# Patient Record
Sex: MALE | Race: BLACK OR AFRICAN AMERICAN | Employment: OTHER | URBAN - METROPOLITAN AREA
[De-identification: names, ages, dates, MRNs, and addresses within clinical notes are randomized per-mention and may not be internally consistent; named-entity substitution may affect disease eponyms.]

---

## 2017-01-04 ENCOUNTER — HOSPITAL ENCOUNTER (OUTPATIENT)
Dept: WOUND CARE | Age: 67
End: 2017-01-04
Payer: COMMERCIAL

## 2017-01-11 ENCOUNTER — HOSPITAL ENCOUNTER (OUTPATIENT)
Dept: WOUND CARE | Age: 67
End: 2017-01-11
Payer: COMMERCIAL

## 2017-01-17 ENCOUNTER — OFFICE VISIT (OUTPATIENT)
Dept: INTERNAL MEDICINE CLINIC | Age: 67
End: 2017-01-17

## 2017-01-17 VITALS
HEIGHT: 73 IN | RESPIRATION RATE: 18 BRPM | SYSTOLIC BLOOD PRESSURE: 140 MMHG | HEART RATE: 82 BPM | DIASTOLIC BLOOD PRESSURE: 76 MMHG | OXYGEN SATURATION: 92 % | TEMPERATURE: 98.5 F

## 2017-01-17 DIAGNOSIS — I10 ESSENTIAL HYPERTENSION: ICD-10-CM

## 2017-01-17 DIAGNOSIS — E11.65 CONTROLLED TYPE 2 DIABETES MELLITUS WITH HYPERGLYCEMIA, WITHOUT LONG-TERM CURRENT USE OF INSULIN (HCC): Primary | ICD-10-CM

## 2017-01-17 DIAGNOSIS — E08.41 DIABETIC MONONEUROPATHY ASSOCIATED WITH DIABETES MELLITUS DUE TO UNDERLYING CONDITION (HCC): ICD-10-CM

## 2017-01-17 DIAGNOSIS — E78.1 PURE HYPERGLYCERIDEMIA: ICD-10-CM

## 2017-01-17 LAB
CHOLEST SERPL-MCNC: 198 MG/DL
GLUCOSE POC: 157 MG/DL
HBA1C MFR BLD HPLC: 6.7 %
HDLC SERPL-MCNC: 40 MG/DL
LDL CHOLESTEROL POC: 85
NON-HDL GOAL (POC): 158
TCHOL/HDL RATIO (POC): 4.9
TRIGL SERPL-MCNC: 366 MG/DL

## 2017-01-17 RX ORDER — OXYCODONE AND ACETAMINOPHEN 5; 325 MG/1; MG/1
1 TABLET ORAL
Qty: 14 TAB | Refills: 0 | Status: SHIPPED | OUTPATIENT
Start: 2017-01-17 | End: 2017-02-14 | Stop reason: SDUPTHER

## 2017-01-17 RX ORDER — LANCETS
EACH MISCELLANEOUS
Qty: 100 EACH | Refills: 12 | Status: SHIPPED | OUTPATIENT
Start: 2017-01-17 | End: 2017-02-23 | Stop reason: SDUPTHER

## 2017-01-17 RX ORDER — IBUPROFEN 800 MG/1
800 TABLET ORAL
Qty: 60 TAB | Refills: 12 | Status: SHIPPED | OUTPATIENT
Start: 2017-01-17 | End: 2017-04-04 | Stop reason: SDUPTHER

## 2017-01-17 NOTE — PROGRESS NOTES
Abi Díaz is a 73 y.o. male and presents with Diabetes; Hypertension; Cholesterol Problem; and Hospital Follow Up      Subjective:    He had a recent amputation of the lt.great toe. he has recurrent pains reported  Hypertension Review:  The patient has hypertension . Diet and Lifestyle: generally follows a low sodium diet, exercises sporadically  Home BP Monitoring: is not measured at home. Pertinent ROS: taking medications as instructed, no medication side effects noted, no TIA's, no chest pain on exertion, no dyspnea on exertion, no swelling of ankles. Dyslipidemia Review:  Patient presents for evaluation of lipids. Compliance with treatment thus far has been excellent. A repeat fasting lipid profile was done. The patient does not use medications that may worsen dyslipidemias . The patient exercises sporadically. The patient is not known to have coexisting coronary artery disease    Diabetes Mellitus Review:  He has diabetes mellitus. Diabetic ROS - medication compliance: compliant all of the time, diabetic diet compliance: compliant all of the time, home glucose monitoring: is performed. Known diabetic complications: he reports a new numbness involving the toes  Cardiovascular risk factors: family history, dyslipidemia, diabetes mellitus, obesity, hypertension  Current diabetic medications include oral agents/insulin   Eye exam current (within one year): no  Weight trend: stable  Prior visit with dietician: no  Current diet: \"healthy\" diet  in general  Current exercise: walking  Current monitoring regimen: home blood tests - daily  Home blood sugar records: trend: stable  Any episodes of hypoglycemia? no  Is He on ACE inhibitor or angiotensin II receptor blocker? Yes   Lab review: orders written for new lab studies as appropriate; see orders.        Review of Systems  Constitutional: negative for fevers, chills, anorexia and weight loss  Eyes:   negative for visual disturbance and irritation  ENT:   negative for tinnitus,sore throat,nasal congestion,ear pains. hoarseness  Respiratory:  negative for cough, hemoptysis, dyspnea,wheezing  CV:   negative for chest pain, palpitations, lower extremity edema  GI:   negative for nausea, vomiting, diarrhea, abdominal pain,melena  Endo:               negative for polyuria,polydipsia,polyphagia,heat intolerance  Genitourinary: negative for frequency, dysuria and hematuria  Integument:  negative for rash and pruritus  Hematologic:  negative for easy bruising and gum/nose bleeding  Musculoskel: myalgias, arthralgias joint pain,upper and lower body weakness  Neurological:  negative for headaches, dizziness, vertigo, memory problems and gait   Behavl/Psych:feelings of anxiety, depression, mood changes    Past Medical History   Diagnosis Date    Arthritis, Degenerative,  Knee 4/4/2011    Carcinoma, Prostate 4/4/2011    Degenerative disc disease 4/4/2011    Depression/ Anxiety 4/4/2011    Diabetes ([facility] [address]     Diabetes Mellitrus ( non-insulin dependent ) Type 2 4/4/2011    HEMATOCHEZIA 4/4/2011    Hypertrension 4/4/2011    Hypertriglyceridemia 4/4/2011    Insomnia 4/4/2011    Laryngitis 4/4/2011    Mild Aortic insufficiency 4/4/2011    Mild Concentric LVH (left ventricular hypertrophy) 4/4/2011    Neuropathy 4/4/2011    Osteoarthritis 4/4/2011    Rotator Cuff Tendonitis 4/4/2011     Past Surgical History   Procedure Laterality Date    Hx urological       Social History     Social History    Marital status: LEGALLY      Spouse name: N/A    Number of children: N/A    Years of education: N/A     Social History Main Topics    Smoking status: Former Smoker     Packs/day: 0.25     Quit date: 10/25/2016    Smokeless tobacco: Never Used    Alcohol use 7.0 oz/week     7 Cans of beer, 7 Shots of liquor per week    Drug use: None    Sexual activity: Yes     Partners: Female     Other Topics Concern    None     Social History Narrative History reviewed. No pertinent family history. Current Outpatient Prescriptions   Medication Sig Dispense Refill    ibuprofen (MOTRIN) 800 mg tablet Take 1 Tab by mouth two (2) times daily (after meals). 60 Tab 12    oxyCODONE-acetaminophen (PERCOCET) 5-325 mg per tablet Take 1 Tab by mouth every eight (8) hours as needed. 14 Tab 0    glucose blood VI test strips (CONTOUR NEXT STRIPS) strip Test 2-3 times daily 100 Strip 12    Lancets misc Use 2-3 times daily 100 Each 12    Calcium Carbonate-Vit D3-Min 600 mg calcium- 400 unit tab Take  by mouth two (2) times a day.  insulin regular (NOVOLIN R, HUMULIN R) 100 unit/mL injection 14 Units by SubCUTAneous route three (3) times daily as needed (with meals).  therapeutic multivitamin (THERA) tablet Take 1 Tab by mouth daily.  loratadine (CLARITIN) 10 mg tablet Take 10 mg by mouth daily.  aspirin delayed-release 81 mg tablet Take 81 mg by mouth daily.  gabapentin (NEURONTIN) 300 mg capsule 2 tabs tid 180 Cap 3    insulin glargine (LANTUS) 100 unit/mL injection 70 units every 12 hours (Patient taking differently: 72 Units. 70 units every 12 hours) 5 Vial 12    diltiazem (TIAZAC) 300 mg SR capsule Take 1 Cap by mouth daily. 30 Cap 12    fluticasone (FLONASE) 50 mcg/actuation nasal spray INHALE 2 SPRAYS IN EACH NOSTRIL EVERY DAY. 16 g 12    docusate sodium (COLACE) 100 mg capsule Take 100 mg by mouth daily.  acetaminophen (TYLENOL) 325 mg tablet Take 2 Tabs by mouth every four (4) hours as needed. 30 Tab 0    atorvastatin (LIPITOR) 40 mg tablet Take 40 mg by mouth nightly.        No Known Allergies    Objective:  Visit Vitals    /76    Pulse 82    Temp 98.5 °F (36.9 °C) (Oral)    Resp 18    Ht 6' 1\" (1.854 m)    SpO2 92%     Physical Exam:   General appearance - alert, well appearing, and in moderate distress  Mental status - alert, oriented to person, place, and time  EYE-LUH, EOMI, corneas normal, no foreign bodies  ENT-ENT exam normal, no neck nodes or sinus tenderness  Nose - normal and patent, no erythema, discharge or polyps  Mouth - mucous membranes moist, pharynx normal without lesions  Neck - supple, no significant adenopathy   Chest - clear to auscultation, no wheezes, rales or rhonchi, symmetric air entry   Heart - normal rate, regular rhythm, normal S1, S2, no murmurs, rubs, clicks or gallops   Abdomen - soft, nontender, nondistended, no masses or organomegaly  Lymph- no adenopathy palpable  Ext-peripheral pulses normal, no pedal edema, no clubbing or cyanosis  Skin-Warm and dry. no hyperpigmentation, vitiligo, or suspicious lesions  Neuro -alert, oriented, normal speech, focal findings  noted  Neck-normal C-spine, no tenderness, full ROM without pain  Knees-crepitations bilaterally,rt.knee deformity  Lt.knee-medial joint line tenderness noted. rt.knee-medial joint line tenderness noted  Lt.foot in wrapping        Results for orders placed or performed in visit on 01/17/17   AMB POC GLUCOSE BLOOD, BY GLUCOSE MONITORING DEVICE   Result Value Ref Range    Glucose  mg/dL   AMB POC HEMOGLOBIN A1C   Result Value Ref Range    Hemoglobin A1c (POC) 6.7 %   AMB POC LIPID PROFILE   Result Value Ref Range    Cholesterol (POC) 198     Triglycerides (POC) 366     HDL Cholesterol (POC) 40     LDL Cholesterol (POC) 85     Non-HDL Goal (POC) 158     TChol/HDL Ratio (POC) 4.9        Assessment/Plan:    ICD-10-CM ICD-9-CM    1. Controlled type 2 diabetes mellitus with hyperglycemia, without long-term current use of insulin ([facility]) E11.65 250.80 AMB POC GLUCOSE BLOOD, BY GLUCOSE MONITORING DEVICE     790.29 AMB POC HEMOGLOBIN A1C      AMB POC LIPID PROFILE   2. Essential hypertension I10 401.9 AMB POC GLUCOSE BLOOD, BY GLUCOSE MONITORING DEVICE      AMB POC HEMOGLOBIN A1C      AMB POC LIPID PROFILE   3. Hypertriglyceridemia E78.1 272.1 AMB POC HEMOGLOBIN A1C      AMB POC LIPID PROFILE   4.  Diabetic mononeuropathy associated with diabetes mellitus due to underlying condition [facility] E08.41 249.60 oxyCODONE-acetaminophen (PERCOCET) 5-325 mg per tablet     355.9      Orders Placed This Encounter    AMB POC GLUCOSE BLOOD, BY GLUCOSE MONITORING DEVICE    AMB POC HEMOGLOBIN A1C    AMB POC LIPID PROFILE    ibuprofen (MOTRIN) 800 mg tablet     Sig: Take 1 Tab by mouth two (2) times daily (after meals). Dispense:  60 Tab     Refill:  12    oxyCODONE-acetaminophen (PERCOCET) 5-325 mg per tablet     Sig: Take 1 Tab by mouth every eight (8) hours as needed. Dispense:  14 Tab     Refill:  0    glucose blood VI test strips (CONTOUR NEXT STRIPS) strip     Sig: Test 2-3 times daily     Dispense:  100 Strip     Refill:  12    Lancets misc     Sig: Use 2-3 times daily     Dispense:  100 Each     Refill:  12     lose weight, increase physical activity, follow low fat diet, follow low salt diet,Take 81mg aspirin daily  Follow-up Disposition:  Return in about 4 weeks (around 2/14/2017), or if symptoms worsen or fail to improve. I have reviewed with the patient details of the assessment and plan and all questions were answered. Relevent patient education was performed      An After Visit Summary was printed and given to the patient.

## 2017-01-17 NOTE — MR AVS SNAPSHOT
Visit Information Date & Time Provider Department Dept. Phone Encounter #  
 1/17/2017  2:45 PM Mala Wood MD [address] [facility] [phone] [id number] Follow-up Instructions Return in about 4 weeks (around 2/14/2017), or if symptoms worsen or fail to improve. Follow-up and Disposition History Upcoming Health Maintenance Date Due  
 EYE EXAM RETINAL OR DILATED Q1 3/11/2016 GLAUCOMA SCREENING Q2Y 4/21/2016 Pneumococcal 65+ High/Highest Risk (1 of 2 - PCV13) 4/21/2016 MEDICARE YEARLY EXAM 4/21/2016 INFLUENZA AGE 9 TO ADULT 8/1/2016 FOOT EXAM Q1 9/8/2016 MICROALBUMIN Q1 10/8/2016 FOBT Q 1 YEAR AGE 50-75 1/14/2017 HEMOGLOBIN A1C Q6M 3/14/2017 LIPID PANEL Q1 9/14/2017 DTaP/Tdap/Td series (2 - Td) 12/10/2024 Allergies as of 1/17/2017  Review Complete On: 1/17/2017 By: Mala Wood MD  
 No Known Allergies Current Immunizations  Reviewed on 12/10/2014 Name Date Pneumococcal Polysaccharide (PPSV-23) 9/11/2014 Tdap 12/10/2014 Not reviewed this visit You Were Diagnosed With   
  
 Codes Comments Controlled type 2 diabetes mellitus with hyperglycemia, without long-term current use of insulin ([facility]    -  Primary ICD-10-CM: E11.65 ICD-9-CM: 250.80, 790.29 Essential hypertension     ICD-10-CM: I10 
ICD-9-CM: 401.9 Pure hyperglyceridemia     ICD-10-CM: E78.1 ICD-9-CM: 272.1 Diabetic mononeuropathy associated with diabetes mellitus due to underlying condition [facility]     ICD-10-CM: E08.41 
ICD-9-CM: 249.60, 355.9 Vitals BP Pulse Temp Resp Height(growth percentile) SpO2  
 140/76 82 98.5 °F (36.9 °C) (Oral) 18 6' 1\" (1.854 m) 92% Smoking Status Former Smoker Vitals History Preferred Pharmacy Pharmacy Name Phone [facility] [address] [facility] [phone] Your Updated Medication List  
  
   
This list is accurate as of: 1/17/17  4:27 PM.  Always use your most recent med list.  
  
  
  
  
 acetaminophen 325 mg tablet Commonly known as:  TYLENOL Take 2 Tabs by mouth every four (4) hours as needed. aspirin delayed-release 81 mg tablet Take 81 mg by mouth daily. atorvastatin 40 mg tablet Commonly known as:  LIPITOR Take 40 mg by mouth nightly. Calcium Carbonate-Vit D3-Min 600 mg calcium- 400 unit Tab Take  by mouth two (2) times a day. COLACE 100 mg capsule Generic drug:  docusate sodium Take 100 mg by mouth daily. dilTIAZem 300 mg SR capsule Commonly known as:  [facility] Take 1 Cap by mouth daily. fluticasone 50 mcg/actuation nasal spray Commonly known as:  Becca Jumper INHALE 2 SPRAYS IN EACH NOSTRIL EVERY DAY. gabapentin 300 mg capsule Commonly known as:  NEURONTIN  
2 tabs tid  
  
 glucose blood VI test strips strip Commonly known as:  CONTOUR NEXT STRIPS Test 2-3 times daily  
  
 ibuprofen 800 mg tablet Commonly known as:  MOTRIN Take 1 Tab by mouth two (2) times daily (after meals). insulin glargine 100 unit/mL injection Commonly known as:  LANTUS  
70 units every 12 hours  
  
 insulin regular 100 unit/mL injection Commonly known as:  NOVOLIN R, HUMULIN R  
14 Units by SubCUTAneous route three (3) times daily as needed (with meals). Lancets Misc Use 2-3 times daily  
  
 loratadine 10 mg tablet Commonly known as:  Saranya Arevalo Take 10 mg by mouth daily. oxyCODONE-acetaminophen 5-325 mg per tablet Commonly known as:  PERCOCET Take 1 Tab by mouth every eight (8) hours as needed. THERA tablet Generic drug:  therapeutic multivitamin Take 1 Tab by mouth daily. Prescriptions Printed Refills  
 oxyCODONE-acetaminophen (PERCOCET) 5-325 mg per tablet 0 Sig: Take 1 Tab by mouth every eight (8) hours as needed. Class: Print Route: Oral  
  
Prescriptions Sent to Pharmacy Refills  
 ibuprofen (MOTRIN) 800 mg tablet 12 Sig: Take 1 Tab by mouth two (2) times daily (after meals). Class: Normal  
 Pharmacy: [address] Ph #: [phone] Route: Oral  
 glucose blood VI test strips (CONTOUR NEXT STRIPS) strip 12 Sig: Test 2-3 times daily Class: Normal  
 Pharmacy: [address] Ph #: [phone] Lancets misc 12 Sig: Use 2-3 times daily Class: Normal  
 Pharmacy: [address] Ph #: [phone] We Performed the Following AMB POC GLUCOSE BLOOD, BY GLUCOSE MONITORING DEVICE [23379 CPT(R)] AMB POC HEMOGLOBIN A1C [22809 CPT(R)] AMB POC LIPID PROFILE [00452 CPT(R)] Follow-up Instructions Return in about 4 weeks (around 2/14/2017), or if symptoms worsen or fail to improve. To-Do List   
 01/18/2017 1:30 PM  
  Appointment with Zaira Mac DPM; [facility] WOUND CARE 1 at [facility] ([phone]) Please provide this summary of care documentation to your next provider. Your primary care clinician is listed as Malia Noguera. If you have any questions after today's visit, please call [phone].

## 2017-01-18 ENCOUNTER — HOSPITAL ENCOUNTER (OUTPATIENT)
Dept: WOUND CARE | Age: 67
Discharge: HOME | End: 2017-01-18
Payer: COMMERCIAL

## 2017-01-18 PROCEDURE — 99212 OFFICE O/P EST SF 10 MIN: CPT

## 2017-01-18 NOTE — PROGRESS NOTES
[address], [address] [place] [address]   WOUND CARE PROGRESS NOTE       Name:  Tika Flowers   MR#:  [id number]   :  1950   Account #:  [de-identified]        Date of Adm:  2017       CONSULTING PHYSICIAN: Bladimir Last DPM    REASON FOR FOLLOWUP: Evaluation and treatment of left foot   wound, status post fifth ray resection of underlying osteomyelitis. HISTORY OF PRESENT ILLNESS: The patient is a pleasant 79-year-  old  male who presents today, approximately 11   weeks status post I and D and left fifth ray resection for underlying   osteomyelitis. He has finished his antibiotics. He has had local wound   care, including VAC therapy and most recently Aquacel AG. He has   been partial weightbearing in his offloading Darco wedge shoe. He   denies any fevers, chills, sweats, nausea or vomiting. He does relate   some hip pain from wearing the surgical shoe, and is anxious to get   out of the surgical shoe. PHYSICAL EXAMINATION   VITAL SIGNS: Stable. He is afebrile. EXTREMITIES: Examination of the left foot reveals a fully healed and   epithelialized wound over the lateral aspect of the foot with overlying   hyperkeratotic rim. There are no signs of infection. There is no wound. There is no drainage. ASSESSMENT AND PLAN: Healed left fifth ray resection with   underlying osteomyelitis with a wound which has fully healed and   epithelialized. At today's visit, I explained to him he can discontinue his   local wound care, his wound has healed; however, from a preventative   standpoint he does need diabetic shoes and inserts to better balance   his foot going forward so that he does not develop further or new   ulcerations. I have dispensed him a prescription for this, for [employer] as he relates that he lost his previous prescription. He will   return for followup with me for outpatient diabetic routine foot care.  He   did have a problem with his left great toenail, which he stubbed the   other day, which resulted in some bleeding, which has since dried. The   nail is extremely mycotic and lifted, and at bedside today I simply   removed the nail with a hemostat. There was no bleeding noted and a   dry sterile dressing was applied. He was instructed to use a Band-Aid   and antibiotic ointment for the next few days and follow up in my office.         Drew CATHERINE Juarez / ARNOLDO   D:  01/18/2017   14:26   T:  01/18/2017   15:03   Job #:  [id number]

## 2017-01-24 ENCOUNTER — TELEPHONE (OUTPATIENT)
Dept: INTERNAL MEDICINE CLINIC | Age: 67
End: 2017-01-24

## 2017-02-14 ENCOUNTER — OFFICE VISIT (OUTPATIENT)
Dept: INTERNAL MEDICINE CLINIC | Age: 67
End: 2017-02-14

## 2017-02-14 VITALS
OXYGEN SATURATION: 93 % | RESPIRATION RATE: 16 BRPM | HEART RATE: 76 BPM | SYSTOLIC BLOOD PRESSURE: 132 MMHG | DIASTOLIC BLOOD PRESSURE: 60 MMHG | TEMPERATURE: 98 F

## 2017-02-14 DIAGNOSIS — I10 HTN (HYPERTENSION), BENIGN: ICD-10-CM

## 2017-02-14 DIAGNOSIS — E08.41 DIABETIC MONONEUROPATHY ASSOCIATED WITH DIABETES MELLITUS DUE TO UNDERLYING CONDITION (HCC): ICD-10-CM

## 2017-02-14 DIAGNOSIS — K62.5 RECTAL BLEEDING: ICD-10-CM

## 2017-02-14 DIAGNOSIS — E11.42 DIABETIC POLYNEUROPATHY ASSOCIATED WITH TYPE 2 DIABETES MELLITUS (HCC): Primary | ICD-10-CM

## 2017-02-14 LAB — GLUCOSE POC: 185 MG/DL

## 2017-02-14 RX ORDER — OXYCODONE AND ACETAMINOPHEN 5; 325 MG/1; MG/1
1 TABLET ORAL
Qty: 14 TAB | Refills: 0 | Status: SHIPPED | OUTPATIENT
Start: 2017-02-14 | End: 2017-03-02 | Stop reason: SDUPTHER

## 2017-02-14 RX ORDER — GABAPENTIN 300 MG/1
CAPSULE ORAL
Qty: 180 CAP | Refills: 3 | Status: SHIPPED | OUTPATIENT
Start: 2017-02-14 | End: 2017-07-24 | Stop reason: SDUPTHER

## 2017-02-14 RX ORDER — DILTIAZEM HYDROCHLORIDE 300 MG/1
300 CAPSULE, EXTENDED RELEASE ORAL DAILY
Qty: 30 CAP | Refills: 12 | Status: SHIPPED | OUTPATIENT
Start: 2017-02-14 | End: 2017-12-22 | Stop reason: SDUPTHER

## 2017-02-14 RX ORDER — ATORVASTATIN CALCIUM 40 MG/1
40 TABLET, FILM COATED ORAL
Qty: 30 TAB | Refills: 11 | Status: SHIPPED | OUTPATIENT
Start: 2017-02-14 | End: 2017-08-23 | Stop reason: SDUPTHER

## 2017-02-14 RX ORDER — FLUTICASONE PROPIONATE 50 MCG
SPRAY, SUSPENSION (ML) NASAL
Qty: 16 G | Refills: 12 | Status: SHIPPED | OUTPATIENT
Start: 2017-02-14 | End: 2017-06-15 | Stop reason: SDUPTHER

## 2017-02-14 RX ORDER — INSULIN GLARGINE 100 [IU]/ML
INJECTION, SOLUTION SUBCUTANEOUS
Qty: 5 VIAL | Refills: 12
Start: 2017-02-14 | End: 2017-02-24 | Stop reason: SDUPTHER

## 2017-02-14 NOTE — PROGRESS NOTES
Jillian Mike is a 73 y.o. male and presents with Diabetes; Melena; and Other (electric wheelchair)  . Subjective:  He has had a bout of bright red blood per rectum. this has occurred on many occasions. He has not had a colonoscopy in a few years. Diabetes Mellitus Review:  He has diabetes mellitus. Diabetic ROS - medication compliance: compliant all of the time, diabetic diet compliance: compliant all of the time, home glucose monitoring: is performed. Known diabetic complications: none  Cardiovascular risk factors: family history, dyslipidemia, diabetes mellitus, obesity, hypertension  Current diabetic medications include oral agents/insulin   Eye exam current (within one year): no  Weight trend: stable  Prior visit with dietician: no  Current diet: \"healthy\" diet  in general  Current exercise: walking  Current monitoring regimen: home blood tests - daily  Home blood sugar records: trend: stable  Any episodes of hypoglycemia? no  Is He on ACE inhibitor or angiotensin II receptor blocker? Yes           Review of Systems  Constitutional: negative for fevers, chills, anorexia and weight loss  Eyes:   negative for visual disturbance and irritation  ENT:   negative for tinnitus,sore throat,nasal congestion,ear pains. hoarseness  Respiratory:  negative for cough, hemoptysis, dyspnea,wheezing  CV:   negative for chest pain, palpitations, lower extremity edema  GI:   negative for nausea, vomiting, diarrhea, abdominal pain,melena  Endo:               negative for polyuria,polydipsia,polyphagia,heat intolerance  Genitourinary: negative for frequency, dysuria and hematuria  Integument:  negative for rash and pruritus  Hematologic:  negative for easy bruising and gum/nose bleeding  Musculoskel: negative for myalgias, arthralgias, back pain, muscle weakness, joint pain  Neurological:  negative for headaches, dizziness, vertigo, memory problems and gait   Behavl/Psych: negative for feelings of anxiety, depression, mood changes    Past Medical History   Diagnosis Date    Arthritis, Degenerative,  Knee 4/4/2011    Carcinoma, Prostate 4/4/2011    Degenerative disc disease 4/4/2011    Depression/ Anxiety 4/4/2011    Diabetes ([address]     Diabetes Mellitrus ( non-insulin dependent ) Type 2 4/4/2011    HEMATOCHEZIA 4/4/2011    Hypertrension 4/4/2011    Hypertriglyceridemia 4/4/2011    Insomnia 4/4/2011    Laryngitis 4/4/2011    Mild Aortic insufficiency 4/4/2011    Mild Concentric LVH (left ventricular hypertrophy) 4/4/2011    Neuropathy 4/4/2011    Osteoarthritis 4/4/2011    Rotator Cuff Tendonitis 4/4/2011     Past Surgical History   Procedure Laterality Date    Hx urological       Social History     Social History    Marital status: LEGALLY      Spouse name: N/A    Number of children: N/A    Years of education: N/A     Social History Main Topics    Smoking status: Former Smoker     Packs/day: 0.25     Quit date: 10/25/2016    Smokeless tobacco: Never Used    Alcohol use 7.0 oz/week     7 Cans of beer, 7 Shots of liquor per week    Drug use: None    Sexual activity: Yes     Partners: Female     Other Topics Concern    None     Social History Narrative     No family history on file. Current Outpatient Prescriptions   Medication Sig Dispense Refill    gabapentin (NEURONTIN) 300 mg capsule 2 tabs tid 180 Cap 3    insulin glargine (LANTUS) 100 unit/mL injection 70 units every 12 hours 5 Vial 12    dilTIAZem (TIAZAC) 300 mg SR capsule Take 1 Cap by mouth daily. 30 Cap 12    fluticasone (FLONASE) 50 mcg/actuation nasal spray INHALE 2 SPRAYS IN EACH NOSTRIL EVERY DAY. 16 g 12    atorvastatin (LIPITOR) 40 mg tablet Take 1 Tab by mouth nightly. 30 Tab 11    oxyCODONE-acetaminophen (PERCOCET) 5-325 mg per tablet Take 1 Tab by mouth every eight (8) hours as needed. 14 Tab 0    ibuprofen (MOTRIN) 800 mg tablet Take 1 Tab by mouth two (2) times daily (after meals).  60 Tab 12    glucose blood VI test strips (CONTOUR NEXT STRIPS) strip Test 2-3 times daily 100 Strip 12    Lancets misc Use 2-3 times daily 100 Each 12    Calcium Carbonate-Vit D3-Min 600 mg calcium- 400 unit tab Take  by mouth two (2) times a day.  docusate sodium (COLACE) 100 mg capsule Take 100 mg by mouth daily.  insulin regular (NOVOLIN R, HUMULIN R) 100 unit/mL injection 14 Units by SubCUTAneous route three (3) times daily as needed (with meals).  therapeutic multivitamin (THERA) tablet Take 1 Tab by mouth daily.  loratadine (CLARITIN) 10 mg tablet Take 10 mg by mouth daily.  aspirin delayed-release 81 mg tablet Take 81 mg by mouth daily.  acetaminophen (TYLENOL) 325 mg tablet Take 2 Tabs by mouth every four (4) hours as needed. 30 Tab 0     No Known Allergies    Objective:  Visit Vitals    /60    Pulse 76    Temp 98 °F (36.7 °C) (Oral)    Resp 16    SpO2 93%     Physical Exam:   General appearance - alert, well appearing, and in no distress  Mental status - alert, oriented to person, place, and time  EYE-LUH, EOMI, corneas normal, no foreign bodies  ENT-ENT exam normal, no neck nodes or sinus tenderness  Nose - normal and patent, no erythema, discharge or polyps  Mouth - mucous membranes moist, pharynx normal without lesions  Neck - supple, no significant adenopathy   Chest - clear to auscultation, no wheezes, rales or rhonchi, symmetric air entry   Heart - normal rate, regular rhythm, normal S1, S2, no murmurs, rubs, clicks or gallops   Abdomen - soft, nontender, nondistended, no masses or organomegaly  Lymph- no adenopathy palpable  Ext-peripheral pulses normal, no pedal edema, no clubbing or cyanosis  Skin-Warm and dry.  no hyperpigmentation, vitiligo, or suspicious lesions  Neuro -alert, oriented, normal speech, no focal findings or movement disorder noted  Neck-normal C-spine, no tenderness, full ROM without pain  Feet-no nail deformities or callus formation with good pulses noted      Results for orders placed or performed in visit on 17   AMB POC GLUCOSE BLOOD, BY GLUCOSE MONITORING DEVICE   Result Value Ref Range    Glucose  mg/dL       Assessment/Plan:    ICD-10-CM ICD-9-CM    1. Diabetic polyneuropathy associated with type 2 diabetes mellitus (HCC) E11.42 250.60 AMB POC GLUCOSE BLOOD, BY GLUCOSE MONITORING DEVICE     357.2    2. HTN (hypertension), benign I10 401.1 dilTIAZem (TIAZAC) 300 mg SR capsule   3. Rectal bleeding K62.5 569.3 REFERRAL TO GASTROENTEROLOGY   4. Diabetic mononeuropathy associated with diabetes mellitus due to underlying condition (HCC) E08.41 249.60 oxyCODONE-acetaminophen (PERCOCET) 5-325 mg per tablet     355.9      Orders Placed This Encounter    REFERRAL TO GASTROENTEROLOGY     Referral Priority:   Routine     Referral Type:   Consultation     Referral Reason:   Specialty Services Required     Referred to Provider:   Yo Jimenez MD     Number of Visits Requested:   1    AMB POC GLUCOSE BLOOD, BY GLUCOSE MONITORING DEVICE    gabapentin (NEURONTIN) 300 mg capsule     Si tabs tid     Dispense:  180 Cap     Refill:  3    insulin glargine (LANTUS) 100 unit/mL injection     Si units every 12 hours     Dispense:  5 Vial     Refill:  12    dilTIAZem (TIAZAC) 300 mg SR capsule     Sig: Take 1 Cap by mouth daily. Dispense:  30 Cap     Refill:  12    fluticasone (FLONASE) 50 mcg/actuation nasal spray     Sig: INHALE 2 SPRAYS IN EACH NOSTRIL EVERY DAY. Dispense:  16 g     Refill:  12    atorvastatin (LIPITOR) 40 mg tablet     Sig: Take 1 Tab by mouth nightly. Dispense:  30 Tab     Refill:  11    oxyCODONE-acetaminophen (PERCOCET) 5-325 mg per tablet     Sig: Take 1 Tab by mouth every eight (8) hours as needed.      Dispense:  14 Tab     Refill:  0     lose weight, increase physical activity, follow low fat diet, follow low salt diet,Take 81mg aspirin daily  Patient Instructions   MyChart Activation    Thank you for requesting access to [employer]. Please follow the instructions below to securely access and download your online medical record. [employer] allows you to send messages to your doctor, view your test results, renew your prescriptions, schedule appointments, and more. How Do I Sign Up? 1. In your internet browser, go to [email or web address]  2. Click on the First Time User? Click Here link in the Sign In box. You will be redirect to the New Member Sign Up page. 3. Enter your [employer] Access Code exactly as it appears below. You will not need to use this code after youve completed the sign-up process. If you do not sign up before the expiration date, you must request a new code. [employer] Access Code: Activation code not generated  Current [employer] Status: Patient Declined (This is the date your [employer] access code will )    4. Enter the last four digits of your Social Security Number (xxxx) and Date of Birth (mm/dd/yyyy) as indicated and click Submit. You will be taken to the next sign-up page. 5. Create a [employer] ID. This will be your [employer] login ID and cannot be changed, so think of one that is secure and easy to remember. 6. Create a [employer] password. You can change your password at any time. 7. Enter your Password Reset Question and Answer. This can be used at a later time if you forget your password. 8. Enter your e-mail address. You will receive e-mail notification when new information is available in [address] 9. Click Sign Up. You can now view and download portions of your medical record. 10. Click the Download Summary menu link to download a portable copy of your medical information. Additional Information    If you have questions, please visit the Frequently Asked Questions section of the [employer] website at [email or web address] Remember, [employer] is NOT to be used for urgent needs. For medical emergencies, dial 911.            Follow-up Disposition:  Return in about 4 weeks (around 3/14/2017), or if symptoms worsen or fail to improve. I have reviewed with the patient details of the assessment and plan and all questions were answered. Relevent patient education was performed    An After Visit Summary was printed and given to the patient.

## 2017-02-14 NOTE — PATIENT INSTRUCTIONS
[employer] Activation    Thank you for requesting access to [employer]. Please follow the instructions below to securely access and download your online medical record. [employer] allows you to send messages to your doctor, view your test results, renew your prescriptions, schedule appointments, and more. How Do I Sign Up? 1. In your internet browser, go to [email or web address]  2. Click on the First Time User? Click Here link in the Sign In box. You will be redirect to the New Member Sign Up page. 3. Enter your [employer] Access Code exactly as it appears below. You will not need to use this code after youve completed the sign-up process. If you do not sign up before the expiration date, you must request a new code. [employer] Access Code: Activation code not generated  Current [employer] Status: Patient Declined (This is the date your [employer] access code will )    4. Enter the last four digits of your Social Security Number (xxxx) and Date of Birth (mm/dd/yyyy) as indicated and click Submit. You will be taken to the next sign-up page. 5. Create a [employer] ID. This will be your [employer] login ID and cannot be changed, so think of one that is secure and easy to remember. 6. Create a [employer] password. You can change your password at any time. 7. Enter your Password Reset Question and Answer. This can be used at a later time if you forget your password. 8. Enter your e-mail address. You will receive e-mail notification when new information is available in [address] 9. Click Sign Up. You can now view and download portions of your medical record. 10. Click the Download Summary menu link to download a portable copy of your medical information. Additional Information    If you have questions, please visit the Frequently Asked Questions section of the [employer] website at [email or web address] Remember, [employer] is NOT to be used for urgent needs. For medical emergencies, dial 911.

## 2017-02-14 NOTE — MR AVS SNAPSHOT
Visit Information Date & Time Provider Department Dept. Phone Encounter #  
 2/14/2017  2:45 PM Rafael Singer MD [address] [phone] [id number] Follow-up Instructions Return in about 4 weeks (around 3/14/2017), or if symptoms worsen or fail to improve. Upcoming Health Maintenance Date Due  
 EYE EXAM RETINAL OR DILATED Q1 3/11/2016 GLAUCOMA SCREENING Q2Y 4/21/2016 Pneumococcal 65+ High/Highest Risk (1 of 2 - PCV13) 4/21/2016 MEDICARE YEARLY EXAM 4/21/2016 INFLUENZA AGE 9 TO ADULT 8/1/2016 FOOT EXAM Q1 9/8/2016 MICROALBUMIN Q1 10/8/2016 FOBT Q 1 YEAR AGE 50-75 1/14/2017 HEMOGLOBIN A1C Q6M 7/17/2017 LIPID PANEL Q1 1/17/2018 DTaP/Tdap/Td series (2 - Td) 12/10/2024 Allergies as of 2/14/2017  Review Complete On: 2/14/2017 By: April Saran Yanes LPN No Known Allergies Current Immunizations  Reviewed on 12/10/2014 Name Date Pneumococcal Polysaccharide (PPSV-23) 9/11/2014 Tdap 12/10/2014 Not reviewed this visit You Were Diagnosed With   
  
 Codes Comments Diabetic polyneuropathy associated with type 2 diabetes mellitus ([facility]    -  Primary ICD-10-CM: E11.42 
ICD-9-CM: 250.60, 357.2 HTN (hypertension), benign     ICD-10-CM: I10 
ICD-9-CM: 401.1 Rectal bleeding     ICD-10-CM: K62.5 ICD-9-CM: 569.3 Diabetic mononeuropathy associated with diabetes mellitus due to underlying condition [facility]     ICD-10-CM: E08.41 
ICD-9-CM: 249.60, 355.9 Vitals BP Pulse Temp Resp SpO2 Smoking Status 132/60 76 98 °F (36.7 °C) (Oral) 16 93% Former Smoker Preferred Pharmacy Pharmacy Name Phone [facility] [address] [facility] [phone] Your Updated Medication List  
  
   
This list is accurate as of: 2/14/17  4:16 PM.  Always use your most recent med list.  
  
  
  
  
 acetaminophen 325 mg tablet Commonly known as:  TYLENOL Take 2 Tabs by mouth every four (4) hours as needed. aspirin delayed-release 81 mg tablet Take 81 mg by mouth daily. atorvastatin 40 mg tablet Commonly known as:  LIPITOR Take 1 Tab by mouth nightly. Calcium Carbonate-Vit D3-Min 600 mg calcium- 400 unit Tab Take  by mouth two (2) times a day. COLACE 100 mg capsule Generic drug:  docusate sodium Take 100 mg by mouth daily. dilTIAZem 300 mg SR capsule Commonly known as:  [facility] Take 1 Cap by mouth daily. fluticasone 50 mcg/actuation nasal spray Commonly known as:  [employer] INHALE 2 SPRAYS IN EACH NOSTRIL EVERY DAY. gabapentin 300 mg capsule Commonly known as:  NEURONTIN  
2 tabs tid  
  
 glucose blood VI test strips strip Commonly known as:  CONTOUR NEXT STRIPS Test 2-3 times daily  
  
 ibuprofen 800 mg tablet Commonly known as:  MOTRIN Take 1 Tab by mouth two (2) times daily (after meals). insulin glargine 100 unit/mL injection Commonly known as:  LANTUS  
70 units every 12 hours  
  
 insulin regular 100 unit/mL injection Commonly known as:  NOVOLIN R, HUMULIN R  
14 Units by SubCUTAneous route three (3) times daily as needed (with meals). Lancets Misc Use 2-3 times daily  
  
 loratadine 10 mg tablet Commonly known as:  Jimmy Kings Take 10 mg by mouth daily. oxyCODONE-acetaminophen 5-325 mg per tablet Commonly known as:  PERCOCET Take 1 Tab by mouth every eight (8) hours as needed. THERA tablet Generic drug:  therapeutic multivitamin Take 1 Tab by mouth daily. Prescriptions Printed Refills  
 oxyCODONE-acetaminophen (PERCOCET) 5-325 mg per tablet 0 Sig: Take 1 Tab by mouth every eight (8) hours as needed. Class: Print Route: Oral  
  
Prescriptions Sent to Pharmacy Refills  
 gabapentin (NEURONTIN) 300 mg capsule 3 Si tabs tid  Class: Normal  
 Pharmacy: [address] [place] [address] Ph #: [phone]  
 dilTIAZem (TIAZAC) 300 mg SR capsule 12 Sig: Take 1 Cap by mouth daily. Class: Normal  
 Pharmacy: [facility] [address] [place] [address] Ph #: [phone] Route: Oral  
 fluticasone (FLONASE) 50 mcg/actuation nasal spray 12 Sig: INHALE 2 SPRAYS IN EACH NOSTRIL EVERY DAY. Class: Normal  
 Pharmacy: [address] [place] [address] Ph #: [phone]  
 atorvastatin (LIPITOR) 40 mg tablet 11 Sig: Take 1 Tab by mouth nightly. Class: Normal  
 Pharmacy: [facility] [address] Ph #: [phone] Route: Oral  
  
We Performed the Following AMB POC GLUCOSE BLOOD, BY GLUCOSE MONITORING DEVICE [12468 CPT(R)] REFERRAL TO GASTROENTEROLOGY [CFY57 Custom] Follow-up Instructions Return in about 4 weeks (around 3/14/2017), or if symptoms worsen or fail to improve. Referral Information Referral ID Referred By Referred To  
  
 [id number] Emily Mejia, Lizzeth Guzman MD   
   [address] [facility] [address] [place], [address]  Phone: [phone] Fax: [phone] Visits Status Start Date End Date 1 New Request 2/14/17 2/14/18 If your referral has a status of pending review or denied, additional information will be sent to support the outcome of this decision. Patient Instructions [employer] Activation Thank you for requesting access to [employer]. Please follow the instructions below to securely access and download your online medical record. [employer] allows you to send messages to your doctor, view your test results, renew your prescriptions, schedule appointments, and more. How Do I Sign Up? 1. In your internet browser, go to [email or web address] 
 2. Click on the First Time User? Click Here link in the Sign In box. You will be redirect to the New Member Sign Up page. 3. Enter your [employer] ( [employer] ) Access Code exactly as it appears below. You will not need to use this code after youve completed the sign-up process. If you do not sign up before the expiration date, you must request a new code. MyChart Access Code: Activation code not generated Current [employer] ( HLM ) Status: Patient Declined (This is the date your MyChart access code will ) 4. Enter the last four digits of your Social Security Number (xxxx) and Date of Birth (mm/dd/yyyy) as indicated and click Submit. You will be taken to the next sign-up page. 5. Create a [employer] ID. This will be your [employer] ( HLM ) login ID and cannot be changed, so think of one that is secure and easy to remember. 6. Create a [employer] ( HLM ) password. You can change your password at any time. 7. Enter your Password Reset Question and Answer. This can be used at a later time if you forget your password. 8. Enter your e-mail address. You will receive e-mail notification when new information is available in [address] 9. Click Sign Up. You can now view and download portions of your medical record. 10. Click the Download Summary menu link to download a portable copy of your medical information. Additional Information If you have questions, please visit the Frequently Asked Questions section of the [employer] ( [employer] ) website at [email or web address] Remember, [employer] ( HLM ) is NOT to be used for urgent needs. For medical emergencies, dial 911. Please provide this summary of care documentation to your next provider. Your primary care clinician is listed as Yoni Maxwell. If you have any questions after today's visit, please call [phone].

## 2017-02-24 RX ORDER — LANCETS
EACH MISCELLANEOUS
Qty: 100 EACH | Refills: 12 | Status: SHIPPED | OUTPATIENT
Start: 2017-02-24 | End: 2018-03-07 | Stop reason: SDUPTHER

## 2017-02-27 RX ORDER — INSULIN GLARGINE 100 [IU]/ML
INJECTION, SOLUTION SUBCUTANEOUS
Qty: 3 VIAL | Refills: 3 | Status: SHIPPED | OUTPATIENT
Start: 2017-02-27 | End: 2017-05-22 | Stop reason: SDUPTHER

## 2017-03-02 ENCOUNTER — OFFICE VISIT (OUTPATIENT)
Dept: INTERNAL MEDICINE CLINIC | Age: 67
End: 2017-03-02

## 2017-03-02 VITALS
OXYGEN SATURATION: 94 % | SYSTOLIC BLOOD PRESSURE: 120 MMHG | DIASTOLIC BLOOD PRESSURE: 70 MMHG | HEIGHT: 73 IN | TEMPERATURE: 98.3 F | HEART RATE: 73 BPM | RESPIRATION RATE: 20 BRPM

## 2017-03-02 DIAGNOSIS — E11.65 CONTROLLED TYPE 2 DIABETES MELLITUS WITH HYPERGLYCEMIA, WITHOUT LONG-TERM CURRENT USE OF INSULIN (HCC): Primary | ICD-10-CM

## 2017-03-02 DIAGNOSIS — C61 MALIGNANT NEOPLASM OF PROSTATE (HCC): ICD-10-CM

## 2017-03-02 DIAGNOSIS — L03.116 CELLULITIS OF LEFT LOWER EXTREMITY: ICD-10-CM

## 2017-03-02 DIAGNOSIS — E08.41 DIABETIC MONONEUROPATHY ASSOCIATED WITH DIABETES MELLITUS DUE TO UNDERLYING CONDITION (HCC): ICD-10-CM

## 2017-03-02 DIAGNOSIS — E11.42 DIABETIC POLYNEUROPATHY ASSOCIATED WITH TYPE 2 DIABETES MELLITUS (HCC): ICD-10-CM

## 2017-03-02 DIAGNOSIS — I10 ESSENTIAL HYPERTENSION: ICD-10-CM

## 2017-03-02 RX ORDER — OXYCODONE AND ACETAMINOPHEN 5; 325 MG/1; MG/1
1 TABLET ORAL
Qty: 14 TAB | Refills: 0 | Status: SHIPPED | OUTPATIENT
Start: 2017-03-02 | End: 2019-03-14

## 2017-03-02 NOTE — PATIENT INSTRUCTIONS
[employer] Activation    Thank you for requesting access to [employer]. Please follow the instructions below to securely access and download your online medical record. [employer] allows you to send messages to your doctor, view your test results, renew your prescriptions, schedule appointments, and more. How Do I Sign Up? 1. In your internet browser, go to [email or web address]  2. Click on the First Time User? Click Here link in the Sign In box. You will be redirect to the New Member Sign Up page. 3. Enter your [employer] Access Code exactly as it appears below. You will not need to use this code after youve completed the sign-up process. If you do not sign up before the expiration date, you must request a new code. [employer] Access Code: Activation code not generated  Current [employer] Status: Patient Declined (This is the date your [employer] access code will )    4. Enter the last four digits of your Social Security Number (xxxx) and Date of Birth (mm/dd/yyyy) as indicated and click Submit. You will be taken to the next sign-up page. 5. Create a [employer] ID. This will be your [employer] login ID and cannot be changed, so think of one that is secure and easy to remember. 6. Create a [employer] password. You can change your password at any time. 7. Enter your Password Reset Question and Answer. This can be used at a later time if you forget your password. 8. Enter your e-mail address. You will receive e-mail notification when new information is available in [address] 9. Click Sign Up. You can now view and download portions of your medical record. 10. Click the Download Summary menu link to download a portable copy of your medical information. Additional Information    If you have questions, please visit the Frequently Asked Questions section of the [employer] website at [email or web address] Remember, [employer] is NOT to be used for urgent needs. For medical emergencies, dial 911.

## 2017-03-02 NOTE — MR AVS SNAPSHOT
Visit Information Date & Time Provider Department Dept. Phone Encounter #  
 3/2/2017  3:15 PM Paris Reynoso MD [address] [place] [address] [phone] [id number] Follow-up Instructions Return in about 4 weeks (around 3/30/2017), or if symptoms worsen or fail to improve. Your Appointments 3/15/2017  2:30 PM  
ROUTINE CARE with Paris Reynoso MD  
[facility] - [address] [place] [address] ([address]) Appt Note: 4 week check up [address] [facility],[address] [place] [id number]  
[phone]  
  
   
 [address] [place] [id number] Upcoming Health Maintenance Date Due  
 EYE EXAM RETINAL OR DILATED Q1 3/11/2016 GLAUCOMA SCREENING Q2Y 4/21/2016 Pneumococcal 65+ High/Highest Risk (1 of 2 - PCV13) 4/21/2016 MEDICARE YEARLY EXAM 4/21/2016 INFLUENZA AGE 9 TO ADULT 8/1/2016 FOOT EXAM Q1 9/8/2016 MICROALBUMIN Q1 10/8/2016 FOBT Q 1 YEAR AGE 50-75 1/14/2017 HEMOGLOBIN A1C Q6M 7/17/2017 LIPID PANEL Q1 1/17/2018 DTaP/Tdap/Td series (2 - Td) 12/10/2024 Allergies as of 3/2/2017  Review Complete On: 3/2/2017 By: Wilmer Santos LPN No Known Allergies Current Immunizations  Reviewed on 12/10/2014 Name Date Pneumococcal Polysaccharide (PPSV-23) 9/11/2014 Tdap 12/10/2014 Not reviewed this visit You Were Diagnosed With   
  
 Codes Comments Controlled type 2 diabetes mellitus with hyperglycemia, without long-term current use of insulin ([facility] [address]    -  Primary ICD-10-CM: E11.65 ICD-9-CM: 250.80, 790.29 Diabetic polyneuropathy associated with type 2 diabetes mellitus ([facility] [address]     ICD-10-CM: E11.42 
ICD-9-CM: 250.60, 357.2 Essential hypertension     ICD-10-CM: I10 
ICD-9-CM: 401.9 Malignant neoplasm of prostate [facility])     ICD-10-CM: J83 ICD-9-CM: 362 Cellulitis of left lower extremity     ICD-10-CM: L03.116 ICD-9-CM: 682.6 Diabetic mononeuropathy associated with diabetes mellitus due to underlying condition ([facility] [address]     ICD-10-CM: E08.41 
ICD-9-CM: 249.60, 355.9 Vitals BP  
  
  
  
  
  
 120/70 Vitals History Preferred Pharmacy Pharmacy Name Phone [facility] [address] [facility] [phone] Your Updated Medication List  
  
   
This list is accurate as of: 3/2/17  4:10 PM.  Always use your most recent med list.  
  
  
  
  
 acetaminophen 325 mg tablet Commonly known as:  TYLENOL Take 2 Tabs by mouth every four (4) hours as needed. aspirin delayed-release 81 mg tablet Take 81 mg by mouth daily. atorvastatin 40 mg tablet Commonly known as:  LIPITOR Take 1 Tab by mouth nightly. Calcium Carbonate-Vit D3-Min 600 mg calcium- 400 unit Tab Take  by mouth two (2) times a day. COLACE 100 mg capsule Generic drug:  docusate sodium Take 100 mg by mouth daily. dilTIAZem 300 mg SR capsule Commonly known as:  [facility] Take 1 Cap by mouth daily. fluticasone 50 mcg/actuation nasal spray Commonly known as:  [place] INHALE 2 SPRAYS IN EACH NOSTRIL EVERY DAY. gabapentin 300 mg capsule Commonly known as:  NEURONTIN  
2 tabs tid  
  
 glucose blood VI test strips strip Commonly known as:  CONTOUR NEXT STRIPS Test 2-3 times daily  
  
 ibuprofen 800 mg tablet Commonly known as:  MOTRIN Take 1 Tab by mouth two (2) times daily (after meals). insulin regular 100 unit/mL injection Commonly known as:  NOVOLIN R, HUMULIN R  
14 Units by SubCUTAneous route three (3) times daily as needed (with meals). Lancets Misc Use 2-3 times daily LANTUS 100 unit/mL injection Generic drug:  insulin glargine INJECT 72 UNITS SUBCUANEOUSLY EVERY 12 HOURS  
  
 loratadine 10 mg tablet Commonly known as:  Vicheydi Myla Take 10 mg by mouth daily. oxyCODONE-acetaminophen 5-325 mg per tablet Commonly known as:  PERCOCET Take 1 Tab by mouth every eight (8) hours as needed. THERA tablet Generic drug:  therapeutic multivitamin Take 1 Tab by mouth daily. [facility] Custom electric wheelchair Prescriptions Printed Refills Wheel Chair dianna 0 Sig: Custom electric wheelchair Class: Print  
 oxyCODONE-acetaminophen (PERCOCET) 5-325 mg per tablet 0 Sig: Take 1 Tab by mouth every eight (8) hours as needed. Class: Print Route: Oral  
  
We Performed the Following AMB POC GLUCOSE BLOOD, BY GLUCOSE MONITORING DEVICE [97559 CPT(R)] Follow-up Instructions Return in about 4 weeks (around 3/30/2017), or if symptoms worsen or fail to improve. Patient Instructions [employer] Activation Thank you for requesting access to [employer]. Please follow the instructions below to securely access and download your online medical record. [employer] allows you to send messages to your doctor, view your test results, renew your prescriptions, schedule appointments, and more. How Do I Sign Up? 1. In your internet browser, go to [email or web address] 
2. Click on the First Time User? Click Here link in the Sign In box. You will be redirect to the New Member Sign Up page. 3. Enter your [employer] Access Code exactly as it appears below. You will not need to use this code after youve completed the sign-up process. If you do not sign up before the expiration date, you must request a new code. [employer] Access Code: Activation code not generated Current [employer] Status: Patient Declined (This is the date your [employer] access code will ) 4. Enter the last four digits of your Social Security Number (xxxx) and Date of Birth (mm/dd/yyyy) as indicated and click Submit. You will be taken to the next sign-up page. 5. Create a [employer] ID.  This will be your [employer] login ID and cannot be changed, so think of one that is secure and easy to remember. 6. Create a [employer] password. You can change your password at any time. 7. Enter your Password Reset Question and Answer. This can be used at a later time if you forget your password. 8. Enter your e-mail address. You will receive e-mail notification when new information is available in [address] 9. Click Sign Up. You can now view and download portions of your medical record. 10. Click the Download Summary menu link to download a portable copy of your medical information. Additional Information If you have questions, please visit the Frequently Asked Questions section of the [employer] website at [email or web address] Remember, [employer] is NOT to be used for urgent needs. For medical emergencies, dial 911. Please provide this summary of care documentation to your next provider. Your primary care clinician is listed as Malia Faith. If you have any questions after today's visit, please call [phone].

## 2017-03-02 NOTE — PROGRESS NOTES
Abi Díaz is a 73 y.o. male and presents with Diabetes and Other (D.M.E)  . Subjective:   He needs a face to face encounter for electric wheelchair  Abi Díaz is a 73 y.o. male and presents with Diabetes and Other (D.M. E)  He has had extreme difficulty in getting about     His  ambulation is poor, cannot walk at all. Activities of daily living are difficulty to perform   Has no strength with the use of a cane walker or wheel chair    Review of Systems   Constitutional: negative for fevers, chills, anorexia and weight loss   Eyes: negative for visual disturbance and irritation   ENT: negative for tinnitus,sore throat,nasal congestion,ear pains. hoarseness   Respiratory: negative for cough, hemoptysis, dyspnea,wheezing   CV: negative for chest pain, palpitations, lower extremity edema   GI: negative for nausea, vomiting, diarrhea, abdominal pain,melena   Endo: negative for polyuria,polydipsia,polyphagia,heat intolerance   Genitourinary: negative for frequency, dysuria and hematuria   Integument: negative for rash and pruritus   Hematologic: negative for easy bruising and gum/nose bleeding   Musculoskel: myalgias, arthralgias,muscle weakness, joint pain   Neurological: gait problems,upper and lower extremity weakness with reduced sensation noted.    Behavl/Psych: negative for feelings of anxiety, depression, mood changes    Past Medical History:   Diagnosis Date    Arthritis, Degenerative,  Knee 4/4/2011    Carcinoma, Prostate 4/4/2011    Degenerative disc disease 4/4/2011    Depression/ Anxiety 4/4/2011    Diabetes ([facility] [address]     Diabetes Mellitrus ( non-insulin dependent ) Type 2 4/4/2011    HEMATOCHEZIA 4/4/2011    Hypertrension 4/4/2011    Hypertriglyceridemia 4/4/2011    Insomnia 4/4/2011    Laryngitis 4/4/2011    Mild Aortic insufficiency 4/4/2011    Mild Concentric LVH (left ventricular hypertrophy) 4/4/2011    Neuropathy 4/4/2011    Osteoarthritis 4/4/2011    Rotator Cuff Tendonitis 4/4/2011     Past Surgical History:   Procedure Laterality Date    HX UROLOGICAL       Social History     Social History    Marital status: LEGALLY      Spouse name: N/A    Number of children: N/A    Years of education: N/A     Social History Main Topics    Smoking status: Former Smoker     Packs/day: 0.25     Quit date: 10/25/2016    Smokeless tobacco: Never Used    Alcohol use 7.0 oz/week     7 Cans of beer, 7 Shots of liquor per week    Drug use: None    Sexual activity: Yes     Partners: Female     Other Topics Concern    None     Social History Narrative     History reviewed. No pertinent family history. Current Outpatient Prescriptions   Medication Sig Dispense Refill    LANTUS 100 unit/mL injection INJECT 72 UNITS SUBCUANEOUSLY EVERY 12 HOURS 3 Vial 3    Lancets misc Use 2-3 times daily 100 Each 12    insulin regular (NOVOLIN R, HUMULIN R) 100 unit/mL injection 14 Units by SubCUTAneous route three (3) times daily as needed (with meals). 1 Vial 12    gabapentin (NEURONTIN) 300 mg capsule 2 tabs tid 180 Cap 3    dilTIAZem (TIAZAC) 300 mg SR capsule Take 1 Cap by mouth daily. 30 Cap 12    fluticasone (FLONASE) 50 mcg/actuation nasal spray INHALE 2 SPRAYS IN EACH NOSTRIL EVERY DAY. 16 g 12    atorvastatin (LIPITOR) 40 mg tablet Take 1 Tab by mouth nightly. 30 Tab 11    oxyCODONE-acetaminophen (PERCOCET) 5-325 mg per tablet Take 1 Tab by mouth every eight (8) hours as needed. 14 Tab 0    ibuprofen (MOTRIN) 800 mg tablet Take 1 Tab by mouth two (2) times daily (after meals). 60 Tab 12    glucose blood VI test strips (CONTOUR NEXT STRIPS) strip Test 2-3 times daily 100 Strip 12    Calcium Carbonate-Vit D3-Min 600 mg calcium- 400 unit tab Take  by mouth two (2) times a day.  docusate sodium (COLACE) 100 mg capsule Take 100 mg by mouth daily.  therapeutic multivitamin (THERA) tablet Take 1 Tab by mouth daily.  loratadine (CLARITIN) 10 mg tablet Take 10 mg by mouth daily.  aspirin delayed-release 81 mg tablet Take 81 mg by mouth daily.  acetaminophen (TYLENOL) 325 mg tablet Take 2 Tabs by mouth every four (4) hours as needed. 30 Tab 0     No Known Allergies    Objective:  Visit Vitals    /70    Pulse 73    Temp 98.3 °F (36.8 °C) (Oral)    Resp 20    Ht 6' 1\" (1.854 m)    SpO2 94%         Results for orders placed or performed in visit on 02/14/17   AMB POC GLUCOSE BLOOD, BY GLUCOSE MONITORING DEVICE   Result Value Ref Range    Glucose  mg/dL                 Past Medical History:   Diagnosis Date    Arthritis, Degenerative,  Knee 4/4/2011    Carcinoma, Prostate 4/4/2011    Degenerative disc disease 4/4/2011    Depression/ Anxiety 4/4/2011    Diabetes ([address]     Diabetes Mellitrus ( non-insulin dependent ) Type 2 4/4/2011    HEMATOCHEZIA 4/4/2011    Hypertrension 4/4/2011    Hypertriglyceridemia 4/4/2011    Insomnia 4/4/2011    Laryngitis 4/4/2011    Mild Aortic insufficiency 4/4/2011    Mild Concentric LVH (left ventricular hypertrophy) 4/4/2011    Neuropathy 4/4/2011    Osteoarthritis 4/4/2011    Rotator Cuff Tendonitis 4/4/2011     Past Surgical History:   Procedure Laterality Date    HX UROLOGICAL       Social History     Social History    Marital status: LEGALLY      Spouse name: N/A    Number of children: N/A    Years of education: N/A     Social History Main Topics    Smoking status: Former Smoker     Packs/day: 0.25     Quit date: 10/25/2016    Smokeless tobacco: Never Used    Alcohol use 7.0 oz/week     7 Cans of beer, 7 Shots of liquor per week    Drug use: None    Sexual activity: Yes     Partners: Female     Other Topics Concern    None     Social History Narrative     History reviewed. No pertinent family history.   Current Outpatient Prescriptions   Medication Sig Dispense Refill    LANTUS 100 unit/mL injection INJECT 72 UNITS SUBCUANEOUSLY EVERY 12 HOURS 3 Vial 3    Lancets misc Use 2-3 times daily 100 Each 12  insulin regular (NOVOLIN R, HUMULIN R) 100 unit/mL injection 14 Units by SubCUTAneous route three (3) times daily as needed (with meals). 1 Vial 12    gabapentin (NEURONTIN) 300 mg capsule 2 tabs tid 180 Cap 3    dilTIAZem (TIAZAC) 300 mg SR capsule Take 1 Cap by mouth daily. 30 Cap 12    fluticasone (FLONASE) 50 mcg/actuation nasal spray INHALE 2 SPRAYS IN EACH NOSTRIL EVERY DAY. 16 g 12    atorvastatin (LIPITOR) 40 mg tablet Take 1 Tab by mouth nightly. 30 Tab 11    oxyCODONE-acetaminophen (PERCOCET) 5-325 mg per tablet Take 1 Tab by mouth every eight (8) hours as needed. 14 Tab 0    ibuprofen (MOTRIN) 800 mg tablet Take 1 Tab by mouth two (2) times daily (after meals). 60 Tab 12    glucose blood VI test strips (CONTOUR NEXT STRIPS) strip Test 2-3 times daily 100 Strip 12    Calcium Carbonate-Vit D3-Min 600 mg calcium- 400 unit tab Take  by mouth two (2) times a day.  docusate sodium (COLACE) 100 mg capsule Take 100 mg by mouth daily.  therapeutic multivitamin (THERA) tablet Take 1 Tab by mouth daily.  loratadine (CLARITIN) 10 mg tablet Take 10 mg by mouth daily.  aspirin delayed-release 81 mg tablet Take 81 mg by mouth daily.  acetaminophen (TYLENOL) 325 mg tablet Take 2 Tabs by mouth every four (4) hours as needed.  30 Tab 0     No Known Allergies    Objective:  Visit Vitals    /70    Pulse 73    Temp 98.3 °F (36.8 °C) (Oral)    Resp 20    Ht 6' 1\" (1.854 m)    SpO2 94%     Physical Exam:   General appearance - alert, well appearing, and in no distress  Mental status - alert, oriented to person, place, and time  EYE-LUH, EOMI, corneas normal, no foreign bodies  ENT-ENT exam normal, no neck nodes or sinus tenderness  Nose - normal and patent, no erythema, discharge or polyps  Mouth - mucous membranes moist, pharynx normal without lesions  Neck - supple, no significant adenopathy   Chest - clear to auscultation, no wheezes, rales or rhonchi, symmetric air entry Heart - normal rate, regular rhythm, normal S1, S2, no murmurs, rubs, clicks or gallops   Abdomen - soft, nontender, nondistended, no masses or organomegaly  Lymph- no adenopathy palpable  Ext-peripheral pulses normal  Skin-Warm and dry. no hyperpigmentation, vitiligo, or suspicious lesions  Neuro -alert, oriented, normal speech, no focal findings or movement disorder noted  Neck-normal C-spine, no tenderness, full ROM without pain  Feet-foot deformities noted  Unable to ambulate    Results for orders placed or performed in visit on 02/14/17   AMB POC GLUCOSE BLOOD, BY GLUCOSE MONITORING DEVICE   Result Value Ref Range    Glucose  mg/dL       Assessment/Plan:    ICD-10-CM ICD-9-CM    1. Controlled type 2 diabetes mellitus with hyperglycemia, without long-term current use of insulin ([facility]) E11.65 250.80 AMB POC GLUCOSE BLOOD, BY GLUCOSE MONITORING DEVICE     790.29    2. Diabetic polyneuropathy associated with type 2 diabetes mellitus ([facility]) E11.42 250.60      357.2    3. Essential hypertension I10 401.9    4. Carcinoma, Prostate C61 185    5. Cellulitis of left lower extremity L03.116 682.6      Orders Placed This Encounter    AMB POC GLUCOSE BLOOD, BY GLUCOSE MONITORING DEVICE     He would benefit from an electric wheelchair that is customized  Patient Instructions   [employer] Activation    Thank you for requesting access to [employer]. Please follow the instructions below to securely access and download your online medical record. [employer] allows you to send messages to your doctor, view your test results, renew your prescriptions, schedule appointments, and more. How Do I Sign Up? 1. In your internet browser, go to [email or web address]  2. Click on the First Time User? Click Here link in the Sign In box. You will be redirect to the New Member Sign Up page. 3. Enter your [employer] Access Code exactly as it appears below. You will not need to use this code after youve completed the sign-up process.  If you do not sign up before the expiration date, you must request a new code. [employer] Access Code: Activation code not generated  Current [employer] Status: Patient Declined (This is the date your [employer] access code will )    4. Enter the last four digits of your Social Security Number (xxxx) and Date of Birth (mm/dd/yyyy) as indicated and click Submit. You will be taken to the next sign-up page. 5. Create a [employer] ID. This will be your [employer] login ID and cannot be changed, so think of one that is secure and easy to remember. 6. Create a [employer] password. You can change your password at any time. 7. Enter your Password Reset Question and Answer. This can be used at a later time if you forget your password. 8. Enter your e-mail address. You will receive e-mail notification when new information is available in [address] 9. Click Sign Up. You can now view and download portions of your medical record. 10. Click the Download Summary menu link to download a portable copy of your medical information. Additional Information    If you have questions, please visit the Frequently Asked Questions section of the [employer] website at [email or web address] Remember, [employer] is NOT to be used for urgent needs. For medical emergencies, dial 911. Follow-up Disposition:  Return in about 4 weeks (around 3/30/2017), or if symptoms worsen or fail to improve. I have reviewed with the patient details of the assessment and plan and all questions were answered. Relevent patient education was performed    An After Visit Summary was printed and given to the patient.

## 2017-03-20 RX ORDER — ASPIRIN 81 MG/1
TABLET ORAL
Qty: 30 TAB | Refills: 0 | Status: SHIPPED | OUTPATIENT
Start: 2017-03-20 | End: 2018-08-30 | Stop reason: ALTCHOICE

## 2017-04-04 ENCOUNTER — OFFICE VISIT (OUTPATIENT)
Dept: INTERNAL MEDICINE CLINIC | Age: 67
End: 2017-04-04

## 2017-04-04 VITALS
HEART RATE: 90 BPM | RESPIRATION RATE: 16 BRPM | SYSTOLIC BLOOD PRESSURE: 122 MMHG | OXYGEN SATURATION: 94 % | DIASTOLIC BLOOD PRESSURE: 66 MMHG | TEMPERATURE: 98 F

## 2017-04-04 DIAGNOSIS — E11.65 CONTROLLED TYPE 2 DIABETES MELLITUS WITH HYPERGLYCEMIA, WITHOUT LONG-TERM CURRENT USE OF INSULIN (HCC): ICD-10-CM

## 2017-04-04 DIAGNOSIS — C61 MALIGNANT NEOPLASM OF PROSTATE (HCC): ICD-10-CM

## 2017-04-04 DIAGNOSIS — E11.42 DIABETIC POLYNEUROPATHY ASSOCIATED WITH TYPE 2 DIABETES MELLITUS (HCC): ICD-10-CM

## 2017-04-04 DIAGNOSIS — M12.812 ROTATOR CUFF ARTHROPATHY, LEFT: Primary | ICD-10-CM

## 2017-04-04 LAB — GLUCOSE POC: 177 MG/DL

## 2017-04-04 RX ORDER — IBUPROFEN 800 MG/1
800 TABLET ORAL
Qty: 90 TAB | Refills: 12 | Status: SHIPPED | OUTPATIENT
Start: 2017-04-04 | End: 2017-09-29 | Stop reason: ALTCHOICE

## 2017-04-04 RX ORDER — TRIAMCINOLONE ACETONIDE 40 MG/ML
40 INJECTION, SUSPENSION INTRA-ARTICULAR; INTRAMUSCULAR ONCE
Qty: 1 VIAL | Refills: 0
Start: 2017-04-04 | End: 2017-04-04

## 2017-04-04 RX ORDER — LIDOCAINE 50 MG/G
1 PATCH TOPICAL EVERY 24 HOURS
Qty: 1 PACKAGE | Refills: 3 | Status: SHIPPED | OUTPATIENT
Start: 2017-04-04 | End: 2017-04-13 | Stop reason: SDUPTHER

## 2017-04-04 NOTE — MR AVS SNAPSHOT
Visit Information Date & Time Provider Department Dept. Phone Encounter #  
 4/4/2017  2:30 PM Jass Mello MD [facility] [phone] [id number] Follow-up Instructions Return in about 4 weeks (around 5/2/2017), or if symptoms worsen or fail to improve. Follow-up and Disposition History Upcoming Health Maintenance Date Due  
 EYE EXAM RETINAL OR DILATED Q1 3/11/2016 GLAUCOMA SCREENING Q2Y 4/21/2016 Pneumococcal 65+ High/Highest Risk (1 of 2 - PCV13) 4/21/2016 MEDICARE YEARLY EXAM 4/21/2016 INFLUENZA AGE 9 TO ADULT 8/1/2016 FOOT EXAM Q1 9/8/2016 MICROALBUMIN Q1 10/8/2016 FOBT Q 1 YEAR AGE 50-75 1/14/2017 HEMOGLOBIN A1C Q6M 7/17/2017 LIPID PANEL Q1 1/17/2018 DTaP/Tdap/Td series (2 - Td) 12/10/2024 Allergies as of 4/4/2017  Review Complete On: 4/4/2017 By: April Ubaldo Prasad LPN No Known Allergies Current Immunizations  Reviewed on 12/10/2014 Name Date Pneumococcal Polysaccharide (PPSV-23) 9/11/2014 Tdap 12/10/2014 Not reviewed this visit You Were Diagnosed With   
  
 Codes Comments Rotator cuff arthropathy, left    -  Primary ICD-10-CM: E77.603 ICD-9-CM: 716.81 Controlled type 2 diabetes mellitus with hyperglycemia, without long-term current use of insulin (HCC)     ICD-10-CM: E11.65 ICD-9-CM: 250.80, 790.29 Diabetic polyneuropathy associated with type 2 diabetes mellitus ([facility]     ICD-10-CM: E11.42 
ICD-9-CM: 250.60, 357.2 Malignant neoplasm of prostate [facility])     ICD-10-CM: H46 ICD-9-CM: 558 Vitals BP Pulse Temp Resp SpO2 Smoking Status 122/66 90 98 °F (36.7 °C) (Oral) 16 94% Former Smoker Preferred Pharmacy Pharmacy Name Phone Lily [facility]ett [address] [facility] [phone] Your Updated Medication List  
  
   
 This list is accurate as of: 4/4/17  4:01 PM.  Always use your most recent med list.  
  
  
  
  
 acetaminophen 325 mg tablet Commonly known as:  TYLENOL Take 2 Tabs by mouth every four (4) hours as needed. aspirin delayed-release 81 mg tablet TAKE ONE TABLET BY MOUTH DAILY  
  
 atorvastatin 40 mg tablet Commonly known as:  LIPITOR Take 1 Tab by mouth nightly. Calcium Carbonate-Vit D3-Min 600 mg calcium- 400 unit Tab Take  by mouth two (2) times a day. COLACE 100 mg capsule Generic drug:  docusate sodium Take 100 mg by mouth daily. dilTIAZem 300 mg SR capsule Commonly known as:  [facility] Take 1 Cap by mouth daily. fluticasone 50 mcg/actuation nasal spray Commonly known as:  [place] INHALE 2 SPRAYS IN EACH NOSTRIL EVERY DAY. gabapentin 300 mg capsule Commonly known as:  NEURONTIN  
2 tabs tid  
  
 glucose blood VI test strips strip Commonly known as:  CONTOUR NEXT STRIPS Test 2-3 times daily  
  
 ibuprofen 800 mg tablet Commonly known as:  MOTRIN Take 1 Tab by mouth every eight (8) hours as needed for Pain. insulin regular 100 unit/mL injection Commonly known as:  NOVOLIN R, HUMULIN R  
14 Units by SubCUTAneous route three (3) times daily as needed (with meals). Lancets Misc Use 2-3 times daily LANTUS 100 unit/mL injection Generic drug:  insulin glargine INJECT 72 UNITS SUBCUANEOUSLY EVERY 12 HOURS  
  
 lidocaine 5 % Commonly known as:  LIDODERM  
1 Patch by TransDERmal route every twenty-four (24) hours. Apply to affected area every 24 hours  
  
 loratadine 10 mg tablet Commonly known as:  Mac Cindy Take 10 mg by mouth daily. oxyCODONE-acetaminophen 5-325 mg per tablet Commonly known as:  PERCOCET Take 1 Tab by mouth every eight (8) hours as needed. THERA tablet Generic drug:  therapeutic multivitamin Take 1 Tab by mouth daily. triamcinolone acetonide 40 mg/mL injection Commonly known as:  KENALOG-40  
1 mL by IntraBURSal route once for 1 dose. [address] [facility] Custom electric wheelchair Prescriptions Sent to Pharmacy Refills  
 ibuprofen (MOTRIN) 800 mg tablet 12 Sig: Take 1 Tab by mouth every eight (8) hours as needed for Pain. Class: Normal  
 Pharmacy: [facility] [id number] [facility] Ph #: [phone] Route: Oral  
 lidocaine (LIDODERM) 5 % 3 Si Patch by TransDERmal route every twenty-four (24) hours. Apply to affected area every 24 hours Class: Normal  
 Pharmacy: [facility] [id number] [facility] Ph #: [phone] Route: TransDERmal  
  
We Performed the Following AMB POC GLUCOSE BLOOD, BY GLUCOSE MONITORING DEVICE [30274 CPT(R)] ME DRAIN/INJECT INTERMEDIATE JOINT/BURSA [id number] CPT(R)] Follow-up Instructions Return in about 4 weeks (around 2017), or if symptoms worsen or fail to improve. Patient Instructions Ener1 Activation Thank you for requesting access to Ener1. Please follow the instructions below to securely access and download your online medical record. Ener1 allows you to send messages to your doctor, view your test results, renew your prescriptions, schedule appointments, and more. How Do I Sign Up? 1. In your internet browser, go to [email or web address] 
2. Click on the First Time User? Click Here link in the Sign In box. You will be redirect to the New Member Sign Up page. 3. Enter your Ener1 Access Code exactly as it appears below. You will not need to use this code after youve completed the sign-up process. If you do not sign up before the expiration date, you must request a new code. Ener1 Access Code: Activation code not generated Current Ener1 Status: Patient Declined (This is the date your Ener1 access code will ) 4. Enter the last four digits of your Social Security Number (xxxx) and Date of Birth (mm/dd/yyyy) as indicated and click Submit. You will be taken to the next sign-up page. 5. Create a Medmonk ID. This will be your Medmonk login ID and cannot be changed, so think of one that is secure and easy to remember. 6. Create a Medmonk password. You can change your password at any time. 7. Enter your Password Reset Question and Answer. This can be used at a later time if you forget your password. 8. Enter your e-mail address. You will receive e-mail notification when new information is available in [address] 9. Click Sign Up. You can now view and download portions of your medical record. 10. Click the Download Summary menu link to download a portable copy of your medical information. Additional Information If you have questions, please visit the Frequently Asked Questions section of the Medmonk website at [email or web address] Remember, Medmonk is NOT to be used for urgent needs. For medical emergencies, dial 911. Please provide this summary of care documentation to your next provider. Your primary care clinician is listed as Daniela Samaniego. If you have any questions after today's visit, please call [phone].

## 2017-04-04 NOTE — PROGRESS NOTES
Alice Vanessa is a 73 y.o. male and presents with Diabetes and Shoulder Pain (bilateral)  . Subjective:  Shoulder Pain Review:  Patient complains of left side shoulder pain. The symptoms began several weeks ago Course of symptoms since onset has been gradually worsening. Pain is described as overall severity = moderate. Symptoms were incited by no known event. Patient denies N/A. Therapy to date includes OTC analgesics: effective. Diabetes Mellitus Review:  He has diabetes mellitus. Diabetic ROS - medication compliance: compliant all of the time, diabetic diet compliance: compliant all of the time, home glucose monitoring: is performed. Known diabetic complications: none  Cardiovascular risk factors: family history, dyslipidemia, diabetes mellitus, obesity, hypertension  Current diabetic medications include oral agents/insulin   Eye exam current (within one year): no  Weight trend: stable  Prior visit with dietician: no  Current diet: \"healthy\" diet  in general  Current exercise: walking  Current monitoring regimen: home blood tests - daily  Home blood sugar records: trend: stable  Any episodes of hypoglycemia? no  Is He on ACE inhibitor or angiotensin II receptor blocker? Yes         Review of Systems  Constitutional: negative for fevers, chills, anorexia and weight loss  Eyes:   negative for visual disturbance and irritation  ENT:   negative for tinnitus,sore throat,nasal congestion,ear pains. hoarseness  Respiratory:  negative for cough, hemoptysis, dyspnea,wheezing  CV:   negative for chest pain, palpitations, lower extremity edema  GI:   negative for nausea, vomiting, diarrhea, abdominal pain,melena  Endo:               negative for polyuria,polydipsia,polyphagia,heat intolerance  Genitourinary: negative for frequency, dysuria and hematuria  Integument:  negative for rash and pruritus  Hematologic:  negative for easy bruising and gum/nose bleeding  Musculoskel: myalgias, arthralgias, back pain,joint pain  Neurological:  negative for headaches, dizziness, vertigo, memory problems and gait   Behavl/Psych: negative for feelings of anxiety, depression, mood changes    Past Medical History:   Diagnosis Date    Arthritis, Degenerative,  Knee 4/4/2011    Carcinoma, Prostate 4/4/2011    Degenerative disc disease 4/4/2011    Depression/ Anxiety 4/4/2011    Diabetes ([address]     Diabetes Mellitrus ( non-insulin dependent ) Type 2 4/4/2011    HEMATOCHEZIA 4/4/2011    Hypertrension 4/4/2011    Hypertriglyceridemia 4/4/2011    Insomnia 4/4/2011    Laryngitis 4/4/2011    Mild Aortic insufficiency 4/4/2011    Mild Concentric LVH (left ventricular hypertrophy) 4/4/2011    Neuropathy 4/4/2011    Osteoarthritis 4/4/2011    Rotator Cuff Tendonitis 4/4/2011     Past Surgical History:   Procedure Laterality Date    HX UROLOGICAL       Social History     Social History    Marital status: LEGALLY      Spouse name: N/A    Number of children: N/A    Years of education: N/A     Social History Main Topics    Smoking status: Former Smoker     Packs/day: 0.25     Quit date: 10/25/2016    Smokeless tobacco: Never Used    Alcohol use 7.0 oz/week     7 Cans of beer, 7 Shots of liquor per week    Drug use: None    Sexual activity: Yes     Partners: Female     Other Topics Concern    None     Social History Narrative     No family history on file. Current Outpatient Prescriptions   Medication Sig Dispense Refill    triamcinolone acetonide (KENALOG-40) 40 mg/mL injection 1 mL by IntraBURSal route once for 1 dose. 1 Vial 0    ibuprofen (MOTRIN) 800 mg tablet Take 1 Tab by mouth every eight (8) hours as needed for Pain. 90 Tab 12    lidocaine (LIDODERM) 5 % 1 Patch by TransDERmal route every twenty-four (24) hours.  Apply to affected area every 24 hours 1 Package 3    aspirin delayed-release 81 mg tablet TAKE ONE TABLET BY MOUTH DAILY 30 Tab 0    Wheel Chair dianna Custom electric wheelchair 1 Each 0  oxyCODONE-acetaminophen (PERCOCET) 5-325 mg per tablet Take 1 Tab by mouth every eight (8) hours as needed. 14 Tab 0    LANTUS 100 unit/mL injection INJECT 72 UNITS SUBCUANEOUSLY EVERY 12 HOURS 3 Vial 3    Lancets misc Use 2-3 times daily 100 Each 12    insulin regular (NOVOLIN R, HUMULIN R) 100 unit/mL injection 14 Units by SubCUTAneous route three (3) times daily as needed (with meals). 1 Vial 12    gabapentin (NEURONTIN) 300 mg capsule 2 tabs tid 180 Cap 3    dilTIAZem (TIAZAC) 300 mg SR capsule Take 1 Cap by mouth daily. 30 Cap 12    fluticasone (FLONASE) 50 mcg/actuation nasal spray INHALE 2 SPRAYS IN EACH NOSTRIL EVERY DAY. 16 g 12    atorvastatin (LIPITOR) 40 mg tablet Take 1 Tab by mouth nightly. 30 Tab 11    glucose blood VI test strips (CONTOUR NEXT STRIPS) strip Test 2-3 times daily 100 Strip 12    Calcium Carbonate-Vit D3-Min 600 mg calcium- 400 unit tab Take  by mouth two (2) times a day.  docusate sodium (COLACE) 100 mg capsule Take 100 mg by mouth daily.  therapeutic multivitamin (THERA) tablet Take 1 Tab by mouth daily.  loratadine (CLARITIN) 10 mg tablet Take 10 mg by mouth daily.  acetaminophen (TYLENOL) 325 mg tablet Take 2 Tabs by mouth every four (4) hours as needed.  30 Tab 0     No Known Allergies    Objective:  Visit Vitals    /66    Pulse 90    Temp 98 °F (36.7 °C) (Oral)    Resp 16    SpO2 94%     Physical Exam:   General appearance - alert, well appearing, and in no distress  Mental status - alert, oriented to person, place, and time  EYE-LUH, EOMI, corneas normal, no foreign bodies  ENT-ENT exam normal, no neck nodes or sinus tenderness  Nose - normal and patent, no erythema, discharge or polyps  Mouth - mucous membranes moist, pharynx normal without lesions  Neck - supple, no significant adenopathy   Chest - clear to auscultation, no wheezes, rales or rhonchi, symmetric air entry   Heart - normal rate, regular rhythm, normal S1, S2, no murmurs, rubs, clicks or gallops   Abdomen - soft, nontender, nondistended, no masses or organomegaly  Lymph- no adenopathy palpable  Ext-peripheral pulses normal, no pedal edema, no clubbing or cyanosis  Skin-Warm and dry. no hyperpigmentation, vitiligo, or suspicious lesions  Neuro -alert, oriented, normal speech, no focal findings or movement disorder noted  Neck-normal C-spine, no tenderness, full ROM without pain  Feet-no nail deformities or callus formation with good pulses noted  Lt.shoulder-subdeltoid tenderness, positive impingement signs    Results for orders placed or performed in visit on 17   AMB POC GLUCOSE BLOOD, BY GLUCOSE MONITORING DEVICE   Result Value Ref Range    Glucose  mg/dL       Assessment/Plan:    ICD-10-CM ICD-9-CM    1. Rotator cuff arthropathy, left M12.812 716.81    2. Controlled type 2 diabetes mellitus with hyperglycemia, without long-term current use of insulin ([facility]) E11.65 250.80 AMB POC GLUCOSE BLOOD, BY GLUCOSE MONITORING DEVICE     790.29 NE DRAIN/INJECT INTERMEDIATE JOINT/BURSA   3. Diabetic polyneuropathy associated with type 2 diabetes mellitus ([facility]) E11.42 250.60      357.2    4. Carcinoma, Prostate C61 185      Orders Placed This Encounter    AMB POC GLUCOSE BLOOD, BY GLUCOSE MONITORING DEVICE    NE DRAIN/INJECT INTERMEDIATE JOINT/BURSA    triamcinolone acetonide (KENALOG-40) 40 mg/mL injection     Si mL by IntraBURSal route once for 1 dose. Dispense:  1 Vial     Refill:  0    ibuprofen (MOTRIN) 800 mg tablet     Sig: Take 1 Tab by mouth every eight (8) hours as needed for Pain. Dispense:  90 Tab     Refill:  12    lidocaine (LIDODERM) 5 %     Si Patch by TransDERmal route every twenty-four (24) hours.  Apply to affected area every 24 hours     Dispense:  1 Package     Refill:  3     lose weight, increase physical activity, follow low fat diet, follow low salt diet,Take 81mg aspirin daily  Patient Instructions   MyChart Activation    Thank you for requesting access to Avalara. Please follow the instructions below to securely access and download your online medical record. Avalara allows you to send messages to your doctor, view your test results, renew your prescriptions, schedule appointments, and more. How Do I Sign Up? 1. In your internet browser, go to [email or web address]  2. Click on the First Time User? Click Here link in the Sign In box. You will be redirect to the New Member Sign Up page. 3. Enter your Avalara Access Code exactly as it appears below. You will not need to use this code after youve completed the sign-up process. If you do not sign up before the expiration date, you must request a new code. Quantcastt Access Code: Activation code not generated  Current Avalara Status: Patient Declined (This is the date your Avalara access code will )    4. Enter the last four digits of your Social Security Number (xxxx) and Date of Birth (mm/dd/yyyy) as indicated and click Submit. You will be taken to the next sign-up page. 5. Create a Avalara ID. This will be your Avalara login ID and cannot be changed, so think of one that is secure and easy to remember. 6. Create a Avalara password. You can change your password at any time. 7. Enter your Password Reset Question and Answer. This can be used at a later time if you forget your password. 8. Enter your e-mail address. You will receive e-mail notification when new information is available in [address] 9. Click Sign Up. You can now view and download portions of your medical record. 10. Click the Download Summary menu link to download a portable copy of your medical information. Additional Information    If you have questions, please visit the Frequently Asked Questions section of the Avalara website at [email or web address] Remember, Avalara is NOT to be used for urgent needs. For medical emergencies, dial 911.            Follow-up Disposition:  Return in about 4 weeks (around 5/2/2017), or if symptoms worsen or fail to improve. I have reviewed with the patient details of the assessment and plan and all questions were answered. Relevent patient education was performed    An After Visit Summary was printed and given to the patient.

## 2017-04-04 NOTE — PATIENT INSTRUCTIONS
Social [employer]icebook Activation    Thank you for requesting access to Dolphin. Please follow the instructions below to securely access and download your online medical record. Dolphin allows you to send messages to your doctor, view your test results, renew your prescriptions, schedule appointments, and more. How Do I Sign Up? 1. In your internet browser, go to [email or web address]  2. Click on the First Time User? Click Here link in the Sign In box. You will be redirect to the New Member Sign Up page. 3. Enter your Dolphin Access Code exactly as it appears below. You will not need to use this code after youve completed the sign-up process. If you do not sign up before the expiration date, you must request a new code. Dolphin Access Code: Activation code not generated  Current Dolphin Status: Patient Declined (This is the date your Dolphin access code will )    4. Enter the last four digits of your Social Security Number (xxxx) and Date of Birth (mm/dd/yyyy) as indicated and click Submit. You will be taken to the next sign-up page. 5. Create a Dolphin ID. This will be your Dolphin login ID and cannot be changed, so think of one that is secure and easy to remember. 6. Create a Dolphin password. You can change your password at any time. 7. Enter your Password Reset Question and Answer. This can be used at a later time if you forget your password. 8. Enter your e-mail address. You will receive e-mail notification when new information is available in [address] 9. Click Sign Up. You can now view and download portions of your medical record. 10. Click the Download Summary menu link to download a portable copy of your medical information. Additional Information    If you have questions, please visit the Frequently Asked Questions section of the Dolphin website at [email or web address] Remember, Dolphin is NOT to be used for urgent needs. For medical emergencies, dial 911.

## 2017-04-13 ENCOUNTER — OFFICE VISIT (OUTPATIENT)
Dept: INTERNAL MEDICINE CLINIC | Age: 67
End: 2017-04-13

## 2017-04-13 VITALS
HEIGHT: 73 IN | DIASTOLIC BLOOD PRESSURE: 86 MMHG | SYSTOLIC BLOOD PRESSURE: 157 MMHG | HEART RATE: 85 BPM | TEMPERATURE: 97.7 F | OXYGEN SATURATION: 94 % | RESPIRATION RATE: 19 BRPM

## 2017-04-13 DIAGNOSIS — Z00.00 MEDICARE ANNUAL WELLNESS VISIT, SUBSEQUENT: ICD-10-CM

## 2017-04-13 DIAGNOSIS — M12.811 ROTATOR CUFF ARTHROPATHY, RIGHT: Primary | ICD-10-CM

## 2017-04-13 DIAGNOSIS — E11.42 DIABETIC POLYNEUROPATHY ASSOCIATED WITH TYPE 2 DIABETES MELLITUS (HCC): ICD-10-CM

## 2017-04-13 LAB — GLUCOSE POC: 140 MG/DL

## 2017-04-13 RX ORDER — TRIAMCINOLONE ACETONIDE 40 MG/ML
40 INJECTION, SUSPENSION INTRA-ARTICULAR; INTRAMUSCULAR ONCE
Qty: 1 VIAL | Refills: 0
Start: 2017-04-13 | End: 2017-04-13

## 2017-04-13 RX ORDER — LIDOCAINE 50 MG/G
1 PATCH TOPICAL EVERY 24 HOURS
Qty: 1 PACKAGE | Refills: 3 | Status: SHIPPED | OUTPATIENT
Start: 2017-04-13 | End: 2017-06-15 | Stop reason: SDUPTHER

## 2017-04-13 NOTE — MR AVS SNAPSHOT
Visit Information Date & Time Provider Department Dept. Phone Encounter #  
 4/13/2017  9:30 AM Urban Garcia MD [address] [place] Eal[place] [phone] [id number] Follow-up Instructions Return in about 4 weeks (around 5/11/2017), or if symptoms worsen or fail to improve. Your Appointments 5/4/2017  1:45 PM  
ROUTINE CARE with Urban Garcia MD  
[facility] - Tressa Ansari ([facility]) Appt Note: Jose Rafael Booker [address] [id number]  
[phone]  
  
   
 [address] [place] [address],[address] [facility] [address] [phone] Upcoming Health Maintenance Date Due  
 EYE EXAM RETINAL OR DILATED Q1 3/11/2016 GLAUCOMA SCREENING Q2Y 4/21/2016 Pneumococcal 65+ High/Highest Risk (1 of 2 - PCV13) 4/21/2016 MEDICARE YEARLY EXAM 4/21/2016 INFLUENZA AGE 9 TO ADULT 8/1/2016 FOOT EXAM Q1 9/8/2016 MICROALBUMIN Q1 10/8/2016 FOBT Q 1 YEAR AGE 50-75 1/14/2017 HEMOGLOBIN A1C Q6M 7/17/2017 LIPID PANEL Q1 1/17/2018 DTaP/Tdap/Td series (2 - Td) 12/10/2024 Allergies as of 4/13/2017  Review Complete On: 4/13/2017 By: Urban Garcia MD  
 No Known Allergies Current Immunizations  Reviewed on 12/10/2014 Name Date Pneumococcal Polysaccharide (PPSV-23) 9/11/2014 Tdap 12/10/2014 Not reviewed this visit You Were Diagnosed With   
  
 Codes Comments Rotator cuff arthropathy, right    -  Primary ICD-10-CM: O66.284 ICD-9-CM: 716.81 Diabetic polyneuropathy associated with type 2 diabetes mellitus ([facility]     ICD-10-CM: E11.42 
ICD-9-CM: 250.60, 357.2 Medicare annual wellness visit, subsequent     ICD-10-CM: Z00.00 ICD-9-CM: V70.0 Vitals BP Pulse Temp Resp Height(growth percentile) SpO2  
 157/86 85 97.7 °F (36.5 °C) (Oral) 19 6' 1\" (1.854 m) 94% Smoking Status Former Smoker Vitals History Preferred Pharmacy Pharmacy Name Phone [facility]RE[facility] [address] [address] [facility] [phone] Your Updated Medication List  
  
   
This list is accurate as of: 4/13/17 11:00 AM.  Always use your most recent med list.  
  
  
  
  
 acetaminophen 325 mg tablet Commonly known as:  TYLENOL Take 2 Tabs by mouth every four (4) hours as needed. aspirin delayed-release 81 mg tablet TAKE ONE TABLET BY MOUTH DAILY  
  
 atorvastatin 40 mg tablet Commonly known as:  LIPITOR Take 1 Tab by mouth nightly. Calcium Carbonate-Vit D3-Min 600 mg calcium- 400 unit Tab Take  by mouth two (2) times a day. COLACE 100 mg capsule Generic drug:  docusate sodium Take 100 mg by mouth daily. dilTIAZem 300 mg SR capsule Commonly known as:  [facility] Take 1 Cap by mouth daily. fluticasone 50 mcg/actuation nasal spray Commonly known as:  Sara Six INHALE 2 SPRAYS IN EACH NOSTRIL EVERY DAY. gabapentin 300 mg capsule Commonly known as:  NEURONTIN  
2 tabs tid  
  
 glucose blood VI test strips strip Commonly known as:  CONTOUR NEXT STRIPS Test 2-3 times daily  
  
 ibuprofen 800 mg tablet Commonly known as:  MOTRIN Take 1 Tab by mouth every eight (8) hours as needed for Pain. insulin regular 100 unit/mL injection Commonly known as:  NOVOLIN R, HUMULIN R  
14 Units by SubCUTAneous route three (3) times daily as needed (with meals). Lancets Misc Use 2-3 times daily LANTUS 100 unit/mL injection Generic drug:  insulin glargine INJECT 72 UNITS SUBCUANEOUSLY EVERY 12 HOURS  
  
 lidocaine 5 % Commonly known as:  LIDODERM  
1 Patch by TransDERmal route every twenty-four (24) hours. Apply to affected area every 24 hours  
  
 loratadine 10 mg tablet Commonly known as:  [facility] [employer] Take 10 mg by mouth daily. oxyCODONE-acetaminophen 5-325 mg per tablet Commonly known as:  PERCOCET  
 Take 1 Tab by mouth every eight (8) hours as needed. THERA tablet Generic drug:  therapeutic multivitamin Take 1 Tab by mouth daily. triamcinolone acetonide 40 mg/mL injection Commonly known as:  KENALOG-40  
1 mL by IntraBURSal route once for 1 dose. [address] [facility] Custom electric wheelchair Prescriptions Sent to Pharmacy Refills  
 lidocaine (LIDODERM) 5 % 3 Si Patch by TransDERmal route every twenty-four (24) hours. Apply to affected area every 24 hours Class: Normal  
 [facility] [address] [address] [facility] [phone] Route: TransDERmal  
  
We Performed the Following AMB POC GLUCOSE BLOOD, BY GLUCOSE MONITORING DEVICE [86649 CPT(R)] Follow-up Instructions Return in about 4 weeks (around 2017), or if symptoms worsen or fail to improve. Patient Instructions Lyst Activation Thank you for requesting access to Lyst. Please follow the instructions below to securely access and download your online medical record. Lyst allows you to send messages to your doctor, view your test results, renew your prescriptions, schedule appointments, and more. How Do I Sign Up? 1. In your internet browser, go to [email or web address] 
2. Click on the First Time User? Click Here link in the Sign In box. You will be redirect to the New Member Sign Up page. 3. Enter your Lyst Access Code exactly as it appears below. You will not need to use this code after youve completed the sign-up process. If you do not sign up before the expiration date, you must request a new code. Lyst Access Code: Activation code not generated Current Lyst Status: Patient Declined (This is the date your Lyst access code will ) 4. Enter the last four digits of your Social Security Number (xxxx) and Date of Birth (mm/dd/yyyy) as indicated and click Submit.  You will be taken to the next sign-up page. 5. Create a ScalingData ID. This will be your [employer] login ID and cannot be changed, so think of one that is secure and easy to remember. 6. Create a [employer] password. You can change your password at any time. 7. Enter your Password Reset Question and Answer. This can be used at a later time if you forget your password. 8. Enter your e-mail address. You will receive e-mail notification when new information is available in [address]. 9. Click Sign Up. You can now view and download portions of your medical record. 10. Click the Download Summary menu link to download a portable copy of your medical information. Additional Information If you have questions, please visit the Frequently Asked Questions section of the [employer] website at [email or web address] Remember, [employer]ingData is NOT to be used for urgent needs. For medical emergencies, dial 911. Please provide this summary of care documentation to your next provider. Your primary care clinician is listed as Tom Kasper. If you have any questions after today's visit, please call [phone].

## 2017-04-13 NOTE — PROGRESS NOTES
Nevaeh Robledo is a 73 y.o. male and presents with Shoulder Pain and Diabetes  . Subjective:  Shoulder Pain Review:  Patient complains of rt. shoulder pain. The symptoms began several weeks ago Course of symptoms since onset has been gradually worsening. Pain is described as overall severity = moderate. Symptoms were incited by no known event. Patient denies N/A. Therapy to date includes OTC analgesics: effective. Diabetes Mellitus Review:  He has diabetes mellitus. Diabetic ROS - medication compliance: compliant all of the time, diabetic diet compliance: compliant all of the time, home glucose monitoring: is performed. Known diabetic complications: none  Cardiovascular risk factors: family history, dyslipidemia, diabetes mellitus, obesity, hypertension  Current diabetic medications include oral agents/insulin   Eye exam current (within one year): no  Weight trend: stable  Prior visit with dietician: no  Current diet: \"healthy\" diet  in general  Current exercise: walking  Current monitoring regimen: home blood tests - daily  Home blood sugar records: trend: stable  Any episodes of hypoglycemia? no  Is He on ACE inhibitor or angiotensin II receptor blocker? Yes       Nevaeh Robledo is a 73 y.o. male and presents for annual Medicare Wellness Visit. Problem List: Reviewed with patient and discussed risk factors.     Patient Active Problem List   Diagnosis Code    Laryngitis J04.0    Diabetic polyneuropathy (HCC) E11.42    Rotator Cuff Tendonitis M71.9, M67.919    Diabetes mellitus type 2, controlled ([facility] E11.9    Insomnia G47.00    Carcinoma, Prostate C61    Degenerative disc disease [id number]    Osteoarthrosis involving lower leg M17.10    Depression/ Anxiety F34.1    HTN (hypertension) I10    Osteoarthritis M16.9    Hypertriglyceridemia E78.1    HEMATOCHEZIA K62.5    Mild Aortic Insufficiency I35.1    Mild Concentric LVH (left ventricular hypertrophy) I51.7    Osteomyelitis (HCC) M86.9 Current medical providers:  Patient Care Team:  Rosa Poole MD as PCP - General (Internal Medicine)  Lotus Wilkerson, RN as Nurse Navigator    PSH: Reviewed with patient  Past Surgical History:   Procedure Laterality Date    HX UROLOGICAL          SH: Reviewed with patient  Social History   Substance Use Topics    Smoking status: Former Smoker     Packs/day: 0.25     Quit date: 10/25/2016    Smokeless tobacco: Never Used    Alcohol use 7.0 oz/week     7 Cans of beer, 7 Shots of liquor per week       FH: Reviewed with patient  History reviewed. No pertinent family history. Medications/Allergies: Reviewed with patient  Current Outpatient Prescriptions on File Prior to Visit   Medication Sig Dispense Refill    ibuprofen (MOTRIN) 800 mg tablet Take 1 Tab by mouth every eight (8) hours as needed for Pain. 90 Tab 12    aspirin delayed-release 81 mg tablet TAKE ONE TABLET BY MOUTH DAILY 30 Tab 0    Wheel Chair dianna Custom electric wheelchair 1 Each 0    LANTUS 100 unit/mL injection INJECT 72 UNITS SUBCUANEOUSLY EVERY 12 HOURS 3 Vial 3    Lancets misc Use 2-3 times daily 100 Each 12    insulin regular (NOVOLIN R, HUMULIN R) 100 unit/mL injection 14 Units by SubCUTAneous route three (3) times daily as needed (with meals). 1 Vial 12    gabapentin (NEURONTIN) 300 mg capsule 2 tabs tid 180 Cap 3    dilTIAZem (TIAZAC) 300 mg SR capsule Take 1 Cap by mouth daily. 30 Cap 12    fluticasone (FLONASE) 50 mcg/actuation nasal spray INHALE 2 SPRAYS IN EACH NOSTRIL EVERY DAY. 16 g 12    atorvastatin (LIPITOR) 40 mg tablet Take 1 Tab by mouth nightly. 30 Tab 11    glucose blood VI test strips (CONTOUR NEXT STRIPS) strip Test 2-3 times daily 100 Strip 12    Calcium Carbonate-Vit D3-Min 600 mg calcium- 400 unit tab Take  by mouth two (2) times a day.  docusate sodium (COLACE) 100 mg capsule Take 100 mg by mouth daily.  therapeutic multivitamin (THERA) tablet Take 1 Tab by mouth daily.       loratadine (CLARITIN) 10 mg tablet Take 10 mg by mouth daily.  oxyCODONE-acetaminophen (PERCOCET) 5-325 mg per tablet Take 1 Tab by mouth every eight (8) hours as needed. 14 Tab 0    acetaminophen (TYLENOL) 325 mg tablet Take 2 Tabs by mouth every four (4) hours as needed. 30 Tab 0     No current facility-administered medications on file prior to visit. No Known Allergies    Objective:  Visit Vitals    /86    Pulse 85    Temp 97.7 °F (36.5 °C) (Oral)    Resp 19    Ht 6' 1\" (1.854 m)    SpO2 94%    There is no height or weight on file to calculate BMI. Assessment of cognitive impairment: Alert and oriented x 3    Depression Screen: No flowsheet data found. Fall Risk Assessment:    Fall Risk Assessment, last 12 mths 4/13/2017   Able to walk? Yes   Fall in past 12 months? No       Functional Ability:   Does the patient exhibit a steady gait?  no   How long did it take the patient to get up and walk from a sitting position? Unable to perform   Is the patient self reliant?  (ie can do own laundry, meals, household chores)  yes     Does the patient handle his/her own medications? yes     Does the patient handle his/her own money? yes     Is the patients home safe (ie good lighting, handrails on stairs and bath, etc.)? yes     Did you notice or did patient express any hearing difficulties? yes     Did you notice or did patient express any vision difficulties?   no     Were distance and reading eye charts used? no       Advance Care Planning:   Patient was offered the opportunity to discuss advance care planning:  yes     Does patient have an Advance Directive:  yes   If no, did you provide information on Caring Connections?   yes       Plan:      Orders Placed This Encounter    AMB POC GLUCOSE BLOOD, BY GLUCOSE MONITORING DEVICE    lidocaine (LIDODERM) 5 %    triamcinolone acetonide (KENALOG-40) 40 mg/mL injection       Health Maintenance   Topic Date Due    EYE EXAM RETINAL OR DILATED Q1  03/11/2016    GLAUCOMA SCREENING Q2Y  04/21/2016    Pneumococcal 65+ High/Highest Risk (1 of 2 - PCV13) 04/21/2016    MEDICARE YEARLY EXAM  04/21/2016    INFLUENZA AGE 9 TO ADULT  08/01/2016    FOOT EXAM Q1  09/08/2016    MICROALBUMIN Q1  10/08/2016    FOBT Q 1 YEAR AGE 50-75  01/14/2017    HEMOGLOBIN A1C Q6M  07/17/2017    LIPID PANEL Q1  01/17/2018    DTaP/Tdap/Td series (2 - Td) 12/10/2024    Hepatitis C Screening  Addressed    ZOSTER VACCINE AGE 60>  Addressed       *Patient verbalized understanding and agreement with the plan. A copy of the After Visit Summary with personalized health plan was given to the patient today. Review of Systems  Constitutional: negative for fevers, chills, anorexia and weight loss  Eyes:   negative for visual disturbance and irritation  ENT:   negative for tinnitus,sore throat,nasal congestion,ear pains. hoarseness  Respiratory:  negative for cough, hemoptysis, dyspnea,wheezing  CV:   negative for chest pain, palpitations, lower extremity edema  GI:   negative for nausea, vomiting, diarrhea, abdominal pain,melena  Endo:               negative for polyuria,polydipsia,polyphagia,heat intolerance  Genitourinary: negative for frequency, dysuria and hematuria  Integument:  negative for rash and pruritus  Hematologic:  negative for easy bruising and gum/nose bleeding  Musculoskel: myalgias, arthralgias, back pain,joint pain  Neurological:  negative for headaches, dizziness, vertigo, memory problems and gait   Behavl/Psych: negative for feelings of anxiety, depression, mood changes    Past Medical History:   Diagnosis Date    Arthritis, Degenerative,  Knee 4/4/2011    Carcinoma, Prostate 4/4/2011    Degenerative disc disease 4/4/2011    Depression/ Anxiety 4/4/2011    Diabetes ([facility]     Diabetes Mellitrus ( non-insulin dependent ) Type 2 4/4/2011    HEMATOCHEZIA 4/4/2011    Hypertrension 4/4/2011    Hypertriglyceridemia 4/4/2011    Insomnia 4/4/2011  Laryngitis 4/4/2011    Mild Aortic insufficiency 4/4/2011    Mild Concentric LVH (left ventricular hypertrophy) 4/4/2011    Neuropathy 4/4/2011    Osteoarthritis 4/4/2011    Rotator Cuff Tendonitis 4/4/2011     Past Surgical History:   Procedure Laterality Date    HX UROLOGICAL       Social History     Social History    Marital status: LEGALLY      Spouse name: N/A    Number of children: N/A    Years of education: N/A     Social History Main Topics    Smoking status: Former Smoker     Packs/day: 0.25     Quit date: 10/25/2016    Smokeless tobacco: Never Used    Alcohol use 7.0 oz/week     7 Cans of beer, 7 Shots of liquor per week    Drug use: None    Sexual activity: Yes     Partners: Female     Other Topics Concern    None     Social History Narrative     History reviewed. No pertinent family history. Current Outpatient Prescriptions   Medication Sig Dispense Refill    ibuprofen (MOTRIN) 800 mg tablet Take 1 Tab by mouth every eight (8) hours as needed for Pain. 90 Tab 12    aspirin delayed-release 81 mg tablet TAKE ONE TABLET BY MOUTH DAILY 30 Tab 0    Wheel Chair dianna Custom electric wheelchair 1 Each 0    LANTUS 100 unit/mL injection INJECT 72 UNITS SUBCUANEOUSLY EVERY 12 HOURS 3 Vial 3    Lancets misc Use 2-3 times daily 100 Each 12    insulin regular (NOVOLIN R, HUMULIN R) 100 unit/mL injection 14 Units by SubCUTAneous route three (3) times daily as needed (with meals). 1 Vial 12    gabapentin (NEURONTIN) 300 mg capsule 2 tabs tid 180 Cap 3    dilTIAZem (TIAZAC) 300 mg SR capsule Take 1 Cap by mouth daily. 30 Cap 12    fluticasone (FLONASE) 50 mcg/actuation nasal spray INHALE 2 SPRAYS IN EACH NOSTRIL EVERY DAY. 16 g 12    atorvastatin (LIPITOR) 40 mg tablet Take 1 Tab by mouth nightly.  30 Tab 11    glucose blood VI test strips (CONTOUR NEXT STRIPS) strip Test 2-3 times daily 100 Strip 12    Calcium Carbonate-Vit D3-Min 600 mg calcium- 400 unit tab Take  by mouth two (2) times a day.  docusate sodium (COLACE) 100 mg capsule Take 100 mg by mouth daily.  therapeutic multivitamin (THERA) tablet Take 1 Tab by mouth daily.  loratadine (CLARITIN) 10 mg tablet Take 10 mg by mouth daily.  lidocaine (LIDODERM) 5 % 1 Patch by TransDERmal route every twenty-four (24) hours. Apply to affected area every 24 hours 1 Package 3    oxyCODONE-acetaminophen (PERCOCET) 5-325 mg per tablet Take 1 Tab by mouth every eight (8) hours as needed. 14 Tab 0    acetaminophen (TYLENOL) 325 mg tablet Take 2 Tabs by mouth every four (4) hours as needed. 30 Tab 0     No Known Allergies    Objective:  Visit Vitals    /86    Pulse 85    Temp 97.7 °F (36.5 °C) (Oral)    Resp 19    Ht 6' 1\" (1.854 m)    SpO2 94%     Physical Exam:   General appearance - alert, well appearing, and in moderate distress  Mental status - alert, oriented to person, place, and time  EYE-LUH, EOMI, corneas normal, no foreign bodies  ENT-ENT exam normal, no neck nodes or sinus tenderness  Nose - normal and patent, no erythema, discharge or polyps  Mouth - mucous membranes moist, pharynx normal without lesions  Neck - supple, no significant adenopathy   Chest - clear to auscultation, no wheezes, rales or rhonchi, symmetric air entry   Heart - normal rate, regular rhythm, normal S1, S2, no murmurs, rubs, clicks or gallops   Abdomen - soft, nontender, nondistended, no masses or organomegaly  Lymph- no adenopathy palpable  Ext-peripheral pulses normal, no pedal edema, no clubbing or cyanosis  Skin-Warm and dry. no hyperpigmentation, vitiligo, or suspicious lesions  Neuro -alert, oriented, normal speech, no focal findings or movement disorder noted  Neck-normal C-spine, no tenderness, full ROM without pain  Feet-no nail deformities or callus formation with good pulses noted  Rt. .shoulder-subdeltoid tenderness, positive impingement signs    Results for orders placed or performed in visit on 04/04/17   AMB POC GLUCOSE BLOOD, BY GLUCOSE MONITORING DEVICE   Result Value Ref Range    Glucose  mg/dL       Assessment/Plan:    ICD-10-CM ICD-9-CM    1. Rotator cuff arthropathy, right M12.811 716.81    2. Diabetic polyneuropathy associated with type 2 diabetes mellitus (HCC) E11.42 250.60      357.2      No orders of the defined types were placed in this encounter. lose weight, increase physical activity, follow low fat diet, follow low salt diet,Take 81mg aspirin daily  There are no Patient Instructions on file for this visit. Follow-up Disposition: Not on File      I have reviewed with the patient details of the assessment and plan and all questions were answered. Relevent patient education was performed    An After Visit Summary was printed and given to the patient.

## 2017-04-28 ENCOUNTER — ANESTHESIA EVENT (OUTPATIENT)
Dept: ENDOSCOPY | Age: 67
End: 2017-04-28
Payer: COMMERCIAL

## 2017-04-28 ENCOUNTER — HOSPITAL ENCOUNTER (OUTPATIENT)
Age: 67
Setting detail: OUTPATIENT SURGERY
Discharge: HOME | End: 2017-04-28
Attending: INTERNAL MEDICINE | Admitting: INTERNAL MEDICINE
Payer: COMMERCIAL

## 2017-04-28 ENCOUNTER — ANESTHESIA (OUTPATIENT)
Dept: ENDOSCOPY | Age: 67
End: 2017-04-28
Payer: COMMERCIAL

## 2017-04-28 VITALS
SYSTOLIC BLOOD PRESSURE: 170 MMHG | HEART RATE: 91 BPM | TEMPERATURE: 97.5 F | OXYGEN SATURATION: 96 % | BODY MASS INDEX: 36.06 KG/M2 | DIASTOLIC BLOOD PRESSURE: 80 MMHG | WEIGHT: 281 LBS | HEIGHT: 74 IN | RESPIRATION RATE: 17 BRPM

## 2017-04-28 LAB
GLUCOSE BLD STRIP.AUTO-MCNC: 106 MG/DL (ref 65–100)
GLUCOSE BLD STRIP.AUTO-MCNC: 119 MG/DL (ref 65–100)
GLUCOSE BLD STRIP.AUTO-MCNC: 59 MG/DL (ref 65–100)
GLUCOSE BLD STRIP.AUTO-MCNC: 74 MG/DL (ref 65–100)
SERVICE CMNT-IMP: ABNORMAL
SERVICE CMNT-IMP: NORMAL

## 2017-04-28 PROCEDURE — 74011000250 HC RX REV CODE- 250: Performed by: ANESTHESIOLOGY

## 2017-04-28 PROCEDURE — 74011250636 HC RX REV CODE- 250/636: Performed by: INTERNAL MEDICINE

## 2017-04-28 PROCEDURE — 82962 GLUCOSE BLOOD TEST: CPT

## 2017-04-28 PROCEDURE — 74011250636 HC RX REV CODE- 250/636

## 2017-04-28 PROCEDURE — 76060000031 HC ANESTHESIA FIRST 0.5 HR: Performed by: INTERNAL MEDICINE

## 2017-04-28 PROCEDURE — 74011000250 HC RX REV CODE- 250

## 2017-04-28 PROCEDURE — 76040000019: Performed by: INTERNAL MEDICINE

## 2017-04-28 RX ORDER — DEXTROMETHORPHAN/PSEUDOEPHED 2.5-7.5/.8
1.2 DROPS ORAL
Status: DISCONTINUED | OUTPATIENT
Start: 2017-04-28 | End: 2017-04-28 | Stop reason: HOSPADM

## 2017-04-28 RX ORDER — MIDAZOLAM HYDROCHLORIDE 1 MG/ML
5 INJECTION, SOLUTION INTRAMUSCULAR; INTRAVENOUS
Status: DISCONTINUED | OUTPATIENT
Start: 2017-04-28 | End: 2017-04-28 | Stop reason: HOSPADM

## 2017-04-28 RX ORDER — FENTANYL CITRATE 50 UG/ML
100 INJECTION, SOLUTION INTRAMUSCULAR; INTRAVENOUS ONCE
Status: DISCONTINUED | OUTPATIENT
Start: 2017-04-28 | End: 2017-04-28 | Stop reason: HOSPADM

## 2017-04-28 RX ORDER — ATROPINE SULFATE 0.1 MG/ML
0.5 INJECTION INTRAVENOUS
Status: DISCONTINUED | OUTPATIENT
Start: 2017-04-28 | End: 2017-04-28 | Stop reason: HOSPADM

## 2017-04-28 RX ORDER — DIPHENHYDRAMINE HYDROCHLORIDE 50 MG/ML
50 INJECTION, SOLUTION INTRAMUSCULAR; INTRAVENOUS ONCE
Status: DISCONTINUED | OUTPATIENT
Start: 2017-04-28 | End: 2017-04-28 | Stop reason: HOSPADM

## 2017-04-28 RX ORDER — SODIUM CHLORIDE 9 MG/ML
100 INJECTION, SOLUTION INTRAVENOUS CONTINUOUS
Status: DISCONTINUED | OUTPATIENT
Start: 2017-04-28 | End: 2017-04-28 | Stop reason: HOSPADM

## 2017-04-28 RX ORDER — SODIUM CHLORIDE 0.9 % (FLUSH) 0.9 %
5-10 SYRINGE (ML) INJECTION EVERY 8 HOURS
Status: DISCONTINUED | OUTPATIENT
Start: 2017-04-28 | End: 2017-04-28 | Stop reason: HOSPADM

## 2017-04-28 RX ORDER — SODIUM CHLORIDE 9 MG/ML
50 INJECTION, SOLUTION INTRAVENOUS CONTINUOUS
Status: DISCONTINUED | OUTPATIENT
Start: 2017-04-28 | End: 2017-04-28 | Stop reason: HOSPADM

## 2017-04-28 RX ORDER — DEXTROSE MONOHYDRATE AND SODIUM CHLORIDE 5; .9 G/100ML; G/100ML
100 INJECTION, SOLUTION INTRAVENOUS CONTINUOUS
Status: DISCONTINUED | OUTPATIENT
Start: 2017-04-28 | End: 2017-04-28 | Stop reason: HOSPADM

## 2017-04-28 RX ORDER — EPINEPHRINE 0.1 MG/ML
1 INJECTION INTRACARDIAC; INTRAVENOUS
Status: DISCONTINUED | OUTPATIENT
Start: 2017-04-28 | End: 2017-04-28 | Stop reason: HOSPADM

## 2017-04-28 RX ORDER — FLUMAZENIL 0.1 MG/ML
0.2 INJECTION INTRAVENOUS
Status: DISCONTINUED | OUTPATIENT
Start: 2017-04-28 | End: 2017-04-28 | Stop reason: HOSPADM

## 2017-04-28 RX ORDER — NALOXONE HYDROCHLORIDE 0.4 MG/ML
0.4 INJECTION, SOLUTION INTRAMUSCULAR; INTRAVENOUS; SUBCUTANEOUS
Status: DISCONTINUED | OUTPATIENT
Start: 2017-04-28 | End: 2017-04-28 | Stop reason: HOSPADM

## 2017-04-28 RX ORDER — PROPOFOL 10 MG/ML
INJECTION, EMULSION INTRAVENOUS AS NEEDED
Status: DISCONTINUED | OUTPATIENT
Start: 2017-04-28 | End: 2017-04-28 | Stop reason: HOSPADM

## 2017-04-28 RX ORDER — LIDOCAINE HYDROCHLORIDE 20 MG/ML
5 SOLUTION OROPHARYNGEAL AS NEEDED
Status: DISCONTINUED | OUTPATIENT
Start: 2017-04-28 | End: 2017-04-28 | Stop reason: HOSPADM

## 2017-04-28 RX ORDER — DEXTROSE 50 % IN WATER (D50W) INTRAVENOUS SYRINGE
25
Status: COMPLETED | OUTPATIENT
Start: 2017-04-28 | End: 2017-04-28

## 2017-04-28 RX ORDER — LORAZEPAM 2 MG/ML
2 INJECTION INTRAMUSCULAR AS NEEDED
Status: DISCONTINUED | OUTPATIENT
Start: 2017-04-28 | End: 2017-04-28 | Stop reason: HOSPADM

## 2017-04-28 RX ORDER — SODIUM CHLORIDE 0.9 % (FLUSH) 0.9 %
5-10 SYRINGE (ML) INJECTION AS NEEDED
Status: DISCONTINUED | OUTPATIENT
Start: 2017-04-28 | End: 2017-04-28 | Stop reason: HOSPADM

## 2017-04-28 RX ORDER — LIDOCAINE HYDROCHLORIDE 20 MG/ML
INJECTION, SOLUTION EPIDURAL; INFILTRATION; INTRACAUDAL; PERINEURAL AS NEEDED
Status: DISCONTINUED | OUTPATIENT
Start: 2017-04-28 | End: 2017-04-28 | Stop reason: HOSPADM

## 2017-04-28 RX ADMIN — LIDOCAINE HYDROCHLORIDE 40 MG: 20 INJECTION, SOLUTION EPIDURAL; INFILTRATION; INTRACAUDAL; PERINEURAL at 11:15

## 2017-04-28 RX ADMIN — DEXTROSE MONOHYDRATE 25 G: 25 INJECTION, SOLUTION INTRAVENOUS at 10:43

## 2017-04-28 RX ADMIN — SODIUM CHLORIDE 50 ML/HR: 900 INJECTION, SOLUTION INTRAVENOUS at 10:46

## 2017-04-28 RX ADMIN — PROPOFOL 200 MG: 10 INJECTION, EMULSION INTRAVENOUS at 11:29

## 2017-04-28 NOTE — ANESTHESIA POSTPROCEDURE EVALUATION
Post-Anesthesia Evaluation and Assessment    Patient: Surekha Webb MRN: [id number]  SSN: [id number]    YOB: 1950  Age: 78 y.o. Sex: male       Cardiovascular Function/Vital Signs  Visit Vitals    /80    Pulse 91    Temp 36.4 °C (97.5 °F)    Resp 17    Ht 6' 2\" (1.88 m)    Wt 127.5 kg (281 lb)    SpO2 96%    BMI 36.08 kg/m2       Patient is status post total IV anesthesia anesthesia for Procedure(s):  COLONOSCOPY. Nausea/Vomiting: None    Postoperative hydration reviewed and adequate. Pain:  Pain Scale 1: Numeric (0 - 10) (04/28/17 1202)  Pain Intensity 1: 0 (04/28/17 1202)   Managed    Neurological Status: At baseline    Mental Status and Level of Consciousness: Arousable    Pulmonary Status:   O2 Device: Room air (04/28/17 1202)   Adequate oxygenation and airway patent    Complications related to anesthesia: None    Post-anesthesia assessment completed.  No concerns    Signed By: Toni Lea DO     April 28, 2017

## 2017-04-28 NOTE — IP AVS SNAPSHOT
[address] [facility] 
[phone] Patient: Mary Kitchen MRN: [id number] YYH:[id number]/[id number] You are allergic to the following No active allergies Recent Documentation Height Weight BMI Smoking Status 1.88 m 127.5 kg 36.08 kg/m2 Current Every Day Smoker Emergency Contacts Name Discharge Info Relation Home Work Mobile Vicki Solano  Child [2] [phone] About your hospitalization You were admitted on:  April 28, 2017 You last received care in the:  [facility] You were discharged on:  April 28, 2017 Unit phone number:  [phone] Why you were hospitalized Your primary diagnosis was:  Not on File Providers Seen During Your Hospitalizations Provider Role Specialty Primary office phone Fidel Pritchett MD Attending Provider Internal Medicine [phone] Your Primary Care Physician (PCP) Primary Care Physician Office Phone Office Fax [address] [phone] Follow-up Information Follow up With Details Comments Contact Info Bubba Johns MD   [address] 
[phone] Your Appointments Thursday May 11, 2017  2:30 PM EDT ROUTINE CARE with Bubba Johns MD  
[facility] ([address]) [facility],[address] [place] [address] [phone] Current Discharge Medication List  
  
CONTINUE these medications which have NOT CHANGED Dose & Instructions Dispensing Information Comments Morning Noon Evening Bedtime  
 acetaminophen 325 mg tablet Commonly known as:  TYLENOL Your last dose was: Your next dose is:    
   
   
 Dose:  650 mg Take 2 Tabs by mouth every four (4) hours as needed. Quantity:  30 Tab Refills:  0 aspirin delayed-release 81 mg tablet Your last dose was: Your next dose is: TAKE ONE TABLET BY MOUTH DAILY Quantity:  30 Tab Refills:  0  
     
   
   
   
  
 atorvastatin 40 mg tablet Commonly known as:  LIPITOR Your last dose was: Your next dose is:    
   
   
 Dose:  40 mg Take 1 Tab by mouth nightly. Quantity:  30 Tab Refills:  11 Calcium Carbonate-Vit D3-Min 600 mg calcium- 400 unit Tab Your last dose was: Your next dose is: Take  by mouth two (2) times a day. Refills:  0  
     
   
   
   
  
 COLACE 100 mg capsule Generic drug:  docusate sodium Your last dose was: Your next dose is:    
   
   
 Dose:  100 mg Take 100 mg by mouth daily. Refills:  0  
     
   
   
   
  
 dilTIAZem 300 mg SR capsule Commonly known as:  [facility] Your last dose was: Your next dose is:    
   
   
 Dose:  300 mg Take 1 Cap by mouth daily. Quantity:  30 Cap Refills:  12  
     
   
   
   
  
 fluticasone 50 mcg/actuation nasal spray Commonly known as:  Dolly Peña Your last dose was: Your next dose is:    
   
   
 INHALE 2 SPRAYS IN EACH NOSTRIL EVERY DAY. Quantity:  16 g Refills:  12  
     
   
   
   
  
 gabapentin 300 mg capsule Commonly known as:  NEURONTIN Your last dose was: Your next dose is:    
   
   
 2 tabs tid Quantity:  180 Cap Refills:  3  
     
   
   
   
  
 glucose blood VI test strips strip Commonly known as:  CONTOUR NEXT STRIPS Your last dose was: Your next dose is:    
   
   
 Test 2-3 times daily Quantity:  100 Strip Refills:  12  
     
   
   
   
  
 ibuprofen 800 mg tablet Commonly known as:  MOTRIN Your last dose was: Your next dose is:    
   
   
 Dose:  800 mg Take 1 Tab by mouth every eight (8) hours as needed for Pain. Quantity:  90 Tab Refills:  12 insulin regular 100 unit/mL injection Commonly known as:  Chely Oats, HUMULIN R Your last dose was: Your next dose is:    
   
   
 Dose:  14 Units 14 Units by SubCUTAneous route three (3) times daily as needed (with meals). Quantity:  1 Vial  
Refills:  12 Lancets Misc Your last dose was: Your next dose is:    
   
   
 Use 2-3 times daily Quantity:  100 Each Refills:  12 LANTUS 100 unit/mL injection Generic drug:  insulin glargine Your last dose was: Your next dose is: INJECT 72 UNITS SUBCUANEOUSLY EVERY 12 HOURS Quantity:  3 Vial  
Refills:  3  
     
   
   
   
  
 lidocaine 5 % Commonly known as:  Merry Molina Your last dose was: Your next dose is:    
   
   
 Dose:  1 Patch 1 Patch by TransDERmal route every twenty-four (24) hours. Apply to affected area every 24 hours Quantity:  1 Package Refills:  3  
     
   
   
   
  
 loratadine 10 mg tablet Commonly known as:  Cris Amble Your last dose was: Your next dose is:    
   
   
 Dose:  10 mg Take 10 mg by mouth daily. Refills:  0  
     
   
   
   
  
 oxyCODONE-acetaminophen 5-325 mg per tablet Commonly known as:  PERCOCET Your last dose was: Your next dose is:    
   
   
 Dose:  1 Tab Take 1 Tab by mouth every eight (8) hours as needed. Quantity:  14 Tab Refills:  0  
     
   
   
   
  
 THERA tablet Generic drug:  therapeutic multivitamin Your last dose was: Your next dose is:    
   
   
 Dose:  1 Tab Take 1 Tab by mouth daily. Refills:  0 [address] Your last dose was: Your next dose is: Custom electric wheelchair Quantity:  1 Each Refills:  0 Discharge Instructions Endoscopy Discharge Instructions Dr. Arvind Alva [place] office  [address]    [phone] NAME: Sharlene Groves RECORD SCJR:[id number] PATIENT SS#[id number] YOB: 1950 SEX:  male AGE:  78 y.o. FINAL Discharge Procedure and Diagnosis:   
  
Procedure(s): 
COLONOSCOPY FINDINGS:    
Diverticulosis Internal hemorrhoids MEDICATIONS CONTINUE CURRENT MEDICATIONS 
 
  NEW MEDICATIONS 1.  
 2.  
 3.  
 
 
 
  
  
 
  
 
Testing Schedule Colonoscopy Screening                                   Recommendations Repeat colonoscopy in 1 years Prep for 2 days next colon Repeat colonoscopy in 5 years Repeat colonoscopy in 10 years New additional 
Tests Call the office  
([phone]) for the appointment time YOUR NEXT APPOINTMENT WITH DR Lois Rojas: in  4 week(s). Asif Meyer MD  
                 
 
 
                     
                                     
   If you had a colonoscopy the \"C\" indicates specific instructions    
  
x  Ordinarily you may resume your previous diet but your initial diet should be   light. Large meals can cause abdominal discomfort after these procedures. Specific Diet Recommendations:  
    
      High fiber diet.,  
 
     GERD diet: avoid fried and fatty foods, peppermint, chocolate, alcohol,    
          coffee, citrus fruits and juices, and tomato products. Avoid lying down for 2           to 3  hours after eating.       
            
__x__  You may feel quite tired and need to rest and recuperate for several hours    following these procedures. __x__  Due to the fact that sedation was administered for this procedure, do not drive,   operate machinery or sign legal documents for the next 24 hours. __x__  Mild abdominal pain may be experienced after your procedure, but is should   disappear after several hours. Notify your physician if you have persistent pain,   tenderness or abdominal distension. __x__  C Many patients for the first few hours following the exam may experience        belching or passing gas through the rectum. Walking may help to relieve      
 distention and gas pains. A warm bath or shower will often help with abdominal  cramping.                                                                                        
  
__x__   Gladystine Fierro may return to your normal routine tomorrow, according to how you feel        and depending on your doctors instructions. Be sure to call your doctor to make  an appointment for a post-surgery check-up on the date your doctor has   requested. __x__ C Rectal bleeding or spotting in small amounts may occur with the first bowel   movement following a colonoscopy or sigmoidoscopy. __x__  Gladystine Fierro may experience a numbness or lack of sensation in throat. If present, do not   
 eat or drink. Before eating, test your ability to drink with small sips of water. Y     You may try clear liquids or soups. If you tolerate these, you may then eat solid     food which is not greasy or spicy. __x__ C   
 IF POLYPS REMOVED: Avoid any blood thinning medication such as plavix,   aspirin or coumadin  NSAIDS (like advil or alleve) for 7 days. __x__  Notify your physician if you cough or vomit blood or experience chest pain. Your biopsy or testing result should be available in 7-10 days Prescription will be electronically sent to your pharmacy you must  
  let your nurse know your pharmacy:  
                                                                                                                               
 
 
     [id number] [place] [address]. TO HELP ENSURE A SMOOTH RECOVERY,  
    IT IS IMPORTANT TO FOLLOW THEM. _x___Pamphlet /Educational Information provided for diagnostic findings Additional education information can assessed at the sites below: 
 Mihaela 
 http://www.digestive. niddk.nih.gov/ddiseases/a-z.asp Web MD patient information Signature of individual giving instruction : 
 Date: 2017 [facility] Activation Thank you for requesting access to Wander[employer]. Please follow the instructions below to securely access and download your online medical record. Wanderio allows you to send messages to your doctor, view your test results, renew your prescriptions, schedule appointments, and more. How Do I Sign Up? 1. In your internet browser, go to [email or web address] 
2. Click on the First Time User? Click Here link in the Sign In box. You will be redirect to the New Member Sign Up page. 3. Enter your Wanderio Access Code exactly as it appears below. You will not need to use this code after youve completed the sign-up process. If you do not sign up before the expiration date, you must request a new code. Wanderio Access Code: Activation code not generated Current Wanderio Status: Patient Declined (This is the date your Wanderio access code will ) 4.  Enter the last four digits of your Social Security Number (xxxx) and Date of Birth (mm/dd/yyyy) as indicated and click Submit. You will be taken to the next sign-up page. 5. Create a Progressus ID. This will be your Progressus login ID and cannot be changed, so think of one that is secure and easy to remember. 6. Create a Progressus password. You can change your password at any time. 7. Enter your Password Reset Question and Answer. This can be used at a later time if you forget your password. 8. Enter your e-mail address. You will receive e-mail notification when new information is available in [address] 9. Click Sign Up. You can now view and download portions of your medical record. 10. Click the Download Summary menu link to download a portable copy of your medical information. Additional Information If you have questions, please visit the Frequently Asked Questions section of the Progressus website at [email or web address] Remember, Progressus is NOT to be used for urgent needs. For medical emergencies, dial 911. Discharge Orders None General Information Please provide this summary of care documentation to your next provider. Patient Signature:  ____________________________________________________________ Date:  ____________________________________________________________  
  
Codi Moe Provider Signature:  ____________________________________________________________ Date:  ____________________________________________________________

## 2017-04-28 NOTE — H&P
G I Procedure Note           Endoscopy History and Physical               Dr. Velazquez [facility] Office [address] [id number]  [id number]    1950  78 y.o.  male      Date of Procedure:   Preoperative Diagnosis:       Procedure:    4/28/2017           rectal bleeding                              Procedure(s):  COLONOSCOPY      Gastroenterologist:  Anesthesia:           Lorenzo Paulino MD                               MAC            History and procedure indication:  Cristal Johnson is a 78 y.o. BLACK OR  male who presents with: rectal bleeding   including the additional history of Gastrointestinal Bleeding ,Rectal bleeding,,  Change in bowel habits      Past Medical History:   Diagnosis Date    Arthritis, Degenerative,  Knee 4/4/2011    Carcinoma, Prostate 4/4/2011    Degenerative disc disease 4/4/2011    Depression/ Anxiety 4/4/2011    Diabetes ([address]     Diabetes Mellitrus ( non-insulin dependent ) Type 2 4/4/2011    HEMATOCHEZIA 4/4/2011    Hypertrension 4/4/2011    Hypertriglyceridemia 4/4/2011    Insomnia 4/4/2011    Laryngitis 4/4/2011    Mild Aortic insufficiency 4/4/2011    Mild Concentric LVH (left ventricular hypertrophy) 4/4/2011    Neuropathy 4/4/2011    Osteoarthritis 4/4/2011    Rotator Cuff Tendonitis 4/4/2011      Prior to Admission medications    Medication Sig Start Date End Date Taking? Authorizing Provider   ibuprofen (MOTRIN) 800 mg tablet Take 1 Tab by mouth every eight (8) hours as needed for Pain. 4/4/17  Yes Jessica Pitt MD   LANTUS 100 unit/mL injection INJECT 72 UNITS SUBCUANEOUSLY EVERY 12 HOURS 2/27/17  Yes Jorge Herbert MD   insulin regular (NOVOLIN R, HUMULIN R) 100 unit/mL injection 14 Units by SubCUTAneous route three (3) times daily as needed (with meals).  2/24/17  Yes Jessica Pitt MD   gabapentin (NEURONTIN) 300 mg capsule 2 tabs tid 2/14/17  Yes Dionne Correa MD   dilTIAZem [facility]) 300 mg SR capsule Take 1 Cap by mouth daily. 2/14/17  Yes Dionne Correa MD   fluticasone [facility]) 50 mcg/actuation nasal spray INHALE 2 SPRAYS IN EACH NOSTRIL EVERY DAY. 2/14/17  Yes Dionne Correa MD   atorvastatin (LIPITOR) 40 mg tablet Take 1 Tab by mouth nightly. 2/14/17  Yes Dionne Correa MD   acetaminophen (TYLENOL) 325 mg tablet Take 2 Tabs by mouth every four (4) hours as needed. 10/28/16  Yes Orlando Flores MD   therapeutic multivitamin (THERA) tablet Take 1 Tab by mouth daily. Yes Historical Provider   loratadine (CLARITIN) 10 mg tablet Take 10 mg by mouth daily. Yes Historical Provider   lidocaine (LIDODERM) 5 % 1 Patch by TransDERmal route every twenty-four (24) hours. Apply to affected area every 24 hours 4/13/17   Dionne Correa MD   aspirin delayed-release 81 mg tablet TAKE ONE TABLET BY MOUTH DAILY 3/20/17   Cristiane Aguilar MD   Wheel Chair dianna Custom electric wheelchair 3/2/17   Dionne Correa MD   oxyCODONE-acetaminophen (PERCOCET) 5-325 mg per tablet Take 1 Tab by mouth every eight (8) hours as needed. 3/2/17   Dionne Correa MD   Lancets misc Use 2-3 times daily 2/24/17   Dionne Correa MD   glucose blood VI test strips (CONTOUR NEXT STRIPS) strip Test 2-3 times daily 1/17/17   Dionne Correa MD   Calcium Carbonate-Vit D3-Min 600 mg calcium- 400 unit tab Take  by mouth two (2) times a day. Historical Provider   docusate sodium (COLACE) 100 mg capsule Take 100 mg by mouth daily. Historical Provider     No Known Allergies    Past Surgical History:   Procedure Laterality Date    CARDIAC SURG PROCEDURE UNLIST      cardiac cath    HX ORTHOPAEDIC      left hip pinning    HX OTHER SURGICAL      left little toe amputation    HX UROLOGICAL       History reviewed. No pertinent family history.    Social History   Substance Use Topics    Smoking status: Current Every Day Smoker     Packs/day: 0.25     Last attempt to quit: 10/25/2016    Smokeless tobacco: Never Used    Alcohol use 7.0 oz/week     7 Cans of beer, 7 Shots of liquor per week                                                      PHYSICAL EXAM     Visit Vitals    /83    Pulse 91    Temp 98.2 °F (36.8 °C)    Resp 20    Ht 6' 2\" (1.88 m)    Wt 127.5 kg (281 lb)    SpO2 94%    BMI 36.08 kg/m2       General appearance:  alert, well appearing, and in no distress  Mental status:  normal mood, behavior, speech, dress, motor activity and thought processes  Nose:      normal and patent, no erythema, discharge or polyps  Mouth:- mucous membranes moist, pharynx normal without lesions                  [x]  No Loose teeth      []    Loose teeth  Finger opening:  []1     []1.5    [] 2     [] 2.5     [x] 3      [] 3.5     [] 4   Mallampati:         [] Class 1     [x] Class 2    [] Class 3      [] Class 4      Neck - supple,      [x] Full ROM [] Decreased ROM  [] Short Neck no significant adenopathy    Chest - clear to auscultation, no wheezes, rales or rhonchi, symmetric air entry  Heart: normal rate, regular rhythm, normal S1, S2, no murmurs, rubs, clicks or gallops  Abdomen: abdomen soft, bowel sounds  [x] normal  [] increased  [] hypoactive                       [] no tenderness  [] epigastric tenderness  [] LLQ tenderness   [] RLQ tenderness                      No masses, organomegaly or guarding. Rectal exam: negative without mass, lesions or tenderness  Extremities: peripheral pulses normal, no pedal edema, no clubbing or cyanosis  Neurologic: Alert and oriented to person, place, and time; normal strength and tone.                          Normal symmetric reflexes  Normal gait:                                      Assessement:                                 Pre op dx:  rectal bleeding   Additional medical problems list below   Patient Active Problem List   Diagnosis Code    Laryngitis J04.0  Diabetic polyneuropathy (HCC) E11.42    Rotator Cuff Tendonitis M71.9, M67.919    Diabetes mellitus type 2, controlled (HCC) E11.9    Insomnia G47.00    Carcinoma, Prostate C61    Degenerative disc disease [id number]    Osteoarthrosis involving lower leg M17.10    Depression/ Anxiety F34.1    HTN (hypertension) I10    Osteoarthritis M16.9    Hypertriglyceridemia E78.1    HEMATOCHEZIA K62.5    Mild Aortic Insufficiency I35.1    Mild Concentric LVH (left ventricular hypertrophy) I51.7    Osteomyelitis (HCC) M86.9                                                                                         This note documentation was performed prior to this planned procedure       after a history and physical was performed in the office. Date:  4 15 17                   Pre Procedure Evaluation (per anesthesia or per h&p)                                                Sedation/Assessment:                                                                                               Mallampati Classification                            []Class 1                    []Class 2                    [] Class 3                  [] Class 4                                              ASA classfication         []     Class I: Normally healthy         []     Class II: Patient with mild systemic disease (e.g. hypertension)         []     Class III: Patient with severe systemic disease (e.g. CHF), non-decompensated         []     Class IV: Patient with severe systemic disease, decompensated         []     Class V: Moribund patient, survival unlikely                     Plan:  []  Egd                                 [x] Colonoscopy                               [] with Moderate Sedation /Conscious Sedation                                 [x] MAC          Patient stable for planned procedure. See orders.      Eliseo Faust MD

## 2017-04-28 NOTE — DISCHARGE INSTRUCTIONS
Endoscopy Discharge Instructions     Dr. Bo Rodas office   [facility]    [phone]                                        NAME: Aditya Alt RECORD LBNGYB:[id number]   PATIENT SS#[id number] YOB: 1950   SEX:  male AGE:  78 y.o. FINAL Discharge Procedure and Diagnosis:       Procedure(s):  COLONOSCOPY       FINDINGS:     Diverticulosis   Internal hemorrhoids                                             MEDICATIONS    [x] CONTINUE CURRENT MEDICATIONS     [] NEW MEDICATIONS           1.    2.    3.                      Testing   Schedule              Colonoscopy Screening                                   Recommendations   [x]  Repeat colonoscopy in 1 years  Prep for 2 days next colon   []  Repeat colonoscopy in 5 years   []  Repeat colonoscopy in 10 years      New additional  Tests  Call the office   [phone] for the appointment time      []      []      []                YOUR NEXT APPOINTMENT WITH DR Ida Vargas: in  4 week(s). Ronney Riedel., MD                                                                                        If you had a colonoscopy the \"C\" indicates specific instructions        x  Ordinarily you may resume your previous diet but your initial diet should be   light. Large meals can cause abdominal discomfort after these procedures. Specific Diet Recommendations:             [x] High fiber diet.,         [] GERD diet: avoid fried and fatty foods, peppermint, chocolate, alcohol,               coffee, citrus fruits and juices, and tomato products. Avoid lying down for 2           to 3  hours after eating.                     __x__  You may feel quite tired and need to rest and recuperate for several hours    following these procedures. __x__  Due to the fact that sedation was administered for this procedure, do not drive,   operate machinery or sign legal documents for the next 24 hours. __x__  Mild abdominal pain may be experienced after your procedure, but is should   disappear after several hours. Notify your physician if you have persistent pain,   tenderness or abdominal distension. __x__  C    Many patients for the first few hours following the exam may experience        belching or passing gas through the rectum. Walking may help to relieve        distention and gas pains. A warm bath or shower will often help with abdominal  cramping.                                                                                            __x__   Isaias Decant may return to your normal routine tomorrow, according to how you feel        and depending on your doctors instructions. Be sure to call your doctor to make  an appointment for a post-surgery check-up on the date your doctor has   requested. __x__ C     Rectal bleeding or spotting in small amounts may occur with the first bowel   movement following a colonoscopy or sigmoidoscopy. __x__  Isaias Decant may experience a numbness or lack of sensation in throat. If present, do not     eat or drink. Before eating, test your ability to drink with small sips of water. Y     You may try clear liquids or soups. If you tolerate these, you may then eat solid     food which is not greasy or spicy. __x__ C     IF POLYPS REMOVED: Avoid any blood thinning medication such as plavix,   aspirin or coumadin  NSAIDS (like advil or alleve) for 7 days. __x__  Notify your physician if you cough or vomit blood or experience chest pain.            Your biopsy or testing result should be available in 7-10 days Prescription will be electronically sent to your pharmacy you must     let your nurse know your pharmacy:                                                                                                                                            [id number] [place] [address]. TO HELP ENSURE A SMOOTH RECOVERY,       IT IS IMPORTANT TO FOLLOW THEM. _x___Pamphlet /Educational Information provided for diagnostic findings     Additional education information can assessed at the sites below:   Mihaela   [email or web address].[email or web address] niddk.nih.gov/ddiseases/a-z.asp      Web MD patient information                                                                                                Signature of individual giving instruction :   Date: 2017                                                                                                                                inkSIG Digitalhart Activation    Thank you for requesting access to Vamo. Please follow the instructions below to securely access and download your online medical record. Vamo allows you to send messages to your doctor, view your test results, renew your prescriptions, schedule appointments, and more. How Do I Sign Up? 1. In your internet browser, go to [email or web address]  2. Click on the First Time User? Click Here link in the Sign In box. You will be redirect to the New Member Sign Up page. 3. Enter your Vamo Access Code exactly as it appears below. You will not need to use this code after youve completed the sign-up process. If you do not sign up before the expiration date, you must request a new code. Vamo Access Code: Activation code not generated  Current Vamo Status: Patient Declined (This is the date your Vamo access code will )    4.  Enter the last four digits of your Social Security Number (xxxx) and Date of Birth (mm/dd/yyyy) as indicated and click Submit. You will be taken to the next sign-up page. 5. Create a Newvem ID. This will be your Newvem login ID and cannot be changed, so think of one that is secure and easy to remember. 6. Create a Newvem password. You can change your password at any time. 7. Enter your Password Reset Question and Answer. This can be used at a later time if you forget your password. 8. Enter your e-mail address. You will receive e-mail notification when new information is available in [address] 9. Click Sign Up. You can now view and download portions of your medical record. 10. Click the Download Summary menu link to download a portable copy of your medical information. Additional Information    If you have questions, please visit the Frequently Asked Questions section of the Newvem website at [email or web address] Remember, Newvem is NOT to be used for urgent needs. For medical emergencies, dial 911.

## 2017-04-28 NOTE — PROCEDURES
G I Procedure Note              COLONOSCOPY   Dr. Laura Hung office   [facility]                                   [id number]                                  [id number]   1950                                      78 y.o.                                    male      Procedure Date: 4/28/2017                                                                                                              Pre Op Diagnosis:                     1. rectal bleeding                                                                                                                                                                        Post Op Diagnosis:                    1.   diverticulosis,                                                          2.  Grade 1 internal hemorrhoids                  H&p completed: Yes            Anesthesia Assessment: Performed prior to procedure:      No change  Anesthesia Plan: Performed prior to procedure:                   No change       Medications: See Reviewed List and Reconcilation           Informed consent was obtained     Risk Statement:  Prior to the procedure the risks were explained to the patient and/or to the family including but not limited to perforation, bleeding, adverse drug reaction, aspiration, and even the need for possible surgery. A colonoscopy exam is not 100% accurate which may be related to preparation or blind spots during the exam.The possibility that an abnormality and /or cancer could be missed was also discussed as well as alternative x-ray options. Instrument:    Olympus adult Videocolonoscope                                   Immediate Procedure Reassessment Completed     With the patient in the left lateral position, a rectal examination was performed and the findings were:negative, stool guaiac negative.   The Olympus Video colonoscope was inserted under direct vision into the rectum. The colonoscope was passed from the rectum to the cecum, which was identified by the ileocecal valve. The colon findings demonstrated:    ANUS: Anal exam reveals no masses or hemorrhoids, sphincter tone is normal.     RECTUM: Rectal exam reveals no masses or hemorrhoids. SIGMOID COLON: The patient was noted to have diverticulosis. The mucosa is normal with good vascular pattern and without ulcers or polyps. DESCENDING COLON:  The mucosa is normal with good vascular pattern and without ulcers or polyps. SPLENIC FLEXURE: The splenic flexure is normal.     TRANSVERSE COLON: The mucosa is normal with good vascular pattern and without ulcers or polyps. HEPATIC FLEXURE: The hepatic flexure is normal.     ASCENDING COLON: The mucosa is normal with good vascular pattern and without ulcers or polyps. CECUM:  The ileocecal valve appears normal.     Scatttered diverticulosis was noted. A  .     The colonoscope was slowly withdrawn >6 minute period and the instrument was retroflexed in the rectum. The rectal findings were:Protruding lesions:     -Internal Hemorrhoids  The patient tolerated the entire procedure well. Blood Loss minimal nominal    Colon preparation was good    Recommendations:     - Repeat colonoscopy in 1 years.    - 2 day prep for next procedure       Copies sent to   Franny Hennessy MD  CC:  Sheryle Files., MD

## 2017-04-28 NOTE — ANESTHESIA PREPROCEDURE EVALUATION
Anesthetic History   No history of anesthetic complications            Review of Systems / Medical History  Patient summary reviewed, nursing notes reviewed and pertinent labs reviewed    Pulmonary        Sleep apnea: No treatment  Smoker         Neuro/Psych         Psychiatric history    Comments: LE neuropathy Cardiovascular    Hypertension: well controlled          Hyperlipidemia    Exercise tolerance: <4 METS     GI/Hepatic/Renal                Endo/Other    Diabetes: well controlled, type 2    Morbid obesity, arthritis and cancer (prostate)     Other Findings              Physical Exam    Airway  Mallampati: II  TM Distance: 4 - 6 cm  Neck ROM: normal range of motion, short neck   Mouth opening: Normal     Cardiovascular    Rhythm: regular  Rate: normal         Dental    Dentition: Full upper dentures and Poor dentition     Pulmonary  Breath sounds clear to auscultation               Abdominal  GI exam deferred       Other Findings            Anesthetic Plan    ASA: 3  Anesthesia type: MAC and total IV anesthesia          Induction: Intravenous  Anesthetic plan and risks discussed with: Patient

## 2017-04-28 NOTE — IP AVS SNAPSHOT
Current Discharge Medication List  
  
CONTINUE these medications which have NOT CHANGED Dose & Instructions Dispensing Information Comments Morning Noon Evening Bedtime  
 acetaminophen 325 mg tablet Commonly known as:  TYLENOL Your last dose was: Your next dose is:    
   
   
 Dose:  650 mg Take 2 Tabs by mouth every four (4) hours as needed. Quantity:  30 Tab Refills:  0  
     
   
   
   
  
 aspirin delayed-release 81 mg tablet Your last dose was: Your next dose is: TAKE ONE TABLET BY MOUTH DAILY Quantity:  30 Tab Refills:  0  
     
   
   
   
  
 atorvastatin 40 mg tablet Commonly known as:  LIPITOR Your last dose was: Your next dose is:    
   
   
 Dose:  40 mg Take 1 Tab by mouth nightly. Quantity:  30 Tab Refills:  11 Calcium Carbonate-Vit D3-Min 600 mg calcium- 400 unit Tab Your last dose was: Your next dose is: Take  by mouth two (2) times a day. Refills:  0  
     
   
   
   
  
 COLACE 100 mg capsule Generic drug:  docusate sodium Your last dose was: Your next dose is:    
   
   
 Dose:  100 mg Take 100 mg by mouth daily. Refills:  0  
     
   
   
   
  
 dilTIAZem 300 mg SR capsule Commonly known as:  [facility] Your last dose was: Your next dose is:    
   
   
 Dose:  300 mg Take 1 Cap by mouth daily. Quantity:  30 Cap Refills:  12  
     
   
   
   
  
 fluticasone 50 mcg/actuation nasal spray Commonly known as:  Creasie Cockayne Your last dose was: Your next dose is:    
   
   
 INHALE 2 SPRAYS IN EACH NOSTRIL EVERY DAY. Quantity:  16 g Refills:  12  
     
   
   
   
  
 gabapentin 300 mg capsule Commonly known as:  NEURONTIN Your last dose was: Your next dose is:    
   
   
 2 tabs tid Quantity:  180 Cap Refills:  3 glucose blood VI test strips strip Commonly known as:  CONTOUR NEXT STRIPS Your last dose was: Your next dose is:    
   
   
 Test 2-3 times daily Quantity:  100 Strip Refills:  12  
     
   
   
   
  
 ibuprofen 800 mg tablet Commonly known as:  MOTRIN Your last dose was: Your next dose is:    
   
   
 Dose:  800 mg Take 1 Tab by mouth every eight (8) hours as needed for Pain. Quantity:  90 Tab Refills:  12  
     
   
   
   
  
 insulin regular 100 unit/mL injection Commonly known as:  BRENDEN Lopez Your last dose was: Your next dose is:    
   
   
 Dose:  14 Units 14 Units by SubCUTAneous route three (3) times daily as needed (with meals). Quantity:  1 Vial  
Refills:  12 Lancets Misc Your last dose was: Your next dose is:    
   
   
 Use 2-3 times daily Quantity:  100 Each Refills:  12 LANTUS 100 unit/mL injection Generic drug:  insulin glargine Your last dose was: Your next dose is: INJECT 72 UNITS SUBCUANEOUSLY EVERY 12 HOURS Quantity:  3 Vial  
Refills:  3  
     
   
   
   
  
 lidocaine 5 % Commonly known as:  Dhruv Brush Your last dose was: Your next dose is:    
   
   
 Dose:  1 Patch 1 Patch by TransDERmal route every twenty-four (24) hours. Apply to affected area every 24 hours Quantity:  1 Package Refills:  3  
     
   
   
   
  
 loratadine 10 mg tablet Commonly known as:  Echo Ford Your last dose was: Your next dose is:    
   
   
 Dose:  10 mg Take 10 mg by mouth daily. Refills:  0  
     
   
   
   
  
 oxyCODONE-acetaminophen 5-325 mg per tablet Commonly known as:  PERCOCET Your last dose was: Your next dose is:    
   
   
 Dose:  1 Tab Take 1 Tab by mouth every eight (8) hours as needed. Quantity:  14 Tab Refills:  0 THERA tablet Generic drug:  therapeutic multivitamin Your last dose was: Your next dose is:    
   
   
 Dose:  1 Tab Take 1 Tab by mouth daily. Refills:  0 [address] Your last dose was: Your next dose is: Custom electric wheelchair Quantity:  1 Each Refills:  0

## 2017-04-28 NOTE — ROUTINE PROCESS
TRANSFER - IN REPORT:    Verbal report received from Olamide RN(name) on Jonathon Rell  being received from endo(unit) for ordered procedure      Report consisted of patients Situation, Background, Assessment and   Recommendations(SBAR). Information from the following report(s) SBAR was reviewed with the receiving nurse. Opportunity for questions and clarification was provided. Assessment completed upon patients arrival to unit and care assumed.

## 2017-05-08 ENCOUNTER — TELEPHONE (OUTPATIENT)
Dept: INTERNAL MEDICINE CLINIC | Age: 67
End: 2017-05-08

## 2017-05-16 ENCOUNTER — OFFICE VISIT (OUTPATIENT)
Dept: INTERNAL MEDICINE CLINIC | Age: 67
End: 2017-05-16

## 2017-05-16 VITALS
SYSTOLIC BLOOD PRESSURE: 133 MMHG | HEIGHT: 74 IN | OXYGEN SATURATION: 95 % | HEART RATE: 64 BPM | TEMPERATURE: 96.2 F | DIASTOLIC BLOOD PRESSURE: 71 MMHG

## 2017-05-16 DIAGNOSIS — E11.42 DIABETIC POLYNEUROPATHY ASSOCIATED WITH TYPE 2 DIABETES MELLITUS (HCC): ICD-10-CM

## 2017-05-16 DIAGNOSIS — E55.9 VITAMIN D DEFICIENCY: ICD-10-CM

## 2017-05-16 DIAGNOSIS — E11.319 TYPE 2 DIABETES MELLITUS WITH BOTH EYES AFFECTED BY RETINOPATHY WITHOUT MACULAR EDEMA, WITHOUT LONG-TERM CURRENT USE OF INSULIN, UNSPECIFIED RETINOPATHY SEVERITY (HCC): ICD-10-CM

## 2017-05-16 DIAGNOSIS — M16.0 PRIMARY OSTEOARTHRITIS OF BOTH HIPS: Primary | ICD-10-CM

## 2017-05-16 DIAGNOSIS — C61 MALIGNANT NEOPLASM OF PROSTATE (HCC): ICD-10-CM

## 2017-05-16 DIAGNOSIS — I10 ESSENTIAL HYPERTENSION: ICD-10-CM

## 2017-05-16 DIAGNOSIS — R60.0 LOCALIZED EDEMA: ICD-10-CM

## 2017-05-16 LAB
CHOLEST SERPL-MCNC: 180 MG/DL
GLUCOSE POC: 177 MG/DL
HBA1C MFR BLD HPLC: 5.9 %
HDLC SERPL-MCNC: 36 MG/DL
LDL CHOLESTEROL POC: 77
NON-HDL GOAL (POC): 144
TCHOL/HDL RATIO (POC): 5
TRIGL SERPL-MCNC: 332 MG/DL

## 2017-05-16 RX ORDER — HYDROCHLOROTHIAZIDE 25 MG/1
25 TABLET ORAL DAILY
Qty: 30 TAB | Refills: 12 | Status: SHIPPED | OUTPATIENT
Start: 2017-05-16 | End: 2017-09-29 | Stop reason: ALTCHOICE

## 2017-05-16 NOTE — MR AVS SNAPSHOT
Visit Information Date & Time Provider Department Dept. Phone Encounter #  
 5/16/2017  2:45 PM Edita Peace MD [address] [place] [address] [phone] [id number] Follow-up Instructions Return in about 4 weeks (around 6/13/2017), or if symptoms worsen or fail to improve. Upcoming Health Maintenance Date Due  
 EYE EXAM RETINAL OR DILATED Q1 3/11/2016 GLAUCOMA SCREENING Q2Y 4/21/2016 Pneumococcal 65+ High/Highest Risk (1 of 2 - PCV13) 4/21/2016 FOOT EXAM Q1 9/8/2016 MICROALBUMIN Q1 10/8/2016 FOBT Q 1 YEAR AGE 50-75 1/14/2017 HEMOGLOBIN A1C Q6M 7/17/2017 INFLUENZA AGE 9 TO ADULT 8/1/2017 LIPID PANEL Q1 1/17/2018 MEDICARE YEARLY EXAM 4/14/2018 DTaP/Tdap/Td series (2 - Td) 12/10/2024 Allergies as of 5/16/2017  Review Complete On: 5/16/2017 By: Edita Peace MD  
 No Known Allergies Current Immunizations  Reviewed on 12/10/2014 Name Date Pneumococcal Polysaccharide (PPSV-23) 9/11/2014 Tdap 12/10/2014 Not reviewed this visit You Were Diagnosed With   
  
 Codes Comments Primary osteoarthritis of both hips    -  Primary ICD-10-CM: M16.0 ICD-9-CM: 715.15 Diabetic polyneuropathy associated with type 2 diabetes mellitus ([facility]     ICD-10-CM: E11.42 
ICD-9-CM: 250.60, 357.2 Malignant neoplasm of prostate [facility])     ICD-10-CM: J09 ICD-9-CM: 797 Essential hypertension     ICD-10-CM: I10 
ICD-9-CM: 401.9 Vitamin D deficiency     ICD-10-CM: E55.9 ICD-9-CM: 268.9 Localized edema     ICD-10-CM: R60.0 ICD-9-CM: 147. 3 Vitals BP Pulse Temp Height(growth percentile) SpO2 Smoking Status 133/71 (BP 1 Location: Right arm, BP Patient Position: Sitting) 64 96.2 °F (35.7 °C) 6' 2\" (1.88 m) 95% Current Every Day Smoker Vitals History Preferred Pharmacy Pharmacy Name Phone  [facility] [address], [address] [facility] [facility] Your Updated Medication List  
  
   
This list is accurate as of: 5/16/17  3:05 PM.  Always use your most recent med list.  
  
  
  
  
 acetaminophen 325 mg tablet Commonly known as:  TYLENOL Take 2 Tabs by mouth every four (4) hours as needed. aspirin delayed-release 81 mg tablet TAKE ONE TABLET BY MOUTH DAILY  
  
 atorvastatin 40 mg tablet Commonly known as:  LIPITOR Take 1 Tab by mouth nightly. Calcium Carbonate-Vit D3-Min 600 mg calcium- 400 unit Tab Take  by mouth two (2) times a day. COLACE 100 mg capsule Generic drug:  docusate sodium Take 100 mg by mouth daily. dilTIAZem 300 mg SR capsule Commonly known as:  [facility] Take 1 Cap by mouth daily. fluticasone 50 mcg/actuation nasal spray Commonly known as:  Ala Lips INHALE 2 SPRAYS IN EACH NOSTRIL EVERY DAY. gabapentin 300 mg capsule Commonly known as:  NEURONTIN  
2 tabs tid  
  
 glucose blood VI test strips strip Commonly known as:  CONTOUR NEXT STRIPS Test 2-3 times daily  
  
 ibuprofen 800 mg tablet Commonly known as:  MOTRIN Take 1 Tab by mouth every eight (8) hours as needed for Pain. insulin regular 100 unit/mL injection Commonly known as:  NOVOLIN R, HUMULIN R  
14 Units by SubCUTAneous route three (3) times daily as needed (with meals). Lancets Misc Use 2-3 times daily LANTUS 100 unit/mL injection Generic drug:  insulin glargine INJECT 72 UNITS SUBCUANEOUSLY EVERY 12 HOURS  
  
 lidocaine 5 % Commonly known as:  LIDODERM  
1 Patch by TransDERmal route every twenty-four (24) hours. Apply to affected area every 24 hours  
  
 loratadine 10 mg tablet Commonly known as:  Mac Cindy Take 10 mg by mouth daily. oxyCODONE-acetaminophen 5-325 mg per tablet Commonly known as:  PERCOCET Take 1 Tab by mouth every eight (8) hours as needed. THERA tablet Generic drug:  therapeutic multivitamin Take 1 Tab by mouth daily. [facility] Custom electric wheelchair We Performed the Following AMB POC GLUCOSE BLOOD, BY GLUCOSE MONITORING DEVICE [82887 CPT(R)] AMB POC HEMOGLOBIN A1C [11987 CPT(R)] AMB POC LIPID PROFILE [79085 CPT(R)] CBC W/O DIFF [79363 CPT(R)] METABOLIC PANEL, COMPREHENSIVE [19636 CPT(R)] REFERRAL TO ORTHOPEDICS [IES280 Custom] Comments:  
 Please evaluate patient for hip pain. Follow-up Instructions Return in about 4 weeks (around 6/13/2017), or if symptoms worsen or fail to improve. To-Do List   
 05/16/2017 ECHO:  ECHO 2D ADULT   
  
 05/16/2017 Imaging:  XR HIP LT W OR WO PELV 2-3 VWS   
  
 05/16/2017 Imaging:  XR HIP RT W OR WO PELV 2-3 VWS   
  
 05/31/2017 Lab:  VITAMIN D, 25 HYDROXY Referral Information Referral ID Referred By Referred To  
  
 [id number] Inga Markus[address] Phone: [phone] Visits Status Start Date End Date 1 New Request 5/16/17 5/16/18 If your referral has a status of pending review or denied, additional information will be sent to support the outcome of this decision. Patient Instructions [email or web address] Activation Thank you for requesting access to Blaze.[email or web address]. Please follow the instructions below to securely access and download your online medical record. Blaze.io allows you to send messages to your doctor, view your test results, renew your prescriptions, schedule appointments, and more. How Do I Sign Up? 1. In your internet browser, go to [email or web address] 
2. Click on the First Time User? Click Here link in the Sign In box. You will be redirect to the New Member Sign Up page. 3. Enter your Blaze.io Access Code exactly as it appears below. You will not need to use this code after youve completed the sign-up process.  If you do not sign up before the expiration date, you must request a new code. EquityZen Access Code: Activation code not generated Current EquityZen Status: Patient Declined (This is the date your Autogeneration Marketingt access code will ) 4. Enter the last four digits of your Social Security Number (xxxx) and Date of Birth (mm/dd/yyyy) as indicated and click Submit. You will be taken to the next sign-up page. 5. Create a Autogeneration Marketingt ID. This will be your EquityZen login ID and cannot be changed, so think of one that is secure and easy to remember. 6. Create a EquityZen password. You can change your password at any time. 7. Enter your Password Reset Question and Answer. This can be used at a later time if you forget your password. 8. Enter your e-mail address. You will receive e-mail notification when new information is available in [address] 9. Click Sign Up. You can now view and download portions of your medical record. 10. Click the Download Summary menu link to download a portable copy of your medical information. Additional Information If you have questions, please visit the Frequently Asked Questions section of the EquityZen website at [email or web address] Remember, EquityZen is NOT to be used for urgent needs. For medical emergencies, dial 911. Please provide this summary of care documentation to your next provider. Your primary care clinician is listed as Tom Kasper. If you have any questions after today's visit, please call [phone].

## 2017-05-16 NOTE — PATIENT INSTRUCTIONS
[facility] Activation    Thank you for requesting access to [employer]. Please follow the instructions below to securely access and download your online medical record. [employer] allows you to send messages to your doctor, view your test results, renew your prescriptions, schedule appointments, and more. How Do I Sign Up? 1. In your internet browser, go to [email or web address].  2. Click on the First Time User? Click Here link in the Sign In box. You will be redirect to the New Member Sign Up page. 3. Enter your Pro[employer] Access Code exactly as it appears below. You will not need to use this code after youve completed the sign-up process. If you do not sign up before the expiration date, you must request a new code. [employer] Access Code: Activation code not generated  Current [employer] Status: Patient Declined (This is the date your [employer] access code will )    4. Enter the last four digits of your Social Security Number (xxxx) and Date of Birth (mm/dd/yyyy) as indicated and click Submit. You will be taken to the next sign-up page. 5. Create a Pro[employer] ID. This will be your Pro[employer] login ID and cannot be changed, so think of one that is secure and easy to remember. 6. Create a Pro[employer] password. You can change your password at any time. 7. Enter your Password Reset Question and Answer. This can be used at a later time if you forget your password. 8. Enter your e-mail address. You will receive e-mail notification when new information is available in [address] 9. Click Sign Up. You can now view and download portions of your medical record. 10. Click the Download Summary menu link to download a portable copy of your medical information. Additional Information    If you have questions, please visit the Frequently Asked Questions section of the [employer] website at [email or web address] Remember, Pro[employer] is NOT to be used for urgent needs. For medical emergencies, dial 911.

## 2017-05-16 NOTE — PROGRESS NOTES
Waldemar Escalera is a 78 y.o. male and presents with Hypertension; Medication Refill (should pt be taking Vit D3 with multi vitamin); Foot Swelling (legs also); and Hip Pain (surgery?)  . Subjective:  He has had recurrent rt.hip pains and has had increasing difficulty      Shoulder Pain Review:  Patient complains of rt. shoulder pain. The symptoms began weeks ago Course of symptoms since onset has been gradually worsening. Pain is described as overall severity = moderate. Symptoms were incited by no known event. Patient denies N/A. Therapy to date includes OTC analgesics: effective. Diabetes Mellitus Review:  He has diabetes mellitus. Diabetic ROS - medication compliance: compliant all of the time, diabetic diet compliance: compliant all of the time, home glucose monitoring: is performed. Known diabetic complications: none  Cardiovascular risk factors: family history, dyslipidemia, diabetes mellitus, obesity, hypertension  Current diabetic medications include oral agents/insulin   Eye exam current (within one year): no  Weight trend: stable  Prior visit with dietician: no  Current diet: \"healthy\" diet  in general  Current exercise: walking  Current monitoring regimen: home blood tests - daily  Home blood sugar records: trend: stable  Any episodes of hypoglycemia? no  Is He on ACE inhibitor or angiotensin II receptor blocker? Yes       Review of Systems  Constitutional: negative for fevers, chills, anorexia and weight loss  Eyes:   negative for visual disturbance and irritation  ENT:   negative for tinnitus,sore throat,nasal congestion,ear pains. hoarseness  Respiratory:  negative for cough, hemoptysis, dyspnea,wheezing  CV:   negative for chest pain, palpitations, lower extremity edema  GI:   negative for nausea, vomiting, diarrhea, abdominal pain,melena  Endo:               negative for polyuria,polydipsia,polyphagia,heat intolerance  Genitourinary: negative for frequency, dysuria and hematuria  Integument:  negative for rash and pruritus  Hematologic:  negative for easy bruising and gum/nose bleeding  Musculoskel: myalgias, arthralgias, back pain,joint pain  Neurological:  negative for headaches, dizziness, vertigo, memory problems and gait   Behavl/Psych: negative for feelings of anxiety, depression, mood changes    Past Medical History:   Diagnosis Date    Arthritis, Degenerative,  Knee 4/4/2011    Carcinoma, Prostate 4/4/2011    Degenerative disc disease 4/4/2011    Depression/ Anxiety 4/4/2011    Diabetes ([facility] [address]     Diabetes Mellitrus ( non-insulin dependent ) Type 2 4/4/2011    HEMATOCHEZIA 4/4/2011    Hypertrension 4/4/2011    Hypertriglyceridemia 4/4/2011    Insomnia 4/4/2011    Laryngitis 4/4/2011    Mild Aortic insufficiency 4/4/2011    Mild Concentric LVH (left ventricular hypertrophy) 4/4/2011    Neuropathy 4/4/2011    Osteoarthritis 4/4/2011    Rotator Cuff Tendonitis 4/4/2011     Past Surgical History:   Procedure Laterality Date    CARDIAC SURG PROCEDURE UNLIST      cardiac cath    COLONOSCOPY N/A 4/28/2017    COL[facility]COPY performed by [facility] at [facility] ENDOSCOPY    HX ORTHOPAEDIC      left hip pinning    HX OTHER SURGICAL      left little toe amputation    HX UROLOGICAL       Social History     Social History    Marital status: LEGALLY      Spouse name: N/A    Number of children: N/A    Years of education: N/A     Social History Main Topics    Smoking status: Current Every Day Smoker     Packs/day: 0.25     Last attempt to quit: 10/25/2016    Smokeless tobacco: Never Used    Alcohol use 7.0 oz/week     7 Cans of beer, 7 Shots of liquor per week    Drug use: None    Sexual activity: Yes     Partners: Female     Other Topics Concern    None     Social History Narrative     History reviewed. No pertinent family history.   Current Outpatient Prescriptions   Medication Sig Dispense Refill    ibuprofen (MOTRIN) 800 mg tablet Take 1 Tab by mouth every eight (8) hours as needed for Pain. 90 Tab 12    aspirin delayed-release 81 mg tablet TAKE ONE TABLET BY MOUTH DAILY 30 Tab 0    Wheel Chair dianna Custom electric wheelchair 1 Each 0    LANTUS 100 unit/mL injection INJECT 72 UNITS SUBCUANEOUSLY EVERY 12 HOURS 3 Vial 3    Lancets misc Use 2-3 times daily 100 Each 12    insulin regular (NOVOLIN R, HUMULIN R) 100 unit/mL injection 14 Units by SubCUTAneous route three (3) times daily as needed (with meals). 1 Vial 12    gabapentin (NEURONTIN) 300 mg capsule 2 tabs tid 180 Cap 3    dilTIAZem (TIAZAC) 300 mg SR capsule Take 1 Cap by mouth daily. 30 Cap 12    fluticasone (FLONASE) 50 mcg/actuation nasal spray INHALE 2 SPRAYS IN EACH NOSTRIL EVERY DAY. 16 g 12    atorvastatin (LIPITOR) 40 mg tablet Take 1 Tab by mouth nightly. 30 Tab 11    glucose blood VI test strips (CONTOUR NEXT STRIPS) strip Test 2-3 times daily 100 Strip 12    Calcium Carbonate-Vit D3-Min 600 mg calcium- 400 unit tab Take  by mouth two (2) times a day.  therapeutic multivitamin (THERA) tablet Take 1 Tab by mouth daily.  loratadine (CLARITIN) 10 mg tablet Take 10 mg by mouth daily.  lidocaine (LIDODERM) 5 % 1 Patch by TransDERmal route every twenty-four (24) hours. Apply to affected area every 24 hours 1 Package 3    oxyCODONE-acetaminophen (PERCOCET) 5-325 mg per tablet Take 1 Tab by mouth every eight (8) hours as needed. 14 Tab 0    docusate sodium (COLACE) 100 mg capsule Take 100 mg by mouth daily.  acetaminophen (TYLENOL) 325 mg tablet Take 2 Tabs by mouth every four (4) hours as needed.  30 Tab 0     No Known Allergies    Objective:  Visit Vitals    /71 (BP 1 Location: Right arm, BP Patient Position: Sitting)    Pulse 64    Temp 96.2 °F (35.7 °C)    Ht 6' 2\" (1.88 m)    SpO2 95%     Physical Exam:   General appearance - alert, well appearing, and in moderate distress  Mental status - alert, oriented to person, place, and time  EYE-LUH, EOMI, corneas normal, no foreign bodies  ENT-ENT exam normal, no neck nodes or sinus tenderness  Nose - normal and patent, no erythema, discharge or polyps  Mouth - mucous membranes moist, pharynx normal without lesions  Neck - supple, no significant adenopathy   Chest - clear to auscultation, no wheezes, rales or rhonchi, symmetric air entry   Heart - normal rate, regular rhythm, normal S1, S2, no murmurs, rubs, clicks or gallops   Abdomen - soft, nontender, nondistended, no masses or organomegaly  Lymph- no adenopathy palpable  Ext-peripheral pulses normal, no pedal edema, no clubbing or cyanosis  Skin-Warm and dry. no hyperpigmentation, vitiligo, or suspicious lesions  Neuro -alert, oriented, normal speech, no focal findings or movement disorder noted  Neck-normal C-spine, no tenderness, full ROM without pain  Feet-no nail deformities or callus formation with good pulses noted  Rt. .shoulder-subdeltoid tenderness, positive impingement signs  Rt.hip tenderness    Results for orders placed or performed in visit on 05/16/17   AMB POC GLUCOSE BLOOD, BY GLUCOSE MONITORING DEVICE   Result Value Ref Range    Glucose  mg/dL   AMB POC LIPID PROFILE   Result Value Ref Range    Cholesterol (POC) 180     Triglycerides (POC) 332     HDL Cholesterol (POC) 36     LDL Cholesterol (POC) 77     Non-HDL Goal (POC) 144     TChol/HDL Ratio (POC) 5.0    AMB POC HEMOGLOBIN A1C   Result Value Ref Range    Hemoglobin A1c (POC) 5.9 %       Assessment/Plan:    ICD-10-CM ICD-9-CM    1. Primary osteoarthritis of both hips M16.0 715.15 XR HIP RT W OR WO PELV 2-3 VWS      AMB POC GLUCOSE BLOOD, BY GLUCOSE MONITORING DEVICE      AMB POC LIPID PROFILE      AMB POC HEMOGLOBIN D9X      METABOLIC PANEL, COMPREHENSIVE      XR HIP LT W OR WO PELV 2-3 VWS      REFERRAL TO ORTHOPEDICS   2.  Diabetic polyneuropathy associated with type 2 diabetes mellitus (HCC) E11.42 250.60 AMB POC GLUCOSE BLOOD, BY GLUCOSE MONITORING DEVICE     357.2 AMB POC LIPID PROFILE      AMB POC HEMOGLOBIN A1C      VITAMIN D, 25 HYDROXY   3. Carcinoma, Prostate C61 185 AMB POC GLUCOSE BLOOD, BY GLUCOSE MONITORING DEVICE      AMB POC LIPID PROFILE      AMB POC HEMOGLOBIN A1C   4. Essential hypertension I10 401.9 AMB POC GLUCOSE BLOOD, BY GLUCOSE MONITORING DEVICE      AMB POC LIPID PROFILE      AMB POC HEMOGLOBIN E5D      METABOLIC PANEL, COMPREHENSIVE      CBC W/O DIFF      ECHO 2D ADULT   5. Vitamin D deficiency E55.9 268.9 VITAMIN D, 25 HYDROXY   6. Localized edema R60.0 782.3      Orders Placed This Encounter    XR HIP RT W OR WO PELV 2-3 VWS     Standing Status:   Future     Standing Expiration Date:   6/16/2018     Order Specific Question:   Reason for Exam     Answer:   severe hip pains     Order Specific Question:   Is Patient Allergic to Contrast Dye? Answer:   No    XR HIP LT W OR WO PELV 2-3 VWS     Standing Status:   Future     Standing Expiration Date:   6/16/2018     Order Specific Question:   Reason for Exam     Answer:   pain     Order Specific Question:   Is Patient Allergic to Contrast Dye? Answer:   No    METABOLIC PANEL, COMPREHENSIVE    VITAMIN D, 25 HYDROXY     Standing Status:   Future     Standing Expiration Date:   5/31/2017    CBC W/O DIFF    REFERRAL TO ORTHOPEDICS     Referral Priority:   Routine     Referral Type:   Consultation     Referral Reason:   Specialty Services Required     Referral Location:   [facility]     Referred to Provider:   Laura Houston MD    AMB POC GLUCOSE BLOOD, BY GLUCOSE MONITORING DEVICE    AMB POC LIPID PROFILE    AMB POC HEMOGLOBIN A1C    ECHO 2D ADULT     Standing Status:   Future     Standing Expiration Date:   5/17/2018     Order Specific Question:   Reason for Exam:     Answer:   bilateral leg edema     lose weight, increase physical activity, follow low fat diet, follow low salt diet,Take 81mg aspirin daily  Patient Instructions   MyChart Activation    Thank you for requesting access to [employer].  Please follow the instructions below to securely access and download your online medical record. [employer] allows you to send messages to your doctor, view your test results, renew your prescriptions, schedule appointments, and more. How Do I Sign Up? 1. In your internet browser, go to [email or web address]  2. Click on the First Time User? Click Here link in the Sign In box. You will be redirect to the New Member Sign Up page. 3. Enter your [employer] Access Code exactly as it appears below. You will not need to use this code after youve completed the sign-up process. If you do not sign up before the expiration date, you must request a new code. [employer] Access Code: Activation code not generated  Current [employer] Status: Patient Declined (This is the date your [employer]a access code will )    4. Enter the last four digits of your Social Security Number (xxxx) and Date of Birth (mm/dd/yyyy) as indicated and click Submit. You will be taken to the next sign-up page. 5. Create a Acupera ID. This will be your Ac[employer]a login ID and cannot be changed, so think of one that is secure and easy to remember. 6. Create a Ac[employer]a password. You can change your password at any time. 7. Enter your Password Reset Question and Answer. This can be used at a later time if you forget your password. 8. Enter your e-mail address. You will receive e-mail notification when new information is available in [address] 9. Click Sign Up. You can now view and download portions of your medical record. 10. Click the Download Summary menu link to download a portable copy of your medical information. Additional Information    If you have questions, please visit the Frequently Asked Questions section of the [employer] website at [email or web address] Remember, [employer] is NOT to be used for urgent needs. For medical emergencies, dial 911.            Follow-up Disposition:  Return in about 4 weeks (around 2017), or if symptoms worsen or fail to improve. I have reviewed with the patient details of the assessment and plan and all questions were answered. Relevent patient education was performed    An After Visit Summary was printed and given to the patient.

## 2017-05-16 NOTE — PROGRESS NOTES
Chief Complaint   Patient presents with    Hypertension    Medication Refill     should pt be taking Vit D3 with multi vitamin    Foot Swelling     legs also    Hip Pain     surgery? 1. Have you been to the ER, urgent care clinic since your last visit? Hospitalized since your last visit? No    2. Have you seen or consulted any other health care providers outside of the [address] [place] [address] since your last visit? Include any pap smears or colon screening.  No\

## 2017-05-17 LAB
25(OH)D3+25(OH)D2 SERPL-MCNC: 16 NG/ML (ref 30–100)
ALBUMIN SERPL-MCNC: 3.4 G/DL (ref 3.6–4.8)
ALBUMIN/GLOB SERPL: 1.3 {RATIO} (ref 1.2–2.2)
ALP SERPL-CCNC: 120 IU/L (ref 39–117)
ALT SERPL-CCNC: 13 IU/L (ref 0–44)
AST SERPL-CCNC: 15 IU/L (ref 0–40)
BILIRUB SERPL-MCNC: <0.2 MG/DL (ref 0–1.2)
BUN SERPL-MCNC: 14 MG/DL (ref 8–27)
BUN/CREAT SERPL: 11 (ref 10–24)
CALCIUM SERPL-MCNC: 9 MG/DL (ref 8.6–10.2)
CHLORIDE SERPL-SCNC: 97 MMOL/L (ref 96–106)
CO2 SERPL-SCNC: 26 MMOL/L (ref 18–29)
CREAT SERPL-MCNC: 1.25 MG/DL (ref 0.76–1.27)
ERYTHROCYTE [DISTWIDTH] IN BLOOD BY AUTOMATED COUNT: 17 % (ref 12.3–15.4)
GLOBULIN SER CALC-MCNC: 2.7 G/DL (ref 1.5–4.5)
GLUCOSE SERPL-MCNC: 138 MG/DL (ref 65–99)
HCT VFR BLD AUTO: 37.3 % (ref 37.5–51)
HGB BLD-MCNC: 11.9 G/DL (ref 12.6–17.7)
MCH RBC QN AUTO: 28.9 PG (ref 26.6–33)
MCHC RBC AUTO-ENTMCNC: 31.9 G/DL (ref 31.5–35.7)
MCV RBC AUTO: 91 FL (ref 79–97)
PLATELET # BLD AUTO: 304 X10E3/UL (ref 150–379)
POTASSIUM SERPL-SCNC: 4.7 MMOL/L (ref 3.5–5.2)
PROT SERPL-MCNC: 6.1 G/DL (ref 6–8.5)
RBC # BLD AUTO: 4.12 X10E6/UL (ref 4.14–5.8)
SODIUM SERPL-SCNC: 140 MMOL/L (ref 134–144)
WBC # BLD AUTO: 8.9 X10E3/UL (ref 3.4–10.8)

## 2017-05-22 RX ORDER — INSULIN GLARGINE 100 [IU]/ML
INJECTION, SOLUTION SUBCUTANEOUS
Qty: 2 VIAL | Refills: 5 | Status: SHIPPED | OUTPATIENT
Start: 2017-05-22 | End: 2017-06-15 | Stop reason: SDUPTHER

## 2017-06-01 DIAGNOSIS — I10 ESSENTIAL HYPERTENSION WITH GOAL BLOOD PRESSURE LESS THAN 130/85: ICD-10-CM

## 2017-06-02 RX ORDER — IRBESARTAN 300 MG/1
TABLET ORAL
Qty: 30 TAB | Refills: 11 | Status: SHIPPED | OUTPATIENT
Start: 2017-06-02 | End: 2018-06-26 | Stop reason: SDUPTHER

## 2017-06-15 ENCOUNTER — OFFICE VISIT (OUTPATIENT)
Dept: INTERNAL MEDICINE CLINIC | Age: 67
End: 2017-06-15

## 2017-06-15 VITALS
DIASTOLIC BLOOD PRESSURE: 58 MMHG | HEART RATE: 84 BPM | RESPIRATION RATE: 18 BRPM | HEIGHT: 74 IN | SYSTOLIC BLOOD PRESSURE: 118 MMHG | WEIGHT: 273.9 LBS | TEMPERATURE: 99.4 F | OXYGEN SATURATION: 92 % | BODY MASS INDEX: 35.15 KG/M2

## 2017-06-15 DIAGNOSIS — I10 ESSENTIAL HYPERTENSION WITH GOAL BLOOD PRESSURE LESS THAN 130/85: ICD-10-CM

## 2017-06-15 DIAGNOSIS — M17.0 PRIMARY OSTEOARTHRITIS OF BOTH KNEES: ICD-10-CM

## 2017-06-15 DIAGNOSIS — M12.811 ROTATOR CUFF ARTHROPATHY, RIGHT: ICD-10-CM

## 2017-06-15 DIAGNOSIS — M16.0 PRIMARY OSTEOARTHRITIS OF BOTH HIPS: ICD-10-CM

## 2017-06-15 DIAGNOSIS — R26.9 GAIT ABNORMALITY: ICD-10-CM

## 2017-06-15 DIAGNOSIS — E11.42 DIABETIC POLYNEUROPATHY ASSOCIATED WITH TYPE 2 DIABETES MELLITUS (HCC): Primary | ICD-10-CM

## 2017-06-15 LAB — GLUCOSE POC: 236 MG/DL

## 2017-06-15 RX ORDER — LIDOCAINE 50 MG/G
1 PATCH TOPICAL EVERY 24 HOURS
Qty: 1 PACKAGE | Refills: 3 | Status: SHIPPED | OUTPATIENT
Start: 2017-06-15 | End: 2018-08-30 | Stop reason: SDUPTHER

## 2017-06-15 RX ORDER — FLUTICASONE PROPIONATE 50 MCG
SPRAY, SUSPENSION (ML) NASAL
Qty: 16 G | Refills: 12 | Status: SHIPPED | OUTPATIENT
Start: 2017-06-15 | End: 2018-07-04 | Stop reason: SDUPTHER

## 2017-06-15 RX ORDER — THERA TABS 400 MCG
1 TAB ORAL DAILY
Qty: 30 TAB | Refills: 12 | Status: SHIPPED | OUTPATIENT
Start: 2017-06-15 | End: 2018-07-04 | Stop reason: SDUPTHER

## 2017-06-15 RX ORDER — INSULIN GLARGINE 100 [IU]/ML
INJECTION, SOLUTION SUBCUTANEOUS
Qty: 2 VIAL | Refills: 5
Start: 2017-06-15 | End: 2017-07-18 | Stop reason: SDUPTHER

## 2017-06-15 NOTE — PATIENT INSTRUCTIONS
[email or web address] Activation    Thank you for requesting access to [employer]. Please follow the instructions below to securely access and download your online medical record. [employer] allows you to send messages to your doctor, view your test results, renew your prescriptions, schedule appointments, and more. How Do I Sign Up? 1. In your internet browser, go to [email or web address]  2. Click on the First Time User? Click Here link in the Sign In box. You will be redirect to the New Member Sign Up page. 3. Enter your [employer] Access Code exactly as it appears below. You will not need to use this code after youve completed the sign-up process. If you do not sign up before the expiration date, you must request a new code. [employer] Access Code: Activation code not generated  Current [employer] Status: Patient Declined (This is the date your [employer]amp access code will )    4. Enter the last four digits of your Social Security Number (xxxx) and Date of Birth (mm/dd/yyyy) as indicated and click Submit. You will be taken to the next sign-up page. 5. Create a [employer] ID. This will be your [employer]amp login ID and cannot be changed, so think of one that is secure and easy to remember. 6. Create a [employer] password. You can change your password at any time. 7. Enter your Password Reset Question and Answer. This can be used at a later time if you forget your password. 8. Enter your e-mail address. You will receive e-mail notification when new information is available in [address] 9. Click Sign Up. You can now view and download portions of your medical record. 10. Click the Download Summary menu link to download a portable copy of your medical information. Additional Information    If you have questions, please visit the Frequently Asked Questions section of the [employer] website at [email or web address] Remember, [employer] is NOT to be used for urgent needs. For medical emergencies, dial 911.

## 2017-06-15 NOTE — PROGRESS NOTES
Jory Nuno is a 78 y.o. male and presents with Osteoarthritis; Diabetes; and Foot Exam  .  Subjective:  He has had recurrent rt.hip pains and has had increasing difficulty with the pains  He has seen orthopedic doctor and was told a host of things that needed to be performed prior to surgery. Shoulder Pain Review:  Patient complains of rt. shoulder pain. The symptoms began weeks ago Course of symptoms since onset has been gradually worsening. Pain is described as overall severity = moderate. Symptoms were incited by no known event. Patient denies N/A. Therapy to date includes OTC analgesics: effective. Diabetes Mellitus Review:  He has diabetes mellitus. Diabetic ROS - medication compliance: compliant all of the time, diabetic diet compliance: compliant all of the time, home glucose monitoring: is performed. Known diabetic complications: none  Cardiovascular risk factors: family history, dyslipidemia, diabetes mellitus, obesity, hypertension  Current diabetic medications include oral agents/insulin   Eye exam current (within one year): no  Weight trend: stable  Prior visit with dietician: no  Current diet: \"healthy\" diet  in general  Current exercise: walking  Current monitoring regimen: home blood tests - daily  Home blood sugar records: trend: stable  Any episodes of hypoglycemia? no  Is He on ACE inhibitor or angiotensin II receptor blocker? Yes       Review of Systems  Constitutional: negative for fevers, chills, anorexia and weight loss  Eyes:   negative for visual disturbance and irritation  ENT:   negative for tinnitus,sore throat,nasal congestion,ear pains. hoarseness  Respiratory:  negative for cough, hemoptysis, dyspnea,wheezing  CV:   negative for chest pain, palpitations, lower extremity edema  GI:   negative for nausea, vomiting, diarrhea, abdominal pain,melena  Endo:               negative for polyuria,polydipsia,polyphagia,heat intolerance  Genitourinary: negative for frequency, dysuria and hematuria  Integument:  negative for rash and pruritus  Hematologic:  negative for easy bruising and gum/nose bleeding  Musculoskel: myalgias, arthralgias, back pain,joint pain  Neurological:  negative for headaches, dizziness, vertigo, memory problems and gait   Behavl/Psych: negative for feelings of anxiety, depression, mood changes    Past Medical History:   Diagnosis Date    Arthritis, Degenerative,  Knee 4/4/2011    Carcinoma, Prostate 4/4/2011    Degenerative disc disease 4/4/2011    Depression/ Anxiety 4/4/2011    Diabetes ([address]     Diabetes Mellitrus ( non-insulin dependent ) Type 2 4/4/2011    HEMATOCHEZIA 4/4/2011    Hypertrension 4/4/2011    Hypertriglyceridemia 4/4/2011    Insomnia 4/4/2011    Laryngitis 4/4/2011    Mild Aortic insufficiency 4/4/2011    Mild Concentric LVH (left ventricular hypertrophy) 4/4/2011    Neuropathy 4/4/2011    Osteoarthritis 4/4/2011    Rotator Cuff Tendonitis 4/4/2011     Past Surgical History:   Procedure Laterality Date    CARDIAC SURG PROCEDURE UNLIST      cardiac cath    COLONOSCOPY N/A 4/28/2017    COLONOSCOPY performed by Philippe Flores MD at hospitals ENDOSCOPY    HX ORTHOPAEDIC      left hip pinning    HX OTHER SURGICAL      left little toe amputation    HX UROLOGICAL       Social History     Social History    Marital status: LEGALLY      Spouse name: N/A    Number of children: N/A    Years of education: N/A     Social History Main Topics    Smoking status: Current Every Day Smoker     Packs/day: 0.25     Last attempt to quit: 10/25/2016    Smokeless tobacco: Never Used    Alcohol use 7.0 oz/week     7 Cans of beer, 7 Shots of liquor per week    Drug use: None    Sexual activity: Yes     Partners: Female     Other Topics Concern    None     Social History Narrative     History reviewed. No pertinent family history.   Current Outpatient Prescriptions   Medication Sig Dispense Refill    insulin regular (Arlyss Dominga R) 100 unit/mL injection 14 Units by SubCUTAneous route three (3) times daily as needed (with meals). 1 Vial 12    therapeutic multivitamin (THERA) tablet Take 1 Tab by mouth daily. 30 Tab 12    insulin glargine (LANTUS) 100 unit/mL injection INJECT 72 UNITS UNDER THE SKIN EVERY 12 HOURS 2 Vial 5    fluticasone (FLONASE) 50 mcg/actuation nasal spray INHALE 2 SPRAYS IN EACH NOSTRIL EVERY DAY. 16 g 12    lidocaine (LIDODERM) 5 % 1 Patch by TransDERmal route every twenty-four (24) hours. Apply to affected area every 24 hours 1 Package 3    irbesartan (AVAPRO) 300 mg tablet TAKE ONE TABLET BY MOUTH ONCE NIGHTLY 30 Tab 11    hydroCHLOROthiazide (HYDRODIURIL) 25 mg tablet Take 1 Tab by mouth daily. 30 Tab 12    aspirin delayed-release 81 mg tablet TAKE ONE TABLET BY MOUTH DAILY 30 Tab 0    Lancets misc Use 2-3 times daily 100 Each 12    gabapentin (NEURONTIN) 300 mg capsule 2 tabs tid 180 Cap 3    dilTIAZem (TIAZAC) 300 mg SR capsule Take 1 Cap by mouth daily. 30 Cap 12    atorvastatin (LIPITOR) 40 mg tablet Take 1 Tab by mouth nightly. 30 Tab 11    glucose blood VI test strips (CONTOUR NEXT STRIPS) strip Test 2-3 times daily 100 Strip 12    Calcium Carbonate-Vit D3-Min 600 mg calcium- 400 unit tab Take  by mouth two (2) times a day.  ibuprofen (MOTRIN) 800 mg tablet Take 1 Tab by mouth every eight (8) hours as needed for Pain. 90 Tab 12    Wheel Chair dianna Custom electric wheelchair 1 Each 0    oxyCODONE-acetaminophen (PERCOCET) 5-325 mg per tablet Take 1 Tab by mouth every eight (8) hours as needed. 14 Tab 0    docusate sodium (COLACE) 100 mg capsule Take 100 mg by mouth daily.  acetaminophen (TYLENOL) 325 mg tablet Take 2 Tabs by mouth every four (4) hours as needed. 30 Tab 0    loratadine (CLARITIN) 10 mg tablet Take 10 mg by mouth daily.        No Known Allergies    Objective:  Visit Vitals    /58 (BP 1 Location: Right arm, BP Patient Position: Sitting)    Pulse 84    Temp 99.4 °F (37.4 °C) (Oral)    Resp 18    Ht 6' 2\" (1.88 m)    Wt 273 lb 14.4 oz (124.2 kg)    SpO2 92%    BMI 35.17 kg/m2     Physical Exam:   General appearance - alert, well appearing, and in moderate distress  Mental status - alert, oriented to person, place, and time  EYE-LUH, EOMI, corneas normal, no foreign bodies  ENT-ENT exam normal, no neck nodes or sinus tenderness  Nose - normal and patent, no erythema, discharge or polyps  Mouth - mucous membranes moist, pharynx normal without lesions  Neck - supple, no significant adenopathy   Chest - clear to auscultation, no wheezes, rales or rhonchi, symmetric air entry   Heart - normal rate, regular rhythm, normal S1, S2, no murmurs, rubs, clicks or gallops   Abdomen - soft, nontender, nondistended, no masses or organomegaly  Lymph- no adenopathy palpable  Ext-peripheral pulses normal, no pedal edema, no clubbing or cyanosis  Skin-Warm and dry. no hyperpigmentation, vitiligo, or suspicious lesions  Neuro -alert, oriented, normal speech, no focal findings or movement disorder noted  Neck-normal C-spine, no tenderness, full ROM without pain  Feet-no nail deformities or callus formation with good pulses noted  Rt. .shoulder-subdeltoid tenderness, positive impingement signs  Rt.hip tenderness    Results for orders placed or performed in visit on 06/15/17   AMB POC GLUCOSE BLOOD, BY GLUCOSE MONITORING DEVICE   Result Value Ref Range    Glucose  mg/dL       Assessment/Plan:    ICD-10-CM ICD-9-CM    1. Diabetic polyneuropathy associated with type 2 diabetes mellitus (HCC) E11.42 250.60 AMB POC GLUCOSE BLOOD, BY GLUCOSE MONITORING DEVICE     357.2 insulin regular (NOVOLIN R, HUMULIN R) 100 unit/mL injection      therapeutic multivitamin (THERA) tablet      insulin glargine (LANTUS) 100 unit/mL injection      fluticasone (FLONASE) 50 mcg/actuation nasal spray      lidocaine (LIDODERM) 5 %   2.  Rotator cuff arthropathy, right M12.811 716.81 insulin regular (NOVOLIN R, HUMULIN R) 100 unit/mL injection      therapeutic multivitamin (THERA) tablet      insulin glargine (LANTUS) 100 unit/mL injection      fluticasone (FLONASE) 50 mcg/actuation nasal spray      lidocaine (LIDODERM) 5 %   3. Essential hypertension with goal blood pressure less than 130/85 I10 401.9    4. Gait abnormality R26.9 781.2    5. Primary osteoarthritis of both knees M17.0 715.16 REFERRAL TO ORTHOPEDICS   6. Primary osteoarthritis of both hips M16.0 715.15 REFERRAL TO ORTHOPEDICS     Orders Placed This Encounter    REFERRAL TO ORTHOPEDICS     Referral Priority:   Routine     Referral Type:   Consultation     Referral Reason:   Specialty Services Required     Referred to Provider:   Charissa Landa MD     Requested Specialty:   Orthopedic Surgery     Number of Visits Requested:   1    AMB POC GLUCOSE BLOOD, BY GLUCOSE MONITORING DEVICE    insulin regular (NOVOLIN R, HUMULIN R) 100 unit/mL injection     Si Units by SubCUTAneous route three (3) times daily as needed (with meals). Dispense:  1 Vial     Refill:  12    therapeutic multivitamin (THERA) tablet     Sig: Take 1 Tab by mouth daily. Dispense:  30 Tab     Refill:  12    insulin glargine (LANTUS) 100 unit/mL injection     Sig: INJECT 72 UNITS UNDER THE SKIN EVERY 12 HOURS     Dispense:  2 Vial     Refill:  5    fluticasone (FLONASE) 50 mcg/actuation nasal spray     Sig: INHALE 2 SPRAYS IN EACH NOSTRIL EVERY DAY. Dispense:  16 g     Refill:  12    lidocaine (LIDODERM) 5 %     Si Patch by TransDERmal route every twenty-four (24) hours. Apply to affected area every 24 hours     Dispense:  1 Package     Refill:  3     lose weight, increase physical activity, follow low fat diet, follow low salt diet,Take 81mg aspirin daily  Patient Instructions   Sun Numbert Activation    Thank you for requesting access to [facility]. Please follow the instructions below to securely access and download your online medical record.  [facility] [employer] allows you to send messages to your doctor, view your test results, renew your prescriptions, schedule appointments, and more. How Do I Sign Up? 1. In your internet browser, go to [email or web address]  2. Click on the First Time User? Click Here link in the Sign In box. You will be redirect to the New Member Sign Up page. 3. Enter your [facility] Access Code exactly as it appears below. You will not need to use this code after youve completed the sign-up process. If you do not sign up before the expiration date, you must request a new code. MyChart Access Code: Activation code not generated  Current [facility] Status: Patient Declined (This is the date your marshallindext access code will )    4. Enter the last four digits of your Social Security Number (xxxx) and Date of Birth (mm/dd/yyyy) as indicated and click Submit. You will be taken to the next sign-up page. 5. Create a [facility]a ID. This will be your [facility] login ID and cannot be changed, so think of one that is secure and easy to remember. 6. Create a [facility]a password. You can change your password at any time. 7. Enter your Password Reset Question and Answer. This can be used at a later time if you forget your password. 8. Enter your e-mail address. You will receive e-mail notification when new information is available in [address] 9. Click Sign Up. You can now view and download portions of your medical record. 10. Click the Download Summary menu link to download a portable copy of your medical information. Additional Information    If you have questions, please visit the Frequently Asked Questions section of the [facility] website at [email or web address] Remember, [facility] is NOT to be used for urgent needs. For medical emergencies, dial 911. Follow-up Disposition:  Return in about 4 weeks (around 2017), or if symptoms worsen or fail to improve.       I have reviewed with the patient details of the assessment and plan and all questions were answered. Relevent patient education was performed    An After Visit Summary was printed and given to the patient.

## 2017-06-15 NOTE — MR AVS SNAPSHOT
Visit Information Date & Time Provider Department Dept. Phone Encounter #  
 6/15/2017  2:30 PM Jolene Martin MD [phone] [id number] Follow-up Instructions Return in about 4 weeks (around 7/13/2017), or if symptoms worsen or fail to improve. Upcoming Health Maintenance Date Due  
 EYE EXAM RETINAL OR DILATED Q1 3/11/2016 GLAUCOMA SCREENING Q2Y 4/21/2016 Pneumococcal 65+ High/Highest Risk (1 of 2 - PCV13) 4/21/2016 FOOT EXAM Q1 9/8/2016 MICROALBUMIN Q1 10/8/2016 FOBT Q 1 YEAR AGE 50-75 1/14/2017 INFLUENZA AGE 9 TO ADULT 8/1/2017 HEMOGLOBIN A1C Q6M 11/16/2017 MEDICARE YEARLY EXAM 4/14/2018 LIPID PANEL Q1 5/16/2018 DTaP/Tdap/Td series (2 - Td) 12/10/2024 Allergies as of 6/15/2017  Review Complete On: 6/15/2017 By: Jolene Martin MD  
 No Known Allergies Current Immunizations  Reviewed on 12/10/2014 Name Date Pneumococcal Polysaccharide (PPSV-23) 9/11/2014 Tdap 12/10/2014 Not reviewed this visit You Were Diagnosed With   
  
 Codes Comments Diabetic polyneuropathy associated with type 2 diabetes mellitus ([facility] [address]    -  Primary ICD-10-CM: E11.42 
ICD-9-CM: 250.60, 357.2 Rotator cuff arthropathy, right     ICD-10-CM: A37.800 ICD-9-CM: 716.81 Essential hypertension with goal blood pressure less than 130/85     ICD-10-CM: I10 
ICD-9-CM: 401.9 Gait abnormality     ICD-10-CM: R26.9 ICD-9-CM: 781.2 Primary osteoarthritis of both knees     ICD-10-CM: M17.0 ICD-9-CM: 715.16 Primary osteoarthritis of both hips     ICD-10-CM: M16.0 ICD-9-CM: 715.15 Vitals BP Pulse Temp Resp Height(growth percentile) Weight(growth percentile) 118/58 (BP 1 Location: Right arm, BP Patient Position: Sitting) 84 99.4 °F (37.4 °C) (Oral) 18 6' 2\" (1.88 m) 273 lb 14.4 oz (124.2 kg) SpO2 BMI Smoking Status 92% 35.17 kg/m2 Current Every Day Smoker BMI and BSA Data Body Mass Index Body Surface Area  
 35.17 kg/m 2 2.55 m 2 Preferred Pharmacy Pharmacy Name Phone [facility]imitry [facility] [address] [facility] [phone] Your Updated Medication List  
  
   
This list is accurate as of: 6/15/17  3:16 PM.  Always use your most recent med list.  
  
  
  
  
 acetaminophen 325 mg tablet Commonly known as:  TYLENOL Take 2 Tabs by mouth every four (4) hours as needed. aspirin delayed-release 81 mg tablet TAKE ONE TABLET BY MOUTH DAILY  
  
 atorvastatin 40 mg tablet Commonly known as:  LIPITOR Take 1 Tab by mouth nightly. Calcium Carbonate-Vit D3-Min 600 mg calcium- 400 unit Tab Take  by mouth two (2) times a day. COLACE 100 mg capsule Generic drug:  docusate sodium Take 100 mg by mouth daily. dilTIAZem 300 mg SR capsule Commonly known as:  [facility] Take 1 Cap by mouth daily. fluticasone 50 mcg/actuation nasal spray Commonly known as:  Cristobal [facility] INHALE 2 SPRAYS IN EACH NOSTRIL EVERY DAY. gabapentin 300 mg capsule Commonly known as:  NEURONTIN  
2 tabs tid  
  
 glucose blood VI test strips strip Commonly known as:  CONTOUR NEXT STRIPS Test 2-3 times daily  
  
 hydroCHLOROthiazide 25 mg tablet Commonly known as:  HYDRODIURIL Take 1 Tab by mouth daily. ibuprofen 800 mg tablet Commonly known as:  MOTRIN Take 1 Tab by mouth every eight (8) hours as needed for Pain. insulin glargine 100 unit/mL injection Commonly known as:  LANTUS INJECT 72 UNITS UNDER THE SKIN EVERY 12 HOURS  
  
 insulin regular 100 unit/mL injection Commonly known as:  NOVOLIN R, HUMULIN R  
14 Units by SubCUTAneous route three (3) times daily as needed (with meals). irbesartan 300 mg tablet Commonly known as:  AVAPRO TAKE ONE TABLET BY MOUTH ONCE NIGHTLY Lancets Misc Use 2-3 times daily  
  
 lidocaine 5 % Commonly known as:  LIDODERM  
1 Patch by TransDERmal route every twenty-four (24) hours. Apply to affected area every 24 hours  
  
 loratadine 10 mg tablet Commonly known as:  Cristobal Dave Take 10 mg by mouth daily. oxyCODONE-acetaminophen 5-325 mg per tablet Commonly known as:  PERCOCET Take 1 Tab by mouth every eight (8) hours as needed. therapeutic multivitamin tablet Commonly known as:  THERA Take 1 Tab by mouth daily. [address] [facility] Custom electric wheelchair Prescriptions Sent to Pharmacy Refills  
 insulin regular (NOVOLIN R, HUMULIN R) 100 unit/mL injection 12 Si Units by SubCUTAneous route three (3) times daily as needed (with meals). Class: Normal  
 Pharmacy: [address] Ph #: [phone] Route: SubCUTAneous  
 therapeutic multivitamin (THERA) tablet 12 Sig: Take 1 Tab by mouth daily. Class: Normal  
 Pharmacy: [address] Ph #: [phone] Route: Oral  
 fluticasone (FLONASE) 50 mcg/actuation nasal spray 12 Sig: INHALE 2 SPRAYS IN EACH NOSTRIL EVERY DAY. Class: Normal  
 Pharmacy: [address] [place] [address] Ph #: [phone]  
 lidocaine (LIDODERM) 5 % 3 Si Patch by TransDERmal route every twenty-four (24) hours. Apply to affected area every 24 hours Class: Normal  
 Pharmacy: [address] Ph #: [phone] Route: TransDERmal  
  
We Performed the Following AMB POC GLUCOSE BLOOD, BY GLUCOSE MONITORING DEVICE [53172 CPT(R)] REFERRAL TO ORTHOPEDICS [BRX032 Custom] Follow-up Instructions Return in about 4 weeks (around 2017), or if symptoms worsen or fail to improve. Referral Information Referral ID Referred By Referred To  
  
 [id number] Donivan Felling, HAROLD T Darylene Pro, MD [address] [facility] [place] Phone: [phone] Fax: [phone] Visits Status Start Date End Date 1 New Request 6/15/17 6/15/18 If your referral has a status of pending review or denied, additional information will be sent to support the outcome of this decision. Patient Instructions Scrippedhart Activation Thank you for requesting access to [facility]. Please follow the instructions below to securely access and download your online medical record. Erenis allows you to send messages to your doctor, view your test results, renew your prescriptions, schedule appointments, and more. How Do I Sign Up? 1. In your internet browser, go to [email or web address] 
2. Click on the First Time User? Click Here link in the Sign In box. You will be redirect to the New Member Sign Up page. 3. Enter your Erenis Access Code exactly as it appears below. You will not need to use this code after youve completed the sign-up process. If you do not sign up before the expiration date, you must request a new code. E[facility]is Access Code: Activation code not generated Current Erenis Status: Patient Declined (This is the date your Erenis access code will ) 4. Enter the last four digits of your Social Security Number (xxxx) and Date of Birth (mm/dd/yyyy) as indicated and click Submit. You will be taken to the next sign-up page. 5. Create a Erenis ID. This will be your Erenis login ID and cannot be changed, so think of one that is secure and easy to remember. 6. Create a Erenis password. You can change your password at any time. 7. Enter your Password Reset Question and Answer. This can be used at a later time if you forget your password. 8. Enter your e-mail address. You will receive e-mail notification when new information is available in [address] 9. Click Sign Up. You can now view and download portions of your medical record. 10. Click the Download Summary menu link to download a portable copy of your medical information. Additional Information If you have questions, please visit the Frequently Asked Questions section of the [employer] website at [email or web address] Remember, Textbroker is NOT to be used for urgent needs. For medical emergencies, dial 911. Please provide this summary of care documentation to your next provider. Your primary care clinician is listed as Aaron Mckenzie. If you have any questions after today's visit, please call [phone].

## 2017-07-06 ENCOUNTER — OFFICE VISIT (OUTPATIENT)
Dept: INTERNAL MEDICINE CLINIC | Age: 67
End: 2017-07-06

## 2017-07-06 VITALS
SYSTOLIC BLOOD PRESSURE: 140 MMHG | DIASTOLIC BLOOD PRESSURE: 66 MMHG | TEMPERATURE: 98 F | RESPIRATION RATE: 16 BRPM | HEART RATE: 94 BPM | OXYGEN SATURATION: 94 %

## 2017-07-06 DIAGNOSIS — M54.5 LEFT LOW BACK PAIN, UNSPECIFIED CHRONICITY, WITH SCIATICA PRESENCE UNSPECIFIED: Primary | ICD-10-CM

## 2017-07-06 DIAGNOSIS — E11.42 DIABETIC POLYNEUROPATHY ASSOCIATED WITH TYPE 2 DIABETES MELLITUS (HCC): ICD-10-CM

## 2017-07-06 DIAGNOSIS — Z12.11 SCREENING FOR COLON CANCER: ICD-10-CM

## 2017-07-06 DIAGNOSIS — K57.30 DIVERTICULOSIS OF LARGE INTESTINE WITHOUT HEMORRHAGE: ICD-10-CM

## 2017-07-06 LAB
BILIRUB UR QL STRIP: NEGATIVE
GLUCOSE POC: 223 MG/DL
GLUCOSE UR-MCNC: NORMAL MG/DL
KETONES P FAST UR STRIP-MCNC: NEGATIVE MG/DL
PH UR STRIP: 7 [PH] (ref 4.6–8)
PROT UR QL STRIP: NORMAL MG/DL
SP GR UR STRIP: 1.02 (ref 1–1.03)
UA UROBILINOGEN AMB POC: NORMAL (ref 0.2–1)
URINALYSIS CLARITY POC: CLEAR
URINALYSIS COLOR POC: YELLOW
URINE BLOOD POC: NORMAL
URINE LEUKOCYTES POC: NEGATIVE
URINE NITRITES POC: NEGATIVE

## 2017-07-06 RX ORDER — LEVOFLOXACIN 500 MG/1
500 TABLET, FILM COATED ORAL DAILY
Qty: 14 TAB | Refills: 0 | Status: SHIPPED | OUTPATIENT
Start: 2017-07-06 | End: 2017-07-24 | Stop reason: ALTCHOICE

## 2017-07-06 NOTE — MR AVS SNAPSHOT
Visit Information Date & Time Provider Department Dept. Phone Encounter #  
 7/6/2017  3:30 PM So Gardiner MD [facility] [phone] [id number] Follow-up Instructions Return in about 2 weeks (around 7/20/2017). Follow-up and Disposition History Your Appointments 7/13/2017  2:30 PM  
ROUTINE CARE with So Gardiner MD  
[facility] [address] [place] [address] ([address]) Appt Note: 1mo fu  
 [address] [facility] [facility] 7 [address]  
[phone]  
  
   
 [address] [place] [address],[address] [facility] 7 [address] Upcoming Health Maintenance Date Due  
 EYE EXAM RETINAL OR DILATED Q1 3/11/2016 GLAUCOMA SCREENING Q2Y 4/21/2016 Pneumococcal 65+ High/Highest Risk (1 of 2 - PCV13) 4/21/2016 MICROALBUMIN Q1 10/8/2016 FOBT Q 1 YEAR AGE 50-75 1/14/2017 INFLUENZA AGE 9 TO ADULT 8/1/2017 HEMOGLOBIN A1C Q6M 11/16/2017 MEDICARE YEARLY EXAM 4/14/2018 LIPID PANEL Q1 5/16/2018 FOOT EXAM Q1 7/6/2018 DTaP/Tdap/Td series (2 - Td) 12/10/2024 Allergies as of 7/6/2017  Review Complete On: 7/6/2017 By: So Gardiner MD  
 No Known Allergies Current Immunizations  Reviewed on 12/10/2014 Name Date Pneumococcal Polysaccharide (PPSV-23) 9/11/2014 Tdap 12/10/2014 Not reviewed this visit You Were Diagnosed With   
  
 Codes Comments Left low back pain, unspecified chronicity, with sciatica presence unspecified    -  Primary ICD-10-CM: M54.5 ICD-9-CM: 724.2 Diabetic polyneuropathy associated with type 2 diabetes mellitus ([facility]     ICD-10-CM: E11.42 
ICD-9-CM: 250.60, 357.2 Diverticulosis of large intestine without hemorrhage     ICD-10-CM: K57.30 ICD-9-CM: 562.10 Screening for colon cancer     ICD-10-CM: Z12.11 ICD-9-CM: V76.51 Vitals BP Pulse Temp Resp SpO2 Smoking Status 140/66 94 98 °F (36.7 °C) (Oral) 16 94% Current Every Day Smoker Vitals History Preferred Pharmacy Pharm[facility] Name Phone [facility] [address] [facility] [phone] Your Updated Medication List  
  
   
This list is accurate as of: 7/6/17  4:14 PM.  Always use your most recent med list.  
  
  
  
  
 acetaminophen 325 mg tablet Commonly known as:  TYLENOL Take 2 Tabs by mouth every four (4) hours as needed. aspirin delayed-release 81 mg tablet TAKE ONE TABLET BY MOUTH DAILY  
  
 atorvastatin 40 mg tablet Commonly known as:  LIPITOR Take 1 Tab by mouth nightly. Calcium Carbonate-Vit D3-Min 600 mg calcium- 400 unit Tab Take  by mouth two (2) times a day. COLACE 100 mg capsule Generic drug:  docusate sodium Take 100 mg by mouth daily. dilTIAZem 300 mg SR capsule Commonly known as:  [facility] Take 1 Cap by mouth daily. fluticasone 50 mcg/actuation nasal spray Commonly known as:  Marcine Infield INHALE 2 SPRAYS IN EACH NOSTRIL EVERY DAY. gabapentin 300 mg capsule Commonly known as:  NEURONTIN  
2 tabs tid  
  
 glucose blood VI test strips strip Commonly known as:  CONTOUR NEXT STRIPS Test 2-3 times daily  
  
 hydroCHLOROthiazide 25 mg tablet Commonly known as:  HYDRODIURIL Take 1 Tab by mouth daily. ibuprofen 800 mg tablet Commonly known as:  MOTRIN Take 1 Tab by mouth every eight (8) hours as needed for Pain. insulin glargine 100 unit/mL injection Commonly known as:  LANTUS INJECT 72 UNITS UNDER THE SKIN EVERY 12 HOURS  
  
 insulin regular 100 unit/mL injection Commonly known as:  NOVOLIN R, HUMULIN R  
14 Units by SubCUTAneous route three (3) times daily as needed (with meals). irbesartan 300 mg tablet Commonly known as:  AVAPRO TAKE ONE TABLET BY MOUTH ONCE NIGHTLY Lancets Misc Use 2-3 times daily  
  
 levoFLOXacin 500 mg tablet Commonly known as:  Eduardo Formosa Take 1 Tab by mouth daily. lidocaine 5 % Commonly known as:  LIDODERM  
1 Patch by TransDERmal route every twenty-four (24) hours. Apply to affected area every 24 hours  
  
 loratadine 10 mg tablet Commonly known as:  Aishwarya Lawton Take 10 mg by mouth daily. oxyCODONE-acetaminophen 5-325 mg per tablet Commonly known as:  PERCOCET Take 1 Tab by mouth every eight (8) hours as needed. therapeutic multivitamin tablet Commonly known as:  THERA Take 1 Tab by mouth daily. [address] [facility] Custom electric wheelchair Prescriptions Sent to Pharmacy Refills  
 levoFLOXacin (LEVAQUIN) 500 mg tablet 0 Sig: Take 1 Tab by mouth daily. Class: Normal  
 Pharmacy: [facility] [address] [facility] Ph #: [phone] Route: Oral  
  
We Performed the Following AMB POC GLUCOSE BLOOD, BY GLUCOSE MONITORING DEVICE [51412 CPT(R)] AMB POC URINALYSIS DIP STICK AUTO W/O MICRO [53909 CPT(R)] OCCULT BLOOD, IMMUNOASSAY (FIT) [id number] CPT(R)] Follow-up Instructions Return in about 2 weeks (around 7/20/2017). Patient Instructions Wireless Environment Activation Thank you for requesting access to Wireless Environment. Please follow the instructions below to securely access and download your online medical record. [employer] Environment allows you to send messages to your doctor, view your test results, renew your prescriptions, schedule appointments, and more. How Do I Sign Up? 1. In your internet browser, go to [email or web address] 
2. Click on the First Time User? Click Here link in the Sign In box. You will be redirect to the New Member Sign Up page. 3. Enter your Wireless Environment Access Code exactly as it appears below. You will not need to use this code after youve completed the sign-up process. If you do not sign up before the expiration date, you must request a new code. [employer] Environment Access Code: Activation code not generated Current [employer] Objects Status: Patient Declined (This is the date your [employer] Objects access code will ) 4. Enter the last four digits of your Social Security Number (xxxx) and Date of Birth (mm/dd/yyyy) as indicated and click Submit. You will be taken to the next sign-up page. 5. Create a Electric Objects ID. This will be your [employer] Objects login ID and cannot be changed, so think of one that is secure and easy to remember. 6. Create a [employer] Objects password. You can change your password at any time. 7. Enter your Password Reset Question and Answer. This can be used at a later time if you forget your password. 8. Enter your e-mail address. You will receive e-mail notification when new information is available in [address] 9. Click Sign Up. You can now view and download portions of your medical record. 10. Click the Download Summary menu link to download a portable copy of your medical information. Additional Information If you have questions, please visit the Frequently Asked Questions section of the Electric Objects website at [email or web address] Remember, [employer] Objects is NOT to be used for urgent needs. For medical emergencies, dial 911. Please provide this summary of care documentation to your next provider. Your primary care clinician is listed as Jan Rodriguez. If you have any questions after today's visit, please call [phone].

## 2017-07-06 NOTE — PATIENT INSTRUCTIONS
Pocket Change CardharTagito Activation    Thank you for requesting access to [employer] [facility]. Please follow the instructions below to securely access and download your online medical record. [employer] allows you to send messages to your doctor, view your test results, renew your prescriptions, schedule appointments, and more. How Do I Sign Up? 1. In your internet browser, go to [email or web address]  2. Click on the First Time User? Click Here link in the Sign In box. You will be redirect to the New Member Sign Up page. 3. Enter your [employer] Access Code exactly as it appears below. You will not need to use this code after youve completed the sign-up process. If you do not sign up before the expiration date, you must request a new code. [employer] Access Code: Activation code not generated  Current [employer] Status: Patient Declined (This is the date your [employer] access code will )    4. Enter the last four digits of your Social Security Number (xxxx) and Date of Birth (mm/dd/yyyy) as indicated and click Submit. You will be taken to the next sign-up page. 5. Create a [employer] ID. This will be your [employer] login ID and cannot be changed, so think of one that is secure and easy to remember. 6. Create a [employer] password. You can change your password at any time. 7. Enter your Password Reset Question and Answer. This can be used at a later time if you forget your password. 8. Enter your e-mail address. You will receive e-mail notification when new information is available in [address] 9. Click Sign Up. You can now view and download portions of your medical record. 10. Click the Download Summary menu link to download a portable copy of your medical information. Additional Information    If you have questions, please visit the Frequently Asked Questions section of the [employer] website at [email or web address] Remember, [employer] is NOT to be used for urgent needs. For medical emergencies, dial 911.

## 2017-07-06 NOTE — PROGRESS NOTES
Neftaly Clark is a 78 y.o. male and presents with Abdominal Pain (lower/left); Back Pain (lower/left); and Bladder Infection  . Subjective:  He still has  recurrent rt.hip pains   He has seen orthopedic doctor and was told a host of things that needed to be performed prior to surgery. Abdominal Pain   Patient complains of abdominal pain. The pain is described as dull and aching, and is 4/10 in intensity. Pain is located in the LLQ without radiation. Onset was a few days ago. Symptoms have been gradually worsening since. Aggravating factors: none. Alleviating factors: none. Associated symptoms: none. .        Diabetes Mellitus Review:  He has diabetes mellitus. Diabetic ROS - medication compliance: compliant all of the time, diabetic diet compliance: compliant all of the time, home glucose monitoring: is performed. Known diabetic complications: none  Cardiovascular risk factors: family history, dyslipidemia, diabetes mellitus, obesity, hypertension  Current diabetic medications include oral agents/insulin   Eye exam current (within one year): no  Weight trend: stable  Prior visit with dietician: no  Current diet: \"healthy\" diet  in general  Current exercise: walking  Current monitoring regimen: home blood tests - daily  Home blood sugar records: trend: stable  Any episodes of hypoglycemia? no  Is He on ACE inhibitor or angiotensin II receptor blocker? Yes       Review of Systems  Constitutional: negative for fevers, chills, anorexia and weight loss  Eyes:   negative for visual disturbance and irritation  ENT:   negative for tinnitus,sore throat,nasal congestion,ear pains. hoarseness  Respiratory:  negative for cough, hemoptysis, dyspnea,wheezing  CV:   negative for chest pain, palpitations, lower extremity edema  GI:    abdominal pain,  Endo:               negative for polyuria,polydipsia,polyphagia,heat intolerance  Genitourinary: negative for frequency, dysuria and hematuria  Integument:  negative for rash and pruritus  Hematologic:  negative for easy bruising and gum/nose bleeding  Musculoskel: myalgias, arthralgias, back pain,joint pain  Neurological:  negative for headaches, dizziness, vertigo, memory problems and gait   Behavl/Psych: negative for feelings of anxiety, depression, mood changes    Past Medical History:   Diagnosis Date    Arthritis, Degenerative,  Knee 4/4/2011    Carcinoma, Prostate 4/4/2011    Degenerative disc disease 4/4/2011    Depression/ Anxiety 4/4/2011    Diabetes ([facility]     Diabetes Mellitrus ( non-insulin dependent ) Type 2 4/4/2011    HEMATOCHEZIA 4/4/2011    Hypertrension 4/4/2011    Hypertriglyceridemia 4/4/2011    Insomnia 4/4/2011    Laryngitis 4/4/2011    Mild Aortic insufficiency 4/4/2011    Mild Concentric LVH (left ventricular hypertrophy) 4/4/2011    Neuropathy 4/4/2011    Osteoarthritis 4/4/2011    Rotator Cuff Tendonitis 4/4/2011     Past Surgical History:   Procedure Laterality Date    CARDIAC SURG PROCEDURE UNLIST      cardiac cath    COLONOSCOPY N/A 4/28/2017    COLONOSCOPY performed by Yunier Scales MD at [facility] ENDOSCOPY    HX ORTHOPAEDIC      left hip pinning    HX OTHER SURGICAL      left little toe amputation    HX UROLOGICAL       Social History     Social History    Marital status: LEGALLY      Spouse name: N/A    Number of children: N/A    Years of education: N/A     Social History Main Topics    Smoking status: Current Every Day Smoker     Packs/day: 0.25     Last attempt to quit: 10/25/2016    Smokeless tobacco: Never Used    Alcohol use 7.0 oz/week     7 Cans of beer, 7 Shots of liquor per week    Drug use: None    Sexual activity: Yes     Partners: Female     Other Topics Concern    None     Social History Narrative     History reviewed. No pertinent family history. Current Outpatient Prescriptions   Medication Sig Dispense Refill    levoFLOXacin (LEVAQUIN) 500 mg tablet Take 1 Tab by mouth daily.  14 Tab 0    insulin regular (NOVOLIN R, HUMULIN R) 100 unit/mL injection 14 Units by SubCUTAneous route three (3) times daily as needed (with meals). 1 Vial 12    therapeutic multivitamin (THERA) tablet Take 1 Tab by mouth daily. 30 Tab 12    insulin glargine (LANTUS) 100 unit/mL injection INJECT 72 UNITS UNDER THE SKIN EVERY 12 HOURS 2 Vial 5    fluticasone (FLONASE) 50 mcg/actuation nasal spray INHALE 2 SPRAYS IN EACH NOSTRIL EVERY DAY. 16 g 12    lidocaine (LIDODERM) 5 % 1 Patch by TransDERmal route every twenty-four (24) hours. Apply to affected area every 24 hours 1 Package 3    irbesartan (AVAPRO) 300 mg tablet TAKE ONE TABLET BY MOUTH ONCE NIGHTLY 30 Tab 11    hydroCHLOROthiazide (HYDRODIURIL) 25 mg tablet Take 1 Tab by mouth daily. 30 Tab 12    ibuprofen (MOTRIN) 800 mg tablet Take 1 Tab by mouth every eight (8) hours as needed for Pain. 90 Tab 12    aspirin delayed-release 81 mg tablet TAKE ONE TABLET BY MOUTH DAILY 30 Tab 0    Wheel Chair dianna Custom electric wheelchair 1 Each 0    oxyCODONE-acetaminophen (PERCOCET) 5-325 mg per tablet Take 1 Tab by mouth every eight (8) hours as needed. 14 Tab 0    Lancets misc Use 2-3 times daily 100 Each 12    gabapentin (NEURONTIN) 300 mg capsule 2 tabs tid 180 Cap 3    dilTIAZem (TIAZAC) 300 mg SR capsule Take 1 Cap by mouth daily. 30 Cap 12    atorvastatin (LIPITOR) 40 mg tablet Take 1 Tab by mouth nightly. 30 Tab 11    glucose blood VI test strips (CONTOUR NEXT STRIPS) strip Test 2-3 times daily 100 Strip 12    Calcium Carbonate-Vit D3-Min 600 mg calcium- 400 unit tab Take  by mouth two (2) times a day.  docusate sodium (COLACE) 100 mg capsule Take 100 mg by mouth daily.  loratadine (CLARITIN) 10 mg tablet Take 10 mg by mouth daily.  acetaminophen (TYLENOL) 325 mg tablet Take 2 Tabs by mouth every four (4) hours as needed.  30 Tab 0     No Known Allergies    Objective:  Visit Vitals    /66    Pulse 94    Temp 98 °F (36.7 °C) (Oral)    Resp 16    SpO2 94%     Physical Exam:   General appearance - alert, well appearing, and in moderate distress  Mental status - alert, oriented to person, place, and time  EYE-LUH, EOMI, corneas normal, no foreign bodies  ENT-ENT exam normal, no neck nodes or sinus tenderness  Nose - normal and patent, no erythema, discharge or polyps  Mouth - mucous membranes moist, pharynx normal without lesions  Neck - supple, no significant adenopathy   Chest - clear to auscultation, no wheezes, rales or rhonchi, symmetric air entry   Heart - normal rate, regular rhythm, normal S1, S2, no murmurs, rubs, clicks or gallops   Abdomen - soft, nontender, nondistended, no masses or organomegaly  Lymph- no adenopathy palpable  Ext-peripheral pulses normal, no pedal edema, no clubbing or cyanosis  Skin-Warm and dry. no hyperpigmentation, vitiligo, or suspicious lesions  Neuro -alert, oriented, normal speech, no focal findings or movement disorder noted  Neck-normal C-spine, no tenderness, full ROM without pain  Feet-no nail deformities or callus formation with good pulses noted  Rt. .shoulder-subdeltoid tenderness, positive impingement signs  Rt.hip tenderness    Results for orders placed or performed in visit on 07/06/17   AMB POC GLUCOSE BLOOD, BY GLUCOSE MONITORING DEVICE   Result Value Ref Range    Glucose  mg/dL   AMB POC URINALYSIS DIP STICK AUTO W/O MICRO   Result Value Ref Range    Color (UA POC) Yellow     Clarity (UA POC) Clear     Glucose (UA POC) Trace Negative    Bilirubin (UA POC) Negative Negative    Ketones (UA POC) Negative Negative    Specific gravity (UA POC) 1.025 1.001 - 1.035    Blood (UA POC) 1+ Negative    pH (UA POC) 7.0 4.6 - 8.0    Protein (UA POC) 3+ Negative mg/dL    Urobilinogen (UA POC) 0.2 mg/dL 0.2 - 1    Nitrites (UA POC) Negative Negative    Leukocyte esterase (UA POC) Negative Negative       Assessment/Plan:    ICD-10-CM ICD-9-CM    1.  Left low back pain, unspecified chronicity, with sciatica presence unspecified M54.5 724.2 AMB POC URINALYSIS DIP STICK AUTO W/O MICRO   2. Diabetic polyneuropathy associated with type 2 diabetes mellitus (HCC) E11.42 250.60 AMB POC GLUCOSE BLOOD, BY GLUCOSE MONITORING DEVICE     357.2 AMB POC URINALYSIS DIP STICK AUTO W/O MICRO   3. Diverticulosis of large intestine without hemorrhage K57.30 562.10 levoFLOXacin (LEVAQUIN) 500 mg tablet   4. Screening for colon cancer Z12.11 V76.51 OCCULT BLOOD, IMMUNOASSAY (FIT)     Orders Placed This Encounter    OCCULT BLOOD, IMMUNOASSAY (FIT)    AMB POC GLUCOSE BLOOD, BY GLUCOSE MONITORING DEVICE    AMB POC URINALYSIS DIP STICK AUTO W/O MICRO    levoFLOXacin (LEVAQUIN) 500 mg tablet     Sig: Take 1 Tab by mouth daily. Dispense:  14 Tab     Refill:  0     lose weight, increase physical activity, follow low fat diet, follow low salt diet,Take 81mg aspirin daily  Patient Instructions   [employer] Activation    Thank you for requesting access to [employer]. Please follow the instructions below to securely access and download your online medical record. [employer] allows you to send messages to your doctor, view your test results, renew your prescriptions, schedule appointments, and more. How Do I Sign Up? 1. In your internet browser, go to [email or web address]  2. Click on the First Time User? Click Here link in the Sign In box. You will be redirect to the New Member Sign Up page. 3. Enter your [employer]ics Access Code exactly as it appears below. You will not need to use this code after youve completed the sign-up process. If you do not sign up before the expiration date, you must request a new code. [employer]ics Access Code: Activation code not generated  Current [employer]ics Status: Patient Declined (This is the date your [employer]ics access code will )    4. Enter the last four digits of your Social Security Number (xxxx) and Date of Birth (mm/dd/yyyy) as indicated and click Submit. You will be taken to the next sign-up page.   5. Create a [employer]ics ID. This will be your Key Travelt login ID and cannot be changed, so think of one that is secure and easy to remember. 6. Create a CureLauncher password. You can change your password at any time. 7. Enter your Password Reset Question and Answer. This can be used at a later time if you forget your password. 8. Enter your e-mail address. You will receive e-mail notification when new information is available in [address] 9. Click Sign Up. You can now view and download portions of your medical record. 10. Click the Download Summary menu link to download a portable copy of your medical information. Additional Information    If you have questions, please visit the Frequently Asked Questions section of the [employer] website at [email or web address] Remember, C[employer] is NOT to be used for urgent needs. For medical emergencies, dial 911. Follow-up Disposition:  Return in about 2 weeks (around 7/20/2017). I have reviewed with the patient details of the assessment and plan and all questions were answered. Relevent patient education was performed    An After Visit Summary was printed and given to the patient.

## 2017-07-18 DIAGNOSIS — M12.811 ROTATOR CUFF ARTHROPATHY, RIGHT: ICD-10-CM

## 2017-07-18 DIAGNOSIS — E11.42 DIABETIC POLYNEUROPATHY ASSOCIATED WITH TYPE 2 DIABETES MELLITUS (HCC): ICD-10-CM

## 2017-07-19 LAB — HEMOCCULT STL QL IA: NEGATIVE

## 2017-07-19 RX ORDER — INSULIN GLARGINE 100 [IU]/ML
INJECTION, SOLUTION SUBCUTANEOUS
Qty: 2 VIAL | Refills: 5
Start: 2017-07-19 | End: 2017-08-23 | Stop reason: SDUPTHER

## 2017-07-19 RX ORDER — TRIAMCINOLONE ACETONIDE 0.25 MG/G
CREAM TOPICAL 2 TIMES DAILY
Qty: 15 G | Refills: 0 | Status: SHIPPED | OUTPATIENT
Start: 2017-07-19 | End: 2017-07-24 | Stop reason: SDUPTHER

## 2017-07-24 ENCOUNTER — OFFICE VISIT (OUTPATIENT)
Dept: INTERNAL MEDICINE CLINIC | Age: 67
End: 2017-07-24

## 2017-07-24 VITALS
HEIGHT: 74 IN | HEART RATE: 83 BPM | SYSTOLIC BLOOD PRESSURE: 120 MMHG | DIASTOLIC BLOOD PRESSURE: 70 MMHG | RESPIRATION RATE: 22 BRPM | TEMPERATURE: 98.7 F | OXYGEN SATURATION: 93 %

## 2017-07-24 DIAGNOSIS — M16.0 PRIMARY OSTEOARTHRITIS OF BOTH HIPS: Primary | ICD-10-CM

## 2017-07-24 DIAGNOSIS — E11.8 TYPE 2 DIABETES MELLITUS WITH COMPLICATION, WITH LONG-TERM CURRENT USE OF INSULIN (HCC): ICD-10-CM

## 2017-07-24 DIAGNOSIS — E11.42 DIABETIC POLYNEUROPATHY ASSOCIATED WITH TYPE 2 DIABETES MELLITUS (HCC): ICD-10-CM

## 2017-07-24 DIAGNOSIS — Z79.4 TYPE 2 DIABETES MELLITUS WITH COMPLICATION, WITH LONG-TERM CURRENT USE OF INSULIN (HCC): ICD-10-CM

## 2017-07-24 LAB — GLUCOSE POC: 318 MG/DL

## 2017-07-24 RX ORDER — GABAPENTIN 300 MG/1
CAPSULE ORAL
Qty: 180 CAP | Refills: 2 | Status: SHIPPED | OUTPATIENT
Start: 2017-07-24 | End: 2017-10-02 | Stop reason: SDUPTHER

## 2017-07-24 RX ORDER — ACETAMINOPHEN 500 MG
500 TABLET ORAL
Qty: 60 TAB | Refills: 3 | Status: SHIPPED | OUTPATIENT
Start: 2017-07-24 | End: 2018-02-09 | Stop reason: SDUPTHER

## 2017-07-24 RX ORDER — TRIAMCINOLONE ACETONIDE 0.25 MG/G
CREAM TOPICAL 2 TIMES DAILY
Qty: 15 G | Refills: 0 | Status: SHIPPED | OUTPATIENT
Start: 2017-07-24 | End: 2017-08-30 | Stop reason: ALTCHOICE

## 2017-07-24 NOTE — PROGRESS NOTES
Gamal Shultz is a 78 y.o. male and presents with Diabetes; Shoulder Pain; and Hip Pain  . Subjective:  He still has  recurrent rt.hip pains   He has seen orthopedic surgery    Hypertension Review:  The patient has essential hypertension  Diet and Lifestyle: generally follows a  low sodium diet, exercises sporadically  Home BP Monitoring: is not measured at home. Pertinent ROS: taking medications as instructed, no medication side effects noted, no TIA's, no chest pain on exertion, no dyspnea on exertion, no swelling of ankles. Diabetes Mellitus Review:  He has diabetes mellitus. Diabetic ROS - medication compliance: compliant all of the time, diabetic diet compliance: compliant all of the time, home glucose monitoring: is performed. Known diabetic complications: none  Cardiovascular risk factors: family history, dyslipidemia, diabetes mellitus, obesity, hypertension  Current diabetic medications include oral agents/insulin   Eye exam current (within one year): no  Weight trend: stable  Prior visit with dietician: no  Current diet: \"healthy\" diet  in general  Current exercise: walking  Current monitoring regimen: home blood tests - daily  Home blood sugar records: trend: stable  Any episodes of hypoglycemia? no  Is He on ACE inhibitor or angiotensin II receptor blocker? Yes       Review of Systems  Constitutional: negative for fevers, chills, anorexia and weight loss  Eyes:   negative for visual disturbance and irritation  ENT:   negative for tinnitus,sore throat,nasal congestion,ear pains. hoarseness  Respiratory:  negative for cough, hemoptysis, dyspnea,wheezing  CV:   negative for chest pain, palpitations, lower extremity edema  GI:    abdominal pain,  Endo:               negative for polyuria,polydipsia,polyphagia,heat intolerance  Genitourinary: negative for frequency, dysuria and hematuria  Integument:  negative for rash and pruritus  Hematologic:  negative for easy bruising and gum/nose bleeding  Musculoskel: myalgias, arthralgias, back pain,joint pain  Neurological:  negative for headaches, dizziness, vertigo, memory problems and gait   Behavl/Psych: negative for feelings of anxiety, depression, mood changes    Past Medical History:   Diagnosis Date    Arthritis, Degenerative,  Knee 4/4/2011    Carcinoma, Prostate 4/4/2011    Degenerative disc disease 4/4/2011    Depression/ Anxiety 4/4/2011    Diabetes ([address]     Diabetes Mellitrus ( non-insulin dependent ) Type 2 4/4/2011    HEMATOCHEZIA 4/4/2011    Hypertrension 4/4/2011    Hypertriglyceridemia 4/4/2011    Insomnia 4/4/2011    Laryngitis 4/4/2011    Mild Aortic insufficiency 4/4/2011    Mild Concentric LVH (left ventricular hypertrophy) 4/4/2011    Neuropathy 4/4/2011    Osteoarthritis 4/4/2011    Rotator Cuff Tendonitis 4/4/2011     Past Surgical History:   Procedure Laterality Date    CARDIAC SURG PROCEDURE UNLIST      cardiac cath    COLONOSCOPY N/A 4/28/2017    COLONOSCOPY performed by Estuardo Rayo MD at [facility] ENDOSCOPY    HX ORTHOPAEDIC      left hip pinning    HX OTHER SURGICAL      left little toe amputation    HX UROLOGICAL       Social History     Social History    Marital status: LEGALLY      Spouse name: N/A    Number of children: N/A    Years of education: N/A     Social History Main Topics    Smoking status: Current Every Day Smoker     Packs/day: 0.25     Last attempt to quit: 10/25/2016    Smokeless tobacco: Never Used    Alcohol use 7.0 oz/week     7 Cans of beer, 7 Shots of liquor per week    Drug use: None    Sexual activity: Yes     Partners: Female     Other Topics Concern    None     Social History Narrative     History reviewed. No pertinent family history.   Current Outpatient Prescriptions   Medication Sig Dispense Refill    gabapentin (NEURONTIN) 300 mg capsule TAKE TWO CAPSULES BY MOUTH THREE TIMES A  Cap 2    triamcinolone acetonide (KENALOG) 0.025 % topical cream Apply to affected area two (2) times a day. use thin layer 15 g 0    acetaminophen (TYLENOL) 500 mg tablet Take 1 Tab by mouth every six (6) hours as needed for Pain. 60 Tab 3    insulin glargine (LANTUS) 100 unit/mL injection INJECT 72 UNITS UNDER THE SKIN EVERY 12 HOURS 2 Vial 5    insulin regular (NOVOLIN R, HUMULIN R) 100 unit/mL injection 14 Units by SubCUTAneous route three (3) times daily as needed (with meals). 1 Vial 12    therapeutic multivitamin (THERA) tablet Take 1 Tab by mouth daily. 30 Tab 12    fluticasone (FLONASE) 50 mcg/actuation nasal spray INHALE 2 SPRAYS IN EACH NOSTRIL EVERY DAY. 16 g 12    irbesartan (AVAPRO) 300 mg tablet TAKE ONE TABLET BY MOUTH ONCE NIGHTLY 30 Tab 11    hydroCHLOROthiazide (HYDRODIURIL) 25 mg tablet Take 1 Tab by mouth daily. 30 Tab 12    ibuprofen (MOTRIN) 800 mg tablet Take 1 Tab by mouth every eight (8) hours as needed for Pain. 90 Tab 12    aspirin delayed-release 81 mg tablet TAKE ONE TABLET BY MOUTH DAILY 30 Tab 0    Wheel Chair dianna Custom electric wheelchair 1 Each 0    oxyCODONE-acetaminophen (PERCOCET) 5-325 mg per tablet Take 1 Tab by mouth every eight (8) hours as needed. 14 Tab 0    Lancets misc Use 2-3 times daily 100 Each 12    dilTIAZem (TIAZAC) 300 mg SR capsule Take 1 Cap by mouth daily. 30 Cap 12    atorvastatin (LIPITOR) 40 mg tablet Take 1 Tab by mouth nightly. 30 Tab 11    glucose blood VI test strips (CONTOUR NEXT STRIPS) strip Test 2-3 times daily 100 Strip 12    Calcium Carbonate-Vit D3-Min 600 mg calcium- 400 unit tab Take  by mouth two (2) times a day.  docusate sodium (COLACE) 100 mg capsule Take 100 mg by mouth daily.  loratadine (CLARITIN) 10 mg tablet Take 10 mg by mouth daily.  lidocaine (LIDODERM) 5 % 1 Patch by TransDERmal route every twenty-four (24) hours.  Apply to affected area every 24 hours 1 Package 3     No Known Allergies    Objective:  Visit Vitals    /70 (BP 1 Location: Left arm, BP Patient Position: Sitting)    Pulse 83    Temp 98.7 °F (37.1 °C) (Oral)    Resp 22    Ht 6' 2\" (1.88 m)    SpO2 93%     Physical Exam:   General appearance - alert, well appearing, and in moderate distress  Mental status - alert, oriented to person, place, and time  EYE-LUH, EOMI, corneas normal, no foreign bodies  ENT-ENT exam normal, no neck nodes or sinus tenderness  Nose - normal and patent, no erythema, discharge or polyps  Mouth - mucous membranes moist, pharynx normal without lesions  Neck - supple, no significant adenopathy   Chest - clear to auscultation, no wheezes, rales or rhonchi, symmetric air entry   Heart - normal rate, regular rhythm, normal S1, S2, no murmurs, rubs, clicks or gallops   Abdomen - soft, nontender, nondistended, no masses or organomegaly  Lymph- no adenopathy palpable  Ext-peripheral pulses normal, no pedal edema, no clubbing or cyanosis  Skin-Warm and dry. no hyperpigmentation, vitiligo, or suspicious lesions  Neuro -alert, oriented, normal speech, no focal findings or movement disorder noted  Neck-normal C-spine, no tenderness, full ROM without pain  Feet-no nail deformities or callus formation with good pulses noted  Rt. .shoulder-subdeltoid tenderness, positive impingement signs  Rt.hip tenderness    Results for orders placed or performed in visit on 07/24/17   AMB POC GLUCOSE BLOOD, BY GLUCOSE MONITORING DEVICE   Result Value Ref Range    Glucose  mg/dL       Assessment/Plan:    ICD-10-CM ICD-9-CM    1. Primary osteoarthritis of both hips M16.0 715.15 AMB POC GLUCOSE BLOOD, BY GLUCOSE MONITORING DEVICE      acetaminophen (TYLENOL) 500 mg tablet   2. Type 2 diabetes mellitus with complication, with long-term current use of insulin ([facility]) E11.8 250.90 AMB POC GLUCOSE BLOOD, BY GLUCOSE MONITORING DEVICE    Z79.4 V58.67    3.  Diabetic polyneuropathy associated with type 2 diabetes mellitus ([facility]) E11.42 250.60      357.2      Orders Placed This Encounter    AMB POC GLUCOSE BLOOD, BY GLUCOSE MONITORING DEVICE    triamcinolone acetonide (KENALOG) 0.025 % topical cream     Sig: Apply  to affected area two (2) times a day. use thin layer     Dispense:  15 g     Refill:  0    acetaminophen (TYLENOL) 500 mg tablet     Sig: Take 1 Tab by mouth every six (6) hours as needed for Pain. Dispense:  60 Tab     Refill:  3     lose weight, increase physical activity, follow low fat diet, follow low salt diet,Take 81mg aspirin daily  Patient Instructions   [employer] Activation    Thank you for requesting access to [employer]. Please follow the instructions below to securely access and download your online medical record. [employer] allows you to send messages to your doctor, view your test results, renew your prescriptions, schedule appointments, and more. How Do I Sign Up? 1. In your internet browser, go to [email or web address]  2. Click on the First Time User? Click Here link in the Sign In box. You will be redirect to the New Member Sign Up page. 3. Enter your [employer] Access Code exactly as it appears below. You will not need to use this code after youve completed the sign-up process. If you do not sign up before the expiration date, you must request a new code. [employer] Access Code: Activation code not generated  Current [employer] Status: Patient Declined (This is the date your [employer] access code will )    4. Enter the last four digits of your Social Security Number (xxxx) and Date of Birth (mm/dd/yyyy) as indicated and click Submit. You will be taken to the next sign-up page. 5. Create a [employer] ID. This will be your [employer] login ID and cannot be changed, so think of one that is secure and easy to remember. 6. Create a [employer] password. You can change your password at any time. 7. Enter your Password Reset Question and Answer. This can be used at a later time if you forget your password. 8. Enter your e-mail address.  You will receive e-mail notification when new information is available in [address] 9. Click Sign Up. You can now view and download portions of your medical record. 10. Click the Download Summary menu link to download a portable copy of your medical information. Additional Information    If you have questions, please visit the Frequently Asked Questions section of the [employer] website at [email or web address] Remember, [employer] is NOT to be used for urgent needs. For medical emergencies, dial 911. Follow-up Disposition:  Return in about 4 weeks (around 8/21/2017), or if symptoms worsen or fail to improve. I have reviewed with the patient details of the assessment and plan and all questions were answered. Relevent patient education was performed    An After Visit Summary was printed and given to the patient.

## 2017-07-24 NOTE — PATIENT INSTRUCTIONS
[email or web address] Activation    Thank you for requesting access to [facility]. Please follow the instructions below to securely access and download your online medical record. [facility] allows you to send messages to your doctor, view your test results, renew your prescriptions, schedule appointments, and more. How Do I Sign Up? 1. In your internet browser, go to [email or web address]  2. Click on the First Time User? Click Here link in the Sign In box. You will be redirect to the New Member Sign Up page. 3. Enter your [facility] Access Code exactly as it appears below. You will not need to use this code after youve completed the sign-up process. If you do not sign up before the expiration date, you must request a new code. [facility] Access Code: Activation code not generated  Current [facility] Status: Patient Declined (This is the date your [facility] access code will )    4. Enter the last four digits of your Social Security Number (xxxx) and Date of Birth (mm/dd/yyyy) as indicated and click Submit. You will be taken to the next sign-up page. 5. Create a [facility] ID. This will be your [facility] login ID and cannot be changed, so think of one that is secure and easy to remember. 6. Create a [facility] password. You can change your password at any time. 7. Enter your Password Reset Question and Answer. This can be used at a later time if you forget your password. 8. Enter your e-mail address. You will receive e-mail notification when new information is available in [address] 9. Click Sign Up. You can now view and download portions of your medical record. 10. Click the Download Summary menu link to download a portable copy of your medical information. Additional Information    If you have questions, please visit the Frequently Asked Questions section of the [facility] website at [email or web address] Remember, [facility] is NOT to be used for urgent needs. For medical emergencies, dial 911.

## 2017-07-24 NOTE — MR AVS SNAPSHOT
Visit Information Date & Time Provider Department Dept. Phone Encounter #  
 7/24/2017  2:45 PM Sunil Hudson MD [address] [facility] [phone] [id number] Follow-up Instructions Return in about 4 weeks (around 8/21/2017), or if symptoms worsen or fail to improve. Upcoming Health Maintenance Date Due  
 EYE EXAM RETINAL OR DILATED Q1 3/11/2016 GLAUCOMA SCREENING Q2Y 4/21/2016 Pneumococcal 65+ High/Highest Risk (1 of 2 - PCV13) 4/21/2016 MICROALBUMIN Q1 10/8/2016 INFLUENZA AGE 9 TO ADULT 8/1/2017 HEMOGLOBIN A1C Q6M 11/16/2017 MEDICARE YEARLY EXAM 4/14/2018 LIPID PANEL Q1 5/16/2018 FOOT EXAM Q1 7/6/2018 FOBT Q 1 YEAR AGE 50-75 7/6/2018 DTaP/Tdap/Td series (2 - Td) 12/10/2024 Allergies as of 7/24/2017  Review Complete On: 7/24/2017 By: Sunil Hudson MD  
 No Known Allergies Current Immunizations  Reviewed on 12/10/2014 Name Date Pneumococcal Polysaccharide (PPSV-23) 9/11/2014 Tdap 12/10/2014 Not reviewed this visit You Were Diagnosed With   
  
 Codes Comments Primary osteoarthritis of both hips    -  Primary ICD-10-CM: M16.0 ICD-9-CM: 715.15 Type 2 diabetes mellitus with complication, with long-term current use of insulin ([facility])     ICD-10-CM: E11.8, Z79.4 ICD-9-CM: 250.90, V58.67 Diabetic polyneuropathy associated with type 2 diabetes mellitus [facility]     ICD-10-CM: E11.42 
ICD-9-CM: 250.60, 357.2 Vitals BP Pulse Temp Resp Height(growth percentile) SpO2  
 120/70 (BP 1 Location: Left arm, BP Patient Position: Sitting) 83 98.7 °F (37.1 °C) (Oral) 22 6' 2\" (1.88 m) 93% Smoking Status Current Every Day Smoker Vitals History Preferred Pharmacy Pharmacy Name Phone [facility] [phone] Your Updated Medication List  
  
   
 This list is accurate as of: 7/24/17  4:03 PM.  Always use your most recent med list.  
  
  
  
  
 acetaminophen 500 mg tablet Commonly known as:  TYLENOL Take 1 Tab by mouth every six (6) hours as needed for Pain. aspirin delayed-release 81 mg tablet TAKE ONE TABLET BY MOUTH DAILY  
  
 atorvastatin 40 mg tablet Commonly known as:  LIPITOR Take 1 Tab by mouth nightly. Calcium Carbonate-Vit D3-Min 600 mg calcium- 400 unit Tab Take  by mouth two (2) times a day. COLACE 100 mg capsule Generic drug:  docusate sodium Take 100 mg by mouth daily. dilTIAZem 300 mg SR capsule Commonly known as:  [facility] Take 1 Cap by mouth daily. fluticasone 50 mcg/actuation nasal spray Commonly known as:  [facility] INHALE 2 SPRAYS IN EACH NOSTRIL EVERY DAY. gabapentin 300 mg capsule Commonly known as:  NEURONTIN  
TAKE TWO CAPSULES BY MOUTH THREE TIMES A DAY  
  
 glucose blood VI test strips strip Commonly known as:  CONTOUR NEXT STRIPS Test 2-3 times daily  
  
 hydroCHLOROthiazide 25 mg tablet Commonly known as:  HYDRODIURIL Take 1 Tab by mouth daily. ibuprofen 800 mg tablet Commonly known as:  MOTRIN Take 1 Tab by mouth every eight (8) hours as needed for Pain. insulin glargine 100 unit/mL injection Commonly known as:  LANTUS INJECT 72 UNITS UNDER THE SKIN EVERY 12 HOURS  
  
 insulin regular 100 unit/mL injection Commonly known as:  NOVOLIN R, HUMULIN R  
14 Units by SubCUTAneous route three (3) times daily as needed (with meals). irbesartan 300 mg tablet Commonly known as:  AVAPRO TAKE ONE TABLET BY MOUTH ONCE NIGHTLY Lancets Misc Use 2-3 times daily  
  
 lidocaine 5 % Commonly known as:  LIDODERM  
1 Patch by TransDERmal route every twenty-four (24) hours. Apply to affected area every 24 hours  
  
 loratadine 10 mg tablet Commonly known as:  Susa Fuchs Take 10 mg by mouth daily. oxyCODONE-acetaminophen 5-325 mg per tablet Commonly known as:  PERCOCET Take 1 Tab by mouth every eight (8) hours as needed. therapeutic multivitamin tablet Commonly known as:  THERA Take 1 Tab by mouth daily. triamcinolone acetonide 0.025 % topical cream  
Commonly known as:  KENALOG Apply  to affected area two (2) times a day. use thin layer [address] [facility] Custom electric wheelchair Prescriptions Sent to Pharmacy Refills  
 triamcinolone acetonide (KENALOG) 0.025 % topical cream 0 Sig: Apply  to affected area two (2) times a day. use thin layer Class: Normal  
 Pharmacy: [facility] [address] [facility] Ph #: [phone] Route: Topical  
 acetaminophen (TYLENOL) 500 mg tablet 3 Sig: Take 1 Tab by mouth every six (6) hours as needed for Pain. Class: Normal  
 Pharmacy: [facility] [id number] [facility] Ph #: [phone] Route: Oral  
  
We Performed the Following AMB POC GLUCOSE BLOOD, BY GLUCOSE MONITORING DEVICE [09073 CPT(R)] Follow-up Instructions Return in about 4 weeks (around 8/21/2017), or if symptoms worsen or fail to improve. Patient Instructions [employer] Activation Thank you for requesting access to [facility]. Please follow the instructions below to securely access and download your online medical record. [facility] allows you to send messages to your doctor, view your test results, renew your prescriptions, schedule appointments, and more. How Do I Sign Up? 1. In your internet browser, go to [email or web address] 
2. Click on the First Time User? Click Here link in the Sign In box. You will be redirect to the New Member Sign Up page. 3. Enter your [facility] Access Code exactly as it appears below. You will not need to use this code after youve completed the sign-up process.  If you do not sign up before the expiration date, you must request a new code. [facility] Access Code: Activation code not generated Current [facility] Status: Patient Declined (This is the date your [facility] access code will ) 4. Enter the last four digits of your Social Security Number (xxxx) and Date of Birth (mm/dd/yyyy) as indicated and click Submit. You will be taken to the next sign-up page. 5. Create a [employer] ID. This will be your [facility] login ID and cannot be changed, so think of one that is secure and easy to remember. 6. Create a [facility] password. You can change your password at any time. 7. Enter your Password Reset Question and Answer. This can be used at a later time if you forget your password. 8. Enter your e-mail address. You will receive e-mail notification when new information is available in [address] 9. Click Sign Up. You can now view and download portions of your medical record. 10. Click the Download Summary menu link to download a portable copy of your medical information. Additional Information If you have questions, please visit the Frequently Asked Questions section of the [facility] website at [email or web address] Remember, [facility] is NOT to be used for urgent needs. For medical emergencies, dial 911. Please provide this summary of care documentation to your next provider. Your primary care clinician is listed as Worthy Renny. If you have any questions after today's visit, please call [phone].

## 2017-08-16 RX ORDER — CALCIUM CARB/VITAMIN D3/VIT K1 500-100-40
TABLET,CHEWABLE ORAL
Qty: 100 SYRINGE | Refills: 11 | Status: SHIPPED | OUTPATIENT
Start: 2017-08-16 | End: 2018-08-30 | Stop reason: SDUPTHER

## 2017-08-23 DIAGNOSIS — M12.811 ROTATOR CUFF ARTHROPATHY, RIGHT: ICD-10-CM

## 2017-08-23 DIAGNOSIS — E11.42 DIABETIC POLYNEUROPATHY ASSOCIATED WITH TYPE 2 DIABETES MELLITUS (HCC): ICD-10-CM

## 2017-08-24 RX ORDER — INSULIN GLARGINE 100 [IU]/ML
INJECTION, SOLUTION SUBCUTANEOUS
Qty: 2 VIAL | Refills: 5
Start: 2017-08-24 | End: 2017-08-28 | Stop reason: SDUPTHER

## 2017-08-24 RX ORDER — ATORVASTATIN CALCIUM 40 MG/1
40 TABLET, FILM COATED ORAL
Qty: 30 TAB | Refills: 11 | Status: SHIPPED | OUTPATIENT
Start: 2017-08-24 | End: 2018-03-14 | Stop reason: SDUPTHER

## 2017-08-28 DIAGNOSIS — M12.811 ROTATOR CUFF ARTHROPATHY, RIGHT: ICD-10-CM

## 2017-08-28 DIAGNOSIS — E11.42 DIABETIC POLYNEUROPATHY ASSOCIATED WITH TYPE 2 DIABETES MELLITUS (HCC): ICD-10-CM

## 2017-08-29 RX ORDER — INSULIN GLARGINE 100 [IU]/ML
INJECTION, SOLUTION SUBCUTANEOUS
Qty: 4 VIAL | Refills: 11 | Status: SHIPPED | OUTPATIENT
Start: 2017-08-29 | End: 2018-09-02 | Stop reason: SDUPTHER

## 2017-08-30 ENCOUNTER — OFFICE VISIT (OUTPATIENT)
Dept: INTERNAL MEDICINE CLINIC | Age: 67
End: 2017-08-30

## 2017-08-30 VITALS
HEART RATE: 86 BPM | DIASTOLIC BLOOD PRESSURE: 70 MMHG | OXYGEN SATURATION: 98 % | TEMPERATURE: 98.6 F | HEIGHT: 74 IN | SYSTOLIC BLOOD PRESSURE: 120 MMHG | RESPIRATION RATE: 20 BRPM

## 2017-08-30 DIAGNOSIS — M16.11 PRIMARY OSTEOARTHRITIS OF RIGHT HIP: ICD-10-CM

## 2017-08-30 DIAGNOSIS — L30.9 ECZEMA, UNSPECIFIED TYPE: ICD-10-CM

## 2017-08-30 DIAGNOSIS — E11.65 CONTROLLED TYPE 2 DIABETES MELLITUS WITH HYPERGLYCEMIA, WITHOUT LONG-TERM CURRENT USE OF INSULIN (HCC): Primary | ICD-10-CM

## 2017-08-30 LAB — GLUCOSE POC: 153 MG/DL

## 2017-08-30 RX ORDER — TRIAMCINOLONE ACETONIDE 1 MG/G
CREAM TOPICAL 2 TIMES DAILY
Qty: 80 G | Refills: 0 | Status: CANCELLED | OUTPATIENT
Start: 2017-08-30

## 2017-08-30 RX ORDER — TRIAMCINOLONE ACETONIDE 1 MG/G
OINTMENT TOPICAL 2 TIMES DAILY
Qty: 80 G | Refills: 12 | Status: SHIPPED | OUTPATIENT
Start: 2017-08-30 | End: 2018-08-30 | Stop reason: SDUPTHER

## 2017-08-30 RX ORDER — CELECOXIB 200 MG/1
200 CAPSULE ORAL DAILY
Qty: 30 CAP | Refills: 12 | Status: SHIPPED | OUTPATIENT
Start: 2017-08-30 | End: 2017-09-29 | Stop reason: ALTCHOICE

## 2017-08-30 NOTE — MR AVS SNAPSHOT
Visit Information Date & Time Provider Department Dept. Phone Encounter #  
 8/30/2017  3:15 PM Cristopher Dowd MD [id number] [facility] [phone] [id number] Follow-up Instructions Return in about 4 weeks (around 9/27/2017), or if symptoms worsen or fail to improve. Follow-up and Disposition History Upcoming Health Maintenance Date Due  
 EYE EXAM RETINAL OR DILATED Q1 3/11/2016 GLAUCOMA SCREENING Q2Y 4/21/2016 Pneumococcal 65+ High/Highest Risk (1 of 2 - PCV13) 4/21/2016 MICROALBUMIN Q1 10/8/2016 INFLUENZA AGE 9 TO ADULT 8/1/2017 HEMOGLOBIN A1C Q6M 11/16/2017 MEDICARE YEARLY EXAM 4/14/2018 LIPID PANEL Q1 5/16/2018 FOOT EXAM Q1 7/6/2018 FOBT Q 1 YEAR AGE 50-75 7/6/2018 DTaP/Tdap/Td series (2 - Td) 12/10/2024 Allergies as of 8/30/2017  Review Complete On: 7/24/2017 By: Cristopher Dowd MD  
 No Known Allergies Current Immunizations  Reviewed on 12/10/2014 Name Date Pneumococcal Polysaccharide (PPSV-23) 9/11/2014 Tdap 12/10/2014 Not reviewed this visit You Were Diagnosed With   
  
 Codes Comments Controlled type 2 diabetes mellitus with hyperglycemia, without long-term current use of insulin ([facility]    -  Primary ICD-10-CM: E11.65 ICD-9-CM: 250.80, 790.29 Primary osteoarthritis of right hip     ICD-10-CM: M16.11 
ICD-9-CM: 715.15 Eczema, unspecified type     ICD-10-CM: L30.9 ICD-9-CM: 692.9 Vitals BP Pulse Temp Resp Height(growth percentile) SpO2  
 120/70 (BP 1 Location: Right arm, BP Patient Position: Sitting) 86 98.6 °F (37 °C) (Oral) 20 6' 2\" (1.88 m) 98% Smoking Status Current Every Day Smoker Preferred Pharmacy Pharmacy Name Phone [facility] [address] [facility] [phone] Your Updated Medication List  
  
   
 This list is accurate as of: 8/30/17  4:05 PM.  Always use your most recent med list.  
  
  
  
  
 acetaminophen 500 mg tablet Commonly known as:  TYLENOL Take 1 Tab by mouth every six (6) hours as needed for Pain. aspirin delayed-release 81 mg tablet TAKE ONE TABLET BY MOUTH DAILY  
  
 atorvastatin 40 mg tablet Commonly known as:  LIPITOR Take 1 Tab by mouth nightly. Calcium Carbonate-Vit D3-Min 600 mg calcium- 400 unit Tab Take  by mouth two (2) times a day. celecoxib 200 mg capsule Commonly known as:  CELEBREX Take 1 Cap by mouth daily for 30 days. COLACE 100 mg capsule Generic drug:  docusate sodium Take 100 mg by mouth daily. dilTIAZem 300 mg SR capsule Commonly known as:  [facility] Take 1 Cap by mouth daily. fluticasone 50 mcg/actuation nasal spray Commonly known as:  Isaias Vincent INHALE 2 SPRAYS IN EACH NOSTRIL EVERY DAY. gabapentin 300 mg capsule Commonly known as:  NEURONTIN  
TAKE TWO CAPSULES BY MOUTH THREE TIMES A DAY  
  
 glucose blood VI test strips strip Commonly known as:  CONTOUR NEXT STRIPS Test 2-3 times daily  
  
 hydroCHLOROthiazide 25 mg tablet Commonly known as:  HYDRODIURIL Take 1 Tab by mouth daily. ibuprofen 800 mg tablet Commonly known as:  MOTRIN Take 1 Tab by mouth every eight (8) hours as needed for Pain. insulin glargine 100 unit/mL injection Commonly known as:  LANTUS INJECT 72 UNITS UNDER THE SKIN EVERY 12 HOURS  
  
 insulin regular 100 unit/mL injection Commonly known as:  NOVOLIN R, HUMULIN R  
14 Units by SubCUTAneous route three (3) times daily as needed (with meals). Insulin Syringe-Needle U-100 1 mL 31 gauge x 5/16 Syrg  
USE TO INJECT INSULIN FIVE TIMES A DAY  
  
 irbesartan 300 mg tablet Commonly known as:  AVAPRO TAKE ONE TABLET BY MOUTH ONCE NIGHTLY Lancets Misc Use 2-3 times daily  
  
 lidocaine 5 % Commonly known as:  Marylee Spina  
 1 Patch by TransDERmal route every twenty-four (24) hours. Apply to affected area every 24 hours  
  
 loratadine 10 mg tablet Commonly known as:  Oscar Bird Take 10 mg by mouth daily. oxyCODONE-acetaminophen 5-325 mg per tablet Commonly known as:  PERCOCET Take 1 Tab by mouth every eight (8) hours as needed. therapeutic multivitamin tablet Commonly known as:  THERA Take 1 Tab by mouth daily. triamcinolone acetonide 0.1 % ointment Commonly known as:  KENALOG Apply  to affected area two (2) times a day. use thin layer bid [address] [facility] Custom electric wheelchair Prescriptions Sent to Pharmacy Refills  
 triamcinolone acetonide (KENALOG) 0.1 % ointment 12 Sig: Apply  to affected area two (2) times a day. use thin layer bid Class: Normal  
 Pharmacy: [facility] [id number] [facility] [place] Ph #: [phone] Route: Topical  
 celecoxib (CELEBREX) 200 mg capsule 12 Sig: Take 1 Cap by mouth daily for 30 days. Class: Normal  
 Pharmacy: [facility] [id number] [facility] [place] Ph #: [phone] Route: Oral  
  
We Performed the Following AMB POC GLUCOSE BLOOD, BY GLUCOSE MONITORING DEVICE [81984 CPT(R)] Follow-up Instructions Return in about 4 weeks (around 9/27/2017), or if symptoms worsen or fail to improve. Patient Instructions Seedfuse Activation Thank you for requesting access to [employer]. Please follow the instructions below to securely access and download your online medical record. [employer] allows you to send messages to your doctor, view your test results, renew your prescriptions, schedule appointments, and more. How Do I Sign Up? 1. In your internet browser, go to [email or web address] 
2. Click on the First Time User? Click Here link in the Sign In box. You will be redirect to the New Member Sign Up page. 3. Enter your Quotefisht Access Code exactly as it appears below. You will not need to use this code after youve completed the sign-up process. If you do not sign up before the expiration date, you must request a new code. MyChart Access Code: Activation code not generated Current Fortem Status: Patient Declined (This is the date your MyChart access code will ) 4. Enter the last four digits of your Social Security Number (xxxx) and Date of Birth (mm/dd/yyyy) as indicated and click Submit. You will be taken to the next sign-up page. 5. Create a Quotefisht ID. This will be your Fortem login ID and cannot be changed, so think of one that is secure and easy to remember. 6. Create a Quotefisht password. You can change your password at any time. 7. Enter your Password Reset Question and Answer. This can be used at a later time if you forget your password. 8. Enter your e-mail address. You will receive e-mail notification when new information is available in [address] 9. Click Sign Up. You can now view and download portions of your medical record. 10. Click the Download Summary menu link to download a portable copy of your medical information. Additional Information If you have questions, please visit the Frequently Asked Questions section of the [employer] website at [email or web address] Remember, Fort[employer] is NOT to be used for urgent needs. For medical emergencies, dial 911. Please provide this summary of care documentation to your next provider. Your primary care clinician is listed as Kasie Mata. If you have any questions after today's visit, please call [phone].

## 2017-08-30 NOTE — PROGRESS NOTES
Jessica Bravo is a 78 y.o. male and presents with Hip Pain and Knee Pain  . Subjective:  He has  recurrent rt.hip pains and knee pains  He has seen orthopedic surgery ans is to have an operation in oct 2017    Hypertension Review:  The patient has essential hypertension  Diet and Lifestyle: generally follows a  low sodium diet, exercises sporadically  Home BP Monitoring: is not measured at home. Pertinent ROS: taking medications as instructed, no medication side effects noted, no TIA's, no chest pain on exertion, no dyspnea on exertion, no swelling of ankles. Diabetes Mellitus Review:  He has diabetes mellitus. Diabetic ROS - medication compliance: compliant all of the time, diabetic diet compliance: compliant all of the time, home glucose monitoring: is performed. Known diabetic complications: none  Cardiovascular risk factors: family history, dyslipidemia, diabetes mellitus, obesity, hypertension  Current diabetic medications include oral agents/insulin   Eye exam current (within one year): no  Weight trend: stable  Prior visit with dietician: no  Current diet: \"healthy\" diet  in general  Current exercise: walking  Current monitoring regimen: home blood tests - daily  Home blood sugar records: trend: stable  Any episodes of hypoglycemia? no  Is He on ACE inhibitor or angiotensin II receptor blocker? Yes     He had a bout of rt.hand swelling    Review of Systems  Constitutional: negative for fevers, chills, anorexia and weight loss  Eyes:   negative for visual disturbance and irritation  ENT:   negative for tinnitus,sore throat,nasal congestion,ear pains. hoarseness  Respiratory:  negative for cough, hemoptysis, dyspnea,wheezing  CV:   negative for chest pain, palpitations, lower extremity edema  GI:    abdominal pain,  Endo:               negative for polyuria,polydipsia,polyphagia,heat intolerance  Genitourinary: negative for frequency, dysuria and hematuria  Integument:  negative for rash and pruritus  Hematologic:  negative for easy bruising and gum/nose bleeding  Musculoskel: myalgias, arthralgias, back pain,joint pain  Neurological:  negative for headaches, dizziness, vertigo, memory problems and gait   Behavl/Psych: negative for feelings of anxiety, depression, mood changes    Past Medical History:   Diagnosis Date    Arthritis, Degenerative,  Knee 4/4/2011    Carcinoma, Prostate 4/4/2011    Degenerative disc disease 4/4/2011    Depression/ Anxiety 4/4/2011    Diabetes ([address]     Diabetes Mellitrus ( non-insulin dependent ) Type 2 4/4/2011    HEMATOCHEZIA 4/4/2011    Hypertrension 4/4/2011    Hypertriglyceridemia 4/4/2011    Insomnia 4/4/2011    Laryngitis 4/4/2011    Mild Aortic insufficiency 4/4/2011    Mild Concentric LVH (left ventricular hypertrophy) 4/4/2011    Neuropathy 4/4/2011    Osteoarthritis 4/4/2011    Rotator Cuff Tendonitis 4/4/2011     Past Surgical History:   Procedure Laterality Date    CARDIAC SURG PROCEDURE UNLIST      cardiac cath    COLONOSCOPY N/A 4/28/2017    COLONOSCOPY performed by Kathy Sellers MD at [facility] ENDOSCOPY    HX ORTHOPAEDIC      left hip pinning    HX OTHER SURGICAL      left little toe amputation    HX UROLOGICAL       Social History     Social History    Marital status: LEGALLY      Spouse name: N/A    Number of children: N/A    Years of education: N/A     Social History Main Topics    Smoking status: Current Every Day Smoker     Packs/day: 0.25     Last attempt to quit: 10/25/2016    Smokeless tobacco: Never Used    Alcohol use 7.0 oz/week     7 Cans of beer, 7 Shots of liquor per week    Drug use: None    Sexual activity: Yes     Partners: Female     Other Topics Concern    None     Social History Narrative     History reviewed. No pertinent family history. Current Outpatient Prescriptions   Medication Sig Dispense Refill    triamcinolone acetonide (KENALOG) 0.1 % ointment Apply  to affected area two (2) times a day. use thin layer bid 80 g 12    celecoxib (CELEBREX) 200 mg capsule Take 1 Cap by mouth daily for 30 days. 30 Cap 12    insulin glargine (LANTUS) 100 unit/mL injection INJECT 72 UNITS UNDER THE SKIN EVERY 12 HOURS 4 Vial 11    atorvastatin (LIPITOR) 40 mg tablet Take 1 Tab by mouth nightly. 30 Tab 11    Insulin Syringe-Needle U-100 1 mL 31 gauge x 5/16 syrg USE TO INJECT INSULIN FIVE TIMES A  Syringe 11    gabapentin (NEURONTIN) 300 mg capsule TAKE TWO CAPSULES BY MOUTH THREE TIMES A  Cap 2    acetaminophen (TYLENOL) 500 mg tablet Take 1 Tab by mouth every six (6) hours as needed for Pain. 60 Tab 3    insulin regular (NOVOLIN R, HUMULIN R) 100 unit/mL injection 14 Units by SubCUTAneous route three (3) times daily as needed (with meals). 1 Vial 12    therapeutic multivitamin (THERA) tablet Take 1 Tab by mouth daily. 30 Tab 12    fluticasone (FLONASE) 50 mcg/actuation nasal spray INHALE 2 SPRAYS IN EACH NOSTRIL EVERY DAY. 16 g 12    irbesartan (AVAPRO) 300 mg tablet TAKE ONE TABLET BY MOUTH ONCE NIGHTLY 30 Tab 11    hydroCHLOROthiazide (HYDRODIURIL) 25 mg tablet Take 1 Tab by mouth daily. 30 Tab 12    aspirin delayed-release 81 mg tablet TAKE ONE TABLET BY MOUTH DAILY 30 Tab 0    Wheel Chair dianna Custom electric wheelchair 1 Each 0    oxyCODONE-acetaminophen (PERCOCET) 5-325 mg per tablet Take 1 Tab by mouth every eight (8) hours as needed. 14 Tab 0    Lancets misc Use 2-3 times daily 100 Each 12    dilTIAZem (TIAZAC) 300 mg SR capsule Take 1 Cap by mouth daily. 30 Cap 12    glucose blood VI test strips (CONTOUR NEXT STRIPS) strip Test 2-3 times daily 100 Strip 12    Calcium Carbonate-Vit D3-Min 600 mg calcium- 400 unit tab Take  by mouth two (2) times a day.  docusate sodium (COLACE) 100 mg capsule Take 100 mg by mouth daily.  loratadine (CLARITIN) 10 mg tablet Take 10 mg by mouth daily.  lidocaine (LIDODERM) 5 % 1 Patch by TransDERmal route every twenty-four (24) hours. Apply to affected area every 24 hours 1 Package 3    ibuprofen (MOTRIN) 800 mg tablet Take 1 Tab by mouth every eight (8) hours as needed for Pain. 90 Tab 12     No Known Allergies    Objective:  Visit Vitals    /70 (BP 1 Location: Right arm, BP Patient Position: Sitting)    Pulse 86    Temp 98.6 °F (37 °C) (Oral)    Resp 20    Ht 6' 2\" (1.88 m)    SpO2 98%     Physical Exam:   General appearance - alert, well appearing, and in moderate distress  Mental status - alert, oriented to person, place, and time  EYE-LUH, EOMI, corneas normal, no foreign bodies  ENT-ENT exam normal, no neck nodes or sinus tenderness  Nose - normal and patent, no erythema, discharge or polyps  Mouth - mucous membranes moist, pharynx normal without lesions  Neck - supple, no significant adenopathy   Chest - clear to auscultation, no wheezes, rales or rhonchi, symmetric air entry   Heart - normal rate, regular rhythm, normal S1, S2, no murmurs, rubs, clicks or gallops   Abdomen - soft, nontender, nondistended, no masses or organomegaly  Lymph- no adenopathy palpable  Ext-peripheral pulses normal, no pedal edema, no clubbing or cyanosis  Skin-Warm and dry. no hyperpigmentation, vitiligo, or suspicious lesions  Neuro -alert, oriented, normal speech, no focal findings or movement disorder noted  Neck-normal C-spine, no tenderness, full ROM without pain  Feet-no nail deformities or callus formation with good pulses noted  Rt. .shoulder-subdeltoid tenderness, positive impingement signs  Rt.hip tenderness  mcp joint tenderness    Results for orders placed or performed in visit on 08/30/17   AMB POC GLUCOSE BLOOD, BY GLUCOSE MONITORING DEVICE   Result Value Ref Range    Glucose  mg/dL       Assessment/Plan:    ICD-10-CM ICD-9-CM    1. Controlled type 2 diabetes mellitus with hyperglycemia, without long-term current use of insulin ([facility]) E11.65 250.80 AMB POC GLUCOSE BLOOD, BY GLUCOSE MONITORING DEVICE     790.29    2.  Primary osteoarthritis of right hip M16.11 715.15    3. Eczema, unspecified type L30.9 692.9      Orders Placed This Encounter    AMB POC GLUCOSE BLOOD, BY GLUCOSE MONITORING DEVICE    triamcinolone acetonide (KENALOG) 0.1 % ointment     Sig: Apply  to affected area two (2) times a day. use thin layer bid     Dispense:  80 g     Refill:  12    celecoxib (CELEBREX) 200 mg capsule     Sig: Take 1 Cap by mouth daily for 30 days. Dispense:  30 Cap     Refill:  12     lose weight, increase physical activity, follow low fat diet, follow low salt diet,Take 81mg aspirin daily  Patient Instructions   MyChart Activation    Thank you for requesting access to [employer]. Please follow the instructions below to securely access and download your online medical record. [employer] allows you to send messages to your doctor, view your test results, renew your prescriptions, schedule appointments, and more. How Do I Sign Up? 1. In your internet browser, go to [email or web address]  2. Click on the First Time User? Click Here link in the Sign In box. You will be redirect to the New Member Sign Up page. 3. Enter your [employer] Access Code exactly as it appears below. You will not need to use this code after youve completed the sign-up process. If you do not sign up before the expiration date, you must request a new code. [employer] Access Code: Activation code not generated  Current [employer] Status: Patient Declined (This is the date your Diwaneet access code will )    4. Enter the last four digits of your Social Security Number (xxxx) and Date of Birth (mm/dd/yyyy) as indicated and click Submit. You will be taken to the next sign-up page. 5. Create a [employer] ID. This will be your [employer] login ID and cannot be changed, so think of one that is secure and easy to remember. 6. Create a [employer] password. You can change your password at any time. 7. Enter your Password Reset Question and Answer.  This can be used at a later time if you forget your password. 8. Enter your e-mail address. You will receive e-mail notification when new information is available in [address] 9. Click Sign Up. You can now view and download portions of your medical record. 10. Click the Download Summary menu link to download a portable copy of your medical information. Additional Information    If you have questions, please visit the Frequently Asked Questions section of the [employer] website at [email or web address] Remember, [employer] is NOT to be used for urgent needs. For medical emergencies, dial 911. Follow-up Disposition:  Return in about 4 weeks (around 9/27/2017), or if symptoms worsen or fail to improve. I have reviewed with the patient details of the assessment and plan and all questions were answered. Relevent patient education was performed    An After Visit Summary was printed and given to the patient.

## 2017-08-30 NOTE — PATIENT INSTRUCTIONS
[employer] Activation    Thank you for requesting access to [employer]. Please follow the instructions below to securely access and download your online medical record. [employer] allows you to send messages to your doctor, view your test results, renew your prescriptions, schedule appointments, and more. How Do I Sign Up? 1. In your internet browser, go to [email or web address]  2. Click on the First Time User? Click Here link in the Sign In box. You will be redirect to the New Member Sign Up page. 3. Enter your [employer] Access Code exactly as it appears below. You will not need to use this code after youve completed the sign-up process. If you do not sign up before the expiration date, you must request a new code. [employer] Access Code: Activation code not generated  Current [employer] Status: Patient Declined (This is the date your [employer] access code will )    4. Enter the last four digits of your Social Security Number (xxxx) and Date of Birth (mm/dd/yyyy) as indicated and click Submit. You will be taken to the next sign-up page. 5. Create a [employer] ID. This will be your [employer] login ID and cannot be changed, so think of one that is secure and easy to remember. 6. Create a [employer] password. You can change your password at any time. 7. Enter your Password Reset Question and Answer. This can be used at a later time if you forget your password. 8. Enter your e-mail address. You will receive e-mail notification when new information is available in [address] 9. Click Sign Up. You can now view and download portions of your medical record. 10. Click the Download Summary menu link to download a portable copy of your medical information. Additional Information    If you have questions, please visit the Frequently Asked Questions section of the [employer] website at [email or web address] Remember, [employer] is NOT to be used for urgent needs. For medical emergencies, dial 911.

## 2017-09-11 RX ORDER — IBUPROFEN 200 MG
TABLET ORAL
Qty: 28 PATCH | Refills: 0 | Status: SHIPPED | OUTPATIENT
Start: 2017-09-11 | End: 2018-08-30 | Stop reason: ALTCHOICE

## 2017-09-27 ENCOUNTER — OFFICE VISIT (OUTPATIENT)
Dept: INTERNAL MEDICINE CLINIC | Age: 67
End: 2017-09-27

## 2017-09-27 VITALS
DIASTOLIC BLOOD PRESSURE: 70 MMHG | OXYGEN SATURATION: 100 % | HEART RATE: 64 BPM | TEMPERATURE: 98.3 F | HEIGHT: 74 IN | RESPIRATION RATE: 20 BRPM | SYSTOLIC BLOOD PRESSURE: 118 MMHG

## 2017-09-27 DIAGNOSIS — I10 ESSENTIAL HYPERTENSION WITH GOAL BLOOD PRESSURE LESS THAN 130/85: ICD-10-CM

## 2017-09-27 DIAGNOSIS — E11.65 CONTROLLED TYPE 2 DIABETES MELLITUS WITH HYPERGLYCEMIA, WITHOUT LONG-TERM CURRENT USE OF INSULIN (HCC): Primary | ICD-10-CM

## 2017-09-27 DIAGNOSIS — E11.42 DIABETIC POLYNEUROPATHY ASSOCIATED WITH TYPE 2 DIABETES MELLITUS (HCC): ICD-10-CM

## 2017-09-27 DIAGNOSIS — R26.9 GAIT ABNORMALITY: ICD-10-CM

## 2017-09-27 DIAGNOSIS — M16.11 PRIMARY OSTEOARTHRITIS OF RIGHT HIP: ICD-10-CM

## 2017-09-27 DIAGNOSIS — E55.9 VITAMIN D DEFICIENCY: ICD-10-CM

## 2017-09-27 LAB
GLUCOSE POC: 176 MG/DL
HBA1C MFR BLD HPLC: 5.5 %

## 2017-09-27 RX ORDER — ERGOCALCIFEROL 1.25 MG/1
50000 CAPSULE ORAL
Qty: 4 CAP | Refills: 12 | Status: SHIPPED | OUTPATIENT
Start: 2017-09-27 | End: 2018-07-31 | Stop reason: SDUPTHER

## 2017-09-27 NOTE — MR AVS SNAPSHOT
Visit Information Date & Time Provider Department Dept. Phone Encounter #  
 9/27/2017  2:45 PM Alta Alarcon MD [address] [place] [address] [phone] [id number] Follow-up Instructions Return in about 3 months (around 12/27/2017), or if symptoms worsen or fail to improve. Follow-up and Disposition History Upcoming Health Maintenance Date Due  
 EYE EXAM RETINAL OR DILATED Q1 3/11/2016 GLAUCOMA SCREENING Q2Y 4/21/2016 Pneumococcal 65+ High/Highest Risk (1 of 2 - PCV13) 4/21/2016 MICROALBUMIN Q1 10/8/2016 HEMOGLOBIN A1C Q6M 11/16/2017 MEDICARE YEARLY EXAM 4/14/2018 LIPID PANEL Q1 5/16/2018 FOOT EXAM Q1 7/6/2018 FOBT Q 1 YEAR AGE 50-75 7/6/2018 DTaP/Tdap/Td series (2 - Td) 12/10/2024 Allergies as of 9/27/2017  Review Complete On: 9/27/2017 By: Alta Alarcon MD  
 No Known Allergies Current Immunizations  Reviewed on 12/10/2014 Name Date Pneumococcal Polysaccharide (PPSV-23) 9/11/2014 Tdap 12/10/2014 Not reviewed this visit You Were Diagnosed With   
  
 Codes Comments Controlled type 2 diabetes mellitus with hyperglycemia, without long-term current use of insulin ([facility]    -  Primary ICD-10-CM: E11.65 ICD-9-CM: 250.80, 790.29 Primary osteoarthritis of right hip     ICD-10-CM: M16.11 
ICD-9-CM: 715.15 Diabetic polyneuropathy associated with type 2 diabetes mellitus ([facility]     ICD-10-CM: E11.42 
ICD-9-CM: 250.60, 357.2 Gait abnormality     ICD-10-CM: R26.9 ICD-9-CM: 781.2 Essential hypertension with goal blood pressure less than 130/85     ICD-10-CM: I10 
ICD-9-CM: 401.9 Vitamin D deficiency     ICD-10-CM: E55.9 ICD-9-CM: 268.9 Vitals BP Pulse Temp Resp Height(growth percentile) SpO2  
 118/70 (BP 1 Location: Right arm, BP Patient Position: Sitting) 64 98.3 °F (36.8 °C) (Oral) 20 6' 2\" (1.88 m) 100% Smoking Status Current Every Day Smoker Vitals History [facility] [id number] [phone] Your Updated Medication List  
  
   
This list is accurate as of: 9/27/17  3:23 PM.  Always use your most recent med list.  
  
  
  
  
 acetaminophen 500 mg tablet Commonly known as:  TYLENOL Take 1 Tab by mouth every six (6) hours as needed for Pain. aspirin delayed-release 81 mg tablet TAKE ONE TABLET BY MOUTH DAILY  
  
 atorvastatin 40 mg tablet Commonly known as:  LIPITOR Take 1 Tab by mouth nightly. Calcium Carbonate-Vit D3-Min 600 mg calcium- 400 unit Tab Take  by mouth two (2) times a day. celecoxib 200 mg capsule Commonly known as:  CELEBREX Take 1 Cap by mouth daily for 30 days. COLACE 100 mg capsule Generic drug:  docusate sodium Take 100 mg by mouth daily. dilTIAZem 300 mg SR capsule Commonly known as:  [facility] Take 1 Cap by mouth daily. ergocalciferol 50,000 unit capsule Commonly known as:  ERGOCALCIFEROL Take 1 Cap by mouth every seven (7) days. fluticasone 50 mcg/actuation nasal spray Commonly known as:  [place] INHALE 2 SPRAYS IN EACH NOSTRIL EVERY DAY. gabapentin 300 mg capsule Commonly known as:  NEURONTIN  
TAKE TWO CAPSULES BY MOUTH THREE TIMES A DAY  
  
 glucose blood VI test strips strip Commonly known as:  CONTOUR NEXT STRIPS Test 2-3 times daily  
  
 hydroCHLOROthiazide 25 mg tablet Commonly known as:  HYDRODIURIL Take 1 Tab by mouth daily. ibuprofen 800 mg tablet Commonly known as:  MOTRIN Take 1 Tab by mouth every eight (8) hours as needed for Pain. insulin glargine 100 unit/mL injection Commonly known as:  LANTUS INJECT 72 UNITS UNDER THE SKIN EVERY 12 HOURS  
  
 insulin regular 100 unit/mL injection Commonly known as:  BRENDEN Correia  
 14 Units by SubCUTAneous route three (3) times daily as needed (with meals). Insulin Syringe-Needle U-100 1 mL 31 gauge x 5/16 Syrg  
USE TO INJECT INSULIN FIVE TIMES A DAY  
  
 irbesartan 300 mg tablet Commonly known as:  AVAPRO TAKE ONE TABLET BY MOUTH ONCE NIGHTLY Lancets Misc Use 2-3 times daily  
  
 lidocaine 5 % Commonly known as:  LIDODERM  
1 Patch by TransDERmal route every twenty-four (24) hours. Apply to affected area every 24 hours  
  
 loratadine 10 mg tablet Commonly known as:  Lydia Buddle Take 10 mg by mouth daily. nicotine 21 mg/24 hr  
Commonly known as:  NICODERM CQ  
APPLY ONE PATCH TO THE SKIN EVERY 24 HOURS  
  
 oxyCODONE-acetaminophen 5-325 mg per tablet Commonly known as:  PERCOCET Take 1 Tab by mouth every eight (8) hours as needed. therapeutic multivitamin tablet Commonly known as:  THERA Take 1 Tab by mouth daily. triamcinolone acetonide 0.1 % ointment Commonly known as:  KENALOG Apply  to affected area two (2) times a day. use thin layer bid [address] [facility] Custom electric wheelchair Prescriptions Sent to Pharmacy Refills  
 ergocalciferol (ERGOCALCIFEROL) 50,000 unit capsule 12 Sig: Take 1 Cap by mouth every seven (7) days. Class: Normal  
 Pharmacy: [facility] [id number] [facility] #: [phone] Route: Oral  
  
We Performed the Following AMB POC GLUCOSE BLOOD, BY GLUCOSE MONITORING DEVICE [33465 CPT(R)] AMB POC HEMOGLOBIN A1C [55350 CPT(R)] Follow-up Instructions Return in about 3 months (around 12/27/2017), or if symptoms worsen or fail to improve. Patient Instructions [employer] Activation Thank you for requesting access to [employer]. Please follow the instructions below to securely access and download your online medical record.  [employer] allows you to send messages to your doctor, view your test results, renew your prescriptions, schedule appointments, and more. How Do I Sign Up? 1. In your internet browser, go to [email or web address] 
2. Click on the First Time User? Click Here link in the Sign In box. You will be redirect to the New Member Sign Up page. 3. Enter your [employer] Access Code exactly as it appears below. You will not need to use this code after youve completed the sign-up process. If you do not sign up before the expiration date, you must request a new code. [employer] Access Code: Activation code not generated Current [employer] Status: Patient Declined (This is the date your [employer] access code will ) 4. Enter the last four digits of your Social Security Number (xxxx) and Date of Birth (mm/dd/yyyy) as indicated and click Submit. You will be taken to the next sign-up page. 5. Create a [employer] ID. This will be your [employer] login ID and cannot be changed, so think of one that is secure and easy to remember. 6. Create a [employer] password. You can change your password at any time. 7. Enter your Password Reset Question and Answer. This can be used at a later time if you forget your password. 8. Enter your e-mail address. You will receive e-mail notification when new information is available in [address] 9. Click Sign Up. You can now view and download portions of your medical record. 10. Click the Download Summary menu link to download a portable copy of your medical information. Additional Information If you have questions, please visit the Frequently Asked Questions section of the [employer] website at [email or web address] Remember, [employer] is NOT to be used for urgent needs. For medical emergencies, dial 911. Please provide this summary of care documentation to your next provider. Your primary care clinician is listed as Priscila Varela.  If you have any questions after today's visit, please call [phone].

## 2017-09-27 NOTE — PATIENT INSTRUCTIONS
[employer] Activation    Thank you for requesting access to [employer]. Please follow the instructions below to securely access and download your online medical record. [employer] allows you to send messages to your doctor, view your test results, renew your prescriptions, schedule appointments, and more. How Do I Sign Up? 1. In your internet browser, go to [email or web address]  2. Click on the First Time User? Click Here link in the Sign In box. You will be redirect to the New Member Sign Up page. 3. Enter your [employer] Access Code exactly as it appears below. You will not need to use this code after youve completed the sign-up process. If you do not sign up before the expiration date, you must request a new code. [employer] Access Code: Activation code not generated  Current [employer] Status: Patient Declined (This is the date your [employer] access code will )    4. Enter the last four digits of your Social Security Number (xxxx) and Date of Birth (mm/dd/yyyy) as indicated and click Submit. You will be taken to the next sign-up page. 5. Create a [employer] ID. This will be your [employer] login ID and cannot be changed, so think of one that is secure and easy to remember. 6. Create a [employer] password. You can change your password at any time. 7. Enter your Password Reset Question and Answer. This can be used at a later time if you forget your password. 8. Enter your e-mail address. You will receive e-mail notification when new information is available in [address] 9. Click Sign Up. You can now view and download portions of your medical record. 10. Click the Download Summary menu link to download a portable copy of your medical information. Additional Information    If you have questions, please visit the Frequently Asked Questions section of the [employer] website at [email or web address] Remember, [employer] is NOT to be used for urgent needs. For medical emergencies, dial 911.

## 2017-09-27 NOTE — PROGRESS NOTES
Latoya Mcclain is a 78 y.o. male and presents with Pre-op Exam and Diabetes  . Subjective:  He has  recurrent rt.hip pains and knee pains  He has seen orthopedic surgery ans is to have a hip operation in oct 2017  He needs medical evaluation prior to surgery  He has no chest pains reported    Hypertension Review:  The patient has essential hypertension  Diet and Lifestyle: generally follows a  low sodium diet, exercises sporadically  Home BP Monitoring: is not measured at home. Pertinent ROS: taking medications as instructed, no medication side effects noted, no TIA's, no chest pain on exertion, no dyspnea on exertion, no swelling of ankles. Diabetes Mellitus Review:  He has diabetes mellitus. Diabetic ROS - medication compliance: compliant all of the time, diabetic diet compliance: compliant all of the time, home glucose monitoring: is performed. Known diabetic complications: none  Cardiovascular risk factors: family history, dyslipidemia, diabetes mellitus, obesity, hypertension  Current diabetic medications include oral agents/insulin   Eye exam current (within one year): no  Weight trend: stable  Prior visit with dietician: no  Current diet: \"healthy\" diet  in general  Current exercise: walking  Current monitoring regimen: home blood tests - daily  Home blood sugar records: trend: stable  Any episodes of hypoglycemia? no  Is He on ACE inhibitor or angiotensin II receptor blocker? Yes     He has a vitamin d deficiency    Review of Systems  Constitutional: negative for fevers, chills, anorexia and weight loss  Eyes:   negative for visual disturbance and irritation  ENT:   negative for tinnitus,sore throat,nasal congestion,ear pains. hoarseness  Respiratory:  negative for cough, hemoptysis, dyspnea,wheezing  CV:   negative for chest pain, palpitations, lower extremity edema  GI:    abdominal pain,  Endo:               negative for polyuria,polydipsia,polyphagia,heat intolerance  Genitourinary: negative for frequency, dysuria and hematuria  Integument:  negative for rash and pruritus  Hematologic:  negative for easy bruising and gum/nose bleeding  Musculoskel: myalgias, arthralgias, back pain,joint pain  Neurological:  negative for headaches, dizziness, vertigo, memory problems and gait   Behavl/Psych: negative for feelings of anxiety, depression, mood changes    Past Medical History:   Diagnosis Date    Arthritis, Degenerative,  Knee 4/4/2011    Carcinoma, Prostate 4/4/2011    Degenerative disc disease 4/4/2011    Depression/ Anxiety 4/4/2011    Diabetes ([facility]     Diabetes Mellitrus ( non-insulin dependent ) Type 2 4/4/2011    HEMATOCHEZIA 4/4/2011    Hypertrension 4/4/2011    Hypertriglyceridemia 4/4/2011    Insomnia 4/4/2011    Laryngitis 4/4/2011    Mild Aortic insufficiency 4/4/2011    Mild Concentric LVH (left ventricular hypertrophy) 4/4/2011    Neuropathy 4/4/2011    Osteoarthritis 4/4/2011    Rotator Cuff Tendonitis 4/4/2011     Past Surgical History:   Procedure Laterality Date    CARDIAC SURG PROCEDURE UNLIST      cardiac cath    COLONOSCOPY N/A 4/28/2017    COLONOSCOPY performed [facility] Garo [facility] at [facility] ENDOSCOPY    HX ORTHOPAEDIC      left hip pinning    HX OTHER SURGICAL      left little toe amputation    HX UROLOGICAL       Social History     Social History    Marital status: LEGALLY      Spouse name: N/A    Number of children: N/A    Years of education: N/A     Social History Main Topics    Smoking status: Current Every Day Smoker     Packs/day: 0.25     Last attempt to quit: 10/25/2016    Smokeless tobacco: Never Used    Alcohol use 7.0 oz/week     7 Cans of beer, 7 Shots of liquor per week    Drug use: None    Sexual activity: Yes     Partners: Female     Other Topics Concern    None     Social History Narrative     No family history on file.   Current Outpatient Prescriptions   Medication Sig Dispense Refill    nicotine (NICODERM CQ) 21 mg/24 hr APPLY ONE PATCH TO THE SKIN EVERY 24 HOURS 28 Patch 0    triamcinolone acetonide (KENALOG) 0.1 % ointment Apply  to affected area two (2) times a day. use thin layer bid 80 g 12    celecoxib (CELEBREX) 200 mg capsule Take 1 Cap by mouth daily for 30 days. 30 Cap 12    insulin glargine (LANTUS) 100 unit/mL injection INJECT 72 UNITS UNDER THE SKIN EVERY 12 HOURS 4 Vial 11    atorvastatin (LIPITOR) 40 mg tablet Take 1 Tab by mouth nightly. 30 Tab 11    Insulin Syringe-Needle U-100 1 mL 31 gauge x 5/16 syrg USE TO INJECT INSULIN FIVE TIMES A  Syringe 11    gabapentin (NEURONTIN) 300 mg capsule TAKE TWO CAPSULES BY MOUTH THREE TIMES A  Cap 2    acetaminophen (TYLENOL) 500 mg tablet Take 1 Tab by mouth every six (6) hours as needed for Pain. 60 Tab 3    insulin regular (NOVOLIN R, HUMULIN R) 100 unit/mL injection 14 Units by SubCUTAneous route three (3) times daily as needed (with meals). 1 Vial 12    therapeutic multivitamin (THERA) tablet Take 1 Tab by mouth daily. 30 Tab 12    fluticasone (FLONASE) 50 mcg/actuation nasal spray INHALE 2 SPRAYS IN EACH NOSTRIL EVERY DAY. 16 g 12    irbesartan (AVAPRO) 300 mg tablet TAKE ONE TABLET BY MOUTH ONCE NIGHTLY 30 Tab 11    hydroCHLOROthiazide (HYDRODIURIL) 25 mg tablet Take 1 Tab by mouth daily. 30 Tab 12    aspirin delayed-release 81 mg tablet TAKE ONE TABLET BY MOUTH DAILY 30 Tab 0    oxyCODONE-acetaminophen (PERCOCET) 5-325 mg per tablet Take 1 Tab by mouth every eight (8) hours as needed. 14 Tab 0    dilTIAZem (TIAZAC) 300 mg SR capsule Take 1 Cap by mouth daily. 30 Cap 12    glucose blood VI test strips (CONTOUR NEXT STRIPS) strip Test 2-3 times daily 100 Strip 12    docusate sodium (COLACE) 100 mg capsule Take 100 mg by mouth daily.  loratadine (CLARITIN) 10 mg tablet Take 10 mg by mouth daily.  lidocaine (LIDODERM) 5 % 1 Patch by TransDERmal route every twenty-four (24) hours.  Apply to affected area every 24 hours 1 Package 3    ibuprofen (MOTRIN) 800 mg tablet Take 1 Tab by mouth every eight (8) hours as needed for Pain. 90 Tab 12    Wheel Chair dianna Custom electric wheelchair 1 Each 0    Lancets misc Use 2-3 times daily 100 Each 12    Calcium Carbonate-Vit D3-Min 600 mg calcium- 400 unit tab Take  by mouth two (2) times a day. No Known Allergies    Objective:  Visit Vitals    /70 (BP 1 Location: Right arm, BP Patient Position: Sitting)    Pulse 64    Temp 98.3 °F (36.8 °C) (Oral)    Resp 20    Ht 6' 2\" (1.88 m)    SpO2 100%     Physical Exam:   General appearance - alert, well appearing, and in moderate distress  Mental status - alert, oriented to person, place, and time  EYE-LUH, EOMI, corneas normal, no foreign bodies  ENT-ENT exam normal, no neck nodes or sinus tenderness  Nose - normal and patent, no erythema, discharge or polyps  Mouth - mucous membranes moist, pharynx normal without lesions  Neck - supple, no significant adenopathy   Chest - clear to auscultation, no wheezes, rales or rhonchi, symmetric air entry   Heart - normal rate, regular rhythm, normal S1, S2, no murmurs, rubs, clicks or gallops   Abdomen - soft, nontender, nondistended, no masses or organomegaly  Lymph- no adenopathy palpable  Ext-peripheral pulses normal, no pedal edema, no clubbing or cyanosis  Skin-Warm and dry. no hyperpigmentation, vitiligo, or suspicious lesions  Neuro -alert, oriented, normal speech, no focal findings or movement disorder noted  Neck-normal C-spine, no tenderness, full ROM without pain  Feet-no nail deformities or callus formation with good pulses noted  Rt. .shoulder-subdeltoid tenderness, positive impingement signs  Rt.hip tenderness  mcp joint tenderness    Results for orders placed or performed in visit on 08/30/17   AMB POC GLUCOSE BLOOD, BY GLUCOSE MONITORING DEVICE   Result Value Ref Range    Glucose  mg/dL       Assessment/Plan:    ICD-10-CM ICD-9-CM    1. Controlled type 2 diabetes mellitus with hyperglycemia, without long-term current use of insulin (HCC) E11.65 250.80      790.29    2. Primary osteoarthritis of right hip M16.11 715.15    3. Diabetic polyneuropathy associated with type 2 diabetes mellitus (HCC) E11.42 250.60      357.2    4. Gait abnormality R26.9 781.2    5. Essential hypertension with goal blood pressure less than 130/85 I10 401.9    6. Vitamin D deficiency E55.9 268.9      No orders of the defined types were placed in this encounter. lose weight, increase physical activity, follow low fat diet, follow low salt diet,    He is medically stable for surgery  Patient Instructions   MyChart Activation    Thank you for requesting access to [facility]Account. Please follow the instructions below to securely access and download your online medical record. [facility]Account allows you to send messages to your doctor, view your test results, renew your prescriptions, schedule appointments, and more. How Do I Sign Up? 1. In your internet browser, go to [email or web address]  2. Click on the First Time User? Click Here link in the Sign In box. You will be redirect to the New Member Sign Up page. 3. Enter your Home-Account Access Code exactly as it appears below. You will not need to use this code after youve completed the sign-up process. If you do not sign up before the expiration date, you must request a new code. Home-Account Access Code: Activation code not generated  Current Home-Account Status: Patient Declined (This is the date your [employer] access code will )    4. Enter the last four digits of your Social Security Number (xxxx) and Date of Birth (mm/dd/yyyy) as indicated and click Submit. You will be taken to the next sign-up page. 5. Create a Home-Account ID. This will be your Home-Account login ID and cannot be changed, so think of one that is secure and easy to remember. 6. Create a Home-Account password. You can change your password at any time.   7. Enter your Password Reset Question and Answer. This can be used at a later time if you forget your password. 8. Enter your e-mail address. You will receive e-mail notification when new information is available in [address] 9. Click Sign Up. You can now view and download portions of your medical record. 10. Click the Download Summary menu link to download a portable copy of your medical information. Additional Information    If you have questions, please visit the Frequently Asked Questions section of the [employer] website at [email or web address] Remember, [employer] is NOT to be used for urgent needs. For medical emergencies, dial 911. Follow-up Disposition:  Return in about 3 months (around 12/27/2017), or if symptoms worsen or fail to improve. I have reviewed with the patient details of the assessment and plan and all questions were answered. Relevent patient education was performed    An After Visit Summary was printed and given to the patient.

## 2017-09-28 LAB
ALBUMIN SERPL-MCNC: 3.8 G/DL (ref 3.6–4.8)
ALBUMIN/GLOB SERPL: 1.1 {RATIO} (ref 1.2–2.2)
ALP SERPL-CCNC: 123 IU/L (ref 39–117)
ALT SERPL-CCNC: 11 IU/L (ref 0–44)
AST SERPL-CCNC: 17 IU/L (ref 0–40)
BILIRUB SERPL-MCNC: 0.3 MG/DL (ref 0–1.2)
BUN SERPL-MCNC: 28 MG/DL (ref 8–27)
BUN/CREAT SERPL: 14 (ref 10–24)
CALCIUM SERPL-MCNC: 9.3 MG/DL (ref 8.6–10.2)
CHLORIDE SERPL-SCNC: 94 MMOL/L (ref 96–106)
CO2 SERPL-SCNC: 25 MMOL/L (ref 18–29)
CREAT SERPL-MCNC: 2.04 MG/DL (ref 0.76–1.27)
ERYTHROCYTE [DISTWIDTH] IN BLOOD BY AUTOMATED COUNT: 16.7 % (ref 12.3–15.4)
GLOBULIN SER CALC-MCNC: 3.5 G/DL (ref 1.5–4.5)
GLUCOSE SERPL-MCNC: 147 MG/DL (ref 65–99)
HCT VFR BLD AUTO: 31.2 % (ref 37.5–51)
HGB BLD-MCNC: 10.5 G/DL (ref 12.6–17.7)
INTERPRETATION: NORMAL
Lab: NORMAL
MCH RBC QN AUTO: 29 PG (ref 26.6–33)
MCHC RBC AUTO-ENTMCNC: 33.7 G/DL (ref 31.5–35.7)
MCV RBC AUTO: 86 FL (ref 79–97)
PLATELET # BLD AUTO: 303 X10E3/UL (ref 150–379)
POTASSIUM SERPL-SCNC: 4.8 MMOL/L (ref 3.5–5.2)
PROT SERPL-MCNC: 7.3 G/DL (ref 6–8.5)
RBC # BLD AUTO: 3.62 X10E6/UL (ref 4.14–5.8)
SODIUM SERPL-SCNC: 135 MMOL/L (ref 134–144)
WBC # BLD AUTO: 8 X10E3/UL (ref 3.4–10.8)

## 2017-10-02 ENCOUNTER — OFFICE VISIT (OUTPATIENT)
Dept: INTERNAL MEDICINE CLINIC | Age: 67
End: 2017-10-02

## 2017-10-02 VITALS
TEMPERATURE: 98.3 F | SYSTOLIC BLOOD PRESSURE: 130 MMHG | RESPIRATION RATE: 16 BRPM | DIASTOLIC BLOOD PRESSURE: 64 MMHG | OXYGEN SATURATION: 94 % | HEART RATE: 74 BPM

## 2017-10-02 DIAGNOSIS — N18.30 CKD (CHRONIC KIDNEY DISEASE) STAGE 3, GFR 30-59 ML/MIN (HCC): Primary | ICD-10-CM

## 2017-10-02 DIAGNOSIS — E11.65 CONTROLLED TYPE 2 DIABETES MELLITUS WITH HYPERGLYCEMIA, WITHOUT LONG-TERM CURRENT USE OF INSULIN (HCC): ICD-10-CM

## 2017-10-02 LAB — GLUCOSE POC: 85 MG/DL

## 2017-10-02 RX ORDER — GABAPENTIN 300 MG/1
CAPSULE ORAL
Qty: 180 CAP | Refills: 2 | Status: SHIPPED | OUTPATIENT
Start: 2017-10-02 | End: 2018-03-06 | Stop reason: SDUPTHER

## 2017-10-02 NOTE — PROGRESS NOTES
Guillaume Hull is a 78 y.o. male and presents with Results and Abnormal Lab Results  . Subjective:    His last renal functions were abnormal during the last visit. Hypertension Review:  The patient has essential hypertension  Diet and Lifestyle: generally follows a  low sodium diet, exercises sporadically  Home BP Monitoring: is not measured at home. Pertinent ROS: taking medications as instructed, no medication side effects noted, no TIA's, no chest pain on exertion, no dyspnea on exertion, no swelling of ankles. Diabetes Mellitus Review:  He has diabetes mellitus. Diabetic ROS - medication compliance: compliant all of the time, diabetic diet compliance: compliant all of the time, home glucose monitoring: is performed. Known diabetic complications: none  Cardiovascular risk factors: family history, dyslipidemia, diabetes mellitus, obesity, hypertension  Current diabetic medications include oral agents/insulin   Eye exam current (within one year): no  Weight trend: stable  Prior visit with dietician: no  Current diet: \"healthy\" diet  in general  Current exercise: walking  Current monitoring regimen: home blood tests - daily  Home blood sugar records: trend: stable  Any episodes of hypoglycemia? no  Is He on ACE inhibitor or angiotensin II receptor blocker? Yes         Review of Systems  Constitutional: negative for fevers, chills, anorexia and weight loss  Eyes:   negative for visual disturbance and irritation  ENT:   negative for tinnitus,sore throat,nasal congestion,ear pains. hoarseness  Respiratory:  negative for cough, hemoptysis, dyspnea,wheezing  CV:   negative for chest pain, palpitations, lower extremity edema  GI:    abdominal pain,  Endo:               negative for polyuria,polydipsia,polyphagia,heat intolerance  Genitourinary: negative for frequency, dysuria and hematuria  Integument:  negative for rash and pruritus  Hematologic:  negative for easy bruising and gum/nose bleeding  Musculoskel: myalgias, arthralgias, back pain,joint pain  Neurological:  negative for headaches, dizziness, vertigo, memory problems and gait   Behavl/Psych: negative for feelings of anxiety, depression, mood changes    Past Medical History:   Diagnosis Date    Arthritis, Degenerative,  Knee 4/4/2011    Carcinoma, Prostate 4/4/2011    Degenerative disc disease 4/4/2011    Depression/ Anxiety 4/4/2011    Diabetes ([facility] [address]     Diabetes Mellitrus ( non-insulin dependent ) Type 2 4/4/2011    HEMATOCHEZIA 4/4/2011    Hypertrension 4/4/2011    Hypertriglyceridemia 4/4/2011    Insomnia 4/4/2011    Laryngitis 4/4/2011    Mild Aortic insufficiency 4/4/2011    Mild Concentric LVH (left ventricular hypertrophy) 4/4/2011    Neuropathy 4/4/2011    Osteoarthritis 4/4/2011    Rotator Cuff Tendonitis 4/4/2011     Past Surgical History:   Procedure Laterality Date    CARDIAC SURG PROCEDURE UNLIST      cardiac cath    COLONOSCOPY N/A 4/28/2017    COLONOSCOPY performed by Patti Serna MD at [facility] ENDOSCOPY    HX ORTHOPAEDIC      left hip pinning    HX OTHER SURGICAL      left little toe amputation    HX UROLOGICAL       Social History     Social History    Marital status: LEGALLY      Spouse name: N/A    Number of children: N/A    Years of education: N/A     Social History Main Topics    Smoking status: Current Every Day Smoker     Packs/day: 0.25     Last attempt to quit: 10/25/2016    Smokeless tobacco: Never Used    Alcohol use 7.0 oz/week     7 Cans of beer, 7 Shots of liquor per week    Drug use: None    Sexual activity: Yes     Partners: Female     Other Topics Concern    None     Social History Narrative     No family history on file. Current Outpatient Prescriptions   Medication Sig Dispense Refill    ergocalciferol (ERGOCALCIFEROL) 50,000 unit capsule Take 1 Cap by mouth every seven (7) days. 4 Cap 12    atorvastatin (LIPITOR) 40 mg tablet Take 1 Tab by mouth nightly.  30 Tab 11    acetaminophen (TYLENOL) 500 mg tablet Take 1 Tab by mouth every six (6) hours as needed for Pain. 60 Tab 3    aspirin delayed-release 81 mg tablet TAKE ONE TABLET BY MOUTH DAILY 30 Tab 0    dilTIAZem (TIAZAC) 300 mg SR capsule Take 1 Cap by mouth daily. 30 Cap 12    gabapentin (NEURONTIN) 300 mg capsule TAKE TWO CAPSULES BY MOUTH THREE TIMES A  Cap 2    nicotine (NICODERM CQ) 21 mg/24 hr APPLY ONE PATCH TO THE SKIN EVERY 24 HOURS 28 Patch 0    triamcinolone acetonide (KENALOG) 0.1 % ointment Apply  to affected area two (2) times a day. use thin layer bid 80 g 12    insulin glargine (LANTUS) 100 unit/mL injection INJECT 72 UNITS UNDER THE SKIN EVERY 12 HOURS 4 Vial 11    Insulin Syringe-Needle U-100 1 mL 31 gauge x 5/16 syrg USE TO INJECT INSULIN FIVE TIMES A  Syringe 11    insulin regular (NOVOLIN R, HUMULIN R) 100 unit/mL injection 14 Units by SubCUTAneous route three (3) times daily as needed (with meals). 1 Vial 12    therapeutic multivitamin (THERA) tablet Take 1 Tab by mouth daily. 30 Tab 12    fluticasone (FLONASE) 50 mcg/actuation nasal spray INHALE 2 SPRAYS IN EACH NOSTRIL EVERY DAY. 16 g 12    lidocaine (LIDODERM) 5 % 1 Patch by TransDERmal route every twenty-four (24) hours. Apply to affected area every 24 hours 1 Package 3    irbesartan (AVAPRO) 300 mg tablet TAKE ONE TABLET BY MOUTH ONCE NIGHTLY 30 Tab 11    Wheel Chair dianna Custom electric wheelchair 1 Each 0    oxyCODONE-acetaminophen (PERCOCET) 5-325 mg per tablet Take 1 Tab by mouth every eight (8) hours as needed. 14 Tab 0    Lancets misc Use 2-3 times daily 100 Each 12    glucose blood VI test strips (CONTOUR NEXT STRIPS) strip Test 2-3 times daily 100 Strip 12    Calcium Carbonate-Vit D3-Min 600 mg calcium- 400 unit tab Take  by mouth two (2) times a day.  docusate sodium (COLACE) 100 mg capsule Take 100 mg by mouth daily.  loratadine (CLARITIN) 10 mg tablet Take 10 mg by mouth daily.        No Known Allergies    Objective:  Visit Vitals    /64    Pulse 74    Temp 98.3 °F (36.8 °C) (Oral)    Resp 16    SpO2 94%     Physical Exam:   General appearance - alert, well appearing, and in moderate distress  Mental status - alert, oriented to person, place, and time  EYE-LUH, EOMI, corneas normal, no foreign bodies  ENT-ENT exam normal, no neck nodes or sinus tenderness  Nose - normal and patent, no erythema, discharge or polyps  Mouth - mucous membranes moist, pharynx normal without lesions  Neck - supple, no significant adenopathy   Chest - clear to auscultation, no wheezes, rales or rhonchi, symmetric air entry   Heart - normal rate, regular rhythm, normal S1, S2, no murmurs, rubs, clicks or gallops   Abdomen - soft, nontender, nondistended, no masses or organomegaly  Lymph- no adenopathy palpable  Ext-peripheral pulses normal, no pedal edema, no clubbing or cyanosis  Skin-Warm and dry. no hyperpigmentation, vitiligo, or suspicious lesions  Neuro -alert, oriented, normal speech, no focal findings or movement disorder noted  Neck-normal C-spine, no tenderness, full ROM without pain  Feet-no nail deformities or callus formation with good pulses noted  Rt. .shoulder-subdeltoid tenderness, positive impingement signs  Rt.hip tenderness  mcp joint tenderness    Results for orders placed or performed in visit on 10/02/17   AMB POC GLUCOSE BLOOD, BY GLUCOSE MONITORING DEVICE   Result Value Ref Range    Glucose POC 85 mg/dL       Assessment/Plan:    ICD-10-CM ICD-9-CM    1. CKD (chronic kidney disease) stage 3, GFR 30-59 ml/min Y01.3 062.9 METABOLIC PANEL, BASIC      AMB POC GLUCOSE BLOOD, BY GLUCOSE MONITORING DEVICE   2.  Controlled type 2 diabetes mellitus with hyperglycemia, without long-term current use of insulin ([facility]) I75.72 240.03 METABOLIC PANEL, BASIC     790.29 AMB POC GLUCOSE BLOOD, BY GLUCOSE MONITORING DEVICE     Orders Placed This Encounter    METABOLIC PANEL, BASIC    AMB POC GLUCOSE BLOOD, BY GLUCOSE MONITORING DEVICE     lose weight, increase physical activity, follow low fat diet, follow low salt diet,    He is medically stable for surgery  Patient Instructions   MyChart Activation    Thank you for requesting access to [facility]. Please follow the instructions below to securely access and download your online medical record. [facility] allows you to send messages to your doctor, view your test results, renew your prescriptions, schedule appointments, and more. How Do I Sign Up? 1. In your internet browser, go to [email or web address]  2. Click on the First Time User? Click Here link in the Sign In box. You will be redirect to the New Member Sign Up page. 3. Enter your [facility] Access Code exactly as it appears below. You will not need to use this code after youve completed the sign-up process. If you do not sign up before the expiration date, you must request a new code. [facility] Access Code: Activation code not generated  Current [facility] Status: Patient Declined (This is the date your [facility] access code will )    4. Enter the last four digits of your Social Security Number (xxxx) and Date of Birth (mm/dd/yyyy) as indicated and click Submit. You will be taken to the next sign-up page. 5. Create a [facility] ID. This will be your [facility] login ID and cannot be changed, so think of one that is secure and easy to remember. 6. Create a [facility] password. You can change your password at any time. 7. Enter your Password Reset Question and Answer. This can be used at a later time if you forget your password. 8. Enter your e-mail address. You will receive e-mail notification when new information is available in [address] 9. Click Sign Up. You can now view and download portions of your medical record. 10. Click the Download Summary menu link to download a portable copy of your medical information.     Additional Information    If you have questions, please visit the Frequently Asked Questions section of the [facility] website at [email or web address] Remember, [facility] is NOT to be used for urgent needs. For medical emergencies, dial 911. Follow-up Disposition:  Return in about 4 weeks (around 10/30/2017), or if symptoms worsen or fail to improve. I have reviewed with the patient details of the assessment and plan and all questions were answered. Relevent patient education was performed    An After Visit Summary was printed and given to the patient.

## 2017-10-02 NOTE — PATIENT INSTRUCTIONS
[employer] Activation    Thank you for requesting access to [facility]. Please follow the instructions below to securely access and download your online medical record. [facility] allows you to send messages to your doctor, view your test results, renew your prescriptions, schedule appointments, and more. How Do I Sign Up? 1. In your internet browser, go to [email or web address]  2. Click on the First Time User? Click Here link in the Sign In box. You will be redirect to the New Member Sign Up page. 3. Enter your [facility] Access Code exactly as it appears below. You will not need to use this code after youve completed the sign-up process. If you do not sign up before the expiration date, you must request a new code. [facility] Access Code: Activation code not generated  Current [facility] Status: Patient Declined (This is the date your [facility] access code will )    4. Enter the last four digits of your Social Security Number (xxxx) and Date of Birth (mm/dd/yyyy) as indicated and click Submit. You will be taken to the next sign-up page. 5. Create a [facility] ID. This will be your [facility] login ID and cannot be changed, so think of one that is secure and easy to remember. 6. Create a [facility] password. You can change your password at any time. 7. Enter your Password Reset Question and Answer. This can be used at a later time if you forget your password. 8. Enter your e-mail address. You will receive e-mail notification when new information is available in [address] 9. Click Sign Up. You can now view and download portions of your medical record. 10. Click the Download Summary menu link to download a portable copy of your medical information. Additional Information    If you have questions, please visit the Frequently Asked Questions section of the [facility] website at [email or web address] Remember, [facility] is NOT to be used for urgent needs. For medical emergencies, dial 911.

## 2017-10-03 LAB
BUN SERPL-MCNC: 23 MG/DL (ref 8–27)
BUN/CREAT SERPL: 13 (ref 10–24)
CALCIUM SERPL-MCNC: 9.2 MG/DL (ref 8.6–10.2)
CHLORIDE SERPL-SCNC: 95 MMOL/L (ref 96–106)
CO2 SERPL-SCNC: 26 MMOL/L (ref 18–29)
CREAT SERPL-MCNC: 1.71 MG/DL (ref 0.76–1.27)
GLUCOSE SERPL-MCNC: 79 MG/DL (ref 65–99)
INTERPRETATION: NORMAL
Lab: NORMAL
POTASSIUM SERPL-SCNC: 4.2 MMOL/L (ref 3.5–5.2)
SODIUM SERPL-SCNC: 135 MMOL/L (ref 134–144)

## 2017-10-31 ENCOUNTER — EXTERNAL NURSING HOME DOCUMENTATION (OUTPATIENT)
Dept: INTERNAL MEDICINE CLINIC | Age: 67
End: 2017-10-31

## 2017-10-31 DIAGNOSIS — N18.30 STAGE 3 CHRONIC KIDNEY DISEASE (HCC): ICD-10-CM

## 2017-10-31 DIAGNOSIS — Z96.641 S/P HIP REPLACEMENT, RIGHT: ICD-10-CM

## 2017-10-31 DIAGNOSIS — I10 ESSENTIAL HYPERTENSION: ICD-10-CM

## 2017-10-31 DIAGNOSIS — E11.65 CONTROLLED TYPE 2 DIABETES MELLITUS WITH HYPERGLYCEMIA, WITHOUT LONG-TERM CURRENT USE OF INSULIN (HCC): ICD-10-CM

## 2017-10-31 DIAGNOSIS — C61 PROSTATE CA (HCC): ICD-10-CM

## 2017-10-31 DIAGNOSIS — M16.11 PRIMARY OSTEOARTHRITIS OF RIGHT HIP: Primary | ICD-10-CM

## 2017-10-31 NOTE — PROGRESS NOTES
History of Present Illness:   Jaun East is a 78year-old obese male with past medical history significant for hypertension, hyperlipidemia, type 2 diabetes mellitus, bilateral DJD of hip and knee, prostate cancer, CKD. He had recent right total hip replacement done on 10/05/2017. Hospital course was complicated by anemia requiring transfusion, hyperkalemia and acute kidney injury/CKD. His hospital course and rehab was complicated by hyperkalemia and hyponatremia too. The patient was not able to tolerate his pain and discomfort. He also has right knee DJD and pain. He was not able to transfer from bed, so he came back to [facility] for further management. He continued with further physical therapy and occupational therapy and rehab program and neuropsychology and had significant improvement in function. He was stabilized and was transferred to [facility] for further physical therapy and occupational therapy. He was noticed to have right lower extremity edema, which is progressively getting worse along with right knee pain and hip pain. He mentioned his pain medications were reduced, which is not helping him to move and do activities. The patient was on Hydrochlorothiazide, which is on hold, probably the reason for his leg edema. Past Medical History:   1. Type 2 diabetes mellitus. 2. Hypertension. 3. Hyperlipidemia. 4. CKD stage 3.   5. Osteoarthritis. 6. Prostate cancer, status post radiation. Past Surgical History:     1. Left hip pinning in 1965.   2. Recent right total hip replacement. Family History: Mother had breast cancer and hypertension. Father had Alzheimer's disease and heart disease. Social History:   He uses alcohol socially. He smoked cigarettes. He denies any drug use. He lives in [place] with his son in a one level home. Son is his caretaker. Allergies:  He is not allergic to any medications.       Medications:  Aspirin 81 mg once daily, Lipitor 40 mg once every night, Capsaicin topical cream three times daily, Coreg 3.125 mg twice a day, Diltiazem 300 mg ER one capsule every day, Colace 100 mg twice a day as needed, Gabapentin 400 mg three times a day, Lantus insulin 72 units subcutaneous twice a day, multivitamin one tablet every day, Oxycodone 5 mg one tablet every six hours as needed, Senna 8.6 mg at bedtime every day as needed, Warfarin 12.5 mg daily. Review of Systems:  A complete review of systems was done. Right now, significant for right hip and knee pain. Physical Examination:     GENERAL:  This is a pleasant male not appearing in any distress. VITALS:  T:  99.1 degrees Fahrenheit. P:  66 per minute. BP:  164/75 mmHg. SaO2:  95% on room air. Weight is 283 pounds. HEENT:  No abnormality detected. NECK:  Supple, no JVD, no carotid bruits, no thyromegaly. CHEST:  Chest is clear to auscultation bilaterally. No rales, no rhonchi. CARDIOVASCULAR:  S1, S2 normal.  Regular rate and rhythm. ABDOMEN:  Soft, nontender, nondistended, bowel sounds present. EXTREMITIES:  Bilateral 1+ leg edema is present. Right leg has more edema than the left leg. Dorsalis pedis pulse normal.   NEUROLOGICAL:  Alert and oriented x3. Cranial nerves II - XII grossly intact. Motor is 5/5 bilaterally. Sensory is within normal limits. Assessment and Plan:   1. Right hip severe osteoarthritis, status post right QI done recently. He is weightbearing as tolerated. He is on Coumadin because of DVT prophylaxis for right hip. We will monitor his INR for now. He is on Oxycodone 5 mg every six hours as needed, which is not controlling his pain. We will increase to one and a half tablets every four hours as needed. He is on a bowel regimen for that too. 2. Type 2 diabetes mellitus. He is on Lantus insulin 72 units twice a day. We will monitor his blood sugar. 3. Hypertension, taking Coreg and Cardizem for now.   He was on Avapro and also he was on Hydrochlorothiazide, which was discontinued. We will monitor his blood pressure. 4. Anemia. We will monitor his hemoglobin. 5. Hyperkalemia. Potassium has improved. Avapro is on hold. We will monitor his blood pressure. 6. Leg edema. If INR is therapeutic, less likely the patient has a blood clot. The patient is off of Hydrochlorothiazide and that might be the reason. We will start him on Lasix 20 mg once a day for one week and monitor his labs. If leg edema is not getting better, we will do an ultrasound to rule out deep vein thrombosis. THIS IS NOT A COMPLETE MEDICAL RECORD ON THIS PATIENT.    THIS IS A PATIENT AT [facility].    PLEASE CONTACT THE FACILITY LISTED BELOW FOR MORE INFORMATION ON THIS PATIENT.    THE COMPLETE RECORD RESIDES WITH THIS LONG TERM CARE FACILITY

## 2017-11-02 ENCOUNTER — EXTERNAL NURSING HOME DOCUMENTATION (OUTPATIENT)
Dept: INTERNAL MEDICINE CLINIC | Age: 67
End: 2017-11-02

## 2017-11-02 DIAGNOSIS — N18.9 CHRONIC KIDNEY DISEASE, UNSPECIFIED CKD STAGE: ICD-10-CM

## 2017-11-02 DIAGNOSIS — K59.00 CONSTIPATION, UNSPECIFIED CONSTIPATION TYPE: ICD-10-CM

## 2017-11-02 DIAGNOSIS — I10 ESSENTIAL HYPERTENSION: ICD-10-CM

## 2017-11-02 DIAGNOSIS — Z96.641 S/P HIP REPLACEMENT, RIGHT: ICD-10-CM

## 2017-11-02 DIAGNOSIS — E11.8 CONTROLLED DIABETES MELLITUS TYPE 2 WITH COMPLICATIONS, UNSPECIFIED LONG TERM INSULIN USE STATUS: ICD-10-CM

## 2017-11-02 DIAGNOSIS — M19.90 OSTEOARTHRITIS, UNSPECIFIED OSTEOARTHRITIS TYPE, UNSPECIFIED SITE: Primary | ICD-10-CM

## 2017-11-02 DIAGNOSIS — E78.5 HYPERLIPIDEMIA, UNSPECIFIED HYPERLIPIDEMIA TYPE: ICD-10-CM

## 2017-11-02 NOTE — PROGRESS NOTES
THIS IS NOT A COMPLETE MEDICAL RECORD ON THIS PATIENT. THIS IS A PATIENT AT [facility]. PLEASE CONTACT THE FACILITY LISTED BELOW FOR MORE INFORMATION ON THIS PATIENT. THE COMPLETE RECORD RESIDES WITH THIS LONG TERM CARE FACILITY. HISTORY OF PRESENT ILLNESS  Jillian Mike is a 78 y.o. male. This patient is currently under the care of Dr. Jonathan Valdovinos at [facility].  The patient was admitted to this facility following hospitalization related to right hip replacement on 10/5/17. His past medical history includes hypertension, hyperlipidemia, diabetes, DJD, prostate CA, CKD. The patient is seen today for follow-up. He has complaints of pain to his right hip and knee at time of visit. At time of Dr. Tricia Forrester visit, 10/31/17, oxycodone was increased to 7.5 mg po q4h PRN. However, pharmacy has not yet dispensed this, per nursing, so patient has continued with oxycodone 5 mg po q6h PRN. Patient has complaints of constipation with hard stools. No abdominal pain, nausea or vomiting. HPI    Review of Systems   Constitutional: Negative for chills and fever. Respiratory: Negative for cough and shortness of breath. Cardiovascular: Negative for chest pain and leg swelling. Gastrointestinal: Positive for constipation. Negative for abdominal pain. Genitourinary: Negative. Musculoskeletal: Positive for joint pain. Neurological: Negative for dizziness and headaches. Physical Exam   Constitutional: He is oriented to person, place, and time. He appears well-developed and well-nourished. No distress. In power wheelchair   HENT:   Head: Normocephalic and atraumatic. Cardiovascular: Normal rate, regular rhythm and intact distal pulses. Pulmonary/Chest: Effort normal and breath sounds normal. No respiratory distress. He has no wheezes. He has no rales. Abdominal: Soft. Bowel sounds are normal. He exhibits no distension. There is no tenderness. Musculoskeletal: He exhibits edema. Trace bilateral lower extremity edema, R>L   Neurological: He is alert and oriented to person, place, and time. Skin: Skin is warm and dry. Psychiatric: He has a normal mood and affect. His behavior is normal.   Nursing note and vitals reviewed. ASSESSMENT and PLAN  Encounter Diagnoses   Name Primary?  Osteoarthritis, unspecified osteoarthritis type, unspecified site Yes    S/P hip replacement, right     Essential hypertension     Hyperlipidemia, unspecified hyperlipidemia type     Controlled diabetes mellitus type 2 with complications, unspecified long term insulin use status (HCC)     Chronic kidney disease, unspecified CKD stage     Constipation, unspecified constipation type      Nursing placed call to pharmacy at time of visit to dispense oxycodone as ordered, 7.5 mg po q4h PRN pain. D/C Colace and PRN Senna. Start Merari-colace 8.6-50 mg 1 tab po bid. Start Miralax 17 g po daily PRN constipation. Continue PT/OT as tolerated. Reviewed medications and side effects in detail. Continue current medications at this time. Nursing to continue to monitor closely and notify MD/NP of any change in condition.

## 2017-11-24 ENCOUNTER — EXTERNAL NURSING HOME DOCUMENTATION (OUTPATIENT)
Dept: INTERNAL MEDICINE CLINIC | Age: 67
End: 2017-11-24

## 2017-11-24 DIAGNOSIS — R60.0 LOWER EXTREMITY EDEMA: Primary | ICD-10-CM

## 2017-11-24 NOTE — PROGRESS NOTES
THIS IS NOT A COMPLETE MEDICAL RECORD ON THIS PATIENT. THIS IS A PATIENT AT [facility]. PLEASE CONTACT THE FACILITY LISTED BELOW FOR MORE INFORMATION ON THIS PATIENT. THE COMPLETE RECORD RESIDES WITH THIS LONG TERM CARE FACILITY. HISTORY OF PRESENT ILLNESS  Jigna Guthrie is a 78 y.o. male. This patient is currently under the care of Dr. Maria R Kapoor at [facility].  The patient was admitted to this facility following hospitalization related to right hip replacement on 10/5/17. His past medical history includes hypertension, hyperlipidemia, diabetes, DJD, prostate CA, CKD. The patient is seen today related to lower extremity edema. He describes swelling to bilateral lower extremities, R>L. He has been trying to elevate legs, as able. INR is therapeutic on recent check 11/23/17. HPI    Review of Systems   Constitutional: Negative for chills and fever. Respiratory: Negative for cough and shortness of breath. Cardiovascular: Positive for leg swelling. Negative for chest pain. Gastrointestinal: Negative for abdominal pain. Genitourinary: Negative. Musculoskeletal: Positive for joint pain. Neurological: Negative for dizziness and headaches. Physical Exam   Constitutional: He is oriented to person, place, and time. He appears well-developed and well-nourished. No distress. In power wheelchair   HENT:   Head: Normocephalic and atraumatic. Cardiovascular: Normal rate, regular rhythm and intact distal pulses. Pulmonary/Chest: Effort normal and breath sounds normal. No respiratory distress. He has no wheezes. He has no rales. Abdominal: Soft. Bowel sounds are normal. He exhibits no distension. There is no tenderness. Musculoskeletal: He exhibits edema. Trace bilateral lower extremity edema, R>L   Neurological: He is alert and oriented to person, place, and time. Skin: Skin is warm and dry. Psychiatric: He has a normal mood and affect.  His behavior is normal. Nursing note and vitals reviewed. ASSESSMENT and PLAN  Encounter Diagnoses   Name Primary?  Lower extremity edema Yes     Will order MARQUEZ hose to bilateral lower extremities daily; remove at bedtime. Elevated lower extremities when possible. Reviewed medications and side effects     Nursing to continue to monitor closely and notify MD/NP of any change in condition.

## 2017-12-08 ENCOUNTER — EXTERNAL NURSING HOME DOCUMENTATION (OUTPATIENT)
Dept: INTERNAL MEDICINE CLINIC | Age: 67
End: 2017-12-08

## 2017-12-08 DIAGNOSIS — R60.0 PEDAL EDEMA: Primary | ICD-10-CM

## 2017-12-08 NOTE — PROGRESS NOTES
THIS IS NOT A COMPLETE MEDICAL RECORD ON THIS PATIENT. THIS IS A PATIENT AT [facility]. PLEASE CONTACT THE FACILITY LISTED BELOW FOR MORE INFORMATION ON THIS PATIENT. THE COMPLETE RECORD RESIDES WITH THIS LONG TERM CARE FACILITY. HISTORY OF PRESENT ILLNESS  Gus Bustos is a 78 y.o. male. This patient is currently under the care of Dr. Ervin Collado at [facility].  The patient was admitted to this facility following hospitalization related to right hip replacement on 10/5/17. His past medical history includes hypertension, hyperlipidemia, diabetes, DJD, prostate CA, CKD. The patient is seen today for follow-up of lower extremity edema. Patient has experienced swelling to bilateral lower extremities, R>L. He has been trying to elevate legs when possible and has been wearing compression stockings during the day. This seems to be helping. No chest pain or shortness of breath. INR is therapeutic on recent check 12/7/17. HPI    Review of Systems   Constitutional: Negative for chills and fever. Respiratory: Negative for cough and shortness of breath. Cardiovascular: Positive for leg swelling. Negative for chest pain. Gastrointestinal: Negative for abdominal pain. Genitourinary: Negative. Musculoskeletal: Positive for joint pain. Neurological: Negative for dizziness and headaches. Physical Exam   Constitutional: He is oriented to person, place, and time. He appears well-developed and well-nourished. No distress. In power wheelchair   HENT:   Head: Normocephalic and atraumatic. Cardiovascular: Normal rate, regular rhythm and intact distal pulses. Pulmonary/Chest: Effort normal and breath sounds normal. No respiratory distress. He has no wheezes. He has no rales. Abdominal: Soft. Bowel sounds are normal. He exhibits no distension. There is no tenderness. Musculoskeletal: He exhibits edema.    Trace bilateral pedal edema, R>L   Neurological: He is alert and oriented to person, place, and time. Skin: Skin is warm and dry. Psychiatric: He has a normal mood and affect. His behavior is normal.   Nursing note and vitals reviewed. ASSESSMENT and PLAN  Encounter Diagnoses   Name Primary?  Pedal edema Yes     Improving. Continue to elevate lower extremities when possible. Continue compression stockings, on during the day, off in the evening. Lab results and schedule of future lab studies reviewed   Reviewed medications and side effects    Nursing to continue to monitor closely and notify MD/NP of any change in condition.

## 2017-12-15 ENCOUNTER — EXTERNAL NURSING HOME DOCUMENTATION (OUTPATIENT)
Dept: INTERNAL MEDICINE CLINIC | Age: 67
End: 2017-12-15

## 2017-12-15 DIAGNOSIS — Z96.641 S/P HIP REPLACEMENT, RIGHT: ICD-10-CM

## 2017-12-15 DIAGNOSIS — R60.0 BILATERAL LEG EDEMA: Primary | ICD-10-CM

## 2017-12-15 DIAGNOSIS — N18.30 STAGE 3 CHRONIC KIDNEY DISEASE (HCC): ICD-10-CM

## 2017-12-15 DIAGNOSIS — E11.8 CONTROLLED DIABETES MELLITUS TYPE 2 WITH COMPLICATIONS, UNSPECIFIED LONG TERM INSULIN USE STATUS: ICD-10-CM

## 2017-12-15 DIAGNOSIS — I10 ESSENTIAL HYPERTENSION: ICD-10-CM

## 2017-12-15 NOTE — PROGRESS NOTES
Progress Note    Subjective:   Mr. Krista Zhang has been residing in rehab post right total hip replacement surgery. The patient is partially weightbearing right now. He is able to transfer bed and able to walk to his steps. Pain is controlled with pain medications. He is on Coumadin for DVT prophylaxis. He was noticed to have some leg swelling, more on the right side than left. No shortness of breath. He is using stockings to control his leg swelling. He mentioned during the early morning, he gets panic attacks and increased shortness of breath because he feels he is not able to get up, so I have advised the nurses here to get him ready and put him in the walker, to put him in his electric wheelchair by 9:00. No other discomfort. He is eating well. Sugar is under control. Objective:    VITALS:  T:  98.6 degrees Fahrenheit. P:  74 per minute. BP:  132/78 mmHg. SaO2:  97% on room air. Weight is 263 pounds. HEENT:  No abnormality detected. NECK:  Supple, no JVD, no carotid bruits, no thyromegaly. CHEST:  Chest is clear to auscultation bilaterally. No rales, no rhonchi. CARDIOVASCULAR:  S1, S2 normal.  Regular rate and rhythm. ABDOMEN:  Soft, nontender, nondistended, bowel sounds present. EXTREMITIES:  Bilateral trace edema is present, more on the right side than left. Laboratory Data:   Labs were reviewed. Creatinine 1.5, BUN was normal.     Assessment and Plan:   1. Leg edema. The patient is on stockings. The patient is on low dose of Lasix 20 mg once a day for three days. We will check his BNP level again. 2. Right hip severe osteoarthritis, status post hip replacement. The patient is on pain management and Coumadin for DVT prophylaxis. INR is therapeutic. He will continue with physical therapy and occupational therapy. 3. Type 2 diabetes mellitus. He is on Lantus. We will check his A1C.   4. Hypertension. The patient is on Cardizem and Coreg.   Blood pressure is under control. 5. Anemia. We will monitor his hemoglobin. THIS IS NOT A COMPLETE MEDICAL RECORD ON THIS PATIENT.    THIS IS A PATIENT AT [facility].    PLEASE CONTACT THE FACILITY LISTED BELOW FOR MORE INFORMATION ON THIS PATIENT.    THE COMPLETE RECORD RESIDES WITH THIS LONG TERM CARE FACILITY

## 2017-12-22 ENCOUNTER — EXTERNAL NURSING HOME DOCUMENTATION (OUTPATIENT)
Dept: INTERNAL MEDICINE CLINIC | Age: 67
End: 2017-12-22

## 2017-12-22 DIAGNOSIS — I10 ESSENTIAL HYPERTENSION: ICD-10-CM

## 2017-12-22 DIAGNOSIS — D64.9 ANEMIA, UNSPECIFIED TYPE: ICD-10-CM

## 2017-12-22 DIAGNOSIS — E11.8 CONTROLLED DIABETES MELLITUS TYPE 2 WITH COMPLICATIONS, UNSPECIFIED LONG TERM INSULIN USE STATUS: ICD-10-CM

## 2017-12-22 DIAGNOSIS — M19.90 OSTEOARTHRITIS, UNSPECIFIED OSTEOARTHRITIS TYPE, UNSPECIFIED SITE: Primary | ICD-10-CM

## 2017-12-22 DIAGNOSIS — I10 HTN (HYPERTENSION), BENIGN: ICD-10-CM

## 2017-12-22 DIAGNOSIS — Z96.641 S/P HIP REPLACEMENT, RIGHT: ICD-10-CM

## 2017-12-22 RX ORDER — DILTIAZEM HYDROCHLORIDE 300 MG/1
300 CAPSULE, EXTENDED RELEASE ORAL DAILY
Qty: 30 CAP | Refills: 0 | Status: SHIPPED | OUTPATIENT
Start: 2017-12-22 | End: 2018-03-28 | Stop reason: SINTOL

## 2017-12-22 RX ORDER — FERROUS SULFATE 325(65) MG
325 TABLET, DELAYED RELEASE (ENTERIC COATED) ORAL
Qty: 30 TAB | Refills: 0 | Status: SHIPPED | OUTPATIENT
Start: 2017-12-22 | End: 2018-05-10 | Stop reason: SDUPTHER

## 2017-12-22 RX ORDER — CARVEDILOL 3.12 MG/1
3.12 TABLET ORAL 2 TIMES DAILY WITH MEALS
Qty: 60 TAB | Refills: 0 | Status: SHIPPED | OUTPATIENT
Start: 2017-12-22 | End: 2019-03-14

## 2017-12-22 RX ORDER — DOCUSATE SODIUM 100 MG/1
100 CAPSULE, LIQUID FILLED ORAL 2 TIMES DAILY
Qty: 60 CAP | Refills: 0 | Status: SHIPPED | OUTPATIENT
Start: 2017-12-22 | End: 2019-03-27 | Stop reason: ALTCHOICE

## 2017-12-22 RX ORDER — WARFARIN 10 MG/1
10 TABLET ORAL DAILY
Qty: 30 TAB | Refills: 0 | Status: SHIPPED | OUTPATIENT
Start: 2017-12-22 | End: 2019-03-14

## 2017-12-23 NOTE — PROGRESS NOTES
THIS IS NOT A COMPLETE MEDICAL RECORD ON THIS PATIENT. THIS IS A PATIENT AT [facility]. PLEASE CONTACT THE FACILITY LISTED BELOW FOR MORE INFORMATION ON THIS PATIENT. THE COMPLETE RECORD RESIDES WITH THIS LONG TERM CARE FACILITY. HISTORY OF PRESENT ILLNESS  Jacinda Valle is a 78 y.o. male. This patient is currently under the care of Dr. Manoj Ward at [facility].  The patient was admitted to this facility following hospitalization related to right hip replacement on 10/5/17. His past medical history includes hypertension, hyperlipidemia, diabetes, DJD, prostate CA, CKD. Per social work, the patient plans to discharge tomorrow, 12/23/17. The patient states he is generally feeling well at the time of today's visit. He denies chest pain, shortness of breath, dizziness, and GI/Gu problems. Nursing does not report any concerns or changes in condition at this time. HPI    Review of Systems   Constitutional: Negative for chills and fever. HENT: Negative for congestion. Respiratory: Negative for cough and shortness of breath. Cardiovascular: Negative for chest pain and leg swelling. Gastrointestinal: Negative for abdominal pain. Genitourinary: Negative. Musculoskeletal: Positive for joint pain. Neurological: Negative for dizziness and headaches. Endo/Heme/Allergies: Negative. Psychiatric/Behavioral: Negative. Physical Exam   Constitutional: He is oriented to person, place, and time. He appears well-developed and well-nourished. No distress. In power wheelchair   HENT:   Head: Normocephalic and atraumatic. Cardiovascular: Normal rate, regular rhythm and intact distal pulses. Pulmonary/Chest: Effort normal and breath sounds normal. No respiratory distress. He has no wheezes. He has no rales. Abdominal: Soft. Bowel sounds are normal. He exhibits no distension. There is no tenderness. Musculoskeletal: He exhibits no edema.    Neurological: He is alert and oriented to person, place, and time. Skin: Skin is warm and dry. Psychiatric: He has a normal mood and affect. His behavior is normal.   Nursing note and vitals reviewed. ASSESSMENT and PLAN  Encounter Diagnoses   Name Primary?  Osteoarthritis, unspecified osteoarthritis type, unspecified site Yes    S/P hip replacement, right     Controlled diabetes mellitus type 2 with complications, unspecified long term insulin use status (HCC)     Essential hypertension     Anemia, unspecified type      Patient to have home health services upon discharge. Will provide prescriptions for 30 day supply of medications patient indicated he did not currently have available at home during medication reconciliation at discharge. [facility]. Patient to follow-up with PCP within 2 weeks of discharge and specialists as scheduled. Nursing to continue to monitor closely and notify MD/NP of any change in condition prior to discharge.

## 2018-01-12 ENCOUNTER — DOCUMENTATION ONLY (OUTPATIENT)
Dept: INTERNAL MEDICINE CLINIC | Age: 68
End: 2018-01-12

## 2018-01-26 ENCOUNTER — TELEPHONE (OUTPATIENT)
Dept: INTERNAL MEDICINE CLINIC | Age: 68
End: 2018-01-26
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
## 2018-01-26 NOTE — TELEPHONE ENCOUNTER
Spoke to DR Linette NEWMAN, VERBAL ORDERS  DENIED. DR. Rachel Poon ADVISED PATIENT TO GO TO THE EMERGENCY ROOM TO BE SEEN BY PHYSICIAN.

## 2018-02-05 RX ORDER — BLOOD SUGAR DIAGNOSTIC
STRIP MISCELLANEOUS
Qty: 100 STRIP | Refills: 11 | Status: SHIPPED | OUTPATIENT
Start: 2018-02-05 | End: 2019-03-28

## 2018-02-09 DIAGNOSIS — M16.0 PRIMARY OSTEOARTHRITIS OF BOTH HIPS: ICD-10-CM

## 2018-02-09 RX ORDER — ACETAMINOPHEN 500 MG
500 TABLET ORAL
Qty: 60 TAB | Refills: 3 | Status: SHIPPED | OUTPATIENT
Start: 2018-02-09 | End: 2018-06-07 | Stop reason: SDUPTHER

## 2018-02-23 ENCOUNTER — TELEPHONE (OUTPATIENT)
Dept: INTERNAL MEDICINE CLINIC | Age: 68
End: 2018-02-23

## 2018-02-23 NOTE — TELEPHONE ENCOUNTER
Basilio Gutierrez ( [employer]) following up on fax submitted 02/08/18 for wheel chair repair, would like a call back [phone] ext [phone] (f) [phone].

## 2018-02-27 ENCOUNTER — OFFICE VISIT (OUTPATIENT)
Dept: INTERNAL MEDICINE CLINIC | Age: 68
End: 2018-02-27

## 2018-02-27 VITALS
WEIGHT: 278 LBS | BODY MASS INDEX: 35.68 KG/M2 | SYSTOLIC BLOOD PRESSURE: 150 MMHG | HEART RATE: 85 BPM | HEIGHT: 74 IN | OXYGEN SATURATION: 96 % | RESPIRATION RATE: 16 BRPM | DIASTOLIC BLOOD PRESSURE: 76 MMHG | TEMPERATURE: 98.6 F

## 2018-02-27 DIAGNOSIS — I10 ESSENTIAL HYPERTENSION: ICD-10-CM

## 2018-02-27 DIAGNOSIS — C61 MALIGNANT NEOPLASM OF PROSTATE (HCC): ICD-10-CM

## 2018-02-27 DIAGNOSIS — M16.11 PRIMARY OSTEOARTHRITIS OF RIGHT HIP: ICD-10-CM

## 2018-02-27 DIAGNOSIS — R60.0 LOCALIZED EDEMA: Primary | ICD-10-CM

## 2018-02-27 DIAGNOSIS — E11.42 DIABETIC POLYNEUROPATHY ASSOCIATED WITH TYPE 2 DIABETES MELLITUS (HCC): ICD-10-CM

## 2018-02-27 PROBLEM — E11.21 TYPE 2 DIABETES WITH NEPHROPATHY (HCC): Status: ACTIVE | Noted: 2018-02-27

## 2018-02-27 RX ORDER — BUMETANIDE 1 MG/1
1 TABLET ORAL DAILY
Qty: 30 TAB | Refills: 12 | Status: SHIPPED | OUTPATIENT
Start: 2018-02-27 | End: 2018-03-28 | Stop reason: SDUPTHER

## 2018-02-27 NOTE — PATIENT INSTRUCTIONS
RoyaltyShare Activation    Thank you for requesting access to RoyaltyShare. Please follow the instructions below to securely access and download your online medical record. RoyaltyShare allows you to send messages to your doctor, view your test results, renew your prescriptions, schedule appointments, and more. How Do I Sign Up? 1. In your internet browser, go to www."[email or web address]  2. Click on the First Time User? Click Here link in the Sign In box. You will be redirect to the New Member Sign Up page. 3. Enter your RoyaltyShare Access Code exactly as it appears below. You will not need to use this code after youve completed the sign-up process. If you do not sign up before the expiration date, you must request a new code. RoyaltyShare Access Code: [email or web address]  Expires: 2018  3:44 PM (This is the date your RoyaltyShare access code will )    4. Enter the last four digits of your Social Security Number (xxxx) and Date of Birth (mm/dd/yyyy) as indicated and click Submit. You will be taken to the next sign-up page. 5. Create a RoyaltyShare ID. This will be your RoyaltyShare login ID and cannot be changed, so think of one that is secure and easy to remember. 6. Create a RoyaltyShare password. You can change your password at any time. 7. Enter your Password Reset Question and Answer. This can be used at a later time if you forget your password. 8. Enter your e-mail address. You will receive e-mail notification when new information is available in [address] 9. Click Sign Up. You can now view and download portions of your medical record. 10. Click the Download Summary menu link to download a portable copy of your medical information. Additional Information    If you have questions, please visit the Frequently Asked Questions section of the RoyaltyShare website at [email or web address] Remember, RoyaltyShare is NOT to be used for urgent needs. For medical emergencies, dial 911.
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
## 2018-02-28 RX ORDER — INSULIN PUMP SYRINGE, 3 ML
EACH MISCELLANEOUS
Qty: 1 KIT | Refills: 0 | Status: SHIPPED | OUTPATIENT
Start: 2018-02-28 | End: 2019-03-28

## 2018-03-07 RX ORDER — GABAPENTIN 300 MG/1
CAPSULE ORAL
Qty: 180 CAP | Refills: 1 | Status: SHIPPED | OUTPATIENT
Start: 2018-03-07 | End: 2018-05-10 | Stop reason: SDUPTHER

## 2018-03-08 DIAGNOSIS — I10 HTN (HYPERTENSION), BENIGN: ICD-10-CM

## 2018-03-08 RX ORDER — CALCIUM CARB/VITAMIN D3/VIT K1 500-500-40
TABLET,CHEWABLE ORAL
Qty: 100 LANCET | Refills: 11 | Status: SHIPPED | OUTPATIENT
Start: 2018-03-08 | End: 2019-03-28

## 2018-03-09 RX ORDER — DILTIAZEM HYDROCHLORIDE 300 MG/1
CAPSULE, EXTENDED RELEASE ORAL
Qty: 30 CAP | Refills: 11 | Status: SHIPPED | OUTPATIENT
Start: 2018-03-09 | End: 2018-03-28 | Stop reason: ALTCHOICE

## 2018-03-15 RX ORDER — ATORVASTATIN CALCIUM 40 MG/1
40 TABLET, FILM COATED ORAL
Qty: 90 TAB | Refills: 3 | Status: SHIPPED | OUTPATIENT
Start: 2018-03-15 | End: 2018-06-27 | Stop reason: ALTCHOICE

## 2018-03-28 ENCOUNTER — OFFICE VISIT (OUTPATIENT)
Dept: INTERNAL MEDICINE CLINIC | Age: 68
End: 2018-03-28

## 2018-03-28 VITALS
HEIGHT: 74 IN | TEMPERATURE: 97.7 F | OXYGEN SATURATION: 97 % | DIASTOLIC BLOOD PRESSURE: 64 MMHG | HEART RATE: 68 BPM | SYSTOLIC BLOOD PRESSURE: 120 MMHG | RESPIRATION RATE: 20 BRPM

## 2018-03-28 DIAGNOSIS — M16.11 PRIMARY OSTEOARTHRITIS OF RIGHT HIP: ICD-10-CM

## 2018-03-28 DIAGNOSIS — E11.42 DIABETIC POLYNEUROPATHY ASSOCIATED WITH TYPE 2 DIABETES MELLITUS (HCC): ICD-10-CM

## 2018-03-28 DIAGNOSIS — C61 MALIGNANT NEOPLASM OF PROSTATE (HCC): ICD-10-CM

## 2018-03-28 DIAGNOSIS — E11.8 CONTROLLED DIABETES MELLITUS TYPE 2 WITH COMPLICATIONS, UNSPECIFIED LONG TERM INSULIN USE STATUS: Primary | ICD-10-CM

## 2018-03-28 DIAGNOSIS — R60.0 LOCALIZED EDEMA: ICD-10-CM

## 2018-03-28 DIAGNOSIS — I10 HTN (HYPERTENSION), BENIGN: ICD-10-CM

## 2018-03-28 DIAGNOSIS — I10 ESSENTIAL HYPERTENSION: ICD-10-CM

## 2018-03-28 PROBLEM — E66.01 SEVERE OBESITY (BMI 35.0-39.9) WITH COMORBIDITY (HCC): Status: ACTIVE | Noted: 2018-03-28

## 2018-03-28 LAB — GLUCOSE POC: 212 MG/DL

## 2018-03-28 RX ORDER — DILTIAZEM HYDROCHLORIDE 180 MG/1
180 CAPSULE, EXTENDED RELEASE ORAL DAILY
Qty: 30 CAP | Refills: 12 | Status: SHIPPED | OUTPATIENT
Start: 2018-03-28 | End: 2019-03-20

## 2018-03-28 RX ORDER — BUMETANIDE 2 MG/1
2 TABLET ORAL DAILY
Qty: 30 TAB | Refills: 3 | Status: SHIPPED | OUTPATIENT
Start: 2018-03-28 | End: 2018-07-24 | Stop reason: SDUPTHER

## 2018-03-28 NOTE — PROGRESS NOTES
Tea Hernandez is a 78 y.o. male and presents with Diabetes and Hip Pain  . Subjective:  He is post-op hip replacement      Anemia review:  Patient presents for  evaluation of anemia. Anemia was found by routine CBC. It had been present for several months. Associated signs & symptoms:none    He still has had progressive leg swelling reported and is uncomfortable  The edema is in both legs      Hypertension Review:  The patient has essential hypertension  Diet and Lifestyle: generally follows a  low sodium diet, exercises sporadically  Home BP Monitoring: is not measured at home. Pertinent ROS: taking medications as instructed, no medication side effects noted, no TIA's, no chest pain on exertion, no dyspnea on exertion, no swelling of ankles. Diabetes Mellitus Review:  He has diabetes mellitus. Diabetic ROS - medication compliance: compliant all of the time, diabetic diet compliance: compliant all of the time, home glucose monitoring: is performed. Known diabetic complications: none  Cardiovascular risk factors: family history, dyslipidemia, diabetes mellitus, obesity, hypertension  Current diabetic medications include oral agents/insulin   Eye exam current (within one year): no  Weight trend: stable  Prior visit with dietician: no  Current diet: \"healthy\" diet  in general  Current exercise: walking  Current monitoring regimen: home blood tests - daily  Home blood sugar records: trend: stable  Any episodes of hypoglycemia? no  Is He on ACE inhibitor or angiotensin II receptor blocker? Yes     Hand pains reported    Review of Systems  Constitutional: negative for fevers, chills, anorexia and weight loss  Eyes:   negative for visual disturbance and irritation  ENT:   negative for tinnitus,sore throat,nasal congestion,ear pains. hoarseness  Respiratory:  negative for cough, hemoptysis, dyspnea,wheezing  CV:   negative for chest pain, palpitations, lower extremity edema  GI:    abdominal pain,  Endo: negative for polyuria,polydipsia,polyphagia,heat intolerance  Genitourinary: negative for frequency, dysuria and hematuria  Integument:  negative for rash and pruritus  Hematologic:  negative for easy bruising and gum/nose bleeding  Musculoskel: myalgias, arthralgias, back pain,joint pain  Neurological:  negative for headaches, dizziness, vertigo, memory problems and gait   Behavl/Psych: negative for feelings of anxiety, depression, mood changes    Past Medical History:   Diagnosis Date    Arthritis, Degenerative,  Knee 4/4/2011    Carcinoma, Prostate 4/4/2011    Degenerative disc disease 4/4/2011    Depression/ Anxiety 4/4/2011    Diabetes ([facility]     Diabetes Mellitrus ( non-insulin dependent ) Type 2 4/4/2011    HEMATOCHEZIA 4/4/2011    Hypertrension 4/4/2011    Hypertriglyceridemia 4/4/2011    Insomnia 4/4/2011    Laryngitis 4/4/2011    Mild Aortic insufficiency 4/4/2011    Mild Concentric LVH (left ventricular hypertrophy) 4/4/2011    Neuropathy 4/4/2011    Osteoarthritis 4/4/2011    Rotator Cuff Tendonitis 4/4/2011     Past Surgical History:   Procedure Laterality Date    CARDIAC SURG PROCEDURE UNLIST      cardiac cath    COLONOSCOPY N/A 4/28/2017    COLONOSCOPY performed by Macho Helton MD at [facility] ENDOSCOPY    HX ORTHOPAEDIC      left hip pinning    HX OTHER SURGICAL      left little toe amputation    HX UROLOGICAL       Social History     Social History    Marital status: LEGALLY      Spouse name: N/A    Number of children: N/A    Years of education: N/A     Social History Main Topics    Smoking status: Current Every Day Smoker     Packs/day: 0.25     Last attempt to quit: 10/25/2016    Smokeless tobacco: Never Used    Alcohol use 7.0 oz/week     7 Cans of beer, 7 Shots of liquor per week    Drug use: None    Sexual activity: Yes     Partners: Female     Other Topics Concern    None     Social History Narrative     No family history on file.   Current Outpatient Prescriptions   Medication Sig Dispense Refill    bumetanide (BUMEX) 2 mg tablet Take 1 Tab by mouth daily. 30 Tab 3    dilTIAZem (TIAZAC) 180 mg SR capsule Take 1 Cap by mouth daily. 30 Cap 12    atorvastatin (LIPITOR) 40 mg tablet Take 1 Tab by mouth nightly. 90 Tab 3    gabapentin (NEURONTIN) 300 mg capsule TAKE TWO CAPSULES BY MOUTH THREE TIMES A  Cap 1    glucose blood VI test strips (ONETOUCH ULTRA TEST) strip Test three times daily. E11.9    Diabetes Type 2 300 Strip 11    Blood-Glucose Meter monitoring kit Use once daily  Onetouch test meter only  E11.9  Type 2 1 Kit 0    acetaminophen (TYLENOL) 500 mg tablet Take 1 Tab by mouth every six (6) hours as needed for Pain. 60 Tab 3    carvedilol (COREG) 3.125 mg tablet Take 1 Tab by mouth two (2) times daily (with meals). 60 Tab 0    docusate sodium (COLACE) 100 mg capsule Take 1 Cap by mouth two (2) times a day. 60 Cap 0    ferrous sulfate (IRON) 325 mg (65 mg iron) EC tablet Take 1 Tab by mouth Daily (before breakfast). 30 Tab 0    nicotine (NICODERM CQ) 21 mg/24 hr APPLY ONE PATCH TO THE SKIN EVERY 24 HOURS 28 Patch 0    triamcinolone acetonide (KENALOG) 0.1 % ointment Apply  to affected area two (2) times a day. use thin layer bid 80 g 12    insulin glargine (LANTUS) 100 unit/mL injection INJECT 72 UNITS UNDER THE SKIN EVERY 12 HOURS 4 Vial 11    Insulin Syringe-Needle U-100 1 mL 31 gauge x 5/16 syrg USE TO INJECT INSULIN FIVE TIMES A  Syringe 11    therapeutic multivitamin (THERA) tablet Take 1 Tab by mouth daily. 30 Tab 12    fluticasone (FLONASE) 50 mcg/actuation nasal spray INHALE 2 SPRAYS IN EACH NOSTRIL EVERY DAY. 16 g 12    lidocaine (LIDODERM) 5 % 1 Patch by TransDERmal route every twenty-four (24) hours.  Apply to affected area every 24 hours 1 Package 3    irbesartan (AVAPRO) 300 mg tablet TAKE ONE TABLET BY MOUTH ONCE NIGHTLY 30 Tab 11    aspirin delayed-release 81 mg tablet TAKE ONE TABLET BY MOUTH DAILY 30 Tab 0    oxyCODONE-acetaminophen (PERCOCET) 5-325 mg per tablet Take 1 Tab by mouth every eight (8) hours as needed. 14 Tab 0    loratadine (CLARITIN) 10 mg tablet Take 10 mg by mouth daily.  MICRO THIN LANCETS 33 gauge misc USE TO TEST BLOOD SUGAR TWO TO THREE TIMES DAILY 100 Lancet 11    CONTOUR NEXT STRIPS strip TEST BLOOD GLUCOSE TWO TO THREE TIMES DAILY 100 Strip 11    warfarin (COUMADIN) 10 mg tablet Take 1 Tab by mouth daily. 30 Tab 0    ergocalciferol (ERGOCALCIFEROL) 50,000 unit capsule Take 1 Cap by mouth every seven (7) days. 4 Cap 12    insulin regular (NOVOLIN R, HUMULIN R) 100 unit/mL injection 14 Units by SubCUTAneous route three (3) times daily as needed (with meals). 1 Vial 12    Wheel Chair dianna Custom electric wheelchair 1 Each 0    Calcium Carbonate-Vit D3-Min 600 mg calcium- 400 unit tab Take  by mouth two (2) times a day. No Known Allergies    Objective:  Visit Vitals    /64 (BP 1 Location: Right arm, BP Patient Position: Sitting)    Pulse 68    Temp 97.7 °F (36.5 °C) (Oral)    Resp 20    Ht 6' 2\" (1.88 m)    SpO2 97%     Physical Exam:   General appearance - alert, well appearing, and in moderate distress  Mental status - alert, oriented to person, place, and time  EYE-LUH, EOMI, corneas normal, no foreign bodies  ENT-ENT exam normal, no neck nodes or sinus tenderness  Nose - normal and patent, no erythema, discharge or polyps  Mouth - mucous membranes moist, pharynx normal without lesions  Neck - supple, no significant adenopathy   Chest - clear to auscultation, no wheezes, rales or rhonchi, symmetric air entry   Heart - normal rate, regular rhythm, normal S1, S2, no murmurs, rubs, clicks or gallops   Abdomen - soft, nontender, nondistended, no masses or organomegaly  Lymph- no adenopathy palpable  Ext-peripheral pulses normal, no pedal edema, no clubbing or cyanosis  Skin-Warm and dry.  no hyperpigmentation, vitiligo, or suspicious lesions  Neuro -alert, oriented, normal speech, no focal findings or movement disorder noted  Neck-normal C-spine, no tenderness, full ROM without pain  Feet-no nail deformities or callus formation with good pulses noted  Rt. .shoulder-subdeltoid tenderness, positive impingement signs  Ext-3+leg edema noted. Results for orders placed or performed in visit on 03/28/18   AMB POC GLUCOSE BLOOD, BY GLUCOSE MONITORING DEVICE   Result Value Ref Range    Glucose  mg/dL       Assessment/Plan:    ICD-10-CM ICD-9-CM    1. Controlled diabetes mellitus type 2 with complications, unspecified long term insulin use status (HCC) E11.8 250.90 AMB POC GLUCOSE BLOOD, BY GLUCOSE MONITORING DEVICE   2. Localized edema R60.0 782.3 bumetanide (BUMEX) 2 mg tablet      dilTIAZem (TIAZAC) 180 mg SR capsule   3. Diabetic polyneuropathy associated with type 2 diabetes mellitus (HCC) E11.42 250.60 bumetanide (BUMEX) 2 mg tablet     357.2    4. Carcinoma, Prostate C61 185 bumetanide (BUMEX) 2 mg tablet   5. Essential hypertension I10 401.9 bumetanide (BUMEX) 2 mg tablet   6. Osteoarthrosis involving lower leg M17.10 715.96 bumetanide (BUMEX) 2 mg tablet   7. Primary osteoarthritis of right hip M16.11 715.15 bumetanide (BUMEX) 2 mg tablet   8. HTN (hypertension), benign I10 401.1      Orders Placed This Encounter    AMB POC GLUCOSE BLOOD, BY GLUCOSE MONITORING DEVICE    bumetanide (BUMEX) 2 mg tablet     Sig: Take 1 Tab by mouth daily. Dispense:  30 Tab     Refill:  3    dilTIAZem (TIAZAC) 180 mg SR capsule     Sig: Take 1 Cap by mouth daily. Dispense:  30 Cap     Refill:  12     lose weight, increase physical activity, follow low fat diet, follow low salt diet,      Patient Instructions   Jivox Activation    Thank you for requesting access to Jivox. Please follow the instructions below to securely access and download your online medical record.  Jivox allows you to send messages to your doctor, view your test results, renew your prescriptions, schedule appointments, and more. How Do I Sign Up? 1. In your internet browser, go to [email or web address]  2. Click on the First Time User? Click Here link in the Sign In box. You will be redirect to the New Member Sign Up page. 3. Enter your Roundrate Access Code exactly as it appears below. You will not need to use this code after youve completed the sign-up process. If you do not sign up before the expiration date, you must request a new code. Roundrate Access Code: [email or web address]  Expires: 2018  4:44 PM (This is the date your Roundrate access code will )    4. Enter the last four digits of your Social Security Number (xxxx) and Date of Birth (mm/dd/yyyy) as indicated and click Submit. You will be taken to the next sign-up page. 5. Create a Roundrate ID. This will be your Roundrate login ID and cannot be changed, so think of one that is secure and easy to remember. 6. Create a Roundrate password. You can change your password at any time. 7. Enter your Password Reset Question and Answer. This can be used at a later time if you forget your password. 8. Enter your e-mail address. You will receive e-mail notification when new information is available in [address] 9. Click Sign Up. You can now view and download portions of your medical record. 10. Click the Download Summary menu link to download a portable copy of your medical information. Additional Information    If you have questions, please visit the Frequently Asked Questions section of the Roundrate website at [email or web address] Remember, Roundrate is NOT to be used for urgent needs. For medical emergencies, dial 911. Follow-up Disposition:  Return in about 2 weeks (around 2018), or if symptoms worsen or fail to improve. I have reviewed with the patient details of the assessment and plan and all questions were answered.  Relevent patient education was performed    An After Visit Summary was printed and given to the patient.

## 2018-03-28 NOTE — MR AVS SNAPSHOT
[address] [place] [address],[address] [facility] [address] 
[phone] Patient: Abdoulaye Patel MRN: [id number] Visit Information Date & Time Provider Department Dept. Phone Encounter #  
 3/28/2018  2:15 PM Juan Staton MD [address] [facility] [phone] [id number] Follow-up Instructions Return in about 2 weeks (around 4/11/2018), or if symptoms worsen or fail to improve. Upcoming Health Maintenance Date Due  
 EYE EXAM RETINAL OR DILATED Q1 3/11/2016 GLAUCOMA SCREENING Q2Y 4/21/2016 Pneumococcal 65+ High/Highest Risk (1 of 2 - PCV13) 4/21/2016 MICROALBUMIN Q1 10/8/2016 HEMOGLOBIN A1C Q6M 3/27/2018 MEDICARE YEARLY EXAM 4/14/2018 LIPID PANEL Q1 5/16/2018 FOOT EXAM Q1 7/6/2018 FOBT Q 1 YEAR AGE 50-75 7/6/2018 DTaP/Tdap/Td series (2 - Td) 12/10/2024 Allergies as of 3/28/2018  Review Complete On: 3/28/2018 By: Juwan Austin LPN No Known Allergies Current Immunizations  Reviewed on 12/10/2014 Name Date Pneumococcal Polysaccharide (PPSV-23) 9/11/2014 Tdap 12/10/2014 Not reviewed this visit You Were Diagnosed With   
  
 Codes Comments Controlled diabetes mellitus type 2 with complications, unspecified long term insulin use status ([facility]    -  Primary ICD-10-CM: E11.8 ICD-9-CM: 250.90 Localized edema     ICD-10-CM: R60.0 ICD-9-CM: 782.3 Diabetic polyneuropathy associated with type 2 diabetes mellitus ([facility] [address] [address]     ICD-10-CM: E11.42 
ICD-9-CM: 250.60, 357.2 Malignant neoplasm of prostate [facility])     ICD-10-CM: L01 ICD-9-CM: 498 Essential hypertension     ICD-10-CM: I10 
ICD-9-CM: 401.9 Osteoarthrosis involving lower leg     ICD-10-CM: M17.10 ICD-9-CM: 715.96 Primary osteoarthritis of right hip     ICD-10-CM: M16.11 
ICD-9-CM: 715.15 HTN (hypertension), benign     ICD-10-CM: I10 
ICD-9-CM: 401.1 Vitals BP Pulse Temp Resp Height(growth percentile) SpO2  
 120/64 (BP 1 Location: Right arm, BP Patient Position: Sitting) 68 97.7 °F (36.5 °C) (Oral) 20 6' 2\" (1.88 m) 97% Smoking Status Current Every Day Smoker Vitals History Preferred Pharmacy Pharmacy Name Phone Domenic Desai [address] [facility] [phone] Your Updated Medication List  
  
   
This list is accurate as of 3/28/18  3:48 PM.  Always use your most recent med list.  
  
  
  
  
 acetaminophen 500 mg tablet Commonly known as:  TYLENOL Take 1 Tab by mouth every six (6) hours as needed for Pain. aspirin delayed-release 81 mg tablet TAKE ONE TABLET BY MOUTH DAILY  
  
 atorvastatin 40 mg tablet Commonly known as:  LIPITOR Take 1 Tab by mouth nightly. Blood-Glucose Meter monitoring kit Use once daily Onetouch test meter only E11.9 Type 2  
  
 bumetanide 2 mg tablet Commonly known as:  Yajaira Books Take 1 Tab by mouth daily. Calcium Carbonate-Vit D3-Min 600 mg calcium- 400 unit Tab Take  by mouth two (2) times a day. carvedilol 3.125 mg tablet Commonly known as:  Isela Remigio Take 1 Tab by mouth two (2) times daily (with meals). * CONTOUR NEXT STRIPS strip Generic drug:  glucose blood VI test strips TEST BLOOD GLUCOSE TWO TO THREE TIMES DAILY * glucose blood VI test strips strip Commonly known as:  ONETOUCH ULTRA TEST Test three times daily. E11.9  Diabetes Type 2  
  
 dilTIAZem 180 mg SR capsule Commonly known as:  [facility] Take 1 Cap by mouth daily. docusate sodium 100 mg capsule Commonly known as:  Brena Slay Take 1 Cap by mouth two (2) times a day. ergocalciferol 50,000 unit capsule Commonly known as:  ERGOCALCIFEROL Take 1 Cap by mouth every seven (7) days. ferrous sulfate 325 mg (65 mg iron) EC tablet Commonly known as:  IRON  
 Take 1 Tab by mouth Daily (before breakfast). fluticasone 50 mcg/actuation nasal spray Commonly known as:  [place] INHALE 2 SPRAYS IN EACH NOSTRIL EVERY DAY. gabapentin 300 mg capsule Commonly known as:  NEURONTIN  
TAKE TWO CAPSULES BY MOUTH THREE TIMES A DAY  
  
 insulin glargine 100 unit/mL injection Commonly known as:  LANTUS U-100 INSULIN INJECT 72 UNITS UNDER THE SKIN EVERY 12 HOURS  
  
 insulin regular 100 unit/mL injection Commonly known as:  NOVOLIN R, HUMULIN R  
14 Units by SubCUTAneous route three (3) times daily as needed (with meals). Insulin Syringe-Needle U-100 1 mL 31 gauge x 5/16 Syrg  
USE TO INJECT INSULIN FIVE TIMES A DAY  
  
 irbesartan 300 mg tablet Commonly known as:  AVAPRO TAKE ONE TABLET BY MOUTH ONCE NIGHTLY  
  
 lidocaine 5 % Commonly known as:  LIDODERM  
1 Patch by TransDERmal route every twenty-four (24) hours. Apply to affected area every 24 hours  
  
 loratadine 10 mg tablet Commonly known as:  Wallace Cousin Take 10 mg by mouth daily. MICRO THIN LANCETS 33 gauge Misc Generic drug:  lancets USE TO TEST BLOOD SUGAR TWO TO THREE TIMES DAILY  
  
 nicotine 21 mg/24 hr  
Commonly known as:  NICODERM CQ  
APPLY ONE PATCH TO THE SKIN EVERY 24 HOURS  
  
 oxyCODONE-acetaminophen 5-325 mg per tablet Commonly known as:  PERCOCET Take 1 Tab by mouth every eight (8) hours as needed. therapeutic multivitamin tablet Commonly known as:  THERA Take 1 Tab by mouth daily. triamcinolone acetonide 0.1 % ointment Commonly known as:  KENALOG Apply  to affected area two (2) times a day. use thin layer bid  
  
 warfarin 10 mg tablet Commonly known as:  COUMADIN Take 1 Tab by mouth daily. [address] [facility] Custom electric wheelchair * Notice: This list has 2 medication(s) that are the same as other medications prescribed for you.  Read the directions carefully, and ask your doctor or other care provider to review them with you. Prescriptions Sent to Pharmacy Refills  
 bumetanide (BUMEX) 2 mg tablet 3 Sig: Take 1 Tab by mouth daily. Class: Normal  
 Pharmacy: [facility] [address] [place] [address] #: [phone] Route: Oral  
 dilTIAZem (TIAZAC) 180 mg SR capsule 12 Sig: Take 1 Cap by mouth daily. Class: Normal  
 Pharmacy: [facility] [address] [place] [address] Ph #: [phone] Route: Oral  
  
We Performed the Following AMB POC GLUCOSE BLOOD, BY GLUCOSE MONITORING DEVICE [30498 CPT(R)] Follow-up Instructions Return in about 2 weeks (around 2018), or if symptoms worsen or fail to improve. Patient Instructions Vibrant Energyhart Activation Thank you for requesting access to Rezzcard. Please follow the instructions below to securely access and download your online medical record. Rezzcard allows you to send messages to your doctor, view your test results, renew your prescriptions, schedule appointments, and more. How Do I Sign Up? 1. In your internet browser, go to [email or web address] 
2. Click on the First Time User? Click Here link in the Sign In box. You will be redirect to the New Member Sign Up page. 3. Enter your Rezzcard Access Code exactly as it appears below. You will not need to use this code after youve completed the sign-up process. If you do not sign up before the expiration date, you must request a new code. Rezzcard Access Code: [email or web address] Expires: 2018  4:44 PM (This is the date your Rezzcard access code will ) 4. Enter the last four digits of your Social Security Number (xxxx) and Date of Birth (mm/dd/yyyy) as indicated and click Submit. You will be taken to the next sign-up page. 5. Create a Rezzcard ID.  This will be your Rezzcard login ID and cannot be changed, so think of one that is secure and easy to remember. 6. Create a Campanja password. You can change your password at any time. 7. Enter your Password Reset Question and Answer. This can be used at a later time if you forget your password. 8. Enter your e-mail address. You will receive e-mail notification when new information is available in [address] 9. Click Sign Up. You can now view and download portions of your medical record. 10. Click the Download Summary menu link to download a portable copy of your medical information. Additional Information If you have questions, please visit the Frequently Asked Questions section of the Campanja website at [email or web address] Remember, Campanja is NOT to be used for urgent needs. For medical emergencies, dial 911. Introducing [facility]! Isidoro Diego introduces Campanja patient portal. Now you can access parts of your medical record, email your doctor's office, and request medication refills online. 1. In your internet browser, go to [email or web address] 2. Click on the First Time User? Click Here link in the Sign In box. You will see the New Member Sign Up page. 3. Enter your Campanja Access Code exactly as it appears below. You will not need to use this code after youve completed the sign-up process. If you do not sign up before the expiration date, you must request a new code. · Campanja Access Code: [email or web address] Expires: 5/28/2018  4:44 PM 
 
4. Enter the last four digits of your Social Security Number (xxxx) and Date of Birth (mm/dd/yyyy) as indicated and click Submit. You will be taken to the next sign-up page. 5. Create a Campanja ID. This will be your Campanja login ID and cannot be changed, so think of one that is secure and easy to remember. 6. Create a Campanja password. You can change your password at any time. 7. Enter your Password Reset Question and Answer. This can be used at a later time if you forget your password. 8. Enter your e-mail address. You will receive e-mail notification when new information is available in [address] 9. Click Sign Up. You can now view and download portions of your medical record. 10. Click the Download Summary menu link to download a portable copy of your medical information. If you have questions, please visit the Frequently Asked Questions section of the We website. Remember, We is NOT to be used for urgent needs. For medical emergencies, dial 911. Now available from your iPhone and Android! Please provide this summary of care documentation to your next provider. Your primary care clinician is listed as Rosalba Kuhn. If you have any questions after today's visit, please call [phone].

## 2018-03-28 NOTE — PATIENT INSTRUCTIONS
TutorVista.com Activation    Thank you for requesting access to [email or web address]. Please follow the instructions below to securely access and download your online medical record. [email or web address] allows you to send messages to your doctor, view your test results, renew your prescriptions, schedule appointments, and more. How Do I Sign Up? 1. In your internet browser, go to www.[facility] [employer] [facility]  2. Click on the First Time User? Click Here link in the Sign In box. You will be redirect to the New Member Sign Up page. 3. Enter your TutorVista.com Access Code exactly as it appears below. You will not need to use this code after youve completed the sign-up process. If you do not sign up before the expiration date, you must request a new code. [email or web address].com Access Code: [email or web address]  Expires: 2018  4:44 PM (This is the date your [email or web address] access code will )    4. Enter the last four digits of your Social Security Number (xxxx) and Date of Birth (mm/dd/yyyy) as indicated and click Submit. You will be taken to the next sign-up page. 5. Create a [email or web address] ID. This will be your [email or web address].com login ID and cannot be changed, so think of one that is secure and easy to remember. 6. Create a TutorVista.com password. You can change your password at any time. 7. Enter your Password Reset Question and Answer. This can be used at a later time if you forget your password. 8. Enter your e-mail address. You will receive e-mail notification when new information is available in [address] 9. Click Sign Up. You can now view and download portions of your medical record. 10. Click the Download Summary menu link to download a portable copy of your medical information. Additional Information    If you have questions, please visit the Frequently Asked Questions section of the [email or web address] website at [email or web address]. [employer]. [employer]atrics/[employer] Exchange Technologieshart/. Remember, [email or web address] is NOT to be used for urgent needs. For medical emergencies, dial 911.

## 2018-03-29 LAB
ALBUMIN SERPL-MCNC: 3.4 G/DL (ref 3.6–4.8)
ALBUMIN/GLOB SERPL: 1 {RATIO} (ref 1.2–2.2)
ALP SERPL-CCNC: 115 IU/L (ref 39–117)
ALT SERPL-CCNC: 13 IU/L (ref 0–44)
AST SERPL-CCNC: 12 IU/L (ref 0–40)
BILIRUB SERPL-MCNC: <0.2 MG/DL (ref 0–1.2)
BUN SERPL-MCNC: 25 MG/DL (ref 8–27)
BUN/CREAT SERPL: 14 (ref 10–24)
CALCIUM SERPL-MCNC: 8.7 MG/DL (ref 8.6–10.2)
CHLORIDE SERPL-SCNC: 99 MMOL/L (ref 96–106)
CO2 SERPL-SCNC: 25 MMOL/L (ref 18–29)
CREAT SERPL-MCNC: 1.75 MG/DL (ref 0.76–1.27)
ERYTHROCYTE [DISTWIDTH] IN BLOOD BY AUTOMATED COUNT: 16.7 % (ref 12.3–15.4)
GFR SERPLBLD CREATININE-BSD FMLA CKD-EPI: 40 ML/MIN/1.73
GFR SERPLBLD CREATININE-BSD FMLA CKD-EPI: 46 ML/MIN/1.73
GLOBULIN SER CALC-MCNC: 3.4 G/DL (ref 1.5–4.5)
GLUCOSE SERPL-MCNC: 169 MG/DL (ref 65–99)
HCT VFR BLD AUTO: 31.6 % (ref 37.5–51)
HGB BLD-MCNC: 9.9 G/DL (ref 13–17.7)
INTERPRETATION: NORMAL
Lab: NORMAL
MCH RBC QN AUTO: 27.3 PG (ref 26.6–33)
MCHC RBC AUTO-ENTMCNC: 31.3 G/DL (ref 31.5–35.7)
MCV RBC AUTO: 87 FL (ref 79–97)
PLATELET # BLD AUTO: 247 X10E3/UL (ref 150–379)
POTASSIUM SERPL-SCNC: 4.7 MMOL/L (ref 3.5–5.2)
PROT SERPL-MCNC: 6.8 G/DL (ref 6–8.5)
RBC # BLD AUTO: 3.62 X10E6/UL (ref 4.14–5.8)
SODIUM SERPL-SCNC: 138 MMOL/L (ref 134–144)
WBC # BLD AUTO: 5.5 X10E3/UL (ref 3.4–10.8)

## 2018-04-05 RX ORDER — LEVOFLOXACIN 500 MG/1
500 TABLET, FILM COATED ORAL DAILY
Qty: 10 TAB | Refills: 0 | Status: SHIPPED | OUTPATIENT
Start: 2018-04-05 | End: 2018-05-22 | Stop reason: ALTCHOICE

## 2018-04-12 ENCOUNTER — OFFICE VISIT (OUTPATIENT)
Dept: INTERNAL MEDICINE CLINIC | Age: 68
End: 2018-04-12

## 2018-04-12 ENCOUNTER — TELEPHONE (OUTPATIENT)
Dept: INTERNAL MEDICINE CLINIC | Age: 68
End: 2018-04-12

## 2018-04-12 VITALS
HEIGHT: 74 IN | TEMPERATURE: 98.2 F | RESPIRATION RATE: 20 BRPM | SYSTOLIC BLOOD PRESSURE: 130 MMHG | DIASTOLIC BLOOD PRESSURE: 70 MMHG | HEART RATE: 65 BPM | OXYGEN SATURATION: 96 %

## 2018-04-12 DIAGNOSIS — L03.119 CELLULITIS OF LOWER EXTREMITY, UNSPECIFIED LATERALITY: ICD-10-CM

## 2018-04-12 DIAGNOSIS — K59.01 SLOW TRANSIT CONSTIPATION: ICD-10-CM

## 2018-04-12 DIAGNOSIS — E66.01 SEVERE OBESITY (BMI 35.0-39.9) WITH COMORBIDITY (HCC): ICD-10-CM

## 2018-04-12 DIAGNOSIS — E11.42 DIABETIC POLYNEUROPATHY ASSOCIATED WITH TYPE 2 DIABETES MELLITUS (HCC): ICD-10-CM

## 2018-04-12 DIAGNOSIS — E11.8 CONTROLLED DIABETES MELLITUS TYPE 2 WITH COMPLICATIONS, UNSPECIFIED WHETHER LONG TERM INSULIN USE (HCC): Primary | ICD-10-CM

## 2018-04-12 LAB — GLUCOSE POC: 161 MG/DL

## 2018-04-12 RX ORDER — POLYETHYLENE GLYCOL 3350 17 G/17G
17 POWDER, FOR SOLUTION ORAL DAILY
Qty: 850 G | Refills: 3 | Status: SHIPPED | OUTPATIENT
Start: 2018-04-12 | End: 2020-02-10

## 2018-04-12 RX ORDER — POLYETHYLENE GLYCOL 3350 17 G/17G
POWDER, FOR SOLUTION ORAL
Qty: 595 G | Refills: 2 | Status: SHIPPED | OUTPATIENT
Start: 2018-04-12 | End: 2018-08-30 | Stop reason: ALTCHOICE

## 2018-04-12 NOTE — PATIENT INSTRUCTIONS
[employer] Activation    Thank you for requesting access to [employer]. Please follow the instructions below to securely access and download your online medical record. [employer] allows you to send messages to your doctor, view your test results, renew your prescriptions, schedule appointments, and more. How Do I Sign Up? 1. In your internet browser, go to [email or web address]  2. Click on the First Time User? Click Here link in the Sign In box. You will be redirect to the New Member Sign Up page. 3. Enter your [employer] Access Code exactly as it appears below. You will not need to use this code after youve completed the sign-up process. If you do not sign up before the expiration date, you must request a new code. [employer] Access Code: [email or web address]  Expires: 2018  4:44 PM (This is the date your [employer] access code will )    4. Enter the last four digits of your Social Security Number (xxxx) and Date of Birth (mm/dd/yyyy) as indicated and click Submit. You will be taken to the next sign-up page. 5. Create a [employer] ID. This will be your [employer] login ID and cannot be changed, so think of one that is secure and easy to remember. 6. Create a [employer] password. You can change your password at any time. 7. Enter your Password Reset Question and Answer. This can be used at a later time if you forget your password. 8. Enter your e-mail address. You will receive e-mail notification when new information is available in [address] 9. Click Sign Up. You can now view and download portions of your medical record. 10. Click the Download Summary menu link to download a portable copy of your medical information. Additional Information    If you have questions, please visit the Frequently Asked Questions section of the [employer] website at [email or web address] Remember, [employer] is NOT to be used for urgent needs. For medical emergencies, dial 911.

## 2018-04-12 NOTE — PROGRESS NOTES
Erica Gonzalezred is a 78 y.o. male and presents with Skin Problem; Leg Swelling; and Diabetes  . Subjective:  He is post-op hip replacement. He has had progressive swelling of the lt.leg. He has noticed some drainage from the leg with a small blister. Anemia review:  Patient presents for  evaluation of anemia. Anemia was found by routine CBC. It had been present for several months. Associated signs & symptoms:none    He still has had progressive leg swelling reported and is uncomfortable  The edema is in both legs      Hypertension Review:  The patient has essential hypertension  Diet and Lifestyle: generally follows a  low sodium diet, exercises sporadically  Home BP Monitoring: is not measured at home. Pertinent ROS: taking medications as instructed, no medication side effects noted, no TIA's, no chest pain on exertion, no dyspnea on exertion, no swelling of ankles. Diabetes Mellitus Review:  He has diabetes mellitus. Diabetic ROS - medication compliance: compliant all of the time, diabetic diet compliance: compliant all of the time, home glucose monitoring: is performed. Known diabetic complications: none  Cardiovascular risk factors: family history, dyslipidemia, diabetes mellitus, obesity, hypertension  Current diabetic medications include oral agents/insulin   Eye exam current (within one year): no  Weight trend: stable  Prior visit with dietician: no  Current diet: \"healthy\" diet  in general  Current exercise: walking  Current monitoring regimen: home blood tests - daily  Home blood sugar records: trend: stable  Any episodes of hypoglycemia? no  Is He on ACE inhibitor or angiotensin II receptor blocker? Yes         Review of Systems  Constitutional: negative for fevers, chills, anorexia and weight loss  Eyes:   negative for visual disturbance and irritation  ENT:   negative for tinnitus,sore throat,nasal congestion,ear pains. hoarseness  Respiratory:  negative for cough, hemoptysis, dyspnea,wheezing  CV:   negative for chest pain, palpitations, lower extremity edema  GI:    abdominal pain,  Endo:               negative for polyuria,polydipsia,polyphagia,heat intolerance  Genitourinary: negative for frequency, dysuria and hematuria  Integument:  negative for rash and pruritus  Hematologic:  negative for easy bruising and gum/nose bleeding  Musculoskel: myalgias, arthralgias, back pain,joint pain  Neurological:  negative for headaches, dizziness, vertigo, memory problems and gait   Behavl/Psych: negative for feelings of anxiety, depression, mood changes    Past Medical History:   Diagnosis Date    Arthritis, Degenerative,  Knee 4/4/2011    Carcinoma, Prostate 4/4/2011    Degenerative disc disease 4/4/2011    Depression/ Anxiety 4/4/2011    Diabetes ([facility] [address]     Diabetes Mellitrus ( non-insulin dependent ) Type 2 4/4/2011    HEMATOCHEZIA 4/4/2011    Hypertrension 4/4/2011    Hypertriglyceridemia 4/4/2011    Insomnia 4/4/2011    Laryngitis 4/4/2011    Mild Aortic insufficiency 4/4/2011    Mild Concentric LVH (left ventricular hypertrophy) 4/4/2011    Neuropathy 4/4/2011    Osteoarthritis 4/4/2011    Rotator Cuff Tendonitis 4/4/2011     Past Surgical History:   Procedure Laterality Date    CARDIAC SURG PROCEDURE UNLIST      cardiac cath    COLONOSCOPY N/A 4/28/2017    COLONOSCOPY performed by [facility] at [facility]    HX ORTHOPAEDIC      left hip pinning    HX OTHER SURGICAL      left little toe amputation    HX UROLOGICAL       Social History     Social History    Marital status: LEGALLY      Spouse name: N/A    Number of children: N/A    Years of education: N/A     Social History Main Topics    Smoking status: Current Every Day Smoker     Packs/day: 0.25     Last attempt to quit: 10/25/2016    Smokeless tobacco: Never Used    Alcohol use 7.0 oz/week     7 Cans of beer, 7 Shots of liquor per week    Drug use: None    Sexual activity: Yes Partners: Female     Other Topics Concern    None     Social History Narrative     No family history on file. Current Outpatient Prescriptions   Medication Sig Dispense Refill    polyethylene glycol (MIRALAX) 17 gram/dose powder DISSOLVE 17 GRAMS IN 8 OUNCES OF LIQUID AND DRINK ONCE A DAY AS NEEDED 595 g 2    polyethylene glycol (MIRALAX) 17 gram/dose powder Take 17 g by mouth daily. 850 g 3    levoFLOXacin (LEVAQUIN) 500 mg tablet Take 1 Tab by mouth daily. 10 Tab 0    bumetanide (BUMEX) 2 mg tablet Take 1 Tab by mouth daily. 30 Tab 3    dilTIAZem (TIAZAC) 180 mg SR capsule Take 1 Cap by mouth daily. 30 Cap 12    atorvastatin (LIPITOR) 40 mg tablet Take 1 Tab by mouth nightly. 90 Tab 3    MICRO THIN LANCETS 33 gauge misc USE TO TEST BLOOD SUGAR TWO TO THREE TIMES DAILY 100 Lancet 11    gabapentin (NEURONTIN) 300 mg capsule TAKE TWO CAPSULES BY MOUTH THREE TIMES A  Cap 1    glucose blood VI test strips (ONETOUCH ULTRA TEST) strip Test three times daily. E11.9    Diabetes Type 2 300 Strip 11    Blood-Glucose Meter monitoring kit Use once daily  Onetouch test meter only  E11.9  Type 2 1 Kit 0    acetaminophen (TYLENOL) 500 mg tablet Take 1 Tab by mouth every six (6) hours as needed for Pain. 60 Tab 3    CONTOUR NEXT STRIPS strip TEST BLOOD GLUCOSE TWO TO THREE TIMES DAILY 100 Strip 11    carvedilol (COREG) 3.125 mg tablet Take 1 Tab by mouth two (2) times daily (with meals). 60 Tab 0    docusate sodium (COLACE) 100 mg capsule Take 1 Cap by mouth two (2) times a day. 60 Cap 0    ferrous sulfate (IRON) 325 mg (65 mg iron) EC tablet Take 1 Tab by mouth Daily (before breakfast). 30 Tab 0    ergocalciferol (ERGOCALCIFEROL) 50,000 unit capsule Take 1 Cap by mouth every seven (7) days. 4 Cap 12    nicotine (NICODERM CQ) 21 mg/24 hr APPLY ONE PATCH TO THE SKIN EVERY 24 HOURS 28 Patch 0    triamcinolone acetonide (KENALOG) 0.1 % ointment Apply  to affected area two (2) times a day.  use thin layer bid 80 g 12    insulin glargine (LANTUS) 100 unit/mL injection INJECT 72 UNITS UNDER THE SKIN EVERY 12 HOURS 4 Vial 11    Insulin Syringe-Needle U-100 1 mL 31 gauge x 5/16 syrg USE TO INJECT INSULIN FIVE TIMES A  Syringe 11    insulin regular (NOVOLIN R, HUMULIN R) 100 unit/mL injection 14 Units by SubCUTAneous route three (3) times daily as needed (with meals). 1 Vial 12    therapeutic multivitamin (THERA) tablet Take 1 Tab by mouth daily. 30 Tab 12    fluticasone (FLONASE) 50 mcg/actuation nasal spray INHALE 2 SPRAYS IN EACH NOSTRIL EVERY DAY. 16 g 12    lidocaine (LIDODERM) 5 % 1 Patch by TransDERmal route every twenty-four (24) hours. Apply to affected area every 24 hours 1 Package 3    irbesartan (AVAPRO) 300 mg tablet TAKE ONE TABLET BY MOUTH ONCE NIGHTLY 30 Tab 11    aspirin delayed-release 81 mg tablet TAKE ONE TABLET BY MOUTH DAILY 30 Tab 0    Wheel Chair dianna Custom electric wheelchair 1 Each 0    oxyCODONE-acetaminophen (PERCOCET) 5-325 mg per tablet Take 1 Tab by mouth every eight (8) hours as needed. 14 Tab 0    Calcium Carbonate-Vit D3-Min 600 mg calcium- 400 unit tab Take  by mouth two (2) times a day.  loratadine (CLARITIN) 10 mg tablet Take 10 mg by mouth daily.  warfarin (COUMADIN) 10 mg tablet Take 1 Tab by mouth daily.  30 Tab 0     No Known Allergies    Objective:  Visit Vitals    /70 (BP 1 Location: Right arm, BP Patient Position: Sitting)    Pulse 65    Temp 98.2 °F (36.8 °C) (Oral)    Resp 20    Ht 6' 2\" (1.88 m)  Comment: wheelchair    SpO2 96%     Physical Exam:   General appearance - alert, well appearing, and in moderate distress  Mental status - alert, oriented to person, place, and time  EYE-LUH, EOMI, corneas normal, no foreign bodies  ENT-ENT exam normal, no neck nodes or sinus tenderness  Nose - normal and patent, no erythema, discharge or polyps  Mouth - mucous membranes moist, pharynx normal without lesions  Neck - supple, no significant adenopathy   Chest - clear to auscultation, no wheezes, rales or rhonchi, symmetric air entry   Heart - normal rate, regular rhythm, normal S1, S2, no murmurs, rubs, clicks or gallops   Abdomen - soft, nontender, nondistended, no masses or organomegaly  Lymph- no adenopathy palpable  Ext-peripheral pulses normal, no pedal edema, no clubbing or cyanosis  Skin-Warm and dry. no hyperpigmentation, vitiligo, or suspicious lesions  Neuro -alert, oriented, normal speech, no focal findings or movement disorder noted  Neck-normal C-spine, no tenderness, full ROM without pain  Feet-no nail deformities or callus formation with good pulses noted  Rt. .shoulder-subdeltoid tenderness, positive impingement signs  Ext-3+leg edema noted lt.leg with blister formation scant. Results for orders placed or performed in visit on 28/89/80   METABOLIC PANEL, COMPREHENSIVE   Result Value Ref Range    Glucose 169 (H) 65 - 99 mg/dL    BUN 25 8 - 27 mg/dL    Creatinine 1.75 (H) 0.76 - 1.27 mg/dL    GFR est non-AA 40 (L) >59 mL/min/1.73    GFR est AA 46 (L) >59 mL/min/1.73    BUN/Creatinine ratio 14 10 - 24    Sodium 138 134 - 144 mmol/L    Potassium 4.7 3.5 - 5.2 mmol/L    Chloride 99 96 - 106 mmol/L    CO2 25 18 - 29 mmol/L    Calcium 8.7 8.6 - 10.2 mg/dL    Protein, total 6.8 6.0 - 8.5 g/dL    Albumin 3.4 (L) 3.6 - 4.8 g/dL    GLOBULIN, TOTAL 3.4 1.5 - 4.5 g/dL    A-G Ratio 1.0 (L) 1.2 - 2.2    Bilirubin, total <0.2 0.0 - 1.2 mg/dL    Alk.  phosphatase 115 39 - 117 IU/L    AST (SGOT) 12 0 - 40 IU/L    ALT (SGPT) 13 0 - 44 IU/L   CBC W/O DIFF   Result Value Ref Range    WBC 5.5 3.4 - 10.8 x10E3/uL    RBC 3.62 (L) 4.14 - 5.80 x10E6/uL    HGB 9.9 (L) 13.0 - 17.7 g/dL    HCT 31.6 (L) 37.5 - 51.0 %    MCV 87 79 - 97 fL    MCH 27.3 26.6 - 33.0 pg    MCHC 31.3 (L) 31.5 - 35.7 g/dL    RDW 16.7 (H) 12.3 - 15.4 %    PLATELET 859 865 - 300 x10E3/uL   CKD REPORT   Result Value Ref Range    Interpretation Note    DIABETES PATIENT EDUCATION   Result Value Ref Range    PDF Image Not applicable    AMB POC GLUCOSE BLOOD, BY GLUCOSE MONITORING DEVICE   Result Value Ref Range    Glucose  mg/dL       Assessment/Plan:    ICD-10-CM ICD-9-CM    1. Controlled diabetes mellitus type 2 with complications, unspecified whether long term insulin use (HCC) E11.8 250.90 AMB POC GLUCOSE BLOOD, BY GLUCOSE MONITORING DEVICE   2. Cellulitis of lower extremity, unspecified laterality L03.119 682.6    3. Severe obesity (BMI 35.0-39. 9) with comorbidity ([facility] [place] [address] E66.01 278.01    4. Diabetic polyneuropathy associated with type 2 diabetes mellitus (HCC) E11.42 250.60      357.2    5. Slow transit constipation K59.01 564.01 polyethylene glycol (MIRALAX) 17 gram/dose powder     Orders Placed This Encounter    AMB POC GLUCOSE BLOOD, BY GLUCOSE MONITORING DEVICE    polyethylene glycol (MIRALAX) 17 gram/dose powder     Sig: Take 17 g by mouth daily. Dispense:  850 g     Refill:  3     lose weight, increase physical activity, follow low fat diet, follow low salt diet,      Patient Instructions   [employer] Activation    Thank you for requesting access to [employer]. Please follow the instructions below to securely access and download your online medical record. [employer] allows you to send messages to your doctor, view your test results, renew your prescriptions, schedule appointments, and more. How Do I Sign Up? 1. In your internet browser, go to [email or web address]  2. Click on the First Time User? Click Here link in the Sign In box. You will be redirect to the New Member Sign Up page. 3. Enter your [employer] Access Code exactly as it appears below. You will not need to use this code after youve completed the sign-up process. If you do not sign up before the expiration date, you must request a new code. [employer] Access Code: [email or web address]  Expires: 2018  4:44 PM (This is the date your [employer] access code will )    4.  Enter the last four digits of your Social Security Number (xxxx) and Date of Birth (mm/dd/yyyy) as indicated and click Submit. You will be taken to the next sign-up page. 5. Create a [employer] ID. This will be your [employer] login ID and cannot be changed, so think of one that is secure and easy to remember. 6. Create a [employer] password. You can change your password at any time. 7. Enter your Password Reset Question and Answer. This can be used at a later time if you forget your password. 8. Enter your e-mail address. You will receive e-mail notification when new information is available in [address] 9. Click Sign Up. You can now view and download portions of your medical record. 10. Click the Download Summary menu link to download a portable copy of your medical information. Additional Information    If you have questions, please visit the Frequently Asked Questions section of the [employer] website at [email or web address] Remember, [employer] is NOT to be used for urgent needs. For medical emergencies, dial 911. Follow-up Disposition:  Return in about 1 week (around 4/19/2018), or if symptoms worsen or fail to improve. I have reviewed with the patient details of the assessment and plan and all questions were answered. Relevent patient education was performed    An After Visit Summary was printed and given to the patient.

## 2018-04-12 NOTE — PROGRESS NOTES
1. Have you been to the ER, urgent care clinic since your last visit? Hospitalized since your last visit? No    2. Have you seen or consulted any other health care providers outside of the [address] [place] [address] since your last visit? Include any pap smears or colon screening.  No

## 2018-04-12 NOTE — MR AVS SNAPSHOT
[address] [facility] 
 
 
 [address] [facility] [facility] 7 [address] 
[phone] Patient: Mani Gonzalez MRN: [id number] Visit Information Date & Time Provider Department Dept. Phone Encounter #  
 2018  2:30 PM Teodoro Romero MD [address] [facility] [phone] [id number] Follow-up Instructions Return in about 1 week (around 2018), or if symptoms worsen or fail to improve. Follow-up and Disposition History Your Appointments 2018  1:30 PM  
ROUTINE CARE with Teodoro Romero MD  
[facility] - [address] [place] [address] ([facility]) Appt Note: Bettynickyovana  [facility] 7 [address]  
[phone]  
  
   
 [address] [place] [address],[address] [place] [id number] Upcoming Health Maintenance Date Due  
 EYE EXAM RETINAL OR DILATED Q1 3/11/2016 GLAUCOMA SCREENING Q2Y 2016 Pneumococcal 65+ High/Highest Risk (1 of 2 - PCV13) 2016 MICROALBUMIN Q1 10/8/2016 HEMOGLOBIN A1C Q6M 3/27/2018 MEDICARE YEARLY EXAM 2018 LIPID PANEL Q1 2018 FOOT EXAM Q1 2018 FOBT Q 1 YEAR AGE 50-75 2018 DTaP/Tdap/Td series (2 - Td) 12/10/2024 Allergies as of 2018  Review Complete On: 2018 By: Teodoro Romero MD  
 No Known Allergies Current Immunizations  Reviewed on 12/10/2014 Name Date Pneumococcal Polysaccharide (PPSV-23) 2014 Tdap 12/10/2014 Not reviewed this visit You Were Diagnosed With   
  
 Codes Comments Controlled diabetes mellitus type 2 with complications, unspecified whether long term insulin use (HCC)    -  Primary ICD-10-CM: E11.8 ICD-9-CM: 250.90 Cellulitis of lower extremity, unspecified laterality     ICD-10-CM: L03.119 ICD-9-CM: 682.6 Severe obesity (BMI 35.0-39. 9) with comorbidity ([facility]     ICD-10-CM: E66.01 
ICD-9-CM: 278.01   
 Diabetic polyneuropathy associated with type 2 diabetes mellitus ([facility]     ICD-10-CM: E11.42 
ICD-9-CM: 250.60, 357.2 Slow transit constipation     ICD-10-CM: K59.01 
ICD-9-CM: 564.01 Vitals BP Pulse Temp Resp Height(growth percentile) SpO2  
 130/70 (BP 1 Location: Right arm, BP Patient Position: Sitting) 65 98.2 °F (36.8 °C) (Oral) 20 6' 2\" (1.88 m) 96% Smoking Status Current Every Day Smoker Vitals History Preferred Pharmacy Pharmacy Name Phone Gema [facility] [address] [facility] [phone] Your Updated Medication List  
  
   
This list is accurate as of 4/12/18  3:57 PM.  Always use your most recent med list.  
  
  
  
  
 acetaminophen 500 mg tablet Commonly known as:  TYLENOL Take 1 Tab by mouth every six (6) hours as needed for Pain. aspirin delayed-release 81 mg tablet TAKE ONE TABLET BY MOUTH DAILY  
  
 atorvastatin 40 mg tablet Commonly known as:  LIPITOR Take 1 Tab by mouth nightly. Blood-Glucose Meter monitoring kit Use once daily Onetouch test meter only E11.9 Type 2  
  
 bumetanide 2 mg tablet Commonly known as:  Carlso Patron Take 1 Tab by mouth daily. Calcium Carbonate-Vit D3-Min 600 mg calcium- 400 unit Tab Take  by mouth two (2) times a day. carvedilol 3.125 mg tablet Commonly known as:  Bret Beets Take 1 Tab by mouth two (2) times daily (with meals). * CONTOUR NEXT STRIPS strip Generic drug:  glucose blood VI test strips TEST BLOOD GLUCOSE TWO TO THREE TIMES DAILY * glucose blood VI test strips strip Commonly known as:  ONETOUCH ULTRA TEST Test three times daily. E11.9  Diabetes Type 2  
  
 dilTIAZem 180 mg SR capsule Commonly known as:  [facility] Take 1 Cap by mouth daily. docusate sodium 100 mg capsule Commonly known as:  Sandra Busing Take 1 Cap by mouth two (2) times a day. ergocalciferol 50,000 unit capsule Commonly known as:  ERGOCALCIFEROL Take 1 Cap by mouth every seven (7) days. ferrous sulfate 325 mg (65 mg iron) EC tablet Commonly known as:  IRON Take 1 Tab by mouth Daily (before breakfast). fluticasone 50 mcg/actuation nasal spray Commonly known as:  Kaye Stearns INHALE 2 SPRAYS IN EACH NOSTRIL EVERY DAY. gabapentin 300 mg capsule Commonly known as:  NEURONTIN  
TAKE TWO CAPSULES BY MOUTH THREE TIMES A DAY  
  
 insulin glargine 100 unit/mL injection Commonly known as:  LANTUS U-100 INSULIN INJECT 72 UNITS UNDER THE SKIN EVERY 12 HOURS  
  
 insulin regular 100 unit/mL injection Commonly known as:  NOVOLIN R, HUMULIN R  
14 Units by SubCUTAneous route three (3) times daily as needed (with meals). Insulin Syringe-Needle U-100 1 mL 31 gauge x 5/16 Syrg  
USE TO INJECT INSULIN FIVE TIMES A DAY  
  
 irbesartan 300 mg tablet Commonly known as:  AVAPRO TAKE ONE TABLET BY MOUTH ONCE NIGHTLY  
  
 levoFLOXacin 500 mg tablet Commonly known as:  Paulett Murrain Take 1 Tab by mouth daily. lidocaine 5 % Commonly known as:  LIDODERM  
1 Patch by TransDERmal route every twenty-four (24) hours. Apply to affected area every 24 hours  
  
 loratadine 10 mg tablet Commonly known as:  Gene Linda Take 10 mg by mouth daily. MICRO THIN LANCETS 33 gauge Misc Generic drug:  lancets USE TO TEST BLOOD SUGAR TWO TO THREE TIMES DAILY  
  
 nicotine 21 mg/24 hr  
Commonly known as:  NICODERM CQ  
APPLY ONE PATCH TO THE SKIN EVERY 24 HOURS  
  
 oxyCODONE-acetaminophen 5-325 mg per tablet Commonly known as:  PERCOCET Take 1 Tab by mouth every eight (8) hours as needed. * polyethylene glycol 17 gram/dose powder Commonly known as:  Hunter Cadenceter DISSOLVE 17 GRAMS IN 8 OUNCES OF LIQUID AND DRINK ONCE A DAY AS NEEDED * polyethylene glycol 17 gram/dose powder Commonly known as:  Hunter Holster Take 17 g by mouth daily. therapeutic multivitamin tablet Commonly known as:  THERA Take 1 Tab by mouth daily. triamcinolone acetonide 0.1 % ointment Commonly known as:  KENALOG Apply  to affected area two (2) times a day. use thin layer bid  
  
 warfarin 10 mg tablet Commonly known as:  COUMADIN Take 1 Tab by mouth daily. [facility] Custom electric wheelchair * Notice: This list has 4 medication(s) that are the same as other medications prescribed for you. Read the directions carefully, and ask your doctor or other care provider to review them with you. Prescriptions Sent to Pharmacy Refills  
 polyethylene glycol (MIRALAX) 17 gram/dose powder 3 Sig: Take 17 g by mouth daily. Class: Normal  
 Pharmacy: [facility] Villeg[facility] [address] [place] [address] #: [phone] Route: Oral  
  
We Performed the Following AMB POC GLUCOSE BLOOD, BY GLUCOSE MONITORING DEVICE [67841 CPT(R)] Follow-up Instructions Return in about 1 week (around 4/19/2018), or if symptoms worsen or fail to improve. Patient Instructions GrabInbox Activation Thank you for requesting access to GrabInbox. Please follow the instructions below to securely access and download your online medical record. GrabInbox allows you to send messages to your doctor, view your test results, renew your prescriptions, schedule appointments, and more. How Do I Sign Up? 1. In your internet browser, go to [email or web address] 
2. Click on the First Time User? Click Here link in the Sign In box. You will be redirect to the New Member Sign Up page. 3. Enter your GrabInbox Access Code exactly as it appears below. You will not need to use this code after youve completed the sign-up process. If you do not sign up before the expiration date, you must request a new code.  
 
GrabInbox Access Code: [email or web address] 
 Expires: 2018  4:44 PM (This is the date your [employer] access code will ) 4. Enter the last four digits of your Social Security Number (xxxx) and Date of Birth (mm/dd/yyyy) as indicated and click Submit. You will be taken to the next sign-up page. 5. Create a Inzen Studiot ID. This will be your [employer] login ID and cannot be changed, so think of one that is secure and easy to remember. 6. Create a [employer] password. You can change your password at any time. 7. Enter your Password Reset Question and Answer. This can be used at a later time if you forget your password. 8. Enter your e-mail address. You will receive e-mail notification when new information is available in [address] 9. Click Sign Up. You can now view and download portions of your medical record. 10. Click the Download Summary menu link to download a portable copy of your medical information. Additional Information If you have questions, please visit the Frequently Asked Questions section of the [employer] website at [email or web address] Remember, [employer] is NOT to be used for urgent needs. For medical emergencies, dial 911. Introducing \[facility] [place] [facility]\"" & HEALTH SERVICES! [employer] introduces [employer] patient portal. Now you can access parts of your medical record, email your doctor's office, and request medication refills online. 1. In your internet browser, go to [email or web address] 2. Click on the First Time User? Click Here link in the Sign In box. You will see the New Member Sign Up page. 3. Enter your [employer] Access Code exactly as it appears below. You will not need to use this code after youve completed the sign-up process. If you do not sign up before the expiration date, you must request a new code. · [employer] Access Code: [email or web address] Expires: 2018  4:44 PM 
 
4.  Enter the last four digits of your Social Security Number (xxxx) and Date of Birth (mm/dd/yyyy) as indicated and click Submit. You will be taken to the next sign-up page. 5. Create a CloudMade ID. This will be your [employer] login ID and cannot be changed, so think of one that is secure and easy to remember. 6. Create a [employer] password. You can change your password at any time. 7. Enter your Password Reset Question and Answer. This can be used at a later time if you forget your password. 8. Enter your e-mail address. You will receive e-mail notification when new information is available in [address] 9. Click Sign Up. You can now view and download portions of your medical record. 10. Click the Download Summary menu link to download a portable copy of your medical information. If you have questions, please visit the Frequently Asked Questions section of the [employer] website. Remember, [employer] is NOT to be used for urgent needs. For medical emergencies, dial 911. Now available from your iPhone and Android! Please provide this summary of care documentation to your next provider. Your primary care clinician is listed as Bryan Encinas. If you have any questions after today's visit, please call [phone].

## 2018-04-18 ENCOUNTER — TELEPHONE (OUTPATIENT)
Dept: INTERNAL MEDICINE CLINIC | Age: 68
End: 2018-04-18

## 2018-04-18 NOTE — TELEPHONE ENCOUNTER
Spoke with JOSIAH, Ivon PERALTA . ; [facility] stating would Dr. Daniella Dye like her to come out once a week to redo Unabot and reduce from 3 days to 1 day visit. Per Dr. Matson Boards told Nurse Jeanine Lal; that . Dr. Daniella Dye will continue to see patient once a week to take care of unabot and  ivon will still come out once a week to care for patient.

## 2018-04-19 ENCOUNTER — OFFICE VISIT (OUTPATIENT)
Dept: INTERNAL MEDICINE CLINIC | Age: 68
End: 2018-04-19

## 2018-04-19 VITALS
OXYGEN SATURATION: 98 % | RESPIRATION RATE: 18 BRPM | HEART RATE: 88 BPM | DIASTOLIC BLOOD PRESSURE: 70 MMHG | TEMPERATURE: 98 F | SYSTOLIC BLOOD PRESSURE: 140 MMHG

## 2018-04-19 DIAGNOSIS — E11.8 CONTROLLED DIABETES MELLITUS TYPE 2 WITH COMPLICATIONS, UNSPECIFIED WHETHER LONG TERM INSULIN USE (HCC): Primary | ICD-10-CM

## 2018-04-19 DIAGNOSIS — C61 MALIGNANT NEOPLASM OF PROSTATE (HCC): ICD-10-CM

## 2018-04-19 DIAGNOSIS — L03.119 CELLULITIS OF LOWER EXTREMITY, UNSPECIFIED LATERALITY: ICD-10-CM

## 2018-04-19 DIAGNOSIS — R60.0 LOCALIZED EDEMA: ICD-10-CM

## 2018-04-19 NOTE — PROGRESS NOTES
Pierrette Schwab is a 78 y.o. male and presents with Leg Pain (left)  . Subjective:    He has had progressive swelling of the lt.leg.he had an unna boot placed last week. He has noticed no drainage from the lt. leg   He now has progressive swelling of the rt.leg      Anemia review:  Patient presents for  evaluation of anemia. Anemia was found by routine CBC. It had been present for several months. Associated signs & symptoms:none        Hypertension Review:  The patient has essential hypertension  Diet and Lifestyle: generally follows a  low sodium diet, exercises sporadically  Home BP Monitoring: is not measured at home. Pertinent ROS: taking medications as instructed, no medication side effects noted, no TIA's, no chest pain on exertion, no dyspnea on exertion, no swelling of ankles. Diabetes Mellitus Review:  He has diabetes mellitus. Diabetic ROS - medication compliance: compliant all of the time, diabetic diet compliance: compliant all of the time, home glucose monitoring: is performed. Known diabetic complications: none  Cardiovascular risk factors: family history, dyslipidemia, diabetes mellitus, obesity, hypertension  Current diabetic medications include oral agents/insulin   Eye exam current (within one year): no  Weight trend: stable  Prior visit with dietician: no  Current diet: \"healthy\" diet  in general  Current exercise: walking  Current monitoring regimen: home blood tests - daily  Home blood sugar records: trend: stable  Any episodes of hypoglycemia? no  Is He on ACE inhibitor or angiotensin II receptor blocker? Yes         Review of Systems  Constitutional: negative for fevers, chills, anorexia and weight loss  Eyes:   negative for visual disturbance and irritation  ENT:   negative for tinnitus,sore throat,nasal congestion,ear pains. hoarseness  Respiratory:  negative for cough, hemoptysis, dyspnea,wheezing  CV:   negative for chest pain, palpitations, lower extremity edema  GI: abdominal pain,  Endo:               negative for polyuria,polydipsia,polyphagia,heat intolerance  Genitourinary: negative for frequency, dysuria and hematuria  Integument:  negative for rash and pruritus  Hematologic:  negative for easy bruising and gum/nose bleeding  Musculoskel: myalgias, arthralgias, back pain,joint pain  Neurological:  negative for headaches, dizziness, vertigo, memory problems and gait   Behavl/Psych: negative for feelings of anxiety, depression, mood changes    Past Medical History:   Diagnosis Date    Arthritis, Degenerative,  Knee 4/4/2011    Carcinoma, Prostate 4/4/2011    Degenerative disc disease 4/4/2011    Depression/ Anxiety 4/4/2011    Diabetes ([address]     Diabetes Mellitrus ( non-insulin dependent ) Type 2 4/4/2011    HEMATOCHEZIA 4/4/2011    Hypertrension 4/4/2011    Hypertriglyceridemia 4/4/2011    Insomnia 4/4/2011    Laryngitis 4/4/2011    Mild Aortic insufficiency 4/4/2011    Mild Concentric LVH (left ventricular hypertrophy) 4/4/2011    Neuropathy 4/4/2011    Osteoarthritis 4/4/2011    Rotator Cuff Tendonitis 4/4/2011     Past Surgical History:   Procedure Laterality Date    CARDIAC SURG PROCEDURE UNLIST      cardiac cath    COLONOSCOPY N/A 4/28/2017    COLONOSCOPY performed by Sedrick Sw[facility] MD at [facility] ENDOSCOPY    HX ORTHOPAEDIC      left hip pinning    HX OTHER SURGICAL      left little toe amputation    HX UROLOGICAL       Social History     Social History    Marital status: LEGALLY      Spouse name: N/A    Number of children: N/A    Years of education: N/A     Social History Main Topics    Smoking status: Current Every Day Smoker     Packs/day: 0.25     Last attempt to quit: 10/25/2016    Smokeless tobacco: Never Used    Alcohol use 7.0 oz/week     7 Cans of beer, 7 Shots of liquor per week    Drug use: None    Sexual activity: Yes     Partners: Female     Other Topics Concern    None     Social History Narrative     No family history on file. Current Outpatient Prescriptions   Medication Sig Dispense Refill    polyethylene glycol (MIRALAX) 17 gram/dose powder DISSOLVE 17 GRAMS IN 8 OUNCES OF LIQUID AND DRINK ONCE A DAY AS NEEDED 595 g 2    polyethylene glycol (MIRALAX) 17 gram/dose powder Take 17 g by mouth daily. 850 g 3    levoFLOXacin (LEVAQUIN) 500 mg tablet Take 1 Tab by mouth daily. 10 Tab 0    bumetanide (BUMEX) 2 mg tablet Take 1 Tab by mouth daily. 30 Tab 3    dilTIAZem (TIAZAC) 180 mg SR capsule Take 1 Cap by mouth daily. 30 Cap 12    atorvastatin (LIPITOR) 40 mg tablet Take 1 Tab by mouth nightly. 90 Tab 3    MICRO THIN LANCETS 33 gauge misc USE TO TEST BLOOD SUGAR TWO TO THREE TIMES DAILY 100 Lancet 11    gabapentin (NEURONTIN) 300 mg capsule TAKE TWO CAPSULES BY MOUTH THREE TIMES A  Cap 1    glucose blood VI test strips (ONETOUCH ULTRA TEST) strip Test three times daily. E11.9    Diabetes Type 2 300 Strip 11    Blood-Glucose Meter monitoring kit Use once daily  Onetouch test meter only  E11.9  Type 2 1 Kit 0    acetaminophen (TYLENOL) 500 mg tablet Take 1 Tab by mouth every six (6) hours as needed for Pain. 60 Tab 3    CONTOUR NEXT STRIPS strip TEST BLOOD GLUCOSE TWO TO THREE TIMES DAILY 100 Strip 11    carvedilol (COREG) 3.125 mg tablet Take 1 Tab by mouth two (2) times daily (with meals). 60 Tab 0    docusate sodium (COLACE) 100 mg capsule Take 1 Cap by mouth two (2) times a day. 60 Cap 0    ferrous sulfate (IRON) 325 mg (65 mg iron) EC tablet Take 1 Tab by mouth Daily (before breakfast). 30 Tab 0    ergocalciferol (ERGOCALCIFEROL) 50,000 unit capsule Take 1 Cap by mouth every seven (7) days. 4 Cap 12    nicotine (NICODERM CQ) 21 mg/24 hr APPLY ONE PATCH TO THE SKIN EVERY 24 HOURS 28 Patch 0    triamcinolone acetonide (KENALOG) 0.1 % ointment Apply  to affected area two (2) times a day.  use thin layer bid 80 g 12    insulin glargine (LANTUS) 100 unit/mL injection INJECT 72 UNITS UNDER THE SKIN EVERY 12 HOURS 4 Vial 11    Insulin Syringe-Needle U-100 1 mL 31 gauge x 5/16 syrg USE TO INJECT INSULIN FIVE TIMES A  Syringe 11    insulin regular (NOVOLIN R, HUMULIN R) 100 unit/mL injection 14 Units by SubCUTAneous route three (3) times daily as needed (with meals). 1 Vial 12    therapeutic multivitamin (THERA) tablet Take 1 Tab by mouth daily. 30 Tab 12    fluticasone (FLONASE) 50 mcg/actuation nasal spray INHALE 2 SPRAYS IN EACH NOSTRIL EVERY DAY. 16 g 12    lidocaine (LIDODERM) 5 % 1 Patch by TransDERmal route every twenty-four (24) hours. Apply to affected area every 24 hours 1 Package 3    irbesartan (AVAPRO) 300 mg tablet TAKE ONE TABLET BY MOUTH ONCE NIGHTLY 30 Tab 11    aspirin delayed-release 81 mg tablet TAKE ONE TABLET BY MOUTH DAILY 30 Tab 0    Wheel Chair dianna Custom electric wheelchair 1 Each 0    oxyCODONE-acetaminophen (PERCOCET) 5-325 mg per tablet Take 1 Tab by mouth every eight (8) hours as needed. 14 Tab 0    Calcium Carbonate-Vit D3-Min 600 mg calcium- 400 unit tab Take  by mouth two (2) times a day.  loratadine (CLARITIN) 10 mg tablet Take 10 mg by mouth daily.  warfarin (COUMADIN) 10 mg tablet Take 1 Tab by mouth daily.  30 Tab 0     No Known Allergies    Objective:  Visit Vitals    /70    Pulse 88    Temp 98 °F (36.7 °C) (Oral)    Resp 18    SpO2 98%     Physical Exam:   General appearance - alert, well appearing, and in moderate distress  Mental status - alert, oriented to person, place, and time  EYE-LUH, EOMI, corneas normal, no foreign bodies  ENT-ENT exam normal, no neck nodes or sinus tenderness  Nose - normal and patent, no erythema, discharge or polyps  Mouth - mucous membranes moist, pharynx normal without lesions  Neck - supple, no significant adenopathy   Chest - clear to auscultation, no wheezes, rales or rhonchi, symmetric air entry   Heart - normal rate, regular rhythm, normal S1, S2, no murmurs, rubs, clicks or gallops   Abdomen - soft, nontender, nondistended, no masses or organomegaly  Lymph- no adenopathy palpable  Ext-peripheral pulses normal, no pedal edema, no clubbing or cyanosis  Skin-Warm and dry. no hyperpigmentation, vitiligo, or suspicious lesions  Neuro -alert, oriented, normal speech, no focal findings or movement disorder noted  Neck-normal C-spine, no tenderness, full ROM without pain  Feet- nail deformities or callus formation with good pulses noted  Rt. .shoulder-subdeltoid tenderness, positive impingement signs  Lt.leg 3+edema  Rt.leg 4+edema    Results for orders placed or performed in visit on 04/12/18   AMB POC GLUCOSE BLOOD, BY GLUCOSE MONITORING DEVICE   Result Value Ref Range    Glucose  mg/dL       Assessment/Plan:    ICD-10-CM ICD-9-CM    1. Controlled diabetes mellitus type 2 with complications, unspecified whether long term insulin use (HCC) E11.8 250.90    2. Cellulitis of lower extremity, unspecified laterality L03.119 682.6    3. Localized edema R60.0 782.3    4. Carcinoma, Prostate C61 185      No orders of the defined types were placed in this encounter. lose weight, increase physical activity, follow low fat diet, follow low salt diet,    Unna boot placed on rt. lower leg      Patient Instructions   [employer] Activation    Thank you for requesting access to [employer]. Please follow the instructions below to securely access and download your online medical record. [employer] allows you to send messages to your doctor, view your test results, renew your prescriptions, schedule appointments, and more. How Do I Sign Up? 1. In your internet browser, go to [email or web address]  2. Click on the First Time User? Click Here link in the Sign In box. You will be redirect to the New Member Sign Up page. 3. Enter your [employer] Access Code exactly as it appears below. You will not need to use this code after youve completed the sign-up process.  If you do not sign up before the expiration date, you must request a new code. [employer] Access Code: [email or web address]  Expires: 2018  4:44 PM (This is the date your [employer] access code will )    4. Enter the last four digits of your Social Security Number (xxxx) and Date of Birth (mm/dd/yyyy) as indicated and click Submit. You will be taken to the next sign-up page. 5. Create a Acrisuret ID. This will be your [employer] login ID and cannot be changed, so think of one that is secure and easy to remember. 6. Create a [employer] password. You can change your password at any time. 7. Enter your Password Reset Question and Answer. This can be used at a later time if you forget your password. 8. Enter your e-mail address. You will receive e-mail notification when new information is available in [address] 9. Click Sign Up. You can now view and download portions of your medical record. 10. Click the Download Summary menu link to download a portable copy of your medical information. Additional Information    If you have questions, please visit the Frequently Asked Questions section of the [employer] website at [email or web address] Remember, [employer] is NOT to be used for urgent needs. For medical emergencies, dial 911. Follow-up Disposition:  Return in about 1 week (around 2018), or if symptoms worsen or fail to improve. I have reviewed with the patient details of the assessment and plan and all questions were answered. Relevent patient education was performed    An After Visit Summary was printed and given to the patient.

## 2018-04-19 NOTE — PATIENT INSTRUCTIONS
[employer] Activation    Thank you for requesting access to [employer] Please follow the instructions below to securely access and download your online medical record. [employer] allows you to send messages to your doctor, view your test results, renew your prescriptions, schedule appointments, and more. How Do I Sign Up? 1. In your internet browser, go to [email or web address]  2. Click on the First Time User? Click Here link in the Sign In box. You will be redirect to the New Member Sign Up page. 3. Enter your [employer] Access Code exactly as it appears below. You will not need to use this code after youve completed the sign-up process. If you do not sign up before the expiration date, you must request a new code. [employer] Access Code: [email or web address]  Expires: 2018  4:44 PM (This is the date your [employer] access code will )    4. Enter the last four digits of your Social Security Number (xxxx) and Date of Birth (mm/dd/yyyy) as indicated and click Submit. You will be taken to the next sign-up page. 5. Create a [employer] ID. This will be your [employer] login ID and cannot be changed, so think of one that is secure and easy to remember. 6. Create a [employer] password. You can change your password at any time. 7. Enter your Password Reset Question and Answer. This can be used at a later time if you forget your password. 8. Enter your e-mail address. You will receive e-mail notification when new information is available in [address] 9. Click Sign Up. You can now view and download portions of your medical record. 10. Click the Download Summary menu link to download a portable copy of your medical information. Additional Information    If you have questions, please visit the Frequently Asked Questions section of the [employer] website at [email or web address] [employer]. [email or web address]/. Remember, [employer] is NOT to be used for urgent needs. For medical emergencies, dial 911.

## 2018-04-19 NOTE — MR AVS SNAPSHOT
[address] [address] 
[phone] Patient: Roly Longo MRN: [id number] VOO:[id number] Visit Information Date & Time Provider Department Dept. Phone Encounter #  
 4/19/2018  1:30 PM Lurdes Burns MD [facility] [phone] [id number] Follow-up Instructions Return in about 1 week (around 4/26/2018), or if symptoms worsen or fail to improve. Your Appointments 4/26/2018  2:30 PM  
ROUTINE CARE with Lurdes Burns MD  
[facility] ([address]) Appt Note: 1 week f/u  
 [address] [place] [address]  
[phone]  
  
   
 [address] [place] [address] Upcoming Health Maintenance Date Due  
 EYE EXAM RETINAL OR DILATED Q1 3/11/2016 GLAUCOMA SCREENING Q2Y 4/21/2016 Pneumococcal 65+ High/Highest Risk (1 of 2 - PCV13) 4/21/2016 MICROALBUMIN Q1 10/8/2016 HEMOGLOBIN A1C Q6M 3/27/2018 MEDICARE YEARLY EXAM 4/14/2018 LIPID PANEL Q1 5/16/2018 FOOT EXAM Q1 7/6/2018 FOBT Q 1 YEAR AGE 50-75 7/6/2018 DTaP/Tdap/Td series (2 - Td) 12/10/2024 Allergies as of 4/19/2018  Review Complete On: 4/19/2018 By: Lurdes Burns MD  
 No Known Allergies Current Immunizations  Reviewed on 12/10/2014 Name Date Pneumococcal Polysaccharide (PPSV-23) 9/11/2014 Tdap 12/10/2014 Not reviewed this visit You Were Diagnosed With   
  
 Codes Comments Controlled diabetes mellitus type 2 with complications, unspecified whether long term insulin use (HCC)    -  Primary ICD-10-CM: E11.8 ICD-9-CM: 250.90 Cellulitis of lower extremity, unspecified laterality     ICD-10-CM: L03.119 ICD-9-CM: 682.6 Localized edema     ICD-10-CM: R60.0 ICD-9-CM: 782.3 Malignant neoplasm of prostate [facility])     ICD-10-CM: F60 ICD-9-CM: 487 Vitals BP Pulse Temp Resp SpO2 Smoking Status 140/70 88 98 °F (36.7 °C) (Oral) 18 98% Current Every Day Smoker Preferred Pharmacy Pharmacy Name Phone [facility] [phone] Your Updated Medication List  
  
   
This list is accurate as of 4/19/18  2:41 PM.  Always use your most recent med list.  
  
  
  
  
 acetaminophen 500 mg tablet Commonly known as:  TYLENOL Take 1 Tab by mouth every six (6) hours as needed for Pain. aspirin delayed-release 81 mg tablet TAKE ONE TABLET BY MOUTH DAILY  
  
 atorvastatin 40 mg tablet Commonly known as:  LIPITOR Take 1 Tab by mouth nightly. Blood-Glucose Meter monitoring kit Use once daily Onetouch test meter only E11.9 Type 2  
  
 bumetanide 2 mg tablet Commonly known as:  [place] Take 1 Tab by mouth daily. Calcium Carbonate-Vit D3-Min 600 mg calcium- 400 unit Tab Take  by mouth two (2) times a day. carvedilol 3.125 mg tablet Commonly known as:  Ricky Mood Take 1 Tab by mouth two (2) times daily (with meals). * CONTOUR NEXT STRIPS strip Generic drug:  glucose blood VI test strips TEST BLOOD GLUCOSE TWO TO THREE TIMES DAILY * glucose blood VI test strips strip Commonly known as:  ONETOUCH ULTRA TEST Test three times daily. E11.9  Diabetes Type 2  
  
 dilTIAZem 180 mg SR capsule Commonly known as:  [facility] Take 1 Cap by mouth daily. docusate sodium 100 mg capsule Commonly known as:  [place] Take 1 Cap by mouth two (2) times a day. ergocalciferol 50,000 unit capsule Commonly known as:  ERGOCALCIFEROL Take 1 Cap by mouth every seven (7) days. ferrous sulfate 325 mg (65 mg iron) EC tablet Commonly known as:  IRON Take 1 Tab by mouth Daily (before breakfast). fluticasone 50 mcg/actuation nasal spray Commonly known as:  Estela Love INHALE 2 SPRAYS IN EACH NOSTRIL EVERY DAY. gabapentin 300 mg capsule Commonly known as:  NEURONTIN  
TAKE TWO CAPSULES BY MOUTH THREE TIMES A DAY  
  
 insulin glargine 100 unit/mL injection Commonly known as:  LANTUS U-100 INSULIN INJECT 72 UNITS UNDER THE SKIN EVERY 12 HOURS  
  
 insulin regular 100 unit/mL injection Commonly known as:  NOVOLIN R, HUMULIN R  
14 Units by SubCUTAneous route three (3) times daily as needed (with meals). Insulin Syringe-Needle U-100 1 mL 31 gauge x 5/16 Syrg  
USE TO INJECT INSULIN FIVE TIMES A DAY  
  
 irbesartan 300 mg tablet Commonly known as:  AVAPRO TAKE ONE TABLET BY MOUTH ONCE NIGHTLY  
  
 levoFLOXacin 500 mg tablet Commonly known as:  Romayne Potters Take 1 Tab by mouth daily. lidocaine 5 % Commonly known as:  LIDODERM  
1 Patch by TransDERmal route every twenty-four (24) hours. Apply to affected area every 24 hours  
  
 loratadine 10 mg tablet Commonly known as:  Phyliss Ervin Take 10 mg by mouth daily. MICRO THIN LANCETS 33 gauge Misc Generic drug:  lancets USE TO TEST BLOOD SUGAR TWO TO THREE TIMES DAILY  
  
 nicotine 21 mg/24 hr  
Commonly known as:  NICODERM CQ  
APPLY ONE PATCH TO THE SKIN EVERY 24 HOURS  
  
 oxyCODONE-acetaminophen 5-325 mg per tablet Commonly known as:  PERCOCET Take 1 Tab by mouth every eight (8) hours as needed. * polyethylene glycol 17 gram/dose powder Commonly known as:  Tatyana Hussar DISSOLVE 17 GRAMS IN 8 OUNCES OF LIQUID AND DRINK ONCE A DAY AS NEEDED * polyethylene glycol 17 gram/dose powder Commonly known as:  Tatyana Hussar Take 17 g by mouth daily. therapeutic multivitamin tablet Commonly known as:  THERA Take 1 Tab by mouth daily. triamcinolone acetonide 0.1 % ointment Commonly known as:  KENALOG Apply  to affected area two (2) times a day. use thin layer bid  
  
 warfarin 10 mg tablet Commonly known as:  COUMADIN Take 1 Tab by mouth daily. [address] [facility] Custom electric wheelchair * Notice: This list has 4 medication(s) that are the same as other medications prescribed for you. Read the directions carefully, and ask your doctor or other care provider to review them with you. Follow-up Instructions Return in about 1 week (around 2018), or if symptoms worsen or fail to improve. Patient Instructions MyChart Activation Thank you for requesting access to [employer] Please follow the instructions below to securely access and download your online medical record. [employer] allows you to send messages to your doctor, view your test results, renew your prescriptions, schedule appointments, and more. How Do I Sign Up? 1. In your internet browser, go to [email or web address] 
2. Click on the First Time User? Click Here link in the Sign In box. You will be redirect to the New Member Sign Up page. 3. Enter your [employer] Access Code exactly as it appears below. You will not need to use this code after youve completed the sign-up process. If you do not sign up before the expiration date, you must request a new code. [employer] Access Code: [email or web address] Expires: 2018  4:44 PM (This is the date your [employer] access code will ) 4. Enter the last four digits of your Social Security Number (xxxx) and Date of Birth (mm/dd/yyyy) as indicated and click Submit. You will be taken to the next sign-up page. 5. Create a [employer] ID. This will be your [employer] login ID and cannot be changed, so think of one that is secure and easy to remember. 6. Create a [employer] password. You can change your password at any time. 7. Enter your Password Reset Question and Answer. This can be used at a later time if you forget your password. 8. Enter your e-mail address. You will receive e-mail notification when new information is available in [address] 9. Click Sign Up. You can now view and download portions of your medical record. 10. Click the Download Summary menu link to download a portable copy of your medical information. Additional Information If you have questions, please visit the Frequently Asked Questions section of the [employer] website at [email or web address] [employer]/Alcyone Lifesciencest/. Remember, [employer] is NOT to be used for urgent needs. For medical emergencies, dial 911. Introducing [facility]! Julio Weir introduces [employer] patient portal. Now you can access parts of your medical record, email your doctor's office, and request medication refills online. 1. In your internet browser, go to [email or web address] [employer]/Alcyone Lifesciencest 2. Click on the First Time User? Click Here link in the Sign In box. You will see the New Member Sign Up page. 3. Enter your [employer] Access Code exactly as it appears below. You will not need to use this code after youve completed the sign-up process. If you do not sign up before the expiration date, you must request a new code. · [employer] Access Code: [email or web address] Expires: 5/28/2018  4:44 PM 
 
4. Enter the last four digits of your Social Security Number (xxxx) and Date of Birth (mm/dd/yyyy) as indicated and click Submit. You will be taken to the next sign-up page. 5. Create a [employer] ID. This will be your [employer] login ID and cannot be changed, so think of one that is secure and easy to remember. 6. Create a [employer] password. You can change your password at any time. 7. Enter your Password Reset Question and Answer. This can be used at a later time if you forget your password. 8. Enter your e-mail address. You will receive e-mail notification when new information is available in [address] 9. Click Sign Up. You can now view and download portions of your medical record. 10. Click the Download Summary menu link to download a portable copy of your medical information.  
 
If you have questions, please visit the Frequently Asked Questions section of the [employer]. Remember, [employer] is NOT to be used for urgent needs. For medical emergencies, dial 911. Now available from your iPhone and Android! Please provide this summary of care documentation to your next provider. Your primary care clinician is listed as Prasanna Asencio. If you have any questions after today's visit, please call [phone].

## 2018-04-26 ENCOUNTER — OFFICE VISIT (OUTPATIENT)
Dept: INTERNAL MEDICINE CLINIC | Age: 68
End: 2018-04-26

## 2018-04-26 VITALS
SYSTOLIC BLOOD PRESSURE: 140 MMHG | HEIGHT: 74 IN | OXYGEN SATURATION: 96 % | WEIGHT: 288 LBS | TEMPERATURE: 98 F | HEART RATE: 84 BPM | BODY MASS INDEX: 36.96 KG/M2 | DIASTOLIC BLOOD PRESSURE: 80 MMHG | RESPIRATION RATE: 22 BRPM

## 2018-04-26 DIAGNOSIS — R60.0 LOCALIZED EDEMA: ICD-10-CM

## 2018-04-26 DIAGNOSIS — E11.8 CONTROLLED DIABETES MELLITUS TYPE 2 WITH COMPLICATIONS, UNSPECIFIED WHETHER LONG TERM INSULIN USE (HCC): ICD-10-CM

## 2018-04-26 DIAGNOSIS — L03.119 CELLULITIS OF LOWER EXTREMITY, UNSPECIFIED LATERALITY: Primary | ICD-10-CM

## 2018-04-26 DIAGNOSIS — E66.01 SEVERE OBESITY (BMI 35.0-39.9) WITH COMORBIDITY (HCC): ICD-10-CM

## 2018-04-26 NOTE — PATIENT INSTRUCTIONS
[employer] Activation    Thank you for requesting access to [employer]. Please follow the instructions below to securely access and download your online medical record. [employer] allows you to send messages to your doctor, view your test results, renew your prescriptions, schedule appointments, and more. How Do I Sign Up? 1. In your internet browser, go to [email or web address]  2. Click on the First Time User? Click Here link in the Sign In box. You will be redirect to the New Member Sign Up page. 3. Enter your [employer] Access Code exactly as it appears below. You will not need to use this code after youve completed the sign-up process. If you do not sign up before the expiration date, you must request a new code. [employer] Access Code: [email or web address]  Expires: 2018  4:44 PM (This is the date your [employer] access code will )    4. Enter the last four digits of your Social Security Number (xxxx) and Date of Birth (mm/dd/yyyy) as indicated and click Submit. You will be taken to the next sign-up page. 5. Create a [employer] ID. This will be your [employer] login ID and cannot be changed, so think of one that is secure and easy to remember. 6. Create a [employer] password. You can change your password at any time. 7. Enter your Password Reset Question and Answer. This can be used at a later time if you forget your password. 8. Enter your e-mail address. You will receive e-mail notification when new information is available in [address] 9. Click Sign Up. You can now view and download portions of your medical record. 10. Click the Download Summary menu link to download a portable copy of your medical information. Additional Information    If you have questions, please visit the Frequently Asked Questions section of the [employer] website at https://[employer]. [employer]. [employer]/Sprint Nextelhart/. Remember, [employer] is NOT to be used for urgent needs. For medical emergencies, dial 911.

## 2018-04-26 NOTE — PROGRESS NOTES
1. Have you been to the ER, urgent care clinic since your last visit? Hospitalized since your last visit? 2. Have you seen or consulted any other health care providers outside of the [address] [place] [address] since your last visit? Include any pap smears or colon screening.  Yes

## 2018-04-26 NOTE — PROGRESS NOTES
Myles Patel is a 80 y.o. male and presents with Leg Pain  . Subjective:    He has had less progressive swelling of the lt.leg.he had an unna boot placed last week. He has noticed no drainage from the lt. leg   He need unna boot removed      Anemia review:  Patient presents for  evaluation of anemia. Anemia was found by routine CBC. It had been present for several months. Associated signs & symptoms:none        Hypertension Review:  The patient has essential hypertension  Diet and Lifestyle: generally follows a  low sodium diet, exercises sporadically  Home BP Monitoring: is not measured at home. Pertinent ROS: taking medications as instructed, no medication side effects noted, no TIA's, no chest pain on exertion, no dyspnea on exertion, no swelling of ankles. Diabetes Mellitus Review:  He has diabetes mellitus. Diabetic ROS - medication compliance: compliant all of the time, diabetic diet compliance: compliant all of the time, home glucose monitoring: is performed. Known diabetic complications: none  Cardiovascular risk factors: family history, dyslipidemia, diabetes mellitus, obesity, hypertension  Current diabetic medications include oral agents/insulin   Eye exam current (within one year): no  Weight trend: stable  Prior visit with dietician: no  Current diet: \"healthy\" diet  in general  Current exercise: walking  Current monitoring regimen: home blood tests - daily  Home blood sugar records: trend: stable  Any episodes of hypoglycemia? no  Is He on ACE inhibitor or angiotensin II receptor blocker? Yes         Review of Systems  Constitutional: negative for fevers, chills, anorexia and weight loss  Eyes:   negative for visual disturbance and irritation  ENT:   negative for tinnitus,sore throat,nasal congestion,ear pains. hoarseness  Respiratory:  negative for cough, hemoptysis, dyspnea,wheezing  CV:   negative for chest pain, palpitations, lower extremity edema  GI:    abdominal pain,  Endo: negative for polyuria,polydipsia,polyphagia,heat intolerance  Genitourinary: negative for frequency, dysuria and hematuria  Integument:  negative for rash and pruritus  Hematologic:  negative for easy bruising and gum/nose bleeding  Musculoskel: myalgias, arthralgias, back pain,joint pain  Neurological:  negative for headaches, dizziness, vertigo, memory problems and gait   Behavl/Psych: negative for feelings of anxiety, depression, mood changes    Past Medical History:   Diagnosis Date    Arthritis, Degenerative,  Knee 4/4/2011    Carcinoma, Prostate 4/4/2011    Degenerative disc disease 4/4/2011    Depression/ Anxiety 4/4/2011    Diabetes ([facility] [address]     Diabetes Mellitrus ( non-insulin dependent ) Type 2 4/4/2011    HEMATOCHEZIA 4/4/2011    Hypertrension 4/4/2011    Hypertriglyceridemia 4/4/2011    Insomnia 4/4/2011    Laryngitis 4/4/2011    Mild Aortic insufficiency 4/4/2011    Mild Concentric LVH (left ventricular hypertrophy) 4/4/2011    Neuropathy 4/4/2011    Osteoarthritis 4/4/2011    Rotator Cuff Tendonitis 4/4/2011     Past Surgical History:   Procedure Laterality Date    CARDIAC SURG PROCEDURE UNLIST      cardiac cath    COLONOSCOPY N/A 4/28/2017    COLONOSCOPY performed by Katlyn Hernández [facility] at [facility] ENDOSCOPY    HX ORTHOPAEDIC      left hip pinning    HX OTHER SURGICAL      left little toe amputation    HX UROLOGICAL       Social History     Social History    Marital status: LEGALLY      Spouse name: N/A    Number of children: N/A    Years of education: N/A     Social History Main Topics    Smoking status: Current Every Day Smoker     Packs/day: 0.25     Last attempt to quit: 10/25/2016    Smokeless tobacco: Never Used    Alcohol use 7.0 oz/week     7 Cans of beer, 7 Shots of liquor per week    Drug use: None    Sexual activity: Yes     Partners: Female     Other Topics Concern    None     Social History Narrative     No family history on file.   Current Outpatient Prescriptions   Medication Sig Dispense Refill    polyethylene glycol (MIRALAX) 17 gram/dose powder DISSOLVE 17 GRAMS IN 8 OUNCES OF LIQUID AND DRINK ONCE A DAY AS NEEDED 595 g 2    polyethylene glycol (MIRALAX) 17 gram/dose powder Take 17 g by mouth daily. 850 g 3    bumetanide (BUMEX) 2 mg tablet Take 1 Tab by mouth daily. 30 Tab 3    dilTIAZem (TIAZAC) 180 mg SR capsule Take 1 Cap by mouth daily. 30 Cap 12    atorvastatin (LIPITOR) 40 mg tablet Take 1 Tab by mouth nightly. 90 Tab 3    MICRO THIN LANCETS 33 gauge misc USE TO TEST BLOOD SUGAR TWO TO THREE TIMES DAILY 100 Lancet 11    gabapentin (NEURONTIN) 300 mg capsule TAKE TWO CAPSULES BY MOUTH THREE TIMES A  Cap 1    glucose blood VI test strips (ONETOUCH ULTRA TEST) strip Test three times daily. E11.9    Diabetes Type 2 300 Strip 11    Blood-Glucose Meter monitoring kit Use once daily  Onetouch test meter only  E11.9  Type 2 1 Kit 0    acetaminophen (TYLENOL) 500 mg tablet Take 1 Tab by mouth every six (6) hours as needed for Pain. 60 Tab 3    CONTOUR NEXT STRIPS strip TEST BLOOD GLUCOSE TWO TO THREE TIMES DAILY 100 Strip 11    carvedilol (COREG) 3.125 mg tablet Take 1 Tab by mouth two (2) times daily (with meals). 60 Tab 0    docusate sodium (COLACE) 100 mg capsule Take 1 Cap by mouth two (2) times a day. 60 Cap 0    ferrous sulfate (IRON) 325 mg (65 mg iron) EC tablet Take 1 Tab by mouth Daily (before breakfast). 30 Tab 0    ergocalciferol (ERGOCALCIFEROL) 50,000 unit capsule Take 1 Cap by mouth every seven (7) days. 4 Cap 12    nicotine (NICODERM CQ) 21 mg/24 hr APPLY ONE PATCH TO THE SKIN EVERY 24 HOURS 28 Patch 0    triamcinolone acetonide (KENALOG) 0.1 % ointment Apply  to affected area two (2) times a day.  use thin layer bid 80 g 12    insulin glargine (LANTUS) 100 unit/mL injection INJECT 72 UNITS UNDER THE SKIN EVERY 12 HOURS 4 Vial 11    insulin regular (NOVOLIN R, HUMULIN R) 100 unit/mL injection 14 Units by SubCUTAneous route three (3) times daily as needed (with meals). 1 Vial 12    therapeutic multivitamin (THERA) tablet Take 1 Tab by mouth daily. 30 Tab 12    fluticasone (FLONASE) 50 mcg/actuation nasal spray INHALE 2 SPRAYS IN EACH NOSTRIL EVERY DAY. 16 g 12    lidocaine (LIDODERM) 5 % 1 Patch by TransDERmal route every twenty-four (24) hours. Apply to affected area every 24 hours 1 Package 3    irbesartan (AVAPRO) 300 mg tablet TAKE ONE TABLET BY MOUTH ONCE NIGHTLY 30 Tab 11    aspirin delayed-release 81 mg tablet TAKE ONE TABLET BY MOUTH DAILY 30 Tab 0    Wheel Chair dianna Custom electric wheelchair 1 Each 0    oxyCODONE-acetaminophen (PERCOCET) 5-325 mg per tablet Take 1 Tab by mouth every eight (8) hours as needed. 14 Tab 0    Calcium Carbonate-Vit D3-Min 600 mg calcium- 400 unit tab Take  by mouth two (2) times a day.  loratadine (CLARITIN) 10 mg tablet Take 10 mg by mouth daily.  levoFLOXacin (LEVAQUIN) 500 mg tablet Take 1 Tab by mouth daily. 10 Tab 0    warfarin (COUMADIN) 10 mg tablet Take 1 Tab by mouth daily.  30 Tab 0    Insulin Syringe-Needle U-100 1 mL 31 gauge x 5/16 syrg USE TO INJECT INSULIN FIVE TIMES A  Syringe 11     No Known Allergies    Objective:  Visit Vitals    /80 (BP 1 Location: Right arm, BP Patient Position: Sitting)    Pulse 84    Temp 98 °F (36.7 °C) (Oral)    Resp 22    Ht 6' 2\" (1.88 m)    Wt 288 lb (130.6 kg)    SpO2 96%    BMI 36.98 kg/m2     Physical Exam:   General appearance - alert, well appearing, and in moderate distress  Mental status - alert, oriented to person, place, and time  EYE-LUH, EOMI, corneas normal, no foreign bodies  ENT-ENT exam normal, no neck nodes or sinus tenderness  Nose - normal and patent, no erythema, discharge or polyps  Mouth - mucous membranes moist, pharynx normal without lesions  Neck - supple, no significant adenopathy   Chest - clear to auscultation, no wheezes, rales or rhonchi, symmetric air entry Heart - normal rate, regular rhythm, normal S1, S2, no murmurs, rubs, clicks or gallops   Abdomen - soft, nontender, nondistended, no masses or organomegaly  Lymph- no adenopathy palpable  Ext-peripheral pulses normal, no pedal edema, no clubbing or cyanosis  Skin-Warm and dry. no hyperpigmentation, vitiligo, or suspicious lesions  Neuro -alert, oriented, normal speech, no focal findings or movement disorder noted  Neck-normal C-spine, no tenderness, full ROM without pain  Feet- nail deformities or callus formation with good pulses noted  Rt. .shoulder-subdeltoid tenderness, positive impingement signs  Lt.leg 2+edema  Rt.leg 2+edema    Results for orders placed or performed in visit on 04/12/18   AMB POC GLUCOSE BLOOD, BY GLUCOSE MONITORING DEVICE   Result Value Ref Range    Glucose  mg/dL       Assessment/Plan:    ICD-10-CM ICD-9-CM    1. Cellulitis of lower extremity, unspecified laterality L03.119 682.6    2. Localized edema R60.0 782.3    3. Controlled diabetes mellitus type 2 with complications, unspecified whether long term insulin use (HCC) E11.8 250.90    4. Severe obesity (BMI 35.0-39. 9) with comorbidity ([facility] [address] E66.01 278.01      No orders of the defined types were placed in this encounter. lose weight, increase physical activity, follow low fat diet, follow low salt diet,    Unna boot removed on rt. lower leg      Patient Instructions   [employer] Activation    Thank you for requesting access to [employer]. Please follow the instructions below to securely access and download your online medical record. [employer] allows you to send messages to your doctor, view your test results, renew your prescriptions, schedule appointments, and more. How Do I Sign Up? 1. In your internet browser, go to [email or web address]  2. Click on the First Time User? Click Here link in the Sign In box. You will be redirect to the New Member Sign Up page. 3. Enter your [employer] Access Code exactly as it appears below.  You will not need to use this code after youve completed the sign-up process. If you do not sign up before the expiration date, you must request a new code. [employer] Access Code: [email or web address]  Expires: 2018  4:44 PM (This is the date your [employer] access code will )    4. Enter the last four digits of your Social Security Number (xxxx) and Date of Birth (mm/dd/yyyy) as indicated and click Submit. You will be taken to the next sign-up page. 5. Create a Relative.ait ID. This will be your [employer] login ID and cannot be changed, so think of one that is secure and easy to remember. 6. Create a [employer] password. You can change your password at any time. 7. Enter your Password Reset Question and Answer. This can be used at a later time if you forget your password. 8. Enter your e-mail address. You will receive e-mail notification when new information is available in [address] 9. Click Sign Up. You can now view and download portions of your medical record. 10. Click the Download Summary menu link to download a portable copy of your medical information. Additional Information    If you have questions, please visit the Frequently Asked Questions section of the [employer] website at [email or web address] Remember, [employer] is NOT to be used for urgent needs. For medical emergencies, dial 911. Follow-up Disposition:  Return in about 4 weeks (around 2018), or if symptoms worsen or fail to improve. I have reviewed with the patient details of the assessment and plan and all questions were answered. Relevent patient education was performed    An After Visit Summary was printed and given to the patient.

## 2018-04-26 NOTE — MR AVS SNAPSHOT
[address] [place] [address],[address] [place] [address] 
[phone] Patient: Neftaly Clark MRN: [id number] AAW:[id number] Visit Information Date & Time Provider Department Dept. Phone Encounter #  
 4/26/2018  2:30 PM MD Greg Curtis[address] Karlene Guerra [phone] [id number] Follow-up Instructions Return in about 4 weeks (around 5/24/2018), or if symptoms worsen or fail to improve. Upcoming Health Maintenance Date Due  
 EYE EXAM RETINAL OR DILATED Q1 3/11/2016 GLAUCOMA SCREENING Q2Y 4/21/2016 Pneumococcal 65+ High/Highest Risk (1 of 2 - PCV13) 4/21/2016 MICROALBUMIN Q1 10/8/2016 HEMOGLOBIN A1C Q6M 3/27/2018 MEDICARE YEARLY EXAM 4/14/2018 LIPID PANEL Q1 5/16/2018 FOOT EXAM Q1 7/6/2018 FOBT Q 1 YEAR AGE 50-75 7/6/2018 DTaP/Tdap/Td series (2 - Td) 12/10/2024 Allergies as of 4/26/2018  Review Complete On: 4/19/2018 By: Florentino Nesbitt MD  
 No Known Allergies Current Immunizations  Reviewed on 12/10/2014 Name Date Pneumococcal Polysaccharide (PPSV-23) 9/11/2014 Tdap 12/10/2014 Not reviewed this visit You Were Diagnosed With   
  
 Codes Comments Cellulitis of lower extremity, unspecified laterality    -  Primary ICD-10-CM: L03.119 ICD-9-CM: 682.6 Localized edema     ICD-10-CM: R60.0 ICD-9-CM: 782.3 Controlled diabetes mellitus type 2 with complications, unspecified whether long term insulin use (HCC)     ICD-10-CM: E11.8 ICD-9-CM: 250.90 Severe obesity (BMI 35.0-39. 9) with comorbidity ([facility] [address]     ICD-10-CM: E66.01 
ICD-9-CM: 278.01 Vitals BP Pulse Temp Resp Height(growth percentile) Weight(growth percentile) 140/80 (BP 1 Location: Right arm, BP Patient Position: Sitting) 84 98 °F (36.7 °C) (Oral) 22 6' 2\" (1.88 m) 288 lb (130.6 kg) SpO2 BMI Smoking Status 96% 36.98 kg/m2 Current Every Day Smoker BMI and BSA Data Body Mass Index Body Surface Area  
 36.98 kg/m 2 2.61 m 2 Preferred Pharmacy Pharmacy Name Phone [facility] [address] [facility] [phone] Your Updated Medication List  
  
   
This list is accurate as of 4/26/18  3:18 PM.  Always use your most recent med list.  
  
  
  
  
 acetaminophen 500 mg tablet Commonly known as:  TYLENOL Take 1 Tab by mouth every six (6) hours as needed for Pain. aspirin delayed-release 81 mg tablet TAKE ONE TABLET BY MOUTH DAILY  
  
 atorvastatin 40 mg tablet Commonly known as:  LIPITOR Take 1 Tab by mouth nightly. Blood-Glucose Meter monitoring kit Use once daily Onetouch test meter only E11.9 Type 2  
  
 bumetanide 2 mg tablet Commonly known as:  Shirlean Byrne Take 1 Tab by mouth daily. Calcium Carbonate-Vit D3-Min 600 mg calcium- 400 unit Tab Take  by mouth two (2) times a day. carvedilol 3.125 mg tablet Commonly known as:  Fazal Gregory Take 1 Tab by mouth two (2) times daily (with meals). * CONTOUR NEXT TEST STRIPS strip Generic drug:  glucose blood VI test strips TEST BLOOD GLUCOSE TWO TO THREE TIMES DAILY * glucose blood VI test strips strip Commonly known as:  ONETOUCH ULTRA TEST Test three times daily. E11.9  Diabetes Type 2  
  
 dilTIAZem 180 mg SR capsule Commonly known as:  [facility] Take 1 Cap by mouth daily. docusate sodium 100 mg capsule Commonly known as:  Renita Officer Take 1 Cap by mouth two (2) times a day. ergocalciferol 50,000 unit capsule Commonly known as:  ERGOCALCIFEROL Take 1 Cap by mouth every seven (7) days. ferrous sulfate 325 mg (65 mg iron) EC tablet Commonly known as:  IRON Take 1 Tab by mouth Daily (before breakfast). fluticasone 50 mcg/actuation nasal spray Commonly known as:  Addis Stevenson INHALE 2 SPRAYS IN EACH NOSTRIL EVERY DAY. gabapentin 300 mg capsule Commonly known as:  NEURONTIN  
TAKE TWO CAPSULES BY MOUTH THREE TIMES A DAY  
  
 insulin glargine 100 unit/mL injection Commonly known as:  LANTUS U-100 INSULIN INJECT 72 UNITS UNDER THE SKIN EVERY 12 HOURS  
  
 insulin regular 100 unit/mL injection Commonly known as:  NOVOLIN R, HUMULIN R  
14 Units by SubCUTAneous route three (3) times daily as needed (with meals). Insulin Syringe-Needle U-100 1 mL 31 gauge x 5/16 Syrg  
USE TO INJECT INSULIN FIVE TIMES A DAY  
  
 irbesartan 300 mg tablet Commonly known as:  AVAPRO TAKE ONE TABLET BY MOUTH ONCE NIGHTLY  
  
 levoFLOXacin 500 mg tablet Commonly known as:  Belvie Leavitt Take 1 Tab by mouth daily. lidocaine 5 % Commonly known as:  LIDODERM  
1 Patch by TransDERmal route every twenty-four (24) hours. Apply to affected area every 24 hours  
  
 loratadine 10 mg tablet Commonly known as:  Shanta Schlossman Take 10 mg by mouth daily. MICRO THIN LANCETS 33 gauge Misc Generic drug:  lancets USE TO TEST BLOOD SUGAR TWO TO THREE TIMES DAILY  
  
 nicotine 21 mg/24 hr  
Commonly known as:  NICODERM CQ  
APPLY ONE PATCH TO THE SKIN EVERY 24 HOURS  
  
 oxyCODONE-acetaminophen 5-325 mg per tablet Commonly known as:  PERCOCET Take 1 Tab by mouth every eight (8) hours as needed. * polyethylene glycol 17 gram/dose powder Commonly known as:  Richard Gram DISSOLVE 17 GRAMS IN 8 OUNCES OF LIQUID AND DRINK ONCE A DAY AS NEEDED * polyethylene glycol 17 gram/dose powder Commonly known as:  [employer] Gram Take 17 g by mouth daily. therapeutic multivitamin tablet Commonly known as:  THERA Take 1 Tab by mouth daily. triamcinolone acetonide 0.1 % ointment Commonly known as:  KENALOG Apply  to affected area two (2) times a day. use thin layer bid  
  
 warfarin 10 mg tablet Commonly known as:  COUMADIN Take 1 Tab by mouth daily. [address] [facility] Custom electric wheelchair * Notice: This list has 4 medication(s) that are the same as other medications prescribed for you. Read the directions carefully, and ask your doctor or other care provider to review them with you. Follow-up Instructions Return in about 4 weeks (around 2018), or if symptoms worsen or fail to improve. Patient Instructions MyChart Activation Thank you for requesting access to [employer]. Please follow the instructions below to securely access and download your online medical record. [employer] allows you to send messages to your doctor, view your test results, renew your prescriptions, schedule appointments, and more. How Do I Sign Up? 1. In your internet browser, go to [email or web address] 
2. Click on the First Time User? Click Here link in the Sign In box. You will be redirect to the New Member Sign Up page. 3. Enter your [employer] Access Code exactly as it appears below. You will not need to use this code after youve completed the sign-up process. If you do not sign up before the expiration date, you must request a new code. [employer] Access Code: [email or web address] Expires: 2018  4:44 PM (This is the date your [employer] access code will ) 4. Enter the last four digits of your Social Security Number (xxxx) and Date of Birth (mm/dd/yyyy) as indicated and click Submit. You will be taken to the next sign-up page. 5. Create a [employer] ID. This will be your [employer] login ID and cannot be changed, so think of one that is secure and easy to remember. 6. Create a [employer] password. You can change your password at any time. 7. Enter your Password Reset Question and Answer. This can be used at a later time if you forget your password. 8. Enter your e-mail address. You will receive e-mail notification when new information is available in [address] 9. Click Sign Up. You can now view and download portions of your medical record. 10. Click the Download Summary menu link to download a portable copy of your medical information. Additional Information If you have questions, please visit the Frequently Asked Questions section of the [employer] website at [email or web address] Remember, [employer] is NOT to be used for urgent needs. For medical emergencies, dial 911. Introducing \[facility]\"" & HEALTH SERVICES! [address] introduces [employer] patient portal. Now you can access parts of your medical record, email your doctor's office, and request medication refills online. 1. In your internet browser, go to [email or web address] 2. Click on the First Time User? Click Here link in the Sign In box. You will see the New Member Sign Up page. 3. Enter your [employer] Access Code exactly as it appears below. You will not need to use this code after youve completed the sign-up process. If you do not sign up before the expiration date, you must request a new code. · [employer] Access Code: [email or web address] Expires: 5/28/2018  4:44 PM 
 
4. Enter the last four digits of your Social Security Number (xxxx) and Date of Birth (mm/dd/yyyy) as indicated and click Submit. You will be taken to the next sign-up page. 5. Create a [employer] ID. This will be your [employer] login ID and cannot be changed, so think of one that is secure and easy to remember. 6. Create a [employer] password. You can change your password at any time. 7. Enter your Password Reset Question and Answer. This can be used at a later time if you forget your password. 8. Enter your e-mail address. You will receive e-mail notification when new information is available in [address] 9. Click Sign Up. You can now view and download portions of your medical record. 10. Click the Download Summary menu link to download a portable copy of your medical information.  
 
If you have questions, please visit the Frequently Asked Questions section of the [employer]. Remember, [employer] is NOT to be used for urgent needs. For medical emergencies, dial 911. Now available from your iPhone and Android! Please provide this summary of care documentation to your next provider. Your primary care clinician is listed as Lucio Damon. If you have any questions after today's visit, please call [phone].

## 2018-05-11 RX ORDER — FERROUS SULFATE 325(65) MG
325 TABLET, DELAYED RELEASE (ENTERIC COATED) ORAL
Qty: 30 TAB | Refills: 6 | Status: SHIPPED | OUTPATIENT
Start: 2018-05-11 | End: 2018-08-30 | Stop reason: SDUPTHER

## 2018-05-11 RX ORDER — GABAPENTIN 300 MG/1
CAPSULE ORAL
Qty: 180 CAP | Refills: 1 | Status: SHIPPED | OUTPATIENT
Start: 2018-05-11 | End: 2018-08-07 | Stop reason: SDUPTHER

## 2018-05-22 ENCOUNTER — OFFICE VISIT (OUTPATIENT)
Dept: INTERNAL MEDICINE CLINIC | Age: 68
End: 2018-05-22

## 2018-05-22 VITALS
HEART RATE: 78 BPM | TEMPERATURE: 97.7 F | OXYGEN SATURATION: 94 % | RESPIRATION RATE: 18 BRPM | DIASTOLIC BLOOD PRESSURE: 74 MMHG | SYSTOLIC BLOOD PRESSURE: 154 MMHG

## 2018-05-22 DIAGNOSIS — E11.42 DIABETIC POLYNEUROPATHY ASSOCIATED WITH TYPE 2 DIABETES MELLITUS (HCC): ICD-10-CM

## 2018-05-22 DIAGNOSIS — E11.21 TYPE 2 DIABETES WITH NEPHROPATHY (HCC): Primary | ICD-10-CM

## 2018-05-22 DIAGNOSIS — I10 ESSENTIAL HYPERTENSION: ICD-10-CM

## 2018-05-22 DIAGNOSIS — L03.119 CELLULITIS OF LOWER EXTREMITY, UNSPECIFIED LATERALITY: ICD-10-CM

## 2018-05-22 DIAGNOSIS — Z96.641 S/P HIP REPLACEMENT, RIGHT: ICD-10-CM

## 2018-05-22 LAB — GLUCOSE POC: 224 MG/DL

## 2018-05-22 RX ORDER — LEVOFLOXACIN 500 MG/1
500 TABLET, FILM COATED ORAL DAILY
Qty: 10 TAB | Refills: 0 | Status: SHIPPED | OUTPATIENT
Start: 2018-05-22 | End: 2018-06-27 | Stop reason: ALTCHOICE

## 2018-05-22 NOTE — PROGRESS NOTES
Mani Gonzalez is a 80 y.o. male and presents with Diabetes and Skin Infection (legs bilateral)  . Subjective:    He has had less progressive swelling of the lt.leg.he had an unna boot placed in the past.  He has noticed no drainage from the lt. leg         Anemia review:  Patient presents for  evaluation of anemia. Anemia was found by routine CBC. It had been present for several months. Associated signs & symptoms:none    Hypertension Review:  The patient has essential hypertension  Diet and Lifestyle: generally follows a  low sodium diet, exercises sporadically  Home BP Monitoring: is not measured at home. Pertinent ROS: taking medications as instructed, no medication side effects noted, no TIA's, no chest pain on exertion, no dyspnea on exertion, no swelling of ankles. Diabetes Mellitus Review:  He has diabetes mellitus. Diabetic ROS - medication compliance: compliant all of the time, diabetic diet compliance: compliant all of the time, home glucose monitoring: is performed. Known diabetic complications: none  Cardiovascular risk factors: family history, dyslipidemia, diabetes mellitus, obesity, hypertension  Current diabetic medications include oral agents/insulin   Eye exam current (within one year): no  Weight trend: stable  Prior visit with dietician: no  Current diet: \"healthy\" diet  in general  Current exercise: walking  Current monitoring regimen: home blood tests - daily  Home blood sugar records: trend: stable  Any episodes of hypoglycemia? no  Is He on ACE inhibitor or angiotensin II receptor blocker? Yes         Review of Systems  Constitutional: negative for fevers, chills, anorexia and weight loss  Eyes:   negative for visual disturbance and irritation  ENT:   negative for tinnitus,sore throat,nasal congestion,ear pains. hoarseness  Respiratory:  negative for cough, hemoptysis, dyspnea,wheezing  CV:   negative for chest pain, palpitations, lower extremity edema  GI:    abdominal pain,  Endo:               negative for polyuria,polydipsia,polyphagia,heat intolerance  Genitourinary: negative for frequency, dysuria and hematuria  Integument:  negative for rash and pruritus  Hematologic:  negative for easy bruising and gum/nose bleeding  Musculoskel: myalgias, arthralgias, back pain,joint pain  Neurological:  negative for headaches, dizziness, vertigo, memory problems and gait   Behavl/Psych: negative for feelings of anxiety, depression, mood changes    Past Medical History:   Diagnosis Date    Arthritis, Degenerative,  Knee 4/4/2011    Carcinoma, Prostate 4/4/2011    Degenerative disc disease 4/4/2011    Depression/ Anxiety 4/4/2011    Diabetes ([address]     Diabetes Mellitrus ( non-insulin dependent ) Type 2 4/4/2011    HEMATOCHEZIA 4/4/2011    Hypertrension 4/4/2011    Hypertriglyceridemia 4/4/2011    Insomnia 4/4/2011    Laryngitis 4/4/2011    Mild Aortic insufficiency 4/4/2011    Mild Concentric LVH (left ventricular hypertrophy) 4/4/2011    Neuropathy 4/4/2011    Osteoarthritis 4/4/2011    Rotator Cuff Tendonitis 4/4/2011     Past Surgical History:   Procedure Laterality Date    CARDIAC SURG PROCEDURE UNLIST      cardiac cath    COLONOSCOPY N/A 4/28/2017    COLONOSCOPY performed by Yusra Moore MD at [facility] ENDOSCOPY    HX ORTHOPAEDIC      left hip pinning    HX OTHER SURGICAL      left little toe amputation    HX UROLOGICAL       Social History     Social History    Marital status: LEGALLY      Spouse name: N/A    Number of children: N/A    Years of education: N/A     Social History Main Topics    Smoking status: Current Every Day Smoker     Packs/day: 0.25     Last attempt to quit: 10/25/2016    Smokeless tobacco: Never Used    Alcohol use 7.0 oz/week     7 Cans of beer, 7 Shots of liquor per week    Drug use: None    Sexual activity: Yes     Partners: Female     Other Topics Concern    None     Social History Narrative     History reviewed.  No pertinent family history. Current Outpatient Prescriptions   Medication Sig Dispense Refill    levoFLOXacin (LEVAQUIN) 500 mg tablet Take 1 Tab by mouth daily. 10 Tab 0    gabapentin (NEURONTIN) 300 mg capsule TAKE TWO CAPSULES BY MOUTH THREE TIMES A  Cap 1    ferrous sulfate (IRON) 325 mg (65 mg iron) EC tablet Take 1 Tab by mouth Daily (before breakfast). 30 Tab 6    polyethylene glycol (MIRALAX) 17 gram/dose powder DISSOLVE 17 GRAMS IN 8 OUNCES OF LIQUID AND DRINK ONCE A DAY AS NEEDED 595 g 2    polyethylene glycol (MIRALAX) 17 gram/dose powder Take 17 g by mouth daily. 850 g 3    bumetanide (BUMEX) 2 mg tablet Take 1 Tab by mouth daily. 30 Tab 3    dilTIAZem (TIAZAC) 180 mg SR capsule Take 1 Cap by mouth daily. 30 Cap 12    atorvastatin (LIPITOR) 40 mg tablet Take 1 Tab by mouth nightly. 90 Tab 3    MICRO THIN LANCETS 33 gauge misc USE TO TEST BLOOD SUGAR TWO TO THREE TIMES DAILY 100 Lancet 11    glucose blood VI test strips (ONETOUCH ULTRA TEST) strip Test three times daily. E11.9    Diabetes Type 2 300 Strip 11    Blood-Glucose Meter monitoring kit Use once daily  Onetouch test meter only  E11.9  Type 2 1 Kit 0    acetaminophen (TYLENOL) 500 mg tablet Take 1 Tab by mouth every six (6) hours as needed for Pain. 60 Tab 3    CONTOUR NEXT STRIPS strip TEST BLOOD GLUCOSE TWO TO THREE TIMES DAILY 100 Strip 11    carvedilol (COREG) 3.125 mg tablet Take 1 Tab by mouth two (2) times daily (with meals). 60 Tab 0    docusate sodium (COLACE) 100 mg capsule Take 1 Cap by mouth two (2) times a day. 60 Cap 0    ergocalciferol (ERGOCALCIFEROL) 50,000 unit capsule Take 1 Cap by mouth every seven (7) days. 4 Cap 12    nicotine (NICODERM CQ) 21 mg/24 hr APPLY ONE PATCH TO THE SKIN EVERY 24 HOURS 28 Patch 0    triamcinolone acetonide (KENALOG) 0.1 % ointment Apply  to affected area two (2) times a day.  use thin layer bid 80 g 12    insulin glargine (LANTUS) 100 unit/mL injection INJECT 72 UNITS UNDER THE SKIN EVERY 12 HOURS 4 Vial 11    Insulin Syringe-Needle U-100 1 mL 31 gauge x 5/16 syrg USE TO INJECT INSULIN FIVE TIMES A  Syringe 11    insulin regular (NOVOLIN R, HUMULIN R) 100 unit/mL injection 14 Units by SubCUTAneous route three (3) times daily as needed (with meals). 1 Vial 12    therapeutic multivitamin (THERA) tablet Take 1 Tab by mouth daily. 30 Tab 12    fluticasone (FLONASE) 50 mcg/actuation nasal spray INHALE 2 SPRAYS IN EACH NOSTRIL EVERY DAY. 16 g 12    lidocaine (LIDODERM) 5 % 1 Patch by TransDERmal route every twenty-four (24) hours. Apply to affected area every 24 hours 1 Package 3    irbesartan (AVAPRO) 300 mg tablet TAKE ONE TABLET BY MOUTH ONCE NIGHTLY 30 Tab 11    aspirin delayed-release 81 mg tablet TAKE ONE TABLET BY MOUTH DAILY 30 Tab 0    oxyCODONE-acetaminophen (PERCOCET) 5-325 mg per tablet Take 1 Tab by mouth every eight (8) hours as needed. 14 Tab 0    Calcium Carbonate-Vit D3-Min 600 mg calcium- 400 unit tab Take  by mouth two (2) times a day.  loratadine (CLARITIN) 10 mg tablet Take 10 mg by mouth daily.  warfarin (COUMADIN) 10 mg tablet Take 1 Tab by mouth daily.  30 Tab 0    Wheel Chair dianna Custom electric wheelchair 1 Each 0     No Known Allergies    Objective:  Visit Vitals    /74    Pulse 78    Temp 97.7 °F (36.5 °C) (Oral)    Resp 18    SpO2 94%     Physical Exam:   General appearance - alert, well appearing, and in moderate distress  Mental status - alert, oriented to person, place, and time  EYE-LUH, EOMI, corneas normal, no foreign bodies  ENT-ENT exam normal, no neck nodes or sinus tenderness  Nose - normal and patent, no erythema, discharge or polyps  Mouth - mucous membranes moist, pharynx normal without lesions  Neck - supple, no significant adenopathy   Chest - clear to auscultation, no wheezes, rales or rhonchi, symmetric air entry   Heart - normal rate, regular rhythm, normal S1, S2, no murmurs, rubs, clicks or gallops Abdomen - soft, nontender, nondistended, no masses or organomegaly  Lymph- no adenopathy palpable  Ext-peripheral pulses normal, no pedal edema, no clubbing or cyanosis  Skin-Warm and dry. no hyperpigmentation, vitiligo, or suspicious lesions  Neuro -alert, oriented, normal speech, no focal findings or movement disorder noted  Neck-normal C-spine, no tenderness, full ROM without pain  Feet- nail deformities or callus formation with good pulses noted  Rt. .shoulder-subdeltoid tenderness, positive impingement signs  Lt.leg 2+edema  Rt.leg 2+edema    Results for orders placed or performed in visit on 05/22/18   AMB POC GLUCOSE BLOOD, BY GLUCOSE MONITORING DEVICE   Result Value Ref Range    Glucose  mg/dL       Assessment/Plan:    ICD-10-CM ICD-9-CM    1. Type 2 diabetes with nephropathy (HCC) E11.21 250.40 AMB POC GLUCOSE BLOOD, BY GLUCOSE MONITORING DEVICE     583.81    2. Diabetic polyneuropathy associated with type 2 diabetes mellitus (HCC) E11.42 250.60      357.2    3. S/P hip replacement, right Z96.641 V43.64    4. Essential hypertension I10 401.9    5. Cellulitis of lower extremity, unspecified laterality L03.119 682.6 levoFLOXacin (LEVAQUIN) 500 mg tablet     Orders Placed This Encounter    AMB POC GLUCOSE BLOOD, BY GLUCOSE MONITORING DEVICE    levoFLOXacin (LEVAQUIN) 500 mg tablet     Sig: Take 1 Tab by mouth daily. Dispense:  10 Tab     Refill:  0     lose weight, increase physical activity, follow low fat diet, follow low salt diet,    Unna boot removed on rt. lower leg      Patient Instructions   Peekapak Activation    Thank you for requesting access to Peekapak. Please follow the instructions below to securely access and download your online medical record. Peekapak allows you to send messages to your doctor, view your test results, renew your prescriptions, schedule appointments, and more. How Do I Sign Up? 1. In your internet browser, go to [email or web address]  2.  Click on the First Time User? Click Here link in the Sign In box. You will be redirect to the New Member Sign Up page. 3. Enter your [employer] Access Code exactly as it appears below. You will not need to use this code after youve completed the sign-up process. If you do not sign up before the expiration date, you must request a new code. [employer] Access Code: [email or web address]  Expires: 2018  4:44 PM (This is the date your [employer] access code will )    4. Enter the last four digits of your Social Security Number (xxxx) and Date of Birth (mm/dd/yyyy) as indicated and click Submit. You will be taken to the next sign-up page. 5. Create a [employer] ID. This will be your [employer] login ID and cannot be changed, so think of one that is secure and easy to remember. 6. Create a [employer] password. You can change your password at any time. 7. Enter your Password Reset Question and Answer. This can be used at a later time if you forget your password. 8. Enter your e-mail address. You will receive e-mail notification when new information is available in [address] 9. Click Sign Up. You can now view and download portions of your medical record. 10. Click the Download Summary menu link to download a portable copy of your medical information. Additional Information    If you have questions, please visit the Frequently Asked Questions section of the [employer] website at [email or web address] Remember, [employer] is NOT to be used for urgent needs. For medical emergencies, dial 911. Follow-up Disposition:  Return in about 4 weeks (around 2018), or if symptoms worsen or fail to improve. I have reviewed with the patient details of the assessment and plan and all questions were answered. Relevent patient education was performed    An After Visit Summary was printed and given to the patient.

## 2018-05-22 NOTE — PATIENT INSTRUCTIONS
Jildy Activation    Thank you for requesting access to Jildy. Please follow the instructions below to securely access and download your online medical record. Jildy allows you to send messages to your doctor, view your test results, renew your prescriptions, schedule appointments, and more. How Do I Sign Up? 1. In your internet browser, go to [email or web address]  2. Click on the First Time User? Click Here link in the Sign In box. You will be redirect to the New Member Sign Up page. 3. Enter your Jildy Access Code exactly as it appears below. You will not need to use this code after youve completed the sign-up process. If you do not sign up before the expiration date, you must request a new code. Jildy Access Code: [email or web address]  Expires: 2018  4:44 PM (This is the date your Jildy access code will )    4. Enter the last four digits of your Social Security Number (xxxx) and Date of Birth (mm/dd/yyyy) as indicated and click Submit. You will be taken to the next sign-up page. 5. Create a Jildy ID. This will be your Jildy login ID and cannot be changed, so think of one that is secure and easy to remember. 6. Create a Jildy password. You can change your password at any time. 7. Enter your Password Reset Question and Answer. This can be used at a later time if you forget your password. 8. Enter your e-mail address. You will receive e-mail notification when new information is available in [address] 9. Click Sign Up. You can now view and download portions of your medical record. 10. Click the Download Summary menu link to download a portable copy of your medical information. Additional Information    If you have questions, please visit the Frequently Asked Questions section of the Jildy website at [email or web address] Remember, Jildy is NOT to be used for urgent needs. For medical emergencies, dial 911.

## 2018-05-22 NOTE — MR AVS SNAPSHOT
[address] [place] [address],[address] [facility] 7 [id number] 
[phone] Patient: Yessy Zamora MRN: [id number] LME:[id number]/32/7370 Visit Information Date & Time Provider Department Dept. Phone Encounter #  
 5/22/2018  2:30 PM Luna Perez MD [facility] [phone] [id number] Follow-up Instructions Return in about 4 weeks (around 6/19/2018), or if symptoms worsen or fail to improve. Upcoming Health Maintenance Date Due  
 EYE EXAM RETINAL OR DILATED Q1 3/11/2016 GLAUCOMA SCREENING Q2Y 4/21/2016 Pneumococcal 65+ High/Highest Risk (1 of 2 - PCV13) 4/21/2016 MICROALBUMIN Q1 10/8/2016 HEMOGLOBIN A1C Q6M 3/27/2018 MEDICARE YEARLY EXAM 4/14/2018 LIPID PANEL Q1 5/16/2018 FOBT Q 1 YEAR AGE 50-75 7/6/2018 FOOT EXAM Q1 7/6/2018 Influenza Age 5 to Adult 8/1/2018 DTaP/Tdap/Td series (2 - Td) 12/10/2024 Allergies as of 5/22/2018  Review Complete On: 5/22/2018 By: Luna Perez MD  
 No Known Allergies Current Immunizations  Reviewed on 12/10/2014 Name Date Pneumococcal Polysaccharide (PPSV-23) 9/11/2014 Tdap 12/10/2014 Not reviewed this visit You Were Diagnosed With   
  
 Codes Comments Type 2 diabetes with nephropathy (HCC)    -  Primary ICD-10-CM: E11.21 
ICD-9-CM: 250.40, 583.81 Diabetic polyneuropathy associated with type 2 diabetes mellitus ([facility] [address]     ICD-10-CM: E11.42 
ICD-9-CM: 250.60, 357.2 S/P hip replacement, right     ICD-10-CM: W75.800 ICD-9-CM: V43.64 Essential hypertension     ICD-10-CM: I10 
ICD-9-CM: 401.9 Cellulitis of lower extremity, unspecified laterality     ICD-10-CM: L03.119 ICD-9-CM: 244. 6 Vitals BP Pulse Temp Resp SpO2 Smoking Status 154/74 78 97.7 °F (36.5 °C) (Oral) 18 94% Current Every Day Smoker Vitals History Preferred [facility] Name Phone [facility] [address] [address] [phone] Your Updated Medication List  
  
   
This list is accurate as of 5/22/18  3:24 PM.  Always use your most recent med list.  
  
  
  
  
 acetaminophen 500 mg tablet Commonly known as:  TYLENOL Take 1 Tab by mouth every six (6) hours as needed for Pain. aspirin delayed-release 81 mg tablet TAKE ONE TABLET BY MOUTH DAILY  
  
 atorvastatin 40 mg tablet Commonly known as:  LIPITOR Take 1 Tab by mouth nightly. Blood-Glucose Meter monitoring kit Use once daily Onetouch test meter only E11.9 Type 2  
  
 bumetanide 2 mg tablet Commonly known as:  [place] Take 1 Tab by mouth daily. Calcium Carbonate-Vit D3-Min 600 mg calcium- 400 unit Tab Take  by mouth two (2) times a day. carvedilol 3.125 mg tablet Commonly known as:  Zachary Heal Take 1 Tab by mouth two (2) times daily (with meals). * CONTOUR NEXT TEST STRIPS strip Generic drug:  glucose blood VI test strips TEST BLOOD GLUCOSE TWO TO THREE TIMES DAILY * glucose blood VI test strips strip Commonly known as:  ONETOUCH ULTRA TEST Test three times daily. E11.9  Diabetes Type 2  
  
 dilTIAZem 180 mg SR capsule Commonly known as:  [facility] Take 1 Cap by mouth daily. docusate sodium 100 mg capsule Commonly known as:  Kenny Parody Take 1 Cap by mouth two (2) times a day. ergocalciferol 50,000 unit capsule Commonly known as:  ERGOCALCIFEROL Take 1 Cap by mouth every seven (7) days. ferrous sulfate 325 mg (65 mg iron) EC tablet Commonly known as:  IRON Take 1 Tab by mouth Daily (before breakfast). fluticasone 50 mcg/actuation nasal spray Commonly known as:  Rick Orantes INHALE 2 SPRAYS IN EACH NOSTRIL EVERY DAY. gabapentin 300 mg capsule Commonly known as:  NEURONTIN  
TAKE TWO CAPSULES BY MOUTH THREE TIMES A DAY  
  
 insulin glargine 100 unit/mL injection Commonly known as:  LANTUS U-100 INSULIN INJECT 72 UNITS UNDER THE SKIN EVERY 12 HOURS  
  
 insulin regular 100 unit/mL injection Commonly known as:  NOVOLIN R, HUMULIN R  
14 Units by SubCUTAneous route three (3) times daily as needed (with meals). Insulin Syringe-Needle U-100 1 mL 31 gauge x 5/16 Syrg  
USE TO INJECT INSULIN FIVE TIMES A DAY  
  
 irbesartan 300 mg tablet Commonly known as:  AVAPRO TAKE ONE TABLET BY MOUTH ONCE NIGHTLY  
  
 levoFLOXacin 500 mg tablet Commonly known as:  Nereyda  Take 1 Tab by mouth daily. lidocaine 5 % Commonly known as:  LIDODERM  
1 Patch by TransDERmal route every twenty-four (24) hours. Apply to affected area every 24 hours  
  
 loratadine 10 mg tablet Commonly known as:  Ratna Muss Take 10 mg by mouth daily. MICRO THIN LANCETS 33 gauge Misc Generic drug:  lancets USE TO TEST BLOOD SUGAR TWO TO THREE TIMES DAILY  
  
 nicotine 21 mg/24 hr  
Commonly known as:  NICODERM CQ  
APPLY ONE PATCH TO THE SKIN EVERY 24 HOURS  
  
 oxyCODONE-acetaminophen 5-325 mg per tablet Commonly known as:  PERCOCET Take 1 Tab by mouth every eight (8) hours as needed. * polyethylene glycol 17 gram/dose powder Commonly known as:  Marisol Royalty DISSOLVE 17 GRAMS IN 8 OUNCES OF LIQUID AND DRINK ONCE A DAY AS NEEDED * polyethylene glycol 17 gram/dose powder Commonly known as:  Marisol Royalty Take 17 g by mouth daily. therapeutic multivitamin tablet Commonly known as:  THERA Take 1 Tab by mouth daily. triamcinolone acetonide 0.1 % ointment Commonly known as:  KENALOG Apply  to affected area two (2) times a day. use thin layer bid  
  
 warfarin 10 mg tablet Commonly known as:  COUMADIN Take 1 Tab by mouth daily. [address] [facility] Custom electric wheelchair * Notice: This list has 4 medication(s) that are the same as other medications prescribed for you.  Read the directions carefully, and ask your doctor or other care provider to review them with you. Prescriptions Sent to Pharmacy Refills  
 levoFLOXacin (LEVAQUIN) 500 mg tablet 0 Sig: Take 1 Tab by mouth daily. Class: Normal  
 [facility] [id number] [facility] [phone] Route: Oral  
  
We Performed the Following AMB POC GLUCOSE BLOOD, BY GLUCOSE MONITORING DEVICE [84122 CPT(R)] Follow-up Instructions Return in about 4 weeks (around 2018), or if symptoms worsen or fail to improve. Patient Instructions MobiWorkhart Activation Thank you for requesting access to Graph[employer]. Please follow the instructions below to securely access and download your online medical record. Graphdive allows you to send messages to your doctor, view your test results, renew your prescriptions, schedule appointments, and more. How Do I Sign Up? 1. In your internet browser, go to [email or web address] 
2. Click on the First Time User? Click Here link in the Sign In box. You will be redirect to the New Member Sign Up page. 3. Enter your Graphdive Access Code exactly as it appears below. You will not need to use this code after youve completed the sign-up process. If you do not sign up before the expiration date, you must request a new code. Graphdive Access Code: [email or web address] Expires: 2018  4:44 PM (This is the date your Graphdive access code will ) 4. Enter the last four digits of your Social Security Number (xxxx) and Date of Birth (mm/dd/yyyy) as indicated and click Submit. You will be taken to the next sign-up page. 5. Create a Graphdive ID. This will be your Graphdive login ID and cannot be changed, so think of one that is secure and easy to remember. 6. Create a Graphdive password. You can change your password at any time. 7. Enter your Password Reset Question and Answer.  This can be used at a later time if you forget your password. 8. Enter your e-mail address. You will receive e-mail notification when new information is available in [address] 9. Click Sign Up. You can now view and download portions of your medical record. 10. Click the Download Summary menu link to download a portable copy of your medical information. Additional Information If you have questions, please visit the Frequently Asked Questions section of the [employer] website at [email or web address] Remember, [employer] is NOT to be used for urgent needs. For medical emergencies, dial 911. Introducing [facility]! Janak Nichole introduces [facility] patient portal. Now you can access parts of your medical record, email your doctor's office, and request medication refills online. 1. In your internet browser, go to [email or web address] 2. Click on the First Time User? Click Here link in the Sign In box. You will see the New Member Sign Up page. 3. Enter your [facility] Access Code exactly as it appears below. You will not need to use this code after youve completed the sign-up process. If you do not sign up before the expiration date, you must request a new code. · [facility] Access Code: [email or web address] Expires: 5/28/2018  4:44 PM 
 
4. Enter the last four digits of your Social Security Number (xxxx) and Date of Birth (mm/dd/yyyy) as indicated and click Submit. You will be taken to the next sign-up page. 5. Create a Kingspoke ID. This will be your Kingspoke login ID and cannot be changed, so think of one that is secure and easy to remember. 6. Create a Kingspoke password. You can change your password at any time. 7. Enter your Password Reset Question and Answer. This can be used at a later time if you forget your password. 8. Enter your e-mail address. You will receive e-mail notification when new information is available in [address] 9. Click Sign Up. You can now view and download portions of your medical record. 10. Click the Download Summary menu link to download a portable copy of your medical information. If you have questions, please visit the Frequently Asked Questions section of the Rising website. Remember, [facility] is NOT to be used for urgent needs. For medical emergencies, dial 911. Now available from your iPhone and Android! Please provide this summary of care documentation to your next provider. Your primary care clinician is listed as Kasie Mata. If you have any questions after today's visit, please call [phone].

## 2018-06-07 DIAGNOSIS — M16.0 PRIMARY OSTEOARTHRITIS OF BOTH HIPS: ICD-10-CM

## 2018-06-11 RX ORDER — ACETAMINOPHEN 500 MG/1
TABLET ORAL
Qty: 60 TAB | Refills: 2 | Status: SHIPPED | OUTPATIENT
Start: 2018-06-11 | End: 2018-08-27 | Stop reason: SDUPTHER

## 2018-06-26 DIAGNOSIS — I10 ESSENTIAL HYPERTENSION WITH GOAL BLOOD PRESSURE LESS THAN 130/85: ICD-10-CM

## 2018-06-27 ENCOUNTER — OFFICE VISIT (OUTPATIENT)
Dept: INTERNAL MEDICINE CLINIC | Age: 68
End: 2018-06-27

## 2018-06-27 VITALS
OXYGEN SATURATION: 95 % | RESPIRATION RATE: 16 BRPM | SYSTOLIC BLOOD PRESSURE: 140 MMHG | DIASTOLIC BLOOD PRESSURE: 60 MMHG | HEART RATE: 81 BPM | HEIGHT: 74 IN | TEMPERATURE: 98.8 F

## 2018-06-27 DIAGNOSIS — M12.811 ROTATOR CUFF ARTHROPATHY, RIGHT: Primary | ICD-10-CM

## 2018-06-27 DIAGNOSIS — R10.31 RIGHT LOWER QUADRANT ABDOMINAL PAIN: ICD-10-CM

## 2018-06-27 DIAGNOSIS — I10 ESSENTIAL HYPERTENSION: ICD-10-CM

## 2018-06-27 DIAGNOSIS — L03.119 CELLULITIS OF LOWER EXTREMITY, UNSPECIFIED LATERALITY: ICD-10-CM

## 2018-06-27 DIAGNOSIS — Z00.00 MEDICARE ANNUAL WELLNESS VISIT, SUBSEQUENT: ICD-10-CM

## 2018-06-27 DIAGNOSIS — E11.21 TYPE 2 DIABETES WITH NEPHROPATHY (HCC): ICD-10-CM

## 2018-06-27 DIAGNOSIS — M16.0 PRIMARY OSTEOARTHRITIS OF BOTH HIPS: ICD-10-CM

## 2018-06-27 DIAGNOSIS — E11.42 DIABETIC POLYNEUROPATHY ASSOCIATED WITH TYPE 2 DIABETES MELLITUS (HCC): ICD-10-CM

## 2018-06-27 LAB
CHOLEST SERPL-MCNC: 167 MG/DL
GLUCOSE POC: 154 MG/DL
HBA1C MFR BLD HPLC: 5.5 %
HDLC SERPL-MCNC: 31 MG/DL
LDL CHOLESTEROL POC: 66 MG/DL
NON-HDL CHOLESTEROL, 011976: 136
TCHOL/HDL RATIO (POC): 5.5
TRIGL SERPL-MCNC: 350 MG/DL

## 2018-06-27 RX ORDER — IRBESARTAN 300 MG/1
TABLET ORAL
Qty: 30 TAB | Refills: 10 | Status: SHIPPED | OUTPATIENT
Start: 2018-06-27 | End: 2019-03-20

## 2018-06-27 RX ORDER — TRIAMCINOLONE ACETONIDE 40 MG/ML
40 INJECTION, SUSPENSION INTRA-ARTICULAR; INTRAMUSCULAR ONCE
Qty: 1 ML | Refills: 0
Start: 2018-06-27 | End: 2018-06-27 | Stop reason: CLARIF

## 2018-06-27 RX ORDER — LIDOCAINE HYDROCHLORIDE 20 MG/ML
2 INJECTION, SOLUTION EPIDURAL; INFILTRATION; INTRACAUDAL; PERINEURAL ONCE
Qty: 2 ML | Refills: 0
Start: 2018-06-27 | End: 2018-06-27

## 2018-06-27 RX ORDER — TRIAMCINOLONE ACETONIDE 40 MG/ML
40 INJECTION, SUSPENSION INTRA-ARTICULAR; INTRAMUSCULAR ONCE
Qty: 1 ML | Refills: 0
Start: 2018-06-27 | End: 2018-06-27

## 2018-06-27 NOTE — MR AVS SNAPSHOT
[address] [place] [address],[address] [facility] 7 [address] 
[phone] Patient: Arias James MRN: [id number] CQA:5[birth year]11/[birth year]26 Visit Information Date & Time Provider Department Dept. Phone Encounter #  
 6/27/2018  2:00 PM Brad Cain MD [address] [facility] [phone] [id number] Follow-up Instructions Return in about 3 months (around 9/27/2018), or if symptoms worsen or fail to improve. Upcoming Health Maintenance Date Due  
 EYE EXAM RETINAL OR DILATED Q1 3/11/2016 GLAUCOMA SCREENING Q2Y 4/21/2016 Pneumococcal 65+ High/Highest Risk (1 of 2 - PCV13) 4/21/2016 MICROALBUMIN Q1 10/8/2016 HEMOGLOBIN A1C Q6M 3/27/2018 FOOT EXAM Q1 7/6/2018 FOBT Q 1 YEAR AGE 50-75 7/6/2018 Influenza Age 5 to Adult 8/1/2018 LIPID PANEL Q1 6/27/2019 MEDICARE YEARLY EXAM 6/28/2019 DTaP/Tdap/Td series (2 - Td) 12/10/2024 Allergies as of 6/27/2018  Review Complete On: 6/27/2018 By: Paula Olivera LPN No Known Allergies Current Immunizations  Reviewed on 12/10/2014 Name Date Pneumococcal Polysaccharide (PPSV-23) 9/11/2014 Tdap 12/10/2014 Not reviewed this visit You Were Diagnosed With   
  
 Codes Comments Rotator cuff arthropathy, right    -  Primary ICD-10-CM: W37.190 ICD-9-CM: 716.81 Type 2 diabetes with nephropathy (HCC)     ICD-10-CM: E11.21 
ICD-9-CM: 250.40, 583.81 Essential hypertension     ICD-10-CM: I10 
ICD-9-CM: 401.9 Primary osteoarthritis of both hips     ICD-10-CM: M16.0 ICD-9-CM: 715.15 Diabetic polyneuropathy associated with type 2 diabetes mellitus [facility]     ICD-10-CM: E11.42 
ICD-9-CM: 250.60, 357.2 Cellulitis of lower extremity, unspecified laterality     ICD-10-CM: L03.119 ICD-9-CM: 682.6 Medicare annual wellness visit, subsequent     ICD-10-CM: Z00.00 ICD-9-CM: V70.0  Right lower quadrant abdominal pain     ICD-10-CM: R10.31 
 ICD-9-CM: 789.03 Vitals BP Pulse Temp Resp Height(growth percentile) SpO2  
 140/60 81 98.8 °F (37.1 °C) (Oral) 16 6' 2\" (1.88 m) 95% Smoking Status Current Every Day Smoker Vitals History Preferred Pharmacy Pharmacy Name Phone [facility] [phone] [phone] Your Updated Medication List  
  
   
This list is accurate as of 6/27/18  3:28 PM.  Always use your most recent med list.  
  
  
  
  
 aspirin delayed-release 81 mg tablet TAKE ONE TABLET BY MOUTH DAILY Blood-Glucose Meter monitoring kit Use once daily Onetouch test meter only E11.9 Type 2  
  
 bumetanide 2 mg tablet Commonly known as:  Gerald Vintondale Take 1 Tab by mouth daily. Calcium Carbonate-Vit D3-Min 600 mg calcium- 400 unit Tab Take  by mouth two (2) times a day. carvedilol 3.125 mg tablet Commonly known as:  Cachorro  Take 1 Tab by mouth two (2) times daily (with meals). * CONTOUR NEXT TEST STRIPS strip Generic drug:  glucose blood VI test strips TEST BLOOD GLUCOSE TWO TO THREE TIMES DAILY * glucose blood VI test strips strip Commonly known as:  ONETOUCH ULTRA TEST Test three times daily. E11.9  Diabetes Type 2  
  
 dilTIAZem 180 mg SR capsule Commonly known as:  [facility] Take 1 Cap by mouth daily. docusate sodium 100 mg capsule Commonly known as:  Sloane Petrin Take 1 Cap by mouth two (2) times a day. ergocalciferol 50,000 unit capsule Commonly known as:  ERGOCALCIFEROL Take 1 Cap by mouth every seven (7) days. ferrous sulfate 325 mg (65 mg iron) EC tablet Commonly known as:  IRON Take 1 Tab by mouth Daily (before breakfast). fluticasone 50 mcg/actuation nasal spray Commonly known as:  Jo Sale INHALE 2 SPRAYS IN EACH NOSTRIL EVERY DAY. gabapentin 300 mg capsule Commonly known as:  NEURONTIN  
 TAKE TWO CAPSULES BY MOUTH THREE TIMES A DAY  
  
 insulin glargine 100 unit/mL injection Commonly known as:  LANTUS U-100 INSULIN INJECT 72 UNITS UNDER THE SKIN EVERY 12 HOURS  
  
 insulin regular 100 unit/mL injection Commonly known as:  NOVOLIN R, HUMULIN R  
14 Units by SubCUTAneous route three (3) times daily as needed (with meals). Insulin Syringe-Needle U-100 1 mL 31 gauge x 5/16 Syrg  
USE TO INJECT INSULIN FIVE TIMES A DAY  
  
 irbesartan 300 mg tablet Commonly known as:  AVAPRO TAKE ONE TABLET BY MOUTH EVERY EVENING  
  
 lidocaine 5 % Commonly known as:  LIDODERM  
1 Patch by TransDERmal route every twenty-four (24) hours. Apply to affected area every 24 hours  
  
 loratadine 10 mg tablet Commonly known as:  Jemma Galea Take 10 mg by mouth daily. MICRO THIN LANCETS 33 gauge Misc Generic drug:  lancets USE TO TEST BLOOD SUGAR TWO TO THREE TIMES DAILY  
  
 nicotine 21 mg/24 hr  
Commonly known as:  NICODERM CQ  
APPLY ONE PATCH TO THE SKIN EVERY 24 HOURS  
  
 oxyCODONE-acetaminophen 5-325 mg per tablet Commonly known as:  PERCOCET Take 1 Tab by mouth every eight (8) hours as needed. PAIN AND FEVER 500 mg tablet Generic drug:  acetaminophen TAKE ONE TABLET BY MOUTH EVERY 6 HOURS AS NEEDED FOR PAIN * polyethylene glycol 17 gram/dose powder Commonly known as:  Sepideh Plane DISSOLVE 17 GRAMS IN 8 OUNCES OF LIQUID AND DRINK ONCE A DAY AS NEEDED * polyethylene glycol 17 gram/dose powder Commonly known as:  Sepideh Plane Take 17 g by mouth daily. therapeutic multivitamin tablet Commonly known as:  THERA Take 1 Tab by mouth daily. triamcinolone acetonide 0.1 % ointment Commonly known as:  KENALOG Apply  to affected area two (2) times a day. use thin layer bid  
  
 warfarin 10 mg tablet Commonly known as:  COUMADIN Take 1 Tab by mouth daily. [address] [facility] Custom electric wheelchair * Notice: This list has 4 medication(s) that are the same as other medications prescribed for you. Read the directions carefully, and ask your doctor or other care provider to review them with you. We Performed the Following AMB POC GLUCOSE BLOOD, BY GLUCOSE MONITORING DEVICE [92955 CPT(R)] AMB POC HEMOGLOBIN A1C [48518 CPT(R)] AMB POC LIPID PROFILE [18579 CPT(R)] Follow-up Instructions Return in about 3 months (around 2018), or if symptoms worsen or fail to improve. To-Do List   
 2018 Imaging:  CT ABD W WO CONT   
  
 2018 Imaging:  CT PELV WO CONT Patient Instructions MyChart Activation Thank you for requesting access to [employer]. Please follow the instructions below to securely access and download your online medical record. [employer] allows you to send messages to your doctor, view your test results, renew your prescriptions, schedule appointments, and more. How Do I Sign Up? 1. In your internet browser, go to [email or web address] 
2. Click on the First Time User? Click Here link in the Sign In box. You will be redirect to the New Member Sign Up page. 3. Enter your [employer] Access Code exactly as it appears below. You will not need to use this code after youve completed the sign-up process. If you do not sign up before the expiration date, you must request a new code. [employer] Access Code: [email or web address] Expires: 2018  2:57 PM (This is the date your [employer] access code will ) 4. Enter the last four digits of your Social Security Number (xxxx) and Date of Birth (mm/dd/yyyy) as indicated and click Submit. You will be taken to the next sign-up page. 5. Create a [employer] ID. This will be your [employer] login ID and cannot be changed, so think of one that is secure and easy to remember. 6. Create a [employer] password. You can change your password at any time. 7. Enter your Password Reset Question and Answer. This can be used at a later time if you forget your password. 8. Enter your e-mail address. You will receive e-mail notification when new information is available in [address] 9. Click Sign Up. You can now view and download portions of your medical record. 10. Click the Download Summary menu link to download a portable copy of your medical information. Additional Information If you have questions, please visit the Frequently Asked Questions section of the [employer] website at [email or web address] Remember, [employer] is NOT to be used for urgent needs. For medical emergencies, dial 911. Introducing [facility]! [employer] introduces [employer] patient portal. Now you can access parts of your medical record, email your doctor's office, and request medication refills online. 1. In your internet browser, go to [email or web address] 2. Click on the First Time User? Click Here link in the Sign In box. You will see the New Member Sign Up page. 3. Enter your [employer] Access Code exactly as it appears below. You will not need to use this code after youve completed the sign-up process. If you do not sign up before the expiration date, you must request a new code. · [employer] Access Code: [email or web address] Expires: 9/25/2018  2:57 PM 
 
4. Enter the last four digits of your Social Security Number (xxxx) and Date of Birth (mm/dd/yyyy) as indicated and click Submit. You will be taken to the next sign-up page. 5. Create a [employer] ID. This will be your [employer] login ID and cannot be changed, so think of one that is secure and easy to remember. 6. Create a [employer] password. You can change your password at any time. 7. Enter your Password Reset Question and Answer. This can be used at a later time if you forget your password. 8. Enter your e-mail address.  You will receive e-mail notification when new information is available in [email or web address]. 9. Click Sign Up. You can now view and download portions of your medical record. 10. Click the Download Summary menu link to download a portable copy of your medical information. If you have questions, please visit the Frequently Asked Questions section of the [email or web address] website. Remember, [email or web address] is NOT to be used for urgent needs. For medical emergencies, dial 911. Now available from your iPhone and Android! Please provide this summary of care documentation to your next provider. Your primary care clinician is listed as Worthy Renny. If you have any questions after today's visit, please call [phone].

## 2018-06-27 NOTE — PROGRESS NOTES
Latoya Mcclain is a 80 y.o. male and presents with Abdominal Pain (lower); Diabetes; Hypertension; Shoulder Pain (right); Foot Burn; and Claudication (hand)  . Subjective:    Shoulder Pain Review:  Patient complains of right side shoulder pain. The symptoms began months ago Course of symptoms since onset has been gradually worsening. Pain is described as overall severity = moderate. Symptoms were incited by no known event. Patient denies N/A. Therapy to date includes OTC analgesics: effective. He states the lower leg cellulitis has almost resolved. Anemia review:  Patient presents for  evaluation of anemia. Anemia was found by routine CBC. It had been present for several months. Associated signs & symptoms:none    Hypertension Review:  The patient has essential hypertension  Diet and Lifestyle: generally follows a  low sodium diet, exercises sporadically  Home BP Monitoring: is not measured at home. Pertinent ROS: taking medications as instructed, no medication side effects noted, no TIA's, no chest pain on exertion, no dyspnea on exertion, no swelling of ankles. Diabetes Mellitus Review:  He has diabetes mellitus. Diabetic ROS - medication compliance: compliant all of the time, diabetic diet compliance: compliant all of the time, home glucose monitoring: is performed. Known diabetic complications:neuropathy feet  Cardiovascular risk factors: family history, dyslipidemia, diabetes mellitus, obesity, hypertension  Current diabetic medications include oral agents/insulin   Eye exam current (within one year): no  Weight trend: stable  Prior visit with dietician: no  Current diet: \"healthy\" diet  in general  Current exercise: walking  Current monitoring regimen: home blood tests - daily  Home blood sugar records: trend: stable  Any episodes of hypoglycemia? no  Is He on ACE inhibitor or angiotensin II receptor blocker?    Yes       He has had recurrent lower abdominal pains reported with associated bulging,states he was told he had a hernia,the pains are dull and aching and intense. He has had porgressive rt.hand weakness and cramping    Dyslipidemia Review:  Patient presents for evaluation of lipids. Compliance with treatment thus far has been excellent. A repeat fasting lipid profile was done. The patient does not use medications that may worsen dyslipidemias (corticosteroids, progestins, anabolic steroids, diuretics, beta-blockers, amiodarone, cyclosporine, olanzapine). The patient exercises some        Jamie Pan is a 80 y.o. male and presents for annual Medicare Wellness Visit. Problem List: Reviewed with patient and discussed risk factors. Patient Active Problem List   Diagnosis Code    Laryngitis J04.0    Diabetic polyneuropathy (HCC) E11.42    Rotator Cuff Tendonitis M71.9, M67.919    Diabetes mellitus type 2, controlled ([facility] [address] E11.9    Insomnia G47.00    Carcinoma, Prostate C61    Degenerative disc disease [id number]    Osteoarthrosis involving lower leg M17.10    Depression/ Anxiety F34.1    HTN (hypertension) I10    Osteoarthritis M16.9    Hypertriglyceridemia E78.1    HEMATOCHEZIA K62.5    Mild Aortic Insufficiency I35.1    Mild Concentric LVH (left ventricular hypertrophy) I51.7    Osteomyelitis (HCC) M86.9    S/P hip replacement, right Z96.641    Primary osteoarthritis of right hip M16.11    Stage 3 chronic kidney disease N18.3    Prostate CA (HCC) C61    Type 2 diabetes with nephropathy (HCC) E11.21    Severe obesity (BMI 35.0-39. 9) with comorbidity ([facility] [address] E66.01       Current medical providers:  Patient Care Team:  Mk Barrera MD as PCP - General (Internal Medicine)  Javid De Souza RN as Ambulatory Care Navigator    PSH: Reviewed with patient  Past Surgical History:   Procedure Laterality Date    CARDIAC SURG PROCEDURE UNLIST      cardiac cath    COLONOSCOPY N/A 4/28/2017    COLON[facility]COPY performed by [facility]lon [facility] at [facility] ENDOSCOPY    HX ORTHOPAEDIC      left hip pinning    HX OTHER SURGICAL      left little toe amputation    HX UROLOGICAL          SH: Reviewed with patient  Social History   Substance Use Topics    Smoking status: Current Every Day Smoker     Packs/day: 0.25     Last attempt to quit: 10/25/2016    Smokeless tobacco: Never Used    Alcohol use 7.0 oz/week     7 Cans of beer, 7 Shots of liquor per week       FH: Reviewed with patient  No family history on file. Medications/Allergies: Reviewed with patient  Current Outpatient Prescriptions on File Prior to Visit   Medication Sig Dispense Refill    irbesartan (AVAPRO) 300 mg tablet TAKE ONE TABLET BY MOUTH EVERY EVENING 30 Tab 10    PAIN AND FEVER 500 mg tablet TAKE ONE TABLET BY MOUTH EVERY 6 HOURS AS NEEDED FOR PAIN 60 Tab 2    gabapentin (NEURONTIN) 300 mg capsule TAKE TWO CAPSULES BY MOUTH THREE TIMES A  Cap 1    ferrous sulfate (IRON) 325 mg (65 mg iron) EC tablet Take 1 Tab by mouth Daily (before breakfast). 30 Tab 6    polyethylene glycol (MIRALAX) 17 gram/dose powder DISSOLVE 17 GRAMS IN 8 OUNCES OF LIQUID AND DRINK ONCE A DAY AS NEEDED 595 g 2    polyethylene glycol (MIRALAX) 17 gram/dose powder Take 17 g by mouth daily. 850 g 3    bumetanide (BUMEX) 2 mg tablet Take 1 Tab by mouth daily. 30 Tab 3    dilTIAZem (TIAZAC) 180 mg SR capsule Take 1 Cap by mouth daily. 30 Cap 12    MICRO THIN LANCETS 33 gauge misc USE TO TEST BLOOD SUGAR TWO TO THREE TIMES DAILY 100 Lancet 11    glucose blood VI test strips (ONETOUCH ULTRA TEST) strip Test three times daily. E11.9    Diabetes Type 2 300 Strip 11    Blood-Glucose Meter monitoring kit Use once daily  Onetouch test meter only  E11.9  Type 2 1 Kit 0    CONTOUR NEXT STRIPS strip TEST BLOOD GLUCOSE TWO TO THREE TIMES DAILY 100 Strip 11    carvedilol (COREG) 3.125 mg tablet Take 1 Tab by mouth two (2) times daily (with meals).  60 Tab 0    docusate sodium (COLACE) 100 mg capsule Take 1 Cap by mouth two (2) times a day. 60 Cap 0    ergocalciferol (ERGOCALCIFEROL) 50,000 unit capsule Take 1 Cap by mouth every seven (7) days. 4 Cap 12    nicotine (NICODERM CQ) 21 mg/24 hr APPLY ONE PATCH TO THE SKIN EVERY 24 HOURS 28 Patch 0    triamcinolone acetonide (KENALOG) 0.1 % ointment Apply  to affected area two (2) times a day. use thin layer bid 80 g 12    insulin glargine (LANTUS) 100 unit/mL injection INJECT 72 UNITS UNDER THE SKIN EVERY 12 HOURS 4 Vial 11    Insulin Syringe-Needle U-100 1 mL 31 gauge x 5/16 syrg USE TO INJECT INSULIN FIVE TIMES A  Syringe 11    insulin regular (NOVOLIN R, HUMULIN R) 100 unit/mL injection 14 Units by SubCUTAneous route three (3) times daily as needed (with meals). 1 Vial 12    therapeutic multivitamin (THERA) tablet Take 1 Tab by mouth daily. 30 Tab 12    fluticasone (FLONASE) 50 mcg/actuation nasal spray INHALE 2 SPRAYS IN EACH NOSTRIL EVERY DAY. 16 g 12    lidocaine (LIDODERM) 5 % 1 Patch by TransDERmal route every twenty-four (24) hours. Apply to affected area every 24 hours 1 Package 3    aspirin delayed-release 81 mg tablet TAKE ONE TABLET BY MOUTH DAILY 30 Tab 0    Wheel Chair dianna Custom electric wheelchair 1 Each 0    oxyCODONE-acetaminophen (PERCOCET) 5-325 mg per tablet Take 1 Tab by mouth every eight (8) hours as needed. 14 Tab 0    Calcium Carbonate-Vit D3-Min 600 mg calcium- 400 unit tab Take  by mouth two (2) times a day.  loratadine (CLARITIN) 10 mg tablet Take 10 mg by mouth daily.  warfarin (COUMADIN) 10 mg tablet Take 1 Tab by mouth daily. 30 Tab 0     No current facility-administered medications on file prior to visit. No Known Allergies    Objective:  Visit Vitals    /60    Pulse 81    Temp 98.8 °F (37.1 °C) (Oral)    Resp 16    Ht 6' 2\" (1.88 m)    SpO2 95%    There is no height or weight on file to calculate BMI.     Assessment of cognitive impairment: Alert and oriented x 3    Depression Screen: No flowsheet data found. Fall Risk Assessment:    Fall Risk Assessment, last 12 mths 6/27/2018   Able to walk? Yes   Fall in past 12 months? No   Fall with injury? -   Number of falls in past 12 months -   Fall Risk Score -       Functional Ability:   Does the patient exhibit a steady gait? yes   How long did it take the patient to get up and walk from a sitting position? seconds   Is the patient self reliant?  (ie can do own laundry, meals, household chores)  yes     Does the patient handle his/her own medications? yes     Does the patient handle his/her own money? yes     Is the patients home safe (ie good lighting, handrails on stairs and bath, etc.)? yes     Did you notice or did patient express any hearing difficulties? yes     Did you notice or did patient express any vision difficulties?   no     Were distance and reading eye charts used? yes       Advance Care Planning:   Patient was offered the opportunity to discuss advance care planning:  yes     Does patient have an Advance Directive:  yes   If no, did you provide information on Caring Connections? yes       Plan:      Orders Placed This Encounter    CT ABD W WO CONT    CT PELV WO CONT    AMB POC LIPID PROFILE    AMB POC GLUCOSE BLOOD, BY GLUCOSE MONITORING DEVICE    AMB POC HEMOGLOBIN A1C       Health Maintenance   Topic Date Due    EYE EXAM RETINAL OR DILATED Q1  03/11/2016    GLAUCOMA SCREENING Q2Y  04/21/2016    Pneumococcal 65+ High/Highest Risk (1 of 2 - PCV13) 04/21/2016    MICROALBUMIN Q1  10/08/2016    HEMOGLOBIN A1C Q6M  03/27/2018    FOOT EXAM Q1  07/06/2018    FOBT Q 1 YEAR AGE 50-75  07/06/2018    Influenza Age 9 to Adult  08/01/2018    LIPID PANEL Q1  06/27/2019    MEDICARE YEARLY EXAM  06/28/2019    DTaP/Tdap/Td series (2 - Td) 12/10/2024    Hepatitis C Screening  Addressed    ZOSTER VACCINE AGE 60>  Addressed       *Patient verbalized understanding and agreement with the plan.   A copy of the After Visit Summary with personalized health plan was given to the patient today. Review of Systems  Constitutional: negative for fevers, chills, anorexia and weight loss  Eyes:   negative for visual disturbance and irritation  ENT:   negative for tinnitus,sore throat,nasal congestion,ear pains. hoarseness  Respiratory:  negative for cough, hemoptysis, dyspnea,wheezing  CV:   negative for chest pain, palpitations, lower extremity edema  GI:    abdominal pain,  Endo:               negative for polyuria,polydipsia,polyphagia,heat intolerance  Genitourinary: negative for frequency, dysuria and hematuria  Integument:  negative for rash and pruritus  Hematologic:  negative for easy bruising and gum/nose bleeding  Musculoskel: myalgias, arthralgias, back pain,joint pain  Neurological:  negative for headaches, dizziness, vertigo, memory problems and gait   Behavl/Psych: negative for feelings of anxiety, depression, mood changes    Past Medical History:   Diagnosis Date    Arthritis, Degenerative,  Knee 4/4/2011    Carcinoma, Prostate 4/4/2011    Degenerative disc disease 4/4/2011    Depression/ Anxiety 4/4/2011    Diabetes ([facility] [address]     Diabetes Mellitrus ( non-insulin dependent ) Type 2 4/4/2011    HEMATOCHEZIA 4/4/2011    Hypertrension 4/4/2011    Hypertriglyceridemia 4/4/2011    Insomnia 4/4/2011    Laryngitis 4/4/2011    Mild Aortic insufficiency 4/4/2011    Mild Concentric LVH (left ventricular hypertrophy) 4/4/2011    Neuropathy 4/4/2011    Osteoarthritis 4/4/2011    Rotator Cuff Tendonitis 4/4/2011     Past Surgical History:   Procedure Laterality Date    CARDIAC SURG PROCEDURE UNLIST      cardiac cath    COLONOSCOPY N/A 4/28/2017    COLONOS[facility] performed by Preet Beckman MD at [facility] ENDOSCOPY    HX ORTHOPAEDIC      left hip pinning    HX OTHER SURGICAL      left little toe amputation    HX UROLOGICAL       Social History     Social History    Marital status: LEGALLY      Spouse name: N/A    Number of children: N/A    Years of education: N/A     Social History Main Topics    Smoking status: Current Every Day Smoker     Packs/day: 0.25     Last attempt to quit: 10/25/2016    Smokeless tobacco: Never Used    Alcohol use 7.0 oz/week     7 Cans of beer, 7 Shots of liquor per week    Drug use: None    Sexual activity: Yes     Partners: Female     Other Topics Concern    None     Social History Narrative     No family history on file. Current Outpatient Prescriptions   Medication Sig Dispense Refill    irbesartan (AVAPRO) 300 mg tablet TAKE ONE TABLET BY MOUTH EVERY EVENING 30 Tab 10    PAIN AND FEVER 500 mg tablet TAKE ONE TABLET BY MOUTH EVERY 6 HOURS AS NEEDED FOR PAIN 60 Tab 2    gabapentin (NEURONTIN) 300 mg capsule TAKE TWO CAPSULES BY MOUTH THREE TIMES A  Cap 1    ferrous sulfate (IRON) 325 mg (65 mg iron) EC tablet Take 1 Tab by mouth Daily (before breakfast). 30 Tab 6    polyethylene glycol (MIRALAX) 17 gram/dose powder DISSOLVE 17 GRAMS IN 8 OUNCES OF LIQUID AND DRINK ONCE A DAY AS NEEDED 595 g 2    polyethylene glycol (MIRALAX) 17 gram/dose powder Take 17 g by mouth daily. 850 g 3    bumetanide (BUMEX) 2 mg tablet Take 1 Tab by mouth daily. 30 Tab 3    dilTIAZem (TIAZAC) 180 mg SR capsule Take 1 Cap by mouth daily. 30 Cap 12    MICRO THIN LANCETS 33 gauge misc USE TO TEST BLOOD SUGAR TWO TO THREE TIMES DAILY 100 Lancet 11    glucose blood VI test strips (ONETOUCH ULTRA TEST) strip Test three times daily. E11.9    Diabetes Type 2 300 Strip 11    Blood-Glucose Meter monitoring kit Use once daily  Onetouch test meter only  E11.9  Type 2 1 Kit 0    CONTOUR NEXT STRIPS strip TEST BLOOD GLUCOSE TWO TO THREE TIMES DAILY 100 Strip 11    carvedilol (COREG) 3.125 mg tablet Take 1 Tab by mouth two (2) times daily (with meals). 60 Tab 0    docusate sodium (COLACE) 100 mg capsule Take 1 Cap by mouth two (2) times a day.  60 Cap 0    ergocalciferol (ERGOCALCIFEROL) 50,000 unit capsule Take 1 Cap by mouth every seven (7) days. 4 Cap 12    nicotine (NICODERM CQ) 21 mg/24 hr APPLY ONE PATCH TO THE SKIN EVERY 24 HOURS 28 Patch 0    triamcinolone acetonide (KENALOG) 0.1 % ointment Apply  to affected area two (2) times a day. use thin layer bid 80 g 12    insulin glargine (LANTUS) 100 unit/mL injection INJECT 72 UNITS UNDER THE SKIN EVERY 12 HOURS 4 Vial 11    Insulin Syringe-Needle U-100 1 mL 31 gauge x 5/16 syrg USE TO INJECT INSULIN FIVE TIMES A  Syringe 11    insulin regular (NOVOLIN R, HUMULIN R) 100 unit/mL injection 14 Units by SubCUTAneous route three (3) times daily as needed (with meals). 1 Vial 12    therapeutic multivitamin (THERA) tablet Take 1 Tab by mouth daily. 30 Tab 12    fluticasone (FLONASE) 50 mcg/actuation nasal spray INHALE 2 SPRAYS IN EACH NOSTRIL EVERY DAY. 16 g 12    lidocaine (LIDODERM) 5 % 1 Patch by TransDERmal route every twenty-four (24) hours. Apply to affected area every 24 hours 1 Package 3    aspirin delayed-release 81 mg tablet TAKE ONE TABLET BY MOUTH DAILY 30 Tab 0    Wheel Chair dianna Custom electric wheelchair 1 Each 0    oxyCODONE-acetaminophen (PERCOCET) 5-325 mg per tablet Take 1 Tab by mouth every eight (8) hours as needed. 14 Tab 0    Calcium Carbonate-Vit D3-Min 600 mg calcium- 400 unit tab Take  by mouth two (2) times a day.  loratadine (CLARITIN) 10 mg tablet Take 10 mg by mouth daily.  warfarin (COUMADIN) 10 mg tablet Take 1 Tab by mouth daily.  30 Tab 0     No Known Allergies    Objective:  Visit Vitals    /60    Pulse 81    Temp 98.8 °F (37.1 °C) (Oral)    Resp 16    Ht 6' 2\" (1.88 m)    SpO2 95%     Physical Exam:   General appearance - alert, well appearing, and in moderate distress  Mental status - alert, oriented to person, place, and time  EYE-LUH, EOMI, corneas normal, no foreign bodies  ENT-ENT exam normal, no neck nodes or sinus tenderness  Nose - normal and patent, no erythema, discharge or polyps  Mouth - mucous membranes moist, pharynx normal without lesions  Neck - supple, no significant adenopathy   Chest - clear to auscultation, no wheezes, rales or rhonchi, symmetric air entry   Heart - normal rate, regular rhythm, normal S1, S2, no murmurs, rubs, clicks or gallops   Abdomen - tenderness, distended, no masses or organomegaly  Lymph- no adenopathy palpable  Ext-peripheral pulses normal, no pedal edema, no clubbing or cyanosis  Skin-Warm and dry. no hyperpigmentation, vitiligo, or suspicious lesions  Neuro -alert, oriented, normal speech, no focal findings or movement disorder noted  Neck-normal C-spine, no tenderness, full ROM without pain  Feet- nail deformities or callus formation with good pulses noted  Rt. .shoulder-subdeltoid tenderness, positive impingement signs  Lt.leg no edema  Rt.leg no edema    Results for orders placed or performed in visit on 06/27/18   AMB POC LIPID PROFILE   Result Value Ref Range    Cholesterol (POC) 167     Triglycerides (POC) 350     HDL Cholesterol (POC) 31     Non-HDL Cholesterol 136     LDL Cholesterol (POC) 66 MG/DL    TChol/HDL Ratio (POC) 5.5    AMB POC GLUCOSE BLOOD, BY GLUCOSE MONITORING DEVICE   Result Value Ref Range    Glucose  mg/dL   AMB POC HEMOGLOBIN A1C   Result Value Ref Range    Hemoglobin A1c (POC) 5.5 %       Assessment/Plan:    ICD-10-CM ICD-9-CM    1. Rotator cuff arthropathy, right M12.811 716.81    2. Type 2 diabetes with nephropathy (HCC) E11.21 250.40 AMB POC LIPID PROFILE     583.81 AMB POC GLUCOSE BLOOD, BY GLUCOSE MONITORING DEVICE      AMB POC HEMOGLOBIN A1C   3. Essential hypertension I10 401.9 AMB POC LIPID PROFILE      AMB POC GLUCOSE BLOOD, BY GLUCOSE MONITORING DEVICE      AMB POC HEMOGLOBIN A1C   4. Primary osteoarthritis of both hips M16.0 715.15    5. Diabetic polyneuropathy associated with type 2 diabetes mellitus (HCC) E11.42 250.60      357.2    6. Cellulitis of lower extremity, unspecified laterality L03.119 682.6    7.  Medicare annual wellness visit, subsequent Z00.00 V70.0    8. Right lower quadrant abdominal pain R10.31 789.03 CT ABD W WO CONT      CT PELV WO CONT     Orders Placed This Encounter    CT ABD W WO CONT     Standing Status:   Future     Standing Expiration Date:   7/27/2019     Order Specific Question:   Is Patient Allergic to Contrast Dye? Answer:   No     Order Specific Question:   STAT Creatinine as indicated     Answer:   Yes     Order Specific Question: This order utilizes IV contrast.  What additional contrast is needed? Answer:   Oral    CT PELV WO CONT     Standing Status:   Future     Standing Expiration Date:   7/27/2019     Order Specific Question:   Is Patient Allergic to Contrast Dye? Answer:   No     Order Specific Question:   Type of contrast.  PLEASE NOTE: IV contrast is NOT utilized with this order. Answer:   Oral    AMB POC LIPID PROFILE    AMB POC GLUCOSE BLOOD, BY GLUCOSE MONITORING DEVICE    AMB POC HEMOGLOBIN A1C     lose weight, increase physical activity, follow low fat diet, follow low salt diet,  Indications:   Symptomatic relief of pain    Procedure:  After consent was obtained, using sterile technique the right shoulder joint was prepped using alcohol. Local anesthetic used: 1% lidocaine. . The joint was entered and Kenalog 40 mg was mixed with 1% lidocaine 3 ml  and injected into the joint and the needle withdrawn. The procedure was well tolerated. The patient is asked to continue to rest the joint for a few more days before resuming regular activities. It may be more painful for the first 1-2 days. Watch for fever, or increased swelling or persistent pain in the joint. Call or return to clinic prn if such symptoms occur or there is failure to improve as anticipated.       [facility]  OFFICE PROCEDURE PROGRESS NOTE        Chart reviewed for the following:   Stormy Slade MD, have reviewed the History, Physical and updated the Allergic reactions for [address] [place] [address] Street performed immediately prior to start of procedure:   I, Bhavani Johnson MD, have performed the following reviews on Jerry Bola prior to the start of the procedure:            * Patient was identified by name and date of birth   * Agreement on procedure being performed was verified  * Risks and Benefits explained to the patient  * Procedure site verified and marked as necessary  * Patient was positioned for comfort       Time: 3:16pm      Date of procedure: 2018    Procedure performed by:  Bhavani Johnson MD    Patient assisted by: self    How tolerated by patient: tolerated the procedure well with no complications    Comments: none                  Unna boot removed on rt. lower leg      Patient Instructions   MyChart Activation    Thank you for requesting access to [employer]. Please follow the instructions below to securely access and download your online medical record. [employer] allows you to send messages to your doctor, view your test results, renew your prescriptions, schedule appointments, and more. How Do I Sign Up? 1. In your internet browser, go to [email or web address]  2. Click on the First Time User? Click Here link in the Sign In box. You will be redirect to the New Member Sign Up page. 3. Enter your [employer] Access Code exactly as it appears below. You will not need to use this code after youve completed the sign-up process. If you do not sign up before the expiration date, you must request a new code. [employer] Access Code: [email or web address]  Expires: 2018  2:57 PM (This is the date your [employer] access code will )    4. Enter the last four digits of your Social Security Number (xxxx) and Date of Birth (mm/dd/yyyy) as indicated and click Submit. You will be taken to the next sign-up page. 5. Create a [employer] ID.  This will be your [employer] login ID and cannot be changed, so think of one that is secure and easy to remember. 6. Create a [employer] password. You can change your password at any time. 7. Enter your Password Reset Question and Answer. This can be used at a later time if you forget your password. 8. Enter your e-mail address. You will receive e-mail notification when new information is available in [address] 9. Click Sign Up. You can now view and download portions of your medical record. 10. Click the Download Summary menu link to download a portable copy of your medical information. Additional Information    If you have questions, please visit the Frequently Asked Questions section of the [employer] website at [email or web address] Remember, [employer] is NOT to be used for urgent needs. For medical emergencies, dial 911. Follow-up Disposition:  Return in about 3 months (around 9/27/2018), or if symptoms worsen or fail to improve. I have reviewed with the patient details of the assessment and plan and all questions were answered. Relevent patient education was performed    An After Visit Summary was printed and given to the patient.

## 2018-06-27 NOTE — PATIENT INSTRUCTIONS
GuidesMob Activation    Thank you for requesting access to GuidesMob. Please follow the instructions below to securely access and download your online medical record. GuidesMob allows you to send messages to your doctor, view your test results, renew your prescriptions, schedule appointments, and more. How Do I Sign Up? 1. In your internet browser, go to [email or web address]  2. Click on the First Time User? Click Here link in the Sign In box. You will be redirect to the New Member Sign Up page. 3. Enter your GuidesMob Access Code exactly as it appears below. You will not need to use this code after youve completed the sign-up process. If you do not sign up before the expiration date, you must request a new code. GuidesMob Access Code: [email or web address]  Expires: 2018  2:57 PM (This is the date your GuidesMob access code will )    4. Enter the last four digits of your Social Security Number (xxxx) and Date of Birth (mm/dd/yyyy) as indicated and click Submit. You will be taken to the next sign-up page. 5. Create a GuidesMob ID. This will be your GuidesMob login ID and cannot be changed, so think of one that is secure and easy to remember. 6. Create a GuidesMob password. You can change your password at any time. 7. Enter your Password Reset Question and Answer. This can be used at a later time if you forget your password. 8. Enter your e-mail address. You will receive e-mail notification when new information is available in [address] 9. Click Sign Up. You can now view and download portions of your medical record. 10. Click the Download Summary menu link to download a portable copy of your medical information. Additional Information    If you have questions, please visit the Frequently Asked Questions section of the GuidesMob website at [email or web address] Remember, GuidesMob is NOT to be used for urgent needs. For medical emergencies, dial 911.

## 2018-07-04 DIAGNOSIS — E11.42 DIABETIC POLYNEUROPATHY ASSOCIATED WITH TYPE 2 DIABETES MELLITUS (HCC): ICD-10-CM

## 2018-07-04 DIAGNOSIS — M12.811 ROTATOR CUFF ARTHROPATHY, RIGHT: ICD-10-CM

## 2018-07-05 RX ORDER — MULTIVIT-MIN/IRON FUM/FOLIC AC 19 MG-400
TABLET ORAL
Qty: 30 TAB | Refills: 11 | Status: SHIPPED | OUTPATIENT
Start: 2018-07-05 | End: 2019-07-16 | Stop reason: SDUPTHER

## 2018-07-05 RX ORDER — FLUTICASONE PROPIONATE 50 MCG
SPRAY, SUSPENSION (ML) NASAL
Qty: 1 BOTTLE | Refills: 11 | Status: SHIPPED | OUTPATIENT
Start: 2018-07-05 | End: 2019-07-16 | Stop reason: SDUPTHER

## 2018-07-12 ENCOUNTER — HOSPITAL ENCOUNTER (OUTPATIENT)
Dept: CT IMAGING | Age: 68
Discharge: HOME | End: 2018-07-12
Attending: INTERNAL MEDICINE
Payer: MEDICARE

## 2018-07-12 DIAGNOSIS — R10.31 RIGHT LOWER QUADRANT ABDOMINAL PAIN: ICD-10-CM

## 2018-07-12 PROCEDURE — 74011000255 HC RX REV CODE- 255: Performed by: INTERNAL MEDICINE

## 2018-07-12 PROCEDURE — 74176 CT ABD & PELVIS W/O CONTRAST: CPT

## 2018-07-12 RX ORDER — BARIUM SULFATE 20 MG/ML
900 SUSPENSION ORAL
Status: COMPLETED | OUTPATIENT
Start: 2018-07-12 | End: 2018-07-12

## 2018-07-12 RX ADMIN — BARIUM SULFATE 900 ML: 20 SUSPENSION ORAL at 12:20

## 2018-07-17 ENCOUNTER — TELEPHONE (OUTPATIENT)
Dept: INTERNAL MEDICINE CLINIC | Age: 68
End: 2018-07-17

## 2018-07-24 DIAGNOSIS — R60.0 LOCALIZED EDEMA: ICD-10-CM

## 2018-07-24 DIAGNOSIS — E11.42 DIABETIC POLYNEUROPATHY ASSOCIATED WITH TYPE 2 DIABETES MELLITUS (HCC): ICD-10-CM

## 2018-07-24 DIAGNOSIS — I10 ESSENTIAL HYPERTENSION: ICD-10-CM

## 2018-07-24 DIAGNOSIS — C61 MALIGNANT NEOPLASM OF PROSTATE (HCC): ICD-10-CM

## 2018-07-24 DIAGNOSIS — M16.11 PRIMARY OSTEOARTHRITIS OF RIGHT HIP: ICD-10-CM

## 2018-07-24 RX ORDER — BUMETANIDE 2 MG/1
TABLET ORAL
Qty: 30 TAB | Refills: 2 | Status: SHIPPED | COMMUNITY
Start: 2018-07-24 | End: 2018-10-23 | Stop reason: SDUPTHER

## 2018-07-30 ENCOUNTER — TELEPHONE (OUTPATIENT)
Dept: INTERNAL MEDICINE CLINIC | Age: 68
End: 2018-07-30

## 2018-07-31 ENCOUNTER — OFFICE VISIT (OUTPATIENT)
Dept: INTERNAL MEDICINE CLINIC | Age: 68
End: 2018-07-31

## 2018-07-31 VITALS
DIASTOLIC BLOOD PRESSURE: 60 MMHG | TEMPERATURE: 98.2 F | SYSTOLIC BLOOD PRESSURE: 130 MMHG | RESPIRATION RATE: 16 BRPM | HEART RATE: 70 BPM | OXYGEN SATURATION: 96 %

## 2018-07-31 DIAGNOSIS — E11.42 DIABETIC POLYNEUROPATHY ASSOCIATED WITH TYPE 2 DIABETES MELLITUS (HCC): ICD-10-CM

## 2018-07-31 DIAGNOSIS — N20.0 RENAL STONES: ICD-10-CM

## 2018-07-31 DIAGNOSIS — I10 ESSENTIAL HYPERTENSION: ICD-10-CM

## 2018-07-31 DIAGNOSIS — E55.9 VITAMIN D DEFICIENCY: ICD-10-CM

## 2018-07-31 DIAGNOSIS — K40.90 NON-RECURRENT UNILATERAL INGUINAL HERNIA WITHOUT OBSTRUCTION OR GANGRENE: Primary | ICD-10-CM

## 2018-07-31 RX ORDER — ERGOCALCIFEROL 1.25 MG/1
50000 CAPSULE ORAL
Qty: 4 CAP | Refills: 12 | Status: SHIPPED | OUTPATIENT
Start: 2018-07-31 | End: 2019-08-08 | Stop reason: SDUPTHER

## 2018-07-31 NOTE — MR AVS SNAPSHOT
[address] [place] [id number] 
[phone] Patient: Kenroy Ojeda 
MRN: [id number] SNV:[id number] Visit Information Date & Time Provider Department Dept. Phone Encounter #  
 7/31/2018  2:30 PM Angeles Coleman MD [address] [facility] [phone] [id number] Follow-up Instructions Return in about 4 weeks (around 8/28/2018), or if symptoms worsen or fail to improve. Upcoming Health Maintenance Date Due  
 EYE EXAM RETINAL OR DILATED Q1 3/11/2016 GLAUCOMA SCREENING Q2Y 4/21/2016 Pneumococcal 65+ High/Highest Risk (1 of 2 - PCV13) 4/21/2016 MICROALBUMIN Q1 10/8/2016 FOOT EXAM Q1 7/6/2018 FOBT Q 1 YEAR AGE 50-75 7/6/2018 Influenza Age 5 to Adult 8/1/2018 HEMOGLOBIN A1C Q6M 12/27/2018 LIPID PANEL Q1 6/27/2019 MEDICARE YEARLY EXAM 6/28/2019 DTaP/Tdap/Td series (2 - Td) 12/10/2024 Allergies as of 7/31/2018  Review Complete On: 6/27/2018 By: April Karen Poag, LPN No Known Allergies Current Immunizations  Reviewed on 12/10/2014 Name Date Pneumococcal Polysaccharide (PPSV-23) 9/11/2014 Tdap 12/10/2014 Not reviewed this visit You Were Diagnosed With   
  
 Codes Comments Non-recurrent unilateral inguinal hernia without obstruction or gangrene    -  Primary ICD-10-CM: K40.90 ICD-9-CM: 550.90 Vitamin D deficiency     ICD-10-CM: E55.9 ICD-9-CM: 268.9 Renal stones     ICD-10-CM: N20.0 ICD-9-CM: 592.0 Diabetic polyneuropathy associated with type 2 diabetes mellitus ([facility]     ICD-10-CM: E11.42 
ICD-9-CM: 250.60, 357.2 Essential hypertension     ICD-10-CM: I10 
ICD-9-CM: 401.9 Vitals BP Pulse Temp Resp SpO2 Smoking Status 130/60 70 98.2 °F (36.8 °C) (Oral) 16 96% Current Every Day Smoker Vitals History Preferred Pharmacy Pharmacy Name Phone [facility] [address] [facility] [phone] Your Updated Medication List  
  
   
This list is accurate as of 7/31/18  3:29 PM.  Always use your most recent med list.  
  
  
  
  
 aspirin delayed-release 81 mg tablet TAKE ONE TABLET BY MOUTH DAILY Blood-Glucose Meter monitoring kit Use once daily Onetouch test meter only E11.9 Type 2  
  
 bumetanide 2 mg tablet Commonly known as:  Bhatti Trinidadian TAKE ONE TABLET BY MOUTH DAILY Calcium Carbonate-Vit D3-Min 600 mg calcium- 400 unit Tab Take  by mouth two (2) times a day. carvedilol 3.125 mg tablet Commonly known as:  Xin Locket Take 1 Tab by mouth two (2) times daily (with meals). * CONTOUR NEXT TEST STRIPS strip Generic drug:  glucose blood VI test strips TEST BLOOD GLUCOSE TWO TO THREE TIMES DAILY * glucose blood VI test strips strip Commonly known as:  ONETOUCH ULTRA TEST Test three times daily. E11.9  Diabetes Type 2  
  
 dilTIAZem 180 mg SR capsule Commonly known as:  [facility] Take 1 Cap by mouth daily. docusate sodium 100 mg capsule Commonly known as:  Patricia Dilling Take 1 Cap by mouth two (2) times a day. ergocalciferol 50,000 unit capsule Commonly known as:  ERGOCALCIFEROL Take 1 Cap by mouth every seven (7) days. ferrous sulfate 325 mg (65 mg iron) EC tablet Commonly known as:  IRON Take 1 Tab by mouth Daily (before breakfast). fluticasone 50 mcg/actuation nasal spray Commonly known as:  María Fryer SPRAY TWO SPRAYS IN EACH NOSTRIL DAILY  
  
 gabapentin 300 mg capsule Commonly known as:  NEURONTIN  
TAKE TWO CAPSULES BY MOUTH THREE TIMES A DAY  
  
 insulin glargine 100 unit/mL injection Commonly known as:  LANTUS U-100 INSULIN INJECT 72 UNITS UNDER THE SKIN EVERY 12 HOURS  
  
 insulin regular 100 unit/mL injection Commonly known as:  BRENDEN Medrano  
 14 Units by SubCUTAneous route three (3) times daily as needed (with meals). Insulin Syringe-Needle U-100 1 mL 31 gauge x 5/16 Syrg  
USE TO INJECT INSULIN FIVE TIMES A DAY  
  
 irbesartan 300 mg tablet Commonly known as:  AVAPRO TAKE ONE TABLET BY MOUTH EVERY EVENING  
  
 lidocaine 5 % Commonly known as:  LIDODERM  
1 Patch by TransDERmal route every twenty-four (24) hours. Apply to affected area every 24 hours  
  
 loratadine 10 mg tablet Commonly known as:  Tita Yahir Take 10 mg by mouth daily. MICRO THIN LANCETS 33 gauge Misc Generic drug:  lancets USE TO TEST BLOOD SUGAR TWO TO THREE TIMES DAILY  
  
 nicotine 21 mg/24 hr  
Commonly known as:  NICODERM CQ  
APPLY ONE PATCH TO THE SKIN EVERY 24 HOURS  
  
 oxyCODONE-acetaminophen 5-325 mg per tablet Commonly known as:  PERCOCET Take 1 Tab by mouth every eight (8) hours as needed. PAIN AND FEVER 500 mg tablet Generic drug:  acetaminophen TAKE ONE TABLET BY MOUTH EVERY 6 HOURS AS NEEDED FOR PAIN * polyethylene glycol 17 gram/dose powder Commonly known as:  Deliliah Hefty DISSOLVE 17 GRAMS IN 8 OUNCES OF LIQUID AND DRINK ONCE A DAY AS NEEDED * polyethylene glycol 17 gram/dose powder Commonly known as:  Deliliah Hefty Take 17 g by mouth daily. THERA-TABS M 27 mg iron-400 mcg Tab Generic drug:  multivit-iron-FA-calcium-mins TAKE ONE TABLET BY MOUTH DAILY  
  
 triamcinolone acetonide 0.1 % ointment Commonly known as:  KENALOG Apply  to affected area two (2) times a day. use thin layer bid  
  
 warfarin 10 mg tablet Commonly known as:  COUMADIN Take 1 Tab by mouth daily. [facility] Custom electric wheelchair * Notice: This list has 4 medication(s) that are the same as other medications prescribed for you. Read the directions carefully, and ask your doctor or other care provider to review them with you. Prescriptions Sent to Pharmacy Refills ergocalciferol (ERGOCALCIFEROL) 50,000 unit capsule 12 Sig: Take 1 Cap by mouth every seven (7) days. Class: Normal  
 Pharmacy: [facility] [address] [place] [place] [address] #: [phone] Route: Oral  
  
We Performed the Following REFERRAL TO GENERAL SURGERY [REF27 Custom] Follow-up Instructions Return in about 4 weeks (around 2018), or if symptoms worsen or fail to improve. Referral Information Referral ID Referred By Referred To  
  
 [id number] Shane Scott MD   
   [address] [facility],[address]   
   [address]/Samuel Galvin Final Phone: [phone] Fax: [phone] Visits Status Start Date End Date 1 New Request 18 If your referral has a status of pending review or denied, additional information will be sent to support the outcome of this decision. Patient Instructions IntoOutdoors Activation Thank you for requesting access to IntoOutdoors. Please follow the instructions below to securely access and download your online medical record. IntoOutdoors allows you to send messages to your doctor, view your test results, renew your prescriptions, schedule appointments, and more. How Do I Sign Up? 1. In your internet browser, go to [email or web address] 
2. Click on the First Time User? Click Here link in the Sign In box. You will be redirect to the New Member Sign Up page. 3. Enter your IntoOutdoors Access Code exactly as it appears below. You will not need to use this code after youve completed the sign-up process. If you do not sign up before the expiration date, you must request a new code. IntoOutdoors Access Code: [email or web address] Expires: 2018  2:57 PM (This is the date your IntoOutdoors access code will ) 4. Enter the last four digits of your Social Security Number (xxxx) and Date of Birth (mm/dd/yyyy) as indicated and click Submit.  You will be taken to the next sign-up page. 5. Create a Medical [employer]rx [employer]st ID. This will be your [employer] login ID and cannot be changed, so think of one that is secure and easy to remember. 6. Create a [employer] password. You can change your password at any time. 7. Enter your Password Reset Question and Answer. This can be used at a later time if you forget your password. 8. Enter your e-mail address. You will receive e-mail notification when new information is available in [address] 9. Click Sign Up. You can now view and download portions of your medical record. 10. Click the Download Summary menu link to download a portable copy of your medical information. Additional Information If you have questions, please visit the Frequently Asked Questions section of the [employer] website at [email or web address]. [email or web address]/. Remember, [employer] is NOT to be used for urgent needs. For medical emergencies, dial 911. Introducing \[facility] [place] [facility]\"" & HEALTH SERVICES! [employer] introduces [employer] patient portal. Now you can access parts of your medical record, email your doctor's office, and request medication refills online. 1. In your internet browser, go to [email or web address] [email or web address]t 2. Click on the First Time User? Click Here link in the Sign In box. You will see the New Member Sign Up page. 3. Enter your [employer] Access Code exactly as it appears below. You will not need to use this code after youve completed the sign-up process. If you do not sign up before the expiration date, you must request a new code. · [employer] Access Code: [email or web address] Expires: 9/25/2018  2:57 PM 
 
4. Enter the last four digits of your Social Security Number (xxxx) and Date of Birth (mm/dd/yyyy) as indicated and click Submit. You will be taken to the next sign-up page. 5. Create a Medical Metrx Solutionst ID. This will be your [employer] login ID and cannot be changed, so think of one that is secure and easy to remember. 6. Create a [employer] password. You can change your password at any time. 7. Enter your Password Reset Question and Answer. This can be used at a later time if you forget your password. 8. Enter your e-mail address. You will receive e-mail notification when new information is available in [address] 9. Click Sign Up. You can now view and download portions of your medical record. 10. Click the Download Summary menu link to download a portable copy of your medical information. If you have questions, please visit the Frequently Asked Questions section of the [employer] website. Remember, [employer] is NOT to be used for urgent needs. For medical emergencies, dial 911. Now available from your iPhone and Android! Please provide this summary of care documentation to your next provider. Your primary care clinician is listed as Malia Faith. If you have any questions after today's visit, please call [phone].

## 2018-07-31 NOTE — PATIENT INSTRUCTIONS
Eigenta Activation Thank you for requesting access to Eigenta. Please follow the instructions below to securely access and download your online medical record. Eigenta allows you to send messages to your doctor, view your test results, renew your prescriptions, schedule appointments, and more. How Do I Sign Up? 1. In your internet browser, go to [email or web address] 
2. Click on the First Time User? Click Here link in the Sign In box. You will be redirect to the New Member Sign Up page. 3. Enter your Eigenta Access Code exactly as it appears below. You will not need to use this code after youve completed the sign-up process. If you do not sign up before the expiration date, you must request a new code. Eigenta Access Code: [email or web address] Expires: 2018  2:57 PM (This is the date your Eigenta access code will ) 4. Enter the last four digits of your Social Security Number (xxxx) and Date of Birth (mm/dd/yyyy) as indicated and click Submit. You will be taken to the next sign-up page. 5. Create a Eigenta ID. This will be your Eigenta login ID and cannot be changed, so think of one that is secure and easy to remember. 6. Create a Eigenta password. You can change your password at any time. 7. Enter your Password Reset Question and Answer. This can be used at a later time if you forget your password. 8. Enter your e-mail address. You will receive e-mail notification when new information is available in [address] 9. Click Sign Up. You can now view and download portions of your medical record. 10. Click the Download Summary menu link to download a portable copy of your medical information. Additional Information If you have questions, please visit the Frequently Asked Questions section of the Eigenta website at [email or web address] Remember, Eigenta is NOT to be used for urgent needs. For medical emergencies, dial 911.

## 2018-07-31 NOTE — PROGRESS NOTES
Aleah Pina is a 80 y.o. male and presents with Results (CT) and Abdominal Pain [facility] Subjective: He has a large lt inguinal hernia via ct scan. He has renal stones Shoulder Pain Review: 
Patient complains of right side shoulder pain. The symptoms began months ago Course of symptoms since onset has been gradually worsening. Pain is described as overall severity = moderate. Symptoms were incited by no known event. Patient denies N/A. Therapy to date includes OTC analgesics: effective. He states the lower leg cellulitis has almost resolved. Anemia review: 
Patient presents for  evaluation of anemia. Anemia was found by routine CBC. It had been present for several months. Associated signs & symptoms:none Hypertension Review: 
The patient has essential hypertension Diet and Lifestyle: generally follows a  low sodium diet, exercises sporadically Home BP Monitoring: is not measured at home. Pertinent ROS: taking medications as instructed, no medication side effects noted, no TIA's, no chest pain on exertion, no dyspnea on exertion, no swelling of ankles. Diabetes Mellitus Review: He has diabetes mellitus. Diabetic ROS - medication compliance: compliant all of the time, diabetic diet compliance: compliant all of the time, home glucose monitoring: is performed. Known diabetic complications:neuropathy feet Cardiovascular risk factors: family history, dyslipidemia, diabetes mellitus, obesity, hypertension Current diabetic medications include oral agents/insulin Eye exam current (within one year): no 
Weight trend: stable Prior visit with dietician: no 
Current diet: \"healthy\" diet  in general 
Current exercise: walking Current monitoring regimen: home blood tests - daily Home blood sugar records: trend: stable Any episodes of hypoglycemia? no 
Is He on ACE inhibitor or angiotensin II receptor blocker? Yes He has had recurrent lower abdominal pains reported with associated bulging,states he was told he had a hernia,the pains are dull and aching and intense. He has had porgressive rt.hand weakness and cramping Dyslipidemia Review: 
Patient presents for evaluation of lipids. Compliance with treatment thus far has been excellent. A repeat fasting lipid profile was done. The patient does not use medications that may worsen dyslipidemias (corticosteroids, progestins, anabolic steroids, diuretics, beta-blockers, amiodarone, cyclosporine, olanzapine). The patient exercises some Review of Systems Constitutional: negative for fevers, chills, anorexia and weight loss Eyes:   negative for visual disturbance and irritation ENT:   negative for tinnitus,sore throat,nasal congestion,ear pains. hoarseness Respiratory:  negative for cough, hemoptysis, dyspnea,wheezing CV:   negative for chest pain, palpitations, lower extremity edema GI:    abdominal pain, Endo:               negative for polyuria,polydipsia,polyphagia,heat intolerance Genitourinary: negative for frequency, dysuria and hematuria Integument:  negative for rash and pruritus Hematologic:  negative for easy bruising and gum/nose bleeding Musculoskel: myalgias, arthralgias, back pain,joint pain Neurological:  negative for headaches, dizziness, vertigo, memory problems and gait Behavl/Psych: negative for feelings of anxiety, depression, mood changes Past Medical History:  
Diagnosis Date  Arthritis, Degenerative,  Knee 4/4/2011  Carcinoma, Prostate 4/4/2011  Degenerative disc disease 4/4/2011  Depression/ Anxiety 4/4/2011  Diabetes ([facility] [address]  Diabetes Mellitrus ( non-insulin dependent ) Type 2 4/4/2011  
 HEMATOCHEZIA 4/4/2011  Hypertrension 4/4/2011  Hypertriglyceridemia 4/4/2011  Insomnia 4/4/2011  Laryngitis 4/4/2011  Mild Aortic insufficiency 4/4/2011  Mild Concentric LVH (left ventricular hypertrophy) 4/4/2011  Neuropathy 4/4/2011  Osteoarthritis 4/4/2011  Rotator Cuff Tendonitis 4/4/2011 Past Surgical History:  
Procedure Laterality Date  CARDIAC SURG PROCEDURE UNLIST    
 cardiac cath  COLONOSCOPY N/A 4/28/2017 COLONOSCOPY performed by Senthil Cardenas [facility] at [facility] ENDOSCOPY  
 HX ORTHOPAEDIC    
 left hip pinning  HX OTHER SURGICAL    
 left little toe amputation  HX UROLOGICAL Social History Social History  Marital status: LEGALLY  Spouse name: N/A  
 Number of children: N/A  
 Years of education: N/A Social History Main Topics  Smoking status: Current Every Day Smoker Packs/day: 0.25 Last attempt to quit: 10/25/2016  Smokeless tobacco: Never Used  Alcohol use 7.0 oz/week 7 Cans of beer, 7 Shots of liquor per week  Drug use: None  Sexual activity: Yes  
  Partners: Female Other Topics Concern  None Social History Narrative No family history on file. Current Outpatient Prescriptions Medication Sig Dispense Refill  ergocalciferol (ERGOCALCIFEROL) 50,000 unit capsule Take 1 Cap by mouth every seven (7) days. 4 Cap 12  
 bumetanide (BUMEX) 2 mg tablet TAKE ONE TABLET BY MOUTH DAILY 30 Tab 2  
 THERA-TABS M 27 mg iron-400 mcg tab TAKE ONE TABLET BY MOUTH DAILY 30 Tab 11  
 fluticasone (FLONASE) 50 mcg/actuation nasal spray SPRAY TWO SPRAYS IN EACH NOSTRIL DAILY 1 Bottle 11  
 irbesartan (AVAPRO) 300 mg tablet TAKE ONE TABLET BY MOUTH EVERY EVENING 30 Tab 10  
 PAIN AND FEVER 500 mg tablet TAKE ONE TABLET BY MOUTH EVERY 6 HOURS AS NEEDED FOR PAIN 60 Tab 2  
 gabapentin (NEURONTIN) 300 mg capsule TAKE TWO CAPSULES BY MOUTH THREE TIMES A  Cap 1  
 ferrous sulfate (IRON) 325 mg (65 mg iron) EC tablet Take 1 Tab by mouth Daily (before breakfast). 30 Tab 6  polyethylene glycol (MIRALAX) 17 gram/dose powder DISSOLVE 17 GRAMS IN 8 OUNCES OF LIQUID AND DRINK ONCE A DAY AS NEEDED 595 g 2  polyethylene glycol (MIRALAX) 17 gram/dose powder Take 17 g by mouth daily. 850 g 3  
 dilTIAZem (TIAZAC) 180 mg SR capsule Take 1 Cap by mouth daily. 30 Cap 12  
 MICRO THIN LANCETS 33 gauge misc USE TO TEST BLOOD SUGAR TWO TO THREE TIMES DAILY 100 Lancet 11  
 glucose blood VI test strips (ONETOUCH ULTRA TEST) strip Test three times daily. E11.9 Diabetes Type 2 300 Strip 11  Blood-Glucose Meter monitoring kit Use once daily Onetouch test meter only E11.9 Type 2 1 Kit 0  
 CONTOUR NEXT STRIPS strip TEST BLOOD GLUCOSE TWO TO THREE TIMES DAILY 100 Strip 11  
 carvedilol (COREG) 3.125 mg tablet Take 1 Tab by mouth two (2) times daily (with meals). 60 Tab 0  
 docusate sodium (COLACE) 100 mg capsule Take 1 Cap by mouth two (2) times a day. 60 Cap 0  
 nicotine (NICODERM CQ) 21 mg/24 hr APPLY ONE PATCH TO THE SKIN EVERY 24 HOURS 28 Patch 0  
 triamcinolone acetonide (KENALOG) 0.1 % ointment Apply  to affected area two (2) times a day. use thin layer bid 80 g 12  
 insulin glargine (LANTUS) 100 unit/mL injection INJECT 72 UNITS UNDER THE SKIN EVERY 12 HOURS 4 Vial 11  
 Insulin Syringe-Needle U-100 1 mL 31 gauge x 5/16 syrg USE TO INJECT INSULIN FIVE TIMES A  Syringe 11  
 insulin regular (NOVOLIN R, HUMULIN R) 100 unit/mL injection 14 Units by SubCUTAneous route three (3) times daily as needed (with meals). 1 Vial 12  
 lidocaine (LIDODERM) 5 % 1 Patch by TransDERmal route every twenty-four (24) hours. Apply to affected area every 24 hours 1 Package 3  
 aspirin delayed-release 81 mg tablet TAKE ONE TABLET BY MOUTH DAILY 30 Tab 0  Wheel Chair dianna Custom electric wheelchair 1 Each 0  
 oxyCODONE-acetaminophen (PERCOCET) 5-325 mg per tablet Take 1 Tab by mouth every eight (8) hours as needed. 14 Tab 0  
 Calcium Carbonate-Vit D3-Min 600 mg calcium- 400 unit tab Take  by mouth two (2) times a day.  loratadine (CLARITIN) 10 mg tablet Take 10 mg by mouth daily.  warfarin (COUMADIN) 10 mg tablet Take 1 Tab by mouth daily. 30 Tab 0 No Known Allergies Objective: 
Visit Vitals  /60  Pulse 70  Temp 98.2 °F (36.8 °C) (Oral)  Resp 16  SpO2 96% Physical Exam:  
General appearance - alert, well appearing, and in moderate distress Mental status - alert, oriented to person, place, and time EYE-LUH, EOMI, corneas normal, no foreign bodies ENT-ENT exam normal, no neck nodes or sinus tenderness Nose - normal and patent, no erythema, discharge or polyps Mouth - mucous membranes moist, pharynx normal without lesions Neck - supple, no significant adenopathy Chest - clear to auscultation, no wheezes, rales or rhonchi, symmetric air entry Heart - normal rate, regular rhythm, normal S1, S2, no murmurs, rubs, clicks or gallops Abdomen - tenderness, distended, no masses or organomegaly Lymph- no adenopathy palpable Ext-peripheral pulses normal, no pedal edema, no clubbing or cyanosis Skin-Warm and dry. no hyperpigmentation, vitiligo, or suspicious lesions Neuro -alert, oriented, normal speech, no focal findings or movement disorder noted Neck-normal C-spine, no tenderness, full ROM without pain Feet- nail deformities or callus formation with good pulses noted Rt. .shoulder-subdeltoid tenderness, positive impingement signs Lt.leg no edema Rt.leg no edema Large lt. inguinal hernia Results for orders placed or performed in visit on 06/27/18 AMB POC LIPID PROFILE Result Value Ref Range Cholesterol (POC) 167 Triglycerides (POC) 350 HDL Cholesterol (POC) 31 Non-HDL Cholesterol 136 LDL Cholesterol (POC) 66 MG/DL TChol/HDL Ratio (POC) 5.5 AMB POC GLUCOSE BLOOD, BY GLUCOSE MONITORING DEVICE Result Value Ref Range Glucose  mg/dL AMB POC HEMOGLOBIN A1C Result Value Ref Range Hemoglobin A1c (POC) 5.5 % Assessment/Plan: ICD-10-CM ICD-9-CM 1. Non-recurrent unilateral inguinal hernia without obstruction or gangrene K40.90 550.90 REFERRAL TO GENERAL SURGERY 2. Vitamin D deficiency E55.9 268.9 ergocalciferol (ERGOCALCIFEROL) 50,000 unit capsule 3. Renal stones N20.0 592.0 4. Diabetic polyneuropathy associated with type 2 diabetes mellitus (HCC) E11.42 250.60   
  357.2 5. Essential hypertension I10 401.9 Orders Placed This Encounter  REFERRAL TO GENERAL SURGERY Referral Priority:   Routine Referral Type:   Consultation Referral Reason:   Specialty Services Required Referred to Provider:   Keith Luther MD  
  Number of Visits Requested:   1  ergocalciferol (ERGOCALCIFEROL) 50,000 unit capsule Sig: Take 1 Cap by mouth every seven (7) days. Dispense:  4 Cap Refill:  12  
 
lose weight, increase physical activity, follow low fat diet, follow low salt diet, Patient Instructions "[employer]" Activation Thank you for requesting access to "[employer]". Please follow the instructions below to securely access and download your online medical record. "[employer]" allows you to send messages to your doctor, view your test results, renew your prescriptions, schedule appointments, and more. How Do I Sign Up? 1. In your internet browser, go to [email or web address] 
2. Click on the First Time User? Click Here link in the Sign In box. You will be redirect to the New Member Sign Up page. 3. Enter your "[employer]" Access Code exactly as it appears below. You will not need to use this code after youve completed the sign-up process. If you do not sign up before the expiration date, you must request a new code. "[employer]" Access Code: [email or web address] Expires: 2018  2:57 PM (This is the date your "[employer]" access code will ) 4. Enter the last four digits of your Social Security Number (xxxx) and Date of Birth (mm/dd/yyyy) as indicated and click Submit. You will be taken to the next sign-up page. 5. Create a "[employer]" ID. This will be your "[employer]" login ID and cannot be changed, so think of one that is secure and easy to remember.  
6. Create a "[employer] password. You can change your password at any time. 7. Enter your Password Reset Question and Answer. This can be used at a later time if you forget your password. 8. Enter your e-mail address. You will receive e-mail notification when new information is available in [address] 9. Click Sign Up. You can now view and download portions of your medical record. 10. Click the Download Summary menu link to download a portable copy of your medical information. Additional Information If you have questions, please visit the Frequently Asked Questions section of the "[employer] website at [email or web address] Remember, "[employer] is NOT to be used for urgent needs. For medical emergencies, dial 911. Follow-up Disposition: 
Return in about 4 weeks (around 8/28/2018), or if symptoms worsen or fail to improve. I have reviewed with the patient details of the assessment and plan and all questions were answered. Relevent patient education was performed An After Visit Summary was printed and given to the patient.

## 2018-08-08 RX ORDER — GABAPENTIN 300 MG/1
CAPSULE ORAL
Qty: 180 CAP | Refills: 0 | Status: SHIPPED | OUTPATIENT
Start: 2018-08-08 | End: 2018-09-07 | Stop reason: SDUPTHER

## 2018-08-27 ENCOUNTER — TELEPHONE (OUTPATIENT)
Dept: INTERNAL MEDICINE CLINIC | Age: 68
End: 2018-08-27

## 2018-08-27 DIAGNOSIS — M16.0 PRIMARY OSTEOARTHRITIS OF BOTH HIPS: ICD-10-CM

## 2018-08-27 NOTE — TELEPHONE ENCOUNTER
----- Message from Aldo Allen sent at 8/27/2018  1:17 PM EDT -----  Regarding: FW: Dr. Tatiana Bonner      ----- Message -----     From: Fiorella Prima: 8/27/2018  11:49 AM       To: [facility] Front Office Pool  Subject: Dr. Tatiana Bonner                                     Pt requested a new prescription for \"Tylenol 500 MG\". Pharmacy [facility] pharmacy located at [address] [place] [address]. [address]Samuel Galvin Final. Best contact [phone].

## 2018-08-28 RX ORDER — ACETAMINOPHEN 500 MG
TABLET ORAL
Qty: 60 TAB | Refills: 2 | Status: SHIPPED | OUTPATIENT
Start: 2018-08-28

## 2018-08-30 ENCOUNTER — OFFICE VISIT (OUTPATIENT)
Dept: INTERNAL MEDICINE CLINIC | Age: 68
End: 2018-08-30

## 2018-08-30 VITALS
SYSTOLIC BLOOD PRESSURE: 130 MMHG | OXYGEN SATURATION: 96 % | DIASTOLIC BLOOD PRESSURE: 60 MMHG | RESPIRATION RATE: 16 BRPM | HEIGHT: 74 IN | HEART RATE: 73 BPM | TEMPERATURE: 98.4 F

## 2018-08-30 DIAGNOSIS — E11.42 DIABETIC POLYNEUROPATHY ASSOCIATED WITH TYPE 2 DIABETES MELLITUS (HCC): Primary | ICD-10-CM

## 2018-08-30 DIAGNOSIS — L02.419 CELLULITIS AND ABSCESS OF LEG: ICD-10-CM

## 2018-08-30 DIAGNOSIS — M16.0 PRIMARY OSTEOARTHRITIS OF BOTH HIPS: ICD-10-CM

## 2018-08-30 DIAGNOSIS — E55.9 VITAMIN D DEFICIENCY: ICD-10-CM

## 2018-08-30 DIAGNOSIS — L85.3 XEROSIS OF SKIN: ICD-10-CM

## 2018-08-30 DIAGNOSIS — L03.119 CELLULITIS AND ABSCESS OF LEG: ICD-10-CM

## 2018-08-30 DIAGNOSIS — I10 ESSENTIAL HYPERTENSION: ICD-10-CM

## 2018-08-30 DIAGNOSIS — E66.01 SEVERE OBESITY (BMI 35.0-39.9) WITH COMORBIDITY (HCC): ICD-10-CM

## 2018-08-30 DIAGNOSIS — C61 MALIGNANT NEOPLASM OF PROSTATE (HCC): ICD-10-CM

## 2018-08-30 DIAGNOSIS — M12.811 ROTATOR CUFF ARTHROPATHY, RIGHT: ICD-10-CM

## 2018-08-30 PROBLEM — M16.11 PRIMARY OSTEOARTHRITIS OF RIGHT HIP: Status: RESOLVED | Noted: 2017-10-31 | Resolved: 2018-08-30

## 2018-08-30 LAB — GLUCOSE POC: NORMAL MG/DL

## 2018-08-30 RX ORDER — LIDOCAINE 50 MG/G
1 PATCH TOPICAL EVERY 24 HOURS
Qty: 1 PACKAGE | Refills: 3 | Status: SHIPPED | OUTPATIENT
Start: 2018-08-30 | End: 2020-02-10

## 2018-08-30 RX ORDER — FERROUS SULFATE 325(65) MG
325 TABLET, DELAYED RELEASE (ENTERIC COATED) ORAL
Qty: 30 TAB | Refills: 6 | Status: SHIPPED | OUTPATIENT
Start: 2018-08-30 | End: 2019-11-25 | Stop reason: SDUPTHER

## 2018-08-30 RX ORDER — DOXYCYCLINE 100 MG/1
100 CAPSULE ORAL 2 TIMES DAILY
Qty: 28 CAP | Refills: 0 | Status: SHIPPED | OUTPATIENT
Start: 2018-08-30 | End: 2018-10-29 | Stop reason: ALTCHOICE

## 2018-08-30 RX ORDER — CALCIUM CARB/VITAMIN D3/VIT K1 500-100-40
TABLET,CHEWABLE ORAL
Qty: 100 SYRINGE | Refills: 11 | Status: SHIPPED | OUTPATIENT
Start: 2018-08-30 | End: 2019-03-28

## 2018-08-30 RX ORDER — TRIAMCINOLONE ACETONIDE 1 MG/G
OINTMENT TOPICAL 2 TIMES DAILY
Qty: 80 G | Refills: 12 | Status: SHIPPED | OUTPATIENT
Start: 2018-08-30 | End: 2020-01-01

## 2018-08-30 NOTE — PATIENT INSTRUCTIONS
[employer] Activation Thank you for requesting access to [employer]. Please follow the instructions below to securely access and download your online medical record. [employer] allows you to send messages to your doctor, view your test results, renew your prescriptions, schedule appointments, and more. How Do I Sign Up? 1. In your internet browser, go to [email or web address]."[email or web address] 
2. Click on the First Time User? Click Here link in the Sign In box. You will be redirect to the New Member Sign Up page. 3. Enter your [employer] Access Code exactly as it appears below. You will not need to use this code after youve completed the sign-up process. If you do not sign up before the expiration date, you must request a new code. [employer] Access Code: [email or web address] Expires: 2018  2:57 PM (This is the date your [employer] access code will ) 4. Enter the last four digits of your Social Security Number (xxxx) and Date of Birth (mm/dd/yyyy) as indicated and click Submit. You will be taken to the next sign-up page. 5. Create a [employer] ID. This will be your [employer] login ID and cannot be changed, so think of one that is secure and easy to remember. 6. Create a [employer] password. You can change your password at any time. 7. Enter your Password Reset Question and Answer. This can be used at a later time if you forget your password. 8. Enter your e-mail address. You will receive e-mail notification when new information is available in [address] 9. Click Sign Up. You can now view and download portions of your medical record. 10. Click the Download Summary menu link to download a portable copy of your medical information. Additional Information If you have questions, please visit the Frequently Asked Questions section of the [employer] website at [email or web address] Remember, [employer] is NOT to be used for urgent needs. For medical emergencies, dial 911.

## 2018-08-30 NOTE — PROGRESS NOTES
All Orosco is a 80 y.o. male and presents with Diabetes and Shoulder Pain (right) Adriana Oviedo Subjective: 
 
Shoulder Pain Review: 
Patient complains of right side shoulder pains continue. The symptoms began months ago Course of symptoms since onset has been gradually worsening. Pain is described as overall severity = moderate. Symptoms were incited by no known event. Patient denies N/A. Therapy to date includes OTC analgesics: effective. Anemia review: 
Patient presents for  evaluation of anemia. Anemia was found by routine CBC. It had been present for several months. Associated signs & symptoms:none Hypertension Review: 
The patient has essential hypertension Diet and Lifestyle: generally follows a  low sodium diet, exercises sporadically Home BP Monitoring: is not measured at home. Pertinent ROS: taking medications as instructed, no medication side effects noted, no TIA's, no chest pain on exertion, no dyspnea on exertion, no swelling of ankles. Diabetes Mellitus Review: He has diabetes mellitus. Diabetic ROS - medication compliance: compliant all of the time, diabetic diet compliance: compliant all of the time, home glucose monitoring: is performed. Known diabetic complications:neuropathy feet Cardiovascular risk factors: family history, dyslipidemia, diabetes mellitus, obesity, hypertension Current diabetic medications include oral agents/insulin Eye exam current (within one year): no 
Weight trend: stable Prior visit with dietician: no 
Current diet: \"healthy\" diet  in general 
Current exercise: walking Current monitoring regimen: home blood tests - daily Home blood sugar records: trend: stable Any episodes of hypoglycemia? no 
Is He on ACE inhibitor or angiotensin II receptor blocker? Yes Dyslipidemia Review: 
Patient presents for evaluation of lipids. Compliance with treatment thus far has been excellent. A repeat fasting lipid profile was done.   The patient does not use medications that may worsen dyslipidemias (corticosteroids, progestins, anabolic steroids, diuretics, beta-blockers, amiodarone, cyclosporine, olanzapine). The patient exercises some He has a chronic cellulitis in the lower leg Review of Systems Constitutional: negative for fevers, chills, anorexia and weight loss Eyes:   negative for visual disturbance and irritation ENT:   negative for tinnitus,sore throat,nasal congestion,ear pains. hoarseness Respiratory:  negative for cough, hemoptysis, dyspnea,wheezing CV:   negative for chest pain, palpitations, lower extremity edema GI:    abdominal pain, Endo:               negative for polyuria,polydipsia,polyphagia,heat intolerance Genitourinary: negative for frequency, dysuria and hematuria Integument:  negative for rash and pruritus Hematologic:  negative for easy bruising and gum/nose bleeding Musculoskel: myalgias, arthralgias, back pain,joint pain Neurological:  negative for headaches, dizziness, vertigo, memory problems and gait Behavl/Psych: negative for feelings of anxiety, depression, mood changes Past Medical History:  
Diagnosis Date  Arthritis, Degenerative,  Knee 4/4/2011  Carcinoma, Prostate 4/4/2011  Degenerative disc disease 4/4/2011  Depression/ Anxiety 4/4/2011  Diabetes ([address]  Diabetes Mellitrus ( non-insulin dependent ) Type 2 4/4/2011  
 HEMATOCHEZIA 4/4/2011  Hypertrension 4/4/2011  Hypertriglyceridemia 4/4/2011  Insomnia 4/4/2011  Laryngitis 4/4/2011  Mild Aortic insufficiency 4/4/2011  Mild Concentric LVH (left ventricular hypertrophy) 4/4/2011  Neuropathy 4/4/2011  Osteoarthritis 4/4/2011  Rotator Cuff Tendonitis 4/4/2011 Past Surgical History:  
Procedure Laterality Date  CARDIAC SURG PROCEDURE UNLIST    
 cardiac cath  COLONOSCOPY N/A 4/28/2017  COLONOSCOPY performed by Santino West MD at [facility] ENDOSCOPY  
  ORTHOPAEDIC    
 left hip pinning  HX OTHER SURGICAL    
 left little toe amputation  HX UROLOGICAL Social History Social History  Marital status: LEGALLY  Spouse name: N/A  
 Number of children: N/A  
 Years of education: N/A Social History Main Topics  Smoking status: Current Every Day Smoker Packs/day: 0.25 Last attempt to quit: 10/25/2016  Smokeless tobacco: Never Used  Alcohol use 7.0 oz/week 7 Cans of beer, 7 Shots of liquor per week  Drug use: None  Sexual activity: Yes  
  Partners: Female Other Topics Concern  None Social History Narrative History reviewed. No pertinent family history. Current Outpatient Prescriptions Medication Sig Dispense Refill  triamcinolone acetonide (KENALOG) 0.1 % ointment Apply  to affected area two (2) times a day. use thin layer bid 80 g 12  
 lidocaine (LIDODERM) 5 % 1 Patch by TransDERmal route every twenty-four (24) hours. Apply to affected area every 24 hours 1 Package 3  
 Insulin Syringe-Needle U-100 1 mL 31 gauge x 5/16 syrg USE TO INJECT INSULIN FIVE TIMES A  Syringe 11  
 ferrous sulfate (IRON) 325 mg (65 mg iron) EC tablet Take 1 Tab by mouth Daily (before breakfast). 30 Tab 6  
 insulin regular (NOVOLIN R, HUMULIN R) 100 unit/mL injection 14 Units by SubCUTAneous route three (3) times daily as needed (with meals). 1 Vial 12  
 doxycycline (VIBRAMYCIN) 100 mg capsule Take 1 Cap by mouth two (2) times a day. 28 Cap 0  
 ammonium lactate (LAC-HYDRIN FIVE) 5 % lotion Apply daily 222 mL 3  
 acetaminophen (PAIN AND FEVER) 500 mg tablet TAKE ONE TABLET BY MOUTH EVERY 6 HOURS AS NEEDED FOR PAIN 60 Tab 2  
 gabapentin (NEURONTIN) 300 mg capsule TAKE TWO CAPSULES BY MOUTH THREE TIMES A  Cap 0  
 ergocalciferol (ERGOCALCIFEROL) 50,000 unit capsule Take 1 Cap by mouth every seven (7) days.  4 Cap 12  
 bumetanide (BUMEX) 2 mg tablet TAKE ONE TABLET BY MOUTH DAILY 30 Tab 2  
  THERA-TABS M 27 mg iron-400 mcg tab TAKE ONE TABLET BY MOUTH DAILY 30 Tab 11  
 fluticasone (FLONASE) 50 mcg/actuation nasal spray SPRAY TWO SPRAYS IN EACH NOSTRIL DAILY 1 Bottle 11  
 irbesartan (AVAPRO) 300 mg tablet TAKE ONE TABLET BY MOUTH EVERY EVENING 30 Tab 10  
 polyethylene glycol (MIRALAX) 17 gram/dose powder Take 17 g by mouth daily. 850 g 3  
 dilTIAZem (TIAZAC) 180 mg SR capsule Take 1 Cap by mouth daily. 30 Cap 12  
 MICRO THIN LANCETS 33 gauge misc USE TO TEST BLOOD SUGAR TWO TO THREE TIMES DAILY 100 Lancet 11  
 glucose blood VI test strips (ONETOUCH ULTRA TEST) strip Test three times daily. E11.9 Diabetes Type 2 300 Strip 11  Blood-Glucose Meter monitoring kit Use once daily Onetouch test meter only E11.9 Type 2 1 Kit 0  
 CONTOUR NEXT STRIPS strip TEST BLOOD GLUCOSE TWO TO THREE TIMES DAILY 100 Strip 11  
 carvedilol (COREG) 3.125 mg tablet Take 1 Tab by mouth two (2) times daily (with meals). 60 Tab 0  
 docusate sodium (COLACE) 100 mg capsule Take 1 Cap by mouth two (2) times a day. 60 Cap 0  
 insulin glargine (LANTUS) 100 unit/mL injection INJECT 72 UNITS UNDER THE SKIN EVERY 12 HOURS 4 Vial 11  Wheel Chair dianna Custom electric wheelchair 1 Each 0  
 oxyCODONE-acetaminophen (PERCOCET) 5-325 mg per tablet Take 1 Tab by mouth every eight (8) hours as needed. 14 Tab 0  
 Calcium Carbonate-Vit D3-Min 600 mg calcium- 400 unit tab Take  by mouth two (2) times a day.  warfarin (COUMADIN) 10 mg tablet Take 1 Tab by mouth daily. 30 Tab 0 No Known Allergies Objective: 
Visit Vitals  /60  Pulse 73  Temp 98.4 °F (36.9 °C) (Oral)  Resp 16  
 Ht 6' 2\" (1.88 m)  SpO2 96% Physical Exam:  
General appearance - alert, well appearing, and in moderate distress Mental status - alert, oriented to person, place, and time EYE-LUH, EOMI, corneas normal, no foreign bodies ENT-ENT exam normal, no neck nodes or sinus tenderness Nose - normal and patent, no erythema, discharge or polyps Mouth - mucous membranes moist, pharynx normal without lesions Neck - supple, no significant adenopathy Chest - clear to auscultation, no wheezes, rales or rhonchi, symmetric air entry Heart - normal rate, regular rhythm, normal S1, S2, no murmurs, rubs, clicks or gallops Abdomen - tenderness, distended, no masses or organomegaly Lymph- no adenopathy palpable Ext-peripheral pulses normal, no pedal edema, no clubbing or cyanosis Skin-Warm and dry. no hyperpigmentation, vitiligo, or suspicious lesions Neuro -alert, oriented, normal speech, no focal findings or movement disorder noted Neck-normal C-spine, no tenderness, full ROM without pain Feet- nail deformities or callus formation with good pulses noted Rt. .shoulder-subdeltoid tenderness, positive impingement signs Lt.leg 3+ edema Rt.leg 3+ edema Results for orders placed or performed in visit on 08/30/18 AMB POC GLUCOSE BLOOD, BY GLUCOSE MONITORING DEVICE Result Value Ref Range Glucose POC  mg/dL Assessment/Plan: ICD-10-CM ICD-9-CM 1. Diabetic polyneuropathy associated with type 2 diabetes mellitus (HCC) E11.42 250.60 lidocaine (LIDODERM) 5 %  
  357.2 insulin regular (NOVOLIN R, HUMULIN R) 100 unit/mL injection AMB POC GLUCOSE BLOOD, BY GLUCOSE MONITORING DEVICE 2. Rotator cuff arthropathy, right M12.811 716.81 lidocaine (LIDODERM) 5 %  
   insulin regular (NOVOLIN R, HUMULIN R) 100 unit/mL injection 3. Primary osteoarthritis of both hips M16.0 715.15   
4. Vitamin D deficiency E55.9 268.9 5. Essential hypertension I10 401.9 6. Carcinoma, Prostate C61 185   
7. Osteoarthrosis involving lower leg M17.10 715.96   
8. Severe obesity (BMI 35.0-39. 9) with comorbidity ([address] E66.01 278.01   
9. Cellulitis and abscess of leg L03.119 682.6 L02.419 10. Xerosis of skin L85.3 706.8 Orders Placed This Encounter  AMB POC GLUCOSE BLOOD, BY GLUCOSE MONITORING DEVICE  triamcinolone acetonide (KENALOG) 0.1 % ointment Sig: Apply  to affected area two (2) times a day. use thin layer bid Dispense:  80 g Refill:  12  
 lidocaine (LIDODERM) 5 % Si Patch by TransDERmal route every twenty-four (24) hours. Apply to affected area every 24 hours Dispense:  1 Package Refill:  3  
 Insulin Syringe-Needle U-100 1 mL 31 gauge x 5/16 syrg Sig: USE TO INJECT INSULIN FIVE TIMES A DAY Dispense:  100 Syringe Refill:  11  
 ferrous sulfate (IRON) 325 mg (65 mg iron) EC tablet Sig: Take 1 Tab by mouth Daily (before breakfast). Dispense:  30 Tab Refill:  6  
 insulin regular (NOVOLIN R, HUMULIN R) 100 unit/mL injection Si Units by SubCUTAneous route three (3) times daily as needed (with meals). Dispense:  1 Vial  
  Refill:  12  
 doxycycline (VIBRAMYCIN) 100 mg capsule Sig: Take 1 Cap by mouth two (2) times a day. Dispense:  28 Cap Refill:  0  
 ammonium lactate (LAC-HYDRIN FIVE) 5 % lotion Sig: Apply daily Dispense:  222 mL Refill:  3  
 
lose weight, increase physical activity, follow low fat diet, follow low salt diet, Unna boot removed on rt. lower leg Patient Instructions [email or web address] Activation Thank you for requesting access to [email or web address]. Please follow the instructions below to securely access and download your online medical record. [email or web address] allows you to send messages to your doctor, view your test results, renew your prescriptions, schedule appointments, and more. How Do I Sign Up? 1. In your internet browser, go to [email or web address] 
2. Click on the First Time User? Click Here link in the Sign In box. You will be redirect to the New Member Sign Up page. 3. Enter your [email or web address] Access Code exactly as it appears below. You will not need to use this code after youve completed the sign-up process.  If you do not sign up before the expiration date, you must request a new code. [facility] Access Code: [email or web address] Expires: 2018  2:57 PM (This is the date your [facility] access code will ) 4. Enter the last four digits of your Social Security Number (xxxx) and Date of Birth (mm/dd/yyyy) as indicated and click Submit. You will be taken to the next sign-up page. 5. Create a [employer] ID. This will be your [facility] login ID and cannot be changed, so think of one that is secure and easy to remember. 6. Create a [facility] password. You can change your password at any time. 7. Enter your Password Reset Question and Answer. This can be used at a later time if you forget your password. 8. Enter your e-mail address. You will receive e-mail notification when new information is available in [address] 9. Click Sign Up. You can now view and download portions of your medical record. 10. Click the Download Summary menu link to download a portable copy of your medical information. Additional Information If you have questions, please visit the Frequently Asked Questions section of the [employer] website at [email or web address] Remember, [employer] is NOT to be used for urgent needs. For medical emergencies, dial 911. Follow-up Disposition: 
Return in about 4 weeks (around 2018), or if symptoms worsen or fail to improve. I have reviewed with the patient details of the assessment and plan and all questions were answered. Relevent patient education was performed An After Visit Summary was printed and given to the patient.

## 2018-08-30 NOTE — MR AVS SNAPSHOT
[address] [place] [address],[address] [place] [id number] 
[phone] Patient: Monisha Wylie 
MRN: [id number] DIVYA:[id number] Visit Information Date & Time Provider Department Dept. Phone Encounter #  
 8/30/2018  2:30 PM René Frost MD [address] [facility] [phone] [id number] Follow-up Instructions Return in about 4 weeks (around 9/27/2018), or if symptoms worsen or fail to improve. Follow-up and Disposition History Upcoming Health Maintenance Date Due  
 EYE EXAM RETINAL OR DILATED Q1 3/11/2016 GLAUCOMA SCREENING Q2Y 4/21/2016 Pneumococcal 65+ High/Highest Risk (1 of 2 - PCV13) 4/21/2016 MICROALBUMIN Q1 10/8/2016 FOOT EXAM Q1 7/6/2018 FOBT Q 1 YEAR AGE 50-75 7/6/2018 Influenza Age 5 to Adult 3/31/2019* HEMOGLOBIN A1C Q6M 12/27/2018 LIPID PANEL Q1 6/27/2019 MEDICARE YEARLY EXAM 6/28/2019 DTaP/Tdap/Td series (2 - Td) 12/10/2024 *Topic was postponed. The date shown is not the original due date. Allergies as of 8/30/2018  Review Complete On: 8/30/2018 By: René Frost MD  
 No Known Allergies Current Immunizations  Reviewed on 12/10/2014 Name Date Pneumococcal Polysaccharide (PPSV-23) 9/11/2014 Tdap 12/10/2014 Not reviewed this visit You Were Diagnosed With   
  
 Codes Comments Diabetic polyneuropathy associated with type 2 diabetes mellitus ([facility] [address]    -  Primary ICD-10-CM: E11.42 
ICD-9-CM: 250.60, 357.2 Rotator cuff arthropathy, right     ICD-10-CM: O54.589 ICD-9-CM: 716.81 Primary osteoarthritis of both hips     ICD-10-CM: M16.0 ICD-9-CM: 715.15 Vitamin D deficiency     ICD-10-CM: E55.9 ICD-9-CM: 268.9 Essential hypertension     ICD-10-CM: I10 
ICD-9-CM: 401.9 Malignant neoplasm of prostate [facility])     ICD-10-CM: M79 ICD-9-CM: 373 Osteoarthrosis involving lower leg     ICD-10-CM: M17.10 ICD-9-CM: 715.96   
 Severe obesity (BMI 35.0-39. 9) with comorbidity ([facility] [address]     ICD-10-CM: E66.01 
ICD-9-CM: 278.01 Cellulitis and abscess of leg     ICD-10-CM: L03.119, L02.419 ICD-9-CM: 315. 6 Xerosis of skin     ICD-10-CM: L85.3 ICD-9-CM: 706.8 Vitals BP Pulse Temp Resp Height(growth percentile) SpO2  
 130/60 73 98.4 °F (36.9 °C) (Oral) 16 6' 2\" (1.88 m) 96% Smoking Status Current Every Day Smoker Vitals History Preferred Pharmacy Pharmacy Name Phone Doreen Montoya [address] [facility] [phone] Your Updated Medication List  
  
   
This list is accurate as of 8/30/18  3:50 PM.  Always use your most recent med list.  
  
  
  
  
 acetaminophen 500 mg tablet Commonly known as:  PAIN AND FEVER  
TAKE ONE TABLET BY MOUTH EVERY 6 HOURS AS NEEDED FOR PAIN  
  
 ammonium lactate 5 % lotion Commonly known as:  LAC-HYDRIN FIVE Apply daily Blood-Glucose Meter monitoring kit Use once daily Onetouch test meter only E11.9 Type 2  
  
 bumetanide 2 mg tablet Commonly known as:  Bhatti Greek TAKE ONE TABLET BY MOUTH DAILY Calcium Carbonate-Vit D3-Min 600 mg calcium- 400 unit Tab Take  by mouth two (2) times a day. carvedilol 3.125 mg tablet Commonly known as:  Xin Locket Take 1 Tab by mouth two (2) times daily (with meals). * CONTOUR NEXT TEST STRIPS strip Generic drug:  glucose blood VI test strips TEST BLOOD GLUCOSE TWO TO THREE TIMES DAILY * glucose blood VI test strips strip Commonly known as:  ONETOUCH ULTRA TEST Test three times daily. E11.9  Diabetes Type 2  
  
 dilTIAZem 180 mg SR capsule Commonly known as:  [facility] Take 1 Cap by mouth daily. docusate sodium 100 mg capsule Commonly known as:  Patricia Dilling Take 1 Cap by mouth two (2) times a day. doxycycline 100 mg capsule Commonly known as:  VIBRAMYCIN Take 1 Cap by mouth two (2) times a day. ergocalciferol 50,000 unit capsule Commonly known as:  ERGOCALCIFEROL Take 1 Cap by mouth every seven (7) days. ferrous sulfate 325 mg (65 mg iron) EC tablet Commonly known as:  IRON Take 1 Tab by mouth Daily (before breakfast). fluticasone 50 mcg/actuation nasal spray Commonly known as:  Becca Jumper SPRAY TWO SPRAYS IN EACH NOSTRIL DAILY  
  
 gabapentin 300 mg capsule Commonly known as:  NEURONTIN  
TAKE TWO CAPSULES BY MOUTH THREE TIMES A DAY  
  
 insulin glargine 100 unit/mL injection Commonly known as:  LANTUS U-100 INSULIN INJECT 72 UNITS UNDER THE SKIN EVERY 12 HOURS  
  
 insulin regular 100 unit/mL injection Commonly known as:  NOVOLIN R, HUMULIN R  
14 Units by SubCUTAneous route three (3) times daily as needed (with meals). Insulin Syringe-Needle U-100 1 mL 31 gauge x 5/16 Syrg  
USE TO INJECT INSULIN FIVE TIMES A DAY  
  
 irbesartan 300 mg tablet Commonly known as:  AVAPRO TAKE ONE TABLET BY MOUTH EVERY EVENING  
  
 lidocaine 5 % Commonly known as:  LIDODERM  
1 Patch by TransDERmal route every twenty-four (24) hours. Apply to affected area every 24 hours MICRO THIN LANCETS 33 gauge Misc Generic drug:  lancets USE TO TEST BLOOD SUGAR TWO TO THREE TIMES DAILY  
  
 oxyCODONE-acetaminophen 5-325 mg per tablet Commonly known as:  PERCOCET Take 1 Tab by mouth every eight (8) hours as needed. polyethylene glycol 17 gram/dose powder Commonly known as:  Sheria Bud Take 17 g by mouth daily. THERA-TABS M 27 mg iron-400 mcg Tab Generic drug:  multivit-iron-FA-calcium-mins TAKE ONE TABLET BY MOUTH DAILY  
  
 triamcinolone acetonide 0.1 % ointment Commonly known as:  KENALOG Apply  to affected area two (2) times a day. use thin layer bid  
  
 warfarin 10 mg tablet Commonly known as:  COUMADIN Take 1 Tab by mouth daily. [address] [facility] Custom electric wheelchair * Notice: This list has 2 medication(s) that are the same as other medications prescribed for you. Read the directions carefully, and ask your doctor or other care provider to review them with you. Prescriptions Sent to Pharmacy Refills  
 triamcinolone acetonide (KENALOG) 0.1 % ointment 12 Sig: Apply  to affected area two (2) times a day. use thin layer bid Class: Normal  
 Pharmacy: [address] Ph #: [phone] Route: Topical  
 lidocaine (LIDODERM) 5 % 3 Si Patch by TransDERmal route every twenty-four (24) hours. Apply to affected area every 24 hours Class: Normal  
 Pharmacy: [address] Ph #: [phone] Route: TransDERmal  
 Insulin Syringe-Needle U-100 1 mL 31 gauge x 5/16 syrg 11 Sig: USE TO INJECT INSULIN FIVE TIMES A DAY Class: Normal  
 Pharmacy: [address] [place] [address] Ph #: [phone]  
 ferrous sulfate (IRON) 325 mg (65 mg iron) EC tablet 6 Sig: Take 1 Tab by mouth Daily (before breakfast). Class: Normal  
 Pharmacy: [address] Ph #: [phone] Route: Oral  
 insulin regular (NOVOLIN R, HUMULIN R) 100 unit/mL injection 12 Si Units by SubCUTAneous route three (3) times daily as needed (with meals). Class: Normal  
 Pharmacy: [address] Ph #: [phone] Route: SubCUTAneous  
 doxycycline (VIBRAMYCIN) 100 mg capsule 0 Sig: Take 1 Cap by mouth two (2) times a day. Class: Normal  
 Pharmacy: [address] Ph #: [phone]  Route: Oral  
 ammonium lactate (LAC-HYDRIN FIVE) 5 % lotion 3  
 Sig: Apply daily Class: Normal  
 Pharmacy: Leslie Lee [address] [facility] #: [phone] We Performed the Following AMB POC GLUCOSE BLOOD, BY GLUCOSE MONITORING DEVICE [37526 CPT(R)] Follow-up Instructions Return in about 4 weeks (around 2018), or if symptoms worsen or fail to improve. Patient Instructions MyChart Activation Thank you for requesting access to [employer]. Please follow the instructions below to securely access and download your online medical record. [employer] allows you to send messages to your doctor, view your test results, renew your prescriptions, schedule appointments, and more. How Do I Sign Up? 1. In your internet browser, go to [email or web address] 
2. Click on the First Time User? Click Here link in the Sign In box. You will be redirect to the New Member Sign Up page. 3. Enter your [employer] Access Code exactly as it appears below. You will not need to use this code after youve completed the sign-up process. If you do not sign up before the expiration date, you must request a new code. [employer] Access Code: [email or web address] Expires: 2018  2:57 PM (This is the date your [employer] access code will ) 4. Enter the last four digits of your Social Security Number (xxxx) and Date of Birth (mm/dd/yyyy) as indicated and click Submit. You will be taken to the next sign-up page. 5. Create a [employer] ID. This will be your [employer] login ID and cannot be changed, so think of one that is secure and easy to remember. 6. Create a [employer] password. You can change your password at any time. 7. Enter your Password Reset Question and Answer. This can be used at a later time if you forget your password. 8. Enter your e-mail address. You will receive e-mail notification when new information is available in [address] 9. Click Sign Up.  You can now view and download portions of your medical record. 10. Click the Download Summary menu link to download a portable copy of your medical information. Additional Information If you have questions, please visit the Frequently Asked Questions section of the [employer] website at [email or web address] Remember, [employer] is NOT to be used for urgent needs. For medical emergencies, dial 911. Introducing hospitals & [facility] SERVICES! [employer] introduces [employer] patient portal. Now you can access parts of your medical record, email your doctor's office, and request medication refills online. 1. In your internet browser, go to [email or web address] 2. Click on the First Time User? Click Here link in the Sign In box. You will see the New Member Sign Up page. 3. Enter your S[employer] Access Code exactly as it appears below. You will not need to use this code after youve completed the sign-up process. If you do not sign up before the expiration date, you must request a new code. · [employer] Access Code: [email or web address] Expires: 9/25/2018  2:57 PM 
 
4. Enter the last four digits of your Social Security Number (xxxx) and Date of Birth (mm/dd/yyyy) as indicated and click Submit. You will be taken to the next sign-up page. 5. Create a S[employer]el ID. This will be your S[employer]el login ID and cannot be changed, so think of one that is secure and easy to remember. 6. Create a [employer] password. You can change your password at any time. 7. Enter your Password Reset Question and Answer. This can be used at a later time if you forget your password. 8. Enter your e-mail address. You will receive e-mail notification when new information is available in [address] 9. Click Sign Up. You can now view and download portions of your medical record. 10. Click the Download Summary menu link to download a portable copy of your medical information.  
 
If you have questions, please visit the Frequently Asked Questions section of the [employer]. Remember, [employer] is NOT to be used for urgent needs. For medical emergencies, dial 911. Now available from your iPhone and Android! Please provide this summary of care documentation to your next provider. Your primary care clinician is listed as Lance Vargas. If you have any questions after today's visit, please call [phone].

## 2018-09-02 DIAGNOSIS — M12.811 ROTATOR CUFF ARTHROPATHY, RIGHT: ICD-10-CM

## 2018-09-02 DIAGNOSIS — E11.42 DIABETIC POLYNEUROPATHY ASSOCIATED WITH TYPE 2 DIABETES MELLITUS (HCC): ICD-10-CM

## 2018-09-03 RX ORDER — INSULIN GLARGINE 100 [IU]/ML
INJECTION, SOLUTION SUBCUTANEOUS
Qty: 6 VIAL | Refills: 10 | Status: ON HOLD | OUTPATIENT
Start: 2018-09-03 | End: 2019-03-31 | Stop reason: SDUPTHER

## 2018-09-07 ENCOUNTER — DOCUMENTATION ONLY (OUTPATIENT)
Dept: INTERNAL MEDICINE CLINIC | Age: 68
End: 2018-09-07

## 2018-09-07 RX ORDER — GABAPENTIN 300 MG/1
CAPSULE ORAL
Qty: 180 CAP | Refills: 0 | Status: SHIPPED | OUTPATIENT
Start: 2018-09-07 | End: 2018-10-06 | Stop reason: SDUPTHER

## 2018-09-07 NOTE — PROGRESS NOTES
Called and informed pharmacy and pt about Med PA Denial for Lidocaine 5% patches, and informed them that medication would need to be paid for out of pocket.  Provider informed of denial.

## 2018-09-12 ENCOUNTER — DOCUMENTATION ONLY (OUTPATIENT)
Dept: INTERNAL MEDICINE CLINIC | Age: 68
End: 2018-09-12

## 2018-09-25 ENCOUNTER — OFFICE VISIT (OUTPATIENT)
Dept: INTERNAL MEDICINE CLINIC | Age: 68
End: 2018-09-25

## 2018-09-25 VITALS
HEIGHT: 74 IN | SYSTOLIC BLOOD PRESSURE: 140 MMHG | RESPIRATION RATE: 17 BRPM | OXYGEN SATURATION: 96 % | HEART RATE: 78 BPM | DIASTOLIC BLOOD PRESSURE: 70 MMHG | TEMPERATURE: 98.5 F

## 2018-09-25 DIAGNOSIS — I89.0 LYMPHEDEMA: ICD-10-CM

## 2018-09-25 DIAGNOSIS — E11.42 DIABETIC POLYNEUROPATHY ASSOCIATED WITH TYPE 2 DIABETES MELLITUS (HCC): ICD-10-CM

## 2018-09-25 DIAGNOSIS — M12.811 ROTATOR CUFF ARTHROPATHY, RIGHT: Primary | ICD-10-CM

## 2018-09-25 DIAGNOSIS — M16.0 PRIMARY OSTEOARTHRITIS OF BOTH HIPS: ICD-10-CM

## 2018-09-25 DIAGNOSIS — C61 MALIGNANT NEOPLASM OF PROSTATE (HCC): ICD-10-CM

## 2018-09-25 DIAGNOSIS — E55.9 VITAMIN D DEFICIENCY: ICD-10-CM

## 2018-09-25 DIAGNOSIS — I10 ESSENTIAL HYPERTENSION: ICD-10-CM

## 2018-09-25 LAB
GLUCOSE POC: 188 MG/DL
HBA1C MFR BLD HPLC: 5.7 %

## 2018-09-25 NOTE — PATIENT INSTRUCTIONS
Re.nooble Activation Thank you for requesting access to [employer]. Please follow the instructions below to securely access and download your online medical record. Re[employer]oble allows you to send messages to your doctor, view your test results, renew your prescriptions, schedule appointments, and more. How Do I Sign Up? 1. In your internet browser, go to [email or web address] 
2. Click on the First Time User? Click Here link in the Sign In box. You will be redirect to the New Member Sign Up page. 3. Enter your Re.nooble Access Code exactly as it appears below. You will not need to use this code after youve completed the sign-up process. If you do not sign up before the expiration date, you must request a new code. Re.nooble Access Code: [email or web address] Expires: 2018  3:18 PM (This is the date your Re.nooble access code will ) 4. Enter the last four digits of your Social Security Number (xxxx) and Date of Birth (mm/dd/yyyy) as indicated and click Submit. You will be taken to the next sign-up page. 5. Create a Re.nooble ID. This will be your Re.nooble login ID and cannot be changed, so think of one that is secure and easy to remember. 6. Create a Re.nooble password. You can change your password at any time. 7. Enter your Password Reset Question and Answer. This can be used at a later time if you forget your password. 8. Enter your e-mail address. You will receive e-mail notification when new information is available in [address] 9. Click Sign Up. You can now view and download portions of your medical record. 10. Click the Download Summary menu link to download a portable copy of your medical information. Additional Information If you have questions, please visit the Frequently Asked Questions section of the Re.nooble website at [email or web address] Remember, Re.nooble is NOT to be used for urgent needs. For medical emergencies, dial 911.

## 2018-09-25 NOTE — PROGRESS NOTES
1. Have you been to the ER, urgent care clinic since your last visit? Hospitalized since your last visit?no 2. Have you seen or consulted any other health care providers outside of the [address] [place] [address] since your last visit? Include any pap smears or colon screening. No 
PHQ over the last two weeks 8/30/2018 Little interest or pleasure in doing things Not at all Feeling down, depressed, irritable, or hopeless Not at all Total Score PHQ 2 0

## 2018-09-25 NOTE — MR AVS SNAPSHOT
[address] [place] [address] [phone] 
[phone] Patient: Anand Hayes 
MRN: [id number] Visit Information Date & Time Provider Department Dept. Phone Encounter #  
 9/25/2018  2:00 PM Jose Mckenzie MD [facility] [phone] [id number] Follow-up Instructions Return in about 3 months (around 12/25/2018), or if symptoms worsen or fail to improve. Follow-up and Disposition History Your Appointments 9/27/2018  2:30 PM  
ROUTINE CARE with Jose Mckenzie MD  
[facility] [address] [place] [address] ([facility]) Appt Note: 4 week fu  
 [address] [place] [address] [phone]  
[phone]  
  
   
 [address] [place] [address] 7 [phone] Upcoming Health Maintenance Date Due Shingrix Vaccine Age 50> (1 of 2) 4/21/2001 EYE EXAM RETINAL OR DILATED Q1 3/11/2016 GLAUCOMA SCREENING Q2Y 4/21/2016 Pneumococcal 65+ High/Highest Risk (1 of 2 - PCV13) 4/21/2016 MICROALBUMIN Q1 10/8/2016 FOOT EXAM Q1 7/6/2018 FOBT Q 1 YEAR AGE 50-75 7/6/2018 Influenza Age 5 to Adult 3/31/2019* HEMOGLOBIN A1C Q6M 12/27/2018 LIPID PANEL Q1 6/27/2019 MEDICARE YEARLY EXAM 6/28/2019 DTaP/Tdap/Td series (2 - Td) 12/10/2024 *Topic was postponed. The date shown is not the original due date. Allergies as of 9/25/2018  Review Complete On: 9/25/2018 By: Sue Pablo LPN No Known Allergies Current Immunizations  Reviewed on 12/10/2014 Name Date Pneumococcal Polysaccharide (PPSV-23) 9/11/2014 Tdap 12/10/2014 Not reviewed this visit You Were Diagnosed With   
  
 Codes Comments Rotator cuff arthropathy, right    -  Primary ICD-10-CM: F54.442 ICD-9-CM: 716.81 Diabetic polyneuropathy associated with type 2 diabetes mellitus ([address]     ICD-10-CM: E11.42 
ICD-9-CM: 250.60, 357.2 Primary osteoarthritis of both hips     ICD-10-CM: M16.0 ICD-9-CM: 715.15 Vitamin D deficiency     ICD-10-CM: E55.9 ICD-9-CM: 268.9 Essential hypertension     ICD-10-CM: I10 
ICD-9-CM: 401.9 Malignant neoplasm of prostate [facility])     ICD-10-CM: A10 ICD-9-CM: 316 Lymphedema     ICD-10-CM: I89.0 ICD-9-CM: 915.5 Vitals BP Pulse Temp Resp Height(growth percentile) SpO2  
 140/70 (BP 1 Location: Left arm, BP Patient Position: Sitting) 78 98.5 °F (36.9 °C) (Oral) 17 6' 2\" (1.88 m) 96% Smoking Status Current Every Day Smoker Preferred Pharmacy Pharmacy Name Phone JUAN M CANTRELL [facility] [address] [facility] [phone] Your Updated Medication List  
  
   
This list is accurate as of 9/25/18  3:20 PM.  Always use your most recent med list.  
  
  
  
  
 acetaminophen 500 mg tablet Commonly known as:  PAIN AND FEVER  
TAKE ONE TABLET BY MOUTH EVERY 6 HOURS AS NEEDED FOR PAIN  
  
 ammonium lactate 5 % lotion Commonly known as:  LAC-HYDRIN FIVE Apply daily Blood-Glucose Meter monitoring kit Use once daily Onetouch test meter only E11.9 Type 2  
  
 bumetanide 2 mg tablet Commonly known as:  [place] Zina TAKE ONE TABLET BY MOUTH DAILY Calcium Carbonate-Vit D3-Min 600 mg calcium- 400 unit Tab Take  by mouth two (2) times a day. carvedilol 3.125 mg tablet Commonly known as:  Jinx Re Take 1 Tab by mouth two (2) times daily (with meals). * CONTOUR NEXT TEST STRIPS strip Generic drug:  glucose blood VI test strips TEST BLOOD GLUCOSE TWO TO THREE TIMES DAILY * glucose blood VI test strips strip Commonly known as:  ONETOUCH ULTRA TEST Test three times daily. E11.9  Diabetes Type 2  
  
 dilTIAZem 180 mg SR capsule Commonly known as:  [facility] Take 1 Cap by mouth daily. docusate sodium 100 mg capsule Commonly known as:  Price Collier  
 Take 1 Cap by mouth two (2) times a day. doxycycline 100 mg capsule Commonly known as:  VIBRAMYCIN Take 1 Cap by mouth two (2) times a day. ergocalciferol 50,000 unit capsule Commonly known as:  ERGOCALCIFEROL Take 1 Cap by mouth every seven (7) days. ferrous sulfate 325 mg (65 mg iron) EC tablet Commonly known as:  IRON Take 1 Tab by mouth Daily (before breakfast). fluticasone 50 mcg/actuation nasal spray Commonly known as:  Heather Fetch SPRAY TWO SPRAYS IN EACH NOSTRIL DAILY  
  
 gabapentin 300 mg capsule Commonly known as:  NEURONTIN  
TAKE TWO CAPSULES BY MOUTH THREE TIMES A DAY  
  
 insulin regular 100 unit/mL injection Commonly known as:  NOVOLIN R, HUMULIN R  
14 Units by SubCUTAneous route three (3) times daily as needed (with meals). Insulin Syringe-Needle U-100 1 mL 31 gauge x 5/16 Syrg  
USE TO INJECT INSULIN FIVE TIMES A DAY  
  
 irbesartan 300 mg tablet Commonly known as:  AVAPRO TAKE ONE TABLET BY MOUTH EVERY EVENING  
  
 LANTUS U-100 INSULIN 100 unit/mL injection Generic drug:  insulin glargine INJECT 72 UNITS UNDER THE SKIN EVERY 12 HOURS  
  
 lidocaine 5 % Commonly known as:  LIDODERM  
1 Patch by TransDERmal route every twenty-four (24) hours. Apply to affected area every 24 hours MICRO THIN LANCETS 33 gauge Misc Generic drug:  lancets USE TO TEST BLOOD SUGAR TWO TO THREE TIMES DAILY  
  
 oxyCODONE-acetaminophen 5-325 mg per tablet Commonly known as:  PERCOCET Take 1 Tab by mouth every eight (8) hours as needed. polyethylene glycol 17 gram/dose powder Commonly known as:  Stephanie Griffiths Take 17 g by mouth daily. THERA-TABS M 27 mg iron-400 mcg Tab Generic drug:  multivit-iron-FA-calcium-mins TAKE ONE TABLET BY MOUTH DAILY  
  
 triamcinolone acetonide 0.1 % ointment Commonly known as:  KENALOG Apply  to affected area two (2) times a day. use thin layer bid  
  
 warfarin 10 mg tablet Commonly known as:  COUMADIN Take 1 Tab by mouth daily. [address] [facility] Custom electric wheelchair * Notice: This list has 2 medication(s) that are the same as other medications prescribed for you. Read the directions carefully, and ask your doctor or other care provider to review them with you. We Performed the Following REFERRAL TO LYMPHEDEMA CLINIC [TOF223 Custom] Comments:  
 Please evaluate patient for leg edema Follow-up Instructions Return in about 3 months (around 2018), or if symptoms worsen or fail to improve. Referral Information Referral ID Referred By Referred To  
  
 [id number] Sahara Marty JUAN J Not Available Visits Status Start Date End Date 1 New Request 18 If your referral has a status of pending review or denied, additional information will be sent to support the outcome of this decision. Patient Instructions Simply Measuredhart Activation Thank you for requesting access to TenKod. Please follow the instructions below to securely access and download your online medical record. TenKod allows you to send messages to your doctor, view your test results, renew your prescriptions, schedule appointments, and more. How Do I Sign Up? 1. In your internet browser, go to [email or web address] 
2. Click on the First Time User? Click Here link in the Sign In box. You will be redirect to the New Member Sign Up page. 3. Enter your TenKod Access Code exactly as it appears below. You will not need to use this code after youve completed the sign-up process. If you do not sign up before the expiration date, you must request a new code. TenKod Access Code: [email or web address] Expires: 2018  3:18 PM (This is the date your TenKod access code will ) 4. Enter the last four digits of your Social Security Number (xxxx) and Date of Birth (mm/dd/yyyy) as indicated and click Submit.  You will be taken to the next sign-up page. 5. Create a The Bar Methodt ID. This will be your Zignals login ID and cannot be changed, so think of one that is secure and easy to remember. 6. Create a The Bar Methodt password. You can change your password at any time. 7. Enter your Password Reset Question and Answer. This can be used at a later time if you forget your password. 8. Enter your e-mail address. You will receive e-mail notification when new information is available in [address] 9. Click Sign Up. You can now view and download portions of your medical record. 10. Click the Download Summary menu link to download a portable copy of your medical information. Additional Information If you have questions, please visit the Frequently Asked Questions section of the Zignals website at [email or web address]. [email or web address]/Realvu Inct/. Remember, Zignals is NOT to be used for urgent needs. For medical emergencies, dial 911. Introducing [facility]! [address] introduces Zignals patient portal. Now you can access parts of your medical record, email your doctor's office, and request medication refills online. 1. In your internet browser, go to [email or web address]. [email or web address]/Realvu Inct 2. Click on the First Time User? Click Here link in the Sign In box. You will see the New Member Sign Up page. 3. Enter your Zignals Access Code exactly as it appears below. You will not need to use this code after youve completed the sign-up process. If you do not sign up before the expiration date, you must request a new code. · Zignals Access Code: [email or web address] Expires: 12/24/2018  3:18 PM 
 
4. Enter the last four digits of your Social Security Number (xxxx) and Date of Birth (mm/dd/yyyy) as indicated and click Submit. You will be taken to the next sign-up page. 5. Create a The Bar Methodt ID. This will be your The Bar Methodt login ID and cannot be changed, so think of one that is secure and easy to remember. 6. Create a IroFit password. You can change your password at any time. 7. Enter your Password Reset Question and Answer. This can be used at a later time if you forget your password. 8. Enter your e-mail address. You will receive e-mail notification when new information is available in [address] 9. Click Sign Up. You can now view and download portions of your medical record. 10. Click the Download Summary menu link to download a portable copy of your medical information. If you have questions, please visit the Frequently Asked Questions section of the IroFit website. Remember, IroFit is NOT to be used for urgent needs. For medical emergencies, dial 911. Now available from your iPhone and Android! Please provide this summary of care documentation to your next provider. Your primary care clinician is listed as Benjamin Philippe. If you have any questions after today's visit, please call [phone].

## 2018-09-25 NOTE — PROGRESS NOTES
Lili Santana is a 80 y.o. male and presents with Leg Swelling and Diabetes Ascencion Joesph Subjective: 
 
Shoulder Pain Review: 
Patient complains of right side shoulder pains continue. The symptoms began months ago Course of symptoms since onset has been gradually worsening. Pain is described as overall severity = moderate. Symptoms were incited by no known event. Patient denies N/A. Therapy to date includes OTC analgesics: effective. Anemia review: 
Patient presents for  evaluation of anemia. Anemia was found by routine CBC. It had been present for several months. Associated signs & symptoms:none Hypertension Review: 
The patient has essential hypertension Diet and Lifestyle: generally follows a  low sodium diet, exercises sporadically Home BP Monitoring: is not measured at home. Pertinent ROS: taking medications as instructed, no medication side effects noted, no TIA's, no chest pain on exertion, no dyspnea on exertion, no swelling of ankles. Diabetes Mellitus Review: He has diabetes mellitus. Diabetic ROS - medication compliance: compliant all of the time, diabetic diet compliance: compliant all of the time, home glucose monitoring: is performed. Known diabetic complications:neuropathy feet,burning sensation Cardiovascular risk factors: family history, dyslipidemia, diabetes mellitus, obesity, hypertension Current diabetic medications include oral agents/insulin Eye exam current (within one year): no 
Weight trend: stable Prior visit with dietician: no 
Current diet: \"healthy\" diet  in general 
Current exercise: walking Current monitoring regimen: home blood tests - daily Home blood sugar records: trend: stable Any episodes of hypoglycemia? no 
Is He on ACE inhibitor or angiotensin II receptor blocker? Yes Dyslipidemia Review: 
Patient presents for evaluation of lipids. Compliance with treatment thus far has been excellent. A repeat fasting lipid profile was done.   The patient does not use medications that may worsen dyslipidemias (corticosteroids, progestins, anabolic steroids, diuretics, beta-blockers, amiodarone, cyclosporine, olanzapine). The patient exercises some He has a chronic cellulitis in the lower leg Review of Systems Constitutional: negative for fevers, chills, anorexia and weight loss Eyes:   negative for visual disturbance and irritation ENT:   negative for tinnitus,sore throat,nasal congestion,ear pains. hoarseness Respiratory:  negative for cough, hemoptysis, dyspnea,wheezing CV:   negative for chest pain, palpitations, lower extremity edema GI:    abdominal pain, Endo:               negative for polyuria,polydipsia,polyphagia,heat intolerance Genitourinary: negative for frequency, dysuria and hematuria Integument:  negative for rash and pruritus Hematologic:  negative for easy bruising and gum/nose bleeding Musculoskel: myalgias, arthralgias, back pain,joint pain Neurological:  negative for headaches, dizziness, vertigo, memory problems and gait Behavl/Psych: negative for feelings of anxiety, depression, mood changes Past Medical History:  
Diagnosis Date  Arthritis, Degenerative,  Knee 4/4/2011  Carcinoma, Prostate 4/4/2011  Degenerative disc disease 4/4/2011  Depression/ Anxiety 4/4/2011  Diabetes ([address]  Diabetes Mellitrus ( non-insulin dependent ) Type 2 4/4/2011  
 HEMATOCHEZIA 4/4/2011  Hypertrension 4/4/2011  Hypertriglyceridemia 4/4/2011  Insomnia 4/4/2011  Laryngitis 4/4/2011  Mild Aortic insufficiency 4/4/2011  Mild Concentric LVH (left ventricular hypertrophy) 4/4/2011  Neuropathy 4/4/2011  Osteoarthritis 4/4/2011  Rotator Cuff Tendonitis 4/4/2011 Past Surgical History:  
Procedure Laterality Date  CARDIAC SURG PROCEDURE UNLIST    
 cardiac cath  COLONOSCOPY N/A 4/28/2017  COLONOSCOPY performed by Jose Fitzpatrick MD at [facility] ENDOSCOPY  
  ORTHOPAEDIC    
 left hip pinning  HX OTHER SURGICAL    
 left little toe amputation  HX UROLOGICAL Social History Social History  Marital status: LEGALLY  Spouse name: N/A  
 Number of children: N/A  
 Years of education: N/A Social History Main Topics  Smoking status: Current Every Day Smoker Packs/day: 0.25 Last attempt to quit: 10/25/2016  Smokeless tobacco: Never Used  Alcohol use 7.0 oz/week 7 Cans of beer, 7 Shots of liquor per week  Drug use: None  Sexual activity: Yes  
  Partners: Female Other Topics Concern  None Social History Narrative No family history on file. Current Outpatient Prescriptions Medication Sig Dispense Refill  gabapentin (NEURONTIN) 300 mg capsule TAKE TWO CAPSULES BY MOUTH THREE TIMES A  Cap 0  
 LANTUS U-100 INSULIN 100 unit/mL injection INJECT 72 UNITS UNDER THE SKIN EVERY 12 HOURS 6 Vial 10  
 triamcinolone acetonide (KENALOG) 0.1 % ointment Apply  to affected area two (2) times a day. use thin layer bid 80 g 12  
 lidocaine (LIDODERM) 5 % 1 Patch by TransDERmal route every twenty-four (24) hours. Apply to affected area every 24 hours 1 Package 3  
 Insulin Syringe-Needle U-100 1 mL 31 gauge x 5/16 syrg USE TO INJECT INSULIN FIVE TIMES A  Syringe 11  
 ferrous sulfate (IRON) 325 mg (65 mg iron) EC tablet Take 1 Tab by mouth Daily (before breakfast). 30 Tab 6  
 insulin regular (NOVOLIN R, HUMULIN R) 100 unit/mL injection 14 Units by SubCUTAneous route three (3) times daily as needed (with meals). 1 Vial 12  
 ammonium lactate (LAC-HYDRIN FIVE) 5 % lotion Apply daily 222 mL 3  
 acetaminophen (PAIN AND FEVER) 500 mg tablet TAKE ONE TABLET BY MOUTH EVERY 6 HOURS AS NEEDED FOR PAIN 60 Tab 2  
 ergocalciferol (ERGOCALCIFEROL) 50,000 unit capsule Take 1 Cap by mouth every seven (7) days.  4 Cap 12  
 bumetanide (BUMEX) 2 mg tablet TAKE ONE TABLET BY MOUTH DAILY 30 Tab 2  
  THERA-TABS M 27 mg iron-400 mcg tab TAKE ONE TABLET BY MOUTH DAILY 30 Tab 11  
 fluticasone (FLONASE) 50 mcg/actuation nasal spray SPRAY TWO SPRAYS IN EACH NOSTRIL DAILY 1 Bottle 11  
 polyethylene glycol (MIRALAX) 17 gram/dose powder Take 17 g by mouth daily. 850 g 3  
 dilTIAZem (TIAZAC) 180 mg SR capsule Take 1 Cap by mouth daily. 30 Cap 12  
 MICRO THIN LANCETS 33 gauge misc USE TO TEST BLOOD SUGAR TWO TO THREE TIMES DAILY 100 Lancet 11  
 glucose blood VI test strips (ONETOUCH ULTRA TEST) strip Test three times daily. E11.9 Diabetes Type 2 300 Strip 11  Blood-Glucose Meter monitoring kit Use once daily Onetouch test meter only E11.9 Type 2 1 Kit 0  
 CONTOUR NEXT STRIPS strip TEST BLOOD GLUCOSE TWO TO THREE TIMES DAILY 100 Strip 11  
 carvedilol (COREG) 3.125 mg tablet Take 1 Tab by mouth two (2) times daily (with meals). 60 Tab 0  
 docusate sodium (COLACE) 100 mg capsule Take 1 Cap by mouth two (2) times a day. 60 Cap 0  Wheel Chair dianna Custom electric wheelchair 1 Each 0  
 oxyCODONE-acetaminophen (PERCOCET) 5-325 mg per tablet Take 1 Tab by mouth every eight (8) hours as needed. 14 Tab 0  
 Calcium Carbonate-Vit D3-Min 600 mg calcium- 400 unit tab Take  by mouth two (2) times a day.  doxycycline (VIBRAMYCIN) 100 mg capsule Take 1 Cap by mouth two (2) times a day. 28 Cap 0  
 irbesartan (AVAPRO) 300 mg tablet TAKE ONE TABLET BY MOUTH EVERY EVENING 30 Tab 10  
 warfarin (COUMADIN) 10 mg tablet Take 1 Tab by mouth daily. 30 Tab 0 No Known Allergies Objective: 
Visit Vitals  /70 (BP 1 Location: Left arm, BP Patient Position: Sitting)  Pulse 78  Temp 98.5 °F (36.9 °C) (Oral)  Resp 17  Ht 6' 2\" (1.88 m)  SpO2 96% Physical Exam:  
General appearance - alert, well appearing, and in moderate distress Mental status - alert, oriented to person, place, and time EYE-LUH, EOMI, corneas normal, no foreign bodies ENT-ENT exam normal, no neck nodes or sinus tenderness Nose - normal and patent, no erythema, discharge or polyps Mouth - mucous membranes moist, pharynx normal without lesions Neck - supple, no significant adenopathy Chest - clear to auscultation, no wheezes, rales or rhonchi, symmetric air entry Heart - normal rate, regular rhythm, normal S1, S2, no murmurs, rubs, clicks or gallops Abdomen - tenderness, distended, no masses or organomegaly Lymph- no adenopathy palpable Ext-peripheral pulses normal, no pedal edema, no clubbing or cyanosis Skin-Warm and dry. no hyperpigmentation, vitiligo, or suspicious lesions Neuro -alert, oriented, normal speech, no focal findings or movement disorder noted Neck-normal C-spine, no tenderness, full ROM without pain Feet- nail deformities or callus formation with good pulses noted Rt. .shoulder-subdeltoid tenderness, positive impingement signs Lt.leg 3+ edema Rt.leg 3+ edema Results for orders placed or performed in visit on 08/30/18 AMB POC GLUCOSE BLOOD, BY GLUCOSE MONITORING DEVICE Result Value Ref Range Glucose POC  mg/dL Assessment/Plan: ICD-10-CM ICD-9-CM 1. Rotator cuff arthropathy, right M12.811 716.81   
2. Diabetic polyneuropathy associated with type 2 diabetes mellitus (HCC) E11.42 250.60   
  357.2 3. Primary osteoarthritis of both hips M16.0 715.15   
4. Vitamin D deficiency E55.9 268.9 5. Essential hypertension I10 401.9 6. Carcinoma, Prostate C61 185   
7. Lymphedema I89.0 457.1 REFERRAL TO LYMPHEDEMA CLINIC Orders Placed This Encounter [facility] Referral Priority:   Routine Referral Type:   Consultation Referral Reason:   Specialty Services Required  
 
lose weight, increase physical activity, follow low fat diet, follow low salt diet, Unna boot removed on rt. lower leg Patient Instructions MyChart Activation Thank you for requesting access to FusionStorm. Please follow the instructions below to securely access and download your online medical record. FusionStorm allows you to send messages to your doctor, view your test results, renew your prescriptions, schedule appointments, and more. How Do I Sign Up? 1. In your internet browser, go to [email or web address] 
2. Click on the First Time User? Click Here link in the Sign In box. You will be redirect to the New Member Sign Up page. 3. Enter your FusionStorm Access Code exactly as it appears below. You will not need to use this code after youve completed the sign-up process. If you do not sign up before the expiration date, you must request a new code. FusionStorm Access Code: [email or web address] Expires: 2018  3:18 PM (This is the date your FusionStorm access code will ) 4. Enter the last four digits of your Social Security Number (xxxx) and Date of Birth (mm/dd/yyyy) as indicated and click Submit. You will be taken to the next sign-up page. 5. Create a FusionStorm ID. This will be your FusionStorm login ID and cannot be changed, so think of one that is secure and easy to remember. 6. Create a FusionStorm password. You can change your password at any time. 7. Enter your Password Reset Question and Answer. This can be used at a later time if you forget your password. 8. Enter your e-mail address. You will receive e-mail notification when new information is available in [address] 9. Click Sign Up. You can now view and download portions of your medical record. 10. Click the Download Summary menu link to download a portable copy of your medical information. Additional Information If you have questions, please visit the Frequently Asked Questions section of the FusionStorm website at [email or web address] [email or web address]. [employer]/Harper Love Adhesivehart/. Remember, FusionStorm is NOT to be used for urgent needs. For medical emergencies, dial 911. Follow-up Disposition: Return in about 3 months (around 12/25/2018), or if symptoms worsen or fail to improve. I have reviewed with the patient details of the assessment and plan and all questions were answered. Relevent patient education was performed An After Visit Summary was printed and given to the patient.

## 2018-10-08 RX ORDER — GABAPENTIN 300 MG/1
CAPSULE ORAL
Qty: 180 CAP | Refills: 0 | Status: SHIPPED | OUTPATIENT
Start: 2018-10-08 | End: 2018-11-07 | Stop reason: SDUPTHER

## 2018-10-11 ENCOUNTER — TELEPHONE (OUTPATIENT)
Dept: INTERNAL MEDICINE CLINIC | Age: 68
End: 2018-10-11

## 2018-10-23 DIAGNOSIS — M16.11 PRIMARY OSTEOARTHRITIS OF RIGHT HIP: ICD-10-CM

## 2018-10-23 DIAGNOSIS — R60.0 LOCALIZED EDEMA: ICD-10-CM

## 2018-10-23 DIAGNOSIS — E11.42 DIABETIC POLYNEUROPATHY ASSOCIATED WITH TYPE 2 DIABETES MELLITUS (HCC): ICD-10-CM

## 2018-10-23 DIAGNOSIS — C61 MALIGNANT NEOPLASM OF PROSTATE (HCC): ICD-10-CM

## 2018-10-23 DIAGNOSIS — I10 ESSENTIAL HYPERTENSION: ICD-10-CM

## 2018-10-24 RX ORDER — BUMETANIDE 2 MG/1
TABLET ORAL
Qty: 30 TAB | Refills: 1 | Status: SHIPPED | OUTPATIENT
Start: 2018-10-24 | End: 2018-12-04 | Stop reason: SDUPTHER

## 2018-10-29 ENCOUNTER — OFFICE VISIT (OUTPATIENT)
Dept: INTERNAL MEDICINE CLINIC | Age: 68
End: 2018-10-29

## 2018-10-29 VITALS
DIASTOLIC BLOOD PRESSURE: 74 MMHG | HEART RATE: 75 BPM | SYSTOLIC BLOOD PRESSURE: 160 MMHG | OXYGEN SATURATION: 96 % | TEMPERATURE: 98.3 F | RESPIRATION RATE: 16 BRPM

## 2018-10-29 DIAGNOSIS — M12.811 ROTATOR CUFF ARTHROPATHY OF RIGHT SHOULDER: Primary | ICD-10-CM

## 2018-10-29 RX ORDER — LIDOCAINE HYDROCHLORIDE 20 MG/ML
1 INJECTION, SOLUTION EPIDURAL; INFILTRATION; INTRACAUDAL; PERINEURAL ONCE
Qty: 1 ML | Refills: 0
Start: 2018-10-29 | End: 2018-10-29

## 2018-10-29 RX ORDER — TRIAMCINOLONE ACETONIDE 40 MG/ML
40 INJECTION, SUSPENSION INTRA-ARTICULAR; INTRAMUSCULAR ONCE
Qty: 1 ML | Refills: 0
Start: 2018-10-29 | End: 2018-10-29

## 2018-10-29 NOTE — PROGRESS NOTES
Domenico Lucero is a 80 y.o. male and presents with Leg Swelling (f/u) and Diabetes Subjective: 
 
Cerumen Impaction Patient presents for evaluation of a plugged ear. He has noticed the  symptoms in the right ear a few weeks ago. There is not a prior history of cerumen impaction. Patient denies ear pain. The patient was not using ear drops to loosen wax immediately prior to this visit. Diabetes Mellitus Review: He has diabetes mellitus. Diabetic ROS - medication compliance: compliant all of the time, diabetic diet compliance: compliant all of the time, home glucose monitoring: is performed. Known diabetic complications: severe neuropathy Cardiovascular risk factors: family history, dyslipidemia, diabetes mellitus, obesity, hypertension Current diabetic medications include oral agents/insulin Eye exam current (within one year): no 
Weight trend: stable Prior visit with dietician: no 
Current diet: \"healthy\" diet  in general 
Current exercise: walking Current monitoring regimen: home blood tests - daily Home blood sugar records: trend: stable Any episodes of hypoglycemia? no 
Is He on ACE inhibitor or angiotensin II receptor blocker? Yes He has had unrelenting pains involving the legs and feet. He has a burning ,numbness and tingling reported. Shoulder Pain Review: 
Patient complains of right side shoulder pain. The symptoms began several weeks ago Course of symptoms since onset has been gradually worsening. Pain is described as overall severity = moderate. Symptoms were incited by no known event. Patient denies N/A. Therapy to date includes OTC analgesics: effective. Hypertension Review: 
The patient has essential hypertension Diet and Lifestyle: generally follows a  low sodium diet, exercises sporadically Home BP Monitoring: is not measured at home.  
Pertinent ROS: taking medications as instructed, no medication side effects noted, no TIA's, no chest pain on exertion, no dyspnea on exertion, no swelling of ankles. Review of Systems Constitutional: negative for fevers, chills, anorexia and weight loss Eyes:   negative for visual disturbance and irritation ENT:   negative for tinnitus,sore throat,nasal congestion,ear pains. hoarseness Respiratory:  negative for cough, hemoptysis, dyspnea,wheezing CV:   negative for chest pain, palpitations, lower extremity edema GI:   negative for nausea, vomiting, diarrhea, abdominal pain,melena Endo:               negative for polyuria,polydipsia,polyphagia,heat intolerance Genitourinary: negative for frequency, dysuria and hematuria Integument:  negative for rash and pruritus Hematologic:  negative for easy bruising and gum/nose bleeding Musculoskel: negative for myalgias, arthralgias, back pain, muscle weakness, joint pain Neurological:  negative for headaches, dizziness, vertigo, memory problems and gait Behavl/Psych: negative for feelings of anxiety, depression, mood changes Past Medical History:  
Diagnosis Date  Arthritis, Degenerative,  Knee 4/4/2011  Carcinoma, Prostate 4/4/2011  Degenerative disc disease 4/4/2011  Depression/ Anxiety 4/4/2011  Diabetes ([facility] [address]  Diabetes Mellitrus ( non-insulin dependent ) Type 2 4/4/2011  
 HEMATOCHEZIA 4/4/2011  Hypertrension 4/4/2011  Hypertriglyceridemia 4/4/2011  Insomnia 4/4/2011  Laryngitis 4/4/2011  Mild Aortic insufficiency 4/4/2011  Mild Concentric LVH (left ventricular hypertrophy) 4/4/2011  Neuropathy 4/4/2011  Osteoarthritis 4/4/2011  Rotator Cuff Tendonitis 4/4/2011 Past Surgical History:  
Procedure Laterality Date  CARDIAC SURG PROCEDURE UNLIST    
 cardiac cath  HX ORTHOPAEDIC    
 left hip pinning  HX OTHER SURGICAL    
 left little toe amputation  HX UROLOGICAL Social History Socioeconomic History  Marital status: LEGALLY  Spouse name: Not on file  Number of children: Not on file  Years of education: Not on file  Highest education level: Not on file Social Needs  Financial resource strain: Not on file  Food insecurity - worry: Not on file  Food insecurity - inability: Not on file  Transportation needs - medical: Not on file  Transportation needs - non-medical: Not on file Occupational History  Not on file Tobacco Use  Smoking status: Current Every Day Smoker Packs/day: 0.25 Last attempt to quit: 10/25/2016 Years since quittin.0  Smokeless tobacco: Never Used Substance and Sexual Activity  Alcohol use: Yes Alcohol/week: 7.0 oz Types: 7 Cans of beer, 7 Shots of liquor per week  Drug use: Not on file  Sexual activity: Yes  
  Partners: Female Other Topics Concern  Not on file Social History Narrative  Not on file No family history on file. Current Outpatient Medications Medication Sig Dispense Refill  triamcinolone acetonide (KENALOG) 40 mg/mL injection 1 mL by Intra artICUlar route once for 1 dose. 1 mL 0  
 lidocaine, PF, (XYLOCAINE) 20 mg/mL (2 %) injection 1 mL by IntraVENous route once for 1 dose. 1 mL 0  
 bumetanide (BUMEX) 2 mg tablet TAKE ONE TABLET BY MOUTH DAILY 30 Tab 1  
 gabapentin (NEURONTIN) 300 mg capsule TAKE TWO CAPSULES BY MOUTH THREE TIMES A  Cap 0  
 LANTUS U-100 INSULIN 100 unit/mL injection INJECT 72 UNITS UNDER THE SKIN EVERY 12 HOURS 6 Vial 10  
 triamcinolone acetonide (KENALOG) 0.1 % ointment Apply  to affected area two (2) times a day. use thin layer bid 80 g 12  
 lidocaine (LIDODERM) 5 % 1 Patch by TransDERmal route every twenty-four (24) hours.  Apply to affected area every 24 hours 1 Package 3  
 Insulin Syringe-Needle U-100 1 mL 31 gauge x 5/16 syrg USE TO INJECT INSULIN FIVE TIMES A  Syringe 11  
 ferrous sulfate (IRON) 325 mg (65 mg iron) EC tablet Take 1 Tab by mouth Daily (before breakfast). 30 Tab 6  
 insulin regular (NOVOLIN R, HUMULIN R) 100 unit/mL injection 14 Units by SubCUTAneous route three (3) times daily as needed (with meals). 1 Vial 12  
 ammonium lactate (LAC-HYDRIN FIVE) 5 % lotion Apply daily 222 mL 3  
 acetaminophen (PAIN AND FEVER) 500 mg tablet TAKE ONE TABLET BY MOUTH EVERY 6 HOURS AS NEEDED FOR PAIN 60 Tab 2  
 ergocalciferol (ERGOCALCIFEROL) 50,000 unit capsule Take 1 Cap by mouth every seven (7) days. 4 Cap 12  
 THERA-TABS M 27 mg iron-400 mcg tab TAKE ONE TABLET BY MOUTH DAILY 30 Tab 11  
 fluticasone (FLONASE) 50 mcg/actuation nasal spray SPRAY TWO SPRAYS IN EACH NOSTRIL DAILY 1 Bottle 11  
 irbesartan (AVAPRO) 300 mg tablet TAKE ONE TABLET BY MOUTH EVERY EVENING 30 Tab 10  
 polyethylene glycol (MIRALAX) 17 gram/dose powder Take 17 g by mouth daily. 850 g 3  
 dilTIAZem (TIAZAC) 180 mg SR capsule Take 1 Cap by mouth daily. 30 Cap 12  
 MICRO THIN LANCETS 33 gauge misc USE TO TEST BLOOD SUGAR TWO TO THREE TIMES DAILY 100 Lancet 11  
 glucose blood VI test strips (ONETOUCH ULTRA TEST) strip Test three times daily. E11.9 Diabetes Type 2 300 Strip 11  Blood-Glucose Meter monitoring kit Use once daily Onetouch test meter only E11.9 Type 2 1 Kit 0  
 CONTOUR NEXT STRIPS strip TEST BLOOD GLUCOSE TWO TO THREE TIMES DAILY 100 Strip 11  
 carvedilol (COREG) 3.125 mg tablet Take 1 Tab by mouth two (2) times daily (with meals). 60 Tab 0  
 docusate sodium (COLACE) 100 mg capsule Take 1 Cap by mouth two (2) times a day. 60 Cap 0  Wheel Chair dianna Custom electric wheelchair 1 Each 0  
 oxyCODONE-acetaminophen (PERCOCET) 5-325 mg per tablet Take 1 Tab by mouth every eight (8) hours as needed. 14 Tab 0  
 Calcium Carbonate-Vit D3-Min 600 mg calcium- 400 unit tab Take  by mouth two (2) times a day.  warfarin (COUMADIN) 10 mg tablet Take 1 Tab by mouth daily.  (Patient not taking: Reported on 10/29/2018) 30 Tab 0  
 
 No Known Allergies Objective: 
Visit Vitals /74 Pulse 75 Temp 98.3 °F (36.8 °C) (Oral) Resp 16 SpO2 96% Physical Exam:  
General appearance - alert, well appearing, and in no distress Mental status - alert, oriented to person, place, and time EYE-LUH, EOMI, corneas normal, no foreign bodies ENT-ENT exam normal, no neck nodes or sinus tenderness Nose - normal and patent, no erythema, discharge or polyps Mouth - mucous membranes moist, pharynx normal without lesions Neck - supple, no significant adenopathy Chest - clear to auscultation, no wheezes, rales or rhonchi, symmetric air entry Heart - normal rate, regular rhythm, normal S1, S2, no murmurs, rubs, clicks or gallops Abdomen - soft, nontender, nondistended, no masses or organomegaly Lymph- no adenopathy palpable Ext-peripheral pulses normal, no pedal edema, no clubbing or cyanosis Skin-Warm and dry. no hyperpigmentation, vitiligo, or suspicious lesions Neuro -alert, oriented, normal speech, no focal findings or movement disorder noted Neck-normal C-spine, no tenderness, full ROM without pain Feet-no nail deformities or callus formation with good pulses noted Rt.shoulder-reduced range of motion of rt., subdeltoid tenderness Results for orders placed or performed in visit on 18 AMB POC GLUCOSE BLOOD, BY GLUCOSE MONITORING DEVICE Result Value Ref Range Glucose  mg/dL AMB POC HEMOGLOBIN A1C Result Value Ref Range Hemoglobin A1c (POC) 5.7 % Assessment/Plan: ICD-10-CM ICD-9-CM 1. Rotator cuff arthropathy of right shoulder M12.811 716.81 TRIAMCINOLONE ACETONIDE INJ  
   LIDOCAINE INJECTION Orders Placed This Encounter  TRIAMCINOLONE ACETONIDE INJ Order Specific Question:   Charge Quantity? Answer:   4  
 LIDOCAINE INJECTION  
 triamcinolone acetonide (KENALOG) 40 mg/mL injection Si mL by Intra artICUlar route once for 1 dose. Dispense:  1 mL   Refill:  0  
  lidocaine, PF, (XYLOCAINE) 20 mg/mL (2 %) injection Si mL by IntraVENous route once for 1 dose. Dispense:  1 mL Refill:  0  
 
lose weight,Take 81mg aspirin daily Indications:  
Symptomatic relief of pain Procedure: After consent was obtained, using sterile technique the right shoulder joint was prepped using alcohol. Local anesthetic used: 1% lidocaine. . The joint was entered by ultrasound guidance and Kenalog 40 mg was mixed with 1% lidocaine 3 ml  and injected into the joint and the needle withdrawn. The procedure was well tolerated. The patient is asked to continue to rest the joint for a few more days before resuming regular activities. It may be more painful for the first 1-2 days. Watch for fever, or increased swelling or persistent pain in the joint. Call or return to clinic prn if such symptoms occur or there is failure to improve as anticipated. [facility] PROCEDURE PROGRESS NOTE Chart reviewed for the following: 
 Arjun Acosta MD, have reviewed the History, Physical and updated the Allergic reactions for Lillette Ingrid III  
 
TIME OUT performed immediately prior to start of procedure: 
 Arjun Acosta MD, have performed the following reviews on Lillette Ingrid III prior to the start of the procedure: 
         
* Patient was identified by name and date of birth * Agreement on procedure being performed was verified * Risks and Benefits explained to the patient * Procedure site verified and marked as necessary * Patient was positioned for comfort Time: 4:50pm 
 
 
Date of procedure: 10/29/2018 Procedure performed by:  Chetan Granda MD 
 
Patient assisted by: nursing attendant How tolerated by patient: tolerated the procedure well with no complications Comments: none Patient Instructions MyChart Activation Thank you for requesting access to [facility]ar. Please follow the instructions below to securely access and download your online medical record. Sitestar allows you to send messages to your doctor, view your test results, renew your prescriptions, schedule appointments, and more. How Do I Sign Up? 1. In your internet browser, go to [email or web address] 
2. Click on the First Time User? Click Here link in the Sign In box. You will be redirect to the New Member Sign Up page. 3. Enter your Sitestar Access Code exactly as it appears below. You will not need to use this code after youve completed the sign-up process. If you do not sign up before the expiration date, you must request a new code. [facility]estar Access Code: [email or web address] Expires: 2018  3:18 PM (This is the date your Sitestar access code will ) 4. Enter the last four digits of your Social Security Number (xxxx) and Date of Birth (mm/dd/yyyy) as indicated and click Submit. You will be taken to the next sign-up page. 5. Create a Sitestar ID. This will be your Sitestar login ID and cannot be changed, so think of one that is secure and easy to remember. 6. Create a Sitestar password. You can change your password at any time. 7. Enter your Password Reset Question and Answer. This can be used at a later time if you forget your password. 8. Enter your e-mail address. You will receive e-mail notification when new information is available in [address] 9. Click Sign Up. You can now view and download portions of your medical record. 10. Click the Download Summary menu link to download a portable copy of your medical information. Additional Information If you have questions, please visit the Frequently Asked Questions section of the Sitestar website at [email or web address] Systems. Amplifinity. Public Mobile/Ephesus Lightinghart/. Remember, Sitestar is NOT to be used for urgent needs. For medical emergencies, dial 911. Follow-up Disposition: Return in about 4 weeks (around 11/26/2018), or if symptoms worsen or fail to improve. I have reviewed with the patient details of the assessment and plan and all questions were answered. Relevent patient education was performed. The most recent lab findings were reviewed with the patient. An After Visit Summary was printed and given to the patient.

## 2018-10-29 NOTE — PROGRESS NOTES
1. Have you been to the ER, urgent care clinic since your last visit? Hospitalized since your last visit?no 2. Have you seen or consulted any other health care providers outside of the [address] [place] [address] since your last visit? Include any pap smears or colon screening. No 
 
PHQ over the last two weeks 9/25/2018 Little interest or pleasure in doing things Not at all Feeling down, depressed, irritable, or hopeless Not at all Total Score PHQ 2 0 Chief Complaint Patient presents with  Leg Swelling f/u  Diabetes

## 2018-10-29 NOTE — PATIENT INSTRUCTIONS
[facility] Activation Thank you for requesting access to [facility]. Please follow the instructions below to securely access and download your online medical record. [facility] allows you to send messages to your doctor, view your test results, renew your prescriptions, schedule appointments, and more. How Do I Sign Up? 1. In your internet browser, go to [email or web address] 
2. Click on the First Time User? Click Here link in the Sign In box. You will be redirect to the New Member Sign Up page. 3. Enter your [facility] Round Access Code exactly as it appears below. You will not need to use this code after youve completed the sign-up process. If you do not sign up before the expiration date, you must request a new code. [facility] Access Code: [email or web address] Expires: 2018  3:18 PM (This is the date your [facility] access code will ) 4. Enter the last four digits of your Social Security Number (xxxx) and Date of Birth (mm/dd/yyyy) as indicated and click Submit. You will be taken to the next sign-up page. 5. Create a [facility]o Round ID. This will be your [facility]o Round login ID and cannot be changed, so think of one that is secure and easy to remember. 6. Create a Primo Round password. You can change your password at any time. 7. Enter your Password Reset Question and Answer. This can be used at a later time if you forget your password. 8. Enter your e-mail address. You will receive e-mail notification when new information is available in [address] 9. Click Sign Up. You can now view and download portions of your medical record. 10. Click the Download Summary menu link to download a portable copy of your medical information. Additional Information If you have questions, please visit the Frequently Asked Questions section of the Primo Round website at [email or web address] Express. Adept Cloud/Innerscope Researchhart/. Remember, Primo Round is NOT to be used for urgent needs. For medical emergencies, dial 911.

## 2018-11-08 RX ORDER — GABAPENTIN 300 MG/1
CAPSULE ORAL
Qty: 180 CAP | Refills: 0 | Status: SHIPPED | OUTPATIENT
Start: 2018-11-08 | End: 2018-12-12 | Stop reason: SDUPTHER

## 2018-12-04 ENCOUNTER — OFFICE VISIT (OUTPATIENT)
Dept: INTERNAL MEDICINE CLINIC | Age: 68
End: 2018-12-04

## 2018-12-04 VITALS
OXYGEN SATURATION: 97 % | SYSTOLIC BLOOD PRESSURE: 154 MMHG | DIASTOLIC BLOOD PRESSURE: 70 MMHG | TEMPERATURE: 98.1 F | RESPIRATION RATE: 16 BRPM | HEART RATE: 72 BPM

## 2018-12-04 DIAGNOSIS — L97.901 ULCER OF LOWER EXTREMITY, LIMITED TO BREAKDOWN OF SKIN, UNSPECIFIED LATERALITY (HCC): Primary | ICD-10-CM

## 2018-12-04 DIAGNOSIS — R60.0 LOCALIZED EDEMA: ICD-10-CM

## 2018-12-04 DIAGNOSIS — I10 ESSENTIAL HYPERTENSION: ICD-10-CM

## 2018-12-04 DIAGNOSIS — M16.11 PRIMARY OSTEOARTHRITIS OF RIGHT HIP: ICD-10-CM

## 2018-12-04 DIAGNOSIS — C61 MALIGNANT NEOPLASM OF PROSTATE (HCC): ICD-10-CM

## 2018-12-04 DIAGNOSIS — E11.42 DIABETIC POLYNEUROPATHY ASSOCIATED WITH TYPE 2 DIABETES MELLITUS (HCC): ICD-10-CM

## 2018-12-04 RX ORDER — METOLAZONE 10 MG/1
10 TABLET ORAL DAILY
Qty: 30 TAB | Refills: 12 | Status: SHIPPED | OUTPATIENT
Start: 2018-12-04 | End: 2019-03-20

## 2018-12-04 RX ORDER — BUMETANIDE 2 MG/1
2 TABLET ORAL 2 TIMES DAILY
Qty: 60 TAB | Refills: 12 | Status: ON HOLD | OUTPATIENT
Start: 2018-12-04 | End: 2019-03-19 | Stop reason: SDUPTHER

## 2018-12-04 NOTE — PROGRESS NOTES
1. Have you been to the ER, urgent care clinic since your last visit? Hospitalized since your last visit? yes/leg wound/[facility]    2. Have you seen or consulted any other health care providers outside of the [address] [place] [address] since your last visit? Include any pap smears or colon screening.  No    PHQ over the last two weeks 9/25/2018   Little interest or pleasure in doing things Not at all   Feeling down, depressed, irritable, or hopeless Not at all   Total Score PHQ 2 0     Chief Complaint   Patient presents with    Leg Problem     ulcers bilateral    ED Follow-up    Leg Swelling

## 2018-12-04 NOTE — PATIENT INSTRUCTIONS
[employer]Sail Education Activation    Thank you for requesting access to [employer] Education. Please follow the instructions below to securely access and download your online medical record. [facility] Education allows you to send messages to your doctor, view your test results, renew your prescriptions, schedule appointments, and more. How Do I Sign Up? 1. In your internet browser, go to [email or web address]  2. Click on the First Time User? Click Here link in the Sign In box. You will be redirect to the New Member Sign Up page. 3. Enter your [employer] Education Access Code exactly as it appears below. You will not need to use this code after youve completed the sign-up process. If you do not sign up before the expiration date, you must request a new code. [employer] Access Code: [email or web address]  Expires: 2018  2:18 PM (This is the date your [employer] access code will )    4. Enter the last four digits of your Social Security Number (xxxx) and Date of Birth (mm/dd/yyyy) as indicated and click Submit. You will be taken to the next sign-up page. 5. Create a [employer] Education ID. This will be your [employer] Education login ID and cannot be changed, so think of one that is secure and easy to remember. 6. Create a [employer] Education password. You can change your password at any time. 7. Enter your Password Reset Question and Answer. This can be used at a later time if you forget your password. 8. Enter your e-mail address. You will receive e-mail notification when new information is available in [address] 9. Click Sign Up. You can now view and download portions of your medical record. 10. Click the Download Summary menu link to download a portable copy of your medical information. Additional Information    If you have questions, please visit the Frequently Asked Questions section of the [employer]ail Education website at https://My Pick Box. AM Technology. JoinTV/Power Innovationshart/. Remember, [employer] Education is NOT to be used for urgent needs. For medical emergencies, dial 911.

## 2018-12-04 NOTE — PROGRESS NOTES
Kelly Patterson is a 80 y.o. male and presents with Leg Problem (ulcers bilateral); ED Follow-up; and Leg Swelling    Subjective:  He states his hospital bed is broken,he needs another bed. Diabetes Mellitus Review:  He has diabetes mellitus. Diabetic ROS - medication compliance: compliant all of the time, diabetic diet compliance: compliant all of the time, home glucose monitoring: is performed. Known diabetic complications: severe neuropathy  Cardiovascular risk factors: family history, dyslipidemia, diabetes mellitus, obesity, hypertension  Current diabetic medications include oral agents/insulin   Eye exam current (within one year): no  Weight trend: stable  Prior visit with dietician: no  Current diet: \"healthy\" diet  in general  Current exercise: walking  Current monitoring regimen: home blood tests - daily  Home blood sugar records: trend: stable  Any episodes of hypoglycemia? no  Is He on ACE inhibitor or angiotensin II receptor blocker? Yes     He has had unrelenting pains involving the legs and feet. He has a burning ,numbness and tingling reported. He recently was treated for wounds in her leg  He has constant leg swelling    Shoulder Pain Review:  Patient complains of right side shoulder pain. The symptoms began several weeks ago Course of symptoms since onset has been gradually worsening. Pain is described as overall severity = moderate. Symptoms were incited by no known event. Patient denies N/A. Therapy to date includes OTC analgesics: effective. Hypertension Review:  The patient has essential hypertension  Diet and Lifestyle: generally follows a  low sodium diet, exercises sporadically  Home BP Monitoring: is not measured at home. Pertinent ROS: taking medications as instructed, no medication side effects noted, no TIA's, no chest pain on exertion, no dyspnea on exertion, no swelling of ankles.          Review of Systems  Constitutional: negative for fevers, chills, anorexia and weight loss  Eyes:   negative for visual disturbance and irritation  ENT:   negative for tinnitus,sore throat,nasal congestion,ear pains. hoarseness  Respiratory:  negative for cough, hemoptysis, dyspnea,wheezing  CV:   negative for chest pain, palpitations, lower extremity edema  GI:   negative for nausea, vomiting, diarrhea, abdominal pain,melena  Endo:               negative for polyuria,polydipsia,polyphagia,heat intolerance  Genitourinary: negative for frequency, dysuria and hematuria  Integument:  negative for rash and pruritus  Hematologic:  negative for easy bruising and gum/nose bleeding  Musculoskel: myalgias, arthralgias, back pain, muscle weakness, joint pain  Neurological:  negative for headaches, dizziness, vertigo, memory problems and gait   Behavl/Psych: negative for feelings of anxiety, depression, mood changes    Past Medical History:   Diagnosis Date    Arthritis, Degenerative,  Knee 4/4/2011    Carcinoma, Prostate 4/4/2011    Degenerative disc disease 4/4/2011    Depression/ Anxiety 4/4/2011    Diabetes [facility] [address]     Diabetes Mellitrus ( non-insulin dependent ) Type 2 4/4/2011    HEMATOCHEZIA 4/4/2011    Hypertrension 4/4/2011    Hypertriglyceridemia 4/4/2011    Insomnia 4/4/2011    Laryngitis 4/4/2011    Mild Aortic insufficiency 4/4/2011    Mild Concentric LVH (left ventricular hypertrophy) 4/4/2011    Neuropathy 4/4/2011    Osteoarthritis 4/4/2011    Rotator Cuff Tendonitis 4/4/2011     Past Surgical History:   Procedure Laterality Date    CARDIAC SURG PROCEDURE UNLIST      cardiac cath    COLONOSCOPY N/A 4/28/2017    COLONOSCOPY performed by Rose Lim MD at \[facility]\"" ENDOSCOPY    HX ORTHOPAEDIC      left hip pinning    HX OTHER SURGICAL      left little toe amputation    HX UROLOGICAL       Social History     Socioeconomic History    Marital status: LEGALLY      Spouse name: Not on file    Number of children: Not on file    Years of education: Not on file    Highest education level: Not on file   Tobacco Use    Smoking status: Current Every Day Smoker     Packs/day: 0.25     Last attempt to quit: 10/25/2016     Years since quittin.1    Smokeless tobacco: Never Used   Substance and Sexual Activity    Alcohol use: Yes     Alcohol/week: 7.0 oz     Types: 7 Cans of beer, 7 Shots of liquor per week    Sexual activity: Yes     Partners: Female     History reviewed. No pertinent family history. Current Outpatient Medications   Medication Sig Dispense Refill    metOLazone (ZAROXOLYN) 10 mg tablet Take 1 Tab by mouth daily. 30 Tab 12    bumetanide (BUMEX) 2 mg tablet Take 1 Tab by mouth two (2) times a day. 60 Tab 12    gabapentin (NEURONTIN) 300 mg capsule TAKE TWO CAPSULES BY MOUTH THREE TIMES A  Cap 0    LANTUS U-100 INSULIN 100 unit/mL injection INJECT 72 UNITS UNDER THE SKIN EVERY 12 HOURS 6 Vial 10    triamcinolone acetonide (KENALOG) 0.1 % ointment Apply  to affected area two (2) times a day. use thin layer bid 80 g 12    lidocaine (LIDODERM) 5 % 1 Patch by TransDERmal route every twenty-four (24) hours. Apply to affected area every 24 hours 1 Package 3    Insulin Syringe-Needle U-100 1 mL 31 gauge x 5/16 syrg USE TO INJECT INSULIN FIVE TIMES A  Syringe 11    ferrous sulfate (IRON) 325 mg (65 mg iron) EC tablet Take 1 Tab by mouth Daily (before breakfast). 30 Tab 6    insulin regular (NOVOLIN R, HUMULIN R) 100 unit/mL injection 14 Units by SubCUTAneous route three (3) times daily as needed (with meals). 1 Vial 12    ammonium lactate (LAC-HYDRIN FIVE) 5 % lotion Apply daily 222 mL 3    acetaminophen (PAIN AND FEVER) 500 mg tablet TAKE ONE TABLET BY MOUTH EVERY 6 HOURS AS NEEDED FOR PAIN 60 Tab 2    ergocalciferol (ERGOCALCIFEROL) 50,000 unit capsule Take 1 Cap by mouth every seven (7) days.  4 Cap 12    THERA-TABS M 27 mg iron-400 mcg tab TAKE ONE TABLET BY MOUTH DAILY 30 Tab 11    fluticasone (FLONASE) 50 mcg/actuation nasal spray SPRAY TWO SPRAYS IN EACH NOSTRIL DAILY 1 Bottle 11    irbesartan (AVAPRO) 300 mg tablet TAKE ONE TABLET BY MOUTH EVERY EVENING 30 Tab 10    polyethylene glycol (MIRALAX) 17 gram/dose powder Take 17 g by mouth daily. 850 g 3    dilTIAZem (TIAZAC) 180 mg SR capsule Take 1 Cap by mouth daily. 30 Cap 12    MICRO THIN LANCETS 33 gauge misc USE TO TEST BLOOD SUGAR TWO TO THREE TIMES DAILY 100 Lancet 11    glucose blood VI test strips (ONETOUCH ULTRA TEST) strip Test three times daily. E11.9    Diabetes Type 2 300 Strip 11    Blood-Glucose Meter monitoring kit Use once daily  Onetouch test meter only  E11.9  Type 2 1 Kit 0    CONTOUR NEXT STRIPS strip TEST BLOOD GLUCOSE TWO TO THREE TIMES DAILY 100 Strip 11    carvedilol (COREG) 3.125 mg tablet Take 1 Tab by mouth two (2) times daily (with meals). 60 Tab 0    docusate sodium (COLACE) 100 mg capsule Take 1 Cap by mouth two (2) times a day. 60 Cap 0    Wheel Chair dianna Custom electric wheelchair 1 Each 0    oxyCODONE-acetaminophen (PERCOCET) 5-325 mg per tablet Take 1 Tab by mouth every eight (8) hours as needed. 14 Tab 0    Calcium Carbonate-Vit D3-Min 600 mg calcium- 400 unit tab Take  by mouth two (2) times a day.  warfarin (COUMADIN) 10 mg tablet Take 1 Tab by mouth daily.  (Patient not taking: Reported on 10/29/2018) 30 Tab 0     No Known Allergies    Objective:  Visit Vitals  /70   Pulse 72   Temp 98.1 °F (36.7 °C) (Oral)   Resp 16   SpO2 97%     Physical Exam:   General appearance - alert, well appearing, and in no distress  Mental status - alert, oriented to person, place, and time  EYE-LUH, EOMI, corneas normal, no foreign bodies  ENT-ENT exam normal, no neck nodes or sinus tenderness  Nose - normal and patent, no erythema, discharge or polyps  Mouth - mucous membranes moist, pharynx normal without lesions  Neck - supple, no significant adenopathy   Chest - clear to auscultation, no wheezes, rales or rhonchi, symmetric air entry   Heart - normal rate, regular rhythm, normal S1, S2, no murmurs, rubs, clicks or gallops   Abdomen - soft, nontender, nondistended, no masses or organomegaly  Lymph- no adenopathy palpable  Ext-peripheral pulses normal, 3+leg edemal , no clubbing or cyanosis  Skin-Warm and dry. no hyperpigmentation, vitiligo, or suspicious lesions  Neuro -alert, oriented, normal speech, no focal findings or movement disorder noted  Neck-normal C-spine, no tenderness, full ROM without pain  Feet-no nail deformities or callus formation with good pulses noted  Rt.shoulder-reduced range of motion of rt., subdeltoid tenderness  Lt.leg ulceration    Results for orders placed or performed in visit on 09/25/18   AMB POC GLUCOSE BLOOD, BY GLUCOSE MONITORING DEVICE   Result Value Ref Range    Glucose  mg/dL   AMB POC HEMOGLOBIN A1C   Result Value Ref Range    Hemoglobin A1c (POC) 5.7 %       Assessment/Plan:    ICD-10-CM ICD-9-CM    1. Ulcer of lower extremity, limited to breakdown of skin, [facility] L97.901 707.10 REFERRAL TO WOUND CARE   2. Localized edema R60.0 782.3 bumetanide (BUMEX) 2 mg tablet   3. Diabetic polyneuropathy associated with type 2 diabetes mellitus (HCC) E11.42 250.60 bumetanide (BUMEX) 2 mg tablet     357.2    4. Carcinoma, Prostate C61 185 bumetanide (BUMEX) 2 mg tablet   5. Essential hypertension I10 401.9 bumetanide (BUMEX) 2 mg tablet   6. Osteoarthrosis involving lower leg M17.10 715.96 bumetanide (BUMEX) 2 mg tablet   7. Primary osteoarthritis of right hip M16.11 715.15 bumetanide (BUMEX) 2 mg tablet     Orders Placed This Encounter    REFERRAL TO WOUND CARE     Referral Priority:   Routine     Referral Type:   Consultation     Referral Reason:   Specialty Services Required     Requested Specialty:   Wound Care     Number of Visits Requested:   1    metOLazone (ZAROXOLYN) 10 mg tablet     Sig: Take 1 Tab by mouth daily.      Dispense:  30 Tab     Refill:  12    bumetanide (BUMEX) 2 mg tablet     Sig: Take 1 Tab by mouth two (2) times a day. Dispense:  60 Tab     Refill:  12     lose weight,Take 81mg aspirin daily                Patient Instructions   MyChart Activation    Thank you for requesting access to [employer]. Please follow the instructions below to securely access and download your online medical record. [employer] allows you to send messages to your doctor, view your test results, renew your prescriptions, schedule appointments, and more. How Do I Sign Up? 1. In your internet browser, go to [email or web address]  2. Click on the First Time User? Click Here link in the Sign In box. You will be redirect to the New Member Sign Up page. 3. Enter your [employer] Access Code exactly as it appears below. You will not need to use this code after youve completed the sign-up process. If you do not sign up before the expiration date, you must request a new code. [employer] Access Code: [email or web address]  Expires: 2018  2:18 PM (This is the date your [employer] access code will )    4. Enter the last four digits of your Social Security Number (xxxx) and Date of Birth (mm/dd/yyyy) as indicated and click Submit. You will be taken to the next sign-up page. 5. Create a [employer]otum ID. This will be your [employer] login ID and cannot be changed, so think of one that is secure and easy to remember. 6. Create a [employer] password. You can change your password at any time. 7. Enter your Password Reset Question and Answer. This can be used at a later time if you forget your password. 8. Enter your e-mail address. You will receive e-mail notification when new information is available in [address] 9. Click Sign Up. You can now view and download portions of your medical record. 10. Click the Download Summary menu link to download a portable copy of your medical information.     Additional Information    If you have questions, please visit the Frequently Asked Questions section of the [employer] website at [email or web address] Remember, [employer] is NOT to be used for urgent needs. For medical emergencies, dial 911. Follow-up Disposition:  Return in about 1 week (around 12/11/2018). I have reviewed with the patient details of the assessment and plan and all questions were answered. Relevent patient education was performed. The most recent lab findings were reviewed with the patient. An After Visit Summary was printed and given to the patient.

## 2018-12-07 ENCOUNTER — OFFICE VISIT (OUTPATIENT)
Dept: INTERNAL MEDICINE CLINIC | Age: 68
End: 2018-12-07

## 2018-12-07 VITALS
TEMPERATURE: 97.5 F | SYSTOLIC BLOOD PRESSURE: 140 MMHG | HEIGHT: 74 IN | RESPIRATION RATE: 16 BRPM | DIASTOLIC BLOOD PRESSURE: 70 MMHG | OXYGEN SATURATION: 96 % | BODY MASS INDEX: 36.98 KG/M2 | HEART RATE: 73 BPM

## 2018-12-07 DIAGNOSIS — E11.42 DIABETIC POLYNEUROPATHY ASSOCIATED WITH TYPE 2 DIABETES MELLITUS (HCC): ICD-10-CM

## 2018-12-07 DIAGNOSIS — R26.9 GAIT ABNORMALITY: Primary | ICD-10-CM

## 2018-12-07 DIAGNOSIS — M12.811 ROTATOR CUFF ARTHROPATHY OF RIGHT SHOULDER: ICD-10-CM

## 2018-12-07 DIAGNOSIS — L97.901 ULCER OF LOWER EXTREMITY, LIMITED TO BREAKDOWN OF SKIN, UNSPECIFIED LATERALITY (HCC): ICD-10-CM

## 2018-12-07 DIAGNOSIS — C61 MALIGNANT NEOPLASM OF PROSTATE (HCC): ICD-10-CM

## 2018-12-07 DIAGNOSIS — E66.01 SEVERE OBESITY (BMI 35.0-39.9) WITH COMORBIDITY (HCC): ICD-10-CM

## 2018-12-07 DIAGNOSIS — I10 ESSENTIAL HYPERTENSION: ICD-10-CM

## 2018-12-07 DIAGNOSIS — N18.30 STAGE 3 CHRONIC KIDNEY DISEASE (HCC): ICD-10-CM

## 2018-12-07 DIAGNOSIS — M16.11 PRIMARY OSTEOARTHRITIS OF RIGHT HIP: ICD-10-CM

## 2018-12-07 RX ORDER — LEVOFLOXACIN 500 MG/1
500 TABLET, FILM COATED ORAL DAILY
Qty: 14 TAB | Refills: 0 | Status: SHIPPED | OUTPATIENT
Start: 2018-12-07 | End: 2019-03-14

## 2018-12-07 NOTE — PATIENT INSTRUCTIONS
[employer] Activation    Thank you for requesting access to [employer]. Please follow the instructions below to securely access and download your online medical record. [employer] allows you to send messages to your doctor, view your test results, renew your prescriptions, schedule appointments, and more. How Do I Sign Up? 1. In your internet browser, go to [email or web address]  2. Click on the First Time User? Click Here link in the Sign In box. You will be redirect to the New Member Sign Up page. 3. Enter your [employer] Access Code exactly as it appears below. You will not need to use this code after youve completed the sign-up process. If you do not sign up before the expiration date, you must request a new code. [employer] Access Code: [email or web address]  Expires: 2018  2:18 PM (This is the date your [employer] access code will )    4. Enter the last four digits of your Social Security Number (xxxx) and Date of Birth (mm/dd/yyyy) as indicated and click Submit. You will be taken to the next sign-up page. 5. Create a [employer] ID. This will be your [employer] login ID and cannot be changed, so think of one that is secure and easy to remember. 6. Create a [employer] password. You can change your password at any time. 7. Enter your Password Reset Question and Answer. This can be used at a later time if you forget your password. 8. Enter your e-mail address. You will receive e-mail notification when new information is available in [address] 9. Click Sign Up. You can now view and download portions of your medical record. 10. Click the Download Summary menu link to download a portable copy of your medical information. Additional Information    If you have questions, please visit the Frequently Asked Questions section of the [employer] website at [email or web address] Remember, [employer] is NOT to be used for urgent needs. For medical emergencies, dial 911.

## 2018-12-07 NOTE — PROGRESS NOTES
1. Have you been to the ER, urgent care clinic since your last visit? Hospitalized since your last visit?no    2. Have you seen or consulted any other health care providers outside of the [address] [place] [address] since your last visit? Include any pap smears or colon screening.  No    PHQ over the last two weeks 9/25/2018   Little interest or pleasure in doing things Not at all   Feeling down, depressed, irritable, or hopeless Not at all   Total Score PHQ 2 0

## 2018-12-07 NOTE — PROGRESS NOTES
Kim Willoughby is a 80 y.o. male and presents with Wound Check    Subjective:  He needs a face to face for a hospital bed    He has been using   a mobile device and states his current hospital bed is broken. He walks with crutches and has a severe hip osteoarthritis and severe knee osteoarthritis. Diabetes Mellitus Review:  He has diabetes mellitus. Diabetic ROS - medication compliance: compliant all of the time, diabetic diet compliance: compliant all of the time, home glucose monitoring: is performed. Known diabetic complications: severe neuropathy  Cardiovascular risk factors: family history, dyslipidemia, diabetes mellitus, obesity, hypertension  Current diabetic medications include oral agents/insulin   Eye exam current (within one year): no  Weight trend: stable  Prior visit with dietician: no  Current diet: \"healthy\" diet  in general  Current exercise: walking  Current monitoring regimen: home blood tests - daily  Home blood sugar records: trend: stable  Any episodes of hypoglycemia? no  Is He on ACE inhibitor or angiotensin II receptor blocker? Yes     He still has had unrelenting pains involving the legs and feet. He has a burning ,numbness and tingling reported. He recently was treated for wounds in her leg  He has constant leg swelling    Shoulder Pain Review:  Patient complains of right side shoulder pain. The symptoms began several weeks ago Course of symptoms since onset has been gradually worsening. Pain is described as overall severity = moderate. Symptoms were incited by no known event. Patient denies N/A. Therapy to date includes OTC analgesics: effective. Hypertension Review:  The patient has essential hypertension  Diet and Lifestyle: generally follows a  low sodium diet, exercises sporadically  Home BP Monitoring: is not measured at home.   Pertinent ROS: taking medications as instructed, no medication side effects noted, no TIA's, no chest pain on exertion, no dyspnea on exertion, no swelling of ankles. Review of Systems  Constitutional: negative for fevers, chills, anorexia and weight loss  Eyes:   negative for visual disturbance and irritation  ENT:   negative for tinnitus,sore throat,nasal congestion,ear pains. hoarseness  Respiratory:  negative for cough, hemoptysis, dyspnea,wheezing  CV:   negative for chest pain, palpitations, lower extremity edema  GI:   negative for nausea, vomiting, diarrhea, abdominal pain,melena  Endo:               negative for polyuria,polydipsia,polyphagia,heat intolerance  Genitourinary: negative for frequency, dysuria and hematuria  Integument:  negative for rash and pruritus  Hematologic:  negative for easy bruising and gum/nose bleeding  Musculoskel: myalgias, arthralgias, back pain, muscle weakness, joint pain  Neurological:  negative for headaches, dizziness, vertigo, memory problems and gait   Behavl/Psych: negative for feelings of anxiety, depression, mood changes    Past Medical History:   Diagnosis Date    Arthritis, Degenerative,  Knee 4/4/2011    Carcinoma, Prostate 4/4/2011    Degenerative disc disease 4/4/2011    Depression/ Anxiety 4/4/2011    Diabetes ([address]     Diabetes Mellitrus ( non-insulin dependent ) Type 2 4/4/2011    HEMATOCHEZIA 4/4/2011    Hypertrension 4/4/2011    Hypertriglyceridemia 4/4/2011    Insomnia 4/4/2011    Laryngitis 4/4/2011    Mild Aortic insufficiency 4/4/2011    Mild Concentric LVH (left ventricular hypertrophy) 4/4/2011    Neuropathy 4/4/2011    Osteoarthritis 4/4/2011    Rotator Cuff Tendonitis 4/4/2011     Past Surgical History:   Procedure Laterality Date    CARDIAC SURG PROCEDURE UNLIST      cardiac cath    COLONOSCOPY N/A 4/28/2017    [facility]ret Groves [facility]    HX ORTHOPAEDIC      left hip pinning    HX OTHER SURGICAL      left little toe amputation    HX UROLOGICAL       Social History     Socioeconomic History    Marital status: LEGALLY      Spouse name: Not on file    Number of children: Not on file    Years of education: Not on file    Highest education level: Not on file   Tobacco Use    Smoking status: Current Every Day Smoker     Packs/day: 0.25     Last attempt to quit: 10/25/2016     Years since quittin.1    Smokeless tobacco: Never Used   Substance and Sexual Activity    Alcohol use: Yes     Alcohol/week: 7.0 oz     Types: 7 Cans of beer, 7 Shots of liquor per week    Sexual activity: Yes     Partners: Female     History reviewed. No pertinent family history. Current Outpatient Medications   Medication Sig Dispense Refill    levoFLOXacin (LEVAQUIN) 500 mg tablet Take 1 Tab by mouth daily. 14 Tab 0    metOLazone (ZAROXOLYN) 10 mg tablet Take 1 Tab by mouth daily. 30 Tab 12    bumetanide (BUMEX) 2 mg tablet Take 1 Tab by mouth two (2) times a day. 60 Tab 12    gabapentin (NEURONTIN) 300 mg capsule TAKE TWO CAPSULES BY MOUTH THREE TIMES A  Cap 0    LANTUS U-100 INSULIN 100 unit/mL injection INJECT 72 UNITS UNDER THE SKIN EVERY 12 HOURS 6 Vial 10    triamcinolone acetonide (KENALOG) 0.1 % ointment Apply  to affected area two (2) times a day. use thin layer bid 80 g 12    lidocaine (LIDODERM) 5 % 1 Patch by TransDERmal route every twenty-four (24) hours. Apply to affected area every 24 hours 1 Package 3    Insulin Syringe-Needle U-100 1 mL 31 gauge x 5/16 syrg USE TO INJECT INSULIN FIVE TIMES A  Syringe 11    ferrous sulfate (IRON) 325 mg (65 mg iron) EC tablet Take 1 Tab by mouth Daily (before breakfast). 30 Tab 6    insulin regular (NOVOLIN R, HUMULIN R) 100 unit/mL injection 14 Units by SubCUTAneous route three (3) times daily as needed (with meals).  1 Vial 12    ammonium lactate (LAC-HYDRIN FIVE) 5 % lotion Apply daily 222 mL 3    acetaminophen (PAIN AND FEVER) 500 mg tablet TAKE ONE TABLET BY MOUTH EVERY 6 HOURS AS NEEDED FOR PAIN 60 Tab 2    ergocalciferol (ERGOCALCIFEROL) 50,000 unit capsule Take 1 Cap by mouth every seven (7) days. 4 Cap 12    THERA-TABS M 27 mg iron-400 mcg tab TAKE ONE TABLET BY MOUTH DAILY 30 Tab 11    fluticasone (FLONASE) 50 mcg/actuation nasal spray SPRAY TWO SPRAYS IN EACH NOSTRIL DAILY 1 Bottle 11    irbesartan (AVAPRO) 300 mg tablet TAKE ONE TABLET BY MOUTH EVERY EVENING 30 Tab 10    polyethylene glycol (MIRALAX) 17 gram/dose powder Take 17 g by mouth daily. 850 g 3    dilTIAZem (TIAZAC) 180 mg SR capsule Take 1 Cap by mouth daily. 30 Cap 12    MICRO THIN LANCETS 33 gauge misc USE TO TEST BLOOD SUGAR TWO TO THREE TIMES DAILY 100 Lancet 11    glucose blood VI test strips (ONETOUCH ULTRA TEST) strip Test three times daily. E11.9    Diabetes Type 2 300 Strip 11    Blood-Glucose Meter monitoring kit Use once daily  Onetouch test meter only  E11.9  Type 2 1 Kit 0    CONTOUR NEXT STRIPS strip TEST BLOOD GLUCOSE TWO TO THREE TIMES DAILY 100 Strip 11    carvedilol (COREG) 3.125 mg tablet Take 1 Tab by mouth two (2) times daily (with meals). 60 Tab 0    docusate sodium (COLACE) 100 mg capsule Take 1 Cap by mouth two (2) times a day. 60 Cap 0    Wheel Chair dianna Custom electric wheelchair 1 Each 0    oxyCODONE-acetaminophen (PERCOCET) 5-325 mg per tablet Take 1 Tab by mouth every eight (8) hours as needed. 14 Tab 0    Calcium Carbonate-Vit D3-Min 600 mg calcium- 400 unit tab Take  by mouth two (2) times a day.  warfarin (COUMADIN) 10 mg tablet Take 1 Tab by mouth daily.  (Patient not taking: Reported on 10/29/2018) 30 Tab 0     No Known Allergies    Objective:  Visit Vitals  /70 (BP 1 Location: Left arm, BP Patient Position: Sitting)   Pulse 73   Temp 97.5 °F (36.4 °C) (Oral)   Resp 16   Ht 6' 2\" (1.88 m)   SpO2 96%   BMI 36.98 kg/m²     Physical Exam:   General appearance - alert, well appearing, and in no distress  Mental status - alert, oriented to person, place, and time  EYE-LUH, EOMI, corneas normal, no foreign bodies  ENT-ENT exam normal, no neck nodes or sinus tenderness  Nose - normal and patent, no erythema, discharge or polyps  Mouth - mucous membranes moist, pharynx normal without lesions  Neck - supple, no significant adenopathy   Chest - clear to auscultation, no wheezes, rales or rhonchi, symmetric air entry   Heart - normal rate, regular rhythm, normal S1, S2, no murmurs, rubs, clicks or gallops   Abdomen - soft, nontender, nondistended, no masses or organomegaly  Lymph- no adenopathy palpable  Ext-peripheral pulses normal, 3+leg edemal , no clubbing or cyanosis  Skin-Warm and dry. no hyperpigmentation, vitiligo, or suspicious lesions  Neuro -alert, oriented, normal speech, no focal findings or movement disorder noted  Neck-normal C-spine, no tenderness, full ROM without pain  Feet-no nail deformities or callus formation with good pulses noted  Rt.shoulder-reduced range of motion of rt., subdeltoid tenderness  Lt.leg erythema and dryness noted    Unna boot and wound care give to lower leg    Results for orders placed or performed in visit on 09/25/18   AMB POC GLUCOSE BLOOD, BY GLUCOSE MONITORING DEVICE   Result Value Ref Range    Glucose  mg/dL   AMB POC HEMOGLOBIN A1C   Result Value Ref Range    Hemoglobin A1c (POC) 5.7 %       Assessment/Plan:    ICD-10-CM ICD-9-CM    1. Gait abnormality R26.9 781.2    2. Diabetic polyneuropathy associated with type 2 diabetes mellitus (HCC) E11.42 250.60      357.2    3. Carcinoma, Prostate C61 185    4. Essential hypertension I10 401.9    5. Osteoarthrosis involving lower leg M17.10 715.96    6. Primary osteoarthritis of right hip M16.11 715.15    7. Ulcer of lower extremity, limited to breakdown of skin, unspecified laterality ([address] L97.901 707.10    8. Rotator cuff arthropathy of right shoulder M12.811 716.81    9. Severe obesity (BMI 35.0-39. 9) with comorbidity ([address] E66.01 278.01    10.  Stage 3 chronic kidney disease (HCC) N18.3 585.3      Orders Placed This Encounter    levoFLOXacin (Kaylee Zina) 500 mg tablet     Sig: Take 1 Tab by mouth daily. Dispense:  14 Tab     Refill:  0     lose weight,Take 81mg aspirin daily                There are no Patient Instructions on file for this visit. Follow-up Disposition: Not on File      I have reviewed with the patient details of the assessment and plan and all questions were answered. Relevent patient education was performed. The most recent lab findings were reviewed with the patient. An After Visit Summary was printed and given to the patient.

## 2018-12-13 RX ORDER — GABAPENTIN 300 MG/1
CAPSULE ORAL
Qty: 180 CAP | Refills: 0 | Status: SHIPPED | OUTPATIENT
Start: 2018-12-13 | End: 2019-01-13 | Stop reason: SDUPTHER

## 2018-12-14 ENCOUNTER — TELEPHONE (OUTPATIENT)
Dept: INTERNAL MEDICINE CLINIC | Age: 68
End: 2018-12-14

## 2018-12-14 NOTE — TELEPHONE ENCOUNTER
Transportation arrangements for December 18. 2018 with  [address] [place] [address] [id number], I spoke with [facility], HIS RIDE WILL ARRIVE AT HIS HOUSE AT 1:30 PM HE SHOULD ARRIVE AT [facility],MOB 1,  BY 2PM FOR HIS CAT SCAN AT 2:30 PM.

## 2018-12-20 ENCOUNTER — OFFICE VISIT (OUTPATIENT)
Dept: INTERNAL MEDICINE CLINIC | Age: 68
End: 2018-12-20

## 2018-12-20 VITALS
HEART RATE: 86 BPM | SYSTOLIC BLOOD PRESSURE: 140 MMHG | DIASTOLIC BLOOD PRESSURE: 70 MMHG | TEMPERATURE: 98.1 F | HEIGHT: 74 IN | OXYGEN SATURATION: 97 % | RESPIRATION RATE: 20 BRPM | BODY MASS INDEX: 36.98 KG/M2

## 2018-12-20 DIAGNOSIS — M16.11 PRIMARY OSTEOARTHRITIS OF RIGHT HIP: ICD-10-CM

## 2018-12-20 DIAGNOSIS — I10 ESSENTIAL HYPERTENSION: ICD-10-CM

## 2018-12-20 DIAGNOSIS — C61 MALIGNANT NEOPLASM OF PROSTATE (HCC): ICD-10-CM

## 2018-12-20 DIAGNOSIS — M12.812 ROTATOR CUFF ARTHROPATHY OF LEFT SHOULDER: Primary | ICD-10-CM

## 2018-12-20 DIAGNOSIS — E66.01 SEVERE OBESITY (BMI 35.0-39.9) WITH COMORBIDITY (HCC): ICD-10-CM

## 2018-12-20 NOTE — PROGRESS NOTES
Vikash Cortes is a 80 y.o. male and presents with Leg Swelling and Shoulder Pain    Subjective:    Diabetes Mellitus Review:  He has diabetes mellitus. Diabetic ROS - medication compliance: compliant all of the time, diabetic diet compliance: compliant all of the time, home glucose monitoring: is performed. Known diabetic complications: severe neuropathy  Cardiovascular risk factors: family history, dyslipidemia, diabetes mellitus, obesity, hypertension  Current diabetic medications include oral agents/insulin   Eye exam current (within one year): no  Weight trend: stable  Prior visit with dietician: no  Current diet: \"healthy\" diet  in general  Current exercise: walking  Current monitoring regimen: home blood tests - daily  Home blood sugar records: trend: stable  Any episodes of hypoglycemia? no  Is He on ACE inhibitor or angiotensin II receptor blocker? Yes     Shoulder Pain Review:  Patient complains of lt. shoulder pain. The symptoms began several weeks ago Course of symptoms since onset has been gradually worsening. Pain is described as overall severity = moderate. Symptoms were incited by no known event. Patient denies N/A. Therapy to date includes OTC analgesics: effective. Hypertension Review:  The patient has essential hypertension  Diet and Lifestyle: generally follows a  low sodium diet, exercises sporadically  Home BP Monitoring: is not measured at home. Pertinent ROS: taking medications as instructed, no medication side effects noted, no TIA's, no chest pain on exertion, no dyspnea on exertion, no swelling of ankles. Review of Systems  Constitutional: negative for fevers, chills, anorexia and weight loss  Eyes:   negative for visual disturbance and irritation  ENT:   negative for tinnitus,sore throat,nasal congestion,ear pains. hoarseness  Respiratory:  negative for cough, hemoptysis, dyspnea,wheezing  CV:   negative for chest pain, palpitations, lower extremity edema  GI: negative for nausea, vomiting, diarrhea, abdominal pain,melena  Endo:               negative for polyuria,polydipsia,polyphagia,heat intolerance  Genitourinary: negative for frequency, dysuria and hematuria  Integument:  negative for rash and pruritus  Hematologic:  negative for easy bruising and gum/nose bleeding  Musculoskel: myalgias, arthralgias, back pain, muscle weakness, joint pain  Neurological:  negative for headaches, dizziness, vertigo, memory problems and gait   Behavl/Psych: negative for feelings of anxiety, depression, mood changes    Past Medical History:   Diagnosis Date    Arthritis, Degenerative,  Knee 2011    Carcinoma, Prostate 2011    Degenerative disc disease 2011    Depression/ Anxiety 2011    Diabetes [address] [address]     Diabetes Mellitrus ( non-insulin dependent ) Type 2 2011    HEMATOCHEZIA 2011    Hypertrension 2011    Hypertriglyceridemia 2011    Insomnia 2011    Laryngitis 2011    Mild Aortic insufficiency 2011    Mild Concentric LVH (left ventricular hypertrophy) 2011    Neuropathy 2011    Osteoarthritis 2011    Rotator Cuff Tendonitis 2011     Past Surgical History:   Procedure Laterality Date    CARDIAC SURG PROCEDURE UNLIST      cardiac cath    COLONOSCOPY N/A 2017    [facility] Mian [facility] ENDOSCOPY    HX ORTHOPAEDIC      left hip pinning    HX OTHER SURGICAL      left little toe amputation    HX UROLOGICAL       Social History     Socioeconomic History    Marital status: LEGALLY      Spouse name: Not on file    Number of children: Not on file    Years of education: Not on file    Highest education level: Not on file   Tobacco Use    Smoking status: Current Every Day Smoker     Packs/day: 0.25     Last attempt to quit: 10/25/2016     Years since quittin.1    Smokeless tobacco: Never Used   Substance and Sexual Activity    Alcohol use:  Yes     Alcohol/week: 7.0 oz     Types: 7 Cans of beer, 7 Shots of liquor per week    Sexual activity: Yes     Partners: Female     History reviewed. No pertinent family history. Current Outpatient Medications   Medication Sig Dispense Refill    gabapentin (NEURONTIN) 300 mg capsule TAKE TWO CAPSULES BY MOUTH THREE TIMES A  Cap 0    levoFLOXacin (LEVAQUIN) 500 mg tablet Take 1 Tab by mouth daily. 14 Tab 0    metOLazone (ZAROXOLYN) 10 mg tablet Take 1 Tab by mouth daily. 30 Tab 12    bumetanide (BUMEX) 2 mg tablet Take 1 Tab by mouth two (2) times a day. 60 Tab 12    LANTUS U-100 INSULIN 100 unit/mL injection INJECT 72 UNITS UNDER THE SKIN EVERY 12 HOURS 6 Vial 10    triamcinolone acetonide (KENALOG) 0.1 % ointment Apply  to affected area two (2) times a day. use thin layer bid 80 g 12    lidocaine (LIDODERM) 5 % 1 Patch by TransDERmal route every twenty-four (24) hours. Apply to affected area every 24 hours 1 Package 3    Insulin Syringe-Needle U-100 1 mL 31 gauge x 5/16 syrg USE TO INJECT INSULIN FIVE TIMES A  Syringe 11    ferrous sulfate (IRON) 325 mg (65 mg iron) EC tablet Take 1 Tab by mouth Daily (before breakfast). 30 Tab 6    insulin regular (NOVOLIN R, HUMULIN R) 100 unit/mL injection 14 Units by SubCUTAneous route three (3) times daily as needed (with meals). 1 Vial 12    ammonium lactate (LAC-HYDRIN FIVE) 5 % lotion Apply daily 222 mL 3    acetaminophen (PAIN AND FEVER) 500 mg tablet TAKE ONE TABLET BY MOUTH EVERY 6 HOURS AS NEEDED FOR PAIN 60 Tab 2    ergocalciferol (ERGOCALCIFEROL) 50,000 unit capsule Take 1 Cap by mouth every seven (7) days.  4 Cap 12    THERA-TABS M 27 mg iron-400 mcg tab TAKE ONE TABLET BY MOUTH DAILY 30 Tab 11    fluticasone (FLONASE) 50 mcg/actuation nasal spray SPRAY TWO SPRAYS IN EACH NOSTRIL DAILY 1 Bottle 11    irbesartan (AVAPRO) 300 mg tablet TAKE ONE TABLET BY MOUTH EVERY EVENING 30 Tab 10    polyethylene glycol (MIRALAX) 17 gram/dose powder Take 17 g by mouth daily. 850 g 3    dilTIAZem (TIAZAC) 180 mg SR capsule Take 1 Cap by mouth daily. 30 Cap 12    MICRO THIN LANCETS 33 gauge misc USE TO TEST BLOOD SUGAR TWO TO THREE TIMES DAILY 100 Lancet 11    glucose blood VI test strips (ONETOUCH ULTRA TEST) strip Test three times daily. E11.9    Diabetes Type 2 300 Strip 11    Blood-Glucose Meter monitoring kit Use once daily  Onetouch test meter only  E11.9  Type 2 1 Kit 0    CONTOUR NEXT STRIPS strip TEST BLOOD GLUCOSE TWO TO THREE TIMES DAILY 100 Strip 11    carvedilol (COREG) 3.125 mg tablet Take 1 Tab by mouth two (2) times daily (with meals). 60 Tab 0    docusate sodium (COLACE) 100 mg capsule Take 1 Cap by mouth two (2) times a day. 60 Cap 0    Wheel Chair dianna Custom electric wheelchair 1 Each 0    oxyCODONE-acetaminophen (PERCOCET) 5-325 mg per tablet Take 1 Tab by mouth every eight (8) hours as needed. 14 Tab 0    Calcium Carbonate-Vit D3-Min 600 mg calcium- 400 unit tab Take  by mouth two (2) times a day.  warfarin (COUMADIN) 10 mg tablet Take 1 Tab by mouth daily.  30 Tab 0     No Known Allergies    Objective:  Visit Vitals  /70 (BP 1 Location: Left arm, BP Patient Position: Sitting)   Pulse 86   Temp 98.1 °F (36.7 °C) (Oral)   Resp 20   Ht 6' 2\" (1.88 m)   SpO2 97%   BMI 36.98 kg/m²     Physical Exam:   General appearance - alert, well appearing, and in moderate distress  Mental status - alert, oriented to person, place, and time  EYE-LUH, EOMI, corneas normal, no foreign bodies  ENT-ENT exam normal, no neck nodes or sinus tenderness  Nose - normal and patent, no erythema, discharge or polyps  Mouth - mucous membranes moist, pharynx normal without lesions  Neck - supple, no significant adenopathy   Chest - clear to auscultation, no wheezes, rales or rhonchi, symmetric air entry   Heart - normal rate, regular rhythm, normal S1, S2, no murmurs, rubs, clicks or gallops   Abdomen - soft, nontender, nondistended, no masses or organomegaly  Lymph- no adenopathy palpable  Ext-peripheral pulses normal, 3+leg edemal , no clubbing or cyanosis  Skin-Warm and dry. no hyperpigmentation, vitiligo, or suspicious lesions  Neuro -alert, oriented, normal speech, no focal findings or movement disorder noted  Neck-normal C-spine, no tenderness, full ROM without pain  Feet-no nail deformities or callus formation with good pulses noted  Lt. .shoulder-reduced range of motion of rt., subdeltoid tenderness  Lt.leg erythema and dryness noted      Results for orders placed or performed in visit on 09/25/18   AMB POC GLUCOSE BLOOD, BY GLUCOSE MONITORING DEVICE   Result Value Ref Range    Glucose  mg/dL   AMB POC HEMOGLOBIN A1C   Result Value Ref Range    Hemoglobin A1c (POC) 5.7 %       Assessment/Plan:    ICD-10-CM ICD-9-CM    1. Rotator cuff arthropathy of left shoulder M12.812 716.81    2. Carcinoma, Prostate C61 185    3. Essential hypertension I10 401.9    4. Primary osteoarthritis of right hip M16.11 715.15    5. Severe obesity (BMI 35.0-39. 9) with comorbidity ([facility] [address] E66.01 278.01      No orders of the defined types were placed in this encounter. lose weight,Take 81mg aspirin daily    Indications:   Symptomatic relief of pain    Procedure:  After consent was obtained, using sterile technique the left shoulder joint was prepped using alcohol. Local anesthetic used: 1% lidocaine. . The joint was entered and Kenalog 40 mg was mixed with 1% lidocaine 3 ml  and injected into the joint and the needle withdrawn. The procedure was well tolerated. The patient is asked to continue to rest the joint for a few more days before resuming regular activities. It may be more painful for the first 1-2 days. Watch for fever, or increased swelling or persistent pain in the joint. Call or return to clinic prn if such symptoms occur or there is failure to improve as anticipated.       [facility]  OFFICE PROCEDURE PROGRESS NOTE        Chart reviewed for the following:   Arjun Acosta MD, have reviewed the History, Physical and updated the Allergic reactions for Lillette Ingrid III     TIME OUT performed immediately prior to start of procedure:   I, Chetan Granda MD, have performed the following reviews on Lillette Ingrid III prior to the start of the procedure:            * Patient was identified by name and date of birth   * Agreement on procedure being performed was verified  * Risks and Benefits explained to the patient  * Procedure site verified and marked as necessary  * Patient was positioned for comfort       Time: 3:31pm      Date of procedure: 2018    Procedure performed by:  Chetan Granda MD    Patient assisted by: nursing attendant    How tolerated by patient: tolerated the procedure well with no complications    Comments: none                        Patient Instructions   MyChart Activation    Thank you for requesting access to [facility]. Please follow the instructions below to securely access and download your online medical record. [facility] allows you to send messages to your doctor, view your test results, renew your prescriptions, schedule appointments, and more. How Do I Sign Up? 1. In your internet browser, go to [email or web address]  2. Click on the First Time User? Click Here link in the Sign In box. You will be redirect to the New Member Sign Up page. 3. Enter your [employer] Access Code exactly as it appears below. You will not need to use this code after youve completed the sign-up process. If you do not sign up before the expiration date, you must request a new code. [employer] Access Code: [email or web address]  Expires: 2018  2:18 PM (This is the date your [employer] access code will )    4. Enter the last four digits of your Social Security Number (xxxx) and Date of Birth (mm/dd/yyyy) as indicated and click Submit. You will be taken to the next sign-up page. 5. Create a [employer] ID.  This will be your [employer] login ID and cannot be changed, so think of one that is secure and easy to remember. 6. Create a [employer] password. You can change your password at any time. 7. Enter your Password Reset Question and Answer. This can be used at a later time if you forget your password. 8. Enter your e-mail address. You will receive e-mail notification when new information is available in [address] 9. Click Sign Up. You can now view and download portions of your medical record. 10. Click the Download Summary menu link to download a portable copy of your medical information. Additional Information    If you have questions, please visit the Frequently Asked Questions section of the [employer] website at [email or web address] Remember, [employer] is NOT to be used for urgent needs. For medical emergencies, dial 911. Follow-up Disposition:  Return in about 3 months (around 3/20/2019), or if symptoms worsen or fail to improve. I have reviewed with the patient details of the assessment and plan and all questions were answered. Relevent patient education was performed. The most recent lab findings were reviewed with the patient. An After Visit Summary was printed and given to the patient. 1. Have you been to the ER, urgent care clinic since your last visit? Hospitalized since your last visit?no    2. Have you seen or consulted any other health care providers outside of the [facility] since your last visit?   Include any pap smears or colon screening.yes, [facility]    PHQ over the last two weeks 9/25/2018   Little interest or pleasure in doing things Not at all   Feeling down, depressed, irritable, or hopeless Not at all   Total Score PHQ 2 0

## 2018-12-20 NOTE — PATIENT INSTRUCTIONS
[employer] Activation    Thank you for requesting access to [employer]. Please follow the instructions below to securely access and download your online medical record. [employer] allows you to send messages to your doctor, view your test results, renew your prescriptions, schedule appointments, and more. How Do I Sign Up? 1. In your internet browser, go to [email or web address] [email or web address]  2. Click on the First Time User? Click Here link in the Sign In box. You will be redirect to the New Member Sign Up page. 3. Enter your [employer] Access Code exactly as it appears below. You will not need to use this code after youve completed the sign-up process. If you do not sign up before the expiration date, you must request a new code. [employer] Access Code: [email or web address]  Expires: 2018  2:18 PM (This is the date your [employer] access code will )    4. Enter the last four digits of your Social Security Number (xxxx) and Date of Birth (mm/dd/yyyy) as indicated and click Submit. You will be taken to the next sign-up page. 5. Create a [employer] ID. This will be your [employer] login ID and cannot be changed, so think of one that is secure and easy to remember. 6. Create a [employer] password. You can change your password at any time. 7. Enter your Password Reset Question and Answer. This can be used at a later time if you forget your password. 8. Enter your e-mail address. You will receive e-mail notification when new information is available in [address] 9. Click Sign Up. You can now view and download portions of your medical record. 10. Click the Download Summary menu link to download a portable copy of your medical information. Additional Information    If you have questions, please visit the Frequently Asked Questions section of the [employer] website at [email or web address] Remember, [employer] is NOT to be used for urgent needs. For medical emergencies, dial 911.

## 2018-12-24 DIAGNOSIS — R60.0 LOCALIZED EDEMA: ICD-10-CM

## 2018-12-24 DIAGNOSIS — C61 MALIGNANT NEOPLASM OF PROSTATE (HCC): ICD-10-CM

## 2018-12-24 DIAGNOSIS — E11.42 DIABETIC POLYNEUROPATHY ASSOCIATED WITH TYPE 2 DIABETES MELLITUS (HCC): ICD-10-CM

## 2018-12-24 DIAGNOSIS — M16.11 PRIMARY OSTEOARTHRITIS OF RIGHT HIP: ICD-10-CM

## 2018-12-24 DIAGNOSIS — I10 ESSENTIAL HYPERTENSION: ICD-10-CM

## 2018-12-28 RX ORDER — BUMETANIDE 2 MG/1
TABLET ORAL
Qty: 30 TAB | Refills: 0 | Status: SHIPPED | OUTPATIENT
Start: 2018-12-28 | End: 2019-03-14

## 2019-01-13 RX ORDER — GABAPENTIN 300 MG/1
CAPSULE ORAL
Qty: 180 CAP | Refills: 0 | Status: SHIPPED | OUTPATIENT
Start: 2019-01-13 | End: 2019-02-10 | Stop reason: SDUPTHER

## 2019-02-11 RX ORDER — GABAPENTIN 300 MG/1
CAPSULE ORAL
Qty: 180 CAP | Refills: 0 | Status: ON HOLD | OUTPATIENT
Start: 2019-02-11 | End: 2019-03-15 | Stop reason: SDUPTHER

## 2019-02-20 ENCOUNTER — OFFICE VISIT (OUTPATIENT)
Dept: INTERNAL MEDICINE CLINIC | Age: 69
End: 2019-02-20

## 2019-02-20 VITALS
OXYGEN SATURATION: 96 % | RESPIRATION RATE: 17 BRPM | BODY MASS INDEX: 36.98 KG/M2 | DIASTOLIC BLOOD PRESSURE: 70 MMHG | TEMPERATURE: 97.9 F | SYSTOLIC BLOOD PRESSURE: 130 MMHG | HEART RATE: 79 BPM | HEIGHT: 74 IN

## 2019-02-20 DIAGNOSIS — R60.0 LOCALIZED EDEMA: Primary | ICD-10-CM

## 2019-02-20 DIAGNOSIS — M16.11 PRIMARY OSTEOARTHRITIS OF RIGHT HIP: ICD-10-CM

## 2019-02-20 DIAGNOSIS — E11.42 DIABETIC POLYNEUROPATHY ASSOCIATED WITH TYPE 2 DIABETES MELLITUS (HCC): ICD-10-CM

## 2019-02-20 DIAGNOSIS — F51.01 PRIMARY INSOMNIA: ICD-10-CM

## 2019-02-20 DIAGNOSIS — I10 ESSENTIAL HYPERTENSION: ICD-10-CM

## 2019-02-20 DIAGNOSIS — C61 MALIGNANT NEOPLASM OF PROSTATE (HCC): ICD-10-CM

## 2019-02-20 RX ORDER — AMITRIPTYLINE HYDROCHLORIDE 100 MG/1
100 TABLET, FILM COATED ORAL
Qty: 30 TAB | Refills: 6 | Status: SHIPPED | OUTPATIENT
Start: 2019-02-20 | End: 2019-03-20

## 2019-02-20 RX ORDER — PREGABALIN 150 MG/1
150 CAPSULE ORAL 3 TIMES DAILY
Qty: 90 CAP | Refills: 3 | Status: SHIPPED | OUTPATIENT
Start: 2019-02-20 | End: 2019-03-14

## 2019-02-20 NOTE — PROGRESS NOTES
Zeinab Lui is a 80 y.o. male and presents with Generalized Body Aches and Diabetes Subjective: 
 
Diabetes Mellitus Review: He has diabetes mellitus. Diabetic ROS - medication compliance: compliant all of the time, diabetic diet compliance: compliant all of the time, home glucose monitoring: is performed. Known diabetic complications: severe neuropathy Cardiovascular risk factors: family history, dyslipidemia, diabetes mellitus, obesity, hypertension Current diabetic medications include oral agents/insulin Eye exam current (within one year): no 
Weight trend: stable Prior visit with dietician: no 
Current diet: \"healthy\" diet  in general 
Current exercise: walking Current monitoring regimen: home blood tests - daily Home blood sugar records: trend: stable Any episodes of hypoglycemia? no 
Is He on ACE inhibitor or angiotensin II receptor blocker? Yes He states that neurontin simply does not help. Shoulder Pain Review: 
Patient complains of lt. shoulder pain. The symptoms began several weeks ago Course of symptoms since onset has been gradually worsening. Pain is described as overall severity = moderate. Symptoms were incited by no known event. Patient denies N/A. Therapy to date includes OTC analgesics: effective. Hypertension Review: 
The patient has essential hypertension Diet and Lifestyle: generally follows a  low sodium diet, exercises sporadically Home BP Monitoring: is not measured at home. Pertinent ROS: taking medications as instructed, no medication side effects noted, no TIA's, no chest pain on exertion, no dyspnea on exertion, no swelling of ankles. He has multiple areas of joint pains reported. He states he receives Percocet for pain control daily Insomnia Review: 
Patient is seen for insomnia. Treatment includes no medication Ongoing symptoms include continued bouts of insomnia,bouts of anxiety and depression are reported Review of Systems Constitutional: negative for fevers, chills, anorexia and weight loss Eyes:   negative for visual disturbance and irritation ENT:   negative for tinnitus,sore throat,nasal congestion,ear pains. hoarseness Respiratory:  negative for cough, hemoptysis, dyspnea,wheezing CV:   negative for chest pain, palpitations, lower extremity edema GI:   negative for nausea, vomiting, diarrhea, abdominal pain,melena Endo:               negative for polyuria,polydipsia,polyphagia,heat intolerance Genitourinary: negative for frequency, dysuria and hematuria Integument:  negative for rash and pruritus Hematologic:  negative for easy bruising and gum/nose bleeding Musculoskel: myalgias, arthralgias, back pain, muscle weakness, joint pain Neurological:  negative for headaches, dizziness, vertigo, memory problems and gait Behavl/Psych: negative for feelings of anxiety, depression, mood changes Past Medical History:  
Diagnosis Date  Arthritis, Degenerative,  Knee 4/4/2011  Carcinoma, Prostate 4/4/2011  Degenerative disc disease 4/4/2011  Depression/ Anxiety 4/4/2011  Diabetes ([facility] [address]  Diabetes Mellitrus ( non-insulin dependent ) Type 2 4/4/2011  
 HEMATOCHEZIA 4/4/2011  Hypertrension 4/4/2011  Hypertriglyceridemia 4/4/2011  Insomnia 4/4/2011  Laryngitis 4/4/2011  Mild Aortic insufficiency 4/4/2011  Mild Concentric LVH (left ventricular hypertrophy) 4/4/2011  Neuropathy 4/4/2011  Osteoarthritis 4/4/2011  Rotator Cuff Tendonitis 4/4/2011 Past Surgical History:  
Procedure Laterality Date  CARDIAC SURG PROCEDURE UNLIST    
 cardiac cath  COLONOSCOPY N/A 4/28/2017 COLONOSCOPY performed by Garo Porter MD at [facility] ENDOSCOPY  
 HX ORTHOPAEDIC    
 left hip pinning  HX OTHER SURGICAL    
 left little toe amputation  HX UROLOGICAL Social History Socioeconomic History  Marital status: LEGALLY  Spouse name: Not on file  Number of children: Not on file  Years of education: Not on file  Highest education level: Not on file Tobacco Use  Smoking status: Current Every Day Smoker Packs/day: 0.25 Last attempt to quit: 10/25/2016 Years since quittin.3  Smokeless tobacco: Never Used Substance and Sexual Activity  Alcohol use: Yes Alcohol/week: 7.0 oz Types: 7 Cans of beer, 7 Shots of liquor per week  Sexual activity: Yes  
  Partners: Female History reviewed. No pertinent family history. Current Outpatient Medications Medication Sig Dispense Refill  gabapentin (NEURONTIN) 300 mg capsule TAKE TWO CAPSULES BY MOUTH THREE TIMES A  Cap 0  
 bumetanide (BUMEX) 2 mg tablet TAKE ONE TABLET BY MOUTH DAILY 30 Tab 0  
 metOLazone (ZAROXOLYN) 10 mg tablet Take 1 Tab by mouth daily. 30 Tab 12  
 bumetanide (BUMEX) 2 mg tablet Take 1 Tab by mouth two (2) times a day. 60 Tab 12  LANTUS U-100 INSULIN 100 unit/mL injection INJECT 72 UNITS UNDER THE SKIN EVERY 12 HOURS 6 Vial 10  
 triamcinolone acetonide (KENALOG) 0.1 % ointment Apply  to affected area two (2) times a day. use thin layer bid 80 g 12  
 lidocaine (LIDODERM) 5 % 1 Patch by TransDERmal route every twenty-four (24) hours. Apply to affected area every 24 hours 1 Package 3  
 Insulin Syringe-Needle U-100 1 mL 31 gauge x 5/16 syrg USE TO INJECT INSULIN FIVE TIMES A  Syringe 11  
 ferrous sulfate (IRON) 325 mg (65 mg iron) EC tablet Take 1 Tab by mouth Daily (before breakfast). 30 Tab 6  
 insulin regular (NOVOLIN R, HUMULIN R) 100 unit/mL injection 14 Units by SubCUTAneous route three (3) times daily as needed (with meals).  1 Vial 12  
 ammonium lactate (LAC-HYDRIN FIVE) 5 % lotion Apply daily 222 mL 3  
 acetaminophen (PAIN AND FEVER) 500 mg tablet TAKE ONE TABLET BY MOUTH EVERY 6 HOURS AS NEEDED FOR PAIN 60 Tab 2  
 ergocalciferol (ERGOCALCIFEROL) 50,000 unit capsule Take 1 Cap by mouth every seven (7) days. 4 Cap 12  
 THERA-TABS M 27 mg iron-400 mcg tab TAKE ONE TABLET BY MOUTH DAILY 30 Tab 11  
 fluticasone (FLONASE) 50 mcg/actuation nasal spray SPRAY TWO SPRAYS IN EACH NOSTRIL DAILY 1 Bottle 11  
 irbesartan (AVAPRO) 300 mg tablet TAKE ONE TABLET BY MOUTH EVERY EVENING 30 Tab 10  
 polyethylene glycol (MIRALAX) 17 gram/dose powder Take 17 g by mouth daily. 850 g 3  
 dilTIAZem (TIAZAC) 180 mg SR capsule Take 1 Cap by mouth daily. 30 Cap 12  
 MICRO THIN LANCETS 33 gauge misc USE TO TEST BLOOD SUGAR TWO TO THREE TIMES DAILY 100 Lancet 11  
 glucose blood VI test strips (ONETOUCH ULTRA TEST) strip Test three times daily. E11.9 Diabetes Type 2 300 Strip 11  Blood-Glucose Meter monitoring kit Use once daily Onetouch test meter only E11.9 Type 2 1 Kit 0  
 CONTOUR NEXT STRIPS strip TEST BLOOD GLUCOSE TWO TO THREE TIMES DAILY 100 Strip 11  
 carvedilol (COREG) 3.125 mg tablet Take 1 Tab by mouth two (2) times daily (with meals). 60 Tab 0  
 docusate sodium (COLACE) 100 mg capsule Take 1 Cap by mouth two (2) times a day. 60 Cap 0  Wheel Chair dianna Custom electric wheelchair 1 Each 0  
 oxyCODONE-acetaminophen (PERCOCET) 5-325 mg per tablet Take 1 Tab by mouth every eight (8) hours as needed. 14 Tab 0  
 Calcium Carbonate-Vit D3-Min 600 mg calcium- 400 unit tab Take  by mouth two (2) times a day.  levoFLOXacin (LEVAQUIN) 500 mg tablet Take 1 Tab by mouth daily. 14 Tab 0  
 warfarin (COUMADIN) 10 mg tablet Take 1 Tab by mouth daily. 30 Tab 0 No Known Allergies Objective: 
Visit Vitals /70 (BP 1 Location: Right arm, BP Patient Position: Sitting) Pulse 79 Temp 97.9 °F (36.6 °C) (Oral) Resp 17 Ht 6' 2\" (1.88 m) SpO2 96% BMI 36.98 kg/m² Physical Exam:  
General appearance - alert, well appearing, and in moderate distress Mental status - alert, oriented to person, place, and time EYE-LUH, EOMI, corneas normal, no foreign bodies ENT-ENT exam normal, no neck nodes or sinus tenderness Nose - normal and patent, no erythema, discharge or polyps Mouth - mucous membranes moist, pharynx normal without lesions Neck - supple, no significant adenopathy Chest - clear to auscultation, no wheezes, rales or rhonchi, symmetric air entry Heart - normal rate, regular rhythm, normal S1, S2, no murmurs, rubs, clicks or gallops Abdomen - soft, nontender, nondistended, no masses or organomegaly Lymph- no adenopathy palpable Ext-peripheral pulses normal, 3+leg edemal , no clubbing or cyanosis Skin-Warm and dry. no hyperpigmentation, vitiligo, or suspicious lesions Neuro -alert, oriented, normal speech, no focal findings or movement disorder noted Neck-normal C-spine, no tenderness, full ROM without pain Feet-no nail deformities or callus formation with good pulses noted Lt. .shoulder-reduced range of motion of rt., subdeltoid tenderness Lt.leg erythema and dryness noted Results for orders placed or performed in visit on 09/25/18 AMB POC GLUCOSE BLOOD, BY GLUCOSE MONITORING DEVICE Result Value Ref Range Glucose  mg/dL AMB POC HEMOGLOBIN A1C Result Value Ref Range Hemoglobin A1c (POC) 5.7 % Assessment/Plan: 
No diagnosis found. No orders of the defined types were placed in this encounter. lose weight,Take 81mg aspirin daily Indications:  
Symptomatic relief of pain Procedure: After consent was obtained, using sterile technique the left shoulder joint was prepped using alcohol. Local anesthetic used: 1% lidocaine. . The joint was entered and Kenalog 40 mg was mixed with 1% lidocaine 3 ml  and injected into the joint and the needle withdrawn. The procedure was well tolerated. The patient is asked to continue to rest the joint for a few more days before resuming regular activities. It may be more painful for the first 1-2 days.   Watch for fever, or increased swelling or persistent pain in the joint. Call or return to clinic prn if such symptoms occur or there is failure to improve as anticipated. [facility] PROCEDURE PROGRESS NOTE Chart reviewed for the following: 
 Selin Zheng MD, have reviewed the History, Physical and updated the Allergic reactions for Hammond La III  
 
TIME OUT performed immediately prior to start of procedure: 
 Selin Zheng MD, have performed the following reviews on Gamal La III prior to the start of the procedure: 
         
* Patient was identified by name and date of birth * Agreement on procedure being performed was verified * Risks and Benefits explained to the patient * Procedure site verified and marked as necessary * Patient was positioned for comfort Time: 3:31pm 
 
 
Date of procedure: 2/20/2019 Procedure performed by:  Asif Ramesh MD 
 
Patient assisted by: nursing attendant How tolerated by patient: tolerated the procedure well with no complications Comments: none There are no Patient Instructions on file for this visit. Follow-up Disposition: Not on File I have reviewed with the patient details of the assessment and plan and all questions were answered. Relevent patient education was performed. The most recent lab findings were reviewed with the patient. An After Visit Summary was printed and given to the patient. 1. Have you been to the ER, urgent care clinic since your last visit? Hospitalized since your last visit?no 2. Have you seen or consulted any other health care providers outside of the [address] [place] [address] since your last visit? Include any pap smears or colon screening.yes, [facility] 
 
3 most recent PHQ Screens 2/20/2019 Little interest or pleasure in doing things Not at all Feeling down, depressed, irritable, or hopeless Not at all Total Score PHQ 2 0

## 2019-02-20 NOTE — PATIENT INSTRUCTIONS
[employer] Activation Thank you for requesting access to [employer]. Please follow the instructions below to securely access and download your online medical record. [employer] allows you to send messages to your doctor, view your test results, renew your prescriptions, schedule appointments, and more. How Do I Sign Up? 1. In your internet browser, go to [email or web address] 
2. Click on the First Time User? Click Here link in the Sign In box. You will be redirect to the New Member Sign Up page. 3. Enter your [employer] Access Code exactly as it appears below. You will not need to use this code after youve completed the sign-up process. If you do not sign up before the expiration date, you must request a new code. [employer] Access Code: [email or web address] Expires: 2019  3:11 PM (This is the date your [employer] access code will ) 4. Enter the last four digits of your Social Security Number (xxxx) and Date of Birth (mm/dd/yyyy) as indicated and click Submit. You will be taken to the next sign-up page. 5. Create a [employer] ID. This will be your [employer] login ID and cannot be changed, so think of one that is secure and easy to remember. 6. Create a [employer] password. You can change your password at any time. 7. Enter your Password Reset Question and Answer. This can be used at a later time if you forget your password. 8. Enter your e-mail address. You will receive e-mail notification when new information is available in [address] 9. Click Sign Up. You can now view and download portions of your medical record. 10. Click the Download Summary menu link to download a portable copy of your medical information. Additional Information If you have questions, please visit the Frequently Asked Questions section of the [employer] website at [email or web address] [employer]. [place] [email or web address] Remember, [employer] is NOT to be used for urgent needs. For medical emergencies, dial 911.

## 2019-02-20 NOTE — PROGRESS NOTES
1. Have you been to the ER, urgent care clinic since your last visit? Hospitalized since your last visit?no 2. Have you seen or consulted any other health care providers outside of the [address] [place] [address] since your last visit? Include any pap smears or colon screening. [facility], [facility] 
 
3 most recent PHQ Screens 2/20/2019 Little interest or pleasure in doing things Not at all Feeling down, depressed, irritable, or hopeless Not at all Total Score PHQ 2 0 Chief Complaint Patient presents with  Generalized Body Aches  Diabetes

## 2019-02-22 ENCOUNTER — TELEPHONE (OUTPATIENT)
Dept: INTERNAL MEDICINE CLINIC | Age: 69
End: 2019-02-22

## 2019-02-25 ENCOUNTER — DOCUMENTATION ONLY (OUTPATIENT)
Dept: INTERNAL MEDICINE CLINIC | Age: 69
End: 2019-02-25

## 2019-02-25 LAB
GLUCOSE POC: 268 MG/DL
HBA1C MFR BLD HPLC: 7.5 %

## 2019-02-25 NOTE — PROGRESS NOTES
Called and informed  pharmacy of Med [place] denial for Amitriptyline HCL 100MG tabs. Advised pt that if medication is still needed that medication would need to be covered for as an out of pocket expense. Pharmacy verbalized understanding of information and instructions given and stated that they will notify pt. Provider informed.

## 2019-02-26 ENCOUNTER — TELEPHONE (OUTPATIENT)
Dept: INTERNAL MEDICINE CLINIC | Age: 69
End: 2019-02-26

## 2019-03-14 ENCOUNTER — APPOINTMENT (OUTPATIENT)
Dept: GENERAL RADIOLOGY | Age: 69
DRG: 683 | End: 2019-03-14
Attending: EMERGENCY MEDICINE
Payer: MEDICARE

## 2019-03-14 ENCOUNTER — HOSPITAL ENCOUNTER (INPATIENT)
Age: 69
LOS: 6 days | Discharge: HOME HEALTH | DRG: 683 | End: 2019-03-20
Attending: EMERGENCY MEDICINE | Admitting: INTERNAL MEDICINE
Payer: MEDICARE

## 2019-03-14 ENCOUNTER — APPOINTMENT (OUTPATIENT)
Dept: CT IMAGING | Age: 69
DRG: 683 | End: 2019-03-14
Attending: INTERNAL MEDICINE
Payer: MEDICARE

## 2019-03-14 ENCOUNTER — OFFICE VISIT (OUTPATIENT)
Dept: INTERNAL MEDICINE CLINIC | Age: 69
End: 2019-03-14

## 2019-03-14 VITALS
OXYGEN SATURATION: 97 % | TEMPERATURE: 98.6 F | HEART RATE: 87 BPM | DIASTOLIC BLOOD PRESSURE: 70 MMHG | RESPIRATION RATE: 16 BRPM | SYSTOLIC BLOOD PRESSURE: 130 MMHG

## 2019-03-14 DIAGNOSIS — L82.1 SEBORRHEIC KERATOSES: ICD-10-CM

## 2019-03-14 DIAGNOSIS — C61 MALIGNANT NEOPLASM OF PROSTATE (HCC): ICD-10-CM

## 2019-03-14 DIAGNOSIS — I10 ESSENTIAL HYPERTENSION: ICD-10-CM

## 2019-03-14 DIAGNOSIS — M16.11 PRIMARY OSTEOARTHRITIS OF RIGHT HIP: ICD-10-CM

## 2019-03-14 DIAGNOSIS — E11.21 TYPE 2 DIABETES WITH NEPHROPATHY (HCC): ICD-10-CM

## 2019-03-14 DIAGNOSIS — R60.0 LOCALIZED EDEMA: ICD-10-CM

## 2019-03-14 DIAGNOSIS — E66.01 SEVERE OBESITY (BMI 35.0-39.9) WITH COMORBIDITY (HCC): ICD-10-CM

## 2019-03-14 DIAGNOSIS — M12.811 ROTATOR CUFF ARTHROPATHY OF RIGHT SHOULDER: Primary | ICD-10-CM

## 2019-03-14 DIAGNOSIS — E11.42 DIABETIC POLYNEUROPATHY ASSOCIATED WITH TYPE 2 DIABETES MELLITUS (HCC): ICD-10-CM

## 2019-03-14 DIAGNOSIS — N17.9 AKI (ACUTE KIDNEY INJURY) (HCC): Primary | ICD-10-CM

## 2019-03-14 PROBLEM — N18.9 ACUTE ON CHRONIC RENAL FAILURE (HCC): Status: ACTIVE | Noted: 2019-03-14

## 2019-03-14 LAB
ALBUMIN SERPL-MCNC: 3 G/DL (ref 3.5–5)
ALBUMIN/GLOB SERPL: 0.7 {RATIO} (ref 1.1–2.2)
ALP SERPL-CCNC: 120 U/L (ref 45–117)
ALT SERPL-CCNC: 22 U/L (ref 12–78)
ANION GAP SERPL CALC-SCNC: 8 MMOL/L (ref 5–15)
APPEARANCE UR: CLEAR
AST SERPL-CCNC: 16 U/L (ref 15–37)
BACTERIA URNS QL MICRO: NEGATIVE /HPF
BASOPHILS # BLD: 0 K/UL (ref 0–0.1)
BASOPHILS NFR BLD: 0 % (ref 0–1)
BILIRUB SERPL-MCNC: 0.3 MG/DL (ref 0.2–1)
BILIRUB UR QL: NEGATIVE
BUN SERPL-MCNC: 77 MG/DL (ref 6–20)
BUN/CREAT SERPL: 24 (ref 12–20)
CALCIUM SERPL-MCNC: 9 MG/DL (ref 8.5–10.1)
CHLORIDE SERPL-SCNC: 98 MMOL/L (ref 97–108)
CK SERPL-CCNC: 125 U/L (ref 39–308)
CO2 SERPL-SCNC: 27 MMOL/L (ref 21–32)
COLOR UR: ABNORMAL
CREAT SERPL-MCNC: 3.19 MG/DL (ref 0.7–1.3)
DIFFERENTIAL METHOD BLD: ABNORMAL
EOSINOPHIL # BLD: 0.2 K/UL (ref 0–0.4)
EOSINOPHIL NFR BLD: 3 % (ref 0–7)
EPITH CASTS URNS QL MICRO: ABNORMAL /LPF
ERYTHROCYTE [DISTWIDTH] IN BLOOD BY AUTOMATED COUNT: 15.6 % (ref 11.5–14.5)
GLOBULIN SER CALC-MCNC: 4.6 G/DL (ref 2–4)
GLUCOSE SERPL-MCNC: 199 MG/DL (ref 65–100)
GLUCOSE UR STRIP.AUTO-MCNC: 100 MG/DL
HCT VFR BLD AUTO: 28.2 % (ref 36.6–50.3)
HGB BLD-MCNC: 8.9 G/DL (ref 12.1–17)
HGB UR QL STRIP: NEGATIVE
IMM GRANULOCYTES # BLD AUTO: 0 K/UL (ref 0–0.04)
IMM GRANULOCYTES NFR BLD AUTO: 1 % (ref 0–0.5)
KETONES UR QL STRIP.AUTO: NEGATIVE MG/DL
LEUKOCYTE ESTERASE UR QL STRIP.AUTO: NEGATIVE
LYMPHOCYTES # BLD: 0.8 K/UL (ref 0.8–3.5)
LYMPHOCYTES NFR BLD: 11 % (ref 12–49)
MCH RBC QN AUTO: 30.5 PG (ref 26–34)
MCHC RBC AUTO-ENTMCNC: 31.6 G/DL (ref 30–36.5)
MCV RBC AUTO: 96.6 FL (ref 80–99)
MONOCYTES # BLD: 0.5 K/UL (ref 0–1)
MONOCYTES NFR BLD: 6 % (ref 5–13)
MUCOUS THREADS URNS QL MICRO: ABNORMAL /LPF
NEUTS SEG # BLD: 6.4 K/UL (ref 1.8–8)
NEUTS SEG NFR BLD: 79 % (ref 32–75)
NITRITE UR QL STRIP.AUTO: NEGATIVE
NRBC # BLD: 0 K/UL (ref 0–0.01)
NRBC BLD-RTO: 0 PER 100 WBC
PH UR STRIP: 5 [PH] (ref 5–8)
PLATELET # BLD AUTO: 249 K/UL (ref 150–400)
PMV BLD AUTO: 9.2 FL (ref 8.9–12.9)
POTASSIUM SERPL-SCNC: 5.1 MMOL/L (ref 3.5–5.1)
PROT SERPL-MCNC: 7.6 G/DL (ref 6.4–8.2)
PROT UR STRIP-MCNC: 100 MG/DL
RBC # BLD AUTO: 2.92 M/UL (ref 4.1–5.7)
RBC #/AREA URNS HPF: ABNORMAL /HPF (ref 0–5)
SODIUM SERPL-SCNC: 133 MMOL/L (ref 136–145)
SP GR UR REFRACTOMETRY: 1.01 (ref 1–1.03)
UA: UC IF INDICATED,UAUC: ABNORMAL
UROBILINOGEN UR QL STRIP.AUTO: 0.2 EU/DL (ref 0.2–1)
WBC # BLD AUTO: 8 K/UL (ref 4.1–11.1)
WBC URNS QL MICRO: ABNORMAL /HPF (ref 0–4)

## 2019-03-14 PROCEDURE — 71045 X-RAY EXAM CHEST 1 VIEW: CPT

## 2019-03-14 PROCEDURE — 65270000029 HC RM PRIVATE

## 2019-03-14 PROCEDURE — 81001 URINALYSIS AUTO W/SCOPE: CPT

## 2019-03-14 PROCEDURE — 36415 COLL VENOUS BLD VENIPUNCTURE: CPT

## 2019-03-14 PROCEDURE — 74011250636 HC RX REV CODE- 250/636: Performed by: EMERGENCY MEDICINE

## 2019-03-14 PROCEDURE — 96360 HYDRATION IV INFUSION INIT: CPT

## 2019-03-14 PROCEDURE — 82550 ASSAY OF CK (CPK): CPT

## 2019-03-14 PROCEDURE — 85025 COMPLETE CBC W/AUTO DIFF WBC: CPT

## 2019-03-14 PROCEDURE — 80053 COMPREHEN METABOLIC PANEL: CPT

## 2019-03-14 PROCEDURE — 84484 ASSAY OF TROPONIN QUANT: CPT

## 2019-03-14 PROCEDURE — 70450 CT HEAD/BRAIN W/O DYE: CPT

## 2019-03-14 PROCEDURE — 99285 EMERGENCY DEPT VISIT HI MDM: CPT

## 2019-03-14 PROCEDURE — 84443 ASSAY THYROID STIM HORMONE: CPT

## 2019-03-14 PROCEDURE — 93005 ELECTROCARDIOGRAM TRACING: CPT

## 2019-03-14 RX ORDER — KETOCONAZOLE 20 MG/G
CREAM TOPICAL 2 TIMES DAILY
Qty: 60 G | Refills: 3 | Status: SHIPPED | OUTPATIENT
Start: 2019-03-14 | End: 2019-05-28 | Stop reason: ALTCHOICE

## 2019-03-14 RX ORDER — LIDOCAINE HYDROCHLORIDE 20 MG/ML
1 INJECTION, SOLUTION EPIDURAL; INFILTRATION; INTRACAUDAL; PERINEURAL ONCE
Qty: 1 VIAL | Refills: 0
Start: 2019-03-14 | End: 2019-03-20

## 2019-03-14 RX ORDER — OXYCODONE AND ACETAMINOPHEN 5; 325 MG/1; MG/1
TABLET ORAL
Status: ON HOLD | COMMUNITY
End: 2019-03-19 | Stop reason: SDUPTHER

## 2019-03-14 RX ORDER — TRIAMCINOLONE ACETONIDE 40 MG/ML
40 INJECTION, SUSPENSION INTRA-ARTICULAR; INTRAMUSCULAR ONCE
Qty: 1 ML | Refills: 0
Start: 2019-03-14 | End: 2019-03-20

## 2019-03-14 RX ADMIN — SODIUM CHLORIDE 1000 ML: 900 INJECTION, SOLUTION INTRAVENOUS at 22:04

## 2019-03-14 NOTE — PROGRESS NOTES
1. Have you been to the ER, urgent care clinic since your last visit? Hospitalized since your last visit?no    2. Have you seen or consulted any other health care providers outside of the [address] [place] [address] since your last visit? Include any pap smears or colon screening.  No    3 most recent PHQ Screens 2/20/2019   Little interest or pleasure in doing things Not at all   Feeling down, depressed, irritable, or hopeless Not at all   Total Score PHQ 2 0     Chief Complaint   Patient presents with    Diabetes   [facility]

## 2019-03-14 NOTE — ED PROVIDER NOTES
EMERGENCY DEPARTMENT HISTORY AND PHYSICAL EXAM      Date: 3/14/2019  Patient Name: Latoya Mcclain III    History of Presenting Illness     Chief Complaint   Patient presents with    Fatigue     While at PCP office today performing physical therapy type movements to see how he could move from chair, became dizzy and weak. , pt is insulin dependent       History Provided By: Patient and EMS    HPI: Isaias Bray, 80 y.o. male with PMHx significant for neuropathy, DM, DDD, HTN, OA, presents via EMS to the ED with cc of generalized weakness with associated lightheadedness PTA today. He notes he was at his doctor's office today, when the episode occurred, and was subsequently sent to the ER for further evaluation. He notes he uses a motorized wheelchair at baseline and does not ambulate or stand much. Today, his wheelchair had gotten stuck and he had stood up and was struggling with the wheelchair, when he had gotten lightheaded. He notes he was asymptomatic prior to this episode today. Pt denies any CP, palpitations, SOB, dysuria, frequency, cough/cold sxs, unilateral weakness. There are no other complaints, changes, or physical findings at this time. PCP: Eilene Bamberger., MD    No current facility-administered medications on file prior to encounter. Current Outpatient Medications on File Prior to Encounter   Medication Sig Dispense Refill    oxyCODONE-acetaminophen (PERCOCET) 5-325 mg per tablet Take  by mouth every four (4) hours as needed for Pain.  irbesartan (AVAPRO) 300 mg tablet TAKE ONE TABLET BY MOUTH EVERY EVENING 30 Tab 10    lidocaine, PF, (XYLOCAINE) 20 mg/mL (2 %) injection 1 mL by Intra artICUlar route once for 1 dose. 1 Vial 0    triamcinolone acetonide (KENALOG) 40 mg/mL injection 1 mL by Intra artICUlar route once for 1 dose. 1 mL 0    ketoconazole (NIZORAL) 2 % topical cream Apply  to affected area two (2) times a day.  60 g 3    hydrocolloid dressing (DUODERM HYDROACTIVE) topical gel Apply  to affected area daily. 30 Tube 0    amitriptyline (ELAVIL) 100 mg tablet Take 1 Tab by mouth nightly. 30 Tab 6    gabapentin (NEURONTIN) 300 mg capsule TAKE TWO CAPSULES BY MOUTH THREE TIMES A  Cap 0    metOLazone (ZAROXOLYN) 10 mg tablet Take 1 Tab by mouth daily. 30 Tab 12    bumetanide (BUMEX) 2 mg tablet Take 1 Tab by mouth two (2) times a day. 60 Tab 12    LANTUS U-100 INSULIN 100 unit/mL injection INJECT 72 UNITS UNDER THE SKIN EVERY 12 HOURS 6 Vial 10    triamcinolone acetonide (KENALOG) 0.1 % ointment Apply  to affected area two (2) times a day. use thin layer bid 80 g 12    lidocaine (LIDODERM) 5 % 1 Patch by TransDERmal route every twenty-four (24) hours. Apply to affected area every 24 hours 1 Package 3    Insulin Syringe-Needle U-100 1 mL 31 gauge x 5/16 syrg USE TO INJECT INSULIN FIVE TIMES A  Syringe 11    ferrous sulfate (IRON) 325 mg (65 mg iron) EC tablet Take 1 Tab by mouth Daily (before breakfast). 30 Tab 6    insulin regular (NOVOLIN R, HUMULIN R) 100 unit/mL injection 14 Units by SubCUTAneous route three (3) times daily as needed (with meals). 1 Vial 12    ammonium lactate (LAC-HYDRIN FIVE) 5 % lotion Apply daily 222 mL 3    acetaminophen (PAIN AND FEVER) 500 mg tablet TAKE ONE TABLET BY MOUTH EVERY 6 HOURS AS NEEDED FOR PAIN 60 Tab 2    ergocalciferol (ERGOCALCIFEROL) 50,000 unit capsule Take 1 Cap by mouth every seven (7) days. 4 Cap 12    THERA-TABS M 27 mg iron-400 mcg tab TAKE ONE TABLET BY MOUTH DAILY 30 Tab 11    fluticasone (FLONASE) 50 mcg/actuation nasal spray SPRAY TWO SPRAYS IN EACH NOSTRIL DAILY 1 Bottle 11    polyethylene glycol (MIRALAX) 17 gram/dose powder Take 17 g by mouth daily. 850 g 3    dilTIAZem (TIAZAC) 180 mg SR capsule Take 1 Cap by mouth daily.  30 Cap 12    MICRO THIN LANCETS 33 gauge misc USE TO TEST BLOOD SUGAR TWO TO THREE TIMES DAILY 100 Lancet 11    glucose blood VI test strips (ONETOUCH ULTRA TEST) strip Test three times daily. E11.9    Diabetes Type 2 300 Strip 11    Blood-Glucose Meter monitoring kit Use once daily  Onetouch test meter only  E11.9  Type 2 1 Kit 0    CONTOUR NEXT STRIPS strip TEST BLOOD GLUCOSE TWO TO THREE TIMES DAILY 100 Strip 11    docusate sodium (COLACE) 100 mg capsule Take 1 Cap by mouth two (2) times a day. 60 Cap 0    Wheel Chair dianna Custom electric wheelchair 1 Each 0    Calcium Carbonate-Vit D3-Min 600 mg calcium- 400 unit tab Take  by mouth two (2) times a day. Past History     Past Medical History:  Past Medical History:   Diagnosis Date    Arthritis, Degenerative,  Knee 2011    Carcinoma, Prostate 2011    Degenerative disc disease 2011    Depression/ Anxiety 2011    Diabetes ([address]     Diabetes Mellitrus ( non-insulin dependent ) Type 2 2011    HEMATOCHEZIA 2011    Hypertrension 2011    Hypertriglyceridemia 2011    Insomnia 2011    Laryngitis 2011    Mild Aortic insufficiency 2011    Mild Concentric LVH (left ventricular hypertrophy) 2011    Neuropathy 2011    Osteoarthritis 2011    Rotator Cuff Tendonitis 2011       Past Surgical History:  Past Surgical History:   Procedure Laterality Date    CARDIAC SURG PROCEDURE UNLIST      cardiac cath    COLONOSCOPY N/A 2017    COLONOSCOPY performed by Jin Gerber MD at hospitals ENDOSCOPY    HX ORTHOPAEDIC      left hip pinning    HX OTHER SURGICAL      left little toe amputation    HX UROLOGICAL         Family History:  No family history on file. Social History:  Social History     Tobacco Use    Smoking status: Current Every Day Smoker     Packs/day: 0.25     Last attempt to quit: 10/25/2016     Years since quittin.3    Smokeless tobacco: Never Used   Substance Use Topics    Alcohol use:  Yes     Alcohol/week: 7.0 oz     Types: 7 Cans of beer, 7 Shots of liquor per week    Drug use: Not on file       Allergies:  No Known Allergies      Review of Systems   Review of Systems   Constitutional: Negative for chills and fever. HENT: Negative for congestion, rhinorrhea and sore throat. Respiratory: Negative for cough and shortness of breath. Cardiovascular: Negative for chest pain and palpitations. Gastrointestinal: Negative for abdominal pain, nausea and vomiting. Genitourinary: Negative for dysuria, frequency and urgency. Skin: Negative for rash. Neurological: Positive for weakness (+generalized) and light-headedness. Negative for dizziness and headaches. All other systems reviewed and are negative. Physical Exam   Physical Exam   Constitutional: He is oriented to person, place, and time. He appears well-developed and well-nourished. No distress. Chronically ill appearing   HENT:   Head: Normocephalic and atraumatic. Eyes: Conjunctivae and EOM are normal. Pupils are equal, round, and reactive to light. Neck: Normal range of motion. Cardiovascular: Normal rate, regular rhythm and intact distal pulses. Pulmonary/Chest: Effort normal and breath sounds normal. No stridor. No respiratory distress. Abdominal: Soft. He exhibits no distension. There is no tenderness. Musculoskeletal: Normal range of motion. He exhibits edema. Pitting edema in BLE, BLE in wraps   Neurological: He is alert and oriented to person, place, and time. Skin: Skin is warm and dry. Psychiatric: He has a normal mood and affect. Nursing note and vitals reviewed.     Diagnostic Study Results     Labs -   Recent Results (from the past 12 hour(s))   CBC WITH AUTOMATED DIFF    Collection Time: 03/14/19  7:30 PM   Result Value Ref Range    WBC 8.0 4.1 - 11.1 K/uL    RBC 2.92 (L) 4.10 - 5.70 M/uL    HGB 8.9 (L) 12.1 - 17.0 g/dL    HCT 28.2 (L) 36.6 - 50.3 %    MCV 96.6 80.0 - 99.0 FL    MCH 30.5 26.0 - 34.0 PG    MCHC 31.6 30.0 - 36.5 g/dL    RDW 15.6 (H) 11.5 - 14.5 %    PLATELET 666 500 - 550 K/uL    MPV 9.2 8.9 - 12.9 FL    NRBC 0.0 0  WBC    ABSOLUTE NRBC 0.00 0.00 - 0.01 K/uL    NEUTROPHILS 79 (H) 32 - 75 %    LYMPHOCYTES 11 (L) 12 - 49 %    MONOCYTES 6 5 - 13 %    EOSINOPHILS 3 0 - 7 %    BASOPHILS 0 0 - 1 %    IMMATURE GRANULOCYTES 1 (H) 0.0 - 0.5 %    ABS. NEUTROPHILS 6.4 1.8 - 8.0 K/UL    ABS. LYMPHOCYTES 0.8 0.8 - 3.5 K/UL    ABS. MONOCYTES 0.5 0.0 - 1.0 K/UL    ABS. EOSINOPHILS 0.2 0.0 - 0.4 K/UL    ABS. BASOPHILS 0.0 0.0 - 0.1 K/UL    ABS. IMM. GRANS. 0.0 0.00 - 0.04 K/UL    DF AUTOMATED     METABOLIC PANEL, COMPREHENSIVE    Collection Time: 03/14/19  7:30 PM   Result Value Ref Range    Sodium 133 (L) 136 - 145 mmol/L    Potassium 5.1 3.5 - 5.1 mmol/L    Chloride 98 97 - 108 mmol/L    CO2 27 21 - 32 mmol/L    Anion gap 8 5 - 15 mmol/L    Glucose 199 (H) 65 - 100 mg/dL    BUN 77 (H) 6 - 20 MG/DL    Creatinine 3.19 (H) 0.70 - 1.30 MG/DL    BUN/Creatinine ratio 24 (H) 12 - 20      GFR est AA 24 (L) >60 ml/min/1.73m2    GFR est non-AA 20 (L) >60 ml/min/1.73m2    Calcium 9.0 8.5 - 10.1 MG/DL    Bilirubin, total 0.3 0.2 - 1.0 MG/DL    ALT (SGPT) 22 12 - 78 U/L    AST (SGOT) 16 15 - 37 U/L    Alk.  phosphatase 120 (H) 45 - 117 U/L    Protein, total 7.6 6.4 - 8.2 g/dL    Albumin 3.0 (L) 3.5 - 5.0 g/dL    Globulin 4.6 (H) 2.0 - 4.0 g/dL    A-G Ratio 0.7 (L) 1.1 - 2.2     CK W/ REFLX CKMB    Collection Time: 03/14/19  7:30 PM   Result Value Ref Range     39 - 308 U/L   URINALYSIS W/ REFLEX CULTURE    Collection Time: 03/14/19  8:50 PM   Result Value Ref Range    Color YELLOW/STRAW      Appearance CLEAR CLEAR      Specific gravity 1.013 1.003 - 1.030      pH (UA) 5.0 5.0 - 8.0      Protein 100 (A) NEG mg/dL    Glucose 100 (A) NEG mg/dL    Ketone NEGATIVE  NEG mg/dL    Bilirubin NEGATIVE  NEG      Blood NEGATIVE  NEG      Urobilinogen 0.2 0.2 - 1.0 EU/dL    Nitrites NEGATIVE  NEG      Leukocyte Esterase NEGATIVE  NEG      WBC 0-4 0 - 4 /hpf    RBC 0-5 0 - 5 /hpf    Epithelial cells FEW FEW /lpf    Bacteria NEGATIVE  NEG /hpf    UA:UC IF INDICATED CULTURE NOT INDICATED BY UA RESULT CNI      Mucus TRACE (A) NEG /lpf       Radiologic Studies -   XR CHEST PORT   Final Result   IMPRESSION: No acute findings. CT Results  (Last 48 hours)    None        CXR Results  (Last 48 hours)               03/14/19 2128  XR CHEST PORT Final result    Impression:  IMPRESSION: No acute findings. Narrative:  EXAM: XR CHEST PORT       INDICATION: weakness       COMPARISON: CT abdomen 7/12/2018 and chest x-ray 10/20/2016. FINDINGS: A portable AP radiograph of the chest was obtained at 21:13 hours. The   patient is on a cardiac monitor. The lungs are clear. The cardiac and   mediastinal contours and pulmonary vascularity are normal.  The bones and soft   tissues are grossly within normal limits. Medical Decision Making   I am the first provider for this patient. I reviewed the vital signs, available nursing notes, past medical history, past surgical history, family history and social history. Vital Signs-Reviewed the patient's vital signs. Patient Vitals for the past 12 hrs:   Pulse Resp BP SpO2   03/14/19 2230 80 13 159/76 100 %   03/14/19 2200 80 15 144/71 99 %   03/14/19 2130 77 13 146/73 99 %   03/14/19 2030 74 11 132/71 98 %   03/14/19 2019 78  165/71    03/14/19 2018 75  137/67    03/14/19 2000 73 11 134/67 98 %   03/14/19 1930 75 16 129/62 98 %   03/14/19 1915 76 11 127/68 98 %   03/14/19 1900   118/65 97 %   03/14/19 1856 77 18 124/69      Pulse Oximetry Analysis - 99% on RA    Cardiac Monitor:   Rate: 75 bpm  Rhythm: Normal Sinus Rhythm     Records Reviewed: Nursing Notes, Old Medical Records, Previous electrocardiograms, Ambulance Run Sheet, Previous Radiology Studies and Previous Laboratory Studies    Provider Notes (Medical Decision Making):   DDx: dehydration, orthostasis, hypoglycemia, anemia    Plan for basic labs, urinalysis, chest x-ray. On exam, weakness is bilateral in nature, therefore doubt CVA.   No need for imaging of the head at this time. ED Course:   Initial assessment performed. The patients presenting problems have been discussed, and they are in agreement with the care plan formulated and outlined with them. I have encouraged them to ask questions as they arise throughout their visit. Labs show an AK I with a creatinine of 3.1 up from a baseline of 1.7. Will admit the patient to hospitalist given generalized weakness and new AK I.      CONSULT NOTE:   Brayan Shah spoke with Dr. Hebert Craig,   Specialty: Hospitalist  Discussed pt's hx, disposition, and available diagnostic and imaging results. Reviewed care plans. Consultant will evaluate pt for admission. Critical Care Time:   0    Disposition:  10:50 PM  Patient is being admitted to the hospital by Dr. Hebert Craig. The results of their tests and reasons for their admission have been discussed with them and/or available family. They convey agreement and understanding for the need to be admitted and for their admission diagnosis. Consultation has been made with the inpatient physician specialist for hospitalization. PLAN:  1. Admit to hospitalist    Diagnosis     Clinical Impression:   1. KATHI (acute kidney injury) ([facility] [address]        Attestations: This note is prepared by Jannelle Blizzard, acting as Scribe for TARUN Reyes MD.    [facility]. Bhakti Reyes MD: The scribe's documentation has been prepared under my direction and personally reviewed by me in its entirety. I confirm that the note above accurately reflects all work, treatment, procedures, and medical decision making performed by me. This note will not be viewable in [address]

## 2019-03-14 NOTE — PATIENT INSTRUCTIONS
[employer] Activation    Thank you for requesting access to [employer]. Please follow the instructions below to securely access and download your online medical record. [employer] allows you to send messages to your doctor, view your test results, renew your prescriptions, schedule appointments, and more. How Do I Sign Up? 1. In your internet browser, go to [email or web address]  2. Click on the First Time User? Click Here link in the Sign In box. You will be redirect to the New Member Sign Up page. 3. Enter your [employer] Access Code exactly as it appears below. You will not need to use this code after youve completed the sign-up process. If you do not sign up before the expiration date, you must request a new code. [employer] Access Code: [email or web address]  Expires: 2019  4:11 PM (This is the date your [employer] access code will )    4. Enter the last four digits of your Social Security Number (xxxx) and Date of Birth (mm/dd/yyyy) as indicated and click Submit. You will be taken to the next sign-up page. 5. Create a [employer] ID. This will be your [employer] login ID and cannot be changed, so think of one that is secure and easy to remember. 6. Create a [employer] password. You can change your password at any time. 7. Enter your Password Reset Question and Answer. This can be used at a later time if you forget your password. 8. Enter your e-mail address. You will receive e-mail notification when new information is available in [address] 9. Click Sign Up. You can now view and download portions of your medical record. 10. Click the Download Summary menu link to download a portable copy of your medical information. Additional Information    If you have questions, please visit the Frequently Asked Questions section of the [employer] website at [email or web address] Remember, [employer] is NOT to be used for urgent needs. For medical emergencies, dial 911.

## 2019-03-14 NOTE — PROGRESS NOTES
Celi Marie is a 80 y.o. male and presents with Diabetes and Malaise    Subjective:    He has had some flaking of the facial region and scalp. Diabetes Mellitus Review:  He has diabetes mellitus. Diabetic ROS - medication compliance: compliant all of the time, diabetic diet compliance: compliant all of the time, home glucose monitoring: is performed. Known diabetic complications: severe neuropathy  Cardiovascular risk factors: family history, dyslipidemia, diabetes mellitus, obesity, hypertension  Current diabetic medications include oral agents/insulin   Eye exam current (within one year): no  Weight trend: stable  Prior visit with dietician: no  Current diet: \"healthy\" diet  in general  Current exercise: walking  Current monitoring regimen: home blood tests - daily  Home blood sugar records: trend: stable  Any episodes of hypoglycemia? no  Is He on ACE inhibitor or angiotensin II receptor blocker? Yes   He states that neurontin simply does not help. Shoulder Pain Review:  Patient complains of rt shoulder pain. The symptoms began several weeks ago Course of symptoms since onset has been gradually worsening. Pain is described as overall severity = moderate. Symptoms were incited by no known event. Patient denies N/A. Therapy to date includes OTC analgesics: effective. Hypertension Review:  The patient has essential hypertension  Diet and Lifestyle: generally follows a  low sodium diet, exercises sporadically  Home BP Monitoring: is not measured at home. Pertinent ROS: taking medications as instructed, no medication side effects noted, no TIA's, no chest pain on exertion, no dyspnea on exertion, no swelling of ankles. He has multiple areas of joint pains reported. He states he receives Percocet for pain control daily    Insomnia Review:  Patient is seen for insomnia.  Treatment includes no medication   Ongoing symptoms include continued bouts of insomnia,bouts of anxiety and depression are reported        Review of Systems  Constitutional: negative for fevers, chills, anorexia and weight loss  Eyes:   negative for visual disturbance and irritation  ENT:   negative for tinnitus,sore throat,nasal congestion,ear pains. hoarseness  Respiratory:  negative for cough, hemoptysis, dyspnea,wheezing  CV:   negative for chest pain, palpitations, lower extremity edema  GI:   negative for nausea, vomiting, diarrhea, abdominal pain,melena  Endo:               negative for polyuria,polydipsia,polyphagia,heat intolerance  Genitourinary: negative for frequency, dysuria and hematuria  Integument:  negative for rash and pruritus  Hematologic:  negative for easy bruising and gum/nose bleeding  Musculoskel: myalgias, arthralgias, back pain, muscle weakness, joint pain  Neurological:  negative for headaches, dizziness, vertigo, memory problems and gait   Behavl/Psych: negative for feelings of anxiety, depression, mood changes    Past Medical History:   Diagnosis Date    Arthritis, Degenerative,  Knee 4/4/2011    Carcinoma, Prostate 4/4/2011    Degenerative disc disease 4/4/2011    Depression/ Anxiety 4/4/2011    Diabetes ([facility] [address]     Diabetes Mellitrus ( non-insulin dependent ) Type 2 4/4/2011    HEMATOCHEZIA 4/4/2011    Hypertrension 4/4/2011    Hypertriglyceridemia 4/4/2011    Insomnia 4/4/2011    Laryngitis 4/4/2011    Mild Aortic insufficiency 4/4/2011    Mild Concentric LVH (left ventricular hypertrophy) 4/4/2011    Neuropathy 4/4/2011    Osteoarthritis 4/4/2011    Rotator Cuff Tendonitis 4/4/2011     Past Surgical History:   Procedure Laterality Date    CARDIAC SURG PROCEDURE UNLIST      cardiac cath    COLONOSCOPY N/A 4/28/2017    COLONOSCOPY performed by Jin Gerber MD at [facility] ENDOSCOPY    HX ORTHOPAEDIC      left hip pinning    HX OTHER SURGICAL      left little toe amputation    HX UROLOGICAL       Social History     Socioeconomic History    Marital status: LEGALLY      Spouse name: Not on file    Number of children: Not on file    Years of education: Not on file    Highest education level: Not on file   Tobacco Use    Smoking status: Current Every Day Smoker     Packs/day: 0.25     Last attempt to quit: 10/25/2016     Years since quittin.3    Smokeless tobacco: Never Used   Substance and Sexual Activity    Alcohol use: Yes     Alcohol/week: 7.0 oz     Types: 7 Cans of beer, 7 Shots of liquor per week    Sexual activity: Yes     Partners: Female     No family history on file. Current Outpatient Medications   Medication Sig Dispense Refill    lidocaine, PF, (XYLOCAINE) 20 mg/mL (2 %) injection 1 mL by Intra artICUlar route once for 1 dose. 1 Vial 0    triamcinolone acetonide (KENALOG) 40 mg/mL injection 1 mL by Intra artICUlar route once for 1 dose. 1 mL 0    ketoconazole (NIZORAL) 2 % topical cream Apply  to affected area two (2) times a day. 60 g 3    gabapentin (NEURONTIN) 300 mg capsule TAKE TWO CAPSULES BY MOUTH THREE TIMES A  Cap 0    metOLazone (ZAROXOLYN) 10 mg tablet Take 1 Tab by mouth daily. 30 Tab 12    bumetanide (BUMEX) 2 mg tablet Take 1 Tab by mouth two (2) times a day. 60 Tab 12    LANTUS U-100 INSULIN 100 unit/mL injection INJECT 72 UNITS UNDER THE SKIN EVERY 12 HOURS 6 Vial 10    triamcinolone acetonide (KENALOG) 0.1 % ointment Apply  to affected area two (2) times a day. use thin layer bid 80 g 12    lidocaine (LIDODERM) 5 % 1 Patch by TransDERmal route every twenty-four (24) hours. Apply to affected area every 24 hours 1 Package 3    Insulin Syringe-Needle U-100 1 mL 31 gauge x 5/16 syrg USE TO INJECT INSULIN FIVE TIMES A  Syringe 11    ferrous sulfate (IRON) 325 mg (65 mg iron) EC tablet Take 1 Tab by mouth Daily (before breakfast). 30 Tab 6    insulin regular (NOVOLIN R, HUMULIN R) 100 unit/mL injection 14 Units by SubCUTAneous route three (3) times daily as needed (with meals).  1 Vial 12    acetaminophen (PAIN AND FEVER) 500 mg tablet TAKE ONE TABLET BY MOUTH EVERY 6 HOURS AS NEEDED FOR PAIN 60 Tab 2    ergocalciferol (ERGOCALCIFEROL) 50,000 unit capsule Take 1 Cap by mouth every seven (7) days. 4 Cap 12    THERA-TABS M 27 mg iron-400 mcg tab TAKE ONE TABLET BY MOUTH DAILY 30 Tab 11    fluticasone (FLONASE) 50 mcg/actuation nasal spray SPRAY TWO SPRAYS IN EACH NOSTRIL DAILY 1 Bottle 11    irbesartan (AVAPRO) 300 mg tablet TAKE ONE TABLET BY MOUTH EVERY EVENING 30 Tab 10    polyethylene glycol (MIRALAX) 17 gram/dose powder Take 17 g by mouth daily. 850 g 3    dilTIAZem (TIAZAC) 180 mg SR capsule Take 1 Cap by mouth daily. 30 Cap 12    MICRO THIN LANCETS 33 gauge misc USE TO TEST BLOOD SUGAR TWO TO THREE TIMES DAILY 100 Lancet 11    glucose blood VI test strips (ONETOUCH ULTRA TEST) strip Test three times daily. E11.9    Diabetes Type 2 300 Strip 11    Blood-Glucose Meter monitoring kit Use once daily  Onetouch test meter only  E11.9  Type 2 1 Kit 0    CONTOUR NEXT STRIPS strip TEST BLOOD GLUCOSE TWO TO THREE TIMES DAILY 100 Strip 11    docusate sodium (COLACE) 100 mg capsule Take 1 Cap by mouth two (2) times a day. 60 Cap 0    Wheel Chair dianna Custom electric wheelchair 1 Each 0    Calcium Carbonate-Vit D3-Min 600 mg calcium- 400 unit tab Take  by mouth two (2) times a day.  pregabalin (LYRICA) 150 mg capsule Take 1 Cap by mouth three (3) times daily. Max Daily Amount: 450 mg. (Patient not taking: Reported on 3/14/2019) 90 Cap 3    amitriptyline (ELAVIL) 100 mg tablet Take 1 Tab by mouth nightly. 30 Tab 6    bumetanide (BUMEX) 2 mg tablet TAKE ONE TABLET BY MOUTH DAILY (Patient not taking: Reported on 3/14/2019) 30 Tab 0    levoFLOXacin (LEVAQUIN) 500 mg tablet Take 1 Tab by mouth daily. (Patient not taking: Reported on 3/14/2019) 14 Tab 0    ammonium lactate (LAC-HYDRIN FIVE) 5 % lotion Apply daily 222 mL 3    carvedilol (COREG) 3.125 mg tablet Take 1 Tab by mouth two (2) times daily (with meals).  (Patient not taking: Reported on 3/14/2019) 60 Tab 0    warfarin (COUMADIN) 10 mg tablet Take 1 Tab by mouth daily. 30 Tab 0    oxyCODONE-acetaminophen (PERCOCET) 5-325 mg per tablet Take 1 Tab by mouth every eight (8) hours as needed. (Patient not taking: Reported on 3/14/2019) 14 Tab 0     No Known Allergies    Objective:  Visit Vitals  /70   Pulse 87   Temp 98.6 °F (37 °C) (Oral)   Resp 16   SpO2 97%     Physical Exam:   General appearance - alert, well appearing, and in moderate distress  Mental status - alert, oriented to person, place, and time  EYE-LUH, EOMI, corneas normal, no foreign bodies  ENT-ENT exam normal, no neck nodes or sinus tenderness  Nose - normal and patent, no erythema, discharge or polyps  Mouth - mucous membranes moist, pharynx normal without lesions  Neck - supple, no significant adenopathy   Chest - clear to auscultation, no wheezes, rales or rhonchi, symmetric air entry   Heart - normal rate, regular rhythm, normal S1, S2, no murmurs, rubs, clicks or gallops   Abdomen - soft, nontender, nondistended, no masses or organomegaly  Lymph- no adenopathy palpable  Ext-peripheral pulses normal, 3+leg edemal , no clubbing or cyanosis  Skin-Warm and dry. no hyperpigmentation, vitiligo, or suspicious lesions  Neuro -alert, oriented, normal speech, no focal findings or movement disorder noted  Neck-normal C-spine, no tenderness, full ROM without pain  Feet-no nail deformities or callus formation with good pulses noted  Lt. .shoulder-reduced range of motion of rt., subdeltoid tenderness  Lt.leg erythema and dryness noted      Results for orders placed or performed in visit on 02/20/19   AMB POC GLUCOSE BLOOD, BY GLUCOSE MONITORING DEVICE   Result Value Ref Range    Glucose  mg/dL   AMB POC HEMOGLOBIN A1C   Result Value Ref Range    Hemoglobin A1c (POC) 7.5 %       Assessment/Plan:    ICD-10-CM ICD-9-CM    1.  Rotator cuff arthropathy of right shoulder M12.811 716.81 [place] THER/PROPH/DIAG INJECTION, SUBCUT/IM      LIDOCAINE INJECTION      TRIAMCINOLONE ACETONIDE INJ   2. Severe obesity (BMI 35.0-39. 9) with comorbidity ([facility] [address] E66.01 278.01    3. Essential hypertension I10 401.9    4. Primary osteoarthritis of right hip M16.11 715.15    5. Type 2 diabetes with nephropathy (HCC) E11.21 250.40      583.81    6. Seborrheic keratoses L82.1 702.19      Orders Placed This Encounter    CT THER/PROPH/DIAG INJECTION, SUBCUT/IM    LIDOCAINE INJECTION    TRIAMCINOLONE ACETONIDE INJ     Order Specific Question:   Charge Quantity? Answer:   4    lidocaine, PF, (XYLOCAINE) 20 mg/mL (2 %) injection     Si mL by Intra artICUlar route once for 1 dose. Dispense:  1 Vial     Refill:  0    triamcinolone acetonide (KENALOG) 40 mg/mL injection     Si mL by Intra artICUlar route once for 1 dose. Dispense:  1 mL     Refill:  0    ketoconazole (NIZORAL) 2 % topical cream     Sig: Apply  to affected area two (2) times a day. Dispense:  60 g     Refill:  3     lose weight,Take 81mg aspirin daily    Indications:   Symptomatic relief of pain    Procedure:  After consent was obtained, using sterile technique the rt. shoulder joint was prepped using alcohol. Local anesthetic used: 1% lidocaine. . The joint was entered and Kenalog 40 mg was mixed with 1% lidocaine 3 ml  and injected into the joint and the needle withdrawn. The procedure was well tolerated. The patient is asked to continue to rest the joint for a few more days before resuming regular activities. It may be more painful for the first 1-2 days. Watch for fever, or increased swelling or persistent pain in the joint. Call or return to clinic prn if such symptoms occur or there is failure to improve as anticipated.       [facility]  OFFICE PROCEDURE PROGRESS NOTE        Chart reviewed for the following:   Shital Woods MD, have reviewed the History, Physical and updated the Allergic reactions for [place] [employer] [place] [facility]     TIME OUT performed immediately prior to start of procedure:   Shelah Blizzard., MD, have performed the following reviews on [facility] prior to the start of the procedure:            * Patient was identified by name and date of birth   * Agreement on procedure being performed was verified  * Risks and Benefits explained to the patient  * Procedure site verified and marked as necessary  * Patient was positioned for comfort       Time: 3:31pm      Date of procedure: 3/14/2019    Procedure performed by:  Valentino Dearth., MD    Patient assisted by: nursing attendant    How tolerated by patient: tolerated the procedure well with no complications    Comments: none                        Patient Instructions   MyChart Activation    Thank you for requesting access to [employer]. Please follow the instructions below to securely access and download your online medical record. [employer] allows you to send messages to your doctor, view your test results, renew your prescriptions, schedule appointments, and more. How Do I Sign Up? 1. In your internet browser, go to [email or web address]  2. Click on the First Time User? Click Here link in the Sign In box. You will be redirect to the New Member Sign Up page. 3. Enter your [employer] Access Code exactly as it appears below. You will not need to use this code after youve completed the sign-up process. If you do not sign up before the expiration date, you must request a new code. [employer] Access Code: [email or web address]  Expires: 2019  4:11 PM (This is the date your [employer] access code will )    4. Enter the last four digits of your Social Security Number (xxxx) and Date of Birth (mm/dd/yyyy) as indicated and click Submit. You will be taken to the next sign-up page. 5. Create a [employer] ID. This will be your [employer] login ID and cannot be changed, so think of one that is secure and easy to remember. 6. Create a [employer] password.  You can change your password at any time. 7. Enter your Password Reset Question and Answer. This can be used at a later time if you forget your password. 8. Enter your e-mail address. You will receive e-mail notification when new information is available in [address] 9. Click Sign Up. You can now view and download portions of your medical record. 10. Click the Download Summary menu link to download a portable copy of your medical information. Additional Information    If you have questions, please visit the Frequently Asked Questions section of the [employer] website at [email or web address] Remember, [employer] is NOT to be used for urgent needs. For medical emergencies, dial 911. Follow-up Disposition:  Return in about 4 weeks (around 4/11/2019), or if symptoms worsen or fail to improve. I have reviewed with the patient details of the assessment and plan and all questions were answered. Relevent patient education was performed. The most recent lab findings were reviewed with the patient. An After Visit Summary was printed and given to the patient. 1. Have you been to the ER, urgent care clinic since your last visit? Hospitalized since your last visit?no    2. Have you seen or consulted any other health care providers outside of the [address] [place] [address] since your last visit?   Include any pap smears or colon screening.yes, [facility]    3 most recent PHQ Screens 2/20/2019   Little interest or pleasure in doing things Not at all   Feeling down, depressed, irritable, or hopeless Not at all   Total Score PHQ 2 0

## 2019-03-15 ENCOUNTER — APPOINTMENT (OUTPATIENT)
Dept: ULTRASOUND IMAGING | Age: 69
DRG: 683 | End: 2019-03-15
Attending: INTERNAL MEDICINE
Payer: MEDICARE

## 2019-03-15 LAB
ALBUMIN SERPL-MCNC: 2.5 G/DL (ref 3.5–5)
ALBUMIN/GLOB SERPL: 0.6 {RATIO} (ref 1.1–2.2)
ALP SERPL-CCNC: 104 U/L (ref 45–117)
ALT SERPL-CCNC: 21 U/L (ref 12–78)
ANION GAP SERPL CALC-SCNC: 6 MMOL/L (ref 5–15)
AST SERPL-CCNC: 14 U/L (ref 15–37)
ATRIAL RATE: 77 BPM
BASOPHILS # BLD: 0 K/UL (ref 0–0.1)
BASOPHILS NFR BLD: 0 % (ref 0–1)
BILIRUB SERPL-MCNC: 0.2 MG/DL (ref 0.2–1)
BUN SERPL-MCNC: 70 MG/DL (ref 6–20)
BUN/CREAT SERPL: 26 (ref 12–20)
CALCIUM SERPL-MCNC: 8.4 MG/DL (ref 8.5–10.1)
CALCULATED P AXIS, ECG09: 47 DEGREES
CALCULATED R AXIS, ECG10: -24 DEGREES
CALCULATED T AXIS, ECG11: 97 DEGREES
CHLORIDE SERPL-SCNC: 100 MMOL/L (ref 97–108)
CHLORIDE UR-SCNC: 83 MMOL/L
CK SERPL-CCNC: 135 U/L (ref 39–308)
CO2 SERPL-SCNC: 27 MMOL/L (ref 21–32)
COMMENT, HOLDF: NORMAL
CREAT SERPL-MCNC: 2.72 MG/DL (ref 0.7–1.3)
CREAT UR-MCNC: 35 MG/DL
DIAGNOSIS, 93000: NORMAL
DIFFERENTIAL METHOD BLD: ABNORMAL
EOSINOPHIL # BLD: 0.1 K/UL (ref 0–0.4)
EOSINOPHIL NFR BLD: 2 % (ref 0–7)
ERYTHROCYTE [DISTWIDTH] IN BLOOD BY AUTOMATED COUNT: 15.2 % (ref 11.5–14.5)
GLOBULIN SER CALC-MCNC: 3.9 G/DL (ref 2–4)
GLUCOSE BLD STRIP.AUTO-MCNC: 310 MG/DL (ref 65–100)
GLUCOSE BLD STRIP.AUTO-MCNC: 327 MG/DL (ref 65–100)
GLUCOSE BLD STRIP.AUTO-MCNC: 330 MG/DL (ref 65–100)
GLUCOSE BLD STRIP.AUTO-MCNC: 335 MG/DL (ref 65–100)
GLUCOSE BLD STRIP.AUTO-MCNC: 382 MG/DL (ref 65–100)
GLUCOSE SERPL-MCNC: 255 MG/DL (ref 65–100)
HCT VFR BLD AUTO: 23.4 % (ref 36.6–50.3)
HGB BLD-MCNC: 7.7 G/DL (ref 12.1–17)
IMM GRANULOCYTES # BLD AUTO: 0.1 K/UL (ref 0–0.04)
IMM GRANULOCYTES NFR BLD AUTO: 1 % (ref 0–0.5)
LYMPHOCYTES # BLD: 0.7 K/UL (ref 0.8–3.5)
LYMPHOCYTES NFR BLD: 10 % (ref 12–49)
MCH RBC QN AUTO: 31.8 PG (ref 26–34)
MCHC RBC AUTO-ENTMCNC: 32.9 G/DL (ref 30–36.5)
MCV RBC AUTO: 96.7 FL (ref 80–99)
MONOCYTES # BLD: 0.3 K/UL (ref 0–1)
MONOCYTES NFR BLD: 5 % (ref 5–13)
NEUTS SEG # BLD: 5.4 K/UL (ref 1.8–8)
NEUTS SEG NFR BLD: 82 % (ref 32–75)
NRBC # BLD: 0 K/UL (ref 0–0.01)
NRBC BLD-RTO: 0 PER 100 WBC
P-R INTERVAL, ECG05: 184 MS
PLATELET # BLD AUTO: 221 K/UL (ref 150–400)
PMV BLD AUTO: 9.9 FL (ref 8.9–12.9)
POTASSIUM SERPL-SCNC: 5.4 MMOL/L (ref 3.5–5.1)
PROT SERPL-MCNC: 6.4 G/DL (ref 6.4–8.2)
PROT UR-MCNC: 88 MG/DL (ref 0–11.9)
PROT/CREAT UR-RTO: 2.5
Q-T INTERVAL, ECG07: 386 MS
QRS DURATION, ECG06: 108 MS
QTC CALCULATION (BEZET), ECG08: 436 MS
RBC # BLD AUTO: 2.42 M/UL (ref 4.1–5.7)
RBC MORPH BLD: ABNORMAL
SAMPLES BEING HELD,HOLD: NORMAL
SERVICE CMNT-IMP: ABNORMAL
SODIUM SERPL-SCNC: 133 MMOL/L (ref 136–145)
SODIUM UR-SCNC: 87 MMOL/L
TROPONIN I SERPL-MCNC: <0.05 NG/ML
TSH SERPL DL<=0.05 MIU/L-ACNC: 0.97 UIU/ML (ref 0.36–3.74)
VENTRICULAR RATE, ECG03: 77 BPM
WBC # BLD AUTO: 6.6 K/UL (ref 4.1–11.1)

## 2019-03-15 PROCEDURE — 84300 ASSAY OF URINE SODIUM: CPT

## 2019-03-15 PROCEDURE — 97530 THERAPEUTIC ACTIVITIES: CPT

## 2019-03-15 PROCEDURE — 82436 ASSAY OF URINE CHLORIDE: CPT

## 2019-03-15 PROCEDURE — 74011636637 HC RX REV CODE- 636/637: Performed by: INTERNAL MEDICINE

## 2019-03-15 PROCEDURE — 74011000250 HC RX REV CODE- 250: Performed by: INTERNAL MEDICINE

## 2019-03-15 PROCEDURE — 97167 OT EVAL HIGH COMPLEX 60 MIN: CPT | Performed by: OCCUPATIONAL THERAPIST

## 2019-03-15 PROCEDURE — 65660000000 HC RM CCU STEPDOWN

## 2019-03-15 PROCEDURE — 97530 THERAPEUTIC ACTIVITIES: CPT | Performed by: OCCUPATIONAL THERAPIST

## 2019-03-15 PROCEDURE — 76770 US EXAM ABDO BACK WALL COMP: CPT

## 2019-03-15 PROCEDURE — 74011250636 HC RX REV CODE- 250/636: Performed by: INTERNAL MEDICINE

## 2019-03-15 PROCEDURE — 82962 GLUCOSE BLOOD TEST: CPT

## 2019-03-15 PROCEDURE — 97162 PT EVAL MOD COMPLEX 30 MIN: CPT

## 2019-03-15 PROCEDURE — 85025 COMPLETE CBC W/AUTO DIFF WBC: CPT

## 2019-03-15 PROCEDURE — 74011250637 HC RX REV CODE- 250/637: Performed by: INTERNAL MEDICINE

## 2019-03-15 PROCEDURE — 80053 COMPREHEN METABOLIC PANEL: CPT

## 2019-03-15 PROCEDURE — 36415 COLL VENOUS BLD VENIPUNCTURE: CPT

## 2019-03-15 PROCEDURE — 84540 ASSAY OF URINE/UREA-N: CPT

## 2019-03-15 PROCEDURE — 84156 ASSAY OF PROTEIN URINE: CPT

## 2019-03-15 RX ORDER — LABETALOL HYDROCHLORIDE 5 MG/ML
20 INJECTION, SOLUTION INTRAVENOUS
Status: DISCONTINUED | OUTPATIENT
Start: 2019-03-15 | End: 2019-03-20 | Stop reason: HOSPADM

## 2019-03-15 RX ORDER — SODIUM CHLORIDE 0.9 % (FLUSH) 0.9 %
5-40 SYRINGE (ML) INJECTION AS NEEDED
Status: DISCONTINUED | OUTPATIENT
Start: 2019-03-15 | End: 2019-03-20 | Stop reason: HOSPADM

## 2019-03-15 RX ORDER — INSULIN LISPRO 100 [IU]/ML
5 INJECTION, SOLUTION INTRAVENOUS; SUBCUTANEOUS ONCE
Status: COMPLETED | OUTPATIENT
Start: 2019-03-15 | End: 2019-03-15

## 2019-03-15 RX ORDER — LANOLIN ALCOHOL/MO/W.PET/CERES
325 CREAM (GRAM) TOPICAL
Status: DISCONTINUED | OUTPATIENT
Start: 2019-03-15 | End: 2019-03-20 | Stop reason: HOSPADM

## 2019-03-15 RX ORDER — MAGNESIUM SULFATE 100 %
4 CRYSTALS MISCELLANEOUS AS NEEDED
Status: DISCONTINUED | OUTPATIENT
Start: 2019-03-15 | End: 2019-03-20 | Stop reason: HOSPADM

## 2019-03-15 RX ORDER — HEPARIN SODIUM 5000 [USP'U]/ML
5000 INJECTION, SOLUTION INTRAVENOUS; SUBCUTANEOUS EVERY 8 HOURS
Status: DISCONTINUED | OUTPATIENT
Start: 2019-03-15 | End: 2019-03-20 | Stop reason: HOSPADM

## 2019-03-15 RX ORDER — GABAPENTIN 300 MG/1
300 CAPSULE ORAL 3 TIMES DAILY
Status: DISCONTINUED | OUTPATIENT
Start: 2019-03-15 | End: 2019-03-20 | Stop reason: HOSPADM

## 2019-03-15 RX ORDER — SODIUM CHLORIDE 0.9 % (FLUSH) 0.9 %
5-40 SYRINGE (ML) INJECTION EVERY 8 HOURS
Status: DISCONTINUED | OUTPATIENT
Start: 2019-03-15 | End: 2019-03-20 | Stop reason: HOSPADM

## 2019-03-15 RX ORDER — DILTIAZEM HYDROCHLORIDE 180 MG/1
180 CAPSULE, EXTENDED RELEASE ORAL DAILY
Status: DISCONTINUED | OUTPATIENT
Start: 2019-03-15 | End: 2019-03-15

## 2019-03-15 RX ORDER — DILTIAZEM HYDROCHLORIDE 180 MG/1
180 CAPSULE, COATED, EXTENDED RELEASE ORAL DAILY
Status: DISCONTINUED | OUTPATIENT
Start: 2019-03-15 | End: 2019-03-16

## 2019-03-15 RX ORDER — INSULIN GLARGINE 100 [IU]/ML
30 INJECTION, SOLUTION SUBCUTANEOUS 2 TIMES DAILY
Status: DISCONTINUED | OUTPATIENT
Start: 2019-03-15 | End: 2019-03-16

## 2019-03-15 RX ORDER — INSULIN LISPRO 100 [IU]/ML
4 INJECTION, SOLUTION INTRAVENOUS; SUBCUTANEOUS ONCE
Status: COMPLETED | OUTPATIENT
Start: 2019-03-15 | End: 2019-03-15

## 2019-03-15 RX ORDER — OXYCODONE AND ACETAMINOPHEN 5; 325 MG/1; MG/1
1 TABLET ORAL
Status: DISCONTINUED | OUTPATIENT
Start: 2019-03-15 | End: 2019-03-20 | Stop reason: HOSPADM

## 2019-03-15 RX ORDER — DEXTROSE MONOHYDRATE 25 G/50ML
12.5-25 INJECTION, SOLUTION INTRAVENOUS AS NEEDED
Status: DISCONTINUED | OUTPATIENT
Start: 2019-03-15 | End: 2019-03-20 | Stop reason: HOSPADM

## 2019-03-15 RX ORDER — HYDRALAZINE HYDROCHLORIDE 25 MG/1
25 TABLET, FILM COATED ORAL 3 TIMES DAILY
Status: DISCONTINUED | OUTPATIENT
Start: 2019-03-15 | End: 2019-03-18

## 2019-03-15 RX ORDER — FLUTICASONE PROPIONATE 50 MCG
2 SPRAY, SUSPENSION (ML) NASAL DAILY
Status: DISCONTINUED | OUTPATIENT
Start: 2019-03-15 | End: 2019-03-20 | Stop reason: HOSPADM

## 2019-03-15 RX ORDER — ONDANSETRON 2 MG/ML
4 INJECTION INTRAMUSCULAR; INTRAVENOUS
Status: DISCONTINUED | OUTPATIENT
Start: 2019-03-15 | End: 2019-03-20 | Stop reason: HOSPADM

## 2019-03-15 RX ORDER — HYDRALAZINE HYDROCHLORIDE 20 MG/ML
20 INJECTION INTRAMUSCULAR; INTRAVENOUS
Status: DISCONTINUED | OUTPATIENT
Start: 2019-03-15 | End: 2019-03-18

## 2019-03-15 RX ORDER — INSULIN LISPRO 100 [IU]/ML
INJECTION, SOLUTION INTRAVENOUS; SUBCUTANEOUS
Status: DISCONTINUED | OUTPATIENT
Start: 2019-03-15 | End: 2019-03-20 | Stop reason: HOSPADM

## 2019-03-15 RX ORDER — AMITRIPTYLINE HYDROCHLORIDE 50 MG/1
100 TABLET, FILM COATED ORAL
Status: DISCONTINUED | OUTPATIENT
Start: 2019-03-15 | End: 2019-03-16

## 2019-03-15 RX ORDER — INSULIN LISPRO 100 [IU]/ML
3 INJECTION, SOLUTION INTRAVENOUS; SUBCUTANEOUS
Status: DISCONTINUED | OUTPATIENT
Start: 2019-03-16 | End: 2019-03-20 | Stop reason: HOSPADM

## 2019-03-15 RX ORDER — GABAPENTIN 300 MG/1
CAPSULE ORAL
Qty: 180 CAP | Refills: 0 | Status: SHIPPED | OUTPATIENT
Start: 2019-03-15 | End: 2019-11-13 | Stop reason: ALTCHOICE

## 2019-03-15 RX ORDER — ACETAMINOPHEN 325 MG/1
650 TABLET ORAL
Status: DISCONTINUED | OUTPATIENT
Start: 2019-03-15 | End: 2019-03-20 | Stop reason: HOSPADM

## 2019-03-15 RX ORDER — AMMONIUM LACTATE 12 G/100G
LOTION TOPICAL 2 TIMES DAILY
Status: DISCONTINUED | OUTPATIENT
Start: 2019-03-15 | End: 2019-03-20 | Stop reason: HOSPADM

## 2019-03-15 RX ORDER — SODIUM CHLORIDE 9 MG/ML
100 INJECTION, SOLUTION INTRAVENOUS CONTINUOUS
Status: DISCONTINUED | OUTPATIENT
Start: 2019-03-15 | End: 2019-03-17

## 2019-03-15 RX ADMIN — FLUTICASONE PROPIONATE 2 SPRAY: 50 SPRAY, METERED NASAL at 11:08

## 2019-03-15 RX ADMIN — SODIUM CHLORIDE 100 ML/HR: 900 INJECTION, SOLUTION INTRAVENOUS at 16:22

## 2019-03-15 RX ADMIN — SALINE NASAL SPRAY 2 SPRAY: 1.5 SOLUTION NASAL at 02:28

## 2019-03-15 RX ADMIN — HYDRALAZINE HYDROCHLORIDE 25 MG: 25 TABLET, FILM COATED ORAL at 16:22

## 2019-03-15 RX ADMIN — GABAPENTIN 300 MG: 300 CAPSULE ORAL at 09:27

## 2019-03-15 RX ADMIN — Medication 10 ML: at 00:39

## 2019-03-15 RX ADMIN — INSULIN LISPRO 4 UNITS: 100 INJECTION, SOLUTION INTRAVENOUS; SUBCUTANEOUS at 00:39

## 2019-03-15 RX ADMIN — HEPARIN SODIUM 5000 UNITS: 5000 INJECTION, SOLUTION INTRAVENOUS; SUBCUTANEOUS at 22:12

## 2019-03-15 RX ADMIN — OXYCODONE AND ACETAMINOPHEN 1 TABLET: 5; 325 TABLET ORAL at 16:22

## 2019-03-15 RX ADMIN — Medication 10 ML: at 14:33

## 2019-03-15 RX ADMIN — LABETALOL HYDROCHLORIDE 20 MG: 5 INJECTION INTRAVENOUS at 11:11

## 2019-03-15 RX ADMIN — Medication 10 ML: at 22:13

## 2019-03-15 RX ADMIN — OXYCODONE AND ACETAMINOPHEN 1 TABLET: 5; 325 TABLET ORAL at 00:54

## 2019-03-15 RX ADMIN — GABAPENTIN 300 MG: 300 CAPSULE ORAL at 16:22

## 2019-03-15 RX ADMIN — Medication 10 ML: at 05:37

## 2019-03-15 RX ADMIN — INSULIN LISPRO 7 UNITS: 100 INJECTION, SOLUTION INTRAVENOUS; SUBCUTANEOUS at 12:37

## 2019-03-15 RX ADMIN — INSULIN GLARGINE 30 UNITS: 100 INJECTION, SOLUTION SUBCUTANEOUS at 18:28

## 2019-03-15 RX ADMIN — AMITRIPTYLINE HYDROCHLORIDE 100 MG: 50 TABLET, FILM COATED ORAL at 22:12

## 2019-03-15 RX ADMIN — INSULIN LISPRO 5 UNITS: 100 INJECTION, SOLUTION INTRAVENOUS; SUBCUTANEOUS at 22:13

## 2019-03-15 RX ADMIN — GABAPENTIN 300 MG: 300 CAPSULE ORAL at 22:12

## 2019-03-15 RX ADMIN — DILTIAZEM HYDROCHLORIDE 180 MG: 180 CAPSULE, COATED, EXTENDED RELEASE ORAL at 09:26

## 2019-03-15 RX ADMIN — HYDRALAZINE HYDROCHLORIDE 25 MG: 25 TABLET, FILM COATED ORAL at 22:13

## 2019-03-15 RX ADMIN — INSULIN LISPRO 7 UNITS: 100 INJECTION, SOLUTION INTRAVENOUS; SUBCUTANEOUS at 16:30

## 2019-03-15 RX ADMIN — FERROUS SULFATE TAB 325 MG (65 MG ELEMENTAL FE) 325 MG: 325 (65 FE) TAB at 09:27

## 2019-03-15 RX ADMIN — INSULIN LISPRO 7 UNITS: 100 INJECTION, SOLUTION INTRAVENOUS; SUBCUTANEOUS at 09:28

## 2019-03-15 RX ADMIN — INSULIN GLARGINE 30 UNITS: 100 INJECTION, SOLUTION SUBCUTANEOUS at 09:27

## 2019-03-15 RX ADMIN — HEPARIN SODIUM 5000 UNITS: 5000 INJECTION, SOLUTION INTRAVENOUS; SUBCUTANEOUS at 14:32

## 2019-03-15 NOTE — PROGRESS NOTES
Orders received, chart reviewed and patient evaluated by physical therapy. Recommend patient to discharge to SNF and transition to LTC pending progress with skilled acute care physical therapy. Recommend tasia lift to the chair over the weekend. Full evaluation to follow.      Charmayne Musca, PT, DPT

## 2019-03-15 NOTE — PROGRESS NOTES
Problem: Self Care Deficits Care Plan (Adult)  Goal: *Acute Goals and Plan of Care (Insert Text)  Occupational Therapy Goals:  Initiated 3/15/2019  1. Patient will perform bathing with minimal assistance within 7 days. 2. Patient will perform lower body dressing with moderate assistance  within 7 days. 3. Patient will perform toileting with moderate assistance  within 7 days. 4. Patient will transfer from elevated BS with minimal assistance using the least restrictive device and appropriate durable medical equipment within 7 days. Occupational Therapy EVALUATION  Patient: So Jules III (27 y.o. male)  Date: 3/15/2019  Primary Diagnosis: Acute on chronic renal failure (HCC) [N17.9, N18.9]       Precautions: fall, skin        ASSESSMENT :  Based on the objective data described below, the patient presents with general weakness and significant limitations with all major joints due to arthritis and history of left hip fracture (alex placed). Pt is very sedentary at baseline and is only able to achieve sit to stand at baseline from very elevated surfaces. He was very demanding at times. Max assist x2 for supine to sit edge of bed. Pt would not allow bed to be lowered as he was insistent that he could not achieve standing without bed almost fully up. Pt wanted to get to [facility] but height of bed was 4 feet taller than [facility] at its highest level. Therapist encouraged pt to attempt standing from similar height that [facility] was to ensure that pt could get back up off of [facility]. Pt would not let the bed lowered and therapist informed pt that he would need to use bedpan as he would not be able to achieve standing from the lower surface. Pt was able to achieve sit to stand from EOB with min assist x2 and full support of walker. Unable to take steps but pt was able to stand long enough to get orthostatic BP. Pt is performing UB ADLS at a set up level and lower body ADLS at a total assist level.   Recommend SNF at discharge. Pt lives alone and reports that he was looking into personal care aide. Patient will benefit from skilled intervention to address the above impairments. Patients rehabilitation potential is considered to be Fair  Factors which may influence rehabilitation potential include:   []             None noted  []             Mental ability/status  [x]             Medical condition  []             Home/family situation and support systems  [x]             Safety awareness  []             Pain tolerance/management  []             Other:      PLAN :  Recommendations and Planned Interventions:  [x]               Self Care Training                  [x]        Therapeutic Activities  [x]               Functional Mobility Training    []        Cognitive Retraining  [x]               Therapeutic Exercises           []        Endurance Activities  [x]               Balance Training                   []        Neuromuscular Re-Education  []               Visual/Perceptual Training     [x]   Home Safety Training  [x]               Patient Education                 [x]        Family Training/Education  []               Other (comment):    Frequency/Duration: Patient will be followed by occupational therapy 3 times a week to address goals. Discharge Recommendations: Skilled Nursing Facility  Further Equipment Recommendations for Discharge: defer to rehab     SUBJECTIVE:   Patient stated I don't sleep in a bed. I have to have the bed all the way up to stand.     OBJECTIVE DATA SUMMARY:   HISTORY:   Past Medical History:   Diagnosis Date    Arthritis, Degenerative,  Knee 4/4/2011    Carcinoma, Prostate 4/4/2011    Degenerative disc disease 4/4/2011    Depression/ Anxiety 4/4/2011    Diabetes ([facility] [address]     Diabetes Mellitrus ( non-insulin dependent ) Type 2 4/4/2011    HEMATOCHEZIA 4/4/2011    Hypertrension 4/4/2011    Hypertriglyceridemia 4/4/2011    Insomnia 4/4/2011    Laryngitis 4/4/2011    Mild Aortic insufficiency 4/4/2011    Mild Concentric LVH (left ventricular hypertrophy) 4/4/2011    Neuropathy 4/4/2011    Osteoarthritis 4/4/2011    Rotator Cuff Tendonitis 4/4/2011     Past Surgical History:   Procedure Laterality Date    CARDIAC SURG PROCEDURE UNLIST      cardiac cath    COLONOSCOPY N/A 4/28/2017    COLONOSCOPY [facility]    HX ORTHOPAEDIC      left hip pinning    HX OTHER SURGICAL      left little toe amputation    HX UROLOGICAL         Prior Level of Function/Environment/Context: very minimal ambulation but was able to ambulate with crutches to bathroom, stands at sink to wash meg areas then needs to walk to lift chair (rest) and then use wipes to bathe, has to have elevated toilet and pushes from back of the tank on the commode to achieve standing, reports performing LB ADLS on his own with limited mobility and standing tolerance   Expanded or extensive additional review of patient history:     Home Situation  Home Environment: Apartment  Wheelchair Ramp: Yes  One/Two Story Residence: One story  Living Alone: Yes  Support Systems: Home care staff, Family member(s)  Current DME Used/Available at Home: Raised toilet seat, Hospital bed, Wheelchair, power, Lift chair, Crutches, Wheelchair  Tub or Shower Type: (sponge bathe/stand at sink)    Hand dominance: Right    EXAMINATION OF PERFORMANCE DEFICITS:  Cognitive/Behavioral Status:  Neurologic State: Alert  Orientation Level: Oriented to person;Oriented to place;Oriented to situation  Cognition: Follows commands  Perception: Appears intact  Perseveration: No perseveration noted  Safety/Judgement: Fall prevention      Hearing:   Auditory  Auditory Impairment: None    Vision/Perceptual:                           Acuity: Impaired near vision(needs reading glasses)         Range of Motion:    AROM: Generally decreased, functional  PROM: Within functional limits                      Strength:    Strength: Generally decreased, functional                Coordination:  Coordination: Within functional limits  Fine Motor Skills-Upper: Left Intact; Right Intact    Gross Motor Skills-Upper: Left Intact; Right Intact    Tone & Sensation:    Tone: Normal  Sensation: Impaired(B neuropathy)                      Balance:  Sitting: Intact; With support  Standing: Impaired  Standing - Static: Fair    Functional Mobility and Transfers for ADLs:  Bed Mobility:  Rolling: Moderate assistance;Maximum assistance;Assist x2  Supine to Sit: Maximum assistance;Assist x2  Sit to Supine: Maximum assistance; Total assistance;Assist x2    Transfers:  Sit to Stand: Minimum assistance;Assist x2(bed elevated)  Stand to Sit: Minimum assistance;Assist x2(bed elevated)  Bed to Chair: (unable today)  Toilet Transfer : (unable)    ADL Assessment:  Feeding: Independent(once provided)    Oral Facial Hygiene/Grooming: Setup(seated)    Bathing: Maximum assistance    Upper Body Dressing: Setup    Lower Body Dressing: Total assistance    Toileting: Total assistance                ADL Intervention and task modifications:     See assessment  Cognitive Retraining  Safety/Judgement: Fall prevention      Functional Measure:  Barthel Index:    Bathin  Bladder: 5  Bowels: 5  Groomin  Dressin  Feeding: 10  Mobility: 0  Stairs: 0  Toilet Use: 0  Transfer (Bed to Chair and Back): 5  Total: 30/100        Percentage of impairment   0%   1-19%   20-39%   40-59%   60-79%   80-99%   100%   Barthel Score 0-100 100 99-80 79-60 59-40 20-39 1-19   0     The Barthel ADL Index: Guidelines  1. The index should be used as a record of what a patient does, not as a record of what a patient could do. 2. The main aim is to establish degree of independence from any help, physical or verbal, however minor and for whatever reason. 3. The need for supervision renders the patient not independent. 4. A patient's performance should be established using the best available evidence.  Asking the patient, friends/relatives and nurses are the usual sources, but direct observation and common sense are also important. However direct testing is not needed. 5. Usually the patient's performance over the preceding 24-48 hours is important, but occasionally longer periods will be relevant. 6. Middle categories imply that the patient supplies over 50 per cent of the effort. 7. Use of aids to be independent is allowed. Bassam Thompson., Barthel, DLinetteW. ([id number]). Functional evaluation: the Barthel Index. [address] [facility] (14)2. Rosy Gilbert gretel PJ Bates, Jimmy Odom., Serg Joseph., Lissette, [address] (1999). Measuring the change indisability after inpatient rehabilitation; comparison of the responsiveness of the Barthel Index and Functional [place] Measure. Journal of Neurology, Neurosurgery, and Psychiatry, 66(4), 924-710. ANDRES Jack, CEDRIC Cole, & Carla Burkett M.A. (2004.) Assessment of post-stroke quality of life in cost-effectiveness studies: The usefulness of the Barthel Index and the EuroQoL-5D. Quality of Life Research, 15, 801-73         Occupational Therapy Evaluation Charge Determination   History Examination Decision-Making   HIGH Complexity : Extensive review of history including physical, cognitive and psychosocial history  HIGH Complexity : 5 or more performance deficits relating to physical, cognitive , or psychosocial skils that result in activity limitations and / or participation restrictions HIGH Complexity : Patient presents with comorbidities that affect occupational performance.  Signifigant modification of tasks or assistance (eg, physical or verbal) with assessment (s) is necessary to enable patient to complete evaluation       Based on the above components, the patient evaluation is determined to be of the following complexity level: HIGH   Pain:            general pain in joints with mobility        Activity Tolerance:     Please refer to the flowsheet for vital signs taken during this treatment. After treatment:   [] Patient left in no apparent distress sitting up in chair  [x] Patient left in no apparent distress in bed  [x] Call bell left within reach  [x] Nursing notified  [] Caregiver present  [] Bed alarm activated    COMMUNICATION/EDUCATION:   The patients plan of care was discussed with: Physical Therapist, Registered Nurse and patient. [] Home safety education was provided and the patient/caregiver indicated understanding. [] Patient/family have participated as able in goal setting and plan of care. [] Patient/family agree to work toward stated goals and plan of care. [x] Patient understands intent and goals of therapy, but is neutral about his/her participation. [] Patient is unable to participate in goal setting and plan of care. This patients plan of care is appropriate for delegation to hospitals.     Thank you for this referral.  Man Silva OTR/L  Time Calculation: 40 mins                    Comments: [phone]

## 2019-03-15 NOTE — PROGRESS NOTES
Bedside shift change report given to RUBA Ceja (oncoming nurse) by Arlet La (offgoing nurse). Report included the following information SBAR, Kardex, ED Summary, Procedure Summary, Intake/Output, MAR and Recent Results.

## 2019-03-15 NOTE — PROGRESS NOTES
Primary Nurse Sharif Davies RN and Juanis Ramos RN performed a dual skin assessment on this patient Impairment noted- see wound doc flow sheet  Edgar score is 15    *Dry/very flaky BLE and heels. Growth on right side of face. Scattered bruising. Blanchable redness and soreness to sacrum. Left pinky toe amputation    *Wound care and turn team consult ordered.

## 2019-03-15 NOTE — ED NOTES
Adjusted pt in bed and used pillow to support left hip/leg due to chronic hip pain. Also provided pt with blanket. Call bell within reach.

## 2019-03-15 NOTE — CONSULTS
Nephrology Consult Note     Yovany Napoles     [facility]. [facility]              Phone - [phone]   Patient: Wan Joyce III   YOB: 1950    Date- 3/15/2019  MRN: [id number]             REASON FOR CONSULTATION: ARF   CONSULTING PHYSICIAN: DR. MANUEL REBOLLEDO    ADMIT DATE:3/14/2019 PATIENT PCP:Stephanie Cervantes MD     ASSESSMENT:   · KATHI likely due to DEHYDRATION - bumex + metolazone use, AVAPRO can drop GFR in setting of dehydration  · ckd - 3 cr 1.75 in march 2018  · Hypertension  · Mild hyperkalemia  · Hyponatremia-  Likely due to hypovolemia  · anemia        · Active Problems:  ·   Diabetic polyneuropathy ([facility] (4/4/2011)  ·   ·   Diabetes mellitus type 2, controlled ([facility] (4/4/2011)  ·   ·   HTN (hypertension) (4/4/2011)  ·   ·   Chronic hip pain (4/4/2011)  ·   ·   Acute on chronic renal failure ([facility] (3/14/2019)  ·   ·   PLAN:   · Start ivf  · Check renal usg  · Check urine lytes  · Hold avapro  · Hold bumex  · Hold metolazone  · Check iron panel  · Start epogen  · Start hydralazine for high bp    [x] High complexity decision making was performed  [x] Patient is at high-risk of decompensation with multiple organ involvement    Subjective:   HPI: Brandon Alvarado is a 80 y.o.  male. He came to er with weakness and dizziness  He is found to have arf with cr. 3.19    He has hyperkalemia and hyponatremia  He has been on metolazone 10  m gdaily for last few weeks  He is on bumex bid  He is on avapro  He has lymphedema  No recent antibiotics use  No hypotension since admission  No h/o NSAIDS use recently  No recent iv contrast exposure    No dysuria or hematuria  No urinary urgency or frequency    Review of Systems:         A 11 point review of system was performed today. Pertinent positives and negatives are mentioned in the HPI. The reminder of the ROS is negative and noncontributory.     Past Medical History:   Diagnosis Date    Arthritis, Degenerative,  Knee 4/4/2011    Carcinoma, Prostate 4/4/2011    Degenerative disc disease 4/4/2011    Depression/ Anxiety 4/4/2011    Diabetes ([address]     Diabetes Mellitrus ( non-insulin dependent ) Type 2 4/4/2011    HEMATOCHEZIA 4/4/2011    Hypertrension 4/4/2011    Hypertriglyceridemia 4/4/2011    Insomnia 4/4/2011    Laryngitis 4/4/2011    Mild Aortic insufficiency 4/4/2011    Mild Concentric LVH (left ventricular hypertrophy) 4/4/2011    Neuropathy 4/4/2011    Osteoarthritis 4/4/2011    Rotator Cuff Tendonitis 4/4/2011      Past Surgical History:   Procedure Laterality Date    CARDIAC SURG PROCEDURE UNLIST      cardiac cath    COLONOSCOPY N/A 4/28/2017    COLONOSCOPY performed by Nahum Jack MD at [facility] ENDOSCOPY    HX ORTHOPAEDIC      left hip pinning    HX OTHER SURGICAL      left little toe amputation    HX UROLOGICAL        Prior to Admission medications    Medication Sig Start Date End Date Taking? Authorizing Provider   oxyCODONE-acetaminophen (PERCOCET) 5-325 mg per tablet Take  by mouth every four (4) hours as needed for Pain. Yes Provider, Historical   irbesartan (AVAPRO) 300 mg tablet TAKE ONE TABLET BY MOUTH EVERY EVENING 6/27/18  Yes Delmi José MD   gabapentin (NEURONTIN) 300 mg capsule TAKE TWO CAPSULES BY MOUTH THREE TIMES A DAY 3/15/19   Delmi José MD   lidocaine, PF, (XYLOCAINE) 20 mg/mL (2 %) injection 1 mL by Intra artICUlar route once for 1 dose. 3/14/19 3/14/19  Delmi José MD   triamcinolone acetonide (KENALOG) 40 mg/mL injection 1 mL by Intra artICUlar route once for 1 dose. 3/14/19 3/14/19  Delmi José MD   ketoconazole (NIZORAL) 2 % topical cream Apply  to affected area two (2) times a day. 3/14/19   Delmi José MD   hydrocolloid dressing (DUODERM HYDROACTIVE) topical gel Apply  to affected area daily. 3/14/19   Delmi José MD   amitriptyline (ELAVIL) 100 mg tablet Take 1 Tab by mouth nightly.  2/20/19 Michelle Side., MD   metOLazone (ZAROXOLYN) 10 mg tablet Take 1 Tab by mouth daily. 12/4/18   Michelle Side., MD   bumetanide (BUMEX) 2 mg tablet Take 1 Tab by mouth two (2) times a day. 12/4/18   Michelle Side., MD   LANTUS U-100 INSULIN 100 unit/mL injection INJECT 72 UNITS UNDER THE SKIN EVERY 12 HOURS 9/3/18   Michelle Side., MD   triamcinolone acetonide (KENALOG) 0.1 % ointment Apply  to affected area two (2) times a day. use thin layer bid 8/30/18   Michelle Side., MD   lidocaine (LIDODERM) 5 % 1 Patch by TransDERmal route every twenty-four (24) hours. Apply to affected area every 24 hours 8/30/18   Michelle Side., MD   Insulin Syringe-Needle U-100 1 mL 31 gauge x 5/16 syrg USE TO INJECT INSULIN FIVE TIMES A DAY 8/30/18   Micehlle Side., MD   ferrous sulfate (IRON) 325 mg (65 mg iron) EC tablet Take 1 Tab by mouth Daily (before breakfast). 8/30/18   Michelle Side., MD   insulin regular (NOVOLIN R, HUMULIN R) 100 unit/mL injection 14 Units by SubCUTAneous route three (3) times daily as needed (with meals). 8/30/18   Michelle Side., MD   ammonium lactate (LAC-HYDRIN FIVE) 5 % lotion Apply daily 8/30/18   Michelle Side., MD   acetaminophen (PAIN AND FEVER) 500 mg tablet TAKE ONE TABLET BY MOUTH EVERY 6 HOURS AS NEEDED FOR PAIN 8/28/18   Michelle Side., MD   ergocalciferol (ERGOCALCIFEROL) 50,000 unit capsule Take 1 Cap by mouth every seven (7) days. 7/31/18   Michelle Side., MD   THERA-TABS M 27 mg iron-400 mcg tab TAKE ONE TABLET BY MOUTH DAILY 7/5/18   Michelle Side., MD   fluticasone [facility]) 50 mcg/actuation nasal spray SPRAY TWO SPRAYS IN EACH NOSTRIL DAILY 7/5/18   Michelle Side., MD   polyethylene glycol [facility]) 17 gram/dose powder Take 17 g by mouth daily. 4/12/18   Michelle Side., MD   dilTIAZem [facility]) 180 mg SR capsule Take 1 Cap by mouth daily.  3/28/18   Michelle Cronin MD   MICRO THIN LANCETS 33 gauge misc USE TO TEST BLOOD SUGAR TWO TO THREE TIMES DAILY 3/8/18   Jean-Paul Barahona MD   glucose blood VI test strips (ONETOUCH ULTRA TEST) strip Test three times daily. E11.9    Diabetes Type 2 18   Jena-Paul Barahona MD   Blood-Glucose Meter monitoring kit Use once daily  Onetouch test meter only  E11.9  Type 2 18   Jean-Paul Barahona MD   CONTOUR NEXT STRIPS strip TEST BLOOD GLUCOSE TWO TO THREE TIMES DAILY 18   Jean-Paul Barhaona MD   docusate sodium (COLACE) 100 mg capsule Take 1 Cap by mouth two (2) times a day. 17   Mindy Goldstein, DORCAS   Wheel Chair dianna Custom electric wheelchair 3/2/17   Jean-Paul Barahona MD   Calcium Carbonate-Vit D3-Min 600 mg calcium- 400 unit tab Take  by mouth two (2) times a day. Provider, Historical     No Known Allergies   Social History     Tobacco Use    Smoking status: Current Every Day Smoker     Packs/day: 0.25     Last attempt to quit: 10/25/2016     Years since quittin.3    Smokeless tobacco: Never Used   Substance Use Topics    Alcohol use: Yes     Alcohol/week: 7.0 oz     Types: 7 Cans of beer, 7 Shots of liquor per week      No family history on file.      Objective:      Patient Vitals for the past 24 hrs:   Temp Pulse Resp BP SpO2   03/15/19 1127 97.9 °F (36.6 °C) 86 19 167/67 96 %   03/15/19 1110 97.8 °F (36.6 °C) 86 18 170/86 96 %   03/15/19 1011  88  166/69 97 %   03/15/19 0952    (!) 177/99    03/15/19 0942  89  172/69    03/15/19 0939  89  175/71 97 %   03/15/19 0748 99.3 °F (37.4 °C) 84 14 167/42 97 %   03/15/19 0534 99 °F (37.2 °C) 84 18 145/63 97 %   03/15/19 0012 98 °F (36.7 °C) 84 18 179/79 96 %   19 2230  80 13 159/76 100 %   19 2200  80 15 144/71 99 %   19 2130  77 13 146/73 99 %   19  74 11 132/71 98 %   19  74 12 135/71 99 %   19  78  165/71    19  75  137/67    19  73 11 134/67 98 %   19 1930  75 16 129/62 98 %   19 1915  76 11 127/68 98 %   03/14/19 1900    118/65 97 %   03/14/19 1856  77 18 124/69      No intake/output data recorded.   Physical Exam:  General:Alert, No distress,   Eyes:No scleral icterus, No conjunctival pallor  Neck:Supple,no mass palpable,no thyromegaly  Lungs:Clears to auscultation Bilaterally, normal respiratory effort  CVS:RRR, S1 S2 normal,  No rub,  Abdomen:Soft, Non tender, No hepatosplenomegaly  Extremities: + LE edema, lymphedema changes    Psych: appropriate affect    :  No kumar  Musculoskeletal : no redness, no joint tenderness  NEURO: Normal Speech,   JOINT- no effusion or tenderness        CODE STATUS:  full  Care Plan discussed with:  Patient and nurse     Chart reviewed.      TOTAL TIME: 76 Minutes   y Reviewed previous records   y Discussion with patient and/or family and questions answered   y >50% of visit spent in counseling and coordination of care        ECG[de-identified] Rev:yes  Xray/CT/US/MRI REV:yes  Lab Data Personally Reviewed: (see below)  Recent Labs     03/15/19  0542 03/14/19  1930   WBC 6.6 8.0   HGB 7.7* 8.9*    249   ANEU 5.4 6.4   * 133*   K 5.4* 5.1   * 199*   BUN 70* 77*   CREA 2.72* 3.19*   ALT 21 22   SGOT 14* 16   TBILI 0.2 0.3    120*   CA 8.4* 9.0     Lab Results   Component Value Date/Time    Color YELLOW/STRAW 03/14/2019 08:50 PM    Appearance CLEAR 03/14/2019 08:50 PM    Specific gravity 1.013 03/14/2019 08:50 PM    pH (UA) 5.0 03/14/2019 08:50 PM    Protein 100 (A) 03/14/2019 08:50 PM    Glucose 100 (A) 03/14/2019 08:50 PM    Ketone NEGATIVE  03/14/2019 08:50 PM    Bilirubin NEGATIVE  03/14/2019 08:50 PM    Urobilinogen 0.2 03/14/2019 08:50 PM    Nitrites NEGATIVE  03/14/2019 08:50 PM    Leukocyte Esterase NEGATIVE  03/14/2019 08:50 PM    Epithelial cells FEW 03/14/2019 08:50 PM    Bacteria NEGATIVE  03/14/2019 08:50 PM    WBC 0-4 03/14/2019 08:50 PM    RBC 0-5 03/14/2019 08:50 PM       No results found for: IRON, FE, TIBC, IBCT, PSAT, FERR  Lab Results   Component Value Date/Time    Culture result: FEW  STAPHYLOCOCCUS AUREUS   10/23/2016 08:57 AM    Culture result: NO ANAEROBES ISOLATED 10/23/2016 08:53 AM    Culture result: LIGHT  STAPHYLOCOCCUS AUREUS   10/23/2016 08:53 AM     Prior to Admission Medications   Prescriptions Last Dose Informant Patient Reported? Taking? Blood-Glucose Meter monitoring kit   No No   Sig: Use once daily  Onetouch test meter only  E11.9  Type 2   CONTOUR NEXT STRIPS strip   No No   Sig: TEST BLOOD GLUCOSE TWO TO THREE TIMES DAILY   Calcium Carbonate-Vit D3-Min 600 mg calcium- 400 unit tab   Yes No   Sig: Take  by mouth two (2) times a day. Insulin Syringe-Needle U-100 1 mL 31 gauge x 5/16 syrg   No No   Sig: USE TO INJECT INSULIN FIVE TIMES A DAY   LANTUS U-100 INSULIN 100 unit/mL injection   No No   Sig: INJECT 72 UNITS UNDER THE SKIN EVERY 12 HOURS   MICRO THIN LANCETS 33 gauge misc   No No   Sig: USE TO TEST BLOOD SUGAR TWO TO THREE TIMES DAILY   THERA-TABS M 27 mg iron-400 mcg tab   No No   Sig: TAKE ONE TABLET BY MOUTH DAILY   Wheel Chair dianna   No No   Sig: Custom electric wheelchair   acetaminophen (PAIN AND FEVER) 500 mg tablet   No No   Sig: TAKE ONE TABLET BY MOUTH EVERY 6 HOURS AS NEEDED FOR PAIN   amitriptyline (ELAVIL) 100 mg tablet   No No   Sig: Take 1 Tab by mouth nightly. ammonium lactate (LAC-HYDRIN FIVE) 5 % lotion   No No   Sig: Apply daily   bumetanide (BUMEX) 2 mg tablet   No No   Sig: Take 1 Tab by mouth two (2) times a day. dilTIAZem (TIAZAC) 180 mg SR capsule   No No   Sig: Take 1 Cap by mouth daily. docusate sodium (COLACE) 100 mg capsule   No No   Sig: Take 1 Cap by mouth two (2) times a day. ergocalciferol (ERGOCALCIFEROL) 50,000 unit capsule   No No   Sig: Take 1 Cap by mouth every seven (7) days. ferrous sulfate (IRON) 325 mg (65 mg iron) EC tablet   No No   Sig: Take 1 Tab by mouth Daily (before breakfast).    fluticasone (FLONASE) 50 mcg/actuation nasal spray   No No   Sig: SPRAY TWO SPRAYS IN EACH NOSTRIL DAILY   gabapentin (NEURONTIN) 300 mg capsule   No No   Sig: TAKE TWO CAPSULES BY MOUTH THREE TIMES A DAY   glucose blood VI test strips (ONETOUCH ULTRA TEST) strip   No No   Sig: Test three times daily. E11.9    Diabetes Type 2   hydrocolloid dressing (DUODERM HYDROACTIVE) topical gel   No No   Sig: Apply  to affected area daily. insulin regular (NOVOLIN R, HUMULIN R) 100 unit/mL injection   No No   Si Units by SubCUTAneous route three (3) times daily as needed (with meals). irbesartan (AVAPRO) 300 mg tablet   No Yes   Sig: TAKE ONE TABLET BY MOUTH EVERY EVENING   ketoconazole (NIZORAL) 2 % topical cream   No No   Sig: Apply  to affected area two (2) times a day. lidocaine (LIDODERM) 5 %   No No   Si Patch by TransDERmal route every twenty-four (24) hours. Apply to affected area every 24 hours   lidocaine, PF, (XYLOCAINE) 20 mg/mL (2 %) injection   No No   Si mL by Intra artICUlar route once for 1 dose. metOLazone (ZAROXOLYN) 10 mg tablet   No No   Sig: Take 1 Tab by mouth daily. oxyCODONE-acetaminophen (PERCOCET) 5-325 mg per tablet   Yes Yes   Sig: Take  by mouth every four (4) hours as needed for Pain.   polyethylene glycol (MIRALAX) 17 gram/dose powder   No No   Sig: Take 17 g by mouth daily. triamcinolone acetonide (KENALOG) 0.1 % ointment   No No   Sig: Apply  to affected area two (2) times a day. use thin layer bid   triamcinolone acetonide (KENALOG) 40 mg/mL injection   No No   Si mL by Intra artICUlar route once for 1 dose. Facility-Administered Medications: None     Imaging:    Medications list Personally Reviewed   [x]      Yes     []               No    Thank you for allowing us to participate in the care this patient. We will follow patient with you.   Signed By: Farnaz Cedeño MD  [facility]  [facility] SYSTM FRANCISFormerly [facility] [address], [address]  [place], [address]  Phone - [phone]         Fax - [phone] [facility]  [address] [facility] [address] [place] [address]  Phone - [phone]        Fax - [phone]     [email or web address]

## 2019-03-15 NOTE — ED NOTES
TRANSFER - OUT REPORT:    Verbal report given to Claudia Resendez RN (name) on Ranadam Pavy III  being transferred to [facility] (unit) for routine progression of care       Report consisted of patients Situation, Background, Assessment and   Recommendations(SBAR). Information from the following report(s) SBAR, Kardex, ED Summary, Intake/Output, MAR and Recent Results was reviewed with the receiving nurse. Lines:   Peripheral IV 03/14/19 Left Forearm (Active)   Site Assessment Clean, dry, & intact 3/14/2019  7:35 PM   Phlebitis Assessment 0 3/14/2019  7:35 PM   Infiltration Assessment 0 3/14/2019  7:35 PM   Dressing Status Clean, dry, & intact 3/14/2019  7:35 PM   Dressing Type Transparent;Tape 3/14/2019  7:35 PM   Hub Color/Line Status Pink 3/14/2019  7:35 PM        Opportunity for questions and clarification was provided.       Patient transported with:   [employer]

## 2019-03-15 NOTE — PROGRESS NOTES
Hospitalist Progress Note    NAME: Candy Gagnon III   :  1950   MRN:  [id number]       Assessment / Plan:  Acute on chronic renal failure with baseline CKD3  S/p 1L fluid bolus in ED with IVF overnight, modest improvement in creatinine  Holding diuretics, holding ARB  Nephrology consult obtained an appreciated  Renal US obtained and demonstrating normal renal parenchyma and no hydronephrosis  Urine electrolytes pending  Follow urine output an repeat labs in AM  UA with proteinurea     Dizziness / Presyncope  Suspect dehydration related  Check orthostatics in AM   Echo pending  TSH normal, troponin negative     Diabetic polyneuropathy   Chronic Hip Pain  Continue Neurontin and percocet     Diabetes mellitus type 2, hyperglycemia worseningd  Lantus dose reduced on admission due to renal failure. Patient is severely hyperglycemic. Will add prandial insulin and escalate SSI.     HTN (hypertension)   Continue Cardizem and hold ARBs due to above  PRN nitropaste     H/o leg swelling  Holding diuretics due to above  Monitor fluid status     Code status: Full  Prophylaxis: Hep SQ  Recommended Disposition: Home w/Family     Subjective:     Chief Complaint / Reason for Physician Visit: follow up renal failure  Patient seen very briefly -- attempted to have in-depth conversation x 3 over the course of the day but patient was indisposed, off floor, on telephone. Denies complaints other than trying to obtain his wheelchair. Good urine output. Review of Systems:  Symptom Y/N Comments  Symptom Y/N Comments   Fever/Chills    Chest Pain n    Poor Appetite    Edema     Cough    Abdominal Pain     Sputum    Joint Pain     SOB/RUSSELL n   Pruritis/Rash     Nausea/vomit n   Tolerating PT/OT     Diarrhea    Tolerating Diet     Constipation n   Other       Could NOT obtain due to:      Objective:     VITALS:   Last 24hrs VS reviewed since prior progress note.  Most recent are:  Patient Vitals for the past 24 hrs:   Temp Pulse Resp BP SpO2   03/15/19 1908 99 °F (37.2 °C) 88 18 152/66 98 %   03/15/19 1426 98.8 °F (37.1 °C) 85 18 153/61 96 %   03/15/19 1127 97.9 °F (36.6 °C) 86 19 167/67 96 %   03/15/19 1110 97.8 °F (36.6 °C) 86 18 170/86 96 %   03/15/19 1011  88  166/69 97 %   03/15/19 0952    (!) 177/99    03/15/19 0942  89  172/69    03/15/19 0939  89  175/71 97 %   03/15/19 0748 99.3 °F (37.4 °C) 84 14 167/42 97 %   03/15/19 0534 99 °F (37.2 °C) 84 18 145/63 97 %   03/15/19 0012 98 °F (36.7 °C) 84 18 179/79 96 %   03/14/19 2230  80 13 159/76 100 %   03/14/19 2200  80 15 144/71 99 %   03/14/19 2130  77 13 146/73 99 %   03/14/19 2030  74 11 132/71 98 %   03/14/19 2020  74 12 135/71 99 %   03/14/19 2019  78  165/71    03/14/19 2018  75  137/67    03/14/19 2000  73 11 134/67 98 %       Intake/Output Summary (Last 24 hours) at 3/15/2019 1943  Last data filed at 3/15/2019 1915  Gross per 24 hour   Intake 1480 ml   Output 3300 ml   Net -1820 ml        PHYSICAL EXAM:  General: WD, WN. Alert, no acute distress    Resp:  No accessory muscle use  CV:  Regular  rhythm,  + edema  GI:  Non distended  Neurologic:  Alert and oriented X 3, normal speech,   Psych:   Not anxious nor agitated  Skin:  No jaundice    Reviewed most current lab test results and cultures  YES  Reviewed most current radiology test results   YES  Review and summation of old records today    NO  Reviewed patient's current orders and MAR    YES  PMH/SH reviewed - no change compared to H&P  ________________________________________________________________________  Care Plan discussed with:    Comments   Patient     Family      RN     Care Manager     Consultant                        Multidiciplinary team rounds were held today with , nursing, pharmacist and clinical coordinator. Patient's plan of care was discussed; medications were reviewed and discharge planning was addressed. ________________________________________________________________________  Total NON critical care TIME:  15   Minutes    Total CRITICAL CARE TIME Spent:   Minutes non procedure based      Comments   >50% of visit spent in counseling and coordination of care     ________________________________________________________________________  Catherine Herrera MD     Procedures: see electronic medical records for all procedures/Xrays and details which were not copied into this note but were reviewed prior to creation of Plan. LABS:  I reviewed today's most current labs and imaging studies.   Pertinent labs include:  Recent Labs     03/15/19  0542 03/14/19 1930   WBC 6.6 8.0   HGB 7.7* 8.9*   HCT 23.4* 28.2*    249     Recent Labs     03/15/19  0542 03/14/19 1930   * 133*   K 5.4* 5.1    98   CO2 27 27   * 199*   BUN 70* 77*   CREA 2.72* 3.19*   CA 8.4* 9.0   ALB 2.5* 3.0*   TBILI 0.2 0.3   SGOT 14* 16   ALT 21 22       Signed: Catherine Herrera MD

## 2019-03-15 NOTE — WOUND CARE
Wound Care nurse consult from staff nurse stating \" Bilateral lower extremeties are very dry and flaky as well as inbetween toes and toenails. \". Patient is a 81 y/o AAM admitted for acute on chronic renal failure. Past Medical History:   Diagnosis Date    Arthritis, Degenerative,  Knee 4/4/2011    Carcinoma, Prostate 4/4/2011    Degenerative disc disease 4/4/2011    Depression/ Anxiety 4/4/2011    Diabetes ([address]     Diabetes Mellitrus ( non-insulin dependent ) Type 2 4/4/2011    HEMATOCHEZIA 4/4/2011    Hypertrension 4/4/2011    Hypertriglyceridemia 4/4/2011    Insomnia 4/4/2011    Laryngitis 4/4/2011    Mild Aortic insufficiency 4/4/2011    Mild Concentric LVH (left ventricular hypertrophy) 4/4/2011    Neuropathy 4/4/2011    Osteoarthritis 4/4/2011    Rotator Cuff Tendonitis 4/4/2011     Past Surgical History:   Procedure Laterality Date    CARDIAC SURG PROCEDURE UNLIST      cardiac cath    COLONOSCOPY N/A 4/28/2017    COLONOSCOPY performed [facility] Yunier Scales [facility] at [facility] ENDOSCOPY    HX ORTHOPAEDIC      left hip pinning    HX OTHER SURGICAL      left little toe amputation    HX UROLOGICAL       Patient is wheelchair bound and tasia lift to chair. Patient has thick, plaques of dead skin to BLE and feet. Patient c/o of buttock \"soreness\" and no open skin but he is incontinent and c/o soreness from being pulled on sheets back and forth. Recommend:    BLE and feet: daily cleanse skin with foaming soap and water, apply Lac-Hydrin lotion thick to skin to help lift and remove dry dead skin. Buttocks: cleanse with foaming soap and water, pat dry with paper towel and apply Aloe Vesta skin barrier ointment found in supply room.     Veronica Fisher RN, [employer]

## 2019-03-15 NOTE — PROGRESS NOTES
Bedside and Verbal shift change report given to [address] [place] [address] (oncoming nurse) by Meng Rodriguez RN (offgoing nurse). Report included the following information SBAR, Kardex, Intake/Output, MAR and Recent Results.

## 2019-03-15 NOTE — PROGRESS NOTES
Problem: Pressure Injury - Risk of  Goal: *Prevention of pressure injury  Document Edgar Scale and appropriate interventions in the flowsheet. Outcome: Progressing Towards Goal  Pressure Injury Interventions:  Sensory Interventions: Assess need for specialty bed, Keep linens dry and wrinkle-free, Turn and reposition approx.  every two hours (pillows and wedges if needed)    Moisture Interventions: Absorbent underpads    Activity Interventions: Increase time out of bed, Pressure redistribution bed/mattress(bed type), PT/OT evaluation    Mobility Interventions: Assess need for specialty bed, HOB 30 degrees or less, PT/OT evaluation    Nutrition Interventions: Document food/fluid/supplement intake    Friction and Shear Interventions: Apply protective barrier, creams and emollients, Transferring/repositioning devices

## 2019-03-15 NOTE — PROGRESS NOTES
Documented on 3/15/19  Spiritual Care Partner Volunteer visited patient in [address] on 3/15/2019.   Documented by:  Enrike Padilla  Pager: [phone]

## 2019-03-15 NOTE — PROGRESS NOTES
Eval complete and note to follow. Recommend SNF at discharge. Vitals:    03/15/19 0748 03/15/19 0939 03/15/19 0942 03/15/19 0952   BP: 167/42 175/71 172/69 (!) 177/99   BP 1 Location: Right arm Right arm Right arm Right arm   BP Patient Position: At rest Supine;Pre-activity Sitting Standing   Pulse: 84 89 89    Resp: 14      Temp: 99.3 °F (37.4 °C)      SpO2: 97% 97%       .

## 2019-03-15 NOTE — PROGRESS NOTES
Pts. BG upon arrival to floor was 312. No BG has been taken since arrival to hospital. [facility] ED work room to speak to Dr. Jf Delcid. One time order of 4 units now    0145: Pt. Now requesting nasal spray due to congestion.  Will message on call MD

## 2019-03-15 NOTE — PROGRESS NOTES
Problem: Mobility Impaired (Adult and Pediatric)  Goal: *Acute Goals and Plan of Care (Insert Text)  Physical Therapy Goals  Initiated 3/15/2019  1. Patient will move from supine to sit and sit to supine , scoot up and down and roll side to side in bed with supervision/set-up within 7 day(s). 2.  Patient will transfer from bed to chair and chair to bed with supervision/set-up using the least restrictive device within 7 day(s). 3.  Patient will perform sit to stand with supervision/set-up within 7 day(s). 4.  Patient will ambulate with minimal assistance/contact guard assist for 15 feet with the least restrictive device within 7 day(s). physical Therapy EVALUATION  Patient: Gamal Shultz III (89 y.o. male)  Date: 3/15/2019  Primary Diagnosis: Acute on chronic renal failure ([facility] [address] [N17.9, N18.9]       Precautions:  Fall    ASSESSMENT :  Based on the objective data described below, the patient presents with decreased strength, decreased endurance, impaired balance, decreased functional mobility and likely unsteady gait following admission for renal failure. PTA, patient reports the most activity he performed was pivoting to chair in bathroom and walking a few feet with crutches. Stays in his electric wheelchair most of the day, and sleeps in a lift chair. Patient has had constant hip and knee pain for ~2 years. Patient lives alone, but is in the process of trying to set up private home care. No reports of falls. Reports neuropathy in bilateral feet. Currently, patient resting in bed on RA, cleared and agreeable to work with therapy. Patient performed bed mobility with mod-total Ax2. Performed sit <>stand with min Ax2 with RW and bed elevated. Due to patient's safety and inability to perform a transfer from a low surface, patient left in bed at end of evaluation. Recommending use of Gely lift to transfer patient to chair with nursing. Recommending SNF placement at discharge. Patient in agreement. Patient will benefit from skilled intervention to address the above impairments. Patients rehabilitation potential is considered to be Fair  Factors which may influence rehabilitation potential include:   []         None noted  []         Mental ability/status  []         Medical condition  []         Home/family situation and support systems  [x]         Safety awareness  []         Pain tolerance/management  []         Other:      PLAN :  Recommendations and Planned Interventions:  [x]           Bed Mobility Training             []    Neuromuscular Re-Education  [x]           Transfer Training                   []    Orthotic/Prosthetic Training  [x]           Gait Training                         []    Modalities  [x]           Therapeutic Exercises           []    Edema Management/Control  [x]           Therapeutic Activities            [x]    Patient and Family Training/Education  []           Other (comment):     Frequency/Duration: Patient will be followed by physical therapy  3 times a week to address goals. Discharge Recommendations: Franklin Reid  Further Equipment Recommendations for Discharge: TBD     SUBJECTIVE:   Patient stated Do you have a Tajik walker or crutches that I can use?     OBJECTIVE DATA SUMMARY:   HISTORY:    Past Medical History:   Diagnosis Date    Arthritis, Degenerative,  Knee 4/4/2011    Carcinoma, Prostate 4/4/2011    Degenerative disc disease 4/4/2011    Depression/ Anxiety 4/4/2011    Diabetes ([address]     Diabetes Mellitrus ( non-insulin dependent ) Type 2 4/4/2011    HEMATOCHEZIA 4/4/2011    Hypertrension 4/4/2011    Hypertriglyceridemia 4/4/2011    Insomnia 4/4/2011    Laryngitis 4/4/2011    Mild Aortic insufficiency 4/4/2011    Mild Concentric LVH (left ventricular hypertrophy) 4/4/2011    Neuropathy 4/4/2011    Osteoarthritis 4/4/2011    Rotator Cuff Tendonitis 4/4/2011     Past Surgical History:   Procedure Laterality Date    CARDIAC SURG PROCEDURE UNLIST      cardiac cath    COLONOSCOPY N/A 4/28/2017    COLONOSCOPY performed by Yusra Moore MD at [facility] ENDOSCOPY    HX ORTHOPAEDIC      left hip pinning    HX OTHER SURGICAL      left little toe amputation    HX UROLOGICAL       Prior Level of Function/Home Situation: patient reports the most activity he performed was pivoting to chair in bathroom and walking a few feet with crutches. States in his electric wheelchair most of the day, and sleeps in a lift chair. Patient has had constant hip and knee pain for ~2 years. Patient lives alone, but is in the process of trying to set up private home care. No reports of falls. Reports neuropathy in bilateral feet. Personal factors and/or comorbidities impacting plan of care: social situation, PLOF, obesity    Home Situation  Home Environment: Apartment  Wheelchair Ramp: Yes  One/Two Story Residence: One story  Living Alone: Yes  Support Systems: Home care staff, Family member(s)  Current DME Used/Available at Home: Raised toilet seat, Hospital bed, Wheelchair, power, Lift chair, Crutches, Wheelchair  Tub or Shower Type: (sponge bathe/stand at sink)    EXAMINATION/PRESENTATION/DECISION MAKING:   Critical Behavior:  Neurologic State: Alert  Orientation Level: Oriented to person, Oriented to place, Oriented to situation  Cognition: Follows commands  Safety/Judgement: Fall prevention  Hearing: Auditory  Auditory Impairment: None  Skin:  Dry, flaky skin on B feet  Edema: not observed  Range Of Motion:  AROM: Generally decreased, functional           PROM: Within functional limits           Strength:    Strength: Generally decreased, functional                    Tone & Sensation:   Tone: Normal              Sensation: Impaired(B neuropathy)               Coordination:  Coordination: Within functional limits  Vision:   Acuity: Impaired near vision(needs reading glasses)  Functional Mobility:  Bed Mobility:  Rolling:  Moderate assistance;Maximum assistance;Assist x2  Supine to Sit: Maximum assistance;Assist x2  Sit to Supine: Maximum assistance; Total assistance;Assist x2     Transfers:  Sit to Stand: Minimum assistance;Assist x2(bed elevated)  Stand to Sit: Minimum assistance;Assist x2(bed elevated)        Bed to Chair: (unable today)              Balance:   Sitting: Intact; With support  Standing: Impaired  Standing - Static: Fair      Functional Measure:  Barthel Index:    Bathin  Bladder: 5  Bowels: 5  Groomin  Dressin  Feeding: 10  Mobility: 0  Stairs: 0  Toilet Use: 0  Transfer (Bed to Chair and Back): 5  Total: 30/100       Percentage of impairment   0%   1-19%   20-39%   40-59%   60-79%   80-99%   100%   Barthel Score 0-100 100 99-80 79-60 59-40 20-39 1-19   0     The Barthel ADL Index: Guidelines  1. The index should be used as a record of what a patient does, not as a record of what a patient could do. 2. The main aim is to establish degree of independence from any help, physical or verbal, however minor and for whatever reason. 3. The need for supervision renders the patient not independent. 4. A patient's performance should be established using the best available evidence. Asking the patient, friends/relatives and nurses are the usual sources, but direct observation and common sense are also important. However direct testing is not needed. 5. Usually the patient's performance over the preceding 24-48 hours is important, but occasionally longer periods will be relevant. 6. Middle categories imply that the patient supplies over 50 per cent of the effort. 7. Use of aids to be independent is allowed. Emila Appl., Barthel, D.W. (9956). Functional evaluation: the Barthel Index. [facility] (14)2. PJ Faust, Aftab Su., Ted Hennessy., [place], [address] [place] [address] (). Measuring the change indisability after inpatient rehabilitation; comparison of the responsiveness of the Barthel Index and Functional [place] Measure. Journal of Neurology, Neurosurgery, and Psychiatry, 66(4), 266-940. ANDRES Peraza, CEDRIC Cole, & Enrique Rm M.A. (2004.) Assessment of post-stroke quality of life in cost-effectiveness studies: The usefulness of the Barthel Index and the EuroQoL-5D. Quality of Life Research, 15, 990-53            Physical Therapy Evaluation Charge Determination   History Examination Presentation Decision-Making   MEDIUM  Complexity : 1-2 comorbidities / personal factors will impact the outcome/ POC  MEDIUM Complexity : 3 Standardized tests and measures addressing body structure, function, activity limitation and / or participation in recreation  MEDIUM Complexity : Evolving with changing characteristics  Other outcome measures Barthel  MEDIUM      Based on the above components, the patient evaluation is determined to be of the following complexity level: MEDIUM    Pain:                    Activity Tolerance:   Fair with VSS on RA  Please refer to the flowsheet for vital signs taken during this treatment. After treatment:   []         Patient left in no apparent distress sitting up in chair  [x]         Patient left in no apparent distress in bed  [x]         Call bell left within reach  [x]         Nursing notified  []         Caregiver present  []         Bed alarm activated     COMMUNICATION/EDUCATION:   The patients plan of care was discussed with: Occupational Therapist, Registered Nurse and . [x]         Fall prevention education was provided and the patient/caregiver indicated understanding. [x]         Patient/family have participated as able in goal setting and plan of care. [x]         Patient/family agree to work toward stated goals and plan of care. []         Patient understands intent and goals of therapy, but is neutral about his/her participation. []         Patient is unable to participate in goal setting and plan of care.     Thank you for this referral.  Mdeardo Otero Time Calculation: 53 mins      Regarding student involvement in patient care:  A student participated in this treatment session. Per CMS Medicare statements and APTA guidelines I certify that the following was true:  1. I was present and directly observed the entire session. 2. I made all skilled judgments and clinical decisions regarding care. 3. I am the practitioner responsible for assessment, treatment, and documentation.

## 2019-03-15 NOTE — ED NOTES
Pt states he can only stand if we put him on a bed that tilts up to stand him up. Performed orthostatics lying down and sitting up only, as pt states he cannot stand. Orthostatics negative. VSS. Call bell within reach.

## 2019-03-15 NOTE — PROGRESS NOTES
Bedside and Verbal shift change report given to Cristobal Hernandez (oncoming nurse) by [place] (offgoing nurse). Report included the following information SBAR, Kardex, Intake/Output and MAR.

## 2019-03-15 NOTE — H&P
Hospitalist Admission Note    NAME: Reed Walton   :  1950   MRN:  [id number]     Date/Time:  3/14/2019 10:58 PM    Patient PCP: Rowe Baumgarten., MD  ______________________________________________________________________  Given the patient's current clinical presentation, I have a high level of concern for decompensation if discharged from the emergency department. Complex decision making was performed, which includes reviewing the patient's available past medical records, laboratory results, and x-ray films. My assessment of this patient's clinical condition and my plan of care is as follows. Assessment / Plan:  Acute on chronic renal failure with baseline CKD3  Admit to tele  Suspect dehydration  S/p 1L fluid bolus in ED  Hold diuretics  Nephrology consult  Monitor lytes  Hold ARBs  UA with proteinurea    Dizziness / Presyncope  Suspect dehydration related  But consider BP better after fluid bolus and remain symptomatic, will check CT head  If doesn't get resolved in next 24 hours then may warrant further workup  PT/OT  Check 2D echo  Tele monitoring  Serial Tn  Check TSH    Diabetic polyneuropathy   Chronic Hip Pain  Continue Neurontin and percocet    Diabetes mellitus type 2, controlled  Will cotninue lantus at lower dose of 30 units BID due to renal failure  SSI    HTN (hypertension)   Continue Cardizem and hold ARBs due to above  PRN nitropaste    H/o leg swelling  Holding diuretics due to above  Monitor fluid status closely    Code Status: full  Surrogate Decision Maker: Daughter    DVT Prophylaxis: SQ heparin    Baseline: fuctional      Subjective:   CHIEF COMPLAINT: dizziness and generalized weakness    HISTORY OF PRESENT ILLNESS:     Sohail Soliman is a 80 y.o.  male who presents with dizziness and generalized weakness.  As per patient, he was standing when he felt dizzy/lighheaded and felt like passing out and since the episode he is been feeling dizzy and generalized weakness. Pt denies any fever, chills, nausea, vomiting, diarrhea, chest pain, cough, shortness of breath, problems urination, chest pain. In ED pt noted to have renal failure with worsening creatinine then his baseline. We were asked to admit for work up and evaluation of the above problems. Past Medical History:   Diagnosis Date    Arthritis, Degenerative,  Knee 2011    Carcinoma, Prostate 2011    Degenerative disc disease 2011    Depression/ Anxiety 2011    Diabetes ([address]     Diabetes Mellitrus ( non-insulin dependent ) Type 2 2011    HEMATOCHEZIA 2011    Hypertrension 2011    Hypertriglyceridemia 2011    Insomnia 2011    Laryngitis 2011    Mild Aortic insufficiency 2011    Mild Concentric LVH (left ventricular hypertrophy) 2011    Neuropathy 2011    Osteoarthritis 2011    Rotator Cuff Tendonitis 2011        Past Surgical History:   Procedure Laterality Date    CARDIAC SURG PROCEDURE UNLIST      cardiac cath    COLONOSCOPY N/A 2017    COLONOSCOPY performed by Catarina Fam MD at [facility]"" ENDOSCOPY    HX ORTHOPAEDIC      left hip pinning    HX OTHER SURGICAL      left little toe amputation    HX UROLOGICAL         Social History     Tobacco Use    Smoking status: Current Every Day Smoker     Packs/day: 0.25     Last attempt to quit: 10/25/2016     Years since quittin.3    Smokeless tobacco: Never Used   Substance Use Topics    Alcohol use: Yes     Alcohol/week: 7.0 oz     Types: 7 Cans of beer, 7 Shots of liquor per week        Family history: positive for DM in the family    No Known Allergies     Prior to Admission medications    Medication Sig Start Date End Date Taking? Authorizing Provider   oxyCODONE-acetaminophen (PERCOCET) 5-325 mg per tablet Take  by mouth every four (4) hours as needed for Pain.    Yes Provider, Historical   irbesartan (AVAPRO) 300 mg tablet TAKE ONE TABLET BY MOUTH EVERY EVENING 6/27/18  Yes Bev Alberto MD   lidocaine, PF, (XYLOCAINE) 20 mg/mL (2 %) injection 1 mL by Intra artICUlar route once for 1 dose. 3/14/19 3/14/19  Bev Alberto MD   triamcinolone acetonide (KENALOG) 40 mg/mL injection 1 mL by Intra artICUlar route once for 1 dose. 3/14/19 3/14/19  Bev Alberto MD   ketoconazole (NIZORAL) 2 % topical cream Apply  to affected area two (2) times a day. 3/14/19   Bev Alberto MD   hydrocolloid dressing (DUODERM HYDROACTIVE) topical gel Apply  to affected area daily. 3/14/19   Bev Alberto MD   amitriptyline (ELAVIL) 100 mg tablet Take 1 Tab by mouth nightly. 2/20/19   Bev Alberto MD   gabapentin (NEURONTIN) 300 mg capsule TAKE TWO CAPSULES BY MOUTH THREE TIMES A DAY 2/11/19   Bev Alberto MD   metOLazone (ZAROXOLYN) 10 mg tablet Take 1 Tab by mouth daily. 12/4/18   Bev Alberto MD   bumetanide (BUMEX) 2 mg tablet Take 1 Tab by mouth two (2) times a day. 12/4/18   Bev Alberto MD   LANTUS U-100 INSULIN 100 unit/mL injection INJECT 72 UNITS UNDER THE SKIN EVERY 12 HOURS 9/3/18   Bev Alberto MD   triamcinolone acetonide (KENALOG) 0.1 % ointment Apply  to affected area two (2) times a day. use thin layer bid 8/30/18   Bev Alberto MD   lidocaine (LIDODERM) 5 % 1 Patch by TransDERmal route every twenty-four (24) hours. Apply to affected area every 24 hours 8/30/18   Bev Alberto MD   Insulin Syringe-Needle U-100 1 mL 31 gauge x 5/16 syrg USE TO INJECT INSULIN FIVE TIMES A DAY 8/30/18   Bev Alberto MD   ferrous sulfate (IRON) 325 mg (65 mg iron) EC tablet Take 1 Tab by mouth Daily (before breakfast). 8/30/18   Bev Alberto MD   insulin regular (NOVOLIN R, HUMULIN R) 100 unit/mL injection 14 Units by SubCUTAneous route three (3) times daily as needed (with meals).  8/30/18   Bev Alberto MD   ammonium lactate (LAC-HYDRIN FIVE) 5 % lotion Apply daily 8/30/18   Wanda Jackson MD   acetaminophen (PAIN AND FEVER) 500 mg tablet TAKE ONE TABLET BY MOUTH EVERY 6 HOURS AS NEEDED FOR PAIN 8/28/18   Wanda Jackson MD   ergocalciferol (ERGOCALCIFEROL) 50,000 unit capsule Take 1 Cap by mouth every seven (7) days. 7/31/18   Wanda Jackson MD   THERA-TABS M 27 mg iron-400 mcg tab TAKE ONE TABLET BY MOUTH DAILY 7/5/18   Wanda Jackson MD   fluticasone [facility]) 50 mcg/actuation nasal spray SPRAY TWO SPRAYS IN EACH NOSTRIL DAILY 7/5/18   Wanda Jackson MD   polyethylene glycol [facility]) 17 gram/dose powder Take 17 g by mouth daily. 4/12/18   Wanda Jackson MD   dilTIAZem [facility]) 180 mg SR capsule Take 1 Cap by mouth daily. 3/28/18   Wanda Jackson MD   MICRO THIN LANCETS 33 gauge misc USE TO TEST BLOOD SUGAR TWO TO THREE TIMES DAILY 3/8/18   Wanda Jackson MD   glucose blood VI test strips (ONETOUCH ULTRA TEST) strip Test three times daily. E11.9    Diabetes Type 2 2/28/18   Wanda Jackson MD   Blood-Glucose Meter monitoring kit Use once daily  Onetouch test meter only  E11.9  Type 2 2/28/18   Wanda Jackson MD   CONTOUR NEXT STRIPS strip TEST BLOOD GLUCOSE TWO TO THREE TIMES DAILY 2/5/18   Wanda Jackson MD   docusate sodium (COLACE) 100 mg capsule Take 1 Cap by mouth two (2) times a day. 12/22/17   Kadie Dyson NP   Wheel Chair dianna Custom electric wheelchair 3/2/17   Wanda Jackson MD   Calcium Carbonate-Vit D3-Min 600 mg calcium- 400 unit tab Take  by mouth two (2) times a day. Provider, Historical       REVIEW OF SYSTEMS:     I am not able to complete the review of systems because:    The patient is intubated and sedated    The patient has altered mental status due to his acute medical problems    The patient has baseline aphasia from prior stroke(s)    The patient has baseline dementia and is not reliable historian    The patient is in acute medical distress and unable to provide information           Total of 12 systems reviewed as follows:       POSITIVE= underlined text  Negative = text not underlined  General:  fever, chills, sweats, generalized weakness, weight loss/gain,      loss of appetite   Eyes:    blurred vision, eye pain, loss of vision, double vision  ENT:    rhinorrhea, pharyngitis   Respiratory:   cough, sputum production, SOB, RUSSELL, wheezing, pleuritic pain   Cardiology:   chest pain, palpitations, orthopnea, PND, edema, syncope   Gastrointestinal:  abdominal pain , N/V, diarrhea, dysphagia, constipation, bleeding   Genitourinary:  frequency, urgency, dysuria, hematuria, incontinence   Muskuloskeletal :  arthralgia, myalgia, back pain  Hematology:  easy bruising, nose or gum bleeding, lymphadenopathy   Dermatological: rash, ulceration, pruritis, color change / jaundice  Endocrine:   hot flashes or polydipsia   Neurological:  headache, dizziness, confusion, focal weakness, paresthesia,     Speech difficulties, memory loss, gait difficulty  Psychological: Feelings of anxiety, depression, agitation    Objective:   VITALS:    Visit Vitals  /76   Pulse 80   Resp 13   SpO2 100%       PHYSICAL EXAM:    General:    Alert, cooperative, no distress, appears stated age. HEENT: Atraumatic, anicteric sclerae, pink conjunctivae     No oral ulcers, mucosa moist, throat clear, dentition fair  Neck:  Supple, symmetrical,  thyroid: non tender  Lungs:   Clear to auscultation bilaterally. No Wheezing or Rhonchi. No rales. Chest wall:  No tenderness  No Accessory muscle use. Heart:   Regular  rhythm,  No  murmur   ++ edema  Abdomen:   Soft, non-tender. Not distended. Bowel sounds normal  Extremities: No cyanosis. No clubbing,      Skin turgor normal, Capillary refill normal, Radial dial pulse 2+  Skin:     Not pale. Not Jaundiced  No rashes   Psych:  Good insight. Not depressed. Not anxious or agitated. Neurologic: EOMs intact. No facial asymmetry. No aphasia or slurred speech. Symmetrical strength, Sensation grossly intact. Alert and oriented X 4.     _______________________________________________________________________  Care Plan discussed with:    Comments   Patient y    Family      RN y    Care Manager                    Consultant:  [facility] ED physician   _______________________________________________________________________  Expected  Disposition:   Home with Family    HH/PT/OT/RN y   SNF/LTC    NADIA    ________________________________________________________________________  TOTAL TIME: 61 Minutes    Critical Care Provided     Minutes non procedure based      Comments    y Reviewed previous records   >50% of visit spent in counseling and coordination of care y Discussion with patient and questions answered       ________________________________________________________________________  Signed: Parker Herndon MD    Procedures: see electronic medical records for all procedures/Xrays and details which were not copied into this note but were reviewed prior to creation of Plan. LAB DATA REVIEWED:    Recent Results (from the past 24 hour(s))   CBC WITH AUTOMATED DIFF    Collection Time: 03/14/19  7:30 PM   Result Value Ref Range    WBC 8.0 4.1 - 11.1 K/uL    RBC 2.92 (L) 4.10 - 5.70 M/uL    HGB 8.9 (L) 12.1 - 17.0 g/dL    HCT 28.2 (L) 36.6 - 50.3 %    MCV 96.6 80.0 - 99.0 FL    MCH 30.5 26.0 - 34.0 PG    MCHC 31.6 30.0 - 36.5 g/dL    RDW 15.6 (H) 11.5 - 14.5 %    PLATELET 395 923 - 013 K/uL    MPV 9.2 8.9 - 12.9 FL    NRBC 0.0 0  WBC    ABSOLUTE NRBC 0.00 0.00 - 0.01 K/uL    NEUTROPHILS 79 (H) 32 - 75 %    LYMPHOCYTES 11 (L) 12 - 49 %    MONOCYTES 6 5 - 13 %    EOSINOPHILS 3 0 - 7 %    BASOPHILS 0 0 - 1 %    IMMATURE GRANULOCYTES 1 (H) 0.0 - 0.5 %    ABS. NEUTROPHILS 6.4 1.8 - 8.0 K/UL    ABS. LYMPHOCYTES 0.8 0.8 - 3.5 K/UL    ABS. MONOCYTES 0.5 0.0 - 1.0 K/UL    ABS. EOSINOPHILS 0.2 0.0 - 0.4 K/UL    ABS. BASOPHILS 0.0 0.0 - 0.1 K/UL    ABS. IMM.  GRANS. 0.0 0.00 - 0.04 K/UL    DF AUTOMATED     METABOLIC PANEL, COMPREHENSIVE    Collection Time: 03/14/19  7:30 PM   Result Value Ref Range    Sodium 133 (L) 136 - 145 mmol/L    Potassium 5.1 3.5 - 5.1 mmol/L    Chloride 98 97 - 108 mmol/L    CO2 27 21 - 32 mmol/L    Anion gap 8 5 - 15 mmol/L    Glucose 199 (H) 65 - 100 mg/dL    BUN 77 (H) 6 - 20 MG/DL    Creatinine 3.19 (H) 0.70 - 1.30 MG/DL    BUN/Creatinine ratio 24 (H) 12 - 20      GFR est AA 24 (L) >60 ml/min/1.73m2    GFR est non-AA 20 (L) >60 ml/min/1.73m2    Calcium 9.0 8.5 - 10.1 MG/DL    Bilirubin, total 0.3 0.2 - 1.0 MG/DL    ALT (SGPT) 22 12 - 78 U/L    AST (SGOT) 16 15 - 37 U/L    Alk.  phosphatase 120 (H) 45 - 117 U/L    Protein, total 7.6 6.4 - 8.2 g/dL    Albumin 3.0 (L) 3.5 - 5.0 g/dL    Globulin 4.6 (H) 2.0 - 4.0 g/dL    A-G Ratio 0.7 (L) 1.1 - 2.2     CK W/ REFLX CKMB    Collection Time: 03/14/19  7:30 PM   Result Value Ref Range     39 - 308 U/L   URINALYSIS W/ REFLEX CULTURE    Collection Time: 03/14/19  8:50 PM   Result Value Ref Range    Color YELLOW/STRAW      Appearance CLEAR CLEAR      Specific gravity 1.013 1.003 - 1.030      pH (UA) 5.0 5.0 - 8.0      Protein 100 (A) NEG mg/dL    Glucose 100 (A) NEG mg/dL    Ketone NEGATIVE  NEG mg/dL    Bilirubin NEGATIVE  NEG      Blood NEGATIVE  NEG      Urobilinogen 0.2 0.2 - 1.0 EU/dL    Nitrites NEGATIVE  NEG      Leukocyte Esterase NEGATIVE  NEG      WBC 0-4 0 - 4 /hpf    RBC 0-5 0 - 5 /hpf    Epithelial cells FEW FEW /lpf    Bacteria NEGATIVE  NEG /hpf    UA:UC IF INDICATED CULTURE NOT INDICATED BY UA RESULT CNI      Mucus TRACE (A) NEG /lpf

## 2019-03-15 NOTE — PROGRESS NOTES
TRANSFER - IN REPORT:    Verbal report received from JuditRN(name) on Latoya Challenger III  being received from ED (unit) for routine progression of care      Report consisted of patients Situation, Background, Assessment and   Recommendations(SBAR). Information from the following report(s) SBAR, Kardex, ED Summary, Procedure Summary, Intake/Output, MAR and Recent Results was reviewed with the receiving nurse. Opportunity for questions and clarification was provided. Assessment completed upon patients arrival to unit and care assumed.

## 2019-03-16 LAB
ALBUMIN SERPL-MCNC: 2.4 G/DL (ref 3.5–5)
ANION GAP SERPL CALC-SCNC: 8 MMOL/L (ref 5–15)
BASOPHILS # BLD: 0 K/UL (ref 0–0.1)
BASOPHILS NFR BLD: 0 % (ref 0–1)
BUN SERPL-MCNC: 54 MG/DL (ref 6–20)
BUN/CREAT SERPL: 24 (ref 12–20)
CALCIUM SERPL-MCNC: 8.2 MG/DL (ref 8.5–10.1)
CHLORIDE SERPL-SCNC: 101 MMOL/L (ref 97–108)
CO2 SERPL-SCNC: 25 MMOL/L (ref 21–32)
CREAT SERPL-MCNC: 2.27 MG/DL (ref 0.7–1.3)
DIFFERENTIAL METHOD BLD: ABNORMAL
EOSINOPHIL # BLD: 0.1 K/UL (ref 0–0.4)
EOSINOPHIL NFR BLD: 2 % (ref 0–7)
ERYTHROCYTE [DISTWIDTH] IN BLOOD BY AUTOMATED COUNT: 15 % (ref 11.5–14.5)
FOLATE SERPL-MCNC: 20 NG/ML (ref 5–21)
GLUCOSE BLD STRIP.AUTO-MCNC: 256 MG/DL (ref 65–100)
GLUCOSE BLD STRIP.AUTO-MCNC: 283 MG/DL (ref 65–100)
GLUCOSE BLD STRIP.AUTO-MCNC: 302 MG/DL (ref 65–100)
GLUCOSE BLD STRIP.AUTO-MCNC: 308 MG/DL (ref 65–100)
GLUCOSE SERPL-MCNC: 290 MG/DL (ref 65–100)
HCT VFR BLD AUTO: 23.6 % (ref 36.6–50.3)
HGB BLD-MCNC: 7.7 G/DL (ref 12.1–17)
IMM GRANULOCYTES # BLD AUTO: 0.1 K/UL (ref 0–0.04)
IMM GRANULOCYTES NFR BLD AUTO: 1 % (ref 0–0.5)
IRON SATN MFR SERPL: 29 % (ref 20–50)
IRON SERPL-MCNC: 68 UG/DL (ref 35–150)
LYMPHOCYTES # BLD: 0.9 K/UL (ref 0.8–3.5)
LYMPHOCYTES NFR BLD: 15 % (ref 12–49)
MCH RBC QN AUTO: 31.4 PG (ref 26–34)
MCHC RBC AUTO-ENTMCNC: 32.6 G/DL (ref 30–36.5)
MCV RBC AUTO: 96.3 FL (ref 80–99)
MONOCYTES # BLD: 0.4 K/UL (ref 0–1)
MONOCYTES NFR BLD: 6 % (ref 5–13)
NEUTS SEG # BLD: 4.5 K/UL (ref 1.8–8)
NEUTS SEG NFR BLD: 76 % (ref 32–75)
NRBC # BLD: 0 K/UL (ref 0–0.01)
NRBC BLD-RTO: 0 PER 100 WBC
PHOSPHATE SERPL-MCNC: 2.6 MG/DL (ref 2.6–4.7)
PLATELET # BLD AUTO: 197 K/UL (ref 150–400)
PMV BLD AUTO: 9.6 FL (ref 8.9–12.9)
POTASSIUM SERPL-SCNC: 5.3 MMOL/L (ref 3.5–5.1)
RBC # BLD AUTO: 2.45 M/UL (ref 4.1–5.7)
SERVICE CMNT-IMP: ABNORMAL
SODIUM SERPL-SCNC: 134 MMOL/L (ref 136–145)
TIBC SERPL-MCNC: 236 UG/DL (ref 250–450)
UUN UR-MCNC: 427 MG/DL
VIT B12 SERPL-MCNC: 620 PG/ML (ref 193–986)
WBC # BLD AUTO: 5.9 K/UL (ref 4.1–11.1)

## 2019-03-16 PROCEDURE — 82746 ASSAY OF FOLIC ACID SERUM: CPT

## 2019-03-16 PROCEDURE — 65660000000 HC RM CCU STEPDOWN

## 2019-03-16 PROCEDURE — 74011250636 HC RX REV CODE- 250/636: Performed by: INTERNAL MEDICINE

## 2019-03-16 PROCEDURE — 74011636637 HC RX REV CODE- 636/637: Performed by: INTERNAL MEDICINE

## 2019-03-16 PROCEDURE — 85025 COMPLETE CBC W/AUTO DIFF WBC: CPT

## 2019-03-16 PROCEDURE — 74011250637 HC RX REV CODE- 250/637: Performed by: INTERNAL MEDICINE

## 2019-03-16 PROCEDURE — 82962 GLUCOSE BLOOD TEST: CPT

## 2019-03-16 PROCEDURE — 36415 COLL VENOUS BLD VENIPUNCTURE: CPT

## 2019-03-16 PROCEDURE — 83540 ASSAY OF IRON: CPT

## 2019-03-16 PROCEDURE — 82607 VITAMIN B-12: CPT

## 2019-03-16 PROCEDURE — 80069 RENAL FUNCTION PANEL: CPT

## 2019-03-16 RX ORDER — INSULIN GLARGINE 100 [IU]/ML
60 INJECTION, SOLUTION SUBCUTANEOUS 2 TIMES DAILY
Status: DISCONTINUED | OUTPATIENT
Start: 2019-03-16 | End: 2019-03-18

## 2019-03-16 RX ORDER — DILTIAZEM HYDROCHLORIDE 240 MG/1
240 CAPSULE, COATED, EXTENDED RELEASE ORAL DAILY
Status: DISCONTINUED | OUTPATIENT
Start: 2019-03-16 | End: 2019-03-20 | Stop reason: HOSPADM

## 2019-03-16 RX ADMIN — Medication 10 ML: at 05:24

## 2019-03-16 RX ADMIN — HYDRALAZINE HYDROCHLORIDE 25 MG: 25 TABLET, FILM COATED ORAL at 09:08

## 2019-03-16 RX ADMIN — OXYCODONE AND ACETAMINOPHEN 1 TABLET: 5; 325 TABLET ORAL at 09:14

## 2019-03-16 RX ADMIN — INSULIN LISPRO 3 UNITS: 100 INJECTION, SOLUTION INTRAVENOUS; SUBCUTANEOUS at 17:18

## 2019-03-16 RX ADMIN — INSULIN GLARGINE 30 UNITS: 100 INJECTION, SOLUTION SUBCUTANEOUS at 09:15

## 2019-03-16 RX ADMIN — GABAPENTIN 300 MG: 300 CAPSULE ORAL at 09:06

## 2019-03-16 RX ADMIN — OXYCODONE AND ACETAMINOPHEN 1 TABLET: 5; 325 TABLET ORAL at 23:35

## 2019-03-16 RX ADMIN — HEPARIN SODIUM 5000 UNITS: 5000 INJECTION, SOLUTION INTRAVENOUS; SUBCUTANEOUS at 05:23

## 2019-03-16 RX ADMIN — FERROUS SULFATE TAB 325 MG (65 MG ELEMENTAL FE) 325 MG: 325 (65 FE) TAB at 09:07

## 2019-03-16 RX ADMIN — SODIUM CHLORIDE 100 ML/HR: 900 INJECTION, SOLUTION INTRAVENOUS at 23:21

## 2019-03-16 RX ADMIN — SODIUM CHLORIDE 100 ML/HR: 900 INJECTION, SOLUTION INTRAVENOUS at 13:35

## 2019-03-16 RX ADMIN — Medication 10 ML: at 23:13

## 2019-03-16 RX ADMIN — INSULIN LISPRO 3 UNITS: 100 INJECTION, SOLUTION INTRAVENOUS; SUBCUTANEOUS at 09:16

## 2019-03-16 RX ADMIN — HEPARIN SODIUM 5000 UNITS: 5000 INJECTION, SOLUTION INTRAVENOUS; SUBCUTANEOUS at 13:35

## 2019-03-16 RX ADMIN — INSULIN LISPRO 3 UNITS: 100 INJECTION, SOLUTION INTRAVENOUS; SUBCUTANEOUS at 12:21

## 2019-03-16 RX ADMIN — HYDRALAZINE HYDROCHLORIDE 25 MG: 25 TABLET, FILM COATED ORAL at 23:10

## 2019-03-16 RX ADMIN — GABAPENTIN 300 MG: 300 CAPSULE ORAL at 23:10

## 2019-03-16 RX ADMIN — INSULIN LISPRO 7 UNITS: 100 INJECTION, SOLUTION INTRAVENOUS; SUBCUTANEOUS at 12:21

## 2019-03-16 RX ADMIN — HEPARIN SODIUM 5000 UNITS: 5000 INJECTION, SOLUTION INTRAVENOUS; SUBCUTANEOUS at 23:11

## 2019-03-16 RX ADMIN — INSULIN GLARGINE 60 UNITS: 100 INJECTION, SOLUTION SUBCUTANEOUS at 17:17

## 2019-03-16 RX ADMIN — INSULIN LISPRO 7 UNITS: 100 INJECTION, SOLUTION INTRAVENOUS; SUBCUTANEOUS at 17:18

## 2019-03-16 RX ADMIN — HYDRALAZINE HYDROCHLORIDE 25 MG: 25 TABLET, FILM COATED ORAL at 15:54

## 2019-03-16 RX ADMIN — INSULIN LISPRO 3 UNITS: 100 INJECTION, SOLUTION INTRAVENOUS; SUBCUTANEOUS at 23:08

## 2019-03-16 RX ADMIN — Medication: at 18:59

## 2019-03-16 RX ADMIN — EPOETIN ALFA-EPBX 12000 UNITS: 10000 INJECTION, SOLUTION INTRAVENOUS; SUBCUTANEOUS at 23:15

## 2019-03-16 RX ADMIN — FLUTICASONE PROPIONATE 2 SPRAY: 50 SPRAY, METERED NASAL at 09:18

## 2019-03-16 RX ADMIN — INSULIN LISPRO 5 UNITS: 100 INJECTION, SOLUTION INTRAVENOUS; SUBCUTANEOUS at 09:16

## 2019-03-16 RX ADMIN — GABAPENTIN 300 MG: 300 CAPSULE ORAL at 15:54

## 2019-03-16 RX ADMIN — DILTIAZEM HYDROCHLORIDE 240 MG: 240 CAPSULE, COATED, EXTENDED RELEASE ORAL at 09:41

## 2019-03-16 RX ADMIN — Medication: at 09:17

## 2019-03-16 RX ADMIN — SODIUM CHLORIDE 100 ML/HR: 900 INJECTION, SOLUTION INTRAVENOUS at 02:59

## 2019-03-16 NOTE — PROGRESS NOTES
Problem: Falls - Risk of  Goal: *Absence of Falls  Document Haylee Fall Risk and appropriate interventions in the flowsheet.   Outcome: Progressing Towards Goal  Fall Risk Interventions:  Mobility Interventions: Bed/chair exit alarm         Medication Interventions: Bed/chair exit alarm

## 2019-03-16 NOTE — PROGRESS NOTES
[facility]   [address] [address], [facility] [address], [facility] [place]  Phone: [phone]   [phone]       Nephrology Progress Note  Yessy Zamora III     1950     [id number]  Date of Admission : 3/14/2019  03/16/19    CC:  Follow up for KATHI      Assessment and Plan   KATHI - improving  -cointinue IVF    Mild hyperkalemia - stable  -should stabilize on IVF    Hyponatremia - improving    CKD~1.8    HTN - controlled    DM       Interval History:  Overall better; escellent U/O, creat improved; not SOB      Review of Systems: Pertinent items are noted in HPI.     Current Medications:   Current Facility-Administered Medications   Medication Dose Route Frequency    dilTIAZem CD (CARDIZEM CD) capsule 240 mg  240 mg Oral DAILY    insulin glargine (LANTUS) injection 60 Units  60 Units SubCUTAneous BID    insulin lispro (HUMALOG) injection   SubCUTAneous AC&HS    glucose chewable tablet 16 g  4 Tab Oral PRN    dextrose (D50W) injection syrg 12.5-25 g  12.5-25 g IntraVENous PRN    glucagon (GLUCAGEN) injection 1 mg  1 mg IntraMUSCular PRN    ferrous sulfate tablet 325 mg  325 mg Oral DAILY WITH BREAKFAST    fluticasone propionate (FLONASE) 50 mcg/actuation nasal spray 2 Spray  2 Spray Both Nostrils DAILY    gabapentin (NEURONTIN) capsule 300 mg  300 mg Oral TID    oxyCODONE-acetaminophen (PERCOCET) 5-325 mg per tablet 1 Tab  1 Tab Oral Q4H PRN    sodium chloride (NS) flush 5-40 mL  5-40 mL IntraVENous Q8H    sodium chloride (NS) flush 5-40 mL  5-40 mL IntraVENous PRN    acetaminophen (TYLENOL) tablet 650 mg  650 mg Oral Q6H PRN    ondansetron (ZOFRAN) injection 4 mg  4 mg IntraVENous Q4H PRN    nitroglycerin (NITROBID) 2 % ointment 1 Inch  1 Inch Topical Q6H PRN    heparin (porcine) injection 5,000 Units  5,000 Units SubCUTAneous Q8H    sodium chloride (OCEAN) 0.65 % nasal squeeze bottle 2 Spray  2 Spray Both Nostrils Q2H PRN    labetalol (NORMODYNE;TRANDATE) injection 20 mg  20 mg IntraVENous Q4H PRN    epoetin chi-epbx (RETACRIT) 12,000 Units combo injection  12,000 Units SubCUTAneous Q TUE, THU & SAT    hydrALAZINE (APRESOLINE) tablet 25 mg  25 mg Oral TID    0.9% sodium chloride infusion  100 mL/hr IntraVENous CONTINUOUS    hydrALAZINE (APRESOLINE) 20 mg/mL injection 20 mg  20 mg IntraVENous Q6H PRN    ammonium lactate (LAC-HYDRIN) 12 % lotion   Topical BID    insulin lispro (HUMALOG) injection 3 Units  3 Units SubCUTAneous TIDAC      No Known Allergies    Objective:  Vitals:    Vitals:    03/16/19 0346 03/16/19 0728 03/16/19 0817 03/16/19 1210   BP: 166/68  184/89 187/90   Pulse: 74  72 72   Resp: 18  18 18   Temp: 98.6 °F (37 °C)  98.3 °F (36.8 °C) 98.6 °F (37 °C)   SpO2: 96%  98% 97%   Weight:  119.2 kg (262 lb 12.6 oz)       Intake and Output:  No intake/output data recorded. 03/14 1901 - 03/16 0700  In: 3023.3 [P.O.:960; I.V.:2063.3]  Out: 5650 [Urine:5350]    Physical Examination:  Pt intubated    No  General: NAD,Conversant   Neck:  Supple, no mass  Resp:  Lungs CTA B/L, no wheezing , normal respiratory effort  CV:  RRR,  no rub, +++ LE edema  GI:  Soft, NT, + Bowel sounds, no hepatosplenomegaly  Neurologic:  Non focal  Psych:             AAO x 3 appropriate affect   Skin:  No Rash  :  No kumar    []    High complexity decision making was performed  []    Patient is at high-risk of decompensation with multiple organ involvement    Lab Data Personally Reviewed: I have reviewed all the pertinent labs, microbiology data and radiology studies during assessment.     Recent Labs     03/16/19  0406 03/15/19  0542 03/14/19  1930   * 133* 133*   K 5.3* 5.4* 5.1    100 98   CO2 25 27 27   * 255* 199*   BUN 54* 70* 77*   CREA 2.27* 2.72* 3.19*   CA 8.2* 8.4* 9.0   PHOS 2.6  --   --    ALB 2.4* 2.5* 3.0*   SGOT  --  14* 16   ALT  --  21 22     Recent Labs     03/16/19  0406 03/15/19  0542 03/14/19 1930   WBC 5.9 6.6 8.0   HGB 7.7* 7.7* 8.9*   HCT 23.6* 23.4* 28.2*  221 249     No results found for: SDES  Lab Results   Component Value Date/Time    Culture result: FEW  STAPHYLOCOCCUS AUREUS   10/23/2016 08:57 AM    Culture result: NO ANAEROBES ISOLATED 10/23/2016 08:53 AM    Culture result: LIGHT  STAPHYLOCOCCUS AUREUS   10/23/2016 08:53 AM    Culture result: NO GROWTH 5 DAYS 10/21/2016 02:45 PM    Culture result: MODERATE  STAPHYLOCOCCUS AUREUS   10/21/2016 02:45 PM     Recent Results (from the past 24 hour(s))   SODIUM, UR, RANDOM    Collection Time: 03/15/19  4:19 PM   Result Value Ref Range    Sodium,urine random 87 MMOL/L   CHLORIDE, URINE RANDOM    Collection Time: 03/15/19  4:19 PM   Result Value Ref Range    Chloride,urine random 83 MMOL/L   UREA NITROGEN, UR, RANDOM    Collection Time: 03/15/19  4:19 PM   Result Value Ref Range    Urea Nitrogen,urine random 427 MG/DL   PROTEIN/CREATININE RATIO, URINE    Collection Time: 03/15/19  4:19 PM   Result Value Ref Range    Protein, urine random 88 (H) 0.0 - 11.9 mg/dL    Creatinine, urine 35.00 mg/dL    Protein/Creat.  urine Ratio 2.5     GLUCOSE, POC    Collection Time: 03/15/19  6:16 PM   Result Value Ref Range    Glucose (POC) 335 (H) 65 - 100 mg/dL    Performed by Sonja Webb (CON) PCT    GLUCOSE, POC    Collection Time: 03/15/19  9:29 PM   Result Value Ref Range    Glucose (POC) 382 (H) 65 - 100 mg/dL    Performed by Taylor Naranjo (PCT)    RENAL FUNCTION PANEL    Collection Time: 03/16/19  4:06 AM   Result Value Ref Range    Sodium 134 (L) 136 - 145 mmol/L    Potassium 5.3 (H) 3.5 - 5.1 mmol/L    Chloride 101 97 - 108 mmol/L    CO2 25 21 - 32 mmol/L    Anion gap 8 5 - 15 mmol/L    Glucose 290 (H) 65 - 100 mg/dL    BUN 54 (H) 6 - 20 MG/DL    Creatinine 2.27 (H) 0.70 - 1.30 MG/DL    BUN/Creatinine ratio 24 (H) 12 - 20      GFR est AA 35 (L) >60 ml/min/1.73m2    GFR est non-AA 29 (L) >60 ml/min/1.73m2    Calcium 8.2 (L) 8.5 - 10.1 MG/DL    Phosphorus 2.6 2.6 - 4.7 MG/DL    Albumin 2.4 (L) 3.5 - 5.0 g/dL   CBC WITH AUTOMATED DIFF    Collection Time: 03/16/19  4:06 AM   Result Value Ref Range    WBC 5.9 4.1 - 11.1 K/uL    RBC 2.45 (L) 4.10 - 5.70 M/uL    HGB 7.7 (L) 12.1 - 17.0 g/dL    HCT 23.6 (L) 36.6 - 50.3 %    MCV 96.3 80.0 - 99.0 FL    MCH 31.4 26.0 - 34.0 PG    MCHC 32.6 30.0 - 36.5 g/dL    RDW 15.0 (H) 11.5 - 14.5 %    PLATELET 957 236 - 457 K/uL    MPV 9.6 8.9 - 12.9 FL    NRBC 0.0 0  WBC    ABSOLUTE NRBC 0.00 0.00 - 0.01 K/uL    NEUTROPHILS 76 (H) 32 - 75 %    LYMPHOCYTES 15 12 - 49 %    MONOCYTES 6 5 - 13 %    EOSINOPHILS 2 0 - 7 %    BASOPHILS 0 0 - 1 %    IMMATURE GRANULOCYTES 1 (H) 0.0 - 0.5 %    ABS. NEUTROPHILS 4.5 1.8 - 8.0 K/UL    ABS. LYMPHOCYTES 0.9 0.8 - 3.5 K/UL    ABS. MONOCYTES 0.4 0.0 - 1.0 K/UL    ABS. EOSINOPHILS 0.1 0.0 - 0.4 K/UL    ABS. BASOPHILS 0.0 0.0 - 0.1 K/UL    ABS. IMM. GRANS. 0.1 (H) 0.00 - 0.04 K/UL    DF AUTOMATED     IRON PROFILE    Collection Time: 03/16/19  4:06 AM   Result Value Ref Range    Iron 68 35 - 150 ug/dL    TIBC 236 (L) 250 - 450 ug/dL    Iron % saturation 29 20 - 50 %   VITAMIN B12    Collection Time: 03/16/19  4:06 AM   Result Value Ref Range    Vitamin B12 620 193 - 986 pg/mL   FOLATE    Collection Time: 03/16/19  4:06 AM   Result Value Ref Range    Folate 20.0 5.0 - 21.0 ng/mL   GLUCOSE, POC    Collection Time: 03/16/19  7:23 AM   Result Value Ref Range    Glucose (POC) 283 (H) 65 - 100 mg/dL    Performed by Oli Allison (PCT)    GLUCOSE, POC    Collection Time: 03/16/19 11:29 AM   Result Value Ref Range    Glucose (POC) 302 (H) 65 - 100 mg/dL    Performed by Oli Signs (PCT)            Total time spent with patient:  xxx   min. Care Plan discussed with:  Patient     Family      RN      Consulting Physician [address] [facility],         I have reviewed the flowsheets. Chart and Pertinent Notes have been reviewed. No change in PMH ,family and social history from Consult note.       Nahum Neri MD

## 2019-03-16 NOTE — PROGRESS NOTES
Bedside and Verbal shift change report given to [place] (oncoming nurse) by Gardenia Petty RN (offgoing nurse). Report included the following information SBAR, Kardex, Intake/Output, MAR, Accordion, Recent Results and Med Rec Status.

## 2019-03-16 NOTE — PROGRESS NOTES
Bedside and Verbal shift change report given to [address] [place] [address] (oncoming nurse) by Missael Fair RN (offgoing nurse). Report included the following information SBAR, Kardex, Intake/Output, MAR and Recent Results.

## 2019-03-16 NOTE — PROGRESS NOTES
Hospitalist Progress Note    NAME: Vimal Session III   :  1950   MRN:  [id number]       Assessment / Plan:  Acute on chronic renal failure with baseline CKD3, improving  Felt to be prerenal, improving with IVF and holding diuretics and ARB  Renal US obtained and demonstrating normal renal parenchyma and no hydronephrosis  Nephrology consult obtained and appreciated  Excellent urine output overnight  UA with proteinuria     Dizziness / Presyncope  Suspect dehydration related  Echo pending  TSH normal, troponin negative  PT/OT recommending SNF but patient would rather return home to his son's care     Diabetic polyneuropathy   Chronic Hip Pain  Continue Neurontin and percocet     Diabetes mellitus type 2, hyperglycemia worseningd  Lantus dose reduced on admission due to renal failure. As renal failure is improving, will increase Lantus dosage. Also has scheduled prandial and corrective insulin.     HTN (hypertension)   Continue Cardizem and hold ARBs due to above  PRN nitropaste     H/o leg swelling  Holding diuretics due to above  Monitor fluid status     Code status: Full  Prophylaxis: Hep SQ  Recommended Disposition: Home w/Family -- 24 to 48 hours? Subjective:     Chief Complaint / Reason for Physician Visit: follow up renal failure    Complains of confusion regarding his treatment, not understanding why he is not receiving some of his usual home medications. After explanation, he understands reasoning and plan. He expresses concerns over discharge planning due to wanting to attend orthopedic appt on Monday, but potentially not having a home environment that is adequate for him until Monday or Tuesday (son helps him at home, also currently does not have his power wheelchair at home). Good urine output.      Review of Systems:  Symptom Y/N Comments  Symptom Y/N Comments   Fever/Chills    Chest Pain n    Poor Appetite n   Edema     Cough    Abdominal Pain     Sputum    Joint Pain     SOB/RUSSELL n Pruritis/Rash     Nausea/vomit n   Tolerating PT/OT     Diarrhea    Tolerating Diet y    Constipation n   Other       Could NOT obtain due to:      Objective:     VITALS:   Last 24hrs VS reviewed since prior progress note. Most recent are:  Patient Vitals for the past 24 hrs:   Temp Pulse Resp BP SpO2   03/16/19 1210 98.6 °F (37 °C) 72 18 187/90 97 %   03/16/19 0817 98.3 °F (36.8 °C) 72 18 184/89 98 %   03/16/19 0346 98.6 °F (37 °C) 74 18 166/68 96 %   03/15/19 2356 98.1 °F (36.7 °C) 78 18 150/62 96 %   03/15/19 1908 99 °F (37.2 °C) 88 18 152/66 98 %   03/15/19 1426 98.8 °F (37.1 °C) 85 18 153/61 96 %       Intake/Output Summary (Last 24 hours) at 3/16/2019 1253  Last data filed at 3/16/2019 0640  Gross per 24 hour   Intake 1783.33 ml   Output 3300 ml   Net -1516.67 ml        PHYSICAL EXAM:  General: WD, WN. Alert, no acute distress    HEENT:  MMM, OP clear  Resp:  No accessory muscle use, CTA  CV:  Regular  rhythm,  + extensive woody edema  GI:  Non distended, soft, nontender  Neurologic:  Alert and oriented X 3, normal speech,  Psych:   Not anxious nor agitated  Skin:  No jaundice    Reviewed most current lab test results and cultures  YES  Reviewed most current radiology test results   YES  Review and summation of old records today    NO  Reviewed patient's current orders and MAR    YES  PMH/ reviewed - no change compared to H&P  ________________________________________________________________________  Care Plan discussed with:    Comments   Patient x    Family      RN x    Care Manager     Consultant                        Multidiciplinary team rounds were held today with , nursing, pharmacist and clinical coordinator. Patient's plan of care was discussed; medications were reviewed and discharge planning was addressed.      ________________________________________________________________________  Total NON critical care TIME:  15   Minutes    Total CRITICAL CARE TIME Spent:   Minutes non procedure based      Comments   >50% of visit spent in counseling and coordination of care     ________________________________________________________________________  Mel Olmos MD     Procedures: see electronic medical records for all procedures/Xrays and details which were not copied into this note but were reviewed prior to creation of Plan. LABS:  I reviewed today's most current labs and imaging studies.   Pertinent labs include:  Recent Labs     03/16/19  0406 03/15/19  0542 03/14/19  1930   WBC 5.9 6.6 8.0   HGB 7.7* 7.7* 8.9*   HCT 23.6* 23.4* 28.2*    221 249     Recent Labs     03/16/19  0406 03/15/19  0542 03/14/19  1930   * 133* 133*   K 5.3* 5.4* 5.1    100 98   CO2 25 27 27   * 255* 199*   BUN 54* 70* 77*   CREA 2.27* 2.72* 3.19*   CA 8.2* 8.4* 9.0   PHOS 2.6  --   --    ALB 2.4* 2.5* 3.0*   TBILI  --  0.2 0.3   SGOT  --  14* 16   ALT  --  21 22       Signed: Mel Olmos MD

## 2019-03-17 ENCOUNTER — APPOINTMENT (OUTPATIENT)
Dept: GENERAL RADIOLOGY | Age: 69
DRG: 683 | End: 2019-03-17
Attending: INTERNAL MEDICINE
Payer: MEDICARE

## 2019-03-17 ENCOUNTER — APPOINTMENT (OUTPATIENT)
Dept: NON INVASIVE DIAGNOSTICS | Age: 69
DRG: 683 | End: 2019-03-17
Attending: INTERNAL MEDICINE
Payer: MEDICARE

## 2019-03-17 LAB
ALBUMIN SERPL-MCNC: 2.4 G/DL (ref 3.5–5)
ANION GAP SERPL CALC-SCNC: 5 MMOL/L (ref 5–15)
BUN SERPL-MCNC: 38 MG/DL (ref 6–20)
BUN/CREAT SERPL: 20 (ref 12–20)
CALCIUM SERPL-MCNC: 7.8 MG/DL (ref 8.5–10.1)
CHLORIDE SERPL-SCNC: 104 MMOL/L (ref 97–108)
CO2 SERPL-SCNC: 26 MMOL/L (ref 21–32)
CREAT SERPL-MCNC: 1.86 MG/DL (ref 0.7–1.3)
GLUCOSE BLD STRIP.AUTO-MCNC: 172 MG/DL (ref 65–100)
GLUCOSE BLD STRIP.AUTO-MCNC: 183 MG/DL (ref 65–100)
GLUCOSE BLD STRIP.AUTO-MCNC: 250 MG/DL (ref 65–100)
GLUCOSE BLD STRIP.AUTO-MCNC: 253 MG/DL (ref 65–100)
GLUCOSE SERPL-MCNC: 147 MG/DL (ref 65–100)
PHOSPHATE SERPL-MCNC: 2.5 MG/DL (ref 2.6–4.7)
POTASSIUM SERPL-SCNC: 4.9 MMOL/L (ref 3.5–5.1)
SERVICE CMNT-IMP: ABNORMAL
SODIUM SERPL-SCNC: 135 MMOL/L (ref 136–145)

## 2019-03-17 PROCEDURE — 74011250636 HC RX REV CODE- 250/636: Performed by: INTERNAL MEDICINE

## 2019-03-17 PROCEDURE — 80069 RENAL FUNCTION PANEL: CPT

## 2019-03-17 PROCEDURE — 74011636637 HC RX REV CODE- 636/637: Performed by: INTERNAL MEDICINE

## 2019-03-17 PROCEDURE — 82962 GLUCOSE BLOOD TEST: CPT

## 2019-03-17 PROCEDURE — 93306 TTE W/DOPPLER COMPLETE: CPT

## 2019-03-17 PROCEDURE — 36415 COLL VENOUS BLD VENIPUNCTURE: CPT

## 2019-03-17 PROCEDURE — 71045 X-RAY EXAM CHEST 1 VIEW: CPT

## 2019-03-17 PROCEDURE — 74011250637 HC RX REV CODE- 250/637: Performed by: INTERNAL MEDICINE

## 2019-03-17 PROCEDURE — 65270000029 HC RM PRIVATE

## 2019-03-17 RX ORDER — LEVALBUTEROL INHALATION SOLUTION 0.63 MG/3ML
0.63 SOLUTION RESPIRATORY (INHALATION) ONCE
Status: ACTIVE | OUTPATIENT
Start: 2019-03-17 | End: 2019-03-18

## 2019-03-17 RX ORDER — FUROSEMIDE 10 MG/ML
40 INJECTION INTRAMUSCULAR; INTRAVENOUS ONCE
Status: COMPLETED | OUTPATIENT
Start: 2019-03-17 | End: 2019-03-17

## 2019-03-17 RX ADMIN — HYDRALAZINE HYDROCHLORIDE 25 MG: 25 TABLET, FILM COATED ORAL at 08:58

## 2019-03-17 RX ADMIN — INSULIN LISPRO 5 UNITS: 100 INJECTION, SOLUTION INTRAVENOUS; SUBCUTANEOUS at 17:35

## 2019-03-17 RX ADMIN — INSULIN LISPRO 3 UNITS: 100 INJECTION, SOLUTION INTRAVENOUS; SUBCUTANEOUS at 12:30

## 2019-03-17 RX ADMIN — INSULIN LISPRO 3 UNITS: 100 INJECTION, SOLUTION INTRAVENOUS; SUBCUTANEOUS at 08:59

## 2019-03-17 RX ADMIN — HYDRALAZINE HYDROCHLORIDE 20 MG: 20 INJECTION INTRAMUSCULAR; INTRAVENOUS at 14:53

## 2019-03-17 RX ADMIN — INSULIN GLARGINE 60 UNITS: 100 INJECTION, SOLUTION SUBCUTANEOUS at 17:35

## 2019-03-17 RX ADMIN — FERROUS SULFATE TAB 325 MG (65 MG ELEMENTAL FE) 325 MG: 325 (65 FE) TAB at 08:58

## 2019-03-17 RX ADMIN — INSULIN LISPRO 3 UNITS: 100 INJECTION, SOLUTION INTRAVENOUS; SUBCUTANEOUS at 17:35

## 2019-03-17 RX ADMIN — FLUTICASONE PROPIONATE 2 SPRAY: 50 SPRAY, METERED NASAL at 09:02

## 2019-03-17 RX ADMIN — Medication 10 ML: at 14:52

## 2019-03-17 RX ADMIN — HEPARIN SODIUM 5000 UNITS: 5000 INJECTION, SOLUTION INTRAVENOUS; SUBCUTANEOUS at 22:36

## 2019-03-17 RX ADMIN — INSULIN GLARGINE 60 UNITS: 100 INJECTION, SOLUTION SUBCUTANEOUS at 08:58

## 2019-03-17 RX ADMIN — GABAPENTIN 300 MG: 300 CAPSULE ORAL at 17:10

## 2019-03-17 RX ADMIN — Medication: at 17:36

## 2019-03-17 RX ADMIN — Medication 10 ML: at 05:14

## 2019-03-17 RX ADMIN — INSULIN LISPRO 2 UNITS: 100 INJECTION, SOLUTION INTRAVENOUS; SUBCUTANEOUS at 12:30

## 2019-03-17 RX ADMIN — OXYCODONE AND ACETAMINOPHEN 1 TABLET: 5; 325 TABLET ORAL at 17:10

## 2019-03-17 RX ADMIN — FUROSEMIDE 40 MG: 10 INJECTION, SOLUTION INTRAMUSCULAR; INTRAVENOUS at 18:34

## 2019-03-17 RX ADMIN — INSULIN LISPRO 2 UNITS: 100 INJECTION, SOLUTION INTRAVENOUS; SUBCUTANEOUS at 08:59

## 2019-03-17 RX ADMIN — HEPARIN SODIUM 5000 UNITS: 5000 INJECTION, SOLUTION INTRAVENOUS; SUBCUTANEOUS at 06:53

## 2019-03-17 RX ADMIN — HEPARIN SODIUM 5000 UNITS: 5000 INJECTION, SOLUTION INTRAVENOUS; SUBCUTANEOUS at 14:53

## 2019-03-17 RX ADMIN — Medication 10 ML: at 22:41

## 2019-03-17 RX ADMIN — GABAPENTIN 300 MG: 300 CAPSULE ORAL at 08:58

## 2019-03-17 RX ADMIN — OXYCODONE AND ACETAMINOPHEN 1 TABLET: 5; 325 TABLET ORAL at 08:58

## 2019-03-17 RX ADMIN — GABAPENTIN 300 MG: 300 CAPSULE ORAL at 22:36

## 2019-03-17 RX ADMIN — Medication: at 09:03

## 2019-03-17 RX ADMIN — INSULIN LISPRO 3 UNITS: 100 INJECTION, SOLUTION INTRAVENOUS; SUBCUTANEOUS at 22:36

## 2019-03-17 RX ADMIN — HYDRALAZINE HYDROCHLORIDE 25 MG: 25 TABLET, FILM COATED ORAL at 17:10

## 2019-03-17 RX ADMIN — DILTIAZEM HYDROCHLORIDE 240 MG: 240 CAPSULE, COATED, EXTENDED RELEASE ORAL at 08:58

## 2019-03-17 RX ADMIN — HYDRALAZINE HYDROCHLORIDE 25 MG: 25 TABLET, FILM COATED ORAL at 22:36

## 2019-03-17 NOTE — PROGRESS NOTES
Hospitalist Progress Note    NAME: Davion Yun III   :  1950   MRN:  [id number]       Assessment / Plan:  Acute on chronic renal failure with baseline CKD3, improving  Felt to be prerenal, improving with IVF and holding diuretics and ARB  Renal US obtained and demonstrating normal renal parenchyma and no hydronephrosis  Nephrology consult obtained and appreciated  Excellent urine output, will discontinue IVF as he appears to be at baseline  UA with proteinuria     Dizziness / Presyncope  Suspect dehydration related  Echo pending  TSH normal, troponin negative  PT/OT recommending SNF but patient would rather return home to his son's care     Diabetic polyneuropathy   Chronic Hip Pain  Continue Neurontin and percocet     Diabetes mellitus type 2, hyperglycemia worseningd  Lantus dose reduced on admission due to renal failure  Hyperglycemia improving     HTN (hypertension)   Continue Cardizem and hold ARBs due to above -- increased Cardizem due to high BPs  PRN nitropaste     H/o leg swelling  Holding diuretics due to above -- may resume tomorrow  Monitor fluid status     Code status: Full  Prophylaxis: Hep SQ  Recommended Disposition: Home w/Family -- 24 to 48 hours? Subjective:     Chief Complaint / Reason for Physician Visit: follow up renal failure    Feeling fine. Happy to see that his glucose level was improved this morning. Slightly more short of breath than baseline. Concerned about going home due to not having his wheelchair and needing help at home. Awaiting CM consult. Review of Systems:  Symptom Y/N Comments  Symptom Y/N Comments   Fever/Chills    Chest Pain n    Poor Appetite n   Edema     Cough n   Abdominal Pain     Sputum n   Joint Pain     SOB/RUSSELL y   Pruritis/Rash     Nausea/vomit n   Tolerating PT/OT     Diarrhea    Tolerating Diet y    Constipation n   Other       Could NOT obtain due to:      Objective:     VITALS:   Last 24hrs VS reviewed since prior progress note.  Most recent are:  Patient Vitals for the past 24 hrs:   Temp Pulse Resp BP SpO2   03/17/19 0832 98.3 °F (36.8 °C) 73 18 162/79 97 %   03/17/19 0337 98.5 °F (36.9 °C) 72 18 145/71 97 %   03/17/19 0004 98.6 °F (37 °C) 78 18 160/70 96 %   03/16/19 1937 98.5 °F (36.9 °C) 81 17 161/82 99 %   03/16/19 1543 98.6 °F (37 °C) 76 16 176/87 98 %   03/16/19 1210 98.6 °F (37 °C) 72 18 187/90 97 %       Intake/Output Summary (Last 24 hours) at 3/17/2019 0835  Last data filed at 3/17/2019 1287  Gross per 24 hour   Intake 960 ml   Output 4000 ml   Net -3040 ml        PHYSICAL EXAM:  General: WD, WN. Alert, no acute distress    HEENT:  MMM, OP clear  Resp:  No accessory muscle use, rales at left base  CV:  Regular  rhythm,  + extensive woody edema  GI:  Non distended, soft, nontender  Neurologic:  Alert and oriented X 3, normal speech,  Psych:   Not anxious nor agitated  Skin:  No jaundice    Reviewed most current lab test results and cultures  YES  Reviewed most current radiology test results   YES  Review and summation of old records today    NO  Reviewed patient's current orders and MAR    YES  PMH/ reviewed - no change compared to H&P  ________________________________________________________________________  Care Plan discussed with:    Comments   Patient x    Family      RN     Care Manager     Consultant                        Multidiciplinary team rounds were held today with , nursing, pharmacist and clinical coordinator. Patient's plan of care was discussed; medications were reviewed and discharge planning was addressed.      ________________________________________________________________________  Total NON critical care TIME:  15   Minutes    Total CRITICAL CARE TIME Spent:   Minutes non procedure based      Comments   >50% of visit spent in counseling and coordination of care     ________________________________________________________________________  Jonn Hernández MD     Procedures: see electronic medical records for all procedures/Xrays and details which were not copied into this note but were reviewed prior to creation of Plan. LABS:  I reviewed today's most current labs and imaging studies.   Pertinent labs include:  Recent Labs     03/16/19  0406 03/15/19  0542 03/14/19  1930   WBC 5.9 6.6 8.0   HGB 7.7* 7.7* 8.9*   HCT 23.6* 23.4* 28.2*    221 249     Recent Labs     03/17/19 0452 03/16/19 0406 03/15/19  0542 03/14/19 1930   * 134* 133* 133*   K 4.9 5.3* 5.4* 5.1    101 100 98   CO2 26 25 27 27   * 290* 255* 199*   BUN 38* 54* 70* 77*   CREA 1.86* 2.27* 2.72* 3.19*   CA 7.8* 8.2* 8.4* 9.0   PHOS 2.5* 2.6  --   --    ALB 2.4* 2.4* 2.5* 3.0*   TBILI  --   --  0.2 0.3   SGOT  --   --  14* 16   ALT  --   --  21 22       Signed: Dago Sofia MD

## 2019-03-17 NOTE — PROGRESS NOTES
Problem: Pressure Injury - Risk of  Goal: *Prevention of pressure injury  Document Edgar Scale and appropriate interventions in the flowsheet.   Outcome: Progressing Towards Goal  Pressure Injury Interventions:  Sensory Interventions: Assess changes in LOC    Moisture Interventions: Absorbent underpads    Activity Interventions: Pressure redistribution bed/mattress(bed type)    Mobility Interventions: Assess need for specialty bed    Nutrition Interventions: Document food/fluid/supplement intake    Friction and Shear Interventions: HOB 30 degrees or less

## 2019-03-17 NOTE — PROGRESS NOTES
Bedside and Verbal shift change report given to [place] (oncoming nurse) by Dori Mike RN (offgoing nurse). Report included the following information SBAR, Kardex, Intake/Output, MAR, Accordion, Recent Results and Med Rec Status.

## 2019-03-17 NOTE — PROGRESS NOTES
Reason for Admission:  Dizziness & Generalized Weakness                RRAT Score: 31                 Resources/supports as identified by patient/family:   The patient resides in a 1st floor apt by himself. He is legally  but reamins close with his ex-wife. She has 2 sons and 2 daughters that all reside locally. One of his sons is his primary caregiver. He reports that his family is very supportive                   Top Challenges facing patient (as identified by patient/family and CM): Finances/Medication cost?  The patient receives $750 monthly from [place] and he uses the [facility] on [address] for his medications                   Transportation? The patient does not drive, he uses his [employer] for transportation needs              Support system or lack thereof? The patient reports that his ex-wife and 4 children are very suppoirtve                      Living arrangements? The patient lives in a 1st floor apt by himself. His son is his primary caregiver and he sees the patient every day                 Self-care/ADLs/Cognition? The patient requires assistance with his ADLs. He has a power wheelchair that he is dependent upon for mobility but it recently has broken and he has someone coming to repair it on Tuesday, 2/19/19. He has a manual wheelchair, bedside commode, and hospital bed at home. He is able to stand and pivot to get into his wheelchair. His son is his primary caregiver but his son recently had to take another job so he is at the patient's apt less. The patient is looking into finding another caregiver. Current Advanced Directive/Advance Care Plan: The patient is a full code and he does not have a MPOA/AD on-file                           Plan for utilizing home health: The patient has been recommended for SNF, however, he is refusing SNF placement. He would prefer to return home with [facility] services.                           Likelihood of readmission: Low                 Transition of Care Plan:         CM met with the patient at bedside. The patient adamantly refuses SNF placement and wishes to return home with [place] services. The patient said he has previously used [facility] for [place] needs but he reports that they were just [place]. \" CM discussed alternative [place] agencies with the patient and provided him with a list. He his deciding whether to use [facility] again vs. Using [facility] [place] services. He requested that CM not send his referral to either agency until he has made a final decision on which [place] agency to use. He requested that CM follow-up with him tomorrow regarding his preference for [place] agency. CM will continue to follow the patient for dispo needs. Care Management Interventions  PCP Verified by CM:  Yes  Last Visit to PCP: 03/14/19  Mode of Transport at Discharge: [address] (CM Consult): Discharge Planning  MyChart Signup: No  Discharge Durable Medical Equipment: No  Physical Therapy Consult: Yes  Occupational Therapy Consult: Yes  Speech Therapy Consult: No  Current Support Network: Lives Alone  Confirm Follow Up Transport: [address] discussed with Pt/Family/Caregiver: Yes  Freedom of Choice Offered: Yes  [place] [facility] Information Provided?: No  Discharge Location  Discharge Placement: Home with home health     Kaden pederson LCSW

## 2019-03-18 LAB
ALBUMIN SERPL-MCNC: 2.6 G/DL (ref 3.5–5)
ANION GAP SERPL CALC-SCNC: 8 MMOL/L (ref 5–15)
BASOPHILS # BLD: 0 K/UL (ref 0–0.1)
BASOPHILS NFR BLD: 0 % (ref 0–1)
BUN SERPL-MCNC: 35 MG/DL (ref 6–20)
BUN/CREAT SERPL: 18 (ref 12–20)
CALCIUM SERPL-MCNC: 8.1 MG/DL (ref 8.5–10.1)
CHLORIDE SERPL-SCNC: 101 MMOL/L (ref 97–108)
CO2 SERPL-SCNC: 24 MMOL/L (ref 21–32)
CREAT SERPL-MCNC: 1.95 MG/DL (ref 0.7–1.3)
DIFFERENTIAL METHOD BLD: ABNORMAL
ECHO AO ROOT DIAM: 2.96 CM
ECHO AV AREA PEAK VELOCITY: 3.5 CM2
ECHO AV AREA VTI: 4.3 CM2
ECHO AV AREA/BSA PEAK VELOCITY: 1.4 CM2/M2
ECHO AV AREA/BSA VTI: 1.6 CM2/M2
ECHO AV MEAN GRADIENT: 4.2 MMHG
ECHO AV PEAK GRADIENT: 7.1 MMHG
ECHO AV PEAK VELOCITY: 133.33 CM/S
ECHO AV VTI: 26.98 CM
ECHO EST RA PRESSURE: 10 MMHG
ECHO LA AREA 2C: 23.28 CM2
ECHO LA AREA 4C: 16.8 CM2
ECHO LA MAJOR AXIS: 3.82 CM
ECHO LA TO AORTIC ROOT RATIO: 1.29
ECHO LA VOL 2C: 81.11 ML (ref 18–58)
ECHO LA VOL 4C: 43.67 ML (ref 18–58)
ECHO LA VOL BP: 63.3 ML (ref 18–58)
ECHO LA VOL/BSA BIPLANE: 24.95 ML/M2 (ref 16–28)
ECHO LA VOLUME INDEX A2C: 31.97 ML/M2 (ref 16–28)
ECHO LA VOLUME INDEX A4C: 17.21 ML/M2 (ref 16–28)
ECHO LV E' LATERAL VELOCITY: 9.23 CM/S
ECHO LV E' SEPTAL VELOCITY: 7.3 CM/S
ECHO LV INTERNAL DIMENSION DIASTOLIC: 5.38 CM (ref 4.2–5.9)
ECHO LV INTERNAL DIMENSION SYSTOLIC: 3.38 CM
ECHO LV IVSD: 1.37 CM (ref 0.6–1)
ECHO LV MASS 2D: 364.1 G (ref 88–224)
ECHO LV MASS INDEX 2D: 143.5 G/M2 (ref 49–115)
ECHO LV POSTERIOR WALL DIASTOLIC: 1.29 CM (ref 0.6–1)
ECHO LVOT DIAM: 2.19 CM
ECHO LVOT PEAK GRADIENT: 6.3 MMHG
ECHO LVOT PEAK VELOCITY: 125.17 CM/S
ECHO LVOT SV: 114.9 ML
ECHO LVOT VTI: 30.58 CM
ECHO MV A VELOCITY: 61.31 CM/S
ECHO MV AREA PHT: 3.3 CM2
ECHO MV E DECELERATION TIME (DT): 233.1 MS
ECHO MV E VELOCITY: 100.17 CM/S
ECHO MV E/A RATIO: 1.63
ECHO MV E/E' LATERAL: 10.85
ECHO MV E/E' RATIO (AVERAGED): 12.29
ECHO MV E/E' SEPTAL: 13.72
ECHO MV PRESSURE HALF TIME (PHT): 66.6 MS
ECHO MV REGURGITANT PEAK GRADIENT: 37.5 MMHG
ECHO MV REGURGITANT PEAK VELOCITY: 306.31 CM/S
ECHO PULMONARY ARTERY SYSTOLIC PRESSURE (PASP): 18 MMHG
ECHO PV MAX VELOCITY: 110.17 CM/S
ECHO PV PEAK GRADIENT: 4.9 MMHG
ECHO RIGHT VENTRICULAR SYSTOLIC PRESSURE (RVSP): 18 MMHG
ECHO RV INTERNAL DIMENSION: 2.9 CM
ECHO TV REGURGITANT MAX VELOCITY: 141.52 CM/S
ECHO TV REGURGITANT PEAK GRADIENT: 8 MMHG
EOSINOPHIL # BLD: 0.2 K/UL (ref 0–0.4)
EOSINOPHIL NFR BLD: 3 % (ref 0–7)
ERYTHROCYTE [DISTWIDTH] IN BLOOD BY AUTOMATED COUNT: 15.1 % (ref 11.5–14.5)
GLUCOSE BLD STRIP.AUTO-MCNC: 207 MG/DL (ref 65–100)
GLUCOSE BLD STRIP.AUTO-MCNC: 255 MG/DL (ref 65–100)
GLUCOSE BLD STRIP.AUTO-MCNC: 262 MG/DL (ref 65–100)
GLUCOSE BLD STRIP.AUTO-MCNC: 271 MG/DL (ref 65–100)
GLUCOSE SERPL-MCNC: 240 MG/DL (ref 65–100)
HCT VFR BLD AUTO: 27 % (ref 36.6–50.3)
HGB BLD-MCNC: 8.7 G/DL (ref 12.1–17)
IMM GRANULOCYTES # BLD AUTO: 0.1 K/UL (ref 0–0.04)
IMM GRANULOCYTES NFR BLD AUTO: 1 % (ref 0–0.5)
LYMPHOCYTES # BLD: 0.9 K/UL (ref 0.8–3.5)
LYMPHOCYTES NFR BLD: 14 % (ref 12–49)
MCH RBC QN AUTO: 31 PG (ref 26–34)
MCHC RBC AUTO-ENTMCNC: 32.2 G/DL (ref 30–36.5)
MCV RBC AUTO: 96.1 FL (ref 80–99)
MONOCYTES # BLD: 0.5 K/UL (ref 0–1)
MONOCYTES NFR BLD: 7 % (ref 5–13)
NEUTS SEG # BLD: 5 K/UL (ref 1.8–8)
NEUTS SEG NFR BLD: 75 % (ref 32–75)
NRBC # BLD: 0.04 K/UL (ref 0–0.01)
NRBC BLD-RTO: 0.6 PER 100 WBC
PHOSPHATE SERPL-MCNC: 2.7 MG/DL (ref 2.6–4.7)
PLATELET # BLD AUTO: 193 K/UL (ref 150–400)
PMV BLD AUTO: 9.5 FL (ref 8.9–12.9)
POTASSIUM SERPL-SCNC: 4.4 MMOL/L (ref 3.5–5.1)
RBC # BLD AUTO: 2.81 M/UL (ref 4.1–5.7)
SERVICE CMNT-IMP: ABNORMAL
SODIUM SERPL-SCNC: 133 MMOL/L (ref 136–145)
WBC # BLD AUTO: 6.7 K/UL (ref 4.1–11.1)

## 2019-03-18 PROCEDURE — 74011250637 HC RX REV CODE- 250/637: Performed by: INTERNAL MEDICINE

## 2019-03-18 PROCEDURE — 74011636637 HC RX REV CODE- 636/637: Performed by: INTERNAL MEDICINE

## 2019-03-18 PROCEDURE — 80069 RENAL FUNCTION PANEL: CPT

## 2019-03-18 PROCEDURE — 82962 GLUCOSE BLOOD TEST: CPT

## 2019-03-18 PROCEDURE — 74011250636 HC RX REV CODE- 250/636: Performed by: INTERNAL MEDICINE

## 2019-03-18 PROCEDURE — 36415 COLL VENOUS BLD VENIPUNCTURE: CPT

## 2019-03-18 PROCEDURE — 77030011256 HC DRSG MEPILEX <16IN NO BORD MOLN -A

## 2019-03-18 PROCEDURE — 65270000029 HC RM PRIVATE

## 2019-03-18 PROCEDURE — 85025 COMPLETE CBC W/AUTO DIFF WBC: CPT

## 2019-03-18 RX ORDER — BUMETANIDE 1 MG/1
2 TABLET ORAL DAILY
Status: DISCONTINUED | OUTPATIENT
Start: 2019-03-18 | End: 2019-03-20 | Stop reason: HOSPADM

## 2019-03-18 RX ORDER — INSULIN GLARGINE 100 [IU]/ML
65 INJECTION, SOLUTION SUBCUTANEOUS EVERY 12 HOURS
Status: DISCONTINUED | OUTPATIENT
Start: 2019-03-18 | End: 2019-03-19

## 2019-03-18 RX ORDER — HYDRALAZINE HYDROCHLORIDE 50 MG/1
50 TABLET, FILM COATED ORAL 3 TIMES DAILY
Qty: 90 TAB | Refills: 0 | Status: SHIPPED | OUTPATIENT
Start: 2019-03-18 | End: 2019-03-20

## 2019-03-18 RX ORDER — POLYETHYLENE GLYCOL 3350 17 G/17G
17 POWDER, FOR SOLUTION ORAL DAILY
Status: DISCONTINUED | OUTPATIENT
Start: 2019-03-19 | End: 2019-03-20 | Stop reason: HOSPADM

## 2019-03-18 RX ORDER — HYDRALAZINE HYDROCHLORIDE 50 MG/1
50 TABLET, FILM COATED ORAL 3 TIMES DAILY
Status: DISCONTINUED | OUTPATIENT
Start: 2019-03-18 | End: 2019-03-20 | Stop reason: HOSPADM

## 2019-03-18 RX ORDER — DILTIAZEM HYDROCHLORIDE 240 MG/1
240 CAPSULE, COATED, EXTENDED RELEASE ORAL DAILY
Qty: 30 CAP | Refills: 0 | Status: SHIPPED | OUTPATIENT
Start: 2019-03-19 | End: 2019-03-20

## 2019-03-18 RX ADMIN — GABAPENTIN 300 MG: 300 CAPSULE ORAL at 08:35

## 2019-03-18 RX ADMIN — OXYCODONE AND ACETAMINOPHEN 1 TABLET: 5; 325 TABLET ORAL at 03:55

## 2019-03-18 RX ADMIN — INSULIN GLARGINE 65 UNITS: 100 INJECTION, SOLUTION SUBCUTANEOUS at 22:31

## 2019-03-18 RX ADMIN — GABAPENTIN 300 MG: 300 CAPSULE ORAL at 22:29

## 2019-03-18 RX ADMIN — INSULIN LISPRO 3 UNITS: 100 INJECTION, SOLUTION INTRAVENOUS; SUBCUTANEOUS at 08:36

## 2019-03-18 RX ADMIN — Medication: at 18:39

## 2019-03-18 RX ADMIN — HEPARIN SODIUM 5000 UNITS: 5000 INJECTION, SOLUTION INTRAVENOUS; SUBCUTANEOUS at 15:06

## 2019-03-18 RX ADMIN — HEPARIN SODIUM 5000 UNITS: 5000 INJECTION, SOLUTION INTRAVENOUS; SUBCUTANEOUS at 05:53

## 2019-03-18 RX ADMIN — Medication 10 ML: at 05:53

## 2019-03-18 RX ADMIN — INSULIN LISPRO 5 UNITS: 100 INJECTION, SOLUTION INTRAVENOUS; SUBCUTANEOUS at 12:10

## 2019-03-18 RX ADMIN — GABAPENTIN 300 MG: 300 CAPSULE ORAL at 16:50

## 2019-03-18 RX ADMIN — OXYCODONE AND ACETAMINOPHEN 1 TABLET: 5; 325 TABLET ORAL at 08:52

## 2019-03-18 RX ADMIN — HYDRALAZINE HYDROCHLORIDE 50 MG: 50 TABLET, FILM COATED ORAL at 22:29

## 2019-03-18 RX ADMIN — Medication 5 ML: at 22:29

## 2019-03-18 RX ADMIN — HYDRALAZINE HYDROCHLORIDE 25 MG: 25 TABLET, FILM COATED ORAL at 08:35

## 2019-03-18 RX ADMIN — INSULIN LISPRO 3 UNITS: 100 INJECTION, SOLUTION INTRAVENOUS; SUBCUTANEOUS at 22:30

## 2019-03-18 RX ADMIN — FERROUS SULFATE TAB 325 MG (65 MG ELEMENTAL FE) 325 MG: 325 (65 FE) TAB at 08:35

## 2019-03-18 RX ADMIN — INSULIN LISPRO 3 UNITS: 100 INJECTION, SOLUTION INTRAVENOUS; SUBCUTANEOUS at 16:51

## 2019-03-18 RX ADMIN — INSULIN LISPRO 3 UNITS: 100 INJECTION, SOLUTION INTRAVENOUS; SUBCUTANEOUS at 08:35

## 2019-03-18 RX ADMIN — HYDRALAZINE HYDROCHLORIDE 50 MG: 50 TABLET, FILM COATED ORAL at 16:50

## 2019-03-18 RX ADMIN — BUMETANIDE 2 MG: 1 TABLET ORAL at 12:09

## 2019-03-18 RX ADMIN — INSULIN LISPRO 3 UNITS: 100 INJECTION, SOLUTION INTRAVENOUS; SUBCUTANEOUS at 12:09

## 2019-03-18 RX ADMIN — Medication: at 08:37

## 2019-03-18 RX ADMIN — INSULIN LISPRO 5 UNITS: 100 INJECTION, SOLUTION INTRAVENOUS; SUBCUTANEOUS at 16:51

## 2019-03-18 RX ADMIN — DILTIAZEM HYDROCHLORIDE 240 MG: 240 CAPSULE, COATED, EXTENDED RELEASE ORAL at 08:34

## 2019-03-18 RX ADMIN — HEPARIN SODIUM 5000 UNITS: 5000 INJECTION, SOLUTION INTRAVENOUS; SUBCUTANEOUS at 22:32

## 2019-03-18 RX ADMIN — Medication 10 ML: at 15:05

## 2019-03-18 RX ADMIN — FLUTICASONE PROPIONATE 2 SPRAY: 50 SPRAY, METERED NASAL at 08:39

## 2019-03-18 NOTE — PROGRESS NOTES
Nephrology Progress Note     Yovany Napoles     www. [facility]                  Phone - [phone]   Patient: James Cornell III   Date- 3/18/2019        Admit Date: 3/14/2019      YOB: 1950         CC: Follow up for arf on ckd         Subjective: Interval History:   -  Cr. Continue to improve  No sob  bp high  No c/o sob,   No c/o chest pain,   No c/o nausea or vomiting  No c/o  fever. ROS:- as above   Assessment:   · KATHI likely due to DEHYDRATION - bumex + metolazone use, AVAPRO can drop GFR in setting of dehydration  · ckd - 3 cr 1.75 in march 2018  · Hypertension  · Mild hyperkalemia  · Hyponatremia-  Likely due to hypovolemia  · Anemia- due to ckd- no iron defi noted     Plan:   Start bumex  Avoid metolazone  Hold avapro  Agree with starting hydralazine     OKAY TO D/ C HOME TODAY  Labs in 1 week.         Physical Exam:   GEN: NAD  NECK- Supple, no mass  RESP: Clear b/l, no wheezing, No accessory muscle use  CVS: RRR,S1,S2    ABDO: soft , non tender, No mass  EXT: + Edema   Chronic skin changes + induration. NEURO: normal speech, non focal    Care Plan discussed with: patient   Objective:   Patient Vitals for the past 24 hrs:   Temp Pulse Resp BP SpO2   03/18/19 0721 98.5 °F (36.9 °C) 71 18 163/81 99 %   03/17/19 2241 98.1 °F (36.7 °C) 73 18 175/74 98 %   03/17/19 2021 98.4 °F (36.9 °C) 75 20 162/68 98 %   03/17/19 1607    145/71    03/17/19 1554 98.7 °F (37.1 °C) 70 20 163/74 100 %   03/17/19 1527 98.5 °F (36.9 °C) 74 18 164/89 99 %   03/17/19 1154 98.6 °F (37 °C) 74 18 (!) 185/92 98 %     Last 3 Recorded Weights in this Encounter    03/17/19 0644 03/17/19 1607 03/18/19 0728   Weight: 128.2 kg (282 lb 10.1 oz) 130.6 kg (288 lb) 127.2 kg (280 lb 6.4 oz)     03/16 1901 - 03/18 0700  In: 1080 [P.O.:1080]  Out: 5650 [Urine:5650]  Chart reviewed. Pertinent Notes reviewed.        Medication list  reviewed   Current Facility-Administered Medications Medication    hydrALAZINE (APRESOLINE) tablet 50 mg    insulin glargine (LANTUS) injection 65 Units    bumetanide (BUMEX) tablet 2 mg    dilTIAZem CD (CARDIZEM CD) capsule 240 mg    insulin lispro (HUMALOG) injection    glucose chewable tablet 16 g    dextrose (D50W) injection syrg 12.5-25 g    glucagon (GLUCAGEN) injection 1 mg    ferrous sulfate tablet 325 mg    fluticasone propionate (FLONASE) 50 mcg/actuation nasal spray 2 Spray    gabapentin (NEURONTIN) capsule 300 mg    oxyCODONE-acetaminophen (PERCOCET) 5-325 mg per tablet 1 Tab    sodium chloride (NS) flush 5-40 mL    sodium chloride (NS) flush 5-40 mL    acetaminophen (TYLENOL) tablet 650 mg    ondansetron (ZOFRAN) injection 4 mg    nitroglycerin (NITROBID) 2 % ointment 1 Inch    heparin (porcine) injection 5,000 Units    sodium chloride (OCEAN) 0.65 % nasal squeeze bottle 2 Spray    labetalol (NORMODYNE;TRANDATE) injection 20 mg    epoetin chi-epbx (RETACRIT) 12,000 Units combo injection    ammonium lactate (LAC-HYDRIN) 12 % lotion    insulin lispro (HUMALOG) injection 3 Units           Data Review :  Recent Labs     03/18/19  0850 03/18/19  0200 03/17/19  0452 03/16/19  0406   WBC 6.7  --   --  5.9   HGB 8.7*  --   --  7.7*     --   --  197   ANEU 5.0  --   --  4.5   NA  --  133* 135* 134*   K  --  4.4 4.9 5.3*   GLU  --  240* 147* 290*   BUN  --  35* 38* 54*   CREA  --  1.95* 1.86* 2.27*   CA  --  8.1* 7.8* 8.2*   PHOS  --  2.7 2.5* 2.6     Lab Results   Component Value Date/Time    Culture result: FEW  STAPHYLOCOCCUS AUREUS   10/23/2016 08:57 AM    Culture result: NO ANAEROBES ISOLATED 10/23/2016 08:53 AM    Culture result: LIGHT  STAPHYLOCOCCUS AUREUS   10/23/2016 08:53 AM     No results found for: [place]  Recent Labs     03/16/19  0406   TIBC 236*   PSAT 29     Lab Results   Component Value Date/Time    Sodium,urine random 87 03/15/2019 04:19 PM    Creatinine, urine 35.00 03/15/2019 04:19 PM     Satish Gaetana Gottron, MD [facility]   [email or web address]  [facility]  [address], [address]  [place], [address]  Phone - [phone]         Fax - [phone]

## 2019-03-18 NOTE — PROGRESS NOTES
Hospitalist Progress Note    NAME: Deangelo Cole III   :  1950   MRN:  [id number]       Assessment / Plan:  Acute on chronic renal failure with baseline CKD3, improving  KATHI due to dehydration  Hyponatremia  - KATHI improved with IV hydration  - appreciate nephrology input; hold Avapro, metolazone. Check BMP in one week upon discharge  - continue with hydralazine, bumex. Daily weight; 128.2kg-> 127.2kg  - Renal US obtained and demonstrating normal renal parenchyma and no hydronephrosis     Dizziness / Presyncope  - resolved; Suspect dehydration related  - pt uses wheelchair, limited activity  - Echo:  · Estimated left ventricular ejection fraction is 61 - 65%. Left ventricular mild concentric hypertrophy. No regional wall motion abnormality noted. · Right ventricle not well visualized. · Aortic valve sclerosis. · Mitral valve non-specific thickening. Mild mitral annular calcification. Mild mitral valve regurgitation.  - TSH 0.97, troponin (-)     Diabetic polyneuropathy   Chronic Hip Pain  - Continue Neurontin and percocet. Continue with bowel regimen     Diabetes mellitus type 2, hyperglycemia worseningd  - A1C 7.5. Continue with Lantus   - check qac/qhs blood glucose and follow SSI    HTN (hypertension)   - Continue Cardizem and hydralazine. hold ARB due to KATHI  - PRN nitropaste    Chronic Venous stasis  - thick, plaques of dead skin to BLE and feet  - appreciate wound care team input; BLE and feet: daily cleanse skin with foaming soap and water, apply Lac-Hydrin lotion thick to skin to help lift and remove dry dead skin. Code status: FULL  DVT prophylaxis: heparin  Disposition: SNF  Tentative discharge; Plan to d/c tomorrow at 11am        Subjective:     Chief Complaint / Reason for Physician Visit: follow up renal failure  \"I don't have anyone who can help me at home. What are my choices? \" CM presented the choice of SNF.     Review of Systems:  Symptom Y/N Comments  Symptom Y/N Comments Fever/Chills    Chest Pain n    Poor Appetite n   Edema     Cough n   Abdominal Pain     Sputum n   Joint Pain     SOB/RUSSELL y   Pruritis/Rash     Nausea/vomit n   Tolerating PT/OT     Diarrhea    Tolerating Diet y    Constipation n   Other       Could NOT obtain due to:      Objective:     VITALS:   Last 24hrs VS reviewed since prior progress note. Most recent are:  Patient Vitals for the past 24 hrs:   Temp Pulse Resp BP SpO2   03/18/19 1429 98.2 °F (36.8 °C) 76 17 169/73 96 %   03/18/19 1043 98.9 °F (37.2 °C) 75 18 164/83 100 %   03/18/19 0721 98.5 °F (36.9 °C) 71 18 163/81 99 %   03/17/19 2241 98.1 °F (36.7 °C) 73 18 175/74 98 %   03/17/19 2021 98.4 °F (36.9 °C) 75 20 162/68 98 %       Intake/Output Summary (Last 24 hours) at 3/18/2019 1614  Last data filed at 3/18/2019 1539  Gross per 24 hour   Intake 240 ml   Output 3900 ml   Net -3660 ml        PHYSICAL EXAM:  General: morbidly obese. Alert, no acute distress    HEENT:  MMM, OP clear  Resp:  No accessory muscle use, clear in apex with decreased breath sounds at bases  CV:  Regular  rhythm,  + extensive woody edema  GI:  Non distended, soft, nontender  Neurologic:  Alert and oriented X 3, normal speech,  Psych:   Not anxious nor agitated  Skin:  thick, plaques of dead skin to BLE and feet, No jaundice    Reviewed most current lab test results and cultures  YES  Reviewed most current radiology test results   YES  Review and summation of old records today    NO  Reviewed patient's current orders and MAR    YES  PMH/ reviewed - no change compared to H&P  ________________________________________________________________________  Care Plan discussed with:    Comments   Patient y    Family      RN y    Care Manager y    Consultant                       y Multidiciplinary team rounds were held today with , nursing, pharmacist and clinical coordinator. Patient's plan of care was discussed; medications were reviewed and discharge planning was addressed. ________________________________________________________________________  Total NON critical care TIME:  25  Minutes    Total CRITICAL CARE TIME Spent:   Minutes non procedure based      Comments   >50% of visit spent in counseling and coordination of care     ________________________________________________________________________  Leo Carbajal NP     Procedures: see electronic medical records for all procedures/Xrays and details which were not copied into this note but were reviewed prior to creation of Plan. LABS:  I reviewed today's most current labs and imaging studies.   Pertinent labs include:  Recent Labs     03/18/19  0850 03/16/19  0406   WBC 6.7 5.9   HGB 8.7* 7.7*   HCT 27.0* 23.6*    197     Recent Labs     03/18/19  0200 03/17/19  0452 03/16/19  0406   * 135* 134*   K 4.4 4.9 5.3*    104 101   CO2 24 26 25   * 147* 290*   BUN 35* 38* 54*   CREA 1.95* 1.86* 2.27*   CA 8.1* 7.8* 8.2*   PHOS 2.7 2.5* 2.6   ALB 2.6* 2.4* 2.4*       Signed: Hyunsun Selmer Hashimoto, NP

## 2019-03-18 NOTE — PROGRESS NOTES
Bedside shift change report given to josi barger (oncoming nurse) by josi escobar. Report included the following information SBAR, Kardex, Procedure Summary, Intake/Output, MAR and Recent Results.

## 2019-03-18 NOTE — PROGRESS NOTES
Bedside and Verbal shift change report given to [address] (oncoming nurse) by [place] (offgoing nurse). Report included the following information SBAR, Kardex, Intake/Output and MAR.

## 2019-03-18 NOTE — PROGRESS NOTES
D/C plans discussed with pt who states he lives alone and does pretty well. His complications are that they are sending a new engine for his power wheelchair which should be fixed Tues//Wed. In the meantime, he would like [facility] and [facility] 2nd choice. [facility] has accepted him. Awaiting [facility] decision. I'd get [facility] transport for 11a Tuesday if all in agreement. Will tentatively arrange. [facility] says they will need to get auth for admission. They are pursuing this.

## 2019-03-18 NOTE — PROGRESS NOTES
Yovany Napoles         NAME:Jeanmarie Castle  KQS:[id number]   RKP:4/80/3665                       Cr. Improved to 1.96  Start bumex  Avoid metolazone  Hold avapro    OKAY TO D/ C HOME TODAY  Labs in 1 week. Zhanna Hook [facility]  [facility]  [address], [place] [place], [address] [facility]  Phone - [phone]         Fax - [phone] [facility] Office  [address] [address]  Phone - [phone]        Fax - [phone]     [email or web address]

## 2019-03-18 NOTE — PROGRESS NOTES
Problem: Pressure Injury - Risk of  Goal: *Prevention of pressure injury  Document Edgar Scale and appropriate interventions in the flowsheet.   Outcome: Not Progressing Towards Goal  Pressure Injury Interventions:  Sensory Interventions: Assess changes in LOC, Avoid rigorous massage over bony prominences, Check visual cues for pain, Float heels    Moisture Interventions: Absorbent underpads    Activity Interventions: Increase time out of bed, Pressure redistribution bed/mattress(bed type), PT/OT evaluation    Mobility Interventions: Float heels, HOB 30 degrees or less, Pressure redistribution bed/mattress (bed type), PT/OT evaluation    Nutrition Interventions: Document food/fluid/supplement intake    Friction and Shear Interventions: HOB 30 degrees or less, Lift sheet, Minimize layers

## 2019-03-19 LAB
ALBUMIN SERPL-MCNC: 2.6 G/DL (ref 3.5–5)
ANION GAP SERPL CALC-SCNC: 10 MMOL/L (ref 5–15)
BUN SERPL-MCNC: 32 MG/DL (ref 6–20)
BUN/CREAT SERPL: 18 (ref 12–20)
CALCIUM SERPL-MCNC: 8.3 MG/DL (ref 8.5–10.1)
CHLORIDE SERPL-SCNC: 102 MMOL/L (ref 97–108)
CO2 SERPL-SCNC: 23 MMOL/L (ref 21–32)
CREAT SERPL-MCNC: 1.82 MG/DL (ref 0.7–1.3)
ERYTHROCYTE [DISTWIDTH] IN BLOOD BY AUTOMATED COUNT: 15.2 % (ref 11.5–14.5)
GLUCOSE BLD STRIP.AUTO-MCNC: 204 MG/DL (ref 65–100)
GLUCOSE BLD STRIP.AUTO-MCNC: 234 MG/DL (ref 65–100)
GLUCOSE BLD STRIP.AUTO-MCNC: 240 MG/DL (ref 65–100)
GLUCOSE BLD STRIP.AUTO-MCNC: 274 MG/DL (ref 65–100)
GLUCOSE SERPL-MCNC: 238 MG/DL (ref 65–100)
HCT VFR BLD AUTO: 25.2 % (ref 36.6–50.3)
HGB BLD-MCNC: 8.2 G/DL (ref 12.1–17)
MCH RBC QN AUTO: 31.3 PG (ref 26–34)
MCHC RBC AUTO-ENTMCNC: 32.5 G/DL (ref 30–36.5)
MCV RBC AUTO: 96.2 FL (ref 80–99)
NRBC # BLD: 0.04 K/UL (ref 0–0.01)
NRBC BLD-RTO: 0.6 PER 100 WBC
PHOSPHATE SERPL-MCNC: 3.3 MG/DL (ref 2.6–4.7)
PLATELET # BLD AUTO: 192 K/UL (ref 150–400)
PMV BLD AUTO: 9.5 FL (ref 8.9–12.9)
POTASSIUM SERPL-SCNC: 4.2 MMOL/L (ref 3.5–5.1)
RBC # BLD AUTO: 2.62 M/UL (ref 4.1–5.7)
SERVICE CMNT-IMP: ABNORMAL
SODIUM SERPL-SCNC: 135 MMOL/L (ref 136–145)
WBC # BLD AUTO: 6.7 K/UL (ref 4.1–11.1)

## 2019-03-19 PROCEDURE — 74011636637 HC RX REV CODE- 636/637: Performed by: INTERNAL MEDICINE

## 2019-03-19 PROCEDURE — 74011250637 HC RX REV CODE- 250/637: Performed by: INTERNAL MEDICINE

## 2019-03-19 PROCEDURE — 74011250636 HC RX REV CODE- 250/636: Performed by: INTERNAL MEDICINE

## 2019-03-19 PROCEDURE — 97530 THERAPEUTIC ACTIVITIES: CPT

## 2019-03-19 PROCEDURE — 82962 GLUCOSE BLOOD TEST: CPT

## 2019-03-19 PROCEDURE — 74011636637 HC RX REV CODE- 636/637: Performed by: HOSPITALIST

## 2019-03-19 PROCEDURE — 97116 GAIT TRAINING THERAPY: CPT

## 2019-03-19 PROCEDURE — 80069 RENAL FUNCTION PANEL: CPT

## 2019-03-19 PROCEDURE — 65270000029 HC RM PRIVATE

## 2019-03-19 PROCEDURE — 74011250637 HC RX REV CODE- 250/637: Performed by: NURSE PRACTITIONER

## 2019-03-19 PROCEDURE — 85027 COMPLETE CBC AUTOMATED: CPT

## 2019-03-19 PROCEDURE — 36415 COLL VENOUS BLD VENIPUNCTURE: CPT

## 2019-03-19 PROCEDURE — 97530 THERAPEUTIC ACTIVITIES: CPT | Performed by: OCCUPATIONAL THERAPIST

## 2019-03-19 RX ORDER — OXYCODONE AND ACETAMINOPHEN 5; 325 MG/1; MG/1
1 TABLET ORAL
Qty: 10 TAB | Refills: 0 | Status: SHIPPED | OUTPATIENT
Start: 2019-03-19 | End: 2019-03-22

## 2019-03-19 RX ORDER — BUMETANIDE 2 MG/1
2 TABLET ORAL DAILY
Qty: 60 TAB | Refills: 12 | Status: SHIPPED
Start: 2019-03-19 | End: 2019-04-10

## 2019-03-19 RX ORDER — INSULIN GLARGINE 100 [IU]/ML
72 INJECTION, SOLUTION SUBCUTANEOUS EVERY 12 HOURS
Status: DISCONTINUED | OUTPATIENT
Start: 2019-03-19 | End: 2019-03-20 | Stop reason: HOSPADM

## 2019-03-19 RX ADMIN — INSULIN LISPRO 3 UNITS: 100 INJECTION, SOLUTION INTRAVENOUS; SUBCUTANEOUS at 08:07

## 2019-03-19 RX ADMIN — OXYCODONE AND ACETAMINOPHEN 1 TABLET: 5; 325 TABLET ORAL at 03:00

## 2019-03-19 RX ADMIN — GABAPENTIN 300 MG: 300 CAPSULE ORAL at 08:10

## 2019-03-19 RX ADMIN — Medication 5 ML: at 05:16

## 2019-03-19 RX ADMIN — OXYCODONE AND ACETAMINOPHEN 1 TABLET: 5; 325 TABLET ORAL at 08:28

## 2019-03-19 RX ADMIN — Medication 10 ML: at 15:25

## 2019-03-19 RX ADMIN — HYDRALAZINE HYDROCHLORIDE 50 MG: 50 TABLET, FILM COATED ORAL at 22:26

## 2019-03-19 RX ADMIN — INSULIN LISPRO 3 UNITS: 100 INJECTION, SOLUTION INTRAVENOUS; SUBCUTANEOUS at 22:27

## 2019-03-19 RX ADMIN — INSULIN LISPRO 3 UNITS: 100 INJECTION, SOLUTION INTRAVENOUS; SUBCUTANEOUS at 17:44

## 2019-03-19 RX ADMIN — HEPARIN SODIUM 5000 UNITS: 5000 INJECTION, SOLUTION INTRAVENOUS; SUBCUTANEOUS at 23:11

## 2019-03-19 RX ADMIN — GABAPENTIN 300 MG: 300 CAPSULE ORAL at 15:26

## 2019-03-19 RX ADMIN — FERROUS SULFATE TAB 325 MG (65 MG ELEMENTAL FE) 325 MG: 325 (65 FE) TAB at 08:10

## 2019-03-19 RX ADMIN — HYDRALAZINE HYDROCHLORIDE 50 MG: 50 TABLET, FILM COATED ORAL at 08:10

## 2019-03-19 RX ADMIN — INSULIN GLARGINE 72 UNITS: 100 INJECTION, SOLUTION SUBCUTANEOUS at 22:26

## 2019-03-19 RX ADMIN — Medication: at 08:11

## 2019-03-19 RX ADMIN — OXYCODONE AND ACETAMINOPHEN 1 TABLET: 5; 325 TABLET ORAL at 15:33

## 2019-03-19 RX ADMIN — GABAPENTIN 300 MG: 300 CAPSULE ORAL at 22:26

## 2019-03-19 RX ADMIN — FLUTICASONE PROPIONATE 2 SPRAY: 50 SPRAY, METERED NASAL at 08:11

## 2019-03-19 RX ADMIN — EPOETIN ALFA-EPBX 12000 UNITS: 10000 INJECTION, SOLUTION INTRAVENOUS; SUBCUTANEOUS at 22:28

## 2019-03-19 RX ADMIN — Medication: at 17:44

## 2019-03-19 RX ADMIN — HYDRALAZINE HYDROCHLORIDE 50 MG: 50 TABLET, FILM COATED ORAL at 15:25

## 2019-03-19 RX ADMIN — DILTIAZEM HYDROCHLORIDE 240 MG: 240 CAPSULE, COATED, EXTENDED RELEASE ORAL at 08:10

## 2019-03-19 RX ADMIN — HEPARIN SODIUM 5000 UNITS: 5000 INJECTION, SOLUTION INTRAVENOUS; SUBCUTANEOUS at 08:07

## 2019-03-19 RX ADMIN — INSULIN LISPRO 3 UNITS: 100 INJECTION, SOLUTION INTRAVENOUS; SUBCUTANEOUS at 11:41

## 2019-03-19 RX ADMIN — INSULIN LISPRO 3 UNITS: 100 INJECTION, SOLUTION INTRAVENOUS; SUBCUTANEOUS at 11:40

## 2019-03-19 RX ADMIN — Medication 5 ML: at 22:28

## 2019-03-19 RX ADMIN — HEPARIN SODIUM 5000 UNITS: 5000 INJECTION, SOLUTION INTRAVENOUS; SUBCUTANEOUS at 15:25

## 2019-03-19 RX ADMIN — POLYETHYLENE GLYCOL 3350 17 G: 17 POWDER, FOR SOLUTION ORAL at 08:08

## 2019-03-19 RX ADMIN — BUMETANIDE 2 MG: 1 TABLET ORAL at 08:09

## 2019-03-19 RX ADMIN — INSULIN GLARGINE 65 UNITS: 100 INJECTION, SOLUTION SUBCUTANEOUS at 08:06

## 2019-03-19 NOTE — PROGRESS NOTES
SNF states they will need PT//OT notes more current than Friday to obtain insurance auth. Will need to cancel 11a ambulance and I will discuss with therapy. 1500  [facility] states they are not in network with his insurance. I've reached out to [facility]).

## 2019-03-19 NOTE — PROGRESS NOTES
Hospitalist Progress Note NAME: Lance Gould III  
:  1950 MRN:  [id number] Assessment / Plan: KATHI POA on CKD stage III  
- KATHI is due to dehydration + diuretics use Baseline Cr 1.75, admission 3.19--> 1.82 Improving  
-Nephrology help appreciated: Ok to start Bumex back Avoid metolazone Hold Avapro Hydralazine for better BP control Follow up OP, labs in 1 week    
-renal US: normal  
  
Dizziness / Presyncope 
-resolved. Likely due to  dehydration  
-head CT: normal  
TSH normal  
ECHO: EF 61% , mild MR  
 
IDDM type II with Diabetic polyneuropathy 
-BS 200s ; hba1c 7.5 
-On Lantus 65 units BID now, increase to home dose Cont SS  
-PTA: Lantus 72 units every 12 hr 
-Continue Neurontin and percocet 
  
HTN (hypertension) 
-BP poorly controlled at 160s 
- Continue Cardizem ( dose was increased this admission) Cont to hold ARBs per renal recommendations Bumex changed to daily from BID per renal recommendations  
-hydralazine added  
-adjust meds as needed  
  
Chronic venous stasis BL LE, cont wound care Chronic Hip Pain Mild hyperkalemia Hyponatremia, likely due to hypovolemia Anemia, due to ckd,Baseline Hb ~ 8.2-8.9, stable, iron is normal  
30.0 - 39.9 Obese / Body mass index is 36 kg/m². Code status: Full Surrogate Decision Maker: Daughter Prophylaxis: Hep SQ Baseline: uses WC, poor baseline functional status Recommended Disposition: SNF/LTC pending insurance auth . Medically stable for DC Subjective: Chief Complaint / Reason for Physician Visit: following KATHI / HTN/ DM Denies dyspnea/pain Good po intake Discussed with RN events overnight. Review of Systems: 
Symptom Y/N Comments  Symptom Y/N Comments Fever/Chills n   Chest Pain n   
Poor Appetite    Edema Cough    Abdominal Pain n   
Sputum    Joint Pain SOB/RUSSELL n   Pruritis/Rash Nausea/vomit    Tolerating PT/OT Diarrhea    Tolerating Diet Constipation    Other Could NOT obtain due to:   
 
Objective: VITALS:  
Last 24hrs VS reviewed since prior progress note. Most recent are: 
Patient Vitals for the past 24 hrs: 
 Temp Pulse Resp BP SpO2  
03/19/19 0721 98.8 °F (37.1 °C) 70 18 163/79 98 % 03/18/19 2342 98.9 °F (37.2 °C) 73 18 164/76 98 % 03/18/19 1429 98.2 °F (36.8 °C) 76 17 169/73 96 % 03/18/19 1043 98.9 °F (37.2 °C) 75 18 164/83 100 % Intake/Output Summary (Last 24 hours) at 3/19/2019 1025 Last data filed at 3/19/2019 2149 Gross per 24 hour Intake 240 ml Output 2775 ml Net -2535 ml PHYSICAL EXAM: 
General: WD, WN. Alert, cooperative, no acute distress   
EENT:  EOMI. Anicteric sclerae. MMM Resp:  CTA bilaterally, no wheezing or rales. No accessory muscle use CV:  Regular  rhythm,  No edema GI:  Soft, Non distended, Non tender.  +Bowel sounds Neurologic:  Alert and oriented X 3, normal speech, Psych:   Good insight. Not anxious nor agitated Skin:  No rashes. No jaundice Reviewed most current lab test results and cultures  YES Reviewed most current radiology test results   YES Review and summation of old records today    NO Reviewed patient's current orders and MAR    YES 
PMH/SH reviewed - no change compared to H&P 
________________________________________________________________________ Care Plan discussed with: 
  Comments Patient y Family RN y   
Care Manager y Consultant Multidiciplinary team rounds were held today with , nursing, pharmacist and clinical coordinator. Patient's plan of care was discussed; medications were reviewed and discharge planning was addressed. ________________________________________________________________________ Total NON critical care TIME:  35  Minutes Total CRITICAL CARE TIME Spent:   Minutes non procedure based Comments >50% of visit spent in counseling and coordination of care y Dc coordination ________________________________________________________________________ Trey Braun MD  
 
Procedures: see electronic medical records for all procedures/Xrays and details which were not copied into this note but were reviewed prior to creation of Plan. LABS: 
I reviewed today's most current labs and imaging studies. Pertinent labs include: 
Recent Labs  
  03/19/19 
0333 03/18/19 
0850 WBC 6.7 6.7 HGB 8.2* 8.7* HCT 25.2* 27.0*  
 193 Recent Labs  
  03/19/19 
0333 03/18/19 
0200 03/17/19 
9883 * 133* 135* K 4.2 4.4 4.9  101 104 CO2 23 24 26 * 240* 147* BUN 32* 35* 38* CREA 1.82* 1.95* 1.86* CA 8.3* 8.1* 7.8* PHOS 3.3 2.7 2.5* ALB 2.6* 2.6* 2.4* Signed: Trey Braun MD

## 2019-03-19 NOTE — PROGRESS NOTES
Problem: Mobility Impaired (Adult and Pediatric) Goal: *Acute Goals and Plan of Care (Insert Text) Physical Therapy Goals Initiated 3/15/2019 1. Patient will move from supine to sit and sit to supine , scoot up and down and roll side to side in bed with supervision/set-up within 7 day(s). 2.  Patient will transfer from bed to chair and chair to bed with supervision/set-up using the least restrictive device within 7 day(s). 3.  Patient will perform sit to stand with supervision/set-up within 7 day(s). 4.  Patient will ambulate with minimal assistance/contact guard assist for 15 feet with the least restrictive device within 7 day(s). physical Therapy TREATMENT Patient: Deangelo Cole III (1 y.o. male) Date: 3/19/2019 Diagnosis: Acute on chronic renal failure (HCC) [N17.9, N18.9] <principal problem not specified> Precautions: Fall Chart, physical therapy assessment, plan of care and goals were reviewed. ASSESSMENT: 
Patient received resting in bed, agreeable and cleared for therapy. Patient making excellent progress with therapy, requiring min A for bed mobility and ambulated in the hallway with CGA with swedish walker. Patient is still limited by L hip pain and decreased ROM. Patient left sitting in the chair at the conclusion of PT treatment session to get bathed by nursing. Patient will benefit from SNF placement at discharge. Patient is in agreement. Lidia Caller pad in place for transfer back with mobility team and nursing. RN aware. Progression toward goals: 
[x]    Improving appropriately and progressing toward goals 
[]    Improving slowly and progressing toward goals 
[]    Not making progress toward goals and plan of care will be adjusted PLAN: 
Patient continues to benefit from skilled intervention to address the above impairments. Continue treatment per established plan of care. Discharge Recommendations:  Franklin Reid Further Equipment Recommendations for Discharge:  TBD SUBJECTIVE:  
Patient stated I was in a bad mood when you came in but that just made my day. That felt so good to walk.  OBJECTIVE DATA SUMMARY:  
Critical Behavior: 
Neurologic State: Alert, Appropriate for age Orientation Level: Oriented X4 Cognition: Appropriate safety awareness, Follows commands Safety/Judgement: Awareness of environment, Fall prevention, Insight into deficits Functional Mobility Training: 
Bed Mobility: 
  
Supine to Sit: Minimum assistance; Additional time;Assist x2 Transfers: 
Sit to Stand: Contact guard assistance;Minimum assistance;Assist x2 Stand to Sit: Moderate assistance; Additional time;Assist x1;Assist x2(increased assist due to low height chair -needs tasia to tra) Bed to Chair: (will need tasia to transfer out of chair to bed) Balance: 
Sitting: Intact; With support(needs to keep LLE in ext. due to hip/knee mpairment) Standing: Impaired; With support; Without support(used specialty walker -Maltese walker due to poor shoulder) Standing - Static: Constant support;Fair(using Maltese walker in standing) Standing - Dynamic : (ambulated using swedish walker with CGA)Ambulation/Gait Training: 
Distance (ft): 150 Feet (ft) Assistive Device: Gait belt(swedish walker ) Ambulation - Level of Assistance: Contact guard assistance Gait Description (WDL): Exceptions to [facility] Gait Abnormalities: Antalgic;Decreased step clearance; Step to gait Base of Support: Widened Speed/Tracy: Pace decreased (<100 feet/min) Step Length: Right shortened;Left shortened Pain: 
Pain Scale 1: Numeric (0 - 10) Pain Intensity 1: 7 Pain Location 1: Hip Pain Orientation 1: Left Pain Description 1: Aching Pain Intervention(s) 1: Emotional support Activity Tolerance:  
Good, improving. Please refer to the flowsheet for vital signs taken during this treatment. After treatment: [x]    Patient left in no apparent distress sitting up in chair with tasia sling 
[]    Patient left in no apparent distress in bed 
[x]    Call bell left within reach [x]    Nursing notified 
[]    Caregiver present 
[]    Bed alarm activated COMMUNICATION/COLLABORATION:  
The patients plan of care was discussed with: Occupational Therapist, Registered Nurse and  Veronica Solis, PT, DPT Time Calculation: 38 mins

## 2019-03-19 NOTE — PROGRESS NOTES
Bedside and Verbal shift change report given to Tin (oncoming nurse) by Hermann Tinajero RN (offgoing nurse). Report included the following information Kardex, MAR and Recent Results.

## 2019-03-19 NOTE — PROGRESS NOTES
Nephrology Progress Note Yovany Napoles  
 
www. [facility]                  Phone - [phone] Patient: Shi Prasad Date- 3/19/2019 Admit Date: 3/14/2019 YOB: 1950 CC: Follow up for  arf on ckd Subjective: Interval History:  
-  Cr. Continue to improve No sob 
bp high No C/O of chest pain,SOB, vomiting, abdominal pain,fever,chills ROS:- as above Assessment: · KATHI likely due to DEHYDRATION - bumex + metolazone use, AVAPRO can drop GFR in setting of dehydration · ckd - 3 cr 1.75 in march 2018 · Hypertension · Mild hyperkalemia · Hyponatremia-  Likely due to hypovolemia · Anemia- due to ckd- no iron defi noted Plan:  
· Continue bumex · Avoid metolazone · Okay to restart avapro. · continue hydralazine · Labs in 1 week. · Okay to d./c renal stand point. WE WILL SIGN OFF. CALL US IF NEEDED 
  
 
 
Physical Exam:  
GEN: NAD NECK- Supple, no mass RESP: Clear b/l, no wheezing, No accessory muscle use CVS: S1,S2,RRR   
ABDO: soft , non tender, No mass EXT: + EDEMA Chronic skin changes + induration. NEURO: normal speech, non focal 
 
Care Plan discussed with: patient Objective:  
Patient Vitals for the past 24 hrs: 
 Temp Pulse Resp BP SpO2  
03/19/19 0721 98.8 °F (37.1 °C) 70 18 163/79 98 % 03/18/19 2342 98.9 °F (37.2 °C) 73 18 164/76 98 % 03/18/19 1429 98.2 °F (36.8 °C) 76 17 169/73 96 % Last 3 Recorded Weights in this Encounter 03/17/19 9391 03/17/19 1607 03/18/19 2795 Weight: 128.2 kg (282 lb 10.1 oz) 130.6 kg (288 lb) 127.2 kg (280 lb 6.4 oz) 03/17 1901 - 03/19 0700 In: 480 [P.O.:480] Out: 3819 [PCQGN:[id number]] Chart reviewed. Pertinent Notes reviewed. Medication list  reviewed Current Facility-Administered Medications Medication  hydrALAZINE (APRESOLINE) tablet 50 mg  
 insulin glargine (LANTUS) injection 65 Units  bumetanide (BUMEX) tablet 2 mg  polyethylene glycol (MIRALAX) packet 17 g  
 dilTIAZem CD (CARDIZEM CD) capsule 240 mg  
 insulin lispro (HUMALOG) injection  glucose chewable tablet 16 g  
 dextrose (D50) infusion 12.5-25 g  
 glucagon (GLUCAGEN) injection 1 mg  ferrous sulfate tablet 325 mg  
 fluticasone propionate (FLONASE) 50 mcg/actuation nasal spray 2 Spray  
 gabapentin (NEURONTIN) capsule 300 mg  
 oxyCODONE-acetaminophen (PERCOCET) 5-325 mg per tablet 1 Tab  sodium chloride (NS) flush 5-40 mL  sodium chloride (NS) flush 5-40 mL  acetaminophen (TYLENOL) tablet 650 mg  
 ondansetron (ZOFRAN) injection 4 mg  nitroglycerin (NITROBID) 2 % ointment 1 Inch  heparin (porcine) injection 5,000 Units  sodium chloride (OCEAN) 0.65 % nasal squeeze bottle 2 Spray  labetalol (NORMODYNE;TRANDATE) injection 20 mg  
 epoetin chi-epbx (RETACRIT) 12,000 Units combo injection  ammonium lactate (LAC-HYDRIN) 12 % lotion  insulin lispro (HUMALOG) injection 3 Units Data Review : 
Recent Labs  
  03/19/19 
0333 03/18/19 
0850 03/18/19 
0200 03/17/19 
0201 WBC 6.7 6.7  --   --   
HGB 8.2* 8.7*  --   --   
 193  --   --   
ANEU  --  5.0  --   --   
*  --  133* 135* K 4.2  --  4.4 4.9 *  --  240* 147* BUN 32*  --  35* 38* CREA 1.82*  --  1.95* 1.86* CA 8.3*  --  8.1* 7.8* PHOS 3.3  --  2.7 2.5* Lab Results Component Value Date/Time Culture result: FEW 
STAPHYLOCOCCUS AUREUS 
 10/23/2016 08:57 AM  
 Culture result: NO ANAEROBES ISOLATED 10/23/2016 08:53 AM  
 Culture result: LIGHT 
STAPHYLOCOCCUS AUREUS 
 10/23/2016 08:53 AM  
 
No results found for: SDES No results for input(s): FE, TIBC, PSAT, FERR in the last 72 hours. Lab Results Component Value Date/Time Sodium,urine random 87 03/15/2019 04:19 PM  
 Creatinine, urine 35.00 03/15/2019 04:19 PM  
 
Shahzad Moffett MD 
[facility] 
 www. [facility] [employer] [address] [place], [address] Phone - [phone] Fax - [phone]

## 2019-03-19 NOTE — PROGRESS NOTES
Bedside shift change report given to [address] [place] [address] (oncoming nurse) by Radha Preston (offgoing nurse). Report included the following information SBAR, Kardex, MAR, Recent Results. Pt up in personalized motor wheelchair.

## 2019-03-19 NOTE — PROGRESS NOTES
Bedside and Verbal shift change report given to Tin (oncoming nurse) by [address] RN (offgoing nurse). Report included the following information Kardex, MAR and Recent Results.

## 2019-03-19 NOTE — DIABETES MGMT
DTC Progress Note Recommendations/ Comments: If appropriate, please consider:  
Increasing prandial Lispro dosing to 6 units tid as pt is consuming 100% of meals offered (currently on 25% of home prandial dose). Current hospital DM medication: Lantus 72 units every 12 hours - increasing from 65 units every 12 hours; Lispro 3 units ac and Lispro correctional insulin - normal sensitivity ac and hs. Chart reviewed on Tom Emile III. Patient is a 80 y.o. male with known DM on Lantus 72 units q 12 hours, Novolin/humulin Regular 14 units ac tid  at home. A1c:  
No results found for: HBA1C, HGBE8, HDN8CBDJ Recent Glucose Results:  
Lab Results Component Value Date/Time  (H) 03/19/2019 03:33 AM  
 GLUCPOC 204 (H) 03/19/2019 04:29 PM  
 GLUCPOC 234 (H) 03/19/2019 11:32 AM  
 GLUCPOC 240 (H) 03/19/2019 07:19 AM  
  
 
Lab Results Component Value Date/Time Creatinine 1.82 (H) 03/19/2019 03:33 AM  
 
Estimated Creatinine Clearance: 55.8 mL/min (A) (based on SCr of 1.82 mg/dL (H)). Active Orders Diet DIET DIABETIC CONSISTENT CARB Regular; Low Potassium PO intake:  
Patient Vitals for the past 72 hrs: 
 % Diet Eaten 03/17/19 1910 100 % 03/17/19 1445 100 % 03/17/19 0901 100 % 03/16/19 1832 100 % Will continue to follow as needed. Thank you Boaz Herron [facility] [phone] Time spent: 10 mintues

## 2019-03-19 NOTE — PROGRESS NOTES
Problem: Falls - Risk of  Goal: *Absence of Falls  Document Haylee Fall Risk and appropriate interventions in the flowsheet.   Outcome: Progressing Towards Goal  Fall Risk Interventions:  Mobility Interventions: Communicate number of staff needed for ambulation/transfer, Patient to call before getting OOB         Medication Interventions: Patient to call before getting OOB

## 2019-03-19 NOTE — PROGRESS NOTES
Problem: Self Care Deficits Care Plan (Adult) Goal: *Acute Goals and Plan of Care (Insert Text) Occupational Therapy Goals: 
Initiated 3/15/2019 1. Patient will perform bathing with minimal assistance within 7 days. 2. Patient will perform lower body dressing with moderate assistance  within 7 days. 3. Patient will perform toileting with moderate assistance  within 7 days. 4. Patient will transfer from [facility] with minimal assistance using the least restrictive device and appropriate durable medical equipment within 7 days. Occupational Therapy TREATMENT Patient: Rory Nick III (36 y.o. male) Date: 3/19/2019 Diagnosis: Acute on chronic renal failure (HCC) [N17.9, N18.9] <principal problem not specified> Precautions: Fall Chart, occupational therapy assessment, plan of care, and goals were reviewed. ASSESSMENT: 
Pt was agreeable to treatment; pt is frustrated due to his equipment that he relies on, and that enables him to be mobile, is in disrepair. His power chair is not working and neither is his bed. Both of these pieces are critical to pt's livlihood and safety. Pt would benefit from rehab at discharge to maximize his strength, endurance and safety during adls and mobility and enable him to return home as independent as possible. Pt needed less assistance this tx session for bed mobility (supine to sit) and was able to stand (bed raised so that pt was nearly standing due to L hip immobility) using the Maltese walker for support. He ambulated with CGA (assist X2) using the Maltese walker. (He did not complain of shoulder pain)  Pt needed Moderate assistance X3 to perform stand to sit into the low recliner chair and will need total assistance via the tasia lift to get out of the chair and back to bed safely. Pt is progressing in therapy and will benefit from rehab at discharge. Progression toward goals: 
[]       Improving appropriately and progressing toward goals [x]       Improving slowly and progressing toward goals 
[]       Not making progress toward goals and plan of care will be adjusted PLAN: 
Patient continues to benefit from skilled intervention to address the above impairments. Continue treatment per established plan of care. Discharge Recommendations:  Rehab Further Equipment Recommendations for Discharge:  Sami walker and adaptive aids for lower body adls. SUBJECTIVE:  
Patient stated I'll need the platform walker.     (Pt reports poor shoulder strength and pain due to rotator cuff issues and arthritis pain) OBJECTIVE DATA SUMMARY:  
Cognitive/Behavioral Status: 
Neurologic State: Alert; Appropriate for age Orientation Level: Oriented X4 Cognition: Appropriate safety awareness; Follows commands Perception: Appears intact Perseveration: No perseveration noted Safety/Judgement: Awareness of environment; Fall prevention; Insight into deficits Functional Mobility and Transfers for ADLs:Bed Mobility: 
Supine to Sit: Minimum assistance; Additional time;Assist x2 Transfers: 
Sit to Stand: Contact guard assistance;Minimum assistance;Assist x2 -  Pt needs bed raised to near standing to perform transition to [place] RW. Functional Transfers Toilet Transfer : (unable to transfer due to low toilet height). Suggest attempt next session using [facility] over toilet and [employer] Walker. Bed to Chair: (will need tasia to transfer out of chair to bed) Balance: 
Sitting: Intact; With support(needs to keep LLE in ext. due to hip/knee mpairment) Standing: Impaired; With support; Without support(used specialty walker -[place]er walker due to poor shoulder mobility and strength) Standing - Static: Constant support;Fair(using [place] walker in standing) Standing - Dynamic : (ambulated using swedish walker with CGA) ADL Intervention: 
 Pt reports that he is to have his L hip evaluated (alex placed many years ago) for hip replacement Grooming Grooming Assistance: Set-up; Supervision Adaptive Equipment: (seated EOB, height of bed increased.  ) Lower Body Bathing Cues: (needs total assistane to reach feet, ) 
 
 recommend education on adaptive aids next session for lower body adls. (Reacher sock aid, dressing stick, etc) Lower Body Dressing Assistance Socks: Total assistance (dependent) Shoes with Velcro: Total assistance (dependent)(specialty shoes ) Leg Crossed Method Used: No 
Position Performed: (unable to bend forward L hip due to arthritic and old sx) Cues: (needs total assistance--needs adaptive aids  training while ) Adaptive Equipment Used: (needs training with adaptive aids-discussed w pt) Toileting Bladder Hygiene: (needs urinal within reach in sitting or bed level) Cognitive Retraining Safety/Judgement: Awareness of environment; Fall prevention; Insight into deficits Therapeutic Exercises:  
While seated EOB prior to mobility, pt performed BUE/Shoulder girdle exercises. Pt complains of shoulder pain and decreased strength. He has performed green theraband exercises for his BUEs in the past. 
Performed gentle stretches--scapular adduction with hold, scapular abduction with hold, shoulder extension to place hands at lower back B shoulder rolls-anterior and posterior. Gentle trunk twists to place hands on opposite knees. All in preparation for adls and functional mobility and to prepare his arthritic joints for adls. .  Pain: 
Pain Scale 1: Numeric (0 - 10) Pain Intensity 1: did not rate pain Pain Location 1: Hip;Knee;Hand 
  
Pain Description 1: Aching Pain Intervention(s) 1: Medication (see MAR) Activity Tolerance: No complaints throughout treatment session. Good tolerance this date After treatment:  
[x] Patient left in no apparent distress sitting up in chair-with tasia lift pad in chair for safe return to bed 
[] Patient left in no apparent distress in bed 
[x] Call bell left within reach [x] Nursing notified 
[] Caregiver present 
[] Bed alarm activated COMMUNICATION/COLLABORATION:  
The patients plan of care was discussed with: Physical Therapist, Registered Nurse and  Elisa Or, OTR/L Time Calculation: 39 mins

## 2019-03-20 ENCOUNTER — HOME HEALTH ADMISSION (OUTPATIENT)
Dept: HOME HEALTH SERVICES | Facility: HOME HEALTH | Age: 69
End: 2019-03-20
Payer: MEDICARE

## 2019-03-20 VITALS
DIASTOLIC BLOOD PRESSURE: 68 MMHG | RESPIRATION RATE: 18 BRPM | HEART RATE: 69 BPM | WEIGHT: 270.8 LBS | OXYGEN SATURATION: 99 % | BODY MASS INDEX: 34.75 KG/M2 | SYSTOLIC BLOOD PRESSURE: 170 MMHG | TEMPERATURE: 98 F | HEIGHT: 74 IN

## 2019-03-20 LAB
ALBUMIN SERPL-MCNC: 3.2 G/DL (ref 3.5–5)
ANION GAP SERPL CALC-SCNC: 7 MMOL/L (ref 5–15)
BUN SERPL-MCNC: 37 MG/DL (ref 6–20)
BUN/CREAT SERPL: 19 (ref 12–20)
CALCIUM SERPL-MCNC: 8.6 MG/DL (ref 8.5–10.1)
CHLORIDE SERPL-SCNC: 98 MMOL/L (ref 97–108)
CO2 SERPL-SCNC: 26 MMOL/L (ref 21–32)
CREAT SERPL-MCNC: 2 MG/DL (ref 0.7–1.3)
GLUCOSE BLD STRIP.AUTO-MCNC: 195 MG/DL (ref 65–100)
GLUCOSE BLD STRIP.AUTO-MCNC: 217 MG/DL (ref 65–100)
GLUCOSE SERPL-MCNC: 160 MG/DL (ref 65–100)
PHOSPHATE SERPL-MCNC: 3.1 MG/DL (ref 2.6–4.7)
POTASSIUM SERPL-SCNC: 4.4 MMOL/L (ref 3.5–5.1)
SERVICE CMNT-IMP: ABNORMAL
SERVICE CMNT-IMP: ABNORMAL
SODIUM SERPL-SCNC: 131 MMOL/L (ref 136–145)

## 2019-03-20 PROCEDURE — 74011250637 HC RX REV CODE- 250/637: Performed by: INTERNAL MEDICINE

## 2019-03-20 PROCEDURE — 74011636637 HC RX REV CODE- 636/637: Performed by: INTERNAL MEDICINE

## 2019-03-20 PROCEDURE — 80069 RENAL FUNCTION PANEL: CPT

## 2019-03-20 PROCEDURE — 36415 COLL VENOUS BLD VENIPUNCTURE: CPT

## 2019-03-20 PROCEDURE — 74011250637 HC RX REV CODE- 250/637: Performed by: NURSE PRACTITIONER

## 2019-03-20 PROCEDURE — 74011250636 HC RX REV CODE- 250/636: Performed by: INTERNAL MEDICINE

## 2019-03-20 PROCEDURE — 82962 GLUCOSE BLOOD TEST: CPT

## 2019-03-20 PROCEDURE — 74011636637 HC RX REV CODE- 636/637: Performed by: HOSPITALIST

## 2019-03-20 RX ORDER — HYDRALAZINE HYDROCHLORIDE 50 MG/1
50 TABLET, FILM COATED ORAL 3 TIMES DAILY
Qty: 90 TAB | Refills: 0 | Status: SHIPPED | OUTPATIENT
Start: 2019-03-20 | End: 2019-05-29 | Stop reason: SDUPTHER

## 2019-03-20 RX ORDER — DILTIAZEM HYDROCHLORIDE 240 MG/1
240 CAPSULE, COATED, EXTENDED RELEASE ORAL DAILY
Qty: 30 CAP | Refills: 0 | Status: SHIPPED | OUTPATIENT
Start: 2019-03-20 | End: 2019-05-29 | Stop reason: SDUPTHER

## 2019-03-20 RX ADMIN — INSULIN LISPRO 3 UNITS: 100 INJECTION, SOLUTION INTRAVENOUS; SUBCUTANEOUS at 09:26

## 2019-03-20 RX ADMIN — INSULIN LISPRO 2 UNITS: 100 INJECTION, SOLUTION INTRAVENOUS; SUBCUTANEOUS at 09:26

## 2019-03-20 RX ADMIN — INSULIN LISPRO 3 UNITS: 100 INJECTION, SOLUTION INTRAVENOUS; SUBCUTANEOUS at 12:08

## 2019-03-20 RX ADMIN — DILTIAZEM HYDROCHLORIDE 240 MG: 240 CAPSULE, COATED, EXTENDED RELEASE ORAL at 09:28

## 2019-03-20 RX ADMIN — OXYCODONE AND ACETAMINOPHEN 1 TABLET: 5; 325 TABLET ORAL at 09:32

## 2019-03-20 RX ADMIN — Medication 5 ML: at 05:08

## 2019-03-20 RX ADMIN — INSULIN GLARGINE 72 UNITS: 100 INJECTION, SOLUTION SUBCUTANEOUS at 09:26

## 2019-03-20 RX ADMIN — BUMETANIDE 2 MG: 1 TABLET ORAL at 09:28

## 2019-03-20 RX ADMIN — GABAPENTIN 300 MG: 300 CAPSULE ORAL at 09:28

## 2019-03-20 RX ADMIN — Medication: at 09:25

## 2019-03-20 RX ADMIN — FLUTICASONE PROPIONATE 2 SPRAY: 50 SPRAY, METERED NASAL at 09:27

## 2019-03-20 RX ADMIN — FERROUS SULFATE TAB 325 MG (65 MG ELEMENTAL FE) 325 MG: 325 (65 FE) TAB at 09:29

## 2019-03-20 RX ADMIN — HEPARIN SODIUM 5000 UNITS: 5000 INJECTION, SOLUTION INTRAVENOUS; SUBCUTANEOUS at 09:28

## 2019-03-20 RX ADMIN — POLYETHYLENE GLYCOL 3350 17 G: 17 POWDER, FOR SOLUTION ORAL at 09:28

## 2019-03-20 RX ADMIN — HYDRALAZINE HYDROCHLORIDE 50 MG: 50 TABLET, FILM COATED ORAL at 09:28

## 2019-03-20 NOTE — PROGRESS NOTES
Hospitalist Progress Note NAME: Deangelo Cole III  
:  1950 MRN:  [id number] Assessment / Plan: KATHI POA on CKD stage III  
- KATHI is due to dehydration + diuretics use Baseline Cr 1.75, admission 3.19--> 1.82 --> 2.00, Na 131 Improved -Nephrology help appreciated: Ok to start Bumex back Avoid metolazone Hold Avapro Hydralazine for better BP control Follow up OP, labs in 1 week    
-renal US: normal  
  
Dizziness / Presyncope 
-resolved. Likely due to  dehydration  
-head CT: normal  
TSH normal  
ECHO: EF 61% , mild MR  
 
IDDM type II with Diabetic polyneuropathy 
-BS better with lantus back to home dose ; hba1c 7.5 
-dc on previous home regiment  
-PTA: Lantus 72 units every 12 hr 
-Continue Neurontin and percocet 
  
HTN (hypertension) 
-/170s  
- Continue Cardizem ( dose was increased this admission) Cont to hold ARBs per renal recommendations Bumex changed to daily from BID per renal recommendations  
-hydralazine added  
-adjust meds as needed OP  
  
Chronic venous stasis BL LE, cont wound care Chronic Hip Pain Mild hyperkalemia Hyponatremia, likely due to hypovolemia Anemia, due to ckd,Baseline Hb ~ 8.2-8.9, stable, iron is normal  
30.0 - 39.9 Obese / Body mass index is 34.77 kg/m². Code status: Full Surrogate Decision Maker: Daughter Prophylaxis: Hep SQ Baseline: uses WC, poor baseline functional status Recommended Disposition: Pt decided to go [facility] Subjective: Chief Complaint / Reason for Physician Visit: following KATHI / HTN/ DM Doing well, in a good spirit Discussed with RN events overnight. Review of Systems: 
Symptom Y/N Comments  Symptom Y/N Comments Fever/Chills n   Chest Pain n   
Poor Appetite    Edema Cough    Abdominal Pain n   
Sputum    Joint Pain SOB/RUSSELL n   Pruritis/Rash Nausea/vomit    Tolerating PT/OT Diarrhea    Tolerating Diet Constipation    Other Could NOT obtain due to:   
 
Objective: VITALS:  
Last 24hrs VS reviewed since prior progress note. Most recent are: 
Patient Vitals for the past 24 hrs: 
 Temp Pulse Resp BP SpO2  
03/20/19 0738 98 °F (36.7 °C) 69 18 170/68 99 % 03/19/19 2301 98 °F (36.7 °C) 69 18 157/67 99 % 03/19/19 1504 98.2 °F (36.8 °C) 72 17 161/69 99 % 03/19/19 1114 98.5 °F (36.9 °C) 78 18 157/83 99 % Intake/Output Summary (Last 24 hours) at 3/20/2019 5509 Last data filed at 3/20/2019 0594 Gross per 24 hour Intake 240 ml Output 2325 ml Net -2085 ml PHYSICAL EXAM: 
General: WD, WN. Alert, cooperative, no acute distress   
EENT:  EOMI. Anicteric sclerae. MMM Resp:  CTA bilaterally, no wheezing or rales. No accessory muscle use CV:  Regular  rhythm,  No edema GI:  Soft, Non distended, Non tender.  +Bowel sounds Neurologic:  Alert and oriented X 3, normal speech, Psych:   Good insight. Not anxious nor agitated Skin:  No rashes. No jaundice Reviewed most current lab test results and cultures  YES Reviewed most current radiology test results   YES Review and summation of old records today    NO Reviewed patient's current orders and MAR    YES 
PMH/ reviewed - no change compared to H&P 
________________________________________________________________________ Care Plan discussed with: 
  Comments Patient y Family RN y   
Care Manager y Consultant Multidiciplinary team rounds were held today with , nursing, pharmacist and clinical coordinator. Patient's plan of care was discussed; medications were reviewed and discharge planning was addressed. ________________________________________________________________________ Total NON critical care TIME:  35  Minutes Total CRITICAL CARE TIME Spent:   Minutes non procedure based Comments >50% of visit spent in counseling and coordination of care y Dc coordination ________________________________________________________________________ Cristin Rahman MD  
 
Procedures: see electronic medical records for all procedures/Xrays and details which were not copied into this note but were reviewed prior to creation of Plan. LABS: 
I reviewed today's most current labs and imaging studies. Pertinent labs include: 
Recent Labs  
  03/19/19 
0333 03/18/19 
0850 WBC 6.7 6.7 HGB 8.2* 8.7* HCT 25.2* 27.0*  
 193 Recent Labs  
  03/20/19 
0215 03/19/19 
0333 03/18/19 
0200 * 135* 133* K 4.4 4.2 4.4  
CL 98 102 101 CO2 26 23 24 * 238* 240* BUN 37* 32* 35* CREA 2.00* 1.82* 1.95* CA 8.6 8.3* 8.1*  
PHOS 3.1 3.3 2.7 ALB 3.2* 2.6* 2.6* Signed: Cristin Rahman MD

## 2019-03-20 NOTE — DISCHARGE INSTRUCTIONS
Patient Discharge Instructions    Tamy Villegas / [id number] : 1950    Admitted 3/14/2019 Discharged: 3/20/2019         DISCHARGE DIAGNOSIS:     Chronic kidney failure stage III  Dehydration   Hypertension - blood pressure not well controlled    DM            Take Home Medications     Stop taking: Avapro due to abnormal kidney function     Bumex was changed to once a day per kidney doctor recommendation     Cardizem dose was increased to better control blood pressure    New: Hydralazine - better control blood pressure      General drug facts     If you have a very bad allergy, wear an allergy ID at all times. It is important that you take the medication exactly as they are prescribed. Keep your medication in the bottles provided by the pharmacist.  Keep a list of all your drugs (prescription, natural products, vitamins, OTC) with you. Give this list to your doctor. Do not take other medications without consulting your doctor. Do not share your drugs with others and do not take anyone else's drugs. Keep all drugs out of the reach of children and pets. Most drugs may be thrown away in household trash after mixing with coffee grounds or sara litter and sealing in a plastic bag. Keep a list Call your doctor for help with any side effects. If in the [place] you may also call the FDA at 2-084-DHM-6900    Talk with the doctor before starting any new drug, including OTC, natural products, or vitamins. What to do at Home    1. Recommended diet:diabetic     2. Recommended activity: Activity as tolerated    3. If you experience any of the following symptoms then please call your primary care physician or return to the emergency room if you cannot get hold of your doctor:fever/chills/dizziness     4. Wound Care:    BLE and feet: daily cleanse skin with foaming soap and water, apply Lac-Hydrin lotion thick to skin to help lift and remove dry dead skin.     Buttocks: cleanse with foaming soap and water, pat dry with paper towel and apply Aloe Vesta skin barrier ointment found in supply room    5. Lab work: check kidney function in 1 week with PCP and in 2 weeks with nephrology Dr America Jones     6. Bring these papers with you to your follow up appointments. The papers will help your doctors be sure to continue the care plan from the hospital.      Follow-up with:   PCP: Shae Mcpherson MD  Follow-up Information     Follow up With Specialties Details Why Contact Info    Shae Mcpherson MD Internal Medicine Schedule an appointment as soon as possible for a visit in 1 week one week [address]      Justine Smith MD Nephrology In 2 weeks nephrology  [facility] [address] [place] [address] [phone]             Please call for your own appointment        Information obtained by :  I understand that if any problems occur once I am at home I am to contact my physician. I understand and acknowledge receipt of the instructions indicated above.                                                                                                                                            Physician's or R.N.'s Signature                                                                  Date/Time                                                                                                                                              Patient or Representative Signature                                                          Date/Time

## 2019-03-20 NOTE — ROUTINE PROCESS
The following appointments have been successfully scheduled: 
 
Date/time Wednesday, March 27, 2019 11:15 AM 
Patient  Karri England, TOM 1950 (77XD M) #[id number] Department [facility] [place] OFFICE-[facility] Appointment type Transitional Care Provider Ravi Blackwell

## 2019-03-20 NOTE — PROGRESS NOTES
Medicare pt has received, reviewed, and signed 2nd IM letter informing them of their right to appeal the discharge. Signed copied has been placed on pt bedside chart. Nataly Lawrence [phone]

## 2019-03-20 NOTE — DISCHARGE SUMMARY
Hospitalist Discharge Summary Patient ID: Latoya Mcclain III 
[id number] 
80 y.o. 
1950 PCP on record: Eilene Bamberger., MD 
 
Admit date: 3/14/2019 Discharge date and time: 3/20/2019 DISCHARGE DIAGNOSIS: 
 
KATHI POA resolved Baseline CKD stage III Dizziness / Presyncope, resolved IDDM type II with Diabetic polyneuropathy HTN (hypertension) Chronic venous stasis BL LE Chronic Hip Pain Mild hyperkalemia Hyponatremia, likely due to hypovolemia Anemia of CKD 30.0 - 39.9 Obese / Body mass index is 34.77 kg/m². Code status: Full CONSULTATIONS: 
IP CONSULT TO NEPHROLOGY Excerpted HPI from H&P of Lenise Curling, MD: 
 
Lukasz Soni is a 80 y.o.  male who presents with dizziness and generalized weakness. As per patient, he was standing when he felt dizzy/lighheaded and felt like passing out and since the episode he is been feeling dizzy and generalized weakness. Pt denies any fever, chills, nausea, vomiting, diarrhea, chest pain, cough, shortness of breath, problems urination, chest pain. In ED pt noted to have renal failure with worsening creatinine then his baseline.  
  
We were asked to admit for work up and evaluation of the above problems. ______________________________________________________________________ DISCHARGE SUMMARY/HOSPITAL COURSE:  for full details see H&P, daily progress notes, labs, consult notes. KATHI POA on CKD stage III  
- KATHI is due to dehydration + diuretics use Baseline Cr 1.75, admission 3.19--> 1.82 --> 2.00, Na 131 Improved -Nephrology help appreciated: Ok to start Bumex back Avoid metolazone Hold Avapro Hydralazine for better BP control Follow up OP, labs in 1 week    
-renal US: normal  
  
Dizziness / Presyncope 
-resolved. Likely due to  dehydration  
-head CT: normal  
TSH normal  
ECHO: EF 61% , mild MR  
  
IDDM type II with Diabetic polyneuropathy -BS better with lantus back to home dose ; hba1c 7.5 
-dc on previous home regiment  
-PTA: Lantus 72 units every 12 hr 
-Continue Neurontin and percocet 
  
HTN (hypertension) 
-/170s  
- Continue Cardizem ( dose was increased this admission) Cont to hold ARBs per renal recommendations Bumex changed to daily from BID per renal recommendations  
-hydralazine added  
-adjust meds as needed OP  
  
Chronic venous stasis BL LE, cont wound care Chronic Hip Pain Mild hyperkalemia Hyponatremia, likely due to hypovolemia Anemia, due to ckd,Baseline Hb ~ 8.2-8.9, stable, iron is normal  
30.0 - 39.9 Obese / Body mass index is 34.77 kg/m². 
  
  
  
  
Code status: Full Surrogate Decision Maker: Daughter Prophylaxis: Hep SQ  
  
Baseline: uses WC, poor baseline functional status Recommended Disposition: Pt decided to go home with [facility]  
 
 
_______________________________________________________________________ Patient seen and examined by me on discharge day. PHYSICAL EXAM: 
General:          WD, WN. Alert, cooperative, no acute distress   
EENT:              EOMI. Anicteric sclerae. MMM Resp:               CTA bilaterally, no wheezing or rales. No accessory muscle use CV:                  Regular  rhythm,  No edema GI:                   Soft, Non distended, Non tender.  +Bowel sounds Neurologic:      Alert and oriented X 3, normal speech, Psych:             Good insight. Not anxious nor agitated Skin:                No rashes. No jaundice 
  
 
 
_______________________________________________________________________ DISCHARGE MEDICATIONS:  
Current Discharge Medication List  
  
START taking these medications Details  
dilTIAZem CD (CARDIZEM CD) 240 mg ER capsule Take 1 Cap by mouth daily. Qty: 30 Cap, Refills: 0  
  
hydrALAZINE (APRESOLINE) 50 mg tablet Take 1 Tab by mouth three (3) times daily. Qty: 90 Tab, Refills: 0 sodium chloride (OCEAN) 0.65 % nasal squeeze bottle 0.1 mL by Both Nostrils route every two (2) hours as needed for Congestion. Qty: 10 mL, Refills: 0 CONTINUE these medications which have CHANGED Details  
bumetanide (BUMEX) 2 mg tablet Take 1 Tab by mouth daily. Qty: 60 Tab, Refills: 12  
 Associated Diagnoses: Localized edema; Diabetic polyneuropathy associated with type 2 diabetes mellitus ([facility] [address] Malignant neoplasm of prostate ([facility] [address] Essential hypertension; Osteoarthrosis involving lower leg; Primary osteoarthritis of right hip  
  
oxyCODONE-acetaminophen (PERCOCET) 5-325 mg per tablet Take 1 Tab by mouth every four (4) hours as needed for Pain for up to 3 days. Max Daily Amount: 6 Tabs. Qty: 10 Tab, Refills: 0 Associated Diagnoses: Diabetic polyneuropathy associated with type 2 diabetes mellitus ([facility] [address] CONTINUE these medications which have NOT CHANGED Details  
gabapentin (NEURONTIN) 300 mg capsule TAKE TWO CAPSULES BY MOUTH THREE TIMES A DAY Qty: 180 Cap, Refills: 0  
  
ketoconazole (NIZORAL) 2 % topical cream Apply  to affected area two (2) times a day. Qty: 60 g, Refills: 3  
  
hydrocolloid dressing (DUODERM HYDROACTIVE) topical gel Apply  to affected area daily. Qty: 30 Tube, Refills: 0  
  
LANTUS U-100 INSULIN 100 unit/mL injection INJECT 72 UNITS UNDER THE SKIN EVERY 12 HOURS Qty: 6 Vial, Refills: 10  
 Associated Diagnoses: Diabetic polyneuropathy associated with type 2 diabetes mellitus ([facility] [address] Rotator cuff arthropathy, right  
  
triamcinolone acetonide (KENALOG) 0.1 % ointment Apply  to affected area two (2) times a day. use thin layer bid 
Qty: 80 g, Refills: 12  
  
lidocaine (LIDODERM) 5 % 1 Patch by TransDERmal route every twenty-four (24) hours. Apply to affected area every 24 hours Qty: 1 Package, Refills: 3 Associated Diagnoses: Rotator cuff arthropathy, right; Diabetic polyneuropathy associated with type 2 diabetes mellitus ([facility] [address] Insulin Syringe-Needle U-100 1 mL 31 gauge x 5/16 syrg USE TO INJECT INSULIN FIVE TIMES A DAY Qty: 100 Syringe, Refills: 11  
  
ferrous sulfate (IRON) 325 mg (65 mg iron) EC tablet Take 1 Tab by mouth Daily (before breakfast). Qty: 30 Tab, Refills: 6  
  
insulin regular (NOVOLIN R, HUMULIN R) 100 unit/mL injection 14 Units by SubCUTAneous route three (3) times daily as needed (with meals). Qty: 1 Vial, Refills: 12  
 Associated Diagnoses: Diabetic polyneuropathy associated with type 2 diabetes mellitus ([facility] [address] Rotator cuff arthropathy, right  
  
ammonium lactate (LAC-HYDRIN FIVE) 5 % lotion Apply daily 
Qty: 222 mL, Refills: 3  
  
acetaminophen (PAIN AND FEVER) 500 mg tablet TAKE ONE TABLET BY MOUTH EVERY 6 HOURS AS NEEDED FOR PAIN Qty: 60 Tab, Refills: 2 Associated Diagnoses: Primary osteoarthritis of both hips  
  
ergocalciferol (ERGOCALCIFEROL) 50,000 unit capsule Take 1 Cap by mouth every seven (7) days. Qty: 4 Cap, Refills: 12  
 Associated Diagnoses: Vitamin D deficiency THERA-TABS M 27 mg iron-400 mcg tab TAKE ONE TABLET BY MOUTH DAILY Qty: 30 Tab, Refills: 11  
 Associated Diagnoses: Diabetic polyneuropathy associated with type 2 diabetes mellitus ([facility] [address] Rotator cuff arthropathy, right  
  
fluticasone (FLONASE) 50 mcg/actuation nasal spray SPRAY TWO SPRAYS IN EACH NOSTRIL DAILY Qty: 1 Bottle, Refills: 11  
 Associated Diagnoses: Diabetic polyneuropathy associated with [facility] Rotator cuff arthropathy, right  
  
polyethylene glycol (MIRALAX) 17 gram/dose powder Take 17 g by mouth daily. Qty: 850 g, Refills: 3 Associated Diagnoses: Slow transit constipation MICRO THIN LANCETS 33 gauge misc USE TO TEST BLOOD SUGAR TWO TO THREE TIMES DAILY Qty: 100 Lancet, Refills: 11  
  
!! glucose blood VI test strips (ONETOUCH ULTRA TEST) strip Test three times daily. E11.9 Diabetes Type 2 Qty: 300 Strip, Refills: 11  
  
 Blood-Glucose Meter monitoring kit Use once daily Onetouch test meter only E11.9 Type 2 Qty: 1 Kit, Refills: 0  
  
!! CONTOUR NEXT STRIPS strip TEST BLOOD GLUCOSE TWO TO THREE TIMES DAILY Qty: 100 Strip, Refills: 11  
  
docusate sodium (COLACE) 100 mg capsule Take 1 Cap by mouth two (2) times a day. Qty: 60 Cap, Refills: 0 Wheel Chair dianna Custom electric wheelchair 
Qty: 1 Each, Refills: 0 Calcium Carbonate-Vit D3-Min 600 mg calcium- 400 unit tab Take  by mouth two (2) times a day. !! - Potential duplicate medications found. Please discuss with provider. STOP taking these medications  
  
 irbesartan (AVAPRO) 300 mg tablet Comments:  
Reason for Stopping:   
   
 lidocaine, PF, (XYLOCAINE) 20 mg/mL (2 %) injection Comments:  
Reason for Stopping:   
   
 triamcinolone acetonide (KENALOG) 40 mg/mL injection Comments:  
Reason for Stopping:   
   
 amitriptyline (ELAVIL) 100 mg tablet Comments:  
Reason for Stopping:   
   
 metOLazone (ZAROXOLYN) 10 mg tablet Comments:  
Reason for Stopping:   
   
 dilTIAZem (TIAZAC) 180 mg SR capsule Comments:  
Reason for Stopping: My Recommended Diet, Activity, Wound Care, and follow-up labs are listed in the patient's Discharge Insturctions which I have personally completed and reviewed. ______________________________________________________________________ Risk of deterioration: Low 
 
Condition at Discharge:  Stable 
______________________________________________________________________ Disposition Home with family and home health services 
______________________________________________________________________ Care Plan discussed with:  
Patient, RN, Care Manager 
 
______________________________________________________________________ Code Status: Full Code 
______________________________________________________________________ Follow up with: PCP : Estela Vera, MD 
Follow-up Information Follow up With Specialties Details Why Contact Info Bev Alberto MD Internal Medicine Go on 3/27/2019 For hospital follow up appointment at 11:15AM  [address] 
[phone] Mayte Yoder MD Nephrology In 2 weeks nephrology  [place] [address] [address] 
[phone] Total time in minutes spent coordinating this discharge (includes going over instructions, follow-up, prescriptions, and preparing report for sign off to her PCP) :  > 30 minutes Signed: 
Renita Phelps MD

## 2019-03-20 NOTE — PROGRESS NOTES
Home Health Care Discharge Planning: [facility] Face to Face Encounter NAME: Janeth Siegel III  
:  1950 MRN:  [id number] Primary Diagnosis: KATHI Date of Face to Face:  3/20/2019 6:32 PM        
                        
Face to Face Encounter findings are related to primary reason for home care:   YES 
 
1. I certify that the patient needs intermittent skilled nursing care, physical therapy and/or speech therapy. I will not be following this patient in the Community and Dr. Jean-Paul Barahona MD will be responsible for signing the [address] [facility] [address] 2. Initial Orders for Care: Skilled Nursing, Physical Therapy and Occupational Therapy 3. I certify that this patient is homebound because of illness or injury, need the aid of supportive devices such as crutches, canes, wheelchairs, and walkers; the use of special transportation; or the assistance of another person in order to leave their place of residence. There exists a normal inability to leave home and leaving home requires a considerable and taxing effort. 4. I certify that this patient is under my care and that I had a Face-to-Face Encounter that meets the physician Face-to-Face Encounter requirements. Document the physical findings from the Face-to-Face Encounter that support the need for skilled services: Has new diagnosis that requires skilled nursing teaching and intervention , Has new medications that requires skilled nursing teaching and monitoring for understanding and compliance , Needs skilled safety assessment and interventions  and Has new finding of weakness and altered mobility that requires skilled physical/occupational and/or speech therapy services for evaluation and interventions. Kirby Amaro MD 
Discharging Physician Office: [phone] Fax:   [phone]

## 2019-03-21 ENCOUNTER — PATIENT OUTREACH (OUTPATIENT)
Dept: INTERNAL MEDICINE CLINIC | Age: 69
End: 2019-03-21

## 2019-03-21 NOTE — PROGRESS NOTES
Hospital Discharge Follow-Up Date/Time:  3/21/2019 4:32 PM 
 
Patient was admitted to [facility] on 3/14 and discharged on 3/20 for Acute on Chronic Renal Failure. The physician discharge summary was available at the time of outreach. Patient was contacted within 1 business days of discharge. Top Challenges reviewed with the provider  
-Pt is concerned about taking Hydralazine because of it's listed side effects. First does has been consumed- no se reported.  
-On  you prescribed Lyrica, pt reports would like to start taking this on 3/22 instead of the Gabapentin.  
-Pt reports Dr Nella Teixeira says it's ok for him to continue the Bumex BID vs dc instructions saying daily- pt has not scheduled that f/u yet. PRETTY is 3/27 Method of communication with provider :chart routing Inpatient RRAT score: 34 Was this a readmission? no  
Patient stated reason for the readmission: n/a Nurse Navigator (NN) contacted the patient by telephone to perform post hospital discharge assessment. Verified name and  with patient as identifiers. Provided introduction to self, and explanation of the Nurse Navigator role. Reviewed discharge instructions and red flags with patient who verbalized understanding. Patient given an opportunity to ask questions and does not have any further questions or concerns at this time. The patient agrees to contact the PCP office for questions related to their healthcare. NN provided contact information for future reference. Disease Specific:   N/A Summary of patient's top problems: 1. Renal Failure- type 2 diabetic, ckd3, medication adjustments during this admission d/t dehydration and medications. Reports consumes 2L/day of water. 2. Hyperglycemia- readings greater than 200, BMI <30, Wheelchair bound Ref. Range 2018 15:03 2018 15:06 2018 15:42 2019 09:47 Hemoglobin A1c (POC) Latest Units: % 5.5  5.7 7.5 3. Home Health orders at discharge: PT, OT, SN [address]: [facility] Insurance Date of initial visit: 3/22 Durable Medical Equipment ordered/company: n/a Durable Medical Equipment received: n/a Barriers to care? None identified at this time. Advance Care Planning:  
Does patient have an Advance Directive:  not on file -[employer] info to be given at [facility] Medication(s):  
New Medications at Discharge:  
dilTIAZem CD (CARDIZEM CD) 240 mg ER capsule Take 1 Cap by mouth daily. Qty: 30 Cap, Refills: 0  
   
hydrALAZINE (APRESOLINE) 50 mg tablet Take 1 Tab by mouth three (3) times daily. Qty: 90 Tab, Refills: 0  
   
sodium chloride (OCEAN) 0.65 % nasal squeeze bottle 0.1 mL by Both Nostrils route every two (2) hours as needed for Congestion. Changed Medications at Discharge:  
bumetanide (BUMEX) 2 mg tablet Take 1 Tab by mouth daily. Qty: 60 Tab, Refills: 12  
  Associated Diagnoses: Localized edema; Diabetic polyneuropathy associated with type 2 diabetes mellitus ([facility] [address] Malignant neoplasm of prostate ([facility] [address] Essential hypertension; Osteoarthrosis involving lower leg; Primary osteoarthritis of right hip  
   
oxyCODONE-acetaminophen (PERCOCET) 5-325 mg per tablet Take 1 Tab by mouth every four (4) hours as needed for Pain for up to 3 days. Max Daily Amount: 6 Tabs. Qty: 10 Tab, Refills: 0 Discontinued Medications at Discharge:  
 irbesartan (AVAPRO) 300 mg tablet Comments:  
Reason for Stopping:   
     
  lidocaine, PF, (XYLOCAINE) 20 mg/mL (2 %) injection Comments:  
Reason for Stopping:   
     
  triamcinolone acetonide (KENALOG) 40 mg/mL injection Comments:  
Reason for Stopping:   
     
  amitriptyline (ELAVIL) 100 mg tablet Comments:  
Reason for Stopping:   
     
  metOLazone (ZAROXOLYN) 10 mg tablet Comments:  
Reason for Stopping:   
     
  dilTIAZem (TIAZAC) 180 mg SR capsule Comments:  
Reason for Stopping Medication reconciliation was performed with patient, who verbalizes understanding of administration of home medications. There were no barriers to obtaining medications identified at this time. Referral to Pharm D needed: no  
 
Current Outpatient Medications Medication Sig  
 dilTIAZem CD (CARDIZEM CD) 240 mg ER capsule Take 1 Cap by mouth daily.  hydrALAZINE (APRESOLINE) 50 mg tablet Take 1 Tab by mouth three (3) times daily.  bumetanide (BUMEX) 2 mg tablet Take 1 Tab by mouth daily.  oxyCODONE-acetaminophen (PERCOCET) 5-325 mg per tablet Take 1 Tab by mouth every four (4) hours as needed for Pain for up to 3 days. Max Daily Amount: 6 Tabs.  sodium chloride (OCEAN) 0.65 % nasal squeeze bottle 0.1 mL by Both Nostrils route every two (2) hours as needed for Congestion.  gabapentin (NEURONTIN) 300 mg capsule TAKE TWO CAPSULES BY MOUTH THREE TIMES A DAY  ketoconazole (NIZORAL) 2 % topical cream Apply  to affected area two (2) times a day.  hydrocolloid dressing (DUODERM HYDROACTIVE) topical gel Apply  to affected area daily.  LANTUS U-100 INSULIN 100 unit/mL injection INJECT 72 UNITS UNDER THE SKIN EVERY 12 HOURS  triamcinolone acetonide (KENALOG) 0.1 % ointment Apply  to affected area two (2) times a day. use thin layer bid  Insulin Syringe-Needle U-100 1 mL 31 gauge x 5/16 syrg USE TO INJECT INSULIN FIVE TIMES A DAY  ferrous sulfate (IRON) 325 mg (65 mg iron) EC tablet Take 1 Tab by mouth Daily (before breakfast).  insulin regular (NOVOLIN R, HUMULIN R) 100 unit/mL injection 14 Units by SubCUTAneous route three (3) times daily as needed (with meals).  acetaminophen (PAIN AND FEVER) 500 mg tablet TAKE ONE TABLET BY MOUTH EVERY 6 HOURS AS NEEDED FOR PAIN  
 ergocalciferol (ERGOCALCIFEROL) 50,000 unit capsule Take 1 Cap by mouth every seven (7) days.  fluticasone (FLONASE) 50 mcg/actuation nasal spray SPRAY TWO SPRAYS IN EACH NOSTRIL DAILY  polyethylene glycol (MIRALAX) 17 gram/dose powder Take 17 g by mouth daily.  MICRO THIN LANCETS 33 gauge misc USE TO TEST BLOOD SUGAR TWO TO THREE TIMES DAILY  glucose blood VI test strips (ONETOUCH ULTRA TEST) strip Test three times daily. E11.9 Diabetes Type 2  Blood-Glucose Meter monitoring kit Use once daily Onetouch test meter only E11.9 Type 2  Wheel Chair dianna Custom electric wheelchair  lidocaine (LIDODERM) 5 % 1 Patch by TransDERmal route every twenty-four (24) hours. Apply to affected area every 24 hours  ammonium lactate (LAC-HYDRIN FIVE) 5 % lotion Apply daily  THERA-TABS M 27 mg iron-400 mcg tab TAKE ONE TABLET BY MOUTH DAILY  CONTOUR NEXT STRIPS strip TEST BLOOD GLUCOSE TWO TO THREE TIMES DAILY  docusate sodium (COLACE) 100 mg capsule Take 1 Cap by mouth two (2) times a day.  Calcium Carbonate-Vit D3-Min 600 mg calcium- 400 unit tab Take  by mouth two (2) times a day. No current facility-administered medications for this visit. There are no discontinued medications. [facility] follow up appointment(s):  
Future Appointments Date Time Provider Flor Szymanskiisti 3/22/2019 To Be Determined Lisseth Garcias RN [address]  
3/27/2019 11:15 AM MD Rachele Cardozo  
4/11/2019  1:45 PM MD Rachele Cardozo [facility] follow up appointment(s):  
[facility]  information provided as a resource- handout to be given at [facility] None

## 2019-03-21 NOTE — Clinical Note
I spoke with Mr Radha Hickman and per that conversation:-Pt is concerned about taking Hydralazine because of it's listed side effects. First does has been consumed- no se reported. -On 2/20 you prescribed Lyrica, pt reports would like to start taking this on 3/22 instead of the Gabapentin. -Pt reports Dr Case Greer says it's ok for him to continue the Bumex BID vs dc instructions saying daily- pt has not scheduled that f/u yet. PRETTY is 3/27

## 2019-03-22 ENCOUNTER — HOME CARE VISIT (OUTPATIENT)
Dept: SCHEDULING | Facility: HOME HEALTH | Age: 69
End: 2019-03-22
Payer: MEDICARE

## 2019-03-22 VITALS
RESPIRATION RATE: 20 BRPM | TEMPERATURE: 98.9 F | HEART RATE: 74 BPM | DIASTOLIC BLOOD PRESSURE: 68 MMHG | OXYGEN SATURATION: 98 % | SYSTOLIC BLOOD PRESSURE: 132 MMHG

## 2019-03-22 VITALS
SYSTOLIC BLOOD PRESSURE: 140 MMHG | TEMPERATURE: 97 F | HEART RATE: 74 BPM | OXYGEN SATURATION: 96 % | DIASTOLIC BLOOD PRESSURE: 78 MMHG

## 2019-03-22 PROCEDURE — 400013 HH SOC

## 2019-03-22 PROCEDURE — 3331090002 HH PPS REVENUE DEBIT

## 2019-03-22 PROCEDURE — G0151 HHCP-SERV OF PT,EA 15 MIN: HCPCS

## 2019-03-22 PROCEDURE — 3331090001 HH PPS REVENUE CREDIT

## 2019-03-22 PROCEDURE — G0299 HHS/HOSPICE OF RN EA 15 MIN: HCPCS

## 2019-03-23 PROCEDURE — 3331090001 HH PPS REVENUE CREDIT

## 2019-03-23 PROCEDURE — 3331090002 HH PPS REVENUE DEBIT

## 2019-03-24 PROCEDURE — 3331090002 HH PPS REVENUE DEBIT

## 2019-03-24 PROCEDURE — 3331090001 HH PPS REVENUE CREDIT

## 2019-03-25 ENCOUNTER — HOME CARE VISIT (OUTPATIENT)
Dept: SCHEDULING | Facility: HOME HEALTH | Age: 69
End: 2019-03-25
Payer: MEDICARE

## 2019-03-25 PROCEDURE — 3331090001 HH PPS REVENUE CREDIT

## 2019-03-25 PROCEDURE — 3331090002 HH PPS REVENUE DEBIT

## 2019-03-25 PROCEDURE — G0299 HHS/HOSPICE OF RN EA 15 MIN: HCPCS

## 2019-03-26 ENCOUNTER — HOME CARE VISIT (OUTPATIENT)
Dept: SCHEDULING | Facility: HOME HEALTH | Age: 69
End: 2019-03-26
Payer: MEDICARE

## 2019-03-26 VITALS
SYSTOLIC BLOOD PRESSURE: 138 MMHG | DIASTOLIC BLOOD PRESSURE: 74 MMHG | OXYGEN SATURATION: 98 % | HEART RATE: 74 BPM | RESPIRATION RATE: 17 BRPM

## 2019-03-26 VITALS
SYSTOLIC BLOOD PRESSURE: 140 MMHG | OXYGEN SATURATION: 97 % | HEART RATE: 78 BPM | RESPIRATION RATE: 18 BRPM | TEMPERATURE: 97.8 F | DIASTOLIC BLOOD PRESSURE: 60 MMHG

## 2019-03-26 VITALS
RESPIRATION RATE: 18 BRPM | SYSTOLIC BLOOD PRESSURE: 164 MMHG | DIASTOLIC BLOOD PRESSURE: 82 MMHG | OXYGEN SATURATION: 96 % | HEART RATE: 76 BPM | TEMPERATURE: 97.8 F

## 2019-03-26 PROCEDURE — 3331090001 HH PPS REVENUE CREDIT

## 2019-03-26 PROCEDURE — G0151 HHCP-SERV OF PT,EA 15 MIN: HCPCS

## 2019-03-26 PROCEDURE — 3331090002 HH PPS REVENUE DEBIT

## 2019-03-26 PROCEDURE — G0152 HHCP-SERV OF OT,EA 15 MIN: HCPCS

## 2019-03-27 ENCOUNTER — PATIENT OUTREACH (OUTPATIENT)
Dept: INTERNAL MEDICINE CLINIC | Age: 69
End: 2019-03-27

## 2019-03-27 ENCOUNTER — OFFICE VISIT (OUTPATIENT)
Dept: INTERNAL MEDICINE CLINIC | Age: 69
End: 2019-03-27

## 2019-03-27 VITALS
SYSTOLIC BLOOD PRESSURE: 130 MMHG | TEMPERATURE: 97.9 F | RESPIRATION RATE: 16 BRPM | OXYGEN SATURATION: 97 % | HEART RATE: 76 BPM | DIASTOLIC BLOOD PRESSURE: 64 MMHG

## 2019-03-27 DIAGNOSIS — D63.1 ANEMIA DUE TO STAGE 4 CHRONIC KIDNEY DISEASE (HCC): ICD-10-CM

## 2019-03-27 DIAGNOSIS — N18.4 ANEMIA DUE TO STAGE 4 CHRONIC KIDNEY DISEASE (HCC): ICD-10-CM

## 2019-03-27 DIAGNOSIS — N18.4 CKD (CHRONIC KIDNEY DISEASE) STAGE 4, GFR 15-29 ML/MIN (HCC): Primary | ICD-10-CM

## 2019-03-27 PROCEDURE — 3331090001 HH PPS REVENUE CREDIT

## 2019-03-27 PROCEDURE — 3331090002 HH PPS REVENUE DEBIT

## 2019-03-27 NOTE — PROGRESS NOTES
1. Have you been to the ER, urgent care clinic since your last visit? Hospitalized since your last visit?no    2. Have you seen or consulted any other health care providers outside of the [address] [place] [address] since your last visit? Include any pap smears or colon screening.  No    3 most recent PHQ Screens 2/20/2019   Little interest or pleasure in doing things Not at all   Feeling down, depressed, irritable, or hopeless Not at all   Total Score PHQ 2 0     Chief Complaint   Patient presents with   [facility] Follow Up    Chronic Kidney Disease

## 2019-03-27 NOTE — PROGRESS NOTES
This writer meets with pt during scheduled ov who reports doing well. He has discussed medication concerns with provider and clear on plan going forward. Labs were also drawn during this visit. This writer has attempted to schedule pt's Nephrology f/u, at pt's request, this writer is advised they will call pt directly after pulling pt's information and getting with Dr Kat Pablo- pt advised. This writer will continue to follow pt's progress.

## 2019-03-27 NOTE — PROGRESS NOTES
Ivis Foreman is a 80 y.o. male and presents with Hospital Follow Up and Chronic Kidney Disease  . Subjective:    He was placed in the hospital for acute renal failure most likely due to volume depletion and diuretic use. He has some underlying chronic renal disease. Hypertension Review:  The patient has essential hypertension  Diet and Lifestyle: generally follows a  low sodium diet, exercises sporadically  Home BP Monitoring: is not measured at home. Pertinent ROS: taking medications as instructed, no medication side effects noted, no TIA's, no chest pain on exertion, no dyspnea on exertion, no swelling of ankles. Diabetes Mellitus Review:  He has diabetes mellitus. Diabetic ROS - medication compliance: compliant all of the time, diabetic diet compliance: compliant all of the time, home glucose monitoring: is performed. Known diabetic complications: none  Cardiovascular risk factors: family history, dyslipidemia, diabetes mellitus, obesity, hypertension  Current diabetic medications include oral agents/insulin   Eye exam current (within one year): no  Weight trend: stable  Prior visit with dietician: no  Current diet: \"healthy\" diet  in general  Current exercise: walking  Current monitoring regimen: home blood tests - daily  Home blood sugar records: trend: stable  Any episodes of hypoglycemia? no  Is He on ACE inhibitor or angiotensin II receptor blocker? Yes     Anemia review:  Patient presents for follow up  evaluation of an anemia. Anemia was found by routine CBC. It had been present for several months. Associated signs & symptoms:none  The  most recent studies were improved  His anemia is felt due to CKD. Review of Systems  Constitutional: negative for fevers, chills, anorexia and weight loss  Eyes:   negative for visual disturbance and irritation  ENT:   negative for tinnitus,sore throat,nasal congestion,ear pains. hoarseness  Respiratory:  negative for cough, hemoptysis, dyspnea,wheezing  CV:   negative for chest pain, palpitations, lower extremity edema  GI:   negative for nausea, vomiting, diarrhea, abdominal pain,melena  Endo:               negative for polyuria,polydipsia,polyphagia,heat intolerance  Genitourinary: negative for frequency, dysuria and hematuria  Integument:  negative for rash and pruritus  Hematologic:  negative for easy bruising and gum/nose bleeding  Musculoskel: negative for myalgias, arthralgias, back pain, muscle weakness, joint pain  Neurological:  negative for headaches, dizziness, vertigo, memory problems and gait   Behavl/Psych: negative for feelings of anxiety, depression, mood changes    Past Medical History:   Diagnosis Date    Arthritis, Degenerative,  Knee 2011    Carcinoma, Prostate 2011    Degenerative disc disease 2011    Depression/ Anxiety 2011    Diabetes ([facility] [address]     Diabetes Mellitrus ( non-insulin dependent ) Type 2 2011    HEMATOCHEZIA 2011    Hypertrension 2011    Hypertriglyceridemia 2011    Insomnia 2011    Laryngitis 2011    Mild Aortic insufficiency 2011    Mild Concentric LVH (left ventricular hypertrophy) 2011    Neuropathy 2011    Osteoarthritis 2011    Rotator Cuff Tendonitis 2011     Past Surgical History:   Procedure Laterality Date    CARDIAC SURG PROCEDURE UNLIST      cardiac cath    COLONOSCOPY N/A 2017    COLONOSCOPY performed by [facility]ira Arndt [facility] at [facility] ENDOSCOPY    HX ORTHOPAEDIC      left hip pinning    HX OTHER SURGICAL      left little toe amputation    HX UROLOGICAL       Social History     Socioeconomic History    Marital status: LEGALLY      Spouse name: Not on file    Number of children: Not on file    Years of education: Not on file    Highest education level: Not on file   Tobacco Use    Smoking status: Current Every Day Smoker     Packs/day: 0.25     Last attempt to quit: 10/25/2016     Years since quittin.4  Smokeless tobacco: Never Used   Substance and Sexual Activity    Alcohol use: Yes     Alcohol/week: 7.0 oz     Types: 7 Cans of beer, 7 Shots of liquor per week    Sexual activity: Yes     Partners: Female     No family history on file. Current Outpatient Medications   Medication Sig Dispense Refill    pregabalin (LYRICA) 150 mg capsule Take 150 mg by mouth three (3) times daily.  cetirizine (ALLERGY RELIEF, CETIRIZINE,) 10 mg tablet Take 10 mg by mouth daily.  aspirin delayed-release 81 mg tablet Take 81 mg by mouth daily.  dilTIAZem CD (CARDIZEM CD) 240 mg ER capsule Take 1 Cap by mouth daily. 30 Cap 0    hydrALAZINE (APRESOLINE) 50 mg tablet Take 1 Tab by mouth three (3) times daily. 90 Tab 0    bumetanide (BUMEX) 2 mg tablet Take 1 Tab by mouth daily. 60 Tab 12    sodium chloride (OCEAN) 0.65 % nasal squeeze bottle 0.1 mL by Both Nostrils route every two (2) hours as needed for Congestion. 10 mL 0    gabapentin (NEURONTIN) 300 mg capsule TAKE TWO CAPSULES BY MOUTH THREE TIMES A  Cap 0    ketoconazole (NIZORAL) 2 % topical cream Apply  to affected area two (2) times a day. 60 g 3    LANTUS U-100 INSULIN 100 unit/mL injection INJECT 72 UNITS UNDER THE SKIN EVERY 12 HOURS 6 Vial 10    triamcinolone acetonide (KENALOG) 0.1 % ointment Apply  to affected area two (2) times a day. use thin layer bid 80 g 12    Insulin Syringe-Needle U-100 1 mL 31 gauge x 5/16 syrg USE TO INJECT INSULIN FIVE TIMES A  Syringe 11    ferrous sulfate (IRON) 325 mg (65 mg iron) EC tablet Take 1 Tab by mouth Daily (before breakfast). 30 Tab 6    insulin regular (NOVOLIN R, HUMULIN R) 100 unit/mL injection 14 Units by SubCUTAneous route three (3) times daily as needed (with meals). (Patient taking differently: 14 Units by SubCUTAneous route three (3) times daily. ) 1 Vial 12    THERA-TABS M 27 mg iron-400 mcg tab TAKE ONE TABLET BY MOUTH DAILY 30 Tab 11    fluticasone (FLONASE) 50 mcg/actuation nasal spray SPRAY TWO SPRAYS IN EACH NOSTRIL DAILY 1 Bottle 11    polyethylene glycol (MIRALAX) 17 gram/dose powder Take 17 g by mouth daily. 850 g 3    MICRO THIN LANCETS 33 gauge misc USE TO TEST BLOOD SUGAR TWO TO THREE TIMES DAILY 100 Lancet 11    glucose blood VI test strips (ONETOUCH ULTRA TEST) strip Test three times daily. E11.9    Diabetes Type 2 300 Strip 11    Blood-Glucose Meter monitoring kit Use once daily  Onetouch test meter only  E11.9  Type 2 1 Kit 0    CONTOUR NEXT STRIPS strip TEST BLOOD GLUCOSE TWO TO THREE TIMES DAILY 100 Strip 11    Wheel Chair dianna Custom electric wheelchair 1 Each 0    Calcium Carbonate-Vit D3-Min 600 mg calcium- 400 unit tab Take  by mouth two (2) times a day.  hydrocolloid dressing (DUODERM HYDROACTIVE) topical gel Apply  to affected area daily. 30 Tube 0    lidocaine (LIDODERM) 5 % 1 Patch by TransDERmal route every twenty-four (24) hours. Apply to affected area every 24 hours (Patient not taking: Reported on 3/22/2019) 1 Package 3    ammonium lactate (LAC-HYDRIN FIVE) 5 % lotion Apply daily (Patient taking differently: Apply 1 Applicator to affected area daily. Apply daily bilateral lower legs ) 222 mL 3    acetaminophen (PAIN AND FEVER) 500 mg tablet TAKE ONE TABLET BY MOUTH EVERY 6 HOURS AS NEEDED FOR PAIN 60 Tab 2    ergocalciferol (ERGOCALCIFEROL) 50,000 unit capsule Take 1 Cap by mouth every seven (7) days.  4 Cap 12     No Known Allergies    Objective:  Visit Vitals  /64   Pulse 76   Temp 97.9 °F (36.6 °C) (Oral)   Resp 16   SpO2 97%     Physical Exam:   General appearance - alert, well appearing, and in no distress  Mental status - alert, oriented to person, place, and time  EYE-LUH, EOMI, corneas normal, no foreign bodies  ENT-ENT exam normal, no neck nodes or sinus tenderness  Nose - normal and patent, no erythema, discharge or polyps  Mouth - mucous membranes moist, pharynx normal without lesions  Neck - supple, no significant adenopathy   Chest - clear to auscultation, no wheezes, rales or rhonchi, symmetric air entry   Heart - normal rate, regular rhythm, normal S1, S2, no murmurs, rubs, clicks or gallops   Abdomen - soft, nontender, nondistended, no masses or organomegaly  Lymph- no adenopathy palpable  Ext-peripheral pulses normal, 2+lower leg edema, no clubbing or cyanosis  Skin-Warm and dry. no hyperpigmentation, vitiligo, or suspicious lesions  Neuro -alert, oriented, normal speech, no focal findings or movement disorder noted  Neck-normal C-spine, no tenderness, full ROM without pain  Feet- nail deformities or callus formation with good pulses noted      Results for orders placed or performed during the hospital encounter of 03/14/19   CBC WITH AUTOMATED DIFF   Result Value Ref Range    WBC 8.0 4.1 - 11.1 K/uL    RBC 2.92 (L) 4.10 - 5.70 M/uL    HGB 8.9 (L) 12.1 - 17.0 g/dL    HCT 28.2 (L) 36.6 - 50.3 %    MCV 96.6 80.0 - 99.0 FL    MCH 30.5 26.0 - 34.0 PG    MCHC 31.6 30.0 - 36.5 g/dL    RDW 15.6 (H) 11.5 - 14.5 %    PLATELET 734 549 - 428 K/uL    MPV 9.2 8.9 - 12.9 FL    NRBC 0.0 0  WBC    ABSOLUTE NRBC 0.00 0.00 - 0.01 K/uL    NEUTROPHILS 79 (H) 32 - 75 %    LYMPHOCYTES 11 (L) 12 - 49 %    MONOCYTES 6 5 - 13 %    EOSINOPHILS 3 0 - 7 %    BASOPHILS 0 0 - 1 %    IMMATURE GRANULOCYTES 1 (H) 0.0 - 0.5 %    ABS. NEUTROPHILS 6.4 1.8 - 8.0 K/UL    ABS. LYMPHOCYTES 0.8 0.8 - 3.5 K/UL    ABS. MONOCYTES 0.5 0.0 - 1.0 K/UL    ABS. EOSINOPHILS 0.2 0.0 - 0.4 K/UL    ABS. BASOPHILS 0.0 0.0 - 0.1 K/UL    ABS. IMM.  GRANS. 0.0 0.00 - 0.04 K/UL    DF AUTOMATED     METABOLIC PANEL, COMPREHENSIVE   Result Value Ref Range    Sodium 133 (L) 136 - 145 mmol/L    Potassium 5.1 3.5 - 5.1 mmol/L    Chloride 98 97 - 108 mmol/L    CO2 27 21 - 32 mmol/L    Anion gap 8 5 - 15 mmol/L    Glucose 199 (H) 65 - 100 mg/dL    BUN 77 (H) 6 - 20 MG/DL    Creatinine 3.19 (H) 0.70 - 1.30 MG/DL    BUN/Creatinine ratio 24 (H) 12 - 20      GFR est AA 24 (L) >60 ml/min/1.73m2    GFR est non-AA 20 (L) >60 ml/min/1.73m2    Calcium 9.0 8.5 - 10.1 MG/DL    Bilirubin, total 0.3 0.2 - 1.0 MG/DL    ALT (SGPT) 22 12 - 78 U/L    AST (SGOT) 16 15 - 37 U/L    Alk. phosphatase 120 (H) 45 - 117 U/L    Protein, total 7.6 6.4 - 8.2 g/dL    Albumin 3.0 (L) 3.5 - 5.0 g/dL    Globulin 4.6 (H) 2.0 - 4.0 g/dL    A-G Ratio 0.7 (L) 1.1 - 2.2     CK W/ REFLX CKMB   Result Value Ref Range     39 - 308 U/L   URINALYSIS W/ REFLEX CULTURE   Result Value Ref Range    Color YELLOW/STRAW      Appearance CLEAR CLEAR      Specific gravity 1.013 1.003 - 1.030      pH (UA) 5.0 5.0 - 8.0      Protein 100 (A) NEG mg/dL    Glucose 100 (A) NEG mg/dL    Ketone NEGATIVE  NEG mg/dL    Bilirubin NEGATIVE  NEG      Blood NEGATIVE  NEG      Urobilinogen 0.2 0.2 - 1.0 EU/dL    Nitrites NEGATIVE  NEG      Leukocyte Esterase NEGATIVE  NEG      WBC 0-4 0 - 4 /hpf    RBC 0-5 0 - 5 /hpf    Epithelial cells FEW FEW /lpf    Bacteria NEGATIVE  NEG /hpf    UA:UC IF INDICATED CULTURE NOT INDICATED BY UA RESULT CNI      Mucus TRACE (A) NEG /lpf   TROPONIN I   Result Value Ref Range    Troponin-I, Qt. <0.05 <0.05 ng/mL   CK W/ REFLX CKMB   Result Value Ref Range     39 - 308 U/L   TSH 3RD GENERATION   Result Value Ref Range    TSH 0.97 0.36 - 6.87 uIU/mL   METABOLIC PANEL, COMPREHENSIVE   Result Value Ref Range    Sodium 133 (L) 136 - 145 mmol/L    Potassium 5.4 (H) 3.5 - 5.1 mmol/L    Chloride 100 97 - 108 mmol/L    CO2 27 21 - 32 mmol/L    Anion gap 6 5 - 15 mmol/L    Glucose 255 (H) 65 - 100 mg/dL    BUN 70 (H) 6 - 20 MG/DL    Creatinine 2.72 (H) 0.70 - 1.30 MG/DL    BUN/Creatinine ratio 26 (H) 12 - 20      GFR est AA 28 (L) >60 ml/min/1.73m2    GFR est non-AA 23 (L) >60 ml/min/1.73m2    Calcium 8.4 (L) 8.5 - 10.1 MG/DL    Bilirubin, total 0.2 0.2 - 1.0 MG/DL    ALT (SGPT) 21 12 - 78 U/L    AST (SGOT) 14 (L) 15 - 37 U/L    Alk.  phosphatase 104 45 - 117 U/L    Protein, total 6.4 6.4 - 8.2 g/dL    Albumin 2.5 (L) 3.5 - 5.0 g/dL    Globulin 3.9 2.0 - 4.0 g/dL    A-G Ratio 0.6 (L) 1.1 - 2.2     CBC WITH AUTOMATED DIFF   Result Value Ref Range    WBC 6.6 4.1 - 11.1 K/uL    RBC 2.42 (L) 4.10 - 5.70 M/uL    HGB 7.7 (L) 12.1 - 17.0 g/dL    HCT 23.4 (L) 36.6 - 50.3 %    MCV 96.7 80.0 - 99.0 FL    MCH 31.8 26.0 - 34.0 PG    MCHC 32.9 30.0 - 36.5 g/dL    RDW 15.2 (H) 11.5 - 14.5 %    PLATELET 968 447 - 688 K/uL    MPV 9.9 8.9 - 12.9 FL    NRBC 0.0 0  WBC    ABSOLUTE NRBC 0.00 0.00 - 0.01 K/uL    NEUTROPHILS 82 (H) 32 - 75 %    LYMPHOCYTES 10 (L) 12 - 49 %    MONOCYTES 5 5 - 13 %    EOSINOPHILS 2 0 - 7 %    BASOPHILS 0 0 - 1 %    IMMATURE GRANULOCYTES 1 (H) 0.0 - 0.5 %    ABS. NEUTROPHILS 5.4 1.8 - 8.0 K/UL    ABS. LYMPHOCYTES 0.7 (L) 0.8 - 3.5 K/UL    ABS. MONOCYTES 0.3 0.0 - 1.0 K/UL    ABS. EOSINOPHILS 0.1 0.0 - 0.4 K/UL    ABS. BASOPHILS 0.0 0.0 - 0.1 K/UL    ABS. IMM. GRANS. 0.1 (H) 0.00 - 0.04 K/UL    DF AUTOMATED      RBC COMMENTS BASOPHILIC STIPPLING  PRESENT       SAMPLES BEING HELD   Result Value Ref Range    SAMPLES BEING HELD PST     COMMENT        Add-on orders for these samples will be processed based on acceptable specimen integrity and analyte stability, which may vary by analyte. SODIUM, UR, RANDOM   Result Value Ref Range    Sodium,urine random 87 MMOL/L   CHLORIDE, URINE RANDOM   Result Value Ref Range    Chloride,urine random 83 MMOL/L   UREA NITROGEN, UR, RANDOM   Result Value Ref Range    Urea Nitrogen,urine random 427 MG/DL   PROTEIN/CREATININE RATIO, URINE   Result Value Ref Range    Protein, urine random 88 (H) 0.0 - 11.9 mg/dL    Creatinine, urine 35.00 mg/dL    Protein/Creat.  urine Ratio 2.5     RENAL FUNCTION PANEL   Result Value Ref Range    Sodium 134 (L) 136 - 145 mmol/L    Potassium 5.3 (H) 3.5 - 5.1 mmol/L    Chloride 101 97 - 108 mmol/L    CO2 25 21 - 32 mmol/L    Anion gap 8 5 - 15 mmol/L    Glucose 290 (H) 65 - 100 mg/dL    BUN 54 (H) 6 - 20 MG/DL    Creatinine 2.27 (H) 0.70 - 1.30 MG/DL BUN/Creatinine ratio 24 (H) 12 - 20      GFR est AA 35 (L) >60 ml/min/1.73m2    GFR est non-AA 29 (L) >60 ml/min/1.73m2    Calcium 8.2 (L) 8.5 - 10.1 MG/DL    Phosphorus 2.6 2.6 - 4.7 MG/DL    Albumin 2.4 (L) 3.5 - 5.0 g/dL   CBC WITH AUTOMATED DIFF   Result Value Ref Range    WBC 5.9 4.1 - 11.1 K/uL    RBC 2.45 (L) 4.10 - 5.70 M/uL    HGB 7.7 (L) 12.1 - 17.0 g/dL    HCT 23.6 (L) 36.6 - 50.3 %    MCV 96.3 80.0 - 99.0 FL    MCH 31.4 26.0 - 34.0 PG    MCHC 32.6 30.0 - 36.5 g/dL    RDW 15.0 (H) 11.5 - 14.5 %    PLATELET 182 849 - 167 K/uL    MPV 9.6 8.9 - 12.9 FL    NRBC 0.0 0  WBC    ABSOLUTE NRBC 0.00 0.00 - 0.01 K/uL    NEUTROPHILS 76 (H) 32 - 75 %    LYMPHOCYTES 15 12 - 49 %    MONOCYTES 6 5 - 13 %    EOSINOPHILS 2 0 - 7 %    BASOPHILS 0 0 - 1 %    IMMATURE GRANULOCYTES 1 (H) 0.0 - 0.5 %    ABS. NEUTROPHILS 4.5 1.8 - 8.0 K/UL    ABS. LYMPHOCYTES 0.9 0.8 - 3.5 K/UL    ABS. MONOCYTES 0.4 0.0 - 1.0 K/UL    ABS. EOSINOPHILS 0.1 0.0 - 0.4 K/UL    ABS. BASOPHILS 0.0 0.0 - 0.1 K/UL    ABS. IMM.  GRANS. 0.1 (H) 0.00 - 0.04 K/UL    DF AUTOMATED     IRON PROFILE   Result Value Ref Range    Iron 68 35 - 150 ug/dL    TIBC 236 (L) 250 - 450 ug/dL    Iron % saturation 29 20 - 50 %   VITAMIN B12   Result Value Ref Range    Vitamin B12 620 193 - 986 pg/mL   FOLATE   Result Value Ref Range    Folate 20.0 5.0 - 21.0 ng/mL   RENAL FUNCTION PANEL   Result Value Ref Range    Sodium 135 (L) 136 - 145 mmol/L    Potassium 4.9 3.5 - 5.1 mmol/L    Chloride 104 97 - 108 mmol/L    CO2 26 21 - 32 mmol/L    Anion gap 5 5 - 15 mmol/L    Glucose 147 (H) 65 - 100 mg/dL    BUN 38 (H) 6 - 20 MG/DL    Creatinine 1.86 (H) 0.70 - 1.30 MG/DL    BUN/Creatinine ratio 20 12 - 20      GFR est AA 44 (L) >60 ml/min/1.73m2    GFR est non-AA 36 (L) >60 ml/min/1.73m2    Calcium 7.8 (L) 8.5 - 10.1 MG/DL    Phosphorus 2.5 (L) 2.6 - 4.7 MG/DL    Albumin 2.4 (L) 3.5 - 5.0 g/dL   RENAL FUNCTION PANEL   Result Value Ref Range    Sodium 133 (L) 136 - 145 mmol/L Potassium 4.4 3.5 - 5.1 mmol/L    Chloride 101 97 - 108 mmol/L    CO2 24 21 - 32 mmol/L    Anion gap 8 5 - 15 mmol/L    Glucose 240 (H) 65 - 100 mg/dL    BUN 35 (H) 6 - 20 MG/DL    Creatinine 1.95 (H) 0.70 - 1.30 MG/DL    BUN/Creatinine ratio 18 12 - 20      GFR est AA 42 (L) >60 ml/min/1.73m2    GFR est non-AA 34 (L) >60 ml/min/1.73m2    Calcium 8.1 (L) 8.5 - 10.1 MG/DL    Phosphorus 2.7 2.6 - 4.7 MG/DL    Albumin 2.6 (L) 3.5 - 5.0 g/dL   CBC WITH AUTOMATED DIFF   Result Value Ref Range    WBC 6.7 4.1 - 11.1 K/uL    RBC 2.81 (L) 4.10 - 5.70 M/uL    HGB 8.7 (L) 12.1 - 17.0 g/dL    HCT 27.0 (L) 36.6 - 50.3 %    MCV 96.1 80.0 - 99.0 FL    MCH 31.0 26.0 - 34.0 PG    MCHC 32.2 30.0 - 36.5 g/dL    RDW 15.1 (H) 11.5 - 14.5 %    PLATELET 237 558 - 814 K/uL    MPV 9.5 8.9 - 12.9 FL    NRBC 0.6 (H) 0  WBC    ABSOLUTE NRBC 0.04 (H) 0.00 - 0.01 K/uL    NEUTROPHILS 75 32 - 75 %    LYMPHOCYTES 14 12 - 49 %    MONOCYTES 7 5 - 13 %    EOSINOPHILS 3 0 - 7 %    BASOPHILS 0 0 - 1 %    IMMATURE GRANULOCYTES 1 (H) 0.0 - 0.5 %    ABS. NEUTROPHILS 5.0 1.8 - 8.0 K/UL    ABS. LYMPHOCYTES 0.9 0.8 - 3.5 K/UL    ABS. MONOCYTES 0.5 0.0 - 1.0 K/UL    ABS. EOSINOPHILS 0.2 0.0 - 0.4 K/UL    ABS. BASOPHILS 0.0 0.0 - 0.1 K/UL    ABS. IMM.  GRANS. 0.1 (H) 0.00 - 0.04 K/UL    DF AUTOMATED     RENAL FUNCTION PANEL   Result Value Ref Range    Sodium 135 (L) 136 - 145 mmol/L    Potassium 4.2 3.5 - 5.1 mmol/L    Chloride 102 97 - 108 mmol/L    CO2 23 21 - 32 mmol/L    Anion gap 10 5 - 15 mmol/L    Glucose 238 (H) 65 - 100 mg/dL    BUN 32 (H) 6 - 20 MG/DL    Creatinine 1.82 (H) 0.70 - 1.30 MG/DL    BUN/Creatinine ratio 18 12 - 20      GFR est AA 45 (L) >60 ml/min/1.73m2    GFR est non-AA 37 (L) >60 ml/min/1.73m2    Calcium 8.3 (L) 8.5 - 10.1 MG/DL    Phosphorus 3.3 2.6 - 4.7 MG/DL    Albumin 2.6 (L) 3.5 - 5.0 g/dL   CBC W/O DIFF   Result Value Ref Range    WBC 6.7 4.1 - 11.1 K/uL    RBC 2.62 (L) 4.10 - 5.70 M/uL    HGB 8.2 (L) 12.1 - 17.0 g/dL HCT 25.2 (L) 36.6 - 50.3 %    MCV 96.2 80.0 - 99.0 FL    MCH 31.3 26.0 - 34.0 PG    MCHC 32.5 30.0 - 36.5 g/dL    RDW 15.2 (H) 11.5 - 14.5 %    PLATELET 799 179 - 940 K/uL    MPV 9.5 8.9 - 12.9 FL    NRBC 0.6 (H) 0  WBC    ABSOLUTE NRBC 0.04 (H) 0.00 - 0.01 K/uL   RENAL FUNCTION PANEL   Result Value Ref Range    Sodium 131 (L) 136 - 145 mmol/L    Potassium 4.4 3.5 - 5.1 mmol/L    Chloride 98 97 - 108 mmol/L    CO2 26 21 - 32 mmol/L    Anion gap 7 5 - 15 mmol/L    Glucose 160 (H) 65 - 100 mg/dL    BUN 37 (H) 6 - 20 MG/DL    Creatinine 2.00 (H) 0.70 - 1.30 MG/DL    BUN/Creatinine ratio 19 12 - 20      GFR est AA 41 (L) >60 ml/min/1.73m2    GFR est non-AA 33 (L) >60 ml/min/1.73m2    Calcium 8.6 8.5 - 10.1 MG/DL    Phosphorus 3.1 2.6 - 4.7 MG/DL    Albumin 3.2 (L) 3.5 - 5.0 g/dL   GLUCOSE, POC   Result Value Ref Range    Glucose (POC) 310 (H) 65 - 100 mg/dL    Performed by [employer]    GLUCOSE, POC   Result Value Ref Range    Glucose (POC) 327 (H) 65 - 100 mg/dL    Performed by Hawa Flores (PCT)    GLUCOSE, POC   Result Value Ref Range    Glucose (POC) 330 (H) 65 - 100 mg/dL    Performed by Hawa Flores (PCT)    GLUCOSE, POC   Result Value Ref Range    Glucose (POC) 335 (H) 65 - 100 mg/dL    Performed by Danita Tripathi (CON) PCT    GLUCOSE, POC   Result Value Ref Range    Glucose (POC) 382 (H) 65 - 100 mg/dL    Performed by Emmie Pacheco (PCT)    GLUCOSE, POC   Result Value Ref Range    Glucose (POC) 283 (H) 65 - 100 mg/dL    Performed by Jacob Shaikh (PCT)    GLUCOSE, POC   Result Value Ref Range    Glucose (POC) 302 (H) 65 - 100 mg/dL    Performed by Jacob Shaikh (PCT)    GLUCOSE, POC   Result Value Ref Range    Glucose (POC) 308 (H) 65 - 100 mg/dL    Performed by Tammi Land    GLUCOSE, POC   Result Value Ref Range    Glucose (POC) 256 (H) 65 - 100 mg/dL    Performed by Emmie Pacheco (PCT)    GLUCOSE, POC   Result Value Ref Range    Glucose (POC) 172 (H) 65 - 100 mg/dL    Performed by Dax Joyner (PCT)    GLUCOSE, POC   Result Value Ref Range    Glucose (POC) 183 (H) 65 - 100 mg/dL    Performed by Maximiliano Chew (PCT)    GLUCOSE, POC   Result Value Ref Range    Glucose (POC) 253 (H) 65 - 100 mg/dL    Performed by Jerilee Krabbe (PCT)    GLUCOSE, POC   Result Value Ref Range    Glucose (POC) 250 (H) 65 - 100 mg/dL    Performed by Jeannie Davis    GLUCOSE, POC   Result Value Ref Range    Glucose (POC) 207 (H) 65 - 100 mg/dL    Performed by Brenda Rater (PCT)    GLUCOSE, POC   Result Value Ref Range    Glucose (POC) 262 (H) 65 - 100 mg/dL    Performed by Brenda Rater (PCT)    GLUCOSE, POC   Result Value Ref Range    Glucose (POC) 255 (H) 65 - 100 mg/dL    Performed by Sherice Novoa    GLUCOSE, POC   Result Value Ref Range    Glucose (POC) 271 (H) 65 - 100 mg/dL    Performed by Raymon Sim (PCT)    GLUCOSE, POC   Result Value Ref Range    Glucose (POC) 240 (H) 65 - 100 mg/dL    Performed by Elease Lux B (CON) PCT    GLUCOSE, POC   Result Value Ref Range    Glucose (POC) 234 (H) 65 - 100 mg/dL    Performed by Elease Lux B (CON) PCT    GLUCOSE, POC   Result Value Ref Range    Glucose (POC) 204 (H) 65 - 100 mg/dL    Performed by Jerilee Krabbe (PCT)    GLUCOSE, POC   Result Value Ref Range    Glucose (POC) 274 (H) 65 - 100 mg/dL    Performed by Reed Delarosa    GLUCOSE, POC   Result Value Ref Range    Glucose (POC) 195 (H) 65 - 100 mg/dL    Performed by Jerilee Krabbe (PCT)    GLUCOSE, POC   Result Value Ref Range    Glucose (POC) 217 (H) 65 - 100 mg/dL    Performed by Jerilee Krabbe (PCT)    EKG, 12 LEAD, INITIAL   Result Value Ref Range    Ventricular Rate 77 BPM    Atrial Rate 77 BPM    P-R Interval 184 ms    QRS Duration 108 ms    Q-T Interval 386 ms    QTC Calculation (Bezet) 436 ms    Calculated P Axis 47 degrees    Calculated R Axis -24 degrees    Calculated T Axis 97 degrees    Diagnosis       Normal sinus rhythm  T wave abnormality, consider lateral ischemia  No previous ECGs available  Confirmed by Jeannine Thomas ([id number]) on 3/15/2019 9:14:10 AM     ECHO ADULT COMPLETE   Result Value Ref Range    LA Volume 63.3 (A) 18 - 58 mL    LV E' Lateral Velocity 9.23 cm/s    LV E' Septal Velocity 7.30 cm/s    Ao Root D 2.96 cm    Aortic Valve Systolic Peak Velocity 035.91 cm/s    AoV VTI 26.98 cm    Aortic Valve Area by Continuity of Peak Velocity 3.5 cm2    Aortic Valve Area by Continuity of VTI 4.3 cm2    AoV PG 7.1 mmHg    LVIDd 5.38 4.2 - 5.9 cm    LVPWd 1.29 (A) 0.6 - 1.0 cm    LVIDs 3.38 cm    IVSd 1.37 (A) 0.6 - 1.0 cm    LVOT d 2.19 cm    LVOT Peak Velocity 125.17 cm/s    LVOT Peak Gradient 6.3 mmHg    LVOT VTI 30.58 cm    MVA (PHT) 3.3 cm2    MV A Yuri 61.31 cm/s    MV E Yuri 100.17 cm/s    MV E/A 1.63     Left Atrium to Aortic Root Ratio 1.29     RVIDd 2.90 cm    Aortic Valve Systolic Mean Gradient 4.2 mmHg    LA Vol 4C 43.67 18 - 58 mL    LA Vol 2C 81.11 (A) 18 - 58 mL    LA Area 2C 23.28 cm2    LA Area 4C 16.8 cm2    LV Mass .1 (A) 88 - 224 g    LV Mass AL Index 143.5 (A) 49 - 115 g/m2    E/E' lateral 10.85     E/E' septal 13.72     RVSP 18.0 mmHg    E/E' ratio (averaged) 12.29     Est. RA Pressure 10.0 mmHg    Mitral Regurgitant Peak Velocity 306.31 cm/s    Mitral Valve E Wave Deceleration Time 233.1 ms    Mitral Valve Pressure Half-time 66.6 ms    Left Atrium Major Axis 3.82 cm    Triscuspid Valve Regurgitation Peak Gradient 8.0 mmHg    Pulmonic Valve Max Velocity 110.17 cm/s    LVOT .9 ml    TR Max Velocity 141.52 cm/s    LA Vol Index 24.95 16 - 28 ml/m2    PASP 18.0 mmHg    LA Vol Index 31.97 16 - 28 ml/m2    LA Vol Index 17.21 16 - 28 ml/m2    MR Peak Gradient 37.5 mmHg    CHELA/BSA Pk Yuri 1.4 cm2/m2    CHELA/BSA VTI 1.6 cm2/m2    PV peak gradient 4.9 mmHg       Assessment/Plan:  No diagnosis found. No orders of the defined types were placed in this encounter.     lose weight, increase physical activity, follow low fat diet, follow low salt diet, continue present plan,Take 81mg aspirin daily  Patient Instructions   MyChart Activation    Thank you for requesting access to [facility]. Please follow the instructions below to securely access and download your online medical record. [facility] allows you to send messages to your doctor, view your test results, renew your prescriptions, schedule appointments, and more. How Do I Sign Up? 1. In your internet browser, go to [email or web address]  2. Click on the First Time User? Click Here link in the Sign In box. You will be redirect to the New Member Sign Up page. 3. Enter your [facility] Access Code exactly as it appears below. You will not need to use this code after youve completed the sign-up process. If you do not sign up before the expiration date, you must request a new code. [facility] Access Code: [email or web address]  Expires: 2019  4:11 PM (This is the date your TopFloor access code will )    4. Enter the last four digits of your Social Security Number (xxxx) and Date of Birth (mm/dd/yyyy) as indicated and click Submit. You will be taken to the next sign-up page. 5. Create a TopFloor ID. This will be your TopFloor login ID and cannot be changed, so think of one that is secure and easy to remember. 6. Create a TopFloor password. You can change your password at any time. 7. Enter your Password Reset Question and Answer. This can be used at a later time if you forget your password. 8. Enter your e-mail address. You will receive e-mail notification when new information is available in [address] 9. Click Sign Up. You can now view and download portions of your medical record. 10. Click the Download Summary menu link to download a portable copy of your medical information. Additional Information    If you have questions, please visit the Frequently Asked Questions section of the TopFloor website at [email or web address] Remember, TopFloor is NOT to be used for urgent needs.  For medical emergencies, dial 911.           Follow-up and Dispositions    · Return in about 1 month (around 4/24/2019), or if symptoms worsen or fail to improve. I have reviewed with the patient details of the assessment and plan and all questions were answered. Relevent patient education was performed. The most recent lab findings were reviewed with the patient. An After Visit Summary was printed and given to the patient.

## 2019-03-27 NOTE — PATIENT INSTRUCTIONS
[employer] Activation    Thank you for requesting access to [employer]. Please follow the instructions below to securely access and download your online medical record. [employer] allows you to send messages to your doctor, view your test results, renew your prescriptions, schedule appointments, and more. How Do I Sign Up? 1. In your internet browser, go to [email or web address]  2. Click on the First Time User? Click Here link in the Sign In box. You will be redirect to the New Member Sign Up page. 3. Enter your [employer] Access Code exactly as it appears below. You will not need to use this code after youve completed the sign-up process. If you do not sign up before the expiration date, you must request a new code. [employer] Access Code: [email or web address]  Expires: 2019  4:11 PM (This is the date your [employer] access code will )    4. Enter the last four digits of your Social Security Number (xxxx) and Date of Birth (mm/dd/yyyy) as indicated and click Submit. You will be taken to the next sign-up page. 5. Create a [employer] ID. This will be your [employer] login ID and cannot be changed, so think of one that is secure and easy to remember. 6. Create a [employer] password. You can change your password at any time. 7. Enter your Password Reset Question and Answer. This can be used at a later time if you forget your password. 8. Enter your e-mail address. You will receive e-mail notification when new information is available in [address] 9. Click Sign Up. You can now view and download portions of your medical record. 10. Click the Download Summary menu link to download a portable copy of your medical information. Additional Information    If you have questions, please visit the Frequently Asked Questions section of the [employer] website at [email or web address] Remember, [employer] is NOT to be used for urgent needs. For medical emergencies, dial 911.

## 2019-03-28 ENCOUNTER — APPOINTMENT (OUTPATIENT)
Dept: VASCULAR SURGERY | Age: 69
End: 2019-03-28
Attending: FAMILY MEDICINE
Payer: MEDICARE

## 2019-03-28 ENCOUNTER — HOSPITAL ENCOUNTER (OUTPATIENT)
Age: 69
Setting detail: OBSERVATION
Discharge: HOME HEALTH | End: 2019-03-31
Attending: STUDENT IN AN ORGANIZED HEALTH CARE EDUCATION/TRAINING PROGRAM | Admitting: FAMILY MEDICINE
Payer: MEDICARE

## 2019-03-28 ENCOUNTER — HOME CARE VISIT (OUTPATIENT)
Dept: SCHEDULING | Facility: HOME HEALTH | Age: 69
End: 2019-03-28
Payer: MEDICARE

## 2019-03-28 ENCOUNTER — APPOINTMENT (OUTPATIENT)
Dept: GENERAL RADIOLOGY | Age: 69
End: 2019-03-28
Attending: STUDENT IN AN ORGANIZED HEALTH CARE EDUCATION/TRAINING PROGRAM
Payer: MEDICARE

## 2019-03-28 ENCOUNTER — APPOINTMENT (OUTPATIENT)
Dept: CT IMAGING | Age: 69
End: 2019-03-28
Attending: FAMILY MEDICINE
Payer: MEDICARE

## 2019-03-28 ENCOUNTER — TELEPHONE (OUTPATIENT)
Dept: INTERNAL MEDICINE CLINIC | Age: 69
End: 2019-03-28

## 2019-03-28 ENCOUNTER — APPOINTMENT (OUTPATIENT)
Dept: ULTRASOUND IMAGING | Age: 69
End: 2019-03-28
Attending: FAMILY MEDICINE
Payer: MEDICARE

## 2019-03-28 DIAGNOSIS — R60.0 LOCALIZED EDEMA: ICD-10-CM

## 2019-03-28 DIAGNOSIS — E11.42 DIABETIC POLYNEUROPATHY ASSOCIATED WITH TYPE 2 DIABETES MELLITUS (HCC): ICD-10-CM

## 2019-03-28 DIAGNOSIS — N17.9 ACUTE KIDNEY INJURY (HCC): Primary | ICD-10-CM

## 2019-03-28 DIAGNOSIS — M12.811 ROTATOR CUFF ARTHROPATHY, RIGHT: ICD-10-CM

## 2019-03-28 LAB
ALBUMIN SERPL-MCNC: 3.2 G/DL (ref 3.5–5)
ALBUMIN SERPL-MCNC: 3.9 G/DL (ref 3.6–4.8)
ALBUMIN/GLOB SERPL: 0.8 {RATIO} (ref 1.1–2.2)
ALBUMIN/GLOB SERPL: 1.1 {RATIO} (ref 1.2–2.2)
ALP SERPL-CCNC: 115 U/L (ref 45–117)
ALP SERPL-CCNC: 95 IU/L (ref 39–117)
ALT SERPL-CCNC: 25 IU/L (ref 0–44)
ALT SERPL-CCNC: 30 U/L (ref 12–78)
ANION GAP SERPL CALC-SCNC: 7 MMOL/L (ref 5–15)
AST SERPL-CCNC: 15 U/L (ref 15–37)
AST SERPL-CCNC: 21 IU/L (ref 0–40)
BASOPHILS # BLD: 0 K/UL (ref 0–0.1)
BASOPHILS NFR BLD: 0 % (ref 0–1)
BILIRUB SERPL-MCNC: 0.2 MG/DL (ref 0.2–1)
BILIRUB SERPL-MCNC: <0.2 MG/DL (ref 0–1.2)
BNP SERPL-MCNC: 226 PG/ML
BUN SERPL-MCNC: 94 MG/DL (ref 6–20)
BUN SERPL-MCNC: 99 MG/DL (ref 8–27)
BUN/CREAT SERPL: 35 (ref 12–20)
BUN/CREAT SERPL: 37 (ref 10–24)
CALCIUM SERPL-MCNC: 8.6 MG/DL (ref 8.5–10.1)
CALCIUM SERPL-MCNC: 9.2 MG/DL (ref 8.6–10.2)
CHLORIDE SERPL-SCNC: 104 MMOL/L (ref 97–108)
CHLORIDE SERPL-SCNC: 98 MMOL/L (ref 96–106)
CO2 SERPL-SCNC: 17 MMOL/L (ref 20–29)
CO2 SERPL-SCNC: 23 MMOL/L (ref 21–32)
CREAT SERPL-MCNC: 2.66 MG/DL (ref 0.76–1.27)
CREAT SERPL-MCNC: 2.7 MG/DL (ref 0.7–1.3)
DIFFERENTIAL METHOD BLD: ABNORMAL
EOSINOPHIL # BLD: 0.1 K/UL (ref 0–0.4)
EOSINOPHIL NFR BLD: 1 % (ref 0–7)
ERYTHROCYTE [DISTWIDTH] IN BLOOD BY AUTOMATED COUNT: 16.4 % (ref 11.5–14.5)
ERYTHROCYTE [DISTWIDTH] IN BLOOD BY AUTOMATED COUNT: 16.7 % (ref 12.3–15.4)
EST. AVERAGE GLUCOSE BLD GHB EST-MCNC: 183 MG/DL
GLOBULIN SER CALC-MCNC: 3.4 G/DL (ref 1.5–4.5)
GLOBULIN SER CALC-MCNC: 4 G/DL (ref 2–4)
GLUCOSE BLD STRIP.AUTO-MCNC: 124 MG/DL (ref 65–100)
GLUCOSE SERPL-MCNC: 124 MG/DL (ref 65–99)
GLUCOSE SERPL-MCNC: 235 MG/DL (ref 65–100)
HBA1C MFR BLD: 8 % (ref 4.2–6.3)
HCT VFR BLD AUTO: 31.9 % (ref 36.6–50.3)
HCT VFR BLD AUTO: 32.1 % (ref 37.5–51)
HGB BLD-MCNC: 10 G/DL (ref 12.1–17)
HGB BLD-MCNC: 10.4 G/DL (ref 13–17.7)
IMM GRANULOCYTES # BLD AUTO: 0.1 K/UL (ref 0–0.04)
IMM GRANULOCYTES NFR BLD AUTO: 1 % (ref 0–0.5)
INTERPRETATION: NORMAL
LACTATE SERPL-SCNC: 1.1 MMOL/L (ref 0.4–2)
LYMPHOCYTES # BLD: 1.1 K/UL (ref 0.8–3.5)
LYMPHOCYTES NFR BLD: 12 % (ref 12–49)
MAGNESIUM SERPL-MCNC: 2.7 MG/DL (ref 1.6–2.4)
MCH RBC QN AUTO: 30.5 PG (ref 26.6–33)
MCH RBC QN AUTO: 31.4 PG (ref 26–34)
MCHC RBC AUTO-ENTMCNC: 31.3 G/DL (ref 30–36.5)
MCHC RBC AUTO-ENTMCNC: 32.4 G/DL (ref 31.5–35.7)
MCV RBC AUTO: 100.3 FL (ref 80–99)
MCV RBC AUTO: 94 FL (ref 79–97)
MONOCYTES # BLD: 0.7 K/UL (ref 0–1)
MONOCYTES NFR BLD: 7 % (ref 5–13)
NEUTS SEG # BLD: 7.2 K/UL (ref 1.8–8)
NEUTS SEG NFR BLD: 79 % (ref 32–75)
NRBC # BLD: 0 K/UL (ref 0–0.01)
NRBC BLD-RTO: 0 PER 100 WBC
PLATELET # BLD AUTO: 257 K/UL (ref 150–400)
PLATELET # BLD AUTO: 330 X10E3/UL (ref 150–379)
PMV BLD AUTO: 9.7 FL (ref 8.9–12.9)
POTASSIUM SERPL-SCNC: 5.3 MMOL/L (ref 3.5–5.1)
POTASSIUM SERPL-SCNC: 5.6 MMOL/L (ref 3.5–5.2)
PROT SERPL-MCNC: 7.2 G/DL (ref 6.4–8.2)
PROT SERPL-MCNC: 7.3 G/DL (ref 6–8.5)
RBC # BLD AUTO: 3.18 M/UL (ref 4.1–5.7)
RBC # BLD AUTO: 3.41 X10E6/UL (ref 4.14–5.8)
SERVICE CMNT-IMP: ABNORMAL
SODIUM SERPL-SCNC: 133 MMOL/L (ref 134–144)
SODIUM SERPL-SCNC: 134 MMOL/L (ref 136–145)
WBC # BLD AUTO: 8.8 X10E3/UL (ref 3.4–10.8)
WBC # BLD AUTO: 9.2 K/UL (ref 4.1–11.1)

## 2019-03-28 PROCEDURE — 96360 HYDRATION IV INFUSION INIT: CPT

## 2019-03-28 PROCEDURE — 94762 N-INVAS EAR/PLS OXIMTRY CONT: CPT

## 2019-03-28 PROCEDURE — 3331090002 HH PPS REVENUE DEBIT

## 2019-03-28 PROCEDURE — 70450 CT HEAD/BRAIN W/O DYE: CPT

## 2019-03-28 PROCEDURE — 99285 EMERGENCY DEPT VISIT HI MDM: CPT

## 2019-03-28 PROCEDURE — 85025 COMPLETE CBC W/AUTO DIFF WBC: CPT

## 2019-03-28 PROCEDURE — 82962 GLUCOSE BLOOD TEST: CPT

## 2019-03-28 PROCEDURE — 83880 ASSAY OF NATRIURETIC PEPTIDE: CPT

## 2019-03-28 PROCEDURE — 74011250636 HC RX REV CODE- 250/636: Performed by: STUDENT IN AN ORGANIZED HEALTH CARE EDUCATION/TRAINING PROGRAM

## 2019-03-28 PROCEDURE — 80053 COMPREHEN METABOLIC PANEL: CPT

## 2019-03-28 PROCEDURE — 83605 ASSAY OF LACTIC ACID: CPT

## 2019-03-28 PROCEDURE — 83735 ASSAY OF MAGNESIUM: CPT

## 2019-03-28 PROCEDURE — 93970 EXTREMITY STUDY: CPT

## 2019-03-28 PROCEDURE — 71046 X-RAY EXAM CHEST 2 VIEWS: CPT

## 2019-03-28 PROCEDURE — 83036 HEMOGLOBIN GLYCOSYLATED A1C: CPT

## 2019-03-28 PROCEDURE — 87040 BLOOD CULTURE FOR BACTERIA: CPT

## 2019-03-28 PROCEDURE — 96361 HYDRATE IV INFUSION ADD-ON: CPT

## 2019-03-28 PROCEDURE — 76770 US EXAM ABDO BACK WALL COMP: CPT

## 2019-03-28 PROCEDURE — 3331090001 HH PPS REVENUE CREDIT

## 2019-03-28 PROCEDURE — G0300 HHS/HOSPICE OF LPN EA 15 MIN: HCPCS

## 2019-03-28 PROCEDURE — 36415 COLL VENOUS BLD VENIPUNCTURE: CPT

## 2019-03-28 PROCEDURE — 99218 HC RM OBSERVATION: CPT

## 2019-03-28 PROCEDURE — G0151 HHCP-SERV OF PT,EA 15 MIN: HCPCS

## 2019-03-28 RX ORDER — POLYETHYLENE GLYCOL 3350 17 G/17G
17 POWDER, FOR SOLUTION ORAL DAILY
Status: DISCONTINUED | OUTPATIENT
Start: 2019-03-29 | End: 2019-03-31 | Stop reason: HOSPADM

## 2019-03-28 RX ORDER — HYDRALAZINE HYDROCHLORIDE 50 MG/1
50 TABLET, FILM COATED ORAL 3 TIMES DAILY
Status: DISCONTINUED | OUTPATIENT
Start: 2019-03-28 | End: 2019-03-31 | Stop reason: HOSPADM

## 2019-03-28 RX ORDER — ERGOCALCIFEROL 1.25 MG/1
50000 CAPSULE ORAL
Status: DISCONTINUED | OUTPATIENT
Start: 2019-03-29 | End: 2019-03-31 | Stop reason: HOSPADM

## 2019-03-28 RX ORDER — SODIUM CHLORIDE 0.9 % (FLUSH) 0.9 %
5-40 SYRINGE (ML) INJECTION EVERY 8 HOURS
Status: DISCONTINUED | OUTPATIENT
Start: 2019-03-28 | End: 2019-03-31 | Stop reason: HOSPADM

## 2019-03-28 RX ORDER — ASPIRIN 81 MG/1
81 TABLET ORAL DAILY
Status: DISCONTINUED | OUTPATIENT
Start: 2019-03-29 | End: 2019-03-31 | Stop reason: HOSPADM

## 2019-03-28 RX ORDER — INSULIN GLARGINE 100 [IU]/ML
40 INJECTION, SOLUTION SUBCUTANEOUS
Status: DISCONTINUED | OUTPATIENT
Start: 2019-03-29 | End: 2019-03-29

## 2019-03-28 RX ORDER — MAGNESIUM SULFATE 100 %
4 CRYSTALS MISCELLANEOUS AS NEEDED
Status: DISCONTINUED | OUTPATIENT
Start: 2019-03-28 | End: 2019-03-31 | Stop reason: HOSPADM

## 2019-03-28 RX ORDER — GABAPENTIN 100 MG/1
200 CAPSULE ORAL 2 TIMES DAILY
Status: DISCONTINUED | OUTPATIENT
Start: 2019-03-29 | End: 2019-03-29

## 2019-03-28 RX ORDER — SODIUM CHLORIDE 0.9 % (FLUSH) 0.9 %
5-40 SYRINGE (ML) INJECTION AS NEEDED
Status: DISCONTINUED | OUTPATIENT
Start: 2019-03-28 | End: 2019-03-31 | Stop reason: HOSPADM

## 2019-03-28 RX ORDER — LANOLIN ALCOHOL/MO/W.PET/CERES
325 CREAM (GRAM) TOPICAL
Status: DISCONTINUED | OUTPATIENT
Start: 2019-03-29 | End: 2019-03-31 | Stop reason: HOSPADM

## 2019-03-28 RX ORDER — LIDOCAINE 50 MG/G
1 PATCH TOPICAL EVERY 24 HOURS
Status: DISCONTINUED | OUTPATIENT
Start: 2019-03-28 | End: 2019-03-31 | Stop reason: HOSPADM

## 2019-03-28 RX ORDER — HEPARIN SODIUM 5000 [USP'U]/ML
5000 INJECTION, SOLUTION INTRAVENOUS; SUBCUTANEOUS EVERY 8 HOURS
Status: DISCONTINUED | OUTPATIENT
Start: 2019-03-28 | End: 2019-03-31 | Stop reason: HOSPADM

## 2019-03-28 RX ORDER — INSULIN LISPRO 100 [IU]/ML
INJECTION, SOLUTION INTRAVENOUS; SUBCUTANEOUS
Status: DISCONTINUED | OUTPATIENT
Start: 2019-03-28 | End: 2019-03-31 | Stop reason: HOSPADM

## 2019-03-28 RX ORDER — SODIUM CHLORIDE 9 MG/ML
75 INJECTION, SOLUTION INTRAVENOUS CONTINUOUS
Status: DISCONTINUED | OUTPATIENT
Start: 2019-03-28 | End: 2019-03-30

## 2019-03-28 RX ORDER — PREGABALIN 75 MG/1
150 CAPSULE ORAL 3 TIMES DAILY
Status: DISCONTINUED | OUTPATIENT
Start: 2019-03-28 | End: 2019-03-31 | Stop reason: HOSPADM

## 2019-03-28 RX ORDER — SODIUM POLYSTYRENE SULFONATE 4.1 MEQ/G
30 POWDER, FOR SUSPENSION ORAL; RECTAL
Status: COMPLETED | OUTPATIENT
Start: 2019-03-28 | End: 2019-03-29

## 2019-03-28 RX ORDER — FLUTICASONE PROPIONATE 50 MCG
2 SPRAY, SUSPENSION (ML) NASAL DAILY
Status: DISCONTINUED | OUTPATIENT
Start: 2019-03-29 | End: 2019-03-31 | Stop reason: HOSPADM

## 2019-03-28 RX ORDER — DEXTROSE 50 % IN WATER (D50W) INTRAVENOUS SYRINGE
25-50 AS NEEDED
Status: DISCONTINUED | OUTPATIENT
Start: 2019-03-28 | End: 2019-03-31 | Stop reason: HOSPADM

## 2019-03-28 RX ORDER — DILTIAZEM HYDROCHLORIDE 240 MG/1
240 CAPSULE, COATED, EXTENDED RELEASE ORAL DAILY
Status: DISCONTINUED | OUTPATIENT
Start: 2019-03-29 | End: 2019-03-31 | Stop reason: HOSPADM

## 2019-03-28 RX ADMIN — SODIUM CHLORIDE 1000 ML: 900 INJECTION, SOLUTION INTRAVENOUS at 21:42

## 2019-03-28 NOTE — ED NOTES
Patient resting comfortly in bed, call bell within reach, bed in lowest position, no distress noted.

## 2019-03-28 NOTE — ED NOTES
Bedside and Verbal shift change report given to Steve Yang RN (oncoming nurse) by Angelica Scott RN (offgoing nurse). Report included the following information ED Summary and Recent Results.

## 2019-03-28 NOTE — TELEPHONE ENCOUNTER
Spoke with Sage Waddell, patient daughter advise her to take patient to ED because of Dehydration.  Daughter stated she is out of town but will contact her  to get him to [facility]

## 2019-03-28 NOTE — ED PROVIDER NOTES
80 y.o. male with past medical history significant for diabetes mellitus, DDD, arthritis, HTN, prostate cancer, hypertriglyceridemia, depression and anxiety, laryngitis, and neuropathy who presents from home via private vehicle for further workup of abnormal lab results. Patient was seen by PCP yesterday and had labs drawn - results returned today and PCP referred him to ED due to elevated creatinine. Patient has felt slightly weak and disoriented today but otherwise \"feels good overall\". He notes leg swelling is significantly improved but still has swelling L>R since discharge - has been taking Bumex but did not monitor weight. EMS states \"patient recently had his pinky toe amputated\". Specifically denies fever, chills, abdominal distension, and any other pain or symptoms. There are no other acute medical concerns at this time. Old Chart Review:  
Admitted to [facility] 3/15-3/20/2019 for kidney failure. Social hx: Current everyday tobacco smoker; Yes EtOH use PCP: Maggi Roblero., MD 
Nephrologist: Dr. Romy Webb Note written by Medardo Cheng, as dictated by Marilu Gonzalez MD 7:02 PM 
 
The history is provided by the patient, the EMS personnel and medical records. No  was used. Past Medical History:  
Diagnosis Date  Arthritis, Degenerative,  Knee 4/4/2011  Carcinoma, Prostate 4/4/2011  Degenerative disc disease 4/4/2011  Depression/ Anxiety 4/4/2011  Diabetes ([address]  Diabetes Mellitrus ( non-insulin dependent ) Type 2 4/4/2011  
 HEMATOCHEZIA 4/4/2011  Hypertrension 4/4/2011  Hypertriglyceridemia 4/4/2011  Insomnia 4/4/2011  Laryngitis 4/4/2011  Mild Aortic insufficiency 4/4/2011  Mild Concentric LVH (left ventricular hypertrophy) 4/4/2011  Neuropathy 4/4/2011  Osteoarthritis 4/4/2011  Rotator Cuff Tendonitis 4/4/2011 Past Surgical History:  
Procedure Laterality Date  CARDIAC SURG PROCEDURE UNLIST    
 cardiac cath  COLONOSCOPY N/A 2017 COL[facility]COPY performed by [facility]da [facility] at [facility] ENDOSCOPY  
 HX ORTHOPAEDIC    
 left hip pinning  HX OTHER SURGICAL    
 left little toe amputation  HX UROLOGICAL History reviewed. No pertinent family history. Social History Socioeconomic History  Marital status: LEGALLY  Spouse name: Not on file  Number of children: Not on file  Years of education: Not on file  Highest education level: Not on file Occupational History  Not on file Social Needs  Financial resource strain: Not on file  Food insecurity:  
  Worry: Not on file Inability: Not on file  Transportation needs:  
  Medical: Not on file Non-medical: Not on file Tobacco Use  Smoking status: Current Every Day Smoker Packs/day: 0.25 Last attempt to quit: 10/25/2016 Years since quittin.4  Smokeless tobacco: Never Used Substance and Sexual Activity  Alcohol use: Yes Alcohol/week: 7.0 oz Types: 7 Cans of beer, 7 Shots of liquor per week  Drug use: Not on file  Sexual activity: Yes  
  Partners: Female Lifestyle  Physical activity:  
  Days per week: Not on file Minutes per session: Not on file  Stress: Not on file Relationships  Social connections:  
  Talks on phone: Not on file Gets together: Not on file Attends Congregational service: Not on file Active member of club or organization: Not on file Attends meetings of clubs or organizations: Not on file Relationship status: Not on file  Intimate partner violence:  
  Fear of current or ex partner: Not on file Emotionally abused: Not on file Physically abused: Not on file Forced sexual activity: Not on file Other Topics Concern  Not on file Social History Narrative  Not on file ALLERGIES: Patient has no known allergies. Review of Systems Constitutional: Negative for chills, fatigue and fever. HENT: Negative for ear pain, sore throat and trouble swallowing. Eyes: Negative for visual disturbance. Respiratory: Negative for cough and shortness of breath. Cardiovascular: Positive for leg swelling. Negative for chest pain. Gastrointestinal: Negative for abdominal distention and abdominal pain. Genitourinary: Negative for dysuria. Musculoskeletal: Negative for back pain. Skin: Negative for rash. Neurological: Positive for weakness. Negative for light-headedness and headaches. Psychiatric/Behavioral: Negative for confusion. All other systems reviewed and are negative. Vitals:  
 03/28/19 1912 BP: 147/59 Pulse: 79 Resp: 16 Temp: 98.3 °F (36.8 °C) SpO2: 100% Weight: 128.3 kg (282 lb 13.6 oz) Height: 6' (1.829 m) Physical Exam  
Constitutional: He appears well-developed. HENT:  
Head: Normocephalic and atraumatic. Eyes: EOM are normal.  
Neck: No neck rigidity. Cardiovascular: Normal rate, regular rhythm and intact distal pulses. Regular rate and rhythm. Pulmonary/Chest: Effort normal. No respiratory distress. He has decreased breath sounds. Pt has diminished breath sounds at left base. Right lung is clear. Not in respiratory distress. No rales. Abdominal: He exhibits no distension. There is no tenderness. Abdomen is obese. Musculoskeletal: He exhibits edema. Pt has chronic venous stasis of lower extremities bilaterally and 2+ pedal pitting edema. Neurological: He is alert. No cranial nerve deficit. Skin: Skin is warm. He is not diaphoretic. Psychiatric: He has a normal mood and affect. Nursing note and vitals reviewed. Note written by Rayleen Kussmaul, Scribe, as dictated by Belén Kim MD 7:02 PM 
 
MDM Number of Diagnoses or Management Options Acute kidney injury [facility]):  
Diagnosis management comments: Igor Wilson is a 80 y.o. male presenting with report of KATHI, minimal sx,  States not dyspneic; LE edema stable. Differential includes hypovolemic kathi, hypervolemia with subsequent worsening cardiac output, intrinsic renal dysfunction Course: stable, will start gentle hydration Dispo: admit for further management. Procedures

## 2019-03-29 VITALS
HEART RATE: 74 BPM | SYSTOLIC BLOOD PRESSURE: 160 MMHG | DIASTOLIC BLOOD PRESSURE: 70 MMHG | OXYGEN SATURATION: 97 % | TEMPERATURE: 98.8 F

## 2019-03-29 LAB
ALBUMIN SERPL-MCNC: 3 G/DL (ref 3.5–5)
ALBUMIN/GLOB SERPL: 0.8 {RATIO} (ref 1.1–2.2)
ALP SERPL-CCNC: 96 U/L (ref 45–117)
ALT SERPL-CCNC: 29 U/L (ref 12–78)
ANION GAP SERPL CALC-SCNC: 8 MMOL/L (ref 5–15)
APPEARANCE UR: CLEAR
AST SERPL-CCNC: 16 U/L (ref 15–37)
BACTERIA URNS QL MICRO: NEGATIVE /HPF
BASOPHILS # BLD: 0 K/UL (ref 0–0.1)
BASOPHILS NFR BLD: 0 % (ref 0–1)
BILIRUB SERPL-MCNC: 0.2 MG/DL (ref 0.2–1)
BILIRUB UR QL: NEGATIVE
BUN SERPL-MCNC: 86 MG/DL (ref 6–20)
BUN/CREAT SERPL: 35 (ref 12–20)
CALCIUM SERPL-MCNC: 8.6 MG/DL (ref 8.5–10.1)
CHLORIDE SERPL-SCNC: 108 MMOL/L (ref 97–108)
CK SERPL-CCNC: 78 U/L (ref 39–308)
CO2 SERPL-SCNC: 23 MMOL/L (ref 21–32)
COLOR UR: ABNORMAL
CREAT SERPL-MCNC: 2.46 MG/DL (ref 0.7–1.3)
DIFFERENTIAL METHOD BLD: ABNORMAL
EOSINOPHIL # BLD: 0.2 K/UL (ref 0–0.4)
EOSINOPHIL #/AREA URNS HPF: NEGATIVE /[HPF]
EOSINOPHIL NFR BLD: 2 % (ref 0–7)
EPITH CASTS URNS QL MICRO: ABNORMAL /LPF
ERYTHROCYTE [DISTWIDTH] IN BLOOD BY AUTOMATED COUNT: 16.3 % (ref 11.5–14.5)
GLOBULIN SER CALC-MCNC: 3.9 G/DL (ref 2–4)
GLUCOSE BLD STRIP.AUTO-MCNC: 144 MG/DL (ref 65–100)
GLUCOSE BLD STRIP.AUTO-MCNC: 171 MG/DL (ref 65–100)
GLUCOSE BLD STRIP.AUTO-MCNC: 203 MG/DL (ref 65–100)
GLUCOSE BLD STRIP.AUTO-MCNC: 246 MG/DL (ref 65–100)
GLUCOSE SERPL-MCNC: 117 MG/DL (ref 65–100)
GLUCOSE UR STRIP.AUTO-MCNC: NEGATIVE MG/DL
HCT VFR BLD AUTO: 29.8 % (ref 36.6–50.3)
HGB BLD-MCNC: 9.3 G/DL (ref 12.1–17)
HGB UR QL STRIP: NEGATIVE
HYALINE CASTS URNS QL MICRO: ABNORMAL /LPF (ref 0–5)
IMM GRANULOCYTES # BLD AUTO: 0.1 K/UL (ref 0–0.04)
IMM GRANULOCYTES NFR BLD AUTO: 1 % (ref 0–0.5)
KETONES UR QL STRIP.AUTO: NEGATIVE MG/DL
LEUKOCYTE ESTERASE UR QL STRIP.AUTO: NEGATIVE
LYMPHOCYTES # BLD: 1.2 K/UL (ref 0.8–3.5)
LYMPHOCYTES NFR BLD: 15 % (ref 12–49)
MAGNESIUM SERPL-MCNC: 2.7 MG/DL (ref 1.6–2.4)
MCH RBC QN AUTO: 31.1 PG (ref 26–34)
MCHC RBC AUTO-ENTMCNC: 31.2 G/DL (ref 30–36.5)
MCV RBC AUTO: 99.7 FL (ref 80–99)
MONOCYTES # BLD: 0.6 K/UL (ref 0–1)
MONOCYTES NFR BLD: 8 % (ref 5–13)
NEUTS SEG # BLD: 6 K/UL (ref 1.8–8)
NEUTS SEG NFR BLD: 74 % (ref 32–75)
NITRITE UR QL STRIP.AUTO: NEGATIVE
NRBC # BLD: 0 K/UL (ref 0–0.01)
NRBC BLD-RTO: 0 PER 100 WBC
PH UR STRIP: 5 [PH] (ref 5–8)
PHOSPHATE SERPL-MCNC: 3.7 MG/DL (ref 2.6–4.7)
PLATELET # BLD AUTO: 247 K/UL (ref 150–400)
PMV BLD AUTO: 9.7 FL (ref 8.9–12.9)
POTASSIUM SERPL-SCNC: 5.2 MMOL/L (ref 3.5–5.1)
PROT SERPL-MCNC: 6.9 G/DL (ref 6.4–8.2)
PROT UR STRIP-MCNC: 100 MG/DL
RBC # BLD AUTO: 2.99 M/UL (ref 4.1–5.7)
RBC #/AREA URNS HPF: ABNORMAL /HPF (ref 0–5)
SERVICE CMNT-IMP: ABNORMAL
SODIUM SERPL-SCNC: 139 MMOL/L (ref 136–145)
SP GR UR REFRACTOMETRY: 1.02 (ref 1–1.03)
UR CULT HOLD, URHOLD: NORMAL
UROBILINOGEN UR QL STRIP.AUTO: 0.2 EU/DL (ref 0.2–1)
WBC # BLD AUTO: 8.1 K/UL (ref 4.1–11.1)
WBC URNS QL MICRO: ABNORMAL /HPF (ref 0–4)

## 2019-03-29 PROCEDURE — 96365 THER/PROPH/DIAG IV INF INIT: CPT

## 2019-03-29 PROCEDURE — 3331090002 HH PPS REVENUE DEBIT

## 2019-03-29 PROCEDURE — 87205 SMEAR GRAM STAIN: CPT

## 2019-03-29 PROCEDURE — 97530 THERAPEUTIC ACTIVITIES: CPT | Performed by: PHYSICAL THERAPIST

## 2019-03-29 PROCEDURE — 97116 GAIT TRAINING THERAPY: CPT | Performed by: PHYSICAL THERAPIST

## 2019-03-29 PROCEDURE — 74011250636 HC RX REV CODE- 250/636: Performed by: FAMILY MEDICINE

## 2019-03-29 PROCEDURE — 99218 HC RM OBSERVATION: CPT

## 2019-03-29 PROCEDURE — 85025 COMPLETE CBC W/AUTO DIFF WBC: CPT

## 2019-03-29 PROCEDURE — 81001 URINALYSIS AUTO W/SCOPE: CPT

## 2019-03-29 PROCEDURE — 74011636637 HC RX REV CODE- 636/637: Performed by: INTERNAL MEDICINE

## 2019-03-29 PROCEDURE — 80053 COMPREHEN METABOLIC PANEL: CPT

## 2019-03-29 PROCEDURE — 3331090001 HH PPS REVENUE CREDIT

## 2019-03-29 PROCEDURE — 74011636637 HC RX REV CODE- 636/637: Performed by: FAMILY MEDICINE

## 2019-03-29 PROCEDURE — 74011000250 HC RX REV CODE- 250: Performed by: FAMILY MEDICINE

## 2019-03-29 PROCEDURE — 74011250637 HC RX REV CODE- 250/637: Performed by: INTERNAL MEDICINE

## 2019-03-29 PROCEDURE — 97535 SELF CARE MNGMENT TRAINING: CPT

## 2019-03-29 PROCEDURE — 96372 THER/PROPH/DIAG INJ SC/IM: CPT

## 2019-03-29 PROCEDURE — 74011250637 HC RX REV CODE- 250/637: Performed by: FAMILY MEDICINE

## 2019-03-29 PROCEDURE — 97165 OT EVAL LOW COMPLEX 30 MIN: CPT

## 2019-03-29 PROCEDURE — 74011000258 HC RX REV CODE- 258: Performed by: FAMILY MEDICINE

## 2019-03-29 PROCEDURE — 96361 HYDRATE IV INFUSION ADD-ON: CPT

## 2019-03-29 PROCEDURE — 84100 ASSAY OF PHOSPHORUS: CPT

## 2019-03-29 PROCEDURE — 36415 COLL VENOUS BLD VENIPUNCTURE: CPT

## 2019-03-29 PROCEDURE — 83735 ASSAY OF MAGNESIUM: CPT

## 2019-03-29 PROCEDURE — 97161 PT EVAL LOW COMPLEX 20 MIN: CPT | Performed by: PHYSICAL THERAPIST

## 2019-03-29 PROCEDURE — 82962 GLUCOSE BLOOD TEST: CPT

## 2019-03-29 PROCEDURE — 82550 ASSAY OF CK (CPK): CPT

## 2019-03-29 RX ORDER — AMMONIUM LACTATE 12 G/100G
LOTION TOPICAL 2 TIMES DAILY
Status: DISCONTINUED | OUTPATIENT
Start: 2019-03-29 | End: 2019-03-31 | Stop reason: HOSPADM

## 2019-03-29 RX ORDER — DILTIAZEM HYDROCHLORIDE 180 MG/1
CAPSULE, EXTENDED RELEASE ORAL
Qty: 30 CAP | Refills: 11 | OUTPATIENT
Start: 2019-03-29 | End: 2019-03-31

## 2019-03-29 RX ORDER — OXYCODONE AND ACETAMINOPHEN 5; 325 MG/1; MG/1
1 TABLET ORAL ONCE
Status: COMPLETED | OUTPATIENT
Start: 2019-03-29 | End: 2019-03-29

## 2019-03-29 RX ORDER — TAMSULOSIN HYDROCHLORIDE 0.4 MG/1
0.4 CAPSULE ORAL DAILY
Status: DISCONTINUED | OUTPATIENT
Start: 2019-03-29 | End: 2019-03-31 | Stop reason: HOSPADM

## 2019-03-29 RX ORDER — INSULIN GLARGINE 100 [IU]/ML
25 INJECTION, SOLUTION SUBCUTANEOUS
Status: DISCONTINUED | OUTPATIENT
Start: 2019-03-29 | End: 2019-03-30

## 2019-03-29 RX ADMIN — SODIUM POLYSTYRENE SULFONATE 30 G: 1 POWDER ORAL; RECTAL at 01:41

## 2019-03-29 RX ADMIN — SODIUM CHLORIDE 75 ML/HR: 900 INJECTION, SOLUTION INTRAVENOUS at 00:55

## 2019-03-29 RX ADMIN — HYDRALAZINE HYDROCHLORIDE 50 MG: 50 TABLET, FILM COATED ORAL at 16:34

## 2019-03-29 RX ADMIN — HEPARIN SODIUM 5000 UNITS: 5000 INJECTION INTRAVENOUS; SUBCUTANEOUS at 00:54

## 2019-03-29 RX ADMIN — PREGABALIN 150 MG: 75 CAPSULE ORAL at 22:04

## 2019-03-29 RX ADMIN — DILTIAZEM HYDROCHLORIDE 240 MG: 240 CAPSULE, COATED, EXTENDED RELEASE ORAL at 09:19

## 2019-03-29 RX ADMIN — Medication 10 ML: at 22:07

## 2019-03-29 RX ADMIN — INSULIN LISPRO 2 UNITS: 100 INJECTION, SOLUTION INTRAVENOUS; SUBCUTANEOUS at 17:30

## 2019-03-29 RX ADMIN — PREGABALIN 150 MG: 75 CAPSULE ORAL at 16:34

## 2019-03-29 RX ADMIN — HYDRALAZINE HYDROCHLORIDE 50 MG: 50 TABLET, FILM COATED ORAL at 22:03

## 2019-03-29 RX ADMIN — TAMSULOSIN HYDROCHLORIDE 0.4 MG: 0.4 CAPSULE ORAL at 13:55

## 2019-03-29 RX ADMIN — HYDRALAZINE HYDROCHLORIDE 50 MG: 50 TABLET, FILM COATED ORAL at 09:18

## 2019-03-29 RX ADMIN — ASPIRIN 81 MG: 81 TABLET, COATED ORAL at 09:18

## 2019-03-29 RX ADMIN — PREGABALIN 150 MG: 75 CAPSULE ORAL at 00:54

## 2019-03-29 RX ADMIN — FLUTICASONE PROPIONATE 2 SPRAY: 50 SPRAY, METERED NASAL at 13:55

## 2019-03-29 RX ADMIN — OXYCODONE AND ACETAMINOPHEN 1 TABLET: 5; 325 TABLET ORAL at 04:57

## 2019-03-29 RX ADMIN — Medication 10 ML: at 14:23

## 2019-03-29 RX ADMIN — Medication 10 ML: at 00:56

## 2019-03-29 RX ADMIN — INSULIN LISPRO 2 UNITS: 100 INJECTION, SOLUTION INTRAVENOUS; SUBCUTANEOUS at 07:30

## 2019-03-29 RX ADMIN — INSULIN GLARGINE 25 UNITS: 100 INJECTION, SOLUTION SUBCUTANEOUS at 22:06

## 2019-03-29 RX ADMIN — CALCIUM GLUCONATE 1 G: 98 INJECTION, SOLUTION INTRAVENOUS at 00:55

## 2019-03-29 RX ADMIN — HYDRALAZINE HYDROCHLORIDE 50 MG: 50 TABLET, FILM COATED ORAL at 00:54

## 2019-03-29 RX ADMIN — ERGOCALCIFEROL 50000 UNITS: 1.25 CAPSULE ORAL at 09:18

## 2019-03-29 RX ADMIN — SODIUM CHLORIDE 75 ML/HR: 900 INJECTION, SOLUTION INTRAVENOUS at 14:24

## 2019-03-29 RX ADMIN — HEPARIN SODIUM 5000 UNITS: 5000 INJECTION INTRAVENOUS; SUBCUTANEOUS at 16:34

## 2019-03-29 RX ADMIN — PREGABALIN 150 MG: 75 CAPSULE ORAL at 09:18

## 2019-03-29 RX ADMIN — HEPARIN SODIUM 5000 UNITS: 5000 INJECTION INTRAVENOUS; SUBCUTANEOUS at 09:19

## 2019-03-29 RX ADMIN — FERROUS SULFATE TAB 325 MG (65 MG ELEMENTAL FE) 325 MG: 325 (65 FE) TAB at 07:23

## 2019-03-29 RX ADMIN — Medication: at 18:10

## 2019-03-29 NOTE — CONSULTS
[facility] 
 [address] [facility] [facility] Phone: [phone] NOTE Patient: Ingrid Randall MRN: [id number]  PCP: Mary Alfaro MD  
:     1950  Age:   80 y.o. Sex:  male Referring physician: Suellen Cronin MD 
Reason for consultation: 80 y.o. male with KATHI (acute kidney injury) ([facility] [address] [C76.5] complicated by KATHI Admission Date: 3/28/2019  6:59 PM  LOS: 0 days ASSESSMENT and PLAN :  
KATHI on CKD  
- recurrent pre renal azotemia  
- still w/ resolving severe KATHI when diuretics were resumed  
- continue w/ IVF for now - would keep him off Avapro on d/c  
- may have to hold Bumex on d/c until he sees Dr Jr Castillo in f/u in a week CKD Stage III : 
- baseline Cr 1.7 mg/dl - May have to allow Creatinine to be a bit higher (~ 2 mg/dl) to keep him euvolemic  
- discussed low salt diet BPH w/ Prostatic Sx Chronic Metabolic acidosis : 
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  
HTN : Continue current meds Care Plan discussed with:  Pt Thank you for consulting [facility] in the care of your patient. Subjective: HPI: Ingrid Randall is a 80 y.o.  male who has been admitted to the hospital for ARF. He was recently hospitalized at [address] [facility] with ARF due to pre renal azotemia from Bumex + Metolazone His Cr was down to 2 mg/dl on d/c and was sent home on Bumex 2 mg BID However he says he has been taking only once a day He has remained off Avapro per records but pt is not sure He has not gained any weight He saw Dr Bhavana Mendoza (PCP) in follow up and labs showed Cr was back to 2.77 and sent to hospital  
He reports prostatic Sx He has high salt diet Denies any NSAID use Past Medical Hx:  
Past Medical History:  
Diagnosis Date  Arthritis, Degenerative,  Knee 2011  Carcinoma, Prostate 2011  Degenerative disc disease 4/4/2011  Depression/ Anxiety 4/4/2011  Diabetes ([address]  Diabetes Mellitrus ( non-insulin dependent ) Type 2 4/4/2011  
 HEMATOCHEZIA 4/4/2011  Hypertrension 4/4/2011  Hypertriglyceridemia 4/4/2011  Insomnia 4/4/2011  Laryngitis 4/4/2011  Mild Aortic insufficiency 4/4/2011  Mild Concentric LVH (left ventricular hypertrophy) 4/4/2011  Neuropathy 4/4/2011  Osteoarthritis 4/4/2011  Rotator Cuff Tendonitis 4/4/2011 Past Surgical Hx: 
  
Past Surgical History:  
Procedure Laterality Date  CARDIAC SURG PROCEDURE UNLIST    
 cardiac cath  COLONOSCOPY N/A 4/28/2017 COLONOSCOPY performed by Dimple Gautam MD at \[facility]\"" ENDOSCOPY  
 HX ORTHOPAEDIC    
 left hip pinning  HX OTHER SURGICAL    
 left little toe amputation  HX UROLOGICAL No Known Allergies Social Hx:  reports that he has been smoking. He has been smoking about 0.25 packs per day. He has never used smokeless tobacco. He reports that he drinks about 7.0 oz of alcohol per week. History reviewed. No pertinent family history. Review of Systems: A thorough twelve point review of system was performed today. Pertinent positives and negatives are mentioned in the HPI. The reminder of the ROS is negative and noncontributory. Objective:  
Vitals:   
Vitals:  
 03/29/19 6404 03/29/19 0724 03/29/19 0912 03/29/19 1147 BP: 147/76 153/71 138/72 149/75 Pulse:  83 84 85 Resp:  16 16 16 Temp:  98.9 °F (37.2 °C) 98.6 °F (37 °C) 98.3 °F (36.8 °C) SpO2:  97% 96% 95% Weight:      
Height:      
 
I&O's:  03/28 0701 - 03/29 0700 In: -  
Out: 1350 [[id number]] Visit Vitals /75 (BP 1 Location: Right arm, BP Patient Position: At rest) Pulse 85 Temp 98.3 °F (36.8 °C) Resp 16 Ht 6' (1.829 m) Wt 128.3 kg (282 lb 13.6 oz) SpO2 95% BMI 38.36 kg/m² Physical Exam: 
General: obese HEENT: PERRL,  + Pallor , No Icterus Neck: Supple,no mass palpable Lungs : CTA 
CVS: RRR, S1 S2 normal, No murmur Abdomen: Soft, NT, BS + Extremities: chronic leg discoloration, no Edema Skin: discoloration of legs MS: No joint swelling, erythema, warmth Neurologic: non focal, AAO x 3 Psych: normal affect Laboratory Results: 
 
Recent Labs  
  03/29/19 0115 03/28/19 1935 03/27/19 
1208  134* 133*  
K 5.2* 5.3* 5.6*  
 104 98 CO2 23 23 17* * 235* 124* BUN 86* 94* 99* CREA 2.46* 2.70* 2.66* CA 8.6 8.6 9.2 MG 2.7* 2.7*  --   
PHOS 3.7  --   --   
ALB 3.0* 3.2* 3.9 SGOT 16 15 21 ALT 29 30 25 Recent Labs  
  03/29/19 0115 03/28/19 1935 03/27/19 
1208 WBC 8.1 9.2 8.8 HGB 9.3* 10.0* 10.4* HCT 29.8* 31.9* 32.1*  
 257 330 No results found for: SDES Lab Results Component Value Date/Time Culture result: NO GROWTH AFTER 5 HOURS 03/28/2019 10:51 PM  
 Culture result: FEW 
STAPHYLOCOCCUS AUREUS 
 10/23/2016 08:57 AM  
 Culture result: NO ANAEROBES ISOLATED 10/23/2016 08:53 AM  
 Culture result: LIGHT 
STAPHYLOCOCCUS AUREUS 
 10/23/2016 08:53 AM  
 
Recent Results (from the past 24 hour(s)) CBC WITH AUTOMATED DIFF Collection Time: 03/28/19  7:35 PM  
Result Value Ref Range WBC 9.2 4.1 - 11.1 K/uL  
 RBC 3.18 (L) 4.10 - 5.70 M/uL  
 HGB 10.0 (L) 12.1 - 17.0 g/dL HCT 31.9 (L) 36.6 - 50.3 % .3 (H) 80.0 - 99.0 FL  
 MCH 31.4 26.0 - 34.0 PG  
 MCHC 31.3 30.0 - 36.5 g/dL  
 RDW 16.4 (H) 11.5 - 14.5 % PLATELET 726 887 - 667 K/uL MPV 9.7 8.9 - 12.9 FL  
 NRBC 0.0 0  WBC ABSOLUTE NRBC 0.00 0.00 - 0.01 K/uL NEUTROPHILS 79 (H) 32 - 75 % LYMPHOCYTES 12 12 - 49 % MONOCYTES 7 5 - 13 % EOSINOPHILS 1 0 - 7 % BASOPHILS 0 0 - 1 % IMMATURE GRANULOCYTES 1 (H) 0.0 - 0.5 % ABS. NEUTROPHILS 7.2 1.8 - 8.0 K/UL  
 ABS. LYMPHOCYTES 1.1 0.8 - 3.5 K/UL  
 ABS. MONOCYTES 0.7 0.0 - 1.0 K/UL  
 ABS. EOSINOPHILS 0.1 0.0 - 0.4 K/UL ABS. BASOPHILS 0.0 0.0 - 0.1 K/UL  
 ABS. IMM. GRANS. 0.1 (H) 0.00 - 0.04 K/UL  
 DF AUTOMATED METABOLIC PANEL, COMPREHENSIVE Collection Time: 03/28/19  7:35 PM  
Result Value Ref Range Sodium 134 (L) 136 - 145 mmol/L Potassium 5.3 (H) 3.5 - 5.1 mmol/L Chloride 104 97 - 108 mmol/L  
 CO2 23 21 - 32 mmol/L Anion gap 7 5 - 15 mmol/L Glucose 235 (H) 65 - 100 mg/dL BUN 94 (H) 6 - 20 MG/DL Creatinine 2.70 (H) 0.70 - 1.30 MG/DL  
 BUN/Creatinine ratio 35 (H) 12 - 20 GFR est AA 29 (L) >60 ml/min/1.73m2 GFR est non-AA 24 (L) >60 ml/min/1.73m2 Calcium 8.6 8.5 - 10.1 MG/DL Bilirubin, total 0.2 0.2 - 1.0 MG/DL  
 ALT (SGPT) 30 12 - 78 U/L  
 AST (SGOT) 15 15 - 37 U/L Alk. phosphatase 115 45 - 117 U/L Protein, total 7.2 6.4 - 8.2 g/dL Albumin 3.2 (L) 3.5 - 5.0 g/dL Globulin 4.0 2.0 - 4.0 g/dL A-G Ratio 0.8 (L) 1.1 - 2.2 NT-PRO BNP Collection Time: 03/28/19  7:35 PM  
Result Value Ref Range NT pro- (H) <125 PG/ML  
MAGNESIUM Collection Time: 03/28/19  7:35 PM  
Result Value Ref Range Magnesium 2.7 (H) 1.6 - 2.4 mg/dL HEMOGLOBIN A1C WITH EAG Collection Time: 03/28/19  7:35 PM  
Result Value Ref Range Hemoglobin A1c 8.0 (H) 4.2 - 6.3 % Est. average glucose 183 mg/dL GLUCOSE, POC Collection Time: 03/28/19 10:50 PM  
Result Value Ref Range Glucose (POC) 124 (H) 65 - 100 mg/dL Performed by Almaz Maxwell CULTURE, BLOOD, PAIRED Collection Time: 03/28/19 10:51 PM  
Result Value Ref Range Special Requests: NO SPECIAL REQUESTS Culture result: NO GROWTH AFTER 5 HOURS    
LACTIC ACID Collection Time: 03/28/19 10:51 PM  
Result Value Ref Range Lactic acid 1.1 0.4 - 2.0 MMOL/L  
METABOLIC PANEL, COMPREHENSIVE Collection Time: 03/29/19  1:15 AM  
Result Value Ref Range Sodium 139 136 - 145 mmol/L Potassium 5.2 (H) 3.5 - 5.1 mmol/L  Chloride 108 97 - 108 mmol/L  
 CO2 23 21 - 32 mmol/L  
 Anion gap 8 5 - 15 mmol/L Glucose 117 (H) 65 - 100 mg/dL BUN 86 (H) 6 - 20 MG/DL Creatinine 2.46 (H) 0.70 - 1.30 MG/DL  
 BUN/Creatinine ratio 35 (H) 12 - 20 GFR est AA 32 (L) >60 ml/min/1.73m2 GFR est non-AA 26 (L) >60 ml/min/1.73m2 Calcium 8.6 8.5 - 10.1 MG/DL Bilirubin, total 0.2 0.2 - 1.0 MG/DL  
 ALT (SGPT) 29 12 - 78 U/L  
 AST (SGOT) 16 15 - 37 U/L Alk. phosphatase 96 45 - 117 U/L Protein, total 6.9 6.4 - 8.2 g/dL Albumin 3.0 (L) 3.5 - 5.0 g/dL Globulin 3.9 2.0 - 4.0 g/dL A-G Ratio 0.8 (L) 1.1 - 2.2 CK Collection Time: 03/29/19  1:15 AM  
Result Value Ref Range CK 78 39 - 308 U/L  
CBC WITH AUTOMATED DIFF Collection Time: 03/29/19  1:15 AM  
Result Value Ref Range WBC 8.1 4.1 - 11.1 K/uL  
 RBC 2.99 (L) 4.10 - 5.70 M/uL HGB 9.3 (L) 12.1 - 17.0 g/dL HCT 29.8 (L) 36.6 - 50.3 % MCV 99.7 (H) 80.0 - 99.0 FL  
 MCH 31.1 26.0 - 34.0 PG  
 MCHC 31.2 30.0 - 36.5 g/dL  
 RDW 16.3 (H) 11.5 - 14.5 % PLATELET 430 714 - 605 K/uL MPV 9.7 8.9 - 12.9 FL  
 NRBC 0.0 0  WBC ABSOLUTE NRBC 0.00 0.00 - 0.01 K/uL NEUTROPHILS 74 32 - 75 % LYMPHOCYTES 15 12 - 49 % MONOCYTES 8 5 - 13 % EOSINOPHILS 2 0 - 7 % BASOPHILS 0 0 - 1 % IMMATURE GRANULOCYTES 1 (H) 0.0 - 0.5 % ABS. NEUTROPHILS 6.0 1.8 - 8.0 K/UL  
 ABS. LYMPHOCYTES 1.2 0.8 - 3.5 K/UL  
 ABS. MONOCYTES 0.6 0.0 - 1.0 K/UL  
 ABS. EOSINOPHILS 0.2 0.0 - 0.4 K/UL  
 ABS. BASOPHILS 0.0 0.0 - 0.1 K/UL  
 ABS. IMM. GRANS. 0.1 (H) 0.00 - 0.04 K/UL  
 DF AUTOMATED MAGNESIUM Collection Time: 03/29/19  1:15 AM  
Result Value Ref Range Magnesium 2.7 (H) 1.6 - 2.4 mg/dL PHOSPHORUS Collection Time: 03/29/19  1:15 AM  
Result Value Ref Range Phosphorus 3.7 2.6 - 4.7 MG/DL  
GLUCOSE, POC Collection Time: 03/29/19  6:31 AM  
Result Value Ref Range Glucose (POC) 203 (H) 65 - 100 mg/dL Performed by Adam Briceno, POC  Collection Time: 03/29/19 11:35 AM  
 Result Value Ref Range Glucose (POC) 144 (H) 65 - 100 mg/dL Performed by Darci Garay Urine dipstick:  
Lab Results Component Value Date/Time Color YELLOW/STRAW 03/14/2019 08:50 PM  
 Appearance CLEAR 03/14/2019 08:50 PM  
 Specific gravity 1.013 03/14/2019 08:50 PM  
 pH (UA) 5.0 03/14/2019 08:50 PM  
 Protein 100 (A) 03/14/2019 08:50 PM  
 Glucose 100 (A) 03/14/2019 08:50 PM  
 Ketone NEGATIVE  03/14/2019 08:50 PM  
 Bilirubin NEGATIVE  03/14/2019 08:50 PM  
 Urobilinogen 0.2 03/14/2019 08:50 PM  
 Nitrites NEGATIVE  03/14/2019 08:50 PM  
 Leukocyte Esterase NEGATIVE  03/14/2019 08:50 PM  
 Epithelial cells FEW 03/14/2019 08:50 PM  
 Bacteria NEGATIVE  03/14/2019 08:50 PM  
 WBC 0-4 03/14/2019 08:50 PM  
 RBC 0-5 03/14/2019 08:50 PM  
 
 
I have reviewed the following: All pertinent labs, microbiology data, radiology imaging for my assessment Medications list Personally Reviewed   [x]      Yes     []               No    
 
Medications: 
Prior to Admission medications Medication Sig Start Date End Date Taking? Authorizing Provider DILT- mg XR capsule TAKE ONE CAPSULE BY MOUTH DAILY 3/29/19   Ryan Friend MD  
pregabalin (LYRICA) 150 mg capsule Take 150 mg by mouth three (3) times daily. Provider, Historical  
cetirizine (ALLERGY RELIEF, CETIRIZINE,) 10 mg tablet Take 10 mg by mouth daily. Provider, Historical  
aspirin delayed-release 81 mg tablet Take 81 mg by mouth daily. Provider, Historical  
dilTIAZem CD (CARDIZEM CD) 240 mg ER capsule Take 1 Cap by mouth daily. 3/20/19   Laura Wiley MD  
hydrALAZINE (APRESOLINE) 50 mg tablet Take 1 Tab by mouth three (3) times daily. 3/20/19   Laura Wiley MD  
bumetanide (BUMEX) 2 mg tablet Take 1 Tab by mouth daily. 3/19/19   Laura Wiley MD  
sodium chloride (OCEAN) 0.65 % nasal squeeze bottle 0.1 mL by Both Nostrils route every two (2) hours as needed for Congestion.  3/18/19 Pritesh Taveras MD  
gabapentin (NEURONTIN) 300 mg capsule TAKE TWO CAPSULES BY MOUTH THREE TIMES A DAY 3/15/19   Lori Davila MD  
ketoconazole (NIZORAL) 2 % topical cream Apply  to affected area two (2) times a day. Patient taking differently: Apply 1 Applicator to affected area two (2) times a day. thin layer to skin irritation  3/14/19   Lori Davila MD  
hydrocolloid dressing (DUODERM HYDROACTIVE) topical gel Apply  to affected area daily. 3/14/19   Lori Davila MD  
LANTUS U-100 INSULIN 100 unit/mL injection INJECT 72 UNITS UNDER THE SKIN EVERY 12 HOURS 9/3/18   Lori Davila MD  
triamcinolone acetonide (KENALOG) 0.1 % ointment Apply  to affected area two (2) times a day. use thin layer bid 8/30/18   Lori Davila MD  
lidocaine (LIDODERM) 5 % 1 Patch by TransDERmal route every twenty-four (24) hours. Apply to affected area every 24 hours Patient not taking: Reported on 3/22/2019 8/30/18   Lori Davila MD  
ferrous sulfate (IRON) 325 mg (65 mg iron) EC tablet Take 1 Tab by mouth Daily (before breakfast). 8/30/18   Lori Davila MD  
insulin regular (NOVOLIN R, HUMULIN R) 100 unit/mL injection 14 Units by SubCUTAneous route three (3) times daily as needed (with meals). Patient taking differently: 14 Units by SubCUTAneous route three (3) times daily. 8/30/18   Lori Davila MD  
ammonium lactate (LAC-HYDRIN FIVE) 5 % lotion Apply daily Patient taking differently: Apply 1 Applicator to affected area daily. Apply daily bilateral lower legs  8/30/18   Lori Davila MD  
acetaminophen (PAIN AND FEVER) 500 mg tablet TAKE ONE TABLET BY MOUTH EVERY 6 HOURS AS NEEDED FOR PAIN 8/28/18   Lori Davila MD  
ergocalciferol (ERGOCALCIFEROL) 50,000 unit capsule Take 1 Cap by mouth every seven (7) days.  7/31/18   Lori Davila MD  
THERA-TABS M 27 mg iron-400 mcg tab TAKE ONE TABLET BY MOUTH DAILY 7/5/18   Lori Davila MD  
 fluticasone (FLONASE) 50 mcg/actuation nasal spray SPRAY TWO SPRAYS IN EACH NOSTRIL DAILY 7/5/18   Lori Davila MD  
polyethylene glycol [facility]) 17 gram/dose powder Take 17 g by mouth daily. 4/12/18   Lori Davila MD  
Calcium Carbonate-Vit D3-Min 600 mg calcium- 400 unit tab Take 1 Tab by mouth two (2) times a day. Provider, Historical  
  
 
Thank you for allowing us to participate in the care of this patient. We will follow patient. Please dont hesitate to call with any questions Cristal Smith, [address] [facility] Kidney [facility] [id number] [place] [address], [facility] Phone - [phone] Fax - [phone] 
[email or web address]. [facility] Island[facility]

## 2019-03-29 NOTE — H&P
[address] HISTORY AND PHYSICAL Name:  Alberto Torres 
MR#:  [id number] :  1950 ACCOUNT #:  [de-identified] ADMIT DATE:  2019 CHIEF COMPLAINT:  Abnormal labs. HISTORY OF PRESENT ILLNESS:  The patient is a 60-year-old gentleman with past medical history of chronic kidney disease stage III, diabetes mellitus type 2, history of KATHI with recent admission to the hospital on 2019, and discharged on 2019, history of hypertension, chronic venous stasis of bilateral lower extremities, chronic hip pain, recurrent hyperkalemia and hyponatremia, anemia of chronic kidney disease, and obesity, who presents to the hospital with the above-mentioned symptoms. The patient was recently admitted to [facility] for KATHI prior to arrival on CKD stage III probably due to dehydration and diuretic. Also had some presyncopal episodes. The patient was given IV fluids and was discharged home. The patient reports that today, he went to his primary care physician for evaluation, had blood work done, and his creatinine was found to be 2.66. The patient was told to come to the ER for further management and evaluation. The patient reports that he has had some weakness associated with his symptoms, but has had no other complaints or problems. The patient reports that he has been taking his medications on a regular basis. The patient's Bumex was restarted when he was discharged from [facility]. The patient reports his leg swelling has improved significantly, but he still has swelling, increased in the left leg than the right leg. The patient came to the ER, his creatinine was found to be 2.7 and he was requested to be admitted under the hospitalist service. The patient reports that recently, he had his pinky toe amputated, but denies any other complaints or problems.   Denies any headache, blurry vision, sore throat, trouble swallowing, trouble with speech. Denies any chest pain, shortness of breath, cough, fever, chills, abdominal pain, constipation, diarrhea, urinary symptoms, focal or generalized neurological weakness, recent travel, sick contacts, falls, injuries, hematemesis, melena, hemoptysis, hematuria, or any other concerns or problems. PAST MEDICAL HISTORY:  See above. HOME MEDICATIONS:  Currently the patient is on: 1. Lyrica 150 mg b.i.d. 2.  Cetirizine. 3.  Aspirin 81 mg daily. 4.  Cardizem 240 mg daily. 5.  Hydralazine 50 mg t.i.d. 6.  Bumex 2 mg daily. 7.  Gabapentin 600 mg t.i.d. 8.  Nizoral spray. 9.  Lantus subcutaneous daily. 10.  Lidocaine. 11.  Ferrous sulfate. 12.  Ammonium lactate. 13.  Acetaminophen. 14.  Ergocalciferol. 15.  Thera Multivitamin. 16.  Flonase. 17.  MiraLax. 18.  Calcium carbonate. SOCIAL HISTORY:  Current everyday smoker. Smokes quarter of a pack per day. Occasional alcohol. Currently the patient reports that for the past six months, he has been drinking one to two beers every week. Denies IV drug abuse. Lives at home. FAMILY HISTORY:  Discussed and found to be noncontributory. ALLERGIES:  NO KNOWN DRUG ALLERGIES. REVIEW OF SYSTEMS:  All systems were reviewed and found to be essentially negative except for the symptoms mentioned above. PHYSICAL EXAMINATION: 
GENERAL:  Alert x3, awake, mildly distressed pleasant male, appears to be stated age. VITAL SIGNS:  Temperature 98.5, pulse 81, respiratory rate 16, blood pressure 152/64, pulse ox 100 on room air. HEENT:  Pupils equal and reactive to light. Dry mucous membranes. Tympanic membranes clear. NECK:  Supple. CHEST:  Clear to auscultation bilaterally. COR:  S1 and S2 are heard. ABDOMEN:  Soft, nontender, nondistended. Bowel sounds are physiological. 
EXTREMITIES:  No clubbing, no cyanosis. Chronic venous stasis of lower extremities bilaterally, 2+ pitting edema. NEURO/PSYCH:  Pleasant mood and affect. Decreased sensation to bilateral lower extremities, moves all 4. Strength 5/5. SKIN:  Warm. LABORATORY DATA:  White count 9.2, hemoglobin 10.0, hematocrit 31.9, platelets 831. Sodium 134, potassium 5.3, chloride 104, bicarb 23, anion gap 7, glucose 235, BUN 94, creatinine 2.70, calcium 8.6. Bilirubin total 0.2, ALT 30, AST 15, alkaline phosphatase 115. . X-ray of the chest shows no acute abnormality. ASSESSMENT AND PLAN: 
1. Acute kidney injury superimposed on chronic kidney injury, stage III:  We will observe the patient on a telemetry bed. Most likely diuretic plus dehydration induced. We will stop Bumex for now and provide gentle intravenous hydration. Strict intake and output, daily weights, and continue to closely monitor. We will get renal ultrasound. Avoid nephrotoxic medication. Renally dose all other medications. We will get a renal ultrasound and nephrology consult in the morning. If symptoms persists, may consider further intervention and diagnostics and we will continue to closely monitor. 2.  History of hypertension:  Continue home medications. Poorly controlled. Continue to closely monitor. 3.  Diabetes mellitus type 2: We will continue home Lantus, sliding scale Novolog insulin, Accu-Chek, diet control, and close monitoring. Further intervention per hospital course. 4.  History of prostate cancer, stable. Continue to closely monitor. 5.  Chronic venous stasis of bilateral lower extremities: We will get Wound Care while in the hospital. 
6.  Hyperkalemia:  EKG was not done in the ER. We will give calcium gluconate. We will give Kayexalate. It should improve with hydration. We will continue to closely monitor and repeat BMP in the morning. Telemetry monitoring does not show any acute T-waves. 7.  Gastrointestinal and deep venous thrombosis prophylaxis. The patient will be on heparin.  
 
 
 
Memo Barnes MD 
 
 
 [id number] 
D:  03/28/2019 22:06 
T:  03/29/2019 0:00 
JOB #:  [id number]

## 2019-03-29 NOTE — PROGRESS NOTES
Hospitalist Progress Note Hospital summary: The patient is a 81-year-old gentleman with past medical history of chronic kidney disease stage III, diabetes mellitus type 2, history of KATHI with recent admission to the hospital on 03/14/2019, and discharged on 03/20/2019, history of hypertension, chronic venous stasis of bilateral lower extremities, chronic hip pain, recurrent hyperkalemia and hyponatremia, anemia of chronic kidney disease, and obesity, who presents to the hospital with the above-mentioned symptoms. The patient was recently admitted to [facility] for KATHI prior to arrival on CKD stage III probably due to dehydration and diuretic. Also had some presyncopal episodes. The patient was given IV fluids and was discharged home. The patient reports that today, he went to his primary care physician for evaluation, had blood work done, and his creatinine was found to be 2.66. The patient was told to come to the ER for further management and evaluation. The patient reports that he has had some weakness associated with his symptoms, but has had no other complaints or problems. The patient reports that he has been taking his medications on a regular basis. The patient's Bumex was restarted when he was discharged from [facility]. The patient reports his leg swelling has improved significantly, but he still has swelling, increased in the left leg than the right leg. The patient came to the ER, his creatinine was found to be 2.7 and he was requested to be admitted under the hospitalist service. 3/28/2019 Assessment/Plan: 
Acute kidney injury superimposed on chronic kidney injury, stage III:   
-pre renal 2/2 diuretics - similar complaints in the past 
- Cr 2.67 on POA, improving 
- IVF 
-  hold bumex, irbesartan 
- nephrology on board; agree with current plan 
- renal us: normal 
- avoid nephrotoxins 
- monitor renal function Hyperkalemia 
- improving - s/p ca gluconate and kayxelate 
- will monitor Diabetes mellitus type 2:   
- A1c 7.5 in 2/2019 - c/w current lantus 40U daily ( takes 72 bid at home and Lispro 14U with meals). ISS  
- DM education consulted Hypertension:  stable. C/w cardizem, hydralazine History of prostate cancer, stable. Chronic venous stasis of bilateral lower extremities: Wound Care Code status: Full DVT prophylaxis: heparin Disposition: TBD. Home with [facility] when ready 
---------------------------------------------- 
 
CC: KATHI 
 
S: Patient is seen and examined at bedside. He has no complaints. He states has been taking bumex only once daily. Review of Systems: A comprehensive review of systems was negative. O: 
Visit Vitals /72 Pulse 84 Temp 98.6 °F (37 °C) Resp 16 Ht 6' (1.829 m) Wt 128.3 kg (282 lb 13.6 oz) SpO2 96% BMI 38.36 kg/m² PHYSICAL EXAM: 
Gen: NAD, non-toxic HEENT: anicteric sclerae, normal conjunctiva, oropharynx clear, MM moist 
Neck: supple, trachea midline, no adenopathy Heart: RRR, no MRG, no JVD, no peripheral edema Lungs: CTA b/l, non-labored respirations Abd: soft, NT, ND, BS+, no organomegaly Extr: chronic leg discoloration b/l 
Skin: dry Neuro: CN II-XII grossly intact, normal speech, moves all extremities Psych: normal mood, appropriate affect Intake/Output Summary (Last 24 hours) at 3/29/2019 1050 Last data filed at 3/29/2019 0800 Gross per 24 hour Intake 240 ml Output 1350 ml Net -1110 ml Recent labs & imaging reviewed: 
Recent Results (from the past 24 hour(s)) CBC WITH AUTOMATED DIFF Collection Time: 03/28/19  7:35 PM  
Result Value Ref Range WBC 9.2 4.1 - 11.1 K/uL  
 RBC 3.18 (L) 4.10 - 5.70 M/uL  
 HGB 10.0 (L) 12.1 - 17.0 g/dL HCT 31.9 (L) 36.6 - 50.3 % .3 (H) 80.0 - 99.0 FL  
 MCH 31.4 26.0 - 34.0 PG  
 MCHC 31.3 30.0 - 36.5 g/dL  
 RDW 16.4 (H) 11.5 - 14.5 % PLATELET 321 875 - 496 K/uL MPV 9.7 8.9 - 12.9 FL  
 NRBC 0.0 0  WBC ABSOLUTE NRBC 0.00 0.00 - 0.01 K/uL NEUTROPHILS 79 (H) 32 - 75 % LYMPHOCYTES 12 12 - 49 % MONOCYTES 7 5 - 13 % EOSINOPHILS 1 0 - 7 % BASOPHILS 0 0 - 1 % IMMATURE GRANULOCYTES 1 (H) 0.0 - 0.5 % ABS. NEUTROPHILS 7.2 1.8 - 8.0 K/UL  
 ABS. LYMPHOCYTES 1.1 0.8 - 3.5 K/UL  
 ABS. MONOCYTES 0.7 0.0 - 1.0 K/UL  
 ABS. EOSINOPHILS 0.1 0.0 - 0.4 K/UL  
 ABS. BASOPHILS 0.0 0.0 - 0.1 K/UL  
 ABS. IMM. GRANS. 0.1 (H) 0.00 - 0.04 K/UL  
 DF AUTOMATED METABOLIC PANEL, COMPREHENSIVE Collection Time: 03/28/19  7:35 PM  
Result Value Ref Range Sodium 134 (L) 136 - 145 mmol/L Potassium 5.3 (H) 3.5 - 5.1 mmol/L Chloride 104 97 - 108 mmol/L  
 CO2 23 21 - 32 mmol/L Anion gap 7 5 - 15 mmol/L Glucose 235 (H) 65 - 100 mg/dL BUN 94 (H) 6 - 20 MG/DL Creatinine 2.70 (H) 0.70 - 1.30 MG/DL  
 BUN/Creatinine ratio 35 (H) 12 - 20 GFR est AA 29 (L) >60 ml/min/1.73m2 GFR est non-AA 24 (L) >60 ml/min/1.73m2 Calcium 8.6 8.5 - 10.1 MG/DL Bilirubin, total 0.2 0.2 - 1.0 MG/DL  
 ALT (SGPT) 30 12 - 78 U/L  
 AST (SGOT) 15 15 - 37 U/L Alk. phosphatase 115 45 - 117 U/L Protein, total 7.2 6.4 - 8.2 g/dL Albumin 3.2 (L) 3.5 - 5.0 g/dL Globulin 4.0 2.0 - 4.0 g/dL A-G Ratio 0.8 (L) 1.1 - 2.2 NT-PRO BNP Collection Time: 03/28/19  7:35 PM  
Result Value Ref Range NT pro- (H) <125 PG/ML  
MAGNESIUM Collection Time: 03/28/19  7:35 PM  
Result Value Ref Range Magnesium 2.7 (H) 1.6 - 2.4 mg/dL HEMOGLOBIN A1C WITH EAG Collection Time: 03/28/19  7:35 PM  
Result Value Ref Range Hemoglobin A1c 8.0 (H) 4.2 - 6.3 % Est. average glucose 183 mg/dL GLUCOSE, POC Collection Time: 03/28/19 10:50 PM  
Result Value Ref Range Glucose (POC) 124 (H) 65 - 100 mg/dL Performed by Álvaro Jurado CULTURE, BLOOD, PAIRED Collection Time: 03/28/19 10:51 PM  
Result Value Ref Range Special Requests: NO SPECIAL REQUESTS Culture result: NO GROWTH AFTER 5 HOURS    
LACTIC ACID Collection Time: 03/28/19 10:51 PM  
Result Value Ref Range Lactic acid 1.1 0.4 - 2.0 MMOL/L  
METABOLIC PANEL, COMPREHENSIVE Collection Time: 03/29/19  1:15 AM  
Result Value Ref Range Sodium 139 136 - 145 mmol/L Potassium 5.2 (H) 3.5 - 5.1 mmol/L Chloride 108 97 - 108 mmol/L  
 CO2 23 21 - 32 mmol/L Anion gap 8 5 - 15 mmol/L Glucose 117 (H) 65 - 100 mg/dL BUN 86 (H) 6 - 20 MG/DL Creatinine 2.46 (H) 0.70 - 1.30 MG/DL  
 BUN/Creatinine ratio 35 (H) 12 - 20 GFR est AA 32 (L) >60 ml/min/1.73m2 GFR est non-AA 26 (L) >60 ml/min/1.73m2 Calcium 8.6 8.5 - 10.1 MG/DL Bilirubin, total 0.2 0.2 - 1.0 MG/DL  
 ALT (SGPT) 29 12 - 78 U/L  
 AST (SGOT) 16 15 - 37 U/L Alk. phosphatase 96 45 - 117 U/L Protein, total 6.9 6.4 - 8.2 g/dL Albumin 3.0 (L) 3.5 - 5.0 g/dL Globulin 3.9 2.0 - 4.0 g/dL A-G Ratio 0.8 (L) 1.1 - 2.2 CK Collection Time: 03/29/19  1:15 AM  
Result Value Ref Range CK 78 39 - 308 U/L  
CBC WITH AUTOMATED DIFF Collection Time: 03/29/19  1:15 AM  
Result Value Ref Range WBC 8.1 4.1 - 11.1 K/uL  
 RBC 2.99 (L) 4.10 - 5.70 M/uL HGB 9.3 (L) 12.1 - 17.0 g/dL HCT 29.8 (L) 36.6 - 50.3 % MCV 99.7 (H) 80.0 - 99.0 FL  
 MCH 31.1 26.0 - 34.0 PG  
 MCHC 31.2 30.0 - 36.5 g/dL  
 RDW 16.3 (H) 11.5 - 14.5 % PLATELET 962 944 - 473 K/uL MPV 9.7 8.9 - 12.9 FL  
 NRBC 0.0 0  WBC ABSOLUTE NRBC 0.00 0.00 - 0.01 K/uL NEUTROPHILS 74 32 - 75 % LYMPHOCYTES 15 12 - 49 % MONOCYTES 8 5 - 13 % EOSINOPHILS 2 0 - 7 % BASOPHILS 0 0 - 1 % IMMATURE GRANULOCYTES 1 (H) 0.0 - 0.5 % ABS. NEUTROPHILS 6.0 1.8 - 8.0 K/UL  
 ABS. LYMPHOCYTES 1.2 0.8 - 3.5 K/UL  
 ABS. MONOCYTES 0.6 0.0 - 1.0 K/UL  
 ABS. EOSINOPHILS 0.2 0.0 - 0.4 K/UL  
 ABS. BASOPHILS 0.0 0.0 - 0.1 K/UL  
 ABS. IMM. GRANS. 0.1 (H) 0.00 - 0.04 K/UL  
 DF AUTOMATED MAGNESIUM Collection Time: 03/29/19  1:15 AM  
Result Value Ref Range Magnesium 2.7 (H) 1.6 - 2.4 mg/dL PHOSPHORUS Collection Time: 03/29/19  1:15 AM  
Result Value Ref Range Phosphorus 3.7 2.6 - 4.7 MG/DL  
GLUCOSE, POC Collection Time: 03/29/19  6:31 AM  
Result Value Ref Range Glucose (POC) 203 (H) 65 - 100 mg/dL Performed by [facility] Recent Labs  
  03/29/19 0115 03/28/19 1935 WBC 8.1 9.2 HGB 9.3* 10.0* HCT 29.8* 31.9*  
 257 Recent Labs  
  03/29/19 0115 03/28/19 1935 03/27/19 
1208  134* 133*  
K 5.2* 5.3* 5.6*  
 104 98 CO2 23 23 17* BUN 86* 94* 99* CREA 2.46* 2.70* 2.66* * 235* 124* CA 8.6 8.6 9.2 MG 2.7* 2.7*  --   
PHOS 3.7  --   --   
 
Recent Labs  
  03/29/19 0115 03/28/19 1935 03/27/19 
1208 SGOT 16 15 21 ALT 29 30 25 AP 96 115 95 TBILI 0.2 0.2 <0.2 TP 6.9 7.2 7.3 ALB 3.0* 3.2* 3.9 GLOB 3.9 4.0  -- No results for input(s): INR, PTP, APTT in the last 72 hours. No lab exists for component: INREXT No results for input(s): FE, TIBC, PSAT, FERR in the last 72 hours. Lab Results Component Value Date/Time Folate 20.0 03/16/2019 04:06 AM  
  
No results for input(s): PH, PCO2, PO2 in the last 72 hours. Recent Labs  
  03/29/19 0115 CPK 78 No results found for: CHOL, CHOLX, CHLST, CHOLV, HDL, LDL, LDLC, DLDLP, TGLX, TRIGL, TRIGP, CHHD, CHHDX Lab Results Component Value Date/Time Glucose (POC) 203 (H) 03/29/2019 06:31 AM  
 Glucose (POC) 124 (H) 03/28/2019 10:50 PM  
 Glucose (POC) 217 (H) 03/20/2019 11:44 AM  
 Glucose (POC) 195 (H) 03/20/2019 07:36 AM  
 Glucose (POC) 274 (H) 03/19/2019 08:26 PM  
 
Lab Results Component Value Date/Time  Color YELLOW/STRAW 03/14/2019 08:50 PM  
 Appearance CLEAR 03/14/2019 08:50 PM  
 Specific gravity 1.013 03/14/2019 08:50 PM  
 pH (UA) 5.0 03/14/2019 08:50 PM  
 Protein 100 (A) 03/14/2019 08:50 PM  
 Glucose 100 (A) 03/14/2019 08:50 PM  
 Ketone NEGATIVE  03/14/2019 08:50 PM  
 Bilirubin NEGATIVE  03/14/2019 08:50 PM  
 Urobilinogen 0.2 03/14/2019 08:50 PM  
 Nitrites NEGATIVE  03/14/2019 08:50 PM  
 Leukocyte Esterase NEGATIVE  03/14/2019 08:50 PM  
 Epithelial cells FEW 03/14/2019 08:50 PM  
 Bacteria NEGATIVE  03/14/2019 08:50 PM  
 WBC 0-4 03/14/2019 08:50 PM  
 RBC 0-5 03/14/2019 08:50 PM  
 
 
Med list reviewed Current Facility-Administered Medications Medication Dose Route Frequency  aspirin delayed-release tablet 81 mg  81 mg Oral DAILY  dilTIAZem CD (CARDIZEM CD) capsule 240 mg  240 mg Oral DAILY  ergocalciferol capsule 50,000 Units  50,000 Units Oral Q7D  
 ferrous sulfate tablet 325 mg  325 mg Oral ACB  fluticasone propionate (FLONASE) 50 mcg/actuation nasal spray 2 Spray  2 Spray Both Nostrils DAILY  pregabalin (LYRICA) capsule 150 mg  150 mg Oral TID  polyethylene glycol (MIRALAX) packet 17 g  17 g Oral DAILY  lidocaine (LIDODERM) 5 % patch 1 Patch  1 Patch TransDERmal Q24H  hydrALAZINE (APRESOLINE) tablet 50 mg  50 mg Oral TID  insulin glargine (LANTUS) injection 40 Units  40 Units SubCUTAneous DAILY  sodium chloride (NS) flush 5-40 mL  5-40 mL IntraVENous Q8H  
 sodium chloride (NS) flush 5-40 mL  5-40 mL IntraVENous PRN  
 0.9% sodium chloride infusion  75 mL/hr IntraVENous CONTINUOUS  
 heparin (porcine) injection 5,000 Units  5,000 Units SubCUTAneous Q8H  
 glucose chewable tablet 16 g  4 Tab Oral PRN  
 dextrose (D50W) injection syrg 12.5-25 g  25-50 mL IntraVENous PRN  
 glucagon (GLUCAGEN) injection 1 mg  1 mg IntraMUSCular PRN  
 insulin lispro (HUMALOG) injection   SubCUTAneous AC&HS Care Plan discussed with:  Patient/Family and Nurse Ольга Hamilton MD 
Internal Medicine Date of Service: 3/29/2019

## 2019-03-29 NOTE — PROGRESS NOTES
Physical Therapy Goals Initiated 3/29/2019 1. Patient will move from supine to sit and sit to supine , scoot up and down and roll side to side in bed with moderate assistance  within 7 day(s). 2.  Patient will transfer from bed to chair and chair to bed with supervision/set-up using the least restrictive device within 7 day(s). 3.  Patient will perform sit to stand with supervision/set-up within 7 day(s). 4.  Patient will ambulate with supervision/set-up for 3 feet with the least restrictive device within 7 day(s). PHYSICAL THERAPY EVALUATION Patient: Melony Krabbe III (82 y.o. male) Date: 3/29/2019 Primary Diagnosis: KATHI (acute kidney injury) ([facility] [address] [N17.9] Precautions: fall ASSESSMENT : 
Based on the objective data described below, the patient presents with significant debility with decreased ability to come to sitting EOB. Patient reports that his hospital bed at home is broken. He is in the electric wheelchair all day and sleeps in it at night. He has a lift recliner he uses and is able to stand and pivot to the chair and raised toilet using the crutches. He does a sponge bath. Today he is unable to come to sitting EOB with max assist of 1. Once I get help we will try with max assist of 2. He has home health aides. Patient will benefit from skilled intervention to address the above impairments. Patients rehabilitation potential is considered to be Fair Factors which may influence rehabilitation potential include:  
? None noted ? Mental ability/status ? Medical condition ? Home/family situation and support systems ? Safety awareness 
? Pain tolerance/management 
? Other: PLAN : 
Recommendations and Planned Interventions: 
?           Bed Mobility Training             ? Neuromuscular Re-Education ? Transfer Training                   ? Orthotic/Prosthetic Training ?           Gait Training                         ? Modalities ? Therapeutic Exercises           ? Edema Management/Control ? Therapeutic Activities            ? Patient and Family Training/Education ? Other (comment): Frequency/Duration: Patient will be followed by physical therapy  5 times a week to address goals. Discharge Recommendations: Home Health-He wants to go home and not to rehab. Further Equipment Recommendations for Discharge: none. SUBJECTIVE:  
Patient stated I don't want to go to rehab. Don't you have help? I need help to get up out of this bed. I don't get into the bed at home.  OBJECTIVE DATA SUMMARY:  
HISTORY:   
Past Medical History:  
Diagnosis Date  Arthritis, Degenerative,  Knee 4/4/2011  Carcinoma, Prostate 4/4/2011  Degenerative disc disease 4/4/2011  Depression/ Anxiety 4/4/2011  Diabetes ([address]  Diabetes Mellitrus ( non-insulin dependent ) Type 2 4/4/2011  
 HEMATOCHEZIA 4/4/2011  Hypertrension 4/4/2011  Hypertriglyceridemia 4/4/2011  Insomnia 4/4/2011  Laryngitis 4/4/2011  Mild Aortic insufficiency 4/4/2011  Mild Concentric LVH (left ventricular hypertrophy) 4/4/2011  Neuropathy 4/4/2011  Osteoarthritis 4/4/2011  Rotator Cuff Tendonitis 4/4/2011 Past Surgical History:  
Procedure Laterality Date  CARDIAC SURG PROCEDURE UNLIST    
 cardiac cath  COLONOSCOPY N/A 4/28/2017 COLONOSCOPY performed by Eryn Donovan MD at [facility] ENDOSCOPY  
 HX ORTHOPAEDIC    
 left hip pinning  HX OTHER SURGICAL    
 left little toe amputation  HX UROLOGICAL Prior Level of Function/Home Situation: Only stands and pivots from the electric wheelchair to the toilet to the recliner. Uses crutches. Personal factors and/or comorbidities impacting plan of care: obesity, hip arthritis. Home Situation Home Environment: Apartment # Steps to Enter: 0 One/Two Story Residence: One story Living Alone: Yes Support Systems: Home care staff, Family member(s) Patient Expects to be Discharged to[de-identified] Private residence Current DME Used/Available at Home: Lift chair, Hospital bed, Crutches, Night time edema control devices, Wheelchair, power, Commode, bedside(reachert) EXAMINATION/PRESENTATION/DECISION MAKING:  
Critical Behavior: 
Neurologic State: Alert Orientation Level: Oriented X4 Cognition: Follows commands Hearing: Auditory Auditory Impairment: None Skin:  per nursing. Edema: per nursing. Range Of Motion: 
AROM: Generally decreased, functional-Patient is unable to flex the left hip PROM: Generally decreased, functional-Patient has decreased flexion of the left hip. Strength:   
Strength: Generally decreased, functional 
  
  
  
  
  
  
Tone & Sensation:  
Tone: Normal 
  
  
  
  
Sensation: Intact Coordination: 
Coordination: Within functional limits Vision:  
  
Functional Mobility: 
Bed Mobility: 
Rolling: Total assistance Able to scoot himself to the [facility]. Transfers: 
 Unable at this time. Balance:  
 Unable at this time. Ambulation/Gait Training: 
  
  
  
  
Gait Description (WDL): (unable at this time) Functional Measure: 
Barthel Index: 
 
Bathin Bladder: 5 Bowels: 5 Groomin Dressin Feedin Mobility: 0 Stairs: 0 Toilet Use: 0 Transfer (Bed to Chair and Back): 0 Total: 25/100 Percentage of impairment  
0% 1-19% 20-39% 40-59% 60-79% 80-99% 100% Barthel Score 0-100 100 99-80 79-60 59-40 20-39 1-19 
 0 The Barthel ADL Index: Guidelines 1. The index should be used as a record of what a patient does, not as a record of what a patient could do. 2. The main aim is to establish degree of independence from any help, physical or verbal, however minor and for whatever reason. 3. The need for supervision renders the patient not independent. 4. A patient's performance should be established using the best available evidence. Asking the patient, friends/relatives and nurses are the usual sources, but direct observation and common sense are also important. However direct testing is not needed. 5. Usually the patient's performance over the preceding 24-48 hours is important, but occasionally longer periods will be relevant. 6. Middle categories imply that the patient supplies over 50 per cent of the effort. 7. Use of aids to be independent is allowed. Nicky Robertson., Barthel, D.W. ([id number]). Functional evaluation: the Barthel Index. [facility] (14)2. Isabelle Ban gretel [place], J.J.M.F, Edison Barone, Ingrid Whitfield., [place], [address] (1999). Measuring the change indisability after inpatient rehabilitation; comparison of the responsiveness of the Barthel Index and Functional Edgar Measure. Journal of Neurology, Neurosurgery, and Psychiatry, 66(4), 776-084. Vikash Mcallister, N.J.A, CEDRIC Cole, & Elida Camarena MCRISTHIAN. (2004.) Assessment of post-stroke quality of life in cost-effectiveness studies: The usefulness of the Barthel Index and the EuroQoL-5D. [facility], 21, 201-33 Physical Therapy Evaluation Charge Determination History Examination Presentation Decision-Making MEDIUM  Complexity : 1-2 comorbidities / personal factors will impact the outcome/ POC  LOW Complexity : 1-2 Standardized tests and measures addressing body structure, function, activity limitation and / or participation in recreation  LOW Complexity : Stable, uncomplicated  LOW Complexity : FOTO score of  Based on the above components, the patient evaluation is determined to be of the following complexity level: LOW Pain: 
Pain Scale 1: Numeric (0 - 10) Pain Intensity 1: 9 Pain Location 1: Hip Pain Orientation 1: Left Pain Description 1: Aching Pain Intervention(s) 1: Medication (see MAR) Activity Tolerance:  
good Please refer to the flowsheet for vital signs taken during this treatment. After treatment:  
?         Patient left in no apparent distress sitting up in chair ? Patient left in no apparent distress in bed 
? Call bell left within reach ? Nursing notified ? Caregiver present ? Bed alarm activated COMMUNICATION/EDUCATION:  
The patients plan of care was discussed with: Registered Nurse and . ?         Fall prevention education was provided and the patient/caregiver indicated understanding. ? Patient/family have participated as able in goal setting and plan of care. ?         Patient/family agree to work toward stated goals and plan of care. ?         Patient understands intent and goals of therapy, but is neutral about his/her participation. ? Patient is unable to participate in goal setting and plan of care. Thank you for this referral. 
Deanna Abel, PT Time Calculation: 23 mins

## 2019-03-29 NOTE — ROUTINE PROCESS
TRANSFER - OUT REPORT: 
 
Verbal report given to RUBA Márquez on Caesar Banter III  being transferred to (unit) for routine progression of care Report consisted of patients Situation, Background, Assessment and  
Recommendations(SBAR). Information from the following report(s) SBAR was reviewed with the receiving nurse. Lines:  
Peripheral IV 03/28/19 Left Antecubital (Active) Site Assessment Clean, dry, & intact 3/28/2019  7:36 PM  
Phlebitis Assessment 0 3/28/2019  7:36 PM  
Infiltration Assessment 0 3/28/2019  7:36 PM  
Dressing Status Clean, dry, & intact 3/28/2019  7:36 PM  
Dressing Type Tape 3/28/2019  7:36 PM  
Hub Color/Line Status Pink;Patent 3/28/2019  7:36 PM  
Action Taken Blood drawn 3/28/2019  7:36 PM  
  
 
Opportunity for questions and clarification was provided. Patient transported with: 
 Vittana

## 2019-03-29 NOTE — DIABETES MGMT
DTC Progress Note Recommendations/ Comments: Consult received for hospital glucose mgmt. BG variable at this time. FBG this am was 203 mg/dl. Unknown when pt's last home dose of Lantus was. Renal status noted. If appropriate, please consider: 
Initiate 25 units of Lantus q 12 hours and titrate as needed Continue lispro correction, high sensitivity scale Add consistent carb to diet order Current hospital DM medication: Lispro correction, high sensitivity scale Noted 40 units of Lantus ordered on MAR but entry cancelled [facility] Patient is a 80 y.o. male with known DM on Lantus, 72 units q 12 hours and 14 units Regular insulin TID at home. A1c:  
Lab Results Component Value Date/Time Hemoglobin A1c 8.0 (H) 03/28/2019 07:35 PM  
 
 
Recent Glucose Results:  
Lab Results Component Value Date/Time  (H) 03/29/2019 01:15 AM  
  (H) 03/28/2019 07:35 PM  
 GLUCPOC 144 (H) 03/29/2019 11:35 AM  
 GLUCPOC 203 (H) 03/29/2019 06:31 AM  
 GLUCPOC 124 (H) 03/28/2019 10:50 PM  
  
 
Lab Results Component Value Date/Time Creatinine 2.46 (H) 03/29/2019 01:15 AM  
 
Estimated Creatinine Clearance: 40.3 mL/min (A) (based on SCr of 2.46 mg/dL (H)). Active Orders Diet DIET RENAL Regular PO intake:  
Patient Vitals for the past 72 hrs: 
 % Diet Eaten  
03/29/19 0800 100 % Will continue to follow as needed. Thank you Gerri Benites RD Time spent: 7 minutes

## 2019-03-29 NOTE — WOUND CARE
Wound Consult:  New Visit. Chart reviewed. Consulted for lower legs. Patient is resting on a total care bed. Turns with assist to roll and able to scoot up to head of bed with trendelenburg independently. Assessment/treatment: 
Bilateral lower legs - scaly plaques; bark like skin; has venous statis and began use of lac-hydrin on recent admission and voices it is helping - his legs are not as dry as baseline - will continue. Heels without discoloration. Buttocks/sacrum - intact, no discoloration or injury. Applied aloe vesta ointment. Skin Care Recommendations: 1. Minimize friction/shear: minimize layers of linen/pads under patient. 2. Off load pressure/reposition: continue to turn and reposition approximately every 2 hours; float heels. 3. Manage Moisture - keep skin folds dry; incontinence skin care as needed; promote continence - using urinal - hourly rounding for needs. 4. Continue to monitor nutrition, pain, and skin risk scale, and skin assessment. 5. Resume lac-hydrin ointment to bilateral lower legs. Plan: 
Spoke with Dr. Armin Rodriguez regarding findings and obtained orders for treatment. We will continue to reassess routinely and as needed. Angy Stephens RN,[facility] Wound Healing Office [phone] Pager [phone]

## 2019-03-29 NOTE — PROGRESS NOTES
Attempted to call and get report on patient coming to [facility]. On hold and phone rang back. Nurse busy. Nurse to call back when free.

## 2019-03-29 NOTE — PROGRESS NOTES
Reason for Readmission:     Abnormal lab --KATHI. [facility] hx includes diabetes, hypertension, DDD, prostate cancer, neuropathy   PCP listed as Dr Joao Miller Sons-- last office visit-- 3/27/19. Patient followed Vanessa Rendon Nephrologist.    No AMD in chart RRAT Score and Risk Level:     35 Level of Readmission:   High due to medical concerns Care Conference scheduled:   No indicated. Resources/supports as identified by patient/family:   Family ( 4 adult children and x wife)  and friends Top Challenges facing patient (as identified by patient/family and CM):   Caretakers Finances/Medication cost?   Medicaid Transportation      Medicaid or family Support system or lack thereof? Good family support Living arrangements? Lives alone in one level apartment with elevator but patient on first level. Has good family support. Son is no longer his primary caregiver as he secured a job and visits less often. Self-care/ADLs/Cognition? Alert and oriented. Uses manual and  motorized wheelchair and crutches-- Requires assistance with ADL's. Current Advanced Directive/Advance Care Plan:  Not in chart  Full code Plan for utilizing home health:   [address] [place] [address] providing home health -- Open through 5/20/19 -- Providing SN PT and OT and home health aide. Likelihood of additional readmission:    
          
Transition of Care Plan:    Based on readmission, the patient's previous Plan of Care 
 has been evaluated and/or modified. The current Transition of Care Plan is:       
TBD   Patient will be evaluated by therapy --patient is open to [facility]--Per Topher fregoso [phone]  if patient remains Observation no resume of care order needed. If patient becomes inpatient then CM will need to  secure resume HH PT/ OT SN and aide if patient return directly home CM met with patient in his room to introduce self and explain role. Patient was alert and oriented. Confirmed address and living situation --living in one level apartment on first floor-- the building does have elevators. He has has DME--wheelchair, BSC, and crutches. Was mobile prior to admission with assistance with DME. Patient has 3 adult children- daughter Iris Metcalf [phone], Edwige Lee [phone]  and Stan Harrison, all reside in [address] [place] [address] and visit. X wife is Graham Koyanagi and patient has a good relationship with her. Patient said he remains in a wheelchair most of the time  -He is not able to transfer from his bed to chair at this time as he said he needs his hip replaced. .. He was using crutches and wheelchair prior to admission. He has a lift chair at home. He said he does not sleep in his bed as it broken (hospital bed from [facility] years ago) CM noted that therapy was not able to transfer him from the bed to chair today. Patient told CM he does not want to go to rehab-- he said \"I need my hip fixed\". Patient uses [employer] for grocery delivery once a month. He says it works out well. He uses medicaid transport, [facility] [phone] to go to appointments and drug store. Patient is observation status-- Cm explained the letter to him from the [facility] and gave him a copy. He refused to sign it. Disposition is TBD-- [facility] Medicaid. Cm will follow and assist with transition of care plan. .  Patient will need transport to home or facility. Care Management Interventions PCP Verified by CM: Yes Mode of Transport at Discharge: (TBD) Transition of Care Consult (CM Consult): Discharge Planning Discharge Durable Medical Equipment: No 
Physical Therapy Consult: Yes Occupational Therapy Consult: Yes Current Support Network: Lives Alone(Lives alone in apartment. . has dme and was mobile with dme prior to admission. No AMD in chart    Family in [address] [place] [address]) Confirm Follow Up Transport: Wheelchair Katelin Laurel Plan discussed with Pt/Family/Caregiver:  Yes 
 Freedom of Choice Offered: Yes Discharge Location Discharge Placement: (TBD)

## 2019-03-29 NOTE — PROGRESS NOTES
Problem: Self Care Deficits Care Plan (Adult) Goal: *Acute Goals and Plan of Care (Insert Text) Description Occupational Therapy Goals Initiated 3/29/2019 1. Patient will perform upper body dressing with modified independence within 7 day(s). 2.  Patient will perform bathing with minimal assistance/contact guard assist within 7 day(s). 3.  Patient will perform lower body dressing with moderate assistance  within 7 day(s). 4.  Patient will perform toilet transfers with minimal assistance/contact guard assist within 7 day(s). 5.  Patient will perform all aspects of toileting with minimal assistance/contact guard assist within 7 day(s). 6.  Patient will participate in upper extremity therapeutic exercise/activities with minimal assistance/contact guard assist for 5 minutes within 7 day(s). 7.  Patient will utilize energy conservation techniques during functional activities with verbal, visual and tactile cues within 7 day(s). Outcome: Progressing Towards Goal 
  
Problem: Patient Education: Go to Patient Education Activity Goal: Patient/Family Education Outcome: Progressing Towards Goal 
OCCUPATIONAL THERAPY EVALUATION Patient: Orlando Newman III (48 y.o. male) Date: 3/29/2019 Primary [facility] [N17.9] Precautions:   Fall, Aspiration ASSESSMENT : 
Based on the objective data described below, the patient presents with Minimum assistance, Moderate Assistance, Additional time and Assist x1 PLOF at power w/c level. Today presents with Minimum assistance upper body ADLs, Maximum assistance lower body ADLs, and Moderate Assistance, Additional time and Assist x2  functional mobility. In [facility] 3-14 to 3-20 for KATHI, went home x ~ 1 week with inactivity due to not feeling well; now back due to elevated creatinine per primary care follow up. Significant general debility but feels he can return home with [facility]. The following are barriers to ADL independence while in acute care:  
- Cognitive and/or behavioral: insight into deficits and learned dependency - Medical condition: ROM, strength, functional reach, functional endurance, standing balance, cardiopulmonary tolerance, pain tolerance, medical history and edema B LEs     
- Other:    
 
Patient will benefit from skilled acute intervention to address the above impairments. Patient?s rehabilitation potential is considered to be Fair Discharge recommendations: Home health (to increase independence and safety) If above is not an option then recommend: Rehab: patient is working towards tolerating 3 hours of therapy Barriers to discharging home, in addition to above listed impairments: lives alone 
total assist driving to follow up medical appointment(s)/groceries/obtain medication 
level of physical assist required to maintain patient safety. Equipment recommendations for successful discharge (if) home: Has DME in place PLAN : 
Recommendations and Planned Interventions: self care training, functional mobility training, therapeutic exercise, balance training, therapeutic activities, endurance activities, patient education, home safety training and family training/education Frequency/Duration: Patient will be followed by occupational therapy 4 times a week to address goals. SUBJECTIVE:  
Patient stated ? I never thought of it this way; I'm my own worst enemy; I see it now. Thank you.? OBJECTIVE DATA SUMMARY:  
HISTORY:  
Past Medical History:  
Diagnosis Date Arthritis, Degenerative,  Knee 4/4/2011 Carcinoma, Prostate 4/4/2011 Degenerative disc disease 4/4/2011 Depression/ Anxiety 4/4/2011 Diabetes ([facility] Diabetes Mellitrus ( non-insulin dependent ) Type 2 4/4/2011 HEMATOCHEZIA 4/4/2011 Hypertrension 4/4/2011 Hypertriglyceridemia 4/4/2011 Insomnia 4/4/2011 Laryngitis 4/4/2011 Mild Aortic insufficiency 4/4/2011 Mild Concentric LVH (left ventricular hypertrophy) 4/4/2011 Neuropathy 4/4/2011 Osteoarthritis 4/4/2011 Rotator Cuff Tendonitis 4/4/2011 Past Surgical History:  
Procedure Laterality Date CARDIAC SURG PROCEDURE UNLIST    
 cardiac cath COLONOSCOPY N/A 4/28/2017 COLONOSCOPY performed by Jayla Delgado MD at [facility] ENDOSCOPY  
 HX ORTHOPAEDIC    
 left hip pinning HX OTHER SURGICAL    
 left little toe amputation HX UROLOGICAL Prior Level of Function/Environment/Context: Mod I toileting, eating UE dressing; able to manage pants at hips but not feet, aides changed socks and bathed feet every 2-3 days; shoes stayed on otherwise; used power w/c for standing Expanded or extensive additional review of patient history:  
 
Home Situation Home Environment: Apartment # Steps to Enter: 0 Wheelchair Ramp: Yes One/Two Story Residence: One story Living Alone: Yes Support Systems: Home care staff, Family member(s) Patient Expects to be Discharged to[de-identified] Private residence Current DME Used/Available at Home: (see PT note) Tub or Shower Type: (sink bathes PTA) Hand dominance: Right EXAMINATION OF PERFORMANCE DEFICITS: 
Cognitive/Behavioral Status: 
Neurologic State: Alert; Appropriate for age Orientation Level: Oriented X4 Cognition: Appropriate decision making; Appropriate for age attention/concentration; Appropriate safety awareness; Follows commands(Drawe A Clock intact) Perception: Appears intact Perseveration: No perseveration noted Safety/Judgement: Fall prevention; Insight into deficits; Decreased insight into deficits(continues to smoke daily and drink ETOH at times) Skin: see nsg notes, B LEs discolored Edema: ++ B LE Hearing: Auditory Auditory Impairment: None Vision/Perceptual:   
    
    
    
  
    
Acuity: Within Defined Limits Corrective Lenses: Reading glasses Range of Motion: 
AROM: Generally decreased, functional(B UE except R shoulder; \"R hip doesn't bend\") PROM: (R RTC tear recent cortisone shot) Strength: 
Strength: Generally decreased, functional(B UE; grossly decreased B LE; static stand PTA 10 Aram@Cognitum) Coordination: 
Coordination: Generally decreased, functional(reports B hands feeling heavy; decreased coordination/weak) Fine Motor Skills-Upper: Left Intact; Right Intact(mild deficits, scribble writing as if weak) Gross Motor Skills-Upper: Left Intact; Right Impaired(B RTC impairment) Tone & Sensation: 
Tone: Normal 
Sensation: Impaired(decreased B LEs) Balance: 
Sitting: Intact Standing: Impaired Standing - Static: Constant support;Good Standing - Dynamic : Fair Functional Mobility and Transfers for ADLs: 
Bed Mobility: 
Rolling: Moderate assistance;Assist x2 Supine to Sit: Maximum assistance;Assist x2 Sit to Supine: Total assistance;Assist x2 Transfers: 
Sit to Stand: Minimum assistance;Assist x2(bed raised) Stand to Sit: Assist x2; Moderate assistance(bed slightly lowered but patient crashed down. Quite unsafe.) Toilet Transfer : Moderate assistance;Assist x2; Additional time; Adaptive equipment ADL Assessment: 
Feeding: Modified independent Oral Facial Hygiene/Grooming: Minimum assistance(poor hair care noted- longterm clearly) Bathing: Maximum assistance Upper Body Dressing: Setup Lower Body Dressing: Maximum assistance; Additional time(assist PTA for shoes, socks & pants over feet; aide 3x/week) Toileting: Moderate assistance; Additional time; Adaptive equipment ADL Intervention and task modifications: 
  
 
 
Cognitive Retraining Safety/Judgement: Fall prevention; Insight into deficits; Decreased insight into deficits(continues to smoke daily and drink ETOH at times) Therapeutic Exercise: 
Encouraged him to do bed and chair exercises throughout the day to increase functional endurance Functional Measure: 
Barthel Index: 
Bathin Bladder: 5 Bowels: 5 Groomin Dressin Feedin Mobility: 0 Stairs: 0 Toilet Use: 0 Transfer (Bed to Chair and Back): 0 Total: 20/100 Percentage of impairment  
0% 1-19% 20-39% 40-59% 60-79% 80-99% 100% Barthel Score 0-100 100 99-80 79-60 59-40 20-39 1-19 
 0 The Barthel ADL Index: Guidelines 1. The index should be used as a record of what a patient does, not as a record of what a patient could do. 2. The main aim is to establish degree of independence from any help, physical or verbal, however minor and for whatever reason. 3. The need for supervision renders the patient not independent. 4. A patient's performance should be established using the best available evidence. Asking the patient, friends/relatives and nurses are the usual sources, but direct observation and common sense are also important. However direct testing is not needed. 5. Usually the patient's performance over the preceding 24-48 hours is important, but occasionally longer periods will be relevant. 6. Middle categories imply that the patient supplies over 50 per cent of the effort. 7. Use of aids to be independent is allowed. Carl Peralta., Barthel, D.W. ([id number]). Functional evaluation: the Barthel Index. [address] [facility] (14)2. Leti Payne gretel PJ Bates, Richard Polanco., Lilia Lord., [place], [address] [place] [address] (). Measuring the change indisability after inpatient rehabilitation; comparison of the responsiveness of the Barthel Index and Functional [place] Measure. Journal of Neurology, Neurosurgery, and Psychiatry, 66(4), 477-336. Angelia Glass, N.J.A, CEDRIC Cole, & Simran Salazar, MLinetteA. (2004.) Assessment of post-stroke quality of life in cost-effectiveness studies: The usefulness of the Barthel Index and the EuroQoL-5D. [facility], [phone] Occupational Therapy Evaluation Charge Determination History Examination Decision-Making LOW Complexity : Brief history review  HIGH Complexity : 5 or more performance deficits relating to physical, cognitive , or psychosocial skils that result in activity limitations and / or participation restrictions HIGH Complexity : Patient presents with comorbidities that affect occupational performance. Signifigant modification of tasks or assistance (eg, physical or verbal) with assessment (s) is necessary to enable patient to complete evaluation Based on the above components, the patient evaluation is determined to be of the following complexity level: LOW Pain: 
Pre treatment: 3 /10 During treatment: 3/10 Post treatment:  3/10 Location: R shoulder, B LEs, R Hip Description:ache Aggravating factors: AROM/PROM Activity Tolerance:  
Fair to low Please refer to the flowsheet for vital signs taken during this treatment. After treatment patient left:  
Supine in bed Bed in low position Call light within reach Side rails x 2  
COMMUNICATION/EDUCATION:  
The patient?s plan of care was discussed with: Registered Nurse. Home safety education was provided and the patient/caregiver indicated understanding., Patient/family have participated as able in goal setting and plan of care. and Patient/family agree to work toward stated goals and plan of care. This patient?s plan of care is appropriate for delegation to [facility]. Thank you for this referral. 
Sanjana Marcos OTR/L Time Calculation: 36 mins

## 2019-03-29 NOTE — PROGRESS NOTES
PHYSICAL THERAPY TREATMENT Patient: Ledy Encinas III (18 y.o. male) Date: 3/29/2019 Diagnosis: [facility]ATH[facility]acute kidney injury[facility] [N17.9] <principal problem not specified> Precautions:   
Chart, physical therapy assessment, plan of care and goals were reviewed. ASSESSMENT: 
Based on the objective data described below, the patient presents with Moderate Assistance level overall for transfers. Gait training completed 15 feet using crutches and gait belt and at the Contact guard assistance level of assistance. Max assist for supine to sit and sit to supine. After ambulating he stood at the EOB for 3 minutes before sitting down. Patient requires max assist of 2 to come to sitting EOB due his inability to flex the left hip. He likes the bed to be raised very high so he can extend his legs and come to standing. When he ambulates he has his cast shoes for his feet and then leans heavily forward onto the crutches. We lowered the bed slightly for him to return to sitting and he crashed onto the bed towards the right. It was quite scary and not safe. In the future it will be best for him to prop on the EOB and slowly lower the bed so he can slide backwards. At home he doesn't get into the bed but we had no other option here as he can't sit in one of our chairs. Unfortunately we do not have a good chair for him to sit in. The bariatric wheelchair is too low as are the other chairs. Even he said it would take 3-4 people for him to get out of a recliner. At home he uses his wheelchair and sleeps in the wheelchair ambulating only short distances to his recliner and the toilet. He does not want rehab. The following are barriers to independence while in acute care:  
-Cognitive and/or behavioral: none  
-Medical condition: ROM, functional endurance, cardiopulmonary tolerance and significant right hip arthritis and inability to flex the hip to sit at a 90 degree angle.     
-Other: Prior level of function: Ambulates very short distances PLAN: 
Patient continues to benefit from skilled intervention to address the above impairments. Continue treatment per established plan of care. Recommendations and/or planned interventions: 
Home with home health. Discharge recommendations: Home health (to increase independence and safety) If above is not an option then recommend: None Patient's barriers to discharging home, in addition to above impairments: lives alone 
level of physical assist required to maintain patient safety. Equipment recommendations for successful discharge (if) home: none-he needs to get  his hospital bed fixed but not sure this is feasible. SUBJECTIVE:  
Patient stated I need that hip replaced.  OBJECTIVE DATA SUMMARY:  
Critical Behavior: 
Neurologic State: Alert Orientation Level: Oriented X4 Cognition: Follows commands Functional Mobility Training: 
Bed Mobility: 
Rolling: Moderate assistance;Assist x2 Supine to Sit: Maximum assistance;Assist x2 Sit to Supine: Total assistance;Assist x2 Transfers: 
Sit to Stand: Minimum assistance;Assist x2(bed raised) Stand to Sit: Assist x2; Moderate assistance(bed slightly lowered but patient crashed down. Quite unsafe.) Balance: 
Sitting: Intact Standing: Impaired Standing - Static: Constant support;Good Standing - Dynamic : Fair Ambulation/Gait Training: 
  
  
  
  
Gait Description (WDL): (unable at this time) Gait Abnormalities: Decreased step clearance;Circumduction; Other(forward leaning. ) Base of Support: Widened;Shift to right Speed/Tracy: Pace decreased (<100 feet/min) Step Length: Right shortened;Left shortened Step to gait leaning heavily on the crutches. Pain: 
 
Activity Tolerance:  
Good Please refer to the flowsheet for vital signs taken during this treatment. After treatment patient left:  
Supine in bed Call light within reach RN notified COMMUNICATION/COLLABORATION:  
The patients plan of care was discussed with: Registered Nurse,  and Rehabilitation Attendant Rachel Lisa PT Time Calculation: 27 mins

## 2019-03-30 LAB
ANION GAP SERPL CALC-SCNC: 5 MMOL/L (ref 5–15)
BUN SERPL-MCNC: 61 MG/DL (ref 6–20)
BUN/CREAT SERPL: 32 (ref 12–20)
CALCIUM SERPL-MCNC: 7.5 MG/DL (ref 8.5–10.1)
CHLORIDE SERPL-SCNC: 110 MMOL/L (ref 97–108)
CO2 SERPL-SCNC: 23 MMOL/L (ref 21–32)
CREAT SERPL-MCNC: 1.91 MG/DL (ref 0.7–1.3)
GLUCOSE BLD STRIP.AUTO-MCNC: 137 MG/DL (ref 65–100)
GLUCOSE BLD STRIP.AUTO-MCNC: 199 MG/DL (ref 65–100)
GLUCOSE BLD STRIP.AUTO-MCNC: 200 MG/DL (ref 65–100)
GLUCOSE BLD STRIP.AUTO-MCNC: 271 MG/DL (ref 65–100)
GLUCOSE SERPL-MCNC: 115 MG/DL (ref 65–100)
POTASSIUM SERPL-SCNC: 4.8 MMOL/L (ref 3.5–5.1)
SERVICE CMNT-IMP: ABNORMAL
SODIUM SERPL-SCNC: 138 MMOL/L (ref 136–145)

## 2019-03-30 PROCEDURE — 82962 GLUCOSE BLOOD TEST: CPT

## 2019-03-30 PROCEDURE — 74011636637 HC RX REV CODE- 636/637: Performed by: INTERNAL MEDICINE

## 2019-03-30 PROCEDURE — 99218 HC RM OBSERVATION: CPT

## 2019-03-30 PROCEDURE — 36415 COLL VENOUS BLD VENIPUNCTURE: CPT

## 2019-03-30 PROCEDURE — 3331090002 HH PPS REVENUE DEBIT

## 2019-03-30 PROCEDURE — 74011250636 HC RX REV CODE- 250/636: Performed by: FAMILY MEDICINE

## 2019-03-30 PROCEDURE — 3331090001 HH PPS REVENUE CREDIT

## 2019-03-30 PROCEDURE — 96372 THER/PROPH/DIAG INJ SC/IM: CPT

## 2019-03-30 PROCEDURE — 74011000250 HC RX REV CODE- 250: Performed by: FAMILY MEDICINE

## 2019-03-30 PROCEDURE — 96361 HYDRATE IV INFUSION ADD-ON: CPT

## 2019-03-30 PROCEDURE — 74011250637 HC RX REV CODE- 250/637: Performed by: FAMILY MEDICINE

## 2019-03-30 PROCEDURE — 80048 BASIC METABOLIC PNL TOTAL CA: CPT

## 2019-03-30 PROCEDURE — 74011636637 HC RX REV CODE- 636/637: Performed by: FAMILY MEDICINE

## 2019-03-30 PROCEDURE — 74011250637 HC RX REV CODE- 250/637: Performed by: INTERNAL MEDICINE

## 2019-03-30 RX ORDER — INSULIN GLARGINE 100 [IU]/ML
30 INJECTION, SOLUTION SUBCUTANEOUS
Status: DISCONTINUED | OUTPATIENT
Start: 2019-03-30 | End: 2019-03-31

## 2019-03-30 RX ORDER — ACETAMINOPHEN 325 MG/1
650 TABLET ORAL
Status: DISCONTINUED | OUTPATIENT
Start: 2019-03-30 | End: 2019-03-31 | Stop reason: HOSPADM

## 2019-03-30 RX ADMIN — HYDRALAZINE HYDROCHLORIDE 50 MG: 50 TABLET, FILM COATED ORAL at 09:14

## 2019-03-30 RX ADMIN — SODIUM CHLORIDE 75 ML/HR: 900 INJECTION, SOLUTION INTRAVENOUS at 05:15

## 2019-03-30 RX ADMIN — HEPARIN SODIUM 5000 UNITS: 5000 INJECTION INTRAVENOUS; SUBCUTANEOUS at 09:16

## 2019-03-30 RX ADMIN — INSULIN LISPRO 2 UNITS: 100 INJECTION, SOLUTION INTRAVENOUS; SUBCUTANEOUS at 21:55

## 2019-03-30 RX ADMIN — ACETAMINOPHEN 650 MG: 325 TABLET ORAL at 11:24

## 2019-03-30 RX ADMIN — Medication: at 17:43

## 2019-03-30 RX ADMIN — PREGABALIN 150 MG: 75 CAPSULE ORAL at 21:55

## 2019-03-30 RX ADMIN — INSULIN GLARGINE 30 UNITS: 100 INJECTION, SOLUTION SUBCUTANEOUS at 21:56

## 2019-03-30 RX ADMIN — HYDRALAZINE HYDROCHLORIDE 50 MG: 50 TABLET, FILM COATED ORAL at 15:03

## 2019-03-30 RX ADMIN — INSULIN LISPRO 2 UNITS: 100 INJECTION, SOLUTION INTRAVENOUS; SUBCUTANEOUS at 12:00

## 2019-03-30 RX ADMIN — FLUTICASONE PROPIONATE 2 SPRAY: 50 SPRAY, METERED NASAL at 12:18

## 2019-03-30 RX ADMIN — HYDRALAZINE HYDROCHLORIDE 50 MG: 50 TABLET, FILM COATED ORAL at 21:55

## 2019-03-30 RX ADMIN — ASPIRIN 81 MG: 81 TABLET, COATED ORAL at 09:14

## 2019-03-30 RX ADMIN — Medication 10 ML: at 15:06

## 2019-03-30 RX ADMIN — Medication: at 11:25

## 2019-03-30 RX ADMIN — DILTIAZEM HYDROCHLORIDE 240 MG: 240 CAPSULE, COATED, EXTENDED RELEASE ORAL at 09:14

## 2019-03-30 RX ADMIN — HEPARIN SODIUM 5000 UNITS: 5000 INJECTION INTRAVENOUS; SUBCUTANEOUS at 17:40

## 2019-03-30 RX ADMIN — PREGABALIN 150 MG: 75 CAPSULE ORAL at 09:15

## 2019-03-30 RX ADMIN — Medication 10 ML: at 06:00

## 2019-03-30 RX ADMIN — FERROUS SULFATE TAB 325 MG (65 MG ELEMENTAL FE) 325 MG: 325 (65 FE) TAB at 06:51

## 2019-03-30 RX ADMIN — PREGABALIN 150 MG: 75 CAPSULE ORAL at 15:04

## 2019-03-30 RX ADMIN — INSULIN GLARGINE 25 UNITS: 100 INJECTION, SOLUTION SUBCUTANEOUS at 06:51

## 2019-03-30 RX ADMIN — Medication 10 ML: at 21:57

## 2019-03-30 RX ADMIN — HEPARIN SODIUM 5000 UNITS: 5000 INJECTION INTRAVENOUS; SUBCUTANEOUS at 01:01

## 2019-03-30 RX ADMIN — Medication 10 ML: at 11:00

## 2019-03-30 NOTE — PROGRESS NOTES
Nephrology Progress Note Ta Rogers III Date of Admission : 3/28/2019 CC:  Follow up for KATHI on CKD Assessment and Plan KATHI on CKD  
- recurrent pre renal azotemia  
- improving 
- d/c IVF and hold diuretics - would keep him off Avapro for now 
- daily labs while here 
- ok for d/c from renal standpoint  
  
CKD Stage III : 
- baseline Cr 1.7 mg/dl - May have to allow Creatinine to be a bit higher (~ 2 mg/dl) to keep him euvolemic  
- discussed low salt diet  
  
BPH w/ Prostatic Sx  
  
Chronic Metabolic acidosis : 
- continue oral Bicarb  
  
Anemia in CKD Type II DM  
HTN : Continue current meds Interval History: 
Seen and examined. Cr better. Feeling ok. Some nausea this AM.  No cp or sob. Current Medications: all current  Medications have been eviewed in [facility] Review of Systems: Pertinent items are noted in HPI. Objective: 
Vitals:   
Vitals:  
 03/30/19 0122 03/30/19 0522 03/30/19 5322 03/30/19 1547 BP: 149/69  152/72 150/75 Pulse: 78  74 79 Resp: 18  16 Temp: 97.3 °F (36.3 °C)  98.1 °F (36.7 °C) SpO2: 97%  98% Weight:  125 kg (275 lb 9.2 oz) Height:      
 
Intake and Output: 
No intake/output data recorded. 03/28 1901 - 03/30 0700 In: 240 [P.O.:240] Out: 4400 [PVHAH:9190] Physical Examination:General: NAD,Conversant Neck:  Supple, no mass Resp:  Lungs CTA B/L, no wheezing , normal respiratory effort CV:  RRR,  no murmur or rub, 2+ chronic LE edema GI:  Soft, NT, + Bowel sounds, no hepatosplenomegaly Neurologic:  Non focal 
Psych:             AAO x 3 appropriate affect Skin:  No Rash :  No kumar []    High complexity decision making was performed 
[]    Patient is at high-risk of decompensation with multiple organ involvement Lab Data Personally Reviewed: I have reviewed all the pertinent labs, microbiology data and radiology studies during assessment. Recent Labs  
  03/30/19 
0402 03/29/19 
0115 03/28/19 
1935 03/27/19 1208  
 139 134* 133* K 4.8 5.2* 5.3* 5.6*  
* 108 104 98 CO2 23 23 23 17* * 117* 235* 124* BUN 61* 86* 94* 99* CREA 1.91* 2.46* 2.70* 2.66* CA 7.5* 8.6 8.6 9.2 MG  --  2.7* 2.7*  --   
PHOS  --  3.7  --   --   
ALB  --  3.0* 3.2* 3.9 SGOT  --  16 15 21 ALT  --  29 30 25 Recent Labs  
  03/29/19 
0115 03/28/19 
1935 03/27/19 
1208 WBC 8.1 9.2 8.8 HGB 9.3* 10.0* 10.4* HCT 29.8* 31.9* 32.1*  
 257 330 No results found for: SDES Lab Results Component Value Date/Time Culture result: NO GROWTH 2 DAYS 03/28/2019 10:51 PM  
 Culture result: FEW 
STAPHYLOCOCCUS AUREUS 
 10/23/2016 08:57 AM  
 Culture result: NO ANAEROBES ISOLATED 10/23/2016 08:53 AM  
 Culture result: LIGHT 
STAPHYLOCOCCUS AUREUS 
 10/23/2016 08:53 AM  
 
Recent Results (from the past 24 hour(s)) GLUCOSE, POC Collection Time: 03/29/19 11:35 AM  
Result Value Ref Range Glucose (POC) 144 (H) 65 - 100 mg/dL Performed by [facility]) EOSINOPHILS, URINE Collection Time: 03/29/19  2:34 PM  
Result Value Ref Range Eosinophils,urine NEGATIVE URINALYSIS W/MICROSCOPIC Collection Time: 03/29/19  2:34 PM  
Result Value Ref Range Color YELLOW/STRAW Appearance CLEAR CLEAR Specific gravity 1.017 1.003 - 1.030    
 pH (UA) 5.0 5.0 - 8.0 Protein 100 (A) NEG mg/dL Glucose NEGATIVE  NEG mg/dL Ketone NEGATIVE  NEG mg/dL Bilirubin NEGATIVE  NEG Blood NEGATIVE  NEG Urobilinogen 0.2 0.2 - 1.0 EU/dL Nitrites NEGATIVE  NEG Leukocyte Esterase NEGATIVE  NEG    
 WBC 0-4 0 - 4 /hpf  
 RBC 0-5 0 - 5 /hpf Epithelial cells FEW FEW /lpf Bacteria NEGATIVE  NEG /hpf Hyaline cast 0-2 0 - 5 /lpf URINE CULTURE HOLD SAMPLE Collection Time: 03/29/19  2:34 PM  
Result Value Ref Range Urine culture hold URINE ON HOLD IN MICROBIOLOGY DEPT FOR 3 DAYS.  IF UNPRESERVED URINE IS SUBMITTED, IT CANNOT BE USED FOR ADDITIONAL TESTING AFTER 24 HRS, RECOLLECTION WILL BE REQUIRED. GLUCOSE, POC Collection Time: 03/29/19  4:31 PM  
Result Value Ref Range Glucose (POC) 246 (H) 65 - 100 mg/dL Performed by Eleanor Hong GLUCOSE, POC Collection Time: 03/29/19  9:47 PM  
Result Value Ref Range Glucose (POC) 171 (H) 65 - 100 mg/dL Performed by Logan Peraza METABOLIC PANEL, BASIC Collection Time: 03/30/19  4:02 AM  
Result Value Ref Range Sodium 138 136 - 145 mmol/L Potassium 4.8 3.5 - 5.1 mmol/L Chloride 110 (H) 97 - 108 mmol/L  
 CO2 23 21 - 32 mmol/L Anion gap 5 5 - 15 mmol/L Glucose 115 (H) 65 - 100 mg/dL BUN 61 (H) 6 - 20 MG/DL Creatinine 1.91 (H) 0.70 - 1.30 MG/DL  
 BUN/Creatinine ratio 32 (H) 12 - 20 GFR est AA 43 (L) >60 ml/min/1.73m2 GFR est non-AA 35 (L) >60 ml/min/1.73m2 Calcium 7.5 (L) 8.5 - 10.1 MG/DL  
GLUCOSE, POC Collection Time: 03/30/19  6:40 AM  
Result Value Ref Range Glucose (POC) 137 (H) 65 - 100 mg/dL Performed by Aubrey Neri MD 
[address] [place] [address], [address] [facility] Phone - [phone] Fax - [phone] 
www. [facility]

## 2019-03-30 NOTE — PROGRESS NOTES
Bedside shift change report given to Amber Rodarte (oncoming nurse) by Alison Hernandez (offgoing nurse). Report included the following information SBAR, Kardex and Recent Results.

## 2019-03-30 NOTE — PROGRESS NOTES
09:00- This nurse received call from [facility] on [facility] stating that nursing supervisor Nely Russell said that physician stated that patient can be taken off of telemetry at this time.

## 2019-03-30 NOTE — ROUTINE PROCESS
Bedside and Verbal shift change report given to Elieser [address] (oncoming nurse) by Jayson Arauz (offgoing nurse). Report included the following information SBAR, Kardex, Intake/Output, MAR, Accordion, Recent Results and Med Rec Status.

## 2019-03-30 NOTE — PROGRESS NOTES
Problem: Falls - Risk of 
Goal: *Absence of Falls Description Document Lydia Ibarra Fall Risk and appropriate interventions in the flowsheet. Outcome: Progressing Towards Goal 
  
Problem: Patient Education: Go to Patient Education Activity Goal: Patient/Family Education Outcome: Progressing Towards Goal 
  
Problem: Pressure Injury - Risk of 
Goal: *Prevention of pressure injury Description Document Edgar Scale and appropriate interventions in the flowsheet. Outcome: Progressing Towards Goal 
  
Problem: Patient Education: Go to Patient Education Activity Goal: Patient/Family Education Outcome: Progressing Towards Goal 
  
Problem: Diabetes Self-Management Goal: *Disease process and treatment process Description Define diabetes and identify own type of diabetes; list 3 options for treating diabetes. Outcome: Progressing Towards Goal 
Goal: *Incorporating nutritional management into lifestyle Description Describe effect of type, amount and timing of food on blood glucose; list 3 methods for planning meals. Outcome: Progressing Towards Goal 
Goal: *Incorporating physical activity into lifestyle Description State effect of exercise on blood glucose levels. Outcome: Progressing Towards Goal 
Goal: *Developing strategies to promote health/change behavior Description Define the ABC's of diabetes; identify appropriate screenings, schedule and personal plan for screenings. Outcome: Progressing Towards Goal 
Goal: *Using medications safely Description State effect of diabetes medications on diabetes; name diabetes medication taking, action and side effects. Outcome: Progressing Towards Goal 
Goal: *Monitoring blood glucose, interpreting and using results Description Identify recommended blood glucose targets  and personal targets. Outcome: Progressing Towards Goal 
Goal: *Prevention, detection, treatment of acute complications Description List symptoms of hyper- and hypoglycemia; describe how to treat low blood sugar and actions for lowering  high blood glucose level. Outcome: Progressing Towards Goal 
Goal: *Prevention, detection and treatment of chronic complications Description Define the natural course of diabetes and describe the relationship of blood glucose levels to long term complications of diabetes. Outcome: Progressing Towards Goal 
Goal: *Developing strategies to address psychosocial issues Description Describe feelings about living with diabetes; identify support needed and support network Outcome: Progressing Towards Goal 
  
Problem: Patient Education: Go to Patient Education Activity Goal: Patient/Family Education Outcome: Progressing Towards Goal

## 2019-03-30 NOTE — PROGRESS NOTES
Hospitalist Progress Note Hospital summary: The patient is a 28-year-old gentleman with past medical history of chronic kidney disease stage III, diabetes mellitus type 2, history of KATHI with recent admission to the hospital on 03/14/2019, and discharged on 03/20/2019, history of hypertension, chronic venous stasis of bilateral lower extremities, chronic hip pain, recurrent hyperkalemia and hyponatremia, anemia of chronic kidney disease, and obesity, who presents to the hospital with the above-mentioned symptoms. The patient was recently admitted to [facility] for KATHI prior to arrival on CKD stage III probably due to dehydration and diuretic. Also had some presyncopal episodes. The patient was given IV fluids and was discharged home. The patient reports that today, he went to his primary care physician for evaluation, had blood work done, and his creatinine was found to be 2.66. The patient was told to come to the ER for further management and evaluation. The patient reports that he has had some weakness associated with his symptoms, but has had no other complaints or problems. The patient reports that he has been taking his medications on a regular basis. The patient's Bumex was restarted when he was discharged from [facility]. The patient reports his leg swelling has improved significantly, but he still has swelling, increased in the left leg than the right leg. The patient came to the ER, his creatinine was found to be 2.7 and he was requested to be admitted under the hospitalist service. 3/28/2019 Assessment/Plan: 
Acute kidney injury superimposed on chronic kidney injury, stage III:   
-pre renal 2/2 diuretics - similar complaints in the past 
- Cr 2.67 on POA, Cr 1.9 today - baseline - dced IVF today 
-  hold bumex, irbesartan 
- nephrology on board; agree with current plan 
- renal us: normal 
- avoid nephrotoxins - monitor renal function Hyperkalemia - resolved - s/p ca gluconate and kayxelate 
- will monitor Diabetes mellitus type 2:   
- A1c 7.5 in 2/2019 - c/w current lantus 30U bid ( takes 72 bid at home and Lispro 14U with meals). ISS  
- DM education consulted Hypertension:  stable. C/w cardizem, hydralazine History of prostate cancer, stable. Chronic venous stasis of bilateral lower extremities: Wound Care Code status: Full DVT prophylaxis: heparin Disposition: TBD. DC to Home with [facility] tomorrow 
---------------------------------------------- 
 
CC: KATHI 
 
S: Patient is seen and examined at bedside. He feels bloated and abd discomfort. He attributes to constipation. Review of Systems: A comprehensive review of systems was negative. O: 
Visit Vitals /75 Pulse 79 Temp 98.1 °F (36.7 °C) Resp 16 Ht 6' (1.829 m) Wt 125 kg (275 lb 9.2 oz) SpO2 98% BMI 37.37 kg/m² PHYSICAL EXAM: 
Gen: NAD, non-toxic HEENT: anicteric sclerae, normal conjunctiva, oropharynx clear, MM moist 
Neck: supple, trachea midline, no adenopathy Heart: RRR, no MRG, no JVD, no peripheral edema Lungs: CTA b/l, non-labored respirations Abd: soft, NT, ND, BS+, no organomegaly Extr: chronic leg discoloration b/l 
Skin: dry Neuro: CN II-XII grossly intact, normal speech, moves all extremities Psych: normal mood, appropriate affect Intake/Output Summary (Last 24 hours) at 3/30/2019 1717 Last data filed at 3/30/2019 0890 Gross per 24 hour Intake  Output 3050 ml Net -3050 ml Recent labs & imaging reviewed: 
Recent Results (from the past 24 hour(s)) GLUCOSE, POC Collection Time: 03/29/19 11:35 AM  
Result Value Ref Range Glucose (POC) 144 (H) 65 - 100 mg/dL Performed by Saba Juárez) EOSINOPHILS, URINE Collection Time: 03/29/19  2:34 PM  
Result Value Ref Range Eosinophils,urine NEGATIVE URINALYSIS W/MICROSCOPIC  
 Collection Time: 03/29/19  2:34 PM  
Result Value Ref Range Color YELLOW/STRAW Appearance CLEAR CLEAR Specific gravity 1.017 1.003 - 1.030    
 pH (UA) 5.0 5.0 - 8.0 Protein 100 (A) NEG mg/dL Glucose NEGATIVE  NEG mg/dL Ketone NEGATIVE  NEG mg/dL Bilirubin NEGATIVE  NEG Blood NEGATIVE  NEG Urobilinogen 0.2 0.2 - 1.0 EU/dL Nitrites NEGATIVE  NEG Leukocyte Esterase NEGATIVE  NEG    
 WBC 0-4 0 - 4 /hpf  
 RBC 0-5 0 - 5 /hpf Epithelial cells FEW FEW /lpf Bacteria NEGATIVE  NEG /hpf Hyaline cast 0-2 0 - 5 /lpf URINE CULTURE HOLD SAMPLE Collection Time: 03/29/19  2:34 PM  
Result Value Ref Range Urine culture hold URINE ON HOLD IN MICROBIOLOGY DEPT FOR 3 DAYS. IF UNPRESERVED URINE IS SUBMITTED, IT CANNOT BE USED FOR ADDITIONAL TESTING AFTER 24 HRS, RECOLLECTION WILL BE REQUIRED. GLUCOSE, POC Collection Time: 03/29/19  4:31 PM  
Result Value Ref Range Glucose (POC) 246 (H) 65 - 100 mg/dL Performed by Tawny Meckel GLUCOSE, POC Collection Time: 03/29/19  9:47 PM  
Result Value Ref Range Glucose (POC) 171 (H) 65 - 100 mg/dL Performed by Aisha [facility] METABOLIC PANEL, BASIC Collection Time: 03/30/19  4:02 AM  
Result Value Ref Range Sodium 138 136 - 145 mmol/L Potassium 4.8 3.5 - 5.1 mmol/L Chloride 110 (H) 97 - 108 mmol/L  
 CO2 23 21 - 32 mmol/L Anion gap 5 5 - 15 mmol/L Glucose 115 (H) 65 - 100 mg/dL BUN 61 (H) 6 - 20 MG/DL Creatinine 1.91 (H) 0.70 - 1.30 MG/DL  
 BUN/Creatinine ratio 32 (H) 12 - 20 GFR est AA 43 (L) >60 ml/min/1.73m2 GFR est non-AA 35 (L) >60 ml/min/1.73m2 Calcium 7.5 (L) 8.5 - 10.1 MG/DL  
GLUCOSE, POC Collection Time: 03/30/19  6:40 AM  
Result Value Ref Range Glucose (POC) 137 (H) 65 - 100 mg/dL Performed by Baldemar Moritz Recent Labs  
  03/29/19 0115 03/28/19 1935 WBC 8.1 9.2 HGB 9.3* 10.0* HCT 29.8* 31.9*  
 257 Recent Labs 03/30/19 
0402 03/29/19 
0115 03/28/19 1935  139 134* K 4.8 5.2* 5.3*  
* 108 104 CO2 23 23 23 BUN 61* 86* 94* CREA 1.91* 2.46* 2.70* * 117* 235* CA 7.5* 8.6 8.6 MG  --  2.7* 2.7* PHOS  --  3.7  --   
 
Recent Labs  
  03/29/19 
0115 03/28/19 1935 03/27/19 
1208 SGOT 16 15 21 ALT 29 30 25 AP 96 115 95 TBILI 0.2 0.2 <0.2 TP 6.9 7.2 7.3 ALB 3.0* 3.2* 3.9 GLOB 3.9 4.0  -- No results for input(s): INR, PTP, APTT in the last 72 hours. No lab exists for component: INREXT, INREXT No results for input(s): FE, TIBC, PSAT, FERR in the last 72 hours. Lab Results Component Value Date/Time Folate 20.0 03/16/2019 04:06 AM  
  
No results for input(s): PH, PCO2, PO2 in the last 72 hours. Recent Labs  
  03/29/19 
0115 CPK 78 No results found for: CHOL, CHOLX, CHLST, CHOLV, HDL, LDL, LDLC, DLDLP, TGLX, TRIGL, TRIGP, CHHD, CHHDX Lab Results Component Value Date/Time Glucose (POC) 137 (H) 03/30/2019 06:40 AM  
 Glucose (POC) 171 (H) 03/29/2019 09:47 PM  
 Glucose (POC) 246 (H) 03/29/2019 04:31 PM  
 Glucose (POC) 144 (H) 03/29/2019 11:35 AM  
 Glucose (POC) 203 (H) 03/29/2019 06:31 AM  
 
Lab Results Component Value Date/Time Color YELLOW/STRAW 03/29/2019 02:34 PM  
 Appearance CLEAR 03/29/2019 02:34 PM  
 Specific gravity 1.017 03/29/2019 02:34 PM  
 pH (UA) 5.0 03/29/2019 02:34 PM  
 Protein 100 (A) 03/29/2019 02:34 PM  
 Glucose NEGATIVE  03/29/2019 02:34 PM  
 Ketone NEGATIVE  03/29/2019 02:34 PM  
 Bilirubin NEGATIVE  03/29/2019 02:34 PM  
 Urobilinogen 0.2 03/29/2019 02:34 PM  
 Nitrites NEGATIVE  03/29/2019 02:34 PM  
 Leukocyte Esterase NEGATIVE  03/29/2019 02:34 PM  
 Epithelial cells FEW 03/29/2019 02:34 PM  
 Bacteria NEGATIVE  03/29/2019 02:34 PM  
 WBC 0-4 03/29/2019 02:34 PM  
 RBC 0-5 03/29/2019 02:34 PM  
 
 
Med list reviewed Current Facility-Administered Medications Medication Dose Route Frequency  acetaminophen (TYLENOL) tablet 650 mg  650 mg Oral Q6H PRN  
 tamsulosin (FLOMAX) capsule 0.4 mg  0.4 mg Oral DAILY  ammonium lactate (LAC-HYDRIN) 12 % lotion   Topical BID  insulin glargine (LANTUS) injection 25 Units  25 Units SubCUTAneous ACB/HS  aspirin delayed-release tablet 81 mg  81 mg Oral DAILY  dilTIAZem CD (CARDIZEM CD) capsule 240 mg  240 mg Oral DAILY  ergocalciferol capsule 50,000 Units  50,000 Units Oral Q7D  
 ferrous sulfate tablet 325 mg  325 mg Oral ACB  fluticasone propionate (FLONASE) 50 mcg/actuation nasal spray 2 Spray  2 Spray Both Nostrils DAILY  pregabalin (LYRICA) capsule 150 mg  150 mg Oral TID  polyethylene glycol (MIRALAX) packet 17 g  17 g Oral DAILY  lidocaine (LIDODERM) 5 % patch 1 Patch  1 Patch TransDERmal Q24H  hydrALAZINE (APRESOLINE) tablet 50 mg  50 mg Oral TID  sodium chloride (NS) flush 5-40 mL  5-40 mL IntraVENous Q8H  
 sodium chloride (NS) flush 5-40 mL  5-40 mL IntraVENous PRN  
 heparin (porcine) injection 5,000 Units  5,000 Units SubCUTAneous Q8H  
 glucose chewable tablet 16 g  4 Tab Oral PRN  
 dextrose (D50W) injection syrg 12.5-25 g  25-50 mL IntraVENous PRN  
 glucagon (GLUCAGEN) injection 1 mg  1 mg IntraMUSCular PRN  
 insulin lispro (HUMALOG) injection   SubCUTAneous AC&HS Care Plan discussed with:  Patient/Family and Nurse Ray Brown MD 
Internal Medicine Date of Service: 3/30/2019

## 2019-03-30 NOTE — PROGRESS NOTES
Bedside shift change report given to RUBA Rodriguez (oncoming nurse) by Malou Bell RN (offgoing nurse). Report included the following information SBAR, Kardex, MAR and Recent Results.

## 2019-03-31 VITALS
DIASTOLIC BLOOD PRESSURE: 77 MMHG | HEIGHT: 72 IN | SYSTOLIC BLOOD PRESSURE: 162 MMHG | BODY MASS INDEX: 37.33 KG/M2 | TEMPERATURE: 98.2 F | WEIGHT: 275.57 LBS | OXYGEN SATURATION: 97 % | RESPIRATION RATE: 16 BRPM | HEART RATE: 74 BPM

## 2019-03-31 LAB
ANION GAP SERPL CALC-SCNC: 5 MMOL/L (ref 5–15)
BUN SERPL-MCNC: 45 MG/DL (ref 6–20)
BUN/CREAT SERPL: 26 (ref 12–20)
CALCIUM SERPL-MCNC: 8.1 MG/DL (ref 8.5–10.1)
CHLORIDE SERPL-SCNC: 110 MMOL/L (ref 97–108)
CO2 SERPL-SCNC: 24 MMOL/L (ref 21–32)
CREAT SERPL-MCNC: 1.75 MG/DL (ref 0.7–1.3)
GLUCOSE BLD STRIP.AUTO-MCNC: 127 MG/DL (ref 65–100)
GLUCOSE BLD STRIP.AUTO-MCNC: 208 MG/DL (ref 65–100)
GLUCOSE SERPL-MCNC: 207 MG/DL (ref 65–100)
POTASSIUM SERPL-SCNC: 4.7 MMOL/L (ref 3.5–5.1)
SERVICE CMNT-IMP: ABNORMAL
SERVICE CMNT-IMP: ABNORMAL
SODIUM SERPL-SCNC: 139 MMOL/L (ref 136–145)

## 2019-03-31 PROCEDURE — 74011250637 HC RX REV CODE- 250/637: Performed by: INTERNAL MEDICINE

## 2019-03-31 PROCEDURE — 99218 HC RM OBSERVATION: CPT

## 2019-03-31 PROCEDURE — 3331090002 HH PPS REVENUE DEBIT

## 2019-03-31 PROCEDURE — 74011636637 HC RX REV CODE- 636/637: Performed by: INTERNAL MEDICINE

## 2019-03-31 PROCEDURE — 74011250637 HC RX REV CODE- 250/637: Performed by: FAMILY MEDICINE

## 2019-03-31 PROCEDURE — 96372 THER/PROPH/DIAG INJ SC/IM: CPT

## 2019-03-31 PROCEDURE — 74011636637 HC RX REV CODE- 636/637: Performed by: FAMILY MEDICINE

## 2019-03-31 PROCEDURE — 74011250636 HC RX REV CODE- 250/636: Performed by: FAMILY MEDICINE

## 2019-03-31 PROCEDURE — 82962 GLUCOSE BLOOD TEST: CPT

## 2019-03-31 PROCEDURE — 36415 COLL VENOUS BLD VENIPUNCTURE: CPT

## 2019-03-31 PROCEDURE — 3331090001 HH PPS REVENUE CREDIT

## 2019-03-31 PROCEDURE — 80048 BASIC METABOLIC PNL TOTAL CA: CPT

## 2019-03-31 RX ORDER — INSULIN GLARGINE 100 [IU]/ML
45 INJECTION, SOLUTION SUBCUTANEOUS 2 TIMES DAILY
Qty: 6 VIAL | Refills: 10 | Status: SHIPPED | OUTPATIENT
Start: 2019-03-31 | End: 2020-01-01 | Stop reason: SDUPTHER

## 2019-03-31 RX ORDER — INSULIN GLARGINE 100 [IU]/ML
35 INJECTION, SOLUTION SUBCUTANEOUS
Status: DISCONTINUED | OUTPATIENT
Start: 2019-03-31 | End: 2019-03-31 | Stop reason: HOSPADM

## 2019-03-31 RX ORDER — TAMSULOSIN HYDROCHLORIDE 0.4 MG/1
0.4 CAPSULE ORAL DAILY
Qty: 30 CAP | Refills: 0 | Status: SHIPPED | OUTPATIENT
Start: 2019-03-31 | End: 2019-05-29 | Stop reason: SDUPTHER

## 2019-03-31 RX ADMIN — Medication 10 ML: at 06:55

## 2019-03-31 RX ADMIN — HEPARIN SODIUM 5000 UNITS: 5000 INJECTION INTRAVENOUS; SUBCUTANEOUS at 01:00

## 2019-03-31 RX ADMIN — HYDRALAZINE HYDROCHLORIDE 50 MG: 50 TABLET, FILM COATED ORAL at 12:31

## 2019-03-31 RX ADMIN — DILTIAZEM HYDROCHLORIDE 240 MG: 240 CAPSULE, COATED, EXTENDED RELEASE ORAL at 12:30

## 2019-03-31 RX ADMIN — TAMSULOSIN HYDROCHLORIDE 0.4 MG: 0.4 CAPSULE ORAL at 12:30

## 2019-03-31 RX ADMIN — INSULIN GLARGINE 30 UNITS: 100 INJECTION, SOLUTION SUBCUTANEOUS at 06:54

## 2019-03-31 RX ADMIN — FLUTICASONE PROPIONATE 2 SPRAY: 50 SPRAY, METERED NASAL at 12:40

## 2019-03-31 RX ADMIN — FERROUS SULFATE TAB 325 MG (65 MG ELEMENTAL FE) 325 MG: 325 (65 FE) TAB at 06:55

## 2019-03-31 RX ADMIN — HEPARIN SODIUM 5000 UNITS: 5000 INJECTION INTRAVENOUS; SUBCUTANEOUS at 12:39

## 2019-03-31 RX ADMIN — ASPIRIN 81 MG: 81 TABLET, COATED ORAL at 12:30

## 2019-03-31 RX ADMIN — INSULIN LISPRO 2 UNITS: 100 INJECTION, SOLUTION INTRAVENOUS; SUBCUTANEOUS at 06:54

## 2019-03-31 RX ADMIN — PREGABALIN 150 MG: 75 CAPSULE ORAL at 12:30

## 2019-03-31 RX ADMIN — ACETAMINOPHEN 650 MG: 325 TABLET ORAL at 13:00

## 2019-03-31 NOTE — ROUTINE PROCESS
Bedside and Verbal shift change report given to oncoming nurse by Chip Stevenson RN (offgoing nurse). Report given with SBAR, Kardex, MAR and Recent Results.

## 2019-03-31 NOTE — PROGRESS NOTES
Bedside shift change report given to Aislinn Sheth (oncoming nurse) by Edgardo Carpio (offgoing nurse). Report included the following information SBAR.

## 2019-03-31 NOTE — DISCHARGE INSTRUCTIONS
Please bring this form with you to show your primary care provider at your follow-up appointment. Primary care provider:  Dr. Alfred Blackburn MD    Discharging provider:  Toan Montiel MD    You have been admitted to the hospital with the following diagnoses:  · KATHI (acute kidney injury) ([facility] [address] [N17.9]    FOLLOW-UP CARE RECOMMENDATIONS:    Hold bumex and irbesartan until seen by primary care physician. Blood pressure mildly elevated and recommend to follow up with primary care physician. Blood glucose numbers have been stable during hospital stay and insulin lantus and lispro doses were adjusted. Advise to log in your blood sugar numbers and follow up with primary care physician. APPOINTMENTS:  · Follow-up with primary care provider, Dr. Alfred Blackburn MD  -  Please call [phone] shortly after discharge to set up an appointment to be seen in  1 week   · Nephrology in 2 weeks    FOLLOW-UP TESTS recommended: bmp in 3 days    PENDING TEST RESULTS:  At the time of your discharge the following test results are still pending: none  Please make sure you review these results with your outpatient follow-up provider(s). SYMPTOMS to watch for: chest pain, shortness of breath, fever, chills, nausea, vomiting, diarrhea, change in mentation, falling, weakness, bleeding. DIET/what to eat:  Diabetic Diet    ACTIVITY:  Activity as tolerated      What to do if new or unexpected symptoms occur? If you experience any of the above symptoms (or should other concerns or questions arise after discharge) please call your primary care physician. Return to the emergency room if you cannot get hold of your doctor. · It is very important that you keep your follow-up appointment(s). · Please bring discharge papers, medication list (and/or medication bottles) to your follow-up appointments for review by your outpatient provider(s).   · Please check the list of medications and be sure it includes every medication (even non-prescription medications) that your provider wants you to take. · It is important that you take the medication exactly as they are prescribed. · Keep your medication in the bottles provided by the pharmacist and keep a list of the medication names, dosages, and times to be taken in your wallet. · Do not take other medications without consulting your doctor. · If you have any questions about your medications or other instructions, please talk to your nurse or care provider before you leave the hospital.    I understand that if any problems occur once I am at home I am to contact my physician. These instructions were explained to me and I had the opportunity to ask questions.

## 2019-03-31 NOTE — DISCHARGE SUMMARY
Discharge Summary Patient:  Kofi Dash 
     MRN: [id number] YOB: 1950 Age: 80 y.o. Date of admission:  3/28/2019 Date of discharge:  3/31/2019 Primary care provider: Dr. Timur Mclaughlin MD 
 
Admitting provider:  David Doty MD 
 
Discharging provider:  Kamlesh Reynolds [address] [phone]. If unavailable, call [phone] and ask the  to page the triage hospitalist. 
 
Consultations · IP CONSULT TO HOSPITALIST · IP CONSULT TO NEPHROLOGY Procedures · * No surgery found * Discharge destination: home with home health. The patient is stable for discharge. Admission diagnosis · KATHI (acute kidney injury) ([facility] [address] [N17.9] Current Discharge Medication List  
  
START taking these medications Details  
tamsulosin (FLOMAX) 0.4 mg capsule Take 1 Cap by mouth daily. Qty: 30 Cap, Refills: 0 CONTINUE these medications which have CHANGED Details  
insulin regular (NOVOLIN R, HUMULIN R) 100 unit/mL injection 7 Units by SubCUTAneous route three (3) times daily as needed (with meals). Qty: 1 Vial, Refills: 12  
 Associated Diagnoses: Diabetic polyneuropathy associated with type 2 diabetes mellitus ([facility] [address] Rotator cuff arthropathy, right  
  
insulin glargine (LANTUS U-100 INSULIN) 100 unit/mL injection 45 Units by SubCUTAneous route two (2) times a day. Qty: 6 Vial, Refills: 10  
 Associated Diagnoses: Diabetic polyneuropathy associated with type 2 diabetes mellitus ([facility] [address] Rotator cuff arthropathy, right CONTINUE these medications which have NOT CHANGED Details  
pregabalin (LYRICA) 150 mg capsule Take 150 mg by mouth three (3) times daily. cetirizine (ALLERGY RELIEF, CETIRIZINE,) 10 mg tablet Take 10 mg by mouth daily. aspirin delayed-release 81 mg tablet Take 81 mg by mouth daily. dilTIAZem CD (CARDIZEM CD) 240 mg ER capsule Take 1 Cap by mouth daily. Qty: 30 Cap, Refills: 0  
  
hydrALAZINE (APRESOLINE) 50 mg tablet Take 1 Tab by mouth three (3) times daily. Qty: 90 Tab, Refills: 0  
  
sodium chloride (OCEAN) 0.65 % nasal squeeze bottle 0.1 mL by Both Nostrils route every two (2) hours as needed for Congestion. Qty: 10 mL, Refills: 0  
  
gabapentin (NEURONTIN) 300 mg capsule TAKE TWO CAPSULES BY MOUTH THREE TIMES A DAY Qty: 180 Cap, Refills: 0  
  
ketoconazole (NIZORAL) 2 % topical cream Apply  to affected area two (2) times a day. Qty: 60 g, Refills: 3  
  
hydrocolloid dressing (DUODERM HYDROACTIVE) topical gel Apply  to affected area daily. Qty: 30 Tube, Refills: 0  
  
triamcinolone acetonide (KENALOG) 0.1 % ointment Apply  to affected area two (2) times a day. use thin layer bid 
Qty: 80 g, Refills: 12  
  
lidocaine (LIDODERM) 5 % 1 Patch by TransDERmal route every twenty-four (24) hours. Apply to affected area every 24 hours Qty: 1 Package, Refills: 3 Associated Diagnoses: Rotator cuff arthropathy, right; Diabetic polyneuropathy associated with type 2 diabetes mellitus ([address] ferrous sulfate (IRON) 325 mg (65 mg iron) EC tablet Take 1 Tab by mouth Daily (before breakfast). Qty: 30 Tab, Refills: 6  
  
ammonium lactate (LAC-HYDRIN FIVE) 5 % lotion Apply daily 
Qty: 222 mL, Refills: 3  
  
acetaminophen (PAIN AND FEVER) 500 mg tablet TAKE ONE TABLET BY MOUTH EVERY 6 HOURS AS NEEDED FOR PAIN Qty: 60 Tab, Refills: 2 Associated Diagnoses: Primary osteoarthritis of both hips  
  
ergocalciferol (ERGOCALCIFEROL) 50,000 unit capsule Take 1 Cap by mouth every seven (7) days. Qty: 4 Cap, Refills: 12  
 Associated Diagnoses: Vitamin D deficiency THERA-TABS M 27 mg iron-400 mcg tab TAKE ONE TABLET BY MOUTH DAILY Qty: 30 Tab, Refills: 11  
 Associated Diagnoses: Diabetic polyneuropathy associated with type 2 diabetes mellitus ([address] Rotator cuff arthropathy, right fluticasone (FLONASE) 50 mcg/actuation nasal spray SPRAY TWO SPRAYS IN EACH NOSTRIL DAILY Qty: 1 Bottle, Refills: 11  
 Associated Diagnoses: Diabetic polyneuropathy associated with type 2 diabetes mellitus ([address] Rotator cuff arthropathy, right  
  
polyethylene glycol (MIRALAX) 17 gram/dose powder Take 17 g by mouth daily. Qty: 850 g, Refills: 3 Associated Diagnoses: Slow transit constipation Calcium Carbonate-Vit D3-Min 600 mg calcium- 400 unit tab Take 1 Tab by mouth two (2) times a day. STOP taking these medications DILT- mg XR capsule Comments:  
Reason for Stopping: Follow-up Information Follow up With Specialties Details Why Contact Info Jena Anderson MD Internal Medicine In 1 week  [address] 
[phone] Silas Sahni MD Nephrology In 2 weeks  [id number] [facility] [place] [id number] 
[phone] Final discharge diagnoses and brief hospital course The patient is a 81-year-old gentleman with past medical history of chronic kidney disease stage III, diabetes mellitus type 2, history of KTAHI with recent admission to the hospital on 03/14/2019, and discharged on 03/20/2019, history of hypertension, chronic venous stasis of bilateral lower extremities, chronic hip pain, recurrent hyperkalemia and hyponatremia, anemia of chronic kidney disease, and obesity, who presents to the hospital with the above-mentioned symptoms.  The patient was recently admitted to [facility] for KATHI prior to arrival on CKD stage III probably due to dehydration and diuretic.  Also had some presyncopal episodes.  The patient was given IV fluids and was discharged home.  The patient reports that today, he went to his primary care physician for evaluation, had blood work done, and his creatinine was found to be 2.66.  The patient was told to come to the ER for further management and evaluation.  The patient reports that he has had some weakness associated with his symptoms, but has had no other complaints or problems.  The patient reports that he has been taking his medications on a regular basis.  The patient's Bumex was restarted when he was discharged from [facility] patient reports his leg swelling has improved significantly, but he still has swelling, increased in the left leg than the right leg.  The patient came to the ER, his creatinine was found to be 2.7 and he was requested to be admitted under the hospitalist service. 3/28/2019 Acute kidney injury superimposed on chronic kidney injury, stage III:  resolved 
-pre renal 2/2 diuretics - similar complaints in the past 
- Cr 2.67 on POA, Cr 1.75 today - baseline - dced IVF today 
-  hold bumex, irbesartan 
- nephrology on board; agree with current plan 
- renal us: normal 
- avoid nephrotoxins 
- monitor renal function 
  
Hyperkalemia - resolved - s/p ca gluconate and kayxelate 
- will monitor 
  
Diabetes mellitus type 2:   
- A1c 7.5 in 2/2019 - c/w current lantus 30U bid ( takes 72 bid at home and Lispro 14U with meals). ISS  
- DM education consulted 
  
Hypertension:  stable. C/w cardizem, hydralazine 
  
History of prostate cancer, stable.  
  
Chronic venous stasis of bilateral lower extremities: Wound Care Recommendations: PCP f/u in 1 week BMP in 3 days Nephrology f/u 1-2 weeks Hold bumex and irbesartan until seen by primary care physician. Blood pressure mildly elevated and recommend to follow up with primary care physician. Blood glucose numbers have been stable during hospital stay and insulin lantus and lispro doses were adjusted. Advise to log in blood sugar numbers and follow up with primary care physician. APPOINTMENTS: 
 
 
 
Physical examination at discharge Visit Vitals /77 (BP 1 Location: Left arm, BP Patient Position: At rest) Pulse 74 Temp 98.2 °F (36.8 °C) Resp 16 Ht 6' (1.829 m) Wt 125 kg (275 lb 9.2 oz) SpO2 97% BMI 37.37 kg/m² Gen: NAD, non-toxic HEENT: anicteric sclerae, normal conjunctiva, oropharynx clear, MM moist 
Neck: supple, trachea midline, no adenopathy Heart: RRR, no MRG, no JVD, no peripheral edema Lungs: CTA b/l, non-labored respirations Abd: soft, NT, ND, BS+, no organomegaly Extr: chronic leg discoloration b/l 
Skin: dry Neuro: CN II-XII grossly intact, normal speech, moves all extremities Psych: normal mood, appropriate affect Pertinent imaging studies: 
 
none Recent Labs  
  03/29/19 0115 03/28/19 1935 WBC 8.1 9.2 HGB 9.3* 10.0* HCT 29.8* 31.9*  
 257 Recent Labs  
  03/31/19 
0327 03/30/19 
0402 03/29/19 0115 03/28/19 1935  138 139 134* K 4.7 4.8 5.2* 5.3*  
* 110* 108 104 CO2 24 23 23 23 BUN 45* 61* 86* 94* CREA 1.75* 1.91* 2.46* 2.70* * 115* 117* 235* CA 8.1* 7.5* 8.6 8.6 MG  --   --  2.7* 2.7* PHOS  --   --  3.7  --   
 
Recent Labs  
  03/29/19 0115 03/28/19 1935 SGOT 16 15 AP 96 115  
TP 6.9 7.2 ALB 3.0* 3.2*  
GLOB 3.9 4.0 No results for input(s): INR, PTP, APTT in the last 72 hours. No lab exists for component: INREXT No results for input(s): FE, TIBC, PSAT, FERR in the last 72 hours. No results for input(s): PH, PCO2, PO2 in the last 72 hours. Recent Labs  
  03/29/19 0115 CPK 78 No components found for: [place] Chronic Diagnoses:   
Problem List as of 3/31/2019 Date Reviewed: 3/14/2019 Codes Class Noted - Resolved KATHI (acute kidney injury) ([facility] ICD-10-CM: N17.9 ICD-9-CM: 584.9  3/28/2019 - Present Acute on chronic renal failure (HCC) ICD-10-CM: N17.9, N18.9 ICD-9-CM: 584.9, 585.9  3/14/2019 - Present Severe obesity (BMI 35.0-39. 9) with comorbidity ([facility] ICD-10-CM: E66.01 
ICD-9-CM: 278.01  3/28/2018 - Present Type 2 diabetes with nephropathy ([facility] ICD-10-CM: E11.21 
ICD-9-CM: 250.40, 583.81  2/27/2018 - Present S/P hip replacement, right ICD-10-CM: Y99.507 ICD-9-CM: V43.64  10/31/2017 - Present Stage 3 chronic kidney disease (HCC) ICD-10-CM: N18.3 ICD-9-CM: 585.3  10/31/2017 - Present Prostate CA [facility]) ICD-10-CM: F25 ICD-9-CM: 185  10/31/2017 - Present Diabetic polyneuropathy ([facility] ICD-10-CM: E11.42 
ICD-9-CM: 250.60, 357.2  4/4/2011 - Present Rotator Cuff Tendonitis ICD-10-CM: M71.9, M67.919 ICD-9-CM: 726.10  4/4/2011 - Present Diabetes mellitus type 2, controlled ([facility] ICD-10-CM: E11.9 ICD-9-CM: 250.00  4/4/2011 - Present Degenerative disc disease ICD-10-CM: [id number] ICD-9-CM: 722.6  4/4/2011 - Present Osteoarthrosis involving lower leg ICD-10-CM: M17.10 ICD-9-CM: 715.96  4/4/2011 - Present Depression/ Anxiety ICD-10-CM: F34.1 ICD-9-CM: 300.4  4/4/2011 - Present HTN (hypertension) ICD-10-CM: I10 
ICD-9-CM: 401.9  4/4/2011 - Present Chronic hip pain ICD-10-CM: M25.559, G89.29 ICD-9-CM: 719.45, 338.29  4/4/2011 - Present Hypertriglyceridemia ICD-10-CM: E78.1 ICD-9-CM: 272.1  4/4/2011 - Present Mild Aortic Insufficiency ICD-10-CM: I35.1 ICD-9-CM: 424.1  4/4/2011 - Present Mild Concentric LVH (left ventricular hypertrophy) ICD-10-CM: I51.7 ICD-9-CM: 429.3  4/4/2011 - Present RESOLVED: Primary osteoarthritis of right hip ICD-10-CM: M16.11 
ICD-9-CM: 715.15  10/31/2017 - 8/30/2018 RESOLVED: Osteomyelitis ([address] ICD-10-CM: M86.9 ICD-9-CM: 730.20  10/21/2016 - 8/30/2018 RESOLVED: Laryngitis ICD-10-CM: J04.0 ICD-9-CM: 464.00  4/4/2011 - 8/30/2018 RESOLVED: Insomnia ICD-10-CM: G47.00 ICD-9-CM: 780.52  4/4/2011 - 8/30/2018 RESOLVED: Carcinoma, Prostate ICD-10-CM: [employer] ICD-9-CM: 185  4/4/2011 - 8/30/2018 RESOLVED: HEMATOCHEZIA ICD-10-CM: K62.5 ICD-9-CM: 569.3  4/4/2011 - 8/30/2018 Time spent on discharge related activities today greater than 30 minutes. Signed:  Isabelle Oglesby MD 
               Hospitalist, Internal Medicine Cc: Bren Abad MD

## 2019-03-31 NOTE — PROGRESS NOTES
Pt discharged to home via medical transport. PIV sites d/c'd. D/C instructions including med changes, new meds, etc., reviewed w/pt. Pt had no questions or concerns.

## 2019-04-01 ENCOUNTER — HOME CARE VISIT (OUTPATIENT)
Dept: SCHEDULING | Facility: HOME HEALTH | Age: 69
End: 2019-04-01
Payer: MEDICARE

## 2019-04-01 VITALS
HEART RATE: 71 BPM | DIASTOLIC BLOOD PRESSURE: 76 MMHG | TEMPERATURE: 98.3 F | RESPIRATION RATE: 18 BRPM | SYSTOLIC BLOOD PRESSURE: 138 MMHG | OXYGEN SATURATION: 98 %

## 2019-04-01 VITALS
SYSTOLIC BLOOD PRESSURE: 160 MMHG | TEMPERATURE: 98.7 F | OXYGEN SATURATION: 98 % | HEART RATE: 63 BPM | DIASTOLIC BLOOD PRESSURE: 80 MMHG

## 2019-04-01 PROCEDURE — 3331090001 HH PPS REVENUE CREDIT

## 2019-04-01 PROCEDURE — G0157 HHC PT ASSISTANT EA 15: HCPCS

## 2019-04-01 PROCEDURE — 3331090002 HH PPS REVENUE DEBIT

## 2019-04-02 ENCOUNTER — HOME CARE VISIT (OUTPATIENT)
Dept: SCHEDULING | Facility: HOME HEALTH | Age: 69
End: 2019-04-02
Payer: MEDICARE

## 2019-04-02 VITALS
OXYGEN SATURATION: 99 % | RESPIRATION RATE: 16 BRPM | DIASTOLIC BLOOD PRESSURE: 70 MMHG | SYSTOLIC BLOOD PRESSURE: 148 MMHG | HEART RATE: 85 BPM

## 2019-04-02 LAB
BACTERIA SPEC CULT: NORMAL
SERVICE CMNT-IMP: NORMAL

## 2019-04-02 PROCEDURE — 3331090002 HH PPS REVENUE DEBIT

## 2019-04-02 PROCEDURE — G0152 HHCP-SERV OF OT,EA 15 MIN: HCPCS

## 2019-04-02 PROCEDURE — 3331090001 HH PPS REVENUE CREDIT

## 2019-04-03 ENCOUNTER — HOME CARE VISIT (OUTPATIENT)
Dept: SCHEDULING | Facility: HOME HEALTH | Age: 69
End: 2019-04-03
Payer: MEDICARE

## 2019-04-03 VITALS
OXYGEN SATURATION: 98 % | TEMPERATURE: 98.3 F | SYSTOLIC BLOOD PRESSURE: 152 MMHG | DIASTOLIC BLOOD PRESSURE: 77 MMHG | HEART RATE: 74 BPM

## 2019-04-03 PROCEDURE — 3331090002 HH PPS REVENUE DEBIT

## 2019-04-03 PROCEDURE — G0157 HHC PT ASSISTANT EA 15: HCPCS

## 2019-04-03 PROCEDURE — 3331090001 HH PPS REVENUE CREDIT

## 2019-04-04 ENCOUNTER — OFFICE VISIT (OUTPATIENT)
Dept: INTERNAL MEDICINE CLINIC | Age: 69
End: 2019-04-04

## 2019-04-04 VITALS
TEMPERATURE: 97.9 F | SYSTOLIC BLOOD PRESSURE: 128 MMHG | HEIGHT: 72 IN | RESPIRATION RATE: 20 BRPM | BODY MASS INDEX: 37.25 KG/M2 | DIASTOLIC BLOOD PRESSURE: 70 MMHG | HEART RATE: 80 BPM | WEIGHT: 275 LBS | OXYGEN SATURATION: 96 %

## 2019-04-04 DIAGNOSIS — N18.4 ANEMIA DUE TO STAGE 4 CHRONIC KIDNEY DISEASE (HCC): ICD-10-CM

## 2019-04-04 DIAGNOSIS — I10 ESSENTIAL HYPERTENSION: ICD-10-CM

## 2019-04-04 DIAGNOSIS — D63.1 ANEMIA DUE TO STAGE 4 CHRONIC KIDNEY DISEASE (HCC): ICD-10-CM

## 2019-04-04 DIAGNOSIS — N18.4 STAGE 4 CHRONIC KIDNEY DISEASE (HCC): Primary | ICD-10-CM

## 2019-04-04 PROCEDURE — 3331090002 HH PPS REVENUE DEBIT

## 2019-04-04 PROCEDURE — 3331090001 HH PPS REVENUE CREDIT

## 2019-04-04 NOTE — ACP (ADVANCE CARE PLANNING)
====Karol Alejandro Invitation====    Patient was invited to [facility] on this date and given the information folder for review. [facility] with [facility] facilitator for ACP conversation regarding advance directives. [] Yes  [x] No  Referral sent to [facility] Choices team member or Coordinator for follow-up    [] Yes  [x] No  Patient scheduled an appointment.        Site of Referral:[facility]

## 2019-04-04 NOTE — PROGRESS NOTES
Chief Complaint   Patient presents with   [facility] Follow Up     3 most recent [facility] Screens 2/20/2019   Little interest or pleasure in doing things Not at all   Feeling down, depressed, irritable, or hopeless Not at all   Total Score PHQ 2 0     1. Have you been to the ER, urgent care clinic since your last visit? Hospitalized since your last visit? yes      2. Have you seen or consulted any other health care providers outside of the [address] [place] [address] since your last visit? Include any pap smears or colon screening.  no

## 2019-04-04 NOTE — PROGRESS NOTES
Ellie Darnell is a 80 y.o. male and presents with Hospital Follow Up  . Subjective:    He was placed in the hospital for acute renal failure most likely due to volume depletion and diuretic use. he was recently taken off his medication. He has some underlying chronic renal disease. His last bun was 45. Hypertension Review:  The patient has essential hypertension  Diet and Lifestyle: generally follows a  low sodium diet, exercises sporadically  Home BP Monitoring: is not measured at home. Pertinent ROS: taking medications as instructed, no medication side effects noted, no TIA's, no chest pain on exertion, no dyspnea on exertion, no swelling of ankles. Diabetes Mellitus Review:  He has diabetes mellitus. Diabetic ROS - medication compliance: compliant all of the time, diabetic diet compliance: compliant all of the time, home glucose monitoring: is performed. Known diabetic complications: none  Cardiovascular risk factors: family history, dyslipidemia, diabetes mellitus, obesity, hypertension  Current diabetic medications include oral agents/insulin   Eye exam current (within one year): no  Weight trend: stable  Prior visit with dietician: no  Current diet: \"healthy\" diet  in general  Current exercise: walking  Current monitoring regimen: home blood tests - daily  Home blood sugar records: trend: stable  Any episodes of hypoglycemia? no  Is He on ACE inhibitor or angiotensin II receptor blocker? Yes     Anemia review:  Patient presents for follow up  evaluation of an anemia. Anemia was found by routine CBC. It had been present for several months. Associated signs & symptoms:none  The  most recent studies were improved  His anemia is felt due to CKD. Review of Systems  Constitutional: negative for fevers, chills, anorexia and weight loss  Eyes:   negative for visual disturbance and irritation  ENT:   negative for tinnitus,sore throat,nasal congestion,ear pains. hoarseness  Respiratory: negative for cough, hemoptysis, dyspnea,wheezing  CV:   negative for chest pain, palpitations, lower extremity edema  GI:   negative for nausea, vomiting, diarrhea, abdominal pain,melena  Endo:               negative for polyuria,polydipsia,polyphagia,heat intolerance  Genitourinary: negative for frequency, dysuria and hematuria  Integument:  negative for rash and pruritus  Hematologic:  negative for easy bruising and gum/nose bleeding  Musculoskel: negative for myalgias, arthralgias, back pain, muscle weakness, joint pain  Neurological:  negative for headaches, dizziness, vertigo, memory problems and gait   Behavl/Psych: negative for feelings of anxiety, depression, mood changes    Past Medical History:   Diagnosis Date    Arthritis, Degenerative,  Knee 4/4/2011    Carcinoma, Prostate 4/4/2011    Degenerative disc disease 4/4/2011    Depression/ Anxiety 4/4/2011    Diabetes ([facility] [address]     Diabetes Mellitrus ( non-insulin dependent ) Type 2 4/4/2011    HEMATOCHEZIA 4/4/2011    Hypertrension 4/4/2011    Hypertriglyceridemia 4/4/2011    Insomnia 4/4/2011    Laryngitis 4/4/2011    Mild Aortic insufficiency 4/4/2011    Mild Concentric LVH (left ventricular hypertrophy) 4/4/2011    Neuropathy 4/4/2011    Osteoarthritis 4/4/2011    Rotator Cuff Tendonitis 4/4/2011     Past Surgical History:   Procedure Laterality Date    CARDIAC SURG PROCEDURE UNLIST      cardiac cath    COLONOSCOPY N/A 4/28/2017    COL[facility]OSCOPY performed [facility] Thompson [facility] ENDOSCOPY    HX ORTHOPAEDIC      left hip pinning    HX OTHER SURGICAL      left little toe amputation    HX UROLOGICAL       Social History     Socioeconomic History    Marital status: LEGALLY      Spouse name: Not on file    Number of children: Not on file    Years of education: Not on file    Highest education level: Not on file   Tobacco Use    Smoking status: Current Every Day Smoker     Packs/day: 0.25     Last attempt to quit: 10/25/2016 Years since quittin.4    Smokeless tobacco: Never Used   Substance and Sexual Activity    Alcohol use: Yes     Alcohol/week: 7.0 oz     Types: 7 Cans of beer, 7 Shots of liquor per week    Sexual activity: Yes     Partners: Female     History reviewed. No pertinent family history. Current Outpatient Medications   Medication Sig Dispense Refill    insulin regular (NOVOLIN R, HUMULIN R) 100 unit/mL injection 7 Units by SubCUTAneous route three (3) times daily as needed (with meals). 1 Vial 12    insulin glargine (LANTUS U-100 INSULIN) 100 unit/mL injection 45 Units by SubCUTAneous route two (2) times a day. 6 Vial 10    tamsulosin (FLOMAX) 0.4 mg capsule Take 1 Cap by mouth daily. 30 Cap 0    pregabalin (LYRICA) 150 mg capsule Take 150 mg by mouth three (3) times daily.  cetirizine (ALLERGY RELIEF, CETIRIZINE,) 10 mg tablet Take 10 mg by mouth daily.  aspirin delayed-release 81 mg tablet Take 81 mg by mouth daily.  hydrALAZINE (APRESOLINE) 50 mg tablet Take 1 Tab by mouth three (3) times daily. 90 Tab 0    sodium chloride (OCEAN) 0.65 % nasal squeeze bottle 0.1 mL by Both Nostrils route every two (2) hours as needed for Congestion. 10 mL 0    gabapentin (NEURONTIN) 300 mg capsule TAKE TWO CAPSULES BY MOUTH THREE TIMES A  Cap 0    ketoconazole (NIZORAL) 2 % topical cream Apply  to affected area two (2) times a day. (Patient taking differently: Apply 1 Applicator to affected area two (2) times a day. thin layer to skin irritation ) 60 g 3    hydrocolloid dressing (DUODERM HYDROACTIVE) topical gel Apply  to affected area daily. 30 Tube 0    triamcinolone acetonide (KENALOG) 0.1 % ointment Apply  to affected area two (2) times a day. use thin layer bid 80 g 12    ammonium lactate (LAC-HYDRIN FIVE) 5 % lotion Apply daily (Patient taking differently: Apply 1 Applicator to affected area daily.  Apply daily bilateral lower legs ) 222 mL 3    acetaminophen (PAIN AND FEVER) 500 mg tablet TAKE ONE TABLET BY MOUTH EVERY 6 HOURS AS NEEDED FOR PAIN 60 Tab 2    THERA-TABS M 27 mg iron-400 mcg tab TAKE ONE TABLET BY MOUTH DAILY 30 Tab 11    fluticasone (FLONASE) 50 mcg/actuation nasal spray SPRAY TWO SPRAYS IN EACH NOSTRIL DAILY 1 Bottle 11    polyethylene glycol (MIRALAX) 17 gram/dose powder Take 17 g by mouth daily. 850 g 3    Calcium Carbonate-Vit D3-Min 600 mg calcium- 400 unit tab Take 1 Tab by mouth two (2) times a day.  dilTIAZem CD (CARDIZEM CD) 240 mg ER capsule Take 1 Cap by mouth daily. 30 Cap 0    bumetanide (BUMEX) 2 mg tablet Take 1 Tab by mouth daily. (Patient not taking: Reported on 4/2/2019) 60 Tab 12    lidocaine (LIDODERM) 5 % 1 Patch by TransDERmal route every twenty-four (24) hours. Apply to affected area every 24 hours (Patient not taking: Reported on 3/22/2019) 1 Package 3    ferrous sulfate (IRON) 325 mg (65 mg iron) EC tablet Take 1 Tab by mouth Daily (before breakfast). 30 Tab 6    ergocalciferol (ERGOCALCIFEROL) 50,000 unit capsule Take 1 Cap by mouth every seven (7) days.  4 Cap 12     No Known Allergies    Objective:  Visit Vitals  /70 (BP 1 Location: Right arm, BP Patient Position: Sitting)   Pulse 80   Temp 97.9 °F (36.6 °C)   Resp 20   Ht 6' (1.829 m)   SpO2 96%   BMI 37.37 kg/m²     Physical Exam:   General appearance - alert, well appearing, and in no distress  Mental status - alert, oriented to person, place, and time  EYE-LUH, EOMI, corneas normal, no foreign bodies  ENT-ENT exam normal, no neck nodes or sinus tenderness  Nose - normal and patent, no erythema, discharge or polyps  Mouth - mucous membranes moist, pharynx normal without lesions  Neck - supple, no significant adenopathy   Chest - clear to auscultation, no wheezes, rales or rhonchi, symmetric air entry   Heart - normal rate, regular rhythm, normal S1, S2, no murmurs, rubs, clicks or gallops   Abdomen - soft, nontender, nondistended, no masses or organomegaly  Lymph- no adenopathy palpable  Ext-peripheral pulses normal, 2+lower leg edema, no clubbing or cyanosis  Skin-Warm and dry. no hyperpigmentation, vitiligo, or suspicious lesions  Neuro -alert, oriented, normal speech, no focal findings or movement disorder noted  Neck-normal C-spine, no tenderness, full ROM without pain  Feet- nail deformities or callus formation with good pulses noted      Results for orders placed or performed during the hospital encounter of 03/28/19   CULTURE, BLOOD, PAIRED   Result Value Ref Range    Special Requests: NO SPECIAL REQUESTS      Culture result: NO GROWTH 5 DAYS     URINE CULTURE HOLD SAMPLE   Result Value Ref Range    Urine culture hold        URINE ON HOLD IN MICROBIOLOGY DEPT FOR 3 DAYS. IF UNPRESERVED URINE IS SUBMITTED, IT CANNOT BE USED FOR ADDITIONAL TESTING AFTER 24 HRS, RECOLLECTION WILL BE REQUIRED. CBC WITH AUTOMATED DIFF   Result Value Ref Range    WBC 9.2 4.1 - 11.1 K/uL    RBC 3.18 (L) 4.10 - 5.70 M/uL    HGB 10.0 (L) 12.1 - 17.0 g/dL    HCT 31.9 (L) 36.6 - 50.3 %    .3 (H) 80.0 - 99.0 FL    MCH 31.4 26.0 - 34.0 PG    MCHC 31.3 30.0 - 36.5 g/dL    RDW 16.4 (H) 11.5 - 14.5 %    PLATELET 924 033 - 519 K/uL    MPV 9.7 8.9 - 12.9 FL    NRBC 0.0 0  WBC    ABSOLUTE NRBC 0.00 0.00 - 0.01 K/uL    NEUTROPHILS 79 (H) 32 - 75 %    LYMPHOCYTES 12 12 - 49 %    MONOCYTES 7 5 - 13 %    EOSINOPHILS 1 0 - 7 %    BASOPHILS 0 0 - 1 %    IMMATURE GRANULOCYTES 1 (H) 0.0 - 0.5 %    ABS. NEUTROPHILS 7.2 1.8 - 8.0 K/UL    ABS. LYMPHOCYTES 1.1 0.8 - 3.5 K/UL    ABS. MONOCYTES 0.7 0.0 - 1.0 K/UL    ABS. EOSINOPHILS 0.1 0.0 - 0.4 K/UL    ABS. BASOPHILS 0.0 0.0 - 0.1 K/UL    ABS. IMM.  GRANS. 0.1 (H) 0.00 - 0.04 K/UL    DF AUTOMATED     METABOLIC PANEL, COMPREHENSIVE   Result Value Ref Range    Sodium 134 (L) 136 - 145 mmol/L    Potassium 5.3 (H) 3.5 - 5.1 mmol/L    Chloride 104 97 - 108 mmol/L    CO2 23 21 - 32 mmol/L    Anion gap 7 5 - 15 mmol/L    Glucose 235 (H) 65 - 100 mg/dL    BUN 94 (H) 6 - 20 MG/DL    Creatinine 2.70 (H) 0.70 - 1.30 MG/DL    BUN/Creatinine ratio 35 (H) 12 - 20      GFR est AA 29 (L) >60 ml/min/1.73m2    GFR est non-AA 24 (L) >60 ml/min/1.73m2    Calcium 8.6 8.5 - 10.1 MG/DL    Bilirubin, total 0.2 0.2 - 1.0 MG/DL    ALT (SGPT) 30 12 - 78 U/L    AST (SGOT) 15 15 - 37 U/L    Alk. phosphatase 115 45 - 117 U/L    Protein, total 7.2 6.4 - 8.2 g/dL    Albumin 3.2 (L) 3.5 - 5.0 g/dL    Globulin 4.0 2.0 - 4.0 g/dL    A-G Ratio 0.8 (L) 1.1 - 2.2     NT-PRO BNP   Result Value Ref Range    NT pro- (H) <125 PG/ML   MAGNESIUM   Result Value Ref Range    Magnesium 2.7 (H) 1.6 - 2.4 mg/dL   HEMOGLOBIN A1C WITH EAG   Result Value Ref Range    Hemoglobin A1c 8.0 (H) 4.2 - 6.3 %    Est. average glucose 183 mg/dL   LACTIC ACID   Result Value Ref Range    Lactic acid 1.1 0.4 - 2.0 MMOL/L   METABOLIC PANEL, COMPREHENSIVE   Result Value Ref Range    Sodium 139 136 - 145 mmol/L    Potassium 5.2 (H) 3.5 - 5.1 mmol/L    Chloride 108 97 - 108 mmol/L    CO2 23 21 - 32 mmol/L    Anion gap 8 5 - 15 mmol/L    Glucose 117 (H) 65 - 100 mg/dL    BUN 86 (H) 6 - 20 MG/DL    Creatinine 2.46 (H) 0.70 - 1.30 MG/DL    BUN/Creatinine ratio 35 (H) 12 - 20      GFR est AA 32 (L) >60 ml/min/1.73m2    GFR est non-AA 26 (L) >60 ml/min/1.73m2    Calcium 8.6 8.5 - 10.1 MG/DL    Bilirubin, total 0.2 0.2 - 1.0 MG/DL    ALT (SGPT) 29 12 - 78 U/L    AST (SGOT) 16 15 - 37 U/L    Alk.  phosphatase 96 45 - 117 U/L    Protein, total 6.9 6.4 - 8.2 g/dL    Albumin 3.0 (L) 3.5 - 5.0 g/dL    Globulin 3.9 2.0 - 4.0 g/dL    A-G Ratio 0.8 (L) 1.1 - 2.2     CK   Result Value Ref Range    CK 78 39 - 308 U/L   CBC WITH AUTOMATED DIFF   Result Value Ref Range    WBC 8.1 4.1 - 11.1 K/uL    RBC 2.99 (L) 4.10 - 5.70 M/uL    HGB 9.3 (L) 12.1 - 17.0 g/dL    HCT 29.8 (L) 36.6 - 50.3 %    MCV 99.7 (H) 80.0 - 99.0 FL    MCH 31.1 26.0 - 34.0 PG    MCHC 31.2 30.0 - 36.5 g/dL    RDW 16.3 (H) 11.5 - 14.5 %    PLATELET 423 951 - 020 K/uL    MPV 9.7 8.9 - 12.9 FL    NRBC 0.0 0  WBC    ABSOLUTE NRBC 0.00 0.00 - 0.01 K/uL    NEUTROPHILS 74 32 - 75 %    LYMPHOCYTES 15 12 - 49 %    MONOCYTES 8 5 - 13 %    EOSINOPHILS 2 0 - 7 %    BASOPHILS 0 0 - 1 %    IMMATURE GRANULOCYTES 1 (H) 0.0 - 0.5 %    ABS. NEUTROPHILS 6.0 1.8 - 8.0 K/UL    ABS. LYMPHOCYTES 1.2 0.8 - 3.5 K/UL    ABS. MONOCYTES 0.6 0.0 - 1.0 K/UL    ABS. EOSINOPHILS 0.2 0.0 - 0.4 K/UL    ABS. BASOPHILS 0.0 0.0 - 0.1 K/UL    ABS. IMM.  GRANS. 0.1 (H) 0.00 - 0.04 K/UL    DF AUTOMATED     MAGNESIUM   Result Value Ref Range    Magnesium 2.7 (H) 1.6 - 2.4 mg/dL   PHOSPHORUS   Result Value Ref Range    Phosphorus 3.7 2.6 - 4.7 MG/DL   EOSINOPHILS, URINE   Result Value Ref Range    Eosinophils,urine NEGATIVE      URINALYSIS W/MICROSCOPIC   Result Value Ref Range    Color YELLOW/STRAW      Appearance CLEAR CLEAR      Specific gravity 1.017 1.003 - 1.030      pH (UA) 5.0 5.0 - 8.0      Protein 100 (A) NEG mg/dL    Glucose NEGATIVE  NEG mg/dL    Ketone NEGATIVE  NEG mg/dL    Bilirubin NEGATIVE  NEG      Blood NEGATIVE  NEG      Urobilinogen 0.2 0.2 - 1.0 EU/dL    Nitrites NEGATIVE  NEG      Leukocyte Esterase NEGATIVE  NEG      WBC 0-4 0 - 4 /hpf    RBC 0-5 0 - 5 /hpf    Epithelial cells FEW FEW /lpf    Bacteria NEGATIVE  NEG /hpf    Hyaline cast 0-2 0 - 5 /lpf   METABOLIC PANEL, BASIC   Result Value Ref Range    Sodium 138 136 - 145 mmol/L    Potassium 4.8 3.5 - 5.1 mmol/L    Chloride 110 (H) 97 - 108 mmol/L    CO2 23 21 - 32 mmol/L    Anion gap 5 5 - 15 mmol/L    Glucose 115 (H) 65 - 100 mg/dL    BUN 61 (H) 6 - 20 MG/DL    Creatinine 1.91 (H) 0.70 - 1.30 MG/DL    BUN/Creatinine ratio 32 (H) 12 - 20      GFR est AA 43 (L) >60 ml/min/1.73m2    GFR est non-AA 35 (L) >60 ml/min/1.73m2    Calcium 7.5 (L) 8.5 - 03.1 MG/DL   METABOLIC PANEL, BASIC   Result Value Ref Range    Sodium 139 136 - 145 mmol/L    Potassium 4.7 3.5 - 5.1 mmol/L    Chloride 110 (H) 97 - 108 mmol/L    CO2 24 21 - 32 mmol/L    Anion gap 5 5 - 15 mmol/L Glucose 207 (H) 65 - 100 mg/dL    BUN 45 (H) 6 - 20 MG/DL    Creatinine 1.75 (H) 0.70 - 1.30 MG/DL    BUN/Creatinine ratio 26 (H) 12 - 20      GFR est AA 47 (L) >60 ml/min/1.73m2    GFR est non-AA 39 (L) >60 ml/min/1.73m2    Calcium 8.1 (L) 8.5 - 10.1 MG/DL   GLUCOSE, POC   Result Value Ref Range    Glucose (POC) 124 (H) 65 - 100 mg/dL    Performed by Tom Palmer    GLUCOSE, POC   Result Value Ref Range    Glucose (POC) 203 (H) 65 - 100 mg/dL    Performed by JANAY/ Angy Cl[address], POC   Result Value Ref Range    Glucose (POC) 144 (H) 65 - 100 mg/dL    Performed by CADY PINEDA(CON)    GLUCOSE, POC   Result Value Ref Range    Glucose (POC) 246 (H) 65 - 100 mg/dL    Performed by [address], POC   Result Value Ref Range    Glucose (POC) 171 (H) 65 - 100 mg/dL    Performed by Rosalee Jackson    GLUCOSE, POC   Result Value Ref Range    Glucose (POC) 137 (H) 65 - 100 mg/dL    Performed by Yair Chavarria    GLUCOSE, POC   Result Value Ref Range    Glucose (POC) 200 (H) 65 - 100 mg/dL    Performed by Lia Dorantes (CON)    GLUCOSE, POC   Result Value Ref Range    Glucose (POC) 199 (H) 65 - 100 mg/dL    Performed by Gopal Mcbride    GLUCOSE, POC   Result Value Ref Range    Glucose (POC) 271 (H) 65 - 100 mg/dL    Performed by Ashanti Hancock    GLUCOSE, POC   Result Value Ref Range    Glucose (POC) 208 (H) 65 - 100 mg/dL    Performed by Lorene [address], POC   Result Value Ref Range    Glucose (POC) 127 (H) 65 - 100 mg/dL    Performed by Gopal Mcbride        Assessment/Plan:    ICD-10-CM ICD-9-CM    1. Stage 4 chronic kidney disease ([address] J19.8 134.6 METABOLIC PANEL, BASIC   2. Essential hypertension I10 401.9    3. Anemia due to stage 4 chronic kidney disease (HCC) N18.4 285.21     D63.1 585.4    4.  Osteoarthrosis involving lower leg M17.10 715.96      Orders Placed This Encounter    METABOLIC PANEL, BASIC     lose weight, increase physical activity, follow low fat diet, follow low salt diet, continue present plan,Take 81mg aspirin daily  There are no Patient Instructions on file for this visit. I have reviewed with the patient details of the assessment and plan and all questions were answered. Relevent patient education was performed. The most recent lab findings were reviewed with the patient. An After Visit Summary was printed and given to the patient.

## 2019-04-05 ENCOUNTER — HOME CARE VISIT (OUTPATIENT)
Dept: SCHEDULING | Facility: HOME HEALTH | Age: 69
End: 2019-04-05
Payer: MEDICARE

## 2019-04-05 VITALS
OXYGEN SATURATION: 99 % | DIASTOLIC BLOOD PRESSURE: 80 MMHG | SYSTOLIC BLOOD PRESSURE: 142 MMHG | RESPIRATION RATE: 17 BRPM | HEART RATE: 83 BPM

## 2019-04-05 LAB
BUN SERPL-MCNC: 46 MG/DL (ref 8–27)
BUN/CREAT SERPL: 24 (ref 10–24)
CALCIUM SERPL-MCNC: 8.7 MG/DL (ref 8.6–10.2)
CHLORIDE SERPL-SCNC: 103 MMOL/L (ref 96–106)
CO2 SERPL-SCNC: 19 MMOL/L (ref 20–29)
CREAT SERPL-MCNC: 1.88 MG/DL (ref 0.76–1.27)
GLUCOSE SERPL-MCNC: 147 MG/DL (ref 65–99)
INTERPRETATION: NORMAL
POTASSIUM SERPL-SCNC: 5 MMOL/L (ref 3.5–5.2)
SODIUM SERPL-SCNC: 136 MMOL/L (ref 134–144)

## 2019-04-05 PROCEDURE — 3331090001 HH PPS REVENUE CREDIT

## 2019-04-05 PROCEDURE — G0152 HHCP-SERV OF OT,EA 15 MIN: HCPCS

## 2019-04-05 PROCEDURE — 3331090002 HH PPS REVENUE DEBIT

## 2019-04-06 PROCEDURE — 3331090001 HH PPS REVENUE CREDIT

## 2019-04-06 PROCEDURE — 3331090002 HH PPS REVENUE DEBIT

## 2019-04-07 PROCEDURE — 3331090002 HH PPS REVENUE DEBIT

## 2019-04-07 PROCEDURE — 3331090001 HH PPS REVENUE CREDIT

## 2019-04-08 ENCOUNTER — HOME CARE VISIT (OUTPATIENT)
Dept: SCHEDULING | Facility: HOME HEALTH | Age: 69
End: 2019-04-08
Payer: MEDICARE

## 2019-04-08 VITALS
DIASTOLIC BLOOD PRESSURE: 74 MMHG | TEMPERATURE: 98.1 F | HEART RATE: 76 BPM | RESPIRATION RATE: 20 BRPM | SYSTOLIC BLOOD PRESSURE: 140 MMHG

## 2019-04-08 PROCEDURE — G0151 HHCP-SERV OF PT,EA 15 MIN: HCPCS

## 2019-04-08 PROCEDURE — 3331090002 HH PPS REVENUE DEBIT

## 2019-04-08 PROCEDURE — 3331090001 HH PPS REVENUE CREDIT

## 2019-04-09 ENCOUNTER — HOME CARE VISIT (OUTPATIENT)
Dept: SCHEDULING | Facility: HOME HEALTH | Age: 69
End: 2019-04-09
Payer: MEDICARE

## 2019-04-09 VITALS
SYSTOLIC BLOOD PRESSURE: 158 MMHG | OXYGEN SATURATION: 98 % | RESPIRATION RATE: 16 BRPM | HEART RATE: 83 BPM | DIASTOLIC BLOOD PRESSURE: 70 MMHG

## 2019-04-09 PROCEDURE — 3331090002 HH PPS REVENUE DEBIT

## 2019-04-09 PROCEDURE — 3331090001 HH PPS REVENUE CREDIT

## 2019-04-09 PROCEDURE — G0152 HHCP-SERV OF OT,EA 15 MIN: HCPCS

## 2019-04-10 PROCEDURE — 3331090002 HH PPS REVENUE DEBIT

## 2019-04-10 PROCEDURE — 3331090001 HH PPS REVENUE CREDIT

## 2019-04-11 ENCOUNTER — PATIENT OUTREACH (OUTPATIENT)
Dept: INTERNAL MEDICINE CLINIC | Age: 69
End: 2019-04-11

## 2019-04-11 ENCOUNTER — HOME CARE VISIT (OUTPATIENT)
Dept: SCHEDULING | Facility: HOME HEALTH | Age: 69
End: 2019-04-11
Payer: MEDICARE

## 2019-04-11 VITALS
SYSTOLIC BLOOD PRESSURE: 140 MMHG | DIASTOLIC BLOOD PRESSURE: 83 MMHG | RESPIRATION RATE: 17 BRPM | HEART RATE: 78 BPM | OXYGEN SATURATION: 99 %

## 2019-04-11 PROCEDURE — 3331090001 HH PPS REVENUE CREDIT

## 2019-04-11 PROCEDURE — G0152 HHCP-SERV OF OT,EA 15 MIN: HCPCS

## 2019-04-11 PROCEDURE — 3331090002 HH PPS REVENUE DEBIT

## 2019-04-11 NOTE — PROGRESS NOTES
Hospital Discharge Follow-Up Date/Time:  2019 3:13 PM 
 
Patient was admitted to [facility] on 3/28 and discharged on 3/31 for KATHI. The physician discharge summary was available at the time of outreach. Patient was contacted within 8 business days of discharge. Top Challenges reviewed with the provider Pt is also seeing Nephrologist Karla Sousa at [address] [place] [address] last apt there was 4/10 she has asked that he decrease his Lyrica from TID to BID and labs were also drawn during this visit. Method of communication with provider :chart routing Inpatient RRAT score: unknown Was this a readmission? yes Patient stated reason for the readmission: PCP asked that he report to the ed for eval because his Cr is 2.66 Nurse Navigator (NN) contacted the patient by telephone to perform post hospital discharge assessment. Verified name and  with patient as identifiers. Provided introduction to self, and explanation of the Nurse Navigator role. Reviewed discharge instructions and red flags with patient who verbalized understanding. Patient given an opportunity to ask questions and does not have any further questions or concerns at this time. The patient agrees to contact the PCP office for questions related to their healthcare. NN provided contact information for future reference. Disease Specific:   N/A Summary of patient's top problems: 1. KATHI- readmitted on 3/28 d/t increased Cr of 2.66 at discharge it's 1.75 ( was 1.88), diabetic A1C 8.0 on 3/28 2. Edema- bilateral lower extremities requiring dressing changes that family assist pt with as he's unable to reach them, wheelchair bound, BMI 37.30, Bumex restarted @ 2 mg/day 3. Anemia- receives Venofer infusions last was 3/5 per [facility] notes Ref. Range 3/18/2019 08:50 3/19/2019 03:33 3/27/2019 12:08 3/28/2019 19:35 3/29/2019 01:15 HGB Latest Ref Range: 12.1 - 17.0 g/dL 8.7 (L) 8.2 (L) 10.4 (L) 10.0 (L) 9.3 (L) Home Health orders at discharge: resumption of care [id number]: [facility] Insurance Date of initial visit: 4/1/19 Durable Medical Equipment ordered/company: n/a Durable Medical Equipment received: n/a Barriers to care? support system, transportation Advance Care Planning:  
Does patient have an Advance Directive:  not on file; education provided Medication(s):  
New Medications at Discharge:  
FlowMax Changed Medications at Discharge:  
Novalin R 7u TID PRN Lantus 45u BID Discontinued Medications at Discharge: [id number]-XR Medication reconciliation was performed with patient, who verbalizes understanding of administration of home medications. There were no barriers to obtaining medications identified at this time. Referral to Pharm D needed: no  
 
Current Outpatient Medications Medication Sig  
 insulin regular (NOVOLIN R, HUMULIN R) 100 unit/mL injection 7 Units by SubCUTAneous route three (3) times daily as needed (with meals).  insulin glargine (LANTUS U-100 INSULIN) 100 unit/mL injection 45 Units by SubCUTAneous route two (2) times a day.  tamsulosin (FLOMAX) 0.4 mg capsule Take 1 Cap by mouth daily.  pregabalin (LYRICA) 150 mg capsule Take 150 mg by mouth three (3) times daily.  cetirizine (ALLERGY RELIEF, CETIRIZINE,) 10 mg tablet Take 10 mg by mouth daily.  aspirin delayed-release 81 mg tablet Take 81 mg by mouth daily.  dilTIAZem CD (CARDIZEM CD) 240 mg ER capsule Take 1 Cap by mouth daily.  hydrALAZINE (APRESOLINE) 50 mg tablet Take 1 Tab by mouth three (3) times daily.  sodium chloride (OCEAN) 0.65 % nasal squeeze bottle 0.1 mL by Both Nostrils route every two (2) hours as needed for Congestion.  gabapentin (NEURONTIN) 300 mg capsule TAKE TWO CAPSULES BY MOUTH THREE TIMES A DAY  ketoconazole (NIZORAL) 2 % topical cream Apply  to affected area two (2) times a day.  (Patient taking differently: Apply 1 Applicator to affected area two (2) times a day. thin layer to skin irritation )  hydrocolloid dressing (DUODERM HYDROACTIVE) topical gel Apply  to affected area daily.  triamcinolone acetonide (KENALOG) 0.1 % ointment Apply  to affected area two (2) times a day. use thin layer bid  lidocaine (LIDODERM) 5 % 1 Patch by TransDERmal route every twenty-four (24) hours. Apply to affected area every 24 hours (Patient not taking: Reported on 3/22/2019)  ferrous sulfate (IRON) 325 mg (65 mg iron) EC tablet Take 1 Tab by mouth Daily (before breakfast).  ammonium lactate (LAC-HYDRIN FIVE) 5 % lotion Apply daily (Patient taking differently: Apply 1 Applicator to affected area daily. Apply daily bilateral lower legs )  acetaminophen (PAIN AND FEVER) 500 mg tablet TAKE ONE TABLET BY MOUTH EVERY 6 HOURS AS NEEDED FOR PAIN  
 ergocalciferol (ERGOCALCIFEROL) 50,000 unit capsule Take 1 Cap by mouth every seven (7) days.  THERA-TABS M 27 mg iron-400 mcg tab TAKE ONE TABLET BY MOUTH DAILY  fluticasone (FLONASE) 50 mcg/actuation nasal spray SPRAY TWO SPRAYS IN EACH NOSTRIL DAILY  polyethylene glycol (MIRALAX) 17 gram/dose powder Take 17 g by mouth daily.  Calcium Carbonate-Vit D3-Min 600 mg calcium- 400 unit tab Take 1 Tab by mouth two (2) times a day. No current facility-administered medications for this visit. There are no discontinued medications. [facility] follow up appointment(s):  
Future Appointments Date Time Provider Flor Martinez 4/15/2019 To Be Determined Mara Cavanaugh PT [facility]  
4/30/2019 12:00 PM MD R[address]le [phone]  
5/2/2019 11:45 AM MD [address]le [address] [facility] follow up appointment(s): 4/16 Ortho @ [facility]:  information provided as a resource Goals None

## 2019-04-12 PROCEDURE — 3331090002 HH PPS REVENUE DEBIT

## 2019-04-12 PROCEDURE — 3331090001 HH PPS REVENUE CREDIT

## 2019-04-13 PROCEDURE — 3331090001 HH PPS REVENUE CREDIT

## 2019-04-13 PROCEDURE — 3331090002 HH PPS REVENUE DEBIT

## 2019-04-14 PROCEDURE — 3331090001 HH PPS REVENUE CREDIT

## 2019-04-14 PROCEDURE — 3331090002 HH PPS REVENUE DEBIT

## 2019-04-15 PROCEDURE — 3331090001 HH PPS REVENUE CREDIT

## 2019-04-15 PROCEDURE — 3331090002 HH PPS REVENUE DEBIT

## 2019-04-16 PROCEDURE — 3331090001 HH PPS REVENUE CREDIT

## 2019-04-16 PROCEDURE — 3331090002 HH PPS REVENUE DEBIT

## 2019-04-17 PROCEDURE — 3331090001 HH PPS REVENUE CREDIT

## 2019-04-17 PROCEDURE — 3331090002 HH PPS REVENUE DEBIT

## 2019-04-18 ENCOUNTER — HOME CARE VISIT (OUTPATIENT)
Dept: SCHEDULING | Facility: HOME HEALTH | Age: 69
End: 2019-04-18
Payer: MEDICARE

## 2019-04-18 PROCEDURE — G0151 HHCP-SERV OF PT,EA 15 MIN: HCPCS

## 2019-04-18 PROCEDURE — 3331090001 HH PPS REVENUE CREDIT

## 2019-04-18 PROCEDURE — 3331090002 HH PPS REVENUE DEBIT

## 2019-04-19 PROCEDURE — 3331090001 HH PPS REVENUE CREDIT

## 2019-04-19 PROCEDURE — 3331090002 HH PPS REVENUE DEBIT

## 2019-04-20 VITALS
HEART RATE: 71 BPM | TEMPERATURE: 98 F | DIASTOLIC BLOOD PRESSURE: 78 MMHG | OXYGEN SATURATION: 97 % | SYSTOLIC BLOOD PRESSURE: 138 MMHG

## 2019-04-20 PROCEDURE — 3331090002 HH PPS REVENUE DEBIT

## 2019-04-20 PROCEDURE — 3331090001 HH PPS REVENUE CREDIT

## 2019-04-21 PROCEDURE — 3331090001 HH PPS REVENUE CREDIT

## 2019-04-21 PROCEDURE — 3331090002 HH PPS REVENUE DEBIT

## 2019-04-22 PROCEDURE — 3331090001 HH PPS REVENUE CREDIT

## 2019-04-22 PROCEDURE — 3331090002 HH PPS REVENUE DEBIT

## 2019-04-23 PROCEDURE — 3331090001 HH PPS REVENUE CREDIT

## 2019-04-23 PROCEDURE — 3331090002 HH PPS REVENUE DEBIT

## 2019-04-24 ENCOUNTER — PATIENT OUTREACH (OUTPATIENT)
Dept: INTERNAL MEDICINE CLINIC | Age: 69
End: 2019-04-24

## 2019-04-24 PROCEDURE — 3331090002 HH PPS REVENUE DEBIT

## 2019-04-24 PROCEDURE — 3331090001 HH PPS REVENUE CREDIT

## 2019-04-24 NOTE — PROGRESS NOTES
This writer reaches out to Mr. Rowan Shetty as a follow up to our last conversation. Pt's name, addr and  verified as pt identifiers. CKD- Pt has followed up with nephrology- Dr. Ladarius Pham @ [facility] on 4/10/19. Labs include : BUN 39, Cr 1.91 (on  BUN 46 & Cr 1.88) Today he has seen Dr. Keke Rahman, nephrology, no med changes made, pt reports his condition is about the same. ED- pt was seen in the ED on  @ [facility] post hitting his right great toe and heavy bleeding. No stiches were needed and pt will follow up with podiatry on . ACP- pt has not thought more about since this our last conversation. Pt has asked for information booklet again as he does have decisions he would like to have in writing (2 week max on a vent). Ortho: Pt reports goals of hip/knee replacement and notes he has to stop smoking for this to happen. He goes on to say he must have a higher Hgb count as well. Pt is working with his [facility] care team to arrange IV Venofer. Goal is Hgb closer to 11 Pt would like Renal Diet meal plans mailed to his home address. Pt has restarted smoking, estimates about 4/day. He would like a refill on the Nicotine gum. This writer will continue to follow pt's progress and provide requested documents.

## 2019-04-25 PROCEDURE — 3331090002 HH PPS REVENUE DEBIT

## 2019-04-25 PROCEDURE — 3331090001 HH PPS REVENUE CREDIT

## 2019-04-26 PROCEDURE — 3331090001 HH PPS REVENUE CREDIT

## 2019-04-26 PROCEDURE — 3331090002 HH PPS REVENUE DEBIT

## 2019-04-27 PROCEDURE — 3331090002 HH PPS REVENUE DEBIT

## 2019-04-27 PROCEDURE — 3331090001 HH PPS REVENUE CREDIT

## 2019-04-28 PROCEDURE — 3331090002 HH PPS REVENUE DEBIT

## 2019-04-28 PROCEDURE — 3331090001 HH PPS REVENUE CREDIT

## 2019-04-29 PROCEDURE — 3331090001 HH PPS REVENUE CREDIT

## 2019-04-29 PROCEDURE — 3331090002 HH PPS REVENUE DEBIT

## 2019-04-30 ENCOUNTER — PATIENT OUTREACH (OUTPATIENT)
Dept: INTERNAL MEDICINE CLINIC | Age: 69
End: 2019-04-30

## 2019-04-30 ENCOUNTER — OFFICE VISIT (OUTPATIENT)
Dept: INTERNAL MEDICINE CLINIC | Age: 69
End: 2019-04-30

## 2019-04-30 VITALS
TEMPERATURE: 98.9 F | OXYGEN SATURATION: 97 % | DIASTOLIC BLOOD PRESSURE: 70 MMHG | RESPIRATION RATE: 16 BRPM | SYSTOLIC BLOOD PRESSURE: 126 MMHG | HEART RATE: 86 BPM

## 2019-04-30 DIAGNOSIS — M16.11 PRIMARY OSTEOARTHRITIS OF RIGHT HIP: ICD-10-CM

## 2019-04-30 DIAGNOSIS — N18.30 CKD (CHRONIC KIDNEY DISEASE) STAGE 3, GFR 30-59 ML/MIN (HCC): ICD-10-CM

## 2019-04-30 DIAGNOSIS — M12.811 ROTATOR CUFF ARTHROPATHY OF RIGHT SHOULDER: ICD-10-CM

## 2019-04-30 DIAGNOSIS — I10 ESSENTIAL HYPERTENSION: Primary | ICD-10-CM

## 2019-04-30 PROCEDURE — 3331090002 HH PPS REVENUE DEBIT

## 2019-04-30 PROCEDURE — 3331090001 HH PPS REVENUE CREDIT

## 2019-04-30 RX ORDER — ACETAMINOPHEN 500 MG
2 TABLET ORAL AS NEEDED
Qty: 60 EACH | Refills: 3 | Status: SHIPPED | OUTPATIENT
Start: 2019-04-30 | End: 2019-05-30

## 2019-04-30 NOTE — PROGRESS NOTES
Patient has graduated from the Transitions of Care Coordination  program on 4/30. Patient's symptoms are stable at this time. Patient/family has the ability to self-manage. Karol Alejandro information provided Renal diet recipes and information provided Care management goals have been completed at this time. No further nurse navigator follow up scheduled. Goals Addressed None Pt has nurse navigator's contact information for any further questions, concerns, or needs. Patients upcoming visits:   
Future Appointments Date Time Provider Flor Martinez 5/28/2019 12:00 PM Lonny Cervantes  [address]  
  
====Karol Alejandro Invitation==== 
 
Patient was invited to [facility] [place], operated by [facility] on this date and given the information folder for review. Recommended appointment with Karol Alejandro facilitator for ACP conversation regarding advance directives. [x] Yes  [] No  Referral sent to [facility] Flavia team member or Coordinator for follow-up 
 
[] Yes  [x] No  Patient scheduled an appointment.     
 
Site of Referral: [facility]

## 2019-04-30 NOTE — PATIENT INSTRUCTIONS
TalentEarth Activation Thank you for requesting access to TalentEarth. Please follow the instructions below to securely access and download your online medical record. TalentEarth allows you to send messages to your doctor, view your test results, renew your prescriptions, schedule appointments, and more. How Do I Sign Up? 1. In your internet browser, go to [email or web address] [email or web address] 
2. Click on the First Time User? Click Here link in the Sign In box. You will be redirect to the New Member Sign Up page. 3. Enter your TalentEarth Access Code exactly as it appears below. You will not need to use this code after youve completed the sign-up process. If you do not sign up before the expiration date, you must request a new code. TalentEarth Access Code: [email or web address] Expires: 2019  6:57 PM (This is the date your TalentEarth access code will ) 4. Enter the last four digits of your Social Security Number (xxxx) and Date of Birth (mm/dd/yyyy) as indicated and click Submit. You will be taken to the next sign-up page. 5. Create a TalentEarth ID. This will be your TalentEarth login ID and cannot be changed, so think of one that is secure and easy to remember. 6. Create a TalentEarth password. You can change your password at any time. 7. Enter your Password Reset Question and Answer. This can be used at a later time if you forget your password. 8. Enter your e-mail address. You will receive e-mail notification when new information is available in [address] 9. Click Sign Up. You can now view and download portions of your medical record. 10. Click the Download Summary menu link to download a portable copy of your medical information. Additional Information If you have questions, please visit the Frequently Asked Questions section of the TalentEarth website at [email or web address] Remember, TalentEarth is NOT to be used for urgent needs. For medical emergencies, dial 911.

## 2019-04-30 NOTE — PROGRESS NOTES
1. Have you been to the ER, urgent care clinic since your last visit? Hospitalized since your last visit? Yes/[facility]/toe injury 2. Have you seen or consulted any other health care providers outside of the [facility] since your last visit? Include any pap smears or colon screening. no 
 
3 most recent PHQ Screens 2/20/2019 Little interest or pleasure in doing things Not at all Feeling down, depressed, irritable, or hopeless Not at all Total Score PHQ 2 0 Chief Complaint Patient presents with  ED Follow-up  
  toe problems

## 2019-04-30 NOTE — PROGRESS NOTES
Aleah Pina is a 77 y.o. male and presents with ED Follow-up (toe problems) and Chronic Kidney Disease Julio Raines Subjective: He was placed in the hospital for acute renal failure most likely due to volume depletion and diuretic use. he was recently taken off his medication. He needs a follow up blood up. He needs to come off cigarettes and needs gum Hypertension Review: 
The patient has essential hypertension Diet and Lifestyle: generally follows a  low sodium diet, exercises sporadically Home BP Monitoring: is not measured at home. Pertinent ROS: taking medications as instructed, no medication side effects noted, no TIA's, no chest pain on exertion, no dyspnea on exertion, no swelling of ankles. Diabetes Mellitus Review: He has diabetes mellitus. Diabetic ROS - medication compliance: compliant all of the time, diabetic diet compliance: compliant all of the time, home glucose monitoring: is performed. Known diabetic complications: none Cardiovascular risk factors: family history, dyslipidemia, diabetes mellitus, obesity, hypertension Current diabetic medications include oral agents/insulin Eye exam current (within one year): no 
Weight trend: stable Prior visit with dietician: no 
Current diet: \"healthy\" diet  in general 
Current exercise: walking Current monitoring regimen: home blood tests - daily Home blood sugar records: trend: stable Any episodes of hypoglycemia? no 
Is He on ACE inhibitor or angiotensin II receptor blocker? Yes Anemia review: 
Patient presents for follow up  evaluation of an anemia. Anemia was found by routine CBC. It had been present for several months. Associated signs & symptoms:none The  most recent studies were improvedHis anemia is felt due to CKD. Review of Systems Constitutional: negative for fevers, chills, anorexia and weight loss Eyes:   negative for visual disturbance and irritation ENT:   negative for tinnitus,sore throat,nasal congestion,ear pains. hoarseness Respiratory:  negative for cough, hemoptysis, dyspnea,wheezing CV:   negative for chest pain, palpitations, lower extremity edema GI:   negative for nausea, vomiting, diarrhea, abdominal pain,melena Endo:               negative for polyuria,polydipsia,polyphagia,heat intolerance Genitourinary: negative for frequency, dysuria and hematuria Integument:  negative for rash and pruritus Hematologic:  negative for easy bruising and gum/nose bleeding Musculoskel: negative for myalgias, arthralgias, back pain, muscle weakness, joint pain Neurological:  negative for headaches, dizziness, vertigo, memory problems and gait Behavl/Psych: negative for feelings of anxiety, depression, mood changes Past Medical History:  
Diagnosis Date  Arthritis, Degenerative,  Knee 4/4/2011  Carcinoma, Prostate 4/4/2011  Degenerative disc disease 4/4/2011  Depression/ Anxiety 4/4/2011  Diabetes ([address]  Diabetes Mellitrus ( non-insulin dependent ) Type 2 4/4/2011  
 HEMATOCHEZIA 4/4/2011  Hypertrension 4/4/2011  Hypertriglyceridemia 4/4/2011  Insomnia 4/4/2011  Laryngitis 4/4/2011  Mild Aortic insufficiency 4/4/2011  Mild Concentric LVH (left ventricular hypertrophy) 4/4/2011  Neuropathy 4/4/2011  Osteoarthritis 4/4/2011  Rotator Cuff Tendonitis 4/4/2011 Past Surgical History:  
Procedure Laterality Date  CARDIAC SURG PROCEDURE UNLIST    
 cardiac cath  COLONOSCOPY N/A 4/28/2017 COL[facility]COPY performed by Thompson [facility] at [facility] ENDOSCOPY  
 HX ORTHOPAEDIC    
 left hip pinning  HX OTHER SURGICAL    
 left little toe amputation  HX UROLOGICAL Social History Socioeconomic History  Marital status: LEGALLY  Spouse name: Not on file  Number of children: Not on file  Years of education: Not on file  Highest education level: Not on file Tobacco Use  
  Smoking status: Current Every Day Smoker Packs/day: 0.25 Last attempt to quit: 10/25/2016 Years since quittin.5  Smokeless tobacco: Never Used Substance and Sexual Activity  Alcohol use: Yes Alcohol/week: 7.0 oz Types: 7 Cans of beer, 7 Shots of liquor per week  Sexual activity: Yes  
  Partners: Female No family history on file. Current Outpatient Medications Medication Sig Dispense Refill  insulin regular (NOVOLIN R, HUMULIN R) 100 unit/mL injection 7 Units by SubCUTAneous route three (3) times daily as needed (with meals). 1 Vial 12  
 insulin glargine (LANTUS U-100 INSULIN) 100 unit/mL injection 45 Units by SubCUTAneous route two (2) times a day. 6 Vial 10  tamsulosin (FLOMAX) 0.4 mg capsule Take 1 Cap by mouth daily. 30 Cap 0  pregabalin (LYRICA) 150 mg capsule Take 150 mg by mouth three (3) times daily.  cetirizine (ALLERGY RELIEF, CETIRIZINE,) 10 mg tablet Take 10 mg by mouth daily.  aspirin delayed-release 81 mg tablet Take 81 mg by mouth daily.  dilTIAZem CD (CARDIZEM CD) 240 mg ER capsule Take 1 Cap by mouth daily. 30 Cap 0  
 hydrALAZINE (APRESOLINE) 50 mg tablet Take 1 Tab by mouth three (3) times daily. 90 Tab 0  
 gabapentin (NEURONTIN) 300 mg capsule TAKE TWO CAPSULES BY MOUTH THREE TIMES A  Cap 0  
 ketoconazole (NIZORAL) 2 % topical cream Apply  to affected area two (2) times a day. (Patient taking differently: Apply 1 Applicator to affected area two (2) times a day. thin layer to skin irritation ) 60 g 3  
 hydrocolloid dressing (DUODERM HYDROACTIVE) topical gel Apply  to affected area daily. 30 Tube 0  
 triamcinolone acetonide (KENALOG) 0.1 % ointment Apply  to affected area two (2) times a day. use thin layer bid 80 g 12  ferrous sulfate (IRON) 325 mg (65 mg iron) EC tablet Take 1 Tab by mouth Daily (before breakfast).  30 Tab 6  
 ammonium lactate (LAC-HYDRIN FIVE) 5 % lotion Apply daily (Patient taking differently: Apply 1 Applicator to affected area daily. Apply daily bilateral lower legs ) 222 mL 3  
 acetaminophen (PAIN AND FEVER) 500 mg tablet TAKE ONE TABLET BY MOUTH EVERY 6 HOURS AS NEEDED FOR PAIN 60 Tab 2  
 ergocalciferol (ERGOCALCIFEROL) 50,000 unit capsule Take 1 Cap by mouth every seven (7) days. 4 Cap 12  
 THERA-TABS M 27 mg iron-400 mcg tab TAKE ONE TABLET BY MOUTH DAILY 30 Tab 11  
 fluticasone (FLONASE) 50 mcg/actuation nasal spray SPRAY TWO SPRAYS IN EACH NOSTRIL DAILY 1 Bottle 11  Calcium Carbonate-Vit D3-Min 600 mg calcium- 400 unit tab Take 1 Tab by mouth two (2) times a day.  sodium chloride (OCEAN) 0.65 % nasal squeeze bottle 0.1 mL by Both Nostrils route every two (2) hours as needed for Congestion. 10 mL 0  
 lidocaine (LIDODERM) 5 % 1 Patch by TransDERmal route every twenty-four (24) hours. Apply to affected area every 24 hours (Patient not taking: Reported on 3/22/2019) 1 Package 3  polyethylene glycol (MIRALAX) 17 gram/dose powder Take 17 g by mouth daily. 850 g 3 No Known Allergies Objective: 
Visit Vitals /70 Pulse 86 Temp 98.9 °F (37.2 °C) (Oral) Resp 16 SpO2 97% Physical Exam:  
General appearance - alert, well appearing, and in no distress Mental status - alert, oriented to person, place, and time EYE-LUH, EOMI, corneas normal, no foreign bodies ENT-ENT exam normal, no neck nodes or sinus tenderness Nose - normal and patent, no erythema, discharge or polyps Mouth - mucous membranes moist, pharynx normal without lesions Neck - supple, no significant adenopathy Chest - clear to auscultation, no wheezes, rales or rhonchi, symmetric air entry Heart - normal rate, regular rhythm, normal S1, S2, no murmurs, rubs, clicks or gallops Abdomen - soft, nontender, nondistended, no masses or organomegaly Lymph- no adenopathy palpable Ext-peripheral pulses normal, 2+lower leg edema, no clubbing or cyanosis Skin-Warm and dry. no hyperpigmentation, vitiligo, or suspicious lesions Neuro -alert, oriented, normal speech, no focal findings or movement disorder noted Neck-normal C-spine, no tenderness, full ROM without pain Feet- nail deformities or callus formation with good pulses noted Results for orders placed or performed in visit on 19 METABOLIC PANEL, BASIC Result Value Ref Range Glucose 147 (H) 65 - 99 mg/dL BUN 46 (H) 8 - 27 mg/dL Creatinine 1.88 (H) 0.76 - 1.27 mg/dL GFR est non-AA 36 (L) >59 mL/min/1.73 GFR est AA 42 (L) >59 mL/min/1.73  
 BUN/Creatinine ratio 24 10 - 24 Sodium 136 134 - 144 mmol/L Potassium 5.0 3.5 - 5.2 mmol/L Chloride 103 96 - 106 mmol/L  
 CO2 19 (L) 20 - 29 mmol/L Calcium 8.7 8.6 - 10.2 mg/dL CKD REPORT Result Value Ref Range Interpretation Note Assessment/Plan: 
No diagnosis found. No orders of the defined types were placed in this encounter. lose weight, increase physical activity, follow low fat diet, follow low salt diet, continue present plan,Take 81mg aspirin daily Patient Instructions Global Analytics[employer] Activation Thank you for requesting access to DipJar. Please follow the instructions below to securely access and download your online medical record. DipJar allows you to send messages to your doctor, view your test results, renew your prescriptions, schedule appointments, and more. How Do I Sign Up? 1. In your internet browser, go to [email or web address] 
2. Click on the First Time User? Click Here link in the Sign In box. You will be redirect to the New Member Sign Up page. 3. Enter your DipJar Access Code exactly as it appears below. You will not need to use this code after youve completed the sign-up process. If you do not sign up before the expiration date, you must request a new code. DipJar Access Code: [email or web address] Expires: 2019  6:57 PM (This is the date your DipJar access code will ) 4. Enter the last four digits of your Social Security Number (xxxx) and Date of Birth (mm/dd/yyyy) as indicated and click Submit. You will be taken to the next sign-up page. 5. Create a [employer] ID. This will be your [employer] login ID and cannot be changed, so think of one that is secure and easy to remember. 6. Create a [employer] password. You can change your password at any time. 7. Enter your Password Reset Question and Answer. This can be used at a later time if you forget your password. 8. Enter your e-mail address. You will receive e-mail notification when new information is available in [address] 9. Click Sign Up. You can now view and download portions of your medical record. 10. Click the Download Summary menu link to download a portable copy of your medical information. Additional Information If you have questions, please visit the Frequently Asked Questions section of the [employer] website at [email or web address][email or web address] Remember, [employer] is NOT to be used for urgent needs. For medical emergencies, dial 911. Follow-up and Dispositions · Return in about 1 month (around 5/28/2019), or if symptoms worsen or fail to improve. I have reviewed with the patient details of the assessment and plan and all questions were answered. Relevent patient education was performed. The most recent lab findings were reviewed with the patient. An After Visit Summary was printed and given to the patient.

## 2019-05-01 PROCEDURE — 3331090001 HH PPS REVENUE CREDIT

## 2019-05-01 PROCEDURE — 3331090002 HH PPS REVENUE DEBIT

## 2019-05-02 PROCEDURE — 3331090001 HH PPS REVENUE CREDIT

## 2019-05-02 PROCEDURE — 3331090002 HH PPS REVENUE DEBIT

## 2019-05-03 PROCEDURE — 3331090001 HH PPS REVENUE CREDIT

## 2019-05-03 PROCEDURE — 3331090002 HH PPS REVENUE DEBIT

## 2019-05-04 PROCEDURE — 3331090001 HH PPS REVENUE CREDIT

## 2019-05-04 PROCEDURE — 3331090002 HH PPS REVENUE DEBIT

## 2019-05-05 PROCEDURE — 3331090001 HH PPS REVENUE CREDIT

## 2019-05-05 PROCEDURE — 3331090002 HH PPS REVENUE DEBIT

## 2019-05-06 PROCEDURE — 3331090001 HH PPS REVENUE CREDIT

## 2019-05-06 PROCEDURE — 3331090002 HH PPS REVENUE DEBIT

## 2019-05-07 PROCEDURE — 3331090002 HH PPS REVENUE DEBIT

## 2019-05-07 PROCEDURE — 3331090001 HH PPS REVENUE CREDIT

## 2019-05-08 PROCEDURE — 3331090001 HH PPS REVENUE CREDIT

## 2019-05-08 PROCEDURE — 3331090002 HH PPS REVENUE DEBIT

## 2019-05-09 PROCEDURE — 3331090001 HH PPS REVENUE CREDIT

## 2019-05-09 PROCEDURE — 3331090002 HH PPS REVENUE DEBIT

## 2019-05-10 ENCOUNTER — HOME CARE VISIT (OUTPATIENT)
Dept: HOME HEALTH SERVICES | Facility: HOME HEALTH | Age: 69
End: 2019-05-10
Payer: MEDICARE

## 2019-05-10 PROCEDURE — 3331090002 HH PPS REVENUE DEBIT

## 2019-05-10 PROCEDURE — 3331090001 HH PPS REVENUE CREDIT

## 2019-05-10 PROCEDURE — 3331090003 HH PPS REVENUE ADJ

## 2019-05-11 PROCEDURE — 3331090002 HH PPS REVENUE DEBIT

## 2019-05-11 PROCEDURE — 3331090001 HH PPS REVENUE CREDIT

## 2019-05-12 PROCEDURE — 3331090001 HH PPS REVENUE CREDIT

## 2019-05-12 PROCEDURE — 3331090002 HH PPS REVENUE DEBIT

## 2019-05-28 ENCOUNTER — OFFICE VISIT (OUTPATIENT)
Dept: INTERNAL MEDICINE CLINIC | Age: 69
End: 2019-05-28

## 2019-05-28 VITALS
HEART RATE: 103 BPM | DIASTOLIC BLOOD PRESSURE: 50 MMHG | OXYGEN SATURATION: 94 % | SYSTOLIC BLOOD PRESSURE: 118 MMHG | WEIGHT: 275 LBS | BODY MASS INDEX: 37.25 KG/M2 | RESPIRATION RATE: 18 BRPM | TEMPERATURE: 98.7 F | HEIGHT: 72 IN

## 2019-05-28 DIAGNOSIS — I10 ESSENTIAL HYPERTENSION: ICD-10-CM

## 2019-05-28 DIAGNOSIS — D63.1 ANEMIA DUE TO STAGE 3 CHRONIC KIDNEY DISEASE (HCC): ICD-10-CM

## 2019-05-28 DIAGNOSIS — N18.30 ANEMIA DUE TO STAGE 3 CHRONIC KIDNEY DISEASE (HCC): ICD-10-CM

## 2019-05-28 DIAGNOSIS — N18.4 ANEMIA DUE TO STAGE 4 CHRONIC KIDNEY DISEASE (HCC): ICD-10-CM

## 2019-05-28 DIAGNOSIS — N18.30 CKD (CHRONIC KIDNEY DISEASE) STAGE 3, GFR 30-59 ML/MIN (HCC): ICD-10-CM

## 2019-05-28 DIAGNOSIS — M12.812 ROTATOR CUFF ARTHROPATHY OF LEFT SHOULDER: Primary | ICD-10-CM

## 2019-05-28 DIAGNOSIS — D63.1 ANEMIA DUE TO STAGE 4 CHRONIC KIDNEY DISEASE (HCC): ICD-10-CM

## 2019-05-28 RX ORDER — TRIAMCINOLONE ACETONIDE 40 MG/ML
40 INJECTION, SUSPENSION INTRA-ARTICULAR; INTRAMUSCULAR ONCE
Qty: 1 ML | Refills: 0
Start: 2019-05-28 | End: 2019-05-28

## 2019-05-28 NOTE — PATIENT INSTRUCTIONS
[employer] Activation    Thank you for requesting access to [employer]. Please follow the instructions below to securely access and download your online medical record. I [employer] allows you to send messages to your doctor, view your test results, renew your prescriptions, schedule appointments, and more. How Do I Sign Up? 1. In your internet browser, go to [email or web address]  2. Click on the First Time User? Click Here link in the Sign In box. You will be redirect to the New Member Sign Up page. 3. Enter your [employer] Access Code exactly as it appears below. You will not need to use this code after youve completed the sign-up process. If you do not sign up before the expiration date, you must request a new code. [employer] Access Code: [email or web address]  Expires: 2019  9:01 AM (This is the date your I Had [employer] access code will )    4. Enter the last four digits of your Social Security Number (xxxx) and Date of Birth (mm/dd/yyyy) as indicated and click Submit. You will be taken to the next sign-up page. 5. Create a [employer] ID. This will be your [employer] login ID and cannot be changed, so think of one that is secure and easy to remember. 6. Create a [employer] password. You can change your password at any time. 7. Enter your Password Reset Question and Answer. This can be used at a later time if you forget your password. 8. Enter your e-mail address. You will receive e-mail notification when new information is available in [address] 9. Click Sign Up. You can now view and download portions of your medical record. 10. Click the Download Summary menu link to download a portable copy of your medical information. Additional Information    If you have questions, please visit the Frequently Asked Questions section of the I Had [employer] website at [email or web address] [employer] [email or web address] Remember, I Had [employer] is NOT to be used for urgent needs. For medical emergencies, dial 911.

## 2019-05-28 NOTE — PROGRESS NOTES
Buster Chadwick is a 77 y.o. male and presents with Osteoarthritis and Chronic Kidney Disease    Subjective:  Shoulder Pain Review:  Patient complains of left side shoulder pain. The symptoms began months ago Course of symptoms since onset has been gradually worsening. Pain is described as overall severity = moderate. Symptoms were incited by no known event. Patient denies N/A. Therapy to date includes OTC analgesics: effective. Hypertension Review:  The patient has essential hypertension  Diet and Lifestyle: generally follows a  low sodium diet, exercises sporadically  Home BP Monitoring: is not measured at home. Pertinent ROS: taking medications as instructed, no medication side effects noted, no TIA's, no chest pain on exertion, no dyspnea on exertion, no swelling of ankles. Diabetes Mellitus Review:  He has diabetes mellitus. Diabetic ROS - medication compliance: compliant all of the time, diabetic diet compliance: compliant all of the time, home glucose monitoring: is performed. Known diabetic complications: none  Cardiovascular risk factors: family history, dyslipidemia, diabetes mellitus, obesity, hypertension  Current diabetic medications include oral agents/insulin   Eye exam current (within one year): no  Weight trend: stable  Prior visit with dietician: no  Current diet: \"healthy\" diet  in general  Current exercise: walking  Current monitoring regimen: home blood tests - daily  Home blood sugar records: trend: stable  Any episodes of hypoglycemia? no  Is He on ACE inhibitor or angiotensin II receptor blocker? Yes     Anemia review:  Patient presents for follow up  evaluation of an anemia. Anemia was found by routine CBC. It had been present for several months. Associated signs & symptoms:none  The  most recent studies were improved  His anemia is felt due to CKD. He has excessive flaking. Shoulder Pain Review:  Patient complains of left side shoulder pain.  The symptoms began several weeks ago Course of symptoms since onset has been gradually worsening. Pain is described as overall severity = moderate. Symptoms were incited by no known event. Patient denies N/A. Therapy to date includes OTC analgesics: effective. Review of Systems  Constitutional: negative for fevers, chills, anorexia and weight loss  Eyes:   negative for visual disturbance and irritation  ENT:   negative for tinnitus,sore throat,nasal congestion,ear pains. hoarseness  Respiratory:  negative for cough, hemoptysis, dyspnea,wheezing  CV:   negative for chest pain, palpitations, lower extremity edema  GI:   negative for nausea, vomiting, diarrhea, abdominal pain,melena  Endo:               negative for polyuria,polydipsia,polyphagia,heat intolerance  Genitourinary: negative for frequency, dysuria and hematuria  Integument:  negative for rash and pruritus  Hematologic:  negative for easy bruising and gum/nose bleeding  Musculoskel: negative for myalgias, arthralgias, back pain, muscle weakness, joint pain  Neurological:  negative for headaches, dizziness, vertigo, memory problems and gait   Behavl/Psych: negative for feelings of anxiety, depression, mood changes    Past Medical History:   Diagnosis Date    Arthritis, Degenerative,  Knee 4/4/2011    Carcinoma, Prostate 4/4/2011    Degenerative disc disease 4/4/2011    Depression/ Anxiety 4/4/2011    Diabetes ([address]     Diabetes Mellitrus ( non-insulin dependent ) Type 2 4/4/2011    HEMATOCHEZIA 4/4/2011    Hypertrension 4/4/2011    Hypertriglyceridemia 4/4/2011    Insomnia 4/4/2011    Laryngitis 4/4/2011    Mild Aortic insufficiency 4/4/2011    Mild Concentric LVH (left ventricular hypertrophy) 4/4/2011    Neuropathy 4/4/2011    Osteoarthritis 4/4/2011    Rotator Cuff Tendonitis 4/4/2011     Past Surgical History:   Procedure Laterality Date    CARDIAC SURG PROCEDURE UNLIST      cardiac cath    COLONOSCOPY N/A 4/28/2017    COLONOSCOPY performed by Bernadette Roldan Abraham Kaye MD at [facility] ENDOSCOPY    HX ORTHOPAEDIC      left hip pinning    HX OTHER SURGICAL      left little toe amputation    HX UROLOGICAL       Social History     Socioeconomic History    Marital status: LEGALLY      Spouse name: Not on file    Number of children: Not on file    Years of education: Not on file    Highest education level: Not on file   Tobacco Use    Smoking status: Current Every Day Smoker     Packs/day: 0.25     Last attempt to quit: 10/25/2016     Years since quittin.5    Smokeless tobacco: Never Used   Substance and Sexual Activity    Alcohol use: Yes     Alcohol/week: 7.0 oz     Types: 7 Cans of beer, 7 Shots of liquor per week    Sexual activity: Yes     Partners: Female     History reviewed. No pertinent family history. Current Outpatient Medications   Medication Sig Dispense Refill    triamcinolone acetonide (KENALOG) 40 mg/mL injection 1 mL by Intra artICUlar route once for 1 dose. 1 mL 0    nicotine (NICORETTE) 2 mg gum Take 1 Each by mouth as needed for Smoking Cessation for up to 30 days. 60 Each 3    insulin regular (NOVOLIN R, HUMULIN R) 100 unit/mL injection 7 Units by SubCUTAneous route three (3) times daily as needed (with meals). 1 Vial 12    insulin glargine (LANTUS U-100 INSULIN) 100 unit/mL injection 45 Units by SubCUTAneous route two (2) times a day. 6 Vial 10    tamsulosin (FLOMAX) 0.4 mg capsule Take 1 Cap by mouth daily. 30 Cap 0    pregabalin (LYRICA) 150 mg capsule Take 150 mg by mouth three (3) times daily.  aspirin delayed-release 81 mg tablet Take 81 mg by mouth daily.  dilTIAZem CD (CARDIZEM CD) 240 mg ER capsule Take 1 Cap by mouth daily. 30 Cap 0    hydrALAZINE (APRESOLINE) 50 mg tablet Take 1 Tab by mouth three (3) times daily. 90 Tab 0    sodium chloride (OCEAN) 0.65 % nasal squeeze bottle 0.1 mL by Both Nostrils route every two (2) hours as needed for Congestion.  10 mL 0    triamcinolone acetonide (KENALOG) 0.1 % ointment Apply  to affected area two (2) times a day. use thin layer bid 80 g 12    ferrous sulfate (IRON) 325 mg (65 mg iron) EC tablet Take 1 Tab by mouth Daily (before breakfast). 30 Tab 6    ammonium lactate (LAC-HYDRIN FIVE) 5 % lotion Apply daily (Patient taking differently: Apply 1 Applicator to affected area daily. Apply daily bilateral lower legs ) 222 mL 3    acetaminophen (PAIN AND FEVER) 500 mg tablet TAKE ONE TABLET BY MOUTH EVERY 6 HOURS AS NEEDED FOR PAIN 60 Tab 2    ergocalciferol (ERGOCALCIFEROL) 50,000 unit capsule Take 1 Cap by mouth every seven (7) days. 4 Cap 12    THERA-TABS M 27 mg iron-400 mcg tab TAKE ONE TABLET BY MOUTH DAILY 30 Tab 11    fluticasone (FLONASE) 50 mcg/actuation nasal spray SPRAY TWO SPRAYS IN EACH NOSTRIL DAILY 1 Bottle 11    polyethylene glycol (MIRALAX) 17 gram/dose powder Take 17 g by mouth daily. 850 g 3    Calcium Carbonate-Vit D3-Min 600 mg calcium- 400 unit tab Take 1 Tab by mouth two (2) times a day.  gabapentin (NEURONTIN) 300 mg capsule TAKE TWO CAPSULES BY MOUTH THREE TIMES A  Cap 0    hydrocolloid dressing (DUODERM HYDROACTIVE) topical gel Apply  to affected area daily. 30 Tube 0    lidocaine (LIDODERM) 5 % 1 Patch by TransDERmal route every twenty-four (24) hours.  Apply to affected area every 24 hours 1 Package 3     No Known Allergies    Objective:  Visit Vitals  /50 (BP 1 Location: Left arm, BP Patient Position: Sitting)   Pulse (!) 103   Temp 98.7 °F (37.1 °C) (Oral)   Resp 18   Ht 6' (1.829 m) Comment: wheelchair   Wt 275 lb (124.7 kg) Comment: wheelchai bound   SpO2 94%   BMI 37.30 kg/m²     Physical Exam:   General appearance - alert, well appearing, and in no distress  Mental status - alert, oriented to person, place, and time  EYE-LUH, EOMI, corneas normal, no foreign bodies  ENT-ENT exam normal, no neck nodes or sinus tenderness  Nose - normal and patent, no erythema, discharge or polyps  Mouth - mucous membranes moist, pharynx normal without lesions  Neck - supple, no significant adenopathy   Chest - clear to auscultation, no wheezes, rales or rhonchi, symmetric air entry   Heart - normal rate, regular rhythm, normal S1, S2, no murmurs, rubs, clicks or gallops   Abdomen - soft, nontender, nondistended, no masses or organomegaly  Lymph- no adenopathy palpable  Ext-peripheral pulses normal, 2+lower leg edema, no clubbing or cyanosis  Skin-flaking noted on nose  Neuro -alert, oriented, normal speech, no focal findings or movement disorder noted  Neck-normal C-spine, no tenderness, full ROM without pain  Feet- nail deformities or callus formation with good pulses noted      Results for orders placed or performed in visit on    METABOLIC PANEL, BASIC   Result Value Ref Range    Glucose 147 (H) 65 - 99 mg/dL    BUN 46 (H) 8 - 27 mg/dL    Creatinine 1.88 (H) 0.76 - 1.27 mg/dL    GFR est non-AA 36 (L) >59 mL/min/1.73    GFR est AA 42 (L) >59 mL/min/1.73    BUN/Creatinine ratio 24 10 - 24    Sodium 136 134 - 144 mmol/L    Potassium 5.0 3.5 - 5.2 mmol/L    Chloride 103 96 - 106 mmol/L    CO2 19 (L) 20 - 29 mmol/L    Calcium 8.7 8.6 - 10.2 mg/dL   CKD REPORT   Result Value Ref Range    Interpretation Note        Assessment/Plan:    ICD-10-CM ICD-9-CM    1. Rotator cuff arthropathy of left shoulder M12.812 716.81 TRIAMCINOLONE ACETONIDE INJ   2. Essential hypertension I10 401.9    3. CKD (chronic kidney disease) stage 3, GFR 30-59 ml/min (HCC) N18.3 585.3    4. Anemia due to stage 4 chronic kidney disease (HCC) N18.4 285.21     D63.1 585.4    5. Anemia due to stage 3 chronic kidney disease (HCC) N18.3 285.21     D63.1 585.3      Orders Placed This Encounter    TRIAMCINOLONE ACETONIDE INJ     Order Specific Question:   Charge Quantity? Answer:   4    triamcinolone acetonide (KENALOG) 40 mg/mL injection     Si mL by Intra artICUlar route once for 1 dose.      Dispense:  1 mL     Refill:  0     lose weight, increase physical activity, follow low fat diet, follow low salt diet, continue present plan,Take 81mg aspirin daily    Indications:   Symptomatic relief of pain    Procedure:  After consent was obtained, using sterile technique the left shoulder joint was prepped using alcohol. Local anesthetic used: 1% lidocaine. . The joint was entered and Kenalog 40 mg was mixed with 1% lidocaine 3 ml  and injected into the joint and the needle withdrawn. The procedure was well tolerated. The patient is asked to continue to rest the joint for a few more days before resuming regular activities. It may be more painful for the first 1-2 days. Watch for fever, or increased swelling or persistent pain in the joint. Call or return to clinic prn if such symptoms occur or there is failure to improve as anticipated. [facility]  OFFICE PROCEDURE PROGRESS NOTE        Chart reviewed for the following:   Dorothea Ramon MD, have reviewed the History, Physical and updated the Allergic reactions for Erman Paganini III     TIME OUT performed immediately prior to start of procedure:   Dorothea Ramon MD, have performed the following reviews on Erman Paganini III prior to the start of the procedure:            * Patient was identified by name and date of birth   * Agreement on procedure being performed was verified  * Risks and Benefits explained to the patient  * Procedure site verified and marked as necessary  * Patient was positioned for comfort       Time: 1:08pm      Date of procedure: 5/28/2019    Procedure performed by:  Dharmesh Long MD    Patient assisted by: nursing attendant    How tolerated by patient: tolerated the procedure well with no complications    Comments: none                Patient Instructions   MyChart Activation    Thank you for requesting access to [employer]. Please follow the instructions below to securely access and download your online medical record.  [employer] allows you to send messages to your doctor, view your test results, renew your prescriptions, schedule appointments, and more. How Do I Sign Up? 1. In your internet browser, go to [email or web address]  2. Click on the First Time User? Click Here link in the Sign In box. You will be redirect to the New Member Sign Up page. 3. Enter your [employer] Access Code exactly as it appears below. You will not need to use this code after youve completed the sign-up process. If you do not sign up before the expiration date, you must request a new code. [employer] Access Code: [email or web address]  Expires: 2019  9:01 AM (This is the date your [employer] access code will )    4. Enter the last four digits of your Social Security Number (xxxx) and Date of Birth (mm/dd/yyyy) as indicated and click Submit. You will be taken to the next sign-up page. 5. Create a [employer]ber ID. This will be your [employer] login ID and cannot be changed, so think of one that is secure and easy to remember. 6. Create a [employer] password. You can change your password at any time. 7. Enter your Password Reset Question and Answer. This can be used at a later time if you forget your password. 8. Enter your e-mail address. You will receive e-mail notification when new information is available in [address] 9. Click Sign Up. You can now view and download portions of your medical record. 10. Click the Download Summary menu link to download a portable copy of your medical information. Additional Information    If you have questions, please visit the Frequently Asked Questions section of the [employer] website at [email or web address] Remember, [employer] is NOT to be used for urgent needs. For medical emergencies, dial 911. Follow-up and Dispositions    · Return in about 3 months (around 2019). I have reviewed with the patient details of the assessment and plan and all questions were answered.  Relevent patient education was performed. The most recent lab findings were reviewed with the patient. An After Visit Summary was printed and given to the patient.

## 2019-05-28 NOTE — PROGRESS NOTES
1. Have you been to the ER, urgent care clinic since your last visit? Hospitalized since your last visit?no    2. Have you seen or consulted any other health care providers outside of the [address] [place] [address] since your last visit? Include any pap smears or colon screening. No    3 most recent PHQ Screens 2/20/2019   Little interest or pleasure in doing things Not at all   Feeling down, depressed, irritable, or hopeless Not at all   Total Score PHQ 2 0     Chief Complaint   Patient presents with    Osteoarthritis    Chronic Kidney Disease     Per Dr. Annika Banda.,  verbal order given for needed amb poc labs.

## 2019-05-29 RX ORDER — HYDRALAZINE HYDROCHLORIDE 50 MG/1
50 TABLET, FILM COATED ORAL 3 TIMES DAILY
Qty: 90 TAB | Refills: 0 | Status: SHIPPED | OUTPATIENT
Start: 2019-05-29 | End: 2019-06-21 | Stop reason: SDUPTHER

## 2019-05-29 RX ORDER — DILTIAZEM HYDROCHLORIDE 240 MG/1
240 CAPSULE, COATED, EXTENDED RELEASE ORAL DAILY
Qty: 30 CAP | Refills: 3 | Status: SHIPPED | OUTPATIENT
Start: 2019-05-29 | End: 2019-11-13 | Stop reason: ALTCHOICE

## 2019-05-29 RX ORDER — TAMSULOSIN HYDROCHLORIDE 0.4 MG/1
0.4 CAPSULE ORAL DAILY
Qty: 30 CAP | Refills: 3 | Status: SHIPPED | OUTPATIENT
Start: 2019-05-29 | End: 2019-11-25 | Stop reason: SDUPTHER

## 2019-06-10 RX ORDER — CALCIUM CARB/VITAMIN D3/VIT K1 500-100-40
TABLET,CHEWABLE ORAL
Qty: 100 SYRINGE | Refills: 10 | Status: SHIPPED | OUTPATIENT
Start: 2019-06-10 | End: 2020-01-01

## 2019-06-21 RX ORDER — HYDRALAZINE HYDROCHLORIDE 50 MG/1
50 TABLET, FILM COATED ORAL 3 TIMES DAILY
Qty: 90 TAB | Refills: 0 | Status: SHIPPED | OUTPATIENT
Start: 2019-06-21 | End: 2019-07-23 | Stop reason: SDUPTHER

## 2019-06-27 ENCOUNTER — OFFICE VISIT (OUTPATIENT)
Dept: INTERNAL MEDICINE CLINIC | Age: 69
End: 2019-06-27

## 2019-06-27 VITALS
HEART RATE: 89 BPM | OXYGEN SATURATION: 97 % | DIASTOLIC BLOOD PRESSURE: 70 MMHG | RESPIRATION RATE: 16 BRPM | SYSTOLIC BLOOD PRESSURE: 140 MMHG | TEMPERATURE: 98.1 F

## 2019-06-27 DIAGNOSIS — I10 ESSENTIAL HYPERTENSION: ICD-10-CM

## 2019-06-27 DIAGNOSIS — N18.30 CKD (CHRONIC KIDNEY DISEASE) STAGE 3, GFR 30-59 ML/MIN (HCC): ICD-10-CM

## 2019-06-27 DIAGNOSIS — N18.4 STAGE 4 CHRONIC KIDNEY DISEASE (HCC): ICD-10-CM

## 2019-06-27 DIAGNOSIS — E11.42 DIABETIC POLYNEUROPATHY ASSOCIATED WITH TYPE 2 DIABETES MELLITUS (HCC): Primary | ICD-10-CM

## 2019-06-27 LAB
GLUCOSE POC: 339 MG/DL
HBA1C MFR BLD HPLC: NORMAL %

## 2019-06-27 NOTE — PROGRESS NOTES
Rachel Lou is a 77 y.o. male and presents with Diabetes    Subjective:  Shoulder Pain Review:  Patient complains of left side shoulder pain. The symptoms began months ago Course of symptoms since onset has been gradually worsening. Pain is described as overall severity = moderate. Symptoms were incited by no known event. Patient denies N/A. Therapy to date includes OTC analgesics: effective. Hypertension Review:  The patient has essential hypertension  Diet and Lifestyle: generally follows a  low sodium diet, exercises sporadically  Home BP Monitoring: is not measured at home. Pertinent ROS: taking medications as instructed, no medication side effects noted, no TIA's, no chest pain on exertion, no dyspnea on exertion, no swelling of ankles. Diabetes Mellitus Review:  He has diabetes mellitus. Diabetic ROS - medication compliance: compliant all of the time, diabetic diet compliance: compliant all of the time, home glucose monitoring: is performed. Known diabetic complications: none  Cardiovascular risk factors: family history, dyslipidemia, diabetes mellitus, obesity, hypertension  Current diabetic medications include oral agents/insulin   Eye exam current (within one year): no  Weight trend: stable  Prior visit with dietician: no  Current diet: \"healthy\" diet  in general  Current exercise: walking  Current monitoring regimen: home blood tests - daily  Home blood sugar records: trend: stable  Any episodes of hypoglycemia? no  Is He on ACE inhibitor or angiotensin II receptor blocker? Yes     Anemia review:  Patient presents for follow up  evaluation of an anemia. Anemia was found by routine CBC. It had been present for several months. Associated signs & symptoms:none  The  most recent studies were improved  His anemia is felt due to CKD. He no longer has excessive flaking. He states he has been taking his medication.     .      Review of Systems  Constitutional: negative for fevers, chills, anorexia and weight loss  Eyes:   negative for visual disturbance and irritation  ENT:   negative for tinnitus,sore throat,nasal congestion,ear pains. hoarseness  Respiratory:  negative for cough, hemoptysis, dyspnea,wheezing  CV:   negative for chest pain, palpitations, lower extremity edema  GI:   negative for nausea, vomiting, diarrhea, abdominal pain,melena  Endo:               negative for polyuria,polydipsia,polyphagia,heat intolerance  Genitourinary: negative for frequency, dysuria and hematuria  Integument:  negative for rash and pruritus  Hematologic:  negative for easy bruising and gum/nose bleeding  Musculoskel: negative for myalgias, arthralgias, back pain, muscle weakness, joint pain  Neurological:  negative for headaches, dizziness, vertigo, memory problems and gait   Behavl/Psych: negative for feelings of anxiety, depression, mood changes    Past Medical History:   Diagnosis Date    Arthritis, Degenerative,  Knee 4/4/2011    Carcinoma, Prostate 4/4/2011    Degenerative disc disease 4/4/2011    Depression/ Anxiety 4/4/2011    Diabetes ([facility] [address]     Diabetes Mellitrus ( non-insulin dependent ) Type 2 4/4/2011    HEMATOCHEZIA 4/4/2011    Hypertrension 4/4/2011    Hypertriglyceridemia 4/4/2011    Insomnia 4/4/2011    Laryngitis 4/4/2011    Mild Aortic insufficiency 4/4/2011    Mild Concentric LVH (left ventricular hypertrophy) 4/4/2011    Neuropathy 4/4/2011    Osteoarthritis 4/4/2011    Rotator Cuff Tendonitis 4/4/2011     Past Surgical History:   Procedure Laterality Date    CARDIAC SURG PROCEDURE UNLIST      cardiac cath    COLONOSCOPY N/A 4/28/2017    [facility] P[facility] Serna [facility] at [facility] ENDOSCOPY    HX ORTHOPAEDIC      left hip pinning    HX OTHER SURGICAL      left little toe amputation    HX UROLOGICAL       Social History     Socioeconomic History    Marital status: LEGALLY      Spouse name: Not on file    Number of children: Not on file    Years of education: Not on file    Highest education level: Not on file   Tobacco Use    Smoking status: Current Every Day Smoker     Packs/day: 0.25     Last attempt to quit: 10/25/2016     Years since quittin.6    Smokeless tobacco: Never Used   Substance and Sexual Activity    Alcohol use: Yes     Alcohol/week: 7.0 oz     Types: 7 Cans of beer, 7 Shots of liquor per week    Sexual activity: Yes     Partners: Female     No family history on file. Current Outpatient Medications   Medication Sig Dispense Refill    hydrALAZINE (APRESOLINE) 50 mg tablet Take 1 Tab by mouth three (3) times daily. 90 Tab 0    Insulin Syringe-Needle U-100 (BD INSULIN SYRINGE ULTRA-FINE) 1 mL 31 gauge x 5/16 syrg USE TO INJECT INSULIN FIVE TIMES A  Syringe 10    dilTIAZem CD (CARDIZEM CD) 240 mg ER capsule Take 1 Cap by mouth daily. 30 Cap 3    tamsulosin (FLOMAX) 0.4 mg capsule Take 1 Cap by mouth daily. 30 Cap 3    insulin regular (NOVOLIN R, HUMULIN R) 100 unit/mL injection 7 Units by SubCUTAneous route three (3) times daily as needed (with meals). 1 Vial 12    insulin glargine (LANTUS U-100 INSULIN) 100 unit/mL injection 45 Units by SubCUTAneous route two (2) times a day. 6 Vial 10    pregabalin (LYRICA) 150 mg capsule Take 150 mg by mouth three (3) times daily.  aspirin delayed-release 81 mg tablet Take 81 mg by mouth daily.  sodium chloride (OCEAN) 0.65 % nasal squeeze bottle 0.1 mL by Both Nostrils route every two (2) hours as needed for Congestion. 10 mL 0    gabapentin (NEURONTIN) 300 mg capsule TAKE TWO CAPSULES BY MOUTH THREE TIMES A  Cap 0    hydrocolloid dressing (DUODERM HYDROACTIVE) topical gel Apply  to affected area daily. 30 Tube 0    triamcinolone acetonide (KENALOG) 0.1 % ointment Apply  to affected area two (2) times a day. use thin layer bid 80 g 12    lidocaine (LIDODERM) 5 % 1 Patch by TransDERmal route every twenty-four (24) hours.  Apply to affected area every 24 hours 1 Package 3    ferrous sulfate (IRON) 325 mg (65 mg iron) EC tablet Take 1 Tab by mouth Daily (before breakfast). 30 Tab 6    ammonium lactate (LAC-HYDRIN FIVE) 5 % lotion Apply daily (Patient taking differently: Apply 1 Applicator to affected area daily. Apply daily bilateral lower legs ) 222 mL 3    acetaminophen (PAIN AND FEVER) 500 mg tablet TAKE ONE TABLET BY MOUTH EVERY 6 HOURS AS NEEDED FOR PAIN 60 Tab 2    ergocalciferol (ERGOCALCIFEROL) 50,000 unit capsule Take 1 Cap by mouth every seven (7) days. 4 Cap 12    THERA-TABS M 27 mg iron-400 mcg tab TAKE ONE TABLET BY MOUTH DAILY 30 Tab 11    fluticasone (FLONASE) 50 mcg/actuation nasal spray SPRAY TWO SPRAYS IN EACH NOSTRIL DAILY 1 Bottle 11    polyethylene glycol (MIRALAX) 17 gram/dose powder Take 17 g by mouth daily. 850 g 3    Calcium Carbonate-Vit D3-Min 600 mg calcium- 400 unit tab Take 1 Tab by mouth two (2) times a day.        No Known Allergies    Objective:  Visit Vitals  /70   Pulse 89   Temp 98.1 °F (36.7 °C) (Oral)   Resp 16   SpO2 97%     Physical Exam:   General appearance - alert, well appearing, and in no distress  Mental status - alert, oriented to person, place, and time  EYE-LUH, EOMI, corneas normal, no foreign bodies  ENT-ENT exam normal, no neck nodes or sinus tenderness  Nose - normal and patent, no erythema, discharge or polyps  Mouth - mucous membranes moist, pharynx normal without lesions  Neck - supple, no significant adenopathy   Chest - clear to auscultation, no wheezes, rales or rhonchi, symmetric air entry   Heart - normal rate, regular rhythm, normal S1, S2, no murmurs, rubs, clicks or gallops   Abdomen - soft, nontender, nondistended, no masses or organomegaly  Lymph- no adenopathy palpable  Ext-peripheral pulses normal, 2+lower leg edema, no clubbing or cyanosis  Skin-no flaking noted  Neuro -alert, oriented, normal speech, no focal findings or movement disorder noted  Neck-normal C-spine, no tenderness, full ROM without pain  Feet- nail deformities or callus formation with good pulses noted      Results for orders placed or performed in visit on 06/27/19   AMB POC GLUCOSE BLOOD, BY GLUCOSE MONITORING DEVICE   Result Value Ref Range    Glucose  mg/dL   AMB POC HEMOGLOBIN A1C   Result Value Ref Range    Hemoglobin A1c (POC)  %       Assessment/Plan:    ICD-10-CM ICD-9-CM    1. Diabetic polyneuropathy associated with type 2 diabetes mellitus (HCC) E11.42 250.60 AMB POC GLUCOSE BLOOD, BY GLUCOSE MONITORING DEVICE     357.2 AMB POC HEMOGLOBIN A1C   2. Essential hypertension I10 401.9 AMB POC GLUCOSE BLOOD, BY GLUCOSE MONITORING DEVICE      AMB POC HEMOGLOBIN A1C   3. CKD (chronic kidney disease) stage 3, GFR 30-59 ml/min ([facility]) N18.3 585.3    4. Stage 4 chronic kidney disease ([facility]) N18.4 585.4 AMB POC GLUCOSE BLOOD, BY GLUCOSE MONITORING DEVICE      AMB POC HEMOGLOBIN A1C     Orders Placed This Encounter    AMB POC GLUCOSE BLOOD, BY GLUCOSE MONITORING DEVICE    AMB POC HEMOGLOBIN A1C     lose weight, increase physical activity, follow low fat diet, follow low salt diet, continue present plan,Take 81mg aspirin daily        There are no Patient Instructions on file for this visit. I have reviewed with the patient details of the assessment and plan and all questions were answered. Relevent patient education was performed. The most recent lab findings were reviewed with the patient. An After Visit Summary was printed and given to the patient.

## 2019-06-27 NOTE — PATIENT INSTRUCTIONS
[employer] Activation    Thank you for requesting access to [employer]. Please follow the instructions below to securely access and download your online medical record. [employer] allows you to send messages to your doctor, view your test results, renew your prescriptions, schedule appointments, and more. How Do I Sign Up? 1. In your internet browser, go to [email or web address]  2. Click on the First Time User? Click Here link in the Sign In box. You will be redirect to the New Member Sign Up page. 3. Enter your [employer] Access Code exactly as it appears below. You will not need to use this code after youve completed the sign-up process. If you do not sign up before the expiration date, you must request a new code. [employer] Access Code: [email or web address]  Expires: 2019  9:01 AM (This is the date your [employer] access code will )    4. Enter the last four digits of your Social Security Number (xxxx) and Date of Birth (mm/dd/yyyy) as indicated and click Submit. You will be taken to the next sign-up page. 5. Create a [employer] ID. This will be your [employer] login ID and cannot be changed, so think of one that is secure and easy to remember. 6. Create a [employer] password. You can change your password at any time. 7. Enter your Password Reset Question and Answer. This can be used at a later time if you forget your password. 8. Enter your e-mail address. You will receive e-mail notification when new information is available in [address] 9. Click Sign Up. You can now view and download portions of your medical record. 10. Click the Download Summary menu link to download a portable copy of your medical information. Additional Information    If you have questions, please visit the Frequently Asked Questions section of the [employer] website at [email or web address] Remember, [employer] is NOT to be used for urgent needs. For medical emergencies, dial 911.

## 2019-06-27 NOTE — PROGRESS NOTES
1. Have you been to the ER, urgent care clinic since your last visit? Hospitalized since your last visit? 2. Have you seen or consulted any other health care providers outside of the [address] [place] [address] since your last visit? Include any pap smears or colon screening. No    3 most recent PHQ Screens 2/20/2019   Little interest or pleasure in doing things Not at all   Feeling down, depressed, irritable, or hopeless Not at all   Total Score PHQ 2 0     Per Dr. Neymar Perez.,  verbal order given for needed amb poc labs.     Chief Complaint   Patient presents with    Diabetes

## 2019-06-28 LAB
ALBUMIN SERPL-MCNC: 3.2 G/DL (ref 3.6–4.8)
ALBUMIN/GLOB SERPL: 1.2 {RATIO} (ref 1.2–2.2)
ALP SERPL-CCNC: 106 IU/L (ref 39–117)
ALT SERPL-CCNC: 16 IU/L (ref 0–44)
AST SERPL-CCNC: 14 IU/L (ref 0–40)
BILIRUB SERPL-MCNC: <0.2 MG/DL (ref 0–1.2)
BUN SERPL-MCNC: 25 MG/DL (ref 8–27)
BUN/CREAT SERPL: 16 (ref 10–24)
CALCIUM SERPL-MCNC: 8.9 MG/DL (ref 8.6–10.2)
CHLORIDE SERPL-SCNC: 98 MMOL/L (ref 96–106)
CO2 SERPL-SCNC: 24 MMOL/L (ref 20–29)
CREAT SERPL-MCNC: 1.61 MG/DL (ref 0.76–1.27)
ERYTHROCYTE [DISTWIDTH] IN BLOOD BY AUTOMATED COUNT: 15.6 % (ref 12.3–15.4)
GLOBULIN SER CALC-MCNC: 2.7 G/DL (ref 1.5–4.5)
GLUCOSE SERPL-MCNC: 302 MG/DL (ref 65–99)
HCT VFR BLD AUTO: 31.3 % (ref 37.5–51)
HGB BLD-MCNC: 9.7 G/DL (ref 13–17.7)
INTERPRETATION: NORMAL
MCH RBC QN AUTO: 28.6 PG (ref 26.6–33)
MCHC RBC AUTO-ENTMCNC: 31 G/DL (ref 31.5–35.7)
MCV RBC AUTO: 92 FL (ref 79–97)
PLATELET # BLD AUTO: 204 X10E3/UL (ref 150–450)
POTASSIUM SERPL-SCNC: 4.3 MMOL/L (ref 3.5–5.2)
PROT SERPL-MCNC: 5.9 G/DL (ref 6–8.5)
RBC # BLD AUTO: 3.39 X10E6/UL (ref 4.14–5.8)
SODIUM SERPL-SCNC: 135 MMOL/L (ref 134–144)
WBC # BLD AUTO: 6.3 X10E3/UL (ref 3.4–10.8)

## 2019-07-16 DIAGNOSIS — E11.42 DIABETIC POLYNEUROPATHY ASSOCIATED WITH TYPE 2 DIABETES MELLITUS (HCC): ICD-10-CM

## 2019-07-16 DIAGNOSIS — M12.811 ROTATOR CUFF ARTHROPATHY, RIGHT: ICD-10-CM

## 2019-07-16 RX ORDER — MULTIVIT-MIN/IRON FUM/FOLIC AC 19 MG-400
TABLET ORAL
Qty: 30 TAB | Refills: 10 | Status: SHIPPED | OUTPATIENT
Start: 2019-07-16 | End: 2019-11-13 | Stop reason: ALTCHOICE

## 2019-07-16 RX ORDER — FLUTICASONE PROPIONATE 50 MCG
SPRAY, SUSPENSION (ML) NASAL
Qty: 1 BOTTLE | Refills: 10 | Status: SHIPPED | OUTPATIENT
Start: 2019-07-16

## 2019-07-25 RX ORDER — HYDRALAZINE HYDROCHLORIDE 50 MG/1
TABLET, FILM COATED ORAL
Qty: 90 TAB | Refills: 0 | Status: SHIPPED | OUTPATIENT
Start: 2019-07-25 | End: 2020-01-01 | Stop reason: SDUPTHER

## 2019-08-02 ENCOUNTER — OFFICE VISIT (OUTPATIENT)
Dept: INTERNAL MEDICINE CLINIC | Age: 69
End: 2019-08-02

## 2019-08-02 VITALS
SYSTOLIC BLOOD PRESSURE: 140 MMHG | RESPIRATION RATE: 16 BRPM | TEMPERATURE: 98.3 F | DIASTOLIC BLOOD PRESSURE: 80 MMHG | HEART RATE: 7 BPM | OXYGEN SATURATION: 88 %

## 2019-08-02 DIAGNOSIS — E11.42 DIABETIC POLYNEUROPATHY ASSOCIATED WITH TYPE 2 DIABETES MELLITUS (HCC): ICD-10-CM

## 2019-08-02 DIAGNOSIS — N18.4 ANEMIA DUE TO STAGE 4 CHRONIC KIDNEY DISEASE (HCC): ICD-10-CM

## 2019-08-02 DIAGNOSIS — N18.30 CKD (CHRONIC KIDNEY DISEASE) STAGE 3, GFR 30-59 ML/MIN (HCC): ICD-10-CM

## 2019-08-02 DIAGNOSIS — D63.1 ANEMIA DUE TO STAGE 4 CHRONIC KIDNEY DISEASE (HCC): ICD-10-CM

## 2019-08-02 DIAGNOSIS — I10 ESSENTIAL HYPERTENSION: ICD-10-CM

## 2019-08-02 DIAGNOSIS — J96.01 ACUTE RESPIRATORY FAILURE WITH HYPOXEMIA (HCC): Primary | ICD-10-CM

## 2019-08-02 NOTE — PROGRESS NOTES
1. Have you been to the ER, urgent care clinic since your last visit? Hospitalized since your last visit?no    2. Have you seen or consulted any other health care providers outside of the [address] [place] [address] since your last visit? Include any pap smears or colon screening. No    3 most recent PHQ Screens 2/20/2019   Little interest or pleasure in doing things Not at all   Feeling down, depressed, irritable, or hopeless Not at all   Total Score PHQ 2 0     Chief Complaint   Patient presents with   [facility] Annual Wellness Visit     Per Dr. Mirella Sheriff.,  verbal order given for needed amb poc labs.

## 2019-08-02 NOTE — PATIENT INSTRUCTIONS
[employer] Activation    Thank you for requesting access to [employer]. Please follow the instructions below to securely access and download your online medical record. [employer] allows you to send messages to your doctor, view your test results, renew your prescriptions, schedule appointments, and more. How Do I Sign Up? 1. In your internet browser, go to [email or web address]  2. Click on the First Time User? Click Here link in the Sign In box. You will be redirect to the New Member Sign Up page. 3. Enter your [employer] Access Code exactly as it appears below. You will not need to use this code after youve completed the sign-up process. If you do not sign up before the expiration date, you must request a new code. [employer] Access Code: [email or web address]  Expires: 2019  5:16 PM (This is the date your [employer] access code will )    4. Enter the last four digits of your Social Security Number (xxxx) and Date of Birth (mm/dd/yyyy) as indicated and click Submit. You will be taken to the next sign-up page. 5. Create a [employer] ID. This will be your [employer] login ID and cannot be changed, so think of one that is secure and easy to remember. 6. Create a [employer] password. You can change your password at any time. 7. Enter your Password Reset Question and Answer. This can be used at a later time if you forget your password. 8. Enter your e-mail address. You will receive e-mail notification when new information is available in [address] 9. Click Sign Up. You can now view and download portions of your medical record. 10. Click the Download Summary menu link to download a portable copy of your medical information. Additional Information    If you have questions, please visit the Frequently Asked Questions section of the [employer] website at [email or web address] Remember, [employer] is NOT to be used for urgent needs. For medical emergencies, dial 911.

## 2019-08-02 NOTE — PROGRESS NOTES
Jesus Marie is a 77 y.o. male and presents with Annual Wellness Visit and Wheezing    Subjective:  He has reported shortness of breath,diffuse wheezing,difficulty in lying flat as well as excessive mucus production the past few days. he has diffuse lower leg edema. Hypertension Review:  The patient has essential hypertension  Diet and Lifestyle: generally follows a  low sodium diet, exercises sporadically  Home BP Monitoring: is not measured at home. Pertinent ROS: taking medications as instructed, no medication side effects noted, no TIA's, no chest pain on exertion, no dyspnea on exertion, no swelling of ankles. Diabetes Mellitus Review:  He has diabetes mellitus. Diabetic ROS - medication compliance: compliant all of the time, diabetic diet compliance: compliant all of the time, home glucose monitoring: is performed. Known diabetic complications: none  Cardiovascular risk factors: family history, dyslipidemia, diabetes mellitus, obesity, hypertension  Current diabetic medications include oral agents/insulin   Eye exam current (within one year): no  Weight trend: stable  Prior visit with dietician: no  Current diet: \"healthy\" diet  in general  Current exercise: walking  Current monitoring regimen: home blood tests - daily  Home blood sugar records: trend: stable  Any episodes of hypoglycemia? no  Is He on ACE inhibitor or angiotensin II receptor blocker? Yes     Anemia review:  Patient presents for follow up  evaluation of an anemia. Anemia was found by routine CBC. It had been present for several months. Associated signs & symptoms:none  The  most recent studies were improved  His anemia is felt due to CKD. Apryl Steward Review of Systems  Constitutional: negative for fevers, chills, anorexia and weight loss  Eyes:   negative for visual disturbance and irritation  ENT:   negative for tinnitus,sore throat,nasal congestion,ear pains. hoarseness  Respiratory:   cough,, dyspnea,wheezing  CV: palpitations, lower extremity edema  GI:   negative for nausea, vomiting, diarrhea, abdominal pain,melena  Endo:               negative for polyuria,polydipsia,polyphagia,heat intolerance  Genitourinary: negative for frequency, dysuria and hematuria  Integument:  negative for rash and pruritus  Hematologic:  negative for easy bruising and gum/nose bleeding  Musculoskel: negative for myalgias, arthralgias, back pain, muscle weakness, joint pain  Neurological:  negative for headaches, dizziness, vertigo, memory problems and gait   Behavl/Psych: negative for feelings of anxiety, depression, mood changes    Past Medical History:   Diagnosis Date    Arthritis, Degenerative,  Knee 2011    Carcinoma, Prostate 2011    Degenerative disc disease 2011    Depression/ Anxiety 2011    Diabetes ([facility] [address]     Diabetes Mellitrus ( non-insulin dependent ) Type 2 2011    HEMATOCHEZIA 2011    Hypertrension 2011    Hypertriglyceridemia 2011    Insomnia 2011    Laryngitis 2011    Mild Aortic insufficiency 2011    Mild Concentric LVH (left ventricular hypertrophy) 2011    Neuropathy 2011    Osteoarthritis 2011    Rotator Cuff Tendonitis 2011     Past Surgical History:   Procedure Laterality Date    CARDIAC SURG PROCEDURE UNLIST      cardiac cath    COLONOSCOPY N/A 2017    COLONOSCOPY [facility] Krzysztof Carpio [facility] ENDOSCOPY    HX ORTHOPAEDIC      left hip pinning    HX OTHER SURGICAL      left little toe amputation    HX UROLOGICAL       Social History     Socioeconomic History    Marital status: LEGALLY      Spouse name: Not on file    Number of children: Not on file    Years of education: Not on file    Highest education level: Not on file   Tobacco Use    Smoking status: Current Every Day Smoker     Packs/day: 0.25     Last attempt to quit: 10/25/2016     Years since quittin.7    Smokeless tobacco: Never Used   Substance and Sexual Activity    Alcohol use: Yes     Alcohol/week: 11.7 standard drinks     Types: 7 Cans of beer, 7 Shots of liquor per week    Sexual activity: Yes     Partners: Female     No family history on file. Current Outpatient Medications   Medication Sig Dispense Refill    hydrALAZINE (APRESOLINE) 50 mg tablet TAKE ONE TABLET BY MOUTH THREE TIMES A DAY 90 Tab 0    fluticasone propionate (FLONASE) 50 mcg/actuation nasal spray SPRAY TWO SPRAYS IN THE AFFECTED NOSTRIL DAILY 1 Bottle 10    THERA-TABS M 27 mg iron-400 mcg tab TAKE ONE TABLET BY MOUTH DAILY 30 Tab 10    Insulin Syringe-Needle U-100 (BD INSULIN SYRINGE ULTRA-FINE) 1 mL 31 gauge x 5/16 syrg USE TO INJECT INSULIN FIVE TIMES A  Syringe 10    dilTIAZem CD (CARDIZEM CD) 240 mg ER capsule Take 1 Cap by mouth daily. 30 Cap 3    tamsulosin (FLOMAX) 0.4 mg capsule Take 1 Cap by mouth daily. 30 Cap 3    insulin regular (NOVOLIN R, HUMULIN R) 100 unit/mL injection 7 Units by SubCUTAneous route three (3) times daily as needed (with meals). 1 Vial 12    insulin glargine (LANTUS U-100 INSULIN) 100 unit/mL injection 45 Units by SubCUTAneous route two (2) times a day. 6 Vial 10    pregabalin (LYRICA) 150 mg capsule Take 150 mg by mouth three (3) times daily.  aspirin delayed-release 81 mg tablet Take 81 mg by mouth daily.  sodium chloride (OCEAN) 0.65 % nasal squeeze bottle 0.1 mL by Both Nostrils route every two (2) hours as needed for Congestion. 10 mL 0    gabapentin (NEURONTIN) 300 mg capsule TAKE TWO CAPSULES BY MOUTH THREE TIMES A  Cap 0    hydrocolloid dressing (DUODERM HYDROACTIVE) topical gel Apply  to affected area daily. 30 Tube 0    triamcinolone acetonide (KENALOG) 0.1 % ointment Apply  to affected area two (2) times a day. use thin layer bid 80 g 12    lidocaine (LIDODERM) 5 % 1 Patch by TransDERmal route every twenty-four (24) hours.  Apply to affected area every 24 hours 1 Package 3    ferrous sulfate (IRON) 325 mg (65 mg iron) EC tablet Take 1 Tab by mouth Daily (before breakfast). 30 Tab 6    ammonium lactate (LAC-HYDRIN FIVE) 5 % lotion Apply daily (Patient taking differently: Apply 1 Applicator to affected area daily. Apply daily bilateral lower legs ) 222 mL 3    acetaminophen (PAIN AND FEVER) 500 mg tablet TAKE ONE TABLET BY MOUTH EVERY 6 HOURS AS NEEDED FOR PAIN 60 Tab 2    ergocalciferol (ERGOCALCIFEROL) 50,000 unit capsule Take 1 Cap by mouth every seven (7) days. 4 Cap 12    polyethylene glycol (MIRALAX) 17 gram/dose powder Take 17 g by mouth daily. 850 g 3    Calcium Carbonate-Vit D3-Min 600 mg calcium- 400 unit tab Take 1 Tab by mouth two (2) times a day.        No Known Allergies    Objective:  Visit Vitals  /80   Pulse (!) 7   Temp 98.3 °F (36.8 °C) (Oral)   Resp 16   SpO2 (!) 88%     Physical Exam:   General appearance - alert, well appearing, and in no distress  Mental status - alert, oriented to person, place, and time  EYE-LUH, EOMI, corneas normal, no foreign bodies  ENT-ENT exam normal, no neck nodes or sinus tenderness  Nose - normal and patent, no erythema, discharge or polyps  Mouth - mucous membranes moist, pharynx normal without lesions  Neck - supple, no significant adenopathy   Chest - clear to auscultation, no wheezes, rales or rhonchi, symmetric air entry   Heart - normal rate, regular rhythm, normal S1, S2, no murmurs, rubs, clicks or gallops   Abdomen - soft, nontender, nondistended, no masses or organomegaly  Lymph- no adenopathy palpable  Ext-peripheral pulses normal, 2+lower leg edema, no clubbing or cyanosis  Skin-no flaking noted  Neuro -alert, oriented, normal speech, no focal findings or movement disorder noted  Neck-normal C-spine, no tenderness, full ROM without pain  Feet- nail deformities or callus formation with good pulses noted      Results for orders placed or performed in visit on 06/27/19   CBC W/O DIFF   Result Value Ref Range    WBC 6.3 3.4 - 10.8 x10E3/uL    RBC 3.39 (L) 4.14 - 5.80 x10E6/uL    HGB 9.7 (L) 13.0 - 17.7 g/dL    HCT 31.3 (L) 37.5 - 51.0 %    MCV 92 79 - 97 fL    MCH 28.6 26.6 - 33.0 pg    MCHC 31.0 (L) 31.5 - 35.7 g/dL    RDW 15.6 (H) 12.3 - 15.4 %    PLATELET 382 242 - 347 Y58M/   METABOLIC PANEL, COMPREHENSIVE   Result Value Ref Range    Glucose 302 (H) 65 - 99 mg/dL    BUN 25 8 - 27 mg/dL    Creatinine 1.61 (H) 0.76 - 1.27 mg/dL    GFR est non-AA 43 (L) >59 mL/min/1.73    GFR est AA 50 (L) >59 mL/min/1.73    BUN/Creatinine ratio 16 10 - 24    Sodium 135 134 - 144 mmol/L    Potassium 4.3 3.5 - 5.2 mmol/L    Chloride 98 96 - 106 mmol/L    CO2 24 20 - 29 mmol/L    Calcium 8.9 8.6 - 10.2 mg/dL    Protein, total 5.9 (L) 6.0 - 8.5 g/dL    Albumin 3.2 (L) 3.6 - 4.8 g/dL    GLOBULIN, TOTAL 2.7 1.5 - 4.5 g/dL    A-G Ratio 1.2 1.2 - 2.2    Bilirubin, total <0.2 0.0 - 1.2 mg/dL    Alk. phosphatase 106 39 - 117 IU/L    AST (SGOT) 14 0 - 40 IU/L    ALT (SGPT) 16 0 - 44 IU/L   CKD REPORT   Result Value Ref Range    Interpretation Note    AMB POC GLUCOSE BLOOD, BY GLUCOSE MONITORING DEVICE   Result Value Ref Range    Glucose  mg/dL   AMB POC HEMOGLOBIN A1C   Result Value Ref Range    Hemoglobin A1c (POC)  %       Assessment/Plan:    ICD-10-CM ICD-9-CM    1. Acute respiratory failure with hypoxemia ([facility]) J96.01 518.81    2. Essential hypertension I10 401.9 CANCELED: AMB POC LIPID PROFILE   3. Diabetic polyneuropathy associated with type 2 diabetes mellitus (HCC) E11.42 250.60      357.2    4. CKD (chronic kidney disease) stage 3, GFR 30-59 ml/min (HCC) N18.3 585.3    5. Anemia due to stage 4 chronic kidney disease (HCC) N18.4 285.21     D63.1 585.4      No orders of the defined types were placed in this encounter.     lose weight, increase physical activity, follow low fat diet, follow low salt diet, continue present plan,Take 81mg aspirin daily    Needs er evaluation  He may require admission      Patient Instructions   MyChart Activation    Thank you for requesting access to [employer]. Please follow the instructions below to securely access and download your online medical record. [employer] allows you to send messages to your doctor, view your test results, renew your prescriptions, schedule appointments, and more. How Do I Sign Up? 1. In your internet browser, go to [email or web address]  2. Click on the First Time User? Click Here link in the Sign In box. You will be redirect to the New Member Sign Up page. 3. Enter your [employer] Access Code exactly as it appears below. You will not need to use this code after youve completed the sign-up process. If you do not sign up before the expiration date, you must request a new code. [employer] Access Code: [email or web address]  Expires: 2019  5:16 PM (This is the date your [employer] access code will )    4. Enter the last four digits of your Social Security Number (xxxx) and Date of Birth (mm/dd/yyyy) as indicated and click Submit. You will be taken to the next sign-up page. 5. Create a [employer] ID. This will be your [employer] login ID and cannot be changed, so think of one that is secure and easy to remember. 6. Create a [employer] password. You can change your password at any time. 7. Enter your Password Reset Question and Answer. This can be used at a later time if you forget your password. 8. Enter your e-mail address. You will receive e-mail notification when new information is available in [address] 9. Click Sign Up. You can now view and download portions of your medical record. 10. Click the Download Summary menu link to download a portable copy of your medical information. Additional Information    If you have questions, please visit the Frequently Asked Questions section of the [employer] website at [email or web address] Remember, [employer] is NOT to be used for urgent needs. For medical emergencies, dial 911.            Follow-up and Dispositions    · Return if symptoms worsen or fail to improve. I have reviewed with the patient details of the assessment and plan and all questions were answered. Relevent patient education was performed. The most recent lab findings were reviewed with the patient. An After Visit Summary was printed and given to the patient.

## 2019-08-03 DIAGNOSIS — E11.42 DIABETIC POLYNEUROPATHY ASSOCIATED WITH TYPE 2 DIABETES MELLITUS (HCC): ICD-10-CM

## 2019-08-03 DIAGNOSIS — M12.811 ROTATOR CUFF ARTHROPATHY, RIGHT: ICD-10-CM

## 2019-08-06 RX ORDER — HUMAN INSULIN 100 [IU]/ML
INJECTION, SOLUTION SUBCUTANEOUS
Qty: 10 VIAL | Refills: 11 | Status: SHIPPED | OUTPATIENT
Start: 2019-08-06

## 2019-08-08 DIAGNOSIS — E55.9 VITAMIN D DEFICIENCY: ICD-10-CM

## 2019-08-08 RX ORDER — ERGOCALCIFEROL 1.25 MG/1
CAPSULE ORAL
Qty: 4 CAP | Refills: 11 | Status: SHIPPED | OUTPATIENT
Start: 2019-08-08 | End: 2019-11-13 | Stop reason: ALTCHOICE

## 2019-11-13 ENCOUNTER — OFFICE VISIT (OUTPATIENT)
Dept: INTERNAL MEDICINE CLINIC | Age: 69
End: 2019-11-13

## 2019-11-13 VITALS
SYSTOLIC BLOOD PRESSURE: 124 MMHG | OXYGEN SATURATION: 96 % | HEART RATE: 81 BPM | TEMPERATURE: 98.2 F | DIASTOLIC BLOOD PRESSURE: 34 MMHG | RESPIRATION RATE: 16 BRPM

## 2019-11-13 DIAGNOSIS — C61 MALIGNANT NEOPLASM OF PROSTATE (HCC): ICD-10-CM

## 2019-11-13 DIAGNOSIS — N18.30 ANEMIA DUE TO STAGE 3 CHRONIC KIDNEY DISEASE (HCC): ICD-10-CM

## 2019-11-13 DIAGNOSIS — E11.42 DIABETIC POLYNEUROPATHY ASSOCIATED WITH TYPE 2 DIABETES MELLITUS (HCC): Primary | ICD-10-CM

## 2019-11-13 DIAGNOSIS — I10 ESSENTIAL HYPERTENSION: ICD-10-CM

## 2019-11-13 DIAGNOSIS — L82.1 SEBORRHEIC KERATOSES: ICD-10-CM

## 2019-11-13 DIAGNOSIS — D63.1 ANEMIA DUE TO STAGE 3 CHRONIC KIDNEY DISEASE (HCC): ICD-10-CM

## 2019-11-13 DIAGNOSIS — N18.30 CKD (CHRONIC KIDNEY DISEASE) STAGE 3, GFR 30-59 ML/MIN (HCC): ICD-10-CM

## 2019-11-13 DIAGNOSIS — E66.01 SEVERE OBESITY (BMI 35.0-39.9) WITH COMORBIDITY (HCC): ICD-10-CM

## 2019-11-13 RX ORDER — BUMETANIDE 2 MG/1
2 TABLET ORAL DAILY
COMMUNITY
End: 2020-02-10

## 2019-11-13 RX ORDER — FACIAL-BODY WIPES
10 EACH TOPICAL DAILY
COMMUNITY

## 2019-11-13 RX ORDER — PREGABALIN 150 MG/1
150 CAPSULE ORAL 2 TIMES DAILY
Qty: 60 CAP | Refills: 3 | Status: SHIPPED | OUTPATIENT
Start: 2019-11-13 | End: 2020-01-01 | Stop reason: SDUPTHER

## 2019-11-13 RX ORDER — AMLODIPINE BESYLATE 10 MG/1
TABLET ORAL DAILY
COMMUNITY
End: 2019-11-25 | Stop reason: SDUPTHER

## 2019-11-13 RX ORDER — INSULIN ASPART 100 [IU]/ML
INJECTION, SOLUTION INTRAVENOUS; SUBCUTANEOUS
COMMUNITY

## 2019-11-13 RX ORDER — BLOOD SUGAR DIAGNOSTIC
STRIP MISCELLANEOUS
Qty: 100 PEN NEEDLE | Refills: 12 | Status: SHIPPED | OUTPATIENT
Start: 2019-11-13

## 2019-11-13 NOTE — PROGRESS NOTES
1. Have you been to the ER, urgent care clinic since your last visit? Hospitalized since your last visit? Yes/[facility]    2. Have you seen or consulted any other health care providers outside of the [address] [place] [address] since your last visit? Include any pap smears or colon screening. No    3 most recent PHQ Screens 2/20/2019   Little interest or pleasure in doing things Not at all   Feeling down, depressed, irritable, or hopeless Not at all   Total Score PHQ 2 0     Chief Complaint   Patient presents with   [facility] Follow Up     Per Dr. Arjun Hanks.,  verbal order given for needed amb poc labs.

## 2019-11-13 NOTE — PATIENT INSTRUCTIONS
[employer] Activation    Thank you for requesting access to [employer]. Please follow the instructions below to securely access and download your online medical record. [employer] allows you to send messages to your doctor, view your test results, renew your prescriptions, schedule appointments, and more. How Do I Sign Up? 1. In your internet browser, go to [email or web address]  2. Click on the First Time User? Click Here link in the Sign In box. You will be redirect to the New Member Sign Up page. 3. Enter your [employer] Access Code exactly as it appears below. You will not need to use this code after youve completed the sign-up process. If you do not sign up before the expiration date, you must request a new code. [employer] Access Code: [email or web address]  Expires: 2019  3:17 PM (This is the date your [employer] access code will )    4. Enter the last four digits of your Social Security Number (xxxx) and Date of Birth (mm/dd/yyyy) as indicated and click Submit. You will be taken to the next sign-up page. 5. Create a [employer] ID. This will be your [employer] login ID and cannot be changed, so think of one that is secure and easy to remember. 6. Create a [employer] password. You can change your password at any time. 7. Enter your Password Reset Question and Answer. This can be used at a later time if you forget your password. 8. Enter your e-mail address. You will receive e-mail notification when new information is available in [address] 9. Click Sign Up. You can now view and download portions of your medical record. 10. Click the Download Summary menu link to download a portable copy of your medical information. Additional Information    If you have questions, please visit the Frequently Asked Questions section of the [employer] website at [email or web address] Remember, [employer] is NOT to be used for urgent needs. For medical emergencies, dial 911.

## 2019-11-13 NOTE — PROGRESS NOTES
Darron Matias is a 77 y.o. male and presents with Hospital Follow Up    Subjective:  He has no sob today  He was treated for respiratory failure due to HFpEF    Hypertension Review:  The patient has essential hypertension  Diet and Lifestyle: generally follows a  low sodium diet, exercises sporadically  Home BP Monitoring: is not measured at home. Pertinent ROS: taking medications as instructed, no medication side effects noted, no TIA's, no chest pain on exertion, no dyspnea on exertion, no swelling of ankles. Diabetes Mellitus Review:  He has diabetes mellitus. Diabetic ROS - medication compliance: compliant all of the time, diabetic diet compliance: compliant all of the time, home glucose monitoring: is NOT performed. Known diabetic complications: none  Cardiovascular risk factors: family history, dyslipidemia, diabetes mellitus, obesity, hypertension  Current diabetic medications include oral agents/insulin   Eye exam current (within one year): no  Weight trend: stable  Prior visit with dietician: no  Current diet: \"healthy\" diet  in general  Current exercise: walking  Current monitoring regimen: home blood tests - daily  Home blood sugar records: trend: stable  Any episodes of hypoglycemia? no  Is He on ACE inhibitor or angiotensin II receptor blocker? Yes     Anemia review:  Patient presents for follow up  evaluation of an anemia. Anemia was found by routine CBC. It had been present for several months. Associated signs & symptoms:none  The  most recent studies were improved  His anemia is felt due to CKD. HE HAS DIFFUSE FLAKING OF THE SCALP REPORTED. He was told he had amyloid reported. He states he is to begin treatment for this. Sleep apnea Review: There is a history of excessive daytime fatigue with associated excessive snoring at night. There is also a history of periods of apnea at night as witnessed by a partner and family  The patient is on CPAP.         Review of Systems  Constitutional: negative for fevers, chills, anorexia and weight loss  Eyes:   negative for visual disturbance and irritation  ENT:   negative for tinnitus,sore throat,nasal congestion,ear pains. hoarseness  Respiratory:   NO cough,,   CV:    palpitations, lower extremity edema  GI:   negative for nausea, vomiting, diarrhea, abdominal pain,melena  Endo:               negative for polyuria,polydipsia,polyphagia,heat intolerance  Genitourinary: negative for frequency, dysuria and hematuria  Integument:  negative for rash and pruritus  Hematologic:  negative for easy bruising and gum/nose bleeding  Musculoskel: negative for myalgias, arthralgias, back pain, muscle weakness, joint pain  Neurological:  negative for headaches, dizziness, vertigo, memory problems and gait   Behavl/Psych: negative for feelings of anxiety, depression, mood changes    Past Medical History:   Diagnosis Date    Arthritis, Degenerative,  Knee 4/4/2011    Carcinoma, Prostate 4/4/2011    Degenerative disc disease 4/4/2011    Depression/ Anxiety 4/4/2011    Diabetes ([facility] [address]     Diabetes Mellitrus ( non-insulin dependent ) Type 2 4/4/2011    HEMATOCHEZIA 4/4/2011    Hypertrension 4/4/2011    Hypertriglyceridemia 4/4/2011    Insomnia 4/4/2011    Laryngitis 4/4/2011    Mild Aortic insufficiency 4/4/2011    Mild Concentric LVH (left ventricular hypertrophy) 4/4/2011    Neuropathy 4/4/2011    Osteoarthritis 4/4/2011    Rotator Cuff Tendonitis 4/4/2011     Past Surgical History:   Procedure Laterality Date    CARDIAC SURG PROCEDURE UNLIST      cardiac cath    COLONOSCOPY N/A 4/28/2017    COL[facility]COPY performed by [facility] at [facility] ENDOSCOPY    HX ORTHOPAEDIC      left hip pinning    HX OTHER SURGICAL      left little toe amputation    HX UROLOGICAL       Social History     Socioeconomic History    Marital status: LEGALLY      Spouse name: Not on file    Number of children: Not on file    Years of education: Not on file    Highest education level: Not on file   Tobacco Use    Smoking status: Current Every Day Smoker     Packs/day: 0.25     Last attempt to quit: 10/25/2016     Years since quitting: 3.0    Smokeless tobacco: Never Used   Substance and Sexual Activity    Alcohol use: Yes     Alcohol/week: 11.7 standard drinks     Types: 7 Cans of beer, 7 Shots of liquor per week    Sexual activity: Yes     Partners: Female     No family history on file. Current Outpatient Medications   Medication Sig Dispense Refill    bumetanide (BUMEX) 2 mg tablet Take 2 mg by mouth daily.  amLODIPine (NORVASC) 10 mg tablet Take  by mouth daily.  bisacodyl (DULCOLAX, BISACODYL,) 10 mg suppository Insert 10 mg into rectum daily.  insulin aspart U-100 (NOVOLOG FLEXPEN U-100 INSULIN) 100 unit/mL (3 mL) inpn by SubCUTAneous route.  hydrALAZINE (APRESOLINE) 50 mg tablet TAKE ONE TABLET BY MOUTH THREE TIMES A DAY 90 Tab 0    fluticasone propionate (FLONASE) 50 mcg/actuation nasal spray SPRAY TWO SPRAYS IN THE AFFECTED NOSTRIL DAILY 1 Bottle 10    Insulin Syringe-Needle U-100 (BD INSULIN SYRINGE ULTRA-FINE) 1 mL 31 gauge x 5/16 syrg USE TO INJECT INSULIN FIVE TIMES A  Syringe 10    tamsulosin (FLOMAX) 0.4 mg capsule Take 1 Cap by mouth daily. 30 Cap 3    insulin glargine (LANTUS U-100 INSULIN) 100 unit/mL injection 45 Units by SubCUTAneous route two (2) times a day. 6 Vial 10    pregabalin (LYRICA) 150 mg capsule Take 150 mg by mouth three (3) times daily.  aspirin delayed-release 81 mg tablet Take 81 mg by mouth daily.  hydrocolloid dressing (DUODERM HYDROACTIVE) topical gel Apply  to affected area daily. 30 Tube 0    triamcinolone acetonide (KENALOG) 0.1 % ointment Apply  to affected area two (2) times a day. use thin layer bid 80 g 12    lidocaine (LIDODERM) 5 % 1 Patch by TransDERmal route every twenty-four (24) hours.  Apply to affected area every 24 hours 1 Package 3    ferrous sulfate (IRON) 325 mg (65 mg iron) EC tablet Take 1 Tab by mouth Daily (before breakfast). 30 Tab 6    ammonium lactate (LAC-HYDRIN FIVE) 5 % lotion Apply daily (Patient taking differently: Apply 1 Applicator to affected area daily. Apply daily bilateral lower legs ) 222 mL 3    acetaminophen (PAIN AND FEVER) 500 mg tablet TAKE ONE TABLET BY MOUTH EVERY 6 HOURS AS NEEDED FOR PAIN 60 Tab 2    Calcium Carbonate-Vit D3-Min 600 mg calcium- 400 unit tab Take 1 Tab by mouth two (2) times a day.  NOVOLIN R REGULAR U-100 INSULN 100 unit/mL injection INJECT 14 UNITS UNDER THE SKIN THREE TIMES A DAY WITH MEALS AS NEEDED 10 Vial 11    insulin regular (NOVOLIN R, HUMULIN R) 100 unit/mL injection 7 Units by SubCUTAneous route three (3) times daily as needed (with meals). 1 Vial 12    polyethylene glycol (MIRALAX) 17 gram/dose powder Take 17 g by mouth daily.  (Patient not taking: Reported on 11/13/2019) 850 g 3     No Known Allergies    Objective:  Visit Vitals  BP (!) 124/34   Pulse 81   Temp 98.2 °F (36.8 °C) (Oral)   Resp 16   SpO2 96%     Physical Exam:   General appearance - alert, well appearing, and in no distress  Mental status - alert, oriented to person, place, and time  EYE-LUH, EOMI, corneas normal, no foreign bodies  ENT-ENT exam normal, no neck nodes or sinus tenderness  Nose - normal and patent, no erythema, discharge or polyps  Mouth - mucous membranes moist, pharynx normal without lesions  Neck - supple, no significant adenopathy   Chest - clear to auscultation, no wheezes, rales or rhonchi, symmetric air entry   Heart - normal rate, regular rhythm, normal S1, S2, no murmurs, rubs, clicks or gallops   Abdomen - soft, nontender, nondistended, no masses or organomegaly  Lymph- no adenopathy palpable  Ext-peripheral pulses normal, 2+lower leg edema, no clubbing or cyanosis  Skin-no flaking noted  Neuro -alert, oriented, normal speech, no focal findings or movement disorder noted  Neck-normal C-spine, no tenderness, full ROM without pain  Feet- nail deformities or callus formation with good pulses noted      Results for orders placed or performed in visit on 06/27/19   CBC W/O DIFF   Result Value Ref Range    WBC 6.3 3.4 - 10.8 x10E3/uL    RBC 3.39 (L) 4.14 - 5.80 x10E6/uL    HGB 9.7 (L) 13.0 - 17.7 g/dL    HCT 31.3 (L) 37.5 - 51.0 %    MCV 92 79 - 97 fL    MCH 28.6 26.6 - 33.0 pg    MCHC 31.0 (L) 31.5 - 35.7 g/dL    RDW 15.6 (H) 12.3 - 15.4 %    PLATELET 435 867 - 175 I62P5/BE   METABOLIC PANEL, COMPREHENSIVE   Result Value Ref Range    Glucose 302 (H) 65 - 99 mg/dL    BUN 25 8 - 27 mg/dL    Creatinine 1.61 (H) 0.76 - 1.27 mg/dL    GFR est non-AA 43 (L) >59 mL/min/1.73    GFR est AA 50 (L) >59 mL/min/1.73    BUN/Creatinine ratio 16 10 - 24    Sodium 135 134 - 144 mmol/L    Potassium 4.3 3.5 - 5.2 mmol/L    Chloride 98 96 - 106 mmol/L    CO2 24 20 - 29 mmol/L    Calcium 8.9 8.6 - 10.2 mg/dL    Protein, total 5.9 (L) 6.0 - 8.5 g/dL    Albumin 3.2 (L) 3.6 - 4.8 g/dL    GLOBULIN, TOTAL 2.7 1.5 - 4.5 g/dL    A-G Ratio 1.2 1.2 - 2.2    Bilirubin, total <0.2 0.0 - 1.2 mg/dL    Alk. phosphatase 106 39 - 117 IU/L    AST (SGOT) 14 0 - 40 IU/L    ALT (SGPT) 16 0 - 44 IU/L   CKD REPORT   Result Value Ref Range    Interpretation Note    AMB POC GLUCOSE BLOOD, BY GLUCOSE MONITORING DEVICE   Result Value Ref Range    Glucose  mg/dL   AMB POC HEMOGLOBIN A1C   Result Value Ref Range    Hemoglobin A1c (POC)  %       Assessment/Plan:    ICD-10-CM ICD-9-CM    1. Diabetic polyneuropathy associated with type 2 diabetes mellitus (HCC) E11.42 250.60      357.2    2. CKD (chronic kidney disease) stage 3, GFR 30-59 ml/min (HCC) N18.3 585.3    3. Essential hypertension I10 401.9    4. Anemia due to stage 3 chronic kidney disease (HCC) N18.3 285.21     D63.1 585.3    5. Carcinoma, Prostate C61 185    6. Severe obesity (BMI 35.0-39. 9) with comorbidity ([facility] [address] E66.01 278.01    7.  Seborrheic keratoses L82.1 702.19      Orders Placed This Encounter    bumetanide (BUMEX) 2 mg tablet     Sig: Take 2 mg by mouth daily.  amLODIPine (NORVASC) 10 mg tablet     Sig: Take  by mouth daily.  bisacodyl (DULCOLAX, BISACODYL,) 10 mg suppository     Sig: Insert 10 mg into rectum daily.  insulin aspart U-100 (NOVOLOG FLEXPEN U-100 INSULIN) 100 unit/mL (3 mL) inpn     Sig: by SubCUTAneous route. lose weight, increase physical activity, follow low fat diet, follow low salt diet, continue present plan,Take 81mg aspirin daily      Patient Instructions   MyChart Activation    Thank you for requesting access to [facility]. Please follow the instructions below to securely access and download your online medical record. [facility] allows you to send messages to your doctor, view your test results, renew your prescriptions, schedule appointments, and more. How Do I Sign Up? 1. In your internet browser, go to [email or web address]  2. Click on the First Time User? Click Here link in the Sign In box. You will be redirect to the New Member Sign Up page. 3. Enter your [facility] Access Code exactly as it appears below. You will not need to use this code after youve completed the sign-up process. If you do not sign up before the expiration date, you must request a new code. [facility] Access Code: [email or web address]  Expires: 2019  3:17 PM (This is the date your [facility] access code will )    4. Enter the last four digits of your Social Security Number (xxxx) and Date of Birth (mm/dd/yyyy) as indicated and click Submit. You will be taken to the next sign-up page. 5. Create a [facility] ID. This will be your [facility] login ID and cannot be changed, so think of one that is secure and easy to remember. 6. Create a [facility] password. You can change your password at any time. 7. Enter your Password Reset Question and Answer. This can be used at a later time if you forget your password. 8. Enter your e-mail address.  You will receive e-mail notification when new information is available in [employer]. 9. Click Sign Up. You can now view and download portions of your medical record. 10. Click the Download Summary menu link to download a portable copy of your medical information. Additional Information    If you have questions, please visit the Frequently Asked Questions section of the [employer] website at [email or web address] Remember, [employer] is NOT to be used for urgent needs. For medical emergencies, dial 911. Follow-up and Dispositions    · Return in about 4 weeks (around 12/11/2019), or if symptoms worsen or fail to improve. I have reviewed with the patient details of the assessment and plan and all questions were answered. Relevent patient education was performed. The most recent lab findings were reviewed with the patient. An After Visit Summary was printed and given to the patient.

## 2019-11-25 RX ORDER — FERROUS SULFATE 325(65) MG
325 TABLET, DELAYED RELEASE (ENTERIC COATED) ORAL
Qty: 30 TAB | Refills: 6 | Status: SHIPPED | OUTPATIENT
Start: 2019-11-25 | End: 2020-01-01

## 2019-11-25 RX ORDER — AMLODIPINE BESYLATE 10 MG/1
10 TABLET ORAL DAILY
Qty: 30 TAB | Refills: 12 | Status: SHIPPED | OUTPATIENT
Start: 2019-11-25 | End: 2020-01-01

## 2019-11-25 RX ORDER — TAMSULOSIN HYDROCHLORIDE 0.4 MG/1
0.4 CAPSULE ORAL DAILY
Qty: 30 CAP | Refills: 3 | Status: SHIPPED | OUTPATIENT
Start: 2019-11-25 | End: 2020-01-01 | Stop reason: SDUPTHER

## 2020-01-01 ENCOUNTER — VIRTUAL VISIT (OUTPATIENT)
Dept: INTERNAL MEDICINE CLINIC | Age: 70
End: 2020-01-01

## 2020-01-01 ENCOUNTER — APPOINTMENT (OUTPATIENT)
Dept: GENERAL RADIOLOGY | Age: 70
DRG: 853 | End: 2020-01-01
Attending: EMERGENCY MEDICINE
Payer: MEDICARE

## 2020-01-01 ENCOUNTER — APPOINTMENT (OUTPATIENT)
Dept: GENERAL RADIOLOGY | Age: 70
DRG: 853 | End: 2020-01-01
Attending: PODIATRIST
Payer: MEDICARE

## 2020-01-01 ENCOUNTER — OFFICE VISIT (OUTPATIENT)
Dept: INTERNAL MEDICINE CLINIC | Age: 70
End: 2020-01-01

## 2020-01-01 ENCOUNTER — VIRTUAL VISIT (OUTPATIENT)
Dept: INTERNAL MEDICINE CLINIC | Age: 70
End: 2020-01-01
Payer: MEDICARE

## 2020-01-01 ENCOUNTER — HOSPITAL ENCOUNTER (OUTPATIENT)
Dept: WOUND CARE | Age: 70
Discharge: HOME | End: 2020-02-24
Payer: MEDICARE

## 2020-01-01 ENCOUNTER — APPOINTMENT (OUTPATIENT)
Dept: ULTRASOUND IMAGING | Age: 70
DRG: 853 | End: 2020-01-01
Attending: INTERNAL MEDICINE
Payer: MEDICARE

## 2020-01-01 ENCOUNTER — HOSPITAL ENCOUNTER (INPATIENT)
Age: 70
LOS: 49 days | DRG: 853 | End: 2021-02-15
Attending: EMERGENCY MEDICINE | Admitting: INTERNAL MEDICINE
Payer: MEDICARE

## 2020-01-01 ENCOUNTER — ANESTHESIA (OUTPATIENT)
Dept: SURGERY | Age: 70
DRG: 853 | End: 2020-01-01
Payer: MEDICARE

## 2020-01-01 ENCOUNTER — APPOINTMENT (OUTPATIENT)
Dept: NON INVASIVE DIAGNOSTICS | Age: 70
DRG: 853 | End: 2020-01-01
Attending: INTERNAL MEDICINE
Payer: MEDICARE

## 2020-01-01 ENCOUNTER — ANESTHESIA EVENT (OUTPATIENT)
Dept: SURGERY | Age: 70
DRG: 853 | End: 2020-01-01
Payer: MEDICARE

## 2020-01-01 ENCOUNTER — HOSPITAL ENCOUNTER (OUTPATIENT)
Dept: WOUND CARE | Age: 70
Discharge: HOME | End: 2020-05-19
Payer: MEDICARE

## 2020-01-01 VITALS
TEMPERATURE: 97.7 F | RESPIRATION RATE: 16 BRPM | HEART RATE: 83 BPM | SYSTOLIC BLOOD PRESSURE: 165 MMHG | DIASTOLIC BLOOD PRESSURE: 79 MMHG

## 2020-01-01 VITALS
TEMPERATURE: 98.1 F | DIASTOLIC BLOOD PRESSURE: 60 MMHG | BODY MASS INDEX: 36.94 KG/M2 | RESPIRATION RATE: 20 BRPM | HEART RATE: 80 BPM | SYSTOLIC BLOOD PRESSURE: 140 MMHG | OXYGEN SATURATION: 96 % | HEIGHT: 73 IN

## 2020-01-01 VITALS
RESPIRATION RATE: 16 BRPM | HEART RATE: 71 BPM | HEIGHT: 72 IN | BODY MASS INDEX: 37.93 KG/M2 | DIASTOLIC BLOOD PRESSURE: 73 MMHG | SYSTOLIC BLOOD PRESSURE: 180 MMHG | WEIGHT: 280 LBS | TEMPERATURE: 98.2 F

## 2020-01-01 DIAGNOSIS — I10 ESSENTIAL HYPERTENSION: ICD-10-CM

## 2020-01-01 DIAGNOSIS — E11.42 DIABETIC POLYNEUROPATHY ASSOCIATED WITH TYPE 2 DIABETES MELLITUS (HCC): Primary | ICD-10-CM

## 2020-01-01 DIAGNOSIS — E11.21 TYPE 2 DIABETES WITH NEPHROPATHY (HCC): ICD-10-CM

## 2020-01-01 DIAGNOSIS — D63.1 ANEMIA DUE TO STAGE 3 CHRONIC KIDNEY DISEASE (HCC): ICD-10-CM

## 2020-01-01 DIAGNOSIS — E66.01 SEVERE OBESITY (BMI 35.0-39.9) WITH COMORBIDITY (HCC): ICD-10-CM

## 2020-01-01 DIAGNOSIS — M12.811 ROTATOR CUFF ARTHROPATHY, RIGHT: ICD-10-CM

## 2020-01-01 DIAGNOSIS — N17.9 ACUTE RENAL FAILURE SUPERIMPOSED ON CHRONIC KIDNEY DISEASE, UNSPECIFIED CKD STAGE, UNSPECIFIED ACUTE RENAL FAILURE TYPE (HCC): ICD-10-CM

## 2020-01-01 DIAGNOSIS — E11.42 DIABETIC POLYNEUROPATHY ASSOCIATED WITH TYPE 2 DIABETES MELLITUS (HCC): ICD-10-CM

## 2020-01-01 DIAGNOSIS — N17.9 AKI (ACUTE KIDNEY INJURY) (HCC): ICD-10-CM

## 2020-01-01 DIAGNOSIS — J96.01 ACUTE RESPIRATORY FAILURE WITH HYPOXIA (HCC): ICD-10-CM

## 2020-01-01 DIAGNOSIS — Z71.89 GOALS OF CARE, COUNSELING/DISCUSSION: ICD-10-CM

## 2020-01-01 DIAGNOSIS — U07.1 COVID-19: ICD-10-CM

## 2020-01-01 DIAGNOSIS — R93.89 ABNORMAL CHEST X-RAY: ICD-10-CM

## 2020-01-01 DIAGNOSIS — C61 MALIGNANT NEOPLASM OF PROSTATE (HCC): ICD-10-CM

## 2020-01-01 DIAGNOSIS — J80 ARDS (ADULT RESPIRATORY DISTRESS SYNDROME) (HCC): ICD-10-CM

## 2020-01-01 DIAGNOSIS — N18.30 ANEMIA DUE TO STAGE 3 CHRONIC KIDNEY DISEASE (HCC): ICD-10-CM

## 2020-01-01 DIAGNOSIS — M19.041 PRIMARY OSTEOARTHRITIS OF BOTH HANDS: Primary | ICD-10-CM

## 2020-01-01 DIAGNOSIS — M19.042 PRIMARY OSTEOARTHRITIS OF BOTH HANDS: Primary | ICD-10-CM

## 2020-01-01 DIAGNOSIS — K29.00 ACUTE SUPERFICIAL GASTRITIS WITHOUT HEMORRHAGE: ICD-10-CM

## 2020-01-01 DIAGNOSIS — N18.9 ACUTE RENAL FAILURE SUPERIMPOSED ON CHRONIC KIDNEY DISEASE, UNSPECIFIED CKD STAGE, UNSPECIFIED ACUTE RENAL FAILURE TYPE (HCC): ICD-10-CM

## 2020-01-01 DIAGNOSIS — K55.20 AV MALFORMATION OF GASTROINTESTINAL TRACT: ICD-10-CM

## 2020-01-01 DIAGNOSIS — L08.9 RIGHT FOOT INFECTION: Primary | ICD-10-CM

## 2020-01-01 LAB
ALBUMIN SERPL-MCNC: 1.6 G/DL (ref 3.5–5)
ALBUMIN SERPL-MCNC: 1.8 G/DL (ref 3.5–5)
ALBUMIN/GLOB SERPL: 0.3 {RATIO} (ref 1.1–2.2)
ALP SERPL-CCNC: 162 U/L (ref 45–117)
ALT SERPL-CCNC: 10 U/L (ref 12–78)
ANION GAP SERPL CALC-SCNC: 10 MMOL/L (ref 5–15)
ANION GAP SERPL CALC-SCNC: 11 MMOL/L (ref 5–15)
ANION GAP SERPL CALC-SCNC: 8 MMOL/L (ref 5–15)
ANION GAP SERPL CALC-SCNC: 9 MMOL/L (ref 5–15)
AST SERPL-CCNC: 24 U/L (ref 15–37)
ATRIAL RATE: 101 BPM
BACTERIA SPEC CULT: ABNORMAL
BACTERIA SPEC CULT: ABNORMAL
BASOPHILS # BLD: 0 K/UL (ref 0–0.1)
BASOPHILS NFR BLD: 0 % (ref 0–1)
BILIRUB SERPL-MCNC: 0.4 MG/DL (ref 0.2–1)
BNP SERPL-MCNC: 9137 PG/ML
BUN SERPL-MCNC: 53 MG/DL (ref 6–20)
BUN SERPL-MCNC: 59 MG/DL (ref 6–20)
BUN SERPL-MCNC: 60 MG/DL (ref 6–20)
BUN SERPL-MCNC: 63 MG/DL (ref 6–20)
BUN/CREAT SERPL: 14 (ref 12–20)
BUN/CREAT SERPL: 16 (ref 12–20)
CALCIUM SERPL-MCNC: 7.7 MG/DL (ref 8.5–10.1)
CALCIUM SERPL-MCNC: 7.9 MG/DL (ref 8.5–10.1)
CALCIUM SERPL-MCNC: 8.4 MG/DL (ref 8.5–10.1)
CALCIUM SERPL-MCNC: 8.5 MG/DL (ref 8.5–10.1)
CALCULATED P AXIS, ECG09: 17 DEGREES
CALCULATED R AXIS, ECG10: -26 DEGREES
CALCULATED T AXIS, ECG11: 43 DEGREES
CHLORIDE SERPL-SCNC: 100 MMOL/L (ref 97–108)
CHLORIDE SERPL-SCNC: 103 MMOL/L (ref 97–108)
CHLORIDE SERPL-SCNC: 97 MMOL/L (ref 97–108)
CHLORIDE SERPL-SCNC: 98 MMOL/L (ref 97–108)
CK SERPL-CCNC: 151 U/L (ref 39–308)
CK SERPL-CCNC: 203 U/L (ref 39–308)
CO2 SERPL-SCNC: 19 MMOL/L (ref 21–32)
CO2 SERPL-SCNC: 20 MMOL/L (ref 21–32)
CO2 SERPL-SCNC: 21 MMOL/L (ref 21–32)
CO2 SERPL-SCNC: 22 MMOL/L (ref 21–32)
COMMENT, HOLDF: NORMAL
CREAT SERPL-MCNC: 3.33 MG/DL (ref 0.7–1.3)
CREAT SERPL-MCNC: 3.75 MG/DL (ref 0.7–1.3)
CREAT SERPL-MCNC: 3.84 MG/DL (ref 0.7–1.3)
CREAT SERPL-MCNC: 4.37 MG/DL (ref 0.7–1.3)
CREATININE, EXTERNAL: 2.27
CRP SERPL-MCNC: 27.7 MG/DL (ref 0–0.6)
CRP SERPL-MCNC: 34.3 MG/DL (ref 0–0.6)
DIAGNOSIS, 93000: NORMAL
DIFFERENTIAL METHOD BLD: ABNORMAL
ECHO AV PEAK GRADIENT: 13.12 MMHG
ECHO AV PEAK VELOCITY: 181.08 CM/S
ECHO LA TO AORTIC ROOT RATIO: 0.94
ECHO LV INTERNAL DIMENSION DIASTOLIC: 5.89 CM (ref 4.2–5.9)
ECHO LV INTERNAL DIMENSION SYSTOLIC: 3.25 CM
ECHO LV IVSD: 0.81 CM (ref 0.6–1)
ECHO LV MASS 2D: 243.9 G (ref 88–224)
ECHO LV MASS INDEX 2D: 100.7 G/M2 (ref 49–115)
ECHO LV POSTERIOR WALL DIASTOLIC: 1.22 CM (ref 0.6–1)
ECHO LVOT PEAK GRADIENT: 5.81 MMHG
ECHO LVOT PEAK VELOCITY: 120.55 CM/S
ECHO MV A VELOCITY: 93.37 CM/S
ECHO MV E VELOCITY: 106.14 CM/S
ECHO MV E/A RATIO: 1.14
ECHO RV TAPSE: 2.02 CM (ref 1.5–2)
EOSINOPHIL # BLD: 0 K/UL (ref 0–0.4)
EOSINOPHIL # BLD: 0 K/UL (ref 0–0.4)
EOSINOPHIL # BLD: 0.1 K/UL (ref 0–0.4)
EOSINOPHIL # BLD: 0.3 K/UL (ref 0–0.4)
EOSINOPHIL NFR BLD: 0 % (ref 0–7)
EOSINOPHIL NFR BLD: 0 % (ref 0–7)
EOSINOPHIL NFR BLD: 1 % (ref 0–7)
EOSINOPHIL NFR BLD: 2 % (ref 0–7)
ERYTHROCYTE [DISTWIDTH] IN BLOOD BY AUTOMATED COUNT: 16.1 % (ref 11.5–14.5)
ERYTHROCYTE [DISTWIDTH] IN BLOOD BY AUTOMATED COUNT: 16.2 % (ref 11.5–14.5)
ERYTHROCYTE [DISTWIDTH] IN BLOOD BY AUTOMATED COUNT: 16.3 % (ref 11.5–14.5)
ERYTHROCYTE [DISTWIDTH] IN BLOOD BY AUTOMATED COUNT: 16.4 % (ref 11.5–14.5)
ERYTHROCYTE [DISTWIDTH] IN BLOOD BY AUTOMATED COUNT: 16.4 % (ref 11.5–14.5)
ERYTHROCYTE [DISTWIDTH] IN BLOOD BY AUTOMATED COUNT: 16.6 % (ref 11.5–14.5)
ERYTHROCYTE [DISTWIDTH] IN BLOOD BY AUTOMATED COUNT: 16.7 % (ref 11.5–14.5)
FERRITIN SERPL-MCNC: 266 NG/ML (ref 26–388)
FERRITIN SERPL-MCNC: 347 NG/ML (ref 26–388)
GLOBULIN SER CALC-MCNC: 6.5 G/DL (ref 2–4)
GLUCOSE BLD STRIP.AUTO-MCNC: 119 MG/DL (ref 65–100)
GLUCOSE BLD STRIP.AUTO-MCNC: 131 MG/DL (ref 65–100)
GLUCOSE BLD STRIP.AUTO-MCNC: 135 MG/DL (ref 65–100)
GLUCOSE BLD STRIP.AUTO-MCNC: 140 MG/DL (ref 65–100)
GLUCOSE BLD STRIP.AUTO-MCNC: 141 MG/DL (ref 65–100)
GLUCOSE BLD STRIP.AUTO-MCNC: 150 MG/DL (ref 65–100)
GLUCOSE BLD STRIP.AUTO-MCNC: 171 MG/DL (ref 65–100)
GLUCOSE BLD STRIP.AUTO-MCNC: 198 MG/DL (ref 65–100)
GLUCOSE BLD STRIP.AUTO-MCNC: 205 MG/DL (ref 65–100)
GLUCOSE BLD STRIP.AUTO-MCNC: 245 MG/DL (ref 65–100)
GLUCOSE BLD STRIP.AUTO-MCNC: 268 MG/DL (ref 65–100)
GLUCOSE BLD STRIP.AUTO-MCNC: 304 MG/DL (ref 65–100)
GLUCOSE BLD STRIP.AUTO-MCNC: 444 MG/DL (ref 65–100)
GLUCOSE BLD STRIP.AUTO-MCNC: 97 MG/DL (ref 65–100)
GLUCOSE POC: 382 MG/DL
GLUCOSE SERPL-MCNC: 190 MG/DL (ref 65–100)
GLUCOSE SERPL-MCNC: 203 MG/DL (ref 65–100)
GLUCOSE SERPL-MCNC: 235 MG/DL (ref 65–100)
GLUCOSE SERPL-MCNC: 379 MG/DL (ref 65–100)
HCT VFR BLD AUTO: 20 % (ref 36.6–50.3)
HCT VFR BLD AUTO: 20.9 % (ref 36.6–50.3)
HCT VFR BLD AUTO: 21.1 % (ref 36.6–50.3)
HCT VFR BLD AUTO: 21.1 % (ref 36.6–50.3)
HCT VFR BLD AUTO: 21.5 % (ref 36.6–50.3)
HCT VFR BLD AUTO: 23.4 % (ref 36.6–50.3)
HCT VFR BLD AUTO: 24.9 % (ref 36.6–50.3)
HEALTH STATUS, XMCV2T: NORMAL
HEALTH STATUS, XMCV2T: NORMAL
HEMOCCULT STL QL: POSITIVE
HGB BLD-MCNC: 6.5 G/DL (ref 12.1–17)
HGB BLD-MCNC: 6.8 G/DL (ref 12.1–17)
HGB BLD-MCNC: 6.8 G/DL (ref 12.1–17)
HGB BLD-MCNC: 6.9 G/DL (ref 12.1–17)
HGB BLD-MCNC: 7.1 G/DL (ref 12.1–17)
HGB BLD-MCNC: 7.8 G/DL (ref 12.1–17)
HGB BLD-MCNC: 7.9 G/DL (ref 12.1–17)
HISTORY CHECKED?,CKHIST: NORMAL
IMM GRANULOCYTES # BLD AUTO: 0.1 K/UL (ref 0–0.04)
IMM GRANULOCYTES # BLD AUTO: 0.3 K/UL (ref 0–0.04)
IMM GRANULOCYTES NFR BLD AUTO: 1 % (ref 0–0.5)
IMM GRANULOCYTES NFR BLD AUTO: 2 % (ref 0–0.5)
INR PPP: 1.1 (ref 0.9–1.1)
IRON SATN MFR SERPL: 10 % (ref 20–50)
IRON SATN MFR SERPL: 14 % (ref 20–50)
IRON SERPL-MCNC: 21 UG/DL (ref 35–150)
IRON SERPL-MCNC: 25 UG/DL (ref 35–150)
LACTATE SERPL-SCNC: 1.6 MMOL/L (ref 0.4–2)
LYMPHOCYTES # BLD: 0.4 K/UL (ref 0.8–3.5)
LYMPHOCYTES # BLD: 0.4 K/UL (ref 0.8–3.5)
LYMPHOCYTES # BLD: 0.5 K/UL (ref 0.8–3.5)
LYMPHOCYTES # BLD: 0.5 K/UL (ref 0.8–3.5)
LYMPHOCYTES # BLD: 0.6 K/UL (ref 0.8–3.5)
LYMPHOCYTES # BLD: 0.6 K/UL (ref 0.8–3.5)
LYMPHOCYTES # BLD: 0.7 K/UL (ref 0.8–3.5)
LYMPHOCYTES NFR BLD: 3 % (ref 12–49)
LYMPHOCYTES NFR BLD: 4 % (ref 12–49)
LYMPHOCYTES NFR BLD: 5 % (ref 12–49)
MCH RBC QN AUTO: 26.1 PG (ref 26–34)
MCH RBC QN AUTO: 26.2 PG (ref 26–34)
MCH RBC QN AUTO: 26.5 PG (ref 26–34)
MCH RBC QN AUTO: 26.7 PG (ref 26–34)
MCH RBC QN AUTO: 26.8 PG (ref 26–34)
MCH RBC QN AUTO: 27.1 PG (ref 26–34)
MCH RBC QN AUTO: 27.4 PG (ref 26–34)
MCHC RBC AUTO-ENTMCNC: 31.7 G/DL (ref 30–36.5)
MCHC RBC AUTO-ENTMCNC: 32.2 G/DL (ref 30–36.5)
MCHC RBC AUTO-ENTMCNC: 32.2 G/DL (ref 30–36.5)
MCHC RBC AUTO-ENTMCNC: 32.5 G/DL (ref 30–36.5)
MCHC RBC AUTO-ENTMCNC: 33 G/DL (ref 30–36.5)
MCHC RBC AUTO-ENTMCNC: 33 G/DL (ref 30–36.5)
MCHC RBC AUTO-ENTMCNC: 33.3 G/DL (ref 30–36.5)
MCV RBC AUTO: 81.1 FL (ref 80–99)
MCV RBC AUTO: 81.2 FL (ref 80–99)
MCV RBC AUTO: 81.3 FL (ref 80–99)
MCV RBC AUTO: 81.6 FL (ref 80–99)
MCV RBC AUTO: 82.2 FL (ref 80–99)
MCV RBC AUTO: 82.7 FL (ref 80–99)
MCV RBC AUTO: 82.9 FL (ref 80–99)
MONOCYTES # BLD: 0.7 K/UL (ref 0–1)
MONOCYTES # BLD: 0.8 K/UL (ref 0–1)
MONOCYTES NFR BLD: 5 % (ref 5–13)
MONOCYTES NFR BLD: 6 % (ref 5–13)
NEUTS SEG # BLD: 10 K/UL (ref 1.8–8)
NEUTS SEG # BLD: 10.9 K/UL (ref 1.8–8)
NEUTS SEG # BLD: 11 K/UL (ref 1.8–8)
NEUTS SEG # BLD: 11.2 K/UL (ref 1.8–8)
NEUTS SEG # BLD: 11.4 K/UL (ref 1.8–8)
NEUTS SEG # BLD: 12.6 K/UL (ref 1.8–8)
NEUTS SEG # BLD: 9.9 K/UL (ref 1.8–8)
NEUTS SEG NFR BLD: 85 % (ref 32–75)
NEUTS SEG NFR BLD: 87 % (ref 32–75)
NEUTS SEG NFR BLD: 87 % (ref 32–75)
NEUTS SEG NFR BLD: 88 % (ref 32–75)
NEUTS SEG NFR BLD: 89 % (ref 32–75)
NEUTS SEG NFR BLD: 89 % (ref 32–75)
NEUTS SEG NFR BLD: 90 % (ref 32–75)
NRBC # BLD: 0 K/UL (ref 0–0.01)
NRBC BLD-RTO: 0 PER 100 WBC
P-R INTERVAL, ECG05: 132 MS
PHOSPHATE SERPL-MCNC: 3.5 MG/DL (ref 2.6–4.7)
PHOSPHATE SERPL-MCNC: 3.9 MG/DL (ref 2.6–4.7)
PLATELET # BLD AUTO: 280 K/UL (ref 150–400)
PLATELET # BLD AUTO: 284 K/UL (ref 150–400)
PLATELET # BLD AUTO: 292 K/UL (ref 150–400)
PLATELET # BLD AUTO: 299 K/UL (ref 150–400)
PLATELET # BLD AUTO: 316 K/UL (ref 150–400)
PLATELET # BLD AUTO: 322 K/UL (ref 150–400)
PLATELET # BLD AUTO: 328 K/UL (ref 150–400)
PLATELET COMMENTS,PCOM: ABNORMAL
PMV BLD AUTO: 10 FL (ref 8.9–12.9)
PMV BLD AUTO: 10 FL (ref 8.9–12.9)
PMV BLD AUTO: 10.1 FL (ref 8.9–12.9)
PMV BLD AUTO: 10.1 FL (ref 8.9–12.9)
PMV BLD AUTO: 10.4 FL (ref 8.9–12.9)
PMV BLD AUTO: 9.6 FL (ref 8.9–12.9)
PMV BLD AUTO: 9.9 FL (ref 8.9–12.9)
POTASSIUM SERPL-SCNC: 3.8 MMOL/L (ref 3.5–5.1)
POTASSIUM SERPL-SCNC: 3.9 MMOL/L (ref 3.5–5.1)
POTASSIUM SERPL-SCNC: 3.9 MMOL/L (ref 3.5–5.1)
POTASSIUM SERPL-SCNC: 4.4 MMOL/L (ref 3.5–5.1)
PROT SERPL-MCNC: 8.3 G/DL (ref 6.4–8.2)
PROTHROMBIN TIME: 11.3 SEC (ref 9–11.1)
Q-T INTERVAL, ECG07: 350 MS
QRS DURATION, ECG06: 90 MS
QTC CALCULATION (BEZET), ECG08: 453 MS
RBC # BLD AUTO: 2.45 M/UL (ref 4.1–5.7)
RBC # BLD AUTO: 2.52 M/UL (ref 4.1–5.7)
RBC # BLD AUTO: 2.55 M/UL (ref 4.1–5.7)
RBC # BLD AUTO: 2.6 M/UL (ref 4.1–5.7)
RBC # BLD AUTO: 2.65 M/UL (ref 4.1–5.7)
RBC # BLD AUTO: 2.88 M/UL (ref 4.1–5.7)
RBC # BLD AUTO: 3.03 M/UL (ref 4.1–5.7)
RBC MORPH BLD: ABNORMAL
SAMPLES BEING HELD,HOLD: NORMAL
SARS-COV-2, COV2: NOT DETECTED
SARS-COV-2, COV2: NOT DETECTED
SERVICE CMNT-IMP: ABNORMAL
SERVICE CMNT-IMP: NORMAL
SODIUM SERPL-SCNC: 128 MMOL/L (ref 136–145)
SODIUM SERPL-SCNC: 129 MMOL/L (ref 136–145)
SODIUM SERPL-SCNC: 130 MMOL/L (ref 136–145)
SODIUM SERPL-SCNC: 131 MMOL/L (ref 136–145)
SOURCE, COVRS: NORMAL
SOURCE, COVRS: NORMAL
SPECIMEN SOURCE, FCOV2M: NORMAL
SPECIMEN SOURCE, FCOV2M: NORMAL
SPECIMEN TYPE, XMCV1T: NORMAL
SPECIMEN TYPE, XMCV1T: NORMAL
TIBC SERPL-MCNC: 177 UG/DL (ref 250–450)
TIBC SERPL-MCNC: 203 UG/DL (ref 250–450)
TROPONIN I SERPL-MCNC: <0.05 NG/ML
TSH SERPL DL<=0.05 MIU/L-ACNC: 1.4 UIU/ML (ref 0.36–3.74)
VANCOMYCIN SERPL-MCNC: 13.4 UG/ML
VENTRICULAR RATE, ECG03: 101 BPM
VIT B12 SERPL-MCNC: 913 PG/ML (ref 193–986)
WBC # BLD AUTO: 11.1 K/UL (ref 4.1–11.1)
WBC # BLD AUTO: 11.5 K/UL (ref 4.1–11.1)
WBC # BLD AUTO: 12.4 K/UL (ref 4.1–11.1)
WBC # BLD AUTO: 12.7 K/UL (ref 4.1–11.1)
WBC # BLD AUTO: 12.8 K/UL (ref 4.1–11.1)
WBC # BLD AUTO: 13.1 K/UL (ref 4.1–11.1)
WBC # BLD AUTO: 13.9 K/UL (ref 4.1–11.1)

## 2020-01-01 PROCEDURE — 74011250636 HC RX REV CODE- 250/636: Performed by: INTERNAL MEDICINE

## 2020-01-01 PROCEDURE — 65270000032 HC RM SEMIPRIVATE

## 2020-01-01 PROCEDURE — 74011000258 HC RX REV CODE- 258: Performed by: INTERNAL MEDICINE

## 2020-01-01 PROCEDURE — 74011250637 HC RX REV CODE- 250/637: Performed by: NURSE PRACTITIONER

## 2020-01-01 PROCEDURE — 36415 COLL VENOUS BLD VENIPUNCTURE: CPT

## 2020-01-01 PROCEDURE — 74011000250 HC RX REV CODE- 250: Performed by: PODIATRIST

## 2020-01-01 PROCEDURE — P9016 RBC LEUKOCYTES REDUCED: HCPCS

## 2020-01-01 PROCEDURE — 80202 ASSAY OF VANCOMYCIN: CPT

## 2020-01-01 PROCEDURE — 85610 PROTHROMBIN TIME: CPT

## 2020-01-01 PROCEDURE — 93005 ELECTROCARDIOGRAM TRACING: CPT

## 2020-01-01 PROCEDURE — 74011250637 HC RX REV CODE- 250/637: Performed by: INTERNAL MEDICINE

## 2020-01-01 PROCEDURE — 80069 RENAL FUNCTION PANEL: CPT

## 2020-01-01 PROCEDURE — 73620 X-RAY EXAM OF FOOT: CPT

## 2020-01-01 PROCEDURE — 82272 OCCULT BLD FECES 1-3 TESTS: CPT

## 2020-01-01 PROCEDURE — 87185 SC STD ENZYME DETCJ PER NZM: CPT

## 2020-01-01 PROCEDURE — 85025 COMPLETE CBC W/AUTO DIFF WBC: CPT

## 2020-01-01 PROCEDURE — 36430 TRANSFUSION BLD/BLD COMPNT: CPT

## 2020-01-01 PROCEDURE — 87040 BLOOD CULTURE FOR BACTERIA: CPT

## 2020-01-01 PROCEDURE — 87205 SMEAR GRAM STAIN: CPT

## 2020-01-01 PROCEDURE — 83540 ASSAY OF IRON: CPT

## 2020-01-01 PROCEDURE — 71045 X-RAY EXAM CHEST 1 VIEW: CPT

## 2020-01-01 PROCEDURE — 74011636637 HC RX REV CODE- 636/637: Performed by: NURSE PRACTITIONER

## 2020-01-01 PROCEDURE — 82962 GLUCOSE BLOOD TEST: CPT

## 2020-01-01 PROCEDURE — 11044 DBRDMT BONE 1ST 20 SQ CM/<: CPT

## 2020-01-01 PROCEDURE — 76060000032 HC ANESTHESIA 0.5 TO 1 HR: Performed by: PODIATRIST

## 2020-01-01 PROCEDURE — 88311 DECALCIFY TISSUE: CPT

## 2020-01-01 PROCEDURE — 80048 BASIC METABOLIC PNL TOTAL CA: CPT

## 2020-01-01 PROCEDURE — 82550 ASSAY OF CK (CPK): CPT

## 2020-01-01 PROCEDURE — 87147 CULTURE TYPE IMMUNOLOGIC: CPT

## 2020-01-01 PROCEDURE — 74011636637 HC RX REV CODE- 636/637: Performed by: PODIATRIST

## 2020-01-01 PROCEDURE — 77030006831 HC BLD SAW SAG MCRA -B: Performed by: PODIATRIST

## 2020-01-01 PROCEDURE — 82607 VITAMIN B-12: CPT

## 2020-01-01 PROCEDURE — 96365 THER/PROPH/DIAG IV INF INIT: CPT

## 2020-01-01 PROCEDURE — 83880 ASSAY OF NATRIURETIC PEPTIDE: CPT

## 2020-01-01 PROCEDURE — 99285 EMERGENCY DEPT VISIT HI MDM: CPT

## 2020-01-01 PROCEDURE — 86901 BLOOD TYPING SEROLOGIC RH(D): CPT

## 2020-01-01 PROCEDURE — 65270000029 HC RM PRIVATE

## 2020-01-01 PROCEDURE — 86140 C-REACTIVE PROTEIN: CPT

## 2020-01-01 PROCEDURE — 74011250637 HC RX REV CODE- 250/637: Performed by: PODIATRIST

## 2020-01-01 PROCEDURE — 74011000258 HC RX REV CODE- 258: Performed by: PODIATRIST

## 2020-01-01 PROCEDURE — 0QTN0ZZ RESECTION OF RIGHT METATARSAL, OPEN APPROACH: ICD-10-PCS | Performed by: PODIATRIST

## 2020-01-01 PROCEDURE — 74011250636 HC RX REV CODE- 250/636: Performed by: ANESTHESIOLOGY

## 2020-01-01 PROCEDURE — 87635 SARS-COV-2 COVID-19 AMP PRB: CPT

## 2020-01-01 PROCEDURE — 93306 TTE W/DOPPLER COMPLETE: CPT | Performed by: SPECIALIST

## 2020-01-01 PROCEDURE — 74011250636 HC RX REV CODE- 250/636: Performed by: PODIATRIST

## 2020-01-01 PROCEDURE — 74011636637 HC RX REV CODE- 636/637: Performed by: INTERNAL MEDICINE

## 2020-01-01 PROCEDURE — 74011000272 HC RX REV CODE- 272: Performed by: PODIATRIST

## 2020-01-01 PROCEDURE — 88305 TISSUE EXAM BY PATHOLOGIST: CPT

## 2020-01-01 PROCEDURE — 82728 ASSAY OF FERRITIN: CPT

## 2020-01-01 PROCEDURE — 74011250636 HC RX REV CODE- 250/636: Performed by: EMERGENCY MEDICINE

## 2020-01-01 PROCEDURE — 2709999900 HC NON-CHARGEABLE SUPPLY: Performed by: PODIATRIST

## 2020-01-01 PROCEDURE — 83605 ASSAY OF LACTIC ACID: CPT

## 2020-01-01 PROCEDURE — 11042 DBRDMT SUBQ TIS 1ST 20SQCM/<: CPT

## 2020-01-01 PROCEDURE — 84443 ASSAY THYROID STIM HORMONE: CPT

## 2020-01-01 PROCEDURE — 80053 COMPREHEN METABOLIC PANEL: CPT

## 2020-01-01 PROCEDURE — 84100 ASSAY OF PHOSPHORUS: CPT

## 2020-01-01 PROCEDURE — 74011000258 HC RX REV CODE- 258: Performed by: EMERGENCY MEDICINE

## 2020-01-01 PROCEDURE — 87077 CULTURE AEROBIC IDENTIFY: CPT

## 2020-01-01 PROCEDURE — 76210000006 HC OR PH I REC 0.5 TO 1 HR: Performed by: PODIATRIST

## 2020-01-01 PROCEDURE — 87186 SC STD MICRODIL/AGAR DIL: CPT

## 2020-01-01 PROCEDURE — 87075 CULTR BACTERIA EXCEPT BLOOD: CPT

## 2020-01-01 PROCEDURE — 74011250636 HC RX REV CODE- 250/636: Performed by: NURSE ANESTHETIST, CERTIFIED REGISTERED

## 2020-01-01 PROCEDURE — 77030019895 HC PCKNG STRP IODO -A: Performed by: PODIATRIST

## 2020-01-01 PROCEDURE — 99442 PR PHYS/QHP TELEPHONE EVALUATION 11-20 MIN: CPT | Performed by: INTERNAL MEDICINE

## 2020-01-01 PROCEDURE — 76770 US EXAM ABDO BACK WALL COMP: CPT

## 2020-01-01 PROCEDURE — 99213 OFFICE O/P EST LOW 20 MIN: CPT

## 2020-01-01 PROCEDURE — 93306 TTE W/DOPPLER COMPLETE: CPT

## 2020-01-01 PROCEDURE — 76010000138 HC OR TIME 0.5 TO 1 HR: Performed by: PODIATRIST

## 2020-01-01 PROCEDURE — 84484 ASSAY OF TROPONIN QUANT: CPT

## 2020-01-01 PROCEDURE — 86923 COMPATIBILITY TEST ELECTRIC: CPT

## 2020-01-01 RX ORDER — SODIUM CHLORIDE 9 MG/ML
75 INJECTION, SOLUTION INTRAVENOUS CONTINUOUS
Status: DISCONTINUED | OUTPATIENT
Start: 2020-01-01 | End: 2021-01-01

## 2020-01-01 RX ORDER — PANTOPRAZOLE SODIUM 40 MG/1
TABLET, DELAYED RELEASE ORAL
Qty: 60 TAB | Refills: 2 | Status: SHIPPED | OUTPATIENT
Start: 2020-01-01

## 2020-01-01 RX ORDER — SODIUM CHLORIDE 0.9 % (FLUSH) 0.9 %
5-40 SYRINGE (ML) INJECTION EVERY 8 HOURS
Status: DISCONTINUED | OUTPATIENT
Start: 2020-01-01 | End: 2020-01-01 | Stop reason: HOSPADM

## 2020-01-01 RX ORDER — OXYCODONE AND ACETAMINOPHEN 5; 325 MG/1; MG/1
1 TABLET ORAL
Status: DISCONTINUED | OUTPATIENT
Start: 2020-01-01 | End: 2020-01-01

## 2020-01-01 RX ORDER — HYDRALAZINE HYDROCHLORIDE 50 MG/1
TABLET, FILM COATED ORAL
Qty: 90 TAB | Refills: 0 | Status: CANCELLED | OUTPATIENT
Start: 2020-01-01

## 2020-01-01 RX ORDER — AMLODIPINE BESYLATE 5 MG/1
5 TABLET ORAL DAILY
Status: DISCONTINUED | OUTPATIENT
Start: 2020-01-01 | End: 2021-01-01

## 2020-01-01 RX ORDER — MIDAZOLAM HYDROCHLORIDE 1 MG/ML
INJECTION, SOLUTION INTRAMUSCULAR; INTRAVENOUS AS NEEDED
Status: DISCONTINUED | OUTPATIENT
Start: 2020-01-01 | End: 2020-01-01 | Stop reason: HOSPADM

## 2020-01-01 RX ORDER — INSULIN GLARGINE 100 [IU]/ML
40 INJECTION, SOLUTION SUBCUTANEOUS
Status: DISCONTINUED | OUTPATIENT
Start: 2020-01-01 | End: 2020-01-01

## 2020-01-01 RX ORDER — TAMSULOSIN HYDROCHLORIDE 0.4 MG/1
0.4 CAPSULE ORAL DAILY
Status: DISCONTINUED | OUTPATIENT
Start: 2020-01-01 | End: 2021-01-01 | Stop reason: HOSPADM

## 2020-01-01 RX ORDER — ASCORBIC ACID 500 MG
TABLET ORAL
COMMUNITY

## 2020-01-01 RX ORDER — SODIUM CHLORIDE 9 MG/ML
50 INJECTION, SOLUTION INTRAVENOUS CONTINUOUS
Status: DISCONTINUED | OUTPATIENT
Start: 2020-01-01 | End: 2020-01-01 | Stop reason: HOSPADM

## 2020-01-01 RX ORDER — HEPARIN SODIUM 5000 [USP'U]/ML
5000 INJECTION, SOLUTION INTRAVENOUS; SUBCUTANEOUS EVERY 8 HOURS
Status: DISCONTINUED | OUTPATIENT
Start: 2020-01-01 | End: 2021-01-01 | Stop reason: ALTCHOICE

## 2020-01-01 RX ORDER — HYDROXYZINE 50 MG/1
TABLET, FILM COATED ORAL
Qty: 60 TAB | Refills: 2 | Status: SHIPPED | OUTPATIENT
Start: 2020-01-01

## 2020-01-01 RX ORDER — PREGABALIN 150 MG/1
150 CAPSULE ORAL 2 TIMES DAILY
Qty: 60 CAP | Refills: 3 | Status: SHIPPED | OUTPATIENT
Start: 2020-01-01 | End: 2020-01-01 | Stop reason: SDUPTHER

## 2020-01-01 RX ORDER — PROPOFOL 10 MG/ML
INJECTION, EMULSION INTRAVENOUS AS NEEDED
Status: DISCONTINUED | OUTPATIENT
Start: 2020-01-01 | End: 2020-01-01 | Stop reason: HOSPADM

## 2020-01-01 RX ORDER — ACETAMINOPHEN 325 MG/1
650 TABLET ORAL
Status: DISCONTINUED | OUTPATIENT
Start: 2020-01-01 | End: 2020-01-01

## 2020-01-01 RX ORDER — HYDROXYZINE 50 MG/1
TABLET, FILM COATED ORAL
COMMUNITY
Start: 2020-01-01 | End: 2020-01-01

## 2020-01-01 RX ORDER — HYDROMORPHONE HYDROCHLORIDE 1 MG/ML
0.2 INJECTION, SOLUTION INTRAMUSCULAR; INTRAVENOUS; SUBCUTANEOUS
Status: DISCONTINUED | OUTPATIENT
Start: 2020-01-01 | End: 2020-01-01 | Stop reason: HOSPADM

## 2020-01-01 RX ORDER — TORSEMIDE 20 MG/1
20 TABLET ORAL 2 TIMES DAILY
Qty: 1440 TAB | Refills: 0 | Status: SHIPPED | OUTPATIENT
Start: 2020-01-01 | End: 2020-01-01

## 2020-01-01 RX ORDER — DEXTROSE 50 % IN WATER (D50W) INTRAVENOUS SYRINGE
12.5-25 AS NEEDED
Status: DISCONTINUED | OUTPATIENT
Start: 2020-01-01 | End: 2021-01-01 | Stop reason: HOSPADM

## 2020-01-01 RX ORDER — FENTANYL CITRATE 50 UG/ML
50 INJECTION, SOLUTION INTRAMUSCULAR; INTRAVENOUS AS NEEDED
Status: DISCONTINUED | OUTPATIENT
Start: 2020-01-01 | End: 2020-01-01 | Stop reason: HOSPADM

## 2020-01-01 RX ORDER — SODIUM CHLORIDE 9 MG/ML
250 INJECTION, SOLUTION INTRAVENOUS AS NEEDED
Status: DISCONTINUED | OUTPATIENT
Start: 2020-01-01 | End: 2021-01-01 | Stop reason: ALTCHOICE

## 2020-01-01 RX ORDER — HYDRALAZINE HYDROCHLORIDE 100 MG/1
100 TABLET, FILM COATED ORAL 3 TIMES DAILY
Qty: 270 TAB | Refills: 3 | Status: SHIPPED | OUTPATIENT
Start: 2020-01-01 | End: 2020-01-01 | Stop reason: SDUPTHER

## 2020-01-01 RX ORDER — ACETAMINOPHEN 500 MG
500 TABLET ORAL
Status: DISCONTINUED | OUTPATIENT
Start: 2020-01-01 | End: 2021-01-01 | Stop reason: HOSPADM

## 2020-01-01 RX ORDER — AMMONIUM LACTATE 12 G/100G
CREAM TOPICAL 2 TIMES DAILY
Qty: 400 G | Refills: 3 | Status: SHIPPED | OUTPATIENT
Start: 2020-01-01

## 2020-01-01 RX ORDER — METRONIDAZOLE 500 MG/100ML
500 INJECTION, SOLUTION INTRAVENOUS EVERY 12 HOURS
Status: DISCONTINUED | OUTPATIENT
Start: 2020-01-01 | End: 2021-01-01

## 2020-01-01 RX ORDER — TAMSULOSIN HYDROCHLORIDE 0.4 MG/1
0.4 CAPSULE ORAL DAILY
Qty: 30 CAP | Refills: 3 | Status: SHIPPED | OUTPATIENT
Start: 2020-01-01 | End: 2020-01-01

## 2020-01-01 RX ORDER — HYDROXYZINE 50 MG/1
50 TABLET, FILM COATED ORAL
Qty: 60 TAB | Refills: 3 | Status: SHIPPED | OUTPATIENT
Start: 2020-01-01 | End: 2020-01-01

## 2020-01-01 RX ORDER — DICLOFENAC SODIUM 10 MG/G
GEL TOPICAL 4 TIMES DAILY
Qty: 100 G | Refills: 12 | Status: SHIPPED | OUTPATIENT
Start: 2020-01-01

## 2020-01-01 RX ORDER — BICALUTAMIDE 50 MG/1
TABLET, FILM COATED ORAL
Qty: 30 TAB | Refills: 2 | Status: SHIPPED | OUTPATIENT
Start: 2020-01-01

## 2020-01-01 RX ORDER — OXYCODONE HYDROCHLORIDE 5 MG/1
5 TABLET ORAL ONCE
Status: COMPLETED | OUTPATIENT
Start: 2020-01-01 | End: 2020-01-01

## 2020-01-01 RX ORDER — SODIUM CHLORIDE, SODIUM LACTATE, POTASSIUM CHLORIDE, CALCIUM CHLORIDE 600; 310; 30; 20 MG/100ML; MG/100ML; MG/100ML; MG/100ML
75 INJECTION, SOLUTION INTRAVENOUS CONTINUOUS
Status: DISCONTINUED | OUTPATIENT
Start: 2020-01-01 | End: 2020-01-01 | Stop reason: HOSPADM

## 2020-01-01 RX ORDER — ONDANSETRON 2 MG/ML
4 INJECTION INTRAMUSCULAR; INTRAVENOUS AS NEEDED
Status: DISCONTINUED | OUTPATIENT
Start: 2020-01-01 | End: 2020-01-01 | Stop reason: HOSPADM

## 2020-01-01 RX ORDER — GABAPENTIN 300 MG/1
300 CAPSULE ORAL 3 TIMES DAILY
Qty: 90 CAP | Refills: 3 | Status: SHIPPED | OUTPATIENT
Start: 2020-01-01

## 2020-01-01 RX ORDER — PROPOFOL 10 MG/ML
INJECTION, EMULSION INTRAVENOUS
Status: DISCONTINUED | OUTPATIENT
Start: 2020-01-01 | End: 2020-01-01 | Stop reason: HOSPADM

## 2020-01-01 RX ORDER — BICALUTAMIDE 50 MG/1
50 TABLET, FILM COATED ORAL DAILY
Qty: 30 TAB | Refills: 3 | Status: SHIPPED | OUTPATIENT
Start: 2020-01-01 | End: 2020-01-01

## 2020-01-01 RX ORDER — INSULIN LISPRO 100 [IU]/ML
7 INJECTION, SOLUTION INTRAVENOUS; SUBCUTANEOUS ONCE
Status: COMPLETED | OUTPATIENT
Start: 2020-01-01 | End: 2020-01-01

## 2020-01-01 RX ORDER — TRIAMCINOLONE ACETONIDE 1 MG/G
OINTMENT TOPICAL
Qty: 30 G | Refills: 11 | Status: SHIPPED | OUTPATIENT
Start: 2020-01-01

## 2020-01-01 RX ORDER — BICALUTAMIDE 50 MG/1
50 TABLET, FILM COATED ORAL
COMMUNITY
Start: 2020-01-01 | End: 2020-01-01 | Stop reason: SDUPTHER

## 2020-01-01 RX ORDER — SODIUM CHLORIDE 0.9 % (FLUSH) 0.9 %
5-40 SYRINGE (ML) INJECTION AS NEEDED
Status: DISCONTINUED | OUTPATIENT
Start: 2020-01-01 | End: 2020-01-01 | Stop reason: HOSPADM

## 2020-01-01 RX ORDER — INSULIN GLARGINE 100 [IU]/ML
72 INJECTION, SOLUTION SUBCUTANEOUS DAILY
Qty: 6 VIAL | Refills: 10 | Status: SHIPPED | OUTPATIENT
Start: 2020-01-01

## 2020-01-01 RX ORDER — LIDOCAINE HYDROCHLORIDE 10 MG/ML
0.1 INJECTION, SOLUTION EPIDURAL; INFILTRATION; INTRACAUDAL; PERINEURAL AS NEEDED
Status: DISCONTINUED | OUTPATIENT
Start: 2020-01-01 | End: 2020-01-01 | Stop reason: HOSPADM

## 2020-01-01 RX ORDER — MIDAZOLAM HYDROCHLORIDE 1 MG/ML
1 INJECTION, SOLUTION INTRAMUSCULAR; INTRAVENOUS AS NEEDED
Status: DISCONTINUED | OUTPATIENT
Start: 2020-01-01 | End: 2020-01-01 | Stop reason: HOSPADM

## 2020-01-01 RX ORDER — TORSEMIDE 20 MG/1
TABLET ORAL
Qty: 720 TAB | Refills: 0 | Status: SHIPPED | OUTPATIENT
Start: 2020-01-01

## 2020-01-01 RX ORDER — DIPHENHYDRAMINE HYDROCHLORIDE 50 MG/ML
12.5 INJECTION, SOLUTION INTRAMUSCULAR; INTRAVENOUS AS NEEDED
Status: DISCONTINUED | OUTPATIENT
Start: 2020-01-01 | End: 2020-01-01 | Stop reason: HOSPADM

## 2020-01-01 RX ORDER — ASPIRIN 81 MG/1
81 TABLET ORAL DAILY
Status: DISCONTINUED | OUTPATIENT
Start: 2020-01-01 | End: 2021-01-01

## 2020-01-01 RX ORDER — FENTANYL CITRATE 50 UG/ML
INJECTION, SOLUTION INTRAMUSCULAR; INTRAVENOUS AS NEEDED
Status: DISCONTINUED | OUTPATIENT
Start: 2020-01-01 | End: 2020-01-01 | Stop reason: HOSPADM

## 2020-01-01 RX ORDER — MIDAZOLAM HYDROCHLORIDE 1 MG/ML
0.5 INJECTION, SOLUTION INTRAMUSCULAR; INTRAVENOUS
Status: DISCONTINUED | OUTPATIENT
Start: 2020-01-01 | End: 2020-01-01 | Stop reason: HOSPADM

## 2020-01-01 RX ORDER — MORPHINE SULFATE 10 MG/ML
2 INJECTION, SOLUTION INTRAMUSCULAR; INTRAVENOUS
Status: DISCONTINUED | OUTPATIENT
Start: 2020-01-01 | End: 2020-01-01 | Stop reason: HOSPADM

## 2020-01-01 RX ORDER — LANOLIN ALCOHOL/MO/W.PET/CERES
CREAM (GRAM) TOPICAL
Qty: 90 TAB | Refills: 5 | Status: SHIPPED | OUTPATIENT
Start: 2020-01-01

## 2020-01-01 RX ORDER — IBUPROFEN 200 MG
1 TABLET ORAL DAILY
Status: DISCONTINUED | OUTPATIENT
Start: 2020-01-01 | End: 2021-01-01 | Stop reason: ALTCHOICE

## 2020-01-01 RX ORDER — INSULIN GLARGINE 100 [IU]/ML
40 INJECTION, SOLUTION SUBCUTANEOUS DAILY
Status: DISCONTINUED | OUTPATIENT
Start: 2020-01-01 | End: 2020-01-01

## 2020-01-01 RX ORDER — PREGABALIN 150 MG/1
150 CAPSULE ORAL 2 TIMES DAILY
Qty: 60 CAP | Refills: 3 | Status: SHIPPED | OUTPATIENT
Start: 2020-01-01 | End: 2020-01-01

## 2020-01-01 RX ORDER — CALCIUM CARB/VITAMIN D3/VIT K1 500-100-40
TABLET,CHEWABLE ORAL
Qty: 100 SYRINGE | Refills: 10 | Status: SHIPPED | OUTPATIENT
Start: 2020-01-01

## 2020-01-01 RX ORDER — PREGABALIN 150 MG/1
CAPSULE ORAL
Qty: 60 CAP | Refills: 2 | Status: SHIPPED | OUTPATIENT
Start: 2020-01-01

## 2020-01-01 RX ORDER — MULTIVIT-MIN/IRON FUM/FOLIC AC 19 MG-400
TABLET ORAL
Qty: 30 TAB | Refills: 9 | Status: SHIPPED | OUTPATIENT
Start: 2020-01-01

## 2020-01-01 RX ORDER — HYDROCODONE BITARTRATE AND ACETAMINOPHEN 5; 325 MG/1; MG/1
1 TABLET ORAL AS NEEDED
Status: DISCONTINUED | OUTPATIENT
Start: 2020-01-01 | End: 2020-01-01 | Stop reason: HOSPADM

## 2020-01-01 RX ORDER — GABAPENTIN 100 MG/1
100 CAPSULE ORAL 3 TIMES DAILY
Status: DISCONTINUED | OUTPATIENT
Start: 2020-01-01 | End: 2021-01-01 | Stop reason: HOSPADM

## 2020-01-01 RX ORDER — SODIUM CHLORIDE 9 MG/ML
25 INJECTION, SOLUTION INTRAVENOUS CONTINUOUS
Status: DISCONTINUED | OUTPATIENT
Start: 2020-01-01 | End: 2020-01-01 | Stop reason: HOSPADM

## 2020-01-01 RX ORDER — SODIUM CHLORIDE, SODIUM LACTATE, POTASSIUM CHLORIDE, CALCIUM CHLORIDE 600; 310; 30; 20 MG/100ML; MG/100ML; MG/100ML; MG/100ML
125 INJECTION, SOLUTION INTRAVENOUS CONTINUOUS
Status: DISCONTINUED | OUTPATIENT
Start: 2020-01-01 | End: 2020-01-01 | Stop reason: HOSPADM

## 2020-01-01 RX ORDER — PANTOPRAZOLE SODIUM 40 MG/1
40 TABLET, DELAYED RELEASE ORAL
COMMUNITY
Start: 2020-01-01 | End: 2020-01-01 | Stop reason: SDUPTHER

## 2020-01-01 RX ORDER — TAMSULOSIN HYDROCHLORIDE 0.4 MG/1
CAPSULE ORAL
Qty: 90 CAP | Refills: 2 | Status: SHIPPED | OUTPATIENT
Start: 2020-01-01 | End: 2020-01-01 | Stop reason: SDUPTHER

## 2020-01-01 RX ORDER — HYDRALAZINE HYDROCHLORIDE 100 MG/1
100 TABLET, FILM COATED ORAL 3 TIMES DAILY
Qty: 270 TAB | Refills: 3 | Status: SHIPPED | OUTPATIENT
Start: 2020-01-01

## 2020-01-01 RX ORDER — DICLOFENAC SODIUM 10 MG/G
2 GEL TOPICAL 4 TIMES DAILY
Status: DISCONTINUED | OUTPATIENT
Start: 2020-01-01 | End: 2021-01-01 | Stop reason: ALTCHOICE

## 2020-01-01 RX ORDER — BICALUTAMIDE 50 MG/1
50 TABLET, FILM COATED ORAL DAILY
Status: DISCONTINUED | OUTPATIENT
Start: 2020-01-01 | End: 2021-01-01 | Stop reason: HOSPADM

## 2020-01-01 RX ORDER — INSULIN LISPRO 100 [IU]/ML
20 INJECTION, SOLUTION INTRAVENOUS; SUBCUTANEOUS
Status: DISCONTINUED | OUTPATIENT
Start: 2020-01-01 | End: 2021-01-01

## 2020-01-01 RX ORDER — VANCOMYCIN 1.75 GRAM/500 ML IN 0.9 % SODIUM CHLORIDE INTRAVENOUS
1750 ONCE
Status: COMPLETED | OUTPATIENT
Start: 2020-01-01 | End: 2020-01-01

## 2020-01-01 RX ORDER — OXYCODONE AND ACETAMINOPHEN 5; 325 MG/1; MG/1
1 TABLET ORAL
Status: DISCONTINUED | OUTPATIENT
Start: 2020-01-01 | End: 2021-01-01

## 2020-01-01 RX ORDER — BUPIVACAINE HYDROCHLORIDE 5 MG/ML
INJECTION, SOLUTION EPIDURAL; INTRACAUDAL AS NEEDED
Status: DISCONTINUED | OUTPATIENT
Start: 2020-01-01 | End: 2020-01-01 | Stop reason: HOSPADM

## 2020-01-01 RX ORDER — TAMSULOSIN HYDROCHLORIDE 0.4 MG/1
CAPSULE ORAL
Qty: 90 CAP | Refills: 2 | Status: SHIPPED | OUTPATIENT
Start: 2020-01-01

## 2020-01-01 RX ORDER — MAGNESIUM SULFATE 100 %
4 CRYSTALS MISCELLANEOUS AS NEEDED
Status: DISCONTINUED | OUTPATIENT
Start: 2020-01-01 | End: 2021-01-01 | Stop reason: HOSPADM

## 2020-01-01 RX ORDER — FENTANYL CITRATE 50 UG/ML
25 INJECTION, SOLUTION INTRAMUSCULAR; INTRAVENOUS
Status: DISCONTINUED | OUTPATIENT
Start: 2020-01-01 | End: 2020-01-01 | Stop reason: HOSPADM

## 2020-01-01 RX ORDER — INSULIN LISPRO 100 [IU]/ML
INJECTION, SOLUTION INTRAVENOUS; SUBCUTANEOUS
Status: DISCONTINUED | OUTPATIENT
Start: 2020-01-01 | End: 2021-01-01

## 2020-01-01 RX ORDER — INSULIN GLARGINE 100 [IU]/ML
50 INJECTION, SOLUTION SUBCUTANEOUS
Status: DISCONTINUED | OUTPATIENT
Start: 2020-01-01 | End: 2021-01-01

## 2020-01-01 RX ORDER — HYDRALAZINE HYDROCHLORIDE 50 MG/1
100 TABLET, FILM COATED ORAL 3 TIMES DAILY
Status: DISCONTINUED | OUTPATIENT
Start: 2020-01-01 | End: 2021-01-01

## 2020-01-01 RX ORDER — PANTOPRAZOLE SODIUM 40 MG/1
40 TABLET, DELAYED RELEASE ORAL 2 TIMES DAILY
Qty: 60 TAB | Refills: 3 | Status: SHIPPED | OUTPATIENT
Start: 2020-01-01 | End: 2020-01-01

## 2020-01-01 RX ADMIN — HYDRALAZINE HYDROCHLORIDE 100 MG: 50 TABLET, FILM COATED ORAL at 16:41

## 2020-01-01 RX ADMIN — HYDRALAZINE HYDROCHLORIDE 100 MG: 50 TABLET, FILM COATED ORAL at 21:33

## 2020-01-01 RX ADMIN — INSULIN LISPRO 2 UNITS: 100 INJECTION, SOLUTION INTRAVENOUS; SUBCUTANEOUS at 16:37

## 2020-01-01 RX ADMIN — METRONIDAZOLE 500 MG: 500 INJECTION, SOLUTION INTRAVENOUS at 12:28

## 2020-01-01 RX ADMIN — Medication 81 MG: at 08:24

## 2020-01-01 RX ADMIN — INSULIN LISPRO 3 UNITS: 100 INJECTION, SOLUTION INTRAVENOUS; SUBCUTANEOUS at 16:42

## 2020-01-01 RX ADMIN — DICLOFENAC SODIUM 2 G: 10 GEL TOPICAL at 12:15

## 2020-01-01 RX ADMIN — OXYCODONE AND ACETAMINOPHEN 1 TABLET: 5; 325 TABLET ORAL at 14:43

## 2020-01-01 RX ADMIN — SODIUM CHLORIDE 1000 ML: 9 INJECTION, SOLUTION INTRAVENOUS at 00:51

## 2020-01-01 RX ADMIN — INSULIN GLARGINE 50 UNITS: 100 INJECTION, SOLUTION SUBCUTANEOUS at 21:50

## 2020-01-01 RX ADMIN — GABAPENTIN 100 MG: 100 CAPSULE ORAL at 21:49

## 2020-01-01 RX ADMIN — PROPOFOL 25 MCG/KG/MIN: 10 INJECTION, EMULSION INTRAVENOUS at 12:22

## 2020-01-01 RX ADMIN — DICLOFENAC SODIUM 2 G: 10 GEL TOPICAL at 08:25

## 2020-01-01 RX ADMIN — INSULIN LISPRO 20 UNITS: 100 INJECTION, SOLUTION INTRAVENOUS; SUBCUTANEOUS at 11:29

## 2020-01-01 RX ADMIN — SODIUM CHLORIDE 75 ML/HR: 9 INJECTION, SOLUTION INTRAVENOUS at 11:32

## 2020-01-01 RX ADMIN — HYDRALAZINE HYDROCHLORIDE 100 MG: 50 TABLET, FILM COATED ORAL at 08:24

## 2020-01-01 RX ADMIN — PROPOFOL 25 MG: 10 INJECTION, EMULSION INTRAVENOUS at 12:29

## 2020-01-01 RX ADMIN — PROPOFOL 25 MG: 10 INJECTION, EMULSION INTRAVENOUS at 12:35

## 2020-01-01 RX ADMIN — INSULIN GLARGINE 50 UNITS: 100 INJECTION, SOLUTION SUBCUTANEOUS at 00:52

## 2020-01-01 RX ADMIN — INSULIN LISPRO 2 UNITS: 100 INJECTION, SOLUTION INTRAVENOUS; SUBCUTANEOUS at 07:09

## 2020-01-01 RX ADMIN — OXYCODONE AND ACETAMINOPHEN 1 TABLET: 5; 325 TABLET ORAL at 07:07

## 2020-01-01 RX ADMIN — GABAPENTIN 100 MG: 100 CAPSULE ORAL at 16:36

## 2020-01-01 RX ADMIN — INSULIN LISPRO 20 UNITS: 100 INJECTION, SOLUTION INTRAVENOUS; SUBCUTANEOUS at 11:39

## 2020-01-01 RX ADMIN — ACETAMINOPHEN 500 MG: 500 TABLET ORAL at 21:49

## 2020-01-01 RX ADMIN — OXYCODONE AND ACETAMINOPHEN 1 TABLET: 5; 325 TABLET ORAL at 23:11

## 2020-01-01 RX ADMIN — DICLOFENAC SODIUM 2 G: 10 GEL TOPICAL at 21:51

## 2020-01-01 RX ADMIN — HYDRALAZINE HYDROCHLORIDE 100 MG: 50 TABLET, FILM COATED ORAL at 21:49

## 2020-01-01 RX ADMIN — GABAPENTIN 100 MG: 100 CAPSULE ORAL at 22:04

## 2020-01-01 RX ADMIN — GABAPENTIN 100 MG: 100 CAPSULE ORAL at 10:15

## 2020-01-01 RX ADMIN — OXYCODONE AND ACETAMINOPHEN 1 TABLET: 5; 325 TABLET ORAL at 14:28

## 2020-01-01 RX ADMIN — ACETAMINOPHEN 500 MG: 500 TABLET ORAL at 11:56

## 2020-01-01 RX ADMIN — HYDRALAZINE HYDROCHLORIDE 100 MG: 50 TABLET, FILM COATED ORAL at 10:15

## 2020-01-01 RX ADMIN — HEPARIN SODIUM 5000 UNITS: 5000 INJECTION INTRAVENOUS; SUBCUTANEOUS at 22:04

## 2020-01-01 RX ADMIN — ACETAMINOPHEN 500 MG: 500 TABLET ORAL at 00:59

## 2020-01-01 RX ADMIN — SODIUM CHLORIDE 75 ML/HR: 9 INJECTION, SOLUTION INTRAVENOUS at 13:44

## 2020-01-01 RX ADMIN — INSULIN LISPRO 20 UNITS: 100 INJECTION, SOLUTION INTRAVENOUS; SUBCUTANEOUS at 07:08

## 2020-01-01 RX ADMIN — GABAPENTIN 100 MG: 100 CAPSULE ORAL at 08:24

## 2020-01-01 RX ADMIN — GABAPENTIN 100 MG: 100 CAPSULE ORAL at 16:41

## 2020-01-01 RX ADMIN — OXYCODONE AND ACETAMINOPHEN 1 TABLET: 5; 325 TABLET ORAL at 08:02

## 2020-01-01 RX ADMIN — INSULIN LISPRO 20 UNITS: 100 INJECTION, SOLUTION INTRAVENOUS; SUBCUTANEOUS at 07:38

## 2020-01-01 RX ADMIN — BICALUTAMIDE 50 MG: 50 TABLET ORAL at 11:30

## 2020-01-01 RX ADMIN — HYDRALAZINE HYDROCHLORIDE 100 MG: 50 TABLET, FILM COATED ORAL at 16:36

## 2020-01-01 RX ADMIN — SODIUM CHLORIDE 75 ML/HR: 9 INJECTION, SOLUTION INTRAVENOUS at 10:07

## 2020-01-01 RX ADMIN — ACETAMINOPHEN 650 MG: 325 TABLET ORAL at 04:09

## 2020-01-01 RX ADMIN — GABAPENTIN 100 MG: 100 CAPSULE ORAL at 21:18

## 2020-01-01 RX ADMIN — INSULIN LISPRO 7 UNITS: 100 INJECTION, SOLUTION INTRAVENOUS; SUBCUTANEOUS at 22:04

## 2020-01-01 RX ADMIN — INSULIN LISPRO 3 UNITS: 100 INJECTION, SOLUTION INTRAVENOUS; SUBCUTANEOUS at 21:49

## 2020-01-01 RX ADMIN — ACETAMINOPHEN 500 MG: 500 TABLET ORAL at 10:15

## 2020-01-01 RX ADMIN — METRONIDAZOLE 500 MG: 500 INJECTION, SOLUTION INTRAVENOUS at 23:14

## 2020-01-01 RX ADMIN — DAPTOMYCIN 750 MG: 500 INJECTION, POWDER, LYOPHILIZED, FOR SOLUTION INTRAVENOUS at 20:27

## 2020-01-01 RX ADMIN — DICLOFENAC SODIUM 2 G: 10 GEL TOPICAL at 14:40

## 2020-01-01 RX ADMIN — INSULIN GLARGINE 50 UNITS: 100 INJECTION, SOLUTION SUBCUTANEOUS at 21:37

## 2020-01-01 RX ADMIN — PIPERACILLIN AND TAZOBACTAM 3.38 G: 3; .375 INJECTION, POWDER, LYOPHILIZED, FOR SOLUTION INTRAVENOUS at 16:41

## 2020-01-01 RX ADMIN — GABAPENTIN 100 MG: 100 CAPSULE ORAL at 21:33

## 2020-01-01 RX ADMIN — OXYCODONE AND ACETAMINOPHEN 1 TABLET: 5; 325 TABLET ORAL at 18:58

## 2020-01-01 RX ADMIN — CEFEPIME HYDROCHLORIDE 1 G: 1 INJECTION, POWDER, FOR SOLUTION INTRAMUSCULAR; INTRAVENOUS at 11:31

## 2020-01-01 RX ADMIN — HEPARIN SODIUM 5000 UNITS: 5000 INJECTION INTRAVENOUS; SUBCUTANEOUS at 04:15

## 2020-01-01 RX ADMIN — DAPTOMYCIN 750 MG: 500 INJECTION, POWDER, LYOPHILIZED, FOR SOLUTION INTRAVENOUS at 21:21

## 2020-01-01 RX ADMIN — CEFEPIME HYDROCHLORIDE 1 G: 1 INJECTION, POWDER, FOR SOLUTION INTRAMUSCULAR; INTRAVENOUS at 10:07

## 2020-01-01 RX ADMIN — METRONIDAZOLE 500 MG: 500 INJECTION, SOLUTION INTRAVENOUS at 00:52

## 2020-01-01 RX ADMIN — TAMSULOSIN HYDROCHLORIDE 0.4 MG: 0.4 CAPSULE ORAL at 10:15

## 2020-01-01 RX ADMIN — AMLODIPINE BESYLATE 5 MG: 5 TABLET ORAL at 08:24

## 2020-01-01 RX ADMIN — BICALUTAMIDE 50 MG: 50 TABLET ORAL at 08:24

## 2020-01-01 RX ADMIN — INSULIN GLARGINE 50 UNITS: 100 INJECTION, SOLUTION SUBCUTANEOUS at 21:31

## 2020-01-01 RX ADMIN — METRONIDAZOLE 500 MG: 500 INJECTION, SOLUTION INTRAVENOUS at 23:19

## 2020-01-01 RX ADMIN — OXYCODONE AND ACETAMINOPHEN 1 TABLET: 5; 325 TABLET ORAL at 20:23

## 2020-01-01 RX ADMIN — INSULIN LISPRO 20 UNITS: 100 INJECTION, SOLUTION INTRAVENOUS; SUBCUTANEOUS at 16:36

## 2020-01-01 RX ADMIN — MIDAZOLAM 1 MG: 1 INJECTION INTRAMUSCULAR; INTRAVENOUS at 12:17

## 2020-01-01 RX ADMIN — HYDRALAZINE HYDROCHLORIDE 100 MG: 50 TABLET, FILM COATED ORAL at 15:17

## 2020-01-01 RX ADMIN — INSULIN LISPRO 7 UNITS: 100 INJECTION, SOLUTION INTRAVENOUS; SUBCUTANEOUS at 07:38

## 2020-01-01 RX ADMIN — CEFEPIME HYDROCHLORIDE 2 G: 2 INJECTION, POWDER, FOR SOLUTION INTRAVENOUS at 10:14

## 2020-01-01 RX ADMIN — GABAPENTIN 100 MG: 100 CAPSULE ORAL at 15:17

## 2020-01-01 RX ADMIN — OXYCODONE AND ACETAMINOPHEN 1 TABLET: 5; 325 TABLET ORAL at 15:17

## 2020-01-01 RX ADMIN — INSULIN LISPRO 2 UNITS: 100 INJECTION, SOLUTION INTRAVENOUS; SUBCUTANEOUS at 11:39

## 2020-01-01 RX ADMIN — INSULIN LISPRO 2 UNITS: 100 INJECTION, SOLUTION INTRAVENOUS; SUBCUTANEOUS at 07:08

## 2020-01-01 RX ADMIN — DICLOFENAC SODIUM 2 G: 10 GEL TOPICAL at 17:02

## 2020-01-01 RX ADMIN — VANCOMYCIN HYDROCHLORIDE 1750 MG: 10 INJECTION, POWDER, LYOPHILIZED, FOR SOLUTION INTRAVENOUS at 18:00

## 2020-01-01 RX ADMIN — HYDRALAZINE HYDROCHLORIDE 100 MG: 50 TABLET, FILM COATED ORAL at 22:04

## 2020-01-01 RX ADMIN — MIDAZOLAM 1 MG: 1 INJECTION INTRAMUSCULAR; INTRAVENOUS at 12:15

## 2020-01-01 RX ADMIN — AMLODIPINE BESYLATE 5 MG: 5 TABLET ORAL at 10:15

## 2020-01-01 RX ADMIN — INSULIN LISPRO 20 UNITS: 100 INJECTION, SOLUTION INTRAVENOUS; SUBCUTANEOUS at 07:09

## 2020-01-01 RX ADMIN — OXYCODONE 5 MG: 5 TABLET ORAL at 19:13

## 2020-01-01 RX ADMIN — HYDRALAZINE HYDROCHLORIDE 100 MG: 50 TABLET, FILM COATED ORAL at 21:18

## 2020-01-01 RX ADMIN — FENTANYL CITRATE 50 MCG: 50 INJECTION, SOLUTION INTRAMUSCULAR; INTRAVENOUS at 12:15

## 2020-01-01 RX ADMIN — INSULIN LISPRO 20 UNITS: 100 INJECTION, SOLUTION INTRAVENOUS; SUBCUTANEOUS at 16:42

## 2020-01-01 RX ADMIN — TAMSULOSIN HYDROCHLORIDE 0.4 MG: 0.4 CAPSULE ORAL at 08:25

## 2020-01-01 RX ADMIN — INSULIN LISPRO 20 UNITS: 100 INJECTION, SOLUTION INTRAVENOUS; SUBCUTANEOUS at 00:52

## 2020-01-01 RX ADMIN — SODIUM CHLORIDE 75 ML/HR: 9 INJECTION, SOLUTION INTRAVENOUS at 11:41

## 2020-01-01 RX ADMIN — SODIUM CHLORIDE 75 ML/HR: 9 INJECTION, SOLUTION INTRAVENOUS at 21:34

## 2020-01-01 RX ADMIN — DICLOFENAC SODIUM 2 G: 10 GEL TOPICAL at 17:22

## 2020-01-01 RX ADMIN — FENTANYL CITRATE 50 MCG: 50 INJECTION, SOLUTION INTRAMUSCULAR; INTRAVENOUS at 12:20

## 2020-01-01 RX ADMIN — DICLOFENAC SODIUM 2 G: 10 GEL TOPICAL at 10:43

## 2020-01-01 RX ADMIN — CEFTRIAXONE SODIUM 1 G: 1 INJECTION, POWDER, FOR SOLUTION INTRAMUSCULAR; INTRAVENOUS at 00:51

## 2020-01-01 RX ADMIN — SODIUM CHLORIDE: 900 INJECTION, SOLUTION INTRAVENOUS at 12:13

## 2020-01-01 RX ADMIN — DICLOFENAC SODIUM 2 G: 10 GEL TOPICAL at 21:33

## 2020-01-01 RX ADMIN — METRONIDAZOLE 500 MG: 500 INJECTION, SOLUTION INTRAVENOUS at 11:31

## 2020-01-01 RX ADMIN — INSULIN LISPRO 20 UNITS: 100 INJECTION, SOLUTION INTRAVENOUS; SUBCUTANEOUS at 17:02

## 2020-01-07 ENCOUNTER — OFFICE VISIT (OUTPATIENT)
Dept: INTERNAL MEDICINE CLINIC | Age: 70
End: 2020-01-07

## 2020-01-07 VITALS
TEMPERATURE: 98.1 F | RESPIRATION RATE: 16 BRPM | HEART RATE: 90 BPM | OXYGEN SATURATION: 98 % | DIASTOLIC BLOOD PRESSURE: 68 MMHG | HEIGHT: 73 IN | BODY MASS INDEX: 36.28 KG/M2 | SYSTOLIC BLOOD PRESSURE: 140 MMHG

## 2020-01-07 DIAGNOSIS — D63.1 ANEMIA DUE TO STAGE 3 CHRONIC KIDNEY DISEASE (HCC): ICD-10-CM

## 2020-01-07 DIAGNOSIS — C61 MALIGNANT NEOPLASM OF PROSTATE (HCC): ICD-10-CM

## 2020-01-07 DIAGNOSIS — E11.21 TYPE 2 DIABETES WITH NEPHROPATHY (HCC): Primary | ICD-10-CM

## 2020-01-07 DIAGNOSIS — E11.42 DIABETIC POLYNEUROPATHY ASSOCIATED WITH TYPE 2 DIABETES MELLITUS (HCC): ICD-10-CM

## 2020-01-07 DIAGNOSIS — L29.9 ITCHING WITH IRRITATION: ICD-10-CM

## 2020-01-07 DIAGNOSIS — N18.30 ANEMIA DUE TO STAGE 3 CHRONIC KIDNEY DISEASE (HCC): ICD-10-CM

## 2020-01-07 LAB
GLUCOSE POC: >444 MG/DL
HBA1C MFR BLD HPLC: 8.1 %

## 2020-01-07 RX ORDER — HYDROXYZINE 50 MG/1
50 TABLET, FILM COATED ORAL
Qty: 60 TAB | Refills: 3 | Status: SHIPPED | OUTPATIENT
Start: 2020-01-07 | End: 2020-01-17

## 2020-01-07 NOTE — PROGRESS NOTES
Chief Complaint   Patient presents with    Diabetes    Wrist Pain     3 most recent PHQ Screens 2/20/2019   Little interest or pleasure in doing things Not at all   Feeling down, depressed, irritable, or hopeless Not at all   Total Score PHQ 2 0     1. Have you been to the ER, urgent care clinic since your last visit? Hospitalized since your last visit?no    2. Have you seen or consulted any other health care providers outside of the [address] [place] [address] since your last visit? Include any pap smears or colon screening.  No

## 2020-01-07 NOTE — PATIENT INSTRUCTIONS
[employer] Activation    Thank you for requesting access to [employer]. Please follow the instructions below to securely access and download your online medical record. [employer] allows you to send messages to your doctor, view your test results, renew your prescriptions, schedule appointments, and more. How Do I Sign Up? 1. In your internet browser, go to [email or web address]  2. Click on the First Time User? Click Here link in the Sign In box. You will be redirect to the New Member Sign Up page. 3. Enter your [employer] Access Code exactly as it appears below. You will not need to use this code after youve completed the sign-up process. If you do not sign up before the expiration date, you must request a new code. [employer] Access Code: [email or web address]  Expires: 2020  1:11 PM (This is the date your [employer] access code will )    4. Enter the last four digits of your Social Security Number (xxxx) and Date of Birth (mm/dd/yyyy) as indicated and click Submit. You will be taken to the next sign-up page. 5. Create a [employer] ID. This will be your [employer] login ID and cannot be changed, so think of one that is secure and easy to remember. 6. Create a [employer] password. You can change your password at any time. 7. Enter your Password Reset Question and Answer. This can be used at a later time if you forget your password. 8. Enter your e-mail address. You will receive e-mail notification when new information is available in [address]. 9. Click Sign Up. You can now view and download portions of your medical record. 10. Click the Download Summary menu link to download a portable copy of your medical information. Additional Information    If you have questions, please visit the Frequently Asked Questions section of the [employer] website at [email or web address] Remember, [employer] is NOT to be used for urgent needs. For medical emergencies, dial 911.

## 2020-01-07 NOTE — PROGRESS NOTES
Rolf Jules is a 77 y.o. male and presents with Diabetes and Wrist Pain    Subjective:    Hypertension Review:  The patient has essential hypertension  Diet and Lifestyle: generally follows a  low sodium diet, exercises sporadically  Home BP Monitoring: is not measured at home. Pertinent ROS: taking medications as instructed, no medication side effects noted, no TIA's, no chest pain on exertion, no dyspnea on exertion, no swelling of ankles. Diabetes Mellitus Review:  He has diabetes mellitus. Diabetic ROS - medication compliance: noncompliant all of the time, diabetic diet noncompliance: compliant all of the time, home glucose monitoring: is NOT performed. Known diabetic complications: none  Cardiovascular risk factors: family history, dyslipidemia, diabetes mellitus, obesity, hypertension  Current diabetic medications include oral agents/insulin   Eye exam current (within one year): no  Weight trend: stable  Prior visit with dietician: no  Current diet: \"healthy\" diet  in general  Current exercise: walking  Current monitoring regimen: home blood tests - daily  Home blood sugar records: trend: stable  Any episodes of hypoglycemia? no  Is He on ACE inhibitor or angiotensin II receptor blocker? Yes     Anemia review:  Patient presents for follow up  evaluation of an anemia. Anemia was found by routine CBC. It had been present for several months. Associated signs & symptoms:none  The  most recent studies were improved  His anemia is felt due to CKD. 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 He states he has not been  treatment for this at this at . Sleep apnea Review: There is a history of excessive daytime fatigue with associated excessive snoring at night. There is also a history of periods of apnea at night as witnessed by a partner and family  The patient is on CPAP.     He reports recurrent itching    Review of Systems  Constitutional: negative for fevers, chills, anorexia and weight loss  Eyes:   negative for visual disturbance and irritation  ENT:   negative for tinnitus,sore throat,nasal congestion,ear pains. hoarseness  Respiratory:   NO cough,,   CV:    palpitations, lower extremity edema  GI:   negative for nausea, vomiting, diarrhea, abdominal pain,melena  Endo:               negative for polyuria,polydipsia,polyphagia,heat intolerance  Genitourinary: negative for frequency, dysuria and hematuria  Integument:  negative for rash and pruritus  Hematologic:  negative for easy bruising and gum/nose bleeding  Musculoskel: negative for myalgias, arthralgias, back pain, muscle weakness, joint pain  Neurological:  negative for headaches, dizziness, vertigo, memory problems and gait   Behavl/Psych: negative for feelings of anxiety, depression, mood changes    Past Medical History:   Diagnosis Date    Arthritis, Degenerative,  Knee 4/4/2011    Carcinoma, Prostate 4/4/2011    Degenerative disc disease 4/4/2011    Depression/ Anxiety 4/4/2011    Diabetes ([address]     Diabetes Mellitrus ( non-insulin dependent ) Type 2 4/4/2011    HEMATOCHEZIA 4/4/2011    Hypertrension 4/4/2011    Hypertriglyceridemia 4/4/2011    Insomnia 4/4/2011    Laryngitis 4/4/2011    Mild Aortic insufficiency 4/4/2011    Mild Concentric LVH (left ventricular hypertrophy) 4/4/2011    Neuropathy 4/4/2011    Osteoarthritis 4/4/2011    Rotator Cuff Tendonitis 4/4/2011     Past Surgical History:   Procedure Laterality Date    CARDIAC SURG PROCEDURE UNLIST      cardiac cath    COLONOSCOPY N/A 4/28/2017    COLON[facility]COPY [facility]    HX ORTHOPAEDIC      left hip pinning    HX OTHER SURGICAL      left little toe amputation    HX UROLOGICAL       Social History     Socioeconomic History    Marital status: LEGALLY      Spouse name: Not on file    Number of children: Not on file    Years of education: Not on file    Highest education level: Not on file Tobacco Use    Smoking status: Current Every Day Smoker     Packs/day: 0.25     Last attempt to quit: 10/25/2016     Years since quitting: 3.2    Smokeless tobacco: Never Used   Substance and Sexual Activity    Alcohol use: Yes     Alcohol/week: 11.7 standard drinks     Types: 7 Cans of beer, 7 Shots of liquor per week    Sexual activity: Yes     Partners: Female     No family history on file. Current Outpatient Medications   Medication Sig Dispense Refill    tamsulosin (FLOMAX) 0.4 mg capsule Take 1 Cap by mouth daily. 30 Cap 3    ferrous sulfate (IRON) 325 mg (65 mg iron) EC tablet Take 1 Tab by mouth Daily (before breakfast). 30 Tab 6    amLODIPine (NORVASC) 10 mg tablet Take 1 Tab by mouth daily. 30 Tab 12    bumetanide (BUMEX) 2 mg tablet Take 2 mg by mouth daily.  bisacodyl (DULCOLAX, BISACODYL,) 10 mg suppository Insert 10 mg into rectum daily.  insulin aspart U-100 (NOVOLOG FLEXPEN U-100 INSULIN) 100 unit/mL (3 mL) inpn by SubCUTAneous route.  Insulin Needles, Disposable, (NOVOFINE 32) 32 gauge x 1/4\" ndle Sig:use 4 times a day as needed 100 Pen Needle 12    pregabalin (LYRICA) 150 mg capsule Take 1 Cap by mouth two (2) times a day. Max Daily Amount: 300 mg. 60 Cap 3    NOVOLIN R REGULAR U-100 INSULN 100 unit/mL injection INJECT 14 UNITS UNDER THE SKIN THREE TIMES A DAY WITH MEALS AS NEEDED 10 Vial 11    hydrALAZINE (APRESOLINE) 50 mg tablet TAKE ONE TABLET BY MOUTH THREE TIMES A DAY 90 Tab 0    fluticasone propionate (FLONASE) 50 mcg/actuation nasal spray SPRAY TWO SPRAYS IN THE AFFECTED NOSTRIL DAILY 1 Bottle 10    Insulin Syringe-Needle U-100 (BD INSULIN SYRINGE ULTRA-FINE) 1 mL 31 gauge x 5/16 syrg USE TO INJECT INSULIN FIVE TIMES A  Syringe 10    insulin regular (NOVOLIN R, HUMULIN R) 100 unit/mL injection 7 Units by SubCUTAneous route three (3) times daily as needed (with meals).  1 Vial 12    insulin glargine (LANTUS U-100 INSULIN) 100 unit/mL injection 45 Units by SubCUTAneous route two (2) times a day. 6 Vial 10    aspirin delayed-release 81 mg tablet Take 81 mg by mouth daily.  hydrocolloid dressing (DUODERM HYDROACTIVE) topical gel Apply  to affected area daily. 30 Tube 0    triamcinolone acetonide (KENALOG) 0.1 % ointment Apply  to affected area two (2) times a day. use thin layer bid 80 g 12    lidocaine (LIDODERM) 5 % 1 Patch by TransDERmal route every twenty-four (24) hours. Apply to affected area every 24 hours 1 Package 3    ammonium lactate (LAC-HYDRIN FIVE) 5 % lotion Apply daily (Patient taking differently: Apply 1 Applicator to affected area daily. Apply daily bilateral lower legs ) 222 mL 3    acetaminophen (PAIN AND FEVER) 500 mg tablet TAKE ONE TABLET BY MOUTH EVERY 6 HOURS AS NEEDED FOR PAIN 60 Tab 2    polyethylene glycol (MIRALAX) 17 gram/dose powder Take 17 g by mouth daily. (Patient not taking: Reported on 11/13/2019) 850 g 3    Calcium Carbonate-Vit D3-Min 600 mg calcium- 400 unit tab Take 1 Tab by mouth two (2) times a day.        No Known Allergies    Objective:  Visit Vitals  /68 (BP 1 Location: Right arm, BP Patient Position: Sitting)   Pulse 90   Temp 98.1 °F (36.7 °C) (Oral)   Resp 16   Ht 6' 1\" (1.854 m)   SpO2 98%   BMI 36.28 kg/m²     Physical Exam:   General appearance - alert, well appearing, and in no distress  Mental status - alert, oriented to person, place, and time  EYE-LUH, EOMI, corneas normal, no foreign bodies  ENT-ENT exam normal, no neck nodes or sinus tenderness  Nose - normal and patent, no erythema, discharge or polyps  Mouth - mucous membranes moist, pharynx normal without lesions  Neck - supple, no significant adenopathy   Chest - clear to auscultation, no wheezes, rales or rhonchi, symmetric air entry   Heart - normal rate, regular rhythm, normal S1, S2, no murmurs, rubs, clicks or gallops   Abdomen - soft, nontender, nondistended, no masses or organomegaly  Lymph- no adenopathy palpable  Ext-peripheral pulses normal, 2+lower leg edema, no clubbing or cyanosis  Skin-no flaking noted  Neuro -alert, oriented, normal speech, no focal findings or movement disorder noted  Neck-normal C-spine, no tenderness, full ROM without pain  Feet- nail deformities or callus formation with good pulses noted      Results for orders placed or performed in visit on 01/07/20   AMB POC GLUCOSE BLOOD, BY GLUCOSE MONITORING DEVICE   Result Value Ref Range    Glucose POC >444 mg/dL   AMB POC HEMOGLOBIN A1C   Result Value Ref Range    Hemoglobin A1c (POC) 8.1 %       Assessment/Plan:    ICD-10-CM ICD-9-CM    1. Type 2 diabetes with nephropathy (HCC) E11.21 250.40 AMB POC GLUCOSE BLOOD, BY GLUCOSE MONITORING DEVICE     583.81 AMB POC HEMOGLOBIN A1C     Orders Placed This Encounter    AMB POC GLUCOSE BLOOD, BY GLUCOSE MONITORING DEVICE    AMB POC HEMOGLOBIN A1C     lose weight, increase physical activity, follow low fat diet, follow low salt diet, continue present plan,Take 81mg aspirin daily      There are no Patient Instructions on file for this visit. I have reviewed with the patient details of the assessment and plan and all questions were answered. Relevent patient education was performed. The most recent lab findings were reviewed with the patient. An After Visit Summary was printed and given to the patient.

## 2020-02-04 ENCOUNTER — OFFICE VISIT (OUTPATIENT)
Dept: INTERNAL MEDICINE CLINIC | Age: 70
End: 2020-02-04

## 2020-02-04 VITALS
RESPIRATION RATE: 16 BRPM | DIASTOLIC BLOOD PRESSURE: 60 MMHG | OXYGEN SATURATION: 95 % | TEMPERATURE: 98.5 F | HEART RATE: 71 BPM | BODY MASS INDEX: 36.28 KG/M2 | HEIGHT: 73 IN | SYSTOLIC BLOOD PRESSURE: 114 MMHG

## 2020-02-04 DIAGNOSIS — N18.30 ANEMIA DUE TO STAGE 3 CHRONIC KIDNEY DISEASE (HCC): ICD-10-CM

## 2020-02-04 DIAGNOSIS — E11.21 TYPE 2 DIABETES WITH NEPHROPATHY (HCC): Primary | ICD-10-CM

## 2020-02-04 DIAGNOSIS — I10 ESSENTIAL HYPERTENSION: ICD-10-CM

## 2020-02-04 DIAGNOSIS — D63.1 ANEMIA DUE TO STAGE 3 CHRONIC KIDNEY DISEASE (HCC): ICD-10-CM

## 2020-02-04 DIAGNOSIS — L21.9 SEBORRHEIC ECZEMA OF SCALP: ICD-10-CM

## 2020-02-04 LAB
CHOLEST SERPL-MCNC: 139 MG/DL
GLUCOSE POC: 311 MG/DL
HDLC SERPL-MCNC: 23 MG/DL
HGB BLD-MCNC: 6.5 G/DL
LDL CHOLESTEROL POC: 42 MG/DL
NON-HDL CHOLESTEROL, 011976: 116
TCHOL/HDL RATIO (POC): 6.1
TRIGL SERPL-MCNC: 371 MG/DL

## 2020-02-04 RX ORDER — KETOCONAZOLE 20 MG/ML
SHAMPOO TOPICAL
Qty: 120 ML | Refills: 3 | Status: SHIPPED | OUTPATIENT
Start: 2020-02-04

## 2020-02-04 RX ORDER — KETOCONAZOLE 20 MG/G
CREAM TOPICAL 2 TIMES DAILY
Qty: 15 G | Refills: 0 | Status: SHIPPED | OUTPATIENT
Start: 2020-02-04

## 2020-02-04 NOTE — PATIENT INSTRUCTIONS
[facility] Activation    Thank you for requesting access to [facility]. Please follow the instructions below to securely access and download your online medical record. [facility] allows you to send messages to your doctor, view your test results, renew your prescriptions, schedule appointments, and more. How Do I Sign Up? 1. In your internet browser, go to [email or web address]  2. Click on the First Time User? Click Here link in the Sign In box. You will be redirect to the New Member Sign Up page. 3. Enter your [facility] Access Code exactly as it appears below. You will not need to use this code after youve completed the sign-up process. If you do not sign up before the expiration date, you must request a new code. [facility] Access Code: [email or web address]  Expires: 2020  1:11 PM (This is the date your [facility] access code will )    4. Enter the last four digits of your Social Security Number (xxxx) and Date of Birth (mm/dd/yyyy) as indicated and click Submit. You will be taken to the next sign-up page. 5. Create a [facility] ID. This will be your [facility] login ID and cannot be changed, so think of one that is secure and easy to remember. 6. Create a [facility] password. You can change your password at any time. 7. Enter your Password Reset Question and Answer. This can be used at a later time if you forget your password. 8. Enter your e-mail address. You will receive e-mail notification when new information is available in [id number]. 9. Click Sign Up. You can now view and download portions of your medical record. 10. Click the Download Summary menu link to download a portable copy of your medical information. Additional Information    If you have questions, please visit the Frequently Asked Questions section of the [facility] website at [email or web address] Remember, [facility] is NOT to be used for urgent needs. For medical emergencies, dial 911.

## 2020-02-04 NOTE — PROGRESS NOTES
Ellie Darnell is a 77 y.o. male and presents with Diabetes; Anemia; Allergies; Annual Wellness Visit; Diabetic Foot Exam; and Itching  . Subjective:    He has flaking in the face and scalp. Hypertension Review:  The patient has essential hypertension  Diet and Lifestyle: generally follows a  low sodium diet, exercises sporadically  Home BP Monitoring: is not measured at home. Pertinent ROS: taking medications as instructed, no medication side effects noted, no TIA's, no chest pain on exertion, no dyspnea on exertion, no swelling of ankles. Diabetes Mellitus Review:  He has diabetes mellitus. Diabetic ROS - medication compliance: noncompliant all of the time, diabetic diet noncompliance: compliant all of the time, home glucose monitoring: is NOT performed. Known diabetic complications: none  Cardiovascular risk factors: family history, dyslipidemia, diabetes mellitus, obesity, hypertension  Current diabetic medications include oral agents/insulin   Eye exam current (within one year): no  Weight trend: stable  Prior visit with dietician: no  Current diet: \"healthy\" diet  in general  Current exercise: walking  Current monitoring regimen: home blood tests - daily  Home blood sugar records: trend: stable  Any episodes of hypoglycemia? no  Is He on ACE inhibitor or angiotensin II receptor blocker? Yes     Anemia review:  Patient presents for follow up  evaluation of an anemia. Anemia was found by routine CBC. It had been present for several months. Associated signs & symptoms:none  The  most recent studies were improved  His anemia is felt due to CKD. 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 Sleep apnea Review: There is a history of excessive daytime fatigue with associated excessive snoring at night. There is also a history of periods of apnea at night as witnessed by a partner and family  The patient is on CPAP.         Ellie Darnell is a 77 y.o. male and presents for annual Medicare Wellness Visit. Problem List: Reviewed with patient and discussed risk factors. Patient Active Problem List   Diagnosis Code    Diabetic polyneuropathy ([facility] E11.42    Rotator Cuff Tendonitis M71.9, M67.919    Diabetes mellitus type 2, controlled ([facility] E11.9    Degenerative disc disease [id number]    Osteoarthrosis involving lower leg M17.10    Depression/ Anxiety F34.1    HTN (hypertension) I10    Chronic hip pain M25.559, G89.29    Hypertriglyceridemia E78.1    Mild Aortic Insufficiency I35.1    Mild Concentric LVH (left ventricular hypertrophy) I51.7    S/P hip replacement, right Z96.641    Stage 3 chronic kidney disease (HCC) N18.3    Prostate CA (HCC) C61    Type 2 diabetes with nephropathy (HCC) E11.21    Severe obesity (BMI 35.0-39. 9) with comorbidity (HCC) E66.01    Acute on chronic renal failure (HCC) N17.9, N18.9    KATHI (acute kidney injury) ([facility] N17.9       Current medical providers:  Patient Care Team:  Abel Knapp MD as PCP - General (Internal Medicine)  Abel Knapp MD as PCP - [facility] Provider    PSH: Reviewed with patient  Past Surgical History:   Procedure Laterality Date    CARDIAC SURG PROCEDURE UNLIST      cardiac cath    COLONOSCOPY N/A 4/28/2017    COLONOSCOPY performed by Layne [facility]edezma [facility] at [facility] ENDOSCOPY    HX ORTHOPAEDIC      left hip pinning    HX OTHER SURGICAL      left little toe amputation    HX UROLOGICAL          SH: Reviewed with patient  Social History     Tobacco Use    Smoking status: Current Every Day Smoker     Packs/day: 0.25     Last attempt to quit: 10/25/2016     Years since quitting: 3.2    Smokeless tobacco: Never Used   Substance Use Topics    Alcohol use: Yes     Alcohol/week: 11.7 standard drinks     Types: 7 Cans of beer, 7 Shots of liquor per week    Drug use: Not on file       FH: Reviewed with patient  History reviewed. No pertinent family history.     Medications/Allergies: Reviewed with patient  Current Outpatient Medications on File Prior to Visit   Medication Sig Dispense Refill    tamsulosin (FLOMAX) 0.4 mg capsule Take 1 Cap by mouth daily. 30 Cap 3    ferrous sulfate (IRON) 325 mg (65 mg iron) EC tablet Take 1 Tab by mouth Daily (before breakfast). 30 Tab 6    amLODIPine (NORVASC) 10 mg tablet Take 1 Tab by mouth daily. 30 Tab 12    bumetanide (BUMEX) 2 mg tablet Take 2 mg by mouth daily.  bisacodyl (DULCOLAX, BISACODYL,) 10 mg suppository Insert 10 mg into rectum daily.  insulin aspart U-100 (NOVOLOG FLEXPEN U-100 INSULIN) 100 unit/mL (3 mL) inpn by SubCUTAneous route.  Insulin Needles, Disposable, (NOVOFINE 32) 32 gauge x 1/4\" ndle Sig:use 4 times a day as needed 100 Pen Needle 12    pregabalin (LYRICA) 150 mg capsule Take 1 Cap by mouth two (2) times a day. Max Daily Amount: 300 mg. 60 Cap 3    NOVOLIN R REGULAR U-100 INSULN 100 unit/mL injection INJECT 14 UNITS UNDER THE SKIN THREE TIMES A DAY WITH MEALS AS NEEDED 10 Vial 11    hydrALAZINE (APRESOLINE) 50 mg tablet TAKE ONE TABLET BY MOUTH THREE TIMES A DAY 90 Tab 0    fluticasone propionate (FLONASE) 50 mcg/actuation nasal spray SPRAY TWO SPRAYS IN THE AFFECTED NOSTRIL DAILY 1 Bottle 10    Insulin Syringe-Needle U-100 (BD INSULIN SYRINGE ULTRA-FINE) 1 mL 31 gauge x 5/16 syrg USE TO INJECT INSULIN FIVE TIMES A  Syringe 10    insulin regular (NOVOLIN R, HUMULIN R) 100 unit/mL injection 7 Units by SubCUTAneous route three (3) times daily as needed (with meals). 1 Vial 12    insulin glargine (LANTUS U-100 INSULIN) 100 unit/mL injection 45 Units by SubCUTAneous route two (2) times a day. 6 Vial 10    aspirin delayed-release 81 mg tablet Take 81 mg by mouth daily.  hydrocolloid dressing (DUODERM HYDROACTIVE) topical gel Apply  to affected area daily. 30 Tube 0    triamcinolone acetonide (KENALOG) 0.1 % ointment Apply  to affected area two (2) times a day.  use thin layer bid 80 g 12    lidocaine (LIDODERM) 5 % 1 Patch by TransDERmal route every twenty-four (24) hours. Apply to affected area every 24 hours 1 Package 3    ammonium lactate (LAC-HYDRIN FIVE) 5 % lotion Apply daily (Patient taking differently: Apply 1 Applicator to affected area daily. Apply daily bilateral lower legs ) 222 mL 3    acetaminophen (PAIN AND FEVER) 500 mg tablet TAKE ONE TABLET BY MOUTH EVERY 6 HOURS AS NEEDED FOR PAIN 60 Tab 2    Calcium Carbonate-Vit D3-Min 600 mg calcium- 400 unit tab Take 1 Tab by mouth two (2) times a day.  polyethylene glycol (MIRALAX) 17 gram/dose powder Take 17 g by mouth daily. (Patient not taking: Reported on 11/13/2019) 850 g 3     No current facility-administered medications on file prior to visit. No Known Allergies    Objective:  Visit Vitals  /60   Pulse 71   Temp 98.5 °F (36.9 °C) (Oral)   Resp 16   Ht 6' 1\" (1.854 m)   SpO2 95%   BMI 36.28 kg/m²    Body mass index is 36.28 kg/m². Assessment of cognitive impairment: Alert and oriented x 3    Depression Screen:   3 most recent PHQ Screens 1/7/2020   Little interest or pleasure in doing things Not at all   Feeling down, depressed, irritable, or hopeless Not at all   Total Score PHQ 2 0     Depression Review:  Patient is seen for screen of depression,denies anhedonia, weight gain, insomnia, hypersomnia, psychomotor agitation, psychomotor retardation, fatigue, feelings of worthlessness/guilt, difficulty concentrating, hopelessness, impaired memory and recurrent thoughts of death Treatment includes no medication   She denies recurrent thoughts of death and suicidal thoughts without plan. Fall Risk Assessment:    Fall Risk Assessment, last 12 mths 2/4/2020   Able to walk? No   Fall in past 12 months? -   Fall with injury? -   Number of falls in past 12 months -   Fall Risk Score -       Functional Ability:   Does the patient exhibit a steady gait?  no   How long did it take the patient to get up and walk from a sitting position? seconds   Is the patient self reliant?  (ie can do own laundry, meals, household chores)  Unable to perform     Does the patient handle his/her own medications? yes     Does the patient handle his/her own money? yes     Is the patients home safe (ie good lighting, handrails on stairs and bath, etc.)? yes     Did you notice or did patient express any hearing difficulties? no     Did you notice or did patient express any vision difficulties?   no     Were distance and reading eye charts used? no       Advance Care Planning:   Patient was offered the opportunity to discuss advance care planning:  yes     Does patient have an Advance Directive:  no   If no, did you provide information on Caring Connections? yes       Plan:      Orders Placed This Encounter    AMB POC GLUCOSE BLOOD, BY GLUCOSE MONITORING DEVICE    AMB POC HEMOGLOBIN (HGB)    AMB POC LIPID PROFILE       Health Maintenance   Topic Date Due    GLAUCOMA SCREENING Q2Y  04/21/2016    MICROALBUMIN Q1  10/08/2016    EYE EXAM RETINAL OR DILATED  03/11/2017    FOBT Q 1 YEAR AGE 50-75  07/06/2018    LIPID PANEL Q1  06/27/2019    Influenza Age 5 to Adult  08/01/2019    Pneumococcal 65+ years (1 of 1 - PPSV23) 09/11/2019    Shingrix Vaccine Age 50> (1 of 2) 02/18/2020 (Originally 4/21/2001)    HEMOGLOBIN A1C Q6M  07/07/2020    MEDICARE YEARLY EXAM  02/04/2021    FOOT EXAM Q1  02/04/2021    DTaP/Tdap/Td series (3 - Td) 09/05/2028    Hepatitis C Screening  Addressed       *Patient verbalized understanding and agreement with the plan. A copy of the After Visit Summary with personalized health plan was given to the patient today. Review of Systems  Constitutional: negative for fevers, chills, anorexia and weight loss  Eyes:   negative for visual disturbance and irritation  ENT:   negative for tinnitus,sore throat,nasal congestion,ear pains. hoarseness  Respiratory:  negative for cough, hemoptysis, dyspnea,wheezing  CV:   negative for chest pain, palpitations, lower extremity edema  GI:   negative for nausea, vomiting, diarrhea, abdominal pain,melena  Endo:               negative for polyuria,polydipsia,polyphagia,heat intolerance  Genitourinary: negative for frequency, dysuria and hematuria  Integument:   rash and pruritus  Hematologic:  negative for easy bruising and gum/nose bleeding  Musculoskel: myalgias, arthralgias,muscle weakness, joint pain  Neurological:  negative for headaches, dizziness, vertigo, memory problems and gait   Behavl/Psych: negative for feelings of anxiety, depression, mood changes    Past Medical History:   Diagnosis Date    Arthritis, Degenerative,  Knee 4/4/2011    Carcinoma, Prostate 4/4/2011    Degenerative disc disease 4/4/2011    Depression/ Anxiety 4/4/2011    Diabetes ([facility]     Diabetes Mellitrus ( non-insulin dependent ) Type 2 4/4/2011    HEMATOCHEZIA 4/4/2011    Hypertrension 4/4/2011    Hypertriglyceridemia 4/4/2011    Insomnia 4/4/2011    Laryngitis 4/4/2011    Mild Aortic insufficiency 4/4/2011    Mild Concentric LVH (left ventricular hypertrophy) 4/4/2011    Neuropathy 4/4/2011    Osteoarthritis 4/4/2011    Rotator Cuff Tendonitis 4/4/2011     Past Surgical History:   Procedure Laterality Date    CARDIAC SURG PROCEDURE UNLIST      cardiac cath    COLONOSCOPY N/A 4/28/2017    COLON[facility]COPY performed by [facility] Starr [facility] at [facility] ENDOSCOPY    HX ORTHOPAEDIC      left hip pinning    HX OTHER SURGICAL      left little toe amputation    HX UROLOGICAL       Social History     Socioeconomic History    Marital status: LEGALLY      Spouse name: Not on file    Number of children: Not on file    Years of education: Not on file    Highest education level: Not on file   Tobacco Use    Smoking status: Current Every Day Smoker     Packs/day: 0.25     Last attempt to quit: 10/25/2016     Years since quitting: 3.2    Smokeless tobacco: Never Used   Substance and Sexual Activity    Alcohol use: Yes     Alcohol/week: 11.7 standard drinks     Types: 7 Cans of beer, 7 Shots of liquor per week    Sexual activity: Yes     Partners: Female     History reviewed. No pertinent family history. Current Outpatient Medications   Medication Sig Dispense Refill    tamsulosin (FLOMAX) 0.4 mg capsule Take 1 Cap by mouth daily. 30 Cap 3    ferrous sulfate (IRON) 325 mg (65 mg iron) EC tablet Take 1 Tab by mouth Daily (before breakfast). 30 Tab 6    amLODIPine (NORVASC) 10 mg tablet Take 1 Tab by mouth daily. 30 Tab 12    bumetanide (BUMEX) 2 mg tablet Take 2 mg by mouth daily.  bisacodyl (DULCOLAX, BISACODYL,) 10 mg suppository Insert 10 mg into rectum daily.  insulin aspart U-100 (NOVOLOG FLEXPEN U-100 INSULIN) 100 unit/mL (3 mL) inpn by SubCUTAneous route.  Insulin Needles, Disposable, (NOVOFINE 32) 32 gauge x 1/4\" ndle Sig:use 4 times a day as needed 100 Pen Needle 12    pregabalin (LYRICA) 150 mg capsule Take 1 Cap by mouth two (2) times a day. Max Daily Amount: 300 mg. 60 Cap 3    NOVOLIN R REGULAR U-100 INSULN 100 unit/mL injection INJECT 14 UNITS UNDER THE SKIN THREE TIMES A DAY WITH MEALS AS NEEDED 10 Vial 11    hydrALAZINE (APRESOLINE) 50 mg tablet TAKE ONE TABLET BY MOUTH THREE TIMES A DAY 90 Tab 0    fluticasone propionate (FLONASE) 50 mcg/actuation nasal spray SPRAY TWO SPRAYS IN THE AFFECTED NOSTRIL DAILY 1 Bottle 10    Insulin Syringe-Needle U-100 (BD INSULIN SYRINGE ULTRA-FINE) 1 mL 31 gauge x 5/16 syrg USE TO INJECT INSULIN FIVE TIMES A  Syringe 10    insulin regular (NOVOLIN R, HUMULIN R) 100 unit/mL injection 7 Units by SubCUTAneous route three (3) times daily as needed (with meals). 1 Vial 12    insulin glargine (LANTUS U-100 INSULIN) 100 unit/mL injection 45 Units by SubCUTAneous route two (2) times a day. 6 Vial 10    aspirin delayed-release 81 mg tablet Take 81 mg by mouth daily.       hydrocolloid dressing (DUODERM HYDROACTIVE) topical gel Apply  to affected area daily. 30 Tube 0    triamcinolone acetonide (KENALOG) 0.1 % ointment Apply  to affected area two (2) times a day. use thin layer bid 80 g 12    lidocaine (LIDODERM) 5 % 1 Patch by TransDERmal route every twenty-four (24) hours. Apply to affected area every 24 hours 1 Package 3    ammonium lactate (LAC-HYDRIN FIVE) 5 % lotion Apply daily (Patient taking differently: Apply 1 Applicator to affected area daily. Apply daily bilateral lower legs ) 222 mL 3    acetaminophen (PAIN AND FEVER) 500 mg tablet TAKE ONE TABLET BY MOUTH EVERY 6 HOURS AS NEEDED FOR PAIN 60 Tab 2    Calcium Carbonate-Vit D3-Min 600 mg calcium- 400 unit tab Take 1 Tab by mouth two (2) times a day.  polyethylene glycol (MIRALAX) 17 gram/dose powder Take 17 g by mouth daily.  (Patient not taking: Reported on 11/13/2019) 850 g 3     No Known Allergies    Objective:  Visit Vitals  /60   Pulse 71   Temp 98.5 °F (36.9 °C) (Oral)   Resp 16   Ht 6' 1\" (1.854 m)   SpO2 95%   BMI 36.28 kg/m²     Physical Exam:   General appearance - alert, well appearing, and in no distress  Mental status - alert, oriented to person, place, and time  EYE-LUH, EOMI, corneas normal, no foreign bodies  ENT-ENT exam normal, no neck nodes or sinus tenderness  Nose - normal and patent, no erythema, discharge or polyps  Mouth - mucous membranes moist, pharynx normal without lesions  Neck - supple, no significant adenopathy   Chest - clear to auscultation, no wheezes, rales or rhonchi, symmetric air entry   Heart - normal rate, regular rhythm, normal S1, S2, no murmurs, rubs, clicks or gallops   Abdomen - soft, nontender, nondistended, no masses or organomegaly  Lymph- no adenopathy palpable  Ext-peripheral pulses normal, no pedal edema, no clubbing or cyanosis  Skin-flaking on scalp  Neuro -alert, oriented, normal speech, unable to ambulate  Neck-normal C-spine, no tenderness, full ROM without pain  Feet-no nail deformities or callus formation with good pulses noted      Results for orders placed or performed in visit on 02/04/20   AMB POC GLUCOSE BLOOD, BY GLUCOSE MONITORING DEVICE   Result Value Ref Range    Glucose  mg/dL   AMB POC HEMOGLOBIN (HGB)   Result Value Ref Range    Hemoglobin (POC) 6.5    AMB POC LIPID PROFILE   Result Value Ref Range    Cholesterol (POC) 139     Triglycerides (POC) 371     HDL Cholesterol (POC) 23     Non-HDL Cholesterol 116     LDL Cholesterol (POC) 42 MG/DL    TChol/HDL Ratio (POC) 6.1        Assessment/Plan:    ICD-10-CM ICD-9-CM    1. Type 2 diabetes with nephropathy (HCC) E11.21 250.40 AMB POC GLUCOSE BLOOD, BY GLUCOSE MONITORING DEVICE     583.81    2. Anemia due to stage 3 chronic kidney disease (HCC) N18.3 285.21 AMB POC HEMOGLOBIN (HGB)    D63.1 585.3    3. Essential hypertension I10 401.9 AMB POC LIPID PROFILE   4. Seborrheic eczema of scalp L21.9 690.18      Orders Placed This Encounter    AMB POC GLUCOSE BLOOD, BY GLUCOSE MONITORING DEVICE    AMB POC HEMOGLOBIN (HGB)    AMB POC LIPID PROFILE     lose weight, increase physical activity, follow low fat diet, follow low salt diet, continue present plan  Patient Instructions   [employer] Activation    Thank you for requesting access to [employer]. Please follow the instructions below to securely access and download your online medical record. [employer] allows you to send messages to your doctor, view your test results, renew your prescriptions, schedule appointments, and more. How Do I Sign Up? 1. In your internet browser, go to [email or web address]  2. Click on the First Time User? Click Here link in the Sign In box. You will be redirect to the New Member Sign Up page. 3. Enter your [employer] Access Code exactly as it appears below. You will not need to use this code after youve completed the sign-up process. If you do not sign up before the expiration date, you must request a new code.     [employer] Access Code: [email or web address]  Expires: 2/21/2020  1:11 PM (This is the date your [employer] access code will )    4. Enter the last four digits of your Social Security Number (xxxx) and Date of Birth (mm/dd/yyyy) as indicated and click Submit. You will be taken to the next sign-up page. 5. Create a Verdiemt ID. This will be your [employer] login ID and cannot be changed, so think of one that is secure and easy to remember. 6. Create a [employer] password. You can change your password at any time. 7. Enter your Password Reset Question and Answer. This can be used at a later time if you forget your password. 8. Enter your e-mail address. You will receive e-mail notification when new information is available in [address] 9. Click Sign Up. You can now view and download portions of your medical record. 10. Click the Download Summary menu link to download a portable copy of your medical information. Additional Information    If you have questions, please visit the Frequently Asked Questions section of the [employer] website at [email or web address] Remember, [employer] is NOT to be used for urgent needs. For medical emergencies, dial 911. Follow-up and Dispositions    · Return in about 3 months (around 2020), or if symptoms worsen or fail to improve. I have reviewed with the patient details of the assessment and plan and all questions were answered. Relevent patient education was performed    An After Visit Summary was printed and given to the patient.

## 2020-02-04 NOTE — PROGRESS NOTES
1. Have you been to the ER, urgent care clinic since your last visit? Hospitalized since your last visit?no  2. Have you seen or consulted any other health care providers outside of the [address] [place] [address] since your last visit? Include any pap smears or colon screening. No    3 most recent PHQ Screens 1/7/2020   Little interest or pleasure in doing things Not at all   Feeling down, depressed, irritable, or hopeless Not at all   Total Score PHQ 2 0     Chief Complaint   Patient presents with    Diabetes    Anemia     Per Dr. Emeli Reid.,  verbal order given for needed amb poc labs.

## 2020-02-10 ENCOUNTER — HOSPITAL ENCOUNTER (OUTPATIENT)
Dept: WOUND CARE | Age: 70
Discharge: HOME | End: 2020-02-10
Payer: MEDICARE

## 2020-02-10 VITALS
TEMPERATURE: 97.7 F | BODY MASS INDEX: 37.93 KG/M2 | SYSTOLIC BLOOD PRESSURE: 151 MMHG | WEIGHT: 280 LBS | DIASTOLIC BLOOD PRESSURE: 69 MMHG | HEART RATE: 73 BPM | HEIGHT: 72 IN | RESPIRATION RATE: 18 BRPM

## 2020-02-10 PROCEDURE — 99203 OFFICE O/P NEW LOW 30 MIN: CPT

## 2020-02-10 RX ORDER — TORSEMIDE 20 MG/1
80 TABLET ORAL 4 TIMES DAILY
COMMUNITY
End: 2020-01-01 | Stop reason: SDUPTHER

## 2020-02-10 NOTE — DISCHARGE INSTRUCTIONS
Discharge Instructions for  [facility]  [address] [facility] [place] [facility]  Telephone: [phone]    NAME:  Shavon Sanchez  YOB: 1950  MEDICAL RECORD NUMBER:  [id number]  DATE:  2/10/2020    Wound Cleansing:   Do not scrub or use excessive force. Cleanse wound prior to applying a clean dressing with:  [] Normal Saline [] Keep Wound Dry in Shower    [] Wound Cleanser   [x] Cleanse wound with Mild Soap & Water  [] May Shower at Discharge   [] Other:      Topical Treatments:  Do not apply lotions, creams, or ointments to wound bed unless directed. [] Apply moisturizing lotion to skin surrounding the wound prior to dressing change.  [] Apply antifungal ointment to skin surrounding the wound prior to dressing change.  [] Apply thin film of moisture barrier ointment to skin immediately around wound. [] Other:       Dressings:           Wound Location right plantar foot  [x] Apply Primary Dressing:       [] MediHoney Gel [] Alginate with Silver [] Alginate   [] Collagen [] Collagen with Silver   [x] Santyl with Moisten saline gauze     [] Hydrocolloid   [] MediHoney Alginate [] Foam with Silver   [] Foam   [] Hydrofera Blue    [] Mepilex Border    [] Moisten with Saline [] Hydrogel [] Mepitel     [] Bactroban/Mupirocin [] Polysporin  [] Other:    [] Pack wound loosely with  [] Iodoform   [] Plain Packing  [] Other   [x] Cover and Secure with:     [x] Gauze [x] Kilo [] Kerlix   [] Ace Wrap [] Cover Roll Tape [x] ABD     [] Other:    Avoid contact of tape with skin. [] Change dressing: [x] Daily    [] Every Other Day [] Three times per week   [] Once a week [] Do Not Change Dressing   [] Other:      Pressure Relief:  [x] Pressure relief on right foot in wheelchair  [] Wheelchair cushion [] Specialty Bed/Mattress  [] Turn every 2 hours when in bed. Avoid position directing pressure on wound site. Limit side lying to 30 degree tilt.   Limit HOB elevation to 30 degrees. [x] Elevate leg(s) above the level of the heart when sitting. [] Avoid prolonged standing in one place. Compression:  Apply: [] Multilayer Compression Wrap Applied in Clinic []RightLeg []Left Leg   [] Multi-layer compression. Do not get leg(s) with wrap wet. If wraps become too tight call the center or completely remove the wrap. [x] Elevate leg(s) above the level of the heart when sitting. [] Avoid pressure on right side    Off-Loading:   [x] Off-loading when [] walking  [x] in bed [x] sitting  [] Total non-weight bearing  [] Right Leg  [] Left Leg   [] Assistive Device [] Walker [] Cane  [] Wheelchair  [] Crutches   [x] Surgical shoe to right foot   [] Podus Boot(s)   [] Foam Boot(s)  [] Roll About    [] Cast Boot [] CROW Boot  [] Other:    Dietary:  [x] Diet as tolerated: [] Calorie Diabetic Diet: [] No Added Salt:  [] Increase Protein: [] Other:   Activity:  [] Activity as tolerated:  [] Patient has no activity restrictions     [] Strict Bedrest: [] Remain off Work:     [] May return to full duty work:                                   [] Return to work with restrictions:             Return Appointment:  [] Wound and dressing supply provider:   [] ECF or Home Healthcare:  [] Wound Assessment: [] Physician or NP scheduled for Wound Assessment:   [] Return Appointment: With Dr. Ryder Valenzuela  in  [facility])  [x] Ordered tests:  Vascular studies     Electronically signed Paula Young RN on 2/10/2020 at 3:13 PM     Rachele Pickering 281: Should you experience any significant changes in your wound(s) or have questions about your wound care, please contact the [facility] at [address] [place] [address] Street 8:00 am - 4:30. If you need help with your wound outside these hours and cannot wait until we are again available, contact your PCP or go to the hospital emergency room.      PLEASE NOTE: IF YOU ARE UNABLE TO OBTAIN WOUND SUPPLIES, CONTINUE TO USE THE SUPPLIES YOU HAVE AVAILABLE UNTIL YOU ARE ABLE TO REACH US. IT IS MOST IMPORTANT TO KEEP THE WOUND COVERED AT ALL TIMES.      Physician Signature:_______________________    Date: ___________ Time:  ____________

## 2020-02-10 NOTE — WOUND CARE
02/10/20 1458 Wound Foot Right;Lateral  
Date First Assessed/Time First Assessed: 02/10/20 1457   Present on Hospital Admission: Yes  Wound Approximate Age at First Assessment (Weeks): 4 weeks  Primary Wound Type: Diabetic Ulcer  Location: Foot  Wound Location Orientation: Right;Lateral  
Dressing Status Clean, dry, and intact Dressing Type Alginate Non-staged Wound Description Full thickness Wound Length (cm) 11.6 cm Wound Width (cm) 6.1 cm Wound Depth (cm) 0.1 cm Wound Surface Area (cm^2) 70.76 cm^2 Wound Volume (cm^3) 7.08 cm^3 Condition of Base Pink;Granulation;Eschar Drainage Amount Moderate Drainage Color Serosanguinous Wound Odor None Merari-wound Assessment Intact Cleansing and Cleansing Agents  Normal saline Wound Foot Right;Plantar;Medial  
Date First Assessed/Time First Assessed: 02/10/20 1459   Wound Approximate Age at First Assessment (Weeks): 4 weeks  Primary Wound Type: Diabetic Ulcer  Location: Foot  Wound Location Orientation: Right;Plantar;Medial  
Dressing Status Clean, dry, and intact Dressing Type Alginate Non-staged Wound Description Partial thickness Wound Length (cm) 1.1 cm Wound Width (cm) 1.6 cm Wound Depth (cm) 0.1 cm Wound Surface Area (cm^2) 1.76 cm^2 Wound Volume (cm^3) 0.18 cm^3 Condition of Base Pink Drainage Amount Scant Drainage Color Serosanguinous Wound Odor None Merari-wound Assessment Intact Cleansing and Cleansing Agents  Normal saline Visit Vitals /69 (BP 1 Location: Left arm, BP Patient Position: At rest) Pulse 73 Temp 97.7 °F (36.5 °C) Resp 18 Ht 6' (1.829 m) Wt 127 kg (280 lb) BMI 37.97 kg/m²

## 2020-02-10 NOTE — PROGRESS NOTES
Patient first visit to wound care center. Appointment set for patient to receive vascular studies. Patient next appointment 2 weeks from today.

## 2020-02-10 NOTE — WOUND CARE
02/10/20 1554 Wound Foot Right;Lateral  
Date First Assessed/Time First Assessed: 02/10/20 1457   Present on Hospital Admission: Yes  Wound Approximate Age at First Assessment (Weeks): 4 weeks  Primary Wound Type: Diabetic Ulcer  Location: Foot  Wound Location Orientation: Right;Lateral  
Dressing Type Applied 4 x 4;Absorptive;Gauze wrap (allyn); Other (Comment) (santyl) Procedure Tolerated Well Wound Foot Right;Plantar;Medial  
Date First Assessed/Time First Assessed: 02/10/20 1459   Wound Approximate Age at First Assessment (Weeks): 4 weeks  Primary Wound Type: Diabetic Ulcer  Location: Foot  Wound Location Orientation: Right;Plantar;Medial  
Dressing Type Applied 4 x 4;Absorptive;Gauze wrap (allyn); Other (Comment) (santyl) Procedure Tolerated Well Normal rate, regular rhythm.  Heart sounds S1, S2.  No murmurs, rubs or gallops.

## 2020-02-11 NOTE — CONSULTS
[address] Name:  Navneet Galaviz 
MR#:  [id number] :  1950 ACCOUNT #:  [de-identified] DATE OF SERVICE:  02/10/2020 WOUND CARE CONSULTATION 
 
TREATING PHYSICIAN:  Pedro Ferris DPM 
 
HISTORY OF PRESENT ILLNESS:  The patient presents today in a motorized wheelchair with a chief complaint of wound on the bottom of the right foot. The patient states that it has been present for at least one month. Denies any trauma. The patient has a history of diabetes with neuropathy. As stated before, he is wheelchair bound. States he has an aide that comes in Monday through Friday and stays with him from 8 a.m. until 2 p.m. The patient denies any nausea, vomiting, fevers, night sweats, or chills. PAST MEDICAL HISTORY:  Positive for rotator cuff tendinitis, osteoarthritis, neuropathy, left ventricular hypertrophy, aortic insufficiency, insomnia, hypertriglyceridemia, hypertension, heart failure, type 2 diabetes, depression, history of prostate carcinoma. PAST SURGICAL HISTORY:  Positive for cardiac cath, left fifth digit amputation, left hip pinning, and colonoscopy. ALLERGIES:  NO KNOWN DRUG ALLERGIES. MEDICATIONS:  Include the following:  Acetaminophen 500 mg, Norvasc 10 mg, ammonium lactate 5% solution, aspirin 81 mg, Dulcolax 10 mg, calcium carbonate, iron sulfate 325 mg, Flonase, hydralazine 50 mg, NovoLog, Lantus, ketoconazole 2%, pregabalin 150 mg, Flomax 0.4 mg, Demadex 20 mg, Kenalog ointment 0.1%. REVIEW OF SYSTEMS:  Negative except for what is mentioned in the HPI. PHYSICAL EXAMINATION:  Right foot exam, there is an 11.6 x 6.1 x 0.1 cm wound along the plantar lateral aspect of the right foot. The perimeter of the wound is granular, however, centrally is a thick dark eschar. There is no purulent drainage. There does not appear to be an underlying area of fluctuance. There is no erythema. The foot is warm to touch. Protective sensation is absent. DP pulse can be palpated, however, the PT pulse is nonpalpable. Pitting edema to the right lower extremity. ASSESSMENT: 
1. Right foot ulceration. 2.  Diabetes with peripheral neuropathy and peripheral vascular disease. 3.  Right lower extremity edema. PLAN:  I had a long discussion with the patient this afternoon. Clinically, the wound does not appear to be infected, however, it does encompass a good portion of the plantar lateral sole. Clinically, it looks as if the wound occurred as a result of pressure from the patient resting his foot on the footplate of his motorized wheelchair. The patient states that he admits to just wearing a thin bootie/sock on the right foot while in his chair. It appears that the same sock did not provide enough protection to the plantar foot causing the plantar lateral sole to get irritated on the footplate resulting in this pressure wound. We will send the patient now for vascular studies in order to evaluate the blood flow going into the right foot. I informed the patient that he may require a surgical debridement in order to facilitate wound healing. In the meantime, we will start Santyl everyday. The patient is to follow up with me in 2 weeks for recheck and to go over his circulation studies. CATHERINE [email or web address] 
D:  02/10/2020 15:36 
T:  02/11/2020 1:04 
JOB #:  [id number]

## 2020-02-13 NOTE — WOUND CARE
Spoke with [facility] pharmacist concerning adjustment of patient's prescription. Patient's current prescription is Santyl collagenase 210gm. Apply to wound daily. Refill 2 times. Pharmacist states that medication needs to be ordered and will not get in until Saturday 2/15/2020.

## 2020-02-24 NOTE — DISCHARGE INSTRUCTIONS
Discharge Instructions for  [facility]  [address] [place] [address], [address] [facility]  Telephone: [phone]    NAME:  Kulwant Farmer  YOB: 1950  MEDICAL RECORD NUMBER:  Stuart Sandymer  DATE:  2/24/2020    Wound Cleansing:   Do not scrub or use excessive force. Cleanse wound prior to applying a clean dressing with:  [] Normal Saline [] Keep Wound Dry in Shower    [] Wound Cleanser   [x] Cleanse wound with Mild Soap & Water  [] May Shower at Discharge   [] Other:      Topical Treatments:  Do not apply lotions, creams, or ointments to wound bed unless directed. [] Apply moisturizing lotion to skin surrounding the wound prior to dressing change.  [] Apply antifungal ointment to skin surrounding the wound prior to dressing change.  [] Apply thin film of moisture barrier ointment to skin immediately around wound. [] Other:       Dressings:           Wound Location right plantar foot  [x] Apply Primary Dressing:       [] MediHoney Gel [] Alginate with Silver [] Alginate   [] Collagen [] Collagen with Silver   [x] Santyl with Moisten saline gauze     [] Hydrocolloid   [] MediHoney Alginate [] Foam with Silver   [] Foam   [] Hydrofera Blue    [] Mepilex Border    [] Moisten with Saline [] Hydrogel [] Mepitel     [] Bactroban/Mupirocin [] Polysporin  [] Other:    [] Pack wound loosely with  [] Iodoform   [] Plain Packing  [] Other   [x] Cover and Secure with:     [x] Gauze [x] Kilo [] Kerlix   [] Ace Wrap [] Cover Roll Tape [x] ABD     [] Other:    Avoid contact of tape with skin. [] Change dressing: [x] Daily    [] Every Other Day [] Three times per week   [] Once a week [] Do Not Change Dressing   [] Other:      Pressure Relief:  [x] Pressure relief on right foot in wheelchair  [] Wheelchair cushion [] Specialty Bed/Mattress  [] Turn every 2 hours when in bed. Avoid position directing pressure on wound site. Limit side lying to 30 degree tilt.   Limit HOB elevation to 30 degrees. [x] Elevate leg(s) above the level of the heart when sitting. [] Avoid prolonged standing in one place. Compression:  Apply: [] Multilayer Compression Wrap Applied in Clinic []RightLeg []Left Leg   [] Multi-layer compression. Do not get leg(s) with wrap wet. If wraps become too tight call the center or completely remove the wrap. [x] Elevate leg(s) above the level of the heart when sitting. [] Avoid pressure on right side    Off-Loading:   [x] Off-loading when [] walking  [x] in bed [x] sitting  [] Total non-weight bearing  [] Right Leg  [] Left Leg   [] Assistive Device [] Walker [] Cane  [] Wheelchair  [] Crutches   [x] Surgical shoe to right foot   [] Podus Boot(s)   [] Foam Boot(s)  [] Roll About    [] Cast Boot [] CROW Boot  [] Other:    Dietary:  [x] Diet as tolerated: [] Calorie Diabetic Diet: [] No Added Salt:  [] Increase Protein: [] Other:   Activity:  [] Activity as tolerated:  [] Patient has no activity restrictions     [] Strict Bedrest: [] Remain off Work:     [] May return to full duty work:                                   [] Return to work with restrictions:             Return Appointment:  [] Wound and dressing supply provider:   [] ECF or Home Healthcare:  [] Wound Assessment: [] Physician or NP scheduled for Wound Assessment:   [x] Return Appointment: With Dr. Lili Posadas  in  After vascular appt. [x] Ordered tests:  Vascular studies     Electronically signed Paula Young RN on 2/24/2020 at [address] [place] [address]: Should you experience any significant changes in your wound(s) or have questions about your wound care, please contact the [facility] at [address] [place] [address] 8:00 am - 4:30. If you need help with your wound outside these hours and cannot wait until we are again available, contact your PCP or go to the hospital emergency room.      PLEASE NOTE: IF YOU ARE UNABLE TO OBTAIN WOUND SUPPLIES, CONTINUE TO USE THE SUPPLIES YOU HAVE AVAILABLE UNTIL YOU ARE ABLE TO REACH US. IT IS MOST IMPORTANT TO KEEP THE WOUND COVERED AT ALL TIMES.      Physician Signature:_______________________    Date: ___________ Time:  ____________

## 2020-02-24 NOTE — WOUND CARE
02/24/20 1328 Wound Foot Right;Lateral  
Date First Assessed/Time First Assessed: 02/10/20 1457   Present on Hospital Admission: Yes  Wound Approximate Age at First Assessment (Weeks): 4 weeks  Primary Wound Type: Diabetic Ulcer  Location: Foot  Wound Location Orientation: Right;Lateral  
Dressing Status Clean, dry, and intact Dressing Type Dry dressing Pressure Injury Unstageable Wound Length (cm) 11.1 cm Wound Width (cm) 5.5 cm Wound Depth (cm) 0.1 cm Wound Surface Area (cm^2) 61.05 cm^2 Wound Volume (cm^3) 6.1 cm^3 Condition of Base [place];Eschar;Slough Drainage Amount Moderate Drainage Color Serosanguinous Wound Odor None Merari-wound Assessment Intact Cleansing and Cleansing Agents  Normal saline Wound Foot Right;Plantar;Medial  
Date First Assessed/Time First Assessed: 02/10/20 1459   Wound Approximate Age at First Assessment (Weeks): 4 weeks  Primary Wound Type: Diabetic Ulcer  Location: Foot  Wound Location Orientation: Right;Plantar;Medial  
Dressing Status Clean, dry, and intact Dressing Type Dry dressing Pressure Injury Unstageable Wound Length (cm) 2.7 cm Wound Width (cm) 3.1 cm Wound Depth (cm) 0.1 cm Wound Surface Area (cm^2) 8.37 cm^2 Wound Volume (cm^3) 0.84 cm^3 Condition of Base Eschar Drainage Amount Small Drainage Color Serosanguinous Wound Odor None Merari-wound Assessment Intact Cleansing and Cleansing Agents  Normal saline Visit Vitals /73 (BP 1 Location: Left arm, BP Patient Position: At rest) Pulse 71 Temp 98.2 °F (36.8 °C) Resp 16 Ht 6' (1.829 m) Wt 127 kg (280 lb) BMI 37.97 kg/m²

## 2020-02-24 NOTE — WOUND CARE
02/24/20 1411 Wound Foot Right;Lateral  
Date First Assessed/Time First Assessed: 02/10/20 1457   Present on Hospital Admission: Yes  Wound Approximate Age at First Assessment (Weeks): 4 weeks  Primary Wound Type: Diabetic Ulcer  Location: Foot  Wound Location Orientation: Right;Lateral  
Dressing Type Applied 4 x 4;Absorptive;Gauze wrap (allyn); Other (Comment) (santyl) Procedure Tolerated Well Wound Foot Right;Plantar;Medial  
Date First Assessed/Time First Assessed: 02/10/20 1459   Wound Approximate Age at First Assessment (Weeks): 4 weeks  Primary Wound Type: Diabetic Ulcer  Location: Foot  Wound Location Orientation: Right;Plantar;Medial  
Dressing Type Applied 4 x 4;Absorptive;Gauze wrap (allyn); Other (Comment) (santyl) Procedure Tolerated Well

## 2020-02-24 NOTE — PROGRESS NOTES
Patient not progressing towards goal because of lack of perfusion to lower leg. Appt made for patient with vascular to assist with lower leg perfusion.

## 2020-02-25 NOTE — PROGRESS NOTES
[address] [facility] 
WOUND CARE PROGRESS NOTE Name:  Alejandro Rubio 
MR#:  [id number] :  1950 ACCOUNT #:  [de-identified] DATE OF SERVICE:  2020 TREATING PHYSICIAN:  Lazarus Slipper, DPM 
 
SUBJECTIVE:  The patient presents today in his motorized wheelchair for a followup to right plantar foot ulceration. Denies any nausea, vomiting, fever, night sweats, or chills. States home care is coming out to do his wound care every day. The patient had his vascular testing done since his last appointment with me. He is here today to go over the results. OBJECTIVE: 
VITAL SIGNS:  Stable. The patient is afebrile. WOUND EXAMINATION:  On right foot exam, there is 11.1 x 5.5 x 0.1 cm wound on the plantar lateral aspect of the right foot. The wound has central dry necrosis with granular areas around the periphery. There is no purulent drainage. No malodor. No erythema. There is dependent edema in the right lower extremity. DP pulse is palpable, but PT pulse is nonpalpable. Protective sensation is absent. ASSESSMENT: 
1. Right foot ulceration. 2.  Diabetes with peripheral neuropathy and peripheral vascular disease. 3.  Right lower extremity edema. PLAN:  I had a long discussion with the patient this afternoon. I reviewed his outpatient noninvasive vascular studies which showed moderate to severe obstruction in the blood flow going into the right lower extremity. I informed the patient that without essential blood flow and without proper circulation, his wound will fail to heal. 
 
We will make arrangements for the patient to see Dr. Gertrude Winkler in the office to hopefully discuss scheduling revascularization procedure. In the meantime, the patient is to continue Santyl every day. Orders were given for the wound care nurse to continue to do it on a daily basis. He is to continue using his surgical shoe. The patient is to follow up with me once he had seen Dr. Manuel Acevedo and after he had gotten his revascularization procedure done. I informed the patient that once I get the approval from the vascular surgeon, I will move forward to schedule the patient for an outpatient debridement in the operating room of his right plantar foot ulceration. The patient understands and is in agreement with the plan. Gus Valle DPM 
 
 
[id number] 
D:  02/24/2020 14:10 
T:  02/25/2020 1:23 
JOB #:  [id number]

## 2020-03-05 NOTE — PROGRESS NOTES
Chief Complaint Patient presents with  Diabetes  Chronic Kidney Disease 3 most recent PHQ Screens 3/5/2020 Little interest or pleasure in doing things Not at all Feeling down, depressed, irritable, or hopeless Not at all Total Score PHQ 2 0  
 
1. Have you been to the ER, urgent care clinic since your last visit? Hospitalized since your last visit?no 2. Have you seen or consulted any other health care providers outside of the [address] [place] [address] since your last visit? Include any pap smears or colon screening.  no

## 2020-03-05 NOTE — PATIENT INSTRUCTIONS
Highlight Activation Thank you for requesting access to Highlight. Please follow the instructions below to securely access and download your online medical record. Highlight allows you to send messages to your doctor, view your test results, renew your prescriptions, schedule appointments, and more. How Do I Sign Up? 1. In your internet browser, go to [email or web address] 
2. Click on the First Time User? Click Here link in the Sign In box. You will be redirect to the New Member Sign Up page. 3. Enter your Highlight Access Code exactly as it appears below. You will not need to use this code after youve completed the sign-up process. If you do not sign up before the expiration date, you must request a new code. Highlight Access Code: [email or web address] Expires: 2020  8:42 AM (This is the date your Highlight access code will ) 4. Enter the last four digits of your Social Security Number (xxxx) and Date of Birth (mm/dd/yyyy) as indicated and click Submit. You will be taken to the next sign-up page. 5. Create a Highlight ID. This will be your Highlight login ID and cannot be changed, so think of one that is secure and easy to remember. 6. Create a Highlight password. You can change your password at any time. 7. Enter your Password Reset Question and Answer. This can be used at a later time if you forget your password. 8. Enter your e-mail address. You will receive e-mail notification when new information is available in [address]. 9. Click Sign Up. You can now view and download portions of your medical record. 10. Click the Download Summary menu link to download a portable copy of your medical information. Additional Information If you have questions, please visit the Frequently Asked Questions section of the Highlight website at [email or web address] Remember, Highlight is NOT to be used for urgent needs. For medical emergencies, dial 911.

## 2020-03-05 NOTE — PROGRESS NOTES
Anand Hayes is a 77 y.o. male and presents with Diabetes and Chronic Kidney Disease Nedra Sahu Subjective: He still  has flaking in the face and scalp. Claudication Patient presents for evaluation of right and left calf claudication He has seen Vascular surgery and is to have a stent soon. Hypertension Review: 
The patient has essential hypertension Diet and Lifestyle: generally follows a  low sodium diet, exercises sporadically Home BP Monitoring: is not measured at home. Pertinent ROS: taking medications as instructed, no medication side effects noted, no TIA's, no chest pain on exertion, no dyspnea on exertion, no swelling of ankles. Diabetes Mellitus Review: He has diabetes mellitus. Diabetic ROS - medication compliance: noncompliant all of the time, diabetic diet noncompliance: compliant all of the time, home glucose monitoring: is NOT performed. Known diabetic complications: none Cardiovascular risk factors: family history, dyslipidemia, diabetes mellitus, obesity, hypertension Current diabetic medications include oral agents/insulin Eye exam current (within one year): no 
Weight trend: stable Prior visit with dietician: no 
Current diet: \"healthy\" diet  in general 
Current exercise: walking Current monitoring regimen: home blood tests - daily Home blood sugar records: trend: stable Any episodes of hypoglycemia? no 
Is He on ACE inhibitor or angiotensin II receptor blocker? Yes Anemia review: 
Patient presents for follow up  evaluation of an anemia. Anemia was found by routine CBC. It had been present for several months. Associated signs & symptoms:none The  most recent studies were improved His anemia is felt due to CKD. Sleep apnea Review: There is a history of excessive daytime fatigue with associated excessive snoring at night. There is also a history of periods of apnea at night as witnessed by a partner and family The patient is on CPAP. Review of Systems Constitutional: negative for fevers, chills, anorexia and weight loss Eyes:   negative for visual disturbance and irritation ENT:   negative for tinnitus,sore throat,nasal congestion,ear pains. hoarseness Respiratory:  negative for cough, hemoptysis, dyspnea,wheezing CV:   negative for chest pain, palpitations, lower extremity edema GI:   negative for nausea, vomiting, diarrhea, abdominal pain,melena Endo:               negative for polyuria,polydipsia,polyphagia,heat intolerance Genitourinary: negative for frequency, dysuria and hematuria Integument:   rash and pruritus Hematologic:  negative for easy bruising and gum/nose bleeding Musculoskel: myalgias, arthralgias,muscle weakness, joint pain Neurological:  negative for headaches, dizziness, vertigo, memory problems and gait Behavl/Psych: negative for feelings of anxiety, depression, mood changes Past Medical History:  
Diagnosis Date  Arthritis, Degenerative,  Knee 4/4/2011  Carcinoma, Prostate 4/4/2011  Degenerative disc disease 4/4/2011  Depression/ Anxiety 4/4/2011  Diabetes ([facility] [address]  Diabetes Mellitrus ( non-insulin dependent ) Type 2 4/4/2011  
 Heart failure ([facility] [address]  HEMATOCHEZIA 4/4/2011  Hypertrension 4/4/2011  Hypertriglyceridemia 4/4/2011  Ill-defined condition   
 lymphadema  Insomnia 4/4/2011  Laryngitis 4/4/2011  Mild Aortic insufficiency 4/4/2011  Mild Concentric LVH (left ventricular hypertrophy) 4/4/2011  Neuropathy 4/4/2011  Osteoarthritis 4/4/2011  Rotator Cuff Tendonitis 4/4/2011 Past Surgical History:  
Procedure Laterality Date  CARDIAC SURG PROCEDURE UNLIST    
 cardiac cath  COLONOSCOPY N/A 4/28/2017 COLON[facility]COPY performed by [facility] Gomez [facility] at [facility] ENDOSCOPY  
 HX ORTHOPAEDIC    
 left hip pinning  HX OTHER SURGICAL    
 left little toe amputation  HX UROLOGICAL Social History Socioeconomic History  Marital status: LEGALLY  Spouse name: Not on file  Number of children: Not on file  Years of education: Not on file  Highest education level: Not on file Tobacco Use  Smoking status: Former Smoker Packs/day: 0.25 Last attempt to quit: 2019 Years since quittin.3  Smokeless tobacco: Never Used Substance and Sexual Activity  Alcohol use: Not Currently Alcohol/week: 11.7 standard drinks Types: 7 Cans of beer, 7 Shots of liquor per week  Drug use: Yes Types: Marijuana, Cocaine  Sexual activity: Yes  
  Partners: Female Other Topics Concern Family History Family history unknown: Yes Current Outpatient Medications Medication Sig Dispense Refill  tamsulosin (FLOMAX) 0.4 mg capsule Take 1 Cap by mouth daily. 30 Cap 3  
 ammonium lactate (LAC-HYDRIN FIVE) 5 % lotion Apply 1 Applicator to affected area daily. Apply daily bilateral lower legs 222 mL 12  
 hydroxyzine HCL (ATARAX) 50 mg tablet Take 1 Tab by mouth three (3) times daily as needed for Itching for up to 10 days. 60 Tab 3  
 insulin glargine (LANTUS U-100 INSULIN) 100 unit/mL injection 72 Units by SubCUTAneous route daily. 6 Vial 10  
 pregabalin (LYRICA) 150 mg capsule Take 1 Cap by mouth two (2) times a day. Max Daily Amount: 300 mg. 60 Cap 3  
 insulin regular (NOVOLIN R, HUMULIN R) 100 unit/mL injection 24 units sc tid 1 Vial 12  torsemide (DEMADEX) 20 mg tablet Take 80 mg by mouth four (4) times daily.  ketoconazole (NIZORAL) 2 % topical cream Apply  to affected area two (2) times a day. 15 g 0  
 ketoconazole (NIZORAL) 2 % shampoo Sig:shampoo once a week 120 mL 3  
 ferrous sulfate (IRON) 325 mg (65 mg iron) EC tablet Take 1 Tab by mouth Daily (before breakfast). 30 Tab 6  
 amLODIPine (NORVASC) 10 mg tablet Take 1 Tab by mouth daily. 30 Tab 12  
 bisacodyl (DULCOLAX, BISACODYL,) 10 mg suppository Insert 10 mg into rectum daily.  insulin aspart U-100 (NOVOLOG FLEXPEN U-100 INSULIN) 100 unit/mL (3 mL) inpn by SubCUTAneous route.  NOVOLIN R REGULAR U-100 INSULN 100 unit/mL injection INJECT 14 UNITS UNDER THE SKIN THREE TIMES A DAY WITH MEALS AS NEEDED 10 Vial 11  
 hydrALAZINE (APRESOLINE) 50 mg tablet TAKE ONE TABLET BY MOUTH THREE TIMES A DAY 90 Tab 0  
 fluticasone propionate (FLONASE) 50 mcg/actuation nasal spray SPRAY TWO SPRAYS IN THE AFFECTED NOSTRIL DAILY 1 Bottle 10  
 aspirin delayed-release 81 mg tablet Take 81 mg by mouth daily.  triamcinolone acetonide (KENALOG) 0.1 % ointment Apply  to affected area two (2) times a day. use thin layer bid 80 g 12  
 acetaminophen (PAIN AND FEVER) 500 mg tablet TAKE ONE TABLET BY MOUTH EVERY 6 HOURS AS NEEDED FOR PAIN 60 Tab 2  
 Calcium Carbonate-Vit D3-Min 600 mg calcium- 400 unit tab Take 1 Tab by mouth two (2) times a day.  Insulin Needles, Disposable, (NOVOFINE 32) 32 gauge x 1/4\" ndle Sig:use 4 times a day as needed 100 Pen Needle 12  Insulin Syringe-Needle U-100 (BD INSULIN SYRINGE ULTRA-FINE) 1 mL 31 gauge x 5/16 syrg USE TO INJECT INSULIN FIVE TIMES A  Syringe 10 No Known Allergies Objective: 
Visit Vitals /60 (BP Patient Position: Sitting) Pulse 80 Temp 98.1 °F (36.7 °C) (Oral) Resp 20 Ht 6' 1\" (1.854 m) SpO2 96% BMI 36.94 kg/m² Physical Exam:  
General appearance - alert, well appearing, and in no distress Mental status - alert, oriented to person, place, and time EYE-LUH, EOMI, corneas normal, no foreign bodies ENT-ENT exam normal, no neck nodes or sinus tenderness Nose - normal and patent, no erythema, discharge or polyps Mouth - mucous membranes moist, pharynx normal without lesions Neck - supple, no significant adenopathy Chest - clear to auscultation, no wheezes, rales or rhonchi, symmetric air entry Heart - normal rate, regular rhythm, normal S1, S2, no murmurs, rubs, clicks or gallops Abdomen - soft, nontender, nondistended, no masses or organomegaly Lymph- no adenopathy palpable Ext-peripheral pulses normal, no pedal edema, no clubbing or cyanosis Skin-flaking on scalp Neuro -alert, oriented, normal speech, unable to ambulate Neck-normal C-spine, no tenderness, full ROM without pain Feet-no nail deformities or callus formation with good pulses noted Results for orders placed or performed in visit on 20 AMB POC GLUCOSE BLOOD, BY GLUCOSE MONITORING DEVICE Result Value Ref Range Glucose  mg/dL Assessment/Plan: ICD-10-CM ICD-9-CM 1. Diabetic polyneuropathy associated with type 2 diabetes mellitus (HCC) E11.42 250.60 AMB POC GLUCOSE BLOOD, BY GLUCOSE MONITORING DEVICE  
  357.2 insulin glargine (LANTUS U-100 INSULIN) 100 unit/mL injection  
   pregabalin (LYRICA) 150 mg capsule  
   insulin regular (NOVOLIN R, HUMULIN R) 100 unit/mL injection 2. Rotator cuff arthropathy, right M12.811 716.81 insulin glargine (LANTUS U-100 INSULIN) 100 unit/mL injection  
   insulin regular (NOVOLIN R, HUMULIN R) 100 unit/mL injection Orders Placed This Encounter  AMB POC GLUCOSE BLOOD, BY GLUCOSE MONITORING DEVICE  tamsulosin (FLOMAX) 0.4 mg capsule Sig: Take 1 Cap by mouth daily. Dispense:  30 Cap Refill:  3  
 ammonium lactate (LAC-HYDRIN FIVE) 5 % lotion Sig: Apply 1 Applicator to affected area daily. Apply daily bilateral lower legs Dispense:  222 mL Refill:  12  
 hydroxyzine HCL (ATARAX) 50 mg tablet Sig: Take 1 Tab by mouth three (3) times daily as needed for Itching for up to 10 days. Dispense:  60 Tab Refill:  3  
 insulin glargine (LANTUS U-100 INSULIN) 100 unit/mL injection Si Units by SubCUTAneous route daily. Dispense:  6 Vial  
  Refill:  10  
 pregabalin (LYRICA) 150 mg capsule Sig: Take 1 Cap by mouth two (2) times a day. Max Daily Amount: 300 mg. Dispense:  60 Cap Refill:  3  insulin regular (NOVOLIN R, HUMULIN R) 100 unit/mL injection Si units sc tid Dispense:  1 Vial  
  Refill:  12  
 
lose weight, increase physical activity, follow low fat diet, follow low salt diet, continue present plan Patient Instructions MyChart Activation Thank you for requesting access to [employer]. Please follow the instructions below to securely access and download your online medical record. [employer] allows you to send messages to your doctor, view your test results, renew your prescriptions, schedule appointments, and more. How Do I Sign Up? 1. In your internet browser, go to [email or web address]."[email or web address]" 
2. Click on the First Time User? Click Here link in the Sign In box. You will be redirect to the New Member Sign Up page. 3. Enter your [employer] Access Code exactly as it appears below. You will not need to use this code after youve completed the sign-up process. If you do not sign up before the expiration date, you must request a new code. [employer] Access Code: [email or web address] Expires: 2020  8:42 AM (This is the date your [employer] access code will ) 4. Enter the last four digits of your Social Security Number (xxxx) and Date of Birth (mm/dd/yyyy) as indicated and click Submit. You will be taken to the next sign-up page. 5. Create a [employer] ID. This will be your [employer] login ID and cannot be changed, so think of one that is secure and easy to remember. 6. Create a [employer] password. You can change your password at any time. 7. Enter your Password Reset Question and Answer. This can be used at a later time if you forget your password. 8. Enter your e-mail address. You will receive e-mail notification when new information is available in [address] 9. Click Sign Up. You can now view and download portions of your medical record. 10. Click the Download Summary menu link to download a portable copy of your medical information. Additional Information If you have questions, please visit the Frequently Asked Questions section of the MyChart website at [email or web address] Remember, MyChart is NOT to be used for urgent needs. For medical emergencies, dial 911. Follow-up and Dispositions · Return in about 4 weeks (around 4/2/2020), or if symptoms worsen or fail to improve. I have reviewed with the patient details of the assessment and plan and all questions were answered. Relevent patient education was performed An After Visit Summary was printed and given to the patient.

## 2020-04-16 NOTE — PROGRESS NOTES
Chief Complaint Patient presents with  Diabetes  Hypertension 3 most recent PHQ Screens 4/16/2020 Little interest or pleasure in doing things Not at all Feeling down, depressed, irritable, or hopeless Not at all Total Score PHQ 2 0  
 
1. Have you been to the ER, urgent care clinic since your last visit? Hospitalized since your last visit?no 2. Have you seen or consulted any other health care providers outside of the [address] [place] [address] since your last visit? Include any pap smears or colon screening.  no

## 2020-04-16 NOTE — PROGRESS NOTES
Aura Gutierrez is a 77 y.o. male and presents with Diabetes and Hypertension Andreas Mukherjee Subjective: Aura Gutierrez is a 77 y.o. male being evaluated by a Virtual Visit (video visit) encounter to address concerns as mentioned above. A caregiver was present when appropriate. Due to this being a TeleHealth encounter (During VQBEI-62 public health emergency), evaluation of the following organ systems was limited: Vitals/Constitutional/EENT/Resp/CV/GI//MS/Neuro/Skin/Heme-Lymph-Imm. Pursuant to the emergency declaration under the [address] [place] [address], [address] [place] [address] authority and the [employer] Act, this Virtual Visit was conducted with patient's (and/or legal guardian's) consent, to reduce the risk of exposure to COVID-19 and provide necessary medical care. Services were provided through a video synchronous discussion virtually to substitute for in-person encounter. --Rod Chun MD on 4/16/2020 at 12:01 PM 
 
An electronic signature was used to authenticate this note. Hypertension Review: 
The patient has hypertension . Diet and Lifestyle: generally follows a low sodium diet, exercises sporadically Home BP Monitoring: is not measured at home. Pertinent ROS: taking medications as instructed, no medication side effects noted, no TIA's, no chest pain on exertion, no dyspnea on exertion, no swelling of ankles. Dyslipidemia Review: 
Patient presents for evaluation of lipids. Compliance with treatment thus far has been excellent. A repeat fasting lipid profile was done. The patient does not use medications that may worsen dyslipidemias . The patient exercises sporadically. Diabetes Mellitus Review: He has diabetes mellitus. Diabetic ROS - medication compliance: compliant all of the time, diabetic diet compliance: compliant all of the time, home glucose monitoring: is performed. Known diabetic complications: none Cardiovascular risk factors: family history, dyslipidemia, diabetes mellitus, obesity, hypertension Current diabetic medications include oral agents/insulin Eye exam current (within one year): no 
Weight trend: stable Prior visit with dietician: no 
Current diet: \"healthy\" diet  in general 
Current exercise: walking Current monitoring regimen: home blood tests - daily Home blood sugar records: trend: stable Any episodes of hypoglycemia? no 
Is He on ACE inhibitor or angiotensin II receptor blocker? Yes Lab review: orders written for new lab studies as appropriate; see orders. He states his vascular surgery went well. Review of Systems Constitutional: negative for fevers, chills, anorexia and weight loss Eyes:   negative for visual disturbance and irritation ENT:   negative for tinnitus,sore throat,nasal congestion,ear pains. hoarseness Respiratory:  negative for cough, hemoptysis, dyspnea,wheezing CV:   negative for chest pain, palpitations, lower extremity edema GI:   negative for nausea, vomiting, diarrhea, abdominal pain,melena Endo:               negative for polyuria,polydipsia,polyphagia,heat intolerance Genitourinary: negative for frequency, dysuria and hematuria Integument:  negative for rash and pruritus Hematologic:  negative for easy bruising and gum/nose bleeding Musculoskel: negative for myalgias, arthralgias, back pain, muscle weakness, joint pain Neurological:  negative for headaches, dizziness, vertigo, memory problems and gait Behavl/Psych: negative for feelings of anxiety, depression, mood changes Past Medical History:  
Diagnosis Date  Arthritis, Degenerative,  Knee 4/4/2011  Carcinoma, Prostate 4/4/2011  Degenerative disc disease 4/4/2011  Depression/ Anxiety 4/4/2011  Diabetes ([facility] [address]  Diabetes Mellitrus ( non-insulin dependent ) Type 2 4/4/2011  
 Heart failure ([facility] [address]  HEMATOCHEZIA 4/4/2011  Hypertrension 2011  Hypertriglyceridemia 2011  Ill-defined condition   
 lymphadema  Insomnia 2011  Laryngitis 2011  Mild Aortic insufficiency 2011  Mild Concentric LVH (left ventricular hypertrophy) 2011  Neuropathy 2011  Osteoarthritis 2011  Rotator Cuff Tendonitis 2011 Past Surgical History:  
Procedure Laterality Date  CARDIAC SURG PROCEDURE UNLIST    
 cardiac cath  COLONOSCOPY N/A 2017 COLONOSCOPY performed by Sue Masters MD at [facility] ENDOSCOPY  
 HX ORTHOPAEDIC    
 left hip pinning  HX OTHER SURGICAL    
 left little toe amputation  HX UROLOGICAL Social History Socioeconomic History  Marital status: LEGALLY  Spouse name: Not on file  Number of children: Not on file  Years of education: Not on file  Highest education level: Not on file Tobacco Use  Smoking status: Former Smoker Packs/day: 0.25 Last attempt to quit: 2019 Years since quittin.4  Smokeless tobacco: Never Used Substance and Sexual Activity  Alcohol use: Not Currently Alcohol/week: 11.7 standard drinks Types: 7 Cans of beer, 7 Shots of liquor per week  Drug use: Yes Types: Marijuana, Cocaine  Sexual activity: Yes  
  Partners: Female Other Topics Concern Family History Family history unknown: Yes Current Outpatient Medications Medication Sig Dispense Refill  ammonium lactate (AMLACTIN) 12 % topical cream Apply  to affected area two (2) times a day. rub in to affected area well 400 g 3  
 tamsulosin (FLOMAX) 0.4 mg capsule Take 1 Cap by mouth daily. 30 Cap 3  
 ammonium lactate (LAC-HYDRIN FIVE) 5 % lotion Apply 1 Applicator to affected area daily. Apply daily bilateral lower legs 222 mL 12  
 insulin glargine (LANTUS U-100 INSULIN) 100 unit/mL injection 72 Units by SubCUTAneous route daily.  6 Vial 10  
  pregabalin (LYRICA) 150 mg capsule Take 1 Cap by mouth two (2) times a day. Max Daily Amount: 300 mg. 60 Cap 3  
 insulin regular (NOVOLIN R, HUMULIN R) 100 unit/mL injection 24 units sc tid 1 Vial 12  torsemide (DEMADEX) 20 mg tablet Take 80 mg by mouth four (4) times daily.  ketoconazole (NIZORAL) 2 % topical cream Apply  to affected area two (2) times a day. 15 g 0  
 ketoconazole (NIZORAL) 2 % shampoo Sig:shampoo once a week 120 mL 3  
 ferrous sulfate (IRON) 325 mg (65 mg iron) EC tablet Take 1 Tab by mouth Daily (before breakfast). 30 Tab 6  
 amLODIPine (NORVASC) 10 mg tablet Take 1 Tab by mouth daily. 30 Tab 12  
 bisacodyl (DULCOLAX, BISACODYL,) 10 mg suppository Insert 10 mg into rectum daily.  insulin aspart U-100 (NOVOLOG FLEXPEN U-100 INSULIN) 100 unit/mL (3 mL) inpn by SubCUTAneous route.  Insulin Needles, Disposable, (NOVOFINE 32) 32 gauge x 1/4\" ndle Sig:use 4 times a day as needed 100 Pen Needle 12  
 NOVOLIN R REGULAR U-100 INSULN 100 unit/mL injection INJECT 14 UNITS UNDER THE SKIN THREE TIMES A DAY WITH MEALS AS NEEDED 10 Vial 11  
 hydrALAZINE (APRESOLINE) 50 mg tablet TAKE ONE TABLET BY MOUTH THREE TIMES A DAY 90 Tab 0  
 fluticasone propionate (FLONASE) 50 mcg/actuation nasal spray SPRAY TWO SPRAYS IN THE AFFECTED NOSTRIL DAILY 1 Bottle 10  Insulin Syringe-Needle U-100 (BD INSULIN SYRINGE ULTRA-FINE) 1 mL 31 gauge x 5/16 syrg USE TO INJECT INSULIN FIVE TIMES A  Syringe 10  
 aspirin delayed-release 81 mg tablet Take 81 mg by mouth daily.  triamcinolone acetonide (KENALOG) 0.1 % ointment Apply  to affected area two (2) times a day. use thin layer bid 80 g 12  
 acetaminophen (PAIN AND FEVER) 500 mg tablet TAKE ONE TABLET BY MOUTH EVERY 6 HOURS AS NEEDED FOR PAIN 60 Tab 2  
 Calcium Carbonate-Vit D3-Min 600 mg calcium- 400 unit tab Take 1 Tab by mouth two (2) times a day. No Known Allergies Objective: There were no vitals taken for this visit. Physical Exam:  
General appearance - alert, well appearing, and in no distress Mental status - alert, oriented to person, place, and time EYE-LUH, EOMI, corneas normal, no foreign bodies ENT-ENT exam normal, no neck nodes or sinus tenderness Nose - normal and patent, no erythema, discharge or polyps Mouth - mucous membranes moist, pharynx normal without lesions Neck - supple, no significant adenopathy Chest - clear to auscultation, no wheezes, rales or rhonchi, symmetric air entry Skin-Warm and dry. no hyperpigmentation, vitiligo, or suspicious lesions Neuro -alert, oriented, normal speech, no focal findings or movement disorder noted Neck-normal C-spine, no tenderness, full ROM without pain Feet-no nail deformities or callus formation with good pulses noted Results for orders placed or performed in visit on 03/05/20 AMB POC GLUCOSE BLOOD, BY GLUCOSE MONITORING DEVICE Result Value Ref Range Glucose  mg/dL Assessment/Plan: 
No diagnosis found. No orders of the defined types were placed in this encounter. call if any problems,Take 81mg aspirin daily There are no Patient Instructions on file for this visit. I have reviewed with the patient details of the assessment and plan and all questions were answered. Relevent patient education was performed. The most recent lab findings were reviewed with the patient. An After Visit Summary was printed and given to the patient.

## 2020-04-16 NOTE — PATIENT INSTRUCTIONS
[employer] Activation Thank you for requesting access to [employer]. Please follow the instructions below to securely access and download your online medical record. [employer] allows you to send messages to your doctor, view your test results, renew your prescriptions, schedule appointments, and more. How Do I Sign Up? 1. In your internet browser, go to [email or web address] 
2. Click on the First Time User? Click Here link in the Sign In box. You will be redirect to the New Member Sign Up page. 3. Enter your [employer] Access Code exactly as it appears below. You will not need to use this code after youve completed the sign-up process. If you do not sign up before the expiration date, you must request a new code. [employer] Access Code: [email or web address] Expires: 2020  9:42 AM (This is the date your [employer] access code will ) 4. Enter the last four digits of your Social Security Number (xxxx) and Date of Birth (mm/dd/yyyy) as indicated and click Submit. You will be taken to the next sign-up page. 5. Create a [employer] ID. This will be your [employer] login ID and cannot be changed, so think of one that is secure and easy to remember. 6. Create a [employer] password. You can change your password at any time. 7. Enter your Password Reset Question and Answer. This can be used at a later time if you forget your password. 8. Enter your e-mail address. You will receive e-mail notification when new information is available in [address] 9. Click Sign Up. You can now view and download portions of your medical record. 10. Click the Download Summary menu link to download a portable copy of your medical information. Additional Information If you have questions, please visit the Frequently Asked Questions section of the [employer] website at [email or web address] Remember, [employer] is NOT to be used for urgent needs. For medical emergencies, dial 911.

## 2020-05-19 PROBLEM — E11.621 DIABETIC ULCER OF RIGHT MIDFOOT ASSOCIATED WITH TYPE 2 DIABETES MELLITUS, WITH FAT LAYER EXPOSED (HCC): Status: ACTIVE | Noted: 2020-01-01

## 2020-05-19 PROBLEM — I73.9 PAD (PERIPHERAL ARTERY DISEASE) (HCC): Status: ACTIVE | Noted: 2020-01-01

## 2020-05-19 PROBLEM — F17.210 DEPENDENCE ON NICOTINE FROM CIGARETTES: Status: ACTIVE | Noted: 2020-01-01

## 2020-05-19 PROBLEM — L97.412 DIABETIC ULCER OF RIGHT MIDFOOT ASSOCIATED WITH TYPE 2 DIABETES MELLITUS, WITH FAT LAYER EXPOSED (HCC): Status: ACTIVE | Noted: 2020-01-01

## 2020-05-19 NOTE — PROGRESS NOTES
Patient presents to wound clinic for: Mildred Burns is a 72 y.o. male who presents subsequent wound care of the following: right plantar foot ulceration. He has previously been seen by my partner Dr. Lukas Mckeon at the [facility] wound center earlier this year for the same ulcer, which has been present since January and is the result of diabetes mellitus complicated by PAD and peripheral neuropathy. He recently had a revascularization with stenting performed of the RLE by Dr. Joycelyn Smith and relates that it has been successfully. [facility] has been changing his dressing regularly with santyl. Patient relates today that he has still been smoking cigarettes daily after his revascularizations. Pertinent Medical History: 
Past Medical History:  
Diagnosis Date  Arthritis, Degenerative,  Knee 4/4/2011  Carcinoma, Prostate 4/4/2011  Degenerative disc disease 4/4/2011  Depression/ Anxiety 4/4/2011  Diabetes ([address]  Diabetes Mellitrus ( non-insulin dependent ) Type 2 4/4/2011  
 Heart failure ([address]  HEMATOCHEZIA 4/4/2011  Hypertrension 4/4/2011  Hypertriglyceridemia 4/4/2011  Ill-defined condition   
 lymphadema  Insomnia 4/4/2011  Laryngitis 4/4/2011  Mild Aortic insufficiency 4/4/2011  Mild Concentric LVH (left ventricular hypertrophy) 4/4/2011  Neuropathy 4/4/2011  Osteoarthritis 4/4/2011  Rotator Cuff Tendonitis 4/4/2011 Past Surgical History:  
Procedure Laterality Date  CARDIAC SURG PROCEDURE UNLIST    
 cardiac cath  COLONOSCOPY N/A 4/28/2017 COLONOSCOPY performed by [facility] Osborne MD at [facility] ENDOSCOPY  
 HX ORTHOPAEDIC    
 left hip pinning  HX OTHER SURGICAL    
 left little toe amputation  HX UROLOGICAL Prior to Admission medications Medication Sig Start Date End Date Taking?  Authorizing Provider  
insulin glargine (LANTUS U-100 INSULIN) 100 unit/mL injection 72 Units by SubCUTAneous route daily. 3/5/20  Yes Carlita Ott., MD  
pregabalin (LYRICA) 150 mg capsule Take 1 Cap by mouth two (2) times a day. Max Daily Amount: 300 mg. 3/5/20  Yes Carlita Ott., MD  
insulin regular (NOVOLIN R, HUMULIN R) 100 unit/mL injection 24 units sc tid 3/5/20  Yes Carlita Ott., MD  
ferrous sulfate (IRON) 325 mg (65 mg iron) EC tablet Take 1 Tab by mouth Daily (before breakfast). 19  Yes Carlita Ott., MD  
amLODIPine (NORVASC) 10 mg tablet Take 1 Tab by mouth daily. 19  Yes Carilta Ott., MD  
insulin aspart U-100 (NOVOLOG FLEXPEN U-100 INSULIN) 100 unit/mL (3 mL) inpn by SubCUTAneous route. Yes Provider, Historical  
fluticasone propionate (FLONASE) 50 mcg/actuation nasal spray SPRAY TWO SPRAYS IN THE AFFECTED NOSTRIL DAILY 19  Yes Carlita Hawkins MD  
aspirin delayed-release 81 mg tablet Take 81 mg by mouth daily. Yes Provider, Historical  
acetaminophen (PAIN AND FEVER) 500 mg tablet TAKE ONE TABLET BY MOUTH EVERY 6 HOURS AS NEEDED FOR PAIN 18  Yes Carlita Ott., MD  
torsemide [facility]) 20 mg tablet Take 1 Tab by mouth two (2) times a day. SI TABS BID 20   Carlita Ott., MD  
hydrALAZINE (APRESOLINE) 100 mg tablet Take 1 Tab by mouth three (3) times daily. 20   Carlita Ott., MD  
gabapentin (NEURONTIN) 300 mg capsule Take 1 Cap by mouth three (3) times daily. Max Daily Amount: 900 mg. 20   Carlita Ott., MD  
triamcinolone acetonide (KENALOG) 0.1 % ointment APPLY A THIN LAYER TO AFFECTED AREA(S) TWO TIMES A DAY **DO NOT EXCEED 2.66 GRAMS PER DAY** 20   Carlita Ott., MD  
tamsulosin (FLOMAX) 0.4 mg capsule TAKE ONE CAPSULE BY MOUTH DAILY 20   Carlita Ott., MD  
diclofenac (VOLTAREN) 1 % gel Apply  to affected area four (4) times daily.  20   Carlita Hawkins MD  
ammonium lactate (AMLACTIN) 12 % topical cream Apply  to affected area two (2) times a day. rub in to affected area well 3/10/20   Cas Valadez MD  
ammonium lactate (LAC-HYDRIN FIVE) 5 % lotion Apply 1 Applicator to affected area daily. Apply daily bilateral lower legs 3/5/20   Cas Valadez MD  
ketoconazole (NIZORAL) 2 % topical cream Apply  to affected area two (2) times a day. 20   Cas Valadez MD  
ketoconazole (NIZORAL) 2 % shampoo Sig:shampoo once a week 20   Cas Valadez MD  
bisacodyl (DULCOLAX, BISACODYL,) 10 mg suppository Insert 10 mg into rectum daily. Provider, Historical  
Insulin Needles, Disposable, (NOVOFINE 32) 32 gauge x 1/4\" ndle Sig:use 4 times a day as needed 19   Cas Valadez MD  
NOVOLIN R REGULAR U-100 INSULN 100 unit/mL injection INJECT 14 UNITS UNDER THE SKIN THREE TIMES A DAY WITH MEALS AS NEEDED 19   Cas Valadez MD  
Insulin Syringe-Needle U-100 (BD INSULIN SYRINGE ULTRA-FINE) 1 mL 31 gauge x  syrg USE TO INJECT INSULIN FIVE TIMES A DAY 6/10/19   Cas Valadez MD  
Calcium Carbonate-Vit D3-Min 600 mg calcium- 400 unit tab Take 1 Tab by mouth two (2) times a day. Provider, Historical  
 
No Known Allergies Family History Family history unknown: Yes  
 
Social History Socioeconomic History  Marital status: LEGALLY  Spouse name: Not on file  Number of children: Not on file  Years of education: Not on file  Highest education level: Not on file Occupational History  Not on file Social Needs  Financial resource strain: Not on file  Food insecurity Worry: Not on file Inability: Not on file  Transportation needs Medical: Not on file Non-medical: Not on file Tobacco Use  Smoking status: Current Every Day Smoker Packs/day: 1.00 Last attempt to quit: 2019 Years since quittin.5  Smokeless tobacco: Never Used Substance and Sexual Activity  Alcohol use: Not Currently Alcohol/week: 11.7 standard drinks Types: 7 Cans of beer, 7 Shots of liquor per week  Drug use: Yes Types: Marijuana, Cocaine  Sexual activity: Yes  
  Partners: Female Lifestyle  Physical activity Days per week: Not on file Minutes per session: Not on file  Stress: Not on file Relationships  Social connections Talks on phone: Not on file Gets together: Not on file Attends Taoism service: Not on file Active member of club or organization: Not on file Attends meetings of clubs or organizations: Not on file Relationship status: Not on file  Intimate partner violence Fear of current or ex partner: Not on file Emotionally abused: Not on file Physically abused: Not on file Forced sexual activity: Not on file Other Topics Concern [address] Service Not Asked  Blood Transfusions Not Asked  Caffeine Concern Not Asked  Occupational Exposure Not Asked Wil[employer] Hazards Not Asked  Sleep Concern Not Asked  Stress Concern Not Asked  Weight Concern Not Asked  Special Diet Not Asked  Back Care Not Asked  Exercise Not Asked  Bike Helmet Not Asked  Seat Belt Not Asked  Self-Exams Not Asked Social History Narrative  Not on file Vitals:  
 05/19/20 1423 BP: 165/79 Pulse: 83 Resp: 16 Temp: 97.7 °F (36.5 °C) Review of Systems: 
 
Gen: No fever, chills, malaise, weight loss/gain. Heent: No headache, rhinorrhea, epistaxis, ear pain, hearing loss, sinus pain, neck pain/stiffness, sore throat. Heart: No chest pain, palpitations, RUSSELL, pnd, or orthopnea. Resp: No cough, hemoptysis, wheezing and shortness of breath. GI: No nausea, vomiting, diarrhea, constipation, melena or hematochezia. : No urinary obstruction, dysuria or hematuria. Derm: see below Musc/skeletal: no bone or joint complains. Vasc: No edema, cyanosis or claudication. Endo: No heat/cold intolerance, no polyuria,polydipsia or polyphagia. Neuro: No unilateral weakness, numbness, tingling. No seizures. Heme: No easy bruising or bleeding. Wound Description: Wound Type: Diabetic Indication:Chronic >30days Status: Improving Measurements: 
  Wound Length:  7.5 Wound Width : 4.5 Wound Depth : 0.7 Tissue Type:  
Epithelial: with necrosis Granulation: with necrosis Subcutaneous: with necrosis Slough: yes Eschar: yes Drainage: stable Debridement: required today to promote wound healing Character of Ulcer: Improved Indication for Debridement: Slough, Exudate, Abnormal wound edge and Abnormal Wound base Pre debridement measurements: 7.5 cm x 4.5 cm x 0.7 cm Risks of procedure were discussed with patient. Consent has been signed. Anesthetic: Lidocaine 4% topical cream  
 
Level of Debridement: Subctaneous Tissue Tissue and/or Material removed: Eschar, Exudate, Fat, Fibrin/ Slough, Skin and Subcutaneous Type of Tissue: Non- Viable Pre-debridement severity: Fat Layer Exposed Post debridement severity: Fat Layer exposed Instrument(s) used: Scalpel Bleeding controlled with: Pressure Estimated blood loss: Minimal 
 
Pre debridement measurements: 7.7 cm x 4.7 cm x 1 cm Post procedural pain: none Patient tolerated procedure well. Infection: no 
 
Lower Extremity Circulation Reviewed Dr. Ossie Baumgarten last note and patient's latest studies [place] [address] [facility] [place] revealing adequate pressures for debridement. Placed copies in patient's chart. Neuropathy: insensate Offloading: yes Discussed importance of keeping pressure off of area. Nicotine/Tobacco Use: Patient is currently smoking. Discussed the importance of tobacco cessation. Discussed that he could possible reocclude his stents if he continues to smoke. Nutrition: 
Status Obese Diabetic type 2 Assessment: [free text] Plan: -Reviewed patient's results from [facility] showing significant improvement in arterial flow of the RLE. -Previously he and my partner Dr. Humphrey Learn and discussed surgical debridement of the right foot with an amniotic graft once the vascular status was optimized. Now patient relates that he has seen significant improvement in the wound dimensions since the revascularization and he would like to wait and see if there is local wound care before consenting to another surgical procedure. I discussed with him that it is a reasonable option and that we can monitor for a period of time in the wound center and if his progress stagnates that we will schedule him for a surgical debridement with graft application. 
-Debridement performed as noted above 
-Discussed the importance of tobacco cessation and that he is at risk of compromising his revascularization if he continues to smoke. 
-Continue santyl, dsd with applications by Marcelo Alba. -Follow-up 2 weeks.

## 2020-05-19 NOTE — WOUND CARE
05/19/20 1524 Wound Foot Right;Lateral  
Date First Assessed/Time First Assessed: 02/10/20 1457   Present on Hospital Admission: Yes  Wound Approximate Age at First Assessment (Weeks): 4 weeks  Primary Wound Type: Diabetic Ulcer  Location: Foot  Wound Location Orientation: Right;Lateral  
Dressing Type Applied Moist to dry;Gauze;Gauze wrap (allyn); Special tape (comment) (santyl nickel thick, saline moist gauze)

## 2020-05-19 NOTE — WOUND CARE
05/19/20 1426 Wound Foot Right;Lateral  
Date First Assessed/Time First Assessed: 02/10/20 1457   Present on Hospital Admission: Yes  Wound Approximate Age at First Assessment (Weeks): 4 weeks  Primary Wound Type: Diabetic Ulcer  Location: Foot  Wound Location Orientation: Right;Lateral  
Dressing Status Removed Dressing Type Gauze;Gauze wrap (allyn); Special tape (comment) Wound Length (cm) 7.5 cm Wound Width (cm) 4.5 cm Wound Depth (cm) 0.7 cm Wound Surface Area (cm^2) 33.75 cm^2 Wound Volume (cm^3) 23.62 cm^3 Change in Wound Size % 52.3 Condition of Base Eschar;Slough Condition of Edges Open Drainage Amount Moderate Drainage Color Serous Wound Odor Mild Merari-wound Assessment Intact Cleansing and Cleansing Agents  Normal saline Visit Vitals /79 Pulse 83 Temp 97.7 °F (36.5 °C) Resp 16 RLE Peripheral Vascular Capillary Refill: Less than/equal to 3 seconds (05/19/20 1425) Color: Appropriate for race(pigmentation) (05/19/20 1425) Temperature: Warm (05/19/20 1425) Sensation: Decreased (05/19/20 1425) Pedal Pulse: Doppler (05/19/20 1425)

## 2020-05-19 NOTE — DISCHARGE INSTRUCTIONS
Discharge Instructions/Wound [address]  [place], [address]  Telephone: [phone]       Wound Care Orders:    Wound plantar left foot wound. .   Clean wound with sop and water or NS, pat dry. Prasanth Campbell Apply santyl, nickel thick, NS moist dressing, dry gauze, Care to be done daily. .. return to see MD in 2 weeks. Treatment Orders:   Elevate leg(s) above the level of the heart when sitting, if swelling present. Avoid prolonged standing in one place. Wear off-loading shoe/boot on the affected foot when walking. Do not get dressing/cast wet. Do not use objects to scratch inside cast/wrap. Follow diet as prescribed:  [x] Diet as tolerated: [x] Diabetic Diet: Low carb no sugar [] No Added Salt:  [] Increase Protein: [] Other:               Follow-up:  [x] Return Appointment: With DR Janiya Montemayor   in  [facility])  [] Ordered tests:      Electronically signed Sher Schrader RN on 5/19/2020 at 3:03 PM     Rachele Pickering 281: Should you experience any significant changes in your wound(s) or have questions about your wound care, please contact the [address] [place] [address] at [address] [place] [address] 8:00 am - 4:30. If you need help with your wound outside these hours and cannot wait until we are again available, contact your PCP or go to the hospital emergency room. PLEASE NOTE: IF YOU ARE UNABLE TO OBTAIN WOUND SUPPLIES, CONTINUE TO USE THE SUPPLIES YOU HAVE AVAILABLE UNTIL YOU ARE ABLE TO REACH US. IT IS MOST IMPORTANT TO KEEP THE WOUND COVERED AT ALL TIMES.      Physician Signature:_______________________    Date: ___________ Time:  ____________

## 2020-06-29 NOTE — PROGRESS NOTES
Fernanda Rivero is a 72 y.o. male evaluated via telephone on 6/29/2020. Consent: 
He and/or health care decision maker is aware that he may receive a bill for this telephone service, depending on his insurance coverage, and has provided verbal consent to proceed: Yes Documentation: I communicated with the patient and/or health care decision maker about dm. Details of this discussion including any medical advice provided: gastritis I affirm this is a Patient Initiated Episode with an Established Patient who has not had a related appointment within my department in the past 7 days or scheduled within the next 24 hours. Total Time: minutes: 11-20 minutes Note: not billable if this call serves to triage the patient into an appointment for the relevant concern Juan Jiang MD   
Hypertension Review: 
The patient has essential hypertension . Diet and Lifestyle: generally follows a low sodium diet, exercises sporadically Home BP Monitoring: is not measured at home. Pertinent ROS: taking medications as instructed, no medication side effects noted, no TIA's, no chest pain on exertion, no dyspnea on exertion, no swelling of ankles. Diabetes Mellitus Review: He has diabetes mellitus. Diabetic ROS - medication compliance: compliant all of the time, diabetic diet compliance: compliant all of the time, home glucose monitoring: is performed. Known diabetic complications: none Cardiovascular risk factors: family history, dyslipidemia, diabetes mellitus, obesity, hypertension Current diabetic medications include /insulin Eye exam current (within one year): no 
Weight trend: stable Prior visit with dietician: no 
Current diet: \"healthy\" diet  in general 
Current exercise: walking Home blood sugar records: trend: stable Any episodes of hypoglycemia? no 
 
 
States he has stage 4 prostate cancer and is on medication He has had previous radiation Review of Systems Constitutional: negative for fevers, chills, anorexia and weight loss Eyes:   negative for visual disturbance and irritation ENT:   negative for tinnitus,sore throat,nasal congestion,ear pains. hoarseness Respiratory:  negative for cough, hemoptysis, dyspnea,wheezing CV:   negative for chest pain, palpitations, lower extremity edema GI:   negative for nausea, vomiting, diarrhea, abdominal pain,melena Endo:               negative for polyuria,polydipsia,polyphagia,heat intolerance Genitourinary: negative for frequency, dysuria and hematuria Integument:  negative for rash and pruritus Hematologic:  negative for easy bruising and gum/nose bleeding Musculoskel: negative for myalgias, arthralgias, back pain, muscle weakness, joint pain Neurological:  negative for headaches, dizziness, vertigo, memory problems and gait Behavl/Psych: negative for feelings of anxiety, depression, mood changes Past Medical History:  
Diagnosis Date  Arthritis, Degenerative,  Knee 4/4/2011  Carcinoma, Prostate 4/4/2011  Degenerative disc disease 4/4/2011  Depression/ Anxiety 4/4/2011  Diabetes [address]  Diabetes Mellitrus ( non-insulin dependent ) Type 2 4/4/2011  
 Heart failure ([facility]  HEMATOCHEZIA 4/4/2011  Hypertrension 4/4/2011  Hypertriglyceridemia 4/4/2011  Ill-defined condition   
 lymphadema  Insomnia 4/4/2011  Laryngitis 4/4/2011  Mild Aortic insufficiency 4/4/2011  Mild Concentric LVH (left ventricular hypertrophy) 4/4/2011  Neuropathy 4/4/2011  Osteoarthritis 4/4/2011  Rotator Cuff Tendonitis 4/4/2011 Past Surgical History:  
Procedure Laterality Date  CARDIAC SURG PROCEDURE UNLIST    
 cardiac cath  COLONOSCOPY N/A 4/28/2017 COLONOSCOPY performed by Lindsay Pablo MD at [facility] ENDOSCOPY  
 HX ORTHOPAEDIC    
 left hip pinning  HX OTHER SURGICAL    
 left little toe amputation  HX UROLOGICAL Social History Socioeconomic History  Marital status: LEGALLY  Spouse name: Not on file  Number of children: Not on file  Years of education: Not on file  Highest education level: Not on file Tobacco Use  Smoking status: Current Every Day Smoker Packs/day: 1.00 Last attempt to quit: 2019 Years since quittin.6  Smokeless tobacco: Never Used Substance and Sexual Activity  Alcohol use: Not Currently Alcohol/week: 11.7 standard drinks Types: 7 Cans of beer, 7 Shots of liquor per week  Drug use: Yes Types: Marijuana, Cocaine  Sexual activity: Yes  
  Partners: Female Other Topics Concern Family History Family history unknown: Yes Current Outpatient Medications Medication Sig Dispense Refill  Insulin Syringe-Needle U-100 (BD Insulin Syringe Ultra-Fine) 1 mL 31 gauge x 5/16 syrg USE TO INJECT INSULIN FIVE TIMES A  Syringe 10  
 ferrous sulfate 325 mg (65 mg iron) tablet TAKE ONE TABLET BY MOUTH DAILY BEFORE BREAKFAST 90 Tab 5  torsemide (DEMADEX) 20 mg tablet Take 1 Tab by mouth two (2) times a day. SI TABS BID 1440 Tab 0  
 hydrALAZINE (APRESOLINE) 100 mg tablet Take 1 Tab by mouth three (3) times daily. 270 Tab 3  
 triamcinolone acetonide (KENALOG) 0.1 % ointment APPLY A THIN LAYER TO AFFECTED AREA(S) TWO TIMES A DAY **DO NOT EXCEED 2.66 GRAMS PER DAY** 30 g 11  tamsulosin (FLOMAX) 0.4 mg capsule TAKE ONE CAPSULE BY MOUTH DAILY 90 Cap 2  
 insulin glargine (LANTUS U-100 INSULIN) 100 unit/mL injection 72 Units by SubCUTAneous route daily. 6 Vial 10  
 pregabalin (LYRICA) 150 mg capsule Take 1 Cap by mouth two (2) times a day. Max Daily Amount: 300 mg. 60 Cap 3  
 insulin regular (NOVOLIN R, HUMULIN R) 100 unit/mL injection 24 units sc tid 1 Vial 12  
 amLODIPine (NORVASC) 10 mg tablet Take 1 Tab by mouth daily. 30 Tab 12  
 insulin aspart U-100 (NOVOLOG FLEXPEN U-100 INSULIN) 100 unit/mL (3 mL) inpn by SubCUTAneous route.  Insulin Needles, Disposable, (NOVOFINE 32) 32 gauge x 1/4\" ndle Sig:use 4 times a day as needed 100 Pen Needle 12  
 NOVOLIN R REGULAR U-100 INSULN 100 unit/mL injection INJECT 14 UNITS UNDER THE SKIN THREE TIMES A DAY WITH MEALS AS NEEDED 10 Vial 11  
 fluticasone propionate (FLONASE) 50 mcg/actuation nasal spray SPRAY TWO SPRAYS IN THE AFFECTED NOSTRIL DAILY 1 Bottle 10  
 aspirin delayed-release 81 mg tablet Take 81 mg by mouth daily.  gabapentin (NEURONTIN) 300 mg capsule Take 1 Cap by mouth three (3) times daily. Max Daily Amount: 900 mg. 90 Cap 3  
 diclofenac (VOLTAREN) 1 % gel Apply  to affected area four (4) times daily. 100 g 12  
 ammonium lactate (AMLACTIN) 12 % topical cream Apply  to affected area two (2) times a day. rub in to affected area well 400 g 3  
 ammonium lactate (LAC-HYDRIN FIVE) 5 % lotion Apply 1 Applicator to affected area daily. Apply daily bilateral lower legs 222 mL 12  
 ketoconazole (NIZORAL) 2 % topical cream Apply  to affected area two (2) times a day. 15 g 0  
 ketoconazole (NIZORAL) 2 % shampoo Sig:shampoo once a week 120 mL 3  
 bisacodyl (DULCOLAX, BISACODYL,) 10 mg suppository Insert 10 mg into rectum daily.  acetaminophen (PAIN AND FEVER) 500 mg tablet TAKE ONE TABLET BY MOUTH EVERY 6 HOURS AS NEEDED FOR PAIN 60 Tab 2  
 Calcium Carbonate-Vit D3-Min 600 mg calcium- 400 unit tab Take 1 Tab by mouth two (2) times a day. No Known Allergies Objective: There were no vitals taken for this visit. Results for orders placed or performed in visit on 03/05/20 AMB POC GLUCOSE BLOOD, BY GLUCOSE MONITORING DEVICE Result Value Ref Range Glucose  mg/dL Assessment/Plan: ICD-10-CM ICD-9-CM 1. Diabetic polyneuropathy associated with type 2 diabetes mellitus (HCC) E11.42 250.60   
  357.2 2. Essential hypertension I10 401.9 3. Carcinoma, Prostate C61 185 4.  Anemia due to stage 3 chronic kidney disease (HCC) N18.3 285.21   
 D63.1 585.3 5. Acute superficial gastritis without hemorrhage K29.00 535.40 6. Severe obesity (BMI 35.0-39. 9) with comorbidity ([facility] E66.01 278.01 No orders of the defined types were placed in this encounter. call if any problems, There are no Patient Instructions on file for this visit. I have reviewed with the patient details of the assessment and plan and all questions were answered. Relevent patient education was performed. The most recent lab findings were reviewed with the patient. An After Visit Summary was printed and given to the patient.

## 2020-06-29 NOTE — PATIENT INSTRUCTIONS
[employer] Activation Thank you for requesting access to [employer]. Please follow the instructions below to securely access and download your online medical record. [employer] allows you to send messages to your doctor, view your test results, renew your prescriptions, schedule appointments, and more. How Do I Sign Up? 1. In your internet browser, go to [email or web address] 
2. Click on the First Time User? Click Here link in the Sign In box. You will be redirect to the New Member Sign Up page. 3. Enter your [employer] Access Code exactly as it appears below. You will not need to use this code after youve completed the sign-up process. If you do not sign up before the expiration date, you must request a new code. [employer] Access Code: Activation code not generated Current [employer] Status: Active (This is the date your [employer] access code will ) 4. Enter the last four digits of your Social Security Number (xxxx) and Date of Birth (mm/dd/yyyy) as indicated and click Submit. You will be taken to the next sign-up page. 5. Create a [employer] ID. This will be your [employer] login ID and cannot be changed, so think of one that is secure and easy to remember. 6. Create a [employer] password. You can change your password at any time. 7. Enter your Password Reset Question and Answer. This can be used at a later time if you forget your password. 8. Enter your e-mail address. You will receive e-mail notification when new information is available in [address] 9. Click Sign Up. You can now view and download portions of your medical record. 10. Click the Download Summary menu link to download a portable copy of your medical information. Additional Information If you have questions, please visit the Frequently Asked Questions section of the [employer] website at [email or web address] Remember, [employer] is NOT to be used for urgent needs. For medical emergencies, dial 911.

## 2020-06-29 NOTE — PROGRESS NOTES
Chief Complaint Patient presents with  Transitions Of Care 3 most recent PHQ Screens 6/29/2020 Little interest or pleasure in doing things Not at all Feeling down, depressed, irritable, or hopeless Not at all Total Score PHQ 2 0  
 
1. Have you been to the ER, urgent care clinic since your last visit? Hospitalized since your last visit? Yes , [facility] 
 
2. Have you seen or consulted any other health care providers outside of the [address] [place] [address] since your last visit? Include any pap smears or colon screening. Yes ,[facility]

## 2020-08-20 NOTE — PROGRESS NOTES
Nicolasa North is a 72 y.o. male evaluated via telephone on 8/20/2020. Consent: 
He and/or health care decision maker is aware that he may receive a bill for this telephone service, depending on his insurance coverage, and has provided verbal consent to proceed: Yes Documentation: I communicated with the patient and/or health care decision maker about melena. Details of this discussion including any medical advice provided: melena I affirm this is a Patient Initiated Episode with an Established Patient who has not had a related appointment within my department in the past 7 days or scheduled within the next 24 hours. Total Time: minutes: 5-10 minutes Note: not billable if this call serves to triage the patient into an appointment for the relevant concern Nam Elliott MD   
 
Diabetes Mellitus Review: He has diabetes mellitus. Diabetic ROS - medication compliance: compliant all of the time, diabetic diet compliance: compliant all of the time, home glucose monitoring: is performed. Known diabetic complications: none Cardiovascular risk factors: family history, dyslipidemia, diabetes mellitus, obesity, hypertension Current diabetic medications include oral agents/insulin Eye exam current (within one year): no 
Weight trend: stable Prior visit with dietician: no 
Current diet: \"healthy\" diet  in general 
Current exercise: walking Current monitoring regimen: home blood tests - daily Home blood sugar records: trend: stable Any episodes of hypoglycemia? no 
Is He on ACE inhibitor or angiotensin II receptor blocker? Yes Hypertension Review: 
The patient has essential hypertension Diet and Lifestyle: generally follows a  low sodium diet, exercises sporadically Home BP Monitoring: is not measured at home.  
Pertinent ROS: taking medications as instructed, no medication side effects noted, no TIA's, no chest pain on exertion, no dyspnea on exertion, no swelling of ankles. States he was treated for a av bleeding lesion in the stomach. Review of Systems Constitutional: negative for fevers, chills, anorexia and weight loss Eyes:   negative for visual disturbance and irritation ENT:   negative for tinnitus,sore throat,nasal congestion,ear pains. hoarseness Respiratory:  negative for cough, hemoptysis, dyspnea,wheezing CV:   negative for chest pain, palpitations, lower extremity edema GI:   negative for nausea, vomiting, diarrhea, abdominal pain,melena Endo:               negative for polyuria,polydipsia,polyphagia,heat intolerance Genitourinary: negative for frequency, dysuria and hematuria Integument:  negative for rash and pruritus Hematologic:  negative for easy bruising and gum/nose bleeding Musculoskel: negative for myalgias, arthralgias, back pain, muscle weakness, joint pain Neurological:  negative for headaches, dizziness, vertigo, memory problems and gait Behavl/Psych: negative for feelings of anxiety, depression, mood changes Past Medical History:  
Diagnosis Date  Arthritis, Degenerative,  Knee 4/4/2011  Carcinoma, Prostate 4/4/2011  Degenerative disc disease 4/4/2011  Depression/ Anxiety 4/4/2011  Diabetes ([facility] [address]  Diabetes Mellitrus ( non-insulin dependent ) Type 2 4/4/2011  
 Heart failure ([facility] [address]  HEMATOCHEZIA 4/4/2011  Hypertrension 4/4/2011  Hypertriglyceridemia 4/4/2011  Ill-defined condition   
 lymphadema  Insomnia 4/4/2011  Laryngitis 4/4/2011  Mild Aortic insufficiency 4/4/2011  Mild Concentric LVH (left ventricular hypertrophy) 4/4/2011  Neuropathy 4/4/2011  Osteoarthritis 4/4/2011  Rotator Cuff Tendonitis 4/4/2011 Past Surgical History:  
Procedure Laterality Date  CARDIAC SURG PROCEDURE UNLIST    
 cardiac cath  COLONOSCOPY N/A 4/28/2017 COLONOSCOPY performed by Jerson Andrade MD at [facility] ENDOSCOPY  
 HX ORTHOPAEDIC    
 left hip pinning  HX OTHER SURGICAL    
 left little toe amputation  HX UROLOGICAL Social History Socioeconomic History  Marital status: LEGALLY  Spouse name: Not on file  Number of children: Not on file  Years of education: Not on file  Highest education level: Not on file Tobacco Use  Smoking status: Current Every Day Smoker Packs/day: 1.00 Last attempt to quit: 2019 Years since quittin.8  Smokeless tobacco: Never Used Substance and Sexual Activity  Alcohol use: Not Currently Alcohol/week: 11.7 standard drinks Types: 7 Cans of beer, 7 Shots of liquor per week  Drug use: Yes Types: Marijuana, Cocaine  Sexual activity: Yes  
  Partners: Female Other Topics Concern Family History Family history unknown: Yes Current Outpatient Medications Medication Sig Dispense Refill  hydrOXYzine HCL (ATARAX) 50 mg tablet  ascorbic acid, vitamin C, (Vitamin C) 500 mg tablet Take  by mouth.  enzalutamide (XTANDI) 40 mg capsule Take 160 mg by mouth daily.  Thera-Tabs M 27 mg iron-400 mcg tab TAKE ONE TABLET BY MOUTH DAILY 30 Tab 9  
 pantoprazole (PROTONIX) 40 mg tablet Take 1 Tab by mouth two (2) times a day. One in the am and one in the pm 60 Tab 3  pregabalin (Lyrica) 150 mg capsule Take 1 Cap by mouth two (2) times a day. Max Daily Amount: 300 mg. 60 Cap 3  
 tamsulosin (FLOMAX) 0.4 mg capsule TAKE ONE CAPSULE BY MOUTH DAILY 90 Cap 2  
 bicalutamide (CASODEX) 50 mg tablet Take 1 Tab by mouth daily. 30 Tab 3  
 Insulin Syringe-Needle U-100 (BD Insulin Syringe Ultra-Fine) 1 mL 31 gauge x 5/16 syrg USE TO INJECT INSULIN FIVE TIMES A  Syringe 10  
 ferrous sulfate 325 mg (65 mg iron) tablet TAKE ONE TABLET BY MOUTH DAILY BEFORE BREAKFAST 90 Tab 5  torsemide (DEMADEX) 20 mg tablet Take 1 Tab by mouth two (2) times a day.  SI TABS BID 1440 Tab 0  
  hydrALAZINE (APRESOLINE) 100 mg tablet Take 1 Tab by mouth three (3) times daily. 270 Tab 3  
 triamcinolone acetonide (KENALOG) 0.1 % ointment APPLY A THIN LAYER TO AFFECTED AREA(S) TWO TIMES A DAY **DO NOT EXCEED 2.66 GRAMS PER DAY** 30 g 11  
 ammonium lactate (AMLACTIN) 12 % topical cream Apply  to affected area two (2) times a day. rub in to affected area well 400 g 3  
 insulin glargine (LANTUS U-100 INSULIN) 100 unit/mL injection 72 Units by SubCUTAneous route daily. 6 Vial 10  
 insulin regular (NOVOLIN R, HUMULIN R) 100 unit/mL injection 24 units sc tid 1 Vial 12  
 ketoconazole (NIZORAL) 2 % shampoo Sig:shampoo once a week 120 mL 3  
 amLODIPine (NORVASC) 10 mg tablet Take 1 Tab by mouth daily. 30 Tab 12  
 insulin aspart U-100 (NOVOLOG FLEXPEN U-100 INSULIN) 100 unit/mL (3 mL) inpn by SubCUTAneous route.  Insulin Needles, Disposable, (NOVOFINE 32) 32 gauge x 1/4\" ndle Sig:use 4 times a day as needed 100 Pen Needle 12  
 fluticasone propionate (FLONASE) 50 mcg/actuation nasal spray SPRAY TWO SPRAYS IN THE AFFECTED NOSTRIL DAILY 1 Bottle 10  
 acetaminophen (PAIN AND FEVER) 500 mg tablet TAKE ONE TABLET BY MOUTH EVERY 6 HOURS AS NEEDED FOR PAIN 60 Tab 2  
 Calcium Carbonate-Vit D3-Min 600 mg calcium- 400 unit tab Take 1 Tab by mouth two (2) times a day.  gabapentin (NEURONTIN) 300 mg capsule Take 1 Cap by mouth three (3) times daily. Max Daily Amount: 900 mg. 90 Cap 3  
 diclofenac (VOLTAREN) 1 % gel Apply  to affected area four (4) times daily. 100 g 12  
 ammonium lactate (LAC-HYDRIN FIVE) 5 % lotion Apply 1 Applicator to affected area daily. Apply daily bilateral lower legs 222 mL 12  
 ketoconazole (NIZORAL) 2 % topical cream Apply  to affected area two (2) times a day. 15 g 0  
 bisacodyl (DULCOLAX, BISACODYL,) 10 mg suppository Insert 10 mg into rectum daily.     
 NOVOLIN R REGULAR U-100 INSULN 100 unit/mL injection INJECT 14 UNITS UNDER THE SKIN THREE TIMES A DAY WITH MEALS AS NEEDED 10 Vial 11  
 aspirin delayed-release 81 mg tablet Take 81 mg by mouth daily. No Known Allergies Objective: There were no vitals taken for this visit. Results for orders placed or performed in visit on 03/05/20 AMB POC GLUCOSE BLOOD, BY GLUCOSE MONITORING DEVICE Result Value Ref Range Glucose  mg/dL Assessment/Plan: ICD-10-CM ICD-9-CM 1. Diabetic polyneuropathy associated with type 2 diabetes mellitus (HCC)  E11.42 250.60   
  357.2 2. Essential hypertension  I10 401.9 3. Carcinoma, Prostate  C61 185 4. Anemia due to stage 3 chronic kidney disease (HCC)  N18.3 285.21   
 D63.1 585.3 5. AV malformation of gastrointestinal tract  K55.20 569.84 Orders Placed This Encounter  hydrOXYzine HCL (ATARAX) 50 mg tablet  ascorbic acid, vitamin C, (Vitamin C) 500 mg tablet Sig: Take  by mouth.  enzalutamide (XTANDI) 40 mg capsule Sig: Take 160 mg by mouth daily. call if any problems, There are no Patient Instructions on file for this visit. Follow-up and Dispositions · Return in about 3 months (around 11/20/2020). I have reviewed with the patient details of the assessment and plan and all questions were answered. Relevent patient education was performed. The most recent lab findings were reviewed with the patient. An After Visit Summary was printed and given to the patient.

## 2020-08-20 NOTE — PROGRESS NOTES
Chief Complaint Patient presents with  
[facility] Follow Up  
 Medication Refill 1. Have you been to the ER, urgent care clinic since your last visit? Hospitalized since your last visit? No 
 
2. Have you seen or consulted any other health care providers outside of the [address] [place] [address] since your last visit? Include any pap smears or colon screening.  No

## 2020-12-28 PROBLEM — M86.9 OSTEOMYELITIS (HCC): Status: ACTIVE | Noted: 2020-01-01

## 2020-12-28 NOTE — H&P
[facility] History and Physical 
 
Primary Care Provider: Jason Rinaldi MD 
Date of Service:  12/28/2020 Subjective: Gregg Tadeo is a 72 y.o. male with PMH of prostatic cancer s/p radiation, IDDM, heart failure, htn. He apparently told the ER physician that he c/o onset of diarrhea yesterday and some chills over the last 2 days, with occasional cough. For me, however, he denies all of these symptoms and states he came in because his caregiver was concerned d/t his lethargy over the last several days to weeks. He lives home alone and has a caregiver five times a week. He denies cp, sob, fever, chills, sweats, N/V. He has had a wound on his right foot for months. He denies any foot pain d/t neuropathy. He goes to [facility] for nephrology and states he last saw them a few months ago. Prior to today, he believes it has been several months since he last had any blood work. Denies etoh or illicits. Smokes just under 1 PPD. Currently no acute complaints except asking for water. States he takes all his meds daily. Past Medical History:  
Diagnosis Date  Arthritis, Degenerative,  Knee 4/4/2011  Carcinoma, Prostate 4/4/2011  Degenerative disc disease 4/4/2011  Depression/ Anxiety 4/4/2011  Diabetes ([facility] [address]  Diabetes Mellitrus ( non-insulin dependent ) Type 2 4/4/2011  
 Heart failure ([facility] [address]  HEMATOCHEZIA 4/4/2011  Hypertrension 4/4/2011  Hypertriglyceridemia 4/4/2011  Ill-defined condition   
 lymphadema  Insomnia 4/4/2011  Laryngitis 4/4/2011  Mild Aortic insufficiency 4/4/2011  Mild Concentric LVH (left ventricular hypertrophy) 4/4/2011  Neuropathy 4/4/2011  Osteoarthritis 4/4/2011  Rotator Cuff Tendonitis 4/4/2011 Past Surgical History:  
Procedure Laterality Date  CARDIAC SURG PROCEDURE UNLIST    
 cardiac cath  COLONOSCOPY N/A 4/28/2017 COLONOSCOPY performed by Jalyn Carpio MD at [facility] ENDOSCOPY  HX ORTHOPAEDIC    
 left hip pinning  HX OTHER SURGICAL    
 left little toe amputation  HX UROLOGICAL Prior to Admission medications Medication Sig Start Date End Date Taking? Authorizing Provider  
pregabalin (LYRICA) 150 mg capsule TAKE ONE CAPSULE BY MOUTH TWICE A DAY 12/7/20   Margarita Kearns MD  
bicalutamide (CASODEX) 50 mg tablet TAKE ONE TABLET BY MOUTH DAILY 11/30/20   Margarita Kearns MD  
torsemide (DEMADEX) 20 mg tablet TAKE FOUR TABLETS BY MOUTH TWICE A DAY 11/23/20   Margarita Kearns MD  
pantoprazole (PROTONIX) 40 mg tablet TAKE ONE TABLET BY MOUTH TWICE DAILY ONCE IN THE MORNING AND ONCE IN THE EVENING 11/18/20   Margarita Kearns MD  
hydrOXYzine HCL (ATARAX) 50 mg tablet TAKE ONE TABLET BY MOUTH THREE TIMES A DAY AS NEEDED FOR ITCHING FOR UP TO 10 DAYS 10/22/20   Margarita Kearns MD  
amLODIPine (NORVASC) 10 mg tablet TAKE ONE TABLET BY MOUTH DAILY 10/2/20   Margarita Kearns MD  
hydrALAZINE (APRESOLINE) 100 mg tablet Take 1 Tab by mouth three (3) times daily. 9/16/20   Margarita Kearns MD  
ascorbic acid, vitamin C, (Vitamin C) 500 mg tablet Take  by mouth. Provider, Historical  
enzalutamide Enrico Mends) 40 mg capsule Take 160 mg by mouth daily. Provider, Historical  
Thera-Tabs M 27 mg iron-400 mcg tab TAKE ONE TABLET BY MOUTH DAILY 7/17/20   Margarita Kearns MD  
tamsulosin (FLOMAX) 0.4 mg capsule TAKE ONE CAPSULE BY MOUTH DAILY 7/16/20   Margarita Kearns MD  
Insulin Syringe-Needle U-100 (BD Insulin Syringe Ultra-Fine) 1 mL 31 gauge x 5/16 syrg USE TO INJECT INSULIN FIVE TIMES A DAY 6/16/20   Margarita Kearns MD  
ferrous sulfate 325 mg (65 mg iron) tablet TAKE ONE TABLET BY MOUTH DAILY BEFORE BREAKFAST 6/16/20   Margarita Kearns MD  
gabapentin (NEURONTIN) 300 mg capsule Take 1 Cap by mouth three (3) times daily.  Max Daily Amount: 900 mg. 5/25/20   Margarita Kearns MD  
 triamcinolone acetonide (KENALOG) 0.1 % ointment APPLY A THIN LAYER TO AFFECTED AREA(S) TWO TIMES A DAY **DO NOT EXCEED 2.66 GRAMS PER DAY** 5/19/20   Juanpablo Nunez MD  
diclofenac (VOLTAREN) 1 % gel Apply  to affected area four (4) times daily. 4/16/20   Juanpablo Nunez MD  
ammonium lactate (AMLACTIN) 12 % topical cream Apply  to affected area two (2) times a day. rub in to affected area well 3/10/20   Juanpablo Nunez MD  
ammonium lactate (LAC-HYDRIN FIVE) 5 % lotion Apply 1 Applicator to affected area daily. Apply daily bilateral lower legs 3/5/20   Juanpablo Nunez MD  
insulin glargine (LANTUS U-100 INSULIN) 100 unit/mL injection 72 Units by SubCUTAneous route daily. 3/5/20   Juanpablo Nunez MD  
insulin regular (NOVOLIN R, HUMULIN R) 100 unit/mL injection 24 units sc tid 3/5/20   Juanpablo Nunez MD  
ketoconazole (NIZORAL) 2 % topical cream Apply  to affected area two (2) times a day. 2/4/20   Juanpablo Nunez MD  
ketoconazole (NIZORAL) 2 % shampoo Sig:shampoo once a week 2/4/20   Juanpablo Nunez MD  
bisacodyl (DULCOLAX, BISACODYL,) 10 mg suppository Insert 10 mg into rectum daily. Provider, Historical  
insulin aspart U-100 (NOVOLOG FLEXPEN U-100 INSULIN) 100 unit/mL (3 mL) inpn by SubCUTAneous route. Provider, Historical  
Insulin Needles, Disposable, (NOVOFINE 32) 32 gauge x 1/4\" ndle Sig:use 4 times a day as needed 11/13/19   Juanpablo Nunez MD  
NOVOLIN R REGULAR U-100 INSULN 100 unit/mL injection INJECT 14 UNITS UNDER THE SKIN THREE TIMES A DAY WITH MEALS AS NEEDED 8/6/19   Juanpablo Nunez MD  
fluticasone propionate (FLONASE) 50 mcg/actuation nasal spray SPRAY TWO SPRAYS IN THE AFFECTED NOSTRIL DAILY 7/16/19   Juanpablo Nunez MD  
aspirin delayed-release 81 mg tablet Take 81 mg by mouth daily.     Provider, Historical  
 acetaminophen (PAIN AND FEVER) 500 mg tablet TAKE ONE TABLET BY MOUTH EVERY 6 HOURS AS NEEDED FOR PAIN 8/28/18   Zhao Mccarthy MD  
Calcium Carbonate-Vit D3-Min 600 mg calcium- 400 unit tab Take 1 Tab by mouth two (2) times a day. Provider, Historical  
 
No Known Allergies Family History Family history unknown: Yes Review of Systems: A comprehensive review of systems was negative except for that written in the History of Present Illness. Objective:  
 
Physical Exam:  
General:          Drowsy but easily arousable, no distress, appears stated age Head:    Normocephalic, atraumatic Eyes:   Conjunctivae clear, no scleral icterus, EOMs intact ENT:   Mucosa normal. No drainage or sinus tenderness. Neck:               Supple, symmetrical, trachea midline Back:               Symmetric, ROM normal. No CVA tenderness Lungs:             Mildly coarse, grossly clear, no respiratory distress Chest wall:      No tenderness or deformity Heart:              Regular rate and rhythm, S1, S2 normal 
Abdomen:        Soft, non-tender, non-distended Extremities:     Extensive RLE open wound with likely exposed bone, malodorous Neurologic:      Sleepy but arousable, awake, alert. Nonfocal. CNII-XII grossly intact. Decreased sensation b/l LE 
 
ECG:  sinus tachycardia, no acute changes Data Review: All diagnostic labs and studies have been reviewed. Chest x-ray Bilateral patchy interstitial and airspace opacities Assessment:  
 
Active Problems: 
  Osteomyelitis ([address] (12/28/2020) Plan:  
 
Severe sepsis 2/2 osteomyelitis right foot Blood cxs pending S/p Vanc, zosyn Cont vanc, rocephin for now NS bolus for sepsis Podiatry ID Trend inflammatory markers Lactate wnl Airspace opacities, infection vs pulm edema HFpEF, EF 65% from 3/2019 BNP >9k Repeat Echo 
covid pending Hold home torsemide for now Toxic metabolic encephalopathy/drowsiness likely 2/2 above vs uremia? Cont to monitor Check TSH, B12 Acute on CKD, unknown baseline but likely around 2 Unclear if KATHI d/t sepsis or progression of CKD Pt follows with Dr. Matthew Tyler at [facility] Consult nephrology to assist, damien since pt is on demadex 80 bid Hold demadex for now given severe sepsis and KATHI, await nephro recs IDDM, last a1c 8.1 01/2020 Cont home insulin plus SSI Cont ASA 
gabapentin lower renal dose HTN, controlled Cont home meds (norvasc lower dose to avoid edema s/e) Hyponatremia 
monitor Acute on chronic anemia, baseline ~9 No s/sx of bleeding Iron studies Prostate ca Cont home meds Tobacco abuse Pt agreed to patch Counseled DVT ppx: heparin Admit as inpt expected 2 MN Signed By: Rhys Sim MD   
 December 28, 2020 5:35 PM

## 2020-12-28 NOTE — ED TRIAGE NOTES
Pt arrives from home via squad from home with weakness, lethargy and diarrhea, pt has a wound on his foot and arm pit

## 2020-12-28 NOTE — ED PROVIDER NOTES
This is a 71-year-old male with a history of prostatic cancer, diabetes and heart failure. He also has a history of hypertension and mild aortic insufficiency. He presents today with an onset of diarrhea yesterday and some chills over the last 2 days. There is an occasional cough. He has had no fever and no nausea or vomiting. He denies any chest pain or abdominal pain. He has had a wound on his right foot for some time now and changes the dressing twice weekly. He is having some pain in the right foot. He denies any other acute symptomatology. Past Medical History:  
Diagnosis Date  Arthritis, Degenerative,  Knee 4/4/2011  Carcinoma, Prostate 4/4/2011  Degenerative disc disease 4/4/2011  Depression/ Anxiety 4/4/2011  Diabetes ([facility] [address]  Diabetes Mellitrus ( non-insulin dependent ) Type 2 4/4/2011  
 Heart failure ([facility] [address]  HEMATOCHEZIA 4/4/2011  Hypertrension 4/4/2011  Hypertriglyceridemia 4/4/2011  Ill-defined condition   
 lymphadema  Insomnia 4/4/2011  Laryngitis 4/4/2011  Mild Aortic insufficiency 4/4/2011  Mild Concentric LVH (left ventricular hypertrophy) 4/4/2011  Neuropathy 4/4/2011  Osteoarthritis 4/4/2011  Rotator Cuff Tendonitis 4/4/2011 Past Surgical History:  
Procedure Laterality Date  CARDIAC SURG PROCEDURE UNLIST    
 cardiac cath  COLONOSCOPY N/A 4/28/2017 COLONOSCOPY performed by Orion Rivera MD at [facility] ENDOSCOPY  
 HX ORTHOPAEDIC    
 left hip pinning  HX OTHER SURGICAL    
 left little toe amputation  HX UROLOGICAL Family History:  
Family history unknown: Yes  
 
 
Social History Socioeconomic History  Marital status: LEGALLY  Spouse name: Not on file  Number of children: Not on file  Years of education: Not on file  Highest education level: Not on file Occupational History  Not on file Social Needs  Financial resource strain: Not on file  Food insecurity Worry: Not on file Inability: Not on file  Transportation needs Medical: Not on file Non-medical: Not on file Tobacco Use  Smoking status: Current Every Day Smoker Packs/day: 1.00 Last attempt to quit: 2019 Years since quittin.1  Smokeless tobacco: Never Used Substance and Sexual Activity  Alcohol use: Not Currently Alcohol/week: 11.7 standard drinks Types: 7 Cans of beer, 7 Shots of liquor per week  Drug use: Yes Types: Marijuana, Cocaine  Sexual activity: Yes  
  Partners: Female Lifestyle  Physical activity Days per week: Not on file Minutes per session: Not on file  Stress: Not on file Relationships  Social connections Talks on phone: Not on file Gets together: Not on file Attends Worship service: Not on file Active member of club or organization: Not on file Attends meetings of clubs or organizations: Not on file Relationship status: Not on file  Intimate partner violence Fear of current or ex partner: Not on file Emotionally abused: Not on file Physically abused: Not on file Forced sexual activity: Not on file Other Topics Concern [address] Service Not Asked  Blood Transfusions Not Asked  Caffeine Concern Not Asked  Occupational Exposure Not Asked [employer] Hazards Not Asked  Sleep Concern Not Asked  Stress Concern Not Asked  Weight Concern Not Asked  Special Diet Not Asked  Back Care Not Asked  Exercise Not Asked  Bike Helmet Not Asked  Seat Belt Not Asked  Self-Exams Not Asked Social History Narrative  Not on file ALLERGIES: Patient has no known allergies. Review of Systems Constitutional: Positive for chills. Negative for activity change, appetite change, fatigue and fever. HENT: Negative for ear pain, facial swelling, sore throat and trouble swallowing. Eyes: Negative for pain, discharge and visual disturbance. Respiratory: Positive for cough ( Occasional). Negative for chest tightness, shortness of breath and wheezing. Cardiovascular: Positive for leg swelling ( Chronic). Negative for chest pain and palpitations. Gastrointestinal: Positive for diarrhea. Negative for abdominal pain, blood in stool, nausea and vomiting. Genitourinary: Negative for difficulty urinating, flank pain and hematuria. Musculoskeletal: Negative for arthralgias, joint swelling, myalgias and neck pain. Ulceration right foot Skin: Negative for color change and rash. Neurological: Negative for dizziness, weakness, numbness and headaches. Hematological: Negative for adenopathy. Does not bruise/bleed easily. Psychiatric/Behavioral: Negative for behavioral problems, confusion and sleep disturbance. All other systems reviewed and are negative. Vitals:  
 12/28/20 1446 BP: 129/77 Pulse: 85 Resp: 16 Temp: 96.9 °F (36.1 °C) SpO2: 97% Physical Exam 
Vitals signs and nursing note reviewed. Constitutional:   
   General: He is not in acute distress. Appearance: He is well-developed. He is obese. He is not ill-appearing. HENT:  
   Head: Normocephalic and atraumatic. Nose: Nose normal.  
Eyes:  
   General: No scleral icterus. Conjunctiva/sclera: Conjunctivae normal.  
   Pupils: Pupils are equal, round, and reactive to light. Neck: Musculoskeletal: Normal range of motion and neck supple. Thyroid: No thyromegaly. Vascular: No JVD. Trachea: No tracheal deviation. Comments: No carotid bruits noted. Cardiovascular:  
   Rate and Rhythm: Normal rate and regular rhythm. Heart sounds: Normal heart sounds. No murmur. No friction rub. No gallop. Pulmonary:  
   Effort: Pulmonary effort is normal. No respiratory distress. Breath sounds: Normal breath sounds. No wheezing or rales.   
Chest:  
 Chest wall: No tenderness. Abdominal:  
   General: Bowel sounds are normal. There is no distension. Palpations: Abdomen is soft. There is no mass. Tenderness: There is no abdominal tenderness. There is no guarding or rebound. Musculoskeletal: Normal range of motion. General: No tenderness. Right lower leg: Edema present. Left lower leg: Edema present. Comments: There is a large deep foul-smelling ulceration over the plantar and lateral surface of the right midfoot. A photo is attached on this chart. The dressing removed was exceptionally bloody. Lymphadenopathy:  
   Cervical: No cervical adenopathy. Skin: 
   General: Skin is warm and dry. Findings: No erythema or rash. Neurological:  
   Mental Status: He is alert and oriented to person, place, and time. Cranial Nerves: No cranial nerve deficit. Coordination: Coordination normal.  
   Deep Tendon Reflexes: Reflexes are normal and symmetric. Psychiatric:     
   Behavior: Behavior normal.     
   Thought Content: Thought content normal.     
   Judgment: Judgment normal.  
 
  
 
MDM Number of Diagnoses or Management Options Abnormal chest x-ray: new and requires workup Right foot infection: new and requires workup Amount and/or Complexity of Data Reviewed Clinical lab tests: ordered and reviewed Tests in the radiology section of CPT®: ordered and reviewed Decide to obtain previous medical records or to obtain history from someone other than the patient: yes Review and summarize past medical records: yes Discuss the patient with other providers: yes Independent visualization of images, tracings, or specimens: yes Risk of Complications, Morbidity, and/or Mortality Presenting problems: high Diagnostic procedures: high Management options: high Patient Progress Patient progress: stable Procedures Perfect Serve Consult for Admission 4:48 PM 
 
 ED Room Number: US30/76 Patient Name and age: Kayla Majano III 72 y.o.  male Working Diagnosis: 1. Right foot infection 2. Abnormal chest x-ray COVID-19 Suspicion:  yes Sepsis present:  no  Reassessment needed: yes Code Status:  Full Code Readmission: no 
Isolation Requirements:  yes Recommended Level of Care:  telemetry Department:[facility] Adult ED - [phone] Other:  Cannot rule out covid vs pneumonia. Foot is infected. Lactate in process

## 2020-12-28 NOTE — ED NOTES
Pt cleaned of bowel movement and placed in new brief. Pt repositioned for comfort. Call bell within reach. Will continue to monitor.

## 2020-12-29 NOTE — PROGRESS NOTES
Hospitalist Progress Note Chuck Elias MD 
Answering service: [phone] from in house phone Date of Service:  2020 NAME:  Deisy Hobbs III 
:  1950 MRN:  [id number] Admission Summary: As per initial admission summary Leonila Bowie is a 72 y.o. male with PMH of prostatic cancer s/p radiation, IDDM, heart failure, htn. He apparently told the ER physician that he c/o onset of diarrhea yesterday and some chills over the last 2 days, with occasional cough. For me, however, he denies all of these symptoms and states he came in because his caregiver was concerned d/t his lethargy over the last several days to weeks. He lives home alone and has a caregiver five times a week. He denies cp, sob, fever, chills, sweats, N/V. He has had a wound on his right foot for months. He denies any foot pain d/t neuropathy. He goes to [facility] for nephrology and states he last saw them a few months ago. Prior to today, he believes it has been several months since he last had any blood work. Denies etoh or illicits. Smokes just under 1 PPD. Currently no acute complaints except asking for water. States he takes all his meds daily. Interval history / Subjective:  
  Patient seen for Follow up of om, covid pui Patient seen and examined by the bedside, Labs, images and notes reviewed Patient is comfortable, denies any chest pain, SOB, abdominal pain, fevers, chills. No N/V/D Discussed with nursing staff, no acute issues overnight, orders reviewed. Today his hgb is 6.8 patient consented for blood transfusion Podiatry consulted Pt to OR tomorrow at noon for debridement of right foot ulcer with removal of infected bone. Assessment & Plan:  
 
Severe sepsis 2/2 osteomyelitis right foot XR shows bony destruction along distal aspect of 5th metatarsal and base of proximal phalanx. Blood cxs pending S/p neftaly Schafer Cont vanc, cefepime for now NS bolus for sepsis Podiatry ID Trend inflammatory markers Lactate wnl Plan for debridement tomorrow  
  Airspace opacities, infection vs pulm edema HFpEF, EF 65% from 3/2019 BNP >9k Repeat Echo 
covid pending Hold home torsemide for now 
  Toxic metabolic encephalopathy/drowsiness likely 2/2 above vs uremia? improved Cont to monitor 
  
Acute on CKD, unknown baseline but likely around 2 Unclear if KATHI d/t sepsis or progression of CKD Pt follows with Dr. Tabitha Alejo at [facility] Consulted nephrology to assist, damien since pt is on demadex 80 bid Hold demadex for now given severe sepsis and KATHI,  
  
IDDM, last a1c 8.1 01/2020 Cont home insulin plus SSI Cont ASA 
gabapentin lower renal dose 
  
HTN, controlled Cont home meds (norvasc lower dose to avoid edema s/e) 
  
Hyponatremia 
monitor 
  
Acute on chronic anemia, baseline ~9 No s/sx of bleeding 
  
Prostate ca Cont home meds 
  
Tobacco abuse Pt agreed to patch Counseled 
  
 
 
Code status: full code DVT prophylaxis: scds Care Plan discussed with: Patient/Family Disposition: Home w/Family and TBD Hospital Problems  Date Reviewed: 8/20/2020 Codes Class Noted POA * (Principal) Osteomyelitis ([facility] [address] ICD-10-CM: M86.9 ICD-9-CM: 730.20  12/28/2020 Yes Review of Systems: A comprehensive review of systems was negative except for that written in the HPI. Vital Signs:  
 Last 24hrs VS reviewed since prior progress note. Most recent are: 
Visit Vitals BP (!) 119/54 (BP 1 Location: Left arm, BP Patient Position: At rest) Pulse 84 Temp 98.3 °F (36.8 °C) Resp 18 Ht 6' (1.829 m) Wt 122.6 kg (270 lb 4.8 oz) SpO2 96% BMI 36.66 kg/m² No intake or output data in the 24 hours ending 12/29/20 1726 Physical Examination:  
I had a face to face encounter with this patient and independently examined them on December 29, 2020 as outlined below: Constitutional:  No acute distress, cooperative, pleasant ENT:  Oral mucous moist, oropharynx benign. Neck supple, Resp:  CTA bilaterally. No wheezing/rhonchi/rales. No accessory muscle use CV:  Regular rhythm, normal rate, no murmurs, gallops, rubs GI:  Soft, non distended, non tender. normoactive bowel sounds, no hepatosplenomegaly Musculoskeletal:  No edema, warm, 2+ pulses throughout. Extensive RLE open wound with likely exposed bone, malodorous Neurologic:  Moves all extremities. AAOx3, CN II-XII reviewed Psych:  Good insight, Not anxious nor agitated. Data Review:  
 Review and/or order of clinical lab test 
Review and/or order of tests in the radiology section of CPT Review and/or order of tests in the medicine section of CPT Labs:  
 
Recent Labs  
  12/29/20 0404 12/28/20 
1541 WBC 11.1 12.8* HGB 6.8* 7.9*  
HCT 21.1* 24.9*  
 316 Recent Labs  
  12/29/20 
0404 12/28/20 
1541 * 128* K 3.9 3.8 CL 97 98 CO2 22 19* BUN 60* 53* CREA 3.84* 3.33* * 379* CA 8.5 8.4* PHOS 3.5  --   
 
Recent Labs  
  12/29/20 
0404 12/28/20 
1541 ALT  --  10* AP  --  162* TBILI  --  0.4 TP  --  8.3* ALB 1.6* 1.8*  
GLOB  --  6.5* No results for input(s): INR, PTP, APTT, INREXT in the last 72 hours. Recent Labs  
  12/28/20 
1541 TIBC 203* PSAT 10* FERR 347 Lab Results Component Value Date/Time Folate 20.0 03/16/2019 04:06 AM  
  
No results for input(s): PH, PCO2, PO2 in the last 72 hours. Recent Labs  
  12/28/20 
1541 TROIQ <0.05 No results found for: CHOL, CHOLX, CHLST, CHOLV, HDL, HDLP, LDL, LDLC, DLDLP, TGLX, TRIGL, TRIGP, CHHD, CHHDX Lab Results Component Value Date/Time  Glucose (POC) 205 (H) 12/29/2020 04:21 PM  
 Glucose (POC) 140 (H) 12/29/2020 11:23 AM  
 Glucose (POC) 171 (H) 12/29/2020 06:15 AM  
 Glucose (POC) 444 (H) 12/28/2020 09:07 PM  
 Glucose (POC) 127 (H) 03/31/2019 11:55 AM  
 Glucose  03/05/2020 12:15 PM  
 Glucose  02/04/2020 02:04 PM  
 Glucose POC >444 01/07/2020 01:02 PM  
 Glucose  06/27/2019 11:50 AM  
 
Lab Results Component Value Date/Time Color YELLOW/STRAW 03/29/2019 02:34 PM  
 Appearance CLEAR 03/29/2019 02:34 PM  
 Specific gravity 1.017 03/29/2019 02:34 PM  
 pH (UA) 5.0 03/29/2019 02:34 PM  
 Protein 100 (A) 03/29/2019 02:34 PM  
 Glucose NEGATIVE  03/29/2019 02:34 PM  
 Ketone NEGATIVE  03/29/2019 02:34 PM  
 Bilirubin NEGATIVE  03/29/2019 02:34 PM  
 Urobilinogen 0.2 03/29/2019 02:34 PM  
 Nitrites NEGATIVE  03/29/2019 02:34 PM  
 Leukocyte Esterase NEGATIVE  03/29/2019 02:34 PM  
 Epithelial cells FEW 03/29/2019 02:34 PM  
 Bacteria NEGATIVE  03/29/2019 02:34 PM  
 WBC 0-4 03/29/2019 02:34 PM  
 RBC 0-5 03/29/2019 02:34 PM  
 
 
 
Medications Reviewed:  
 
Current Facility-Administered Medications Medication Dose Route Frequency  vancomycin random level 12/29 @ 1800   Other ONCE  
 acetaminophen (TYLENOL) tablet 500 mg  500 mg Oral Q4H PRN  
 cefepime (MAXIPIME) 1 g in 0.9% sodium chloride (MBP/ADV) 50 mL MBP  1 g IntraVENous Q24H  
 0.9% sodium chloride infusion  75 mL/hr IntraVENous CONTINUOUS  
 0.9% sodium chloride infusion 250 mL  250 mL IntraVENous PRN  
 oxyCODONE-acetaminophen (PERCOCET) 5-325 mg per tablet 1 Tab  1 Tab Oral Q8H PRN  
 amLODIPine (NORVASC) tablet 5 mg  5 mg Oral DAILY  aspirin delayed-release tablet 81 mg  81 mg Oral DAILY  bicalutamide (CASODEX) tablet 50 mg  50 mg Oral DAILY  . PHARMACY TO SUBSTITUTE PER PROTOCOL (Reordered from: enzalutamide (XTANDI) 40 mg capsule)    Per Protocol  gabapentin (NEURONTIN) capsule 100 mg  100 mg Oral TID  hydrALAZINE (APRESOLINE) tablet 100 mg  100 mg Oral TID  tamsulosin (FLOMAX) capsule 0.4 mg  0.4 mg Oral DAILY  nicotine (NICODERM CQ) 14 mg/24 hr patch 1 Patch  1 Patch TransDERmal DAILY  insulin lispro (HUMALOG) injection   SubCUTAneous AC&HS  
  glucose chewable tablet 16 g  4 Tab Oral PRN  
 dextrose (D50W) injection syrg 12.5-25 g  12.5-25 g IntraVENous PRN  
 glucagon (GLUCAGEN) injection 1 mg  1 mg IntraMUSCular PRN  
 [Held by provider] heparin (porcine) injection 5,000 Units  5,000 Units SubCUTAneous Q8H  
 insulin lispro (HUMALOG) injection 20 Units  20 Units SubCUTAneous TIDAC  insulin glargine (LANTUS) injection 50 Units  50 Units SubCUTAneous QHS  Vancomycin Pharmay Dosing   Other PRN  
 
______________________________________________________________________ EXPECTED LENGTH OF STAY: 2d 21h ACTUAL LENGTH OF STAY:          1 Bertha Humphrey MD

## 2020-12-29 NOTE — CONSULTS
ID Consult Note NAME:  Urban Landry III  
:   1950 MRN:   [id number] Date/Time:  2020 6:53 PM 
Subjective:  
REASON FOR CONSULT: Right foot diabetic infection Harriett Charles is a 72 y.o. with a history of heart failure, diabetes, hypertension, prostate cancer, hypertriglyceridemia. He also had a history of left foot osteomyelitis in 2016. The patient's has had a chronic wound on his right foot. Over the last week, it has become worse. He has experienced more pain. The pain is nonradiating. There are no aggravating or alleviating factor. There was some drainage coming from the foot. He could not really see it so he could not tell me whether it was purulent or serous. He does say that there was a foul smell. He did not have any fever, but he did have chills. Because of his symptoms he came to the emergency room where a x-ray of the foot shows osteomyelitis of the distal fifth metatarsal and the base of the fifth digit. He will be taken to the operating room for debridement and possibly resection. His chest x-ray shows findings that are suspicious for Covid. He is currently being tested right now. The first test came back negative, but the second test is still pending. He does not have any cough or dyspnea. He is currently on vancomycin and cefepime and we are being asked to see him in consult. His creatinine is over 3 Past Medical History:  
Diagnosis Date  Arthritis, Degenerative,  Knee 2011  Carcinoma, Prostate 2011  Degenerative disc disease 2011  Depression/ Anxiety 2011  Diabetes ([facility] [address]  Diabetes Mellitrus ( non-insulin dependent ) Type 2 2011  
 Heart failure ([facility] [address]  HEMATOCHEZIA 2011  Hypertrension 2011  Hypertriglyceridemia 2011  Ill-defined condition   
 lymphadema  Insomnia 2011  Laryngitis 2011  Mild Aortic insufficiency 2011  Mild Concentric LVH (left ventricular hypertrophy) 2011  Neuropathy 2011  Osteoarthritis 2011  Rotator Cuff Tendonitis 2011 Past Surgical History:  
Procedure Laterality Date  CARDIAC SURG PROCEDURE UNLIST    
 cardiac cath  COLONOSCOPY N/A 2017 COLONOSCOPY performed by Lindsay Pablo MD at [facility] ENDOSCOPY  
 HX ORTHOPAEDIC    
 left hip pinning  HX OTHER SURGICAL    
 left little toe amputation  HX UROLOGICAL Social History Tobacco Use  Smoking status: Current Every Day Smoker Packs/day: 1.00 Last attempt to quit: 2019 Years since quittin.1  Smokeless tobacco: Never Used Substance Use Topics  Alcohol use: Not Currently Alcohol/week: 11.7 standard drinks Types: 7 Cans of beer, 7 Shots of liquor per week Family History His father had hypertension. His mother had cancer. No Known Allergies Home Medications: 
Prior to Admission Medications Prescriptions Last Dose Informant Patient Reported? Taking? Calcium Carbonate-Vit D3-Min 600 mg calcium- 400 unit tab   Yes No  
Sig: Take 1 Tab by mouth two (2) times a day. Insulin Needles, Disposable, (NOVOFINE 32) 32 gauge x 1/4\" ndle   No No  
Sig: Sig:use 4 times a day as needed Insulin Syringe-Needle U-100 (BD Insulin Syringe Ultra-Fine) 1 mL 31 gauge x 5/16 syrg   No No  
Sig: USE TO INJECT INSULIN FIVE TIMES A DAY NOVOLIN R REGULAR U-100 INSULN 100 unit/mL injection   No No  
Sig: INJECT 14 UNITS UNDER THE SKIN THREE TIMES A DAY WITH MEALS AS NEEDED Thera-Tabs M 27 mg iron-400 mcg tab   No No  
Sig: TAKE ONE TABLET BY MOUTH DAILY  
acetaminophen (PAIN AND FEVER) 500 mg tablet   No No  
Sig: TAKE ONE TABLET BY MOUTH EVERY 6 HOURS AS NEEDED FOR PAIN  
amLODIPine (NORVASC) 10 mg tablet   No No  
Sig: TAKE ONE TABLET BY MOUTH DAILY  
ammonium lactate (AMLACTIN) 12 % topical cream   No No  
 Sig: Apply  to affected area two (2) times a day. rub in to affected area well  
ammonium lactate (LAC-HYDRIN FIVE) 5 % lotion   No No  
Sig: Apply 1 Applicator to affected area daily. Apply daily bilateral lower legs  
ascorbic acid, vitamin C, (Vitamin C) 500 mg tablet   Yes No  
Sig: Take  by mouth. aspirin delayed-release 81 mg tablet   Yes No  
Sig: Take 81 mg by mouth daily. bicalutamide (CASODEX) 50 mg tablet   No No  
Sig: TAKE ONE TABLET BY MOUTH DAILY  
bisacodyl (DULCOLAX, BISACODYL,) 10 mg suppository   Yes No  
Sig: Insert 10 mg into rectum daily. diclofenac (VOLTAREN) 1 % gel   No No  
Sig: Apply  to affected area four (4) times daily. enzalutamide (XTANDI) 40 mg capsule   Yes No  
Sig: Take 160 mg by mouth daily. ferrous sulfate 325 mg (65 mg iron) tablet   No No  
Sig: TAKE ONE TABLET BY MOUTH DAILY BEFORE BREAKFAST  
fluticasone propionate (FLONASE) 50 mcg/actuation nasal spray   No No  
Sig: SPRAY TWO SPRAYS IN THE AFFECTED NOSTRIL DAILY  
gabapentin (NEURONTIN) 300 mg capsule   No No  
Sig: Take 1 Cap by mouth three (3) times daily. Max Daily Amount: 900 mg.  
hydrALAZINE (APRESOLINE) 100 mg tablet   No No  
Sig: Take 1 Tab by mouth three (3) times daily. hydrOXYzine HCL (ATARAX) 50 mg tablet   No No  
Sig: TAKE ONE TABLET BY MOUTH THREE TIMES A DAY AS NEEDED FOR ITCHING FOR UP TO 10 DAYS  
insulin aspart U-100 (NOVOLOG FLEXPEN U-100 INSULIN) 100 unit/mL (3 mL) inpn   Yes No  
Sig: by SubCUTAneous route. insulin glargine (LANTUS U-100 INSULIN) 100 unit/mL injection   No No  
Si Units by SubCUTAneous route daily. insulin regular (NOVOLIN R, HUMULIN R) 100 unit/mL injection   No No  
Si units sc tid  
ketoconazole (NIZORAL) 2 % shampoo   No No  
Sig: Sig:shampoo once a week  
ketoconazole (NIZORAL) 2 % topical cream   No No  
Sig: Apply  to affected area two (2) times a day.   
pantoprazole (PROTONIX) 40 mg tablet   No No  
 Sig: TAKE ONE TABLET BY MOUTH TWICE DAILY ONCE IN THE MORNING AND ONCE IN THE EVENING  
pregabalin (LYRICA) 150 mg capsule   No No  
Sig: TAKE ONE CAPSULE BY MOUTH TWICE A DAY  
tamsulosin (FLOMAX) 0.4 mg capsule   No No  
Sig: TAKE ONE CAPSULE BY MOUTH DAILY  
torsemide (DEMADEX) 20 mg tablet   No No  
Sig: TAKE FOUR TABLETS BY MOUTH TWICE A DAY  
triamcinolone acetonide (KENALOG) 0.1 % ointment   No No  
Sig: APPLY A THIN LAYER TO AFFECTED AREA(S) TWO TIMES A DAY **DO NOT EXCEED 2.66 GRAMS PER DAY** Facility-Administered Medications: None Hospital medications: 
Current Facility-Administered Medications Medication Dose Route Frequency  acetaminophen (TYLENOL) tablet 500 mg  500 mg Oral Q4H PRN  
 cefepime (MAXIPIME) 1 g in 0.9% sodium chloride (MBP/ADV) 50 mL MBP  1 g IntraVENous Q24H  
 0.9% sodium chloride infusion  75 mL/hr IntraVENous CONTINUOUS  
 0.9% sodium chloride infusion 250 mL  250 mL IntraVENous PRN  
 oxyCODONE-acetaminophen (PERCOCET) 5-325 mg per tablet 1 Tab  1 Tab Oral Q8H PRN  
 amLODIPine (NORVASC) tablet 5 mg  5 mg Oral DAILY  aspirin delayed-release tablet 81 mg  81 mg Oral DAILY  bicalutamide (CASODEX) tablet 50 mg  50 mg Oral DAILY  . PHARMACY TO SUBSTITUTE PER PROTOCOL (Reordered from: enzalutamide (XTANDI) 40 mg capsule)    Per Protocol  gabapentin (NEURONTIN) capsule 100 mg  100 mg Oral TID  hydrALAZINE (APRESOLINE) tablet 100 mg  100 mg Oral TID  tamsulosin (FLOMAX) capsule 0.4 mg  0.4 mg Oral DAILY  nicotine (NICODERM CQ) 14 mg/24 hr patch 1 Patch  1 Patch TransDERmal DAILY  insulin lispro (HUMALOG) injection   SubCUTAneous AC&HS  
 glucose chewable tablet 16 g  4 Tab Oral PRN  
 dextrose (D50W) injection syrg 12.5-25 g  12.5-25 g IntraVENous PRN  
 glucagon (GLUCAGEN) injection 1 mg  1 mg IntraMUSCular PRN  
 [Held by provider] heparin (porcine) injection 5,000 Units  5,000 Units SubCUTAneous Q8H  
  insulin lispro (HUMALOG) injection 20 Units  20 Units SubCUTAneous TIDAC  insulin glargine (LANTUS) injection 50 Units  50 Units SubCUTAneous QHS  Vancomycin Pharmay Dosing   Other PRN  
 
REVIEW OF SYSTEMS:   
 
 
Const:   negative weight loss Eyes:   negative diplopia or visual changes, negative eye pain ENT:   negative coryza, negative sore throat Resp:   negative  hemoptysis Cards:  negative for chest pain, palpitations :  negative for frequency, dysuria and hematuria GI:   Negative for hematemesis and hematochezia Skin:   negative for rash and pruritus Heme:   negative for easy bruising and gum/nose bleeding MS:  negative for myalgias, arthralgias, back pain and muscle weakness Neurolo:  negative for headaches, dizziness, vertigo, memory problems Psych:  negative for hallucinations Objective: VITALS:   
Visit Vitals /62 (BP 1 Location: Left arm, BP Patient Position: At rest) Pulse 83 Temp 98.5 °F (36.9 °C) Resp 18 Ht 6' (1.829 m) Wt 122.6 kg (270 lb 4.8 oz) SpO2 96% BMI 36.66 kg/m² Temp (24hrs), Av.6 °F (37.6 °C), Min:97.9 °F (36.6 °C), Max:102.9 °F (39.4 °C) PHYSICAL EXAM:  
General:    Alert, cooperative, no distress, appears stated age. Head:   Normocephalic, without obvious abnormality, atraumatic. Eyes:   Conjunctivae clear, anicteric sclerae. Nose:  Nares normal.  
Throat:    Lips and tongue normal.  No Thrush Neck:  Supple, symmetrical 
  no carotid bruit and no JVD. :    No CVA tenderness Lungs:   Clear to auscultation bilaterally. No Wheezing or Rhonchi. No rales. Heart:   Regular rate and rhythm,  no murmur, rub or gallop. Abdomen:   Soft, non-tender,not distended. Bowel sounds normal.  
Extremities: Knees, ankles, wrists, elbows are not warm and not tender. He has a wound on the plantar lateral area. There is some necrosis. There is a foul smell emanating from the right foot. Skin:     No rashes or lesions. Not Jaundiced Lymph: Cervical normal 
Neurologic: Full use of extraocular muscles, no facial asymmetry, tongue midline muscle strength 5 out of 5 
 
LAB DATA REVIEWED:   
Recent Results (from the past 48 hour(s)) EKG, 12 LEAD, INITIAL Collection Time: 12/28/20  3:32 PM  
Result Value Ref Range Ventricular Rate 101 BPM  
 Atrial Rate 101 BPM  
 P-R Interval 132 ms QRS Duration 90 ms Q-T Interval 350 ms QTC Calculation (Bezet) 453 ms Calculated P Axis 17 degrees Calculated R Axis -26 degrees Calculated T Axis 43 degrees Diagnosis Sinus tachycardia When compared with ECG of 14-MAR-2019 18:55, 
T wave inversion no longer evident in Lateral leads Confirmed by MD Esperanza, Alberto Mcmillan ([id number]) on 12/28/2020 8:11:46 PM 
  
CBC WITH AUTOMATED DIFF Collection Time: 12/28/20  3:41 PM  
Result Value Ref Range WBC 12.8 (H) 4.1 - 11.1 K/uL  
 RBC 3.03 (L) 4.10 - 5.70 M/uL HGB 7.9 (L) 12.1 - 17.0 g/dL HCT 24.9 (L) 36.6 - 50.3 % MCV 82.2 80.0 - 99.0 FL  
 MCH 26.1 26.0 - 34.0 PG  
 MCHC 31.7 30.0 - 36.5 g/dL  
 RDW 16.4 (H) 11.5 - 14.5 % PLATELET 187 578 - 687 K/uL MPV 10.4 8.9 - 12.9 FL  
 NRBC 0.0 0  WBC ABSOLUTE NRBC 0.00 0.00 - 0.01 K/uL NEUTROPHILS 89 (H) 32 - 75 % LYMPHOCYTES 4 (L) 12 - 49 % MONOCYTES 6 5 - 13 % EOSINOPHILS 0 0 - 7 % BASOPHILS 0 0 - 1 % IMMATURE GRANULOCYTES 1 (H) 0.0 - 0.5 % ABS. NEUTROPHILS 11.4 (H) 1.8 - 8.0 K/UL  
 ABS. LYMPHOCYTES 0.5 (L) 0.8 - 3.5 K/UL  
 ABS. MONOCYTES 0.8 0.0 - 1.0 K/UL  
 ABS. EOSINOPHILS 0.0 0.0 - 0.4 K/UL  
 ABS. BASOPHILS 0.0 0.0 - 0.1 K/UL  
 ABS. IMM. GRANS. 0.1 (H) 0.00 - 0.04 K/UL  
 DF SMEAR SCANNED    
 RBC COMMENTS ANISOCYTOSIS 1+ 
    
 RBC COMMENTS MICROCYTOSIS 1+ 
    
 RBC COMMENTS OVALOCYTES PRESENT 
    
METABOLIC PANEL, COMPREHENSIVE Collection Time: 12/28/20  3:41 PM  
Result Value Ref Range  Sodium 128 (L) 136 - 145 mmol/L  
 Potassium 3.8 3.5 - 5.1 mmol/L Chloride 98 97 - 108 mmol/L  
 CO2 19 (L) 21 - 32 mmol/L Anion gap 11 5 - 15 mmol/L Glucose 379 (H) 65 - 100 mg/dL BUN 53 (H) 6 - 20 MG/DL Creatinine 3.33 (H) 0.70 - 1.30 MG/DL  
 BUN/Creatinine ratio 16 12 - 20 GFR est AA 22 (L) >60 ml/min/1.73m2 GFR est non-AA 18 (L) >60 ml/min/1.73m2 Calcium 8.4 (L) 8.5 - 10.1 MG/DL Bilirubin, total 0.4 0.2 - 1.0 MG/DL  
 ALT (SGPT) 10 (L) 12 - 78 U/L  
 AST (SGOT) 24 15 - 37 U/L Alk. phosphatase 162 (H) 45 - 117 U/L Protein, total 8.3 (H) 6.4 - 8.2 g/dL Albumin 1.8 (L) 3.5 - 5.0 g/dL Globulin 6.5 (H) 2.0 - 4.0 g/dL A-G Ratio 0.3 (L) 1.1 - 2.2 SAMPLES BEING HELD Collection Time: 12/28/20  3:41 PM  
Result Value Ref Range SAMPLES BEING HELD 1 RED, 1 JACOB   
 COMMENT Add-on orders for these samples will be processed based on acceptable specimen integrity and analyte stability, which may vary by analyte. TROPONIN I Collection Time: 12/28/20  3:41 PM  
Result Value Ref Range Troponin-I, Qt. <0.05 <0.05 ng/mL NT-PRO BNP Collection Time: 12/28/20  3:41 PM  
Result Value Ref Range NT pro-BNP 9,137 (H) <125 PG/ML  
TSH 3RD GENERATION Collection Time: 12/28/20  3:41 PM  
Result Value Ref Range TSH 1.40 0.36 - 3.74 uIU/mL VITAMIN B12 Collection Time: 12/28/20  3:41 PM  
Result Value Ref Range Vitamin B12 913 193 - 986 pg/mL C REACTIVE PROTEIN, QT Collection Time: 12/28/20  3:41 PM  
Result Value Ref Range C-Reactive protein 34.30 (H) 0.00 - 0.60 mg/dL FERRITIN Collection Time: 12/28/20  3:41 PM  
Result Value Ref Range Ferritin 347 26 - 388 NG/ML  
IRON PROFILE Collection Time: 12/28/20  3:41 PM  
Result Value Ref Range Iron 21 (L) 35 - 150 ug/dL TIBC 203 (L) 250 - 450 ug/dL Iron % saturation 10 (L) 20 - 50 % LACTIC ACID Collection Time: 12/28/20  4:28 PM  
Result Value Ref Range  Lactic acid 1.6 0.4 - 2.0 MMOL/L  
 CULTURE, BLOOD, PAIRED  
 Collection Time: 12/28/20  4:28 PM  
 Specimen: Blood  
Result Value Ref Range  
 Special Requests: NO SPECIAL REQUESTS    
 Culture result: (A)    
  GRAM POSITIVE COCCI IN PAIRS AND CHAINS GROWING IN ALL 4 BOTTLES DRAWN (SITE =  RAC AND LAC)  
 Culture result: (A)    
  PRELIMINARY REPORT OF GRAM POSITIVE COCCI IN PAIRS AND CHAINS GROWING IN 3 OF 4 BOTTLES DRAWN CALLED TO AND READ BACK BY RUBA CASTILLO ON 12/29/20 AT 0705.  
 Culture result: REMAINING BOTTLE(S) HAS/HAVE NO GROWTH SO FAR    
SARS-COV-2  
 Collection Time: 12/28/20  6:01 PM  
Result Value Ref Range  
 Specimen source Nasopharyngeal    
 SARS-CoV-2 Not detected NOTD    
 Specimen source Nasopharyngeal    
 Specimen type NP Swab    
 Health status Symptomatic Testing    
GLUCOSE, POC  
 Collection Time: 12/28/20  9:07 PM  
Result Value Ref Range  
 Glucose (POC) 444 (H) 65 - 100 mg/dL  
 Performed by Josee Magana (CON)   
C REACTIVE PROTEIN, QT  
 Collection Time: 12/29/20  4:04 AM  
Result Value Ref Range  
 C-Reactive protein 27.70 (H) 0.00 - 0.60 mg/dL  
CBC WITH AUTOMATED DIFF  
 Collection Time: 12/29/20  4:04 AM  
Result Value Ref Range  
 WBC 11.1 4.1 - 11.1 K/uL  
 RBC 2.60 (L) 4.10 - 5.70 M/uL  
 HGB 6.8 (L) 12.1 - 17.0 g/dL  
 HCT 21.1 (L) 36.6 - 50.3 %  
 MCV 81.2 80.0 - 99.0 FL  
 MCH 26.2 26.0 - 34.0 PG  
 MCHC 32.2 30.0 - 36.5 g/dL  
 RDW 16.3 (H) 11.5 - 14.5 %  
 PLATELET 280 150 - 400 K/uL  
 MPV 10.1 8.9 - 12.9 FL  
 NRBC 0.0 0  WBC  
 ABSOLUTE NRBC 0.00 0.00 - 0.01 K/uL  
 NEUTROPHILS 89 (H) 32 - 75 %  
 LYMPHOCYTES 4 (L) 12 - 49 %  
 MONOCYTES 6 5 - 13 %  
 EOSINOPHILS 0 0 - 7 %  
 BASOPHILS 0 0 - 1 %  
 IMMATURE GRANULOCYTES 1 (H) 0.0 - 0.5 %  
 ABS. NEUTROPHILS 9.9 (H) 1.8 - 8.0 K/UL  
 ABS. LYMPHOCYTES 0.4 (L) 0.8 - 3.5 K/UL  
 ABS. MONOCYTES 0.7 0.0 - 1.0 K/UL  
 ABS. EOSINOPHILS 0.0 0.0 - 0.4 K/UL  
 ABS. BASOPHILS 0.0 0.0 - 0.1 K/UL  
 ABS. IMM. GRANS. 0.1 (H) 0.00 - 0.04 K/UL  
 DF SMEAR SCANNED    
 PLATELET COMMENTS Large Platelets RBC COMMENTS ANISOCYTOSIS 1+ 
    
 RBC COMMENTS OVALOCYTES PRESENT 
    
RENAL FUNCTION PANEL Collection Time: 12/29/20  4:04 AM  
Result Value Ref Range Sodium 129 (L) 136 - 145 mmol/L Potassium 3.9 3.5 - 5.1 mmol/L Chloride 97 97 - 108 mmol/L  
 CO2 22 21 - 32 mmol/L Anion gap 10 5 - 15 mmol/L Glucose 203 (H) 65 - 100 mg/dL BUN 60 (H) 6 - 20 MG/DL Creatinine 3.84 (H) 0.70 - 1.30 MG/DL  
 BUN/Creatinine ratio 16 12 - 20 GFR est AA 19 (L) >60 ml/min/1.73m2 GFR est non-AA 16 (L) >60 ml/min/1.73m2 Calcium 8.5 8.5 - 10.1 MG/DL Phosphorus 3.5 2.6 - 4.7 MG/DL Albumin 1.6 (L) 3.5 - 5.0 g/dL GLUCOSE, POC Collection Time: 12/29/20  6:15 AM  
Result Value Ref Range Glucose (POC) 171 (H) 65 - 100 mg/dL Performed by Radha Trevino ECHO ADULT COMPLETE Collection Time: 12/29/20 10:55 AM  
Result Value Ref Range IVSd 0.81 0.6 - 1.0 cm LVIDd 5.89 4.2 - 5.9 cm LVIDs 3.25 cm  
 LVPWd 1.22 (A) 0.6 - 1.0 cm  
 LVOT Peak Gradient 5.81 mmHg LVOT Peak Velocity 120.55 cm/s Left Atrium to Aortic Root Ratio 0.94 AoV PG 13.12 mmHg Aortic Valve Systolic Peak Velocity 884.61 cm/s  
 MV A Yuri 93.37 cm/s  
 MV E Yuri 106.14 cm/s Tapse 2.02 (A) 1.5 - 2.0 cm  
 MV E/A 1.14 LV Mass .9 88 - 224 g LV Mass AL Index 100.7 49 - 115 g/m2 GLUCOSE, POC Collection Time: 12/29/20 11:23 AM  
Result Value Ref Range Glucose (POC) 140 (H) 65 - 100 mg/dL Performed by Anabell Bell   
RBC, ALLOCATE Collection Time: 12/29/20 11:45 AM  
Result Value Ref Range HISTORY CHECKED? Historical check performed SARS-COV-2 Collection Time: 12/29/20  1:37 PM  
Result Value Ref Range Specimen source Nasopharyngeal    
 SARS-CoV-2 PENDING   
 SARS-CoV-2 PENDING Specimen source Nasopharyngeal    
 COVID-19 rapid test PENDING Specimen type NP Swab  Health status PENDING   
 COVID-19 PENDING   
TYPE & SCREEN Collection Time: 12/29/20  1:44 PM  
Result Value Ref Range Crossmatch Expiration 01/01/2021,2359 ABO/Rh(D) O POSITIVE Antibody screen NEG Unit number [id number] Blood component type RC LR Unit division 00 Status of unit ISSUED Crossmatch result Compatible GLUCOSE, POC Collection Time: 12/29/20  4:21 PM  
Result Value Ref Range Glucose (POC) 205 (H) 65 - 100 mg/dL Performed by TRDERIK eventblimp July Carreno Collection Time: 12/29/20  5:45 PM  
Result Value Ref Range Vancomycin, random 13.4 UG/ML IMPRESSION #1 osteomyelitis of the right foot #2 Gram-positive cocci bacteremia #3 renal insufficiency #4 diabetes #5 prostate cancer #6 hypertension #7 heart failure PLAN His creatinine is rising. I will discontinue Vanco and start daptomycin. I will also add Flagyl for anaerobic coverage. Please send surgical cultures. Repeat blood cultures tomorrow. Because he is now bacteremic, he will need IV antibiotics when he is discharged. ___________________________________________________ ID: Elige Gosselin, MD

## 2020-12-29 NOTE — CONSULTS
NEPHROLOGY CONSULT NOTE Patient: Fernanda Rivero MRN: [id number]  PCP: Bill Blair MD  
:     1950  Age:   72 y.o. Sex:  male Referring physician: Cl Echavarria MD 
Reason for consultation: 72 y.o. male with Osteomyelitis ([facility] [D26.9] complicated by KATHI Admission Date: 2020  3:14 PM  LOS: 1 day ASSESSMENT and PLAN :  
KATHI on CKD  
- suspect volume depletion from diuretics and GI symptoms 
- hold diuretics - renal U/S when COVID ruled out 
- start IVF 
- daily labs and I/Os 
- may need kumar or condom cath to record I/Os 
  
CKD Stage IIIa: 
-  Presumed 2/2 DM and HTN 
- baseline Cr 1.7 mg/dl  
- followed at [facility] 
  
Hx of prostate cancer s/p XRT 
  
Metabolic acidosis: 
- cont oral bicarb 
  
Anemia in CKD: 
- hgb 6.8 
- would transfuse 1 unit of PRBCs today 
- iron studies in AM 
 
Type II DM: 
- on insulin HTN :  
- Continue current meds  
 
R foot osteo: 
- on abx 
- ID and podiatry consulted COVID-19 PUI 
 
B/l Airspace disease: 
- ? PNA vs edema 
- COVID PUI as above 
- on broad spectrum abx Active Problems / Assessment AAActive  :  
Principal Problem: 
  Osteomyelitis ([facility] (2020) Subjective: HPI: Niharika Fu III is a 72 y.o.  male who has been admitted to the hospital for ;ethargy, diarrhea, nausea and worsening R foot wound. He has a hx of CKD III, baseline cr around 1.7, hx of prostate cancer, DM2, HTN. His labs revealed a Cr of 3.3, Na 128, bicarb 19. He is on demedex at home. He was admitted and is currently on abx for osteo. Plain films of foot concerning for osteo. B/l airspace disease of his CXR. He is a COVID-19 PUI. Per RN, no resp distress. Incontinent, so no UOP measured. No reported CP, sob, n/v/d at this time. Past Medical Hx:  
Past Medical History:  
Diagnosis Date  Arthritis, Degenerative,  Knee 2011  Carcinoma, Prostate 2011  Degenerative disc disease 2011  Depression/ Anxiety 4/4/2011  Diabetes ([facility]  Diabetes Mellitrus ( non-insulin dependent ) Type 2 4/4/2011  
 Heart failure ([facility]  HEMATOCHEZIA 4/4/2011  Hypertrension 4/4/2011  Hypertriglyceridemia 4/4/2011  Ill-defined condition   
 lymphadema  Insomnia 4/4/2011  Laryngitis 4/4/2011  Mild Aortic insufficiency 4/4/2011  Mild Concentric LVH (left ventricular hypertrophy) 4/4/2011  Neuropathy 4/4/2011  Osteoarthritis 4/4/2011  Rotator Cuff Tendonitis 4/4/2011 Past Surgical Hx: 
  
Past Surgical History:  
Procedure Laterality Date  CARDIAC SURG PROCEDURE UNLIST    
 cardiac cath  COLONOSCOPY N/A 4/28/2017 COLONOSCOPY performed by Marleny Burnett MD at [facility] ENDOSCOPY  
 HX ORTHOPAEDIC    
 left hip pinning  HX OTHER SURGICAL    
 left little toe amputation  HX UROLOGICAL Medications: 
Prior to Admission medications Medication Sig Start Date End Date Taking? Authorizing Provider  
pregabalin (LYRICA) 150 mg capsule TAKE ONE CAPSULE BY MOUTH TWICE A DAY 12/7/20   Joaquin Mcleod MD  
bicalutamide (CASODEX) 50 mg tablet TAKE ONE TABLET BY MOUTH DAILY 11/30/20   Joaquin Mcleod MD  
torsemide (DEMADEX) 20 mg tablet TAKE FOUR TABLETS BY MOUTH TWICE A DAY 11/23/20   Joaquin Mcleod MD  
pantoprazole (PROTONIX) 40 mg tablet TAKE ONE TABLET BY MOUTH TWICE DAILY ONCE IN THE MORNING AND ONCE IN THE EVENING 11/18/20   Joaquin Mcleod MD  
hydrOXYzine HCL (ATARAX) 50 mg tablet TAKE ONE TABLET BY MOUTH THREE TIMES A DAY AS NEEDED FOR ITCHING FOR UP TO 10 DAYS 10/22/20   Joaquin Mcleod MD  
amLODIPine (NORVASC) 10 mg tablet TAKE ONE TABLET BY MOUTH DAILY 10/2/20   Joaquin Mcleod MD  
hydrALAZINE (APRESOLINE) 100 mg tablet Take 1 Tab by mouth three (3) times daily. 9/16/20   Joaquin Mcleod MD  
ascorbic acid, vitamin C, (Vitamin C) 500 mg tablet Take  by mouth.     Provider, Historical  
 enzalutamide (XTANDI) 40 mg capsule Take 160 mg by mouth daily. Provider, Historical  
Thera-Tabs M 27 mg iron-400 mcg tab TAKE ONE TABLET BY MOUTH DAILY 7/17/20   Mandy Mancilla MD  
tamsulosin (FLOMAX) 0.4 mg capsule TAKE ONE CAPSULE BY MOUTH DAILY 7/16/20   Mandy Mancilla MD  
Insulin Syringe-Needle U-100 (BD Insulin Syringe Ultra-Fine) 1 mL 31 gauge x 5/16 syrg USE TO INJECT INSULIN FIVE TIMES A DAY 6/16/20   Mandy Mancilla MD  
ferrous sulfate 325 mg (65 mg iron) tablet TAKE ONE TABLET BY MOUTH DAILY BEFORE BREAKFAST 6/16/20   Mandy Mancilla MD  
gabapentin (NEURONTIN) 300 mg capsule Take 1 Cap by mouth three (3) times daily. Max Daily Amount: 900 mg. 5/25/20   Mandy Mancilla MD  
triamcinolone acetonide (KENALOG) 0.1 % ointment APPLY A THIN LAYER TO AFFECTED AREA(S) TWO TIMES A DAY **DO NOT EXCEED 2.66 GRAMS PER DAY** 5/19/20   Mandy Mancilla MD  
diclofenac (VOLTAREN) 1 % gel Apply  to affected area four (4) times daily. 4/16/20   Mandy Mancilla MD  
ammonium lactate (AMLACTIN) 12 % topical cream Apply  to affected area two (2) times a day. rub in to affected area well 3/10/20   Mandy Mancilla MD  
ammonium lactate (LAC-HYDRIN FIVE) 5 % lotion Apply 1 Applicator to affected area daily. Apply daily bilateral lower legs 3/5/20   Mandy Mancilla MD  
insulin glargine (LANTUS U-100 INSULIN) 100 unit/mL injection 72 Units by SubCUTAneous route daily. 3/5/20   Mandy Mancilla MD  
insulin regular (NOVOLIN R, HUMULIN R) 100 unit/mL injection 24 units sc tid 3/5/20   Mandy Mancilla MD  
ketoconazole (NIZORAL) 2 % topical cream Apply  to affected area two (2) times a day. 2/4/20   Mandy Mancilla MD  
ketoconazole (NIZORAL) 2 % shampoo Sig:shampoo once a week 2/4/20   Mandy Mancilla MD  
bisacodyl (DULCOLAX, BISACODYL,) 10 mg suppository Insert 10 mg into rectum daily.     Provider, Historical  
 insulin aspart U-100 (NOVOLOG FLEXPEN U-100 INSULIN) 100 unit/mL (3 mL) inpn by SubCUTAneous route. Provider, Historical  
Insulin Needles, Disposable, (NOVOFINE 32) 32 gauge x 1/4\" ndle Sig:use 4 times a day as needed 11/13/19   MD LINDA Otero R REGULAR U-100 INSULN 100 unit/mL injection INJECT 14 UNITS UNDER THE SKIN THREE TIMES A DAY WITH MEALS AS NEEDED 8/6/19   Deepti Bueno MD  
fluticasone propionate (FLONASE) 50 mcg/actuation nasal spray SPRAY TWO SPRAYS IN THE AFFECTED NOSTRIL DAILY 7/16/19   Deepti Bueno MD  
aspirin delayed-release 81 mg tablet Take 81 mg by mouth daily. Provider, Historical  
acetaminophen (PAIN AND FEVER) 500 mg tablet TAKE ONE TABLET BY MOUTH EVERY 6 HOURS AS NEEDED FOR PAIN 8/28/18   Deepti Bueno MD  
Calcium Carbonate-Vit D3-Min 600 mg calcium- 400 unit tab Take 1 Tab by mouth two (2) times a day. Provider, Historical  
 
 
No Known Allergies Social Hx:  reports that he has been smoking. He has been smoking about 1.00 pack per day. He has never used smokeless tobacco. He reports previous alcohol use of about 11.7 standard drinks of alcohol per week. He reports current drug use. Drugs: Marijuana and Cocaine. Family History Family history unknown: Yes Review of Systems: A twelve point review of system was performed today. Pertinent positives and negatives are mentioned in the HPI. The reminder of the ROS is negative and noncontributory. Objective:   
Vitals:   
Vitals:  
 12/29/20 1702 12/29/20 0421 12/29/20 0710 12/29/20 1825 BP: (!) 155/74   (!) 143/57 Pulse: 100   84 Resp: 18   18 Temp: (!) 102.8 °F (39.3 °C)  98.6 °F (37 °C) (!) 102.9 °F (39.4 °C) SpO2: 93%   97% Weight:  122.6 kg (270 lb 4.8 oz) Height:      
 
I&O's:  No intake/output data recorded. Visit Vitals BP (!) 143/57 (BP 1 Location: Left arm, BP Patient Position: At rest) Pulse 84 Temp (!) 102.9 °F (39.4 °C) Resp 18 Ht 6' (1.829 m) Wt 122.6 kg (270 lb 4.8 oz) SpO2 97% BMI 36.66 kg/m² Physical Exam:  Examined outside of room due to COVID-19 PUI: 
 
General:Alert, No distress,  
Lungs : stable oxygenation CVS: RRR on monitor, no edema, R LE wound Neurologic: non focal, AAO x 3 : no kumar Laboratory Results: 
 
Lab Results Component Value Date BUN 60 (H) 12/29/2020  (L) 12/29/2020  
 K 3.9 12/29/2020 CL 97 12/29/2020 CO2 22 12/29/2020 Lab Results Component Value Date BUN 60 (H) 12/29/2020 BUN 53 (H) 12/28/2020 BUN 25 06/27/2019 BUN 46 (H) 04/04/2019 BUN 45 (H) 03/31/2019  
 K 3.9 12/29/2020  
 K 3.8 12/28/2020  
 K 4.3 06/27/2019  
 K 5.0 04/04/2019  
 K 4.7 03/31/2019 Lab Results Component Value Date WBC 11.1 12/29/2020 RBC 2.60 (L) 12/29/2020 HGB 6.8 (L) 12/29/2020 HCT 21.1 (L) 12/29/2020 MCV 81.2 12/29/2020 MCH 26.2 12/29/2020 RDW 16.3 (H) 12/29/2020  12/29/2020 Lab Results Component Value Date PHOS 3.5 12/29/2020 Urine dipstick:  
Lab Results Component Value Date/Time Color YELLOW/STRAW 03/29/2019 02:34 PM  
 Appearance CLEAR 03/29/2019 02:34 PM  
 Specific gravity 1.017 03/29/2019 02:34 PM  
 pH (UA) 5.0 03/29/2019 02:34 PM  
 Protein 100 (A) 03/29/2019 02:34 PM  
 Glucose NEGATIVE  03/29/2019 02:34 PM  
 Ketone NEGATIVE  03/29/2019 02:34 PM  
 Bilirubin NEGATIVE  03/29/2019 02:34 PM  
 Urobilinogen 0.2 03/29/2019 02:34 PM  
 Nitrites NEGATIVE  03/29/2019 02:34 PM  
 Leukocyte Esterase NEGATIVE  03/29/2019 02:34 PM  
 Epithelial cells FEW 03/29/2019 02:34 PM  
 Bacteria NEGATIVE  03/29/2019 02:34 PM  
 WBC 0-4 03/29/2019 02:34 PM  
 RBC 0-5 03/29/2019 02:34 PM  
 
 
 
 
 
Thank you for allowing us to participate in the care of this patient. We will follow patient. Please dont hesitate to call with any questions Annika Hawkins MD 
12/29/2020 [address] [place] [address]  
 [address] [facility] Phone - [phone] Fax - [phone] 
[email or web address]

## 2020-12-29 NOTE — PROGRESS NOTES
Day #2 of Vancomycin Indication:  SSTI 
-XR of R ft with concerns for osteo, no confirmatory MRI ordered yet Current regimen:  dosing per levels while in KATHI Abx regimen:  vanc/ceftriaxone ID Following ?: NO 
Concomitant nephrotoxic drugs (requires more frequent monitoring): None Frequency of BMP?: maliniiy Recent Labs  
  20 
0404 20 
1541 WBC 11.1 12.8* CREA 3.84* 3.33* BUN 60* 53* Est CrCl: 20-25 ml/min; UO: -- ml/kg/hr Temp (24hrs), Av °F (37.2 °C), Min:96.9 °F (36.1 °C), Max:102.8 °F (39.3 °C) Cultures:  
 blood: GPC in pair and chains in 3/4 bottles; prelim Goal trough = 15 - 20 mcg/mL Recent trough history (date/time/level/dose/action taken): 
N/a Plan: Continue current regimen of dosing per levels while in KATHI. Will obtain 24h random level this evening to assess timing of next dose. Anahy Loco, [id number]

## 2020-12-29 NOTE — CONSULTS
Podiatry History and Physical 
 
Subjective: 
 
  
 
Date of Consultation:  December 29, 2020 Patient is a 72 y.o.  male who is being seen for a right diabetic foot infection. Pt was admitted to [facility] for the same reason. He is known to me from [address] [place] [address]. I last saw him in February of this year because he has a larger wound on the bottom of of his right foot. Vascular studies showed PVD so he was sent to Dr. Ana Laura San for revacularization. Revascularization was successful, but because of the pandemic, Pt did not follow back up at the wound center. He lives at home and has an aide that helps him during the week. States he came to the ER because he was feeling light headed. Patient Active Problem List  
 Diagnosis Date Noted  Osteomyelitis ([address] 12/28/2020  Diabetic ulcer of right midfoot associated with type 2 diabetes mellitus, with fat layer exposed ([address] 05/19/2020  PAD (peripheral artery disease) ([address] 05/19/2020  Dependence on nicotine from cigarettes 05/19/2020  KATHI (acute kidney injury) ([address] 03/28/2019  Acute on chronic renal failure ([address] 03/14/2019  Severe obesity (BMI 35.0-39. 9) with comorbidity ([address] 03/28/2018  Type 2 diabetes with nephropathy ([address] 02/27/2018  S/P hip replacement, right 10/31/2017  Stage 3 chronic kidney disease 10/31/2017  Prostate CA ([address] 10/31/2017  Diabetic polyneuropathy ([address] 04/04/2011  Rotator Cuff Tendonitis 04/04/2011  Diabetes mellitus type 2, controlled ([address] 04/04/2011  Degenerative disc disease 04/04/2011  Osteoarthrosis involving lower leg 04/04/2011  Depression/ Anxiety 04/04/2011  
 HTN (hypertension) 04/04/2011  Chronic hip pain 04/04/2011  Hypertriglyceridemia 04/04/2011  Mild Aortic Insufficiency 04/04/2011  Mild Concentric LVH (left ventricular hypertrophy) 04/04/2011 Past Medical History:  
Diagnosis Date  Arthritis, Degenerative,  Knee 4/4/2011  Carcinoma, Prostate 2011  Degenerative disc disease 2011  Depression/ Anxiety 2011  Diabetes ([facility]  Diabetes Mellitrus ( non-insulin dependent ) Type 2 2011  
 Heart failure ([facility]  HEMATOCHEZIA 2011  Hypertrension 2011  Hypertriglyceridemia 2011  Ill-defined condition   
 lymphadema  Insomnia 2011  Laryngitis 2011  Mild Aortic insufficiency 2011  Mild Concentric LVH (left ventricular hypertrophy) 2011  Neuropathy 2011  Osteoarthritis 2011  Rotator Cuff Tendonitis 2011 Family History Family history unknown: Yes  
  
Social History Tobacco Use  Smoking status: Current Every Day Smoker Packs/day: 1.00 Last attempt to quit: 2019 Years since quittin.1  Smokeless tobacco: Never Used Substance Use Topics  Alcohol use: Not Currently Alcohol/week: 11.7 standard drinks Types: 7 Cans of beer, 7 Shots of liquor per week Past Surgical History:  
Procedure Laterality Date  CARDIAC SURG PROCEDURE UNLIST    
 cardiac cath  COLONOSCOPY N/A 2017 COLONOSCOPY performed by Janae Ramos MD at [facility] ENDOSCOPY  
 HX ORTHOPAEDIC    
 left hip pinning  HX OTHER SURGICAL    
 left little toe amputation  HX UROLOGICAL Prior to Admission medications Medication Sig Start Date End Date Taking?  Authorizing Provider  
pregabalin (LYRICA) 150 mg capsule TAKE ONE CAPSULE BY MOUTH TWICE A DAY 20   Heriberto Longo MD  
bicalutamide (CASODEX) 50 mg tablet TAKE ONE TABLET BY MOUTH DAILY 20   Heriberto Longo MD  
torsemide (DEMADEX) 20 mg tablet TAKE FOUR TABLETS BY MOUTH TWICE A DAY 20   Heriberto Longo MD  
pantoprazole (PROTONIX) 40 mg tablet TAKE ONE TABLET BY MOUTH TWICE DAILY ONCE IN THE MORNING AND ONCE IN THE EVENING 20   Heriberto Longo MD  
 hydrOXYzine HCL (ATARAX) 50 mg tablet TAKE ONE TABLET BY MOUTH THREE TIMES A DAY AS NEEDED FOR ITCHING FOR UP TO 10 DAYS 10/22/20   Bhupendra Corona MD  
amLODIPine (NORVASC) 10 mg tablet TAKE ONE TABLET BY MOUTH DAILY 10/2/20   Bhupendra Corona MD  
hydrALAZINE (APRESOLINE) 100 mg tablet Take 1 Tab by mouth three (3) times daily. 9/16/20   Bhupendra Corona MD  
ascorbic acid, vitamin C, (Vitamin C) 500 mg tablet Take  by mouth. Provider, Historical  
enzalutamide Agnesdore Carie) 40 mg capsule Take 160 mg by mouth daily. Provider, Historical  
Thera-Tabs M 27 mg iron-400 mcg tab TAKE ONE TABLET BY MOUTH DAILY 7/17/20   Bhupendra Corona MD  
tamsulosin (FLOMAX) 0.4 mg capsule TAKE ONE CAPSULE BY MOUTH DAILY 7/16/20   Bhupendra Corona MD  
Insulin Syringe-Needle U-100 (BD Insulin Syringe Ultra-Fine) 1 mL 31 gauge x 5/16 syrg USE TO INJECT INSULIN FIVE TIMES A DAY 6/16/20   Bhupendra Corona MD  
ferrous sulfate 325 mg (65 mg iron) tablet TAKE ONE TABLET BY MOUTH DAILY BEFORE BREAKFAST 6/16/20   Bhupendra Corona MD  
gabapentin (NEURONTIN) 300 mg capsule Take 1 Cap by mouth three (3) times daily. Max Daily Amount: 900 mg. 5/25/20   Bhupendra Corona MD  
triamcinolone acetonide (KENALOG) 0.1 % ointment APPLY A THIN LAYER TO AFFECTED AREA(S) TWO TIMES A DAY **DO NOT EXCEED 2.66 GRAMS PER DAY** 5/19/20   Bhupendra Corona MD  
diclofenac (VOLTAREN) 1 % gel Apply  to affected area four (4) times daily. 4/16/20   Bhupendra Corona MD  
ammonium lactate (AMLACTIN) 12 % topical cream Apply  to affected area two (2) times a day. rub in to affected area well 3/10/20   Bhupendra Corona MD  
ammonium lactate (LAC-HYDRIN FIVE) 5 % lotion Apply 1 Applicator to affected area daily. Apply daily bilateral lower legs 3/5/20   Bhupendra Corona MD  
insulin glargine (LANTUS U-100 INSULIN) 100 unit/mL injection 72 Units by SubCUTAneous route daily.  3/5/20   Bhupendra Corona MD  
 insulin regular (NOVOLIN R, HUMULIN R) 100 unit/mL injection 24 units sc tid 3/5/20   Margarita Kearns MD  
ketoconazole (NIZORAL) 2 % topical cream Apply  to affected area two (2) times a day. 20   Margarita Kearns MD  
ketoconazole (NIZORAL) 2 % shampoo Sig:shampoo once a week 20   Margarita Kearns MD  
bisacodyl (DULCOLAX, BISACODYL,) 10 mg suppository Insert 10 mg into rectum daily. Provider, Historical  
insulin aspart U-100 (NOVOLOG FLEXPEN U-100 INSULIN) 100 unit/mL (3 mL) inpn by SubCUTAneous route. Provider, Historical  
Insulin Needles, Disposable, (NOVOFINE 32) 32 gauge x 1/4\" ndle Sig:use 4 times a day as needed 19   Margarita Kearns MD  
NOVOLIN R REGULAR U-100 INSULN 100 unit/mL injection INJECT 14 UNITS UNDER THE SKIN THREE TIMES A DAY WITH MEALS AS NEEDED 19   Margarita Kearns MD  
fluticasone propionate (FLONASE) 50 mcg/actuation nasal spray SPRAY TWO SPRAYS IN THE AFFECTED NOSTRIL DAILY 19   Margarita Kearns MD  
aspirin delayed-release 81 mg tablet Take 81 mg by mouth daily. Provider, Historical  
acetaminophen (PAIN AND FEVER) 500 mg tablet TAKE ONE TABLET BY MOUTH EVERY 6 HOURS AS NEEDED FOR PAIN 18   Margarita Kearns MD  
Calcium Carbonate-Vit D3-Min 600 mg calcium- 400 unit tab Take 1 Tab by mouth two (2) times a day. Provider, Historical  
 
No Known Allergies Review of Systems:  A comprehensive review of systems was negative except for that written in the HPI. Objective:  
 
Patient Vitals for the past 8 hrs: 
 BP Temp Pulse Resp SpO2  
20 1125  99.5 °F (37.5 °C)     
20 0852 (!) 143/57 (!) 102.9 °F (39.4 °C) 84 18 97 % 20 0710  98.6 °F (37 °C)    Temp (24hrs), Av.7 °F (37.6 °C), Min:96.9 °F (36.1 °C), Max:102.9 °F (39.4 °C) Right Foot Exam: +large plantar lateral ulcer with central area of necrosis and fibrosis. +purulent drainage expressed from under the wound and under the 5th MPJ.  +dependent edema in foot. Foot is warm to touch.  +malodor from ulcer. Data Review:  
Recent Results (from the past 24 hour(s)) EKG, 12 LEAD, INITIAL Collection Time: 12/28/20  3:32 PM  
Result Value Ref Range Ventricular Rate 101 BPM  
 Atrial Rate 101 BPM  
 P-R Interval 132 ms QRS Duration 90 ms Q-T Interval 350 ms QTC Calculation (Bezet) 453 ms Calculated P Axis 17 degrees Calculated R Axis -26 degrees Calculated T Axis 43 degrees Diagnosis Sinus tachycardia When compared with ECG of 14-MAR-2019 18:55, 
T wave inversion no longer evident in Lateral leads Confirmed by Kylee Álvarez MD, Dearrubina Molina ([id number]) on 12/28/2020 8:11:46 PM 
  
CBC WITH AUTOMATED DIFF Collection Time: 12/28/20  3:41 PM  
Result Value Ref Range WBC 12.8 (H) 4.1 - 11.1 K/uL  
 RBC 3.03 (L) 4.10 - 5.70 M/uL HGB 7.9 (L) 12.1 - 17.0 g/dL HCT 24.9 (L) 36.6 - 50.3 % MCV 82.2 80.0 - 99.0 FL  
 MCH 26.1 26.0 - 34.0 PG  
 MCHC 31.7 30.0 - 36.5 g/dL  
 RDW 16.4 (H) 11.5 - 14.5 % PLATELET 221 621 - 534 K/uL MPV 10.4 8.9 - 12.9 FL  
 NRBC 0.0 0  WBC ABSOLUTE NRBC 0.00 0.00 - 0.01 K/uL NEUTROPHILS 89 (H) 32 - 75 % LYMPHOCYTES 4 (L) 12 - 49 % MONOCYTES 6 5 - 13 % EOSINOPHILS 0 0 - 7 % BASOPHILS 0 0 - 1 % IMMATURE GRANULOCYTES 1 (H) 0.0 - 0.5 % ABS. NEUTROPHILS 11.4 (H) 1.8 - 8.0 K/UL  
 ABS. LYMPHOCYTES 0.5 (L) 0.8 - 3.5 K/UL  
 ABS. MONOCYTES 0.8 0.0 - 1.0 K/UL  
 ABS. EOSINOPHILS 0.0 0.0 - 0.4 K/UL  
 ABS. BASOPHILS 0.0 0.0 - 0.1 K/UL  
 ABS. IMM. GRANS. 0.1 (H) 0.00 - 0.04 K/UL  
 DF SMEAR SCANNED    
 RBC COMMENTS ANISOCYTOSIS 1+ 
    
 RBC COMMENTS MICROCYTOSIS 1+ 
    
 RBC COMMENTS OVALOCYTES PRESENT 
    
METABOLIC PANEL, COMPREHENSIVE  Collection Time: 12/28/20  3:41 PM  
 Result Value Ref Range Sodium 128 (L) 136 - 145 mmol/L Potassium 3.8 3.5 - 5.1 mmol/L Chloride 98 97 - 108 mmol/L  
 CO2 19 (L) 21 - 32 mmol/L Anion gap 11 5 - 15 mmol/L Glucose 379 (H) 65 - 100 mg/dL BUN 53 (H) 6 - 20 MG/DL Creatinine 3.33 (H) 0.70 - 1.30 MG/DL  
 BUN/Creatinine ratio 16 12 - 20 GFR est AA 22 (L) >60 ml/min/1.73m2 GFR est non-AA 18 (L) >60 ml/min/1.73m2 Calcium 8.4 (L) 8.5 - 10.1 MG/DL Bilirubin, total 0.4 0.2 - 1.0 MG/DL  
 ALT (SGPT) 10 (L) 12 - 78 U/L  
 AST (SGOT) 24 15 - 37 U/L Alk. phosphatase 162 (H) 45 - 117 U/L Protein, total 8.3 (H) 6.4 - 8.2 g/dL Albumin 1.8 (L) 3.5 - 5.0 g/dL Globulin 6.5 (H) 2.0 - 4.0 g/dL A-G Ratio 0.3 (L) 1.1 - 2.2 SAMPLES BEING HELD Collection Time: 12/28/20  3:41 PM  
Result Value Ref Range SAMPLES BEING HELD 1 RED, 1 JACOB   
 COMMENT Add-on orders for these samples will be processed based on acceptable specimen integrity and analyte stability, which may vary by analyte. TROPONIN I Collection Time: 12/28/20  3:41 PM  
Result Value Ref Range Troponin-I, Qt. <0.05 <0.05 ng/mL NT-PRO BNP Collection Time: 12/28/20  3:41 PM  
Result Value Ref Range NT pro-BNP 9,137 (H) <125 PG/ML  
TSH 3RD GENERATION Collection Time: 12/28/20  3:41 PM  
Result Value Ref Range TSH 1.40 0.36 - 3.74 uIU/mL VITAMIN B12 Collection Time: 12/28/20  3:41 PM  
Result Value Ref Range Vitamin B12 913 193 - 986 pg/mL C REACTIVE PROTEIN, QT Collection Time: 12/28/20  3:41 PM  
Result Value Ref Range C-Reactive protein 34.30 (H) 0.00 - 0.60 mg/dL FERRITIN Collection Time: 12/28/20  3:41 PM  
Result Value Ref Range Ferritin 347 26 - 388 NG/ML  
IRON PROFILE Collection Time: 12/28/20  3:41 PM  
Result Value Ref Range Iron 21 (L) 35 - 150 ug/dL TIBC 203 (L) 250 - 450 ug/dL Iron % saturation 10 (L) 20 - 50 % LACTIC ACID  Collection Time: 12/28/20  4:28 PM  
 Result Value Ref Range Lactic acid 1.6 0.4 - 2.0 MMOL/L  
CULTURE, BLOOD, PAIRED Collection Time: 12/28/20  4:28 PM  
 Specimen: Blood Result Value Ref Range Special Requests: NO SPECIAL REQUESTS Culture result: (A) GRAM POSITIVE COCCI IN PAIRS AND CHAINS GROWING IN ALL 4 BOTTLES DRAWN (SITE =  RAC AND LAC) Culture result: (A) PRELIMINARY REPORT OF GRAM POSITIVE COCCI IN PAIRS AND CHAINS GROWING IN 3 OF 4 BOTTLES DRAWN CALLED TO AND READ BACK BY 33 Ariella Mercado RN ON 12/29/20 AT 0028.   
 Culture result: REMAINING BOTTLE(S) HAS/HAVE NO GROWTH SO FAR    
SARS-COV-2 Collection Time: 12/28/20  6:01 PM  
Result Value Ref Range Specimen source Nasopharyngeal    
 SARS-CoV-2 Not detected NOTD Specimen source Nasopharyngeal    
 Specimen type NP Swab Health status Symptomatic Testing GLUCOSE, POC Collection Time: 12/28/20  9:07 PM  
Result Value Ref Range Glucose (POC) 444 (H) 65 - 100 mg/dL Performed by Kassandra Daley (CON) C REACTIVE PROTEIN, QT Collection Time: 12/29/20  4:04 AM  
Result Value Ref Range C-Reactive protein 27.70 (H) 0.00 - 0.60 mg/dL CBC WITH AUTOMATED DIFF Collection Time: 12/29/20  4:04 AM  
Result Value Ref Range WBC 11.1 4.1 - 11.1 K/uL  
 RBC 2.60 (L) 4.10 - 5.70 M/uL HGB 6.8 (L) 12.1 - 17.0 g/dL HCT 21.1 (L) 36.6 - 50.3 % MCV 81.2 80.0 - 99.0 FL  
 MCH 26.2 26.0 - 34.0 PG  
 MCHC 32.2 30.0 - 36.5 g/dL  
 RDW 16.3 (H) 11.5 - 14.5 % PLATELET 080 510 - 173 K/uL MPV 10.1 8.9 - 12.9 FL  
 NRBC 0.0 0  WBC ABSOLUTE NRBC 0.00 0.00 - 0.01 K/uL NEUTROPHILS 89 (H) 32 - 75 % LYMPHOCYTES 4 (L) 12 - 49 % MONOCYTES 6 5 - 13 % EOSINOPHILS 0 0 - 7 % BASOPHILS 0 0 - 1 % IMMATURE GRANULOCYTES 1 (H) 0.0 - 0.5 % ABS. NEUTROPHILS 9.9 (H) 1.8 - 8.0 K/UL  
 ABS. LYMPHOCYTES 0.4 (L) 0.8 - 3.5 K/UL  
 ABS. MONOCYTES 0.7 0.0 - 1.0 K/UL  
 ABS. EOSINOPHILS 0.0 0.0 - 0.4 K/UL ABS. BASOPHILS 0.0 0.0 - 0.1 K/UL  
 ABS. IMM. GRANS. 0.1 (H) 0.00 - 0.04 K/UL  
 DF SMEAR SCANNED    
 PLATELET COMMENTS Large Platelets RBC COMMENTS ANISOCYTOSIS 1+ 
    
 RBC COMMENTS OVALOCYTES PRESENT 
    
RENAL FUNCTION PANEL Collection Time: 12/29/20  4:04 AM  
Result Value Ref Range Sodium 129 (L) 136 - 145 mmol/L Potassium 3.9 3.5 - 5.1 mmol/L Chloride 97 97 - 108 mmol/L  
 CO2 22 21 - 32 mmol/L Anion gap 10 5 - 15 mmol/L Glucose 203 (H) 65 - 100 mg/dL BUN 60 (H) 6 - 20 MG/DL Creatinine 3.84 (H) 0.70 - 1.30 MG/DL  
 BUN/Creatinine ratio 16 12 - 20 GFR est AA 19 (L) >60 ml/min/1.73m2 GFR est non-AA 16 (L) >60 ml/min/1.73m2 Calcium 8.5 8.5 - 10.1 MG/DL Phosphorus 3.5 2.6 - 4.7 MG/DL Albumin 1.6 (L) 3.5 - 5.0 g/dL GLUCOSE, POC Collection Time: 12/29/20  6:15 AM  
Result Value Ref Range Glucose (POC) 171 (H) 65 - 100 mg/dL Performed by Luiza Romero ECHO ADULT COMPLETE Collection Time: 12/29/20 10:55 AM  
Result Value Ref Range IVSd 0.81 0.6 - 1.0 cm LVIDd 5.89 4.2 - 5.9 cm LVIDs 3.25 cm  
 LVPWd 1.22 (A) 0.6 - 1.0 cm  
 LVOT Peak Gradient 5.81 mmHg LVOT Peak Velocity 120.55 cm/s Left Atrium to Aortic Root Ratio 0.94 AoV PG 13.12 mmHg Aortic Valve Systolic Peak Velocity 511.03 cm/s  
 MV A Yuri 93.37 cm/s  
 MV E Yuri 106.14 cm/s Tapse 2.02 (A) 1.5 - 2.0 cm  
 MV E/A 1.14 LV Mass .9 88 - 224 g LV Mass AL Index 100.7 49 - 115 g/m2 GLUCOSE, POC Collection Time: 12/29/20 11:23 AM  
Result Value Ref Range Glucose (POC) 140 (H) 65 - 100 mg/dL Performed by Anabell Bell   
RBC, ALLOCATE Collection Time: 12/29/20 11:45 AM  
Result Value Ref Range HISTORY CHECKED? Historical check performed SARS-COV-2 Collection Time: 12/29/20  1:37 PM  
Result Value Ref Range Specimen source Nasopharyngeal    
 SARS-CoV-2 PENDING   
 SARS-CoV-2 PENDING  Specimen source Nasopharyngeal    
 COVID-19 rapid test PENDING Specimen type NP Swab Health status PENDING   
 COVID-19 PENDING   
[address] Collection Time: 12/29/20  1:44 PM  
Result Value Ref Range Crossmatch Expiration 01/01/2021,2358 ABO/Rh(D) PENDING Antibody screen PENDING Impression:  
 
1.) Osteomyelitis Right Foot with Ulcer 2.) Diabetes with Neuropathy Recommendation:  
1.) Had long discussion with Pt. Reviewed my findings with him. 2.) XR shows bony destruction along distal aspect of 5th metatarsal and base of proximal phalanx. 3.) Pt to OR tomorrow at noon for debridement of right foot ulcer with removal of infected bone. Will pre-op & consent today for OR tomorrow. Informed Pt that if the infection appears to have spread throughout his foot, he may end up needing a more proximal amputation. He understands. 4.) Continue antibxs (vanco/cefepime). WBC 12.8 --> 11.1 
5.) Will follow

## 2020-12-29 NOTE — WOUND CARE
WOCN Note New consult for assessment of right diabetic foot wound. Patient followed by podiatry with plans for debridement in OR tomorrow. Will sign off, reconsult if needed. MONIKA WattN RN [facility] Inpatient Wound Care Available on [employer] Pager [phone] Office [phone]

## 2020-12-29 NOTE — PROGRESS NOTES
Pharmacist Note - Vancomycin Dosing Consult provided for this 72 y.o. male for indication of osteomyelitis Antibiotic regimen(s):Rocephin+vancomycin Patient on vancomycin PTA? NO Recent Labs  
  20 
1541 WBC 12.8* CREA 3.33* BUN 53* Frequency of BMP: daily Height: 182.9 cm Weight: 122.5 kg Est CrCl: ~ 30 ml/min; Temp (24hrs), Av.9 °F (36.6 °C), Min:96.9 °F (36.1 °C), Max:98.5 °F (36.9 °C) Cultures: 
 blood in process Goal trough = 15 - 20 mcg/mL Therapy will be initiated with a loading dose of 1750 mg IV x 1 due to elevated SCR- will dose by levels Pharmacy to follow patient daily and order levels / make dose adjustments as appropriate.

## 2020-12-29 NOTE — PROGRESS NOTES
Problem: Falls - Risk of 
Goal: *Absence of Falls Description: Document Do Stewart Fall Risk and appropriate interventions in the flowsheet. Outcome: Progressing Towards Goal 
Note: Fall Risk Interventions: 
  
 
  
 
Medication Interventions: Evaluate medications/consider consulting pharmacy, Patient to call before getting OOB, Teach patient to arise slowly Elimination Interventions: Patient to call for help with toileting needs, Call light in reach

## 2020-12-30 NOTE — PERIOP NOTES
TRANSFER - OUT REPORT: 
 
Verbal report given to [facility] RN(name) on Sanborn Mighty III  being transferred to 6E(unit) for routine post - op Report consisted of patients Situation, Background, Assessment and  
Recommendations(SBAR). Time Pre op antibiotic given:1228 Anesthesia Stop time: 9680 Bynum Present on Transfer to floor:yes Order for Bynum on Chart:yes Discharge Prescriptions with Chart:no Information from the following report(s) SBAR, Kardex, Procedure Summary, Intake/Output, MAR, Accordion, Recent Results and Cardiac Rhythm NSR was reviewed with the receiving nurse. Opportunity for questions and clarification was provided. Is the patient on 02? YES 
     L/Min 2 Is the patient on a monitor? NO Is the nurse transporting with the patient? NO Surgical Waiting Area notified of patient's transfer from PACU? YES The following personal items collected during your admission accompanied patient upon transfer:  
Dental Appliance: Dental Appliances: None Vision:   
Hearing Aid:   
Jewelry: Jewelry: None Clothing: Clothing: None Other Valuables: Other Valuables: (S) Cell Phone(sent to safe by security Candida) Valuables sent to safe:

## 2020-12-30 NOTE — PROGRESS NOTES
Hospitalist Progress Note Chuck Elias MD 
Answering service: [phone] from in house phone Date of Service:  2020 NAME:  Deisy Hobbs III 
:  1950 MRN:  [id number] Admission Summary: As per initial admission summary Leonila Bowie is a 72 y.o. male with PMH of prostatic cancer s/p radiation, IDDM, heart failure, htn. He apparently told the ER physician that he c/o onset of diarrhea yesterday and some chills over the last 2 days, with occasional cough. For me, however, he denies all of these symptoms and states he came in because his caregiver was concerned d/t his lethargy over the last several days to weeks. He lives home alone and has a caregiver five times a week. He denies cp, sob, fever, chills, sweats, N/V. He has had a wound on his right foot for months. He denies any foot pain d/t neuropathy. He goes to [facility] for nephrology and states he last saw them a few months ago. Prior to today, he believes it has been several months since he last had any blood work. Denies etoh or illicits. Smokes just under 1 PPD. Currently no acute complaints except asking for water. States he takes all his meds daily. Interval history / Subjective:  
  Patient seen for Follow up of om, covid pui Patient seen and examined by the bedside, Labs, images and notes reviewed Patient is comfortable, denies any chest pain, SOB, abdominal pain, fevers, chills. No N/V/D Discussed with nursing staff, no acute issues overnight, orders reviewed. Pt to OR today at noon for debridement of right foot ulcer with removal of infected bone. S/p blood transfusion. No acute issues overnight Still having fever spikes Assessment & Plan:  
 
Severe sepsis 2/2 osteomyelitis right foot XR shows bony destruction along distal aspect of 5th metatarsal and base of proximal phalanx. Blood cxs pending S/p neftaly Schafer Now on daptomycin, cefepime for now. Flagyl also added NS bolus for sepsis Podiatry ID Trend inflammatory markers Lactate wnl Plan for debridement today  
  
Airspace opacities, infection vs pulm edema HFpEF, EF 65% from 3/2019 BNP >9k Echo, · Estimated LVEF is 55 - 60%. Normal cavity size and systolic function (ejection fraction normal). Severe concentric hypertrophy. Inconclusive left ventricular diastolic function. covid negative Hold home torsemide for now 
  Toxic metabolic encephalopathy/drowsiness likely 2/2 above vs uremia? improved Cont to monitor 
  
Acute on CKD, unknown baseline but likely around 2 Unclear if KATHI d/t sepsis or progression of CKD Pt follows with Dr. Rory Odom at [address] [facility] Hold demadex for now given severe sepsis and KATHI, Nephrology following  
  
IDDM, last a1c 8.1 01/2020 Cont home insulin plus SSI Cont ASA 
gabapentin lower renal dose 
  
HTN, controlled Cont home meds (norvasc lower dose to avoid edema s/e) 
  
Hyponatremia 
monitor 
  
Acute on chronic anemia, baseline ~9 No s/sx of bleeding Most recent hgb 6.8 S/o blood transfusion 3 times FOBT ordered, need to consult GI if FOBT positive or suspicion of GI bleeding Patient had some blood loss during debridement as well. As per nursing staff. .  
  
Prostate ca Cont home meds 
  
Tobacco abuse Pt agreed to patch Counseled 
  
 
 
Code status: full code DVT prophylaxis: scds Care Plan discussed with: Patient/Family Disposition: Home w/Family and TBD Hospital Problems  Date Reviewed: 8/20/2020 Codes Class Noted POA * (Principal) Osteomyelitis ([facility] [address] ICD-10-CM: M86.9 ICD-9-CM: 730.20  12/28/2020 Yes Review of Systems: A comprehensive review of systems was negative except for that written in the HPI. Vital Signs:  
 Last 24hrs VS reviewed since prior progress note. Most recent are: 
Visit Vitals BP (!) 152/66 (BP 1 Location: Left arm, BP Patient Position: At rest) Pulse 94 Temp 97.1 °F (36.2 °C) Resp 16 Ht 6' (1.829 m) Wt 112.8 kg (248 lb 11.2 oz) SpO2 93% BMI 33.73 kg/m² Intake/Output Summary (Last 24 hours) at 12/30/2020 1704 Last data filed at 12/30/2020 1317 Gross per 24 hour Intake 916.3 ml Output 655 ml Net 261.3 ml Physical Examination:  
I had a face to face encounter with this patient and independently examined them on December 30, 2020 as outlined below: 
     
Constitutional:  No acute distress, cooperative, pleasant ENT:  Oral mucous moist, oropharynx benign. Neck supple, Resp:  CTA bilaterally. No wheezing/rhonchi/rales. No accessory muscle use CV:  Regular rhythm, normal rate, no murmurs, gallops, rubs GI:  Soft, non distended, non tender. normoactive bowel sounds, no hepatosplenomegaly Musculoskeletal:  No edema, warm, 2+ pulses throughout. Extensive RLE open wound with likely exposed bone, malodorous Neurologic:  Moves all extremities. AAOx3, CN II-XII reviewed Psych:  Good insight, Not anxious nor agitated. Data Review:  
 Review and/or order of clinical lab test 
Review and/or order of tests in the radiology section of CPT Review and/or order of tests in the medicine section of CPT Labs:  
 
Recent Labs 12/30/20 
1538 12/30/20 
1047 WBC 11.5* 12.4* HGB 6.8* 6.5* HCT 21.1* 20.0*  
 292 Recent Labs 12/30/20 
0117 12/29/20 
0404 12/28/20 
1541 * 129* 128* K 3.9 3.9 3.8  97 98 CO2 21 22 19* BUN 63* 60* 53* CREA 4.37* 3.84* 3.33* * 203* 379* CA 7.9* 8.5 8.4* PHOS 3.9 3.5  --   
 
Recent Labs  
  12/29/20 
0404 12/28/20 
1541 ALT  --  10* AP  --  162* TBILI  --  0.4 TP  --  8.3* ALB 1.6* 1.8*  
GLOB  --  6.5* No results for input(s): INR, PTP, APTT, INREXT, INREXT in the last 72 hours. Recent Labs 12/30/20 
0117 12/28/20 
1541 TIBC 177* 203* PSAT 14* 10* FERR 266 347 Lab Results Component Value Date/Time Folate 20.0 03/16/2019 04:06 AM  
  
No results for input(s): PH, PCO2, PO2 in the last 72 hours. Recent Labs 12/30/20 
0117 12/29/20 
1745 12/28/20 
1541  151  --   
TROIQ  --   --  <0.05 No results found for: CHOL, CHOLX, CHLST, CHOLV, HDL, HDLP, LDL, LDLC, DLDLP, TGLX, TRIGL, TRIGP, CHHD, CHHDX Lab Results Component Value Date/Time Glucose (POC) 245 (H) 12/30/2020 04:21 PM  
 Glucose (POC) 119 (H) 12/30/2020 01:27 PM  
 Glucose (POC) 97 12/30/2020 12:04 PM  
 Glucose (POC) 198 (H) 12/30/2020 06:58 AM  
 Glucose (POC) 150 (H) 12/29/2020 09:36 PM  
 
Lab Results Component Value Date/Time Color YELLOW/STRAW 03/29/2019 02:34 PM  
 Appearance CLEAR 03/29/2019 02:34 PM  
 Specific gravity 1.017 03/29/2019 02:34 PM  
 pH (UA) 5.0 03/29/2019 02:34 PM  
 Protein 100 (A) 03/29/2019 02:34 PM  
 Glucose NEGATIVE  03/29/2019 02:34 PM  
 Ketone NEGATIVE  03/29/2019 02:34 PM  
 Bilirubin NEGATIVE  03/29/2019 02:34 PM  
 Urobilinogen 0.2 03/29/2019 02:34 PM  
 Nitrites NEGATIVE  03/29/2019 02:34 PM  
 Leukocyte Esterase NEGATIVE  03/29/2019 02:34 PM  
 Epithelial cells FEW 03/29/2019 02:34 PM  
 Bacteria NEGATIVE  03/29/2019 02:34 PM  
 WBC 0-4 03/29/2019 02:34 PM  
 RBC 0-5 03/29/2019 02:34 PM  
 
 
 
Medications Reviewed:  
 
Current Facility-Administered Medications Medication Dose Route Frequency  diclofenac (VOLTAREN) 1 % topical gel 2 g  2 g Topical QID  
 0.9% sodium chloride infusion 250 mL  250 mL IntraVENous PRN  
 acetaminophen (TYLENOL) tablet 500 mg  500 mg Oral Q4H PRN  
 cefepime (MAXIPIME) 1 g in 0.9% sodium chloride (MBP/ADV) 50 mL MBP  1 g IntraVENous Q24H  
 0.9% sodium chloride infusion  75 mL/hr IntraVENous CONTINUOUS  
 0.9% sodium chloride infusion 250 mL  250 mL IntraVENous PRN  
 oxyCODONE-acetaminophen (PERCOCET) 5-325 mg per tablet 1 Tab  1 Tab Oral Q8H PRN  
 metroNIDAZOLE (FLAGYL) IVPB premix 500 mg  500 mg IntraVENous Q12H  DAPTOmycin (CUBICIN) 750 mg in 0.9% sodium chloride 50 mL IVPB RF formulation  750 mg IntraVENous Q48H  
 amLODIPine (NORVASC) tablet 5 mg  5 mg Oral DAILY  aspirin delayed-release tablet 81 mg  81 mg Oral DAILY  bicalutamide (CASODEX) tablet 50 mg  50 mg Oral DAILY  enzalutamide Kiersten Plana) chemo capsule 160 mg (patient supplied) (Patient Supplied)  160 mg Oral DAILY AFTER BREAKFAST  gabapentin (NEURONTIN) capsule 100 mg  100 mg Oral TID  hydrALAZINE (APRESOLINE) tablet 100 mg  100 mg Oral TID  tamsulosin (FLOMAX) capsule 0.4 mg  0.4 mg Oral DAILY  nicotine (NICODERM CQ) 14 mg/24 hr patch 1 Patch  1 Patch TransDERmal DAILY  insulin lispro (HUMALOG) injection   SubCUTAneous AC&HS  
 glucose chewable tablet 16 g  4 Tab Oral PRN  
 dextrose (D50W) injection syrg 12.5-25 g  12.5-25 g IntraVENous PRN  
 glucagon (GLUCAGEN) injection 1 mg  1 mg IntraMUSCular PRN  
 [Held by provider] heparin (porcine) injection 5,000 Units  5,000 Units SubCUTAneous Q8H  
 insulin lispro (HUMALOG) injection 20 Units  20 Units SubCUTAneous TIDAC  insulin glargine (LANTUS) injection 50 Units  50 Units SubCUTAneous QHS  
 
______________________________________________________________________ EXPECTED LENGTH OF STAY: 2d 21h ACTUAL LENGTH OF STAY:          2 Berta Penaloza MD

## 2020-12-30 NOTE — CONSULTS
NEPHROLOGY CONSULT NOTE Patient: Mildred Burns MRN: [id number]  PCP: Leana Buerger., MD  
:     1950  Age:   72 y.o. Sex:  male Referring physician: Gray Ma MD 
Reason for consultation: 72 y.o. male with Osteomyelitis ([facility] [address] [O87.0] complicated by KATHI Admission Date: 2020  3:14 PM  LOS: 2 days ASSESSMENT and PLAN :  
KATHI on CKD  
- suspect volume depletion from diuretics and GI symptoms 
- renal U/S to r/o obstruction when COVID ruled out 
- cont IVF and hold diuretics - place kumar today 
- daily labs and I/Os 
  
CKD Stage IIIa: 
-  Presumed 2/2 DM and HTN 
- baseline Cr 1.7 mg/dl  
- followed at [facility] 
  
Hx of prostate cancer s/p XRT 
  
Metabolic acidosis: 
- cont oral bicarb 
  
Anemia in CKD: 
- hgb 6.8 from yesterday 
- would transfuse 1 unit of PRBCs Type II DM: 
- on insulin HTN :  
- Continue current meds  
 
R foot osteo: 
- on abx 
- ID and podiatry consulted COVID-19 PUI 
 
B/l Airspace disease: 
- ? PNA vs edema 
- COVID PUI as above 
- on broad spectrum abx Active Problems / Assessment AAActive  :  
Principal Problem: 
  Osteomyelitis ([facility] [address] (2020) Subjective:  
Doing ok overall. Cr up. Still PUI. Not examined in the room. Per RN, no new issues. Not voiding much. Getting IVF. O2 sats stable Past Medical Hx:  
Past Medical History:  
Diagnosis Date  Arthritis, Degenerative,  Knee 2011  Carcinoma, Prostate 2011  Degenerative disc disease 2011  Depression/ Anxiety 2011  Diabetes Mellitrus ( non-insulin dependent ) Type 2 2011  
 Heart failure ([facility] [address]  HEMATOCHEZIA 2011  Hypertrension 2011  Hypertriglyceridemia 2011  Ill-defined condition   
 lymphadema  Insomnia 2011  Laryngitis 2011  Mild Aortic insufficiency 2011  Mild Concentric LVH (left ventricular hypertrophy) 2011  Neuropathy 2011  Osteoarthritis 2011  Rotator Cuff Tendonitis 4/4/2011 Past Surgical Hx: 
  
Past Surgical History:  
Procedure Laterality Date  CARDIAC SURG PROCEDURE UNLIST    
 cardiac cath  COLONOSCOPY N/A 4/28/2017 COLONOSCOPY performed by Sarah Linares MD at \[facility]\"" ENDOSCOPY  
 HX ORTHOPAEDIC    
 left hip pinning  HX OTHER SURGICAL    
 left little toe amputation  HX UROLOGICAL Medications: 
Prior to Admission medications Medication Sig Start Date End Date Taking? Authorizing Provider  
pregabalin (LYRICA) 150 mg capsule TAKE ONE CAPSULE BY MOUTH TWICE A DAY 12/7/20  Yes Mary Anderson MD  
bicalutamide (CASODEX) 50 mg tablet TAKE ONE TABLET BY MOUTH DAILY 11/30/20  Yes Mary Anderson MD  
torsemide (DEMADEX) 20 mg tablet TAKE FOUR TABLETS BY MOUTH TWICE A DAY 11/23/20  Yes Mary Anderson MD  
pantoprazole (PROTONIX) 40 mg tablet TAKE ONE TABLET BY MOUTH TWICE DAILY ONCE IN THE MORNING AND ONCE IN THE EVENING 11/18/20  Yes Mary Anderson MD  
hydrOXYzine HCL (ATARAX) 50 mg tablet TAKE ONE TABLET BY MOUTH THREE TIMES A DAY AS NEEDED FOR ITCHING FOR UP TO 10 DAYS 10/22/20  Yes Mary Anderson MD  
amLODIPine (NORVASC) 10 mg tablet TAKE ONE TABLET BY MOUTH DAILY 10/2/20  Yes Mary Anderson MD  
hydrALAZINE (APRESOLINE) 100 mg tablet Take 1 Tab by mouth three (3) times daily. 9/16/20  Yes Mary Anderson MD  
ascorbic acid, vitamin C, (Vitamin C) 500 mg tablet Take  by mouth. Yes Provider, Historical  
enzalutamide (XTANDI) 40 mg capsule Take 160 mg by mouth daily.    Yes Provider, Historical  
Thera-Tabs M 27 mg iron-400 mcg tab TAKE ONE TABLET BY MOUTH DAILY 7/17/20  Yes Mary Anderson MD  
tamsulosin [facility]) 0.4 mg capsule TAKE ONE CAPSULE BY MOUTH DAILY 7/16/20  Yes Mary Anderson MD  
Insulin Syringe-Needle U-100 (BD Insulin Syringe Ultra-Fine) 1 mL 31 gauge x 5/16 syrg USE TO INJECT INSULIN FIVE TIMES A DAY 6/16/20  Yes Mary Anderson MD  
 ferrous sulfate 325 mg (65 mg iron) tablet TAKE ONE TABLET BY MOUTH DAILY BEFORE BREAKFAST 6/16/20  Yes Silvia Skinner MD  
gabapentin (NEURONTIN) 300 mg capsule Take 1 Cap by mouth three (3) times daily. Max Daily Amount: 900 mg. 5/25/20  Yes Silvia Skinner MD  
triamcinolone acetonide (KENALOG) 0.1 % ointment APPLY A THIN LAYER TO AFFECTED AREA(S) TWO TIMES A DAY **DO NOT EXCEED 2.66 GRAMS PER DAY** 5/19/20  Yes Silvia Skinner MD  
diclofenac (VOLTAREN) 1 % gel Apply  to affected area four (4) times daily. 4/16/20  Yes Silvia Skinner MD  
ammonium lactate (AMLACTIN) 12 % topical cream Apply  to affected area two (2) times a day. rub in to affected area well 3/10/20  Yes Silvia Skinner MD  
ammonium lactate (LAC-HYDRIN FIVE) 5 % lotion Apply 1 Applicator to affected area daily. Apply daily bilateral lower legs 3/5/20  Yes Silvia Skinner MD  
insulin glargine (LANTUS U-100 INSULIN) 100 unit/mL injection 72 Units by SubCUTAneous route daily. 3/5/20  Yes Silvia Skinner MD  
insulin regular (NOVOLIN R, HUMULIN R) 100 unit/mL injection 24 units sc tid 3/5/20  Yes Silvia Skinner MD  
ketoconazole (NIZORAL) 2 % topical cream Apply  to affected area two (2) times a day. 2/4/20  Yes Silvia Skinner MD  
ketoconazole (NIZORAL) 2 % shampoo Sig:shampoo once a week 2/4/20  Yes Silvia Skinner MD  
bisacodyl (DULCOLAX, BISACODYL,) 10 mg suppository Insert 10 mg into rectum daily. Yes Provider, Historical  
insulin aspart U-100 (NOVOLOG FLEXPEN U-100 INSULIN) 100 unit/mL (3 mL) inpn by SubCUTAneous route.    Yes Provider, Historical  
Insulin Needles, Disposable, (NOVOFINE 32) 32 gauge x 1/4\" ndle Sig:use 4 times a day as needed 11/13/19  Yes Silvia Skinner MD  
NOVOLIN R REGULAR U-100 INSULN 100 unit/mL injection INJECT 14 UNITS UNDER THE SKIN THREE TIMES A DAY WITH MEALS AS NEEDED 8/6/19  Yes Silvia Wesley., MD  
 fluticasone propionate (FLONASE) 50 mcg/actuation nasal spray SPRAY TWO SPRAYS IN THE AFFECTED NOSTRIL DAILY 7/16/19  Yes Cornell Caballero MD  
aspirin delayed-release 81 mg tablet Take 81 mg by mouth daily. Yes Provider, Historical  
acetaminophen (PAIN AND FEVER) 500 mg tablet TAKE ONE TABLET BY MOUTH EVERY 6 HOURS AS NEEDED FOR PAIN 8/28/18  Yes Cornell Caballero MD  
Calcium Carbonate-Vit D3-Min 600 mg calcium- 400 unit tab Take 1 Tab by mouth two (2) times a day. Yes Provider, Historical  
 
 
No Known Allergies Social Hx:  reports that he has been smoking. He has been smoking about 1.00 pack per day. He has never used smokeless tobacco. He reports previous alcohol use of about 11.7 standard drinks of alcohol per week. He reports current drug use. Drugs: Marijuana and Cocaine. Family History Family history unknown: Yes Review of Systems: A twelve point review of system was performed today. Pertinent positives and negatives are mentioned in the HPI. The reminder of the ROS is negative and noncontributory. Objective:   
Vitals:   
Vitals:  
 12/30/20 0102 12/30/20 0700 12/30/20 0703 12/30/20 8661 BP: 112/62   (!) 131/59 Pulse: 95   90 Resp: 20   20 Temp: 100.3 °F (37.9 °C)  98.9 °F (37.2 °C) 99.2 °F (37.3 °C) SpO2: 93%   90% Weight:  112.8 kg (248 lb 11.2 oz) Height:      
 
I&O's:  12/29 0701 - 12/30 0700 In: 491.3 Out: 350 [Urine:350] Visit Vitals BP (!) 131/59 Pulse 90 Temp 99.2 °F (37.3 °C) Resp 20 Ht 6' (1.829 m) Wt 112.8 kg (248 lb 11.2 oz) SpO2 90% BMI 33.73 kg/m² Physical Exam:  Examined outside of room due to COVID-19 PUI: 
 
General:Alert, No distress,  
Lungs : stable oxygenation CVS: RRR on monitor, no edema, R LE wound Neurologic: non focal, AAO x 3 : no kumar Laboratory Results: 
 
Lab Results Component Value Date BUN 63 (H) 12/30/2020  (L) 12/30/2020  
 K 3.9 12/30/2020  12/30/2020 CO2 21 12/30/2020 Lab Results Component Value Date BUN 63 (H) 12/30/2020 BUN 60 (H) 12/29/2020 BUN 53 (H) 12/28/2020 BUN 25 06/27/2019 BUN 46 (H) 04/04/2019  
 K 3.9 12/30/2020  
 K 3.9 12/29/2020  
 K 3.8 12/28/2020  
 K 4.3 06/27/2019  
 K 5.0 04/04/2019 Lab Results Component Value Date WBC 11.1 12/29/2020 RBC 2.60 (L) 12/29/2020 HGB 6.8 (L) 12/29/2020 HCT 21.1 (L) 12/29/2020 MCV 81.2 12/29/2020 MCH 26.2 12/29/2020 RDW 16.3 (H) 12/29/2020  12/29/2020 Lab Results Component Value Date PHOS 3.9 12/30/2020 Urine dipstick:  
Lab Results Component Value Date/Time Color YELLOW/STRAW 03/29/2019 02:34 PM  
 Appearance CLEAR 03/29/2019 02:34 PM  
 Specific gravity 1.017 03/29/2019 02:34 PM  
 pH (UA) 5.0 03/29/2019 02:34 PM  
 Protein 100 (A) 03/29/2019 02:34 PM  
 Glucose NEGATIVE  03/29/2019 02:34 PM  
 Ketone NEGATIVE  03/29/2019 02:34 PM  
 Bilirubin NEGATIVE  03/29/2019 02:34 PM  
 Urobilinogen 0.2 03/29/2019 02:34 PM  
 Nitrites NEGATIVE  03/29/2019 02:34 PM  
 Leukocyte Esterase NEGATIVE  03/29/2019 02:34 PM  
 Epithelial cells FEW 03/29/2019 02:34 PM  
 Bacteria NEGATIVE  03/29/2019 02:34 PM  
 WBC 0-4 03/29/2019 02:34 PM  
 RBC 0-5 03/29/2019 02:34 PM  
 
 
 
 
Prudence Murrell MD 
12/30/2020 [address] [place] [address], [address] [place] [address] Phone - [phone] Fax - [phone] 
[email or web address]

## 2020-12-30 NOTE — ANESTHESIA PREPROCEDURE EVALUATION
Relevant Problems No relevant active problems Anesthetic History No history of anesthetic complications Review of Systems / Medical History Patient summary reviewed, nursing notes reviewed and pertinent labs reviewed Pulmonary Sleep apnea: No treatment Smoker Neuro/Psych Psychiatric history Comments: LE neuropathy Cardiovascular Hypertension: well controlled Hyperlipidemia Exercise tolerance: <4 METS 
  
GI/Hepatic/Renal 
  
 
 
 
 
 
 Endo/Other Diabetes: well controlled, type 2 Morbid obesity, arthritis and cancer (prostate) Other Findings Physical Exam 
 
Airway Mallampati: II 
TM Distance: 4 - 6 cm Neck ROM: normal range of motion, short neck Mouth opening: Normal 
 
 Cardiovascular Rhythm: regular Rate: normal 
 
 
 
 Dental 
 
Dentition: Full upper dentures and Poor dentition Pulmonary Breath sounds clear to auscultation Abdominal 
GI exam deferred Other Findings Anesthetic Plan ASA: 3 Anesthesia type: MAC and total IV anesthesia Induction: Intravenous Anesthetic plan and risks discussed with: Patient Anesthetic History Review of Systems / Medical History Pulmonary Neuro/Psych Cardiovascular Hypertension GI/Hepatic/Renal 
  
 
 
 
 
 
 Endo/Other Diabetes Obesity and arthritis Other Findings Physical Exam 
 
Airway Mallampati: II 
TM Distance: > 6 cm Neck ROM: normal range of motion Mouth opening: Normal 
 
 Cardiovascular Regular rate and rhythm,  S1 and S2 normal,  no murmur, click, rub, or gallop Dental 
No notable dental hx Pulmonary Breath sounds clear to auscultation Abdominal 
GI exam deferred Other Findings Anesthetic Plan ASA: 3 Anesthesia type: MAC Induction: Intravenous Anesthetic plan and risks discussed with: Patient

## 2020-12-30 NOTE — BRIEF OP NOTE
Brief Postoperative Note Patient: Dontrell Spivey YOB: 1950 MRN: [id number] Date of Procedure: 12/30/2020 Pre-Op Diagnosis: OSTEOMYELITIS RIGHT FOOT Post-Op Diagnosis: Same as preoperative diagnosis. Procedure(s): DEBRIDEMENT OF RIGHT FOOT ULCER WITH REMOVAL OF INFECTED BONE (URGENT) Surgeon(s): 
Papa Sifuentes DPM 
 
Surgical Assistant: None Anesthesia: MAC Estimated Blood Loss (mL): less than 100 Complications: None Specimens: * No specimens in log * Implants: * No implants in log * Drains:  
[REMOVED] Condom Catheter 12/29/20 (Removed) Indications for Use Accurate measurement of urinary output 12/30/20 0815 Status Patent 12/30/20 0815 Site Condition No abnormalities 12/30/20 0815 Drainage Tube Clipped to Bed Yes 12/30/20 0815 Catheter Secured to Thigh No 12/30/20 0815 Tamper Seal Intact Yes 12/30/20 0815 Bag Below Bladder/Not on Floor Yes 12/30/20 0815 Lack of Dependent Loop in Tubing Yes 12/30/20 0815 Drainage Bag Less Than Half Full Yes 12/30/20 0815 Sterile Solution Used for  Irrigation Yes 12/30/20 0815 Urine Output (mL) 350 ml 12/29/20 1810 Findings: necrotic plantar lateral ulcer that probed to bone. Entire 5th metatarsal resected. Bone sent to path and micro. Soft tissue cxs sent to micro. Adequate bleeding intra-op Electronically Signed by Fazal Lovelace DPM on 12/30/2020 at 1:09 PM

## 2020-12-30 NOTE — PROGRESS NOTES
Kenroy Haile TRANSFER - IN REPORT: 
 
Verbal report received from Ryanne(name) on Lane Castro III  being received from [facility]) for routine post - op Report consisted of patients Situation, Background, Assessment and  
Recommendations(SBAR). Information from the following report(s) SBAR, OR Summary, Intake/Output and MAR was reviewed with the receiving nurse. Opportunity for questions and clarification was provided. Assessment completed upon patients arrival to unit and care assumed. 1510: Significant sanguinous drainage noted on dressing and blanket, MD notified and dressing reinforced. 1545: New dressing saturated along with blanket and sunshine blood dripping onto floor, dressing reinforced again, MD notified and new CBC drawn. 1640: Dressing saturated with sunshine blood dripping off of bed, dressing reinforced again and MD notified.

## 2020-12-30 NOTE — PROGRESS NOTES
Clinical Pharmacy Note: Metronidazole Dosing Please note that the metronidazole dose for Merla Norse III has been changed to 500 mg  q12h per [facility]-approved protocol. Please contact the pharmacy with any questions.  
 
Yovani Pace [facility]

## 2020-12-30 NOTE — PERIOP NOTES
TRANSFER - IN REPORT: 
 
Verbal report received from [facility] RN(name) on Sarah Shutter III  being received from 6E(unit) for ordered procedure Report consisted of patients Situation, Background, Assessment and  
Recommendations(SBAR). Information from the following report(s) SBAR, Procedure Summary, Intake/Output and MAR was reviewed with the receiving nurse. Opportunity for questions and clarification was provided. Assessment completed upon patients arrival to unit and care assumed.

## 2020-12-30 NOTE — ANESTHESIA POSTPROCEDURE EVALUATION
Procedure(s): DEBRIDEMENT OF RIGHT FOOT ULCER WITH REMOVAL OF INFECTED BONE (URGENT). MAC Anesthesia Post Evaluation Patient location during evaluation: PACU Patient participation: complete - patient participated Level of consciousness: awake Pain management: adequate Airway patency: patent Anesthetic complications: no 
Cardiovascular status: hemodynamically stable Respiratory status: acceptable Hydration status: acceptable Comments: I have seen and evaluated the patient. The patient is ready for PACU discharge. Rivka Penny,  
  
 
 
  
 
 
  
 
 
 
INITIAL Post-op Vital signs:  
Vitals Value Taken Time /61 12/30/20 1345 Temp 37.1 °C (98.8 °F) 12/30/20 1317 Pulse 74 12/30/20 1355 Resp 17 12/30/20 1355 SpO2 100 % 12/30/20 1355

## 2020-12-30 NOTE — PROGRESS NOTES
Physical Therapy Screening: An [facility] screening referral was triggered for physical therapy based on results obtained during the nursing admission assessment. The patients chart was reviewed and the patient is appropriate for a skilled therapy evaluation if there is a decline in functional mobility from baseline. Please order a consult for physical therapy if you are in agreement and would like an evaluation to be completed. Thank you. Monica Hill, PT  
 
3/29/19 Prior Level of Function/Home Situation: Only stands and pivots from the electric wheelchair to the toilet to the recliner. Uses crutches. Personal factors and/or comorbidities impacting plan of care: obesity, hip arthritis. 
  
Home Situation Home Environment: Apartment # Steps to Enter: 0 One/Two Story Residence: One story Living Alone: Yes Support Systems: Home care staff, Family member(s) Patient Expects to be Discharged to[de-identified] Private residence Current DME Used/Available at Home: Lift chair, Hospital bed, Crutches, Night time edema control devices, Wheelchair, power, Commode, bedside(reachert)

## 2020-12-30 NOTE — PROGRESS NOTES
Problem: Falls - Risk of 
Goal: *Absence of Falls Description: Document [place] Flow Fall Risk and appropriate interventions in the flowsheet. Outcome: Progressing Towards Goal 
Note: Fall Risk Interventions: 
  
 
  
 
Medication Interventions: Evaluate medications/consider consulting pharmacy Elimination Interventions: Patient to call for help with toileting needs Problem: Patient Education: Go to Patient Education Activity Goal: Patient/Family Education Outcome: Progressing Towards Goal 
  
Problem: Pressure Injury - Risk of 
Goal: *Prevention of pressure injury Description: Document Edgar Scale and appropriate interventions in the flowsheet. Outcome: Progressing Towards Goal 
Note: Pressure Injury Interventions: 
Sensory Interventions: Float heels Moisture Interventions: Absorbent underpads Activity Interventions: Increase time out of bed Mobility Interventions: Pressure redistribution bed/mattress (bed type) Nutrition Interventions: Document food/fluid/supplement intake Friction and Shear Interventions: HOB 30 degrees or less Problem: Patient Education: Go to Patient Education Activity Goal: Patient/Family Education Outcome: Progressing Towards Goal 
  
Problem: Risk for Spread of Infection Goal: Prevent transmission of infectious organism to others Description: Prevent the transmission of infectious organisms to other patients, staff members, and visitors. Outcome: Progressing Towards Goal 
  
Problem: Patient Education:  Go to Education Activity Goal: Patient/Family Education Outcome: Progressing Towards Goal

## 2020-12-31 NOTE — PROGRESS NOTES
ID Progress Note 2020 Subjective: Low grade fever earlier. He says he is slightly dyspneic but is satting at 99% on room air. Objective:  
 
Vitals:  
Visit Vitals BP (!) 182/85 (BP 1 Location: Right arm, BP Patient Position: At rest) Comment: pt on bedpan, will come back and do it again Pulse 87 Temp 98.4 °F (36.9 °C) Resp 18 Ht 6' (1.829 m) Wt 114.1 kg (251 lb 8.7 oz) SpO2 99% BMI 34.12 kg/m² Tmax:  Temp (24hrs), Av.7 °F (37.1 °C), Min:97.1 °F (36.2 °C), Max:100.1 °F (37.8 °C) Exam: Not in distress Lungs: clear to auscultation Heart: s1, s2, no murmurs Abdomen: soft, nontender Labs:  
Lab Results Component Value Date/Time WBC 12.7 (H) 2020 06:56 AM  
 Hemoglobin (POC) 6.5 2020 01:59 PM  
 HGB 6.9 (L) 2020 06:56 AM  
 HCT 20.9 (L) 2020 06:56 AM  
 PLATELET 541  06:56 AM  
 MCV 82.9 2020 06:56 AM  
 
Lab Results Component Value Date/Time Sodium 131 (L) 2020 06:56 AM  
 Potassium 4.4 2020 06:56 AM  
 Chloride 103 2020 06:56 AM  
 CO2 20 (L) 2020 06:56 AM  
 Anion gap 8 2020 06:56 AM  
 Glucose 235 (H) 2020 06:56 AM  
 BUN 59 (H) 2020 06:56 AM  
 Creatinine 3.75 (H) 2020 06:56 AM  
 BUN/Creatinine ratio 16 2020 06:56 AM  
 GFR est AA 20 (L) 2020 06:56 AM  
 GFR est non-AA 16 (L) 2020 06:56 AM  
 Calcium 7.7 (L) 2020 06:56 AM  
 Bilirubin, total 0.4 2020 03:41 PM  
 Alk.  phosphatase 162 (H) 2020 03:41 PM  
 Protein, total 8.3 (H) 2020 03:41 PM  
 Albumin 1.6 (L) 2020 04:04 AM  
 Globulin 6.5 (H) 2020 03:41 PM  
 A-G Ratio 0.3 (L) 2020 03:41 PM  
 ALT (SGPT) 10 (L) 2020 03:41 PM  
 
 
 
 
 
Assessment:  
#1 osteomyelitis of the right foot 
  
#2 Gram-positive cocci bacteremia 
  
#3 renal insufficiency 
  
#4 diabetes 
  
#5 prostate cancer 
  
#6 hypertension 
  
#7 heart failure 
  
  
  
 
 
  
 
 Recommendations:  
 
Continue dapto, cefepime + flagyl Team available over the weekend if needed.   
 
Jimena Bush MD

## 2020-12-31 NOTE — PROGRESS NOTES
12/30/20 2116 Vital Signs Temp 100.1 °F (37.8 °C) (notified RN) Temp Source Oral  
Pulse (Heart Rate) (!) 102 (notified RN) Heart Rate Source Monitor Resp Rate 22  
O2 Sat (%) 91 % Level of Consciousness Alert  
BP (!) 188/80 
(notified RN)  
MAP (Calculated) 116 BP 1 Method Automatic  
BP 1 Location Right arm BP Patient Position At rest;Supine MEWS Score 3 Notified DORCAS Murillo of patient mews. No orders received.

## 2020-12-31 NOTE — PROGRESS NOTES
Care Management Interventions PCP Verified by CM: Yes Last Visit to PCP: 08/20/20 Mode of Transport at Discharge: [place] 49 Transition of Care Consult (CM Consult): Discharge Planning Discharge Durable Medical Equipment: No 
Current Support Network: Lives Alone Confirm Follow Up Transport: Wheelchair Anthonette Slimmer The Patient and/or Patient Representative was Provided with a Choice of Provider and Agrees with the Discharge Plan?: Yes Freedom of Choice List was Provided with Basic Dialogue that Supports the Patient's Individualized Plan of Care/Goals, Treatment Preferences and Shares the Quality Data Associated with the Providers?: Yes Discharge Location Discharge Placement: (home) Chart reviewed. Patient admitted here on 12/28/20 from [facility] (Disabled/Senior [facility]). The patient comes to the ED with right foot wound/pain. The patient has a past medical hx of DM, Mild Aortic Insufficiency, and recent onset of diarrhea with mild chills over the past 2 days. Chart reviewed:  Patient has been known to [address] [place] [address] in the past for home health PT,OT, SN. He has also been known to the [facility]. CM met with patient to introduce self and role. Demographics verified. Patient has caregivers 6 hrs/day. Agency name is unknown to the patient. Patient asked if interview could continue at a later time and he was feeling some discomfort. CM agreed to follow-up at a later time. ([facility] [address] [phone]--[facility]) provides home health services SN, PT, OT [facility]) since Aug 2020. CM sending updates via [employer]. CM asked if  [facility] agency was aware of name of Personal care Provider agency. Maykel checked notes and stated a caregiver is in the residence whenever [facility] staff arrive, however the agency name is unknown. Prior to this patient discharge[facility] CM continuing to follow. Tanika Deion, [address], [address] [place]

## 2020-12-31 NOTE — PROGRESS NOTES
Nephrology Progress Note Georgina Manzo III Date of Admission : 12/28/2020 CC: Follow up for KATHI on CKD Assessment and Plan KATHI on CKD  
- suspect volume depletion from diuretics and GI symptoms  
- renal U/S neg 
- check UA, PCR 
- cont IVF and hold diuretics - keep kumar for now 
- daily labs and I/Os 
  
CKD Stage IIIa: 
-  Presumed 2/2 DM and HTN 
- baseline Cr 1.7 mg/dl  
- followed at [facility] 
  
Hx of prostate cancer s/p XRT 
  
Metabolic acidosis: 
- cont oral bicarb 
  
Anemia in CKD: 
- hgb 6.9 post op 
- PRBCs today 
- start JAZ tomorrow 
  
Type II DM: 
- on insulin 
  
HTN :  
- Continue current meds  
  
R foot osteo: 
- on abx 
- s/p debridement on 12/30 
  
B/l Airspace disease: 
- ? PNA vs edema 
- COVID neg 
- on broad spectrum abx Interval History: 
Seen and examined. Feeling better. Kumar in place. Good UOP, Cr improving. S/p R foot debridement yesterday. Pain controlled. No cp, sob, n/v/d. Current Medications: all current  Medications have been eviewed in [facility] Review of Systems: Pertinent items are noted in HPI. Objective: 
Vitals:   
Vitals:  
 12/30/20 1859 12/30/20 2116 12/31/20 0205 12/31/20 5857 BP: (!) 161/81 (!) 188/80 (!) 152/70 Pulse: 91 (!) 102 87 Resp: 18 22 20 Temp: 98.7 °F (37.1 °C) 100.1 °F (37.8 °C) 99.2 °F (37.3 °C) SpO2: 95% 91% 93% Weight:    114.1 kg (251 lb 8.7 oz) Height:      
 
Intake and Output: 
No intake/output data recorded. 12/29 1901 - 12/31 0700 In: 1955.1 [P.O.:720; I.V.:150] Out: 1405 [AEFOE:0898] Physical Examination: 
General: NAD,Conversant, obese Neck:  Supple, no mass Resp:  Lungs CTA B/L, no wheezing , normal respiratory effort CV:  RRR,  no murmur or rub,  trace LLE edema, RLE in wrap GI:  Soft, NT, + Bowel sounds, no hepatosplenomegaly Neurologic:  Non focal 
Psych:             AAO x 3 appropriate affect Skin:  No Rash :  Kumar in place []    High complexity decision making was performed 
[]    Patient is at high-risk of decompensation with multiple organ involvement Lab Data Personally Reviewed: I have reviewed all the pertinent labs, microbiology data and radiology studies during assessment. Recent Labs 12/31/20 
1626 12/30/20 
0117 12/29/20 
0404 12/28/20 
1541 * 130* 129* 128* K 4.4 3.9 3.9 3.8  100 97 98 CO2 20* 21 22 19* * 190* 203* 379* BUN 59* 63* 60* 53* CREA 3.75* 4.37* 3.84* 3.33* CA 7.7* 7.9* 8.5 8.4* PHOS  --  3.9 3.5  --   
ALB  --   --  1.6* 1.8* ALT  --   --   --  10* Recent Labs 12/31/20 
0656 12/30/20 
2153 12/30/20 
1538 WBC 12.7* 13.9* 11.5* HGB 6.9* 7.8* 6.8* HCT 20.9* 23.4* 21.1*  
 322 284 No results found for: SDES Lab Results Component Value Date/Time Culture result: PENDING 12/30/2020 01:14 PM  
 Culture result: PENDING 12/30/2020 01:13 PM  
 Culture result: (A) 12/28/2020 04:28 PM  
  STREPTOCOCCUS AGALACTIAE SERO GROUP B GROWING IN ALL 4 BOTTLES DRAWN (SITE = RAC AND LAC) SENSITIVITY TO FOLLOW Culture result: (A) 12/28/2020 04:28 PM  
  PRELIMINARY REPORT OF GRAM POSITIVE COCCI IN PAIRS AND CHAINS GROWING IN 3 OF 4 BOTTLES DRAWN CALLED TO AND READ BACK BY [address], RN ON 12/29/20 AT 2350. 31 Rachele Salazar  
 Culture result: REMAINING BOTTLE(S) HAS/HAVE NO GROWTH SO FAR 12/28/2020 04:28 PM  
 
Recent Results (from the past 24 hour(s)) CBC WITH AUTOMATED DIFF Collection Time: 12/30/20 10:47 AM  
Result Value Ref Range WBC 12.4 (H) 4.1 - 11.1 K/uL  
 RBC 2.45 (L) 4.10 - 5.70 M/uL HGB 6.5 (L) 12.1 - 17.0 g/dL HCT 20.0 (L) 36.6 - 50.3 % MCV 81.6 80.0 - 99.0 FL  
 MCH 26.5 26.0 - 34.0 PG  
 MCHC 32.5 30.0 - 36.5 g/dL  
 RDW 16.1 (H) 11.5 - 14.5 % PLATELET 161 539 - 567 K/uL MPV 10.0 8.9 - 12.9 FL  
 NRBC 0.0 0  WBC ABSOLUTE NRBC 0.00 0.00 - 0.01 K/uL NEUTROPHILS 87 (H) 32 - 75 % LYMPHOCYTES 5 (L) 12 - 49 % MONOCYTES 6 5 - 13 % EOSINOPHILS 1 0 - 7 % BASOPHILS 0 0 - 1 % IMMATURE GRANULOCYTES 1 (H) 0.0 - 0.5 % ABS. NEUTROPHILS 10.9 (H) 1.8 - 8.0 K/UL  
 ABS. LYMPHOCYTES 0.6 (L) 0.8 - 3.5 K/UL  
 ABS. MONOCYTES 0.7 0.0 - 1.0 K/UL  
 ABS. EOSINOPHILS 0.1 0.0 - 0.4 K/UL  
 ABS. BASOPHILS 0.0 0.0 - 0.1 K/UL  
 ABS. IMM. GRANS. 0.1 (H) 0.00 - 0.04 K/UL  
 DF SMEAR SCANNED    
 RBC COMMENTS HYPOCHROMIA 1+ 
    
 RBC COMMENTS ANISOCYTOSIS 1+ 
    
 RBC COMMENTS MICROCYTOSIS 1+ GLUCOSE, POC Collection Time: 12/30/20 12:04 PM  
Result Value Ref Range Glucose (POC) 97 65 - 100 mg/dL Performed by Samson [facility] RBC, ALLOCATE Collection Time: 12/30/20 12:15 PM  
Result Value Ref Range HISTORY CHECKED? Historical check performed CULTURE, TISSUE W GRAM STAIN Collection Time: 12/30/20  1:13 PM  
 Specimen: Foot Result Value Ref Range Special Requests: NO SPECIAL REQUESTS    
 GRAM STAIN FEW WBCS SEEN    
 GRAM STAIN OCCASIONAL GRAM POSITIVE COCCI IN PAIRS Culture result: PENDING   
CULTURE, WOUND W GRAM STAIN Collection Time: 12/30/20  1:14 PM  
 Specimen: Foot, Right Result Value Ref Range Special Requests: RIGHT FOOT ABSCESS   
 GRAM STAIN RARE WBCS SEEN    
 GRAM STAIN 2+ GRAM POSITIVE COCCI    
 GRAM STAIN 2+ GRAM NEGATIVE RODS Culture result: PENDING   
GLUCOSE, POC Collection Time: 12/30/20  1:27 PM  
Result Value Ref Range Glucose (POC) 119 (H) 65 - 100 mg/dL Performed by Annette Macias CBC WITH AUTOMATED DIFF Collection Time: 12/30/20  3:38 PM  
Result Value Ref Range WBC 11.5 (H) 4.1 - 11.1 K/uL  
 RBC 2.55 (L) 4.10 - 5.70 M/uL HGB 6.8 (L) 12.1 - 17.0 g/dL HCT 21.1 (L) 36.6 - 50.3 % MCV 82.7 80.0 - 99.0 FL  
 MCH 26.7 26.0 - 34.0 PG  
 MCHC 32.2 30.0 - 36.5 g/dL  
 RDW 16.2 (H) 11.5 - 14.5 % PLATELET 658 051 - 628 K/uL MPV 10.1 8.9 - 12.9 FL  
 NRBC 0.0 0  WBC ABSOLUTE NRBC 0.00 0.00 - 0.01 K/uL NEUTROPHILS 87 (H) 32 - 75 % LYMPHOCYTES 5 (L) 12 - 49 % MONOCYTES 6 5 - 13 % EOSINOPHILS 1 0 - 7 % BASOPHILS 0 0 - 1 % IMMATURE GRANULOCYTES 1 (H) 0.0 - 0.5 % ABS. NEUTROPHILS 10.0 (H) 1.8 - 8.0 K/UL  
 ABS. LYMPHOCYTES 0.6 (L) 0.8 - 3.5 K/UL  
 ABS. MONOCYTES 0.7 0.0 - 1.0 K/UL  
 ABS. EOSINOPHILS 0.1 0.0 - 0.4 K/UL  
 ABS. BASOPHILS 0.0 0.0 - 0.1 K/UL  
 ABS. IMM. GRANS. 0.1 (H) 0.00 - 0.04 K/UL  
 DF AUTOMATED    
GLUCOSE, POC Collection Time: 12/30/20  4:21 PM  
Result Value Ref Range Glucose (POC) 245 (H) 65 - 100 mg/dL Performed by Rey Jordan ([facility]NER) RBC, ALLOCATE Collection Time: 12/30/20  5:30 PM  
Result Value Ref Range HISTORY CHECKED? Historical check performed GLUCOSE, POC Collection Time: 12/30/20  9:22 PM  
Result Value Ref Range Glucose (POC) 268 (H) 65 - 100 mg/dL Performed by Rey Jordan ([facility]) CBC WITH AUTOMATED DIFF Collection Time: 12/30/20  9:53 PM  
Result Value Ref Range WBC 13.9 (H) 4.1 - 11.1 K/uL  
 RBC 2.88 (L) 4.10 - 5.70 M/uL HGB 7.8 (L) 12.1 - 17.0 g/dL HCT 23.4 (L) 36.6 - 50.3 % MCV 81.3 80.0 - 99.0 FL  
 MCH 27.1 26.0 - 34.0 PG  
 MCHC 33.3 30.0 - 36.5 g/dL  
 RDW 16.4 (H) 11.5 - 14.5 % PLATELET 332 102 - 795 K/uL MPV 10.0 8.9 - 12.9 FL  
 NRBC 0.0 0  WBC ABSOLUTE NRBC 0.00 0.00 - 0.01 K/uL NEUTROPHILS 90 (H) 32 - 75 % LYMPHOCYTES 3 (L) 12 - 49 % MONOCYTES 5 5 - 13 % EOSINOPHILS 1 0 - 7 % BASOPHILS 0 0 - 1 % IMMATURE GRANULOCYTES 1 (H) 0.0 - 0.5 % ABS. NEUTROPHILS 12.6 (H) 1.8 - 8.0 K/UL  
 ABS. LYMPHOCYTES 0.4 (L) 0.8 - 3.5 K/UL  
 ABS. MONOCYTES 0.7 0.0 - 1.0 K/UL  
 ABS. EOSINOPHILS 0.1 0.0 - 0.4 K/UL  
 ABS. BASOPHILS 0.0 0.0 - 0.1 K/UL  
 ABS. IMM. GRANS. 0.1 (H) 0.00 - 0.04 K/UL  
 DF SMEAR SCANNED    
 RBC COMMENTS HYPOCHROMIA 1+ 
    
 RBC COMMENTS MICROCYTOSIS 1+ 
    
 RBC COMMENTS ANISOCYTOSIS 
1+ METABOLIC PANEL, BASIC  Collection Time: 12/31/20  6:56 AM  
 Result Value Ref Range Sodium 131 (L) 136 - 145 mmol/L Potassium 4.4 3.5 - 5.1 mmol/L Chloride 103 97 - 108 mmol/L  
 CO2 20 (L) 21 - 32 mmol/L Anion gap 8 5 - 15 mmol/L Glucose 235 (H) 65 - 100 mg/dL BUN 59 (H) 6 - 20 MG/DL Creatinine 3.75 (H) 0.70 - 1.30 MG/DL  
 BUN/Creatinine ratio 16 12 - 20 GFR est AA 20 (L) >60 ml/min/1.73m2 GFR est non-AA 16 (L) >60 ml/min/1.73m2 Calcium 7.7 (L) 8.5 - 10.1 MG/DL  
CBC WITH AUTOMATED DIFF Collection Time: 12/31/20  6:56 AM  
Result Value Ref Range WBC 12.7 (H) 4.1 - 11.1 K/uL  
 RBC 2.52 (L) 4.10 - 5.70 M/uL HGB 6.9 (L) 12.1 - 17.0 g/dL HCT 20.9 (L) 36.6 - 50.3 % MCV 82.9 80.0 - 99.0 FL  
 MCH 27.4 26.0 - 34.0 PG  
 MCHC 33.0 30.0 - 36.5 g/dL  
 RDW 16.7 (H) 11.5 - 14.5 % PLATELET 086 611 - 078 K/uL MPV 9.6 8.9 - 12.9 FL  
 NRBC 0.0 0  WBC ABSOLUTE NRBC 0.00 0.00 - 0.01 K/uL NEUTROPHILS 88 (H) 32 - 75 % LYMPHOCYTES 4 (L) 12 - 49 % MONOCYTES 6 5 - 13 % EOSINOPHILS 1 0 - 7 % BASOPHILS 0 0 - 1 % IMMATURE GRANULOCYTES 1 (H) 0.0 - 0.5 % ABS. NEUTROPHILS 11.2 (H) 1.8 - 8.0 K/UL  
 ABS. LYMPHOCYTES 0.5 (L) 0.8 - 3.5 K/UL  
 ABS. MONOCYTES 0.8 0.0 - 1.0 K/UL  
 ABS. EOSINOPHILS 0.1 0.0 - 0.4 K/UL  
 ABS. BASOPHILS 0.0 0.0 - 0.1 K/UL  
 ABS. IMM. GRANS. 0.1 (H) 0.00 - 0.04 K/UL  
 DF SMEAR SCANNED    
 RBC COMMENTS ANISOCYTOSIS 1+ 
    
 RBC COMMENTS OVALOCYTES PRESENT 
    
GLUCOSE, POC Collection Time: 12/31/20  7:22 AM  
Result Value Ref Range Glucose (POC) 304 (H) 65 - 100 mg/dL Performed by Jovan Denis   
RBC, ALLOCATE Collection Time: 12/31/20  8:15 AM  
Result Value Ref Range HISTORY CHECKED? Historical check performed Margarita Boucher MD 
[address] [place] [address], [address] [facility] Phone - [phone] Fax - [phone] 
[email or web address]

## 2020-12-31 NOTE — CONSULTS
Pt seen and examined, full note to follow. He reports having had what sounds to be an extensive GI workup at [facility] earlier this year for anemia. We will attempt to retrieve those records from our office.

## 2020-12-31 NOTE — PROGRESS NOTES
Progress Note Patient: Jimmy Villar MRN: [id number]  SSN: [id number] YOB: 1950  Age: 72 y.o. Sex: male Admit Date: 12/28/2020 
1 Day Post-Op Procedure:   Procedure(s): DEBRIDEMENT OF RIGHT FOOT ULCER WITH REMOVAL OF INFECTED BONE (URGENT) Subjective:  
 
Patient seen resting quiet and comfortably and no apparent distress. No pain in the foot. Currently just had BM on bedpan and feels better. Nursing noted significant strike-through drainage yesterday, but notes that there has not been any issues today. Objective:  
 
Visit Vitals BP (!) 182/85 (BP 1 Location: Right arm, BP Patient Position: At rest) Pulse 87 Temp 98.4 °F (36.9 °C) Resp 18 Ht 6' (1.829 m) Wt 114.1 kg (251 lb 8.7 oz) SpO2 99% BMI 34.12 kg/m² Physical Exam: 
Examination is of the right lateral foot surgical site. Signficant sanguineous strikethrough noted to the inner dressings. No signs of active pulsatile bleeding for surgical site but significant oozing. No signs of necrosis or purulence to the wound site at this time. No malodor. Only healthy red tissue noted at this time. Labs/Radiology Review: images and reports reviewed Assessment:  
 
Hospital Problems  Date Reviewed: 8/20/2020 Codes Class Noted POA * (Principal) [facility] ICD-10-CM: M86.9 ICD-9-CM: 730.20  12/28/2020 Yes Plan/Recommendations/Medical Decision Making:  
 
-Packing and dressing changed at bedside today 
-Continue bedrest at this time. There are no signs of any active pulsatile bleeding vessels at this time. Will have patient keep leg elevated at this time. 
-Blood cx growing strep agalactiae group B 
-Surgical soft tissue and surgical bone cultures both growing strep betahemolytic group B and gram negative rods 
-Surgical pathology pending 
-IV abx per ID 
-Continue to follow Activity: bedrest

## 2020-12-31 NOTE — PROGRESS NOTES
Day #3 of cefepime Indication: bacteremia 2/2 osteomyelitis Current regimen:  cefepime 1g q24h Recent Labs 20 
3896 20 
2153 20 
1538 20 
0117 20 
0117 20 
0404 WBC 12.7* 13.9* 11.5*   < >  --  11.1 CREA 3.75*  --   --   --  4.37* 3.84* BUN 59*  --   --   --  63* 60*  
 < > = values in this interval not displayed. Temp (24hrs), Av.6 °F (37 °C), Min:97 °F (36.1 °C), Max:100.1 °F (37.8 °C) Est CrCl: 20-25 ml/min Plan: Change to cefepime 2g q24h given renal function and indication.

## 2020-12-31 NOTE — OP NOTES
[facility] 
OPERATIVE REPORT Name:  Babar Espitia 
MR#:  [id number] :  1950 ACCOUNT #:  [de-identified] DATE OF SERVICE:  2020 PREOPERATIVE DIAGNOSIS:  Osteomyelitis, right foot. POSTOPERATIVE DIAGNOSIS:  Osteomyelitis, right foot. PROCEDURE PERFORMED:  Debridement of right foot ulcer with removal of infected bone. SURGEON:  Palmira Russo MD 
 
ASSISTANT:  None ANESTHESIA:  MAC. HEMOSTASIS:  Anatomic. COMPLICATIONS:  None. SPECIMENS REMOVED:  The bone and soft tissue, right foot. IMPLANTS:  Materials used included a 2-0 nylon and 1/2-inch iodoform packing. ESTIMATED BLOOD LOSS:  Less than 100 mL. INJECTABLES:  Included 20 mL of 0.5% Marcaine plain. INDICATIONS:  This is a 62-year-old diabetic male with history of peripheral neuropathy that was admitted to [facility] with a right diabetic foot infection. The patient presents with an ulcer along the plantar lateral aspect of the right foot. X-rays revealed bony destruction in the distal aspect of the fifth metatarsal consistent with osteomyelitis. The patient is here today for surgical intervention. PROCEDURE:  After the patient was properly identified by myself, my initials were placed on the right lower extremity. He was brought back to the operating room under mild sedation. Once in the operating room, he was left in the supine position on his hospital bed. Next, the right foot was then prepped and draped in normal aseptic fashion. Using a 15-blade, attention was focused to the plantar lateral aspect of the right foot. It is here where the patient's ulcer is present. This ulcer was excised using 15-blade down to the underlying fifth metatarsal bone. The bone itself appeared to be soft and there was evidence of fracturing distally. Based on the condition of the bone, the entire fifth metatarsal was removed,  and it was split in half and half was sent to Microbiology and the other half was sent to Pathology. Next, the proximal aspect of the ulcer was examined and there was found to be an abscess located in this area that extended to the soft tissues and muscle along the plantar lateral/central aspect of the foot. This area was investigated using Metzenbaum scissors and manual palpation. Once I was satisfied that all nonviable skin and tissue and bone had been removed, the area was then thoroughly irrigated using pulse lavage with normal sterile saline mixed with gentamicin solution. Next, the distal end of the wound was re-approximated using 2-0 nylon in a simple horizontal mattress type fashion. The remainder of the wound was then packed using 1/2-inch iodoform packing. A 20 mL of 0.5% Marcaine plain was used to perform a right ankle block. Next, the wound itself was then packed with, like I said, 1/2-inch iodoform packing and it was dressed with Xeroform, 4 x 4 gauze, ABD pads, Kerlix, and a 4-inch Ace wrap. The patient tolerated the procedure well and was transferred to the recovery room afebrile, vital signs stable, and cap refill intact to all toes in the right foot.   The patient will be transferred back up to his hospital room where he will continue to receive IV antibiotics which are being management by Infectious Disease Team. 
 
 Intraoperatively, the patient bled very well and demonstrated adequate circulation. As previously mentioned, bone cultures were sent to Micro and to Path, and soft tissue cultures were sent to Microbiology as well. Due to the extent of the wound, the patient would benefit from extended IV antibiotics. Once the foot stabilizes, he will benefit from wound VAC therapy as well. CATHERINE [email or web address] 
D:  12/30/2020 13:27 
T:  12/30/2020 21:39 
JOB #:  [id number]

## 2020-12-31 NOTE — PROGRESS NOTES
Bedside shift change report given to J Carlos Brar (oncoming nurse) by Bing Bach RN (offgoing nurse). Report included the following information SBAR, Procedure Summary, Intake/Output, MAR, Recent Results and Med Rec Status.

## 2020-12-31 NOTE — PROGRESS NOTES
1700: New breakthrough drainage noted on foot dressing, reinforced and MD aware. 
 
1900: Copious breakthrough drainage saturating through dressing, blanket, chux pad, and dripping onto floor noted. Pt reported new sharp pain in foot and on inspection, large blood clot noted on dressing. Dr. Yamel De La O paged and no new orders were received. Dressing reinforced and will continue to monitor. Problem: Falls - Risk of 
Goal: *Absence of Falls Description: Document Danney Mess Fall Risk and appropriate interventions in the flowsheet. Outcome: Progressing Towards Goal 
Note: Fall Risk Interventions: 
  
 
  
 
Medication Interventions: Evaluate medications/consider consulting pharmacy Elimination Interventions: Patient to call for help with toileting needs Problem: Patient Education: Go to Patient Education Activity Goal: Patient/Family Education Outcome: Progressing Towards Goal 
  
Problem: Pressure Injury - Risk of 
Goal: *Prevention of pressure injury Description: Document Edgar Scale and appropriate interventions in the flowsheet. Outcome: Progressing Towards Goal 
Note: Pressure Injury Interventions: 
Sensory Interventions: Float heels Moisture Interventions: Internal/External urinary devices Activity Interventions: Increase time out of bed Mobility Interventions: Pressure redistribution bed/mattress (bed type) Nutrition Interventions: Document food/fluid/supplement intake Friction and Shear Interventions: HOB 30 degrees or less Problem: Patient Education: Go to Patient Education Activity Goal: Patient/Family Education Outcome: Progressing Towards Goal 
  
Problem: Risk for Spread of Infection Goal: Prevent transmission of infectious organism to others Description: Prevent the transmission of infectious organisms to other patients, staff members, and visitors. Outcome: Progressing Towards Goal 
  
Problem: Patient Education:  Go to Education Activity Goal: Patient/Family Education Outcome: Progressing Towards Goal

## 2021-01-01 ENCOUNTER — APPOINTMENT (OUTPATIENT)
Dept: GENERAL RADIOLOGY | Age: 71
DRG: 853 | End: 2021-01-01
Attending: NURSE PRACTITIONER
Payer: MEDICARE

## 2021-01-01 ENCOUNTER — APPOINTMENT (OUTPATIENT)
Dept: NON INVASIVE DIAGNOSTICS | Age: 71
DRG: 853 | End: 2021-01-01
Attending: NURSE PRACTITIONER
Payer: MEDICARE

## 2021-01-01 ENCOUNTER — APPOINTMENT (OUTPATIENT)
Dept: GENERAL RADIOLOGY | Age: 71
DRG: 853 | End: 2021-01-01
Payer: MEDICARE

## 2021-01-01 ENCOUNTER — APPOINTMENT (OUTPATIENT)
Dept: GENERAL RADIOLOGY | Age: 71
DRG: 853 | End: 2021-01-01
Attending: EMERGENCY MEDICINE
Payer: MEDICARE

## 2021-01-01 ENCOUNTER — APPOINTMENT (OUTPATIENT)
Dept: GENERAL RADIOLOGY | Age: 71
DRG: 853 | End: 2021-01-01
Attending: INTERNAL MEDICINE
Payer: MEDICARE

## 2021-01-01 ENCOUNTER — APPOINTMENT (OUTPATIENT)
Dept: GENERAL RADIOLOGY | Age: 71
DRG: 853 | End: 2021-01-01
Attending: STUDENT IN AN ORGANIZED HEALTH CARE EDUCATION/TRAINING PROGRAM
Payer: MEDICARE

## 2021-01-01 ENCOUNTER — APPOINTMENT (OUTPATIENT)
Dept: CT IMAGING | Age: 71
DRG: 853 | End: 2021-01-01
Attending: INTERNAL MEDICINE
Payer: MEDICARE

## 2021-01-01 ENCOUNTER — ANESTHESIA EVENT (OUTPATIENT)
Dept: ENDOSCOPY | Age: 71
DRG: 853 | End: 2021-01-01
Payer: MEDICARE

## 2021-01-01 ENCOUNTER — APPOINTMENT (OUTPATIENT)
Dept: VASCULAR SURGERY | Age: 71
DRG: 853 | End: 2021-01-01
Attending: NURSE PRACTITIONER
Payer: MEDICARE

## 2021-01-01 ENCOUNTER — ANESTHESIA (OUTPATIENT)
Dept: ENDOSCOPY | Age: 71
DRG: 853 | End: 2021-01-01
Payer: MEDICARE

## 2021-01-01 ENCOUNTER — APPOINTMENT (OUTPATIENT)
Dept: INTERVENTIONAL RADIOLOGY/VASCULAR | Age: 71
DRG: 853 | End: 2021-01-01
Attending: INTERNAL MEDICINE
Payer: MEDICARE

## 2021-01-01 VITALS
HEIGHT: 72 IN | SYSTOLIC BLOOD PRESSURE: 49 MMHG | WEIGHT: 301.15 LBS | HEART RATE: 102 BPM | OXYGEN SATURATION: 17 % | TEMPERATURE: 97 F | RESPIRATION RATE: 26 BRPM | DIASTOLIC BLOOD PRESSURE: 36 MMHG | BODY MASS INDEX: 40.79 KG/M2

## 2021-01-01 LAB
ABO + RH BLD: NORMAL
ALBUMIN SERPL-MCNC: 1.6 G/DL (ref 3.5–5)
ALBUMIN SERPL-MCNC: 1.7 G/DL (ref 3.5–5)
ALBUMIN SERPL-MCNC: 1.8 G/DL (ref 3.5–5)
ALBUMIN SERPL-MCNC: 1.9 G/DL (ref 3.5–5)
ALBUMIN SERPL-MCNC: 2 G/DL (ref 3.5–5)
ALBUMIN SERPL-MCNC: 2.1 G/DL (ref 3.5–5)
ALBUMIN SERPL-MCNC: 2.2 G/DL (ref 3.5–5)
ALBUMIN SERPL-MCNC: 2.2 G/DL (ref 3.5–5)
ALBUMIN SERPL-MCNC: 2.3 G/DL (ref 3.5–5)
ALBUMIN SERPL-MCNC: 2.3 G/DL (ref 3.5–5)
ALBUMIN SERPL-MCNC: 2.4 G/DL (ref 3.5–5)
ALBUMIN SERPL-MCNC: 2.4 G/DL (ref 3.5–5)
ALBUMIN SERPL-MCNC: 2.6 G/DL (ref 3.5–5)
ALBUMIN SERPL-MCNC: 2.7 G/DL (ref 3.5–5)
ALBUMIN SERPL-MCNC: 2.8 G/DL (ref 3.5–5)
ALBUMIN SERPL-MCNC: 2.9 G/DL (ref 3.5–5)
ALBUMIN SERPL-MCNC: 2.9 G/DL (ref 3.5–5)
ALBUMIN SERPL-MCNC: 3.2 G/DL (ref 3.5–5)
ALBUMIN/GLOB SERPL: 0.3 {RATIO} (ref 1.1–2.2)
ALBUMIN/GLOB SERPL: 0.4 {RATIO} (ref 1.1–2.2)
ALBUMIN/GLOB SERPL: 0.5 {RATIO} (ref 1.1–2.2)
ALBUMIN/GLOB SERPL: 0.6 {RATIO} (ref 1.1–2.2)
ALBUMIN/GLOB SERPL: 0.7 {RATIO} (ref 1.1–2.2)
ALBUMIN/GLOB SERPL: 0.8 {RATIO} (ref 1.1–2.2)
ALP SERPL-CCNC: 101 U/L (ref 45–117)
ALP SERPL-CCNC: 105 U/L (ref 45–117)
ALP SERPL-CCNC: 105 U/L (ref 45–117)
ALP SERPL-CCNC: 106 U/L (ref 45–117)
ALP SERPL-CCNC: 115 U/L (ref 45–117)
ALP SERPL-CCNC: 117 U/L (ref 45–117)
ALP SERPL-CCNC: 128 U/L (ref 45–117)
ALP SERPL-CCNC: 129 U/L (ref 45–117)
ALP SERPL-CCNC: 74 U/L (ref 45–117)
ALP SERPL-CCNC: 79 U/L (ref 45–117)
ALP SERPL-CCNC: 79 U/L (ref 45–117)
ALP SERPL-CCNC: 80 U/L (ref 45–117)
ALP SERPL-CCNC: 94 U/L (ref 45–117)
ALP SERPL-CCNC: 95 U/L (ref 45–117)
ALT SERPL-CCNC: 10 U/L (ref 12–78)
ALT SERPL-CCNC: 11 U/L (ref 12–78)
ALT SERPL-CCNC: 12 U/L (ref 12–78)
ALT SERPL-CCNC: 14 U/L (ref 12–78)
ALT SERPL-CCNC: 7 U/L (ref 12–78)
ALT SERPL-CCNC: 7 U/L (ref 12–78)
ALT SERPL-CCNC: 8 U/L (ref 12–78)
ALT SERPL-CCNC: 9 U/L (ref 12–78)
ALT SERPL-CCNC: <6 U/L (ref 12–78)
AMORPH CRY URNS QL MICRO: ABNORMAL
ANION GAP SERPL CALC-SCNC: 10 MMOL/L (ref 5–15)
ANION GAP SERPL CALC-SCNC: 11 MMOL/L (ref 5–15)
ANION GAP SERPL CALC-SCNC: 12 MMOL/L (ref 5–15)
ANION GAP SERPL CALC-SCNC: 13 MMOL/L (ref 5–15)
ANION GAP SERPL CALC-SCNC: 13 MMOL/L (ref 5–15)
ANION GAP SERPL CALC-SCNC: 14 MMOL/L (ref 5–15)
ANION GAP SERPL CALC-SCNC: 15 MMOL/L (ref 5–15)
ANION GAP SERPL CALC-SCNC: 2 MMOL/L (ref 5–15)
ANION GAP SERPL CALC-SCNC: 3 MMOL/L (ref 5–15)
ANION GAP SERPL CALC-SCNC: 3 MMOL/L (ref 5–15)
ANION GAP SERPL CALC-SCNC: 4 MMOL/L (ref 5–15)
ANION GAP SERPL CALC-SCNC: 5 MMOL/L (ref 5–15)
ANION GAP SERPL CALC-SCNC: 6 MMOL/L (ref 5–15)
ANION GAP SERPL CALC-SCNC: 7 MMOL/L (ref 5–15)
ANION GAP SERPL CALC-SCNC: 8 MMOL/L (ref 5–15)
ANION GAP SERPL CALC-SCNC: 9 MMOL/L (ref 5–15)
APPEARANCE UR: CLEAR
ARTERIAL PATENCY WRIST A: ABNORMAL
ARTERIAL PATENCY WRIST A: YES
AST SERPL-CCNC: 10 U/L (ref 15–37)
AST SERPL-CCNC: 11 U/L (ref 15–37)
AST SERPL-CCNC: 12 U/L (ref 15–37)
AST SERPL-CCNC: 12 U/L (ref 15–37)
AST SERPL-CCNC: 13 U/L (ref 15–37)
AST SERPL-CCNC: 16 U/L (ref 15–37)
AST SERPL-CCNC: 17 U/L (ref 15–37)
AST SERPL-CCNC: 17 U/L (ref 15–37)
AST SERPL-CCNC: 22 U/L (ref 15–37)
AST SERPL-CCNC: 7 U/L (ref 15–37)
AST SERPL-CCNC: 8 U/L (ref 15–37)
AST SERPL-CCNC: 9 U/L (ref 15–37)
ATRIAL RATE: 117 BPM
BACTERIA SPEC CULT: ABNORMAL
BACTERIA SPEC CULT: NORMAL
BACTERIA SPEC CULT: NORMAL
BACTERIA URNS QL MICRO: ABNORMAL /HPF
BASE DEFICIT BLDA-SCNC: 0.3 MMOL/L
BASE DEFICIT BLDA-SCNC: 0.4 MMOL/L
BASE DEFICIT BLDA-SCNC: 0.6 MMOL/L
BASE DEFICIT BLDA-SCNC: 0.6 MMOL/L
BASE DEFICIT BLDA-SCNC: 1 MMOL/L
BASE DEFICIT BLDA-SCNC: 1.2 MMOL/L
BASE DEFICIT BLDA-SCNC: 1.3 MMOL/L
BASE DEFICIT BLDA-SCNC: 1.4 MMOL/L
BASE DEFICIT BLDA-SCNC: 1.4 MMOL/L
BASE DEFICIT BLDA-SCNC: 1.6 MMOL/L
BASE DEFICIT BLDA-SCNC: 1.8 MMOL/L
BASE DEFICIT BLDA-SCNC: 2.2 MMOL/L
BASE DEFICIT BLDA-SCNC: 2.8 MMOL/L
BASE DEFICIT BLDA-SCNC: 2.8 MMOL/L
BASE DEFICIT BLDA-SCNC: 3.1 MMOL/L
BASE DEFICIT BLDA-SCNC: 3.7 MMOL/L
BASE DEFICIT BLDA-SCNC: 3.7 MMOL/L
BASE DEFICIT BLDA-SCNC: 4.1 MMOL/L
BASE DEFICIT BLDA-SCNC: 4.1 MMOL/L
BASE DEFICIT BLDA-SCNC: 4.7 MMOL/L
BASE DEFICIT BLDA-SCNC: 4.7 MMOL/L
BASE DEFICIT BLDA-SCNC: 4.8 MMOL/L
BASE EXCESS BLD CALC-SCNC: 0 MMOL/L
BASE EXCESS BLD CALC-SCNC: 1 MMOL/L
BASE EXCESS BLDA CALC-SCNC: 0.6 MMOL/L
BASE EXCESS BLDA CALC-SCNC: 0.7 MMOL/L
BASE EXCESS BLDA CALC-SCNC: 0.9 MMOL/L
BASE EXCESS BLDA CALC-SCNC: 1.8 MMOL/L
BASE EXCESS BLDA CALC-SCNC: 1.9 MMOL/L
BASOPHILS # BLD: 0 K/UL (ref 0–0.1)
BASOPHILS # BLD: 0.1 K/UL (ref 0–0.1)
BASOPHILS NFR BLD: 0 % (ref 0–1)
BASOPHILS NFR BLD: 1 % (ref 0–1)
BDY SITE: ABNORMAL
BILIRUB DIRECT SERPL-MCNC: 0.3 MG/DL (ref 0–0.2)
BILIRUB DIRECT SERPL-MCNC: <0.1 MG/DL (ref 0–0.2)
BILIRUB DIRECT SERPL-MCNC: <0.1 MG/DL (ref 0–0.2)
BILIRUB SERPL-MCNC: 0.2 MG/DL (ref 0.2–1)
BILIRUB SERPL-MCNC: 0.4 MG/DL (ref 0.2–1)
BILIRUB SERPL-MCNC: 0.5 MG/DL (ref 0.2–1)
BILIRUB SERPL-MCNC: 0.6 MG/DL (ref 0.2–1)
BILIRUB SERPL-MCNC: 0.6 MG/DL (ref 0.2–1)
BILIRUB UR QL: NEGATIVE
BLD PROD TYP BPU: NORMAL
BLOOD GROUP ANTIBODIES SERPL: NORMAL
BNP SERPL-MCNC: 5192 PG/ML
BNP SERPL-MCNC: 8349 PG/ML
BNP SERPL-MCNC: ABNORMAL PG/ML
BODY TEMPERATURE: 37
BODY TEMPERATURE: 37
BPU ID: NORMAL
BUN SERPL-MCNC: 31 MG/DL (ref 6–20)
BUN SERPL-MCNC: 32 MG/DL (ref 6–20)
BUN SERPL-MCNC: 32 MG/DL (ref 6–20)
BUN SERPL-MCNC: 34 MG/DL (ref 6–20)
BUN SERPL-MCNC: 34 MG/DL (ref 6–20)
BUN SERPL-MCNC: 35 MG/DL (ref 6–20)
BUN SERPL-MCNC: 36 MG/DL (ref 6–20)
BUN SERPL-MCNC: 39 MG/DL (ref 6–20)
BUN SERPL-MCNC: 40 MG/DL (ref 6–20)
BUN SERPL-MCNC: 41 MG/DL (ref 6–20)
BUN SERPL-MCNC: 42 MG/DL (ref 6–20)
BUN SERPL-MCNC: 42 MG/DL (ref 6–20)
BUN SERPL-MCNC: 43 MG/DL (ref 6–20)
BUN SERPL-MCNC: 44 MG/DL (ref 6–20)
BUN SERPL-MCNC: 44 MG/DL (ref 6–20)
BUN SERPL-MCNC: 45 MG/DL (ref 6–20)
BUN SERPL-MCNC: 46 MG/DL (ref 6–20)
BUN SERPL-MCNC: 46 MG/DL (ref 6–20)
BUN SERPL-MCNC: 47 MG/DL (ref 6–20)
BUN SERPL-MCNC: 48 MG/DL (ref 6–20)
BUN SERPL-MCNC: 50 MG/DL (ref 6–20)
BUN SERPL-MCNC: 51 MG/DL (ref 6–20)
BUN SERPL-MCNC: 52 MG/DL (ref 6–20)
BUN SERPL-MCNC: 52 MG/DL (ref 6–20)
BUN SERPL-MCNC: 54 MG/DL (ref 6–20)
BUN SERPL-MCNC: 55 MG/DL (ref 6–20)
BUN SERPL-MCNC: 56 MG/DL (ref 6–20)
BUN SERPL-MCNC: 58 MG/DL (ref 6–20)
BUN SERPL-MCNC: 59 MG/DL (ref 6–20)
BUN SERPL-MCNC: 63 MG/DL (ref 6–20)
BUN SERPL-MCNC: 64 MG/DL (ref 6–20)
BUN SERPL-MCNC: 64 MG/DL (ref 6–20)
BUN SERPL-MCNC: 67 MG/DL (ref 6–20)
BUN SERPL-MCNC: 70 MG/DL (ref 6–20)
BUN SERPL-MCNC: 72 MG/DL (ref 6–20)
BUN SERPL-MCNC: 73 MG/DL (ref 6–20)
BUN SERPL-MCNC: 80 MG/DL (ref 6–20)
BUN SERPL-MCNC: 83 MG/DL (ref 6–20)
BUN SERPL-MCNC: 88 MG/DL (ref 6–20)
BUN SERPL-MCNC: 89 MG/DL (ref 6–20)
BUN SERPL-MCNC: 91 MG/DL (ref 6–20)
BUN/CREAT SERPL: 13 (ref 12–20)
BUN/CREAT SERPL: 13 (ref 12–20)
BUN/CREAT SERPL: 14 (ref 12–20)
BUN/CREAT SERPL: 15 (ref 12–20)
BUN/CREAT SERPL: 15 (ref 12–20)
BUN/CREAT SERPL: 16 (ref 12–20)
BUN/CREAT SERPL: 17 (ref 12–20)
BUN/CREAT SERPL: 18 (ref 12–20)
BUN/CREAT SERPL: 19 (ref 12–20)
BUN/CREAT SERPL: 20 (ref 12–20)
BUN/CREAT SERPL: 21 (ref 12–20)
BUN/CREAT SERPL: 22 (ref 12–20)
BUN/CREAT SERPL: 23 (ref 12–20)
BUN/CREAT SERPL: 23 (ref 12–20)
BUN/CREAT SERPL: 24 (ref 12–20)
BUN/CREAT SERPL: 25 (ref 12–20)
BUN/CREAT SERPL: 26 (ref 12–20)
BUN/CREAT SERPL: 27 (ref 12–20)
BUN/CREAT SERPL: 28 (ref 12–20)
BUN/CREAT SERPL: 29 (ref 12–20)
BUN/CREAT SERPL: 30 (ref 12–20)
CA-I BLD-SCNC: 1.22 MMOL/L (ref 1.12–1.32)
CA-I BLD-SCNC: 1.23 MMOL/L (ref 1.12–1.32)
CALCIUM SERPL-MCNC: 7.3 MG/DL (ref 8.5–10.1)
CALCIUM SERPL-MCNC: 7.4 MG/DL (ref 8.5–10.1)
CALCIUM SERPL-MCNC: 7.9 MG/DL (ref 8.5–10.1)
CALCIUM SERPL-MCNC: 7.9 MG/DL (ref 8.5–10.1)
CALCIUM SERPL-MCNC: 8 MG/DL (ref 8.5–10.1)
CALCIUM SERPL-MCNC: 8 MG/DL (ref 8.5–10.1)
CALCIUM SERPL-MCNC: 8.1 MG/DL (ref 8.5–10.1)
CALCIUM SERPL-MCNC: 8.2 MG/DL (ref 8.5–10.1)
CALCIUM SERPL-MCNC: 8.3 MG/DL (ref 8.5–10.1)
CALCIUM SERPL-MCNC: 8.3 MG/DL (ref 8.5–10.1)
CALCIUM SERPL-MCNC: 8.4 MG/DL (ref 8.5–10.1)
CALCIUM SERPL-MCNC: 8.5 MG/DL (ref 8.5–10.1)
CALCIUM SERPL-MCNC: 8.6 MG/DL (ref 8.5–10.1)
CALCIUM SERPL-MCNC: 8.7 MG/DL (ref 8.5–10.1)
CALCIUM SERPL-MCNC: 8.8 MG/DL (ref 8.5–10.1)
CALCIUM SERPL-MCNC: 8.9 MG/DL (ref 8.5–10.1)
CALCIUM SERPL-MCNC: 9 MG/DL (ref 8.5–10.1)
CALCIUM SERPL-MCNC: 9.2 MG/DL (ref 8.5–10.1)
CALCIUM SERPL-MCNC: 9.4 MG/DL (ref 8.5–10.1)
CALCULATED P AXIS, ECG09: 51 DEGREES
CALCULATED R AXIS, ECG10: -10 DEGREES
CALCULATED T AXIS, ECG11: 95 DEGREES
CHLORIDE SERPL-SCNC: 101 MMOL/L (ref 97–108)
CHLORIDE SERPL-SCNC: 101 MMOL/L (ref 97–108)
CHLORIDE SERPL-SCNC: 102 MMOL/L (ref 97–108)
CHLORIDE SERPL-SCNC: 102 MMOL/L (ref 97–108)
CHLORIDE SERPL-SCNC: 103 MMOL/L (ref 97–108)
CHLORIDE SERPL-SCNC: 104 MMOL/L (ref 97–108)
CHLORIDE SERPL-SCNC: 105 MMOL/L (ref 97–108)
CHLORIDE SERPL-SCNC: 106 MMOL/L (ref 97–108)
CHLORIDE SERPL-SCNC: 107 MMOL/L (ref 97–108)
CHLORIDE SERPL-SCNC: 108 MMOL/L (ref 97–108)
CHLORIDE SERPL-SCNC: 109 MMOL/L (ref 97–108)
CHLORIDE SERPL-SCNC: 90 MMOL/L (ref 97–108)
CHLORIDE SERPL-SCNC: 96 MMOL/L (ref 97–108)
CHLORIDE SERPL-SCNC: 96 MMOL/L (ref 97–108)
CHLORIDE SERPL-SCNC: 97 MMOL/L (ref 97–108)
CHLORIDE SERPL-SCNC: 98 MMOL/L (ref 97–108)
CHLORIDE SERPL-SCNC: 99 MMOL/L (ref 97–108)
CHOLEST SERPL-MCNC: 165 MG/DL
CHOLEST SERPL-MCNC: 166 MG/DL
CHOLEST SERPL-MCNC: 176 MG/DL
CK SERPL-CCNC: 15 U/L (ref 39–308)
CK SERPL-CCNC: 17 U/L (ref 39–308)
CK SERPL-CCNC: 22 U/L (ref 39–308)
CK SERPL-CCNC: 38 U/L (ref 39–308)
CO2 SERPL-SCNC: 20 MMOL/L (ref 21–32)
CO2 SERPL-SCNC: 21 MMOL/L (ref 21–32)
CO2 SERPL-SCNC: 22 MMOL/L (ref 21–32)
CO2 SERPL-SCNC: 23 MMOL/L (ref 21–32)
CO2 SERPL-SCNC: 24 MMOL/L (ref 21–32)
CO2 SERPL-SCNC: 25 MMOL/L (ref 21–32)
CO2 SERPL-SCNC: 26 MMOL/L (ref 21–32)
CO2 SERPL-SCNC: 27 MMOL/L (ref 21–32)
CO2 SERPL-SCNC: 28 MMOL/L (ref 21–32)
CO2 SERPL-SCNC: 29 MMOL/L (ref 21–32)
CO2 SERPL-SCNC: 29 MMOL/L (ref 21–32)
CO2 SERPL-SCNC: 30 MMOL/L (ref 21–32)
CO2 SERPL-SCNC: 30 MMOL/L (ref 21–32)
CO2 SERPL-SCNC: 31 MMOL/L (ref 21–32)
COHGB MFR BLD: 0.3 % (ref 1–2)
COHGB MFR BLD: 0.4 % (ref 1–2)
COLOR UR: ABNORMAL
COMMENT, HOLDF: NORMAL
COMMENT, HOLDF: NORMAL
COVID-19 RAPID TEST, COVR: DETECTED
CREAT SERPL-MCNC: 1.63 MG/DL (ref 0.7–1.3)
CREAT SERPL-MCNC: 1.71 MG/DL (ref 0.7–1.3)
CREAT SERPL-MCNC: 1.74 MG/DL (ref 0.7–1.3)
CREAT SERPL-MCNC: 1.75 MG/DL (ref 0.7–1.3)
CREAT SERPL-MCNC: 1.76 MG/DL (ref 0.7–1.3)
CREAT SERPL-MCNC: 1.77 MG/DL (ref 0.7–1.3)
CREAT SERPL-MCNC: 1.82 MG/DL (ref 0.7–1.3)
CREAT SERPL-MCNC: 1.83 MG/DL (ref 0.7–1.3)
CREAT SERPL-MCNC: 1.84 MG/DL (ref 0.7–1.3)
CREAT SERPL-MCNC: 1.86 MG/DL (ref 0.7–1.3)
CREAT SERPL-MCNC: 1.88 MG/DL (ref 0.7–1.3)
CREAT SERPL-MCNC: 1.89 MG/DL (ref 0.7–1.3)
CREAT SERPL-MCNC: 1.91 MG/DL (ref 0.7–1.3)
CREAT SERPL-MCNC: 1.94 MG/DL (ref 0.7–1.3)
CREAT SERPL-MCNC: 1.94 MG/DL (ref 0.7–1.3)
CREAT SERPL-MCNC: 1.96 MG/DL (ref 0.7–1.3)
CREAT SERPL-MCNC: 1.96 MG/DL (ref 0.7–1.3)
CREAT SERPL-MCNC: 1.97 MG/DL (ref 0.7–1.3)
CREAT SERPL-MCNC: 1.99 MG/DL (ref 0.7–1.3)
CREAT SERPL-MCNC: 2.1 MG/DL (ref 0.7–1.3)
CREAT SERPL-MCNC: 2.17 MG/DL (ref 0.7–1.3)
CREAT SERPL-MCNC: 2.25 MG/DL (ref 0.7–1.3)
CREAT SERPL-MCNC: 2.27 MG/DL (ref 0.7–1.3)
CREAT SERPL-MCNC: 2.38 MG/DL (ref 0.7–1.3)
CREAT SERPL-MCNC: 2.42 MG/DL (ref 0.7–1.3)
CREAT SERPL-MCNC: 2.44 MG/DL (ref 0.7–1.3)
CREAT SERPL-MCNC: 2.46 MG/DL (ref 0.7–1.3)
CREAT SERPL-MCNC: 2.48 MG/DL (ref 0.7–1.3)
CREAT SERPL-MCNC: 2.52 MG/DL (ref 0.7–1.3)
CREAT SERPL-MCNC: 2.56 MG/DL (ref 0.7–1.3)
CREAT SERPL-MCNC: 2.58 MG/DL (ref 0.7–1.3)
CREAT SERPL-MCNC: 2.59 MG/DL (ref 0.7–1.3)
CREAT SERPL-MCNC: 2.59 MG/DL (ref 0.7–1.3)
CREAT SERPL-MCNC: 2.65 MG/DL (ref 0.7–1.3)
CREAT SERPL-MCNC: 2.68 MG/DL (ref 0.7–1.3)
CREAT SERPL-MCNC: 2.77 MG/DL (ref 0.7–1.3)
CREAT SERPL-MCNC: 2.78 MG/DL (ref 0.7–1.3)
CREAT SERPL-MCNC: 2.9 MG/DL (ref 0.7–1.3)
CREAT SERPL-MCNC: 2.98 MG/DL (ref 0.7–1.3)
CREAT SERPL-MCNC: 2.98 MG/DL (ref 0.7–1.3)
CREAT SERPL-MCNC: 3.13 MG/DL (ref 0.7–1.3)
CREAT SERPL-MCNC: 3.17 MG/DL (ref 0.7–1.3)
CREAT SERPL-MCNC: 3.17 MG/DL (ref 0.7–1.3)
CREAT SERPL-MCNC: 3.18 MG/DL (ref 0.7–1.3)
CREAT SERPL-MCNC: 3.28 MG/DL (ref 0.7–1.3)
CREAT SERPL-MCNC: 3.32 MG/DL (ref 0.7–1.3)
CREAT SERPL-MCNC: 3.41 MG/DL (ref 0.7–1.3)
CREAT SERPL-MCNC: 3.41 MG/DL (ref 0.7–1.3)
CREAT SERPL-MCNC: 3.45 MG/DL (ref 0.7–1.3)
CREAT SERPL-MCNC: 3.69 MG/DL (ref 0.7–1.3)
CREAT SERPL-MCNC: 3.72 MG/DL (ref 0.7–1.3)
CREAT SERPL-MCNC: 3.83 MG/DL (ref 0.7–1.3)
CREAT UR-MCNC: 45.8 MG/DL
CROSSMATCH RESULT,%XM: NORMAL
CRP SERPL-MCNC: 14.5 MG/DL (ref 0–0.6)
CRP SERPL-MCNC: 5.89 MG/DL (ref 0–0.6)
D DIMER PPP FEU-MCNC: 0.91 MG/L FEU (ref 0–0.65)
D DIMER PPP FEU-MCNC: 1.43 MG/L FEU (ref 0–0.65)
D DIMER PPP FEU-MCNC: 1.44 MG/L FEU (ref 0–0.65)
D DIMER PPP FEU-MCNC: 1.47 MG/L FEU (ref 0–0.65)
D DIMER PPP FEU-MCNC: 1.53 MG/L FEU (ref 0–0.65)
D DIMER PPP FEU-MCNC: 1.54 MG/L FEU (ref 0–0.65)
D DIMER PPP FEU-MCNC: 1.59 MG/L FEU (ref 0–0.65)
D DIMER PPP FEU-MCNC: 1.66 MG/L FEU (ref 0–0.65)
D DIMER PPP FEU-MCNC: 1.68 MG/L FEU (ref 0–0.65)
D DIMER PPP FEU-MCNC: 1.71 MG/L FEU (ref 0–0.65)
D DIMER PPP FEU-MCNC: 1.71 MG/L FEU (ref 0–0.65)
D DIMER PPP FEU-MCNC: 1.75 MG/L FEU (ref 0–0.65)
D DIMER PPP FEU-MCNC: 1.85 MG/L FEU (ref 0–0.65)
D DIMER PPP FEU-MCNC: 1.87 MG/L FEU (ref 0–0.65)
D DIMER PPP FEU-MCNC: 2.05 MG/L FEU (ref 0–0.65)
D DIMER PPP FEU-MCNC: 2.06 MG/L FEU (ref 0–0.65)
D DIMER PPP FEU-MCNC: 2.18 MG/L FEU (ref 0–0.65)
D DIMER PPP FEU-MCNC: 2.19 MG/L FEU (ref 0–0.65)
D DIMER PPP FEU-MCNC: 2.56 MG/L FEU (ref 0–0.65)
D DIMER PPP FEU-MCNC: 2.8 MG/L FEU (ref 0–0.65)
D DIMER PPP FEU-MCNC: 2.87 MG/L FEU (ref 0–0.65)
D DIMER PPP FEU-MCNC: 3.13 MG/L FEU (ref 0–0.65)
D DIMER PPP FEU-MCNC: 3.67 MG/L FEU (ref 0–0.65)
DIAGNOSIS, 93000: NORMAL
DIFFERENTIAL METHOD BLD: ABNORMAL
ECHO AO ROOT DIAM: 3.69 CM
ECHO LA MAJOR AXIS: 4.18 CM
ECHO LA MINOR AXIS: 1.76 CM
ECHO LV EDV A2C: 127.17 ML
ECHO LV EDV A4C: 100.76 ML
ECHO LV EDV BP: 132.14 ML (ref 67–155)
ECHO LV EDV INDEX A4C: 42.4 ML/M2
ECHO LV EDV INDEX BP: 55.7 ML/M2
ECHO LV EDV NDEX A2C: 53.6 ML/M2
ECHO LV EJECTION FRACTION A2C: 52 PERCENT
ECHO LV EJECTION FRACTION A4C: 41 PERCENT
ECHO LV EJECTION FRACTION BIPLANE: 49.6 PERCENT (ref 55–100)
ECHO LV ESV A2C: 60.94 ML
ECHO LV ESV A4C: 59.62 ML
ECHO LV ESV BP: 66.64 ML (ref 22–58)
ECHO LV ESV INDEX A2C: 25.7 ML/M2
ECHO LV ESV INDEX A4C: 25.1 ML/M2
ECHO LV ESV INDEX BP: 28.1 ML/M2
ECHO LV INTERNAL DIMENSION DIASTOLIC: 5.02 CM (ref 4.2–5.9)
ECHO LV INTERNAL DIMENSION SYSTOLIC: 3.77 CM
ECHO LV IVSD: 1.21 CM (ref 0.6–1)
ECHO LV MASS 2D: 273.7 G (ref 88–224)
ECHO LV MASS INDEX 2D: 115.3 G/M2 (ref 49–115)
ECHO LV POSTERIOR WALL DIASTOLIC: 1.46 CM (ref 0.6–1)
ECHO RV TAPSE: 1.87 CM (ref 1.5–2)
EOSINOPHIL # BLD: 0 K/UL (ref 0–0.4)
EOSINOPHIL # BLD: 0.1 K/UL (ref 0–0.4)
EOSINOPHIL # BLD: 0.2 K/UL (ref 0–0.4)
EOSINOPHIL # BLD: 0.3 K/UL (ref 0–0.4)
EOSINOPHIL # BLD: 0.4 K/UL (ref 0–0.4)
EOSINOPHIL NFR BLD: 0 % (ref 0–7)
EOSINOPHIL NFR BLD: 1 % (ref 0–7)
EOSINOPHIL NFR BLD: 2 % (ref 0–7)
EOSINOPHIL NFR BLD: 3 % (ref 0–7)
EOSINOPHIL NFR BLD: 4 % (ref 0–7)
EOSINOPHIL NFR BLD: 5 % (ref 0–7)
EPITH CASTS URNS QL MICRO: ABNORMAL /LPF
ERYTHROCYTE [DISTWIDTH] IN BLOOD BY AUTOMATED COUNT: 16.2 % (ref 11.5–14.5)
ERYTHROCYTE [DISTWIDTH] IN BLOOD BY AUTOMATED COUNT: 16.4 % (ref 11.5–14.5)
ERYTHROCYTE [DISTWIDTH] IN BLOOD BY AUTOMATED COUNT: 16.4 % (ref 11.5–14.5)
ERYTHROCYTE [DISTWIDTH] IN BLOOD BY AUTOMATED COUNT: 16.7 % (ref 11.5–14.5)
ERYTHROCYTE [DISTWIDTH] IN BLOOD BY AUTOMATED COUNT: 16.7 % (ref 11.5–14.5)
ERYTHROCYTE [DISTWIDTH] IN BLOOD BY AUTOMATED COUNT: 17.1 % (ref 11.5–14.5)
ERYTHROCYTE [DISTWIDTH] IN BLOOD BY AUTOMATED COUNT: 17.2 % (ref 11.5–14.5)
ERYTHROCYTE [DISTWIDTH] IN BLOOD BY AUTOMATED COUNT: 17.6 % (ref 11.5–14.5)
ERYTHROCYTE [DISTWIDTH] IN BLOOD BY AUTOMATED COUNT: 17.7 % (ref 11.5–14.5)
ERYTHROCYTE [DISTWIDTH] IN BLOOD BY AUTOMATED COUNT: 17.7 % (ref 11.5–14.5)
ERYTHROCYTE [DISTWIDTH] IN BLOOD BY AUTOMATED COUNT: 17.8 % (ref 11.5–14.5)
ERYTHROCYTE [DISTWIDTH] IN BLOOD BY AUTOMATED COUNT: 17.8 % (ref 11.5–14.5)
ERYTHROCYTE [DISTWIDTH] IN BLOOD BY AUTOMATED COUNT: 17.9 % (ref 11.5–14.5)
ERYTHROCYTE [DISTWIDTH] IN BLOOD BY AUTOMATED COUNT: 18 % (ref 11.5–14.5)
ERYTHROCYTE [DISTWIDTH] IN BLOOD BY AUTOMATED COUNT: 18.1 % (ref 11.5–14.5)
ERYTHROCYTE [DISTWIDTH] IN BLOOD BY AUTOMATED COUNT: 18.1 % (ref 11.5–14.5)
ERYTHROCYTE [DISTWIDTH] IN BLOOD BY AUTOMATED COUNT: 18.2 % (ref 11.5–14.5)
ERYTHROCYTE [DISTWIDTH] IN BLOOD BY AUTOMATED COUNT: 18.4 % (ref 11.5–14.5)
ERYTHROCYTE [DISTWIDTH] IN BLOOD BY AUTOMATED COUNT: 18.6 % (ref 11.5–14.5)
ERYTHROCYTE [DISTWIDTH] IN BLOOD BY AUTOMATED COUNT: 19 % (ref 11.5–14.5)
ERYTHROCYTE [DISTWIDTH] IN BLOOD BY AUTOMATED COUNT: 19.1 % (ref 11.5–14.5)
ERYTHROCYTE [DISTWIDTH] IN BLOOD BY AUTOMATED COUNT: 19.2 % (ref 11.5–14.5)
ERYTHROCYTE [DISTWIDTH] IN BLOOD BY AUTOMATED COUNT: 19.3 % (ref 11.5–14.5)
ERYTHROCYTE [DISTWIDTH] IN BLOOD BY AUTOMATED COUNT: 19.3 % (ref 11.5–14.5)
ERYTHROCYTE [DISTWIDTH] IN BLOOD BY AUTOMATED COUNT: 19.4 % (ref 11.5–14.5)
ERYTHROCYTE [DISTWIDTH] IN BLOOD BY AUTOMATED COUNT: 19.4 % (ref 11.5–14.5)
ERYTHROCYTE [DISTWIDTH] IN BLOOD BY AUTOMATED COUNT: 19.6 % (ref 11.5–14.5)
ERYTHROCYTE [DISTWIDTH] IN BLOOD BY AUTOMATED COUNT: 19.7 % (ref 11.5–14.5)
ERYTHROCYTE [DISTWIDTH] IN BLOOD BY AUTOMATED COUNT: 19.8 % (ref 11.5–14.5)
ERYTHROCYTE [DISTWIDTH] IN BLOOD BY AUTOMATED COUNT: 19.9 % (ref 11.5–14.5)
ERYTHROCYTE [DISTWIDTH] IN BLOOD BY AUTOMATED COUNT: 19.9 % (ref 11.5–14.5)
ERYTHROCYTE [DISTWIDTH] IN BLOOD BY AUTOMATED COUNT: 20 % (ref 11.5–14.5)
FIO2 ON VENT: 0.7 %
FIO2 ON VENT: 0.9 %
FIO2 ON VENT: 1 %
FIO2 ON VENT: 100 %
FIO2 ON VENT: 50 %
FIO2 ON VENT: 60 %
FIO2 ON VENT: 70 %
FIO2 ON VENT: 75 %
FIO2 ON VENT: 90 %
FIO2 ON VENT: 90 %
GAS FLOW.O2 O2 DELIVERY SYS: ABNORMAL L/MIN
GAS FLOW.O2 O2 DELIVERY SYS: ABNORMAL L/MIN
GAS FLOW.O2 SETTING OXYMISER: 15 L/M
GAS FLOW.O2 SETTING OXYMISER: 15 L/M
GAS FLOW.O2 SETTING OXYMISER: 18 L/MIN
GAS FLOW.O2 SETTING OXYMISER: 20 L/MIN
GAS FLOW.O2 SETTING OXYMISER: 22 L/MIN
GAS FLOW.O2 SETTING OXYMISER: 26 L/MIN
GLOBULIN SER CALC-MCNC: 3.6 G/DL (ref 2–4)
GLOBULIN SER CALC-MCNC: 4.2 G/DL (ref 2–4)
GLOBULIN SER CALC-MCNC: 4.2 G/DL (ref 2–4)
GLOBULIN SER CALC-MCNC: 4.3 G/DL (ref 2–4)
GLOBULIN SER CALC-MCNC: 4.7 G/DL (ref 2–4)
GLOBULIN SER CALC-MCNC: 5.1 G/DL (ref 2–4)
GLOBULIN SER CALC-MCNC: 5.2 G/DL (ref 2–4)
GLOBULIN SER CALC-MCNC: 5.2 G/DL (ref 2–4)
GLOBULIN SER CALC-MCNC: 5.4 G/DL (ref 2–4)
GLOBULIN SER CALC-MCNC: 5.5 G/DL (ref 2–4)
GLOBULIN SER CALC-MCNC: 5.5 G/DL (ref 2–4)
GLOBULIN SER CALC-MCNC: 5.6 G/DL (ref 2–4)
GLOBULIN SER CALC-MCNC: 5.8 G/DL (ref 2–4)
GLOBULIN SER CALC-MCNC: 6.2 G/DL (ref 2–4)
GLUCOSE BLD STRIP.AUTO-MCNC: 100 MG/DL (ref 65–100)
GLUCOSE BLD STRIP.AUTO-MCNC: 101 MG/DL (ref 65–100)
GLUCOSE BLD STRIP.AUTO-MCNC: 102 MG/DL (ref 65–100)
GLUCOSE BLD STRIP.AUTO-MCNC: 103 MG/DL (ref 65–100)
GLUCOSE BLD STRIP.AUTO-MCNC: 104 MG/DL (ref 65–100)
GLUCOSE BLD STRIP.AUTO-MCNC: 104 MG/DL (ref 65–100)
GLUCOSE BLD STRIP.AUTO-MCNC: 106 MG/DL (ref 65–100)
GLUCOSE BLD STRIP.AUTO-MCNC: 106 MG/DL (ref 65–100)
GLUCOSE BLD STRIP.AUTO-MCNC: 107 MG/DL (ref 65–100)
GLUCOSE BLD STRIP.AUTO-MCNC: 109 MG/DL (ref 65–100)
GLUCOSE BLD STRIP.AUTO-MCNC: 109 MG/DL (ref 65–100)
GLUCOSE BLD STRIP.AUTO-MCNC: 110 MG/DL (ref 65–100)
GLUCOSE BLD STRIP.AUTO-MCNC: 110 MG/DL (ref 65–100)
GLUCOSE BLD STRIP.AUTO-MCNC: 114 MG/DL (ref 65–100)
GLUCOSE BLD STRIP.AUTO-MCNC: 115 MG/DL (ref 65–100)
GLUCOSE BLD STRIP.AUTO-MCNC: 115 MG/DL (ref 65–100)
GLUCOSE BLD STRIP.AUTO-MCNC: 116 MG/DL (ref 65–100)
GLUCOSE BLD STRIP.AUTO-MCNC: 117 MG/DL (ref 65–100)
GLUCOSE BLD STRIP.AUTO-MCNC: 118 MG/DL (ref 65–100)
GLUCOSE BLD STRIP.AUTO-MCNC: 119 MG/DL (ref 65–100)
GLUCOSE BLD STRIP.AUTO-MCNC: 121 MG/DL (ref 65–100)
GLUCOSE BLD STRIP.AUTO-MCNC: 123 MG/DL (ref 65–100)
GLUCOSE BLD STRIP.AUTO-MCNC: 123 MG/DL (ref 65–100)
GLUCOSE BLD STRIP.AUTO-MCNC: 124 MG/DL (ref 65–100)
GLUCOSE BLD STRIP.AUTO-MCNC: 124 MG/DL (ref 65–100)
GLUCOSE BLD STRIP.AUTO-MCNC: 125 MG/DL (ref 65–100)
GLUCOSE BLD STRIP.AUTO-MCNC: 126 MG/DL (ref 65–100)
GLUCOSE BLD STRIP.AUTO-MCNC: 126 MG/DL (ref 65–100)
GLUCOSE BLD STRIP.AUTO-MCNC: 127 MG/DL (ref 65–100)
GLUCOSE BLD STRIP.AUTO-MCNC: 128 MG/DL (ref 65–100)
GLUCOSE BLD STRIP.AUTO-MCNC: 128 MG/DL (ref 65–100)
GLUCOSE BLD STRIP.AUTO-MCNC: 130 MG/DL (ref 65–100)
GLUCOSE BLD STRIP.AUTO-MCNC: 130 MG/DL (ref 65–100)
GLUCOSE BLD STRIP.AUTO-MCNC: 131 MG/DL (ref 65–100)
GLUCOSE BLD STRIP.AUTO-MCNC: 132 MG/DL (ref 65–100)
GLUCOSE BLD STRIP.AUTO-MCNC: 132 MG/DL (ref 65–100)
GLUCOSE BLD STRIP.AUTO-MCNC: 133 MG/DL (ref 65–100)
GLUCOSE BLD STRIP.AUTO-MCNC: 135 MG/DL (ref 65–100)
GLUCOSE BLD STRIP.AUTO-MCNC: 136 MG/DL (ref 65–100)
GLUCOSE BLD STRIP.AUTO-MCNC: 136 MG/DL (ref 65–100)
GLUCOSE BLD STRIP.AUTO-MCNC: 139 MG/DL (ref 65–100)
GLUCOSE BLD STRIP.AUTO-MCNC: 140 MG/DL (ref 65–100)
GLUCOSE BLD STRIP.AUTO-MCNC: 141 MG/DL (ref 65–100)
GLUCOSE BLD STRIP.AUTO-MCNC: 142 MG/DL (ref 65–100)
GLUCOSE BLD STRIP.AUTO-MCNC: 145 MG/DL (ref 65–100)
GLUCOSE BLD STRIP.AUTO-MCNC: 145 MG/DL (ref 65–100)
GLUCOSE BLD STRIP.AUTO-MCNC: 146 MG/DL (ref 65–100)
GLUCOSE BLD STRIP.AUTO-MCNC: 146 MG/DL (ref 65–100)
GLUCOSE BLD STRIP.AUTO-MCNC: 147 MG/DL (ref 65–100)
GLUCOSE BLD STRIP.AUTO-MCNC: 148 MG/DL (ref 65–100)
GLUCOSE BLD STRIP.AUTO-MCNC: 149 MG/DL (ref 65–100)
GLUCOSE BLD STRIP.AUTO-MCNC: 152 MG/DL (ref 65–100)
GLUCOSE BLD STRIP.AUTO-MCNC: 153 MG/DL (ref 65–100)
GLUCOSE BLD STRIP.AUTO-MCNC: 154 MG/DL (ref 65–100)
GLUCOSE BLD STRIP.AUTO-MCNC: 156 MG/DL (ref 65–100)
GLUCOSE BLD STRIP.AUTO-MCNC: 159 MG/DL (ref 65–100)
GLUCOSE BLD STRIP.AUTO-MCNC: 160 MG/DL (ref 65–100)
GLUCOSE BLD STRIP.AUTO-MCNC: 162 MG/DL (ref 65–100)
GLUCOSE BLD STRIP.AUTO-MCNC: 167 MG/DL (ref 65–100)
GLUCOSE BLD STRIP.AUTO-MCNC: 168 MG/DL (ref 65–100)
GLUCOSE BLD STRIP.AUTO-MCNC: 169 MG/DL (ref 65–100)
GLUCOSE BLD STRIP.AUTO-MCNC: 170 MG/DL (ref 65–100)
GLUCOSE BLD STRIP.AUTO-MCNC: 173 MG/DL (ref 65–100)
GLUCOSE BLD STRIP.AUTO-MCNC: 175 MG/DL (ref 65–100)
GLUCOSE BLD STRIP.AUTO-MCNC: 176 MG/DL (ref 65–100)
GLUCOSE BLD STRIP.AUTO-MCNC: 178 MG/DL (ref 65–100)
GLUCOSE BLD STRIP.AUTO-MCNC: 179 MG/DL (ref 65–100)
GLUCOSE BLD STRIP.AUTO-MCNC: 179 MG/DL (ref 65–100)
GLUCOSE BLD STRIP.AUTO-MCNC: 180 MG/DL (ref 65–100)
GLUCOSE BLD STRIP.AUTO-MCNC: 181 MG/DL (ref 65–100)
GLUCOSE BLD STRIP.AUTO-MCNC: 182 MG/DL (ref 65–100)
GLUCOSE BLD STRIP.AUTO-MCNC: 182 MG/DL (ref 65–100)
GLUCOSE BLD STRIP.AUTO-MCNC: 183 MG/DL (ref 65–100)
GLUCOSE BLD STRIP.AUTO-MCNC: 186 MG/DL (ref 65–100)
GLUCOSE BLD STRIP.AUTO-MCNC: 189 MG/DL (ref 65–100)
GLUCOSE BLD STRIP.AUTO-MCNC: 190 MG/DL (ref 65–100)
GLUCOSE BLD STRIP.AUTO-MCNC: 191 MG/DL (ref 65–100)
GLUCOSE BLD STRIP.AUTO-MCNC: 192 MG/DL (ref 65–100)
GLUCOSE BLD STRIP.AUTO-MCNC: 193 MG/DL (ref 65–100)
GLUCOSE BLD STRIP.AUTO-MCNC: 193 MG/DL (ref 65–100)
GLUCOSE BLD STRIP.AUTO-MCNC: 194 MG/DL (ref 65–100)
GLUCOSE BLD STRIP.AUTO-MCNC: 195 MG/DL (ref 65–100)
GLUCOSE BLD STRIP.AUTO-MCNC: 195 MG/DL (ref 65–100)
GLUCOSE BLD STRIP.AUTO-MCNC: 198 MG/DL (ref 65–100)
GLUCOSE BLD STRIP.AUTO-MCNC: 198 MG/DL (ref 65–100)
GLUCOSE BLD STRIP.AUTO-MCNC: 199 MG/DL (ref 65–100)
GLUCOSE BLD STRIP.AUTO-MCNC: 200 MG/DL (ref 65–100)
GLUCOSE BLD STRIP.AUTO-MCNC: 202 MG/DL (ref 65–100)
GLUCOSE BLD STRIP.AUTO-MCNC: 206 MG/DL (ref 65–100)
GLUCOSE BLD STRIP.AUTO-MCNC: 207 MG/DL (ref 65–100)
GLUCOSE BLD STRIP.AUTO-MCNC: 208 MG/DL (ref 65–100)
GLUCOSE BLD STRIP.AUTO-MCNC: 211 MG/DL (ref 65–100)
GLUCOSE BLD STRIP.AUTO-MCNC: 212 MG/DL (ref 65–100)
GLUCOSE BLD STRIP.AUTO-MCNC: 212 MG/DL (ref 65–100)
GLUCOSE BLD STRIP.AUTO-MCNC: 213 MG/DL (ref 65–100)
GLUCOSE BLD STRIP.AUTO-MCNC: 214 MG/DL (ref 65–100)
GLUCOSE BLD STRIP.AUTO-MCNC: 216 MG/DL (ref 65–100)
GLUCOSE BLD STRIP.AUTO-MCNC: 217 MG/DL (ref 65–100)
GLUCOSE BLD STRIP.AUTO-MCNC: 219 MG/DL (ref 65–100)
GLUCOSE BLD STRIP.AUTO-MCNC: 220 MG/DL (ref 65–100)
GLUCOSE BLD STRIP.AUTO-MCNC: 221 MG/DL (ref 65–100)
GLUCOSE BLD STRIP.AUTO-MCNC: 223 MG/DL (ref 65–100)
GLUCOSE BLD STRIP.AUTO-MCNC: 224 MG/DL (ref 65–100)
GLUCOSE BLD STRIP.AUTO-MCNC: 225 MG/DL (ref 65–100)
GLUCOSE BLD STRIP.AUTO-MCNC: 227 MG/DL (ref 65–100)
GLUCOSE BLD STRIP.AUTO-MCNC: 228 MG/DL (ref 65–100)
GLUCOSE BLD STRIP.AUTO-MCNC: 228 MG/DL (ref 65–100)
GLUCOSE BLD STRIP.AUTO-MCNC: 231 MG/DL (ref 65–100)
GLUCOSE BLD STRIP.AUTO-MCNC: 232 MG/DL (ref 65–100)
GLUCOSE BLD STRIP.AUTO-MCNC: 235 MG/DL (ref 65–100)
GLUCOSE BLD STRIP.AUTO-MCNC: 236 MG/DL (ref 65–100)
GLUCOSE BLD STRIP.AUTO-MCNC: 237 MG/DL (ref 65–100)
GLUCOSE BLD STRIP.AUTO-MCNC: 239 MG/DL (ref 65–100)
GLUCOSE BLD STRIP.AUTO-MCNC: 239 MG/DL (ref 65–100)
GLUCOSE BLD STRIP.AUTO-MCNC: 241 MG/DL (ref 65–100)
GLUCOSE BLD STRIP.AUTO-MCNC: 241 MG/DL (ref 65–100)
GLUCOSE BLD STRIP.AUTO-MCNC: 248 MG/DL (ref 65–100)
GLUCOSE BLD STRIP.AUTO-MCNC: 248 MG/DL (ref 65–100)
GLUCOSE BLD STRIP.AUTO-MCNC: 249 MG/DL (ref 65–100)
GLUCOSE BLD STRIP.AUTO-MCNC: 250 MG/DL (ref 65–100)
GLUCOSE BLD STRIP.AUTO-MCNC: 252 MG/DL (ref 65–100)
GLUCOSE BLD STRIP.AUTO-MCNC: 257 MG/DL (ref 65–100)
GLUCOSE BLD STRIP.AUTO-MCNC: 258 MG/DL (ref 65–100)
GLUCOSE BLD STRIP.AUTO-MCNC: 266 MG/DL (ref 65–100)
GLUCOSE BLD STRIP.AUTO-MCNC: 267 MG/DL (ref 65–100)
GLUCOSE BLD STRIP.AUTO-MCNC: 268 MG/DL (ref 65–100)
GLUCOSE BLD STRIP.AUTO-MCNC: 268 MG/DL (ref 65–100)
GLUCOSE BLD STRIP.AUTO-MCNC: 269 MG/DL (ref 65–100)
GLUCOSE BLD STRIP.AUTO-MCNC: 272 MG/DL (ref 65–100)
GLUCOSE BLD STRIP.AUTO-MCNC: 273 MG/DL (ref 65–100)
GLUCOSE BLD STRIP.AUTO-MCNC: 274 MG/DL (ref 65–100)
GLUCOSE BLD STRIP.AUTO-MCNC: 275 MG/DL (ref 65–100)
GLUCOSE BLD STRIP.AUTO-MCNC: 276 MG/DL (ref 65–100)
GLUCOSE BLD STRIP.AUTO-MCNC: 283 MG/DL (ref 65–100)
GLUCOSE BLD STRIP.AUTO-MCNC: 289 MG/DL (ref 65–100)
GLUCOSE BLD STRIP.AUTO-MCNC: 308 MG/DL (ref 65–100)
GLUCOSE BLD STRIP.AUTO-MCNC: 320 MG/DL (ref 65–100)
GLUCOSE BLD STRIP.AUTO-MCNC: 325 MG/DL (ref 65–100)
GLUCOSE BLD STRIP.AUTO-MCNC: 339 MG/DL (ref 65–100)
GLUCOSE BLD STRIP.AUTO-MCNC: 352 MG/DL (ref 65–100)
GLUCOSE BLD STRIP.AUTO-MCNC: 365 MG/DL (ref 65–100)
GLUCOSE BLD STRIP.AUTO-MCNC: 62 MG/DL (ref 65–100)
GLUCOSE BLD STRIP.AUTO-MCNC: 69 MG/DL (ref 65–100)
GLUCOSE BLD STRIP.AUTO-MCNC: 78 MG/DL (ref 65–100)
GLUCOSE BLD STRIP.AUTO-MCNC: 81 MG/DL (ref 65–100)
GLUCOSE BLD STRIP.AUTO-MCNC: 82 MG/DL (ref 65–100)
GLUCOSE BLD STRIP.AUTO-MCNC: 82 MG/DL (ref 65–100)
GLUCOSE BLD STRIP.AUTO-MCNC: 84 MG/DL (ref 65–100)
GLUCOSE BLD STRIP.AUTO-MCNC: 86 MG/DL (ref 65–100)
GLUCOSE BLD STRIP.AUTO-MCNC: 87 MG/DL (ref 65–100)
GLUCOSE BLD STRIP.AUTO-MCNC: 89 MG/DL (ref 65–100)
GLUCOSE BLD STRIP.AUTO-MCNC: 90 MG/DL (ref 65–100)
GLUCOSE BLD STRIP.AUTO-MCNC: 90 MG/DL (ref 65–100)
GLUCOSE BLD STRIP.AUTO-MCNC: 91 MG/DL (ref 65–100)
GLUCOSE BLD STRIP.AUTO-MCNC: 93 MG/DL (ref 65–100)
GLUCOSE BLD STRIP.AUTO-MCNC: 94 MG/DL (ref 65–100)
GLUCOSE BLD STRIP.AUTO-MCNC: 95 MG/DL (ref 65–100)
GLUCOSE BLD STRIP.AUTO-MCNC: 96 MG/DL (ref 65–100)
GLUCOSE BLD STRIP.AUTO-MCNC: 97 MG/DL (ref 65–100)
GLUCOSE BLD STRIP.AUTO-MCNC: 97 MG/DL (ref 65–100)
GLUCOSE BLD STRIP.AUTO-MCNC: 98 MG/DL (ref 65–100)
GLUCOSE BLD STRIP.AUTO-MCNC: NORMAL MG/DL (ref 65–100)
GLUCOSE SERPL-MCNC: 100 MG/DL (ref 65–100)
GLUCOSE SERPL-MCNC: 103 MG/DL (ref 65–100)
GLUCOSE SERPL-MCNC: 106 MG/DL (ref 65–100)
GLUCOSE SERPL-MCNC: 107 MG/DL (ref 65–100)
GLUCOSE SERPL-MCNC: 110 MG/DL (ref 65–100)
GLUCOSE SERPL-MCNC: 112 MG/DL (ref 65–100)
GLUCOSE SERPL-MCNC: 114 MG/DL (ref 65–100)
GLUCOSE SERPL-MCNC: 115 MG/DL (ref 65–100)
GLUCOSE SERPL-MCNC: 116 MG/DL (ref 65–100)
GLUCOSE SERPL-MCNC: 121 MG/DL (ref 65–100)
GLUCOSE SERPL-MCNC: 122 MG/DL (ref 65–100)
GLUCOSE SERPL-MCNC: 124 MG/DL (ref 65–100)
GLUCOSE SERPL-MCNC: 136 MG/DL (ref 65–100)
GLUCOSE SERPL-MCNC: 136 MG/DL (ref 65–100)
GLUCOSE SERPL-MCNC: 145 MG/DL (ref 65–100)
GLUCOSE SERPL-MCNC: 161 MG/DL (ref 65–100)
GLUCOSE SERPL-MCNC: 167 MG/DL (ref 65–100)
GLUCOSE SERPL-MCNC: 167 MG/DL (ref 65–100)
GLUCOSE SERPL-MCNC: 170 MG/DL (ref 65–100)
GLUCOSE SERPL-MCNC: 172 MG/DL (ref 65–100)
GLUCOSE SERPL-MCNC: 175 MG/DL (ref 65–100)
GLUCOSE SERPL-MCNC: 178 MG/DL (ref 65–100)
GLUCOSE SERPL-MCNC: 178 MG/DL (ref 65–100)
GLUCOSE SERPL-MCNC: 191 MG/DL (ref 65–100)
GLUCOSE SERPL-MCNC: 195 MG/DL (ref 65–100)
GLUCOSE SERPL-MCNC: 197 MG/DL (ref 65–100)
GLUCOSE SERPL-MCNC: 198 MG/DL (ref 65–100)
GLUCOSE SERPL-MCNC: 200 MG/DL (ref 65–100)
GLUCOSE SERPL-MCNC: 209 MG/DL (ref 65–100)
GLUCOSE SERPL-MCNC: 210 MG/DL (ref 65–100)
GLUCOSE SERPL-MCNC: 216 MG/DL (ref 65–100)
GLUCOSE SERPL-MCNC: 219 MG/DL (ref 65–100)
GLUCOSE SERPL-MCNC: 224 MG/DL (ref 65–100)
GLUCOSE SERPL-MCNC: 237 MG/DL (ref 65–100)
GLUCOSE SERPL-MCNC: 249 MG/DL (ref 65–100)
GLUCOSE SERPL-MCNC: 254 MG/DL (ref 65–100)
GLUCOSE SERPL-MCNC: 310 MG/DL (ref 65–100)
GLUCOSE SERPL-MCNC: 316 MG/DL (ref 65–100)
GLUCOSE SERPL-MCNC: 64 MG/DL (ref 65–100)
GLUCOSE SERPL-MCNC: 72 MG/DL (ref 65–100)
GLUCOSE SERPL-MCNC: 73 MG/DL (ref 65–100)
GLUCOSE SERPL-MCNC: 77 MG/DL (ref 65–100)
GLUCOSE SERPL-MCNC: 81 MG/DL (ref 65–100)
GLUCOSE SERPL-MCNC: 83 MG/DL (ref 65–100)
GLUCOSE SERPL-MCNC: 84 MG/DL (ref 65–100)
GLUCOSE SERPL-MCNC: 88 MG/DL (ref 65–100)
GLUCOSE SERPL-MCNC: 88 MG/DL (ref 65–100)
GLUCOSE SERPL-MCNC: 90 MG/DL (ref 65–100)
GLUCOSE SERPL-MCNC: 90 MG/DL (ref 65–100)
GLUCOSE SERPL-MCNC: 91 MG/DL (ref 65–100)
GLUCOSE SERPL-MCNC: 93 MG/DL (ref 65–100)
GLUCOSE SERPL-MCNC: 98 MG/DL (ref 65–100)
GLUCOSE UR STRIP.AUTO-MCNC: 100 MG/DL
GRAM STN SPEC: ABNORMAL
HBV SURFACE AB SER QL: NONREACTIVE
HBV SURFACE AB SER-ACNC: 4 MIU/ML
HBV SURFACE AG SER QL: <0.1 INDEX
HBV SURFACE AG SER QL: NEGATIVE
HCO3 BLD-SCNC: 26.5 MMOL/L (ref 22–26)
HCO3 BLD-SCNC: 27.4 MMOL/L (ref 22–26)
HCO3 BLDA-SCNC: 21 MMOL/L (ref 22–26)
HCO3 BLDA-SCNC: 21 MMOL/L (ref 22–26)
HCO3 BLDA-SCNC: 22 MMOL/L (ref 22–26)
HCO3 BLDA-SCNC: 23 MMOL/L (ref 22–26)
HCO3 BLDA-SCNC: 24 MMOL/L (ref 22–26)
HCO3 BLDA-SCNC: 25 MMOL/L (ref 22–26)
HCO3 BLDA-SCNC: 26 MMOL/L (ref 22–26)
HCO3 BLDA-SCNC: 27 MMOL/L (ref 22–26)
HCT VFR BLD AUTO: 19.3 % (ref 36.6–50.3)
HCT VFR BLD AUTO: 19.6 % (ref 36.6–50.3)
HCT VFR BLD AUTO: 19.7 % (ref 36.6–50.3)
HCT VFR BLD AUTO: 19.8 % (ref 36.6–50.3)
HCT VFR BLD AUTO: 20.8 % (ref 36.6–50.3)
HCT VFR BLD AUTO: 20.9 % (ref 36.6–50.3)
HCT VFR BLD AUTO: 21 % (ref 36.6–50.3)
HCT VFR BLD AUTO: 21.4 % (ref 36.6–50.3)
HCT VFR BLD AUTO: 21.4 % (ref 36.6–50.3)
HCT VFR BLD AUTO: 21.7 % (ref 36.6–50.3)
HCT VFR BLD AUTO: 22.2 % (ref 36.6–50.3)
HCT VFR BLD AUTO: 22.3 % (ref 36.6–50.3)
HCT VFR BLD AUTO: 22.3 % (ref 36.6–50.3)
HCT VFR BLD AUTO: 22.5 % (ref 36.6–50.3)
HCT VFR BLD AUTO: 22.6 % (ref 36.6–50.3)
HCT VFR BLD AUTO: 22.6 % (ref 36.6–50.3)
HCT VFR BLD AUTO: 22.7 % (ref 36.6–50.3)
HCT VFR BLD AUTO: 22.8 % (ref 36.6–50.3)
HCT VFR BLD AUTO: 22.9 % (ref 36.6–50.3)
HCT VFR BLD AUTO: 23 % (ref 36.6–50.3)
HCT VFR BLD AUTO: 23.2 % (ref 36.6–50.3)
HCT VFR BLD AUTO: 23.4 % (ref 36.6–50.3)
HCT VFR BLD AUTO: 23.5 % (ref 36.6–50.3)
HCT VFR BLD AUTO: 23.7 % (ref 36.6–50.3)
HCT VFR BLD AUTO: 23.9 % (ref 36.6–50.3)
HCT VFR BLD AUTO: 24 % (ref 36.6–50.3)
HCT VFR BLD AUTO: 24.2 % (ref 36.6–50.3)
HCT VFR BLD AUTO: 24.3 % (ref 36.6–50.3)
HCT VFR BLD AUTO: 24.5 % (ref 36.6–50.3)
HCT VFR BLD AUTO: 25 % (ref 36.6–50.3)
HCT VFR BLD AUTO: 25.7 % (ref 36.6–50.3)
HCT VFR BLD AUTO: 26.1 % (ref 36.6–50.3)
HCT VFR BLD AUTO: 26.1 % (ref 36.6–50.3)
HCT VFR BLD AUTO: 26.2 % (ref 36.6–50.3)
HCT VFR BLD AUTO: 26.2 % (ref 36.6–50.3)
HCT VFR BLD AUTO: 26.8 % (ref 36.6–50.3)
HCT VFR BLD AUTO: 27.1 % (ref 36.6–50.3)
HCT VFR BLD AUTO: 28.1 % (ref 36.6–50.3)
HCT VFR BLD AUTO: 28.4 % (ref 36.6–50.3)
HCT VFR BLD AUTO: 29.4 % (ref 36.6–50.3)
HDLC SERPL-MCNC: 25 MG/DL
HDLC SERPL-MCNC: 26 MG/DL
HDLC SERPL-MCNC: 27 MG/DL
HDLC SERPL: 6.4 {RATIO} (ref 0–5)
HDLC SERPL: 6.5 {RATIO} (ref 0–5)
HDLC SERPL: 6.6 {RATIO} (ref 0–5)
HGB BLD OXIMETRY-MCNC: 8.8 G/DL (ref 14–17)
HGB BLD OXIMETRY-MCNC: 9.2 G/DL (ref 14–17)
HGB BLD OXIMETRY-MCNC: 9.2 G/DL (ref 14–17)
HGB BLD OXIMETRY-MCNC: 9.4 G/DL (ref 14–17)
HGB BLD-MCNC: 5.9 G/DL (ref 12.1–17)
HGB BLD-MCNC: 6 G/DL (ref 12.1–17)
HGB BLD-MCNC: 6.1 G/DL (ref 12.1–17)
HGB BLD-MCNC: 6.4 G/DL (ref 12.1–17)
HGB BLD-MCNC: 6.4 G/DL (ref 12.1–17)
HGB BLD-MCNC: 6.5 G/DL (ref 12.1–17)
HGB BLD-MCNC: 6.7 G/DL (ref 12.1–17)
HGB BLD-MCNC: 6.7 G/DL (ref 12.1–17)
HGB BLD-MCNC: 6.8 G/DL (ref 12.1–17)
HGB BLD-MCNC: 6.8 G/DL (ref 12.1–17)
HGB BLD-MCNC: 6.9 G/DL (ref 12.1–17)
HGB BLD-MCNC: 6.9 G/DL (ref 12.1–17)
HGB BLD-MCNC: 7 G/DL (ref 12.1–17)
HGB BLD-MCNC: 7.1 G/DL (ref 12.1–17)
HGB BLD-MCNC: 7.2 G/DL (ref 12.1–17)
HGB BLD-MCNC: 7.3 G/DL (ref 12.1–17)
HGB BLD-MCNC: 7.4 G/DL (ref 12.1–17)
HGB BLD-MCNC: 7.4 G/DL (ref 12.1–17)
HGB BLD-MCNC: 7.5 G/DL (ref 12.1–17)
HGB BLD-MCNC: 7.6 G/DL (ref 12.1–17)
HGB BLD-MCNC: 7.7 G/DL (ref 12.1–17)
HGB BLD-MCNC: 7.8 G/DL (ref 12.1–17)
HGB BLD-MCNC: 7.9 G/DL (ref 12.1–17)
HGB BLD-MCNC: 8 G/DL (ref 12.1–17)
HGB BLD-MCNC: 8 G/DL (ref 12.1–17)
HGB BLD-MCNC: 8.1 G/DL (ref 12.1–17)
HGB BLD-MCNC: 8.1 G/DL (ref 12.1–17)
HGB BLD-MCNC: 8.4 G/DL (ref 12.1–17)
HGB BLD-MCNC: 8.6 G/DL (ref 12.1–17)
HGB BLD-MCNC: 8.6 G/DL (ref 12.1–17)
HGB BLD-MCNC: 8.8 G/DL (ref 12.1–17)
HGB UR QL STRIP: NEGATIVE
HISTORY CHECKED?,CKHIST: NORMAL
IMM GRANULOCYTES # BLD AUTO: 0 K/UL
IMM GRANULOCYTES # BLD AUTO: 0 K/UL (ref 0–0.04)
IMM GRANULOCYTES # BLD AUTO: 0.1 K/UL (ref 0–0.04)
IMM GRANULOCYTES # BLD AUTO: 0.2 K/UL (ref 0–0.04)
IMM GRANULOCYTES # BLD AUTO: 0.4 K/UL (ref 0–0.04)
IMM GRANULOCYTES NFR BLD AUTO: 0 %
IMM GRANULOCYTES NFR BLD AUTO: 0 % (ref 0–0.5)
IMM GRANULOCYTES NFR BLD AUTO: 1 % (ref 0–0.5)
IMM GRANULOCYTES NFR BLD AUTO: 2 % (ref 0–0.5)
IMM GRANULOCYTES NFR BLD AUTO: 3 % (ref 0–0.5)
IPAP/PIP, IPAPIP: 20
IRON SATN MFR SERPL: 21 % (ref 20–50)
IRON SATN MFR SERPL: 26 % (ref 20–50)
IRON SERPL-MCNC: 42 UG/DL (ref 35–150)
IRON SERPL-MCNC: 43 UG/DL (ref 35–150)
KETONES UR QL STRIP.AUTO: NEGATIVE MG/DL
LACTATE SERPL-SCNC: 1.7 MMOL/L (ref 0.4–2)
LDLC SERPL CALC-MCNC: ABNORMAL MG/DL (ref 0–100)
LDLC SERPL DIRECT ASSAY-MCNC: 60 MG/DL (ref 0–100)
LDLC SERPL DIRECT ASSAY-MCNC: 68 MG/DL (ref 0–100)
LDLC SERPL DIRECT ASSAY-MCNC: 94 MG/DL (ref 0–100)
LEUKOCYTE ESTERASE UR QL STRIP.AUTO: NEGATIVE
LIPID PROFILE,FLP: ABNORMAL
LYMPHOCYTES # BLD: 0.3 K/UL (ref 0.8–3.5)
LYMPHOCYTES # BLD: 0.4 K/UL (ref 0.8–3.5)
LYMPHOCYTES # BLD: 0.5 K/UL (ref 0.8–3.5)
LYMPHOCYTES # BLD: 0.6 K/UL (ref 0.8–3.5)
LYMPHOCYTES # BLD: 0.7 K/UL (ref 0.8–3.5)
LYMPHOCYTES # BLD: 0.9 K/UL (ref 0.8–3.5)
LYMPHOCYTES # BLD: 0.9 K/UL (ref 0.8–3.5)
LYMPHOCYTES # BLD: 1 K/UL (ref 0.8–3.5)
LYMPHOCYTES # BLD: 1.5 K/UL (ref 0.8–3.5)
LYMPHOCYTES NFR BLD: 11 % (ref 12–49)
LYMPHOCYTES NFR BLD: 11 % (ref 12–49)
LYMPHOCYTES NFR BLD: 12 % (ref 12–49)
LYMPHOCYTES NFR BLD: 13 % (ref 12–49)
LYMPHOCYTES NFR BLD: 13 % (ref 12–49)
LYMPHOCYTES NFR BLD: 14 % (ref 12–49)
LYMPHOCYTES NFR BLD: 3 % (ref 12–49)
LYMPHOCYTES NFR BLD: 4 % (ref 12–49)
LYMPHOCYTES NFR BLD: 5 % (ref 12–49)
LYMPHOCYTES NFR BLD: 6 % (ref 12–49)
LYMPHOCYTES NFR BLD: 7 % (ref 12–49)
LYMPHOCYTES NFR BLD: 8 % (ref 12–49)
LYMPHOCYTES NFR BLD: 9 % (ref 12–49)
MAGNESIUM SERPL-MCNC: 1.4 MG/DL (ref 1.6–2.4)
MAGNESIUM SERPL-MCNC: 1.7 MG/DL (ref 1.6–2.4)
MAGNESIUM SERPL-MCNC: 1.8 MG/DL (ref 1.6–2.4)
MAGNESIUM SERPL-MCNC: 1.9 MG/DL (ref 1.6–2.4)
MAGNESIUM SERPL-MCNC: 1.9 MG/DL (ref 1.6–2.4)
MAGNESIUM SERPL-MCNC: 2 MG/DL (ref 1.6–2.4)
MAGNESIUM SERPL-MCNC: 2.1 MG/DL (ref 1.6–2.4)
MAGNESIUM SERPL-MCNC: 2.2 MG/DL (ref 1.6–2.4)
MAGNESIUM SERPL-MCNC: 2.3 MG/DL (ref 1.6–2.4)
MAGNESIUM SERPL-MCNC: 2.4 MG/DL (ref 1.6–2.4)
MAGNESIUM SERPL-MCNC: 2.5 MG/DL (ref 1.6–2.4)
MCH RBC QN AUTO: 27 PG (ref 26–34)
MCH RBC QN AUTO: 27 PG (ref 26–34)
MCH RBC QN AUTO: 27.1 PG (ref 26–34)
MCH RBC QN AUTO: 27.2 PG (ref 26–34)
MCH RBC QN AUTO: 27.3 PG (ref 26–34)
MCH RBC QN AUTO: 27.4 PG (ref 26–34)
MCH RBC QN AUTO: 27.5 PG (ref 26–34)
MCH RBC QN AUTO: 27.5 PG (ref 26–34)
MCH RBC QN AUTO: 27.6 PG (ref 26–34)
MCH RBC QN AUTO: 27.7 PG (ref 26–34)
MCH RBC QN AUTO: 27.8 PG (ref 26–34)
MCH RBC QN AUTO: 27.8 PG (ref 26–34)
MCH RBC QN AUTO: 27.9 PG (ref 26–34)
MCH RBC QN AUTO: 28 PG (ref 26–34)
MCH RBC QN AUTO: 28 PG (ref 26–34)
MCH RBC QN AUTO: 28.1 PG (ref 26–34)
MCH RBC QN AUTO: 28.1 PG (ref 26–34)
MCH RBC QN AUTO: 28.2 PG (ref 26–34)
MCH RBC QN AUTO: 28.3 PG (ref 26–34)
MCH RBC QN AUTO: 28.4 PG (ref 26–34)
MCH RBC QN AUTO: 28.5 PG (ref 26–34)
MCH RBC QN AUTO: 28.5 PG (ref 26–34)
MCH RBC QN AUTO: 28.6 PG (ref 26–34)
MCH RBC QN AUTO: 28.6 PG (ref 26–34)
MCH RBC QN AUTO: 28.7 PG (ref 26–34)
MCH RBC QN AUTO: 29 PG (ref 26–34)
MCH RBC QN AUTO: 29 PG (ref 26–34)
MCH RBC QN AUTO: 29.3 PG (ref 26–34)
MCH RBC QN AUTO: 29.5 PG (ref 26–34)
MCHC RBC AUTO-ENTMCNC: 29.3 G/DL (ref 30–36.5)
MCHC RBC AUTO-ENTMCNC: 29.5 G/DL (ref 30–36.5)
MCHC RBC AUTO-ENTMCNC: 29.8 G/DL (ref 30–36.5)
MCHC RBC AUTO-ENTMCNC: 29.9 G/DL (ref 30–36.5)
MCHC RBC AUTO-ENTMCNC: 29.9 G/DL (ref 30–36.5)
MCHC RBC AUTO-ENTMCNC: 30 G/DL (ref 30–36.5)
MCHC RBC AUTO-ENTMCNC: 30.1 G/DL (ref 30–36.5)
MCHC RBC AUTO-ENTMCNC: 30.2 G/DL (ref 30–36.5)
MCHC RBC AUTO-ENTMCNC: 30.2 G/DL (ref 30–36.5)
MCHC RBC AUTO-ENTMCNC: 30.3 G/DL (ref 30–36.5)
MCHC RBC AUTO-ENTMCNC: 30.4 G/DL (ref 30–36.5)
MCHC RBC AUTO-ENTMCNC: 30.5 G/DL (ref 30–36.5)
MCHC RBC AUTO-ENTMCNC: 30.5 G/DL (ref 30–36.5)
MCHC RBC AUTO-ENTMCNC: 30.6 G/DL (ref 30–36.5)
MCHC RBC AUTO-ENTMCNC: 30.7 G/DL (ref 30–36.5)
MCHC RBC AUTO-ENTMCNC: 30.8 G/DL (ref 30–36.5)
MCHC RBC AUTO-ENTMCNC: 30.9 G/DL (ref 30–36.5)
MCHC RBC AUTO-ENTMCNC: 31 G/DL (ref 30–36.5)
MCHC RBC AUTO-ENTMCNC: 31.1 G/DL (ref 30–36.5)
MCHC RBC AUTO-ENTMCNC: 31.2 G/DL (ref 30–36.5)
MCHC RBC AUTO-ENTMCNC: 31.3 G/DL (ref 30–36.5)
MCHC RBC AUTO-ENTMCNC: 31.3 G/DL (ref 30–36.5)
MCHC RBC AUTO-ENTMCNC: 31.4 G/DL (ref 30–36.5)
MCHC RBC AUTO-ENTMCNC: 31.5 G/DL (ref 30–36.5)
MCHC RBC AUTO-ENTMCNC: 31.5 G/DL (ref 30–36.5)
MCHC RBC AUTO-ENTMCNC: 31.7 G/DL (ref 30–36.5)
MCHC RBC AUTO-ENTMCNC: 31.7 G/DL (ref 30–36.5)
MCHC RBC AUTO-ENTMCNC: 31.8 G/DL (ref 30–36.5)
MCHC RBC AUTO-ENTMCNC: 31.9 G/DL (ref 30–36.5)
MCHC RBC AUTO-ENTMCNC: 32 G/DL (ref 30–36.5)
MCHC RBC AUTO-ENTMCNC: 32.1 G/DL (ref 30–36.5)
MCHC RBC AUTO-ENTMCNC: 32.2 G/DL (ref 30–36.5)
MCHC RBC AUTO-ENTMCNC: 32.5 G/DL (ref 30–36.5)
MCHC RBC AUTO-ENTMCNC: 32.7 G/DL (ref 30–36.5)
MCHC RBC AUTO-ENTMCNC: 32.7 G/DL (ref 30–36.5)
MCV RBC AUTO: 82.6 FL (ref 80–99)
MCV RBC AUTO: 82.7 FL (ref 80–99)
MCV RBC AUTO: 83.9 FL (ref 80–99)
MCV RBC AUTO: 86 FL (ref 80–99)
MCV RBC AUTO: 86.1 FL (ref 80–99)
MCV RBC AUTO: 86.6 FL (ref 80–99)
MCV RBC AUTO: 87.9 FL (ref 80–99)
MCV RBC AUTO: 88.6 FL (ref 80–99)
MCV RBC AUTO: 88.7 FL (ref 80–99)
MCV RBC AUTO: 88.8 FL (ref 80–99)
MCV RBC AUTO: 89 FL (ref 80–99)
MCV RBC AUTO: 89.1 FL (ref 80–99)
MCV RBC AUTO: 89.4 FL (ref 80–99)
MCV RBC AUTO: 89.4 FL (ref 80–99)
MCV RBC AUTO: 89.5 FL (ref 80–99)
MCV RBC AUTO: 89.6 FL (ref 80–99)
MCV RBC AUTO: 90 FL (ref 80–99)
MCV RBC AUTO: 90 FL (ref 80–99)
MCV RBC AUTO: 90.1 FL (ref 80–99)
MCV RBC AUTO: 90.1 FL (ref 80–99)
MCV RBC AUTO: 90.4 FL (ref 80–99)
MCV RBC AUTO: 90.5 FL (ref 80–99)
MCV RBC AUTO: 90.5 FL (ref 80–99)
MCV RBC AUTO: 90.8 FL (ref 80–99)
MCV RBC AUTO: 90.9 FL (ref 80–99)
MCV RBC AUTO: 91 FL (ref 80–99)
MCV RBC AUTO: 91.1 FL (ref 80–99)
MCV RBC AUTO: 91.2 FL (ref 80–99)
MCV RBC AUTO: 91.2 FL (ref 80–99)
MCV RBC AUTO: 91.4 FL (ref 80–99)
MCV RBC AUTO: 91.6 FL (ref 80–99)
MCV RBC AUTO: 91.7 FL (ref 80–99)
MCV RBC AUTO: 91.8 FL (ref 80–99)
MCV RBC AUTO: 92 FL (ref 80–99)
MCV RBC AUTO: 92.2 FL (ref 80–99)
MCV RBC AUTO: 92.4 FL (ref 80–99)
MCV RBC AUTO: 92.5 FL (ref 80–99)
MCV RBC AUTO: 92.5 FL (ref 80–99)
MCV RBC AUTO: 92.6 FL (ref 80–99)
MCV RBC AUTO: 92.7 FL (ref 80–99)
MCV RBC AUTO: 92.9 FL (ref 80–99)
MCV RBC AUTO: 92.9 FL (ref 80–99)
MCV RBC AUTO: 93.1 FL (ref 80–99)
MCV RBC AUTO: 93.4 FL (ref 80–99)
MCV RBC AUTO: 93.9 FL (ref 80–99)
METAMYELOCYTES NFR BLD MANUAL: 1 %
METAMYELOCYTES NFR BLD MANUAL: 2 %
METAMYELOCYTES NFR BLD MANUAL: 2 %
METHGB MFR BLD: 0.4 % (ref 0–1.4)
METHGB MFR BLD: 0.5 % (ref 0–1.4)
METHGB MFR BLD: 0.5 % (ref 0–1.4)
METHGB MFR BLD: 0.6 % (ref 0–1.4)
MONOCYTES # BLD: 0.1 K/UL (ref 0–1)
MONOCYTES # BLD: 0.2 K/UL (ref 0–1)
MONOCYTES # BLD: 0.2 K/UL (ref 0–1)
MONOCYTES # BLD: 0.3 K/UL (ref 0–1)
MONOCYTES # BLD: 0.4 K/UL (ref 0–1)
MONOCYTES # BLD: 0.5 K/UL (ref 0–1)
MONOCYTES # BLD: 0.6 K/UL (ref 0–1)
MONOCYTES # BLD: 0.7 K/UL (ref 0–1)
MONOCYTES # BLD: 0.8 K/UL (ref 0–1)
MONOCYTES # BLD: 1 K/UL (ref 0–1)
MONOCYTES NFR BLD: 1 % (ref 5–13)
MONOCYTES NFR BLD: 4 % (ref 5–13)
MONOCYTES NFR BLD: 5 % (ref 5–13)
MONOCYTES NFR BLD: 6 % (ref 5–13)
MONOCYTES NFR BLD: 7 % (ref 5–13)
MONOCYTES NFR BLD: 8 % (ref 5–13)
MONOCYTES NFR BLD: 9 % (ref 5–13)
MUCOUS THREADS URNS QL MICRO: ABNORMAL /LPF
MYELOCYTES NFR BLD MANUAL: 1 %
MYELOCYTES NFR BLD MANUAL: 1 %
NEUTS BAND NFR BLD MANUAL: 1 % (ref 0–6)
NEUTS BAND NFR BLD MANUAL: 1 % (ref 0–6)
NEUTS BAND NFR BLD MANUAL: 2 % (ref 0–6)
NEUTS SEG # BLD: 10.5 K/UL (ref 1.8–8)
NEUTS SEG # BLD: 10.7 K/UL (ref 1.8–8)
NEUTS SEG # BLD: 10.8 K/UL (ref 1.8–8)
NEUTS SEG # BLD: 11.1 K/UL (ref 1.8–8)
NEUTS SEG # BLD: 12 K/UL (ref 1.8–8)
NEUTS SEG # BLD: 12.5 K/UL (ref 1.8–8)
NEUTS SEG # BLD: 22.6 K/UL (ref 1.8–8)
NEUTS SEG # BLD: 3 K/UL (ref 1.8–8)
NEUTS SEG # BLD: 3.3 K/UL (ref 1.8–8)
NEUTS SEG # BLD: 3.7 K/UL (ref 1.8–8)
NEUTS SEG # BLD: 3.9 K/UL (ref 1.8–8)
NEUTS SEG # BLD: 4.2 K/UL (ref 1.8–8)
NEUTS SEG # BLD: 4.3 K/UL (ref 1.8–8)
NEUTS SEG # BLD: 4.6 K/UL (ref 1.8–8)
NEUTS SEG # BLD: 4.8 K/UL (ref 1.8–8)
NEUTS SEG # BLD: 4.8 K/UL (ref 1.8–8)
NEUTS SEG # BLD: 4.9 K/UL (ref 1.8–8)
NEUTS SEG # BLD: 4.9 K/UL (ref 1.8–8)
NEUTS SEG # BLD: 5 K/UL (ref 1.8–8)
NEUTS SEG # BLD: 5.2 K/UL (ref 1.8–8)
NEUTS SEG # BLD: 5.6 K/UL (ref 1.8–8)
NEUTS SEG # BLD: 5.7 K/UL (ref 1.8–8)
NEUTS SEG # BLD: 6.6 K/UL (ref 1.8–8)
NEUTS SEG # BLD: 6.6 K/UL (ref 1.8–8)
NEUTS SEG # BLD: 6.7 K/UL (ref 1.8–8)
NEUTS SEG # BLD: 6.7 K/UL (ref 1.8–8)
NEUTS SEG # BLD: 7 K/UL (ref 1.8–8)
NEUTS SEG # BLD: 7.2 K/UL (ref 1.8–8)
NEUTS SEG # BLD: 7.2 K/UL (ref 1.8–8)
NEUTS SEG # BLD: 7.6 K/UL (ref 1.8–8)
NEUTS SEG # BLD: 8.3 K/UL (ref 1.8–8)
NEUTS SEG # BLD: 8.4 K/UL (ref 1.8–8)
NEUTS SEG NFR BLD: 76 % (ref 32–75)
NEUTS SEG NFR BLD: 78 % (ref 32–75)
NEUTS SEG NFR BLD: 79 % (ref 32–75)
NEUTS SEG NFR BLD: 80 % (ref 32–75)
NEUTS SEG NFR BLD: 81 % (ref 32–75)
NEUTS SEG NFR BLD: 82 % (ref 32–75)
NEUTS SEG NFR BLD: 83 % (ref 32–75)
NEUTS SEG NFR BLD: 83 % (ref 32–75)
NEUTS SEG NFR BLD: 84 % (ref 32–75)
NEUTS SEG NFR BLD: 84 % (ref 32–75)
NEUTS SEG NFR BLD: 85 % (ref 32–75)
NEUTS SEG NFR BLD: 86 % (ref 32–75)
NEUTS SEG NFR BLD: 87 % (ref 32–75)
NEUTS SEG NFR BLD: 90 % (ref 32–75)
NEUTS SEG NFR BLD: 95 % (ref 32–75)
NITRITE UR QL STRIP.AUTO: NEGATIVE
NRBC # BLD: 0 K/UL (ref 0–0.01)
NRBC # BLD: 0.02 K/UL (ref 0–0.01)
NRBC # BLD: 0.03 K/UL (ref 0–0.01)
NRBC # BLD: 0.04 K/UL (ref 0–0.01)
NRBC # BLD: 0.05 K/UL (ref 0–0.01)
NRBC # BLD: 0.06 K/UL (ref 0–0.01)
NRBC # BLD: 0.07 K/UL (ref 0–0.01)
NRBC # BLD: 0.07 K/UL (ref 0–0.01)
NRBC # BLD: 0.09 K/UL (ref 0–0.01)
NRBC BLD-RTO: 0 PER 100 WBC
NRBC BLD-RTO: 0.2 PER 100 WBC
NRBC BLD-RTO: 0.3 PER 100 WBC
NRBC BLD-RTO: 0.4 PER 100 WBC
NRBC BLD-RTO: 0.5 PER 100 WBC
NRBC BLD-RTO: 0.6 PER 100 WBC
NRBC BLD-RTO: 0.8 PER 100 WBC
NRBC BLD-RTO: 0.9 PER 100 WBC
NRBC BLD-RTO: 1.4 PER 100 WBC
NRBC BLD-RTO: 1.5 PER 100 WBC
O2/TOTAL GAS SETTING VFR VENT: 100 %
OTHER,OTHU: ABNORMAL
OXYHGB MFR BLD: 88.5 % (ref 94–97)
OXYHGB MFR BLD: 88.5 % (ref 94–97)
OXYHGB MFR BLD: 98.6 % (ref 94–97)
OXYHGB MFR BLD: 98.8 % (ref 94–97)
P-R INTERVAL, ECG05: 130 MS
PCO2 BLD: 47.8 MMHG (ref 35–45)
PCO2 BLD: 65 MMHG (ref 35–45)
PCO2 BLDA: 34 MMHG (ref 35–45)
PCO2 BLDA: 35 MMHG (ref 35–45)
PCO2 BLDA: 36 MMHG (ref 35–45)
PCO2 BLDA: 37 MMHG (ref 35–45)
PCO2 BLDA: 38 MMHG (ref 35–45)
PCO2 BLDA: 39 MMHG (ref 35–45)
PCO2 BLDA: 41 MMHG (ref 35–45)
PCO2 BLDA: 43 MMHG (ref 35–45)
PCO2 BLDA: 43 MMHG (ref 35–45)
PCO2 BLDA: 44 MMHG (ref 35–45)
PCO2 BLDA: 44 MMHG (ref 35–45)
PCO2 BLDA: 45 MMHG (ref 35–45)
PCO2 BLDA: 45 MMHG (ref 35–45)
PCO2 BLDA: 46 MMHG (ref 35–45)
PCO2 BLDA: 47 MMHG (ref 35–45)
PCO2 BLDA: 48 MMHG (ref 35–45)
PCO2 BLDA: 49 MMHG (ref 35–45)
PCO2 BLDA: 54 MMHG (ref 35–45)
PCO2 BLDA: 54 MMHG (ref 35–45)
PCO2 BLDA: 57 MMHG (ref 35–45)
PCO2 BLDA: 61 MMHG (ref 35–45)
PCO2 BLDA: 71 MMHG (ref 35–45)
PEEP RESPIRATORY: 10 CM[H2O]
PEEP RESPIRATORY: 12 CM[H2O]
PEEP RESPIRATORY: 15 CM[H2O]
PEEP RESPIRATORY: 15 CM[H2O]
PEEP RESPIRATORY: 16 CM[H2O]
PEEP RESPIRATORY: 8 CM[H2O]
PH BLD: 7.23 [PH] (ref 7.35–7.45)
PH BLD: 7.35 [PH] (ref 7.35–7.45)
PH BLDA: 7.19 [PH] (ref 7.35–7.45)
PH BLDA: 7.22 [PH] (ref 7.35–7.45)
PH BLDA: 7.26 [PH] (ref 7.35–7.45)
PH BLDA: 7.29 [PH] (ref 7.35–7.45)
PH BLDA: 7.3 [PH] (ref 7.35–7.45)
PH BLDA: 7.31 [PH] (ref 7.35–7.45)
PH BLDA: 7.33 [PH] (ref 7.35–7.45)
PH BLDA: 7.34 [PH] (ref 7.35–7.45)
PH BLDA: 7.34 [PH] (ref 7.35–7.45)
PH BLDA: 7.36 [PH] (ref 7.35–7.45)
PH BLDA: 7.36 [PH] (ref 7.35–7.45)
PH BLDA: 7.37 [PH] (ref 7.35–7.45)
PH BLDA: 7.37 [PH] (ref 7.35–7.45)
PH BLDA: 7.38 [PH] (ref 7.35–7.45)
PH BLDA: 7.39 [PH] (ref 7.35–7.45)
PH BLDA: 7.39 [PH] (ref 7.35–7.45)
PH BLDA: 7.4 [PH] (ref 7.35–7.45)
PH BLDA: 7.4 [PH] (ref 7.35–7.45)
PH BLDA: 7.42 [PH] (ref 7.35–7.45)
PH BLDA: 7.42 [PH] (ref 7.35–7.45)
PH BLDA: 7.43 [PH] (ref 7.35–7.45)
PH BLDA: 7.43 [PH] (ref 7.35–7.45)
PH BLDA: 7.44 [PH] (ref 7.35–7.45)
PH BLDA: 7.44 [PH] (ref 7.35–7.45)
PH UR STRIP: 5.5 [PH] (ref 5–8)
PHOSPHATE SERPL-MCNC: 2.6 MG/DL (ref 2.6–4.7)
PHOSPHATE SERPL-MCNC: 2.7 MG/DL (ref 2.6–4.7)
PHOSPHATE SERPL-MCNC: 2.8 MG/DL (ref 2.6–4.7)
PHOSPHATE SERPL-MCNC: 3.2 MG/DL (ref 2.6–4.7)
PHOSPHATE SERPL-MCNC: 3.3 MG/DL (ref 2.6–4.7)
PHOSPHATE SERPL-MCNC: 3.3 MG/DL (ref 2.6–4.7)
PHOSPHATE SERPL-MCNC: 3.4 MG/DL (ref 2.6–4.7)
PHOSPHATE SERPL-MCNC: 3.4 MG/DL (ref 2.6–4.7)
PHOSPHATE SERPL-MCNC: 3.6 MG/DL (ref 2.6–4.7)
PHOSPHATE SERPL-MCNC: 3.6 MG/DL (ref 2.6–4.7)
PHOSPHATE SERPL-MCNC: 3.8 MG/DL (ref 2.6–4.7)
PHOSPHATE SERPL-MCNC: 3.8 MG/DL (ref 2.6–4.7)
PHOSPHATE SERPL-MCNC: 4.1 MG/DL (ref 2.6–4.7)
PHOSPHATE SERPL-MCNC: 4.2 MG/DL (ref 2.6–4.7)
PHOSPHATE SERPL-MCNC: 4.4 MG/DL (ref 2.6–4.7)
PHOSPHATE SERPL-MCNC: 4.5 MG/DL (ref 2.6–4.7)
PHOSPHATE SERPL-MCNC: 4.6 MG/DL (ref 2.6–4.7)
PHOSPHATE SERPL-MCNC: 5 MG/DL (ref 2.6–4.7)
PHOSPHATE SERPL-MCNC: 5 MG/DL (ref 2.6–4.7)
PHOSPHATE SERPL-MCNC: 5.4 MG/DL (ref 2.6–4.7)
PHOSPHATE SERPL-MCNC: 5.6 MG/DL (ref 2.6–4.7)
PHOSPHATE SERPL-MCNC: 5.8 MG/DL (ref 2.6–4.7)
PHOSPHATE SERPL-MCNC: 6 MG/DL (ref 2.6–4.7)
PHOSPHATE SERPL-MCNC: 6.1 MG/DL (ref 2.6–4.7)
PHOSPHATE SERPL-MCNC: 6.5 MG/DL (ref 2.6–4.7)
PHOSPHATE SERPL-MCNC: 6.5 MG/DL (ref 2.6–4.7)
PHOSPHATE SERPL-MCNC: 6.6 MG/DL (ref 2.6–4.7)
PLATELET # BLD AUTO: 190 K/UL (ref 150–400)
PLATELET # BLD AUTO: 215 K/UL (ref 150–400)
PLATELET # BLD AUTO: 216 K/UL (ref 150–400)
PLATELET # BLD AUTO: 217 K/UL (ref 150–400)
PLATELET # BLD AUTO: 219 K/UL (ref 150–400)
PLATELET # BLD AUTO: 221 K/UL (ref 150–400)
PLATELET # BLD AUTO: 222 K/UL (ref 150–400)
PLATELET # BLD AUTO: 224 K/UL (ref 150–400)
PLATELET # BLD AUTO: 224 K/UL (ref 150–400)
PLATELET # BLD AUTO: 225 K/UL (ref 150–400)
PLATELET # BLD AUTO: 227 K/UL (ref 150–400)
PLATELET # BLD AUTO: 231 K/UL (ref 150–400)
PLATELET # BLD AUTO: 231 K/UL (ref 150–400)
PLATELET # BLD AUTO: 233 K/UL (ref 150–400)
PLATELET # BLD AUTO: 233 K/UL (ref 150–400)
PLATELET # BLD AUTO: 236 K/UL (ref 150–400)
PLATELET # BLD AUTO: 236 K/UL (ref 150–400)
PLATELET # BLD AUTO: 238 K/UL (ref 150–400)
PLATELET # BLD AUTO: 239 K/UL (ref 150–400)
PLATELET # BLD AUTO: 247 K/UL (ref 150–400)
PLATELET # BLD AUTO: 248 K/UL (ref 150–400)
PLATELET # BLD AUTO: 249 K/UL (ref 150–400)
PLATELET # BLD AUTO: 251 K/UL (ref 150–400)
PLATELET # BLD AUTO: 253 K/UL (ref 150–400)
PLATELET # BLD AUTO: 256 K/UL (ref 150–400)
PLATELET # BLD AUTO: 264 K/UL (ref 150–400)
PLATELET # BLD AUTO: 265 K/UL (ref 150–400)
PLATELET # BLD AUTO: 274 K/UL (ref 150–400)
PLATELET # BLD AUTO: 278 K/UL (ref 150–400)
PLATELET # BLD AUTO: 279 K/UL (ref 150–400)
PLATELET # BLD AUTO: 284 K/UL (ref 150–400)
PLATELET # BLD AUTO: 290 K/UL (ref 150–400)
PLATELET # BLD AUTO: 303 K/UL (ref 150–400)
PLATELET # BLD AUTO: 314 K/UL (ref 150–400)
PLATELET # BLD AUTO: 316 K/UL (ref 150–400)
PLATELET # BLD AUTO: 318 K/UL (ref 150–400)
PLATELET # BLD AUTO: 319 K/UL (ref 150–400)
PLATELET # BLD AUTO: 323 K/UL (ref 150–400)
PLATELET # BLD AUTO: 329 K/UL (ref 150–400)
PLATELET # BLD AUTO: 335 K/UL (ref 150–400)
PLATELET # BLD AUTO: 352 K/UL (ref 150–400)
PLATELET # BLD AUTO: 366 K/UL (ref 150–400)
PLATELET # BLD AUTO: 390 K/UL (ref 150–400)
PLATELET # BLD AUTO: 396 K/UL (ref 150–400)
PLATELET # BLD AUTO: 414 K/UL (ref 150–400)
PLATELET # BLD AUTO: 436 K/UL (ref 150–400)
PLATELET # BLD AUTO: 438 K/UL (ref 150–400)
PLATELET # BLD AUTO: 486 K/UL (ref 150–400)
PLATELET # BLD AUTO: 502 K/UL (ref 150–400)
PLATELET COMMENTS,PCOM: ABNORMAL
PMV BLD AUTO: 10 FL (ref 8.9–12.9)
PMV BLD AUTO: 8.8 FL (ref 8.9–12.9)
PMV BLD AUTO: 9 FL (ref 8.9–12.9)
PMV BLD AUTO: 9.1 FL (ref 8.9–12.9)
PMV BLD AUTO: 9.2 FL (ref 8.9–12.9)
PMV BLD AUTO: 9.3 FL (ref 8.9–12.9)
PMV BLD AUTO: 9.4 FL (ref 8.9–12.9)
PMV BLD AUTO: 9.5 FL (ref 8.9–12.9)
PMV BLD AUTO: 9.6 FL (ref 8.9–12.9)
PMV BLD AUTO: 9.7 FL (ref 8.9–12.9)
PMV BLD AUTO: 9.7 FL (ref 8.9–12.9)
PMV BLD AUTO: 9.8 FL (ref 8.9–12.9)
PMV BLD AUTO: 9.8 FL (ref 8.9–12.9)
PMV BLD AUTO: 9.9 FL (ref 8.9–12.9)
PO2 BLD: 80 MMHG (ref 80–100)
PO2 BLD: 84 MMHG (ref 80–100)
PO2 BLDA: 102 MMHG (ref 80–100)
PO2 BLDA: 110 MMHG (ref 80–100)
PO2 BLDA: 115 MMHG (ref 80–100)
PO2 BLDA: 142 MMHG (ref 80–100)
PO2 BLDA: 162 MMHG (ref 80–100)
PO2 BLDA: 200 MMHG (ref 80–100)
PO2 BLDA: 214 MMHG (ref 80–100)
PO2 BLDA: 261 MMHG (ref 80–100)
PO2 BLDA: 454 MMHG (ref 80–100)
PO2 BLDA: 52 MMHG (ref 80–100)
PO2 BLDA: 57 MMHG (ref 80–100)
PO2 BLDA: 58 MMHG (ref 80–100)
PO2 BLDA: 64 MMHG (ref 80–100)
PO2 BLDA: 65 MMHG (ref 80–100)
PO2 BLDA: 65 MMHG (ref 80–100)
PO2 BLDA: 66 MMHG (ref 80–100)
PO2 BLDA: 66 MMHG (ref 80–100)
PO2 BLDA: 70 MMHG (ref 80–100)
PO2 BLDA: 72 MMHG (ref 80–100)
PO2 BLDA: 72 MMHG (ref 80–100)
PO2 BLDA: 75 MMHG (ref 80–100)
PO2 BLDA: 75 MMHG (ref 80–100)
PO2 BLDA: 78 MMHG (ref 80–100)
PO2 BLDA: 79 MMHG (ref 80–100)
PO2 BLDA: 83 MMHG (ref 80–100)
PO2 BLDA: 94 MMHG (ref 80–100)
PO2 BLDA: 97 MMHG (ref 80–100)
POTASSIUM SERPL-SCNC: 2.9 MMOL/L (ref 3.5–5.1)
POTASSIUM SERPL-SCNC: 3 MMOL/L (ref 3.5–5.1)
POTASSIUM SERPL-SCNC: 3.1 MMOL/L (ref 3.5–5.1)
POTASSIUM SERPL-SCNC: 3.2 MMOL/L (ref 3.5–5.1)
POTASSIUM SERPL-SCNC: 3.3 MMOL/L (ref 3.5–5.1)
POTASSIUM SERPL-SCNC: 3.3 MMOL/L (ref 3.5–5.1)
POTASSIUM SERPL-SCNC: 3.4 MMOL/L (ref 3.5–5.1)
POTASSIUM SERPL-SCNC: 3.5 MMOL/L (ref 3.5–5.1)
POTASSIUM SERPL-SCNC: 3.6 MMOL/L (ref 3.5–5.1)
POTASSIUM SERPL-SCNC: 3.7 MMOL/L (ref 3.5–5.1)
POTASSIUM SERPL-SCNC: 3.8 MMOL/L (ref 3.5–5.1)
POTASSIUM SERPL-SCNC: 3.9 MMOL/L (ref 3.5–5.1)
POTASSIUM SERPL-SCNC: 3.9 MMOL/L (ref 3.5–5.1)
POTASSIUM SERPL-SCNC: 4 MMOL/L (ref 3.5–5.1)
POTASSIUM SERPL-SCNC: 4.1 MMOL/L (ref 3.5–5.1)
POTASSIUM SERPL-SCNC: 4.1 MMOL/L (ref 3.5–5.1)
POTASSIUM SERPL-SCNC: 4.2 MMOL/L (ref 3.5–5.1)
POTASSIUM SERPL-SCNC: 4.3 MMOL/L (ref 3.5–5.1)
POTASSIUM SERPL-SCNC: 4.4 MMOL/L (ref 3.5–5.1)
POTASSIUM SERPL-SCNC: 4.4 MMOL/L (ref 3.5–5.1)
POTASSIUM SERPL-SCNC: 4.6 MMOL/L (ref 3.5–5.1)
POTASSIUM SERPL-SCNC: 4.7 MMOL/L (ref 3.5–5.1)
POTASSIUM SERPL-SCNC: 4.8 MMOL/L (ref 3.5–5.1)
POTASSIUM SERPL-SCNC: 5 MMOL/L (ref 3.5–5.1)
POTASSIUM SERPL-SCNC: 5.1 MMOL/L (ref 3.5–5.1)
POTASSIUM SERPL-SCNC: 5.2 MMOL/L (ref 3.5–5.1)
POTASSIUM SERPL-SCNC: 5.2 MMOL/L (ref 3.5–5.1)
POTASSIUM SERPL-SCNC: 5.4 MMOL/L (ref 3.5–5.1)
POTASSIUM SERPL-SCNC: 5.4 MMOL/L (ref 3.5–5.1)
POTASSIUM SERPL-SCNC: 5.7 MMOL/L (ref 3.5–5.1)
POTASSIUM SERPL-SCNC: 5.8 MMOL/L (ref 3.5–5.1)
PROCALCITONIN SERPL-MCNC: 0.31 NG/ML
PROCALCITONIN SERPL-MCNC: 0.42 NG/ML
PROCALCITONIN SERPL-MCNC: 1.03 NG/ML
PROT SERPL-MCNC: 6.4 G/DL (ref 6.4–8.2)
PROT SERPL-MCNC: 6.5 G/DL (ref 6.4–8.2)
PROT SERPL-MCNC: 6.6 G/DL (ref 6.4–8.2)
PROT SERPL-MCNC: 6.9 G/DL (ref 6.4–8.2)
PROT SERPL-MCNC: 7 G/DL (ref 6.4–8.2)
PROT SERPL-MCNC: 7.2 G/DL (ref 6.4–8.2)
PROT SERPL-MCNC: 7.2 G/DL (ref 6.4–8.2)
PROT SERPL-MCNC: 7.3 G/DL (ref 6.4–8.2)
PROT SERPL-MCNC: 7.3 G/DL (ref 6.4–8.2)
PROT SERPL-MCNC: 7.4 G/DL (ref 6.4–8.2)
PROT SERPL-MCNC: 7.5 G/DL (ref 6.4–8.2)
PROT SERPL-MCNC: 7.8 G/DL (ref 6.4–8.2)
PROT SERPL-MCNC: 8.1 G/DL (ref 6.4–8.2)
PROT SERPL-MCNC: 8.1 G/DL (ref 6.4–8.2)
PROT UR STRIP-MCNC: 300 MG/DL
PROT UR-MCNC: 247 MG/DL (ref 0–11.9)
PROT/CREAT UR-RTO: 5.4
Q-T INTERVAL, ECG07: 336 MS
QRS DURATION, ECG06: 94 MS
QTC CALCULATION (BEZET), ECG08: 468 MS
RBC # BLD AUTO: 2.12 M/UL (ref 4.1–5.7)
RBC # BLD AUTO: 2.16 M/UL (ref 4.1–5.7)
RBC # BLD AUTO: 2.28 M/UL (ref 4.1–5.7)
RBC # BLD AUTO: 2.29 M/UL (ref 4.1–5.7)
RBC # BLD AUTO: 2.37 M/UL (ref 4.1–5.7)
RBC # BLD AUTO: 2.37 M/UL (ref 4.1–5.7)
RBC # BLD AUTO: 2.39 M/UL (ref 4.1–5.7)
RBC # BLD AUTO: 2.41 M/UL (ref 4.1–5.7)
RBC # BLD AUTO: 2.44 M/UL (ref 4.1–5.7)
RBC # BLD AUTO: 2.44 M/UL (ref 4.1–5.7)
RBC # BLD AUTO: 2.45 M/UL (ref 4.1–5.7)
RBC # BLD AUTO: 2.49 M/UL (ref 4.1–5.7)
RBC # BLD AUTO: 2.51 M/UL (ref 4.1–5.7)
RBC # BLD AUTO: 2.53 M/UL (ref 4.1–5.7)
RBC # BLD AUTO: 2.54 M/UL (ref 4.1–5.7)
RBC # BLD AUTO: 2.54 M/UL (ref 4.1–5.7)
RBC # BLD AUTO: 2.56 M/UL (ref 4.1–5.7)
RBC # BLD AUTO: 2.57 M/UL (ref 4.1–5.7)
RBC # BLD AUTO: 2.58 M/UL (ref 4.1–5.7)
RBC # BLD AUTO: 2.59 M/UL (ref 4.1–5.7)
RBC # BLD AUTO: 2.62 M/UL (ref 4.1–5.7)
RBC # BLD AUTO: 2.62 M/UL (ref 4.1–5.7)
RBC # BLD AUTO: 2.63 M/UL (ref 4.1–5.7)
RBC # BLD AUTO: 2.64 M/UL (ref 4.1–5.7)
RBC # BLD AUTO: 2.64 M/UL (ref 4.1–5.7)
RBC # BLD AUTO: 2.65 M/UL (ref 4.1–5.7)
RBC # BLD AUTO: 2.65 M/UL (ref 4.1–5.7)
RBC # BLD AUTO: 2.66 M/UL (ref 4.1–5.7)
RBC # BLD AUTO: 2.66 M/UL (ref 4.1–5.7)
RBC # BLD AUTO: 2.69 M/UL (ref 4.1–5.7)
RBC # BLD AUTO: 2.7 M/UL (ref 4.1–5.7)
RBC # BLD AUTO: 2.72 M/UL (ref 4.1–5.7)
RBC # BLD AUTO: 2.76 M/UL (ref 4.1–5.7)
RBC # BLD AUTO: 2.76 M/UL (ref 4.1–5.7)
RBC # BLD AUTO: 2.79 M/UL (ref 4.1–5.7)
RBC # BLD AUTO: 2.85 M/UL (ref 4.1–5.7)
RBC # BLD AUTO: 2.86 M/UL (ref 4.1–5.7)
RBC # BLD AUTO: 2.9 M/UL (ref 4.1–5.7)
RBC # BLD AUTO: 2.94 M/UL (ref 4.1–5.7)
RBC # BLD AUTO: 2.95 M/UL (ref 4.1–5.7)
RBC # BLD AUTO: 3.08 M/UL (ref 4.1–5.7)
RBC # BLD AUTO: 3.1 M/UL (ref 4.1–5.7)
RBC # BLD AUTO: 3.18 M/UL (ref 4.1–5.7)
RBC #/AREA URNS HPF: ABNORMAL /HPF (ref 0–5)
RBC MORPH BLD: ABNORMAL
SAMPLES BEING HELD,HOLD: NORMAL
SAMPLES BEING HELD,HOLD: NORMAL
SAO2 % BLD: 100 % (ref 92–97)
SAO2 % BLD: 100 % (ref 92–97)
SAO2 % BLD: 100 % (ref 95–99)
SAO2 % BLD: 100 % (ref 95–99)
SAO2 % BLD: 88 % (ref 92–97)
SAO2 % BLD: 89 % (ref 95–99)
SAO2 % BLD: 89 % (ref 95–99)
SAO2 % BLD: 90 % (ref 92–97)
SAO2 % BLD: 91 % (ref 92–97)
SAO2 % BLD: 92 % (ref 92–97)
SAO2 % BLD: 93 % (ref 92–97)
SAO2 % BLD: 93 % (ref 92–97)
SAO2 % BLD: 94 % (ref 92–97)
SAO2 % BLD: 95 % (ref 92–97)
SAO2 % BLD: 96 % (ref 92–97)
SAO2 % BLD: 96 % (ref 92–97)
SAO2 % BLD: 98 % (ref 92–97)
SAO2 % BLD: 99 % (ref 92–97)
SAO2% DEVICE SAO2% SENSOR NAME: ABNORMAL
SARS-COV-2, COV2: NORMAL
SARS-COV-2, COV2: NORMAL
SARS-COV-2, XPLCVT: DETECTED
SARS-COV-2, XPLCVT: DETECTED
SERVICE CMNT-IMP: ABNORMAL
SERVICE CMNT-IMP: NORMAL
SODIUM SERPL-SCNC: 124 MMOL/L (ref 136–145)
SODIUM SERPL-SCNC: 131 MMOL/L (ref 136–145)
SODIUM SERPL-SCNC: 132 MMOL/L (ref 136–145)
SODIUM SERPL-SCNC: 133 MMOL/L (ref 136–145)
SODIUM SERPL-SCNC: 134 MMOL/L (ref 136–145)
SODIUM SERPL-SCNC: 135 MMOL/L (ref 136–145)
SODIUM SERPL-SCNC: 135 MMOL/L (ref 136–145)
SODIUM SERPL-SCNC: 136 MMOL/L (ref 136–145)
SODIUM SERPL-SCNC: 137 MMOL/L (ref 136–145)
SODIUM SERPL-SCNC: 138 MMOL/L (ref 136–145)
SODIUM SERPL-SCNC: 139 MMOL/L (ref 136–145)
SODIUM SERPL-SCNC: 140 MMOL/L (ref 136–145)
SODIUM SERPL-SCNC: 141 MMOL/L (ref 136–145)
SODIUM SERPL-SCNC: 141 MMOL/L (ref 136–145)
SODIUM SERPL-SCNC: 142 MMOL/L (ref 136–145)
SODIUM SERPL-SCNC: 143 MMOL/L (ref 136–145)
SOURCE, COVRS: ABNORMAL
SP GR UR REFRACTOMETRY: 1.01 (ref 1–1.03)
SPECIMEN EXP DATE BLD: NORMAL
SPECIMEN SITE: ABNORMAL
SPECIMEN SOURCE, FCOV2M: ABNORMAL
SPECIMEN TYPE, XMCV1T: ABNORMAL
SPECIMEN TYPE: ABNORMAL
SPECIMEN TYPE: ABNORMAL
STATUS OF UNIT,%ST: NORMAL
TIBC SERPL-MCNC: 167 UG/DL (ref 250–450)
TIBC SERPL-MCNC: 201 UG/DL (ref 250–450)
TOTAL RESP. RATE, ITRR: 16
TRIGL SERPL-MCNC: 1303 MG/DL (ref ?–150)
TRIGL SERPL-MCNC: 411 MG/DL (ref ?–150)
TRIGL SERPL-MCNC: 477 MG/DL (ref ?–150)
TRIGL SERPL-MCNC: 481 MG/DL (ref ?–150)
TRIGL SERPL-MCNC: 541 MG/DL (ref ?–150)
TRIGL SERPL-MCNC: 555 MG/DL (ref ?–150)
TRIGL SERPL-MCNC: 560 MG/DL (ref ?–150)
TRIGL SERPL-MCNC: 564 MG/DL (ref ?–150)
TRIGL SERPL-MCNC: 576 MG/DL (ref ?–150)
TROPONIN I SERPL-MCNC: 0.05 NG/ML
UNIT DIVISION, %UDIV: 0
UR CULT HOLD, URHOLD: NORMAL
UROBILINOGEN UR QL STRIP.AUTO: 0.2 EU/DL (ref 0.2–1)
VENTILATION MODE VENT: ABNORMAL
VENTRICULAR RATE, ECG03: 117 BPM
VLDLC SERPL CALC-MCNC: ABNORMAL MG/DL
VT SETTING VENT: 500 ML
VT SETTING VENT: 550 ML
VT SETTING VENT: 580 ML
WBC # BLD AUTO: 10.1 K/UL (ref 4.1–11.1)
WBC # BLD AUTO: 12.3 K/UL (ref 4.1–11.1)
WBC # BLD AUTO: 12.4 K/UL (ref 4.1–11.1)
WBC # BLD AUTO: 12.4 K/UL (ref 4.1–11.1)
WBC # BLD AUTO: 12.5 K/UL (ref 4.1–11.1)
WBC # BLD AUTO: 13.1 K/UL (ref 4.1–11.1)
WBC # BLD AUTO: 13.1 K/UL (ref 4.1–11.1)
WBC # BLD AUTO: 13.6 K/UL (ref 4.1–11.1)
WBC # BLD AUTO: 14.2 K/UL (ref 4.1–11.1)
WBC # BLD AUTO: 14.3 K/UL (ref 4.1–11.1)
WBC # BLD AUTO: 16.3 K/UL (ref 4.1–11.1)
WBC # BLD AUTO: 2.9 K/UL (ref 4.1–11.1)
WBC # BLD AUTO: 25.7 K/UL (ref 4.1–11.1)
WBC # BLD AUTO: 3.3 K/UL (ref 4.1–11.1)
WBC # BLD AUTO: 3.8 K/UL (ref 4.1–11.1)
WBC # BLD AUTO: 4.1 K/UL (ref 4.1–11.1)
WBC # BLD AUTO: 4.3 K/UL (ref 4.1–11.1)
WBC # BLD AUTO: 4.3 K/UL (ref 4.1–11.1)
WBC # BLD AUTO: 4.5 K/UL (ref 4.1–11.1)
WBC # BLD AUTO: 4.6 K/UL (ref 4.1–11.1)
WBC # BLD AUTO: 4.9 K/UL (ref 4.1–11.1)
WBC # BLD AUTO: 5.1 K/UL (ref 4.1–11.1)
WBC # BLD AUTO: 5.1 K/UL (ref 4.1–11.1)
WBC # BLD AUTO: 5.2 K/UL (ref 4.1–11.1)
WBC # BLD AUTO: 5.4 K/UL (ref 4.1–11.1)
WBC # BLD AUTO: 5.5 K/UL (ref 4.1–11.1)
WBC # BLD AUTO: 5.6 K/UL (ref 4.1–11.1)
WBC # BLD AUTO: 5.6 K/UL (ref 4.1–11.1)
WBC # BLD AUTO: 5.7 K/UL (ref 4.1–11.1)
WBC # BLD AUTO: 6 K/UL (ref 4.1–11.1)
WBC # BLD AUTO: 6.1 K/UL (ref 4.1–11.1)
WBC # BLD AUTO: 6.2 K/UL (ref 4.1–11.1)
WBC # BLD AUTO: 6.3 K/UL (ref 4.1–11.1)
WBC # BLD AUTO: 6.4 K/UL (ref 4.1–11.1)
WBC # BLD AUTO: 6.6 K/UL (ref 4.1–11.1)
WBC # BLD AUTO: 6.6 K/UL (ref 4.1–11.1)
WBC # BLD AUTO: 7 K/UL (ref 4.1–11.1)
WBC # BLD AUTO: 7.5 K/UL (ref 4.1–11.1)
WBC # BLD AUTO: 7.7 K/UL (ref 4.1–11.1)
WBC # BLD AUTO: 7.8 K/UL (ref 4.1–11.1)
WBC # BLD AUTO: 7.9 K/UL (ref 4.1–11.1)
WBC # BLD AUTO: 8 K/UL (ref 4.1–11.1)
WBC # BLD AUTO: 8 K/UL (ref 4.1–11.1)
WBC # BLD AUTO: 8.3 K/UL (ref 4.1–11.1)
WBC # BLD AUTO: 8.6 K/UL (ref 4.1–11.1)
WBC # BLD AUTO: 8.7 K/UL (ref 4.1–11.1)
WBC # BLD AUTO: 8.8 K/UL (ref 4.1–11.1)
WBC # BLD AUTO: 8.9 K/UL (ref 4.1–11.1)
WBC # BLD AUTO: 9.6 K/UL (ref 4.1–11.1)
WBC MORPH BLD: ABNORMAL
WBC MORPH BLD: ABNORMAL
WBC URNS QL MICRO: ABNORMAL /HPF (ref 0–4)

## 2021-01-01 PROCEDURE — 74011636637 HC RX REV CODE- 636/637: Performed by: FAMILY MEDICINE

## 2021-01-01 PROCEDURE — 83540 ASSAY OF IRON: CPT

## 2021-01-01 PROCEDURE — 74011250637 HC RX REV CODE- 250/637: Performed by: INTERNAL MEDICINE

## 2021-01-01 PROCEDURE — 36415 COLL VENOUS BLD VENIPUNCTURE: CPT

## 2021-01-01 PROCEDURE — P9047 ALBUMIN (HUMAN), 25%, 50ML: HCPCS | Performed by: INTERNAL MEDICINE

## 2021-01-01 PROCEDURE — 82803 BLOOD GASES ANY COMBINATION: CPT

## 2021-01-01 PROCEDURE — 74011250636 HC RX REV CODE- 250/636: Performed by: INTERNAL MEDICINE

## 2021-01-01 PROCEDURE — 80076 HEPATIC FUNCTION PANEL: CPT

## 2021-01-01 PROCEDURE — 77030005402 HC CATH RAD ART LN KT TELE -B

## 2021-01-01 PROCEDURE — 74011250637 HC RX REV CODE- 250/637: Performed by: NURSE PRACTITIONER

## 2021-01-01 PROCEDURE — 74011000258 HC RX REV CODE- 258: Performed by: INTERNAL MEDICINE

## 2021-01-01 PROCEDURE — 65610000006 HC RM INTENSIVE CARE

## 2021-01-01 PROCEDURE — 74011250636 HC RX REV CODE- 250/636: Performed by: NURSE PRACTITIONER

## 2021-01-01 PROCEDURE — 85027 COMPLETE CBC AUTOMATED: CPT

## 2021-01-01 PROCEDURE — 85025 COMPLETE CBC W/AUTO DIFF WBC: CPT

## 2021-01-01 PROCEDURE — 94640 AIRWAY INHALATION TREATMENT: CPT

## 2021-01-01 PROCEDURE — 85379 FIBRIN DEGRADATION QUANT: CPT

## 2021-01-01 PROCEDURE — 90935 HEMODIALYSIS ONE EVALUATION: CPT

## 2021-01-01 PROCEDURE — 74011000250 HC RX REV CODE- 250: Performed by: INTERNAL MEDICINE

## 2021-01-01 PROCEDURE — 2709999900 HC NON-CHARGEABLE SUPPLY

## 2021-01-01 PROCEDURE — 80048 BASIC METABOLIC PNL TOTAL CA: CPT

## 2021-01-01 PROCEDURE — 74011636637 HC RX REV CODE- 636/637: Performed by: NURSE PRACTITIONER

## 2021-01-01 PROCEDURE — 74011250637 HC RX REV CODE- 250/637: Performed by: PODIATRIST

## 2021-01-01 PROCEDURE — 83735 ASSAY OF MAGNESIUM: CPT

## 2021-01-01 PROCEDURE — 74011636637 HC RX REV CODE- 636/637: Performed by: INTERNAL MEDICINE

## 2021-01-01 PROCEDURE — 94762 N-INVAS EAR/PLS OXIMTRY CONT: CPT

## 2021-01-01 PROCEDURE — 74011250637 HC RX REV CODE- 250/637: Performed by: FAMILY MEDICINE

## 2021-01-01 PROCEDURE — 82962 GLUCOSE BLOOD TEST: CPT

## 2021-01-01 PROCEDURE — 80053 COMPREHEN METABOLIC PANEL: CPT

## 2021-01-01 PROCEDURE — 36600 WITHDRAWAL OF ARTERIAL BLOOD: CPT

## 2021-01-01 PROCEDURE — 82375 ASSAY CARBOXYHB QUANT: CPT

## 2021-01-01 PROCEDURE — 94003 VENT MGMT INPAT SUBQ DAY: CPT

## 2021-01-01 PROCEDURE — 74011000250 HC RX REV CODE- 250: Performed by: NURSE PRACTITIONER

## 2021-01-01 PROCEDURE — 94660 CPAP INITIATION&MGMT: CPT

## 2021-01-01 PROCEDURE — 77030018798 HC PMP KT ENTRL FED COVD -A

## 2021-01-01 PROCEDURE — 71045 X-RAY EXAM CHEST 1 VIEW: CPT

## 2021-01-01 PROCEDURE — 74011636637 HC RX REV CODE- 636/637: Performed by: PODIATRIST

## 2021-01-01 PROCEDURE — 36591 DRAW BLOOD OFF VENOUS DEVICE: CPT

## 2021-01-01 PROCEDURE — U0005 INFEC AGEN DETEC AMPLI PROBE: HCPCS

## 2021-01-01 PROCEDURE — 94760 N-INVAS EAR/PLS OXIMETRY 1: CPT

## 2021-01-01 PROCEDURE — 84100 ASSAY OF PHOSPHORUS: CPT

## 2021-01-01 PROCEDURE — C9113 INJ PANTOPRAZOLE SODIUM, VIA: HCPCS | Performed by: NURSE PRACTITIONER

## 2021-01-01 PROCEDURE — 77030013797 HC KT TRNSDUC PRSSR EDWD -A

## 2021-01-01 PROCEDURE — 86923 COMPATIBILITY TEST ELECTRIC: CPT

## 2021-01-01 PROCEDURE — 74011000258 HC RX REV CODE- 258: Performed by: NURSE PRACTITIONER

## 2021-01-01 PROCEDURE — 97606 NEG PRS WND THER DME>50 SQCM: CPT

## 2021-01-01 PROCEDURE — 74011000250 HC RX REV CODE- 250: Performed by: ANESTHESIOLOGY

## 2021-01-01 PROCEDURE — 74011000250 HC RX REV CODE- 250: Performed by: PODIATRIST

## 2021-01-01 PROCEDURE — 80069 RENAL FUNCTION PANEL: CPT

## 2021-01-01 PROCEDURE — 0W3P8ZZ CONTROL BLEEDING IN GASTROINTESTINAL TRACT, VIA NATURAL OR ARTIFICIAL OPENING ENDOSCOPIC: ICD-10-PCS | Performed by: INTERNAL MEDICINE

## 2021-01-01 PROCEDURE — 74011000250 HC RX REV CODE- 250

## 2021-01-01 PROCEDURE — 74018 RADEX ABDOMEN 1 VIEW: CPT

## 2021-01-01 PROCEDURE — 74011250637 HC RX REV CODE- 250/637: Performed by: PHYSICIAN ASSISTANT

## 2021-01-01 PROCEDURE — 77030010936 HC CLP LIG BSC -C: Performed by: INTERNAL MEDICINE

## 2021-01-01 PROCEDURE — P9047 ALBUMIN (HUMAN), 25%, 50ML: HCPCS

## 2021-01-01 PROCEDURE — 36430 TRANSFUSION BLD/BLD COMPNT: CPT

## 2021-01-01 PROCEDURE — 84484 ASSAY OF TROPONIN QUANT: CPT

## 2021-01-01 PROCEDURE — P9016 RBC LEUKOCYTES REDUCED: HCPCS

## 2021-01-01 PROCEDURE — 97605 NEG PRS WND THER DME<=50SQCM: CPT

## 2021-01-01 PROCEDURE — 94761 N-INVAS EAR/PLS OXIMETRY MLT: CPT

## 2021-01-01 PROCEDURE — 84478 ASSAY OF TRIGLYCERIDES: CPT

## 2021-01-01 PROCEDURE — 97166 OT EVAL MOD COMPLEX 45 MIN: CPT

## 2021-01-01 PROCEDURE — 77010033711 HC HIGH FLOW OXYGEN

## 2021-01-01 PROCEDURE — 94664 DEMO&/EVAL PT USE INHALER: CPT

## 2021-01-01 PROCEDURE — 77030039491 HC WND FISTULA SYS MGMT COLO -C

## 2021-01-01 PROCEDURE — 84145 PROCALCITONIN (PCT): CPT

## 2021-01-01 PROCEDURE — 80061 LIPID PANEL: CPT

## 2021-01-01 PROCEDURE — 5A1D70Z PERFORMANCE OF URINARY FILTRATION, INTERMITTENT, LESS THAN 6 HOURS PER DAY: ICD-10-PCS | Performed by: INTERNAL MEDICINE

## 2021-01-01 PROCEDURE — 83880 ASSAY OF NATRIURETIC PEPTIDE: CPT

## 2021-01-01 PROCEDURE — 86140 C-REACTIVE PROTEIN: CPT

## 2021-01-01 PROCEDURE — 83721 ASSAY OF BLOOD LIPOPROTEIN: CPT

## 2021-01-01 PROCEDURE — 97530 THERAPEUTIC ACTIVITIES: CPT

## 2021-01-01 PROCEDURE — 77030018717 HC DRSG GRNUFM KCON -B

## 2021-01-01 PROCEDURE — 74011250636 HC RX REV CODE- 250/636

## 2021-01-01 PROCEDURE — 65270000029 HC RM PRIVATE

## 2021-01-01 PROCEDURE — 86901 BLOOD TYPING SEROLOGIC RH(D): CPT

## 2021-01-01 PROCEDURE — 5A1955Z RESPIRATORY VENTILATION, GREATER THAN 96 CONSECUTIVE HOURS: ICD-10-PCS | Performed by: INTERNAL MEDICINE

## 2021-01-01 PROCEDURE — 86706 HEP B SURFACE ANTIBODY: CPT

## 2021-01-01 PROCEDURE — 74011000250 HC RX REV CODE- 250: Performed by: EMERGENCY MEDICINE

## 2021-01-01 PROCEDURE — 73610000026 HC INO THERAPY EACH HOUR

## 2021-01-01 PROCEDURE — 77010033678 HC OXYGEN DAILY

## 2021-01-01 PROCEDURE — 87340 HEPATITIS B SURFACE AG IA: CPT

## 2021-01-01 PROCEDURE — 87040 BLOOD CULTURE FOR BACTERIA: CPT

## 2021-01-01 PROCEDURE — 85018 HEMOGLOBIN: CPT

## 2021-01-01 PROCEDURE — 77030005513 HC CATH URETH FOL11 MDII -B

## 2021-01-01 PROCEDURE — 74011250637 HC RX REV CODE- 250/637: Performed by: STUDENT IN AN ORGANIZED HEALTH CARE EDUCATION/TRAINING PROGRAM

## 2021-01-01 PROCEDURE — 87070 CULTURE OTHR SPECIMN AEROBIC: CPT

## 2021-01-01 PROCEDURE — 99232 SBSQ HOSP IP/OBS MODERATE 35: CPT | Performed by: INTERNAL MEDICINE

## 2021-01-01 PROCEDURE — 65660000001 HC RM ICU INTERMED STEPDOWN

## 2021-01-01 PROCEDURE — 97161 PT EVAL LOW COMPLEX 20 MIN: CPT

## 2021-01-01 PROCEDURE — 77030008771 HC TU NG SALEM SUMP -A

## 2021-01-01 PROCEDURE — 99222 1ST HOSP IP/OBS MODERATE 55: CPT | Performed by: PHYSICIAN ASSISTANT

## 2021-01-01 PROCEDURE — 2709999900 HC NON-CHARGEABLE SUPPLY: Performed by: INTERNAL MEDICINE

## 2021-01-01 PROCEDURE — 03HY32Z INSERTION OF MONITORING DEVICE INTO UPPER ARTERY, PERCUTANEOUS APPROACH: ICD-10-PCS | Performed by: INTERNAL MEDICINE

## 2021-01-01 PROCEDURE — 36592 COLLECT BLOOD FROM PICC: CPT

## 2021-01-01 PROCEDURE — 82550 ASSAY OF CK (CPK): CPT

## 2021-01-01 PROCEDURE — 94002 VENT MGMT INPAT INIT DAY: CPT

## 2021-01-01 PROCEDURE — 74011250636 HC RX REV CODE- 250/636: Performed by: PODIATRIST

## 2021-01-01 PROCEDURE — 77030018831 HC SOL IRR H20 BAXT -A

## 2021-01-01 PROCEDURE — 0DB98ZX EXCISION OF DUODENUM, VIA NATURAL OR ARTIFICIAL OPENING ENDOSCOPIC, DIAGNOSTIC: ICD-10-PCS | Performed by: INTERNAL MEDICINE

## 2021-01-01 PROCEDURE — 77030008793 HC TU TRACH CUF COVD -B

## 2021-01-01 PROCEDURE — 77030002996 HC SUT SLK J&J -A

## 2021-01-01 PROCEDURE — 93308 TTE F-UP OR LMTD: CPT | Performed by: INTERNAL MEDICINE

## 2021-01-01 PROCEDURE — 76040000019: Performed by: INTERNAL MEDICINE

## 2021-01-01 PROCEDURE — 88305 TISSUE EXAM BY PATHOLOGIST: CPT

## 2021-01-01 PROCEDURE — 74011000258 HC RX REV CODE- 258: Performed by: ANESTHESIOLOGY

## 2021-01-01 PROCEDURE — 99356 PR PROLONGED SVC I/P OR OBS SETTING 1ST HOUR: CPT | Performed by: INTERNAL MEDICINE

## 2021-01-01 PROCEDURE — 74011250636 HC RX REV CODE- 250/636: Performed by: STUDENT IN AN ORGANIZED HEALTH CARE EDUCATION/TRAINING PROGRAM

## 2021-01-01 PROCEDURE — 93005 ELECTROCARDIOGRAM TRACING: CPT

## 2021-01-01 PROCEDURE — 97164 PT RE-EVAL EST PLAN CARE: CPT

## 2021-01-01 PROCEDURE — 31500 INSERT EMERGENCY AIRWAY: CPT

## 2021-01-01 PROCEDURE — 74011000258 HC RX REV CODE- 258: Performed by: EMERGENCY MEDICINE

## 2021-01-01 PROCEDURE — 77030018865 HC TRNSDCR PRSS MON EDWD -A

## 2021-01-01 PROCEDURE — 77030037878 HC DRSG MEPILEX >48IN BORD MOLN -B

## 2021-01-01 PROCEDURE — 77030041076 HC DRSG AG OPTICELL MDII -A

## 2021-01-01 PROCEDURE — 02HV33Z INSERTION OF INFUSION DEVICE INTO SUPERIOR VENA CAVA, PERCUTANEOUS APPROACH: ICD-10-PCS | Performed by: INTERNAL MEDICINE

## 2021-01-01 PROCEDURE — XW043E5 INTRODUCTION OF REMDESIVIR ANTI-INFECTIVE INTO CENTRAL VEIN, PERCUTANEOUS APPROACH, NEW TECHNOLOGY GROUP 5: ICD-10-PCS | Performed by: INTERNAL MEDICINE

## 2021-01-01 PROCEDURE — 74011250636 HC RX REV CODE- 250/636: Performed by: EMERGENCY MEDICINE

## 2021-01-01 PROCEDURE — 97110 THERAPEUTIC EXERCISES: CPT

## 2021-01-01 PROCEDURE — 0D568ZZ DESTRUCTION OF STOMACH, VIA NATURAL OR ARTIFICIAL OPENING ENDOSCOPIC: ICD-10-PCS | Performed by: INTERNAL MEDICINE

## 2021-01-01 PROCEDURE — 99153 MOD SED SAME PHYS/QHP EA: CPT

## 2021-01-01 PROCEDURE — 93970 EXTREMITY STUDY: CPT

## 2021-01-01 PROCEDURE — C1751 CATH, INF, PER/CENT/MIDLINE: HCPCS

## 2021-01-01 PROCEDURE — 93308 TTE F-UP OR LMTD: CPT

## 2021-01-01 PROCEDURE — 76060000031 HC ANESTHESIA FIRST 0.5 HR: Performed by: INTERNAL MEDICINE

## 2021-01-01 PROCEDURE — 73610000005 HC INO THERAPY INITIAL

## 2021-01-01 PROCEDURE — 74011250636 HC RX REV CODE- 250/636: Performed by: NURSE ANESTHETIST, CERTIFIED REGISTERED

## 2021-01-01 PROCEDURE — 77030029684 HC NEB SM VOL KT MONA -A

## 2021-01-01 PROCEDURE — 99233 SBSQ HOSP IP/OBS HIGH 50: CPT | Performed by: INTERNAL MEDICINE

## 2021-01-01 PROCEDURE — P9047 ALBUMIN (HUMAN), 25%, 50ML: HCPCS | Performed by: NURSE PRACTITIONER

## 2021-01-01 PROCEDURE — 97168 OT RE-EVAL EST PLAN CARE: CPT

## 2021-01-01 PROCEDURE — 74011000250 HC RX REV CODE- 250: Performed by: STUDENT IN AN ORGANIZED HEALTH CARE EDUCATION/TRAINING PROGRAM

## 2021-01-01 PROCEDURE — 71250 CT THORAX DX C-: CPT

## 2021-01-01 PROCEDURE — 99223 1ST HOSP IP/OBS HIGH 75: CPT | Performed by: INTERNAL MEDICINE

## 2021-01-01 PROCEDURE — 81001 URINALYSIS AUTO W/SCOPE: CPT

## 2021-01-01 PROCEDURE — 0BH17EZ INSERTION OF ENDOTRACHEAL AIRWAY INTO TRACHEA, VIA NATURAL OR ARTIFICIAL OPENING: ICD-10-PCS | Performed by: EMERGENCY MEDICINE

## 2021-01-01 PROCEDURE — 77030009426 HC FCPS BIOP ENDOSC BSC -B: Performed by: INTERNAL MEDICINE

## 2021-01-01 PROCEDURE — 77030036958 HC BRONCHSCOPE ASCOPE3 FLX DISP AMBU -D

## 2021-01-01 PROCEDURE — 97535 SELF CARE MNGMENT TRAINING: CPT

## 2021-01-01 PROCEDURE — 83605 ASSAY OF LACTIC ACID: CPT

## 2021-01-01 PROCEDURE — 82570 ASSAY OF URINE CREATININE: CPT

## 2021-01-01 PROCEDURE — 87635 SARS-COV-2 COVID-19 AMP PRB: CPT

## 2021-01-01 PROCEDURE — C1894 INTRO/SHEATH, NON-LASER: HCPCS

## 2021-01-01 PROCEDURE — 86850 RBC ANTIBODY SCREEN: CPT

## 2021-01-01 PROCEDURE — 03HC33Z INSERTION OF INFUSION DEVICE INTO LEFT RADIAL ARTERY, PERCUTANEOUS APPROACH: ICD-10-PCS | Performed by: INTERNAL MEDICINE

## 2021-01-01 PROCEDURE — 86900 BLOOD TYPING SEROLOGIC ABO: CPT

## 2021-01-01 PROCEDURE — 74011000250 HC RX REV CODE- 250: Performed by: NURSE ANESTHETIST, CERTIFIED REGISTERED

## 2021-01-01 DEVICE — WORKING LENGTH 235CM, WORKING CHANNEL 2.8MM
Type: IMPLANTABLE DEVICE | Site: STOMACH | Status: FUNCTIONAL
Brand: RESOLUTION 360 CLIP

## 2021-01-01 RX ORDER — HYDRALAZINE HYDROCHLORIDE 20 MG/ML
20 INJECTION INTRAMUSCULAR; INTRAVENOUS
Status: DISCONTINUED | OUTPATIENT
Start: 2021-01-01 | End: 2021-01-01 | Stop reason: HOSPADM

## 2021-01-01 RX ORDER — SUCCINYLCHOLINE CHLORIDE 20 MG/ML
INJECTION INTRAMUSCULAR; INTRAVENOUS
Status: COMPLETED
Start: 2021-01-01 | End: 2021-01-01

## 2021-01-01 RX ORDER — NOREPINEPHRINE BITARTRATE/D5W 8 MG/250ML
.5-16 PLASTIC BAG, INJECTION (ML) INTRAVENOUS
Status: DISCONTINUED | OUTPATIENT
Start: 2021-01-01 | End: 2021-01-01

## 2021-01-01 RX ORDER — PANTOPRAZOLE SODIUM 40 MG/1
40 TABLET, DELAYED RELEASE ORAL
Status: DISCONTINUED | OUTPATIENT
Start: 2021-01-01 | End: 2021-01-01

## 2021-01-01 RX ORDER — ROCURONIUM BROMIDE 10 MG/ML
INJECTION, SOLUTION INTRAVENOUS
Status: COMPLETED
Start: 2021-01-01 | End: 2021-01-01

## 2021-01-01 RX ORDER — FUROSEMIDE 10 MG/ML
40 INJECTION INTRAMUSCULAR; INTRAVENOUS DAILY
Status: DISCONTINUED | OUTPATIENT
Start: 2021-01-01 | End: 2021-01-01

## 2021-01-01 RX ORDER — ALBUTEROL SULFATE 90 UG/1
2 AEROSOL, METERED RESPIRATORY (INHALATION)
Status: DISCONTINUED | OUTPATIENT
Start: 2021-01-01 | End: 2021-01-01

## 2021-01-01 RX ORDER — ATROPINE SULFATE 1 MG/ML
1 INJECTION, SOLUTION INTRAVENOUS ONCE
Status: COMPLETED | OUTPATIENT
Start: 2021-01-01 | End: 2021-01-01

## 2021-01-01 RX ORDER — IPRATROPIUM BROMIDE AND ALBUTEROL SULFATE 2.5; .5 MG/3ML; MG/3ML
3 SOLUTION RESPIRATORY (INHALATION)
Status: COMPLETED | OUTPATIENT
Start: 2021-01-01 | End: 2021-01-01

## 2021-01-01 RX ORDER — GUAIFENESIN 100 MG/5ML
81 LIQUID (ML) ORAL DAILY
Status: DISCONTINUED | OUTPATIENT
Start: 2021-01-01 | End: 2021-01-01 | Stop reason: HOSPADM

## 2021-01-01 RX ORDER — INSULIN GLARGINE 100 [IU]/ML
27 INJECTION, SOLUTION SUBCUTANEOUS
Status: DISCONTINUED | OUTPATIENT
Start: 2021-01-01 | End: 2021-01-01

## 2021-01-01 RX ORDER — DEXTROMETHORPHAN/PSEUDOEPHED 2.5-7.5/.8
1.2 DROPS ORAL
Status: DISCONTINUED | OUTPATIENT
Start: 2021-01-01 | End: 2021-01-01 | Stop reason: HOSPADM

## 2021-01-01 RX ORDER — EPINEPHRINE 0.1 MG/ML
INJECTION INTRACARDIAC; INTRAVENOUS
Status: COMPLETED | OUTPATIENT
Start: 2021-01-01 | End: 2021-01-01

## 2021-01-01 RX ORDER — IPRATROPIUM BROMIDE AND ALBUTEROL SULFATE 2.5; .5 MG/3ML; MG/3ML
3 SOLUTION RESPIRATORY (INHALATION)
Status: DISCONTINUED | OUTPATIENT
Start: 2021-01-01 | End: 2021-01-01

## 2021-01-01 RX ORDER — FUROSEMIDE 10 MG/ML
80 INJECTION INTRAMUSCULAR; INTRAVENOUS ONCE
Status: COMPLETED | OUTPATIENT
Start: 2021-01-01 | End: 2021-01-01

## 2021-01-01 RX ORDER — FLUMAZENIL 0.1 MG/ML
0.2 INJECTION INTRAVENOUS
Status: DISCONTINUED | OUTPATIENT
Start: 2021-01-01 | End: 2021-01-01 | Stop reason: HOSPADM

## 2021-01-01 RX ORDER — INSULIN GLARGINE 100 [IU]/ML
8 INJECTION, SOLUTION SUBCUTANEOUS DAILY
Status: DISCONTINUED | OUTPATIENT
Start: 2021-01-01 | End: 2021-01-01

## 2021-01-01 RX ORDER — HEPARIN SODIUM 1000 [USP'U]/ML
3200 INJECTION, SOLUTION INTRAVENOUS; SUBCUTANEOUS
Status: DISCONTINUED | OUTPATIENT
Start: 2021-01-01 | End: 2021-01-01 | Stop reason: HOSPADM

## 2021-01-01 RX ORDER — HYDRALAZINE HYDROCHLORIDE 25 MG/1
25 TABLET, FILM COATED ORAL 3 TIMES DAILY
Status: DISCONTINUED | OUTPATIENT
Start: 2021-01-01 | End: 2021-01-01

## 2021-01-01 RX ORDER — NOREPINEPHRINE BITARTRATE/D5W 8 MG/250ML
.5-3 PLASTIC BAG, INJECTION (ML) INTRAVENOUS
Status: DISCONTINUED | OUTPATIENT
Start: 2021-01-01 | End: 2021-01-01

## 2021-01-01 RX ORDER — ALBUMIN HUMAN 250 G/1000ML
12.5 SOLUTION INTRAVENOUS
Status: DISCONTINUED | OUTPATIENT
Start: 2021-01-01 | End: 2021-01-01 | Stop reason: HOSPADM

## 2021-01-01 RX ORDER — NALOXONE HYDROCHLORIDE 0.4 MG/ML
0.4 INJECTION, SOLUTION INTRAMUSCULAR; INTRAVENOUS; SUBCUTANEOUS
Status: DISCONTINUED | OUTPATIENT
Start: 2021-01-01 | End: 2021-01-01 | Stop reason: HOSPADM

## 2021-01-01 RX ORDER — SUCCINYLCHOLINE CHLORIDE 20 MG/ML
100 INJECTION INTRAMUSCULAR; INTRAVENOUS
Status: COMPLETED | OUTPATIENT
Start: 2021-01-01 | End: 2021-01-01

## 2021-01-01 RX ORDER — INSULIN LISPRO 100 [IU]/ML
INJECTION, SOLUTION INTRAVENOUS; SUBCUTANEOUS EVERY 6 HOURS
Status: DISCONTINUED | OUTPATIENT
Start: 2021-01-01 | End: 2021-01-01 | Stop reason: HOSPADM

## 2021-01-01 RX ORDER — HEPARIN SODIUM 1000 [USP'U]/ML
2000 INJECTION, SOLUTION INTRAVENOUS; SUBCUTANEOUS
Status: DISCONTINUED | OUTPATIENT
Start: 2021-01-01 | End: 2021-01-01 | Stop reason: HOSPADM

## 2021-01-01 RX ORDER — MIDAZOLAM HYDROCHLORIDE 1 MG/ML
INJECTION, SOLUTION INTRAMUSCULAR; INTRAVENOUS
Status: COMPLETED
Start: 2021-01-01 | End: 2021-01-01

## 2021-01-01 RX ORDER — MAGNESIUM SULFATE HEPTAHYDRATE 40 MG/ML
2 INJECTION, SOLUTION INTRAVENOUS ONCE
Status: COMPLETED | OUTPATIENT
Start: 2021-01-01 | End: 2021-01-01

## 2021-01-01 RX ORDER — MICONAZOLE NITRATE 2 %
POWDER (GRAM) TOPICAL 2 TIMES DAILY
Status: DISCONTINUED | OUTPATIENT
Start: 2021-01-01 | End: 2021-01-01 | Stop reason: SDUPTHER

## 2021-01-01 RX ORDER — SODIUM CHLORIDE 9 MG/ML
50 INJECTION, SOLUTION INTRAVENOUS CONTINUOUS
Status: DISPENSED | OUTPATIENT
Start: 2021-01-01 | End: 2021-01-01

## 2021-01-01 RX ORDER — ALBUMIN HUMAN 250 G/1000ML
25 SOLUTION INTRAVENOUS EVERY 12 HOURS
Status: COMPLETED | OUTPATIENT
Start: 2021-01-01 | End: 2021-01-01

## 2021-01-01 RX ORDER — INSULIN GLARGINE 100 [IU]/ML
30 INJECTION, SOLUTION SUBCUTANEOUS
Status: DISCONTINUED | OUTPATIENT
Start: 2021-01-01 | End: 2021-01-01

## 2021-01-01 RX ORDER — LABETALOL HYDROCHLORIDE 5 MG/ML
20 INJECTION, SOLUTION INTRAVENOUS ONCE
Status: COMPLETED | OUTPATIENT
Start: 2021-01-01 | End: 2021-01-01

## 2021-01-01 RX ORDER — GLYCOPYRROLATE 0.2 MG/ML
INJECTION INTRAMUSCULAR; INTRAVENOUS AS NEEDED
Status: DISCONTINUED | OUTPATIENT
Start: 2021-01-01 | End: 2021-01-01 | Stop reason: HOSPADM

## 2021-01-01 RX ORDER — ATROPINE SULFATE 0.1 MG/ML
1 INJECTION INTRAVENOUS ONCE
Status: DISCONTINUED | OUTPATIENT
Start: 2021-01-01 | End: 2021-01-01 | Stop reason: HOSPADM

## 2021-01-01 RX ORDER — LORAZEPAM 2 MG/ML
0.5 INJECTION INTRAMUSCULAR ONCE
Status: COMPLETED | OUTPATIENT
Start: 2021-01-01 | End: 2021-01-01

## 2021-01-01 RX ORDER — ATROPINE SULFATE 0.1 MG/ML
0.5 INJECTION INTRAVENOUS
Status: DISCONTINUED | OUTPATIENT
Start: 2021-01-01 | End: 2021-01-01 | Stop reason: HOSPADM

## 2021-01-01 RX ORDER — ALBUMIN HUMAN 250 G/1000ML
25 SOLUTION INTRAVENOUS EVERY 12 HOURS
Status: DISCONTINUED | OUTPATIENT
Start: 2021-01-01 | End: 2021-01-01

## 2021-01-01 RX ORDER — ALBUTEROL SULFATE 0.83 MG/ML
5 SOLUTION RESPIRATORY (INHALATION)
Status: COMPLETED | OUTPATIENT
Start: 2021-01-01 | End: 2021-01-01

## 2021-01-01 RX ORDER — MELATONIN
2000 DAILY
Status: DISCONTINUED | OUTPATIENT
Start: 2021-01-01 | End: 2021-01-01 | Stop reason: HOSPADM

## 2021-01-01 RX ORDER — VANCOMYCIN 2 GRAM/500 ML IN 0.9 % SODIUM CHLORIDE INTRAVENOUS
2000 ONCE
Status: COMPLETED | OUTPATIENT
Start: 2021-01-01 | End: 2021-01-01

## 2021-01-01 RX ORDER — MIDAZOLAM HYDROCHLORIDE 1 MG/ML
INJECTION, SOLUTION INTRAMUSCULAR; INTRAVENOUS
Status: DISPENSED
Start: 2021-01-01 | End: 2021-01-01

## 2021-01-01 RX ORDER — MIDAZOLAM HYDROCHLORIDE 1 MG/ML
4 INJECTION, SOLUTION INTRAMUSCULAR; INTRAVENOUS ONCE
Status: COMPLETED | OUTPATIENT
Start: 2021-01-01 | End: 2021-01-01

## 2021-01-01 RX ORDER — INSULIN GLARGINE 100 [IU]/ML
10 INJECTION, SOLUTION SUBCUTANEOUS DAILY
Status: DISCONTINUED | OUTPATIENT
Start: 2021-01-01 | End: 2021-01-01

## 2021-01-01 RX ORDER — EPINEPHRINE 0.1 MG/ML
1 INJECTION INTRACARDIAC; INTRAVENOUS ONCE
Status: COMPLETED | OUTPATIENT
Start: 2021-01-01 | End: 2021-01-01

## 2021-01-01 RX ORDER — LIDOCAINE HYDROCHLORIDE 20 MG/ML
INJECTION, SOLUTION EPIDURAL; INFILTRATION; INTRACAUDAL; PERINEURAL AS NEEDED
Status: DISCONTINUED | OUTPATIENT
Start: 2021-01-01 | End: 2021-01-01 | Stop reason: HOSPADM

## 2021-01-01 RX ORDER — ROCURONIUM BROMIDE 10 MG/ML
50 INJECTION, SOLUTION INTRAVENOUS
Status: COMPLETED | OUTPATIENT
Start: 2021-01-01 | End: 2021-01-01

## 2021-01-01 RX ORDER — BALSAM PERU/CASTOR OIL
OINTMENT (GRAM) TOPICAL EVERY 8 HOURS
Status: DISCONTINUED | OUTPATIENT
Start: 2021-01-01 | End: 2021-01-01

## 2021-01-01 RX ORDER — INSULIN GLARGINE 100 [IU]/ML
14 INJECTION, SOLUTION SUBCUTANEOUS
Status: DISCONTINUED | OUTPATIENT
Start: 2021-01-01 | End: 2021-01-01

## 2021-01-01 RX ORDER — SEVELAMER CARBONATE FOR ORAL SUSPENSION 800 MG/1
1.6 POWDER, FOR SUSPENSION ORAL
Status: DISCONTINUED | OUTPATIENT
Start: 2021-01-01 | End: 2021-01-01 | Stop reason: HOSPADM

## 2021-01-01 RX ORDER — INSULIN GLARGINE 100 [IU]/ML
40 INJECTION, SOLUTION SUBCUTANEOUS
Status: DISCONTINUED | OUTPATIENT
Start: 2021-01-01 | End: 2021-01-01

## 2021-01-01 RX ORDER — AMLODIPINE BESYLATE 5 MG/1
5 TABLET ORAL ONCE
Status: COMPLETED | OUTPATIENT
Start: 2021-01-01 | End: 2021-01-01

## 2021-01-01 RX ORDER — OXYCODONE AND ACETAMINOPHEN 5; 325 MG/1; MG/1
1 TABLET ORAL
Status: DISCONTINUED | OUTPATIENT
Start: 2021-01-01 | End: 2021-01-01 | Stop reason: HOSPADM

## 2021-01-01 RX ORDER — ASCORBIC ACID 500 MG
500 TABLET ORAL DAILY
Status: DISCONTINUED | OUTPATIENT
Start: 2021-01-01 | End: 2021-01-01 | Stop reason: HOSPADM

## 2021-01-01 RX ORDER — HYDROCORTISONE SODIUM SUCCINATE 100 MG/2ML
50 INJECTION, POWDER, FOR SOLUTION INTRAMUSCULAR; INTRAVENOUS EVERY 12 HOURS
Status: COMPLETED | OUTPATIENT
Start: 2021-01-01 | End: 2021-01-01

## 2021-01-01 RX ORDER — NOREPINEPHRINE BITARTRATE/D5W 8 MG/250ML
.5-3 PLASTIC BAG, INJECTION (ML) INTRAVENOUS
Status: DISCONTINUED | OUTPATIENT
Start: 2021-01-01 | End: 2021-01-01 | Stop reason: HOSPADM

## 2021-01-01 RX ORDER — SODIUM CHLORIDE 9 MG/ML
250 INJECTION, SOLUTION INTRAVENOUS AS NEEDED
Status: DISCONTINUED | OUTPATIENT
Start: 2021-01-01 | End: 2021-01-01

## 2021-01-01 RX ORDER — LINEZOLID 2 MG/ML
600 INJECTION, SOLUTION INTRAVENOUS EVERY 12 HOURS
Status: COMPLETED | OUTPATIENT
Start: 2021-01-01 | End: 2021-01-01

## 2021-01-01 RX ORDER — ALBUMIN HUMAN 250 G/1000ML
25 SOLUTION INTRAVENOUS EVERY 6 HOURS
Status: COMPLETED | OUTPATIENT
Start: 2021-01-01 | End: 2021-01-01

## 2021-01-01 RX ORDER — POTASSIUM CHLORIDE 29.8 MG/ML
20 INJECTION INTRAVENOUS
Status: COMPLETED | OUTPATIENT
Start: 2021-01-01 | End: 2021-01-01

## 2021-01-01 RX ORDER — MIDAZOLAM IN 0.9 % SOD.CHLORID 1 MG/ML
0-10 PLASTIC BAG, INJECTION (ML) INTRAVENOUS
Status: DISCONTINUED | OUTPATIENT
Start: 2021-01-01 | End: 2021-01-01

## 2021-01-01 RX ORDER — MIDAZOLAM HYDROCHLORIDE 1 MG/ML
2 INJECTION, SOLUTION INTRAMUSCULAR; INTRAVENOUS
Status: DISCONTINUED | OUTPATIENT
Start: 2021-01-01 | End: 2021-01-01 | Stop reason: HOSPADM

## 2021-01-01 RX ORDER — MIDAZOLAM HYDROCHLORIDE 1 MG/ML
2 INJECTION, SOLUTION INTRAMUSCULAR; INTRAVENOUS
Status: COMPLETED | OUTPATIENT
Start: 2021-01-01 | End: 2021-01-01

## 2021-01-01 RX ORDER — INSULIN LISPRO 100 [IU]/ML
12 INJECTION, SOLUTION INTRAVENOUS; SUBCUTANEOUS
Status: DISCONTINUED | OUTPATIENT
Start: 2021-01-01 | End: 2021-01-01

## 2021-01-01 RX ORDER — PROPOFOL 10 MG/ML
0-50 VIAL (ML) INTRAVENOUS
Status: DISCONTINUED | OUTPATIENT
Start: 2021-01-01 | End: 2021-01-01

## 2021-01-01 RX ORDER — SODIUM CHLORIDE 9 MG/ML
250 INJECTION, SOLUTION INTRAVENOUS AS NEEDED
Status: DISCONTINUED | OUTPATIENT
Start: 2021-01-01 | End: 2021-01-01 | Stop reason: ALTCHOICE

## 2021-01-01 RX ORDER — INSULIN GLARGINE 100 [IU]/ML
8 INJECTION, SOLUTION SUBCUTANEOUS
Status: DISCONTINUED | OUTPATIENT
Start: 2021-01-01 | End: 2021-01-01

## 2021-01-01 RX ORDER — ALBUMIN HUMAN 250 G/1000ML
12.5 SOLUTION INTRAVENOUS EVERY 12 HOURS
Status: DISCONTINUED | OUTPATIENT
Start: 2021-01-01 | End: 2021-01-01

## 2021-01-01 RX ORDER — DEXAMETHASONE SODIUM PHOSPHATE 10 MG/ML
6 INJECTION INTRAMUSCULAR; INTRAVENOUS EVERY 24 HOURS
Status: COMPLETED | OUTPATIENT
Start: 2021-01-01 | End: 2021-01-01

## 2021-01-01 RX ORDER — FUROSEMIDE 10 MG/ML
40 INJECTION INTRAMUSCULAR; INTRAVENOUS EVERY 12 HOURS
Status: DISCONTINUED | OUTPATIENT
Start: 2021-01-01 | End: 2021-01-01

## 2021-01-01 RX ORDER — HYDROCORTISONE SODIUM SUCCINATE 100 MG/2ML
50 INJECTION, POWDER, FOR SOLUTION INTRAMUSCULAR; INTRAVENOUS EVERY 6 HOURS
Status: DISCONTINUED | OUTPATIENT
Start: 2021-01-01 | End: 2021-01-01

## 2021-01-01 RX ORDER — INSULIN GLARGINE 100 [IU]/ML
7 INJECTION, SOLUTION SUBCUTANEOUS ONCE
Status: COMPLETED | OUTPATIENT
Start: 2021-01-01 | End: 2021-01-01

## 2021-01-01 RX ORDER — INSULIN GLARGINE 100 [IU]/ML
12 INJECTION, SOLUTION SUBCUTANEOUS ONCE
Status: COMPLETED | OUTPATIENT
Start: 2021-01-01 | End: 2021-01-01

## 2021-01-01 RX ORDER — ALBUTEROL SULFATE 0.83 MG/ML
SOLUTION RESPIRATORY (INHALATION)
Status: COMPLETED
Start: 2021-01-01 | End: 2021-01-01

## 2021-01-01 RX ORDER — BUMETANIDE 0.25 MG/ML
1 INJECTION INTRAMUSCULAR; INTRAVENOUS EVERY 12 HOURS
Status: DISCONTINUED | OUTPATIENT
Start: 2021-01-01 | End: 2021-01-01

## 2021-01-01 RX ORDER — LABETALOL HYDROCHLORIDE 5 MG/ML
10 INJECTION, SOLUTION INTRAVENOUS ONCE
Status: COMPLETED | OUTPATIENT
Start: 2021-01-01 | End: 2021-01-01

## 2021-01-01 RX ORDER — ETOMIDATE 2 MG/ML
30 INJECTION INTRAVENOUS
Status: COMPLETED | OUTPATIENT
Start: 2021-01-01 | End: 2021-01-01

## 2021-01-01 RX ORDER — SODIUM BICARBONATE 1 MEQ/ML
SYRINGE (ML) INTRAVENOUS
Status: COMPLETED | OUTPATIENT
Start: 2021-01-01 | End: 2021-01-01

## 2021-01-01 RX ORDER — FLUTICASONE PROPIONATE 50 MCG
2 SPRAY, SUSPENSION (ML) NASAL DAILY
Status: DISCONTINUED | OUTPATIENT
Start: 2021-01-01 | End: 2021-01-01

## 2021-01-01 RX ORDER — POTASSIUM CHLORIDE 14.9 MG/ML
10 INJECTION INTRAVENOUS
Status: COMPLETED | OUTPATIENT
Start: 2021-01-01 | End: 2021-01-01

## 2021-01-01 RX ORDER — CYANOCOBALAMIN (VITAMIN B-12) 500 MCG
400 TABLET ORAL DAILY
Status: DISCONTINUED | OUTPATIENT
Start: 2021-01-01 | End: 2021-01-01

## 2021-01-01 RX ORDER — CLONIDINE HYDROCHLORIDE 0.2 MG/1
0.1 TABLET ORAL 2 TIMES DAILY
Status: DISCONTINUED | OUTPATIENT
Start: 2021-01-01 | End: 2021-01-01 | Stop reason: HOSPADM

## 2021-01-01 RX ORDER — SODIUM CHLORIDE 9 MG/ML
25 INJECTION, SOLUTION INTRAVENOUS
Status: COMPLETED | OUTPATIENT
Start: 2021-01-01 | End: 2021-01-01

## 2021-01-01 RX ORDER — INSULIN GLARGINE 100 [IU]/ML
25 INJECTION, SOLUTION SUBCUTANEOUS DAILY
Status: DISCONTINUED | OUTPATIENT
Start: 2021-01-01 | End: 2021-01-01 | Stop reason: HOSPADM

## 2021-01-01 RX ORDER — SODIUM CHLORIDE 0.9 % (FLUSH) 0.9 %
5-40 SYRINGE (ML) INJECTION AS NEEDED
Status: DISCONTINUED | OUTPATIENT
Start: 2021-01-01 | End: 2021-01-01 | Stop reason: HOSPADM

## 2021-01-01 RX ORDER — BUMETANIDE 0.25 MG/ML
2 INJECTION INTRAMUSCULAR; INTRAVENOUS EVERY 12 HOURS
Status: DISCONTINUED | OUTPATIENT
Start: 2021-01-01 | End: 2021-01-01

## 2021-01-01 RX ORDER — ENOXAPARIN SODIUM 100 MG/ML
40 INJECTION SUBCUTANEOUS EVERY 12 HOURS
Status: DISCONTINUED | OUTPATIENT
Start: 2021-01-01 | End: 2021-01-01

## 2021-01-01 RX ORDER — ETOMIDATE 2 MG/ML
INJECTION INTRAVENOUS
Status: COMPLETED
Start: 2021-01-01 | End: 2021-01-01

## 2021-01-01 RX ORDER — QUETIAPINE FUMARATE 25 MG/1
25 TABLET, FILM COATED ORAL ONCE
Status: COMPLETED | OUTPATIENT
Start: 2021-01-01 | End: 2021-01-01

## 2021-01-01 RX ORDER — EPINEPHRINE 0.1 MG/ML
1 INJECTION INTRACARDIAC; INTRAVENOUS
Status: DISCONTINUED | OUTPATIENT
Start: 2021-01-01 | End: 2021-01-01 | Stop reason: HOSPADM

## 2021-01-01 RX ORDER — CHLORHEXIDINE GLUCONATE 0.12 MG/ML
15 RINSE ORAL EVERY 12 HOURS
Status: DISCONTINUED | OUTPATIENT
Start: 2021-01-01 | End: 2021-01-01 | Stop reason: HOSPADM

## 2021-01-01 RX ORDER — INSULIN GLARGINE 100 [IU]/ML
8 INJECTION, SOLUTION SUBCUTANEOUS ONCE
Status: COMPLETED | OUTPATIENT
Start: 2021-01-01 | End: 2021-01-01

## 2021-01-01 RX ORDER — HEPARIN SODIUM 5000 [USP'U]/ML
7500 INJECTION, SOLUTION INTRAVENOUS; SUBCUTANEOUS EVERY 8 HOURS
Status: DISCONTINUED | OUTPATIENT
Start: 2021-01-01 | End: 2021-01-01 | Stop reason: HOSPADM

## 2021-01-01 RX ORDER — PROPOFOL 10 MG/ML
INJECTION, EMULSION INTRAVENOUS AS NEEDED
Status: DISCONTINUED | OUTPATIENT
Start: 2021-01-01 | End: 2021-01-01 | Stop reason: HOSPADM

## 2021-01-01 RX ORDER — ENOXAPARIN SODIUM 100 MG/ML
40 INJECTION SUBCUTANEOUS EVERY 24 HOURS
Status: DISCONTINUED | OUTPATIENT
Start: 2021-01-01 | End: 2021-01-01

## 2021-01-01 RX ORDER — HYDROCORTISONE SODIUM SUCCINATE 100 MG/2ML
50 INJECTION, POWDER, FOR SOLUTION INTRAMUSCULAR; INTRAVENOUS EVERY 8 HOURS
Status: DISCONTINUED | OUTPATIENT
Start: 2021-01-01 | End: 2021-01-01

## 2021-01-01 RX ORDER — INSULIN GLARGINE 100 [IU]/ML
25 INJECTION, SOLUTION SUBCUTANEOUS
Status: DISCONTINUED | OUTPATIENT
Start: 2021-01-01 | End: 2021-01-01

## 2021-01-01 RX ORDER — ALBUMIN HUMAN 250 G/1000ML
SOLUTION INTRAVENOUS
Status: COMPLETED
Start: 2021-01-01 | End: 2021-01-01

## 2021-01-01 RX ORDER — MICONAZOLE NITRATE 2 %
POWDER (GRAM) TOPICAL 2 TIMES DAILY
Status: DISCONTINUED | OUTPATIENT
Start: 2021-01-01 | End: 2021-01-01 | Stop reason: HOSPADM

## 2021-01-01 RX ORDER — PROPOFOL 10 MG/ML
INJECTION, EMULSION INTRAVENOUS
Status: COMPLETED
Start: 2021-01-01 | End: 2021-01-01

## 2021-01-01 RX ORDER — MIDAZOLAM IN 0.9 % SOD.CHLORID 1 MG/ML
0-15 PLASTIC BAG, INJECTION (ML) INTRAVENOUS
Status: DISCONTINUED | OUTPATIENT
Start: 2021-01-01 | End: 2021-01-01 | Stop reason: HOSPADM

## 2021-01-01 RX ORDER — SODIUM BICARBONATE 650 MG/1
650 TABLET ORAL DAILY
Status: DISCONTINUED | OUTPATIENT
Start: 2021-01-01 | End: 2021-01-01

## 2021-01-01 RX ORDER — TORSEMIDE 20 MG/1
80 TABLET ORAL 2 TIMES DAILY
Status: DISCONTINUED | OUTPATIENT
Start: 2021-01-01 | End: 2021-01-01

## 2021-01-01 RX ORDER — INSULIN LISPRO 100 [IU]/ML
10 INJECTION, SOLUTION INTRAVENOUS; SUBCUTANEOUS
Status: DISCONTINUED | OUTPATIENT
Start: 2021-01-01 | End: 2021-01-01

## 2021-01-01 RX ORDER — MIDAZOLAM HYDROCHLORIDE 5 MG/ML
INJECTION, SOLUTION INTRAMUSCULAR; INTRAVENOUS
Status: DISPENSED
Start: 2021-01-01 | End: 2021-01-01

## 2021-01-01 RX ORDER — BALSAM PERU/CASTOR OIL
OINTMENT (GRAM) TOPICAL EVERY 12 HOURS
Status: DISCONTINUED | OUTPATIENT
Start: 2021-01-01 | End: 2021-01-01 | Stop reason: HOSPADM

## 2021-01-01 RX ORDER — DOCUSATE SODIUM 50 MG/5ML
100 LIQUID ORAL 2 TIMES DAILY
Status: DISCONTINUED | OUTPATIENT
Start: 2021-01-01 | End: 2021-01-01

## 2021-01-01 RX ORDER — FUROSEMIDE 10 MG/ML
80 INJECTION INTRAMUSCULAR; INTRAVENOUS EVERY 12 HOURS
Status: DISCONTINUED | OUTPATIENT
Start: 2021-01-01 | End: 2021-01-01

## 2021-01-01 RX ORDER — THERA TABS 400 MCG
1 TAB ORAL DAILY
Status: DISCONTINUED | OUTPATIENT
Start: 2021-01-01 | End: 2021-01-01 | Stop reason: HOSPADM

## 2021-01-01 RX ORDER — AMLODIPINE BESYLATE 5 MG/1
10 TABLET ORAL DAILY
Status: DISCONTINUED | OUTPATIENT
Start: 2021-01-01 | End: 2021-01-01 | Stop reason: HOSPADM

## 2021-01-01 RX ORDER — FENTANYL CITRATE 50 UG/ML
INJECTION, SOLUTION INTRAMUSCULAR; INTRAVENOUS
Status: COMPLETED
Start: 2021-01-01 | End: 2021-01-01

## 2021-01-01 RX ORDER — FUROSEMIDE 10 MG/ML
60 INJECTION INTRAMUSCULAR; INTRAVENOUS ONCE
Status: COMPLETED | OUTPATIENT
Start: 2021-01-01 | End: 2021-01-01

## 2021-01-01 RX ORDER — ALBUMIN HUMAN 250 G/1000ML
12.5 SOLUTION INTRAVENOUS ONCE
Status: COMPLETED | OUTPATIENT
Start: 2021-01-01 | End: 2021-01-01

## 2021-01-01 RX ORDER — MIDAZOLAM HYDROCHLORIDE 1 MG/ML
5 INJECTION, SOLUTION INTRAMUSCULAR; INTRAVENOUS
Status: DISCONTINUED | OUTPATIENT
Start: 2021-01-01 | End: 2021-01-01

## 2021-01-01 RX ORDER — FENTANYL CITRATE 50 UG/ML
100 INJECTION, SOLUTION INTRAMUSCULAR; INTRAVENOUS ONCE
Status: COMPLETED | OUTPATIENT
Start: 2021-01-01 | End: 2021-01-01

## 2021-01-01 RX ORDER — FENTANYL CITRATE 50 UG/ML
100 INJECTION, SOLUTION INTRAMUSCULAR; INTRAVENOUS
Status: DISCONTINUED | OUTPATIENT
Start: 2021-01-01 | End: 2021-01-01

## 2021-01-01 RX ORDER — HEPARIN SODIUM 1000 [USP'U]/ML
3200 INJECTION, SOLUTION INTRAVENOUS; SUBCUTANEOUS ONCE
Status: COMPLETED | OUTPATIENT
Start: 2021-01-01 | End: 2021-01-01

## 2021-01-01 RX ORDER — OXYMETAZOLINE HCL 0.05 %
2 SPRAY, NON-AEROSOL (ML) NASAL
Status: DISCONTINUED | OUTPATIENT
Start: 2021-01-01 | End: 2021-01-01 | Stop reason: HOSPADM

## 2021-01-01 RX ORDER — SODIUM POLYSTYRENE SULFONATE 15 G/60ML
30 SUSPENSION ORAL; RECTAL
Status: ACTIVE | OUTPATIENT
Start: 2021-01-01 | End: 2021-01-01

## 2021-01-01 RX ORDER — SODIUM CHLORIDE 0.9 % (FLUSH) 0.9 %
5-40 SYRINGE (ML) INJECTION EVERY 8 HOURS
Status: DISCONTINUED | OUTPATIENT
Start: 2021-01-01 | End: 2021-01-01 | Stop reason: HOSPADM

## 2021-01-01 RX ORDER — LIDOCAINE HYDROCHLORIDE 20 MG/ML
20 INJECTION, SOLUTION INFILTRATION; PERINEURAL ONCE
Status: COMPLETED | OUTPATIENT
Start: 2021-01-01 | End: 2021-01-01

## 2021-01-01 RX ORDER — INSULIN GLARGINE 100 [IU]/ML
18 INJECTION, SOLUTION SUBCUTANEOUS DAILY
Status: DISCONTINUED | OUTPATIENT
Start: 2021-01-01 | End: 2021-01-01

## 2021-01-01 RX ORDER — ZINC SULFATE 50(220)MG
1 CAPSULE ORAL DAILY
Status: COMPLETED | OUTPATIENT
Start: 2021-01-01 | End: 2021-01-01

## 2021-01-01 RX ORDER — AMMONIUM LACTATE 12 G/100G
LOTION TOPICAL EVERY 12 HOURS
Status: DISCONTINUED | OUTPATIENT
Start: 2021-01-01 | End: 2021-01-01 | Stop reason: HOSPADM

## 2021-01-01 RX ADMIN — Medication 10 ML: at 07:13

## 2021-01-01 RX ADMIN — FENTANYL CITRATE 100 MCG: 50 INJECTION, SOLUTION INTRAMUSCULAR; INTRAVENOUS at 14:31

## 2021-01-01 RX ADMIN — FENTANYL CITRATE 50 MCG: 50 INJECTION, SOLUTION INTRAMUSCULAR; INTRAVENOUS at 16:58

## 2021-01-01 RX ADMIN — HYDRALAZINE HYDROCHLORIDE 100 MG: 50 TABLET, FILM COATED ORAL at 22:37

## 2021-01-01 RX ADMIN — Medication 10 ML: at 13:48

## 2021-01-01 RX ADMIN — ALBUMIN (HUMAN) 25 G: 0.25 INJECTION, SOLUTION INTRAVENOUS at 11:38

## 2021-01-01 RX ADMIN — HYDRALAZINE HYDROCHLORIDE 100 MG: 50 TABLET, FILM COATED ORAL at 09:56

## 2021-01-01 RX ADMIN — OXYCODONE AND ACETAMINOPHEN 1 TABLET: 5; 325 TABLET ORAL at 05:56

## 2021-01-01 RX ADMIN — LANSOPRAZOLE 30 MG: KIT at 18:11

## 2021-01-01 RX ADMIN — OXYCODONE AND ACETAMINOPHEN 1 TABLET: 5; 325 TABLET ORAL at 03:12

## 2021-01-01 RX ADMIN — INSULIN LISPRO 3 UNITS: 100 INJECTION, SOLUTION INTRAVENOUS; SUBCUTANEOUS at 12:06

## 2021-01-01 RX ADMIN — PIPERACILLIN AND TAZOBACTAM 3.38 G: 3; .375 INJECTION, POWDER, LYOPHILIZED, FOR SOLUTION INTRAVENOUS at 06:02

## 2021-01-01 RX ADMIN — PIPERACILLIN AND TAZOBACTAM 3.38 G: 3; .375 INJECTION, POWDER, LYOPHILIZED, FOR SOLUTION INTRAVENOUS at 14:10

## 2021-01-01 RX ADMIN — Medication 10 ML: at 05:58

## 2021-01-01 RX ADMIN — Medication 300 MCG/HR: at 15:26

## 2021-01-01 RX ADMIN — PIPERACILLIN AND TAZOBACTAM 3.38 G: 3; .375 INJECTION, POWDER, LYOPHILIZED, FOR SOLUTION INTRAVENOUS at 05:25

## 2021-01-01 RX ADMIN — MICONAZOLE NITRATE 2 % TOPICAL POWDER: at 18:54

## 2021-01-01 RX ADMIN — OXYCODONE AND ACETAMINOPHEN 1 TABLET: 5; 325 TABLET ORAL at 11:48

## 2021-01-01 RX ADMIN — LINEZOLID 600 MG: 600 INJECTION, SOLUTION INTRAVENOUS at 14:39

## 2021-01-01 RX ADMIN — PIPERACILLIN AND TAZOBACTAM 3.38 G: 3; .375 INJECTION, POWDER, LYOPHILIZED, FOR SOLUTION INTRAVENOUS at 14:26

## 2021-01-01 RX ADMIN — Medication 5 MCG/MIN: at 12:24

## 2021-01-01 RX ADMIN — PROPOFOL 25 MCG/KG/MIN: 10 INJECTION, EMULSION INTRAVENOUS at 20:32

## 2021-01-01 RX ADMIN — DICLOFENAC SODIUM 2 G: 10 GEL TOPICAL at 22:31

## 2021-01-01 RX ADMIN — PIPERACILLIN AND TAZOBACTAM 3.38 G: 3; .375 INJECTION, POWDER, LYOPHILIZED, FOR SOLUTION INTRAVENOUS at 21:00

## 2021-01-01 RX ADMIN — CHLORHEXIDINE GLUCONATE 15 ML: 0.12 RINSE ORAL at 08:56

## 2021-01-01 RX ADMIN — HEPARIN SODIUM 3200 UNITS: 1000 INJECTION INTRAVENOUS; SUBCUTANEOUS at 10:53

## 2021-01-01 RX ADMIN — HEPARIN SODIUM 7500 UNITS: 5000 INJECTION INTRAVENOUS; SUBCUTANEOUS at 21:00

## 2021-01-01 RX ADMIN — PIPERACILLIN AND TAZOBACTAM 3.38 G: 3; .375 INJECTION, POWDER, LYOPHILIZED, FOR SOLUTION INTRAVENOUS at 23:08

## 2021-01-01 RX ADMIN — PROPOFOL 25 MCG/KG/MIN: 10 INJECTION, EMULSION INTRAVENOUS at 15:56

## 2021-01-01 RX ADMIN — MICONAZOLE NITRATE 2 % TOPICAL POWDER: at 18:50

## 2021-01-01 RX ADMIN — Medication 300 MCG/HR: at 03:04

## 2021-01-01 RX ADMIN — Medication 40 ML: at 06:00

## 2021-01-01 RX ADMIN — Medication 275 MCG/HR: at 21:39

## 2021-01-01 RX ADMIN — INSULIN LISPRO 2 UNITS: 100 INJECTION, SOLUTION INTRAVENOUS; SUBCUTANEOUS at 17:35

## 2021-01-01 RX ADMIN — COLLAGENASE SANTYL: 250 OINTMENT TOPICAL at 08:56

## 2021-01-01 RX ADMIN — MIDAZOLAM HYDROCHLORIDE 15 MG/HR: 5 INJECTION INTRAMUSCULAR; INTRAVENOUS at 03:19

## 2021-01-01 RX ADMIN — GLYCOPYRROLATE 0.2 MG: 0.2 INJECTION, SOLUTION INTRAMUSCULAR; INTRAVENOUS at 14:46

## 2021-01-01 RX ADMIN — Medication: at 21:48

## 2021-01-01 RX ADMIN — HYDRALAZINE HYDROCHLORIDE 100 MG: 50 TABLET, FILM COATED ORAL at 21:23

## 2021-01-01 RX ADMIN — DICLOFENAC SODIUM 2 G: 10 GEL TOPICAL at 13:30

## 2021-01-01 RX ADMIN — LINEZOLID 600 MG: 600 INJECTION, SOLUTION INTRAVENOUS at 09:05

## 2021-01-01 RX ADMIN — EPOETIN ALFA-EPBX 20000 UNITS: 10000 INJECTION, SOLUTION INTRAVENOUS; SUBCUTANEOUS at 20:08

## 2021-01-01 RX ADMIN — DEXAMETHASONE SODIUM PHOSPHATE 6 MG: 10 INJECTION, SOLUTION INTRAMUSCULAR; INTRAVENOUS at 10:33

## 2021-01-01 RX ADMIN — HYDRALAZINE HYDROCHLORIDE 100 MG: 50 TABLET, FILM COATED ORAL at 10:22

## 2021-01-01 RX ADMIN — LANSOPRAZOLE 30 MG: KIT at 09:16

## 2021-01-01 RX ADMIN — LANSOPRAZOLE 30 MG: KIT at 09:03

## 2021-01-01 RX ADMIN — INSULIN LISPRO 2 UNITS: 100 INJECTION, SOLUTION INTRAVENOUS; SUBCUTANEOUS at 22:03

## 2021-01-01 RX ADMIN — PROPOFOL 45 MCG/KG/MIN: 10 INJECTION, EMULSION INTRAVENOUS at 04:07

## 2021-01-01 RX ADMIN — OXYCODONE AND ACETAMINOPHEN 1 TABLET: 5; 325 TABLET ORAL at 22:06

## 2021-01-01 RX ADMIN — MIDAZOLAM HYDROCHLORIDE 8 MG/HR: 5 INJECTION INTRAMUSCULAR; INTRAVENOUS at 16:02

## 2021-01-01 RX ADMIN — Medication: at 21:37

## 2021-01-01 RX ADMIN — DICLOFENAC SODIUM 2 G: 10 GEL TOPICAL at 12:32

## 2021-01-01 RX ADMIN — HYDRALAZINE HYDROCHLORIDE 100 MG: 50 TABLET, FILM COATED ORAL at 08:38

## 2021-01-01 RX ADMIN — ASPIRIN 81 MG: 81 TABLET, CHEWABLE ORAL at 09:54

## 2021-01-01 RX ADMIN — Medication: at 21:47

## 2021-01-01 RX ADMIN — Medication 10 ML: at 22:34

## 2021-01-01 RX ADMIN — Medication 10 ML: at 13:04

## 2021-01-01 RX ADMIN — INSULIN LISPRO 10 UNITS: 100 INJECTION, SOLUTION INTRAVENOUS; SUBCUTANEOUS at 07:30

## 2021-01-01 RX ADMIN — CHLORHEXIDINE GLUCONATE 15 ML: 0.12 RINSE ORAL at 20:30

## 2021-01-01 RX ADMIN — GABAPENTIN 100 MG: 100 CAPSULE ORAL at 23:02

## 2021-01-01 RX ADMIN — LINEZOLID 600 MG: 600 INJECTION, SOLUTION INTRAVENOUS at 21:33

## 2021-01-01 RX ADMIN — MIDAZOLAM HYDROCHLORIDE 4 MG: 1 INJECTION, SOLUTION INTRAMUSCULAR; INTRAVENOUS at 20:04

## 2021-01-01 RX ADMIN — MIDAZOLAM HYDROCHLORIDE 8 MG/HR: 5 INJECTION INTRAMUSCULAR; INTRAVENOUS at 01:55

## 2021-01-01 RX ADMIN — SODIUM CHLORIDE 40 MG: 9 INJECTION INTRAMUSCULAR; INTRAVENOUS; SUBCUTANEOUS at 22:16

## 2021-01-01 RX ADMIN — CHLORHEXIDINE GLUCONATE 15 ML: 0.12 RINSE ORAL at 09:27

## 2021-01-01 RX ADMIN — INSULIN LISPRO 3 UNITS: 100 INJECTION, SOLUTION INTRAVENOUS; SUBCUTANEOUS at 11:55

## 2021-01-01 RX ADMIN — DICLOFENAC SODIUM 2 G: 10 GEL TOPICAL at 17:57

## 2021-01-01 RX ADMIN — EPINEPHRINE 1 MG: 0.1 INJECTION INTRACARDIAC; INTRAVENOUS at 13:31

## 2021-01-01 RX ADMIN — GABAPENTIN 100 MG: 100 CAPSULE ORAL at 09:23

## 2021-01-01 RX ADMIN — PROPOFOL 40 MCG/KG/MIN: 10 INJECTION, EMULSION INTRAVENOUS at 04:15

## 2021-01-01 RX ADMIN — ALBUMIN (HUMAN) 25 G: 0.25 INJECTION, SOLUTION INTRAVENOUS at 00:15

## 2021-01-01 RX ADMIN — SODIUM BICARBONATE 650 MG: 650 TABLET ORAL at 09:51

## 2021-01-01 RX ADMIN — GABAPENTIN 100 MG: 100 CAPSULE ORAL at 09:24

## 2021-01-01 RX ADMIN — Medication 2000 UNITS: at 08:44

## 2021-01-01 RX ADMIN — ALBUMIN (HUMAN) 25 G: 0.25 INJECTION, SOLUTION INTRAVENOUS at 21:55

## 2021-01-01 RX ADMIN — DICLOFENAC SODIUM 2 G: 10 GEL TOPICAL at 09:37

## 2021-01-01 RX ADMIN — SEVELAMER CARBONATE 1.6 G: 0.8 POWDER, FOR SUSPENSION ORAL at 11:28

## 2021-01-01 RX ADMIN — OXYCODONE AND ACETAMINOPHEN 1 TABLET: 5; 325 TABLET ORAL at 22:48

## 2021-01-01 RX ADMIN — ZINC SULFATE 220 MG (50 MG) CAPSULE 1 CAPSULE: CAPSULE at 10:01

## 2021-01-01 RX ADMIN — OXYCODONE AND ACETAMINOPHEN 1 TABLET: 5; 325 TABLET ORAL at 04:08

## 2021-01-01 RX ADMIN — Medication 2000 UNITS: at 08:46

## 2021-01-01 RX ADMIN — DICLOFENAC SODIUM 2 G: 10 GEL TOPICAL at 19:04

## 2021-01-01 RX ADMIN — OXYCODONE HYDROCHLORIDE AND ACETAMINOPHEN 500 MG: 500 TABLET ORAL at 09:32

## 2021-01-01 RX ADMIN — PIPERACILLIN AND TAZOBACTAM 3.38 G: 3; .375 INJECTION, POWDER, LYOPHILIZED, FOR SOLUTION INTRAVENOUS at 21:42

## 2021-01-01 RX ADMIN — CLONIDINE HYDROCHLORIDE 0.1 MG: 0.1 TABLET ORAL at 10:29

## 2021-01-01 RX ADMIN — AMLODIPINE BESYLATE 10 MG: 5 TABLET ORAL at 09:51

## 2021-01-01 RX ADMIN — DICLOFENAC SODIUM 2 G: 10 GEL TOPICAL at 18:07

## 2021-01-01 RX ADMIN — Medication: at 08:44

## 2021-01-01 RX ADMIN — Medication 275 MCG/HR: at 04:52

## 2021-01-01 RX ADMIN — ALBUTEROL SULFATE 2 PUFF: 108 AEROSOL, METERED RESPIRATORY (INHALATION) at 09:10

## 2021-01-01 RX ADMIN — DIAPER RASH SKIN PROTECTENT: at 13:07

## 2021-01-01 RX ADMIN — PROPOFOL 30 MCG/KG/MIN: 10 INJECTION, EMULSION INTRAVENOUS at 07:30

## 2021-01-01 RX ADMIN — CLONIDINE HYDROCHLORIDE 0.1 MG: 0.1 TABLET ORAL at 17:31

## 2021-01-01 RX ADMIN — FLUTICASONE PROPIONATE 2 SPRAY: 50 SPRAY, METERED NASAL at 08:28

## 2021-01-01 RX ADMIN — MIDAZOLAM HYDROCHLORIDE 10 MG/HR: 5 INJECTION INTRAMUSCULAR; INTRAVENOUS at 11:48

## 2021-01-01 RX ADMIN — Medication 1 PACKET: at 21:23

## 2021-01-01 RX ADMIN — PIPERACILLIN AND TAZOBACTAM 3.38 G: 3; .375 INJECTION, POWDER, LYOPHILIZED, FOR SOLUTION INTRAVENOUS at 07:28

## 2021-01-01 RX ADMIN — ALBUMIN (HUMAN) 25 G: 0.25 INJECTION, SOLUTION INTRAVENOUS at 18:39

## 2021-01-01 RX ADMIN — MIDAZOLAM HYDROCHLORIDE 15 MG/HR: 5 INJECTION INTRAMUSCULAR; INTRAVENOUS at 10:21

## 2021-01-01 RX ADMIN — ZINC SULFATE 220 MG (50 MG) CAPSULE 1 CAPSULE: CAPSULE at 09:18

## 2021-01-01 RX ADMIN — Medication 300 MCG/HR: at 18:00

## 2021-01-01 RX ADMIN — GABAPENTIN 100 MG: 100 CAPSULE ORAL at 22:00

## 2021-01-01 RX ADMIN — INSULIN LISPRO 12 UNITS: 100 INJECTION, SOLUTION INTRAVENOUS; SUBCUTANEOUS at 10:27

## 2021-01-01 RX ADMIN — INSULIN GLARGINE 30 UNITS: 100 INJECTION, SOLUTION SUBCUTANEOUS at 21:34

## 2021-01-01 RX ADMIN — Medication 1 PACKET: at 13:48

## 2021-01-01 RX ADMIN — Medication 1 CAPSULE: at 08:09

## 2021-01-01 RX ADMIN — GABAPENTIN 100 MG: 100 CAPSULE ORAL at 08:25

## 2021-01-01 RX ADMIN — GABAPENTIN 100 MG: 100 CAPSULE ORAL at 09:08

## 2021-01-01 RX ADMIN — INSULIN LISPRO 3 UNITS: 100 INJECTION, SOLUTION INTRAVENOUS; SUBCUTANEOUS at 21:38

## 2021-01-01 RX ADMIN — PROPOFOL 45 MCG/KG/MIN: 10 INJECTION, EMULSION INTRAVENOUS at 10:49

## 2021-01-01 RX ADMIN — GABAPENTIN 100 MG: 100 CAPSULE ORAL at 17:54

## 2021-01-01 RX ADMIN — CLONIDINE HYDROCHLORIDE 0.1 MG: 0.1 TABLET ORAL at 17:41

## 2021-01-01 RX ADMIN — Medication 10 ML: at 14:37

## 2021-01-01 RX ADMIN — ASPIRIN 81 MG: 81 TABLET, CHEWABLE ORAL at 08:55

## 2021-01-01 RX ADMIN — MICONAZOLE NITRATE 2 % TOPICAL POWDER: at 18:03

## 2021-01-01 RX ADMIN — INSULIN LISPRO 5 UNITS: 100 INJECTION, SOLUTION INTRAVENOUS; SUBCUTANEOUS at 17:29

## 2021-01-01 RX ADMIN — AMLODIPINE BESYLATE 10 MG: 5 TABLET ORAL at 09:18

## 2021-01-01 RX ADMIN — INSULIN LISPRO 2 UNITS: 100 INJECTION, SOLUTION INTRAVENOUS; SUBCUTANEOUS at 18:35

## 2021-01-01 RX ADMIN — Medication 300 MCG/HR: at 13:15

## 2021-01-01 RX ADMIN — Medication 10 ML: at 21:36

## 2021-01-01 RX ADMIN — INSULIN LISPRO 10 UNITS: 100 INJECTION, SOLUTION INTRAVENOUS; SUBCUTANEOUS at 13:34

## 2021-01-01 RX ADMIN — MICONAZOLE NITRATE 2 % TOPICAL POWDER: at 10:32

## 2021-01-01 RX ADMIN — Medication 10 ML: at 14:00

## 2021-01-01 RX ADMIN — INSULIN LISPRO 3 UNITS: 100 INJECTION, SOLUTION INTRAVENOUS; SUBCUTANEOUS at 23:21

## 2021-01-01 RX ADMIN — LANSOPRAZOLE 30 MG: KIT at 18:03

## 2021-01-01 RX ADMIN — HYDROCORTISONE SODIUM SUCCINATE 50 MG: 100 INJECTION, POWDER, FOR SOLUTION INTRAMUSCULAR; INTRAVENOUS at 13:07

## 2021-01-01 RX ADMIN — FUROSEMIDE 80 MG: 10 INJECTION, SOLUTION INTRAMUSCULAR; INTRAVENOUS at 09:24

## 2021-01-01 RX ADMIN — ALBUTEROL SULFATE 2 PUFF: 108 AEROSOL, METERED RESPIRATORY (INHALATION) at 03:48

## 2021-01-01 RX ADMIN — FLUTICASONE PROPIONATE 2 SPRAY: 50 SPRAY, METERED NASAL at 08:48

## 2021-01-01 RX ADMIN — SODIUM CHLORIDE 40 MG: 9 INJECTION INTRAMUSCULAR; INTRAVENOUS; SUBCUTANEOUS at 21:49

## 2021-01-01 RX ADMIN — MICONAZOLE NITRATE 2 % TOPICAL POWDER: at 09:58

## 2021-01-01 RX ADMIN — Medication 10 ML: at 06:13

## 2021-01-01 RX ADMIN — OXYCODONE HYDROCHLORIDE AND ACETAMINOPHEN 500 MG: 500 TABLET ORAL at 08:57

## 2021-01-01 RX ADMIN — CISATRACURIUM BESYLATE 9 MCG/KG/MIN: 2 INJECTION INTRAVENOUS at 03:30

## 2021-01-01 RX ADMIN — FLUTICASONE PROPIONATE 2 SPRAY: 50 SPRAY, METERED NASAL at 09:46

## 2021-01-01 RX ADMIN — BUMETANIDE 1 MG: 0.25 INJECTION INTRAMUSCULAR; INTRAVENOUS at 08:43

## 2021-01-01 RX ADMIN — MICONAZOLE NITRATE 2 % TOPICAL POWDER: at 09:31

## 2021-01-01 RX ADMIN — Medication 275 MCG/HR: at 18:20

## 2021-01-01 RX ADMIN — ENOXAPARIN SODIUM 40 MG: 40 INJECTION SUBCUTANEOUS at 08:53

## 2021-01-01 RX ADMIN — GABAPENTIN 100 MG: 100 CAPSULE ORAL at 21:23

## 2021-01-01 RX ADMIN — HYDRALAZINE HYDROCHLORIDE 100 MG: 50 TABLET, FILM COATED ORAL at 15:35

## 2021-01-01 RX ADMIN — IRON SUCROSE 200 MG: 20 INJECTION, SOLUTION INTRAVENOUS at 10:35

## 2021-01-01 RX ADMIN — HYDROCORTISONE SODIUM SUCCINATE 50 MG: 100 INJECTION, POWDER, FOR SOLUTION INTRAMUSCULAR; INTRAVENOUS at 21:48

## 2021-01-01 RX ADMIN — OXYCODONE HYDROCHLORIDE AND ACETAMINOPHEN 500 MG: 500 TABLET ORAL at 08:47

## 2021-01-01 RX ADMIN — ENOXAPARIN SODIUM 40 MG: 40 INJECTION SUBCUTANEOUS at 13:34

## 2021-01-01 RX ADMIN — NOREPINEPHRINE BITARTRATE 4 MCG/MIN: 1 INJECTION, SOLUTION, CONCENTRATE INTRAVENOUS at 20:26

## 2021-01-01 RX ADMIN — DICLOFENAC SODIUM 2 G: 10 GEL TOPICAL at 08:55

## 2021-01-01 RX ADMIN — AMLODIPINE BESYLATE 10 MG: 5 TABLET ORAL at 10:01

## 2021-01-01 RX ADMIN — INSULIN LISPRO 10 UNITS: 100 INJECTION, SOLUTION INTRAVENOUS; SUBCUTANEOUS at 13:28

## 2021-01-01 RX ADMIN — Medication 1 CAPSULE: at 08:46

## 2021-01-01 RX ADMIN — Medication 1 PACKET: at 12:57

## 2021-01-01 RX ADMIN — PROPOFOL 35 MCG/KG/MIN: 10 INJECTION, EMULSION INTRAVENOUS at 20:54

## 2021-01-01 RX ADMIN — INSULIN LISPRO 20 UNITS: 100 INJECTION, SOLUTION INTRAVENOUS; SUBCUTANEOUS at 16:35

## 2021-01-01 RX ADMIN — PROPOFOL 25 MCG/KG/MIN: 10 INJECTION, EMULSION INTRAVENOUS at 08:16

## 2021-01-01 RX ADMIN — ASPIRIN 81 MG: 81 TABLET, CHEWABLE ORAL at 09:10

## 2021-01-01 RX ADMIN — GABAPENTIN 100 MG: 100 CAPSULE ORAL at 17:57

## 2021-01-01 RX ADMIN — FUROSEMIDE 40 MG: 10 INJECTION, SOLUTION INTRAMUSCULAR; INTRAVENOUS at 08:58

## 2021-01-01 RX ADMIN — DOCUSATE SODIUM 100 MG: 50 LIQUID ORAL at 08:44

## 2021-01-01 RX ADMIN — Medication: at 09:31

## 2021-01-01 RX ADMIN — Medication 1 PACKET: at 13:09

## 2021-01-01 RX ADMIN — LACTULOSE 45 ML: 20 SOLUTION ORAL at 06:57

## 2021-01-01 RX ADMIN — MIDAZOLAM 2 MG: 1 INJECTION INTRAMUSCULAR; INTRAVENOUS at 18:43

## 2021-01-01 RX ADMIN — POTASSIUM BICARBONATE 20 MEQ: 782 TABLET, EFFERVESCENT ORAL at 17:18

## 2021-01-01 RX ADMIN — PANTOPRAZOLE SODIUM 40 MG: 40 TABLET, DELAYED RELEASE ORAL at 07:30

## 2021-01-01 RX ADMIN — OXYCODONE HYDROCHLORIDE AND ACETAMINOPHEN 500 MG: 500 TABLET ORAL at 09:10

## 2021-01-01 RX ADMIN — INSULIN LISPRO 20 UNITS: 100 INJECTION, SOLUTION INTRAVENOUS; SUBCUTANEOUS at 06:57

## 2021-01-01 RX ADMIN — INSULIN LISPRO 2 UNITS: 100 INJECTION, SOLUTION INTRAVENOUS; SUBCUTANEOUS at 07:30

## 2021-01-01 RX ADMIN — PIPERACILLIN AND TAZOBACTAM 3.38 G: 3; .375 INJECTION, POWDER, LYOPHILIZED, FOR SOLUTION INTRAVENOUS at 17:16

## 2021-01-01 RX ADMIN — TAMSULOSIN HYDROCHLORIDE 0.4 MG: 0.4 CAPSULE ORAL at 10:01

## 2021-01-01 RX ADMIN — INSULIN LISPRO 10 UNITS: 100 INJECTION, SOLUTION INTRAVENOUS; SUBCUTANEOUS at 12:39

## 2021-01-01 RX ADMIN — PIPERACILLIN AND TAZOBACTAM 3.38 G: 3; .375 INJECTION, POWDER, LYOPHILIZED, FOR SOLUTION INTRAVENOUS at 22:01

## 2021-01-01 RX ADMIN — ALBUMIN (HUMAN) 25 G: 0.25 INJECTION, SOLUTION INTRAVENOUS at 18:09

## 2021-01-01 RX ADMIN — AMLODIPINE BESYLATE 10 MG: 5 TABLET ORAL at 11:18

## 2021-01-01 RX ADMIN — SEVELAMER CARBONATE 1.6 G: 0.8 POWDER, FOR SUSPENSION ORAL at 12:28

## 2021-01-01 RX ADMIN — OXYCODONE AND ACETAMINOPHEN 1 TABLET: 5; 325 TABLET ORAL at 20:49

## 2021-01-01 RX ADMIN — GABAPENTIN 100 MG: 100 CAPSULE ORAL at 15:22

## 2021-01-01 RX ADMIN — THERA TABS 1 TABLET: TAB at 08:44

## 2021-01-01 RX ADMIN — INSULIN LISPRO 7 UNITS: 100 INJECTION, SOLUTION INTRAVENOUS; SUBCUTANEOUS at 08:47

## 2021-01-01 RX ADMIN — MIDAZOLAM HYDROCHLORIDE 8 MG/HR: 5 INJECTION INTRAMUSCULAR; INTRAVENOUS at 18:06

## 2021-01-01 RX ADMIN — EPINEPHRINE 1 MG: 0.1 INJECTION, SOLUTION ENDOTRACHEAL; INTRACARDIAC; INTRAVENOUS at 10:27

## 2021-01-01 RX ADMIN — GABAPENTIN 100 MG: 100 CAPSULE ORAL at 18:43

## 2021-01-01 RX ADMIN — Medication 1 PACKET: at 08:57

## 2021-01-01 RX ADMIN — TAMSULOSIN HYDROCHLORIDE 0.4 MG: 0.4 CAPSULE ORAL at 10:28

## 2021-01-01 RX ADMIN — Medication 81 MG: at 09:24

## 2021-01-01 RX ADMIN — ALBUTEROL SULFATE 2 PUFF: 108 AEROSOL, METERED RESPIRATORY (INHALATION) at 00:01

## 2021-01-01 RX ADMIN — HEPARIN SODIUM 7500 UNITS: 5000 INJECTION INTRAVENOUS; SUBCUTANEOUS at 14:01

## 2021-01-01 RX ADMIN — HYDRALAZINE HYDROCHLORIDE 100 MG: 50 TABLET, FILM COATED ORAL at 08:43

## 2021-01-01 RX ADMIN — DEXAMETHASONE 6 MG: 4 TABLET ORAL at 09:33

## 2021-01-01 RX ADMIN — Medication: at 09:59

## 2021-01-01 RX ADMIN — OXYCODONE HYDROCHLORIDE AND ACETAMINOPHEN 500 MG: 500 TABLET ORAL at 17:44

## 2021-01-01 RX ADMIN — EPOETIN ALFA-EPBX 20000 UNITS: 10000 INJECTION, SOLUTION INTRAVENOUS; SUBCUTANEOUS at 21:38

## 2021-01-01 RX ADMIN — ALBUTEROL SULFATE 2 PUFF: 108 AEROSOL, METERED RESPIRATORY (INHALATION) at 15:40

## 2021-01-01 RX ADMIN — MIDAZOLAM 2 MG: 1 INJECTION INTRAMUSCULAR; INTRAVENOUS at 18:52

## 2021-01-01 RX ADMIN — INSULIN LISPRO 2 UNITS: 100 INJECTION, SOLUTION INTRAVENOUS; SUBCUTANEOUS at 22:47

## 2021-01-01 RX ADMIN — THERA TABS 1 TABLET: TAB at 09:23

## 2021-01-01 RX ADMIN — BICALUTAMIDE 50 MG: 50 TABLET ORAL at 08:50

## 2021-01-01 RX ADMIN — Medication: at 09:27

## 2021-01-01 RX ADMIN — ALBUTEROL SULFATE 2 PUFF: 108 AEROSOL, METERED RESPIRATORY (INHALATION) at 16:09

## 2021-01-01 RX ADMIN — HYDRALAZINE HYDROCHLORIDE 100 MG: 50 TABLET, FILM COATED ORAL at 09:23

## 2021-01-01 RX ADMIN — HEPARIN SODIUM 7500 UNITS: 5000 INJECTION INTRAVENOUS; SUBCUTANEOUS at 05:41

## 2021-01-01 RX ADMIN — Medication 1 PACKET: at 09:24

## 2021-01-01 RX ADMIN — COLLAGENASE SANTYL: 250 OINTMENT TOPICAL at 09:59

## 2021-01-01 RX ADMIN — ALBUMIN (HUMAN) 12.5 G: 0.25 INJECTION, SOLUTION INTRAVENOUS at 20:47

## 2021-01-01 RX ADMIN — OXYCODONE HYDROCHLORIDE AND ACETAMINOPHEN 500 MG: 500 TABLET ORAL at 08:59

## 2021-01-01 RX ADMIN — DICLOFENAC SODIUM 2 G: 10 GEL TOPICAL at 12:12

## 2021-01-01 RX ADMIN — EPOETIN ALFA-EPBX 20000 UNITS: 10000 INJECTION, SOLUTION INTRAVENOUS; SUBCUTANEOUS at 00:34

## 2021-01-01 RX ADMIN — HEPARIN SODIUM 7500 UNITS: 5000 INJECTION INTRAVENOUS; SUBCUTANEOUS at 13:48

## 2021-01-01 RX ADMIN — INSULIN LISPRO 3 UNITS: 100 INJECTION, SOLUTION INTRAVENOUS; SUBCUTANEOUS at 23:38

## 2021-01-01 RX ADMIN — OXYCODONE HYDROCHLORIDE AND ACETAMINOPHEN 500 MG: 500 TABLET ORAL at 10:29

## 2021-01-01 RX ADMIN — HYDRALAZINE HYDROCHLORIDE 20 MG: 20 INJECTION INTRAMUSCULAR; INTRAVENOUS at 13:56

## 2021-01-01 RX ADMIN — SODIUM BICARBONATE 650 MG: 650 TABLET ORAL at 09:02

## 2021-01-01 RX ADMIN — OXYCODONE HYDROCHLORIDE AND ACETAMINOPHEN 500 MG: 500 TABLET ORAL at 08:45

## 2021-01-01 RX ADMIN — DICLOFENAC SODIUM 2 G: 10 GEL TOPICAL at 22:15

## 2021-01-01 RX ADMIN — OXYCODONE AND ACETAMINOPHEN 1 TABLET: 5; 325 TABLET ORAL at 20:26

## 2021-01-01 RX ADMIN — PIPERACILLIN AND TAZOBACTAM 3.38 G: 3; .375 INJECTION, POWDER, LYOPHILIZED, FOR SOLUTION INTRAVENOUS at 05:00

## 2021-01-01 RX ADMIN — Medication: at 21:25

## 2021-01-01 RX ADMIN — Medication: at 20:54

## 2021-01-01 RX ADMIN — OXYCODONE AND ACETAMINOPHEN 1 TABLET: 5; 325 TABLET ORAL at 05:24

## 2021-01-01 RX ADMIN — TAMSULOSIN HYDROCHLORIDE 0.4 MG: 0.4 CAPSULE ORAL at 08:35

## 2021-01-01 RX ADMIN — GABAPENTIN 100 MG: 100 CAPSULE ORAL at 09:59

## 2021-01-01 RX ADMIN — Medication: at 21:40

## 2021-01-01 RX ADMIN — Medication: at 03:21

## 2021-01-01 RX ADMIN — INSULIN LISPRO 5 UNITS: 100 INJECTION, SOLUTION INTRAVENOUS; SUBCUTANEOUS at 12:24

## 2021-01-01 RX ADMIN — DICLOFENAC SODIUM 2 G: 10 GEL TOPICAL at 21:51

## 2021-01-01 RX ADMIN — Medication 275 MCG/HR: at 13:14

## 2021-01-01 RX ADMIN — SODIUM CHLORIDE 40 MG: 9 INJECTION INTRAMUSCULAR; INTRAVENOUS; SUBCUTANEOUS at 21:37

## 2021-01-01 RX ADMIN — PROPOFOL 25 MCG/KG/MIN: 10 INJECTION, EMULSION INTRAVENOUS at 12:36

## 2021-01-01 RX ADMIN — DIAPER RASH SKIN PROTECTENT: at 13:05

## 2021-01-01 RX ADMIN — SEVELAMER CARBONATE 1.6 G: 0.8 POWDER, FOR SUSPENSION ORAL at 08:55

## 2021-01-01 RX ADMIN — HYDROCORTISONE SODIUM SUCCINATE 50 MG: 100 INJECTION, POWDER, FOR SOLUTION INTRAMUSCULAR; INTRAVENOUS at 06:25

## 2021-01-01 RX ADMIN — GABAPENTIN 100 MG: 100 CAPSULE ORAL at 09:17

## 2021-01-01 RX ADMIN — FLUTICASONE PROPIONATE 2 SPRAY: 50 SPRAY, METERED NASAL at 10:31

## 2021-01-01 RX ADMIN — PROPOFOL 50 MCG/KG/MIN: 10 INJECTION, EMULSION INTRAVENOUS at 19:00

## 2021-01-01 RX ADMIN — BUMETANIDE 1 MG: 0.25 INJECTION INTRAMUSCULAR; INTRAVENOUS at 20:50

## 2021-01-01 RX ADMIN — TAMSULOSIN HYDROCHLORIDE 0.4 MG: 0.4 CAPSULE ORAL at 08:47

## 2021-01-01 RX ADMIN — DICLOFENAC SODIUM 2 G: 10 GEL TOPICAL at 11:56

## 2021-01-01 RX ADMIN — ALBUTEROL SULFATE 2 PUFF: 108 AEROSOL, METERED RESPIRATORY (INHALATION) at 07:30

## 2021-01-01 RX ADMIN — LANSOPRAZOLE 30 MG: KIT at 17:56

## 2021-01-01 RX ADMIN — FLUTICASONE PROPIONATE 2 SPRAY: 50 SPRAY, METERED NASAL at 09:13

## 2021-01-01 RX ADMIN — LANSOPRAZOLE 30 MG: KIT at 18:04

## 2021-01-01 RX ADMIN — DOCUSATE SODIUM 100 MG: 50 LIQUID ORAL at 17:56

## 2021-01-01 RX ADMIN — INSULIN GLARGINE 40 UNITS: 100 INJECTION, SOLUTION SUBCUTANEOUS at 00:43

## 2021-01-01 RX ADMIN — PIPERACILLIN AND TAZOBACTAM 3.38 G: 3; .375 INJECTION, POWDER, LYOPHILIZED, FOR SOLUTION INTRAVENOUS at 18:57

## 2021-01-01 RX ADMIN — PIPERACILLIN AND TAZOBACTAM 3.38 G: 3; .375 INJECTION, POWDER, LYOPHILIZED, FOR SOLUTION INTRAVENOUS at 07:15

## 2021-01-01 RX ADMIN — OXYCODONE HYDROCHLORIDE AND ACETAMINOPHEN 500 MG: 500 TABLET ORAL at 09:23

## 2021-01-01 RX ADMIN — LANSOPRAZOLE 30 MG: KIT at 18:36

## 2021-01-01 RX ADMIN — CISATRACURIUM BESYLATE 9 MCG/KG/MIN: 2 INJECTION INTRAVENOUS at 15:26

## 2021-01-01 RX ADMIN — MICONAZOLE NITRATE 2 % TOPICAL POWDER: at 18:02

## 2021-01-01 RX ADMIN — ALBUMIN (HUMAN) 12.5 G: 0.25 INJECTION, SOLUTION INTRAVENOUS at 09:10

## 2021-01-01 RX ADMIN — DICLOFENAC SODIUM 2 G: 10 GEL TOPICAL at 09:20

## 2021-01-01 RX ADMIN — GABAPENTIN 100 MG: 100 CAPSULE ORAL at 12:54

## 2021-01-01 RX ADMIN — PIPERACILLIN AND TAZOBACTAM 3.38 G: 3; .375 INJECTION, POWDER, LYOPHILIZED, FOR SOLUTION INTRAVENOUS at 05:24

## 2021-01-01 RX ADMIN — OXYCODONE HYDROCHLORIDE AND ACETAMINOPHEN 500 MG: 500 TABLET ORAL at 09:08

## 2021-01-01 RX ADMIN — INSULIN LISPRO 10 UNITS: 100 INJECTION, SOLUTION INTRAVENOUS; SUBCUTANEOUS at 18:38

## 2021-01-01 RX ADMIN — INSULIN GLARGINE 40 UNITS: 100 INJECTION, SOLUTION SUBCUTANEOUS at 22:53

## 2021-01-01 RX ADMIN — DICLOFENAC SODIUM 2 G: 10 GEL TOPICAL at 17:04

## 2021-01-01 RX ADMIN — Medication: at 20:34

## 2021-01-01 RX ADMIN — Medication 1 CAPSULE: at 08:55

## 2021-01-01 RX ADMIN — GABAPENTIN 100 MG: 100 CAPSULE ORAL at 15:55

## 2021-01-01 RX ADMIN — INSULIN LISPRO 10 UNITS: 100 INJECTION, SOLUTION INTRAVENOUS; SUBCUTANEOUS at 16:35

## 2021-01-01 RX ADMIN — PROPOFOL 25 MCG/KG/MIN: 10 INJECTION, EMULSION INTRAVENOUS at 00:20

## 2021-01-01 RX ADMIN — DICLOFENAC SODIUM 2 G: 10 GEL TOPICAL at 17:19

## 2021-01-01 RX ADMIN — POTASSIUM CHLORIDE 20 MEQ: 400 INJECTION, SOLUTION INTRAVENOUS at 21:08

## 2021-01-01 RX ADMIN — LINEZOLID 600 MG: 600 INJECTION, SOLUTION INTRAVENOUS at 21:44

## 2021-01-01 RX ADMIN — PIPERACILLIN AND TAZOBACTAM 3.38 G: 3; .375 INJECTION, POWDER, LYOPHILIZED, FOR SOLUTION INTRAVENOUS at 06:26

## 2021-01-01 RX ADMIN — DICLOFENAC SODIUM 2 G: 10 GEL TOPICAL at 22:55

## 2021-01-01 RX ADMIN — CISATRACURIUM BESYLATE 9 MCG/KG/MIN: 2 INJECTION INTRAVENOUS at 21:54

## 2021-01-01 RX ADMIN — GABAPENTIN 100 MG: 100 CAPSULE ORAL at 10:02

## 2021-01-01 RX ADMIN — Medication 150 MCG/HR: at 04:55

## 2021-01-01 RX ADMIN — HYDRALAZINE HYDROCHLORIDE 100 MG: 50 TABLET, FILM COATED ORAL at 12:53

## 2021-01-01 RX ADMIN — ENOXAPARIN SODIUM 40 MG: 40 INJECTION SUBCUTANEOUS at 08:47

## 2021-01-01 RX ADMIN — OXYCODONE AND ACETAMINOPHEN 1 TABLET: 5; 325 TABLET ORAL at 22:17

## 2021-01-01 RX ADMIN — THERA TABS 1 TABLET: TAB at 09:17

## 2021-01-01 RX ADMIN — Medication 1 CAPSULE: at 09:03

## 2021-01-01 RX ADMIN — ENOXAPARIN SODIUM 40 MG: 40 INJECTION SUBCUTANEOUS at 09:00

## 2021-01-01 RX ADMIN — Medication 10 ML: at 16:30

## 2021-01-01 RX ADMIN — HYDRALAZINE HYDROCHLORIDE 100 MG: 50 TABLET, FILM COATED ORAL at 22:49

## 2021-01-01 RX ADMIN — HEPARIN SODIUM 3200 UNITS: 1000 INJECTION INTRAVENOUS; SUBCUTANEOUS at 23:39

## 2021-01-01 RX ADMIN — PROPOFOL 40 MCG/KG/MIN: 10 INJECTION, EMULSION INTRAVENOUS at 12:20

## 2021-01-01 RX ADMIN — ENOXAPARIN SODIUM 40 MG: 40 INJECTION SUBCUTANEOUS at 13:29

## 2021-01-01 RX ADMIN — CHLORHEXIDINE GLUCONATE 15 ML: 0.12 RINSE ORAL at 20:33

## 2021-01-01 RX ADMIN — GABAPENTIN 100 MG: 100 CAPSULE ORAL at 09:27

## 2021-01-01 RX ADMIN — CHLORHEXIDINE GLUCONATE 15 ML: 0.12 RINSE ORAL at 20:12

## 2021-01-01 RX ADMIN — FLUTICASONE PROPIONATE 2 SPRAY: 50 SPRAY, METERED NASAL at 13:16

## 2021-01-01 RX ADMIN — Medication 10 ML: at 15:56

## 2021-01-01 RX ADMIN — MICONAZOLE NITRATE 2 % TOPICAL POWDER: at 18:14

## 2021-01-01 RX ADMIN — PIPERACILLIN AND TAZOBACTAM 3.38 G: 3; .375 INJECTION, POWDER, LYOPHILIZED, FOR SOLUTION INTRAVENOUS at 21:56

## 2021-01-01 RX ADMIN — DICLOFENAC SODIUM 2 G: 10 GEL TOPICAL at 12:02

## 2021-01-01 RX ADMIN — VANCOMYCIN HYDROCHLORIDE 2000 MG: 10 INJECTION, POWDER, LYOPHILIZED, FOR SOLUTION INTRAVENOUS at 13:51

## 2021-01-01 RX ADMIN — LINEZOLID 600 MG: 600 INJECTION, SOLUTION INTRAVENOUS at 08:57

## 2021-01-01 RX ADMIN — DICLOFENAC SODIUM 2 G: 10 GEL TOPICAL at 18:54

## 2021-01-01 RX ADMIN — SODIUM BICARBONATE 650 MG: 650 TABLET ORAL at 08:52

## 2021-01-01 RX ADMIN — DICLOFENAC SODIUM 2 G: 10 GEL TOPICAL at 13:25

## 2021-01-01 RX ADMIN — PIPERACILLIN AND TAZOBACTAM 3.38 G: 3; .375 INJECTION, POWDER, LYOPHILIZED, FOR SOLUTION INTRAVENOUS at 13:29

## 2021-01-01 RX ADMIN — GABAPENTIN 100 MG: 100 CAPSULE ORAL at 08:44

## 2021-01-01 RX ADMIN — OXYCODONE HYDROCHLORIDE AND ACETAMINOPHEN 500 MG: 500 TABLET ORAL at 08:46

## 2021-01-01 RX ADMIN — HYDRALAZINE HYDROCHLORIDE 100 MG: 50 TABLET, FILM COATED ORAL at 15:22

## 2021-01-01 RX ADMIN — Medication 2000 UNITS: at 08:45

## 2021-01-01 RX ADMIN — INSULIN LISPRO 2 UNITS: 100 INJECTION, SOLUTION INTRAVENOUS; SUBCUTANEOUS at 06:10

## 2021-01-01 RX ADMIN — INSULIN LISPRO 20 UNITS: 100 INJECTION, SOLUTION INTRAVENOUS; SUBCUTANEOUS at 13:00

## 2021-01-01 RX ADMIN — HYDRALAZINE HYDROCHLORIDE 100 MG: 50 TABLET, FILM COATED ORAL at 18:43

## 2021-01-01 RX ADMIN — MICONAZOLE NITRATE 2 % TOPICAL POWDER: at 11:42

## 2021-01-01 RX ADMIN — SODIUM BICARBONATE 650 MG: 650 TABLET ORAL at 09:57

## 2021-01-01 RX ADMIN — Medication 1 PACKET: at 17:16

## 2021-01-01 RX ADMIN — INSULIN LISPRO 20 UNITS: 100 INJECTION, SOLUTION INTRAVENOUS; SUBCUTANEOUS at 16:24

## 2021-01-01 RX ADMIN — SUCCINYLCHOLINE CHLORIDE 100 MG: 20 INJECTION INTRAMUSCULAR; INTRAVENOUS at 18:53

## 2021-01-01 RX ADMIN — PIPERACILLIN AND TAZOBACTAM 3.38 G: 3; .375 INJECTION, POWDER, LYOPHILIZED, FOR SOLUTION INTRAVENOUS at 17:31

## 2021-01-01 RX ADMIN — SEVELAMER CARBONATE 1.6 G: 0.8 POWDER, FOR SUSPENSION ORAL at 18:02

## 2021-01-01 RX ADMIN — GABAPENTIN 100 MG: 100 CAPSULE ORAL at 22:07

## 2021-01-01 RX ADMIN — SODIUM BICARBONATE 50 MEQ: 84 INJECTION, SOLUTION INTRAVENOUS at 10:42

## 2021-01-01 RX ADMIN — SODIUM CHLORIDE 40 MG: 9 INJECTION INTRAMUSCULAR; INTRAVENOUS; SUBCUTANEOUS at 08:24

## 2021-01-01 RX ADMIN — REMDESIVIR 100 MG: 5 INJECTION INTRAVENOUS at 21:00

## 2021-01-01 RX ADMIN — DIAPER RASH SKIN PROTECTENT: at 00:26

## 2021-01-01 RX ADMIN — Medication 2000 UNITS: at 08:56

## 2021-01-01 RX ADMIN — ENOXAPARIN SODIUM 40 MG: 40 INJECTION SUBCUTANEOUS at 09:26

## 2021-01-01 RX ADMIN — PIPERACILLIN AND TAZOBACTAM 3.38 G: 3; .375 INJECTION, POWDER, LYOPHILIZED, FOR SOLUTION INTRAVENOUS at 14:44

## 2021-01-01 RX ADMIN — INSULIN LISPRO 10 UNITS: 100 INJECTION, SOLUTION INTRAVENOUS; SUBCUTANEOUS at 19:09

## 2021-01-01 RX ADMIN — Medication: at 22:13

## 2021-01-01 RX ADMIN — AMLODIPINE BESYLATE 10 MG: 5 TABLET ORAL at 08:52

## 2021-01-01 RX ADMIN — DICLOFENAC SODIUM 2 G: 10 GEL TOPICAL at 17:14

## 2021-01-01 RX ADMIN — INSULIN GLARGINE 50 UNITS: 100 INJECTION, SOLUTION SUBCUTANEOUS at 21:51

## 2021-01-01 RX ADMIN — Medication 100 MCG/HR: at 19:07

## 2021-01-01 RX ADMIN — COLLAGENASE SANTYL: 250 OINTMENT TOPICAL at 09:34

## 2021-01-01 RX ADMIN — ALBUTEROL SULFATE 5 MG: 2.5 SOLUTION RESPIRATORY (INHALATION) at 00:42

## 2021-01-01 RX ADMIN — PIPERACILLIN AND TAZOBACTAM 3.38 G: 3; .375 INJECTION, POWDER, LYOPHILIZED, FOR SOLUTION INTRAVENOUS at 06:32

## 2021-01-01 RX ADMIN — HYDRALAZINE HYDROCHLORIDE 100 MG: 50 TABLET, FILM COATED ORAL at 16:35

## 2021-01-01 RX ADMIN — INSULIN LISPRO 2 UNITS: 100 INJECTION, SOLUTION INTRAVENOUS; SUBCUTANEOUS at 00:15

## 2021-01-01 RX ADMIN — PROPOFOL 25 MCG/KG/MIN: 10 INJECTION, EMULSION INTRAVENOUS at 04:58

## 2021-01-01 RX ADMIN — INSULIN LISPRO 3 UNITS: 100 INJECTION, SOLUTION INTRAVENOUS; SUBCUTANEOUS at 17:23

## 2021-01-01 RX ADMIN — Medication 1 PACKET: at 21:00

## 2021-01-01 RX ADMIN — MICONAZOLE NITRATE 2 % TOPICAL POWDER: at 17:47

## 2021-01-01 RX ADMIN — MICONAZOLE NITRATE 2 % TOPICAL POWDER: at 09:28

## 2021-01-01 RX ADMIN — GABAPENTIN 100 MG: 100 CAPSULE ORAL at 09:32

## 2021-01-01 RX ADMIN — GABAPENTIN 100 MG: 100 CAPSULE ORAL at 17:02

## 2021-01-01 RX ADMIN — PROPOFOL 40 MCG/KG/MIN: 10 INJECTION, EMULSION INTRAVENOUS at 01:36

## 2021-01-01 RX ADMIN — Medication: at 09:20

## 2021-01-01 RX ADMIN — GABAPENTIN 100 MG: 100 CAPSULE ORAL at 18:03

## 2021-01-01 RX ADMIN — INSULIN LISPRO 2 UNITS: 100 INJECTION, SOLUTION INTRAVENOUS; SUBCUTANEOUS at 09:56

## 2021-01-01 RX ADMIN — FLUTICASONE PROPIONATE 2 SPRAY: 50 SPRAY, METERED NASAL at 13:02

## 2021-01-01 RX ADMIN — Medication: at 09:13

## 2021-01-01 RX ADMIN — Medication 1 PACKET: at 09:32

## 2021-01-01 RX ADMIN — EPOETIN ALFA-EPBX 20000 UNITS: 10000 INJECTION, SOLUTION INTRAVENOUS; SUBCUTANEOUS at 01:28

## 2021-01-01 RX ADMIN — DICLOFENAC SODIUM 2 G: 10 GEL TOPICAL at 18:40

## 2021-01-01 RX ADMIN — Medication 225 MCG/HR: at 17:15

## 2021-01-01 RX ADMIN — Medication 10 ML: at 13:07

## 2021-01-01 RX ADMIN — TAMSULOSIN HYDROCHLORIDE 0.4 MG: 0.4 CAPSULE ORAL at 09:27

## 2021-01-01 RX ADMIN — DICLOFENAC SODIUM 2 G: 10 GEL TOPICAL at 18:11

## 2021-01-01 RX ADMIN — POTASSIUM CHLORIDE 20 MEQ: 400 INJECTION, SOLUTION INTRAVENOUS at 10:36

## 2021-01-01 RX ADMIN — PIPERACILLIN AND TAZOBACTAM 3.38 G: 3; .375 INJECTION, POWDER, LYOPHILIZED, FOR SOLUTION INTRAVENOUS at 22:18

## 2021-01-01 RX ADMIN — Medication 2000 UNITS: at 08:57

## 2021-01-01 RX ADMIN — DIAPER RASH SKIN PROTECTENT: at 05:00

## 2021-01-01 RX ADMIN — THERA TABS 1 TABLET: TAB at 09:33

## 2021-01-01 RX ADMIN — POTASSIUM CHLORIDE 10 MEQ: 14.9 INJECTION, SOLUTION INTRAVENOUS at 12:46

## 2021-01-01 RX ADMIN — INSULIN GLARGINE 40 UNITS: 100 INJECTION, SOLUTION SUBCUTANEOUS at 22:00

## 2021-01-01 RX ADMIN — IPRATROPIUM BROMIDE AND ALBUTEROL SULFATE 3 ML: .5; 3 SOLUTION RESPIRATORY (INHALATION) at 14:36

## 2021-01-01 RX ADMIN — PROPOFOL 50 MCG/KG/MIN: 10 INJECTION, EMULSION INTRAVENOUS at 14:19

## 2021-01-01 RX ADMIN — ALBUMIN (HUMAN) 25 G: 0.25 INJECTION, SOLUTION INTRAVENOUS at 08:59

## 2021-01-01 RX ADMIN — INSULIN LISPRO 2 UNITS: 100 INJECTION, SOLUTION INTRAVENOUS; SUBCUTANEOUS at 23:55

## 2021-01-01 RX ADMIN — HYDRALAZINE HYDROCHLORIDE 100 MG: 50 TABLET, FILM COATED ORAL at 21:06

## 2021-01-01 RX ADMIN — DIAPER RASH SKIN PROTECTENT: at 14:38

## 2021-01-01 RX ADMIN — DICLOFENAC SODIUM 2 G: 10 GEL TOPICAL at 08:58

## 2021-01-01 RX ADMIN — PROPOFOL 40 MCG/KG/MIN: 10 INJECTION, EMULSION INTRAVENOUS at 18:24

## 2021-01-01 RX ADMIN — GABAPENTIN 100 MG: 100 CAPSULE ORAL at 22:18

## 2021-01-01 RX ADMIN — AMLODIPINE BESYLATE 10 MG: 5 TABLET ORAL at 09:57

## 2021-01-01 RX ADMIN — DIAPER RASH SKIN PROTECTENT: at 06:08

## 2021-01-01 RX ADMIN — SEVELAMER CARBONATE 1.6 G: 0.8 POWDER, FOR SUSPENSION ORAL at 17:16

## 2021-01-01 RX ADMIN — Medication 225 MCG/HR: at 20:34

## 2021-01-01 RX ADMIN — PROPOFOL 40 MCG/KG/MIN: 10 INJECTION, EMULSION INTRAVENOUS at 10:36

## 2021-01-01 RX ADMIN — METHYLPREDNISOLONE SODIUM SUCCINATE 40 MG: 40 INJECTION, POWDER, FOR SOLUTION INTRAMUSCULAR; INTRAVENOUS at 06:00

## 2021-01-01 RX ADMIN — INSULIN GLARGINE 27 UNITS: 100 INJECTION, SOLUTION SUBCUTANEOUS at 22:02

## 2021-01-01 RX ADMIN — DICLOFENAC SODIUM 2 G: 10 GEL TOPICAL at 09:15

## 2021-01-01 RX ADMIN — PROPOFOL 40 MCG/KG/MIN: 10 INJECTION, EMULSION INTRAVENOUS at 15:59

## 2021-01-01 RX ADMIN — CHLORHEXIDINE GLUCONATE 15 ML: 0.12 RINSE ORAL at 08:58

## 2021-01-01 RX ADMIN — OXYCODONE HYDROCHLORIDE AND ACETAMINOPHEN 500 MG: 500 TABLET ORAL at 08:09

## 2021-01-01 RX ADMIN — MICONAZOLE NITRATE 2 % TOPICAL POWDER: at 18:29

## 2021-01-01 RX ADMIN — CISATRACURIUM BESYLATE 9 MCG/KG/MIN: 2 INJECTION INTRAVENOUS at 03:48

## 2021-01-01 RX ADMIN — Medication 1 PACKET: at 00:16

## 2021-01-01 RX ADMIN — Medication 1 CAPSULE: at 08:57

## 2021-01-01 RX ADMIN — DIAPER RASH SKIN PROTECTENT: at 22:15

## 2021-01-01 RX ADMIN — ENOXAPARIN SODIUM 40 MG: 40 INJECTION SUBCUTANEOUS at 00:34

## 2021-01-01 RX ADMIN — Medication: at 08:55

## 2021-01-01 RX ADMIN — ENOXAPARIN SODIUM 40 MG: 40 INJECTION SUBCUTANEOUS at 08:44

## 2021-01-01 RX ADMIN — MICONAZOLE NITRATE 2 % TOPICAL POWDER: at 08:58

## 2021-01-01 RX ADMIN — GABAPENTIN 100 MG: 100 CAPSULE ORAL at 20:57

## 2021-01-01 RX ADMIN — AMLODIPINE BESYLATE 10 MG: 5 TABLET ORAL at 09:07

## 2021-01-01 RX ADMIN — DICLOFENAC SODIUM 2 G: 10 GEL TOPICAL at 13:19

## 2021-01-01 RX ADMIN — ZINC SULFATE 220 MG (50 MG) CAPSULE 1 CAPSULE: CAPSULE at 08:47

## 2021-01-01 RX ADMIN — ASPIRIN 81 MG: 81 TABLET, CHEWABLE ORAL at 08:09

## 2021-01-01 RX ADMIN — INSULIN LISPRO 3 UNITS: 100 INJECTION, SOLUTION INTRAVENOUS; SUBCUTANEOUS at 12:08

## 2021-01-01 RX ADMIN — Medication: at 20:24

## 2021-01-01 RX ADMIN — HYDROCORTISONE SODIUM SUCCINATE 50 MG: 100 INJECTION, POWDER, FOR SOLUTION INTRAMUSCULAR; INTRAVENOUS at 23:37

## 2021-01-01 RX ADMIN — TAMSULOSIN HYDROCHLORIDE 0.4 MG: 0.4 CAPSULE ORAL at 09:34

## 2021-01-01 RX ADMIN — INSULIN LISPRO 2 UNITS: 100 INJECTION, SOLUTION INTRAVENOUS; SUBCUTANEOUS at 10:28

## 2021-01-01 RX ADMIN — ALTEPLASE 1 MG: 2.2 INJECTION, POWDER, LYOPHILIZED, FOR SOLUTION INTRAVENOUS at 03:31

## 2021-01-01 RX ADMIN — SODIUM CHLORIDE 75 ML/HR: 9 INJECTION, SOLUTION INTRAVENOUS at 18:00

## 2021-01-01 RX ADMIN — THERA TABS 1 TABLET: TAB at 09:24

## 2021-01-01 RX ADMIN — SODIUM BICARBONATE 650 MG: 650 TABLET ORAL at 12:54

## 2021-01-01 RX ADMIN — FLUTICASONE PROPIONATE 2 SPRAY: 50 SPRAY, METERED NASAL at 08:39

## 2021-01-01 RX ADMIN — PIPERACILLIN AND TAZOBACTAM 3.38 G: 3; .375 INJECTION, POWDER, LYOPHILIZED, FOR SOLUTION INTRAVENOUS at 06:25

## 2021-01-01 RX ADMIN — ALBUMIN (HUMAN) 25 G: 0.25 INJECTION, SOLUTION INTRAVENOUS at 20:25

## 2021-01-01 RX ADMIN — OXYCODONE AND ACETAMINOPHEN 1 TABLET: 5; 325 TABLET ORAL at 10:53

## 2021-01-01 RX ADMIN — Medication 300 MCG/HR: at 13:12

## 2021-01-01 RX ADMIN — Medication 275 MCG/HR: at 01:27

## 2021-01-01 RX ADMIN — DICLOFENAC SODIUM 2 G: 10 GEL TOPICAL at 12:58

## 2021-01-01 RX ADMIN — PIPERACILLIN AND TAZOBACTAM 3.38 G: 3; .375 INJECTION, POWDER, LYOPHILIZED, FOR SOLUTION INTRAVENOUS at 17:37

## 2021-01-01 RX ADMIN — PROPOFOL 25 MCG/KG/MIN: 10 INJECTION, EMULSION INTRAVENOUS at 07:17

## 2021-01-01 RX ADMIN — TAMSULOSIN HYDROCHLORIDE 0.4 MG: 0.4 CAPSULE ORAL at 10:22

## 2021-01-01 RX ADMIN — MIDAZOLAM HYDROCHLORIDE 8 MG/HR: 5 INJECTION INTRAMUSCULAR; INTRAVENOUS at 22:40

## 2021-01-01 RX ADMIN — Medication 20 ML: at 23:18

## 2021-01-01 RX ADMIN — SODIUM BICARBONATE 650 MG: 650 TABLET ORAL at 09:19

## 2021-01-01 RX ADMIN — CEFEPIME HYDROCHLORIDE 2 G: 2 INJECTION, POWDER, FOR SOLUTION INTRAVENOUS at 09:19

## 2021-01-01 RX ADMIN — GABAPENTIN 100 MG: 100 CAPSULE ORAL at 08:38

## 2021-01-01 RX ADMIN — MICONAZOLE NITRATE 2 % TOPICAL POWDER: at 17:14

## 2021-01-01 RX ADMIN — Medication 10 ML: at 14:13

## 2021-01-01 RX ADMIN — Medication: at 06:12

## 2021-01-01 RX ADMIN — HEPARIN SODIUM 3200 UNITS: 1000 INJECTION INTRAVENOUS; SUBCUTANEOUS at 18:58

## 2021-01-01 RX ADMIN — Medication 1 PACKET: at 12:44

## 2021-01-01 RX ADMIN — Medication 150 MCG/HR: at 19:25

## 2021-01-01 RX ADMIN — DIAPER RASH SKIN PROTECTENT: at 14:57

## 2021-01-01 RX ADMIN — TORSEMIDE 80 MG: 20 TABLET ORAL at 18:43

## 2021-01-01 RX ADMIN — HEPARIN SODIUM 7500 UNITS: 5000 INJECTION INTRAVENOUS; SUBCUTANEOUS at 21:23

## 2021-01-01 RX ADMIN — MICONAZOLE NITRATE 2 % TOPICAL POWDER: at 09:03

## 2021-01-01 RX ADMIN — MICONAZOLE NITRATE 2 % TOPICAL POWDER: at 09:47

## 2021-01-01 RX ADMIN — Medication 1 PACKET: at 12:15

## 2021-01-01 RX ADMIN — INSULIN LISPRO 10 UNITS: 100 INJECTION, SOLUTION INTRAVENOUS; SUBCUTANEOUS at 13:13

## 2021-01-01 RX ADMIN — Medication 300 MCG/HR: at 17:55

## 2021-01-01 RX ADMIN — METRONIDAZOLE 500 MG: 500 INJECTION, SOLUTION INTRAVENOUS at 11:40

## 2021-01-01 RX ADMIN — Medication 81 MG: at 11:18

## 2021-01-01 RX ADMIN — Medication 10 ML: at 07:19

## 2021-01-01 RX ADMIN — Medication: at 21:05

## 2021-01-01 RX ADMIN — DICLOFENAC SODIUM 2 G: 10 GEL TOPICAL at 12:37

## 2021-01-01 RX ADMIN — FUROSEMIDE 40 MG: 10 INJECTION, SOLUTION INTRAMUSCULAR; INTRAVENOUS at 21:59

## 2021-01-01 RX ADMIN — Medication 10 ML: at 06:32

## 2021-01-01 RX ADMIN — DICLOFENAC SODIUM 2 G: 10 GEL TOPICAL at 01:50

## 2021-01-01 RX ADMIN — GABAPENTIN 100 MG: 100 CAPSULE ORAL at 08:45

## 2021-01-01 RX ADMIN — CHLORHEXIDINE GLUCONATE 15 ML: 0.12 RINSE ORAL at 21:00

## 2021-01-01 RX ADMIN — DICLOFENAC SODIUM 2 G: 10 GEL TOPICAL at 21:28

## 2021-01-01 RX ADMIN — PROPOFOL 35 MCG/KG/MIN: 10 INJECTION, EMULSION INTRAVENOUS at 20:45

## 2021-01-01 RX ADMIN — PROPOFOL 25 MCG/KG/MIN: 10 INJECTION, EMULSION INTRAVENOUS at 11:26

## 2021-01-01 RX ADMIN — HYDRALAZINE HYDROCHLORIDE 100 MG: 50 TABLET, FILM COATED ORAL at 17:23

## 2021-01-01 RX ADMIN — GABAPENTIN 100 MG: 100 CAPSULE ORAL at 21:27

## 2021-01-01 RX ADMIN — Medication: at 08:57

## 2021-01-01 RX ADMIN — PROPOFOL 30 MCG/KG/MIN: 10 INJECTION, EMULSION INTRAVENOUS at 22:16

## 2021-01-01 RX ADMIN — Medication 300 MCG/HR: at 03:40

## 2021-01-01 RX ADMIN — GABAPENTIN 100 MG: 100 CAPSULE ORAL at 09:03

## 2021-01-01 RX ADMIN — GABAPENTIN 100 MG: 100 CAPSULE ORAL at 16:17

## 2021-01-01 RX ADMIN — Medication 1 PACKET: at 22:14

## 2021-01-01 RX ADMIN — Medication 300 MCG/HR: at 18:06

## 2021-01-01 RX ADMIN — Medication 300 MCG/HR: at 03:31

## 2021-01-01 RX ADMIN — Medication: at 20:22

## 2021-01-01 RX ADMIN — PROPOFOL 25 MCG/KG/MIN: 10 INJECTION, EMULSION INTRAVENOUS at 08:28

## 2021-01-01 RX ADMIN — ALBUMIN (HUMAN) 25 G: 0.25 INJECTION, SOLUTION INTRAVENOUS at 09:27

## 2021-01-01 RX ADMIN — DICLOFENAC SODIUM 2 G: 10 GEL TOPICAL at 17:35

## 2021-01-01 RX ADMIN — MICONAZOLE NITRATE 2 % TOPICAL POWDER: at 09:35

## 2021-01-01 RX ADMIN — MIDAZOLAM 2 MG: 1 INJECTION INTRAMUSCULAR; INTRAVENOUS at 06:09

## 2021-01-01 RX ADMIN — MICONAZOLE NITRATE 2 % TOPICAL POWDER: at 08:55

## 2021-01-01 RX ADMIN — Medication: at 09:11

## 2021-01-01 RX ADMIN — INSULIN LISPRO 5 UNITS: 100 INJECTION, SOLUTION INTRAVENOUS; SUBCUTANEOUS at 18:18

## 2021-01-01 RX ADMIN — Medication 1 PACKET: at 11:04

## 2021-01-01 RX ADMIN — Medication 2000 UNITS: at 11:04

## 2021-01-01 RX ADMIN — PIPERACILLIN AND TAZOBACTAM 3.38 G: 3; .375 INJECTION, POWDER, LYOPHILIZED, FOR SOLUTION INTRAVENOUS at 18:39

## 2021-01-01 RX ADMIN — PROPOFOL 50 MCG/KG/MIN: 10 INJECTION, EMULSION INTRAVENOUS at 16:49

## 2021-01-01 RX ADMIN — SEVELAMER CARBONATE 1.6 G: 0.8 POWDER, FOR SUSPENSION ORAL at 11:39

## 2021-01-01 RX ADMIN — EPOETIN ALFA-EPBX 20000 UNITS: 10000 INJECTION, SOLUTION INTRAVENOUS; SUBCUTANEOUS at 23:48

## 2021-01-01 RX ADMIN — Medication 300 MCG/HR: at 14:23

## 2021-01-01 RX ADMIN — IPRATROPIUM BROMIDE AND ALBUTEROL SULFATE 3 ML: .5; 3 SOLUTION RESPIRATORY (INHALATION) at 03:51

## 2021-01-01 RX ADMIN — DICLOFENAC SODIUM 2 G: 10 GEL TOPICAL at 12:24

## 2021-01-01 RX ADMIN — OXYCODONE HYDROCHLORIDE AND ACETAMINOPHEN 500 MG: 500 TABLET ORAL at 08:44

## 2021-01-01 RX ADMIN — DICLOFENAC SODIUM 2 G: 10 GEL TOPICAL at 08:37

## 2021-01-01 RX ADMIN — Medication 10 ML: at 06:04

## 2021-01-01 RX ADMIN — DIAPER RASH SKIN PROTECTENT: at 13:10

## 2021-01-01 RX ADMIN — LANSOPRAZOLE 30 MG: KIT at 08:09

## 2021-01-01 RX ADMIN — OXYCODONE AND ACETAMINOPHEN 1 TABLET: 5; 325 TABLET ORAL at 03:11

## 2021-01-01 RX ADMIN — MIDAZOLAM HYDROCHLORIDE 8 MG/HR: 5 INJECTION INTRAMUSCULAR; INTRAVENOUS at 15:20

## 2021-01-01 RX ADMIN — INSULIN LISPRO 10 UNITS: 100 INJECTION, SOLUTION INTRAVENOUS; SUBCUTANEOUS at 08:48

## 2021-01-01 RX ADMIN — DIAPER RASH SKIN PROTECTENT: at 22:00

## 2021-01-01 RX ADMIN — Medication 10 ML: at 15:14

## 2021-01-01 RX ADMIN — Medication 1 PACKET: at 18:03

## 2021-01-01 RX ADMIN — SODIUM CHLORIDE 40 MG: 9 INJECTION INTRAMUSCULAR; INTRAVENOUS; SUBCUTANEOUS at 09:05

## 2021-01-01 RX ADMIN — INSULIN LISPRO 2 UNITS: 100 INJECTION, SOLUTION INTRAVENOUS; SUBCUTANEOUS at 19:01

## 2021-01-01 RX ADMIN — PIPERACILLIN AND TAZOBACTAM 3.38 G: 3; .375 INJECTION, POWDER, LYOPHILIZED, FOR SOLUTION INTRAVENOUS at 22:14

## 2021-01-01 RX ADMIN — Medication 300 MCG/HR: at 20:00

## 2021-01-01 RX ADMIN — ETOMIDATE 30 MG: 2 INJECTION INTRAVENOUS at 18:53

## 2021-01-01 RX ADMIN — HYDRALAZINE HYDROCHLORIDE 100 MG: 50 TABLET, FILM COATED ORAL at 21:57

## 2021-01-01 RX ADMIN — ENOXAPARIN SODIUM 40 MG: 40 INJECTION SUBCUTANEOUS at 09:10

## 2021-01-01 RX ADMIN — INSULIN LISPRO 4 UNITS: 100 INJECTION, SOLUTION INTRAVENOUS; SUBCUTANEOUS at 23:14

## 2021-01-01 RX ADMIN — FUROSEMIDE 40 MG: 10 INJECTION, SOLUTION INTRAMUSCULAR; INTRAVENOUS at 21:14

## 2021-01-01 RX ADMIN — FUROSEMIDE 80 MG: 10 INJECTION, SOLUTION INTRAMUSCULAR; INTRAVENOUS at 09:10

## 2021-01-01 RX ADMIN — DICLOFENAC SODIUM 2 G: 10 GEL TOPICAL at 21:38

## 2021-01-01 RX ADMIN — ALBUTEROL SULFATE 2 PUFF: 108 AEROSOL, METERED RESPIRATORY (INHALATION) at 11:39

## 2021-01-01 RX ADMIN — Medication: at 22:41

## 2021-01-01 RX ADMIN — ENOXAPARIN SODIUM 40 MG: 40 INJECTION SUBCUTANEOUS at 09:54

## 2021-01-01 RX ADMIN — HYDRALAZINE HYDROCHLORIDE 100 MG: 50 TABLET, FILM COATED ORAL at 21:14

## 2021-01-01 RX ADMIN — PIPERACILLIN AND TAZOBACTAM 3.38 G: 3; .375 INJECTION, POWDER, LYOPHILIZED, FOR SOLUTION INTRAVENOUS at 17:21

## 2021-01-01 RX ADMIN — CHLORHEXIDINE GLUCONATE 15 ML: 0.12 RINSE ORAL at 08:55

## 2021-01-01 RX ADMIN — Medication 10 ML: at 05:11

## 2021-01-01 RX ADMIN — DEXTROSE MONOHYDRATE 12.5 G: 25 INJECTION, SOLUTION INTRAVENOUS at 12:26

## 2021-01-01 RX ADMIN — INSULIN GLARGINE 25 UNITS: 100 INJECTION, SOLUTION SUBCUTANEOUS at 08:55

## 2021-01-01 RX ADMIN — INSULIN LISPRO 2 UNITS: 100 INJECTION, SOLUTION INTRAVENOUS; SUBCUTANEOUS at 13:45

## 2021-01-01 RX ADMIN — PIPERACILLIN AND TAZOBACTAM 3.38 G: 3; .375 INJECTION, POWDER, LYOPHILIZED, FOR SOLUTION INTRAVENOUS at 22:00

## 2021-01-01 RX ADMIN — INSULIN GLARGINE 25 UNITS: 100 INJECTION, SOLUTION SUBCUTANEOUS at 09:23

## 2021-01-01 RX ADMIN — Medication 81 MG: at 09:51

## 2021-01-01 RX ADMIN — MIDAZOLAM HYDROCHLORIDE 9 MG/HR: 5 INJECTION INTRAMUSCULAR; INTRAVENOUS at 06:26

## 2021-01-01 RX ADMIN — Medication 1 PACKET: at 18:56

## 2021-01-01 RX ADMIN — INSULIN GLARGINE 25 UNITS: 100 INJECTION, SOLUTION SUBCUTANEOUS at 11:04

## 2021-01-01 RX ADMIN — DEXAMETHASONE SODIUM PHOSPHATE 6 MG: 10 INJECTION, SOLUTION INTRAMUSCULAR; INTRAVENOUS at 08:48

## 2021-01-01 RX ADMIN — MAGNESIUM SULFATE HEPTAHYDRATE 2 G: 40 INJECTION, SOLUTION INTRAVENOUS at 09:09

## 2021-01-01 RX ADMIN — GABAPENTIN 100 MG: 100 CAPSULE ORAL at 08:42

## 2021-01-01 RX ADMIN — Medication 10 ML: at 13:00

## 2021-01-01 RX ADMIN — PIPERACILLIN AND TAZOBACTAM 3.38 G: 3; .375 INJECTION, POWDER, LYOPHILIZED, FOR SOLUTION INTRAVENOUS at 21:23

## 2021-01-01 RX ADMIN — Medication 2000 UNITS: at 09:26

## 2021-01-01 RX ADMIN — INSULIN LISPRO 5 UNITS: 100 INJECTION, SOLUTION INTRAVENOUS; SUBCUTANEOUS at 13:14

## 2021-01-01 RX ADMIN — CLONIDINE HYDROCHLORIDE 0.1 MG: 0.1 TABLET ORAL at 09:22

## 2021-01-01 RX ADMIN — CHLORHEXIDINE GLUCONATE 15 ML: 0.12 RINSE ORAL at 21:17

## 2021-01-01 RX ADMIN — HYDROCORTISONE SODIUM SUCCINATE 50 MG: 100 INJECTION, POWDER, FOR SOLUTION INTRAMUSCULAR; INTRAVENOUS at 21:32

## 2021-01-01 RX ADMIN — ALBUMIN (HUMAN) 25 G: 0.25 INJECTION, SOLUTION INTRAVENOUS at 22:17

## 2021-01-01 RX ADMIN — LANSOPRAZOLE 30 MG: KIT at 08:44

## 2021-01-01 RX ADMIN — FLUTICASONE PROPIONATE 2 SPRAY: 50 SPRAY, METERED NASAL at 10:35

## 2021-01-01 RX ADMIN — Medication: at 10:41

## 2021-01-01 RX ADMIN — GABAPENTIN 100 MG: 100 CAPSULE ORAL at 21:33

## 2021-01-01 RX ADMIN — Medication 1 PACKET: at 13:52

## 2021-01-01 RX ADMIN — INSULIN LISPRO 2 UNITS: 100 INJECTION, SOLUTION INTRAVENOUS; SUBCUTANEOUS at 13:05

## 2021-01-01 RX ADMIN — GABAPENTIN 100 MG: 100 CAPSULE ORAL at 09:26

## 2021-01-01 RX ADMIN — BICALUTAMIDE 50 MG: 50 TABLET ORAL at 09:14

## 2021-01-01 RX ADMIN — FLUTICASONE PROPIONATE 2 SPRAY: 50 SPRAY, METERED NASAL at 09:28

## 2021-01-01 RX ADMIN — MIDAZOLAM HYDROCHLORIDE 5 MG/HR: 5 INJECTION INTRAMUSCULAR; INTRAVENOUS at 04:17

## 2021-01-01 RX ADMIN — ALBUMIN (HUMAN) 12.5 G: 0.25 INJECTION, SOLUTION INTRAVENOUS at 08:59

## 2021-01-01 RX ADMIN — PIPERACILLIN AND TAZOBACTAM 3.38 G: 3; .375 INJECTION, POWDER, LYOPHILIZED, FOR SOLUTION INTRAVENOUS at 06:48

## 2021-01-01 RX ADMIN — INSULIN LISPRO 5 UNITS: 100 INJECTION, SOLUTION INTRAVENOUS; SUBCUTANEOUS at 09:10

## 2021-01-01 RX ADMIN — SODIUM CHLORIDE 40 MG: 9 INJECTION INTRAMUSCULAR; INTRAVENOUS; SUBCUTANEOUS at 22:37

## 2021-01-01 RX ADMIN — Medication: at 21:54

## 2021-01-01 RX ADMIN — TAMSULOSIN HYDROCHLORIDE 0.4 MG: 0.4 CAPSULE ORAL at 08:49

## 2021-01-01 RX ADMIN — DICLOFENAC SODIUM 2 G: 10 GEL TOPICAL at 08:48

## 2021-01-01 RX ADMIN — CHLORHEXIDINE GLUCONATE 15 ML: 0.12 RINSE ORAL at 08:57

## 2021-01-01 RX ADMIN — INSULIN LISPRO 3 UNITS: 100 INJECTION, SOLUTION INTRAVENOUS; SUBCUTANEOUS at 13:33

## 2021-01-01 RX ADMIN — PROPOFOL 25 MCG/KG/MIN: 10 INJECTION, EMULSION INTRAVENOUS at 07:11

## 2021-01-01 RX ADMIN — HYDRALAZINE HYDROCHLORIDE 100 MG: 50 TABLET, FILM COATED ORAL at 22:00

## 2021-01-01 RX ADMIN — Medication: at 08:52

## 2021-01-01 RX ADMIN — FUROSEMIDE 80 MG: 10 INJECTION, SOLUTION INTRAMUSCULAR; INTRAVENOUS at 09:34

## 2021-01-01 RX ADMIN — PROPOFOL 45 MCG/KG/MIN: 10 INJECTION, EMULSION INTRAVENOUS at 07:04

## 2021-01-01 RX ADMIN — BUMETANIDE 2 MG: 0.25 INJECTION, SOLUTION INTRAMUSCULAR; INTRAVENOUS at 09:02

## 2021-01-01 RX ADMIN — DICLOFENAC SODIUM 2 G: 10 GEL TOPICAL at 09:40

## 2021-01-01 RX ADMIN — PROPOFOL 40 MG: 10 INJECTION, EMULSION INTRAVENOUS at 15:08

## 2021-01-01 RX ADMIN — Medication 81 MG: at 09:33

## 2021-01-01 RX ADMIN — GABAPENTIN 100 MG: 100 CAPSULE ORAL at 18:11

## 2021-01-01 RX ADMIN — Medication: at 22:18

## 2021-01-01 RX ADMIN — PROPOFOL 50 MCG/KG/MIN: 10 INJECTION, EMULSION INTRAVENOUS at 20:53

## 2021-01-01 RX ADMIN — INSULIN LISPRO 2 UNITS: 100 INJECTION, SOLUTION INTRAVENOUS; SUBCUTANEOUS at 11:33

## 2021-01-01 RX ADMIN — ZINC SULFATE 220 MG (50 MG) CAPSULE 1 CAPSULE: CAPSULE at 17:44

## 2021-01-01 RX ADMIN — DIAPER RASH SKIN PROTECTENT: at 21:11

## 2021-01-01 RX ADMIN — OXYCODONE AND ACETAMINOPHEN 1 TABLET: 5; 325 TABLET ORAL at 21:42

## 2021-01-01 RX ADMIN — DICLOFENAC SODIUM 2 G: 10 GEL TOPICAL at 09:58

## 2021-01-01 RX ADMIN — PROPOFOL 35 MCG/KG/MIN: 10 INJECTION, EMULSION INTRAVENOUS at 13:59

## 2021-01-01 RX ADMIN — Medication: at 09:26

## 2021-01-01 RX ADMIN — OXYCODONE AND ACETAMINOPHEN 1 TABLET: 5; 325 TABLET ORAL at 10:33

## 2021-01-01 RX ADMIN — GABAPENTIN 100 MG: 100 CAPSULE ORAL at 09:02

## 2021-01-01 RX ADMIN — INSULIN LISPRO 3 UNITS: 100 INJECTION, SOLUTION INTRAVENOUS; SUBCUTANEOUS at 05:32

## 2021-01-01 RX ADMIN — INSULIN LISPRO 2 UNITS: 100 INJECTION, SOLUTION INTRAVENOUS; SUBCUTANEOUS at 18:00

## 2021-01-01 RX ADMIN — EPOETIN ALFA-EPBX 20000 UNITS: 10000 INJECTION, SOLUTION INTRAVENOUS; SUBCUTANEOUS at 22:38

## 2021-01-01 RX ADMIN — OXYCODONE AND ACETAMINOPHEN 1 TABLET: 5; 325 TABLET ORAL at 10:00

## 2021-01-01 RX ADMIN — Medication 300 MCG/HR: at 20:05

## 2021-01-01 RX ADMIN — DICLOFENAC SODIUM 2 G: 10 GEL TOPICAL at 21:46

## 2021-01-01 RX ADMIN — OXYCODONE AND ACETAMINOPHEN 1 TABLET: 5; 325 TABLET ORAL at 03:16

## 2021-01-01 RX ADMIN — INSULIN LISPRO 10 UNITS: 100 INJECTION, SOLUTION INTRAVENOUS; SUBCUTANEOUS at 17:26

## 2021-01-01 RX ADMIN — DICLOFENAC SODIUM 2 G: 10 GEL TOPICAL at 09:51

## 2021-01-01 RX ADMIN — PANTOPRAZOLE SODIUM 40 MG: 40 TABLET, DELAYED RELEASE ORAL at 06:55

## 2021-01-01 RX ADMIN — HEPARIN SODIUM 7500 UNITS: 5000 INJECTION INTRAVENOUS; SUBCUTANEOUS at 06:03

## 2021-01-01 RX ADMIN — DICLOFENAC SODIUM 2 G: 10 GEL TOPICAL at 13:21

## 2021-01-01 RX ADMIN — INSULIN LISPRO 5 UNITS: 100 INJECTION, SOLUTION INTRAVENOUS; SUBCUTANEOUS at 07:27

## 2021-01-01 RX ADMIN — Medication 225 MCG/HR: at 10:30

## 2021-01-01 RX ADMIN — DIAPER RASH SKIN PROTECTENT: at 06:54

## 2021-01-01 RX ADMIN — OXYCODONE HYDROCHLORIDE AND ACETAMINOPHEN 500 MG: 500 TABLET ORAL at 08:56

## 2021-01-01 RX ADMIN — INSULIN GLARGINE 8 UNITS: 100 INJECTION, SOLUTION SUBCUTANEOUS at 11:28

## 2021-01-01 RX ADMIN — MICONAZOLE NITRATE 2 % TOPICAL POWDER: at 09:52

## 2021-01-01 RX ADMIN — FUROSEMIDE 80 MG: 10 INJECTION, SOLUTION INTRAVENOUS at 15:12

## 2021-01-01 RX ADMIN — ALBUMIN (HUMAN) 25 G: 0.25 INJECTION, SOLUTION INTRAVENOUS at 10:01

## 2021-01-01 RX ADMIN — PIPERACILLIN AND TAZOBACTAM 3.38 G: 3; .375 INJECTION, POWDER, LYOPHILIZED, FOR SOLUTION INTRAVENOUS at 14:52

## 2021-01-01 RX ADMIN — SODIUM BICARBONATE 650 MG: 650 TABLET ORAL at 08:25

## 2021-01-01 RX ADMIN — OXYCODONE HYDROCHLORIDE AND ACETAMINOPHEN 500 MG: 500 TABLET ORAL at 09:00

## 2021-01-01 RX ADMIN — PROPOFOL 20 MG: 10 INJECTION, EMULSION INTRAVENOUS at 14:58

## 2021-01-01 RX ADMIN — INSULIN LISPRO 2 UNITS: 100 INJECTION, SOLUTION INTRAVENOUS; SUBCUTANEOUS at 23:36

## 2021-01-01 RX ADMIN — OXYCODONE AND ACETAMINOPHEN 1 TABLET: 5; 325 TABLET ORAL at 17:57

## 2021-01-01 RX ADMIN — MICONAZOLE NITRATE 2 % TOPICAL POWDER: at 17:58

## 2021-01-01 RX ADMIN — CHLORHEXIDINE GLUCONATE 15 ML: 0.12 RINSE ORAL at 20:50

## 2021-01-01 RX ADMIN — PROPOFOL 30 MCG/KG/MIN: 10 INJECTION, EMULSION INTRAVENOUS at 22:17

## 2021-01-01 RX ADMIN — Medication 10 ML: at 18:46

## 2021-01-01 RX ADMIN — INSULIN LISPRO 3 UNITS: 100 INJECTION, SOLUTION INTRAVENOUS; SUBCUTANEOUS at 06:28

## 2021-01-01 RX ADMIN — INSULIN GLARGINE 14 UNITS: 100 INJECTION, SOLUTION SUBCUTANEOUS at 23:36

## 2021-01-01 RX ADMIN — CISATRACURIUM BESYLATE 10 MCG/KG/MIN: 2 INJECTION INTRAVENOUS at 16:26

## 2021-01-01 RX ADMIN — DOCUSATE SODIUM 100 MG: 50 LIQUID ORAL at 18:52

## 2021-01-01 RX ADMIN — Medication: at 20:52

## 2021-01-01 RX ADMIN — PIPERACILLIN AND TAZOBACTAM 3.38 G: 3; .375 INJECTION, POWDER, LYOPHILIZED, FOR SOLUTION INTRAVENOUS at 18:29

## 2021-01-01 RX ADMIN — PIPERACILLIN AND TAZOBACTAM 3.38 G: 3; .375 INJECTION, POWDER, LYOPHILIZED, FOR SOLUTION INTRAVENOUS at 13:09

## 2021-01-01 RX ADMIN — Medication 300 MCG/HR: at 08:55

## 2021-01-01 RX ADMIN — GABAPENTIN 100 MG: 100 CAPSULE ORAL at 16:39

## 2021-01-01 RX ADMIN — ALBUMIN (HUMAN) 25 G: 0.25 INJECTION, SOLUTION INTRAVENOUS at 20:20

## 2021-01-01 RX ADMIN — LANSOPRAZOLE 30 MG: KIT at 18:42

## 2021-01-01 RX ADMIN — GABAPENTIN 100 MG: 100 CAPSULE ORAL at 09:54

## 2021-01-01 RX ADMIN — GABAPENTIN 100 MG: 100 CAPSULE ORAL at 08:52

## 2021-01-01 RX ADMIN — OXYCODONE AND ACETAMINOPHEN 1 TABLET: 5; 325 TABLET ORAL at 08:45

## 2021-01-01 RX ADMIN — BICALUTAMIDE 50 MG: 50 TABLET ORAL at 16:45

## 2021-01-01 RX ADMIN — Medication 275 MCG/HR: at 07:13

## 2021-01-01 RX ADMIN — OXYCODONE HYDROCHLORIDE AND ACETAMINOPHEN 500 MG: 500 TABLET ORAL at 10:01

## 2021-01-01 RX ADMIN — Medication 1 PACKET: at 21:28

## 2021-01-01 RX ADMIN — LANSOPRAZOLE 30 MG: KIT at 17:28

## 2021-01-01 RX ADMIN — MICONAZOLE NITRATE 2 % TOPICAL POWDER: at 09:00

## 2021-01-01 RX ADMIN — SODIUM BICARBONATE 650 MG: 650 TABLET ORAL at 11:18

## 2021-01-01 RX ADMIN — ALBUTEROL SULFATE 2 PUFF: 108 AEROSOL, METERED RESPIRATORY (INHALATION) at 04:25

## 2021-01-01 RX ADMIN — CISATRACURIUM BESYLATE 8 MCG/KG/MIN: 2 INJECTION INTRAVENOUS at 11:56

## 2021-01-01 RX ADMIN — PROPOFOL 50 MCG/KG/MIN: 10 INJECTION, EMULSION INTRAVENOUS at 11:41

## 2021-01-01 RX ADMIN — INSULIN LISPRO 5 UNITS: 100 INJECTION, SOLUTION INTRAVENOUS; SUBCUTANEOUS at 18:21

## 2021-01-01 RX ADMIN — PANTOPRAZOLE SODIUM 40 MG: 40 TABLET, DELAYED RELEASE ORAL at 18:43

## 2021-01-01 RX ADMIN — THERA TABS 1 TABLET: TAB at 08:57

## 2021-01-01 RX ADMIN — CHLORHEXIDINE GLUCONATE 15 ML: 0.12 RINSE ORAL at 21:12

## 2021-01-01 RX ADMIN — ENOXAPARIN SODIUM 40 MG: 40 INJECTION SUBCUTANEOUS at 10:30

## 2021-01-01 RX ADMIN — DICLOFENAC SODIUM 2 G: 10 GEL TOPICAL at 09:04

## 2021-01-01 RX ADMIN — Medication: at 08:59

## 2021-01-01 RX ADMIN — INSULIN LISPRO 3 UNITS: 100 INJECTION, SOLUTION INTRAVENOUS; SUBCUTANEOUS at 17:55

## 2021-01-01 RX ADMIN — GABAPENTIN 100 MG: 100 CAPSULE ORAL at 09:12

## 2021-01-01 RX ADMIN — Medication: at 08:51

## 2021-01-01 RX ADMIN — MIDAZOLAM HYDROCHLORIDE 6 MG/HR: 5 INJECTION INTRAMUSCULAR; INTRAVENOUS at 19:12

## 2021-01-01 RX ADMIN — GABAPENTIN 100 MG: 100 CAPSULE ORAL at 00:08

## 2021-01-01 RX ADMIN — LANSOPRAZOLE 30 MG: KIT at 17:34

## 2021-01-01 RX ADMIN — DICLOFENAC SODIUM 2 G: 10 GEL TOPICAL at 21:41

## 2021-01-01 RX ADMIN — PIPERACILLIN AND TAZOBACTAM 3.38 G: 3; .375 INJECTION, POWDER, LYOPHILIZED, FOR SOLUTION INTRAVENOUS at 21:33

## 2021-01-01 RX ADMIN — COLLAGENASE SANTYL: 250 OINTMENT TOPICAL at 09:19

## 2021-01-01 RX ADMIN — SODIUM CHLORIDE 40 MG: 9 INJECTION INTRAMUSCULAR; INTRAVENOUS; SUBCUTANEOUS at 21:39

## 2021-01-01 RX ADMIN — HYDRALAZINE HYDROCHLORIDE 100 MG: 50 TABLET, FILM COATED ORAL at 17:07

## 2021-01-01 RX ADMIN — INSULIN LISPRO 2 UNITS: 100 INJECTION, SOLUTION INTRAVENOUS; SUBCUTANEOUS at 23:47

## 2021-01-01 RX ADMIN — DICLOFENAC SODIUM 2 G: 10 GEL TOPICAL at 09:08

## 2021-01-01 RX ADMIN — GABAPENTIN 100 MG: 100 CAPSULE ORAL at 22:37

## 2021-01-01 RX ADMIN — Medication: at 20:05

## 2021-01-01 RX ADMIN — DICLOFENAC SODIUM 2 G: 10 GEL TOPICAL at 09:55

## 2021-01-01 RX ADMIN — Medication 2000 UNITS: at 09:05

## 2021-01-01 RX ADMIN — LANSOPRAZOLE 30 MG: KIT at 08:42

## 2021-01-01 RX ADMIN — PIPERACILLIN AND TAZOBACTAM 3.38 G: 3; .375 INJECTION, POWDER, LYOPHILIZED, FOR SOLUTION INTRAVENOUS at 14:34

## 2021-01-01 RX ADMIN — IPRATROPIUM BROMIDE AND ALBUTEROL SULFATE 3 ML: .5; 3 SOLUTION RESPIRATORY (INHALATION) at 01:14

## 2021-01-01 RX ADMIN — MICONAZOLE NITRATE 2 % TOPICAL POWDER: at 05:11

## 2021-01-01 RX ADMIN — ALBUTEROL SULFATE 2 PUFF: 108 AEROSOL, METERED RESPIRATORY (INHALATION) at 09:13

## 2021-01-01 RX ADMIN — Medication: at 09:58

## 2021-01-01 RX ADMIN — Medication: at 10:04

## 2021-01-01 RX ADMIN — Medication 10 ML: at 14:12

## 2021-01-01 RX ADMIN — Medication: at 21:11

## 2021-01-01 RX ADMIN — ASPIRIN 81 MG: 81 TABLET, CHEWABLE ORAL at 09:27

## 2021-01-01 RX ADMIN — OXYCODONE AND ACETAMINOPHEN 1 TABLET: 5; 325 TABLET ORAL at 14:11

## 2021-01-01 RX ADMIN — LINEZOLID 600 MG: 600 INJECTION, SOLUTION INTRAVENOUS at 11:32

## 2021-01-01 RX ADMIN — INSULIN LISPRO 2 UNITS: 100 INJECTION, SOLUTION INTRAVENOUS; SUBCUTANEOUS at 11:54

## 2021-01-01 RX ADMIN — PANTOPRAZOLE SODIUM 40 MG: 40 TABLET, DELAYED RELEASE ORAL at 07:11

## 2021-01-01 RX ADMIN — HYDRALAZINE HYDROCHLORIDE 100 MG: 50 TABLET, FILM COATED ORAL at 21:26

## 2021-01-01 RX ADMIN — Medication 100 MCG/HR: at 03:00

## 2021-01-01 RX ADMIN — PIPERACILLIN AND TAZOBACTAM 3.38 G: 3; .375 INJECTION, POWDER, LYOPHILIZED, FOR SOLUTION INTRAVENOUS at 06:04

## 2021-01-01 RX ADMIN — PIPERACILLIN AND TAZOBACTAM 3.38 G: 3; .375 INJECTION, POWDER, LYOPHILIZED, FOR SOLUTION INTRAVENOUS at 22:31

## 2021-01-01 RX ADMIN — ASPIRIN 81 MG: 81 TABLET, CHEWABLE ORAL at 08:56

## 2021-01-01 RX ADMIN — Medication: at 10:33

## 2021-01-01 RX ADMIN — CLONIDINE HYDROCHLORIDE 0.1 MG: 0.1 TABLET ORAL at 11:17

## 2021-01-01 RX ADMIN — INSULIN LISPRO 3 UNITS: 100 INJECTION, SOLUTION INTRAVENOUS; SUBCUTANEOUS at 11:56

## 2021-01-01 RX ADMIN — DICLOFENAC SODIUM 2 G: 10 GEL TOPICAL at 21:49

## 2021-01-01 RX ADMIN — HYDROCORTISONE SODIUM SUCCINATE 50 MG: 100 INJECTION, POWDER, FOR SOLUTION INTRAMUSCULAR; INTRAVENOUS at 06:08

## 2021-01-01 RX ADMIN — HYDRALAZINE HYDROCHLORIDE 100 MG: 50 TABLET, FILM COATED ORAL at 08:49

## 2021-01-01 RX ADMIN — MIDAZOLAM 4 MG: 1 INJECTION INTRAMUSCULAR; INTRAVENOUS at 20:04

## 2021-01-01 RX ADMIN — Medication 10 ML: at 13:35

## 2021-01-01 RX ADMIN — POTASSIUM CHLORIDE 20 MEQ: 400 INJECTION, SOLUTION INTRAVENOUS at 09:55

## 2021-01-01 RX ADMIN — Medication: at 08:54

## 2021-01-01 RX ADMIN — Medication 1 PACKET: at 17:37

## 2021-01-01 RX ADMIN — Medication 275 MCG/HR: at 10:25

## 2021-01-01 RX ADMIN — OXYCODONE AND ACETAMINOPHEN 1 TABLET: 5; 325 TABLET ORAL at 13:32

## 2021-01-01 RX ADMIN — CLONIDINE HYDROCHLORIDE 0.1 MG: 0.1 TABLET ORAL at 09:24

## 2021-01-01 RX ADMIN — MIDAZOLAM HYDROCHLORIDE 15 MG/HR: 5 INJECTION INTRAMUSCULAR; INTRAVENOUS at 13:52

## 2021-01-01 RX ADMIN — ENOXAPARIN SODIUM 40 MG: 40 INJECTION SUBCUTANEOUS at 09:16

## 2021-01-01 RX ADMIN — Medication: at 21:27

## 2021-01-01 RX ADMIN — THERA TABS 1 TABLET: TAB at 08:55

## 2021-01-01 RX ADMIN — PIPERACILLIN AND TAZOBACTAM 3.38 G: 3; .375 INJECTION, POWDER, LYOPHILIZED, FOR SOLUTION INTRAVENOUS at 22:49

## 2021-01-01 RX ADMIN — PROPOFOL 40 MCG/KG/MIN: 10 INJECTION, EMULSION INTRAVENOUS at 14:01

## 2021-01-01 RX ADMIN — Medication 10 ML: at 06:56

## 2021-01-01 RX ADMIN — Medication: at 20:42

## 2021-01-01 RX ADMIN — DICLOFENAC SODIUM 2 G: 10 GEL TOPICAL at 17:08

## 2021-01-01 RX ADMIN — CISATRACURIUM BESYLATE 9 MCG/KG/MIN: 2 INJECTION INTRAVENOUS at 21:20

## 2021-01-01 RX ADMIN — Medication: at 20:51

## 2021-01-01 RX ADMIN — ALBUTEROL SULFATE 2 PUFF: 108 AEROSOL, METERED RESPIRATORY (INHALATION) at 16:39

## 2021-01-01 RX ADMIN — PROPOFOL 50 MCG/KG/MIN: 10 INJECTION, EMULSION INTRAVENOUS at 14:54

## 2021-01-01 RX ADMIN — HYDRALAZINE HYDROCHLORIDE 100 MG: 50 TABLET, FILM COATED ORAL at 09:34

## 2021-01-01 RX ADMIN — ALBUTEROL SULFATE 2 PUFF: 108 AEROSOL, METERED RESPIRATORY (INHALATION) at 21:57

## 2021-01-01 RX ADMIN — ALBUTEROL SULFATE 2 PUFF: 108 AEROSOL, METERED RESPIRATORY (INHALATION) at 08:20

## 2021-01-01 RX ADMIN — BICALUTAMIDE 50 MG: 50 TABLET ORAL at 08:35

## 2021-01-01 RX ADMIN — GABAPENTIN 100 MG: 100 CAPSULE ORAL at 10:22

## 2021-01-01 RX ADMIN — MICONAZOLE NITRATE 2 % TOPICAL POWDER: at 08:59

## 2021-01-01 RX ADMIN — Medication 20 ML: at 21:26

## 2021-01-01 RX ADMIN — LANSOPRAZOLE 30 MG: KIT at 18:02

## 2021-01-01 RX ADMIN — MIDAZOLAM HYDROCHLORIDE 15 MG/HR: 5 INJECTION INTRAMUSCULAR; INTRAVENOUS at 13:24

## 2021-01-01 RX ADMIN — MICONAZOLE NITRATE 2 % TOPICAL POWDER: at 17:59

## 2021-01-01 RX ADMIN — OXYCODONE AND ACETAMINOPHEN 1 TABLET: 5; 325 TABLET ORAL at 10:54

## 2021-01-01 RX ADMIN — ALBUTEROL SULFATE 2 PUFF: 108 AEROSOL, METERED RESPIRATORY (INHALATION) at 04:47

## 2021-01-01 RX ADMIN — DICLOFENAC SODIUM 2 G: 10 GEL TOPICAL at 18:16

## 2021-01-01 RX ADMIN — Medication 300 MCG/HR: at 23:39

## 2021-01-01 RX ADMIN — DIAPER RASH SKIN PROTECTENT: at 22:55

## 2021-01-01 RX ADMIN — PROPOFOL 40 MCG/KG/MIN: 10 INJECTION, EMULSION INTRAVENOUS at 03:24

## 2021-01-01 RX ADMIN — CLONIDINE HYDROCHLORIDE 0.1 MG: 0.1 TABLET ORAL at 17:22

## 2021-01-01 RX ADMIN — AMLODIPINE BESYLATE 10 MG: 5 TABLET ORAL at 10:22

## 2021-01-01 RX ADMIN — GABAPENTIN 100 MG: 100 CAPSULE ORAL at 16:23

## 2021-01-01 RX ADMIN — INSULIN LISPRO 3 UNITS: 100 INJECTION, SOLUTION INTRAVENOUS; SUBCUTANEOUS at 09:03

## 2021-01-01 RX ADMIN — POTASSIUM BICARBONATE 40 MEQ: 782 TABLET, EFFERVESCENT ORAL at 11:37

## 2021-01-01 RX ADMIN — Medication 300 MCG/HR: at 05:00

## 2021-01-01 RX ADMIN — ALBUMIN (HUMAN) 12.5 G: 0.25 INJECTION, SOLUTION INTRAVENOUS at 20:54

## 2021-01-01 RX ADMIN — FUROSEMIDE 40 MG: 10 INJECTION, SOLUTION INTRAMUSCULAR; INTRAVENOUS at 22:16

## 2021-01-01 RX ADMIN — SODIUM BICARBONATE 650 MG: 650 TABLET ORAL at 08:43

## 2021-01-01 RX ADMIN — SODIUM CHLORIDE 40 MG: 9 INJECTION INTRAMUSCULAR; INTRAVENOUS; SUBCUTANEOUS at 21:28

## 2021-01-01 RX ADMIN — INSULIN LISPRO 2 UNITS: 100 INJECTION, SOLUTION INTRAVENOUS; SUBCUTANEOUS at 01:29

## 2021-01-01 RX ADMIN — INSULIN LISPRO 3 UNITS: 100 INJECTION, SOLUTION INTRAVENOUS; SUBCUTANEOUS at 19:05

## 2021-01-01 RX ADMIN — PIPERACILLIN AND TAZOBACTAM 3.38 G: 3; .375 INJECTION, POWDER, LYOPHILIZED, FOR SOLUTION INTRAVENOUS at 14:12

## 2021-01-01 RX ADMIN — IPRATROPIUM BROMIDE AND ALBUTEROL SULFATE 3 ML: .5; 3 SOLUTION RESPIRATORY (INHALATION) at 12:43

## 2021-01-01 RX ADMIN — LANSOPRAZOLE 30 MG: KIT at 09:51

## 2021-01-01 RX ADMIN — Medication: at 21:15

## 2021-01-01 RX ADMIN — DICLOFENAC SODIUM 2 G: 10 GEL TOPICAL at 09:28

## 2021-01-01 RX ADMIN — Medication 10 ML: at 21:29

## 2021-01-01 RX ADMIN — Medication 275 MCG/HR: at 10:50

## 2021-01-01 RX ADMIN — DICLOFENAC SODIUM 2 G: 10 GEL TOPICAL at 18:56

## 2021-01-01 RX ADMIN — PROPOFOL 50 MCG/KG/MIN: 10 INJECTION, EMULSION INTRAVENOUS at 20:00

## 2021-01-01 RX ADMIN — CISATRACURIUM BESYLATE 9 MCG/KG/MIN: 2 INJECTION INTRAVENOUS at 09:22

## 2021-01-01 RX ADMIN — GABAPENTIN 100 MG: 100 CAPSULE ORAL at 16:03

## 2021-01-01 RX ADMIN — PIPERACILLIN AND TAZOBACTAM 3.38 G: 3; .375 INJECTION, POWDER, LYOPHILIZED, FOR SOLUTION INTRAVENOUS at 18:06

## 2021-01-01 RX ADMIN — Medication: at 09:30

## 2021-01-01 RX ADMIN — HEPARIN SODIUM 7500 UNITS: 5000 INJECTION INTRAVENOUS; SUBCUTANEOUS at 05:01

## 2021-01-01 RX ADMIN — LINEZOLID 600 MG: 600 INJECTION, SOLUTION INTRAVENOUS at 10:00

## 2021-01-01 RX ADMIN — INSULIN LISPRO 3 UNITS: 100 INJECTION, SOLUTION INTRAVENOUS; SUBCUTANEOUS at 17:06

## 2021-01-01 RX ADMIN — Medication: at 23:22

## 2021-01-01 RX ADMIN — ALBUTEROL SULFATE 2 PUFF: 108 AEROSOL, METERED RESPIRATORY (INHALATION) at 03:46

## 2021-01-01 RX ADMIN — HYDROCORTISONE SODIUM SUCCINATE 50 MG: 100 INJECTION, POWDER, FOR SOLUTION INTRAMUSCULAR; INTRAVENOUS at 14:19

## 2021-01-01 RX ADMIN — Medication 300 MCG/HR: at 09:20

## 2021-01-01 RX ADMIN — CHLORHEXIDINE GLUCONATE 15 ML: 0.12 RINSE ORAL at 20:41

## 2021-01-01 RX ADMIN — BICALUTAMIDE 50 MG: 50 TABLET ORAL at 09:07

## 2021-01-01 RX ADMIN — Medication 10 ML: at 21:24

## 2021-01-01 RX ADMIN — OXYCODONE HYDROCHLORIDE AND ACETAMINOPHEN 500 MG: 500 TABLET ORAL at 10:31

## 2021-01-01 RX ADMIN — GABAPENTIN 100 MG: 100 CAPSULE ORAL at 17:45

## 2021-01-01 RX ADMIN — Medication 10 ML: at 00:43

## 2021-01-01 RX ADMIN — Medication: at 09:15

## 2021-01-01 RX ADMIN — DICLOFENAC SODIUM 2 G: 10 GEL TOPICAL at 12:55

## 2021-01-01 RX ADMIN — INSULIN LISPRO 2 UNITS: 100 INJECTION, SOLUTION INTRAVENOUS; SUBCUTANEOUS at 12:33

## 2021-01-01 RX ADMIN — PIPERACILLIN AND TAZOBACTAM 3.38 G: 3; .375 INJECTION, POWDER, LYOPHILIZED, FOR SOLUTION INTRAVENOUS at 22:48

## 2021-01-01 RX ADMIN — Medication 1 PACKET: at 21:04

## 2021-01-01 RX ADMIN — INSULIN LISPRO 10 UNITS: 100 INJECTION, SOLUTION INTRAVENOUS; SUBCUTANEOUS at 11:55

## 2021-01-01 RX ADMIN — OXYCODONE AND ACETAMINOPHEN 1 TABLET: 5; 325 TABLET ORAL at 22:37

## 2021-01-01 RX ADMIN — SODIUM BICARBONATE 50 MEQ: 84 INJECTION, SOLUTION INTRAVENOUS at 10:36

## 2021-01-01 RX ADMIN — Medication 10 ML: at 13:10

## 2021-01-01 RX ADMIN — GABAPENTIN 100 MG: 100 CAPSULE ORAL at 22:48

## 2021-01-01 RX ADMIN — DIAPER RASH SKIN PROTECTENT: at 13:49

## 2021-01-01 RX ADMIN — DEXAMETHASONE 6 MG: 4 TABLET ORAL at 11:18

## 2021-01-01 RX ADMIN — PIPERACILLIN AND TAZOBACTAM 3.38 G: 3; .375 INJECTION, POWDER, LYOPHILIZED, FOR SOLUTION INTRAVENOUS at 05:47

## 2021-01-01 RX ADMIN — FUROSEMIDE 80 MG: 10 INJECTION, SOLUTION INTRAMUSCULAR; INTRAVENOUS at 22:06

## 2021-01-01 RX ADMIN — HYDRALAZINE HYDROCHLORIDE 100 MG: 50 TABLET, FILM COATED ORAL at 17:32

## 2021-01-01 RX ADMIN — MICONAZOLE NITRATE 2 % TOPICAL POWDER: at 10:55

## 2021-01-01 RX ADMIN — PROPOFOL 45 MCG/KG/MIN: 10 INJECTION, EMULSION INTRAVENOUS at 00:55

## 2021-01-01 RX ADMIN — HYDRALAZINE HYDROCHLORIDE 100 MG: 50 TABLET, FILM COATED ORAL at 21:51

## 2021-01-01 RX ADMIN — SODIUM CHLORIDE 40 MG: 9 INJECTION INTRAMUSCULAR; INTRAVENOUS; SUBCUTANEOUS at 21:14

## 2021-01-01 RX ADMIN — POTASSIUM CHLORIDE 20 MEQ: 400 INJECTION, SOLUTION INTRAVENOUS at 00:09

## 2021-01-01 RX ADMIN — GABAPENTIN 100 MG: 100 CAPSULE ORAL at 21:50

## 2021-01-01 RX ADMIN — LANSOPRAZOLE 30 MG: KIT at 09:10

## 2021-01-01 RX ADMIN — ALBUTEROL SULFATE 2 PUFF: 108 AEROSOL, METERED RESPIRATORY (INHALATION) at 04:00

## 2021-01-01 RX ADMIN — INSULIN GLARGINE 7 UNITS: 100 INJECTION, SOLUTION SUBCUTANEOUS at 12:27

## 2021-01-01 RX ADMIN — LANSOPRAZOLE 30 MG: KIT at 08:59

## 2021-01-01 RX ADMIN — ALBUMIN (HUMAN) 25 G: 0.25 INJECTION, SOLUTION INTRAVENOUS at 01:35

## 2021-01-01 RX ADMIN — SEVELAMER CARBONATE 1.6 G: 0.8 POWDER, FOR SUSPENSION ORAL at 09:32

## 2021-01-01 RX ADMIN — Medication: at 13:55

## 2021-01-01 RX ADMIN — FUROSEMIDE 80 MG: 10 INJECTION, SOLUTION INTRAMUSCULAR; INTRAVENOUS at 21:32

## 2021-01-01 RX ADMIN — IRON SUCROSE 200 MG: 20 INJECTION, SOLUTION INTRAVENOUS at 09:59

## 2021-01-01 RX ADMIN — MIDAZOLAM HYDROCHLORIDE 4 MG: 1 INJECTION, SOLUTION INTRAMUSCULAR; INTRAVENOUS at 19:08

## 2021-01-01 RX ADMIN — Medication 1 PACKET: at 17:29

## 2021-01-01 RX ADMIN — OXYCODONE HYDROCHLORIDE AND ACETAMINOPHEN 500 MG: 500 TABLET ORAL at 08:53

## 2021-01-01 RX ADMIN — SODIUM CHLORIDE 40 MG: 9 INJECTION INTRAMUSCULAR; INTRAVENOUS; SUBCUTANEOUS at 08:49

## 2021-01-01 RX ADMIN — DICLOFENAC SODIUM 2 G: 10 GEL TOPICAL at 12:40

## 2021-01-01 RX ADMIN — CLONIDINE HYDROCHLORIDE 0.1 MG: 0.1 TABLET ORAL at 09:18

## 2021-01-01 RX ADMIN — OXYCODONE AND ACETAMINOPHEN 1 TABLET: 5; 325 TABLET ORAL at 18:05

## 2021-01-01 RX ADMIN — INSULIN GLARGINE 18 UNITS: 100 INJECTION, SOLUTION SUBCUTANEOUS at 08:57

## 2021-01-01 RX ADMIN — ETOMIDATE 30 MG: 2 INJECTION, SOLUTION INTRAVENOUS at 18:53

## 2021-01-01 RX ADMIN — POTASSIUM BICARBONATE 40 MEQ: 782 TABLET, EFFERVESCENT ORAL at 06:35

## 2021-01-01 RX ADMIN — Medication 300 MCG/HR: at 04:10

## 2021-01-01 RX ADMIN — ALBUTEROL SULFATE 2 PUFF: 108 AEROSOL, METERED RESPIRATORY (INHALATION) at 03:36

## 2021-01-01 RX ADMIN — INSULIN GLARGINE 27 UNITS: 100 INJECTION, SOLUTION SUBCUTANEOUS at 21:23

## 2021-01-01 RX ADMIN — MICONAZOLE NITRATE 2 % TOPICAL POWDER: at 18:23

## 2021-01-01 RX ADMIN — PROPOFOL 40 MCG/KG/MIN: 10 INJECTION, EMULSION INTRAVENOUS at 03:13

## 2021-01-01 RX ADMIN — PIPERACILLIN AND TAZOBACTAM 3.38 G: 3; .375 INJECTION, POWDER, LYOPHILIZED, FOR SOLUTION INTRAVENOUS at 21:50

## 2021-01-01 RX ADMIN — CISATRACURIUM BESYLATE 9 MCG/KG/MIN: 2 INJECTION INTRAVENOUS at 00:20

## 2021-01-01 RX ADMIN — LANSOPRAZOLE 30 MG: KIT at 09:01

## 2021-01-01 RX ADMIN — OXYCODONE AND ACETAMINOPHEN 1 TABLET: 5; 325 TABLET ORAL at 15:22

## 2021-01-01 RX ADMIN — PANTOPRAZOLE SODIUM 40 MG: 40 TABLET, DELAYED RELEASE ORAL at 06:45

## 2021-01-01 RX ADMIN — EPINEPHRINE 1 MG: 0.1 INJECTION INTRACARDIAC; INTRAVENOUS at 10:11

## 2021-01-01 RX ADMIN — OXYCODONE AND ACETAMINOPHEN 1 TABLET: 5; 325 TABLET ORAL at 21:56

## 2021-01-01 RX ADMIN — SODIUM CHLORIDE 40 MG: 9 INJECTION INTRAMUSCULAR; INTRAVENOUS; SUBCUTANEOUS at 10:02

## 2021-01-01 RX ADMIN — POTASSIUM BICARBONATE 20 MEQ: 782 TABLET, EFFERVESCENT ORAL at 09:26

## 2021-01-01 RX ADMIN — PROPOFOL 50 MCG/KG/MIN: 10 INJECTION, EMULSION INTRAVENOUS at 06:00

## 2021-01-01 RX ADMIN — PROPOFOL 25 MCG/KG/MIN: 10 INJECTION, EMULSION INTRAVENOUS at 19:09

## 2021-01-01 RX ADMIN — PROPOFOL 25 MCG/KG/MIN: 10 INJECTION, EMULSION INTRAVENOUS at 04:55

## 2021-01-01 RX ADMIN — OXYCODONE AND ACETAMINOPHEN 1 TABLET: 5; 325 TABLET ORAL at 12:47

## 2021-01-01 RX ADMIN — DICLOFENAC SODIUM 2 G: 10 GEL TOPICAL at 00:07

## 2021-01-01 RX ADMIN — DICLOFENAC SODIUM 2 G: 10 GEL TOPICAL at 12:03

## 2021-01-01 RX ADMIN — MICONAZOLE NITRATE 2 % TOPICAL POWDER: at 17:26

## 2021-01-01 RX ADMIN — Medication: at 12:55

## 2021-01-01 RX ADMIN — INSULIN GLARGINE 14 UNITS: 100 INJECTION, SOLUTION SUBCUTANEOUS at 22:18

## 2021-01-01 RX ADMIN — INSULIN LISPRO 10 UNITS: 100 INJECTION, SOLUTION INTRAVENOUS; SUBCUTANEOUS at 17:45

## 2021-01-01 RX ADMIN — INSULIN LISPRO 2 UNITS: 100 INJECTION, SOLUTION INTRAVENOUS; SUBCUTANEOUS at 10:01

## 2021-01-01 RX ADMIN — INSULIN LISPRO 3 UNITS: 100 INJECTION, SOLUTION INTRAVENOUS; SUBCUTANEOUS at 12:47

## 2021-01-01 RX ADMIN — INSULIN LISPRO 10 UNITS: 100 INJECTION, SOLUTION INTRAVENOUS; SUBCUTANEOUS at 12:52

## 2021-01-01 RX ADMIN — Medication 150 MCG/HR: at 00:20

## 2021-01-01 RX ADMIN — PROPOFOL 25 MCG/KG/MIN: 10 INJECTION, EMULSION INTRAVENOUS at 15:41

## 2021-01-01 RX ADMIN — HYDRALAZINE HYDROCHLORIDE 100 MG: 50 TABLET, FILM COATED ORAL at 16:34

## 2021-01-01 RX ADMIN — PROPOFOL 50 MCG/KG/MIN: 10 INJECTION, EMULSION INTRAVENOUS at 16:31

## 2021-01-01 RX ADMIN — DEXAMETHASONE 6 MG: 4 TABLET ORAL at 09:51

## 2021-01-01 RX ADMIN — CHLORHEXIDINE GLUCONATE 15 ML: 0.12 RINSE ORAL at 09:58

## 2021-01-01 RX ADMIN — Medication 10 ML: at 05:32

## 2021-01-01 RX ADMIN — Medication: at 09:07

## 2021-01-01 RX ADMIN — EPOETIN ALFA-EPBX 20000 UNITS: 10000 INJECTION, SOLUTION INTRAVENOUS; SUBCUTANEOUS at 01:03

## 2021-01-01 RX ADMIN — PIPERACILLIN AND TAZOBACTAM 3.38 G: 3; .375 INJECTION, POWDER, LYOPHILIZED, FOR SOLUTION INTRAVENOUS at 21:46

## 2021-01-01 RX ADMIN — CHLORHEXIDINE GLUCONATE 15 ML: 0.12 RINSE ORAL at 05:00

## 2021-01-01 RX ADMIN — DICLOFENAC SODIUM 2 G: 10 GEL TOPICAL at 21:07

## 2021-01-01 RX ADMIN — Medication 300 MCG/HR: at 09:00

## 2021-01-01 RX ADMIN — CISATRACURIUM BESYLATE 3 MCG/KG/MIN: 2 INJECTION INTRAVENOUS at 17:43

## 2021-01-01 RX ADMIN — ALBUTEROL SULFATE 2 PUFF: 108 AEROSOL, METERED RESPIRATORY (INHALATION) at 20:42

## 2021-01-01 RX ADMIN — ALBUTEROL SULFATE 2 PUFF: 108 AEROSOL, METERED RESPIRATORY (INHALATION) at 15:50

## 2021-01-01 RX ADMIN — Medication 150 MCG/HR: at 09:39

## 2021-01-01 RX ADMIN — INSULIN LISPRO 10 UNITS: 100 INJECTION, SOLUTION INTRAVENOUS; SUBCUTANEOUS at 13:23

## 2021-01-01 RX ADMIN — MICONAZOLE NITRATE 2 % TOPICAL POWDER: at 20:20

## 2021-01-01 RX ADMIN — INSULIN LISPRO 10 UNITS: 100 INJECTION, SOLUTION INTRAVENOUS; SUBCUTANEOUS at 07:11

## 2021-01-01 RX ADMIN — LINEZOLID 600 MG: 600 INJECTION, SOLUTION INTRAVENOUS at 21:56

## 2021-01-01 RX ADMIN — GABAPENTIN 100 MG: 100 CAPSULE ORAL at 21:51

## 2021-01-01 RX ADMIN — LANSOPRAZOLE 30 MG: KIT at 09:32

## 2021-01-01 RX ADMIN — HEPARIN SODIUM 7500 UNITS: 5000 INJECTION INTRAVENOUS; SUBCUTANEOUS at 06:12

## 2021-01-01 RX ADMIN — GABAPENTIN 100 MG: 100 CAPSULE ORAL at 21:00

## 2021-01-01 RX ADMIN — PIPERACILLIN AND TAZOBACTAM 3.38 G: 3; .375 INJECTION, POWDER, LYOPHILIZED, FOR SOLUTION INTRAVENOUS at 13:16

## 2021-01-01 RX ADMIN — AMLODIPINE BESYLATE 5 MG: 5 TABLET ORAL at 08:25

## 2021-01-01 RX ADMIN — FLUTICASONE PROPIONATE 2 SPRAY: 50 SPRAY, METERED NASAL at 13:20

## 2021-01-01 RX ADMIN — ALBUTEROL SULFATE 2 PUFF: 108 AEROSOL, METERED RESPIRATORY (INHALATION) at 08:00

## 2021-01-01 RX ADMIN — ALBUTEROL SULFATE 2 PUFF: 108 AEROSOL, METERED RESPIRATORY (INHALATION) at 08:04

## 2021-01-01 RX ADMIN — THERA TABS 1 TABLET: TAB at 08:45

## 2021-01-01 RX ADMIN — Medication 1 PACKET: at 22:01

## 2021-01-01 RX ADMIN — Medication: at 21:28

## 2021-01-01 RX ADMIN — Medication 275 MCG/HR: at 23:55

## 2021-01-01 RX ADMIN — DICLOFENAC SODIUM 2 G: 10 GEL TOPICAL at 21:30

## 2021-01-01 RX ADMIN — Medication 4 MCG/MIN: at 20:51

## 2021-01-01 RX ADMIN — SEVELAMER CARBONATE 1.6 G: 0.8 POWDER, FOR SUSPENSION ORAL at 16:09

## 2021-01-01 RX ADMIN — HYDRALAZINE HYDROCHLORIDE 20 MG: 20 INJECTION INTRAMUSCULAR; INTRAVENOUS at 15:52

## 2021-01-01 RX ADMIN — OXYCODONE HYDROCHLORIDE AND ACETAMINOPHEN 500 MG: 500 TABLET ORAL at 11:04

## 2021-01-01 RX ADMIN — PIPERACILLIN AND TAZOBACTAM 3.38 G: 3; .375 INJECTION, POWDER, LYOPHILIZED, FOR SOLUTION INTRAVENOUS at 14:32

## 2021-01-01 RX ADMIN — Medication 2000 UNITS: at 08:59

## 2021-01-01 RX ADMIN — LINEZOLID 600 MG: 600 INJECTION, SOLUTION INTRAVENOUS at 20:49

## 2021-01-01 RX ADMIN — LINEZOLID 600 MG: 600 INJECTION, SOLUTION INTRAVENOUS at 21:41

## 2021-01-01 RX ADMIN — Medication: at 10:38

## 2021-01-01 RX ADMIN — GABAPENTIN 100 MG: 100 CAPSULE ORAL at 22:04

## 2021-01-01 RX ADMIN — INSULIN LISPRO 2 UNITS: 100 INJECTION, SOLUTION INTRAVENOUS; SUBCUTANEOUS at 12:56

## 2021-01-01 RX ADMIN — SEVELAMER CARBONATE 1.6 G: 0.8 POWDER, FOR SUSPENSION ORAL at 08:57

## 2021-01-01 RX ADMIN — ALBUTEROL SULFATE 2 PUFF: 108 AEROSOL, METERED RESPIRATORY (INHALATION) at 00:00

## 2021-01-01 RX ADMIN — PANTOPRAZOLE SODIUM 40 MG: 40 TABLET, DELAYED RELEASE ORAL at 16:03

## 2021-01-01 RX ADMIN — INSULIN LISPRO 3 UNITS: 100 INJECTION, SOLUTION INTRAVENOUS; SUBCUTANEOUS at 06:58

## 2021-01-01 RX ADMIN — OXYCODONE AND ACETAMINOPHEN 1 TABLET: 5; 325 TABLET ORAL at 07:24

## 2021-01-01 RX ADMIN — COLLAGENASE SANTYL: 250 OINTMENT TOPICAL at 09:28

## 2021-01-01 RX ADMIN — DIAPER RASH SKIN PROTECTENT: at 16:09

## 2021-01-01 RX ADMIN — LINEZOLID 600 MG: 600 INJECTION, SOLUTION INTRAVENOUS at 22:18

## 2021-01-01 RX ADMIN — Medication: at 20:11

## 2021-01-01 RX ADMIN — CISATRACURIUM BESYLATE 9 MCG/KG/MIN: 2 INJECTION INTRAVENOUS at 09:34

## 2021-01-01 RX ADMIN — PIPERACILLIN AND TAZOBACTAM 3.38 G: 3; .375 INJECTION, POWDER, LYOPHILIZED, FOR SOLUTION INTRAVENOUS at 14:37

## 2021-01-01 RX ADMIN — Medication 1 CAPSULE: at 09:05

## 2021-01-01 RX ADMIN — INSULIN GLARGINE 25 UNITS: 100 INJECTION, SOLUTION SUBCUTANEOUS at 22:14

## 2021-01-01 RX ADMIN — ASPIRIN 81 MG: 81 TABLET, CHEWABLE ORAL at 08:57

## 2021-01-01 RX ADMIN — PIPERACILLIN AND TAZOBACTAM 3.38 G: 3; .375 INJECTION, POWDER, LYOPHILIZED, FOR SOLUTION INTRAVENOUS at 21:38

## 2021-01-01 RX ADMIN — DICLOFENAC SODIUM 2 G: 10 GEL TOPICAL at 17:59

## 2021-01-01 RX ADMIN — Medication 10 ML: at 06:34

## 2021-01-01 RX ADMIN — Medication 10 ML: at 06:53

## 2021-01-01 RX ADMIN — LINEZOLID 600 MG: 600 INJECTION, SOLUTION INTRAVENOUS at 20:47

## 2021-01-01 RX ADMIN — Medication 1 CAPSULE: at 11:04

## 2021-01-01 RX ADMIN — ATROPINE SULFATE 1 MG: 1 INJECTION, SOLUTION INTRAMUSCULAR; INTRAVENOUS; SUBCUTANEOUS at 11:00

## 2021-01-01 RX ADMIN — LIDOCAINE HYDROCHLORIDE 5 ML: 20 INJECTION, SOLUTION INFILTRATION; PERINEURAL at 17:03

## 2021-01-01 RX ADMIN — ALBUTEROL SULFATE 2 PUFF: 108 AEROSOL, METERED RESPIRATORY (INHALATION) at 00:28

## 2021-01-01 RX ADMIN — INSULIN GLARGINE 8 UNITS: 100 INJECTION, SOLUTION SUBCUTANEOUS at 21:43

## 2021-01-01 RX ADMIN — INSULIN LISPRO 2 UNITS: 100 INJECTION, SOLUTION INTRAVENOUS; SUBCUTANEOUS at 06:16

## 2021-01-01 RX ADMIN — PIPERACILLIN AND TAZOBACTAM 3.38 G: 3; .375 INJECTION, POWDER, LYOPHILIZED, FOR SOLUTION INTRAVENOUS at 05:32

## 2021-01-01 RX ADMIN — PIPERACILLIN AND TAZOBACTAM 3.38 G: 3; .375 INJECTION, POWDER, LYOPHILIZED, FOR SOLUTION INTRAVENOUS at 14:40

## 2021-01-01 RX ADMIN — Medication 2000 UNITS: at 09:10

## 2021-01-01 RX ADMIN — FLUTICASONE PROPIONATE 2 SPRAY: 50 SPRAY, METERED NASAL at 09:29

## 2021-01-01 RX ADMIN — DICLOFENAC SODIUM 2 G: 10 GEL TOPICAL at 09:48

## 2021-01-01 RX ADMIN — PROPOFOL 50 MCG/KG/MIN: 10 INJECTION, EMULSION INTRAVENOUS at 17:37

## 2021-01-01 RX ADMIN — FUROSEMIDE 40 MG: 10 INJECTION, SOLUTION INTRAMUSCULAR; INTRAVENOUS at 10:23

## 2021-01-01 RX ADMIN — Medication 2000 UNITS: at 09:25

## 2021-01-01 RX ADMIN — SODIUM CHLORIDE 40 MG: 9 INJECTION INTRAMUSCULAR; INTRAVENOUS; SUBCUTANEOUS at 21:33

## 2021-01-01 RX ADMIN — PROPOFOL 50 MCG/KG/MIN: 10 INJECTION, EMULSION INTRAVENOUS at 04:10

## 2021-01-01 RX ADMIN — PROPOFOL 45 MCG/KG/MIN: 10 INJECTION, EMULSION INTRAVENOUS at 14:21

## 2021-01-01 RX ADMIN — PROPOFOL 25 MCG/KG/MIN: 10 INJECTION, EMULSION INTRAVENOUS at 01:36

## 2021-01-01 RX ADMIN — SODIUM BICARBONATE 650 MG: 650 TABLET ORAL at 09:22

## 2021-01-01 RX ADMIN — GABAPENTIN 100 MG: 100 CAPSULE ORAL at 08:46

## 2021-01-01 RX ADMIN — Medication: at 09:16

## 2021-01-01 RX ADMIN — INSULIN LISPRO 10 UNITS: 100 INJECTION, SOLUTION INTRAVENOUS; SUBCUTANEOUS at 17:17

## 2021-01-01 RX ADMIN — ASPIRIN 81 MG: 81 TABLET, CHEWABLE ORAL at 08:44

## 2021-01-01 RX ADMIN — HYDRALAZINE HYDROCHLORIDE 100 MG: 50 TABLET, FILM COATED ORAL at 09:18

## 2021-01-01 RX ADMIN — Medication: at 06:22

## 2021-01-01 RX ADMIN — POTASSIUM CHLORIDE 10 MEQ: 14.9 INJECTION, SOLUTION INTRAVENOUS at 10:16

## 2021-01-01 RX ADMIN — GABAPENTIN 100 MG: 100 CAPSULE ORAL at 08:57

## 2021-01-01 RX ADMIN — PIPERACILLIN AND TAZOBACTAM 3.38 G: 3; .375 INJECTION, POWDER, LYOPHILIZED, FOR SOLUTION INTRAVENOUS at 06:46

## 2021-01-01 RX ADMIN — THERA TABS 1 TABLET: TAB at 10:03

## 2021-01-01 RX ADMIN — HYDROCORTISONE SODIUM SUCCINATE 50 MG: 100 INJECTION, POWDER, FOR SOLUTION INTRAMUSCULAR; INTRAVENOUS at 05:24

## 2021-01-01 RX ADMIN — LANSOPRAZOLE 30 MG: KIT at 17:38

## 2021-01-01 RX ADMIN — Medication: at 05:10

## 2021-01-01 RX ADMIN — Medication: at 22:17

## 2021-01-01 RX ADMIN — Medication 300 MCG/HR: at 21:32

## 2021-01-01 RX ADMIN — DIAPER RASH SKIN PROTECTENT: at 16:30

## 2021-01-01 RX ADMIN — EPOETIN ALFA-EPBX 20000 UNITS: 10000 INJECTION, SOLUTION INTRAVENOUS; SUBCUTANEOUS at 20:48

## 2021-01-01 RX ADMIN — SODIUM CHLORIDE 40 MG: 9 INJECTION INTRAMUSCULAR; INTRAVENOUS; SUBCUTANEOUS at 10:22

## 2021-01-01 RX ADMIN — Medication: at 09:50

## 2021-01-01 RX ADMIN — ALBUTEROL SULFATE 2 PUFF: 108 AEROSOL, METERED RESPIRATORY (INHALATION) at 22:14

## 2021-01-01 RX ADMIN — Medication 10 ML: at 21:28

## 2021-01-01 RX ADMIN — OXYCODONE AND ACETAMINOPHEN 1 TABLET: 5; 325 TABLET ORAL at 06:32

## 2021-01-01 RX ADMIN — GABAPENTIN 100 MG: 100 CAPSULE ORAL at 16:32

## 2021-01-01 RX ADMIN — DICLOFENAC SODIUM 2 G: 10 GEL TOPICAL at 22:51

## 2021-01-01 RX ADMIN — HYDRALAZINE HYDROCHLORIDE 100 MG: 50 TABLET, FILM COATED ORAL at 09:51

## 2021-01-01 RX ADMIN — TAMSULOSIN HYDROCHLORIDE 0.4 MG: 0.4 CAPSULE ORAL at 12:54

## 2021-01-01 RX ADMIN — INSULIN GLARGINE 27 UNITS: 100 INJECTION, SOLUTION SUBCUTANEOUS at 22:30

## 2021-01-01 RX ADMIN — INSULIN LISPRO 10 UNITS: 100 INJECTION, SOLUTION INTRAVENOUS; SUBCUTANEOUS at 17:05

## 2021-01-01 RX ADMIN — PIPERACILLIN AND TAZOBACTAM 3.38 G: 3; .375 INJECTION, POWDER, LYOPHILIZED, FOR SOLUTION INTRAVENOUS at 13:00

## 2021-01-01 RX ADMIN — CLONIDINE HYDROCHLORIDE 0.1 MG: 0.1 TABLET ORAL at 17:23

## 2021-01-01 RX ADMIN — PIPERACILLIN AND TAZOBACTAM 3.38 G: 3; .375 INJECTION, POWDER, LYOPHILIZED, FOR SOLUTION INTRAVENOUS at 06:03

## 2021-01-01 RX ADMIN — PROPOFOL 40 MCG/KG/MIN: 10 INJECTION, EMULSION INTRAVENOUS at 02:15

## 2021-01-01 RX ADMIN — INSULIN LISPRO 2 UNITS: 100 INJECTION, SOLUTION INTRAVENOUS; SUBCUTANEOUS at 22:15

## 2021-01-01 RX ADMIN — Medication 10 ML: at 21:43

## 2021-01-01 RX ADMIN — ENOXAPARIN SODIUM 40 MG: 40 INJECTION SUBCUTANEOUS at 09:24

## 2021-01-01 RX ADMIN — MICONAZOLE NITRATE 2 % TOPICAL POWDER: at 09:27

## 2021-01-01 RX ADMIN — PROPOFOL 40 MCG/KG/MIN: 10 INJECTION, EMULSION INTRAVENOUS at 07:45

## 2021-01-01 RX ADMIN — INSULIN LISPRO 10 UNITS: 100 INJECTION, SOLUTION INTRAVENOUS; SUBCUTANEOUS at 06:45

## 2021-01-01 RX ADMIN — GABAPENTIN 100 MG: 100 CAPSULE ORAL at 21:30

## 2021-01-01 RX ADMIN — GABAPENTIN 100 MG: 100 CAPSULE ORAL at 16:44

## 2021-01-01 RX ADMIN — MIDAZOLAM 2 MG: 1 INJECTION INTRAMUSCULAR; INTRAVENOUS at 22:29

## 2021-01-01 RX ADMIN — HYDRALAZINE HYDROCHLORIDE 100 MG: 50 TABLET, FILM COATED ORAL at 22:04

## 2021-01-01 RX ADMIN — ALBUTEROL SULFATE 2 PUFF: 108 AEROSOL, METERED RESPIRATORY (INHALATION) at 16:53

## 2021-01-01 RX ADMIN — SODIUM CHLORIDE 40 MG: 9 INJECTION INTRAMUSCULAR; INTRAVENOUS; SUBCUTANEOUS at 09:49

## 2021-01-01 RX ADMIN — METHYLPREDNISOLONE SODIUM SUCCINATE 125 MG: 125 INJECTION, POWDER, FOR SOLUTION INTRAMUSCULAR; INTRAVENOUS at 00:39

## 2021-01-01 RX ADMIN — Medication 2000 UNITS: at 09:03

## 2021-01-01 RX ADMIN — DICLOFENAC SODIUM 2 G: 10 GEL TOPICAL at 12:59

## 2021-01-01 RX ADMIN — Medication 10 ML: at 06:28

## 2021-01-01 RX ADMIN — ALBUTEROL SULFATE 2 PUFF: 108 AEROSOL, METERED RESPIRATORY (INHALATION) at 01:03

## 2021-01-01 RX ADMIN — MIDAZOLAM HYDROCHLORIDE 9 MG/HR: 5 INJECTION INTRAMUSCULAR; INTRAVENOUS at 19:06

## 2021-01-01 RX ADMIN — Medication 300 MCG/HR: at 23:22

## 2021-01-01 RX ADMIN — CHLORHEXIDINE GLUCONATE 15 ML: 0.12 RINSE ORAL at 20:34

## 2021-01-01 RX ADMIN — INSULIN LISPRO 3 UNITS: 100 INJECTION, SOLUTION INTRAVENOUS; SUBCUTANEOUS at 11:24

## 2021-01-01 RX ADMIN — HYDRALAZINE HYDROCHLORIDE 100 MG: 50 TABLET, FILM COATED ORAL at 22:03

## 2021-01-01 RX ADMIN — GABAPENTIN 100 MG: 100 CAPSULE ORAL at 08:49

## 2021-01-01 RX ADMIN — BICALUTAMIDE 50 MG: 50 TABLET ORAL at 08:59

## 2021-01-01 RX ADMIN — AMLODIPINE BESYLATE 5 MG: 5 TABLET ORAL at 08:38

## 2021-01-01 RX ADMIN — MIDAZOLAM HYDROCHLORIDE 8 MG/HR: 5 INJECTION INTRAMUSCULAR; INTRAVENOUS at 14:20

## 2021-01-01 RX ADMIN — GABAPENTIN 100 MG: 100 CAPSULE ORAL at 18:02

## 2021-01-01 RX ADMIN — ASPIRIN 81 MG: 81 TABLET, CHEWABLE ORAL at 09:24

## 2021-01-01 RX ADMIN — INSULIN LISPRO 3 UNITS: 100 INJECTION, SOLUTION INTRAVENOUS; SUBCUTANEOUS at 23:30

## 2021-01-01 RX ADMIN — MICONAZOLE NITRATE 2 % TOPICAL POWDER: at 17:43

## 2021-01-01 RX ADMIN — LIDOCAINE HYDROCHLORIDE 100 MG: 20 INJECTION, SOLUTION EPIDURAL; INFILTRATION; INTRACAUDAL; PERINEURAL at 14:53

## 2021-01-01 RX ADMIN — ALBUMIN (HUMAN) 12.5 G: 0.25 INJECTION, SOLUTION INTRAVENOUS at 09:12

## 2021-01-01 RX ADMIN — Medication 10 ML: at 13:30

## 2021-01-01 RX ADMIN — Medication 1 PACKET: at 09:26

## 2021-01-01 RX ADMIN — Medication 1 PACKET: at 08:46

## 2021-01-01 RX ADMIN — HYDRALAZINE HYDROCHLORIDE 100 MG: 50 TABLET, FILM COATED ORAL at 21:50

## 2021-01-01 RX ADMIN — ROCURONIUM BROMIDE 50 MG: 10 INJECTION, SOLUTION INTRAVENOUS at 14:47

## 2021-01-01 RX ADMIN — DIAPER RASH SKIN PROTECTENT: at 05:32

## 2021-01-01 RX ADMIN — FUROSEMIDE 80 MG: 10 INJECTION, SOLUTION INTRAMUSCULAR; INTRAVENOUS at 21:56

## 2021-01-01 RX ADMIN — ALBUMIN (HUMAN) 25 G: 0.25 INJECTION, SOLUTION INTRAVENOUS at 10:29

## 2021-01-01 RX ADMIN — REMDESIVIR 100 MG: 5 INJECTION INTRAVENOUS at 22:07

## 2021-01-01 RX ADMIN — INSULIN GLARGINE 18 UNITS: 100 INJECTION, SOLUTION SUBCUTANEOUS at 08:09

## 2021-01-01 RX ADMIN — GABAPENTIN 100 MG: 100 CAPSULE ORAL at 16:34

## 2021-01-01 RX ADMIN — Medication 275 MCG/HR: at 18:42

## 2021-01-01 RX ADMIN — INSULIN GLARGINE 40 UNITS: 100 INJECTION, SOLUTION SUBCUTANEOUS at 21:39

## 2021-01-01 RX ADMIN — COLLAGENASE SANTYL: 250 OINTMENT TOPICAL at 10:31

## 2021-01-01 RX ADMIN — PROPOFOL 50 MCG/KG/MIN: 10 INJECTION, EMULSION INTRAVENOUS at 21:38

## 2021-01-01 RX ADMIN — PROPOFOL 25 MCG/KG/MIN: 10 INJECTION, EMULSION INTRAVENOUS at 12:22

## 2021-01-01 RX ADMIN — GABAPENTIN 100 MG: 100 CAPSULE ORAL at 09:57

## 2021-01-01 RX ADMIN — MICONAZOLE NITRATE 2 % TOPICAL POWDER: at 09:04

## 2021-01-01 RX ADMIN — OXYCODONE AND ACETAMINOPHEN 1 TABLET: 5; 325 TABLET ORAL at 14:04

## 2021-01-01 RX ADMIN — Medication: at 09:52

## 2021-01-01 RX ADMIN — HEPARIN SODIUM 7500 UNITS: 5000 INJECTION INTRAVENOUS; SUBCUTANEOUS at 06:14

## 2021-01-01 RX ADMIN — SODIUM BICARBONATE 650 MG: 650 TABLET ORAL at 09:34

## 2021-01-01 RX ADMIN — SEVELAMER CARBONATE 1.6 G: 0.8 POWDER, FOR SUSPENSION ORAL at 09:25

## 2021-01-01 RX ADMIN — LANSOPRAZOLE 30 MG: KIT at 09:46

## 2021-01-01 RX ADMIN — GABAPENTIN 100 MG: 100 CAPSULE ORAL at 08:55

## 2021-01-01 RX ADMIN — HYDROCORTISONE SODIUM SUCCINATE 50 MG: 100 INJECTION, POWDER, FOR SOLUTION INTRAMUSCULAR; INTRAVENOUS at 07:12

## 2021-01-01 RX ADMIN — GABAPENTIN 100 MG: 100 CAPSULE ORAL at 18:42

## 2021-01-01 RX ADMIN — Medication 10 ML: at 06:00

## 2021-01-01 RX ADMIN — ACETAMINOPHEN 500 MG: 500 TABLET ORAL at 03:31

## 2021-01-01 RX ADMIN — INSULIN GLARGINE 50 UNITS: 100 INJECTION, SOLUTION SUBCUTANEOUS at 22:50

## 2021-01-01 RX ADMIN — LINEZOLID 600 MG: 600 INJECTION, SOLUTION INTRAVENOUS at 09:10

## 2021-01-01 RX ADMIN — TAMSULOSIN HYDROCHLORIDE 0.4 MG: 0.4 CAPSULE ORAL at 09:25

## 2021-01-01 RX ADMIN — ALBUTEROL SULFATE 2 PUFF: 108 AEROSOL, METERED RESPIRATORY (INHALATION) at 20:09

## 2021-01-01 RX ADMIN — PIPERACILLIN AND TAZOBACTAM 3.38 G: 3; .375 INJECTION, POWDER, LYOPHILIZED, FOR SOLUTION INTRAVENOUS at 14:19

## 2021-01-01 RX ADMIN — CLONIDINE HYDROCHLORIDE 0.1 MG: 0.1 TABLET ORAL at 09:52

## 2021-01-01 RX ADMIN — HYDROCORTISONE SODIUM SUCCINATE 50 MG: 100 INJECTION, POWDER, FOR SOLUTION INTRAMUSCULAR; INTRAVENOUS at 09:54

## 2021-01-01 RX ADMIN — OXYCODONE AND ACETAMINOPHEN 1 TABLET: 5; 325 TABLET ORAL at 22:08

## 2021-01-01 RX ADMIN — CLONIDINE HYDROCHLORIDE 0.1 MG: 0.1 TABLET ORAL at 18:19

## 2021-01-01 RX ADMIN — ZINC SULFATE 220 MG (50 MG) CAPSULE 1 CAPSULE: CAPSULE at 09:23

## 2021-01-01 RX ADMIN — OXYCODONE AND ACETAMINOPHEN 1 TABLET: 5; 325 TABLET ORAL at 21:50

## 2021-01-01 RX ADMIN — HYDROCORTISONE SODIUM SUCCINATE 50 MG: 100 INJECTION, POWDER, FOR SOLUTION INTRAMUSCULAR; INTRAVENOUS at 20:26

## 2021-01-01 RX ADMIN — LANSOPRAZOLE 30 MG: KIT at 18:45

## 2021-01-01 RX ADMIN — DICLOFENAC SODIUM 2 G: 10 GEL TOPICAL at 23:19

## 2021-01-01 RX ADMIN — LABETALOL HYDROCHLORIDE 20 MG: 5 INJECTION INTRAVENOUS at 11:32

## 2021-01-01 RX ADMIN — Medication 10 ML: at 22:55

## 2021-01-01 RX ADMIN — OXYCODONE HYDROCHLORIDE AND ACETAMINOPHEN 500 MG: 500 TABLET ORAL at 09:24

## 2021-01-01 RX ADMIN — THERA TABS 1 TABLET: TAB at 08:59

## 2021-01-01 RX ADMIN — DIAPER RASH SKIN PROTECTENT: at 20:18

## 2021-01-01 RX ADMIN — PIPERACILLIN AND TAZOBACTAM 3.38 G: 3; .375 INJECTION, POWDER, LYOPHILIZED, FOR SOLUTION INTRAVENOUS at 07:13

## 2021-01-01 RX ADMIN — Medication 1 PACKET: at 14:02

## 2021-01-01 RX ADMIN — PROPOFOL 45 MCG/KG/MIN: 10 INJECTION, EMULSION INTRAVENOUS at 10:45

## 2021-01-01 RX ADMIN — Medication 40 ML: at 05:36

## 2021-01-01 RX ADMIN — ALBUTEROL SULFATE 2 PUFF: 108 AEROSOL, METERED RESPIRATORY (INHALATION) at 07:11

## 2021-01-01 RX ADMIN — SODIUM BICARBONATE 650 MG: 650 TABLET ORAL at 10:22

## 2021-01-01 RX ADMIN — Medication 300 MCG/HR: at 11:04

## 2021-01-01 RX ADMIN — Medication: at 00:33

## 2021-01-01 RX ADMIN — INSULIN LISPRO 10 UNITS: 100 INJECTION, SOLUTION INTRAVENOUS; SUBCUTANEOUS at 17:31

## 2021-01-01 RX ADMIN — INSULIN GLARGINE 50 UNITS: 100 INJECTION, SOLUTION SUBCUTANEOUS at 21:38

## 2021-01-01 RX ADMIN — TAMSULOSIN HYDROCHLORIDE 0.4 MG: 0.4 CAPSULE ORAL at 09:57

## 2021-01-01 RX ADMIN — INSULIN LISPRO 20 UNITS: 100 INJECTION, SOLUTION INTRAVENOUS; SUBCUTANEOUS at 18:01

## 2021-01-01 RX ADMIN — INSULIN LISPRO 2 UNITS: 100 INJECTION, SOLUTION INTRAVENOUS; SUBCUTANEOUS at 17:29

## 2021-01-01 RX ADMIN — CHLORHEXIDINE GLUCONATE 15 ML: 0.12 RINSE ORAL at 09:04

## 2021-01-01 RX ADMIN — DOCUSATE SODIUM 100 MG: 50 LIQUID ORAL at 13:18

## 2021-01-01 RX ADMIN — HEPARIN SODIUM 7500 UNITS: 5000 INJECTION INTRAVENOUS; SUBCUTANEOUS at 14:23

## 2021-01-01 RX ADMIN — ALBUTEROL SULFATE 2 PUFF: 108 AEROSOL, METERED RESPIRATORY (INHALATION) at 00:48

## 2021-01-01 RX ADMIN — Medication: at 20:07

## 2021-01-01 RX ADMIN — ASPIRIN 81 MG: 81 TABLET, CHEWABLE ORAL at 09:17

## 2021-01-01 RX ADMIN — FUROSEMIDE 40 MG: 10 INJECTION, SOLUTION INTRAMUSCULAR; INTRAVENOUS at 14:44

## 2021-01-01 RX ADMIN — Medication: at 09:03

## 2021-01-01 RX ADMIN — ASPIRIN 81 MG: 81 TABLET, CHEWABLE ORAL at 08:45

## 2021-01-01 RX ADMIN — DICLOFENAC SODIUM 2 G: 10 GEL TOPICAL at 14:14

## 2021-01-01 RX ADMIN — INSULIN LISPRO 20 UNITS: 100 INJECTION, SOLUTION INTRAVENOUS; SUBCUTANEOUS at 07:27

## 2021-01-01 RX ADMIN — GABAPENTIN 100 MG: 100 CAPSULE ORAL at 22:01

## 2021-01-01 RX ADMIN — HYDROCORTISONE SODIUM SUCCINATE 50 MG: 100 INJECTION, POWDER, FOR SOLUTION INTRAMUSCULAR; INTRAVENOUS at 13:18

## 2021-01-01 RX ADMIN — INSULIN LISPRO 3 UNITS: 100 INJECTION, SOLUTION INTRAVENOUS; SUBCUTANEOUS at 16:35

## 2021-01-01 RX ADMIN — INSULIN LISPRO 5 UNITS: 100 INJECTION, SOLUTION INTRAVENOUS; SUBCUTANEOUS at 18:53

## 2021-01-01 RX ADMIN — Medication: at 21:29

## 2021-01-01 RX ADMIN — MIDAZOLAM HYDROCHLORIDE 7 MG/HR: 5 INJECTION INTRAMUSCULAR; INTRAVENOUS at 05:51

## 2021-01-01 RX ADMIN — Medication 300 MCG/HR: at 14:24

## 2021-01-01 RX ADMIN — ALBUTEROL SULFATE 2 PUFF: 108 AEROSOL, METERED RESPIRATORY (INHALATION) at 11:30

## 2021-01-01 RX ADMIN — Medication: at 20:55

## 2021-01-01 RX ADMIN — OXYCODONE AND ACETAMINOPHEN 1 TABLET: 5; 325 TABLET ORAL at 22:20

## 2021-01-01 RX ADMIN — PIPERACILLIN AND TAZOBACTAM 3.38 G: 3; .375 INJECTION, POWDER, LYOPHILIZED, FOR SOLUTION INTRAVENOUS at 22:28

## 2021-01-01 RX ADMIN — PIPERACILLIN AND TAZOBACTAM 3.38 G: 3; .375 INJECTION, POWDER, LYOPHILIZED, FOR SOLUTION INTRAVENOUS at 22:37

## 2021-01-01 RX ADMIN — Medication: at 21:51

## 2021-01-01 RX ADMIN — LANSOPRAZOLE 30 MG: KIT at 17:37

## 2021-01-01 RX ADMIN — ALBUTEROL SULFATE 2 PUFF: 108 AEROSOL, METERED RESPIRATORY (INHALATION) at 19:50

## 2021-01-01 RX ADMIN — Medication 1 PACKET: at 23:02

## 2021-01-01 RX ADMIN — MIDAZOLAM HYDROCHLORIDE 6 MG/HR: 5 INJECTION INTRAMUSCULAR; INTRAVENOUS at 00:54

## 2021-01-01 RX ADMIN — Medication 10 ML: at 21:42

## 2021-01-01 RX ADMIN — ALBUTEROL SULFATE 2 PUFF: 108 AEROSOL, METERED RESPIRATORY (INHALATION) at 15:43

## 2021-01-01 RX ADMIN — HYDROCORTISONE SODIUM SUCCINATE 50 MG: 100 INJECTION, POWDER, FOR SOLUTION INTRAMUSCULAR; INTRAVENOUS at 23:06

## 2021-01-01 RX ADMIN — ALBUTEROL SULFATE 2 PUFF: 108 AEROSOL, METERED RESPIRATORY (INHALATION) at 11:53

## 2021-01-01 RX ADMIN — Medication 1 PACKET: at 08:55

## 2021-01-01 RX ADMIN — HEPARIN SODIUM 3200 UNITS: 1000 INJECTION INTRAVENOUS; SUBCUTANEOUS at 12:00

## 2021-01-01 RX ADMIN — INSULIN LISPRO 10 UNITS: 100 INJECTION, SOLUTION INTRAVENOUS; SUBCUTANEOUS at 08:25

## 2021-01-01 RX ADMIN — PROPOFOL 40 MCG/KG/MIN: 10 INJECTION, EMULSION INTRAVENOUS at 01:16

## 2021-01-01 RX ADMIN — CLONIDINE HYDROCHLORIDE 0.1 MG: 0.1 TABLET ORAL at 09:34

## 2021-01-01 RX ADMIN — MICONAZOLE NITRATE 2 % TOPICAL POWDER: at 17:32

## 2021-01-01 RX ADMIN — Medication 20 ML: at 06:21

## 2021-01-01 RX ADMIN — POTASSIUM CHLORIDE 20 MEQ: 400 INJECTION, SOLUTION INTRAVENOUS at 12:02

## 2021-01-01 RX ADMIN — OXYCODONE AND ACETAMINOPHEN 1 TABLET: 5; 325 TABLET ORAL at 10:01

## 2021-01-01 RX ADMIN — INSULIN GLARGINE 14 UNITS: 100 INJECTION, SOLUTION SUBCUTANEOUS at 21:30

## 2021-01-01 RX ADMIN — BICALUTAMIDE 50 MG: 50 TABLET ORAL at 08:34

## 2021-01-01 RX ADMIN — MIDAZOLAM HYDROCHLORIDE 10 MG/HR: 5 INJECTION INTRAMUSCULAR; INTRAVENOUS at 17:00

## 2021-01-01 RX ADMIN — INSULIN LISPRO 5 UNITS: 100 INJECTION, SOLUTION INTRAVENOUS; SUBCUTANEOUS at 07:29

## 2021-01-01 RX ADMIN — Medication 1 PACKET: at 09:04

## 2021-01-01 RX ADMIN — BICALUTAMIDE 50 MG: 50 TABLET ORAL at 10:32

## 2021-01-01 RX ADMIN — EPINEPHRINE 1 MG: 0.1 INJECTION INTRACARDIAC; INTRAVENOUS at 10:34

## 2021-01-01 RX ADMIN — SEVELAMER CARBONATE 1.6 G: 0.8 POWDER, FOR SUSPENSION ORAL at 09:02

## 2021-01-01 RX ADMIN — BUMETANIDE 2 MG: 0.25 INJECTION, SOLUTION INTRAMUSCULAR; INTRAVENOUS at 09:17

## 2021-01-01 RX ADMIN — Medication: at 09:40

## 2021-01-01 RX ADMIN — Medication 1 PACKET: at 22:00

## 2021-01-01 RX ADMIN — CHLORHEXIDINE GLUCONATE 15 ML: 0.12 RINSE ORAL at 20:18

## 2021-01-01 RX ADMIN — Medication 10 ML: at 08:59

## 2021-01-01 RX ADMIN — HYDRALAZINE HYDROCHLORIDE 100 MG: 50 TABLET, FILM COATED ORAL at 17:05

## 2021-01-01 RX ADMIN — INSULIN LISPRO 2 UNITS: 100 INJECTION, SOLUTION INTRAVENOUS; SUBCUTANEOUS at 13:00

## 2021-01-01 RX ADMIN — SEVELAMER CARBONATE 1.6 G: 0.8 POWDER, FOR SUSPENSION ORAL at 18:56

## 2021-01-01 RX ADMIN — IPRATROPIUM BROMIDE AND ALBUTEROL SULFATE 3 ML: .5; 3 SOLUTION RESPIRATORY (INHALATION) at 14:19

## 2021-01-01 RX ADMIN — Medication 40 ML: at 06:04

## 2021-01-01 RX ADMIN — HYDROCORTISONE SODIUM SUCCINATE 50 MG: 100 INJECTION, POWDER, FOR SOLUTION INTRAMUSCULAR; INTRAVENOUS at 09:25

## 2021-01-01 RX ADMIN — Medication: at 09:46

## 2021-01-01 RX ADMIN — Medication 10 ML: at 05:45

## 2021-01-01 RX ADMIN — THERA TABS 1 TABLET: TAB at 09:03

## 2021-01-01 RX ADMIN — SEVELAMER CARBONATE 1.6 G: 0.8 POWDER, FOR SUSPENSION ORAL at 16:30

## 2021-01-01 RX ADMIN — Medication 10 ML: at 21:06

## 2021-01-01 RX ADMIN — CHLORHEXIDINE GLUCONATE 15 ML: 0.12 RINSE ORAL at 08:45

## 2021-01-01 RX ADMIN — ROCURONIUM BROMIDE 50 MG: 10 INJECTION INTRAVENOUS at 16:18

## 2021-01-01 RX ADMIN — Medication: at 06:04

## 2021-01-01 RX ADMIN — PIPERACILLIN AND TAZOBACTAM 3.38 G: 3; .375 INJECTION, POWDER, LYOPHILIZED, FOR SOLUTION INTRAVENOUS at 06:11

## 2021-01-01 RX ADMIN — Medication: at 09:05

## 2021-01-01 RX ADMIN — PROPOFOL 40 MCG/KG/MIN: 10 INJECTION, EMULSION INTRAVENOUS at 17:29

## 2021-01-01 RX ADMIN — ALBUMIN (HUMAN) 12.5 G: 0.25 INJECTION, SOLUTION INTRAVENOUS at 09:15

## 2021-01-01 RX ADMIN — BICALUTAMIDE 50 MG: 50 TABLET ORAL at 08:46

## 2021-01-01 RX ADMIN — Medication: at 09:29

## 2021-01-01 RX ADMIN — DICLOFENAC SODIUM 2 G: 10 GEL TOPICAL at 13:12

## 2021-01-01 RX ADMIN — PIPERACILLIN AND TAZOBACTAM 3.38 G: 3; .375 INJECTION, POWDER, LYOPHILIZED, FOR SOLUTION INTRAVENOUS at 06:01

## 2021-01-01 RX ADMIN — GABAPENTIN 100 MG: 100 CAPSULE ORAL at 08:36

## 2021-01-01 RX ADMIN — Medication: at 21:16

## 2021-01-01 RX ADMIN — LINEZOLID 600 MG: 600 INJECTION, SOLUTION INTRAVENOUS at 09:39

## 2021-01-01 RX ADMIN — TAMSULOSIN HYDROCHLORIDE 0.4 MG: 0.4 CAPSULE ORAL at 08:43

## 2021-01-01 RX ADMIN — DICLOFENAC SODIUM 2 G: 10 GEL TOPICAL at 21:18

## 2021-01-01 RX ADMIN — PIPERACILLIN AND TAZOBACTAM 3.38 G: 3; .375 INJECTION, POWDER, LYOPHILIZED, FOR SOLUTION INTRAVENOUS at 15:12

## 2021-01-01 RX ADMIN — INSULIN GLARGINE 14 UNITS: 100 INJECTION, SOLUTION SUBCUTANEOUS at 21:22

## 2021-01-01 RX ADMIN — INSULIN GLARGINE 14 UNITS: 100 INJECTION, SOLUTION SUBCUTANEOUS at 23:16

## 2021-01-01 RX ADMIN — INSULIN LISPRO 10 UNITS: 100 INJECTION, SOLUTION INTRAVENOUS; SUBCUTANEOUS at 12:48

## 2021-01-01 RX ADMIN — GABAPENTIN 100 MG: 100 CAPSULE ORAL at 21:57

## 2021-01-01 RX ADMIN — INSULIN LISPRO 3 UNITS: 100 INJECTION, SOLUTION INTRAVENOUS; SUBCUTANEOUS at 13:29

## 2021-01-01 RX ADMIN — Medication: at 08:58

## 2021-01-01 RX ADMIN — Medication 275 MCG/HR: at 15:45

## 2021-01-01 RX ADMIN — OXYCODONE HYDROCHLORIDE AND ACETAMINOPHEN 500 MG: 500 TABLET ORAL at 09:05

## 2021-01-01 RX ADMIN — Medication 1 PACKET: at 18:04

## 2021-01-01 RX ADMIN — OXYCODONE HYDROCHLORIDE AND ACETAMINOPHEN 500 MG: 500 TABLET ORAL at 11:17

## 2021-01-01 RX ADMIN — CHLORHEXIDINE GLUCONATE 15 ML: 0.12 RINSE ORAL at 21:48

## 2021-01-01 RX ADMIN — Medication: at 20:19

## 2021-01-01 RX ADMIN — GABAPENTIN 100 MG: 100 CAPSULE ORAL at 09:10

## 2021-01-01 RX ADMIN — ASPIRIN 81 MG: 81 TABLET, CHEWABLE ORAL at 09:00

## 2021-01-01 RX ADMIN — FLUTICASONE PROPIONATE 2 SPRAY: 50 SPRAY, METERED NASAL at 09:00

## 2021-01-01 RX ADMIN — DIAPER RASH SKIN PROTECTENT: at 18:57

## 2021-01-01 RX ADMIN — DICLOFENAC SODIUM 2 G: 10 GEL TOPICAL at 09:27

## 2021-01-01 RX ADMIN — SEVELAMER CARBONATE 1.6 G: 0.8 POWDER, FOR SUSPENSION ORAL at 13:48

## 2021-01-01 RX ADMIN — SEVELAMER CARBONATE 1.6 G: 0.8 POWDER, FOR SUSPENSION ORAL at 08:09

## 2021-01-01 RX ADMIN — MICONAZOLE NITRATE 2 % TOPICAL POWDER: at 18:00

## 2021-01-01 RX ADMIN — ASPIRIN 81 MG: 81 TABLET, CHEWABLE ORAL at 09:02

## 2021-01-01 RX ADMIN — HYDRALAZINE HYDROCHLORIDE 100 MG: 50 TABLET, FILM COATED ORAL at 22:18

## 2021-01-01 RX ADMIN — DEXAMETHASONE 6 MG: 4 TABLET ORAL at 09:57

## 2021-01-01 RX ADMIN — LANSOPRAZOLE 30 MG: KIT at 19:21

## 2021-01-01 RX ADMIN — PROPOFOL 40 MCG/KG/MIN: 10 INJECTION, EMULSION INTRAVENOUS at 16:15

## 2021-01-01 RX ADMIN — Medication: at 22:16

## 2021-01-01 RX ADMIN — DICLOFENAC SODIUM 2 G: 10 GEL TOPICAL at 14:26

## 2021-01-01 RX ADMIN — OXYCODONE HYDROCHLORIDE AND ACETAMINOPHEN 500 MG: 500 TABLET ORAL at 09:19

## 2021-01-01 RX ADMIN — THERA TABS 1 TABLET: TAB at 08:46

## 2021-01-01 RX ADMIN — Medication 10 ML: at 22:04

## 2021-01-01 RX ADMIN — Medication 300 MCG/HR: at 04:49

## 2021-01-01 RX ADMIN — Medication 81 MG: at 10:29

## 2021-01-01 RX ADMIN — FUROSEMIDE 80 MG: 10 INJECTION, SOLUTION INTRAMUSCULAR; INTRAVENOUS at 20:51

## 2021-01-01 RX ADMIN — Medication 225 MCG/HR: at 03:24

## 2021-01-01 RX ADMIN — OXYCODONE AND ACETAMINOPHEN 1 TABLET: 5; 325 TABLET ORAL at 10:04

## 2021-01-01 RX ADMIN — SEVELAMER CARBONATE 1.6 G: 0.8 POWDER, FOR SUSPENSION ORAL at 11:00

## 2021-01-01 RX ADMIN — DIAPER RASH SKIN PROTECTENT: at 13:52

## 2021-01-01 RX ADMIN — LANSOPRAZOLE 30 MG: KIT at 09:05

## 2021-01-01 RX ADMIN — Medication: at 15:14

## 2021-01-01 RX ADMIN — LINEZOLID 600 MG: 600 INJECTION, SOLUTION INTRAVENOUS at 09:57

## 2021-01-01 RX ADMIN — IRON SUCROSE 200 MG: 20 INJECTION, SOLUTION INTRAVENOUS at 10:42

## 2021-01-01 RX ADMIN — TAMSULOSIN HYDROCHLORIDE 0.4 MG: 0.4 CAPSULE ORAL at 09:07

## 2021-01-01 RX ADMIN — MIDAZOLAM HYDROCHLORIDE 8 MG/HR: 5 INJECTION INTRAMUSCULAR; INTRAVENOUS at 04:10

## 2021-01-01 RX ADMIN — PROPOFOL 45 MCG/KG/MIN: 10 INJECTION, EMULSION INTRAVENOUS at 16:17

## 2021-01-01 RX ADMIN — PIPERACILLIN AND TAZOBACTAM 3.38 G: 3; .375 INJECTION, POWDER, LYOPHILIZED, FOR SOLUTION INTRAVENOUS at 21:28

## 2021-01-01 RX ADMIN — HYDRALAZINE HYDROCHLORIDE 100 MG: 50 TABLET, FILM COATED ORAL at 09:02

## 2021-01-01 RX ADMIN — SEVELAMER CARBONATE 1.6 G: 0.8 POWDER, FOR SUSPENSION ORAL at 16:01

## 2021-01-01 RX ADMIN — Medication 100 MCG/HR: at 10:00

## 2021-01-01 RX ADMIN — AMLODIPINE BESYLATE 10 MG: 5 TABLET ORAL at 09:24

## 2021-01-01 RX ADMIN — MIDAZOLAM HYDROCHLORIDE 0.5 MG: 1 INJECTION, SOLUTION INTRAMUSCULAR; INTRAVENOUS at 16:55

## 2021-01-01 RX ADMIN — PIPERACILLIN AND TAZOBACTAM 3.38 G: 3; .375 INJECTION, POWDER, LYOPHILIZED, FOR SOLUTION INTRAVENOUS at 05:10

## 2021-01-01 RX ADMIN — PROPOFOL 40 MCG/KG/MIN: 10 INJECTION, EMULSION INTRAVENOUS at 14:52

## 2021-01-01 RX ADMIN — THERA TABS 1 TABLET: TAB at 11:04

## 2021-01-01 RX ADMIN — Medication: at 09:36

## 2021-01-01 RX ADMIN — Medication 1 PACKET: at 12:28

## 2021-01-01 RX ADMIN — Medication: at 20:10

## 2021-01-01 RX ADMIN — CISATRACURIUM BESYLATE 9 MCG/KG/MIN: 2 INJECTION INTRAVENOUS at 00:09

## 2021-01-01 RX ADMIN — PIPERACILLIN AND TAZOBACTAM 3.38 G: 3; .375 INJECTION, POWDER, LYOPHILIZED, FOR SOLUTION INTRAVENOUS at 07:35

## 2021-01-01 RX ADMIN — HYDRALAZINE HYDROCHLORIDE 100 MG: 50 TABLET, FILM COATED ORAL at 08:36

## 2021-01-01 RX ADMIN — DICLOFENAC SODIUM 2 G: 10 GEL TOPICAL at 09:11

## 2021-01-01 RX ADMIN — HYDRALAZINE HYDROCHLORIDE 100 MG: 50 TABLET, FILM COATED ORAL at 10:02

## 2021-01-01 RX ADMIN — PIPERACILLIN AND TAZOBACTAM 3.38 G: 3; .375 INJECTION, POWDER, LYOPHILIZED, FOR SOLUTION INTRAVENOUS at 14:08

## 2021-01-01 RX ADMIN — MICONAZOLE NITRATE 2 % TOPICAL POWDER: at 17:04

## 2021-01-01 RX ADMIN — Medication: at 21:04

## 2021-01-01 RX ADMIN — INSULIN LISPRO 3 UNITS: 100 INJECTION, SOLUTION INTRAVENOUS; SUBCUTANEOUS at 06:46

## 2021-01-01 RX ADMIN — TORSEMIDE 80 MG: 20 TABLET ORAL at 09:23

## 2021-01-01 RX ADMIN — Medication: at 20:39

## 2021-01-01 RX ADMIN — DICLOFENAC SODIUM 2 G: 10 GEL TOPICAL at 09:34

## 2021-01-01 RX ADMIN — PROPOFOL 25 MCG/KG/MIN: 10 INJECTION, EMULSION INTRAVENOUS at 20:26

## 2021-01-01 RX ADMIN — PIPERACILLIN AND TAZOBACTAM 3.38 G: 3; .375 INJECTION, POWDER, LYOPHILIZED, FOR SOLUTION INTRAVENOUS at 05:41

## 2021-01-01 RX ADMIN — LANSOPRAZOLE 30 MG: KIT at 08:57

## 2021-01-01 RX ADMIN — PROPOFOL 50 MCG/KG/MIN: 10 INJECTION, EMULSION INTRAVENOUS at 14:35

## 2021-01-01 RX ADMIN — MICONAZOLE NITRATE 2 % TOPICAL POWDER: at 17:18

## 2021-01-01 RX ADMIN — PANTOPRAZOLE SODIUM 40 MG: 40 TABLET, DELAYED RELEASE ORAL at 06:53

## 2021-01-01 RX ADMIN — Medication 10 ML: at 06:02

## 2021-01-01 RX ADMIN — LANSOPRAZOLE 30 MG: KIT at 18:17

## 2021-01-01 RX ADMIN — DICLOFENAC SODIUM 2 G: 10 GEL TOPICAL at 11:50

## 2021-01-01 RX ADMIN — OXYCODONE AND ACETAMINOPHEN 1 TABLET: 5; 325 TABLET ORAL at 01:26

## 2021-01-01 RX ADMIN — PROPOFOL 25 MCG/KG/MIN: 10 INJECTION, EMULSION INTRAVENOUS at 09:30

## 2021-01-01 RX ADMIN — INSULIN GLARGINE 10 UNITS: 100 INJECTION, SOLUTION SUBCUTANEOUS at 09:04

## 2021-01-01 RX ADMIN — Medication: at 23:02

## 2021-01-01 RX ADMIN — DICLOFENAC SODIUM 2 G: 10 GEL TOPICAL at 22:00

## 2021-01-01 RX ADMIN — GABAPENTIN 100 MG: 100 CAPSULE ORAL at 09:01

## 2021-01-01 RX ADMIN — MIDAZOLAM HYDROCHLORIDE 15 MG/HR: 5 INJECTION INTRAMUSCULAR; INTRAVENOUS at 06:30

## 2021-01-01 RX ADMIN — INSULIN LISPRO 5 UNITS: 100 INJECTION, SOLUTION INTRAVENOUS; SUBCUTANEOUS at 05:46

## 2021-01-01 RX ADMIN — DICLOFENAC SODIUM 2 G: 10 GEL TOPICAL at 21:06

## 2021-01-01 RX ADMIN — LINEZOLID 600 MG: 600 INJECTION, SOLUTION INTRAVENOUS at 21:37

## 2021-01-01 RX ADMIN — PIPERACILLIN AND TAZOBACTAM 3.38 G: 3; .375 INJECTION, POWDER, LYOPHILIZED, FOR SOLUTION INTRAVENOUS at 23:30

## 2021-01-01 RX ADMIN — HEPARIN SODIUM 7500 UNITS: 5000 INJECTION INTRAVENOUS; SUBCUTANEOUS at 22:00

## 2021-01-01 RX ADMIN — POTASSIUM BICARBONATE 20 MEQ: 782 TABLET, EFFERVESCENT ORAL at 09:17

## 2021-01-01 RX ADMIN — MIDAZOLAM HYDROCHLORIDE 15 MG/HR: 5 INJECTION INTRAMUSCULAR; INTRAVENOUS at 03:30

## 2021-01-01 RX ADMIN — DICLOFENAC SODIUM 2 G: 10 GEL TOPICAL at 08:50

## 2021-01-01 RX ADMIN — HYDRALAZINE HYDROCHLORIDE 100 MG: 50 TABLET, FILM COATED ORAL at 21:36

## 2021-01-01 RX ADMIN — PIPERACILLIN AND TAZOBACTAM 3.38 G: 3; .375 INJECTION, POWDER, LYOPHILIZED, FOR SOLUTION INTRAVENOUS at 06:16

## 2021-01-01 RX ADMIN — HEPARIN SODIUM 7500 UNITS: 5000 INJECTION INTRAVENOUS; SUBCUTANEOUS at 13:09

## 2021-01-01 RX ADMIN — Medication 2000 UNITS: at 09:24

## 2021-01-01 RX ADMIN — MICONAZOLE NITRATE 2 % TOPICAL POWDER: at 09:24

## 2021-01-01 RX ADMIN — PIPERACILLIN AND TAZOBACTAM 3.38 G: 3; .375 INJECTION, POWDER, LYOPHILIZED, FOR SOLUTION INTRAVENOUS at 14:54

## 2021-01-01 RX ADMIN — Medication: at 21:39

## 2021-01-01 RX ADMIN — DICLOFENAC SODIUM 2 G: 10 GEL TOPICAL at 21:31

## 2021-01-01 RX ADMIN — Medication 150 MCG/HR: at 14:32

## 2021-01-01 RX ADMIN — DIAPER RASH SKIN PROTECTENT: at 20:09

## 2021-01-01 RX ADMIN — PROPOFOL 20 MG: 10 INJECTION, EMULSION INTRAVENOUS at 14:55

## 2021-01-01 RX ADMIN — TAMSULOSIN HYDROCHLORIDE 0.4 MG: 0.4 CAPSULE ORAL at 09:23

## 2021-01-01 RX ADMIN — CHLORHEXIDINE GLUCONATE 15 ML: 0.12 RINSE ORAL at 11:42

## 2021-01-01 RX ADMIN — OXYCODONE AND ACETAMINOPHEN 1 TABLET: 5; 325 TABLET ORAL at 14:12

## 2021-01-01 RX ADMIN — FUROSEMIDE 80 MG: 10 INJECTION, SOLUTION INTRAMUSCULAR; INTRAVENOUS at 20:22

## 2021-01-01 RX ADMIN — DICLOFENAC SODIUM 2 G: 10 GEL TOPICAL at 14:20

## 2021-01-01 RX ADMIN — PIPERACILLIN AND TAZOBACTAM 3.38 G: 3; .375 INJECTION, POWDER, LYOPHILIZED, FOR SOLUTION INTRAVENOUS at 22:52

## 2021-01-01 RX ADMIN — PROPOFOL 25 MCG/KG/MIN: 10 INJECTION, EMULSION INTRAVENOUS at 20:50

## 2021-01-01 RX ADMIN — IPRATROPIUM BROMIDE AND ALBUTEROL SULFATE 3 ML: .5; 3 SOLUTION RESPIRATORY (INHALATION) at 11:22

## 2021-01-01 RX ADMIN — SODIUM CHLORIDE 40 MG: 9 INJECTION INTRAMUSCULAR; INTRAVENOUS; SUBCUTANEOUS at 08:44

## 2021-01-01 RX ADMIN — BICALUTAMIDE 50 MG: 50 TABLET ORAL at 09:23

## 2021-01-01 RX ADMIN — CISATRACURIUM BESYLATE 3.5 MCG/KG/MIN: 2 INJECTION INTRAVENOUS at 06:30

## 2021-01-01 RX ADMIN — Medication: at 10:40

## 2021-01-01 RX ADMIN — Medication 250 MCG/HR: at 20:30

## 2021-01-01 RX ADMIN — GABAPENTIN 100 MG: 100 CAPSULE ORAL at 17:05

## 2021-01-01 RX ADMIN — Medication 275 MCG/HR: at 06:00

## 2021-01-01 RX ADMIN — PROPOFOL 40 MCG/KG/MIN: 10 INJECTION, EMULSION INTRAVENOUS at 23:30

## 2021-01-01 RX ADMIN — Medication: at 21:32

## 2021-01-01 RX ADMIN — INSULIN LISPRO 3 UNITS: 100 INJECTION, SOLUTION INTRAVENOUS; SUBCUTANEOUS at 23:51

## 2021-01-01 RX ADMIN — CISATRACURIUM BESYLATE 9 MCG/KG/MIN: 2 INJECTION INTRAVENOUS at 18:26

## 2021-01-01 RX ADMIN — INSULIN LISPRO 10 UNITS: 100 INJECTION, SOLUTION INTRAVENOUS; SUBCUTANEOUS at 09:56

## 2021-01-01 RX ADMIN — Medication 10 ML: at 09:16

## 2021-01-01 RX ADMIN — INSULIN LISPRO 10 UNITS: 100 INJECTION, SOLUTION INTRAVENOUS; SUBCUTANEOUS at 07:23

## 2021-01-01 RX ADMIN — SEVELAMER CARBONATE 1.6 G: 0.8 POWDER, FOR SUSPENSION ORAL at 11:18

## 2021-01-01 RX ADMIN — Medication 250 MCG/HR: at 13:14

## 2021-01-01 RX ADMIN — PIPERACILLIN AND TAZOBACTAM 3.38 G: 3; .375 INJECTION, POWDER, LYOPHILIZED, FOR SOLUTION INTRAVENOUS at 14:06

## 2021-01-01 RX ADMIN — PROPOFOL 25 MCG/KG/MIN: 10 INJECTION, EMULSION INTRAVENOUS at 14:23

## 2021-01-01 RX ADMIN — OXYCODONE AND ACETAMINOPHEN 1 TABLET: 5; 325 TABLET ORAL at 10:42

## 2021-01-01 RX ADMIN — GABAPENTIN 100 MG: 100 CAPSULE ORAL at 17:56

## 2021-01-01 RX ADMIN — TAMSULOSIN HYDROCHLORIDE 0.4 MG: 0.4 CAPSULE ORAL at 09:19

## 2021-01-01 RX ADMIN — PIPERACILLIN AND TAZOBACTAM 3.38 G: 3; .375 INJECTION, POWDER, LYOPHILIZED, FOR SOLUTION INTRAVENOUS at 06:00

## 2021-01-01 RX ADMIN — Medication 300 MCG/HR: at 16:07

## 2021-01-01 RX ADMIN — MICONAZOLE NITRATE 2 % TOPICAL POWDER: at 08:10

## 2021-01-01 RX ADMIN — INSULIN LISPRO 2 UNITS: 100 INJECTION, SOLUTION INTRAVENOUS; SUBCUTANEOUS at 16:24

## 2021-01-01 RX ADMIN — LINEZOLID 600 MG: 600 INJECTION, SOLUTION INTRAVENOUS at 11:00

## 2021-01-01 RX ADMIN — DICLOFENAC SODIUM 2 G: 10 GEL TOPICAL at 17:45

## 2021-01-01 RX ADMIN — Medication 150 MCG/HR: at 00:53

## 2021-01-01 RX ADMIN — AMLODIPINE BESYLATE 5 MG: 5 TABLET ORAL at 14:43

## 2021-01-01 RX ADMIN — Medication 300 MCG/HR: at 14:32

## 2021-01-01 RX ADMIN — CLONIDINE HYDROCHLORIDE 0.1 MG: 0.1 TABLET ORAL at 09:08

## 2021-01-01 RX ADMIN — DOCUSATE SODIUM 100 MG: 50 LIQUID ORAL at 08:59

## 2021-01-01 RX ADMIN — MICONAZOLE NITRATE 2 % TOPICAL POWDER: at 18:57

## 2021-01-01 RX ADMIN — LANSOPRAZOLE 30 MG: KIT at 18:52

## 2021-01-01 RX ADMIN — MICONAZOLE NITRATE 2 % TOPICAL POWDER: at 17:25

## 2021-01-01 RX ADMIN — PIPERACILLIN AND TAZOBACTAM 3.38 G: 3; .375 INJECTION, POWDER, LYOPHILIZED, FOR SOLUTION INTRAVENOUS at 05:49

## 2021-01-01 RX ADMIN — GABAPENTIN 100 MG: 100 CAPSULE ORAL at 17:16

## 2021-01-01 RX ADMIN — PIPERACILLIN AND TAZOBACTAM 3.38 G: 3; .375 INJECTION, POWDER, LYOPHILIZED, FOR SOLUTION INTRAVENOUS at 13:04

## 2021-01-01 RX ADMIN — PIPERACILLIN AND TAZOBACTAM 3.38 G: 3; .375 INJECTION, POWDER, LYOPHILIZED, FOR SOLUTION INTRAVENOUS at 16:36

## 2021-01-01 RX ADMIN — LANSOPRAZOLE 30 MG: KIT at 17:25

## 2021-01-01 RX ADMIN — LABETALOL HYDROCHLORIDE 10 MG: 5 INJECTION INTRAVENOUS at 15:43

## 2021-01-01 RX ADMIN — SEVELAMER CARBONATE 1.6 G: 0.8 POWDER, FOR SUSPENSION ORAL at 08:46

## 2021-01-01 RX ADMIN — GABAPENTIN 100 MG: 100 CAPSULE ORAL at 16:36

## 2021-01-01 RX ADMIN — Medication: at 09:08

## 2021-01-01 RX ADMIN — INSULIN GLARGINE 30 UNITS: 100 INJECTION, SOLUTION SUBCUTANEOUS at 21:36

## 2021-01-01 RX ADMIN — LANSOPRAZOLE 30 MG: KIT at 18:58

## 2021-01-01 RX ADMIN — PROPOFOL 30 MG: 10 INJECTION, EMULSION INTRAVENOUS at 15:04

## 2021-01-01 RX ADMIN — PROPOFOL 40 MCG/KG/MIN: 10 INJECTION, EMULSION INTRAVENOUS at 08:36

## 2021-01-01 RX ADMIN — Medication 1 PACKET: at 08:09

## 2021-01-01 RX ADMIN — DICLOFENAC SODIUM 2 G: 10 GEL TOPICAL at 18:08

## 2021-01-01 RX ADMIN — MIDAZOLAM 4 MG: 1 INJECTION INTRAMUSCULAR; INTRAVENOUS at 15:00

## 2021-01-01 RX ADMIN — INSULIN LISPRO 2 UNITS: 100 INJECTION, SOLUTION INTRAVENOUS; SUBCUTANEOUS at 12:07

## 2021-01-01 RX ADMIN — GABAPENTIN 100 MG: 100 CAPSULE ORAL at 21:14

## 2021-01-01 RX ADMIN — PIPERACILLIN AND TAZOBACTAM 3.38 G: 3; .375 INJECTION, POWDER, LYOPHILIZED, FOR SOLUTION INTRAVENOUS at 06:08

## 2021-01-01 RX ADMIN — FUROSEMIDE 80 MG: 10 INJECTION, SOLUTION INTRAMUSCULAR; INTRAVENOUS at 09:32

## 2021-01-01 RX ADMIN — Medication: at 20:17

## 2021-01-01 RX ADMIN — ALBUTEROL SULFATE 2 PUFF: 108 AEROSOL, METERED RESPIRATORY (INHALATION) at 11:33

## 2021-01-01 RX ADMIN — DICLOFENAC SODIUM 2 G: 10 GEL TOPICAL at 17:40

## 2021-01-01 RX ADMIN — HYDRALAZINE HYDROCHLORIDE 100 MG: 50 TABLET, FILM COATED ORAL at 21:33

## 2021-01-01 RX ADMIN — MIDAZOLAM HYDROCHLORIDE 8 MG/HR: 5 INJECTION INTRAMUSCULAR; INTRAVENOUS at 11:55

## 2021-01-01 RX ADMIN — FUROSEMIDE 80 MG: 10 INJECTION, SOLUTION INTRAMUSCULAR; INTRAVENOUS at 10:16

## 2021-01-01 RX ADMIN — Medication: at 20:47

## 2021-01-01 RX ADMIN — PIPERACILLIN AND TAZOBACTAM 3.38 G: 3; .375 INJECTION, POWDER, LYOPHILIZED, FOR SOLUTION INTRAVENOUS at 05:20

## 2021-01-01 RX ADMIN — HYDRALAZINE HYDROCHLORIDE 100 MG: 50 TABLET, FILM COATED ORAL at 17:57

## 2021-01-01 RX ADMIN — FUROSEMIDE 80 MG: 10 INJECTION, SOLUTION INTRAMUSCULAR; INTRAVENOUS at 22:09

## 2021-01-01 RX ADMIN — Medication 10 ML: at 14:40

## 2021-01-01 RX ADMIN — DICLOFENAC SODIUM 2 G: 10 GEL TOPICAL at 09:26

## 2021-01-01 RX ADMIN — Medication: at 01:49

## 2021-01-01 RX ADMIN — ALBUTEROL SULFATE 2 PUFF: 108 AEROSOL, METERED RESPIRATORY (INHALATION) at 09:25

## 2021-01-01 RX ADMIN — Medication 1 PACKET: at 18:07

## 2021-01-01 RX ADMIN — LANSOPRAZOLE 30 MG: KIT at 09:39

## 2021-01-01 RX ADMIN — HYDROCORTISONE SODIUM SUCCINATE 50 MG: 100 INJECTION, POWDER, FOR SOLUTION INTRAMUSCULAR; INTRAVENOUS at 21:51

## 2021-01-01 RX ADMIN — FUROSEMIDE 80 MG: 10 INJECTION, SOLUTION INTRAMUSCULAR; INTRAVENOUS at 08:53

## 2021-01-01 RX ADMIN — TAMSULOSIN HYDROCHLORIDE 0.4 MG: 0.4 CAPSULE ORAL at 08:25

## 2021-01-01 RX ADMIN — SEVELAMER CARBONATE 1.6 G: 0.8 POWDER, FOR SUSPENSION ORAL at 17:29

## 2021-01-01 RX ADMIN — PROPOFOL 20 MG: 10 INJECTION, EMULSION INTRAVENOUS at 15:00

## 2021-01-01 RX ADMIN — INSULIN LISPRO 2 UNITS: 100 INJECTION, SOLUTION INTRAVENOUS; SUBCUTANEOUS at 12:38

## 2021-01-01 RX ADMIN — DOCUSATE SODIUM 100 MG: 50 LIQUID ORAL at 18:11

## 2021-01-01 RX ADMIN — INSULIN LISPRO 2 UNITS: 100 INJECTION, SOLUTION INTRAVENOUS; SUBCUTANEOUS at 00:25

## 2021-01-01 RX ADMIN — MICONAZOLE NITRATE 2 % TOPICAL POWDER: at 09:15

## 2021-01-01 RX ADMIN — SEVELAMER CARBONATE 1.6 G: 0.8 POWDER, FOR SUSPENSION ORAL at 16:32

## 2021-01-01 RX ADMIN — FLUTICASONE PROPIONATE 2 SPRAY: 50 SPRAY, METERED NASAL at 08:54

## 2021-01-01 RX ADMIN — PIPERACILLIN AND TAZOBACTAM 3.38 G: 3; .375 INJECTION, POWDER, LYOPHILIZED, FOR SOLUTION INTRAVENOUS at 22:38

## 2021-01-01 RX ADMIN — PIPERACILLIN AND TAZOBACTAM 3.38 G: 3; .375 INJECTION, POWDER, LYOPHILIZED, FOR SOLUTION INTRAVENOUS at 15:27

## 2021-01-01 RX ADMIN — INSULIN LISPRO 2 UNITS: 100 INJECTION, SOLUTION INTRAVENOUS; SUBCUTANEOUS at 17:03

## 2021-01-01 RX ADMIN — MICONAZOLE NITRATE 2 % TOPICAL POWDER: at 10:36

## 2021-01-01 RX ADMIN — CHLORHEXIDINE GLUCONATE 15 ML: 0.12 RINSE ORAL at 20:55

## 2021-01-01 RX ADMIN — Medication 30 MCG/MIN: at 10:23

## 2021-01-01 RX ADMIN — MICONAZOLE NITRATE 2 % TOPICAL POWDER: at 17:49

## 2021-01-01 RX ADMIN — INSULIN LISPRO 10 UNITS: 100 INJECTION, SOLUTION INTRAVENOUS; SUBCUTANEOUS at 12:56

## 2021-01-01 RX ADMIN — GABAPENTIN 100 MG: 100 CAPSULE ORAL at 09:05

## 2021-01-01 RX ADMIN — SEVELAMER CARBONATE 1.6 G: 0.8 POWDER, FOR SUSPENSION ORAL at 11:04

## 2021-01-01 RX ADMIN — OXYCODONE HYDROCHLORIDE AND ACETAMINOPHEN 500 MG: 500 TABLET ORAL at 09:17

## 2021-01-01 RX ADMIN — Medication 2000 UNITS: at 08:55

## 2021-01-01 RX ADMIN — GABAPENTIN 100 MG: 100 CAPSULE ORAL at 22:14

## 2021-01-01 RX ADMIN — GABAPENTIN 100 MG: 100 CAPSULE ORAL at 16:50

## 2021-01-01 RX ADMIN — DICLOFENAC SODIUM 2 G: 10 GEL TOPICAL at 21:29

## 2021-01-01 RX ADMIN — Medication 10 ML: at 14:20

## 2021-01-01 RX ADMIN — EPOETIN ALFA-EPBX 20000 UNITS: 10000 INJECTION, SOLUTION INTRAVENOUS; SUBCUTANEOUS at 00:08

## 2021-01-01 RX ADMIN — MICONAZOLE NITRATE 2 % TOPICAL POWDER: at 10:05

## 2021-01-01 RX ADMIN — OXYCODONE AND ACETAMINOPHEN 1 TABLET: 5; 325 TABLET ORAL at 16:38

## 2021-01-01 RX ADMIN — Medication 1 PACKET: at 17:34

## 2021-01-01 RX ADMIN — FUROSEMIDE 80 MG: 10 INJECTION, SOLUTION INTRAMUSCULAR; INTRAVENOUS at 20:26

## 2021-01-01 RX ADMIN — CLONIDINE HYDROCHLORIDE 0.1 MG: 0.1 TABLET ORAL at 18:43

## 2021-01-01 RX ADMIN — OXYCODONE AND ACETAMINOPHEN 1 TABLET: 5; 325 TABLET ORAL at 11:24

## 2021-01-01 RX ADMIN — INSULIN LISPRO 2 UNITS: 100 INJECTION, SOLUTION INTRAVENOUS; SUBCUTANEOUS at 17:17

## 2021-01-01 RX ADMIN — GABAPENTIN 100 MG: 100 CAPSULE ORAL at 17:38

## 2021-01-01 RX ADMIN — CHLORHEXIDINE GLUCONATE 15 ML: 0.12 RINSE ORAL at 09:00

## 2021-01-01 RX ADMIN — HEPARIN SODIUM 7500 UNITS: 5000 INJECTION INTRAVENOUS; SUBCUTANEOUS at 07:19

## 2021-01-01 RX ADMIN — INSULIN LISPRO 10 UNITS: 100 INJECTION, SOLUTION INTRAVENOUS; SUBCUTANEOUS at 12:21

## 2021-01-01 RX ADMIN — REMDESIVIR 200 MG: 100 INJECTION, POWDER, LYOPHILIZED, FOR SOLUTION INTRAVENOUS at 22:54

## 2021-01-01 RX ADMIN — Medication 4 MCG/MIN: at 09:06

## 2021-01-01 RX ADMIN — MIDAZOLAM HYDROCHLORIDE 7 MG/HR: 5 INJECTION INTRAMUSCULAR; INTRAVENOUS at 03:40

## 2021-01-01 RX ADMIN — MIDAZOLAM 2 MG: 1 INJECTION INTRAMUSCULAR; INTRAVENOUS at 16:18

## 2021-01-01 RX ADMIN — Medication 150 MCG/HR: at 09:23

## 2021-01-01 RX ADMIN — Medication: at 09:02

## 2021-01-01 RX ADMIN — PROPOFOL 25 MCG/KG/MIN: 10 INJECTION, EMULSION INTRAVENOUS at 10:17

## 2021-01-01 RX ADMIN — Medication 40 ML: at 05:42

## 2021-01-01 RX ADMIN — MICONAZOLE NITRATE 2 % TOPICAL POWDER: at 18:07

## 2021-01-01 RX ADMIN — OXYCODONE HYDROCHLORIDE AND ACETAMINOPHEN 500 MG: 500 TABLET ORAL at 09:34

## 2021-01-01 RX ADMIN — Medication: at 21:55

## 2021-01-01 RX ADMIN — OXYCODONE AND ACETAMINOPHEN 1 TABLET: 5; 325 TABLET ORAL at 02:11

## 2021-01-01 RX ADMIN — PROPOFOL 50 MCG/KG/MIN: 10 INJECTION, EMULSION INTRAVENOUS at 11:48

## 2021-01-01 RX ADMIN — MICONAZOLE NITRATE 2 % TOPICAL POWDER: at 18:16

## 2021-01-01 RX ADMIN — PROPOFOL 40 MCG/KG/MIN: 10 INJECTION, EMULSION INTRAVENOUS at 14:33

## 2021-01-01 RX ADMIN — ASPIRIN 81 MG: 81 TABLET, CHEWABLE ORAL at 08:59

## 2021-01-01 RX ADMIN — Medication 10 ML: at 14:26

## 2021-01-01 RX ADMIN — MIDAZOLAM HYDROCHLORIDE 7 MG/HR: 5 INJECTION INTRAMUSCULAR; INTRAVENOUS at 12:53

## 2021-01-01 RX ADMIN — PROPOFOL 50 MCG/KG/MIN: 10 INJECTION, EMULSION INTRAVENOUS at 09:20

## 2021-01-01 RX ADMIN — OXYCODONE HYDROCHLORIDE AND ACETAMINOPHEN 500 MG: 500 TABLET ORAL at 09:33

## 2021-01-01 RX ADMIN — OXYCODONE AND ACETAMINOPHEN 1 TABLET: 5; 325 TABLET ORAL at 08:34

## 2021-01-01 RX ADMIN — PIPERACILLIN AND TAZOBACTAM 3.38 G: 3; .375 INJECTION, POWDER, LYOPHILIZED, FOR SOLUTION INTRAVENOUS at 22:08

## 2021-01-01 RX ADMIN — AMLODIPINE BESYLATE 5 MG: 5 TABLET ORAL at 09:01

## 2021-01-01 RX ADMIN — INSULIN GLARGINE 12 UNITS: 100 INJECTION, SOLUTION SUBCUTANEOUS at 08:56

## 2021-01-01 RX ADMIN — POTASSIUM BICARBONATE 20 MEQ: 782 TABLET, EFFERVESCENT ORAL at 17:56

## 2021-01-01 RX ADMIN — Medication 10 ML: at 22:00

## 2021-01-01 RX ADMIN — INSULIN GLARGINE 8 UNITS: 100 INJECTION, SOLUTION SUBCUTANEOUS at 09:23

## 2021-01-01 RX ADMIN — Medication: at 08:11

## 2021-01-01 RX ADMIN — THERA TABS 1 TABLET: TAB at 10:29

## 2021-01-01 RX ADMIN — Medication 81 MG: at 09:08

## 2021-01-01 RX ADMIN — INSULIN LISPRO 2 UNITS: 100 INJECTION, SOLUTION INTRAVENOUS; SUBCUTANEOUS at 22:37

## 2021-01-01 RX ADMIN — INSULIN LISPRO 12 UNITS: 100 INJECTION, SOLUTION INTRAVENOUS; SUBCUTANEOUS at 10:00

## 2021-01-01 RX ADMIN — DICLOFENAC SODIUM 2 G: 10 GEL TOPICAL at 22:13

## 2021-01-01 RX ADMIN — MICONAZOLE NITRATE 2 % TOPICAL POWDER: at 17:37

## 2021-01-01 RX ADMIN — Medication 150 MCG/HR: at 19:06

## 2021-01-01 RX ADMIN — PROPOFOL 25 MCG/KG/MIN: 10 INJECTION, EMULSION INTRAVENOUS at 04:10

## 2021-01-01 RX ADMIN — SODIUM CHLORIDE 9 MCG/KG/MIN: 9 INJECTION, SOLUTION INTRAVENOUS at 19:06

## 2021-01-01 RX ADMIN — Medication 20 ML: at 23:56

## 2021-01-01 RX ADMIN — INSULIN LISPRO 2 UNITS: 100 INJECTION, SOLUTION INTRAVENOUS; SUBCUTANEOUS at 07:19

## 2021-01-01 RX ADMIN — CHLORHEXIDINE GLUCONATE 15 ML: 0.12 RINSE ORAL at 09:34

## 2021-01-01 RX ADMIN — Medication 10 ML: at 05:47

## 2021-01-01 RX ADMIN — DICLOFENAC SODIUM 2 G: 10 GEL TOPICAL at 08:56

## 2021-01-01 RX ADMIN — PROPOFOL 30 MCG/KG/MIN: 10 INJECTION, EMULSION INTRAVENOUS at 17:27

## 2021-01-01 RX ADMIN — INSULIN LISPRO 5 UNITS: 100 INJECTION, SOLUTION INTRAVENOUS; SUBCUTANEOUS at 12:21

## 2021-01-01 RX ADMIN — INSULIN LISPRO 2 UNITS: 100 INJECTION, SOLUTION INTRAVENOUS; SUBCUTANEOUS at 17:44

## 2021-01-01 RX ADMIN — INSULIN GLARGINE 14 UNITS: 100 INJECTION, SOLUTION SUBCUTANEOUS at 21:48

## 2021-01-01 RX ADMIN — GABAPENTIN 100 MG: 100 CAPSULE ORAL at 11:04

## 2021-01-01 RX ADMIN — ENOXAPARIN SODIUM 40 MG: 40 INJECTION SUBCUTANEOUS at 08:49

## 2021-01-01 RX ADMIN — DICLOFENAC SODIUM 2 G: 10 GEL TOPICAL at 22:37

## 2021-01-01 RX ADMIN — PROPOFOL 40 MCG/KG/MIN: 10 INJECTION, EMULSION INTRAVENOUS at 04:58

## 2021-01-01 RX ADMIN — Medication: at 09:00

## 2021-01-01 RX ADMIN — SODIUM CHLORIDE 9 MCG/KG/MIN: 9 INJECTION, SOLUTION INTRAVENOUS at 05:35

## 2021-01-01 RX ADMIN — PROPOFOL 40 MCG/KG/MIN: 10 INJECTION, EMULSION INTRAVENOUS at 09:04

## 2021-01-01 RX ADMIN — FUROSEMIDE 80 MG: 10 INJECTION, SOLUTION INTRAMUSCULAR; INTRAVENOUS at 09:19

## 2021-01-01 RX ADMIN — INSULIN LISPRO 3 UNITS: 100 INJECTION, SOLUTION INTRAVENOUS; SUBCUTANEOUS at 12:36

## 2021-01-01 RX ADMIN — INSULIN LISPRO 2 UNITS: 100 INJECTION, SOLUTION INTRAVENOUS; SUBCUTANEOUS at 17:21

## 2021-01-01 RX ADMIN — Medication 250 MCG/HR: at 02:48

## 2021-01-01 RX ADMIN — DICLOFENAC SODIUM 2 G: 10 GEL TOPICAL at 18:39

## 2021-01-01 RX ADMIN — EPOETIN ALFA-EPBX 20000 UNITS: 10000 INJECTION, SOLUTION INTRAVENOUS; SUBCUTANEOUS at 20:35

## 2021-01-01 RX ADMIN — Medication 2000 UNITS: at 09:53

## 2021-01-01 RX ADMIN — Medication 1 PACKET: at 18:29

## 2021-01-01 RX ADMIN — Medication 10 ML: at 21:40

## 2021-01-01 RX ADMIN — ENOXAPARIN SODIUM 40 MG: 40 INJECTION SUBCUTANEOUS at 01:23

## 2021-01-01 RX ADMIN — DEXAMETHASONE 6 MG: 4 TABLET ORAL at 09:23

## 2021-01-01 RX ADMIN — ALBUMIN (HUMAN) 25 G: 0.25 INJECTION, SOLUTION INTRAVENOUS at 23:48

## 2021-01-01 RX ADMIN — LABETALOL HYDROCHLORIDE 10 MG: 5 INJECTION INTRAVENOUS at 17:38

## 2021-01-01 RX ADMIN — ALBUTEROL SULFATE 5 MG: 0.83 SOLUTION RESPIRATORY (INHALATION) at 00:42

## 2021-01-01 RX ADMIN — FUROSEMIDE 80 MG: 10 INJECTION, SOLUTION INTRAMUSCULAR; INTRAVENOUS at 22:18

## 2021-01-01 RX ADMIN — HEPARIN SODIUM 7500 UNITS: 5000 INJECTION INTRAVENOUS; SUBCUTANEOUS at 23:53

## 2021-01-01 RX ADMIN — PIPERACILLIN AND TAZOBACTAM 3.38 G: 3; .375 INJECTION, POWDER, LYOPHILIZED, FOR SOLUTION INTRAVENOUS at 21:57

## 2021-01-01 RX ADMIN — HYDRALAZINE HYDROCHLORIDE 100 MG: 50 TABLET, FILM COATED ORAL at 10:29

## 2021-01-01 RX ADMIN — HYDRALAZINE HYDROCHLORIDE 100 MG: 50 TABLET, FILM COATED ORAL at 17:45

## 2021-01-01 RX ADMIN — Medication 1 PACKET: at 12:24

## 2021-01-01 RX ADMIN — INSULIN LISPRO 3 UNITS: 100 INJECTION, SOLUTION INTRAVENOUS; SUBCUTANEOUS at 17:59

## 2021-01-01 RX ADMIN — Medication 300 MCG/HR: at 14:50

## 2021-01-01 RX ADMIN — GABAPENTIN 100 MG: 100 CAPSULE ORAL at 09:34

## 2021-01-01 RX ADMIN — PIPERACILLIN AND TAZOBACTAM 3.38 G: 3; .375 INJECTION, POWDER, LYOPHILIZED, FOR SOLUTION INTRAVENOUS at 13:19

## 2021-01-01 RX ADMIN — EPOETIN ALFA-EPBX 20000 UNITS: 10000 INJECTION, SOLUTION INTRAVENOUS; SUBCUTANEOUS at 21:06

## 2021-01-01 RX ADMIN — INSULIN LISPRO 5 UNITS: 100 INJECTION, SOLUTION INTRAVENOUS; SUBCUTANEOUS at 07:10

## 2021-01-01 RX ADMIN — ASPIRIN 81 MG: 81 TABLET, CHEWABLE ORAL at 09:05

## 2021-01-01 RX ADMIN — COLLAGENASE SANTYL: 250 OINTMENT TOPICAL at 08:58

## 2021-01-01 RX ADMIN — DICLOFENAC SODIUM 2 G: 10 GEL TOPICAL at 17:44

## 2021-01-01 RX ADMIN — INSULIN LISPRO 2 UNITS: 100 INJECTION, SOLUTION INTRAVENOUS; SUBCUTANEOUS at 07:22

## 2021-01-01 RX ADMIN — BICALUTAMIDE 50 MG: 50 TABLET ORAL at 10:05

## 2021-01-01 RX ADMIN — AMLODIPINE BESYLATE 5 MG: 5 TABLET ORAL at 08:44

## 2021-01-01 RX ADMIN — Medication: at 09:04

## 2021-01-01 RX ADMIN — PIPERACILLIN AND TAZOBACTAM 3.38 G: 3; .375 INJECTION, POWDER, LYOPHILIZED, FOR SOLUTION INTRAVENOUS at 13:15

## 2021-01-01 RX ADMIN — POTASSIUM CHLORIDE 20 MEQ: 400 INJECTION, SOLUTION INTRAVENOUS at 06:33

## 2021-01-01 RX ADMIN — ASPIRIN 81 MG: 81 TABLET, CHEWABLE ORAL at 09:26

## 2021-01-01 RX ADMIN — SEVELAMER CARBONATE 1.6 G: 0.8 POWDER, FOR SUSPENSION ORAL at 08:56

## 2021-01-01 RX ADMIN — Medication 10 ML: at 07:35

## 2021-01-01 RX ADMIN — CHLORHEXIDINE GLUCONATE 15 ML: 0.12 RINSE ORAL at 20:44

## 2021-01-01 RX ADMIN — Medication 1 PACKET: at 17:21

## 2021-01-01 RX ADMIN — PROPOFOL 40 MCG/KG/MIN: 10 INJECTION, EMULSION INTRAVENOUS at 23:16

## 2021-01-01 RX ADMIN — GABAPENTIN 100 MG: 100 CAPSULE ORAL at 16:30

## 2021-01-01 RX ADMIN — ENOXAPARIN SODIUM 40 MG: 40 INJECTION SUBCUTANEOUS at 11:28

## 2021-01-01 RX ADMIN — Medication: at 09:12

## 2021-01-01 RX ADMIN — INSULIN LISPRO 20 UNITS: 100 INJECTION, SOLUTION INTRAVENOUS; SUBCUTANEOUS at 07:10

## 2021-01-01 RX ADMIN — MICONAZOLE NITRATE 2 % TOPICAL POWDER: at 08:54

## 2021-01-01 RX ADMIN — ALBUMIN (HUMAN) 25 G: 0.25 INJECTION, SOLUTION INTRAVENOUS at 06:09

## 2021-01-01 RX ADMIN — Medication 275 MCG/HR: at 11:56

## 2021-01-01 RX ADMIN — GABAPENTIN 100 MG: 100 CAPSULE ORAL at 21:39

## 2021-01-01 RX ADMIN — HYDRALAZINE HYDROCHLORIDE 100 MG: 50 TABLET, FILM COATED ORAL at 09:57

## 2021-01-01 RX ADMIN — ALBUMIN (HUMAN) 25 G: 0.25 INJECTION, SOLUTION INTRAVENOUS at 11:28

## 2021-01-01 RX ADMIN — GABAPENTIN 100 MG: 100 CAPSULE ORAL at 10:29

## 2021-01-01 RX ADMIN — INSULIN GLARGINE 8 UNITS: 100 INJECTION, SOLUTION SUBCUTANEOUS at 08:55

## 2021-01-01 RX ADMIN — METRONIDAZOLE 500 MG: 500 INJECTION, SOLUTION INTRAVENOUS at 23:03

## 2021-01-01 RX ADMIN — Medication 300 MCG/HR: at 00:07

## 2021-01-01 RX ADMIN — ZINC SULFATE 220 MG (50 MG) CAPSULE 1 CAPSULE: CAPSULE at 09:07

## 2021-01-01 RX ADMIN — MIDAZOLAM HYDROCHLORIDE 9 MG/HR: 5 INJECTION INTRAMUSCULAR; INTRAVENOUS at 06:30

## 2021-01-01 RX ADMIN — DICLOFENAC SODIUM 2 G: 10 GEL TOPICAL at 10:33

## 2021-01-01 RX ADMIN — PIPERACILLIN AND TAZOBACTAM 3.38 G: 3; .375 INJECTION, POWDER, LYOPHILIZED, FOR SOLUTION INTRAVENOUS at 15:35

## 2021-01-01 RX ADMIN — SODIUM CHLORIDE 40 MG: 9 INJECTION INTRAMUSCULAR; INTRAVENOUS; SUBCUTANEOUS at 09:04

## 2021-01-01 RX ADMIN — OXYCODONE AND ACETAMINOPHEN 1 TABLET: 5; 325 TABLET ORAL at 21:02

## 2021-01-01 RX ADMIN — POTASSIUM BICARBONATE 40 MEQ: 782 TABLET, EFFERVESCENT ORAL at 10:55

## 2021-01-01 RX ADMIN — PROPOFOL 45 MCG/KG/MIN: 10 INJECTION, EMULSION INTRAVENOUS at 22:47

## 2021-01-01 RX ADMIN — Medication 10 ML: at 15:18

## 2021-01-01 RX ADMIN — ALBUTEROL SULFATE 2 PUFF: 108 AEROSOL, METERED RESPIRATORY (INHALATION) at 15:30

## 2021-01-01 RX ADMIN — GABAPENTIN 100 MG: 100 CAPSULE ORAL at 22:17

## 2021-01-01 RX ADMIN — OXYCODONE HYDROCHLORIDE AND ACETAMINOPHEN 500 MG: 500 TABLET ORAL at 09:03

## 2021-01-01 RX ADMIN — SODIUM BICARBONATE 650 MG: 650 TABLET ORAL at 10:02

## 2021-01-01 RX ADMIN — IRON SUCROSE 200 MG: 20 INJECTION, SOLUTION INTRAVENOUS at 11:05

## 2021-01-01 RX ADMIN — GABAPENTIN 100 MG: 100 CAPSULE ORAL at 17:23

## 2021-01-01 RX ADMIN — EPOETIN ALFA-EPBX 20000 UNITS: 10000 INJECTION, SOLUTION INTRAVENOUS; SUBCUTANEOUS at 21:59

## 2021-01-01 RX ADMIN — PROPOFOL 25 MCG/KG/MIN: 10 INJECTION, EMULSION INTRAVENOUS at 16:02

## 2021-01-01 RX ADMIN — SODIUM CHLORIDE 75 ML/HR: 9 INJECTION, SOLUTION INTRAVENOUS at 07:13

## 2021-01-01 RX ADMIN — INSULIN LISPRO 2 UNITS: 100 INJECTION, SOLUTION INTRAVENOUS; SUBCUTANEOUS at 23:40

## 2021-01-01 RX ADMIN — Medication 275 MCG/HR: at 20:50

## 2021-01-01 RX ADMIN — METHYLPREDNISOLONE SODIUM SUCCINATE 40 MG: 40 INJECTION, POWDER, FOR SOLUTION INTRAMUSCULAR; INTRAVENOUS at 15:28

## 2021-01-01 RX ADMIN — Medication 1 CAPSULE: at 11:01

## 2021-01-01 RX ADMIN — HYDROCORTISONE SODIUM SUCCINATE 50 MG: 100 INJECTION, POWDER, FOR SOLUTION INTRAMUSCULAR; INTRAVENOUS at 06:35

## 2021-01-01 RX ADMIN — PANTOPRAZOLE SODIUM 40 MG: 40 TABLET, DELAYED RELEASE ORAL at 16:23

## 2021-01-01 RX ADMIN — THERA TABS 1 TABLET: TAB at 09:01

## 2021-01-01 RX ADMIN — GABAPENTIN 100 MG: 100 CAPSULE ORAL at 09:51

## 2021-01-01 RX ADMIN — OXYCODONE HYDROCHLORIDE AND ACETAMINOPHEN 500 MG: 500 TABLET ORAL at 09:51

## 2021-01-01 RX ADMIN — MIDAZOLAM HYDROCHLORIDE 15 MG/HR: 5 INJECTION INTRAMUSCULAR; INTRAVENOUS at 20:42

## 2021-01-01 RX ADMIN — INSULIN GLARGINE 10 UNITS: 100 INJECTION, SOLUTION SUBCUTANEOUS at 08:49

## 2021-01-01 RX ADMIN — Medication: at 22:48

## 2021-01-01 RX ADMIN — INSULIN LISPRO 20 UNITS: 100 INJECTION, SOLUTION INTRAVENOUS; SUBCUTANEOUS at 11:33

## 2021-01-01 RX ADMIN — INSULIN LISPRO 10 UNITS: 100 INJECTION, SOLUTION INTRAVENOUS; SUBCUTANEOUS at 09:10

## 2021-01-01 RX ADMIN — BICALUTAMIDE 50 MG: 50 TABLET ORAL at 09:34

## 2021-01-01 RX ADMIN — FUROSEMIDE 60 MG: 10 INJECTION, SOLUTION INTRAMUSCULAR; INTRAVENOUS at 14:31

## 2021-01-01 RX ADMIN — INSULIN LISPRO 2 UNITS: 100 INJECTION, SOLUTION INTRAVENOUS; SUBCUTANEOUS at 18:48

## 2021-01-01 RX ADMIN — DICLOFENAC SODIUM 2 G: 10 GEL TOPICAL at 13:55

## 2021-01-01 RX ADMIN — Medication 1 PACKET: at 22:54

## 2021-01-01 RX ADMIN — DICLOFENAC SODIUM 2 G: 10 GEL TOPICAL at 15:26

## 2021-01-01 RX ADMIN — Medication 81 MG: at 12:54

## 2021-01-01 RX ADMIN — POTASSIUM BICARBONATE 20 MEQ: 782 TABLET, EFFERVESCENT ORAL at 06:24

## 2021-01-01 RX ADMIN — Medication 10 ML: at 06:08

## 2021-01-01 RX ADMIN — Medication 1 PACKET: at 18:02

## 2021-01-01 RX ADMIN — LANSOPRAZOLE 30 MG: KIT at 08:46

## 2021-01-01 RX ADMIN — Medication 10 ML: at 05:42

## 2021-01-01 RX ADMIN — ENOXAPARIN SODIUM 40 MG: 40 INJECTION SUBCUTANEOUS at 00:06

## 2021-01-01 RX ADMIN — Medication: at 20:56

## 2021-01-01 RX ADMIN — HEPARIN SODIUM 7500 UNITS: 5000 INJECTION INTRAVENOUS; SUBCUTANEOUS at 21:07

## 2021-01-01 RX ADMIN — GABAPENTIN 100 MG: 100 CAPSULE ORAL at 08:09

## 2021-01-01 RX ADMIN — DIAPER RASH SKIN PROTECTENT: at 23:05

## 2021-01-01 RX ADMIN — DIAPER RASH SKIN PROTECTENT: at 21:00

## 2021-01-01 RX ADMIN — MIDAZOLAM 2 MG: 1 INJECTION INTRAMUSCULAR; INTRAVENOUS at 23:45

## 2021-01-01 RX ADMIN — PIPERACILLIN AND TAZOBACTAM 3.38 G: 3; .375 INJECTION, POWDER, LYOPHILIZED, FOR SOLUTION INTRAVENOUS at 06:51

## 2021-01-01 RX ADMIN — INSULIN LISPRO 10 UNITS: 100 INJECTION, SOLUTION INTRAVENOUS; SUBCUTANEOUS at 10:02

## 2021-01-01 RX ADMIN — GABAPENTIN 100 MG: 100 CAPSULE ORAL at 09:19

## 2021-01-01 RX ADMIN — INSULIN LISPRO 10 UNITS: 100 INJECTION, SOLUTION INTRAVENOUS; SUBCUTANEOUS at 17:59

## 2021-01-01 RX ADMIN — PROPOFOL 40 MCG/KG/MIN: 10 INJECTION, EMULSION INTRAVENOUS at 07:55

## 2021-01-01 RX ADMIN — THERA TABS 1 TABLET: TAB at 09:10

## 2021-01-01 RX ADMIN — Medication 10 ML: at 16:10

## 2021-01-01 RX ADMIN — HYDRALAZINE HYDROCHLORIDE 100 MG: 50 TABLET, FILM COATED ORAL at 22:06

## 2021-01-01 RX ADMIN — PROPOFOL 45 MCG/KG/MIN: 10 INJECTION, EMULSION INTRAVENOUS at 19:05

## 2021-01-01 RX ADMIN — Medication 2 TABLET: at 08:42

## 2021-01-01 RX ADMIN — Medication: at 09:33

## 2021-01-01 RX ADMIN — DICLOFENAC SODIUM 2 G: 10 GEL TOPICAL at 18:00

## 2021-01-01 RX ADMIN — OXYCODONE HYDROCHLORIDE AND ACETAMINOPHEN 500 MG: 500 TABLET ORAL at 09:27

## 2021-01-01 RX ADMIN — THERA TABS 1 TABLET: TAB at 08:56

## 2021-01-01 RX ADMIN — MICONAZOLE NITRATE 2 % TOPICAL POWDER: at 17:27

## 2021-01-01 RX ADMIN — HYDRALAZINE HYDROCHLORIDE 100 MG: 50 TABLET, FILM COATED ORAL at 08:52

## 2021-01-01 RX ADMIN — Medication 81 MG: at 10:02

## 2021-01-01 RX ADMIN — INSULIN LISPRO 2 UNITS: 100 INJECTION, SOLUTION INTRAVENOUS; SUBCUTANEOUS at 06:29

## 2021-01-01 RX ADMIN — CLONIDINE HYDROCHLORIDE 0.1 MG: 0.1 TABLET ORAL at 08:47

## 2021-01-01 RX ADMIN — GABAPENTIN 100 MG: 100 CAPSULE ORAL at 17:07

## 2021-01-01 RX ADMIN — OXYCODONE HYDROCHLORIDE AND ACETAMINOPHEN 500 MG: 500 TABLET ORAL at 09:53

## 2021-01-01 RX ADMIN — Medication 10 ML: at 13:18

## 2021-01-01 RX ADMIN — POTASSIUM BICARBONATE 40 MEQ: 782 TABLET, EFFERVESCENT ORAL at 13:33

## 2021-01-01 RX ADMIN — TAMSULOSIN HYDROCHLORIDE 0.4 MG: 0.4 CAPSULE ORAL at 09:52

## 2021-01-01 RX ADMIN — Medication 250 MCG/HR: at 09:00

## 2021-01-01 RX ADMIN — PIPERACILLIN AND TAZOBACTAM 3.38 G: 3; .375 INJECTION, POWDER, LYOPHILIZED, FOR SOLUTION INTRAVENOUS at 06:45

## 2021-01-01 RX ADMIN — ZINC SULFATE 220 MG (50 MG) CAPSULE 1 CAPSULE: CAPSULE at 09:57

## 2021-01-01 RX ADMIN — FUROSEMIDE 40 MG: 10 INJECTION, SOLUTION INTRAMUSCULAR; INTRAVENOUS at 09:46

## 2021-01-01 RX ADMIN — ALBUMIN (HUMAN) 25 G: 0.25 INJECTION, SOLUTION INTRAVENOUS at 21:23

## 2021-01-01 RX ADMIN — SODIUM CHLORIDE: 900 INJECTION, SOLUTION INTRAVENOUS at 14:41

## 2021-01-01 RX ADMIN — OXYCODONE HYDROCHLORIDE AND ACETAMINOPHEN 500 MG: 500 TABLET ORAL at 08:55

## 2021-01-01 RX ADMIN — PROPOFOL 35 MCG/KG/MIN: 10 INJECTION, EMULSION INTRAVENOUS at 22:15

## 2021-01-01 RX ADMIN — DICLOFENAC SODIUM 2 G: 10 GEL TOPICAL at 08:53

## 2021-01-01 RX ADMIN — ASPIRIN 81 MG: 81 TABLET, CHEWABLE ORAL at 08:42

## 2021-01-01 RX ADMIN — AMLODIPINE BESYLATE 5 MG: 5 TABLET ORAL at 08:36

## 2021-01-01 RX ADMIN — Medication: at 09:55

## 2021-01-01 RX ADMIN — POTASSIUM BICARBONATE 20 MEQ: 782 TABLET, EFFERVESCENT ORAL at 09:03

## 2021-01-01 RX ADMIN — AMLODIPINE BESYLATE 10 MG: 5 TABLET ORAL at 12:54

## 2021-01-01 RX ADMIN — BUMETANIDE 1 MG: 0.25 INJECTION INTRAMUSCULAR; INTRAVENOUS at 20:43

## 2021-01-01 RX ADMIN — Medication 10 ML: at 13:56

## 2021-01-01 RX ADMIN — PROPOFOL 30 MG: 10 INJECTION, EMULSION INTRAVENOUS at 15:02

## 2021-01-01 RX ADMIN — Medication: at 09:48

## 2021-01-01 RX ADMIN — DICLOFENAC SODIUM 2 G: 10 GEL TOPICAL at 22:17

## 2021-01-01 RX ADMIN — INSULIN LISPRO 5 UNITS: 100 INJECTION, SOLUTION INTRAVENOUS; SUBCUTANEOUS at 11:29

## 2021-01-01 RX ADMIN — HYDRALAZINE HYDROCHLORIDE 100 MG: 50 TABLET, FILM COATED ORAL at 18:38

## 2021-01-01 RX ADMIN — Medication 1 PACKET: at 17:14

## 2021-01-01 RX ADMIN — INSULIN LISPRO 3 UNITS: 100 INJECTION, SOLUTION INTRAVENOUS; SUBCUTANEOUS at 06:19

## 2021-01-01 RX ADMIN — MICONAZOLE NITRATE 2 % TOPICAL POWDER: at 09:56

## 2021-01-01 RX ADMIN — SEVELAMER CARBONATE 1.6 G: 0.8 POWDER, FOR SUSPENSION ORAL at 11:50

## 2021-01-01 RX ADMIN — MICONAZOLE NITRATE 2 % TOPICAL POWDER: at 09:40

## 2021-01-01 RX ADMIN — LINEZOLID 600 MG: 600 INJECTION, SOLUTION INTRAVENOUS at 21:00

## 2021-01-01 RX ADMIN — PROPOFOL 45 MCG/KG/MIN: 10 INJECTION, EMULSION INTRAVENOUS at 07:35

## 2021-01-01 RX ADMIN — Medication: at 08:45

## 2021-01-01 RX ADMIN — PROPOFOL 40 MG: 10 INJECTION, EMULSION INTRAVENOUS at 15:06

## 2021-01-01 RX ADMIN — BICALUTAMIDE 50 MG: 50 TABLET ORAL at 13:15

## 2021-01-01 RX ADMIN — GABAPENTIN 100 MG: 100 CAPSULE ORAL at 21:46

## 2021-01-01 RX ADMIN — INSULIN LISPRO 10 UNITS: 100 INJECTION, SOLUTION INTRAVENOUS; SUBCUTANEOUS at 11:48

## 2021-01-01 RX ADMIN — INSULIN LISPRO 2 UNITS: 100 INJECTION, SOLUTION INTRAVENOUS; SUBCUTANEOUS at 17:46

## 2021-01-01 RX ADMIN — GABAPENTIN 100 MG: 100 CAPSULE ORAL at 17:22

## 2021-01-01 RX ADMIN — ALBUTEROL SULFATE 2 PUFF: 108 AEROSOL, METERED RESPIRATORY (INHALATION) at 16:35

## 2021-01-01 RX ADMIN — SODIUM CHLORIDE 40 MG: 9 INJECTION INTRAMUSCULAR; INTRAVENOUS; SUBCUTANEOUS at 11:16

## 2021-01-01 RX ADMIN — PROPOFOL 50 MCG/KG/MIN: 10 INJECTION, EMULSION INTRAVENOUS at 23:50

## 2021-01-01 RX ADMIN — PROPOFOL 45 MCG/KG/MIN: 10 INJECTION, EMULSION INTRAVENOUS at 22:00

## 2021-01-01 RX ADMIN — LANSOPRAZOLE 30 MG: KIT at 11:04

## 2021-01-01 RX ADMIN — Medication 10 ML: at 05:22

## 2021-01-01 RX ADMIN — GABAPENTIN 100 MG: 100 CAPSULE ORAL at 21:26

## 2021-01-01 RX ADMIN — FLUTICASONE PROPIONATE 2 SPRAY: 50 SPRAY, METERED NASAL at 09:54

## 2021-01-01 RX ADMIN — Medication 81 MG: at 09:22

## 2021-01-01 RX ADMIN — SODIUM BICARBONATE 650 MG: 650 TABLET ORAL at 08:37

## 2021-01-01 RX ADMIN — DIAPER RASH SKIN PROTECTENT: at 05:19

## 2021-01-01 RX ADMIN — ALBUTEROL SULFATE 2 PUFF: 108 AEROSOL, METERED RESPIRATORY (INHALATION) at 07:44

## 2021-01-01 RX ADMIN — GABAPENTIN 100 MG: 100 CAPSULE ORAL at 08:59

## 2021-01-01 RX ADMIN — INSULIN LISPRO 3 UNITS: 100 INJECTION, SOLUTION INTRAVENOUS; SUBCUTANEOUS at 17:39

## 2021-01-01 RX ADMIN — HEPARIN SODIUM 7500 UNITS: 5000 INJECTION INTRAVENOUS; SUBCUTANEOUS at 05:37

## 2021-01-01 RX ADMIN — BICALUTAMIDE 50 MG: 50 TABLET ORAL at 09:53

## 2021-01-01 RX ADMIN — Medication: at 22:37

## 2021-01-01 RX ADMIN — MIDAZOLAM HYDROCHLORIDE 6 MG/HR: 5 INJECTION INTRAMUSCULAR; INTRAVENOUS at 12:39

## 2021-01-01 RX ADMIN — OXYCODONE AND ACETAMINOPHEN 1 TABLET: 5; 325 TABLET ORAL at 13:15

## 2021-01-01 RX ADMIN — Medication 300 MCG/HR: at 19:17

## 2021-01-01 RX ADMIN — SEVELAMER CARBONATE 1.6 G: 0.8 POWDER, FOR SUSPENSION ORAL at 12:05

## 2021-01-01 RX ADMIN — PIPERACILLIN AND TAZOBACTAM 3.38 G: 3; .375 INJECTION, POWDER, LYOPHILIZED, FOR SOLUTION INTRAVENOUS at 07:00

## 2021-01-01 RX ADMIN — PIPERACILLIN AND TAZOBACTAM 3.38 G: 3; .375 INJECTION, POWDER, LYOPHILIZED, FOR SOLUTION INTRAVENOUS at 14:29

## 2021-01-01 RX ADMIN — SODIUM BICARBONATE 650 MG: 650 TABLET ORAL at 09:24

## 2021-01-01 RX ADMIN — HYDRALAZINE HYDROCHLORIDE 100 MG: 50 TABLET, FILM COATED ORAL at 08:34

## 2021-01-01 RX ADMIN — Medication: at 08:09

## 2021-01-01 RX ADMIN — DICLOFENAC SODIUM 2 G: 10 GEL TOPICAL at 22:49

## 2021-01-01 RX ADMIN — MICONAZOLE NITRATE 2 % TOPICAL POWDER: at 09:11

## 2021-01-01 RX ADMIN — ALBUTEROL SULFATE 2 PUFF: 108 AEROSOL, METERED RESPIRATORY (INHALATION) at 23:23

## 2021-01-01 RX ADMIN — OXYCODONE AND ACETAMINOPHEN 1 TABLET: 5; 325 TABLET ORAL at 03:21

## 2021-01-01 RX ADMIN — PROPOFOL 50 MCG/KG/MIN: 10 INJECTION, EMULSION INTRAVENOUS at 21:00

## 2021-01-01 RX ADMIN — SODIUM CHLORIDE 40 MG: 9 INJECTION INTRAMUSCULAR; INTRAVENOUS; SUBCUTANEOUS at 21:07

## 2021-01-01 RX ADMIN — Medication 81 MG: at 08:47

## 2021-01-01 RX ADMIN — PROPOFOL 70 MG: 10 INJECTION, EMULSION INTRAVENOUS at 14:53

## 2021-01-01 RX ADMIN — Medication 50 MCG/HR: at 14:35

## 2021-01-01 RX ADMIN — Medication: at 20:44

## 2021-01-01 RX ADMIN — THERA TABS 1 TABLET: TAB at 09:26

## 2021-01-01 RX ADMIN — Medication 300 MCG/HR: at 00:12

## 2021-01-01 RX ADMIN — MICONAZOLE NITRATE 2 % TOPICAL POWDER: at 08:49

## 2021-01-01 RX ADMIN — HYDRALAZINE HYDROCHLORIDE 100 MG: 50 TABLET, FILM COATED ORAL at 09:07

## 2021-01-01 RX ADMIN — DIAPER RASH SKIN PROTECTENT: at 23:45

## 2021-01-01 RX ADMIN — MICONAZOLE NITRATE 2 % TOPICAL POWDER: at 18:40

## 2021-01-01 RX ADMIN — INSULIN GLARGINE 40 UNITS: 100 INJECTION, SOLUTION SUBCUTANEOUS at 22:38

## 2021-01-01 RX ADMIN — ENOXAPARIN SODIUM 40 MG: 40 INJECTION SUBCUTANEOUS at 23:43

## 2021-01-01 RX ADMIN — Medication 300 MCG/HR: at 22:00

## 2021-01-01 RX ADMIN — Medication 10 ML: at 22:14

## 2021-01-01 RX ADMIN — Medication 10 ML: at 21:04

## 2021-01-01 RX ADMIN — INSULIN GLARGINE 27 UNITS: 100 INJECTION, SOLUTION SUBCUTANEOUS at 22:03

## 2021-01-01 RX ADMIN — Medication 10 ML: at 21:55

## 2021-01-01 RX ADMIN — HYDRALAZINE HYDROCHLORIDE 100 MG: 50 TABLET, FILM COATED ORAL at 11:17

## 2021-01-01 RX ADMIN — GABAPENTIN 100 MG: 100 CAPSULE ORAL at 15:04

## 2021-01-01 RX ADMIN — PIPERACILLIN AND TAZOBACTAM 3.38 G: 3; .375 INJECTION, POWDER, LYOPHILIZED, FOR SOLUTION INTRAVENOUS at 17:29

## 2021-01-01 RX ADMIN — Medication 10 ML: at 21:54

## 2021-01-01 RX ADMIN — PIPERACILLIN AND TAZOBACTAM 3.38 G: 3; .375 INJECTION, POWDER, LYOPHILIZED, FOR SOLUTION INTRAVENOUS at 22:15

## 2021-01-01 RX ADMIN — AMLODIPINE BESYLATE 10 MG: 5 TABLET ORAL at 08:47

## 2021-01-01 RX ADMIN — Medication 300 MCG/HR: at 19:09

## 2021-01-01 RX ADMIN — Medication 300 MCG/HR: at 10:29

## 2021-01-01 RX ADMIN — Medication 1 CAPSULE: at 08:56

## 2021-01-01 RX ADMIN — INSULIN LISPRO 3 UNITS: 100 INJECTION, SOLUTION INTRAVENOUS; SUBCUTANEOUS at 13:24

## 2021-01-01 RX ADMIN — HYDRALAZINE HYDROCHLORIDE 100 MG: 50 TABLET, FILM COATED ORAL at 21:27

## 2021-01-01 RX ADMIN — GABAPENTIN 100 MG: 100 CAPSULE ORAL at 09:33

## 2021-01-01 RX ADMIN — PIPERACILLIN AND TAZOBACTAM 3.38 G: 3; .375 INJECTION, POWDER, LYOPHILIZED, FOR SOLUTION INTRAVENOUS at 05:57

## 2021-01-01 RX ADMIN — HYDROCORTISONE SODIUM SUCCINATE 50 MG: 100 INJECTION, POWDER, FOR SOLUTION INTRAMUSCULAR; INTRAVENOUS at 23:17

## 2021-01-01 RX ADMIN — Medication 1 PACKET: at 08:56

## 2021-01-01 RX ADMIN — AMLODIPINE BESYLATE 10 MG: 5 TABLET ORAL at 09:22

## 2021-01-01 RX ADMIN — ALBUTEROL SULFATE 2 PUFF: 108 AEROSOL, METERED RESPIRATORY (INHALATION) at 00:22

## 2021-01-01 RX ADMIN — HEPARIN SODIUM 7500 UNITS: 5000 INJECTION INTRAVENOUS; SUBCUTANEOUS at 14:21

## 2021-01-01 RX ADMIN — MIDAZOLAM HYDROCHLORIDE 15 MG/HR: 5 INJECTION INTRAMUSCULAR; INTRAVENOUS at 16:30

## 2021-01-01 RX ADMIN — HYDROCORTISONE SODIUM SUCCINATE 50 MG: 100 INJECTION, POWDER, FOR SOLUTION INTRAMUSCULAR; INTRAVENOUS at 21:30

## 2021-01-01 RX ADMIN — PROPOFOL 45 MCG/KG/MIN: 10 INJECTION, EMULSION INTRAVENOUS at 06:50

## 2021-01-01 RX ADMIN — Medication 300 MCG/HR: at 09:30

## 2021-01-01 RX ADMIN — ALBUTEROL SULFATE 2 PUFF: 108 AEROSOL, METERED RESPIRATORY (INHALATION) at 00:45

## 2021-01-01 RX ADMIN — PROPOFOL 50 MCG/KG/MIN: 10 INJECTION, EMULSION INTRAVENOUS at 12:05

## 2021-01-01 RX ADMIN — DICLOFENAC SODIUM 2 G: 10 GEL TOPICAL at 21:17

## 2021-01-01 RX ADMIN — PIPERACILLIN AND TAZOBACTAM 3.38 G: 3; .375 INJECTION, POWDER, LYOPHILIZED, FOR SOLUTION INTRAVENOUS at 14:24

## 2021-01-01 RX ADMIN — INSULIN LISPRO 2 UNITS: 100 INJECTION, SOLUTION INTRAVENOUS; SUBCUTANEOUS at 12:03

## 2021-01-01 RX ADMIN — GABAPENTIN 100 MG: 100 CAPSULE ORAL at 21:58

## 2021-01-01 RX ADMIN — HYDRALAZINE HYDROCHLORIDE 100 MG: 50 TABLET, FILM COATED ORAL at 22:16

## 2021-01-01 RX ADMIN — POTASSIUM CHLORIDE 20 MEQ: 400 INJECTION, SOLUTION INTRAVENOUS at 07:46

## 2021-01-01 RX ADMIN — PIPERACILLIN AND TAZOBACTAM 3.38 G: 3; .375 INJECTION, POWDER, LYOPHILIZED, FOR SOLUTION INTRAVENOUS at 13:54

## 2021-01-01 RX ADMIN — PROPOFOL 40 MCG/KG/MIN: 10 INJECTION, EMULSION INTRAVENOUS at 12:05

## 2021-01-01 RX ADMIN — SODIUM BICARBONATE 650 MG: 650 TABLET ORAL at 09:07

## 2021-01-01 RX ADMIN — INSULIN LISPRO 10 UNITS: 100 INJECTION, SOLUTION INTRAVENOUS; SUBCUTANEOUS at 11:54

## 2021-01-01 RX ADMIN — SEVELAMER CARBONATE 1.6 G: 0.8 POWDER, FOR SUSPENSION ORAL at 09:04

## 2021-01-01 RX ADMIN — HYDRALAZINE HYDROCHLORIDE 25 MG: 25 TABLET, FILM COATED ORAL at 16:16

## 2021-01-01 RX ADMIN — LANSOPRAZOLE 30 MG: KIT at 09:53

## 2021-01-01 RX ADMIN — FUROSEMIDE 40 MG: 10 INJECTION, SOLUTION INTRAMUSCULAR; INTRAVENOUS at 10:02

## 2021-01-01 RX ADMIN — LANSOPRAZOLE 30 MG: KIT at 09:26

## 2021-01-01 RX ADMIN — CHLORHEXIDINE GLUCONATE 15 ML: 0.12 RINSE ORAL at 21:04

## 2021-01-01 RX ADMIN — DEXAMETHASONE 6 MG: 4 TABLET ORAL at 08:50

## 2021-01-01 RX ADMIN — HYDRALAZINE HYDROCHLORIDE 100 MG: 50 TABLET, FILM COATED ORAL at 17:02

## 2021-01-01 RX ADMIN — PANTOPRAZOLE SODIUM 40 MG: 40 TABLET, DELAYED RELEASE ORAL at 07:16

## 2021-01-01 RX ADMIN — MIDAZOLAM HYDROCHLORIDE 9 MG/HR: 5 INJECTION INTRAMUSCULAR; INTRAVENOUS at 17:36

## 2021-01-01 RX ADMIN — OXYCODONE AND ACETAMINOPHEN 1 TABLET: 5; 325 TABLET ORAL at 18:21

## 2021-01-01 RX ADMIN — OXYCODONE AND ACETAMINOPHEN 1 TABLET: 5; 325 TABLET ORAL at 03:59

## 2021-01-01 RX ADMIN — PIPERACILLIN AND TAZOBACTAM 3.38 G: 3; .375 INJECTION, POWDER, LYOPHILIZED, FOR SOLUTION INTRAVENOUS at 06:55

## 2021-01-01 RX ADMIN — FUROSEMIDE 40 MG: 10 INJECTION, SOLUTION INTRAMUSCULAR; INTRAVENOUS at 21:33

## 2021-01-01 RX ADMIN — SODIUM CHLORIDE 9 MCG/KG/MIN: 9 INJECTION, SOLUTION INTRAVENOUS at 00:11

## 2021-01-01 RX ADMIN — GABAPENTIN 100 MG: 100 CAPSULE ORAL at 16:09

## 2021-01-01 RX ADMIN — EPOETIN ALFA-EPBX 20000 UNITS: 10000 INJECTION, SOLUTION INTRAVENOUS; SUBCUTANEOUS at 22:48

## 2021-01-01 RX ADMIN — OXYCODONE AND ACETAMINOPHEN 1 TABLET: 5; 325 TABLET ORAL at 05:00

## 2021-01-01 RX ADMIN — PIPERACILLIN AND TAZOBACTAM 3.38 G: 3; .375 INJECTION, POWDER, LYOPHILIZED, FOR SOLUTION INTRAVENOUS at 22:32

## 2021-01-01 RX ADMIN — OXYCODONE AND ACETAMINOPHEN 1 TABLET: 5; 325 TABLET ORAL at 17:07

## 2021-01-01 RX ADMIN — DICLOFENAC SODIUM 2 G: 10 GEL TOPICAL at 21:50

## 2021-01-01 RX ADMIN — Medication 1 PACKET: at 12:05

## 2021-01-01 RX ADMIN — PROPOFOL 25 MCG/KG/MIN: 10 INJECTION, EMULSION INTRAVENOUS at 23:26

## 2021-01-01 RX ADMIN — PANTOPRAZOLE SODIUM 40 MG: 40 TABLET, DELAYED RELEASE ORAL at 15:35

## 2021-01-01 RX ADMIN — ALBUMIN (HUMAN) 25 G: 0.25 INJECTION, SOLUTION INTRAVENOUS at 09:32

## 2021-01-01 RX ADMIN — HYDROCORTISONE SODIUM SUCCINATE 50 MG: 100 INJECTION, POWDER, FOR SOLUTION INTRAMUSCULAR; INTRAVENOUS at 13:15

## 2021-01-01 RX ADMIN — DOCUSATE SODIUM 100 MG: 50 LIQUID ORAL at 09:10

## 2021-01-01 RX ADMIN — PROPOFOL 25 MCG/KG/MIN: 10 INJECTION, EMULSION INTRAVENOUS at 03:15

## 2021-01-01 RX ADMIN — LINEZOLID 600 MG: 600 INJECTION, SOLUTION INTRAVENOUS at 09:55

## 2021-01-01 RX ADMIN — Medication 10 ML: at 22:01

## 2021-01-01 RX ADMIN — MICONAZOLE NITRATE 2 % TOPICAL POWDER: at 18:11

## 2021-01-01 RX ADMIN — Medication 150 MCG/HR: at 05:24

## 2021-01-01 RX ADMIN — Medication 275 MCG/HR: at 12:51

## 2021-01-01 RX ADMIN — HYDROCORTISONE SODIUM SUCCINATE 50 MG: 100 INJECTION, POWDER, FOR SOLUTION INTRAMUSCULAR; INTRAVENOUS at 12:00

## 2021-01-01 RX ADMIN — FLUTICASONE PROPIONATE 2 SPRAY: 50 SPRAY, METERED NASAL at 09:05

## 2021-01-01 RX ADMIN — MICONAZOLE NITRATE 2 % TOPICAL POWDER: at 18:55

## 2021-01-01 RX ADMIN — INSULIN LISPRO 10 UNITS: 100 INJECTION, SOLUTION INTRAVENOUS; SUBCUTANEOUS at 17:54

## 2021-01-01 RX ADMIN — PIPERACILLIN AND TAZOBACTAM 3.38 G: 3; .375 INJECTION, POWDER, LYOPHILIZED, FOR SOLUTION INTRAVENOUS at 13:58

## 2021-01-01 RX ADMIN — CLONIDINE HYDROCHLORIDE 0.1 MG: 0.1 TABLET ORAL at 17:13

## 2021-01-01 RX ADMIN — HYDRALAZINE HYDROCHLORIDE 100 MG: 50 TABLET, FILM COATED ORAL at 16:17

## 2021-01-01 RX ADMIN — SODIUM CHLORIDE 9 MCG/KG/MIN: 9 INJECTION, SOLUTION INTRAVENOUS at 13:00

## 2021-01-01 RX ADMIN — MIDAZOLAM HYDROCHLORIDE 10 MG/HR: 5 INJECTION INTRAMUSCULAR; INTRAVENOUS at 14:51

## 2021-01-01 RX ADMIN — Medication: at 09:51

## 2021-01-01 RX ADMIN — REMDESIVIR 100 MG: 5 INJECTION INTRAVENOUS at 21:33

## 2021-01-01 RX ADMIN — DICLOFENAC SODIUM 2 G: 10 GEL TOPICAL at 10:03

## 2021-01-01 RX ADMIN — MIDAZOLAM 2 MG: 1 INJECTION INTRAMUSCULAR; INTRAVENOUS at 14:10

## 2021-01-01 RX ADMIN — FUROSEMIDE 80 MG: 10 INJECTION, SOLUTION INTRAMUSCULAR; INTRAVENOUS at 20:48

## 2021-01-01 RX ADMIN — INSULIN LISPRO 3 UNITS: 100 INJECTION, SOLUTION INTRAVENOUS; SUBCUTANEOUS at 05:01

## 2021-01-01 RX ADMIN — SEVELAMER CARBONATE 1.6 G: 0.8 POWDER, FOR SUSPENSION ORAL at 16:39

## 2021-01-01 RX ADMIN — MICONAZOLE NITRATE 2 % TOPICAL POWDER: at 19:00

## 2021-01-01 RX ADMIN — CHLORHEXIDINE GLUCONATE 15 ML: 0.12 RINSE ORAL at 21:29

## 2021-01-01 RX ADMIN — Medication 250 MCG/HR: at 00:00

## 2021-01-01 RX ADMIN — ALBUTEROL SULFATE 2 PUFF: 108 AEROSOL, METERED RESPIRATORY (INHALATION) at 11:40

## 2021-01-01 RX ADMIN — LANSOPRAZOLE 30 MG: KIT at 18:56

## 2021-01-01 RX ADMIN — SUCCINYLCHOLINE CHLORIDE 100 MG: 20 INJECTION, SOLUTION INTRAMUSCULAR; INTRAVENOUS at 18:53

## 2021-01-01 RX ADMIN — THERA TABS 1 TABLET: TAB at 09:05

## 2021-01-01 RX ADMIN — LANSOPRAZOLE 30 MG: KIT at 10:50

## 2021-01-01 RX ADMIN — Medication 10 ML: at 22:18

## 2021-01-01 RX ADMIN — OXYCODONE AND ACETAMINOPHEN 1 TABLET: 5; 325 TABLET ORAL at 05:09

## 2021-01-01 RX ADMIN — GABAPENTIN 100 MG: 100 CAPSULE ORAL at 15:42

## 2021-01-01 RX ADMIN — Medication 10 ML: at 13:52

## 2021-01-01 RX ADMIN — FLUTICASONE PROPIONATE 2 SPRAY: 50 SPRAY, METERED NASAL at 09:40

## 2021-01-01 RX ADMIN — EPOETIN ALFA-EPBX 20000 UNITS: 10000 INJECTION, SOLUTION INTRAVENOUS; SUBCUTANEOUS at 00:24

## 2021-01-01 RX ADMIN — HYDRALAZINE HYDROCHLORIDE 100 MG: 50 TABLET, FILM COATED ORAL at 09:24

## 2021-01-01 RX ADMIN — Medication 300 MCG/HR: at 20:26

## 2021-01-01 RX ADMIN — INSULIN LISPRO 10 UNITS: 100 INJECTION, SOLUTION INTRAVENOUS; SUBCUTANEOUS at 07:29

## 2021-01-01 RX ADMIN — PROPOFOL 50 MCG/KG/MIN: 10 INJECTION, EMULSION INTRAVENOUS at 05:15

## 2021-01-01 RX ADMIN — ALBUTEROL SULFATE 2 PUFF: 108 AEROSOL, METERED RESPIRATORY (INHALATION) at 12:20

## 2021-01-01 RX ADMIN — BUMETANIDE 2 MG: 0.25 INJECTION, SOLUTION INTRAMUSCULAR; INTRAVENOUS at 20:51

## 2021-01-01 RX ADMIN — DICLOFENAC SODIUM 2 G: 10 GEL TOPICAL at 17:54

## 2021-01-01 RX ADMIN — Medication 10 ML: at 13:54

## 2021-01-01 RX ADMIN — DIAPER RASH SKIN PROTECTENT: at 21:29

## 2021-01-01 RX ADMIN — Medication: at 09:43

## 2021-01-01 RX ADMIN — PROPOFOL 50 MCG/KG/MIN: 10 INJECTION, EMULSION INTRAVENOUS at 21:10

## 2021-01-01 RX ADMIN — HYDROCORTISONE SODIUM SUCCINATE 50 MG: 100 INJECTION, POWDER, FOR SOLUTION INTRAMUSCULAR; INTRAVENOUS at 06:04

## 2021-01-01 RX ADMIN — Medication: at 18:40

## 2021-01-01 RX ADMIN — Medication: at 20:35

## 2021-01-01 RX ADMIN — BICALUTAMIDE 50 MG: 50 TABLET ORAL at 10:02

## 2021-01-01 RX ADMIN — PIPERACILLIN AND TAZOBACTAM 3.38 G: 3; .375 INJECTION, POWDER, LYOPHILIZED, FOR SOLUTION INTRAVENOUS at 21:05

## 2021-01-01 RX ADMIN — PROPOFOL 45 MCG/KG/MIN: 10 INJECTION, EMULSION INTRAVENOUS at 23:54

## 2021-01-01 RX ADMIN — ASPIRIN 81 MG: 81 TABLET, CHEWABLE ORAL at 11:04

## 2021-01-01 RX ADMIN — PROPOFOL 35 MCG/KG/MIN: 10 INJECTION, EMULSION INTRAVENOUS at 04:53

## 2021-01-01 RX ADMIN — DICLOFENAC SODIUM 2 G: 10 GEL TOPICAL at 13:08

## 2021-01-01 RX ADMIN — Medication 10 ML: at 05:01

## 2021-01-01 RX ADMIN — ENOXAPARIN SODIUM 40 MG: 40 INJECTION SUBCUTANEOUS at 09:04

## 2021-01-01 RX ADMIN — DICLOFENAC SODIUM 2 G: 10 GEL TOPICAL at 20:10

## 2021-01-01 RX ADMIN — LINEZOLID 600 MG: 600 INJECTION, SOLUTION INTRAVENOUS at 23:48

## 2021-01-01 RX ADMIN — DICLOFENAC SODIUM 2 G: 10 GEL TOPICAL at 12:41

## 2021-01-01 RX ADMIN — PANTOPRAZOLE SODIUM 40 MG: 40 TABLET, DELAYED RELEASE ORAL at 17:23

## 2021-01-01 RX ADMIN — ALBUTEROL SULFATE 2 PUFF: 108 AEROSOL, METERED RESPIRATORY (INHALATION) at 17:27

## 2021-01-01 RX ADMIN — HEPARIN SODIUM 7500 UNITS: 5000 INJECTION INTRAVENOUS; SUBCUTANEOUS at 22:49

## 2021-01-01 RX ADMIN — INSULIN LISPRO 20 UNITS: 100 INJECTION, SOLUTION INTRAVENOUS; SUBCUTANEOUS at 11:51

## 2021-01-01 RX ADMIN — INSULIN LISPRO 7 UNITS: 100 INJECTION, SOLUTION INTRAVENOUS; SUBCUTANEOUS at 11:00

## 2021-01-01 RX ADMIN — Medication 2 TABLET: at 09:34

## 2021-01-01 RX ADMIN — OXYCODONE AND ACETAMINOPHEN 1 TABLET: 5; 325 TABLET ORAL at 21:06

## 2021-01-01 RX ADMIN — LANSOPRAZOLE 30 MG: KIT at 09:06

## 2021-01-01 RX ADMIN — ALBUMIN (HUMAN) 25 G: 0.25 INJECTION, SOLUTION INTRAVENOUS at 09:19

## 2021-01-01 RX ADMIN — INSULIN GLARGINE 30 UNITS: 100 INJECTION, SOLUTION SUBCUTANEOUS at 22:00

## 2021-01-01 RX ADMIN — DIAPER RASH SKIN PROTECTENT: at 05:42

## 2021-01-01 RX ADMIN — OXYCODONE AND ACETAMINOPHEN 1 TABLET: 5; 325 TABLET ORAL at 16:04

## 2021-01-01 RX ADMIN — AMLODIPINE BESYLATE 10 MG: 5 TABLET ORAL at 09:34

## 2021-01-01 RX ADMIN — MIDAZOLAM HYDROCHLORIDE 9 MG/HR: 5 INJECTION INTRAMUSCULAR; INTRAVENOUS at 14:55

## 2021-01-01 RX ADMIN — Medication 10 ML: at 15:42

## 2021-01-01 RX ADMIN — PROPOFOL 25 MCG/KG/MIN: 10 INJECTION, EMULSION INTRAVENOUS at 18:42

## 2021-01-01 RX ADMIN — BICALUTAMIDE 50 MG: 50 TABLET ORAL at 08:51

## 2021-01-01 RX ADMIN — PROPOFOL 35 MCG/KG/MIN: 10 INJECTION, EMULSION INTRAVENOUS at 08:51

## 2021-01-01 RX ADMIN — DICLOFENAC SODIUM 2 G: 10 GEL TOPICAL at 12:14

## 2021-01-01 RX ADMIN — INSULIN LISPRO 3 UNITS: 100 INJECTION, SOLUTION INTRAVENOUS; SUBCUTANEOUS at 00:30

## 2021-01-01 RX ADMIN — INSULIN GLARGINE 8 UNITS: 100 INJECTION, SOLUTION SUBCUTANEOUS at 21:51

## 2021-01-01 RX ADMIN — PIPERACILLIN AND TAZOBACTAM 3.38 G: 3; .375 INJECTION, POWDER, LYOPHILIZED, FOR SOLUTION INTRAVENOUS at 06:35

## 2021-01-01 RX ADMIN — ENOXAPARIN SODIUM 40 MG: 40 INJECTION SUBCUTANEOUS at 09:23

## 2021-01-01 RX ADMIN — PROPOFOL 50 MCG/KG/MIN: 10 INJECTION, EMULSION INTRAVENOUS at 01:00

## 2021-01-01 RX ADMIN — HYDRALAZINE HYDROCHLORIDE 100 MG: 50 TABLET, FILM COATED ORAL at 21:46

## 2021-01-01 RX ADMIN — Medication 1 PACKET: at 21:07

## 2021-01-01 RX ADMIN — HEPARIN SODIUM 7500 UNITS: 5000 INJECTION INTRAVENOUS; SUBCUTANEOUS at 13:00

## 2021-01-01 RX ADMIN — ENOXAPARIN SODIUM 40 MG: 40 INJECTION SUBCUTANEOUS at 12:54

## 2021-01-01 RX ADMIN — CISATRACURIUM BESYLATE 10 MCG/KG/MIN: 2 INJECTION INTRAVENOUS at 18:26

## 2021-01-01 RX ADMIN — TAMSULOSIN HYDROCHLORIDE 0.4 MG: 0.4 CAPSULE ORAL at 09:03

## 2021-01-01 RX ADMIN — LINEZOLID 600 MG: 600 INJECTION, SOLUTION INTRAVENOUS at 10:02

## 2021-01-01 RX ADMIN — HYDRALAZINE HYDROCHLORIDE 100 MG: 50 TABLET, FILM COATED ORAL at 18:06

## 2021-01-01 RX ADMIN — OXYCODONE AND ACETAMINOPHEN 1 TABLET: 5; 325 TABLET ORAL at 17:32

## 2021-01-01 RX ADMIN — GABAPENTIN 100 MG: 100 CAPSULE ORAL at 17:14

## 2021-01-01 RX ADMIN — LINEZOLID 600 MG: 600 INJECTION, SOLUTION INTRAVENOUS at 11:34

## 2021-01-01 RX ADMIN — OXYCODONE AND ACETAMINOPHEN 1 TABLET: 5; 325 TABLET ORAL at 03:10

## 2021-01-01 RX ADMIN — Medication 1 CAPSULE: at 09:25

## 2021-01-01 RX ADMIN — PROPOFOL 50 MCG/KG/MIN: 10 INJECTION, EMULSION INTRAVENOUS at 06:46

## 2021-01-01 RX ADMIN — HYDRALAZINE HYDROCHLORIDE 100 MG: 50 TABLET, FILM COATED ORAL at 08:25

## 2021-01-01 RX ADMIN — GABAPENTIN 100 MG: 100 CAPSULE ORAL at 22:03

## 2021-01-01 RX ADMIN — CISATRACURIUM BESYLATE 9 MCG/KG/MIN: 2 INJECTION INTRAVENOUS at 05:00

## 2021-01-01 RX ADMIN — TAMSULOSIN HYDROCHLORIDE 0.4 MG: 0.4 CAPSULE ORAL at 11:17

## 2021-01-01 RX ADMIN — MICONAZOLE NITRATE 2 % TOPICAL POWDER: at 17:35

## 2021-01-01 RX ADMIN — GABAPENTIN 100 MG: 100 CAPSULE ORAL at 08:56

## 2021-01-01 RX ADMIN — BICALUTAMIDE 50 MG: 50 TABLET ORAL at 10:33

## 2021-01-01 RX ADMIN — INSULIN LISPRO 2 UNITS: 100 INJECTION, SOLUTION INTRAVENOUS; SUBCUTANEOUS at 00:27

## 2021-01-01 RX ADMIN — Medication 10 ML: at 21:00

## 2021-01-01 RX ADMIN — GABAPENTIN 100 MG: 100 CAPSULE ORAL at 22:15

## 2021-01-01 RX ADMIN — ZINC SULFATE 220 MG (50 MG) CAPSULE 1 CAPSULE: CAPSULE at 09:51

## 2021-01-01 RX ADMIN — HYDRALAZINE HYDROCHLORIDE 50 MG: 50 TABLET, FILM COATED ORAL at 20:56

## 2021-01-01 RX ADMIN — SEVELAMER CARBONATE 1.6 G: 0.8 POWDER, FOR SUSPENSION ORAL at 16:07

## 2021-01-01 RX ADMIN — BICALUTAMIDE 50 MG: 50 TABLET ORAL at 10:52

## 2021-01-01 RX ADMIN — LINEZOLID 600 MG: 600 INJECTION, SOLUTION INTRAVENOUS at 21:05

## 2021-01-01 RX ADMIN — HYDRALAZINE HYDROCHLORIDE 20 MG: 20 INJECTION INTRAMUSCULAR; INTRAVENOUS at 19:15

## 2021-01-01 RX ADMIN — IRON SUCROSE 200 MG: 20 INJECTION, SOLUTION INTRAVENOUS at 10:31

## 2021-01-01 RX ADMIN — INSULIN LISPRO 3 UNITS: 100 INJECTION, SOLUTION INTRAVENOUS; SUBCUTANEOUS at 23:25

## 2021-01-01 RX ADMIN — ASPIRIN 81 MG: 81 TABLET, CHEWABLE ORAL at 10:55

## 2021-01-01 RX ADMIN — INSULIN LISPRO 20 UNITS: 100 INJECTION, SOLUTION INTRAVENOUS; SUBCUTANEOUS at 07:22

## 2021-01-01 RX ADMIN — PIPERACILLIN AND TAZOBACTAM 3.38 G: 3; .375 INJECTION, POWDER, LYOPHILIZED, FOR SOLUTION INTRAVENOUS at 14:13

## 2021-01-01 RX ADMIN — HYDRALAZINE HYDROCHLORIDE 100 MG: 50 TABLET, FILM COATED ORAL at 16:23

## 2021-01-01 RX ADMIN — PROPOFOL 25 MCG/KG/MIN: 10 INJECTION, EMULSION INTRAVENOUS at 14:24

## 2021-01-01 RX ADMIN — GABAPENTIN 100 MG: 100 CAPSULE ORAL at 18:10

## 2021-01-01 RX ADMIN — DICLOFENAC SODIUM 2 G: 10 GEL TOPICAL at 17:43

## 2021-01-01 RX ADMIN — PROPOFOL 30 MCG/KG/MIN: 10 INJECTION, EMULSION INTRAVENOUS at 03:32

## 2021-01-01 RX ADMIN — ALBUMIN (HUMAN) 12.5 G: 0.25 INJECTION, SOLUTION INTRAVENOUS at 20:37

## 2021-01-01 RX ADMIN — COLLAGENASE SANTYL: 250 OINTMENT TOPICAL at 09:04

## 2021-01-01 RX ADMIN — BICALUTAMIDE 50 MG: 50 TABLET ORAL at 10:43

## 2021-01-01 RX ADMIN — DICLOFENAC SODIUM 2 G: 10 GEL TOPICAL at 09:57

## 2021-01-01 RX ADMIN — OXYCODONE HYDROCHLORIDE AND ACETAMINOPHEN 500 MG: 500 TABLET ORAL at 09:26

## 2021-01-01 RX ADMIN — INSULIN GLARGINE 8 UNITS: 100 INJECTION, SOLUTION SUBCUTANEOUS at 12:15

## 2021-01-01 RX ADMIN — DICLOFENAC SODIUM 2 G: 10 GEL TOPICAL at 17:58

## 2021-01-01 RX ADMIN — FUROSEMIDE 40 MG: 10 INJECTION, SOLUTION INTRAVENOUS at 09:26

## 2021-01-01 RX ADMIN — METRONIDAZOLE 500 MG: 500 INJECTION, SOLUTION INTRAVENOUS at 11:49

## 2021-01-01 RX ADMIN — PROPOFOL 50 MCG/KG/MIN: 10 INJECTION, EMULSION INTRAVENOUS at 04:50

## 2021-01-01 RX ADMIN — PROPOFOL 45 MCG/KG/MIN: 10 INJECTION, EMULSION INTRAVENOUS at 01:28

## 2021-01-01 RX ADMIN — DICLOFENAC SODIUM 2 G: 10 GEL TOPICAL at 09:33

## 2021-01-01 RX ADMIN — Medication 4 MCG/MIN: at 08:54

## 2021-01-01 RX ADMIN — INSULIN LISPRO 3 UNITS: 100 INJECTION, SOLUTION INTRAVENOUS; SUBCUTANEOUS at 12:53

## 2021-01-01 RX ADMIN — DIAPER RASH SKIN PROTECTENT: at 06:13

## 2021-01-01 RX ADMIN — Medication 275 MCG/HR: at 15:50

## 2021-01-01 RX ADMIN — Medication: at 20:14

## 2021-01-01 RX ADMIN — CLONIDINE HYDROCHLORIDE 0.1 MG: 0.1 TABLET ORAL at 08:51

## 2021-01-01 RX ADMIN — HYDRALAZINE HYDROCHLORIDE 20 MG: 20 INJECTION INTRAMUSCULAR; INTRAVENOUS at 05:59

## 2021-01-01 RX ADMIN — INSULIN LISPRO 2 UNITS: 100 INJECTION, SOLUTION INTRAVENOUS; SUBCUTANEOUS at 08:26

## 2021-01-01 RX ADMIN — INSULIN GLARGINE 50 UNITS: 100 INJECTION, SOLUTION SUBCUTANEOUS at 22:29

## 2021-01-01 RX ADMIN — DICLOFENAC SODIUM 2 G: 10 GEL TOPICAL at 21:54

## 2021-01-01 RX ADMIN — MICONAZOLE NITRATE 2 % TOPICAL POWDER: at 17:41

## 2021-01-01 RX ADMIN — Medication 10 ML: at 21:08

## 2021-01-01 RX ADMIN — MIDAZOLAM HYDROCHLORIDE 2 MG/HR: 5 INJECTION INTRAMUSCULAR; INTRAVENOUS at 21:24

## 2021-01-01 RX ADMIN — INSULIN LISPRO 10 UNITS: 100 INJECTION, SOLUTION INTRAVENOUS; SUBCUTANEOUS at 12:07

## 2021-01-01 RX ADMIN — Medication: at 09:39

## 2021-01-01 RX ADMIN — MICONAZOLE NITRATE 2 % TOPICAL POWDER: at 08:51

## 2021-01-01 RX ADMIN — Medication 300 MCG/HR: at 09:02

## 2021-01-01 RX ADMIN — Medication: at 20:41

## 2021-01-01 RX ADMIN — PROPOFOL 50 MCG/KG/MIN: 10 INJECTION, EMULSION INTRAVENOUS at 08:46

## 2021-01-01 RX ADMIN — Medication 10 ML: at 21:31

## 2021-01-01 RX ADMIN — TAMSULOSIN HYDROCHLORIDE 0.4 MG: 0.4 CAPSULE ORAL at 08:38

## 2021-01-01 RX ADMIN — Medication 10 ML: at 21:34

## 2021-01-01 RX ADMIN — FLUTICASONE PROPIONATE 2 SPRAY: 50 SPRAY, METERED NASAL at 10:07

## 2021-01-01 RX ADMIN — INSULIN LISPRO 10 UNITS: 100 INJECTION, SOLUTION INTRAVENOUS; SUBCUTANEOUS at 18:20

## 2021-01-01 RX ADMIN — Medication 275 MCG/HR: at 03:08

## 2021-01-01 RX ADMIN — ALBUTEROL SULFATE 2 PUFF: 108 AEROSOL, METERED RESPIRATORY (INHALATION) at 08:15

## 2021-01-01 RX ADMIN — HYDROCORTISONE SODIUM SUCCINATE 50 MG: 100 INJECTION, POWDER, FOR SOLUTION INTRAMUSCULAR; INTRAVENOUS at 17:59

## 2021-01-01 RX ADMIN — Medication 10 ML: at 14:58

## 2021-01-01 RX ADMIN — SEVELAMER CARBONATE 1.6 G: 0.8 POWDER, FOR SUSPENSION ORAL at 12:15

## 2021-01-01 RX ADMIN — Medication 10 ML: at 18:51

## 2021-01-01 RX ADMIN — CHLORHEXIDINE GLUCONATE 15 ML: 0.12 RINSE ORAL at 08:10

## 2021-01-01 RX ADMIN — HYDRALAZINE HYDROCHLORIDE 100 MG: 50 TABLET, FILM COATED ORAL at 17:22

## 2021-01-01 RX ADMIN — LANSOPRAZOLE 30 MG: KIT at 08:55

## 2021-01-01 RX ADMIN — INSULIN LISPRO 2 UNITS: 100 INJECTION, SOLUTION INTRAVENOUS; SUBCUTANEOUS at 18:08

## 2021-01-01 RX ADMIN — POTASSIUM CHLORIDE 20 MEQ: 400 INJECTION, SOLUTION INTRAVENOUS at 10:03

## 2021-01-01 RX ADMIN — Medication: at 10:31

## 2021-01-01 RX ADMIN — ALBUTEROL SULFATE 2 PUFF: 108 AEROSOL, METERED RESPIRATORY (INHALATION) at 11:37

## 2021-01-01 RX ADMIN — INSULIN LISPRO 2 UNITS: 100 INJECTION, SOLUTION INTRAVENOUS; SUBCUTANEOUS at 00:24

## 2021-01-01 RX ADMIN — OXYCODONE AND ACETAMINOPHEN 1 TABLET: 5; 325 TABLET ORAL at 23:31

## 2021-01-01 RX ADMIN — PIPERACILLIN AND TAZOBACTAM 3.38 G: 3; .375 INJECTION, POWDER, LYOPHILIZED, FOR SOLUTION INTRAVENOUS at 13:50

## 2021-01-01 RX ADMIN — SEVELAMER CARBONATE 1.6 G: 0.8 POWDER, FOR SUSPENSION ORAL at 12:44

## 2021-01-01 RX ADMIN — INSULIN LISPRO 5 UNITS: 100 INJECTION, SOLUTION INTRAVENOUS; SUBCUTANEOUS at 17:41

## 2021-01-01 RX ADMIN — HEPARIN SODIUM 7500 UNITS: 5000 INJECTION INTRAVENOUS; SUBCUTANEOUS at 06:01

## 2021-01-01 RX ADMIN — HYDRALAZINE HYDROCHLORIDE 100 MG: 50 TABLET, FILM COATED ORAL at 17:54

## 2021-01-01 RX ADMIN — Medication: at 08:27

## 2021-01-01 RX ADMIN — LORAZEPAM 0.5 MG: 2 INJECTION INTRAMUSCULAR; INTRAVENOUS at 04:12

## 2021-01-01 RX ADMIN — INSULIN LISPRO 3 UNITS: 100 INJECTION, SOLUTION INTRAVENOUS; SUBCUTANEOUS at 11:32

## 2021-01-01 RX ADMIN — Medication 10 ML: at 05:25

## 2021-01-01 RX ADMIN — BICALUTAMIDE 50 MG: 50 TABLET ORAL at 08:40

## 2021-01-01 RX ADMIN — GABAPENTIN 100 MG: 100 CAPSULE ORAL at 15:35

## 2021-01-01 RX ADMIN — PIPERACILLIN AND TAZOBACTAM 3.38 G: 3; .375 INJECTION, POWDER, LYOPHILIZED, FOR SOLUTION INTRAVENOUS at 21:03

## 2021-01-01 RX ADMIN — HYDRALAZINE HYDROCHLORIDE 20 MG: 20 INJECTION INTRAMUSCULAR; INTRAVENOUS at 12:37

## 2021-01-01 RX ADMIN — DICLOFENAC SODIUM 2 G: 10 GEL TOPICAL at 12:42

## 2021-01-01 RX ADMIN — INSULIN GLARGINE 27 UNITS: 100 INJECTION, SOLUTION SUBCUTANEOUS at 21:56

## 2021-01-01 RX ADMIN — HEPARIN SODIUM 7500 UNITS: 5000 INJECTION INTRAVENOUS; SUBCUTANEOUS at 06:02

## 2021-01-01 RX ADMIN — Medication 10 ML: at 06:57

## 2021-01-01 RX ADMIN — DICLOFENAC SODIUM 2 G: 10 GEL TOPICAL at 12:15

## 2021-01-01 RX ADMIN — INSULIN LISPRO 2 UNITS: 100 INJECTION, SOLUTION INTRAVENOUS; SUBCUTANEOUS at 07:11

## 2021-01-01 RX ADMIN — INSULIN LISPRO 12 UNITS: 100 INJECTION, SOLUTION INTRAVENOUS; SUBCUTANEOUS at 18:47

## 2021-01-01 RX ADMIN — POTASSIUM CHLORIDE 20 MEQ: 400 INJECTION, SOLUTION INTRAVENOUS at 06:17

## 2021-01-01 RX ADMIN — FUROSEMIDE 80 MG: 10 INJECTION, SOLUTION INTRAMUSCULAR; INTRAVENOUS at 10:29

## 2021-01-01 RX ADMIN — CHLORHEXIDINE GLUCONATE 15 ML: 0.12 RINSE ORAL at 09:35

## 2021-01-01 RX ADMIN — EPOETIN ALFA-EPBX 20000 UNITS: 10000 INJECTION, SOLUTION INTRAVENOUS; SUBCUTANEOUS at 21:35

## 2021-01-01 RX ADMIN — INSULIN LISPRO 10 UNITS: 100 INJECTION, SOLUTION INTRAVENOUS; SUBCUTANEOUS at 13:29

## 2021-01-01 RX ADMIN — Medication: at 20:08

## 2021-01-01 RX ADMIN — ALBUTEROL SULFATE 2 PUFF: 108 AEROSOL, METERED RESPIRATORY (INHALATION) at 20:17

## 2021-01-01 RX ADMIN — PIPERACILLIN AND TAZOBACTAM 3.38 G: 3; .375 INJECTION, POWDER, LYOPHILIZED, FOR SOLUTION INTRAVENOUS at 13:23

## 2021-01-01 RX ADMIN — PROPOFOL 40 MCG/KG/MIN: 10 INJECTION, EMULSION INTRAVENOUS at 17:35

## 2021-01-01 RX ADMIN — HEPARIN SODIUM 7500 UNITS: 5000 INJECTION INTRAVENOUS; SUBCUTANEOUS at 22:14

## 2021-01-01 RX ADMIN — DOCUSATE SODIUM 100 MG: 50 LIQUID ORAL at 18:42

## 2021-01-01 RX ADMIN — Medication 10 ML: at 20:59

## 2021-01-01 RX ADMIN — PATIROMER 16.8 G: 8.4 POWDER, FOR SUSPENSION ORAL at 10:36

## 2021-01-01 RX ADMIN — MICONAZOLE NITRATE 2 % TOPICAL POWDER: at 09:06

## 2021-01-01 RX ADMIN — CHLORHEXIDINE GLUCONATE 15 ML: 0.12 RINSE ORAL at 20:09

## 2021-01-01 RX ADMIN — INSULIN GLARGINE 40 UNITS: 100 INJECTION, SOLUTION SUBCUTANEOUS at 23:14

## 2021-01-01 RX ADMIN — Medication 10 ML: at 16:11

## 2021-01-01 RX ADMIN — DEXTROSE MONOHYDRATE 4 MCG/MIN: 5 INJECTION, SOLUTION INTRAVENOUS at 15:11

## 2021-01-01 RX ADMIN — GABAPENTIN 100 MG: 100 CAPSULE ORAL at 21:28

## 2021-01-01 RX ADMIN — ZINC SULFATE 220 MG (50 MG) CAPSULE 1 CAPSULE: CAPSULE at 09:33

## 2021-01-01 RX ADMIN — QUETIAPINE FUMARATE 25 MG: 25 TABLET ORAL at 02:37

## 2021-01-01 RX ADMIN — HEPARIN SODIUM 7500 UNITS: 5000 INJECTION INTRAVENOUS; SUBCUTANEOUS at 22:01

## 2021-01-01 RX ADMIN — ENOXAPARIN SODIUM 40 MG: 40 INJECTION SUBCUTANEOUS at 00:33

## 2021-01-01 RX ADMIN — IPRATROPIUM BROMIDE AND ALBUTEROL SULFATE 3 ML: .5; 3 SOLUTION RESPIRATORY (INHALATION) at 18:58

## 2021-01-01 RX ADMIN — LINEZOLID 600 MG: 600 INJECTION, SOLUTION INTRAVENOUS at 12:23

## 2021-01-01 RX ADMIN — Medication: at 21:12

## 2021-01-01 RX ADMIN — SODIUM CHLORIDE 40 MG: 9 INJECTION INTRAMUSCULAR; INTRAVENOUS; SUBCUTANEOUS at 22:04

## 2021-01-01 RX ADMIN — POTASSIUM CHLORIDE 20 MEQ: 400 INJECTION, SOLUTION INTRAVENOUS at 22:43

## 2021-01-01 RX ADMIN — Medication 300 MCG/HR: at 05:30

## 2021-01-01 RX ADMIN — POTASSIUM BICARBONATE 40 MEQ: 782 TABLET, EFFERVESCENT ORAL at 11:30

## 2021-01-01 RX ADMIN — PROPOFOL 30 MCG/KG/MIN: 10 INJECTION, EMULSION INTRAVENOUS at 00:46

## 2021-01-01 RX ADMIN — Medication 300 MCG/HR: at 01:21

## 2021-01-01 RX ADMIN — Medication 10 ML: at 07:17

## 2021-01-01 RX ADMIN — ALBUTEROL SULFATE 2 PUFF: 108 AEROSOL, METERED RESPIRATORY (INHALATION) at 20:04

## 2021-01-01 RX ADMIN — ZINC SULFATE 220 MG (50 MG) CAPSULE 1 CAPSULE: CAPSULE at 11:17

## 2021-01-01 RX ADMIN — Medication 300 MCG/HR: at 01:44

## 2021-01-01 RX ADMIN — PIPERACILLIN AND TAZOBACTAM 3.38 G: 3; .375 INJECTION, POWDER, LYOPHILIZED, FOR SOLUTION INTRAVENOUS at 21:59

## 2021-01-01 RX ADMIN — MIDAZOLAM HYDROCHLORIDE 15 MG/HR: 5 INJECTION INTRAMUSCULAR; INTRAVENOUS at 20:27

## 2021-01-01 RX ADMIN — EPOETIN ALFA-EPBX 20000 UNITS: 10000 INJECTION, SOLUTION INTRAVENOUS; SUBCUTANEOUS at 21:05

## 2021-01-01 RX ADMIN — Medication 300 MCG/HR: at 08:00

## 2021-01-01 RX ADMIN — PROPOFOL 40 MCG/KG/MIN: 10 INJECTION, EMULSION INTRAVENOUS at 20:37

## 2021-01-01 RX ADMIN — INSULIN LISPRO 2 UNITS: 100 INJECTION, SOLUTION INTRAVENOUS; SUBCUTANEOUS at 12:02

## 2021-01-01 RX ADMIN — INSULIN GLARGINE 8 UNITS: 100 INJECTION, SOLUTION SUBCUTANEOUS at 21:58

## 2021-01-01 RX ADMIN — BICALUTAMIDE 50 MG: 50 TABLET ORAL at 09:58

## 2021-01-01 RX ADMIN — MIDAZOLAM HYDROCHLORIDE 15 MG/HR: 5 INJECTION INTRAMUSCULAR; INTRAVENOUS at 23:35

## 2021-01-01 RX ADMIN — INSULIN LISPRO 2 UNITS: 100 INJECTION, SOLUTION INTRAVENOUS; SUBCUTANEOUS at 12:39

## 2021-01-01 RX ADMIN — OXYCODONE AND ACETAMINOPHEN 1 TABLET: 5; 325 TABLET ORAL at 21:27

## 2021-01-01 RX ADMIN — Medication 2000 UNITS: at 08:09

## 2021-01-01 RX ADMIN — CEFEPIME HYDROCHLORIDE 2 G: 2 INJECTION, POWDER, FOR SOLUTION INTRAVENOUS at 09:09

## 2021-01-01 RX ADMIN — ALBUTEROL SULFATE 2 PUFF: 108 AEROSOL, METERED RESPIRATORY (INHALATION) at 08:11

## 2021-01-01 RX ADMIN — INSULIN LISPRO 20 UNITS: 100 INJECTION, SOLUTION INTRAVENOUS; SUBCUTANEOUS at 11:39

## 2021-01-01 RX ADMIN — PIPERACILLIN AND TAZOBACTAM 3.38 G: 3; .375 INJECTION, POWDER, LYOPHILIZED, FOR SOLUTION INTRAVENOUS at 05:36

## 2021-01-01 RX ADMIN — GABAPENTIN 100 MG: 100 CAPSULE ORAL at 18:07

## 2021-01-01 RX ADMIN — ENOXAPARIN SODIUM 40 MG: 40 INJECTION SUBCUTANEOUS at 09:34

## 2021-01-01 RX ADMIN — ALBUTEROL SULFATE 2 PUFF: 108 AEROSOL, METERED RESPIRATORY (INHALATION) at 04:22

## 2021-01-01 RX ADMIN — CISATRACURIUM BESYLATE 10 MCG/KG/MIN: 2 INJECTION INTRAVENOUS at 13:40

## 2021-01-01 RX ADMIN — TAMSULOSIN HYDROCHLORIDE 0.4 MG: 0.4 CAPSULE ORAL at 08:52

## 2021-01-01 RX ADMIN — GABAPENTIN 100 MG: 100 CAPSULE ORAL at 22:55

## 2021-01-01 RX ADMIN — PROPOFOL 50 MCG/KG/MIN: 10 INJECTION, EMULSION INTRAVENOUS at 08:30

## 2021-01-01 RX ADMIN — Medication: at 23:05

## 2021-01-01 RX ADMIN — Medication 275 MCG/HR: at 08:32

## 2021-01-01 RX ADMIN — ALBUMIN (HUMAN) 25 G: 0.25 INJECTION, SOLUTION INTRAVENOUS at 20:47

## 2021-01-01 RX ADMIN — REMDESIVIR 100 MG: 5 INJECTION INTRAVENOUS at 22:36

## 2021-01-01 RX ADMIN — CLONIDINE HYDROCHLORIDE 0.1 MG: 0.1 TABLET ORAL at 17:45

## 2021-01-01 RX ADMIN — SEVELAMER CARBONATE 1.6 G: 0.8 POWDER, FOR SUSPENSION ORAL at 18:03

## 2021-01-01 RX ADMIN — PROPOFOL 35 MCG/KG/MIN: 10 INJECTION, EMULSION INTRAVENOUS at 23:29

## 2021-01-01 RX ADMIN — PIPERACILLIN AND TAZOBACTAM 3.38 G: 3; .375 INJECTION, POWDER, LYOPHILIZED, FOR SOLUTION INTRAVENOUS at 18:02

## 2021-01-01 RX ADMIN — PIPERACILLIN AND TAZOBACTAM 3.38 G: 3; .375 INJECTION, POWDER, LYOPHILIZED, FOR SOLUTION INTRAVENOUS at 06:37

## 2021-01-01 RX ADMIN — DEXAMETHASONE 6 MG: 4 TABLET ORAL at 09:18

## 2021-01-01 RX ADMIN — PIPERACILLIN AND TAZOBACTAM 3.38 G: 3; .375 INJECTION, POWDER, LYOPHILIZED, FOR SOLUTION INTRAVENOUS at 13:05

## 2021-01-01 RX ADMIN — PIPERACILLIN AND TAZOBACTAM 3.38 G: 3; .375 INJECTION, POWDER, LYOPHILIZED, FOR SOLUTION INTRAVENOUS at 07:09

## 2021-01-01 RX ADMIN — THERA TABS 1 TABLET: TAB at 09:32

## 2021-01-01 RX ADMIN — Medication 10 ML: at 22:15

## 2021-01-01 RX ADMIN — GABAPENTIN 100 MG: 100 CAPSULE ORAL at 18:38

## 2021-01-01 RX ADMIN — DICLOFENAC SODIUM 2 G: 10 GEL TOPICAL at 10:41

## 2021-01-01 RX ADMIN — Medication 300 MCG/HR: at 22:53

## 2021-01-01 RX ADMIN — POTASSIUM CHLORIDE 10 MEQ: 14.9 INJECTION, SOLUTION INTRAVENOUS at 12:01

## 2021-01-01 RX ADMIN — ALBUTEROL SULFATE 2 PUFF: 108 AEROSOL, METERED RESPIRATORY (INHALATION) at 08:23

## 2021-01-01 RX ADMIN — INSULIN LISPRO 2 UNITS: 100 INJECTION, SOLUTION INTRAVENOUS; SUBCUTANEOUS at 17:42

## 2021-01-01 RX ADMIN — METHYLPREDNISOLONE SODIUM SUCCINATE 40 MG: 40 INJECTION, POWDER, FOR SOLUTION INTRAMUSCULAR; INTRAVENOUS at 23:09

## 2021-01-01 RX ADMIN — THERA TABS 1 TABLET: TAB at 08:09

## 2021-01-01 RX ADMIN — CISATRACURIUM BESYLATE 3.5 MCG/KG/MIN: 2 INJECTION INTRAVENOUS at 23:07

## 2021-01-01 RX ADMIN — PROPOFOL 25 MCG/KG/MIN: 10 INJECTION, EMULSION INTRAVENOUS at 03:14

## 2021-01-01 RX ADMIN — INSULIN LISPRO 2 UNITS: 100 INJECTION, SOLUTION INTRAVENOUS; SUBCUTANEOUS at 22:02

## 2021-01-01 RX ADMIN — HYDRALAZINE HYDROCHLORIDE 100 MG: 50 TABLET, FILM COATED ORAL at 16:11

## 2021-01-01 RX ADMIN — Medication 81 MG: at 08:36

## 2021-01-01 RX ADMIN — GABAPENTIN 100 MG: 100 CAPSULE ORAL at 21:06

## 2021-01-01 RX ADMIN — INSULIN LISPRO 3 UNITS: 100 INJECTION, SOLUTION INTRAVENOUS; SUBCUTANEOUS at 00:00

## 2021-01-01 RX ADMIN — DICLOFENAC SODIUM 2 G: 10 GEL TOPICAL at 13:03

## 2021-01-01 RX ADMIN — Medication 1 PACKET: at 09:03

## 2021-01-01 RX ADMIN — MIDAZOLAM 4 MG: 1 INJECTION INTRAMUSCULAR; INTRAVENOUS at 19:08

## 2021-01-01 RX ADMIN — DEXAMETHASONE SODIUM PHOSPHATE 6 MG: 10 INJECTION, SOLUTION INTRAMUSCULAR; INTRAVENOUS at 09:07

## 2021-01-01 RX ADMIN — HEPARIN SODIUM 7500 UNITS: 5000 INJECTION INTRAVENOUS; SUBCUTANEOUS at 13:02

## 2021-01-01 RX ADMIN — Medication: at 09:34

## 2021-01-01 RX ADMIN — PROPOFOL 50 MCG/KG/MIN: 10 INJECTION, EMULSION INTRAVENOUS at 11:25

## 2021-01-01 RX ADMIN — AMLODIPINE BESYLATE 5 MG: 5 TABLET ORAL at 08:35

## 2021-01-01 RX ADMIN — DICLOFENAC SODIUM 2 G: 10 GEL TOPICAL at 09:54

## 2021-01-01 RX ADMIN — LANSOPRAZOLE 30 MG: KIT at 17:19

## 2021-01-01 RX ADMIN — OXYCODONE AND ACETAMINOPHEN 1 TABLET: 5; 325 TABLET ORAL at 00:33

## 2021-01-01 RX ADMIN — SODIUM CHLORIDE 40 MG: 9 INJECTION INTRAMUSCULAR; INTRAVENOUS; SUBCUTANEOUS at 21:46

## 2021-01-01 RX ADMIN — SODIUM BICARBONATE 650 MG: 650 TABLET ORAL at 08:47

## 2021-01-01 RX ADMIN — GABAPENTIN 100 MG: 100 CAPSULE ORAL at 00:15

## 2021-01-01 RX ADMIN — OXYCODONE HYDROCHLORIDE AND ACETAMINOPHEN 500 MG: 500 TABLET ORAL at 08:42

## 2021-01-01 RX ADMIN — INSULIN LISPRO 2 UNITS: 100 INJECTION, SOLUTION INTRAVENOUS; SUBCUTANEOUS at 22:31

## 2021-01-01 RX ADMIN — MICONAZOLE NITRATE 2 % TOPICAL POWDER: at 09:54

## 2021-01-01 RX ADMIN — ALBUTEROL SULFATE 2 PUFF: 108 AEROSOL, METERED RESPIRATORY (INHALATION) at 12:58

## 2021-01-01 RX ADMIN — Medication: at 09:32

## 2021-01-01 RX ADMIN — HEPARIN SODIUM 7500 UNITS: 5000 INJECTION INTRAVENOUS; SUBCUTANEOUS at 21:28

## 2021-01-01 RX ADMIN — ALBUTEROL SULFATE 2 PUFF: 108 AEROSOL, METERED RESPIRATORY (INHALATION) at 03:14

## 2021-01-01 RX ADMIN — DICLOFENAC SODIUM 2 G: 10 GEL TOPICAL at 12:30

## 2021-01-01 RX ADMIN — DICLOFENAC SODIUM 2 G: 10 GEL TOPICAL at 13:35

## 2021-01-01 RX ADMIN — FUROSEMIDE 40 MG: 10 INJECTION, SOLUTION INTRAMUSCULAR; INTRAVENOUS at 08:48

## 2021-01-01 RX ADMIN — HYDRALAZINE HYDROCHLORIDE 100 MG: 50 TABLET, FILM COATED ORAL at 16:04

## 2021-01-01 RX ADMIN — HYDRALAZINE HYDROCHLORIDE 20 MG: 20 INJECTION INTRAMUSCULAR; INTRAVENOUS at 09:47

## 2021-01-01 RX ADMIN — PROPOFOL 25 MCG/KG/MIN: 10 INJECTION, EMULSION INTRAVENOUS at 12:59

## 2021-01-01 RX ADMIN — MIDAZOLAM HYDROCHLORIDE 7 MG/HR: 5 INJECTION INTRAMUSCULAR; INTRAVENOUS at 06:51

## 2021-01-01 RX ADMIN — INSULIN LISPRO 20 UNITS: 100 INJECTION, SOLUTION INTRAVENOUS; SUBCUTANEOUS at 17:07

## 2021-01-01 RX ADMIN — GABAPENTIN 100 MG: 100 CAPSULE ORAL at 16:16

## 2021-01-01 RX ADMIN — CLONIDINE HYDROCHLORIDE 0.1 MG: 0.1 TABLET ORAL at 09:57

## 2021-01-01 RX ADMIN — INSULIN GLARGINE 40 UNITS: 100 INJECTION, SOLUTION SUBCUTANEOUS at 22:02

## 2021-01-01 RX ADMIN — SODIUM BICARBONATE 650 MG: 650 TABLET ORAL at 13:54

## 2021-01-01 RX ADMIN — GABAPENTIN 100 MG: 100 CAPSULE ORAL at 16:07

## 2021-01-01 RX ADMIN — PIPERACILLIN AND TAZOBACTAM 3.38 G: 3; .375 INJECTION, POWDER, LYOPHILIZED, FOR SOLUTION INTRAVENOUS at 06:56

## 2021-01-01 RX ADMIN — MIDAZOLAM HYDROCHLORIDE 15 MG/HR: 5 INJECTION INTRAMUSCULAR; INTRAVENOUS at 22:55

## 2021-01-01 RX ADMIN — Medication 10 ML: at 00:15

## 2021-01-01 RX ADMIN — CHLORHEXIDINE GLUCONATE 15 ML: 0.12 RINSE ORAL at 09:05

## 2021-01-01 RX ADMIN — LINEZOLID 600 MG: 600 INJECTION, SOLUTION INTRAVENOUS at 09:50

## 2021-01-01 RX ADMIN — Medication 10 ML: at 14:53

## 2021-01-01 RX ADMIN — GABAPENTIN 100 MG: 100 CAPSULE ORAL at 17:28

## 2021-01-01 RX ADMIN — INSULIN LISPRO 3 UNITS: 100 INJECTION, SOLUTION INTRAVENOUS; SUBCUTANEOUS at 05:31

## 2021-01-01 RX ADMIN — FUROSEMIDE 80 MG: 10 INJECTION, SOLUTION INTRAMUSCULAR; INTRAVENOUS at 08:46

## 2021-01-01 RX ADMIN — DICLOFENAC SODIUM 2 G: 10 GEL TOPICAL at 21:25

## 2021-01-01 RX ADMIN — AMLODIPINE BESYLATE 10 MG: 5 TABLET ORAL at 10:29

## 2021-01-01 RX ADMIN — Medication 10 ML: at 21:01

## 2021-01-01 RX ADMIN — CHLORHEXIDINE GLUCONATE 15 ML: 0.12 RINSE ORAL at 13:18

## 2021-01-01 RX ADMIN — GABAPENTIN 100 MG: 100 CAPSULE ORAL at 21:42

## 2021-01-01 RX ADMIN — PROPOFOL 45 MCG/KG/MIN: 10 INJECTION, EMULSION INTRAVENOUS at 10:01

## 2021-01-01 RX ADMIN — Medication 225 MCG/HR: at 14:03

## 2021-01-01 RX ADMIN — CLONIDINE HYDROCHLORIDE 0.1 MG: 0.1 TABLET ORAL at 18:07

## 2021-01-01 RX ADMIN — INSULIN LISPRO 10 UNITS: 100 INJECTION, SOLUTION INTRAVENOUS; SUBCUTANEOUS at 17:43

## 2021-01-01 RX ADMIN — SODIUM CHLORIDE 25 ML/HR: 9 INJECTION, SOLUTION INTRAVENOUS at 16:30

## 2021-01-01 RX ADMIN — INSULIN GLARGINE 40 UNITS: 100 INJECTION, SOLUTION SUBCUTANEOUS at 21:07

## 2021-01-01 RX ADMIN — OXYCODONE AND ACETAMINOPHEN 1 TABLET: 5; 325 TABLET ORAL at 08:57

## 2021-01-01 RX ADMIN — INSULIN LISPRO 12 UNITS: 100 INJECTION, SOLUTION INTRAVENOUS; SUBCUTANEOUS at 12:38

## 2021-01-01 RX ADMIN — Medication 2000 UNITS: at 09:32

## 2021-01-01 RX ADMIN — PROPOFOL 25 MCG/KG/MIN: 10 INJECTION, EMULSION INTRAVENOUS at 02:04

## 2021-01-01 RX ADMIN — MIDAZOLAM HYDROCHLORIDE 1 MG: 1 INJECTION, SOLUTION INTRAMUSCULAR; INTRAVENOUS at 16:59

## 2021-01-01 RX ADMIN — GABAPENTIN 100 MG: 100 CAPSULE ORAL at 17:32

## 2021-01-01 RX ADMIN — PROPOFOL 50 MCG/KG/MIN: 10 INJECTION, EMULSION INTRAVENOUS at 18:29

## 2021-01-01 RX ADMIN — HEPARIN SODIUM 7500 UNITS: 5000 INJECTION INTRAVENOUS; SUBCUTANEOUS at 13:51

## 2021-01-01 RX ADMIN — GABAPENTIN 100 MG: 100 CAPSULE ORAL at 21:32

## 2021-01-01 RX ADMIN — GABAPENTIN 100 MG: 100 CAPSULE ORAL at 16:11

## 2021-01-01 RX ADMIN — FLUTICASONE PROPIONATE 2 SPRAY: 50 SPRAY, METERED NASAL at 10:41

## 2021-01-01 RX ADMIN — INSULIN LISPRO 2 UNITS: 100 INJECTION, SOLUTION INTRAVENOUS; SUBCUTANEOUS at 18:18

## 2021-01-01 RX ADMIN — EPOETIN ALFA-EPBX 20000 UNITS: 10000 INJECTION, SOLUTION INTRAVENOUS; SUBCUTANEOUS at 20:43

## 2021-01-01 RX ADMIN — EPOETIN ALFA-EPBX 20000 UNITS: 10000 INJECTION, SOLUTION INTRAVENOUS; SUBCUTANEOUS at 21:15

## 2021-01-01 RX ADMIN — PROPOFOL 50 MCG/KG/MIN: 10 INJECTION, EMULSION INTRAVENOUS at 21:45

## 2021-01-01 RX ADMIN — CALCIUM GLUCONATE 1 G: 98 INJECTION, SOLUTION INTRAVENOUS at 09:19

## 2021-01-01 RX ADMIN — MICONAZOLE NITRATE 2 % TOPICAL POWDER: at 09:08

## 2021-01-01 RX ADMIN — HEPARIN SODIUM 7500 UNITS: 5000 INJECTION INTRAVENOUS; SUBCUTANEOUS at 15:11

## 2021-01-01 RX ADMIN — INSULIN GLARGINE 25 UNITS: 100 INJECTION, SOLUTION SUBCUTANEOUS at 09:02

## 2021-01-01 RX ADMIN — GABAPENTIN 100 MG: 100 CAPSULE ORAL at 21:04

## 2021-01-01 RX ADMIN — ALBUMIN (HUMAN) 25 G: 0.25 INJECTION, SOLUTION INTRAVENOUS at 13:37

## 2021-01-01 RX ADMIN — FLUTICASONE PROPIONATE 2 SPRAY: 50 SPRAY, METERED NASAL at 09:11

## 2021-01-01 RX ADMIN — MIDAZOLAM HYDROCHLORIDE 8 MG/HR: 5 INJECTION INTRAMUSCULAR; INTRAVENOUS at 03:29

## 2021-01-01 RX ADMIN — PANTOPRAZOLE SODIUM 40 MG: 40 TABLET, DELAYED RELEASE ORAL at 17:22

## 2021-01-01 RX ADMIN — CHLORHEXIDINE GLUCONATE 15 ML: 0.12 RINSE ORAL at 21:27

## 2021-01-01 RX ADMIN — FUROSEMIDE 80 MG: 10 INJECTION, SOLUTION INTRAVENOUS at 10:15

## 2021-01-01 RX ADMIN — Medication: at 21:31

## 2021-01-01 RX ADMIN — GABAPENTIN 100 MG: 100 CAPSULE ORAL at 21:07

## 2021-01-01 NOTE — PROGRESS NOTES
Nephrology Progress Note Sarah Hernandez III Date of Admission : 12/28/2020 CC: Follow up for KATHI on CKD Assessment and Plan KATHI on CKD  
- suspect volume depletion from diuretics and GI symptoms vs ATN 
- renal U/S neg 
- check UA 
- cont IVF for now - can d/c in AM 
- can d/c kumar in AM if Cr improving 
- daily labs 
  
CKD Stage IIIa: 
-  Presumed 2/2 DM and HTN 
- nephrotic range proteinuria 
- baseline Cr 1.7 mg/dl  
- followed by Dr. Kelton Ayon 
  
Hx of prostate cancer s/p XRT 
  
Metabolic acidosis: 
- cont oral bicarb 
  
Anemia in CKD: 
- hgb 6.4, iron sat low 
- start JAZ + IV iron - GI planning EGD monday 
  
Type II DM: 
- on insulin 
  
HTN :  
- Continue current meds  
  
R foot osteo: 
- on abx 
- s/p debridement on 12/30 Group B strep bacteremia: 
- Abx per ID 
  
B/l Airspace disease: 
- ? PNA vs edema 
- COVID neg 
- on broad spectrum abx Interval History: 
Seen and examined. Feeling better. Cr slightly better. Good UOP. On RA in bed. Some dyspnea w/ activity, hgb remains at 6.8 despite PRBCs. No cp, n/v/d. Current Medications: all current  Medications have been eviewed in [facility] Review of Systems: Pertinent items are noted in HPI. Objective: 
Vitals:   
Vitals:  
 01/01/21 3609 04/01/59 2564 01/01/21 0137 01/01/21 5335 BP: 138/66 129/65 (!) 144/72 (!) 152/64 Pulse: 76 78 83 90 Resp: 18 16 18 16 Temp: 98.3 °F (36.8 °C) 98.4 °F (36.9 °C) 98.3 °F (36.8 °C) 98.6 °F (37 °C) SpO2: 94% 96% 96% 94% Weight:      
Height:      
 
Intake and Output: 
No intake/output data recorded. 12/30 1901 - 01/01 0700 In: 1612.1 [P.O.:960] Out: 4150 [IONGT:2723] Physical Examination: 
General: NAD,Conversant, obese Neck:  Supple, no mass Resp:  Lungs CTA B/L, no wheezing , normal respiratory effort CV:  RRR,  no murmur or rub,  trace LLE edema, RLE in wrap GI:  Soft, NT, + Bowel sounds, no hepatosplenomegaly Neurologic:  Non focal 
 Psych:             AAO x 3 appropriate affect Skin:  No Rash :  Bynum in place 
 
[]    High complexity decision making was performed 
[]    Patient is at high-risk of decompensation with multiple organ involvement Lab Data Personally Reviewed: I have reviewed all the pertinent labs, microbiology data and radiology studies during assessment. Recent Labs 01/01/21 0132 12/31/20 
1703 12/31/20 
5263 12/30/20 
9969 *  --  131* 130*  
K 4.2  --  4.4 3.9   --  103 100 CO2 21  --  20* 21 *  --  235* 190* BUN 54*  --  59* 63* CREA 3.28*  --  3.75* 4.37* CA 7.9*  --  7.7* 7.9*  
MG 2.1  --   --   --   
PHOS 4.6  --   --  3.9 INR  --  1.1  --   --   
 
Recent Labs 01/01/21 0132 12/31/20 
1703 12/31/20 
4468 WBC 10.1 13.1* 12.7* HGB 6.4* 7.1* 6.9*  
HCT 19.6* 21.5* 20.9*  
 328 299 No results found for: [facility] Lab Results Component Value Date/Time Culture result: LIGHT GRAM NEGATIVE RODS (A) 12/30/2020 01:14 PM  
 Culture result: (A) 12/30/2020 01:14 PM  
  LIGHT STREPTOCOCCI, BETA HEMOLYTIC GROUP B Penicillin and ampicillin are drugs of choice for treatment of beta-hemolytic streptococcal infections. Susceptibility testing of penicillins and beta-lactams approved by the FDA for treatment of beta-hemolytic streptococcal infections need not be performed routinely, because nonsusceptible isolates are extremely rare. CLSI 2012 Culture result: CHECKING FOR POSSIBLE 
OTHER ORGANISMS 
 12/30/2020 01:14 PM  
 
Recent Results (from the past 24 hour(s)) GLUCOSE, POC Collection Time: 12/31/20 11:22 AM  
Result Value Ref Range Glucose (POC) 131 (H) 65 - 100 mg/dL Performed by Gregg Ware (CON) OCCULT BLOOD, STOOL Collection Time: 12/31/20 12:16 PM  
Result Value Ref Range Occult blood, stool Positive (A) NEG    
GLUCOSE, POC Collection Time: 12/31/20  4:12 PM  
Result Value Ref Range Glucose (POC) 135 (H) 65 - 100 mg/dL Performed by Ana Paula Reyes   
CBC WITH AUTOMATED DIFF Collection Time: 12/31/20  5:03 PM  
Result Value Ref Range WBC 13.1 (H) 4.1 - 11.1 K/uL  
 RBC 2.65 (L) 4.10 - 5.70 M/uL HGB 7.1 (L) 12.1 - 17.0 g/dL HCT 21.5 (L) 36.6 - 50.3 % MCV 81.1 80.0 - 99.0 FL  
 MCH 26.8 26.0 - 34.0 PG  
 MCHC 33.0 30.0 - 36.5 g/dL  
 RDW 16.6 (H) 11.5 - 14.5 % PLATELET 542 311 - 811 K/uL MPV 9.9 8.9 - 12.9 FL  
 NRBC 0.0 0  WBC ABSOLUTE NRBC 0.00 0.00 - 0.01 K/uL NEUTROPHILS 85 (H) 32 - 75 % LYMPHOCYTES 5 (L) 12 - 49 % MONOCYTES 6 5 - 13 % EOSINOPHILS 2 0 - 7 % BASOPHILS 0 0 - 1 % IMMATURE GRANULOCYTES 2 (H) 0.0 - 0.5 % ABS. NEUTROPHILS 11.0 (H) 1.8 - 8.0 K/UL  
 ABS. LYMPHOCYTES 0.7 (L) 0.8 - 3.5 K/UL  
 ABS. MONOCYTES 0.8 0.0 - 1.0 K/UL  
 ABS. EOSINOPHILS 0.3 0.0 - 0.4 K/UL  
 ABS. BASOPHILS 0.0 0.0 - 0.1 K/UL  
 ABS. IMM. GRANS. 0.3 (H) 0.00 - 0.04 K/UL  
 DF AUTOMATED    
 RBC COMMENTS ANISOCYTOSIS 1+ 
    
 RBC COMMENTS OVALOCYTES 1+ PROTHROMBIN TIME + INR Collection Time: 12/31/20  5:03 PM  
Result Value Ref Range INR 1.1 0.9 - 1.1 Prothrombin time 11.3 (H) 9.0 - 11.1 sec GLUCOSE, POC Collection Time: 12/31/20  9:23 PM  
Result Value Ref Range Glucose (POC) 141 (H) 65 - 100 mg/dL Performed by Mikael RITCHIE   
CBC WITH AUTOMATED DIFF Collection Time: 01/01/21  1:32 AM  
Result Value Ref Range WBC 10.1 4.1 - 11.1 K/uL  
 RBC 2.37 (L) 4.10 - 5.70 M/uL HGB 6.4 (L) 12.1 - 17.0 g/dL HCT 19.6 (L) 36.6 - 50.3 % MCV 82.7 80.0 - 99.0 FL  
 MCH 27.0 26.0 - 34.0 PG  
 MCHC 32.7 30.0 - 36.5 g/dL  
 RDW 16.7 (H) 11.5 - 14.5 % PLATELET 163 000 - 489 K/uL MPV 9.8 8.9 - 12.9 FL  
 NRBC 0.0 0  WBC ABSOLUTE NRBC 0.00 0.00 - 0.01 K/uL NEUTROPHILS 83 (H) 32 - 75 % LYMPHOCYTES 6 (L) 12 - 49 % MONOCYTES 6 5 - 13 % EOSINOPHILS 3 0 - 7 % BASOPHILS 0 0 - 1 % IMMATURE GRANULOCYTES 2 (H) 0.0 - 0.5 % ABS. NEUTROPHILS 8.4 (H) 1.8 - 8.0 K/UL  
 ABS. LYMPHOCYTES 0.6 (L) 0.8 - 3.5 K/UL  
 ABS. MONOCYTES 0.6 0.0 - 1.0 K/UL  
 ABS. EOSINOPHILS 0.3 0.0 - 0.4 K/UL  
 ABS. BASOPHILS 0.0 0.0 - 0.1 K/UL  
 ABS. IMM. GRANS. 0.2 (H) 0.00 - 0.04 K/UL  
 DF SMEAR SCANNED    
 RBC COMMENTS OVALOCYTES PRESENT 
    
 RBC COMMENTS POLYCHROMASIA 1+ 
    
 RBC COMMENTS ANISOCYTOSIS 
1+ METABOLIC PANEL, BASIC Collection Time: 01/01/21  1:32 AM  
Result Value Ref Range Sodium 131 (L) 136 - 145 mmol/L Potassium 4.2 3.5 - 5.1 mmol/L Chloride 103 97 - 108 mmol/L  
 CO2 21 21 - 32 mmol/L Anion gap 7 5 - 15 mmol/L Glucose 170 (H) 65 - 100 mg/dL BUN 54 (H) 6 - 20 MG/DL Creatinine 3.28 (H) 0.70 - 1.30 MG/DL  
 BUN/Creatinine ratio 16 12 - 20 GFR est AA 23 (L) >60 ml/min/1.73m2 GFR est non-AA 19 (L) >60 ml/min/1.73m2 Calcium 7.9 (L) 8.5 - 10.1 MG/DL MAGNESIUM Collection Time: 01/01/21  1:32 AM  
Result Value Ref Range Magnesium 2.1 1.6 - 2.4 mg/dL PHOSPHORUS Collection Time: 01/01/21  1:32 AM  
Result Value Ref Range Phosphorus 4.6 2.6 - 4.7 MG/DL  
RBC, ALLOCATE Collection Time: 01/01/21  3:15 AM  
Result Value Ref Range HISTORY CHECKED? Historical check performed GLUCOSE, POC Collection Time: 01/01/21  6:52 AM  
Result Value Ref Range Glucose (POC) 148 (H) 65 - 100 mg/dL Performed by Linda Lopez MD 
[address] [facility] Phone - [phone] Fax - [phone] 
[email or web address]

## 2021-01-01 NOTE — PROGRESS NOTES
Hospitalist Progress Note Jairo RowellDO Answering service: [phone] OR [phone] from in house phone Date of Service:  2021 NAME:  Jane Barnard III 
:  1950 MRN:  [id number] Admission Summary: As per initial admission summary Anibal Francis is a 72 y.o. male with PMH of prostatic cancer s/p radiation, IDDM, heart failure, htn. He apparently told the ER physician that he c/o onset of diarrhea yesterday and some chills over the last 2 days, with occasional cough. For me, however, he denies all of these symptoms and states he came in because his caregiver was concerned d/t his lethargy over the last several days to weeks. He lives home alone and has a caregiver five times a week. He denies cp, sob, fever, chills, sweats, N/V. He has had a wound on his right foot for months. He denies any foot pain d/t neuropathy. He goes to [address] [place] [address] for nephrology and states he last saw them a few months ago. Prior to today, he believes it has been several months since he last had any blood work. Denies etoh or illicits. Smokes just under 1 PPD. Currently no acute complaints except asking for water. States he takes all his meds daily. Interval history / Subjective: Follow up osteomyelitis. Patient seen and examined. No acute complaints. Received 1 unit pRBCs. We reviewed hospitalization course. Had gross blood from bandage site- appreciate podiatry Assessment & Plan:  
 
Severe sepsis 2/2 osteomyelitis right foot 
-Podiatry consulted  
-Underwent debridement right foot ulcer with removal of infected bone 2020 
-Cultures with proteus mirabilis, stept group B, enterococcus -ID following 
-Follow surgical pathology 
-Continues on broad-spectrum antibiotics: Cefepime, Vanco, Flagyl Acute on chronic anemia, baseline ~9 
-appreciate GI, plans for EGD on 2020 -s/p 1 unit pRBs 1/1. Has received 5 units total during admission 
  
Toxic metabolic encephalopathy/drowsiness: Improved, suspect secondary to underlying infection 
  
Acute on CKD: improving 
-nephrology following, continue to hold diurectics  
  
IDDM, last a1c 8.1 01/2020 
-continue long acting insulin, meal time, SSI  
  
HTN, controlled 
-Cont home meds (norvasc lower dose to avoid edema s/e) 
  
Hyponatremia 
-stable, montior  
  
 
Prostate ca 
-Cont home meds 
  
Tobacco abuse 
-continue patch Code status: full code DVT prophylaxis: scds Care Plan discussed with: Patient/Family Disposition: Home w/Family and TBD Hospital Problems  Date Reviewed: 8/20/2020 Codes Class Noted POA * (Principal) Osteomyelitis ([facility] [address] ICD-10-CM: M86.9 ICD-9-CM: 730.20  12/28/2020 Yes Review of Systems:  
Negative unless stated above Vital Signs:  
 Last 24hrs VS reviewed since prior progress note. Most recent are: 
Visit Vitals BP (!) 150/69 (BP 1 Location: Right arm, BP Patient Position: At rest) Pulse 85 Temp 98.3 °F (36.8 °C) Resp 16 Ht 6' (1.829 m) Wt 114.1 kg (251 lb 8.7 oz) SpO2 93% BMI 34.12 kg/m² Intake/Output Summary (Last 24 hours) at 1/1/2021 1647 Last data filed at 1/1/2021 1231 Gross per 24 hour Intake 480 ml Output 1150 ml Net -670 ml Physical Examination:  
I had a face to face encounter with this patient and independently examined them on January 1, 2021 as outlined below: 
     
Constitutional:  No acute distress, cooperative, pleasant ENT:  Oral mucous moist, oropharynx benign. Neck supple, Resp:  CTA bilaterally. No wheezing/rhonchi/rales. No accessory muscle use CV:  Regular rhythm, normal rate, no murmurs, gallops, rubs GI:  Soft, non distended, non tender. normoactive bowel sounds, no hepatosplenomegaly Musculoskeletal:  trace edema, warm, 2+ pulses throughout. Right lower extremity wrapped with bleeding Neurologic:  Moves all extremities Psych:  Good insight, Not anxious nor agitated. Data Review:  
 Review and/or order of clinical lab test 
Review and/or order of tests in the radiology section of CPT Review and/or order of tests in the medicine section of CPT Labs:  
 
Recent Labs 01/01/21 
0132 12/31/20 
1703 WBC 10.1 13.1* HGB 6.4* 7.1*  
HCT 19.6* 21.5*  
 328 Recent Labs 01/01/21 
0132 12/31/20 
3104 12/30/20 
3135 * 131* 130*  
K 4.2 4.4 3.9  103 100 CO2 21 20* 21 BUN 54* 59* 63* CREA 3.28* 3.75* 4.37* * 235* 190* CA 7.9* 7.7* 7.9*  
MG 2.1  --   --   
PHOS 4.6  --  3.9 No results for input(s): ALT, AP, TBIL, TBILI, TP, ALB, GLOB, GGT, AML, LPSE in the last 72 hours. No lab exists for component: SGOT, GPT, AMYP, HLPSE Recent Labs 12/31/20 
1703 INR 1.1 PTP 11.3* Recent Labs 12/30/20 
0117 TIBC 177* PSAT 14* FERR 266 Lab Results Component Value Date/Time Folate 20.0 03/16/2019 04:06 AM  
  
No results for input(s): PH, PCO2, PO2 in the last 72 hours. Recent Labs 12/30/20 
0117 12/29/20 
1745  151 No results found for: CHOL, CHOLX, CHLST, CHOLV, HDL, HDLP, LDL, LDLC, DLDLP, TGLX, TRIGL, TRIGP, CHHD, CHHDX Lab Results Component Value Date/Time Glucose (POC) 90 01/01/2021 03:50 PM  
 Glucose (POC) 124 (H) 01/01/2021 11:21 AM  
 Glucose (POC) 148 (H) 01/01/2021 06:52 AM  
 Glucose (POC) 141 (H) 12/31/2020 09:23 PM  
 Glucose (POC) 135 (H) 12/31/2020 04:12 PM  
 
Lab Results Component Value Date/Time  Color YELLOW/STRAW 03/29/2019 02:34 PM  
 Appearance CLEAR 03/29/2019 02:34 PM  
 Specific gravity 1.017 03/29/2019 02:34 PM  
 pH (UA) 5.0 03/29/2019 02:34 PM  
 Protein 100 (A) 03/29/2019 02:34 PM  
 Glucose NEGATIVE  03/29/2019 02:34 PM  
 Ketone NEGATIVE  03/29/2019 02:34 PM  
 Bilirubin NEGATIVE  03/29/2019 02:34 PM  
 Urobilinogen 0.2 03/29/2019 02:34 PM  
 Nitrites NEGATIVE  03/29/2019 02:34 PM  
 Leukocyte Esterase NEGATIVE  03/29/2019 02:34 PM  
 Epithelial cells FEW 03/29/2019 02:34 PM  
 Bacteria NEGATIVE  03/29/2019 02:34 PM  
 WBC 0-4 03/29/2019 02:34 PM  
 RBC 0-5 03/29/2019 02:34 PM  
 
 
 
Medications Reviewed:  
 
Current Facility-Administered Medications Medication Dose Route Frequency  0.9% sodium chloride infusion 250 mL  250 mL IntraVENous PRN  
 epoetin chi-epbx (RETACRIT) injection 20,000 Units  20,000 Units SubCUTAneous Q MON, WED & FRI  iron sucrose (VENOFER) 200 mg in 0.9% sodium chloride 100 mL IVPB  200 mg IntraVENous Q24H  cefepime (MAXIPIME) 2 g in 0.9% sodium chloride (MBP/ADV) 100 mL MBP  2 g IntraVENous Q24H  
 diclofenac (VOLTAREN) 1 % topical gel 2 g  2 g Topical QID  oxyCODONE-acetaminophen (PERCOCET) 5-325 mg per tablet 1 Tab  1 Tab Oral Q6H PRN  
 acetaminophen (TYLENOL) tablet 500 mg  500 mg Oral Q4H PRN  
 0.9% sodium chloride infusion  75 mL/hr IntraVENous CONTINUOUS  
 metroNIDAZOLE (FLAGYL) IVPB premix 500 mg  500 mg IntraVENous Q12H  DAPTOmycin (CUBICIN) 750 mg in 0.9% sodium chloride 50 mL IVPB RF formulation  750 mg IntraVENous Q48H  
 amLODIPine (NORVASC) tablet 5 mg  5 mg Oral DAILY  aspirin delayed-release tablet 81 mg  81 mg Oral DAILY  bicalutamide (CASODEX) tablet 50 mg  50 mg Oral DAILY  enzalutamide Florestine Abts) chemo capsule 160 mg (patient supplied) (Patient Supplied)  160 mg Oral DAILY AFTER BREAKFAST  gabapentin (NEURONTIN) capsule 100 mg  100 mg Oral TID  hydrALAZINE (APRESOLINE) tablet 100 mg  100 mg Oral TID  tamsulosin (FLOMAX) capsule 0.4 mg  0.4 mg Oral DAILY  nicotine (NICODERM CQ) 14 mg/24 hr patch 1 Patch  1 Patch TransDERmal DAILY  insulin lispro (HUMALOG) injection   SubCUTAneous AC&HS  
 glucose chewable tablet 16 g  4 Tab Oral PRN  
 dextrose (D50W) injection syrg 12.5-25 g  12.5-25 g IntraVENous PRN  
  glucagon (GLUCAGEN) injection 1 mg  1 mg IntraMUSCular PRN  
 [Held by provider] heparin (porcine) injection 5,000 Units  5,000 Units SubCUTAneous Q8H  
 insulin lispro (HUMALOG) injection 20 Units  20 Units SubCUTAneous TIDAC  insulin glargine (LANTUS) injection 50 Units  50 Units SubCUTAneous QHS  
 
______________________________________________________________________ EXPECTED LENGTH OF STAY: 3d 19h ACTUAL LENGTH OF STAY:          4 [address] [facility],

## 2021-01-01 NOTE — PROGRESS NOTES
Progress Note Patient: Everardo Willson MRN: [id number]  SSN: [id number] YOB: 1950  Age: 72 y.o. Sex: male Admit Date: 12/28/2020 
2 Day Post-Op Procedure:   Procedure(s): DEBRIDEMENT OF RIGHT FOOT ULCER WITH REMOVAL OF INFECTED BONE (URGENT) Subjective:  
 
Patient seen resting quiet and comfortably. Notes some pain in the heel. Did experience significant strike-through again overnight. Dressing needed to be reinforced x2 by nursing. Objective:  
 
Visit Vitals BP (!) 150/69 (BP 1 Location: Right arm, BP Patient Position: At rest) Pulse 85 Temp 98.3 °F (36.8 °C) Resp 16 Ht 6' (1.829 m) Wt 114.1 kg (251 lb 8.7 oz) SpO2 93% BMI 34.12 kg/m² Physical Exam: 
Examination is of the right lateral foot surgical site. Gross sanguineous strikethrough with clottingnoted to the inner dressings. Small bleeding vessel with consistent bleeding (however no pulsatile bleeding) noted at the proximal portion of incision site. No signs of necrosis or purulence to the wound site at this time. No malodor. Only healthy red tissue and bone noted at this time. Labs/Radiology Review: images and reports reviewed Assessment:  
 
Hospital Problems  Date Reviewed: 8/20/2020 Codes Class Noted POA * (Principal) [facility] ICD-10-CM: M86.9 ICD-9-CM: 730.20  12/28/2020 Yes Plan/Recommendations/Medical Decision Making:  
 
-Packing and dressing changed at bedside today -Continue bedrest at this time. Today at the examination of the incision site there was a small actively bleeding vessel noted in the subcutaneous tissues of the proximal portion of the incision site. The vessel was not large enough in nature to be tied off. Manual pressure  was applied to the area for 15 minutes until bleeding was significantly decreased. Additionally, surgical gel foam was then applied to the area and then bleeding of the area completely decreased. Dressing was then reapplied. Will continue to monitor. 
-Blood cx growing strep agalactiae group B 
-Surgical soft tissue currently growing proteus mirabilis, beta hemolytic strep group B, and possible enterococcus spp;  surgical bone culture currently growing proteus mirablis,  beta hemolytic strep group B, possible enterococcus spp, and coag neg staph spp 
 -Surgical pathology pending 
-IV abx per ID 
-Continue to follow Activity: bedrest

## 2021-01-01 NOTE — PROGRESS NOTES
Hospitalist Progress Note [address] [facility],  Answering service: [phone] OR [phone] from in house phone Date of Service:  2020 NAME:  Ghassan Daniel III 
:  1950 MRN:  [id number] Admission Summary: As per initial admission summary Bertha Doll is a 72 y.o. male with PMH of prostatic cancer s/p radiation, IDDM, heart failure, htn. He apparently told the ER physician that he c/o onset of diarrhea yesterday and some chills over the last 2 days, with occasional cough. For me, however, he denies all of these symptoms and states he came in because his caregiver was concerned d/t his lethargy over the last several days to weeks. He lives home alone and has a caregiver five times a week. He denies cp, sob, fever, chills, sweats, N/V. He has had a wound on his right foot for months. He denies any foot pain d/t neuropathy. He goes to [facility] for nephrology and states he last saw them a few months ago. Prior to today, he believes it has been several months since he last had any blood work. Denies etoh or illicits. Smokes just under 1 PPD. Currently no acute complaints except asking for water. States he takes all his meds daily. Interval history / Subjective: Follow up osteomyelitis. Patient seen and examined. No acute complaints. Appreciate subspecialities. Assessment & Plan:  
 
Severe sepsis 2/2 osteomyelitis right foot 
-Podiatry consulted  
-Underwent debridement right foot ulcer with removal of infected bone 2020 
-Follow cultures -ID following 
-Follow surgical pathology 
-Continues on broad-spectrum antibiotics: Cefepime, Vanco, Flagyl 
  
Toxic metabolic encephalopathy/drowsiness: Improved, suspect secondary to ongoing infection 
  
Acute on CKD: 
-nephrology following, holding diurectics  
  
IDDM, last a1c 8.1 2020 
-continue long acting insulin, meal time, SSI  
  
HTN, controlled -Cont home meds (norvasc lower dose to avoid edema s/e) 
  
Hyponatremia 
-stable, montior  
  
Acute on chronic anemia, baseline ~9 
-appreciate GI, plans for EGD on 1/4/2020 
  
Prostate ca 
-Cont home meds 
  
Tobacco abuse 
-continue patch Code status: full code DVT prophylaxis: scds Care Plan discussed with: Patient/Family Disposition: Home w/Family and TBD Hospital Problems  Date Reviewed: 8/20/2020 Codes Class Noted POA * (Principal) Osteomyelitis ([facility] [address] ICD-10-CM: M86.9 ICD-9-CM: 730.20  12/28/2020 Yes Review of Systems:  
Negative unless stated above Vital Signs:  
 Last 24hrs VS reviewed since prior progress note. Most recent are: 
Visit Vitals BP (!) 182/85 (BP 1 Location: Right arm, BP Patient Position: At rest) Pulse 87 Temp 98.4 °F (36.9 °C) Resp 18 Ht 6' (1.829 m) Wt 114.1 kg (251 lb 8.7 oz) SpO2 99% BMI 34.12 kg/m² Intake/Output Summary (Last 24 hours) at 12/31/2020 1925 Last data filed at 12/31/2020 1259 Gross per 24 hour Intake 1612.1 ml Output 3000 ml Net -1387.9 ml  
  
 
Physical Examination:  
I had a face to face encounter with this patient and independently examined them on December 31, 2020 as outlined below: 
     
Constitutional:  No acute distress, cooperative, pleasant ENT:  Oral mucous moist, oropharynx benign. Neck supple, Resp:  CTA bilaterally. No wheezing/rhonchi/rales. No accessory muscle use CV:  Regular rhythm, normal rate, no murmurs, gallops, rubs GI:  Soft, non distended, non tender. normoactive bowel sounds, no hepatosplenomegaly Musculoskeletal:  No edema, warm, 2+ pulses throughout. Right lower extremity wrapped Neurologic:  Moves all extremities Psych:  Good insight, Not anxious nor agitated. Data Review:  
 Review and/or order of clinical lab test 
Review and/or order of tests in the radiology section of CPT Review and/or order of tests in the medicine section of [facility] Labs:  
 
Recent Labs 12/31/20 
1703 12/31/20 
9073 WBC 13.1* 12.7* HGB 7.1* 6.9*  
HCT 21.5* 20.9*  
 299 Recent Labs 12/31/20 
9276 12/30/20 
0117 12/29/20 
0404 * 130* 129*  
K 4.4 3.9 3.9  100 97 CO2 20* 21 22 BUN 59* 63* 60* CREA 3.75* 4.37* 3.84* * 190* 203* CA 7.7* 7.9* 8.5 PHOS  --  3.9 3.5 Recent Labs  
  12/29/20 
0404 ALB 1.6* Recent Labs 12/31/20 
1703 INR 1.1 PTP 11.3* Recent Labs 12/30/20 
0117 TIBC 177* PSAT 14* FERR 266 Lab Results Component Value Date/Time Folate 20.0 03/16/2019 04:06 AM  
  
No results for input(s): PH, PCO2, PO2 in the last 72 hours. Recent Labs 12/30/20 
0117 12/29/20 
1745  151 No results found for: CHOL, CHOLX, CHLST, CHOLV, HDL, HDLP, LDL, LDLC, DLDLP, TGLX, TRIGL, TRIGP, CHHD, CHHDX Lab Results Component Value Date/Time Glucose (POC) 135 (H) 12/31/2020 04:12 PM  
 Glucose (POC) 131 (H) 12/31/2020 11:22 AM  
 Glucose (POC) 304 (H) 12/31/2020 07:22 AM  
 Glucose (POC) 268 (H) 12/30/2020 09:22 PM  
 Glucose (POC) 245 (H) 12/30/2020 04:21 PM  
 
Lab Results Component Value Date/Time Color YELLOW/STRAW 03/29/2019 02:34 PM  
 Appearance CLEAR 03/29/2019 02:34 PM  
 Specific gravity 1.017 03/29/2019 02:34 PM  
 pH (UA) 5.0 03/29/2019 02:34 PM  
 Protein 100 (A) 03/29/2019 02:34 PM  
 Glucose NEGATIVE  03/29/2019 02:34 PM  
 Ketone NEGATIVE  03/29/2019 02:34 PM  
 Bilirubin NEGATIVE  03/29/2019 02:34 PM  
 Urobilinogen 0.2 03/29/2019 02:34 PM  
 Nitrites NEGATIVE  03/29/2019 02:34 PM  
 Leukocyte Esterase NEGATIVE  03/29/2019 02:34 PM  
 Epithelial cells FEW 03/29/2019 02:34 PM  
 Bacteria NEGATIVE  03/29/2019 02:34 PM  
 WBC 0-4 03/29/2019 02:34 PM  
 RBC 0-5 03/29/2019 02:34 PM  
 
 
 
Medications Reviewed:  
 
Current Facility-Administered Medications Medication Dose Route Frequency  0.9% sodium chloride infusion 250 mL  250 mL IntraVENous PRN  
 cefepime (MAXIPIME) 2 g in 0.9% sodium chloride (MBP/ADV) 100 mL MBP  2 g IntraVENous Q24H  
 diclofenac (VOLTAREN) 1 % topical gel 2 g  2 g Topical QID  
 0.9% sodium chloride infusion 250 mL  250 mL IntraVENous PRN  
 0.9% sodium chloride infusion 250 mL  250 mL IntraVENous PRN  
 oxyCODONE-acetaminophen (PERCOCET) 5-325 mg per tablet 1 Tab  1 Tab Oral Q6H PRN  
 acetaminophen (TYLENOL) tablet 500 mg  500 mg Oral Q4H PRN  
 0.9% sodium chloride infusion  75 mL/hr IntraVENous CONTINUOUS  
 0.9% sodium chloride infusion 250 mL  250 mL IntraVENous PRN  
 metroNIDAZOLE (FLAGYL) IVPB premix 500 mg  500 mg IntraVENous Q12H  DAPTOmycin (CUBICIN) 750 mg in 0.9% sodium chloride 50 mL IVPB RF formulation  750 mg IntraVENous Q48H  
 amLODIPine (NORVASC) tablet 5 mg  5 mg Oral DAILY  aspirin delayed-release tablet 81 mg  81 mg Oral DAILY  bicalutamide (CASODEX) tablet 50 mg  50 mg Oral DAILY  enzalutamide Becky Listen) chemo capsule 160 mg (patient supplied) (Patient Supplied)  160 mg Oral DAILY AFTER BREAKFAST  gabapentin (NEURONTIN) capsule 100 mg  100 mg Oral TID  hydrALAZINE (APRESOLINE) tablet 100 mg  100 mg Oral TID  tamsulosin (FLOMAX) capsule 0.4 mg  0.4 mg Oral DAILY  nicotine (NICODERM CQ) 14 mg/24 hr patch 1 Patch  1 Patch TransDERmal DAILY  insulin lispro (HUMALOG) injection   SubCUTAneous AC&HS  
 glucose chewable tablet 16 g  4 Tab Oral PRN  
 dextrose (D50W) injection syrg 12.5-25 g  12.5-25 g IntraVENous PRN  
 glucagon (GLUCAGEN) injection 1 mg  1 mg IntraMUSCular PRN  
 [Held by provider] heparin (porcine) injection 5,000 Units  5,000 Units SubCUTAneous Q8H  
 insulin lispro (HUMALOG) injection 20 Units  20 Units SubCUTAneous TIDAC  insulin glargine (LANTUS) injection 50 Units  50 Units SubCUTAneous QHS  
 
______________________________________________________________________ EXPECTED LENGTH OF STAY: 3d 19h ACTUAL LENGTH OF STAY:          3 Sandra Matias DO

## 2021-01-02 NOTE — PROGRESS NOTES
Hospitalist Progress Note [address] [facility],  Answering service: [phone] OR [phone] from in house phone Date of Service:  2021 NAME:  Kayla Majano III 
:  1950 MRN:  [id number] Admission Summary: As per initial admission summary Su Reyes is a 72 y.o. male with PMH of prostatic cancer s/p radiation, IDDM, heart failure, htn. He apparently told the ER physician that he c/o onset of diarrhea yesterday and some chills over the last 2 days, with occasional cough. For me, however, he denies all of these symptoms and states he came in because his caregiver was concerned d/t his lethargy over the last several days to weeks. He lives home alone and has a caregiver five times a week. He denies cp, sob, fever, chills, sweats, N/V. He has had a wound on his right foot for months. He denies any foot pain d/t neuropathy. He goes to [facility] for nephrology and states he last saw them a few months ago. Prior to today, he believes it has been several months since he last had any blood work. Denies etoh or illicits. Smokes just under 1 PPD. Currently no acute complaints except asking for water. States he takes all his meds daily. Interval history / Subjective: Follow up osteomyelitis. Patient seen and examined. Requesting decreased frequency between pain medication. Required 1 unit pRBCs today. Assessment & Plan:  
 
Severe sepsis 2/2 osteomyelitis right foot 
-Podiatry consulted  
-Underwent debridement right foot ulcer with removal of infected bone 2020 
-Cultures with proteus mirabilis, stept group B, enterococcus -ID following. Antibiotics transitioned to zosyn  
-Follow surgical pathology Acute on chronic anemia, baseline ~9 
-appreciate GI, plans for EGD on 2020, NPO after midnight 
-s/p 1 unit pRBs /2.  Has received 6 units total during admission 
  
 Toxic metabolic encephalopathy/drowsiness: resolved 
  
Acute on CKD: improving 
-nephrology following, continue to hold diurectics  
-kumar removed 
  
IDDM, last a1c 8.1 01/2020 
-continue long acting insulin, meal time, SSI  
  
HTN, controlled 
-holding amlodipine, continue hydralazine,  
  
Hyponatremia 
-stable, montior  
  
Prostate ca 
-Cont home meds 
  
Tobacco abuse 
-continue patch Code status: full code DVT prophylaxis: scds Care Plan discussed with: Patient/Family Disposition: Home w/Family and TBD Hospital Problems  Date Reviewed: 8/20/2020 Codes Class Noted POA * (Principal) Osteomyelitis ([facility] [address] ICD-10-CM: M86.9 ICD-9-CM: 730.20  12/28/2020 Yes Review of Systems:  
Negative unless stated above Vital Signs:  
 Last 24hrs VS reviewed since prior progress note. Most recent are: 
Visit Vitals BP (!) 150/73 (BP 1 Location: Left arm, BP Patient Position: At rest) Pulse 89 Temp 98.2 °F (36.8 °C) Resp 18 Ht 6' (1.829 m) Wt 113.9 kg (251 lb 1.6 oz) SpO2 95% BMI 34.06 kg/m² Intake/Output Summary (Last 24 hours) at 1/2/2021 1828 Last data filed at 1/2/2021 1520 Gross per 24 hour Intake 2150 ml Output 2900 ml Net -750 ml Physical Examination:  
I had a face to face encounter with this patient and independently examined them on January 2, 2021 as outlined below: 
     
Constitutional:  No acute distress, cooperative, pleasant ENT:  Oral mucous moist, oropharynx benign. Neck supple Resp:  CTA bilaterally. No wheezing/rhonchi/rales. No accessory muscle use CV:  Regular rhythm, normal rate, no murmurs, gallops, rubs GI:  Soft, obese, non distended, non tender. normoactive bowel sounds, no hepatosplenomegaly Musculoskeletal:  trace edema, warm, 2+ pulses throughout. Right lower extremity wrapped Neurologic:  Moves all extremities Psych:  Good insight, Not anxious nor agitated. Data Review: Review and/or order of clinical lab test 
Review and/or order of tests in the radiology section of CPT Review and/or order of tests in the medicine section of CPT Labs:  
 
Recent Labs 01/02/21 0230 01/01/21 1816 WBC 12.5* 12.3* HGB 6.8* 7.0*  
HCT 20.9* 21.4*  
 352 Recent Labs 01/02/21 
0230 01/01/21 
1816 01/01/21 
0132 * 132* 131*  
K 5.0 4.7 4.2  105 103 CO2 20* 21 21 BUN 51* 52* 54* CREA 2.90* 2.98* 3.28* GLU 84 136* 170* CA 8.3* 7.9* 7.9*  
MG  --   --  2.1 PHOS  --   --  4.6 No results for input(s): ALT, AP, TBIL, TBILI, TP, ALB, GLOB, GGT, AML, LPSE in the last 72 hours. No lab exists for component: SGOT, GPT, AMYP, HLPSE Recent Labs 12/31/20 
1703 INR 1.1 PTP 11.3* No results for input(s): FE, TIBC, PSAT, FERR in the last 72 hours. Lab Results Component Value Date/Time Folate 20.0 03/16/2019 04:06 AM  
  
No results for input(s): PH, PCO2, PO2 in the last 72 hours. No results for input(s): CPK, CKNDX, TROIQ in the last 72 hours. No lab exists for component: CPKMB No results found for: CHOL, CHOLX, CHLST, CHOLV, HDL, HDLP, LDL, LDLC, DLDLP, TGLX, TRIGL, TRIGP, CHHD, CHHDX Lab Results Component Value Date/Time Glucose (POC) 160 (H) 01/02/2021 04:18 PM  
 Glucose (POC) 192 (H) 01/02/2021 11:29 AM  
 Glucose (POC) 82 01/02/2021 06:58 AM  
 Glucose (POC) 117 (H) 01/01/2021 09:35 PM  
 Glucose (POC) 90 01/01/2021 03:50 PM  
 
Lab Results Component Value Date/Time  Color YELLOW/STRAW 03/29/2019 02:34 PM  
 Appearance CLEAR 03/29/2019 02:34 PM  
 Specific gravity 1.017 03/29/2019 02:34 PM  
 pH (UA) 5.0 03/29/2019 02:34 PM  
 Protein 100 (A) 03/29/2019 02:34 PM  
 Glucose NEGATIVE  03/29/2019 02:34 PM  
 Ketone NEGATIVE  03/29/2019 02:34 PM  
 Bilirubin NEGATIVE  03/29/2019 02:34 PM  
 Urobilinogen 0.2 03/29/2019 02:34 PM  
 Nitrites NEGATIVE  03/29/2019 02:34 PM  
 Leukocyte Esterase NEGATIVE  03/29/2019 02:34 PM  
 Epithelial cells FEW 03/29/2019 02:34 PM  
 Bacteria NEGATIVE  03/29/2019 02:34 PM  
 WBC 0-4 03/29/2019 02:34 PM  
 RBC 0-5 03/29/2019 02:34 PM  
 
 
 
Medications Reviewed:  
 
Current Facility-Administered Medications Medication Dose Route Frequency  0.9% sodium chloride infusion 250 mL  250 mL IntraVENous PRN  
 0.9% sodium chloride infusion 250 mL  250 mL IntraVENous PRN  piperacillin-tazobactam (ZOSYN) 3.375 g in 0.9% sodium chloride (MBP/ADV) 100 mL MBP  3.375 g IntraVENous Q8H  
 oxyCODONE-acetaminophen (PERCOCET) 5-325 mg per tablet 1 Tab  1 Tab Oral Q4H PRN  
 0.9% sodium chloride infusion 250 mL  250 mL IntraVENous PRN  
 epoetin chi-epbx (RETACRIT) injection 20,000 Units  20,000 Units SubCUTAneous Q MON, WED & FRI  iron sucrose (VENOFER) 200 mg in 0.9% sodium chloride 100 mL IVPB  200 mg IntraVENous Q24H  
 diclofenac (VOLTAREN) 1 % topical gel 2 g  2 g Topical QID  acetaminophen (TYLENOL) tablet 500 mg  500 mg Oral Q4H PRN  
 [Held by provider] amLODIPine (NORVASC) tablet 5 mg  5 mg Oral DAILY  [Held by provider] aspirin delayed-release tablet 81 mg  81 mg Oral DAILY  bicalutamide (CASODEX) tablet 50 mg  50 mg Oral DAILY  enzalutamide Laurey Beat) chemo capsule 160 mg (patient supplied) (Patient Supplied)  160 mg Oral DAILY AFTER BREAKFAST  gabapentin (NEURONTIN) capsule 100 mg  100 mg Oral TID  hydrALAZINE (APRESOLINE) tablet 100 mg  100 mg Oral TID  tamsulosin (FLOMAX) capsule 0.4 mg  0.4 mg Oral DAILY  nicotine (NICODERM CQ) 14 mg/24 hr patch 1 Patch  1 Patch TransDERmal DAILY  insulin lispro (HUMALOG) injection   SubCUTAneous AC&HS  
 glucose chewable tablet 16 g  4 Tab Oral PRN  
 dextrose (D50W) injection syrg 12.5-25 g  12.5-25 g IntraVENous PRN  
 glucagon (GLUCAGEN) injection 1 mg  1 mg IntraMUSCular PRN  
 [Held by provider] heparin (porcine) injection 5,000 Units  5,000 Units SubCUTAneous Q8H  
  insulin lispro (HUMALOG) injection 20 Units  20 Units SubCUTAneous TIDAC  insulin glargine (LANTUS) injection 50 Units  50 Units SubCUTAneous QHS  
 
______________________________________________________________________ EXPECTED LENGTH OF STAY: 3d 19h ACTUAL LENGTH OF STAY:          5 Flores Wheat DO

## 2021-01-02 NOTE — PROGRESS NOTES
Nephrology Progress Note Deisy Hobbs III Date of Admission : 12/28/2020 CC: Follow up for KATHI on CKD Assessment and Plan KATHI on CKD  
- suspect volume depletion from diuretics and GI symptoms vs ATN 
- U/S neg for hydro - check UA 
- d/c fluids 
- remove kumar and monitor 
- hold on diuretics today 
  
CKD Stage IIIa: 
-  Presumed 2/2 DM and HTN 
- nephrotic range proteinuria 
- baseline Cr 1.7 mg/dl  
- followed by Dr. Amaury Pablo 
  
Hx of prostate cancer s/p XRT 
  
Metabolic acidosis: 
- cont oral bicarb 
  
Anemia in CKD: 
- low iron 
- start JAZ + IV iron 
- getting PRBCs 1/2 
- GI planning EGD monday 
  
Type II DM: 
- on insulin 
  
HTN :  
- Continue current meds  
  
R foot osteo: 
- on abx 
- s/p debridement on 12/30 Group B strep bacteremia: 
- Abx per ID 
  
B/l Airspace disease: 
- ? PNA vs edema 
- COVID neg 
- on broad spectrum abx Interval History: 
Seen and examined. Voiding well. Cr down. Getting PRBCs this AM.  On RA, but c/o dyspnea. Oozing from foot. No n/v/d or cp reported. Current Medications: all current  Medications have been eviewed in [facility] Review of Systems: Pertinent items are noted in HPI. Objective: 
Vitals:   
Vitals:  
 01/01/21 2144 01/02/21 0937 01/02/21 1802 01/02/21 0949 BP: 139/67 (!) 140/80  (!) 153/88 Pulse: 93 91  90 Resp: 16 18  18 Temp: 99.5 °F (37.5 °C) 98.1 °F (36.7 °C)  98.6 °F (37 °C) SpO2: 98% 94%  94% Weight:   113.9 kg (251 lb 1.6 oz) Height:      
 
Intake and Output: 
01/02 0701 - 01/02 1900 In: 3572 [P.O.:240; I.V.:1134] Out: 300 [Urine:300] 12/31 1901 - 01/02 0700 In: 8736 [P.O.:480; I.V.:774] Out: 2950 [Urine:2950] Physical Examination: 
General: NAD,Conversant, obese Neck:  Supple, no mass Resp:  Lungs CTA B/L, no wheezing , normal respiratory effort CV:  RRR,  no murmur or rub,  trace LLE edema, RLE in wrap GI:  Soft, NT, + Bowel sounds, no hepatosplenomegaly Neurologic:  Non focal 
 Psych:             AAO x 3 appropriate affect Skin:  No Rash :  Bynum in place 
 
[]    High complexity decision making was performed 
[]    Patient is at high-risk of decompensation with multiple organ involvement Lab Data Personally Reviewed: I have reviewed all the pertinent labs, microbiology data and radiology studies during assessment. Recent Labs 01/02/21 0230 01/01/21 1816 01/01/21 0132 12/31/20 
1703 * 132* 131*  --   
K 5.0 4.7 4.2  --   
 105 103  --   
CO2 20* 21 21  --   
GLU 84 136* 170*  --   
BUN 51* 52* 54*  --   
CREA 2.90* 2.98* 3.28*  --   
CA 8.3* 7.9* 7.9*  --   
MG  --   --  2.1  --   
PHOS  --   --  4.6  --   
INR  --   --   --  1.1 Recent Labs 01/02/21 0230 01/01/21 1816 01/01/21 0132 WBC 12.5* 12.3* 10.1 HGB 6.8* 7.0* 6.4* HCT 20.9* 21.4* 19.6*  352 316 No results found for: SDES Lab Results Component Value Date/Time Culture result: (A) 12/30/2020 01:14 PM  
  LIGHT ANAEROBIC GRAM NEGATIVE RODS BETA LACTAMASE POSITIVE Culture result: LIGHT PROTEUS MIRABILIS (A) 12/30/2020 01:14 PM  
 Culture result: (A) 12/30/2020 01:14 PM  
  LIGHT STREPTOCOCCI, BETA HEMOLYTIC GROUP B Penicillin and ampicillin are drugs of choice for treatment of beta-hemolytic streptococcal infections. Susceptibility testing of penicillins and beta-lactams approved by the FDA for treatment of beta-hemolytic streptococcal infections need not be performed routinely, because nonsusceptible isolates are extremely rare. CLSI 2012 Culture result: LIGHT POSSIBLE ENTEROCOCCUS SPECIES (A) 12/30/2020 01:14 PM  
 
Recent Results (from the past 24 hour(s)) GLUCOSE, POC Collection Time: 01/01/21 11:21 AM  
Result Value Ref Range Glucose (POC) 124 (H) 65 - 100 mg/dL Performed by [facility] (PCT) GLUCOSE, POC Collection Time: 01/01/21  3:50 PM  
Result Value Ref Range Glucose (POC) 90 65 - 100 mg/dL Performed by [place] CBC WITH AUTOMATED DIFF Collection Time: 01/01/21  6:16 PM  
Result Value Ref Range WBC 12.3 (H) 4.1 - 11.1 K/uL  
 RBC 2.59 (L) 4.10 - 5.70 M/uL HGB 7.0 (L) 12.1 - 17.0 g/dL HCT 21.4 (L) 36.6 - 50.3 % MCV 82.6 80.0 - 99.0 FL  
 MCH 27.0 26.0 - 34.0 PG  
 MCHC 32.7 30.0 - 36.5 g/dL  
 RDW 16.2 (H) 11.5 - 14.5 % PLATELET 057 535 - 441 K/uL MPV 9.2 8.9 - 12.9 FL  
 NRBC 0.0 0  WBC ABSOLUTE NRBC 0.00 0.00 - 0.01 K/uL NEUTROPHILS 90 (H) 32 - 75 % LYMPHOCYTES 5 (L) 12 - 49 % MONOCYTES 4 (L) 5 - 13 % EOSINOPHILS 1 0 - 7 % BASOPHILS 0 0 - 1 % IMMATURE GRANULOCYTES 0 %  
 ABS. NEUTROPHILS 11.1 (H) 1.8 - 8.0 K/UL  
 ABS. LYMPHOCYTES 0.6 (L) 0.8 - 3.5 K/UL  
 ABS. MONOCYTES 0.5 0.0 - 1.0 K/UL  
 ABS. EOSINOPHILS 0.1 0.0 - 0.4 K/UL  
 ABS. BASOPHILS 0.0 0.0 - 0.1 K/UL  
 ABS. IMM. GRANS. 0.0 K/UL  
 DF MANUAL    
 RBC COMMENTS ANISOCYTOSIS 1+ 
    
 RBC COMMENTS BASOPHILIC STIPPLING 
PRESENT 
    
 RBC COMMENTS POLYCHROMASIA 1+ METABOLIC PANEL, BASIC Collection Time: 01/01/21  6:16 PM  
Result Value Ref Range Sodium 132 (L) 136 - 145 mmol/L Potassium 4.7 3.5 - 5.1 mmol/L Chloride 105 97 - 108 mmol/L  
 CO2 21 21 - 32 mmol/L Anion gap 6 5 - 15 mmol/L Glucose 136 (H) 65 - 100 mg/dL BUN 52 (H) 6 - 20 MG/DL Creatinine 2.98 (H) 0.70 - 1.30 MG/DL  
 BUN/Creatinine ratio 17 12 - 20 GFR est AA 25 (L) >60 ml/min/1.73m2 GFR est non-AA 21 (L) >60 ml/min/1.73m2 Calcium 7.9 (L) 8.5 - 10.1 MG/DL  
GLUCOSE, POC Collection Time: 01/01/21  9:35 PM  
Result Value Ref Range Glucose (POC) 117 (H) 65 - 100 mg/dL Performed by Gage Contreras CBC WITH AUTOMATED DIFF Collection Time: 01/02/21  2:30 AM  
Result Value Ref Range WBC 12.5 (H) 4.1 - 11.1 K/uL  
 RBC 2.49 (L) 4.10 - 5.70 M/uL HGB 6.8 (L) 12.1 - 17.0 g/dL HCT 20.9 (L) 36.6 - 50.3 % MCV 83.9 80.0 - 99.0 FL  
 MCH 27.3 26.0 - 34.0 PG  
 MCHC 32.5 30.0 - 36.5 g/dL RDW 16.4 (H) 11.5 - 14.5 % PLATELET 031 433 - 002 K/uL MPV 9.3 8.9 - 12.9 FL  
 NRBC 0.3 (H) 0  WBC ABSOLUTE NRBC 0.04 (H) 0.00 - 0.01 K/uL NEUTROPHILS 85 (H) 32 - 75 % BAND NEUTROPHILS 1 0 - 6 % LYMPHOCYTES 5 (L) 12 - 49 % MONOCYTES 6 5 - 13 % EOSINOPHILS 1 0 - 7 % BASOPHILS 0 0 - 1 % METAMYELOCYTES 2 (H) 0 % IMMATURE GRANULOCYTES 0 %  
 ABS. NEUTROPHILS 10.8 (H) 1.8 - 8.0 K/UL  
 ABS. LYMPHOCYTES 0.6 (L) 0.8 - 3.5 K/UL  
 ABS. MONOCYTES 0.8 0.0 - 1.0 K/UL  
 ABS. EOSINOPHILS 0.1 0.0 - 0.4 K/UL  
 ABS. BASOPHILS 0.0 0.0 - 0.1 K/UL  
 ABS. IMM. GRANS. 0.0 K/UL  
 DF MANUAL    
 RBC COMMENTS ANISOCYTOSIS 1+ 
    
 RBC COMMENTS ROULEAUX PRESENT 
    
 RBC COMMENTS OVALOCYTES PRESENT 
    
METABOLIC PANEL, BASIC Collection Time: 01/02/21  2:30 AM  
Result Value Ref Range Sodium 133 (L) 136 - 145 mmol/L Potassium 5.0 3.5 - 5.1 mmol/L Chloride 108 97 - 108 mmol/L  
 CO2 20 (L) 21 - 32 mmol/L Anion gap 5 5 - 15 mmol/L Glucose 84 65 - 100 mg/dL BUN 51 (H) 6 - 20 MG/DL Creatinine 2.90 (H) 0.70 - 1.30 MG/DL  
 BUN/Creatinine ratio 18 12 - 20 GFR est AA 26 (L) >60 ml/min/1.73m2 GFR est non-AA 22 (L) >60 ml/min/1.73m2 Calcium 8.3 (L) 8.5 - 10.1 MG/DL  
RBC, ALLOCATE Collection Time: 01/02/21  3:30 AM  
Result Value Ref Range HISTORY CHECKED? Historical check performed TYPE & SCREEN Collection Time: 01/02/21  4:02 AM  
Result Value Ref Range Crossmatch Expiration 01/05/2021,2359 ABO/Rh(D) O POSITIVE Antibody screen NEG Unit number [id number] Blood component type RC LR,1 Unit division 00 Status of unit ISSUED Crossmatch result Compatible GLUCOSE, POC Collection Time: 01/02/21  6:58 AM  
Result Value Ref Range Glucose (POC) 82 65 - 100 mg/dL Performed by Myron Herrera  PCT Donta Johnson MD 
[address] [place] [address] A 
 [facility] Phone - [phone] Fax - [phone] 
[email or web address]

## 2021-01-02 NOTE — PROGRESS NOTES
[address] 
[address] [place], [address] 
[phone] GI PROGRESS NOTE 
 
 
NAME:   Steve Adam III  
:    1950 MRN:    [id number] Subjective:  
 
 
RN states BM this AM as dark, though not overt melena. He had significant bleeding from R foot wound. Hb 6.8 today and receiving unit of blood. Objective: VITALS:  
Last 24hrs VS reviewed since prior hospitalist progress note. Most recent are: 
Visit Vitals BP (!) 164/77 (BP 1 Location: Right arm, BP Patient Position: At rest) Pulse 89 Temp 98.8 °F (37.1 °C) Resp 18 Ht 6' (1.829 m) Wt 113.9 kg (251 lb 1.6 oz) SpO2 95% BMI 34.06 kg/m² Intake/Output Summary (Last 24 hours) at 2021 1219 Last data filed at 2021 9416 Gross per 24 hour Intake 2388 ml Output 2100 ml Net 288 ml PHYSICAL EXAM: 
Full exam deferred, NAD Lab Data Reviewed Medications: Reviewed [facility] records from earlier this year: - EGD 20 - erythema in the pre-pyloric stomach 
- Colonoscopy 20 - pan-diverticulosis, otherwise normal 
- Virtual colonoscopy 20 - normal GI  
- EGD 20 - clotted blood in the fundus, requiring copious lavage to dislodge the clot and net to remove it 
- Small bowel enteroscopy 20 - normal, no bleeding - Capsule endoscopy 20 - BRB in the stomach with small erosions 
- EGD 20 - multiple non-bleeding angioectasias, treated with APCs 
  
 
Assessment/Plan Jericho Marcum is a 72 y.o.  male who was admitted with sepsis from osteomyelitis of the right foot. Anemia is multi-factorial, though significant upper GI bleeding at [facility] this past summer noted. Plan for EGD on Mon, . NPO midnight 1/3 Attending Physician: Emily Odom MD  
Date/Time:  2021

## 2021-01-02 NOTE — PROGRESS NOTES
Progress Note Patient: Mildred Burns MRN: [id number]  SSN: [id number] YOB: 1950  Age: 72 y.o. Sex: male Admit Date: 12/28/2020 
3 Day Post-Op Procedure:   Procedure(s): DEBRIDEMENT OF RIGHT FOOT ULCER WITH REMOVAL OF INFECTED BONE (URGENT) Subjective:  
 
Patient seen resting quiet and comfortably. Pleased because no strike-through overnight. Notes the right heel is slightly sore. Objective:  
 
Visit Vitals BP (!) 171/72 (BP 1 Location: Right arm, BP Patient Position: At rest) Pulse 89 Temp 98.9 °F (37.2 °C) Resp 18 Ht 6' (1.829 m) Wt 113.9 kg (251 lb 1.6 oz) SpO2 95% BMI 34.06 kg/m² Physical Exam: 
Examination is of the right lateral foot surgical site. Right foot surgical dressing has not been reinforced. There is no strike-through noted at this time. Labs/Radiology Review: images and reports reviewed Assessment:  
 
Hospital Problems  Date Reviewed: 8/20/2020 Codes Class Noted POA * (Principal) [facility] ICD-10-CM: M86.9 ICD-9-CM: 730.20  12/28/2020 Yes Plan/Recommendations/Medical Decision Making:  
 
-Dressing left intact today, as there is no longer strike-through from dressing. Confirmed with nursing that dressing did not require reinforcement or dressing change yesterday overnight or this morningPost-op bleeding from R foot remains resolved. Today's Hgb noted, along plan for EGD on 1/4/20. Continue to monitor. 
-Continue bedrest at this time.   
-Surgical soft tissue currently growing proteus mirabilis, beta hemolytic strep group B, and enterococcus faecalis; surgical bone culture currently growing proteus mirablis,  beta hemolytic strep group B, enterococcus faecalis, and coag neg staph  
-Surgical pathology pending 
-IV abx per ID 
-Prevalon boot ordered for right foot 
-Continue to follow Activity: bedrest

## 2021-01-03 NOTE — PROGRESS NOTES
Hospitalist Progress Note [address] [facility],  Answering service: [phone] OR [phone] from in house phone Date of Service:  1/3/2021 NAME:  Jose Porras III 
:  1950 MRN:  [id number] Admission Summary: As per initial admission summary Jimmy  is a 72 y.o. male with PMH of prostatic cancer s/p radiation, IDDM, heart failure, htn. He apparently told the ER physician that he c/o onset of diarrhea yesterday and some chills over the last 2 days, with occasional cough. For me, however, he denies all of these symptoms and states he came in because his caregiver was concerned d/t his lethargy over the last several days to weeks. He lives home alone and has a caregiver five times a week. He denies cp, sob, fever, chills, sweats, N/V. He has had a wound on his right foot for months. He denies any foot pain d/t neuropathy. He goes to [facility] for nephrology and states he last saw them a few months ago. Prior to today, he believes it has been several months since he last had any blood work. Denies etoh or illicits. Smokes just under 1 PPD. Currently no acute complaints except asking for water. States he takes all his meds daily. Interval history / Subjective: Follow up osteomyelitis. Patient seen and examined. Requesting flonase. Hgb low at 7.3. Plans for EGD . Assessment & Plan:  
 
Severe sepsis 2/2 osteomyelitis right foot 
-Podiatry consulted  
-Underwent debridement right foot ulcer with removal of infected bone 2020 
-Cultures with proteus mirabilis, stept group B, enterococcus -ID following. Antibiotics transitioned to zosyn  
-Follow surgical pathology Acute on chronic anemia, baseline ~9: persistent -appreciate GI, plans for EGD on 2020, NPO after midnight 
-s/p 1 unit pRBs 1/2.  Has received 6 units total during admission 
  
Toxic metabolic encephalopathy/drowsiness: resolved 
  
 Acute on CKD: improving 
-nephrology following, continue to hold diurectics  
-kumar removed 1/2 
  
IDDM, last a1c 8.1 01/2020 
-continue long acting insulin, meal time, SSI  
  
HTN, controlled 
-holding amlodipine, continue hydralazine,  
  
Hyponatremia 
-stable, montior  
  
Prostate ca 
-Cont home meds 
  
Tobacco abuse 
-continue patch Code status: full code DVT prophylaxis: scds Care Plan discussed with: Patient/Family Disposition: Home w/Family and TBD Hospital Problems  Date Reviewed: 8/20/2020 Codes Class Noted POA * (Principal) [facility]omy[facility] ICD-10-CM: M86.9 ICD-9-CM: 730.20  12/28/2020 Yes Review of Systems:  
Negative unless stated above Vital Signs:  
 Last 24hrs VS reviewed since prior progress note. Most recent are: 
Visit Vitals BP (!) 150/83 (BP 1 Location: Left arm, BP Patient Position: At rest) Pulse 81 Temp 97.4 °F (36.3 °C) Resp 16 Ht 6' (1.829 m) Wt 113.9 kg (251 lb 1.6 oz) SpO2 97% BMI 34.06 kg/m² Intake/Output Summary (Last 24 hours) at 1/3/2021 1702 Last data filed at 1/2/2021 8845 Gross per 24 hour Intake 238 ml Output  Net 238 ml Physical Examination:  
I had a face to face encounter with this patient and independently examined them on January 3, 2021 as outlined below: 
     
Constitutional:  No acute distress, cooperative, pleasant ENT:  Oral mucous moist, oropharynx benign. Neck supple Resp:  CTA bilaterally. No wheezing/rhonchi/rales. No accessory muscle use CV:  Regular rhythm, normal rate, no murmurs, gallops, rubs GI:  Soft, obese, non distended, non tender. normoactive bowel sounds, no hepatosplenomegaly Musculoskeletal:  trace edema, warm, 2+ pulses throughout. Right lower extremity wrapped Neurologic:  Moves all extremities Psych:  Good insight, Not anxious nor agitated.  
 
  
 
Data Review:  
 Review and/or order of clinical lab test 
 Review and/or order of tests in the radiology section of CPT Review and/or order of tests in the medicine section of CPT Labs:  
 
Recent Labs 01/03/21 
0323 01/02/21 
0230 WBC 12.4* 12.5* HGB 7.3* 6.8* HCT 22.8* 20.9*  
 366 Recent Labs 01/03/21 
6499 01/02/21 
0230 01/01/21 
1816 01/01/21 
0132 * 133* 132* 131*  
K 5.1 5.0 4.7 4.2  108 105 103 CO2 20* 20* 21 21 BUN 44* 51* 52* 54* CREA 2.59* 2.90* 2.98* 3.28* * 84 136* 170* CA 8.1* 8.3* 7.9* 7.9*  
MG 2.1  --   --  2.1 PHOS 4.1  --   --  4.6 No results for input(s): ALT, AP, TBIL, TBILI, TP, ALB, GLOB, GGT, AML, LPSE in the last 72 hours. No lab exists for component: SGOT, GPT, AMYP, HLPSE Recent Labs 12/31/20 
1703 INR 1.1 PTP 11.3* No results for input(s): FE, TIBC, PSAT, FERR in the last 72 hours. Lab Results Component Value Date/Time Folate 20.0 03/16/2019 04:06 AM  
  
No results for input(s): PH, PCO2, PO2 in the last 72 hours. No results for input(s): CPK, CKNDX, TROIQ in the last 72 hours. No lab exists for component: CPKMB No results found for: CHOL, CHOLX, CHLST, CHOLV, HDL, HDLP, LDL, LDLC, DLDLP, TGLX, TRIGL, TRIGP, CHHD, CHHDX Lab Results Component Value Date/Time Glucose (POC) 130 (H) 01/03/2021 04:15 PM  
 Glucose (POC) 156 (H) 01/03/2021 11:07 AM  
 Glucose (POC) 224 (H) 01/03/2021 06:50 AM  
 Glucose (POC) 116 (H) 01/02/2021 10:25 PM  
 Glucose (POC) 160 (H) 01/02/2021 04:18 PM  
 
Lab Results Component Value Date/Time  Color YELLOW/STRAW 01/03/2021 12:56 PM  
 Appearance CLEAR 01/03/2021 12:56 PM  
 Specific gravity 1.015 01/03/2021 12:56 PM  
 pH (UA) 5.5 01/03/2021 12:56 PM  
 Protein 300 (A) 01/03/2021 12:56 PM  
 Glucose 100 (A) 01/03/2021 12:56 PM  
 Ketone Negative 01/03/2021 12:56 PM  
 Bilirubin Negative 01/03/2021 12:56 PM  
 Urobilinogen 0.2 01/03/2021 12:56 PM  
 Nitrites Negative 01/03/2021 12:56 PM  
 Leukocyte Esterase Negative 01/03/2021 12:56 PM  
 Epithelial cells FEW 01/03/2021 12:56 PM  
 Bacteria 1+ (A) 01/03/2021 12:56 PM  
 WBC 0-4 01/03/2021 12:56 PM  
 RBC 0-5 01/03/2021 12:56 PM  
 
 
 
Medications Reviewed:  
 
Current Facility-Administered Medications Medication Dose Route Frequency  enzalutamide Enrico Burns) chemo capsule 80 mg (patient supplied) (Patient Supplied)  80 mg Oral BID  fluticasone propionate (FLONASE) 50 mcg/actuation nasal spray 2 Spray  2 Spray Both Nostrils DAILY  0.9% sodium chloride infusion 250 mL  250 mL IntraVENous PRN  
 0.9% sodium chloride infusion 250 mL  250 mL IntraVENous PRN  piperacillin-tazobactam (ZOSYN) 3.375 g in 0.9% sodium chloride (MBP/ADV) 100 mL MBP  3.375 g IntraVENous Q8H  
 oxyCODONE-acetaminophen (PERCOCET) 5-325 mg per tablet 1 Tab  1 Tab Oral Q4H PRN  
 0.9% sodium chloride infusion 250 mL  250 mL IntraVENous PRN  
 epoetin chi-epbx (RETACRIT) injection 20,000 Units  20,000 Units SubCUTAneous Q MON, WED & FRI  iron sucrose (VENOFER) 200 mg in 0.9% sodium chloride 100 mL IVPB  200 mg IntraVENous Q24H  
 diclofenac (VOLTAREN) 1 % topical gel 2 g  2 g Topical QID  acetaminophen (TYLENOL) tablet 500 mg  500 mg Oral Q4H PRN  
 [Held by provider] amLODIPine (NORVASC) tablet 5 mg  5 mg Oral DAILY  [Held by provider] aspirin delayed-release tablet 81 mg  81 mg Oral DAILY  bicalutamide (CASODEX) tablet 50 mg  50 mg Oral DAILY  gabapentin (NEURONTIN) capsule 100 mg  100 mg Oral TID  hydrALAZINE (APRESOLINE) tablet 100 mg  100 mg Oral TID  tamsulosin (FLOMAX) capsule 0.4 mg  0.4 mg Oral DAILY  nicotine (NICODERM CQ) 14 mg/24 hr patch 1 Patch  1 Patch TransDERmal DAILY  insulin lispro (HUMALOG) injection   SubCUTAneous AC&HS  
 glucose chewable tablet 16 g  4 Tab Oral PRN  
 dextrose (D50W) injection syrg 12.5-25 g  12.5-25 g IntraVENous PRN  
 glucagon (GLUCAGEN) injection 1 mg  1 mg IntraMUSCular PRN  
  [Held by provider] heparin (porcine) injection 5,000 Units  5,000 Units SubCUTAneous Q8H  
 insulin lispro (HUMALOG) injection 20 Units  20 Units SubCUTAneous TIDAC  insulin glargine (LANTUS) injection 50 Units  50 Units SubCUTAneous QHS  
 
______________________________________________________________________ EXPECTED LENGTH OF STAY: 3d 19h ACTUAL LENGTH OF STAY:          6 [address] [facility],

## 2021-01-03 NOTE — PROGRESS NOTES
Nephrology Progress Note Júnior Warren III Date of Admission : 12/28/2020 CC: Follow up for KATHI on CKD Assessment and Plan KATHI on CKD  
- suspect volume depletion from diuretics and GI symptoms vs ATN 
- U/S neg for hydro - UA never done - will reorder today 
- cont current care 
- daily labs 
  
CKD Stage IIIa: 
-  Presumed 2/2 DM and HTN 
- nephrotic range proteinuria 
- baseline Cr 1.7 mg/dl  
- followed by Dr. Rosalba Snow 
  
Hx of prostate cancer s/p XRT 
  
Metabolic acidosis: 
- cont oral bicarb 
  
Anemia in CKD + blood loss: 
- received PRBCs on 1/2 
- cont JAZ + IV iron - GI planning EGD 1/4 
  
Type II DM: 
- on insulin 
  
HTN :  
- Continue current meds  
  
R foot osteo: 
- on abx 
- s/p debridement on 12/30 Group B strep bacteremia: 
- Abx per ID 
  
B/l Airspace disease: 
- ? PNA vs edema 
- COVID neg 
- on zosyn Interval History: 
Seen and examined. Voiding well w/o kumar. Cr better. Feels better after blood. On RA, dyspnea improving. No cp, sob, n/v/d reported. Current Medications: all current  Medications have been eviewed in [facility] Review of Systems: Pertinent items are noted in HPI. Objective: 
Vitals:   
Vitals:  
 01/02/21 1329 01/02/21 1458 01/02/21 2013 01/03/21 0320 BP: (!) 171/72 (!) 150/73 (!) 176/70 134/63 Pulse: 89 89 89 85 Resp: 18 18 17 18 Temp: 98.9 °F (37.2 °C) 98.2 °F (36.8 °C) 98.5 °F (36.9 °C) 98.5 °F (36.9 °C) SpO2: 95% 95% 94% 95% Weight:      
Height:      
 
Intake and Output: 
No intake/output data recorded. 01/01 1901 - 01/03 0700 In: 2388 [P.O.:480; I.V.:1539] Out: 2250 [Urine:2250] Physical Examination: 
General: NAD,Conversant, obese Neck:  Supple, no mass Resp:  Lungs CTA B/L, no wheezing , normal respiratory effort CV:  RRR,  no murmur or rub,  trace LLE edema, RLE in wrap GI:  Soft, NT, + Bowel sounds, no hepatosplenomegaly Neurologic:  Non focal 
Psych:             AAO x 3 appropriate affect Skin:  No Rash :  Bynum in place 
 
[]    High complexity decision making was performed 
[]    Patient is at high-risk of decompensation with multiple organ involvement Lab Data Personally Reviewed: I have reviewed all the pertinent labs, microbiology data and radiology studies during assessment. Recent Labs 01/03/21 
6534 01/02/21 
0230 01/01/21 
1816 01/01/21 
0132 12/31/20 
1703 12/31/20 
1703 * 133* 132* 131*   < >  --   
K 5.1 5.0 4.7 4.2   < >  --   
 108 105 103   < >  --   
CO2 20* 20* 21 21   < >  --   
* 84 136* 170*   < >  --   
BUN 44* 51* 52* 54*   < >  --   
CREA 2.59* 2.90* 2.98* 3.28*   < >  --   
CA 8.1* 8.3* 7.9* 7.9*   < >  --   
MG 2.1  --   --  2.1  --   --   
PHOS 4.1  --   --  4.6  --   --   
INR  --   --   --   --   --  1.1  
 < > = values in this interval not displayed. Recent Labs 01/03/21 
0323 01/02/21 
0230 01/01/21 
1816 WBC 12.4* 12.5* 12.3* HGB 7.3* 6.8* 7.0*  
HCT 22.8* 20.9* 21.4*  
 366 352 No results found for: SDES Lab Results Component Value Date/Time Culture result: (A) 12/30/2020 01:14 PM  
  LIGHT ANAEROBIC GRAM NEGATIVE RODS BETA LACTAMASE POSITIVE Culture result: LIGHT PROTEUS MIRABILIS (A) 12/30/2020 01:14 PM  
 Culture result: (A) 12/30/2020 01:14 PM  
  LIGHT STREPTOCOCCI, BETA HEMOLYTIC GROUP B Penicillin and ampicillin are drugs of choice for treatment of beta-hemolytic streptococcal infections. Susceptibility testing of penicillins and beta-lactams approved by the FDA for treatment of beta-hemolytic streptococcal infections need not be performed routinely, because nonsusceptible isolates are extremely rare. CLSI 2012 Culture result: LIGHT ENTEROCOCCUS FAECALIS (A) 12/30/2020 01:14 PM  
 
Recent Results (from the past 24 hour(s)) GLUCOSE, POC Collection Time: 01/02/21 11:29 AM  
Result Value Ref Range Glucose (POC) 192 (H) 65 - 100 mg/dL  Performed by COURTNEY Lucas  
 Collection Time: 01/02/21  4:18 PM  
Result Value Ref Range Glucose (POC) 160 (H) 65 - 100 mg/dL Performed by Ted Fisher POC Collection Time: 01/02/21 10:25 PM  
Result Value Ref Range Glucose (POC) 116 (H) 65 - 100 mg/dL Performed by HILLARY SINGER   
CBC WITH AUTOMATED DIFF Collection Time: 01/03/21  3:23 AM  
Result Value Ref Range WBC 12.4 (H) 4.1 - 11.1 K/uL  
 RBC 2.65 (L) 4.10 - 5.70 M/uL HGB 7.3 (L) 12.1 - 17.0 g/dL HCT 22.8 (L) 36.6 - 50.3 % MCV 86.0 80.0 - 99.0 FL  
 MCH 27.5 26.0 - 34.0 PG  
 MCHC 32.0 30.0 - 36.5 g/dL  
 RDW 16.4 (H) 11.5 - 14.5 % PLATELET 247 686 - 599 K/uL MPV 9.5 8.9 - 12.9 FL  
 NRBC 0.3 (H) 0  WBC ABSOLUTE NRBC 0.04 (H) 0.00 - 0.01 K/uL NEUTROPHILS 85 (H) 32 - 75 % LYMPHOCYTES 7 (L) 12 - 49 % MONOCYTES 4 (L) 5 - 13 % EOSINOPHILS 2 0 - 7 % BASOPHILS 0 0 - 1 % METAMYELOCYTES 2 (H) 0 % IMMATURE GRANULOCYTES 0 %  
 ABS. NEUTROPHILS 10.5 (H) 1.8 - 8.0 K/UL  
 ABS. LYMPHOCYTES 0.9 0.8 - 3.5 K/UL  
 ABS. MONOCYTES 0.5 0.0 - 1.0 K/UL  
 ABS. EOSINOPHILS 0.2 0.0 - 0.4 K/UL  
 ABS. BASOPHILS 0.0 0.0 - 0.1 K/UL  
 ABS. IMM. GRANS. 0.0 K/UL  
 DF MANUAL    
 RBC COMMENTS ANISOCYTOSIS 2+ 
    
 RBC COMMENTS MACROCYTOSIS 1+ 
    
 RBC COMMENTS POLYCHROMASIA 1+ METABOLIC PANEL, BASIC Collection Time: 01/03/21  3:23 AM  
Result Value Ref Range Sodium 133 (L) 136 - 145 mmol/L Potassium 5.1 3.5 - 5.1 mmol/L Chloride 107 97 - 108 mmol/L  
 CO2 20 (L) 21 - 32 mmol/L Anion gap 6 5 - 15 mmol/L Glucose 198 (H) 65 - 100 mg/dL BUN 44 (H) 6 - 20 MG/DL Creatinine 2.59 (H) 0.70 - 1.30 MG/DL  
 BUN/Creatinine ratio 17 12 - 20 GFR est AA 30 (L) >60 ml/min/1.73m2 GFR est non-AA 25 (L) >60 ml/min/1.73m2 Calcium 8.1 (L) 8.5 - 10.1 MG/DL MAGNESIUM Collection Time: 01/03/21  3:23 AM  
Result Value Ref Range Magnesium 2.1 1.6 - 2.4 mg/dL PHOSPHORUS  
 Collection Time: 01/03/21  3:23 AM  
Result Value Ref Range Phosphorus 4.1 2.6 - 4.7 MG/DL  
GLUCOSE, POC Collection Time: 01/03/21  6:50 AM  
Result Value Ref Range Glucose (POC) 224 (H) 65 - 100 mg/dL Performed by Will Combs MD 
[facility]  
[address] [place] [address], [address] [facility] Phone - [phone] Fax - [phone] 
www. [email or web address]

## 2021-01-04 NOTE — PROGRESS NOTES
TRANSFER - IN REPORT: 
 
Verbal report received from Oxana(name) on Lysle Doll III  being received from [facility](unit) for ordered procedure Report consisted of patients Situation, Background, Assessment and  
Recommendations(SBAR). Information from the following report(s) SBAR was reviewed with the receiving nurse. Opportunity for questions and clarification was provided. Assessment completed upon patients arrival to unit and care assumed.

## 2021-01-04 NOTE — PROGRESS NOTES
Spoke to Milan Cordero, pharmacist about timing of pt's Nette Iva and Casodex. Pt currently NPO for EGD at 1500. She states pt can resume Casodex this evening when he returns from procedure and get back on his normal schedule tomorrow morning. However, today's morning dose of Xtandi should be held.

## 2021-01-04 NOTE — ANESTHESIA POSTPROCEDURE EVALUATION
Post-Anesthesia Evaluation and Assessment Patient: Jimmy Villar MRN: [id number]  SSN: [id number] YOB: 1950  Age: 72 y.o. Sex: male I have evaluated the patient and they are stable and ready for discharge from the PACU. Obstructive Sleep Apnea education discussed including taking post-op analgesics as prescribed, awareness of how opioid analgesics  
   affect sleep apnea, having a caregiver stay with you for 24 hrs., sleep with your CPAP machine in use, and follow up with a  
   physician who can perform sleep studies. Pt understands and agrees. Cardiovascular Function/Vital Signs Visit Vitals BP (!) 150/71 Pulse 92 Temp 36 °C (96.8 °F) Resp 18 Ht 6' (1.829 m) Wt 114 kg (251 lb 4.8 oz) SpO2 98% BMI 34.08 kg/m² Patient is status post MAC anesthesia for Procedure(s) with comments: 
ESOPHAGOGASTRODUODENOSCOPY (EGD)    :- 
ESOPHAGOGASTRODUODENAL (EGD) BIOPSY RESOLUTION CLIP - 2 clips. Nausea/Vomiting: None Postoperative hydration reviewed and adequate. Pain: 
Pain Scale 1: Numeric (0 - 10) (01/04/21 1412) Pain Intensity 1: 0 (01/04/21 1412) Managed Neurological Status:  
Neuro (WDL): Within Defined Limits (12/30/20 1317) Neuro LUE Motor Response: Purposeful (01/03/21 1350) LLE Motor Response: Purposeful (01/03/21 1350) RUE Motor Response: Purposeful (01/03/21 1350) RLE Motor Response: Other(comment)(dressing intact) (01/03/21 2053) At baseline Mental Status, Level of Consciousness: Alert and  oriented to person, place, and time Pulmonary Status:  
O2 Device: CO2 nasal cannula (01/04/21 1514) Adequate oxygenation and airway patent Complications related to anesthesia: None Post-anesthesia assessment completed. No concerns Signed By: Elvira Randolph MD   
 January 4, 2021 Procedure(s): ESOPHAGOGASTRODUODENOSCOPY (EGD)    :- 
ESOPHAGOGASTRODUODENAL (EGD) BIOPSY RESOLUTION CLIP. MAC 
 
<BSHSIANPOST> INITIAL Post-op Vital signs:  
Vitals Value Taken Time /81 01/04/21 1525 Temp Pulse 88 01/04/21 1525 Resp 20 01/04/21 1525 SpO2 95 % 01/04/21 1525 Vitals shown include unvalidated device data.

## 2021-01-04 NOTE — PROGRESS NOTES
[address] 
[address] [facility] [address] [place], [address] 
[phone] GI PROGRESS NOTE 
 
 
NAME:   Cristin Andrews III  
:    1950 MRN:    [id number] Assessment/Plan Anemia: 
-Heme + stool on admission 2020 
-Hemoglobin 7.3 this AM 2021, continue to monitor 
-Transfuse if hemoglobin less than 7.0 
-BID PPI with Protonix 
-Plans for EGD today 2021 for evaluation for gastric ulcer, AVM's, etc. 
-Patient NPO Osteomyelitis: 
-s/p right foot ulcer debridement Will discuss with Dr. José Antonio Diaz Patient Active Problem List  
Diagnosis Code  Diabetic polyneuropathy ([facility]) E11.42  
 Rotator Cuff Tendonitis M71.9, M67.919  
 Diabetes mellitus type 2, controlled ([address] E11.9  Degenerative disc disease [id number]  Osteoarthrosis involving lower leg M17.10  Depression/ Anxiety F34.1  HTN (hypertension) I10  
 Chronic hip pain M25.559, G89.29  
 Hypertriglyceridemia E78.1  Mild Aortic Insufficiency I35.1  Mild Concentric LVH (left ventricular hypertrophy) I51.7  S/P hip replacement, right E70.866  Stage 3 chronic kidney disease N18.30  Prostate CA ([address] C61  Type 2 diabetes with nephropathy ([facility]) E11.21  
 Severe obesity (BMI 35.0-39. 9) with comorbidity ([address] E66.01  
 Acute on chronic renal failure (HCC) N17.9, N18.9  KATHI (acute kidney injury) ([address] N17.9  Diabetic ulcer of right midfoot associated with type 2 diabetes mellitus, with fat layer exposed ([address] E11.621, N34.373  PAD (peripheral artery disease) ([facility]) I73.9  Dependence on nicotine from cigarettes F17.210  
 Osteomyelitis ([facility]) M86.9 Subjective: Kate Harrell is a 72 y.o.  male Patient denies nausea, no vomiting,no abdominal pain. Patient reports having formed dark stool last night Objective: VITALS:  
Last 24hrs VS reviewed since prior hospitalist progress note. Most recent are: 
Visit Vitals BP (!) 158/87 (BP 1 Location: Right arm, BP Patient Position: At rest) Pulse 76 Temp 98.1 °F (36.7 °C) Resp 18 Ht 6' (1.829 m) Wt 114 kg (251 lb 4.8 oz) SpO2 96% BMI 34.08 kg/m² Intake/Output Summary (Last 24 hours) at 1/4/2021 4702 Last data filed at 1/3/2021 1901 Gross per 24 hour Intake 120 ml Output 450 ml Net -330 ml PHYSICAL EXAM: 
General   well developed, well nourished, appears stated age, in no acute distress EENT  Normocephalic, Atraumatic, PERRLA, EOMI, sclera clear, nares clear, pharynx normal 
Respiratory   Clear To Auscultation bilaterally Cardiology  Regular Rate and Rythmn Abdominal  Soft, non-tender, non-distended, bowel sounds present, no palpable mass Extremities: Right foot with dressing Skin  Normal skin turgor. No rashes or skin ulcers noted Neurological  No focal neurological deficits noted Psychological  Oriented x 3. Normal affect. Lab Data Recent Results (from the past 12 hour(s)) GLUCOSE, POC Collection Time: 01/03/21 10:10 PM  
Result Value Ref Range Glucose (POC) 94 65 - 100 mg/dL Performed by Ganesh Steele CBC W/O DIFF Collection Time: 01/04/21  3:25 AM  
Result Value Ref Range WBC 13.1 (H) 4.1 - 11.1 K/uL  
 RBC 2.66 (L) 4.10 - 5.70 M/uL HGB 7.3 (L) 12.1 - 17.0 g/dL HCT 22.9 (L) 36.6 - 50.3 % MCV 86.1 80.0 - 99.0 FL  
 MCH 27.4 26.0 - 34.0 PG  
 MCHC 31.9 30.0 - 36.5 g/dL  
 RDW 16.7 (H) 11.5 - 14.5 % PLATELET 331 (H) 056 - 400 K/uL MPV 9.4 8.9 - 12.9 FL  
 NRBC 0.3 (H) 0  WBC ABSOLUTE NRBC 0.04 (H) 0.00 - 0.01 K/uL METABOLIC PANEL, BASIC Collection Time: 01/04/21  3:25 AM  
Result Value Ref Range Sodium 134 (L) 136 - 145 mmol/L Potassium 5.4 (H) 3.5 - 5.1 mmol/L Chloride 107 97 - 108 mmol/L  
 CO2 21 21 - 32 mmol/L Anion gap 6 5 - 15 mmol/L Glucose 88 65 - 100 mg/dL BUN 43 (H) 6 - 20 MG/DL  Creatinine 2.42 (H) 0.70 - 1.30 MG/DL  
 BUN/Creatinine ratio 18 12 - 20 GFR est AA 32 (L) >60 ml/min/1.73m2 GFR est non-AA 27 (L) >60 ml/min/1.73m2 Calcium 8.4 (L) 8.5 - 10.1 MG/DL MAGNESIUM Collection Time: 01/04/21  3:25 AM  
Result Value Ref Range Magnesium 2.1 1.6 - 2.4 mg/dL PHOSPHORUS Collection Time: 01/04/21  3:25 AM  
Result Value Ref Range Phosphorus 4.6 2.6 - 4.7 MG/DL  
GLUCOSE, POC Collection Time: 01/04/21  6:34 AM  
Result Value Ref Range Glucose (POC) 93 65 - 100 mg/dL Performed by [facility] Medications: Reviewed Peggy Jones NP I have personally reviewed the history and independently examined the patient. I have reviewed the chart and agree with the documentation recorded by the Mid Level Provider, including the assessment, treatment plan, and disposition.  
 
Rosita Zabala MD

## 2021-01-04 NOTE — PROGRESS NOTES

## 2021-01-04 NOTE — PROGRESS NOTES
Bedside and Verbal shift change report given to Abida YEH (oncoming nurse) by Ryan Walters (offgoing nurse). Report included the following information SBAR, Kardex, Intake/Output and MAR.

## 2021-01-04 NOTE — PROCEDURES
[address] [facility] 
[address] [facility] [address] [place] [address], [address]  
[phone] NAME:  Lane Castro III  
:   1950 MRN:   [id number] Date/Time:  2021 3:16 PM 
 
Esophagogastroduodenoscopy (EGD) Procedure Note :  Harpal Rondon MD 
 
Staff: Endoscopy Darren Sandra: Mary Alice Fenton Endoscopy RN-1: Lorraine Eisenmenger, RN Implants: Two resolution clips were placed in the gastric fundus for control of bleeding from Dieulafoy's lesion Referring Provider:  Stacie Goltz., MD 
 
Anethesia/Sedation:  MAC anesthesia Procedure Details After infom consent was obtained for the procedure, with all risks and benefits of procedure explained the patient was taken to the endoscopy suite and placed in the left lateral decubitus position. Following sequential administration of sedation as per above, the ITRH801 gastroscope was inserted into the mouth and advanced under direct vision to second portion of the duodenum. A careful inspection was made as the gastroscope was withdrawn, including a retroflexed view of the proximal stomach; findings and interventions are described below. Findings: 
Esophagus: Normal 
Stomach: An actively bleeding Dieulafoy's lesion was noted in the gastric fundus. Rest of the stomach was normal. 
Duodenum/jejunum:Patchy areas of erythema, congestion and superficial ulcers measuring 2 to 4 mm were noted in the distal bulb and second portion of the duodenum. No bleeding or stigmata were noted. Therapies:  Two resolution clips were placed in the gastric fundus for control of bleeding from Dieulafoy's lesion Ablation of the lesion was also performed using soft coag settings of the ERBE. No bleeding was noted at the end of the procedure Specimens: Duodenal ulcer biopsy EBL: None Complications:   None; patient tolerated the procedure well. Impression: See Postoperative diagnosis above Recommendations: 
-Continue acid suppression. , -Await pathology. , -Follow clinical symptoms and laboratory studies for evidence of rebleeding. , -Clear liquid diet and advance as tolerated.  
 
Discharge disposition: Continue care in the hospital 
 
David James MD

## 2021-01-04 NOTE — PROGRESS NOTES
Spiritual Care Assessment/Progress Note [address] 
 
 
NAME: Carrillo Bee III      MRN: [id number] AGE: 72 y.o. SEX: male Lutheran Affiliation: [facility] Language: [place] 1/4/2021     Total Time (in minutes): 5 Spiritual Assessment begun in [facility] through conversation with: 
  
    []Patient        [] Family    [] Friend(s) Reason for Consult: Initial/Spiritual assessment, patient floor Spiritual beliefs: (Please include comment if needed) 
   [] Identifies with a sasha tradition:     
   [] Supported by a sasha community:        
   [] Claims no spiritual orientation:       
   [] Seeking spiritual identity:            
   [] Adheres to an individual form of spirituality:       
   [x] Not able to assess:                   
 
    
Identified resources for coping:  
   [] Prayer                           
   [] Music                  [] Guided Imagery 
   [] Family/friends                 [] Pet visits [] Devotional reading                         [x] Unknown 
   [] Other:                                       
 
 
Interventions offered during this visit: (See comments for more details) Plan of Care: 
 
 [] Support spiritual and/or cultural needs  
 [] Support AMD and/or advance care planning process    
 [] Support grieving process 
 [] Coordinate Rites and/or Rituals  
 [] Coordination with community clergy [] No spiritual needs identified at this time 
 [] Detailed Plan of Care below (See Comments)  [] Make referral to Music Therapy 
[] Make referral to Pet Therapy    
[] Make referral to Addiction services 
[] Make referral to [facility] 
[] Make referral to Spiritual Care Partner 
[] No future visits requested       
[x] Follow up upon further referrals Comments:  visit for initial spiritual assessment. Patient reclining in bed asleep, appears comfortable. Did not awaken to knock at door or verbal greeting x 2. Please consult spiritual care for further referral or consult. Rev. Harman Castellanos MDiv, [facility], [address] [employer]  paging service: [phone])

## 2021-01-04 NOTE — PROGRESS NOTES
TRANSFER - OUT REPORT: 
 
Verbal report given to Elva(name) on Mazomanie Mighty III  being transferred to [facility](unit) for routine progression of care Report consisted of patients Situation, Background, Assessment and  
Recommendations(SBAR). Information from the following report(s) SBAR was reviewed with the receiving nurse. Lines:  
Peripheral IV 12/30/20 Anterior;Right Forearm (Active) Site Assessment Clean, dry, & intact 01/04/21 1525 Phlebitis Assessment 0 01/04/21 1525 Infiltration Assessment 0 01/04/21 1525 Dressing Status Clean, dry, & intact 01/04/21 1525 Dressing Type Transparent;Tape 01/04/21 1525 Hub Color/Line Status Yellow;Capped 01/04/21 1525 Action Taken Open ports on tubing capped 01/04/21 0930 Alcohol Cap Used Yes 01/04/21 0930 Peripheral IV 12/31/20 Anterior; Left Forearm (Active) Site Assessment Clean, dry, & intact 01/04/21 1525 Phlebitis Assessment 0 01/04/21 1525 Infiltration Assessment 0 01/04/21 1525 Dressing Status Clean, dry, & intact 01/04/21 1525 Dressing Type Transparent;Tape 01/04/21 1525 Hub Color/Line Status Pink;Capped 01/04/21 1525 Action Taken Open ports on tubing capped 01/04/21 0930 Alcohol Cap Used Yes 01/04/21 0930 Opportunity for questions and clarification was provided.    
 
Patient transported with:

## 2021-01-04 NOTE — PROGRESS NOTES
Hospitalist Progress Note [address] [facility],  Answering service: [phone] or [phone] from in house phone Date of Service:  2021 NAME:  Sarah Hernandez III 
:  1950 MRN:  [id number] Admission Summary: As per initial admission summary Carlos Stout is a 72 y.o. male with PMH of prostatic cancer s/p radiation, IDDM, heart failure, htn. He apparently told the ER physician that he c/o onset of diarrhea yesterday and some chills over the last 2 days, with occasional cough. For me, however, he denies all of these symptoms and states he came in because his caregiver was concerned d/t his lethargy over the last several days to weeks. He lives home alone and has a caregiver five times a week. He denies cp, sob, fever, chills, sweats, N/V. He has had a wound on his right foot for months. He denies any foot pain d/t neuropathy. He goes to [facility] for nephrology and states he last saw them a few months ago. Prior to today, he believes it has been several months since he last had any blood work. Denies etoh or illicits. Smokes just under 1 PPD. Currently no acute complaints except asking for water. States he takes all his meds daily. Interval history / Subjective: Follow up osteomyelitis. Patient seen and examined. No acute complaints. Plans for EGD today and wound vac placement. Assessment & Plan:  
 
Severe sepsis 2/2 osteomyelitis right foot 
-Podiatry consulted  
-Underwent debridement right foot ulcer with removal of infected bone 2020 
-Cultures with proteus mirabilis, stept group B, enterococcus -ID following. Antibiotics transitioned to zosyn  
-Follow surgical pathology 
-wound vac  Acute on chronic anemia, baseline ~9: persistent -appreciate GI, plans for EGD 2020 
-s/p 1 unit pRBs 1/2. Has received 6 units total during admission 
  
Acute on CKD: improving -nephrology following, continue to hold diurectics  
-kumar removed 1/2 
  
IDDM, last a1c 8.1 01/2020 
-continue long acting insulin, meal time, SSI  
  
HTN, controlled 
-holding amlodipine, continue hydralazine 
  
Hyponatremia 
-stable, montior Toxic metabolic encephalopathy/drowsiness: resolved 
  
Prostate ca 
-Cont home meds 
  
Tobacco abuse 
-continue patch Code status: full code DVT prophylaxis: scds Care Plan discussed with: Patient/Family Disposition: Home w/Family and TBD Hospital Problems  Date Reviewed: 8/20/2020 Codes Class Noted POA * (Principal) Osteomyelitis ([facility] ICD-10-CM: M86.9 ICD-9-CM: 730.20  12/28/2020 Yes Review of Systems:  
Negative unless stated above Vital Signs:  
 Last 24hrs VS reviewed since prior progress note. Most recent are: 
Visit Vitals BP (!) 158/87 (BP 1 Location: Right arm, BP Patient Position: At rest) Pulse 76 Temp 98.1 °F (36.7 °C) Resp 18 Ht 6' (1.829 m) Wt 114 kg (251 lb 4.8 oz) SpO2 96% BMI 34.08 kg/m² Intake/Output Summary (Last 24 hours) at 1/4/2021 1310 Last data filed at 1/3/2021 1901 Gross per 24 hour Intake 120 ml Output 450 ml Net -330 ml Physical Examination:  
I had a face to face encounter with this patient and independently examined them on January 4, 2021 as outlined below: 
     
Constitutional:  No acute distress, cooperative, pleasant ENT:  Oral mucous moist, oropharynx benign. Neck supple Resp:  CTA bilaterally. No wheezing/rhonchi/rales. No accessory muscle use CV:  Regular rhythm, normal rate, no murmurs, gallops, rubs GI:  Soft, obese, non distended, non tender. normoactive bowel sounds, no hepatosplenomegaly Musculoskeletal:  trace edema, warm, 2+ pulses throughout. Right lower extremity wrapped Neurologic:  Moves all extremities Psych:  Good insight, Not anxious nor agitated. Data Review: Review and/or order of clinical lab test 
Review and/or order of tests in the radiology section of CPT Review and/or order of tests in the medicine section of CPT Labs:  
 
Recent Labs 01/04/21 
1372 01/03/21 
3196 WBC 13.1* 12.4* HGB 7.3* 7.3* HCT 22.9* 22.8* * 390 Recent Labs 01/04/21 
0325 01/03/21 
0323 01/02/21 
0230 * 133* 133*  
K 5.4* 5.1 5.0  
 107 108 CO2 21 20* 20* BUN 43* 44* 51* CREA 2.42* 2.59* 2.90* GLU 88 198* 84  
CA 8.4* 8.1* 8.3*  
MG 2.1 2.1  --   
PHOS 4.6 4.1  -- No results for input(s): ALT, AP, TBIL, TBILI, TP, ALB, GLOB, GGT, AML, LPSE in the last 72 hours. No lab exists for component: SGOT, GPT, AMYP, HLPSE No results for input(s): INR, PTP, APTT, INREXT, INREXT in the last 72 hours. No results for input(s): FE, TIBC, PSAT, FERR in the last 72 hours. Lab Results Component Value Date/Time Folate 20.0 03/16/2019 04:06 AM  
  
No results for input(s): PH, PCO2, PO2 in the last 72 hours. No results for input(s): CPK, CKNDX, TROIQ in the last 72 hours. No lab exists for component: CPKMB No results found for: CHOL, CHOLX, CHLST, CHOLV, HDL, HDLP, LDL, LDLC, DLDLP, TGLX, TRIGL, TRIGP, CHHD, CHHDX Lab Results Component Value Date/Time Glucose (POC) 110 (H) 01/04/2021 11:30 AM  
 Glucose (POC) 93 01/04/2021 06:34 AM  
 Glucose (POC) 94 01/03/2021 10:10 PM  
 Glucose (POC) 130 (H) 01/03/2021 04:15 PM  
 Glucose (POC) 156 (H) 01/03/2021 11:07 AM  
 
Lab Results Component Value Date/Time  Color YELLOW/STRAW 01/03/2021 12:56 PM  
 Appearance CLEAR 01/03/2021 12:56 PM  
 Specific gravity 1.015 01/03/2021 12:56 PM  
 pH (UA) 5.5 01/03/2021 12:56 PM  
 Protein 300 (A) 01/03/2021 12:56 PM  
 Glucose 100 (A) 01/03/2021 12:56 PM  
 Ketone Negative 01/03/2021 12:56 PM  
 Bilirubin Negative 01/03/2021 12:56 PM  
 Urobilinogen 0.2 01/03/2021 12:56 PM  
 Nitrites Negative 01/03/2021 12:56 PM  
 Leukocyte Esterase Negative 01/03/2021 12:56 PM  
 Epithelial cells FEW 01/03/2021 12:56 PM  
 Bacteria 1+ (A) 01/03/2021 12:56 PM  
 WBC 0-4 01/03/2021 12:56 PM  
 RBC 0-5 01/03/2021 12:56 PM  
 
 
 
Medications Reviewed:  
 
Current Facility-Administered Medications Medication Dose Route Frequency  pantoprazole (PROTONIX) 40 mg in 0.9% sodium chloride 10 mL injection  40 mg IntraVENous Q12H  
 enzalutamide (XTANDI) chemo capsule 80 mg (patient supplied) (Patient Supplied)  80 mg Oral BID  fluticasone propionate (FLONASE) 50 mcg/actuation nasal spray 2 Spray  2 Spray Both Nostrils DAILY  0.9% sodium chloride infusion 250 mL  250 mL IntraVENous PRN  
 0.9% sodium chloride infusion 250 mL  250 mL IntraVENous PRN  piperacillin-tazobactam (ZOSYN) 3.375 g in 0.9% sodium chloride (MBP/ADV) 100 mL MBP  3.375 g IntraVENous Q8H  
 oxyCODONE-acetaminophen (PERCOCET) 5-325 mg per tablet 1 Tab  1 Tab Oral Q4H PRN  
 0.9% sodium chloride infusion 250 mL  250 mL IntraVENous PRN  
 epoetin chi-epbx (RETACRIT) injection 20,000 Units  20,000 Units SubCUTAneous Q MON, WED & FRI  iron sucrose (VENOFER) 200 mg in 0.9% sodium chloride 100 mL IVPB  200 mg IntraVENous Q24H  
 diclofenac (VOLTAREN) 1 % topical gel 2 g  2 g Topical QID  acetaminophen (TYLENOL) tablet 500 mg  500 mg Oral Q4H PRN  
 amLODIPine (NORVASC) tablet 5 mg  5 mg Oral DAILY  [Held by provider] aspirin delayed-release tablet 81 mg  81 mg Oral DAILY  bicalutamide (CASODEX) tablet 50 mg  50 mg Oral DAILY  gabapentin (NEURONTIN) capsule 100 mg  100 mg Oral TID  hydrALAZINE (APRESOLINE) tablet 100 mg  100 mg Oral TID  tamsulosin (FLOMAX) capsule 0.4 mg  0.4 mg Oral DAILY  nicotine (NICODERM CQ) 14 mg/24 hr patch 1 Patch  1 Patch TransDERmal DAILY  insulin lispro (HUMALOG) injection   SubCUTAneous AC&HS  
 glucose chewable tablet 16 g  4 Tab Oral PRN  
 dextrose (D50W) injection syrg 12.5-25 g  12.5-25 g IntraVENous PRN  
  glucagon (GLUCAGEN) injection 1 mg  1 mg IntraMUSCular PRN  
 [Held by provider] heparin (porcine) injection 5,000 Units  5,000 Units SubCUTAneous Q8H  
 insulin lispro (HUMALOG) injection 20 Units  20 Units SubCUTAneous TIDAC  insulin glargine (LANTUS) injection 50 Units  50 Units SubCUTAneous QHS  
 
______________________________________________________________________ EXPECTED LENGTH OF STAY: 3d 19h ACTUAL LENGTH OF STAY:          7 [address] [facility],

## 2021-01-04 NOTE — WOUND CARE
WOUND CARE CONSULT by physician request to apply wound VAC to right foot ulcer today. Patient requested pain medication during dressing removal.  He is NPO and cannot have Percocet. RN reached out to MD for IV pain medication orders. Checked chart several times, no IV medication for pain. Patient now off the floor for EGD. TABITHA Middleton, RN, [facility] Office x [phone] Pager x [phone]

## 2021-01-04 NOTE — PROGRESS NOTES
Nephrology Progress Note Cristin Andrews III Date of Admission : 12/28/2020 CC: Follow up for KATHI on CKD Assessment and Plan KATHI on CKD  
- suspect volume depletion from diuretics and GI symptoms vs ATN 
- U/S neg for hydro - UA with proteinuria - Cr trending down. cont current care 
- daily labs 
  
CKD Stage IIIa: 
-  Presumed 2/2 DM and HTN 
- nephrotic range proteinuria 
- baseline Cr 1.7 mg/dl  
- followed by Dr. Miriam Mcmillan 
  
Hx of prostate cancer s/p XRT 
  
Metabolic acidosis: 
- cont oral bicarb 
  
Anemia in CKD + blood loss: 
- received PRBCs on 1/2 
- cont JAZ + IV iron - GI planning EGD 1/4 
  
Type II DM: 
- on insulin 
  
HTN :  
- Continue current meds  
  
R foot osteo: 
- on abx 
- s/p debridement on 12/30 Group B strep bacteremia: 
- Abx per ID 
  
B/l Airspace disease: 
- ? PNA vs edema 
- COVID neg 
- on zosyn Interval History: 
Seen and examined. Voiding well w/o kumar. Cr better. On RA, dyspnea improving. No cp, sob, n/v/d reported. Current Medications: all current  Medications have been eviewed in [facility] Review of Systems: Pertinent items are noted in HPI. Objective: 
Vitals:   
Vitals:  
 01/03/21 1422 01/03/21 2028 01/04/21 6106 01/04/21 0818 BP: (!) 150/83 (!) 150/73 (!) 154/71 (!) 158/87 Pulse: 81 79 80 76 Resp: 16 18 18 18 Temp: 97.4 °F (36.3 °C) 97.7 °F (36.5 °C) 97.7 °F (36.5 °C) 98.1 °F (36.7 °C) SpO2: 97% 96% 97% 96% Weight:   114 kg (251 lb 4.8 oz) Height:      
 
Intake and Output: 
No intake/output data recorded. 01/02 1901 - 01/04 0700 In: 120 [P.O.:120] Out: 450 [Urine:450] Physical Examination: 
General: NAD,Conversant, obese Neck:  Supple, no mass Resp:  Lungs CTA B/L, no wheezing , normal respiratory effort CV:  RRR,  no murmur or rub,  trace LLE edema, RLE in wrap GI:  Soft, NT, + Bowel sounds, no hepatosplenomegaly Neurologic:  Non focal 
Psych:             AAO x 3 appropriate affect Skin:  No Rash :  Bynum in place 
 
[]    High complexity decision making was performed 
[]    Patient is at high-risk of decompensation with multiple organ involvement Lab Data Personally Reviewed: I have reviewed all the pertinent labs, microbiology data and radiology studies during assessment. Recent Labs 01/04/21 0325 01/03/21 0323 01/02/21 
0230 * 133* 133*  
K 5.4* 5.1 5.0  
 107 108 CO2 21 20* 20* GLU 88 198* 84 BUN 43* 44* 51* CREA 2.42* 2.59* 2.90* CA 8.4* 8.1* 8.3*  
MG 2.1 2.1  --   
PHOS 4.6 4.1  --   
 
Recent Labs 01/04/21 0325 01/03/21 0323 01/02/21 
0230 WBC 13.1* 12.4* 12.5* HGB 7.3* 7.3* 6.8* HCT 22.9* 22.8* 20.9*  
* 390 366 No results found for: SDES Lab Results Component Value Date/Time Culture result: (A) 12/30/2020 01:14 PM  
  LIGHT ANAEROBIC GRAM NEGATIVE RODS BETA LACTAMASE POSITIVE Culture result: LIGHT PROTEUS MIRABILIS (A) 12/30/2020 01:14 PM  
 Culture result: (A) 12/30/2020 01:14 PM  
  LIGHT STREPTOCOCCI, BETA HEMOLYTIC GROUP B Penicillin and ampicillin are drugs of choice for treatment of beta-hemolytic streptococcal infections. Susceptibility testing of penicillins and beta-lactams approved by the FDA for treatment of beta-hemolytic streptococcal infections need not be performed routinely, because nonsusceptible isolates are extremely rare. CLSI 2012 Culture result: LIGHT ENTEROCOCCUS FAECALIS (A) 12/30/2020 01:14 PM  
 
Recent Results (from the past 24 hour(s)) GLUCOSE, POC Collection Time: 01/03/21 11:07 AM  
Result Value Ref Range Glucose (POC) 156 (H) 65 - 100 mg/dL Performed by Leslie Rodarte URINALYSIS W/ RFLX MICROSCOPIC Collection Time: 01/03/21 12:56 PM  
Result Value Ref Range Color YELLOW/STRAW Appearance CLEAR CLEAR Specific gravity 1.015 1.003 - 1.030    
 pH (UA) 5.5 5.0 - 8.0 Protein 300 (A) NEG mg/dL Glucose 100 (A) NEG mg/dL Ketone Negative NEG mg/dL Bilirubin Negative NEG Blood Negative NEG Urobilinogen 0.2 0.2 - 1.0 EU/dL Nitrites Negative NEG Leukocyte Esterase Negative NEG    
 WBC 0-4 0 - 4 /hpf  
 RBC 0-5 0 - 5 /hpf Epithelial cells FEW FEW /lpf Bacteria 1+ (A) NEG /hpf Mucus TRACE (A) NEG /lpf Amorphous Crystals FEW (A) NEG Other: Renal Epithelial cells Present PROTEIN/CREATININE RATIO, URINE Collection Time: 01/03/21 12:56 PM  
Result Value Ref Range Protein, urine random 247 (H) 0.0 - 11.9 mg/dL Creatinine, urine 45.80 mg/dL Protein/Creat. urine Ratio 5.4 URINE CULTURE HOLD SAMPLE Collection Time: 01/03/21 12:56 PM  
 Specimen: Serum Result Value Ref Range Urine culture hold Urine on hold in Microbiology dept for 2 days. If unpreserved urine is submitted, it cannot be used for addtional testing after 24 hours, recollection will be required. GLUCOSE, POC Collection Time: 01/03/21  4:15 PM  
Result Value Ref Range Glucose (POC) 130 (H) 65 - 100 mg/dL Performed by Jeremiah Sanchez GLUCOSE, POC Collection Time: 01/03/21 10:10 PM  
Result Value Ref Range Glucose (POC) 94 65 - 100 mg/dL Performed by Elma Louise CBC W/O DIFF Collection Time: 01/04/21  3:25 AM  
Result Value Ref Range WBC 13.1 (H) 4.1 - 11.1 K/uL  
 RBC 2.66 (L) 4.10 - 5.70 M/uL HGB 7.3 (L) 12.1 - 17.0 g/dL HCT 22.9 (L) 36.6 - 50.3 % MCV 86.1 80.0 - 99.0 FL  
 MCH 27.4 26.0 - 34.0 PG  
 MCHC 31.9 30.0 - 36.5 g/dL  
 RDW 16.7 (H) 11.5 - 14.5 % PLATELET 089 (H) 739 - 400 K/uL MPV 9.4 8.9 - 12.9 FL  
 NRBC 0.3 (H) 0  WBC ABSOLUTE NRBC 0.04 (H) 0.00 - 0.01 K/uL METABOLIC PANEL, BASIC Collection Time: 01/04/21  3:25 AM  
Result Value Ref Range Sodium 134 (L) 136 - 145 mmol/L Potassium 5.4 (H) 3.5 - 5.1 mmol/L Chloride 107 97 - 108 mmol/L  
 CO2 21 21 - 32 mmol/L Anion gap 6 5 - 15 mmol/L Glucose 88 65 - 100 mg/dL  BUN 43 (H) 6 - 20 MG/DL  
 Creatinine 2.42 (H) 0.70 - 1.30 MG/DL  
 BUN/Creatinine ratio 18 12 - 20 GFR est AA 32 (L) >60 ml/min/1.73m2 GFR est non-AA 27 (L) >60 ml/min/1.73m2 Calcium 8.4 (L) 8.5 - 10.1 MG/DL MAGNESIUM Collection Time: 01/04/21  3:25 AM  
Result Value Ref Range Magnesium 2.1 1.6 - 2.4 mg/dL PHOSPHORUS Collection Time: 01/04/21  3:25 AM  
Result Value Ref Range Phosphorus 4.6 2.6 - 4.7 MG/DL  
GLUCOSE, POC Collection Time: 01/04/21  6:34 AM  
Result Value Ref Range Glucose (POC) 93 65 - 100 mg/dL Performed by Dinesh Burger MD 
[address] [place] [address], [address] [facility] Phone - [phone] Fax - [phone] 
[email or web address]

## 2021-01-04 NOTE — PROGRESS NOTES
Progress Note Patient: Pennie Muir MRN: [id number]  SSN: [id number] YOB: 1950  Age: 72 y.o. Sex: male Admit Date: 2020 
5 Days Post-Op Procedure:   Procedure(s): DEBRIDEMENT OF RIGHT FOOT ULCER WITH REMOVAL OF INFECTED BONE (URGENT) Subjective:  
 
Patient seen & examined at bedside. Denies any n/v/f/ns/c.   
 
Objective:  
 
Visit Vitals BP (!) 154/71 Pulse 80 Temp 97.7 °F (36.5 °C) Resp 18 Ht 6' (1.829 m) Wt 114 kg (251 lb 4.8 oz) SpO2 97% BMI 34.08 kg/m² Right Foot Exam: dressing clean, dry, and intact. No strikethrough. Labs/Radiology Review: images and reports reviewed Assessment:  
 
Hospital Problems  Date Reviewed: 2020 Codes Class Noted POA * (Principal) [facility] ICD-10-CM: M86.9 ICD-9-CM: 730.20  2020 Yes Plan/Recommendations/Medical Decision Makin.) Dressing intact and no strikethrough noted. Will order wound vac to right foot today. 2.) Continue antibxs per ID. Path pending. 3.) Continue bedrest.  
4.) Will follow

## 2021-01-04 NOTE — PROGRESS NOTES
ID Progress Note 2021 Subjective: Afebrile. No complaints. Objective:  
 
Vitals:  
Visit Vitals BP (!) 148/84 (BP 1 Location: Left arm, BP Patient Position: At rest) Pulse 67 Temp 98 °F (36.7 °C) Resp 20 Ht 6' (1.829 m) Wt 114 kg (251 lb 4.8 oz) SpO2 94% BMI 34.08 kg/m² Tmax:  Temp (24hrs), Av.9 °F (36.6 °C), Min:96.8 °F (36 °C), Max:98.9 °F (37.2 °C) Exam: Not in distress Lungs: clear to auscultation Heart: s1, s2, no murmurs Abdomen: soft, nontender Labs:  
Lab Results Component Value Date/Time WBC 13.1 (H) 2021 03:25 AM  
 Hemoglobin (POC) 6.5 2020 01:59 PM  
 HGB 7.3 (L) 2021 03:25 AM  
 HCT 22.9 (L) 2021 03:25 AM  
 PLATELET 506 (H)  03:25 AM  
 MCV 86.1 2021 03:25 AM  
 
Lab Results Component Value Date/Time Sodium 134 (L) 2021 03:25 AM  
 Potassium 5.4 (H) 2021 03:25 AM  
 Chloride 107 2021 03:25 AM  
 CO2 21 2021 03:25 AM  
 Anion gap 6 2021 03:25 AM  
 Glucose 88 2021 03:25 AM  
 BUN 43 (H) 2021 03:25 AM  
 Creatinine 2.42 (H) 2021 03:25 AM  
 BUN/Creatinine ratio 18 2021 03:25 AM  
 GFR est AA 32 (L) 2021 03:25 AM  
 GFR est non-AA 27 (L) 2021 03:25 AM  
 Calcium 8.4 (L) 2021 03:25 AM  
 Bilirubin, total 0.4 2020 03:41 PM  
 Alk. phosphatase 162 (H) 2020 03:41 PM  
 Protein, total 8.3 (H) 2020 03:41 PM  
 Albumin 1.6 (L) 2020 04:04 AM  
 Globulin 6.5 (H) 2020 03:41 PM  
 A-G Ratio 0.3 (L) 2020 03:41 PM  
 ALT (SGPT) 10 (L) 2020 03:41 PM  
 
 
 
 
 
Assessment:  
#1 osteomyelitis of the right foot 
  
#2 Gram-positive cocci bacteremia 
  
#3 renal insufficiency 
  
#4 diabetes 
  
#5 prostate cancer 
  
#6 hypertension 
  
#7 heart failure 
  
  
  
 
 
  
 
Recommendations:  
 
Continue zosyn. There is OM on the surgical margin. He will need prolonged therapy. Kenia Smiley MD

## 2021-01-05 NOTE — CONSULTS
Comprehensive Nutrition Assessment Type and Reason for Visit: Initial, Consult, Wound Nutrition Recommendations/Plan: 1. Consistent CHO 2400 kcal, low K+ diet 2. Monitor Phos, consider binder vs restriction if no improvement 3. RD added Ensure High Protein (low K+), Nepro, and Yoel for pt to try Nutrition Assessment:    
72 y.o. male with PMHx of prostate CA s/p XRT on oral chemo, DM, CHF, and HTN. Admitted with severe sepsis 2° osteomyelitis of R foot, podiatry following. S/p I&D and removal of infected bone 12/30, wound VAC placement today 1/5, to get powerline for tomorrow for long-term IV abx. Noted acute on chronic anemia - GI following. EGD 1/4:  Dieulafoy's lesion, actively bleed s/p clipping; duodenal ulcer, non-bleeding. On PPI. Has received 6 units PRBC since admission. Completed 5 days of Venofer. KATHI on CKD, nephrology following. Hyperkalemia s/p Veltassa today, hyperphosphatemia. Starting bicarb today. SOB 1/4 improved, s/p 1 dose IV steroids and breathing treatment. Pt from home, wheelchair bound at baseline, has caregiver 5 days/week x 6 hours/day. Concern with discharge disposition, CM involved. Pt unwilling to work with PT today. Upon RD visit to room, pt pleasant and receptive. Reports a good appetite, but c/o lack of flavor on tray. States he doesn't need salt, knows it isn't good for him, but just more seasoning in general.  Noted pt not making own meal selections which he is fully capable of doing. Message sent to diet office for someone to come take order. Discussed nutrition and wound healing. Pt verbalized understanding. Receptive to trying vanilla Ensure High Protein (less kcal and K+ even compared to Nepro) and Yoel supplements.  lb, denies wt loss. Edentulous, but takes a regular diet at home without difficulty. However, states having more difficult with our food. Preferences updated for soft solids. Pt should likely have K+ restriction since has good PO intake. Monitor Phos improvements, if none would consider Phos binder. RD will continue to follow along with PO adequacy. Malnutrition Assessment: 
Malnutrition Status:  No malnutrition Nutritionally Significant Medications:  
Retacrit, Lantus 50 units, correctional scale, Humalog 20 units TID ac meals, Percocet, Protonix, Zosyn, sodium bicarb,  
 
 
Estimated Daily Nutrient Needs: 
Energy (kcal): 0747-6820(MSJ x 1.2 x 1-1.2) Protein (g): 115-135(1-1.2 gm/kg ~20% with close monitoring of kidney fx) Fluid (ml/day): 1 mL/kcal 
 
Nutrition Related Findings:  
Edema: 1-2+ bilat LE Last BM: 1/3 - soft Wounds:   
Wound vac(R foot) Current Nutrition Therapies: DIET DIABETIC CONSISTENT CARB Regular Meal intake:  
Patient Vitals for the past 168 hrs: 
 % Diet Eaten 01/03/21 1810 100 % 01/02/21 1823 100 % 01/02/21 1329 25 % 01/02/21 0853 100 % 01/01/21 1231 100 % 01/01/21 0823 100 % 12/31/20 1215 100 % 12/31/20 0950 100 % 12/30/20 1510 100 % 12/30/20 1454 100 % Anthropometric Measures: 
· Height:  6' (182.9 cm) · Current Body Wt:  112 kg (246 lb 14.6 oz) · Admission Body Wt:  270 lb(source unknown) · Usual Body Wt:  113.4 kg (250 lb) · Ideal Body Wt:  178 lbs:  138.7 % · BMI Category:  Obese class 1 (BMI 30.0-34. 9) Wt Readings from Last 20 Encounters:  
01/05/21 112 kg (246 lb 14.6 oz) 02/24/20 127 kg (280 lb) 02/10/20 127 kg (280 lb) 05/28/19 124.7 kg (275 lb) 04/04/19 124.7 kg (275 lb)  
03/31/19 125 kg (275 lb 9.2 oz)  
03/20/19 122.8 kg (270 lb 12.8 oz) 04/26/18 130.6 kg (288 lb)  
02/27/18 126.1 kg (278 lb)  
06/15/17 124.2 kg (273 lb 14.4 oz) 04/28/17 127.5 kg (281 lb) 10/21/16 117.9 kg (260 lb) 10/20/16 117.5 kg (259 lb) 10/19/16 117.5 kg (259 lb) 10/12/16 120.7 kg (266 lb)  
09/14/16 122.5 kg (270 lb) 08/02/16 117.9 kg (260 lb)  
07/12/16 115.2 kg (254 lb) 06/30/16 115.2 kg (254 lb) 06/02/16 117 kg (258 lb) Nutrition Diagnosis:  
· Increased nutrient needs related to increased demand for energy/nutrients as evidenced by wounds · Altered nutrition-related lab values related to renal dysfunction as evidenced by lab values Nutrition Interventions:  
Food and/or Nutrient Delivery: Modify current diet, Start oral nutrition supplement, Mineral supplement, Vitamin supplement Nutrition Education and Counseling: Counseling initiated Coordination of Nutrition Care: Continue to monitor while inpatient, Interdisciplinary rounds Goals: 
continued PO >/=75% of most meals with consumption of at least 16 gm supplemental protein over next 7 days; improvements in K+ and Phos levels within 3 days Nutrition Monitoring and Evaluation:  
Behavioral-Environmental Outcomes: None identified Food/Nutrient Intake Outcomes: Food and nutrient intake, Supplement intake, Vitamin/mineral intake Physical Signs/Symptoms Outcomes: Biochemical data, GI status, Fluid status or edema, Weight, Skin, Meal time behavior, Nutrition focused physical findings Discharge Planning:   
Continue oral nutrition supplement Recent Labs 01/05/21 
9532 01/04/21 
4914 01/03/21 
1184 * 88 198* BUN 42* 43* 44* CREA 2.27* 2.42* 2.59* * 134* 133*  
K 5.8* 5.4* 5.1  107 107 CO2 20* 21 20* CA 8.2* 8.4* 8.1*  
PHOS 5.6* 4.6 4.1 MG 2.2 2.1 2.1 No results for input(s): ALT, AP, TBIL, TBILI, TP, ALB, GLOB, GGT, AML, LPSE in the last 72 hours. No lab exists for component: SGOT, GPT, AMYP, HLPSE No results for input(s): LAC in the last 72 hours. Recent Labs 01/05/21 
1834 01/04/21 
8843 WBC 16.3* 13.1* HGB 7.5* 7.3* HCT 23.9* 22.9*  
* 436* No results for input(s): PREALB in the last 72 hours. No results for input(s): TRIGL in the last 72 hours. Recent Labs 01/05/21 
1250 01/05/21 
0655 01/04/21 
2131 01/04/21 7078 01/04/21 
1130 01/04/21 
2329 01/03/21 
2210 01/03/21 
1615 01/03/21 
1107 01/03/21 
0650 01/02/21 
2225 01/02/21 
1618 GLUCPOC 186* 272* 124* 109* 110* 93 94 130* 156* 224* 116* 160* Lab Results Component Value Date/Time Hemoglobin A1c 8.0 (H) 03/28/2019 07:35 PM  
 Hemoglobin A1c (POC) 8.1 01/07/2020 01:02 PM  
 
 
Iron Profile Iron Date Value Ref Range Status 12/30/2020 25 (L) 35 - 150 ug/dL Final  
12/28/2020 21 (L) 35 - 150 ug/dL Final  
03/16/2019 68 35 - 150 ug/dL Final  
 
TIBC Date Value Ref Range Status 12/30/2020 177 (L) 250 - 450 ug/dL Final  
12/28/2020 203 (L) 250 - 450 ug/dL Final  
03/16/2019 236 (L) 250 - 450 ug/dL Final  
 
Iron % saturation Date Value Ref Range Status 12/30/2020 14 (L) 20 - 50 % Final  
12/28/2020 10 (L) 20 - 50 % Final  
03/16/2019 29 20 - 50 % Final  
 
 
Ferritin Date Value Ref Range Status 12/30/2020 266 26 - 388 NG/ML Final  
12/28/2020 347 26 - 388 NG/ML Final  
 
 
B Vitamins Folate Date Value Ref Range Status 03/16/2019 20.0 5.0 - 21.0 ng/mL Final  
 
Vitamin B12 Date Value Ref Range Status 12/28/2020 913 193 - 986 pg/mL Final  
03/16/2019 620 193 - 986 pg/mL Final  
 
 
 
 
Cecilia Escobar RD Available via [employer] Or Pager# [phone]

## 2021-01-05 NOTE — PROGRESS NOTES
Progress Note Patient: Everardo Willson MRN: [id number]  SSN: [id number] YOB: 1950  Age: 72 y.o. Sex: male Admit Date: 2020 Subjective:  
 
Patient seen & examined at bedside. Denies any n/v/f/ns/c. 
 
Objective:  
 
Visit Vitals BP (!) 165/75 Pulse 87 Temp 97.9 °F (36.6 °C) Resp 18 Ht 6' (1.829 m) Wt 112 kg (246 lb 14.6 oz) SpO2 96% BMI 33.49 kg/m² Right Foot Exam: wound vac dressing intact. Vac running at continuous therapy. Labs/Radiology Review: images and reports reviewed Assessment:  
 
Hospital Problems  Date Reviewed: 2020 Codes Class Noted POA * (Principal) Osteomyelitis ([facility] ICD-10-CM: M86.9 ICD-9-CM: 730.20  2020 Yes Plan/Recommendations/Medical Decision Makin.) Continue antibxs per ID. Path shows osteo at margin. Pt will need 6 weeks of IV antibxs upon discharge. 2.) Continue wound vac therapy. My portion of wound vac paperwork completed and on hard chart. 3.) Continue strict nwb to right foot. 4.) Ok to d/c from my standpoint once final antibx orders written and home wound vac arranged. See below for discharge instructions. Discharge Instructions 1.) Follow up with Dr. Hakan Baker (Foot Doc) at [address] [place] [address] ([phone]) 1-2 weeks following hospital discharge. 2.) Apply wound vac to right plantar foot ulcer and set to 125 mmHg continuous therapy. Change vac dressing MWF.  
3.) No weightbearing to right foot.   Elevate foot when at rest.

## 2021-01-05 NOTE — PROGRESS NOTES
Hospitalist Progress Note 16[address] [facility],  Answering service: [phone] OR [phone] from in house phone Date of Service:  2021 NAME:  Geno Alexander III 
:  1950 MRN:  [id number] Admission Summary: As per initial admission summary Clarita Sanchez is a 72 y.o. male with PMH of prostatic cancer s/p radiation, IDDM, heart failure, htn. He apparently told the ER physician that he c/o onset of diarrhea yesterday and some chills over the last 2 days, with occasional cough. For me, however, he denies all of these symptoms and states he came in because his caregiver was concerned d/t his lethargy over the last several days to weeks. He lives home alone and has a caregiver five times a week. He denies cp, sob, fever, chills, sweats, N/V. He has had a wound on his right foot for months. He denies any foot pain d/t neuropathy. He goes to [facility] for nephrology and states he last saw them a few months ago. Prior to today, he believes it has been several months since he last had any blood work. Denies etoh or illicits. Smokes just under 1 PPD. Currently no acute complaints except asking for water. States he takes all his meds daily. Interval history / Subjective: Follow up osteomyelitis. Patient seen and examined. He was short of breath overnight, no complaints this AM. Received IV steroids with increase in WBC. Discussed EGD findings with patient. Wound vac today. Will need long term antibiotics. Discussed need for IV antibiotics. He defers SNF. Assessment & Plan:  
 
Severe sepsis 2/2 osteomyelitis right foot 
-Podiatry consulted  
-Underwent debridement right foot ulcer with removal of infected bone 2020 
-Cultures with proteus mirabilis, stept group B, enterococcus -ID following.  Antibiotics transitioned to zosyn  
-surgical path with + margins, will need prolonged antibiotics 
-Powerline ordered for  
 -wound vac 1/5 
-non weight bearing right lower extremity (patient wheelchair bound at baseline) Shortness of breath 1/4: improved 
-s/p steroids, breathing treatment, hold further steroids 
-chest x-ray ordered Acute on chronic anemia, baseline ~9: persistent  
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 Duodenal ucler, non bleeding 
-Has received 6 units total during admission 
-Continue PPI 
  
Acute on CKD: improving 
-nephrology following, will initiate on bicarb, valtessa  
-kumar removed 1/2 
  
IDDM, last a1c 8.1 01/2020 
-continue long acting insulin, meal time, SSI  
-hyperglycemia 1/5 due to steroids  
  
HTN, controlled 
-holding amlodipine, continue hydralazine 
  
Hyponatremia 
-stable, montior Toxic metabolic encephalopathy/drowsiness: resolved 
  
Prostate ca 
-Cont home meds 
  
Tobacco abuse 
-continue patch Code status: full code DVT prophylaxis: scds Care Plan discussed with: Patient/Family Disposition: possible discharge 1/6 pending powerline, antibiotics, and home health set up Hospital Problems  Date Reviewed: 8/20/2020 Codes Class Noted POA * (Principal) Osteomyelitis ([facility] [address] ICD-10-CM: M86.9 ICD-9-CM: 730.20  12/28/2020 Yes Review of Systems:  
Negative unless stated above Vital Signs:  
 Last 24hrs VS reviewed since prior progress note. Most recent are: 
Visit Vitals BP (!) 165/75 Pulse 87 Temp 97.9 °F (36.6 °C) Resp 18 Ht 6' (1.829 m) Wt 112 kg (246 lb 14.6 oz) SpO2 96% BMI 33.49 kg/m² Intake/Output Summary (Last 24 hours) at 1/5/2021 2360 Last data filed at 1/4/2021 2335 Gross per 24 hour Intake 600 ml Output 350 ml Net 250 ml Physical Examination:  
I had a face to face encounter with this patient and independently examined them on January 5, 2021 as outlined below: 
     
Constitutional:  No acute distress, cooperative, pleasant ENT:  Oral mucous moist, oropharynx benign. Neck supple Resp:  CTA bilaterally. No wheezing/rhonchi/rales. No accessory muscle use CV:  Regular rhythm, normal rate, no murmurs, gallops, rubs GI:  Soft, obese, non distended, non tender. normoactive bowel sounds, no hepatosplenomegaly Musculoskeletal:  trace edema, warm, 2+ pulses throughout. Right lower extremity wrapped Neurologic:  Moves all extremities Psych:  Good insight, Not anxious nor agitated. Data Review:  
 Review and/or order of clinical lab test 
Review and/or order of tests in the radiology section of CPT Review and/or order of tests in the medicine section of CPT Labs:  
 
Recent Labs 01/05/21 
8612 01/04/21 
1361 WBC 16.3* 13.1* HGB 7.5* 7.3* HCT 23.9* 22.9*  
* 436* Recent Labs 01/05/21 
7781 01/04/21 
8073 01/03/21 
6050 * 134* 133*  
K 5.8* 5.4* 5.1  107 107 CO2 20* 21 20* BUN 42* 43* 44* CREA 2.27* 2.42* 2.59* * 88 198* CA 8.2* 8.4* 8.1*  
MG 2.2 2.1 2.1 PHOS 5.6* 4.6 4.1 No results for input(s): ALT, AP, TBIL, TBILI, TP, ALB, GLOB, GGT, AML, LPSE in the last 72 hours. No lab exists for component: SGOT, GPT, AMYP, HLPSE No results for input(s): INR, PTP, APTT, INREXT, INREXT in the last 72 hours. No results for input(s): FE, TIBC, PSAT, FERR in the last 72 hours. Lab Results Component Value Date/Time Folate 20.0 03/16/2019 04:06 AM  
  
No results for input(s): PH, PCO2, PO2 in the last 72 hours. No results for input(s): CPK, CKNDX, TROIQ in the last 72 hours. No lab exists for component: CPKMB No results found for: CHOL, CHOLX, CHLST, CHOLV, HDL, HDLP, LDL, LDLC, DLDLP, TGLX, TRIGL, TRIGP, CHHD, CHHDX Lab Results Component Value Date/Time  Glucose (POC) 272 (H) 01/05/2021 06:55 AM  
 Glucose (POC) 124 (H) 01/04/2021 09:31 PM  
 Glucose (POC) 109 (H) 01/04/2021 04:33 PM  
 Glucose (POC) 110 (H) 01/04/2021 11:30 AM  
 Glucose (POC) 93 01/04/2021 06:34 AM  
 
Lab Results Component Value Date/Time Color YELLOW/STRAW 01/03/2021 12:56 PM  
 Appearance CLEAR 01/03/2021 12:56 PM  
 Specific gravity 1.015 01/03/2021 12:56 PM  
 pH (UA) 5.5 01/03/2021 12:56 PM  
 Protein 300 (A) 01/03/2021 12:56 PM  
 Glucose 100 (A) 01/03/2021 12:56 PM  
 Ketone Negative 01/03/2021 12:56 PM  
 Bilirubin Negative 01/03/2021 12:56 PM  
 Urobilinogen 0.2 01/03/2021 12:56 PM  
 Nitrites Negative 01/03/2021 12:56 PM  
 Leukocyte Esterase Negative 01/03/2021 12:56 PM  
 Epithelial cells FEW 01/03/2021 12:56 PM  
 Bacteria 1+ (A) 01/03/2021 12:56 PM  
 WBC 0-4 01/03/2021 12:56 PM  
 RBC 0-5 01/03/2021 12:56 PM  
 
 
 
Medications Reviewed:  
 
Current Facility-Administered Medications Medication Dose Route Frequency  sodium chloride (NS) flush 5-40 mL  5-40 mL IntraVENous Q8H  
 sodium chloride (NS) flush 5-40 mL  5-40 mL IntraVENous PRN  pantoprazole (PROTONIX) 40 mg in 0.9% sodium chloride 10 mL injection  40 mg IntraVENous Q12H  
 enzalutamide (XTANDI) chemo capsule 80 mg (patient supplied) (Patient Supplied)  80 mg Oral BID  fluticasone propionate (FLONASE) 50 mcg/actuation nasal spray 2 Spray  2 Spray Both Nostrils DAILY  0.9% sodium chloride infusion 250 mL  250 mL IntraVENous PRN  
 0.9% sodium chloride infusion 250 mL  250 mL IntraVENous PRN  piperacillin-tazobactam (ZOSYN) 3.375 g in 0.9% sodium chloride (MBP/ADV) 100 mL MBP  3.375 g IntraVENous Q8H  
 oxyCODONE-acetaminophen (PERCOCET) 5-325 mg per tablet 1 Tab  1 Tab Oral Q4H PRN  
 0.9% sodium chloride infusion 250 mL  250 mL IntraVENous PRN  
 epoetin chi-epbx (RETACRIT) injection 20,000 Units  20,000 Units SubCUTAneous Q MON, WED & FRI  iron sucrose (VENOFER) 200 mg in 0.9% sodium chloride 100 mL IVPB  200 mg IntraVENous Q24H  
 diclofenac (VOLTAREN) 1 % topical gel 2 g  2 g Topical QID  acetaminophen (TYLENOL) tablet 500 mg  500 mg Oral Q4H PRN  
  amLODIPine (NORVASC) tablet 5 mg  5 mg Oral DAILY  [Held by provider] aspirin delayed-release tablet 81 mg  81 mg Oral DAILY  bicalutamide (CASODEX) tablet 50 mg  50 mg Oral DAILY  gabapentin (NEURONTIN) capsule 100 mg  100 mg Oral TID  hydrALAZINE (APRESOLINE) tablet 100 mg  100 mg Oral TID  tamsulosin (FLOMAX) capsule 0.4 mg  0.4 mg Oral DAILY  nicotine (NICODERM CQ) 14 mg/24 hr patch 1 Patch  1 Patch TransDERmal DAILY  insulin lispro (HUMALOG) injection   SubCUTAneous AC&HS  
 glucose chewable tablet 16 g  4 Tab Oral PRN  
 dextrose (D50W) injection syrg 12.5-25 g  12.5-25 g IntraVENous PRN  
 glucagon (GLUCAGEN) injection 1 mg  1 mg IntraMUSCular PRN  
 [Held by provider] heparin (porcine) injection 5,000 Units  5,000 Units SubCUTAneous Q8H  
 insulin lispro (HUMALOG) injection 20 Units  20 Units SubCUTAneous TIDAC  insulin glargine (LANTUS) injection 50 Units  50 Units SubCUTAneous QHS  
 
______________________________________________________________________ EXPECTED LENGTH OF STAY: 3d 19h ACTUAL LENGTH OF STAY:          8 [address] [facility]

## 2021-01-05 NOTE — WOUND CARE
Wound Care Note: Follow-up visit for wound VAC application to right foot Chart shows: 
Admitted for osteomyelitis s/p debridement of right foot ulcer with removal of infected bone on 12/30/20 Past Medical History:  
Diagnosis Date  Arthritis, Degenerative,  Knee 4/4/2011  Carcinoma, Prostate 4/4/2011  Degenerative disc disease 4/4/2011  Depression/ Anxiety 4/4/2011  Diabetes Mellitrus ( non-insulin dependent ) Type 2 4/4/2011  
 Heart failure ([address]  HEMATOCHEZIA 4/4/2011  Hypertrension 4/4/2011  Hypertriglyceridemia 4/4/2011  Ill-defined condition   
 lymphadema  Insomnia 4/4/2011  Laryngitis 4/4/2011  Mild Aortic insufficiency 4/4/2011  Mild Concentric LVH (left ventricular hypertrophy) 4/4/2011  Neuropathy 4/4/2011  Osteoarthritis 4/4/2011  Rotator Cuff Tendonitis 4/4/2011 Wound Culture obtained on 12/30/20 with results light beta hemolytic group B streptocicci and light enterococcus faecalis WBC = 16.3 on 1/5/21 Admitted from home Assessment:  
Patient is A&O x 4, communicative, continent with moderate assistance needed in repositioning. Bed: [facility] Diet: Diabetic consistent carb regular Patient pre-medicated for pain by RN. Bilateral heels and buttocks skin intact and without erythema. Faint palpable DP pulses bilaterally. Generalized edema and dry, flaky skin to lower legs and feet. 1. Right foot surgical wound measures 6 cm x 6.5 cm x 2 cm, wound bed is mostly pink with central area being tan slough, undermining at 9 o'clock is approximately 1.5 cm and boggy, small sero/sang drainage, wound edges are open, meg-wound with epithealiazed tissue and distal end with two sutures. Negative pressure wound therapy applied. Skin prep applied to meg-wound, drape applied toward lower leg for Trac pad, Mepitel One placed in wound bed and an additional piece used to cover sutures, 1 piece of black granufoam placed in wound bed and an additional piece used to bridge wound to lower leg, occlusively sealed at 125 mmhg continuous. TOTAL OF 2 BLACK GRANUFOAM AND 2 MEPITEL ONE PLACED IN WOUND. 2.  POA right dorsal foot with area of purple, non-blanchable tissue measuring 7 cm x 8 cm, no open area, meg-wound with dry, flaky skin. Venelex ointment to be ordered. 3.  Sacrum with area approximately 1.5 cm x 1.5 cm x 0 cm with purple, non-blanchable tissue, left and right sacrum with small areas of non-blanchable pink tissue, width measures 5.5 cm, wound edges are open, meg-wound intact. Venelex ointment and specialty bed to be ordered. 4.  POA bilateral lower legs with thick, dry, flaky, \"tree trunk\" looking skin. Lac-Hydrin lotion to be ordered for left leg (do not want to interfere with wound VAC dressing on right leg) 5. POA left 5th toe amputation. Open to air. Spoke with Dr. Cezar Gatuam, wound care orders obtained. Patient repositioned on left side. Heels offloaded on pillows. Recommendations:   
Maintain wound VAC 
VAC (NPWT) Dressing Orders: 
VAC Settings: 125 mmHg continuous/low suction Dressing change twice weekly by WOCN. Change canisters when full and measure output q shift. (located 5E or 4W supply room). For sudden development of blood or an increased amount of sunshine bleedin. Immediately turn off VAC system. 2.  Leave dressing in place. 3.  Hold pressure over wound. 4.  Call physician. If vacuum fails to maintain seal or unable to manage alarm for clogged tubing for >2hrs:    
1. Contact Physician 2. Cleanse wound with normal saline. 3.  Saline moistened fluff gauze to base. 4.  Cover with dry dressing. 5.  Secure with transparent film. 6.  Change q 8 hours and as needed. 7.  Notify Physician and WOCN that wound VAC dressing needs to be reapplied. If patient is discharged from our facility: 1. Remove all of equipment and sponge. 2.  Place moist dressing. 3.  Put VAC system and power cord in clear bag in utility room. (no red bags) For procedures or when pt is off the floor:  
Battery backup on our current inpatient systems lasts for 4 hours and may be used to prevent interruption of treatment- VAC should NOT be turned off. If disconnecting for more than 2 hours, with discharge, or a pt is admitted with a VAC: 
Discontinue the therapy/ begin wound care orders above, return any outpatient equipment to family or transferring facility, and notify the [id number] [facility] Nurse and ordering practitioner. Sacrum and right dorsal foot- Every 8 hours liberally apply Venelex ointment. Left lower leg/foot- Daily cleanse with soap and water using a wash cloth to remove loose, dry skin. Every 12 hours liberally apply Lac-Hydrin. Specialty bed: P-500 ordered via [facility]. Use only flat sheet and one incontinence pad. Please call Equipment Distribution at  if not delivered and when patient discharged. Skin Care & Pressure Prevention: 
Minimize layers of linen/pads under patient to optimize support surface. Turn/reposition approximately every 2 hours and offload heels. Manage incontinence / promote continence Nourishing Skin Cream to dry skin, minimize use of briefs when able Discussed above plan with patient & RUBA Luz Transition of Care: Plan to follow as needed while admitted to hospital. 
 
Cat Cao" TABITHA Cedeño, RN, [facility], [facility]. 
office [phone] 
pager [phone] or call  to page

## 2021-01-05 NOTE — PROGRESS NOTES
Nephrology Progress Note Tennille Barrios III Date of Admission : 12/28/2020 CC: Follow up for KATHI on CKD Assessment and Plan KATHI on CKD  
- suspected ATN from severe pre renal azotemia - U/S neg for hydro - UA with proteinuria - Cr trending down. - daily labs 
  
CKD Stage IIIa: 
-  Presumed 2/2 DM and HTN 
- nephrotic range proteinuria 
- baseline Cr 1.7 mg/dl  
- followed by Dr. Louis Gates 
  
Hx of prostate cancer s/p XRT 
  
Metabolic acidosis: 
- not getting oral Bicarb --> re-ordered  
  
Anemia in CKD + blood loss: 
- received PRBCs on 1/2 
- cont JAZ + IV iron 
- EGD 1/5/2021: An actively bleeding Dieulafoy's lesion was noted in the gastric fundus.   
  
Type II DM: 
- on insulin 
  
HTN :  
- Continue current meds  
  
R foot osteo: 
- on abx 
- s/p debridement on 12/30 Group B strep bacteremia: 
- Abx per ID 
  
B/l Airspace disease: 
- ? PNA vs edema 
- COVID neg 
- on zosyn Interval History: 
Seen and examined. K remains high. No cp, sob, n/v/d reported. EGD 1/5/2021: An actively bleeding Dieulafoy's lesion was noted in the gastric fundus.   
Rest of the stomach was normal.Duodenum/jejunum:Patchy areas of erythema, congestion and superficial ulcers measuring 2 to 4 mm were noted in the distal bulb and second portion of the duodenum. Current Medications: all current  Medications have been eviewed in [facility] Review of Systems: Pertinent items are noted in HPI. Objective: 
Vitals:   
Vitals:  
 01/05/21 0203 01/05/21 2786 01/05/21 2175 01/05/21 7849 BP: 131/73  (!) 165/76 (!) 165/75 Pulse: 84  87 Resp: 18  18 Temp: 98.2 °F (36.8 °C)  97.9 °F (36.6 °C) SpO2: 97%  96% Weight:  112 kg (246 lb 14.6 oz) Height:      
 
Intake and Output: 
No intake/output data recorded. 01/03 1901 - 01/05 0700 In: 600 [P.O.:200; I.V.:400] Out: 550 [Urine:550] Physical Examination: 
General: NAD,Conversant, obese Neck:  Supple, no mass Resp:  Lungs CTA B/L, no wheezing , normal respiratory effort CV:  RRR,  no murmur or rub,  trace LLE edema, RLE in wrap GI:  Soft, NT, + Bowel sounds, no hepatosplenomegaly Neurologic:  Non focal 
Psych:             AAO x 3 appropriate affect Skin:  No Rash :  Bynum in place 
 
[]    High complexity decision making was performed 
[]    Patient is at high-risk of decompensation with multiple organ involvement Lab Data Personally Reviewed: I have reviewed all the pertinent labs, microbiology data and radiology studies during assessment. Recent Labs 01/05/21 
6955 01/04/21 
3505 01/03/21 
5557 * 134* 133*  
K 5.8* 5.4* 5.1  107 107 CO2 20* 21 20* * 88 198* BUN 42* 43* 44* CREA 2.27* 2.42* 2.59* CA 8.2* 8.4* 8.1*  
MG 2.2 2.1 2.1 PHOS 5.6* 4.6 4.1 Recent Labs 01/05/21 
3236 01/04/21 
7125 01/03/21 
2204 WBC 16.3* 13.1* 12.4* HGB 7.5* 7.3* 7.3* HCT 23.9* 22.9* 22.8* * 436* 390 No results found for: SDES Lab Results Component Value Date/Time Culture result: (A) 12/30/2020 01:14 PM  
  LIGHT ANAEROBIC GRAM NEGATIVE RODS BETA LACTAMASE POSITIVE Culture result: LIGHT PROTEUS MIRABILIS (A) 12/30/2020 01:14 PM  
 Culture result: (A) 12/30/2020 01:14 PM  
  LIGHT STREPTOCOCCI, BETA HEMOLYTIC GROUP B Penicillin and ampicillin are drugs of choice for treatment of beta-hemolytic streptococcal infections. Susceptibility testing of penicillins and beta-lactams approved by the FDA for treatment of beta-hemolytic streptococcal infections need not be performed routinely, because nonsusceptible isolates are extremely rare. CLSI 2012 Culture result: LIGHT ENTEROCOCCUS FAECALIS (A) 12/30/2020 01:14 PM  
 
Recent Results (from the past 24 hour(s)) GLUCOSE, POC Collection Time: 01/04/21  4:33 PM  
Result Value Ref Range Glucose (POC) 109 (H) 65 - 100 mg/dL Performed by Manuelito RAMIRES, POC  Collection Time: 01/04/21  9:31 PM  
 Result Value Ref Range Glucose (POC) 124 (H) 65 - 100 mg/dL Performed by Armando Sanchez (CHAU) CBC W/O DIFF Collection Time: 01/05/21  2:12 AM  
Result Value Ref Range WBC 16.3 (H) 4.1 - 11.1 K/uL  
 RBC 2.76 (L) 4.10 - 5.70 M/uL HGB 7.5 (L) 12.1 - 17.0 g/dL HCT 23.9 (L) 36.6 - 50.3 % MCV 86.6 80.0 - 99.0 FL  
 MCH 27.2 26.0 - 34.0 PG  
 MCHC 31.4 30.0 - 36.5 g/dL  
 RDW 17.2 (H) 11.5 - 14.5 % PLATELET 217 (H) 517 - 400 K/uL MPV 9.3 8.9 - 12.9 FL  
 NRBC 0.2 (H) 0  WBC ABSOLUTE NRBC 0.03 (H) 0.00 - 0.01 K/uL METABOLIC PANEL, BASIC Collection Time: 01/05/21  2:12 AM  
Result Value Ref Range Sodium 131 (L) 136 - 145 mmol/L Potassium 5.8 (H) 3.5 - 5.1 mmol/L Chloride 108 97 - 108 mmol/L  
 CO2 20 (L) 21 - 32 mmol/L Anion gap 3 (L) 5 - 15 mmol/L Glucose 167 (H) 65 - 100 mg/dL BUN 42 (H) 6 - 20 MG/DL Creatinine 2.27 (H) 0.70 - 1.30 MG/DL  
 BUN/Creatinine ratio 19 12 - 20 GFR est AA 35 (L) >60 ml/min/1.73m2 GFR est non-AA 29 (L) >60 ml/min/1.73m2 Calcium 8.2 (L) 8.5 - 10.1 MG/DL MAGNESIUM Collection Time: 01/05/21  2:12 AM  
Result Value Ref Range Magnesium 2.2 1.6 - 2.4 mg/dL PHOSPHORUS Collection Time: 01/05/21  2:12 AM  
Result Value Ref Range Phosphorus 5.6 (H) 2.6 - 4.7 MG/DL  
GLUCOSE, POC Collection Time: 01/05/21  6:55 AM  
Result Value Ref Range Glucose (POC) 272 (H) 65 - 100 mg/dL Performed by Shikha Delgado GLUCOSE, POC Collection Time: 01/05/21 12:50 PM  
Result Value Ref Range Glucose (POC) 186 (H) 65 - 100 mg/dL Performed by Juan José Carmichael MD 
[address] [place] [address] Phone - [phone] Fax - [phone] 
[email or web address]

## 2021-01-05 NOTE — PROGRESS NOTES
Rapid Response Documentation Name: Pennie Muir YOB: 1950 MRN: [id number] Admission Date: 12/28/2020  3:14 PM   
 
Date of service: 1/5/2021 Active Diagnoses: 
 
Hospital Problems  Date Reviewed: 8/20/2020 Codes Class Noted POA * (Principal) [facility] ICD-10-CM: M86.9 ICD-9-CM: 730.20  12/28/2020 Yes Chief Complaint: 
 
Sudden onset shortness of breath while at rest.  Patient states he was drinking coffee until he found it difficult to breathe. Has had similar episodes in past, denies history of asthma, COPD, emphysema. Admits to history ADAM. No chest pain Clinical Presentation: 
 
Very pleasant 72-year-old male admitted 12/28/2020 for sepsis related to osteomyelitis in his right foot. Hospitalization complicated by positive wound cultures, GI bleed, CKD, IDDM, hypertension. Physical Exam: · Alert, oriented. Appears mildly distressed sitting upright in bed · Rest rapid, relatively shallow. Some abdominal movement with inspiration, no other accessory muscle use · Lungs tight all fields, appreciated expiratory wheeze Patient Vitals for the past 12 hrs: 
 Temp Pulse Resp BP SpO2  
01/05/21 0037  (!) 107  (!) 169/87 97 % 01/05/21 0031 98.4 °F (36.9 °C) (!) 116 (!) 34 (!) 144/106 100 % 01/05/21 0031 98.4 °F (36.9 °C) (!) 116 24 (!) 144/106 100 % 01/04/21 1922 97.6 °F (36.4 °C) 73 18 98/63 93 % 01/04/21 1815 97.5 °F (36.4 °C) 81 18 (!) 174/87 95 % 01/04/21 1711 98.4 °F (36.9 °C) 68 18 (!) 164/74 93 % 01/04/21 1618 98 °F (36.7 °C) 67 20 (!) 148/84 94 % 01/04/21 1600  74 21 (!) 159/82 93 % 01/04/21 1555  76 14 (!) 154/79 94 % 01/04/21 1550  76 16 (!) 168/87 92 % 01/04/21 1545  82 17 (!) 179/93 93 % 01/04/21 1535  85 20 (!) 197/87 92 % 01/04/21 1530  86 22 (!) 176/94 93 % 01/04/21 1525 98.9 °F (37.2 °C) 88 20 (!) 173/81 93 % 01/04/21 1514  92 18 (!) 150/71 98 % 01/04/21 1412 96.8 °F (36 °C) 83 23 (!) 169/79  Data Reviewed: All diagnostic labs and studies have been reviewed. Assessment and Plan: 
 
Acute asthma · No history, unclear what exacerbated this · Responding nicely to albuterol. SaO2 96% while on neb · Solu-Medrol 125 mg 
· NC to maintain SaO2 > 94%. Wean as tolerated. · Continue to monitor UPDATE 0645: much improved after nebs. Now 97% on NC and weaning. Gayle Seip, MSN, RN, NP-C 
[phone] or via [employer] 
 
1/5/2021

## 2021-01-05 NOTE — PROGRESS NOTES
ID Progress Note 2021 Subjective: Afebrile. No complaints. Objective:  
 
Vitals:  
Visit Vitals BP (!) 155/93 (BP 1 Location: Left arm, BP Patient Position: At rest) Pulse 78 Temp 97.4 °F (36.3 °C) Resp 18 Ht 6' (1.829 m) Wt 112 kg (246 lb 14.6 oz) SpO2 97% BMI 33.49 kg/m² Tmax:  Temp (24hrs), Av °F (36.7 °C), Min:97.4 °F (36.3 °C), Max:98.4 °F (36.9 °C) Exam: Not in distress Lungs: clear to auscultation Heart: s1, s2, no murmurs Abdomen: soft, nontender Labs:  
Lab Results Component Value Date/Time WBC 16.3 (H) 2021 02:12 AM  
 Hemoglobin (POC) 6.5 2020 01:59 PM  
 HGB 7.5 (L) 2021 02:12 AM  
 HCT 23.9 (L) 2021 02:12 AM  
 PLATELET 085 (H)  02:12 AM  
 MCV 86.6 2021 02:12 AM  
 
Lab Results Component Value Date/Time Sodium 131 (L) 2021 02:12 AM  
 Potassium 5.8 (H) 2021 02:12 AM  
 Chloride 108 2021 02:12 AM  
 CO2 20 (L) 2021 02:12 AM  
 Anion gap 3 (L) 2021 02:12 AM  
 Glucose 167 (H) 2021 02:12 AM  
 BUN 42 (H) 2021 02:12 AM  
 Creatinine 2.27 (H) 2021 02:12 AM  
 BUN/Creatinine ratio 19 2021 02:12 AM  
 GFR est AA 35 (L) 2021 02:12 AM  
 GFR est non-AA 29 (L) 2021 02:12 AM  
 Calcium 8.2 (L) 2021 02:12 AM  
 Bilirubin, total 0.4 2020 03:41 PM  
 Alk. phosphatase 162 (H) 2020 03:41 PM  
 Protein, total 8.3 (H) 2020 03:41 PM  
 Albumin 1.6 (L) 2020 04:04 AM  
 Globulin 6.5 (H) 2020 03:41 PM  
 A-G Ratio 0.3 (L) 2020 03:41 PM  
 ALT (SGPT) 10 (L) 2020 03:41 PM  
 
 
 
 
 
Assessment:  
#1 osteomyelitis of the right foot 
  
#2 group b strep  bacteremia 
  
#3 renal insufficiency 
  
#4 diabetes 
  
#5 prostate cancer 
  
#6 hypertension 
  
#7 heart failure 
  
  
  
 
 
  
 
Recommendations:  
 
Continue zosyn. There is OM on the surgical margin. He will need prolonged therapy.  Check cbc tomorrow. Will write orders.    
 
  
 
Manaverbrooke Cunningham MD

## 2021-01-05 NOTE — PROGRESS NOTES
Patient c/o SOB and unable to breathe. This RN went in to assess patient who was having difficulty breathing in air. Took patient vital signs and O2 sat was at 36% on room air. Supplemental O2 was applied and rapid response team was then called.
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
## 2021-01-05 NOTE — PROGRESS NOTES
Chart reviewed and attempted to see patient for OT intervention. Patient adamantly refusing to work with therapy at this time, stating he can do what he needs to do to get home and shouting \"No!\" after encouragement to participate in order to evaluate safety with functional transfers. Noted at baseline, patient sleeps in lift power wheelchair and transfers on/off the commode. Patient states that he will have to be brought to his door by the ambulance staff and he can use his crutches to get inside. Patient unwilling to work with therapy at this time despite multiple attempts. Will follow up for OT evaluation as patient is willing. Thank you.

## 2021-01-05 NOTE — PROGRESS NOTES
Physical Therapy 1/5/2021 Chart reviewed and attempted to see patient for PT intervention. Patient adamantly refusing to work with therapy at this time, stating he can do what he needs to do to get home and shouting \"No!\" after gentle encouragement to participate. Provided active listening to patient's reasons for why he does not need therapy. Patient with very poor insight in his limitations.  
  
Noted at baseline, patient sleeps in lift power wheelchair and transfers on/off the commode. Patient states that he will have to be brought to his door by the ambulance staff and he can use his crutches to get inside. Patient unwilling to work with therapy at this time despite multiple attempts. Will follow up for PT evaluation as patient is willing. Thank you.  
 
Thank oRgelio Chavez, PT, DPT

## 2021-01-05 NOTE — PROGRESS NOTES
[address] 
[address] [facility] [address] [place], [address] 
[phone] GI PROGRESS NOTE 
 
 
NAME:   Cristin Andrews III  
:    1950 MRN:    [id number] Assessment/Plan Anemia: 
-Heme + stool on admission 2020 
-Hemoglobin 7.5 this AM 2021, continue to monitor 
-Transfuse with PRBCs if hemoglobin less than 7.0 
-BID PPI with Protonix 
-EGD 2021: An actively bleeding Dieulafoy's lesion was noted in the gastric fundus. Rest of the stomach was normal.Duodenum/jejunum:Patchy areas of erythema, congestion and superficial ulcers measuring 2 to 4 mm were noted in the distal bulb and second portion of the duodenum. No bleeding or stigmata were noted. Two resolution clips were placed in the gastric fundus for control of bleeding from Dieulafoy's lesion Ablation of the lesion was also performed using soft coag settings of the ERBE. No bleeding was noted at the end of the procedure 
-Advance patient to diabetic diet   
 
Osteomyelitis: 
-s/p right foot ulcer debridement  
-IV Zosyn in use  
  
Will discuss with Dr. José Antonio Diaz Patient Active Problem List  
Diagnosis Code  Diabetic polyneuropathy (HCC) E11.42  
 Rotator Cuff Tendonitis M71.9, M67.919  
 Diabetes mellitus type 2, controlled ([address] E11.9  Degenerative disc disease [id number]  Osteoarthrosis involving lower leg M17.10  Depression/ Anxiety F34.1  HTN (hypertension) I10  
 Chronic hip pain M25.559, G89.29  
 Hypertriglyceridemia E78.1  Mild Aortic Insufficiency I35.1  Mild Concentric LVH (left ventricular hypertrophy) I51.7  S/P hip replacement, right Z90.490  Stage 3 chronic kidney disease N18.30  Prostate CA ([address] C61  Type 2 diabetes with nephropathy (HCC) E11.21  
 Severe obesity (BMI 35.0-39. 9) with comorbidity ([address] E66.01  
 Acute on chronic renal failure (HCC) N17.9, N18.9  KATHI (acute kidney injury) ([address] N17.9  Diabetic ulcer of right midfoot associated with type 2 diabetes mellitus, with fat layer exposed ([facility] E11.621, I00.253  PAD (peripheral artery disease) ([facility]) I73.9  Dependence on nicotine from cigarettes F17.210  
 Osteomyelitis ([facility]) M86.9 Subjective: Mert Lobato is a 72 y.o.  male Patient with no complaints. Patient denies nausea, no vomiting,no abdominal pain. No bowel movement overnight, + flatus Objective: VITALS:  
Last 24hrs VS reviewed since prior hospitalist progress note. Most recent are: 
Visit Vitals BP (!) 165/75 Pulse 87 Temp 97.9 °F (36.6 °C) Resp 18 Ht 6' (1.829 m) Wt 112 kg (246 lb 14.6 oz) SpO2 96% BMI 33.49 kg/m² Intake/Output Summary (Last 24 hours) at 1/5/2021 1015 Last data filed at 1/4/2021 2335 Gross per 24 hour Intake 600 ml Output 350 ml Net 250 ml PHYSICAL EXAM: 
General   well developed, well nourished, appears stated age, in no acute distress EENT  Normocephalic, Atraumatic, PERRLA, EOMI, sclera clear, nares clear, pharynx normal 
Respiratory   Clear To Auscultation bilaterally Cardiology  Regular Rate and Rythmn Abdominal  Soft, non-tender, non-distended, bowel sounds present, no palpable mass Extremities: Right foot with dressing Skin  Normal skin turgor. No rashes or skin ulcers noted Neurological  No focal neurological deficits noted Psychological  Oriented x 3. Normal affect. Lab Data Recent Results (from the past 12 hour(s)) CBC W/O DIFF Collection Time: 01/05/21  2:12 AM  
Result Value Ref Range WBC 16.3 (H) 4.1 - 11.1 K/uL  
 RBC 2.76 (L) 4.10 - 5.70 M/uL HGB 7.5 (L) 12.1 - 17.0 g/dL HCT 23.9 (L) 36.6 - 50.3 % MCV 86.6 80.0 - 99.0 FL  
 MCH 27.2 26.0 - 34.0 PG  
 MCHC 31.4 30.0 - 36.5 g/dL  
 RDW 17.2 (H) 11.5 - 14.5 % PLATELET 759 (H) 105 - 400 K/uL MPV 9.3 8.9 - 12.9 FL  
 NRBC 0.2 (H) 0  WBC ABSOLUTE NRBC 0.03 (H) 0.00 - 0.01 K/uL METABOLIC PANEL, BASIC Collection Time: 01/05/21  2:12 AM  
Result Value Ref Range Sodium 131 (L) 136 - 145 mmol/L Potassium 5.8 (H) 3.5 - 5.1 mmol/L Chloride 108 97 - 108 mmol/L  
 CO2 20 (L) 21 - 32 mmol/L Anion gap 3 (L) 5 - 15 mmol/L Glucose 167 (H) 65 - 100 mg/dL BUN 42 (H) 6 - 20 MG/DL Creatinine 2.27 (H) 0.70 - 1.30 MG/DL  
 BUN/Creatinine ratio 19 12 - 20 GFR est AA 35 (L) >60 ml/min/1.73m2 GFR est non-AA 29 (L) >60 ml/min/1.73m2 Calcium 8.2 (L) 8.5 - 10.1 MG/DL MAGNESIUM Collection Time: 01/05/21  2:12 AM  
Result Value Ref Range Magnesium 2.2 1.6 - 2.4 mg/dL PHOSPHORUS Collection Time: 01/05/21  2:12 AM  
Result Value Ref Range Phosphorus 5.6 (H) 2.6 - 4.7 MG/DL  
GLUCOSE, POC Collection Time: 01/05/21  6:55 AM  
Result Value Ref Range Glucose (POC) 272 (H) 65 - 100 mg/dL Performed by Steve Butcher Medications: Reviewed Carlos Ramirez NP I have personally reviewed the history and independently examined the patient. I have reviewed the chart and agree with the documentation recorded by the Mid Level Provider, including the assessment, treatment plan, and disposition.  
 
Vance Mena MD

## 2021-01-06 NOTE — PROGRESS NOTES
[address] 
[address] [facility] [address] [facility], [address] 
[phone] GI PROGRESS NOTE 
 
 
NAME:   Deisy Hobbs III  
:    1950 MRN:    [id number] Assessment/Plan Anemia: 
-Heme + stool on admission 2020 
-Hemoglobin 7.3 this AM 2021, continue to monitor- no overt bleeding  
-Transfuse with PRBCs if hemoglobin less than 7.0 
-BID PPI with Protonix 
-EGD 2021: An actively bleeding Dieulafoy's lesion was noted in the gastric fundus.  Rest of the stomach was normal.Duodenum/jejunum:Patchy areas of erythema, congestion and superficial ulcers measuring 2 to 4 mm were noted in the distal bulb and second portion of the duodenum.  No bleeding or stigmata were noted. Two resolution clips were placed in the gastric fundus for control of bleeding from Dieulafoy's lesion Ablation of the lesion was also performed using soft coag settings of the ERBE.  No bleeding was noted at the end of the procedure 
  
  
Osteomyelitis: 
-s/p right foot ulcer debridement, wound vac in place -IV Zosyn in use  
-WBC 13.6 today 2021, trending down Will discuss with Dr. Meli Rodas Patient Active Problem List  
Diagnosis Code  Diabetic polyneuropathy (HCC) E11.42  
 Rotator Cuff Tendonitis M71.9, M67.919  
 Diabetes mellitus type 2, controlled ([facility] E11.9  Degenerative disc disease [id number]  Osteoarthrosis involving lower leg M17.10  Depression/ Anxiety F34.1  HTN (hypertension) I10  
 Chronic hip pain M25.559, G89.29  
 Hypertriglyceridemia E78.1  Mild Aortic Insufficiency I35.1  Mild Concentric LVH (left ventricular hypertrophy) I51.7  S/P hip replacement, right L51.981  Stage 3 chronic kidney disease N18.30  Prostate CA ([address] C61  Type 2 diabetes with nephropathy (HCC) E11.21  
 Severe obesity (BMI 35.0-39. 9) with comorbidity ([facility] E66.01  
 Acute on chronic renal failure (HCC) N17.9, N18.9  KATHI (acute kidney injury) ([facility] N17.9  Diabetic ulcer of right midfoot associated with type 2 diabetes mellitus, with fat layer exposed ([facility] E11.621, I30.159  PAD (peripheral artery disease) ([facility]) I73.9  Dependence on nicotine from cigarettes F17.210  
 Osteomyelitis ([facility]) M86.9 Subjective: Fernanda Rivero is a 72 y.o.  male patient denies nausea, no vomiting, no abdominal pain. Patient reports having bowel movement last night- nurse stated no presence of blood. Objective: VITALS:  
Last 24hrs VS reviewed since prior hospitalist progress note. Most recent are: 
Visit Vitals BP (!) 156/91 Pulse 77 Temp 97.7 °F (36.5 °C) Resp 17 Ht 6' (1.829 m) Wt 124.4 kg (274 lb 4.8 oz) SpO2 96% BMI 37.20 kg/m² Intake/Output Summary (Last 24 hours) at 1/6/2021 5114 Last data filed at 1/5/2021 2345 Gross per 24 hour Intake 350 ml Output 775 ml Net -425 ml PHYSICAL EXAM: 
General   well developed, well nourished, appears stated age, in no acute distress EENT  Normocephalic, Atraumatic, PERRLA, EOMI, sclera clear, nares clear, pharynx normal 
Respiratory   Clear To Auscultation bilaterally Cardiology  Regular Rate and Rythmn Abdominal  Soft, non-tender, non-distended, bowel sounds present, no palpable mass Extremities: Right foot with wound vac Skin  Normal skin turgor.  No rashes or skin ulcers noted Neurological  No focal neurological deficits noted Psychological  Oriented x 3.  Normal affect. 
  
  
Lab Data Recent Results (from the past 12 hour(s)) GLUCOSE, POC Collection Time: 01/05/21  9:59 PM  
Result Value Ref Range Glucose (POC) 167 (H) 65 - 100 mg/dL Performed by Sary Sandoval (SON) CK Collection Time: 01/06/21  3:47 AM  
Result Value Ref Range CK 38 (L) 39 - 308 U/L  
CBC W/O DIFF Collection Time: 01/06/21  3:47 AM  
Result Value Ref Range WBC 13.6 (H) 4.1 - 11.1 K/uL  
 RBC 2.64 (L) 4.10 - 5.70 M/uL HGB 7.3 (L) 12.1 - 17.0 g/dL HCT 23.4 (L) 36.6 - 50.3 % MCV 88.6 80.0 - 99.0 FL  
 MCH 27.7 26.0 - 34.0 PG  
 MCHC 31.2 30.0 - 36.5 g/dL  
 RDW 17.1 (H) 11.5 - 14.5 % PLATELET 973 (H) 027 - 400 K/uL MPV 9.0 8.9 - 12.9 FL  
 NRBC 0.3 (H) 0  WBC ABSOLUTE NRBC 0.04 (H) 0.00 - 0.01 K/uL METABOLIC PANEL, BASIC Collection Time: 01/06/21  3:47 AM  
Result Value Ref Range Sodium 137 136 - 145 mmol/L Potassium 5.2 (H) 3.5 - 5.1 mmol/L Chloride 109 (H) 97 - 108 mmol/L  
 CO2 21 21 - 32 mmol/L Anion gap 7 5 - 15 mmol/L Glucose 90 65 - 100 mg/dL BUN 40 (H) 6 - 20 MG/DL Creatinine 2.17 (H) 0.70 - 1.30 MG/DL  
 BUN/Creatinine ratio 18 12 - 20 GFR est AA 37 (L) >60 ml/min/1.73m2 GFR est non-AA 30 (L) >60 ml/min/1.73m2 Calcium 8.1 (L) 8.5 - 10.1 MG/DL MAGNESIUM Collection Time: 01/06/21  3:47 AM  
Result Value Ref Range Magnesium 1.9 1.6 - 2.4 mg/dL PHOSPHORUS Collection Time: 01/06/21  3:47 AM  
Result Value Ref Range Phosphorus 4.2 2.6 - 4.7 MG/DL  
GLUCOSE, POC Collection Time: 01/06/21  6:36 AM  
Result Value Ref Range Glucose (POC) 90 65 - 100 mg/dL Performed by Leila Willingham Medications: Reviewed Caren Ricketts NP I have personally reviewed the history and independently examined the patient. I have reviewed the chart and agree with the documentation recorded by the Mid Level Provider, including the assessment, treatment plan, and disposition.  
 
Benson Holman MD

## 2021-01-06 NOTE — PROGRESS NOTES
Physical Therapy Attempted to see again for evaluation. Patient adamantly refusing to participate at this time stating needs to eat first.  He is currently NPO for procedure later today. He states he can do what he needs to do to go home and has been doing for himself before anyway, even being NWB on RLE. Attempted to educate patient on the purpose of therapy and that he has been in bed for a week without mobilizing out of bed. He insists he is fine. At this time, patient will have wound vac and antibiotics which can not be managed by home care staff and feel he would be most appropriate for SNF . Will attempt once more tomorrow for evaluation but if declines will complete orders.  
Lynnette Gamas, PT

## 2021-01-06 NOTE — ROUTINE PROCESS
Pt arrives via hospital bed to angio department accompanied by transport for placement of tunneled central venous catheter/powerline by Pa Burgos MD. All assessments completed and consent reviewed. Education given  regarding procedure, conscious sedation, post-procedure care and  management/follow-up. Opportunity for questions was provided and all questions and concerns were addressed.

## 2021-01-06 NOTE — PROGRESS NOTES
Nephrology Progress Note Jose Porras III Date of Admission : 12/28/2020 CC: Follow up for KATHI on CKD Assessment and Plan KATHI on CKD  
- suspected ATN from severe pre renal azotemia - U/S neg for hydro - UA with proteinuria - Cr trending down. - please avoid PICC line and should get Tisha catheter for abx  
- advised to f/u with Dr Eva Tellez in 4-6 weeks  
- daily labs 
  
CKD Stage IIIa: 
-  Presumed 2/2 DM and HTN 
- nephrotic range proteinuria 
- baseline Cr 1.7 mg/dl  
- followed by Dr. Eva Tellez 
  
Hx of prostate cancer s/p XRT 
  
Metabolic acidosis: 
-  Continue oral Bicarb on d/c  
  
Anemia in CKD + blood loss: 
- received PRBCs on 1/2 
- cont JAZ + IV iron 
- EGD 1/5/2021: An actively bleeding Dieulafoy's lesion was noted in the gastric fundus.   
  
Type II DM: 
- on insulin 
  
HTN :  
- Continue current meds  
  
R foot osteo: 
- on abx 
- s/p debridement on 12/30 Group B strep bacteremia: 
- Abx per ID 
  
B/l Airspace disease: 
- ? PNA vs edema 
- COVID neg 
- on zosyn Interval History: 
Seen and examined. Getting Tisha vs PICC today  
reoports being hungry K down and agreed to low K diet Current Medications: all current  Medications [facility] been [facility]iew[facility] in [facility] Review of Systems: Pertinent items are noted in HPI. Objective: 
Vitals:   
Vitals:  
 01/05/21 2049 01/06/21 0354 01/06/21 1942 01/06/21 0825 BP: (!) 165/74 (!) 162/72  (!) 156/91 Pulse: 79 76  77 Resp: 19 17  17 Temp: 99.9 °F (37.7 °C) 97.6 °F (36.4 °C)  97.7 °F (36.5 °C) SpO2: 97% 96%  96% Weight:   124.4 kg (274 lb 4.8 oz) Height:      
 
Intake and Output: 
No intake/output data recorded. 01/04 1901 - 01/06 0700 In: 350 [P.O.:350] Out: 1125 [[id number]] Physical Examination: 
General: NAD,Conversant, obese Neck:  Supple, no mass Resp:  Lungs CTA B/L, no wheezing , normal respiratory effort CV:  RRR,  no murmur or rub,  trace LLE edema, RLE in wrap GI:  Soft, NT, + Bowel sounds, no hepatosplenomegaly Neurologic:  Non focal 
Psych:             AAO x 3 appropriate affect Skin:  No Rash :  Bynum in place 
 
[]    High complexity decision making was performed 
[]    Patient is at high-risk of decompensation with multiple organ involvement Lab Data Personally Reviewed: I have reviewed all the pertinent labs, microbiology data and radiology studies during assessment. Recent Labs 01/06/21 
7850 01/05/21 
2404 01/04/21 
1433  131* 134* K 5.2* 5.8* 5.4*  
* 108 107 CO2 21 20* 21  
GLU 90 167* 88 BUN 40* 42* 43* CREA 2.17* 2.27* 2.42* CA 8.1* 8.2* 8.4* MG 1.9 2.2 2.1 PHOS 4.2 5.6* 4.6 Recent Labs 01/06/21 
1609 01/05/21 
6131 01/04/21 
5875 WBC 13.6* 16.3* 13.1* HGB 7.3* 7.5* 7.3* HCT 23.4* 23.9* 22.9*  
* 486* 436* No results found for: SDES Lab Results Component Value Date/Time Culture result: (A) 12/30/2020 01:14 PM  
  LIGHT ANAEROBIC GRAM NEGATIVE RODS BETA LACTAMASE POSITIVE Culture result: LIGHT PROTEUS MIRABILIS (A) 12/30/2020 01:14 PM  
 Culture result: (A) 12/30/2020 01:14 PM  
  LIGHT STREPTOCOCCI, BETA HEMOLYTIC GROUP B Penicillin and ampicillin are drugs of choice for treatment of beta-hemolytic streptococcal infections. Susceptibility testing of penicillins and beta-lactams approved by the FDA for treatment of beta-hemolytic streptococcal infections need not be performed routinely, because nonsusceptible isolates are extremely rare. CLSI 2012 Culture result: LIGHT ENTEROCOCCUS FAECALIS (A) 12/30/2020 01:14 PM  
 
Recent Results (from the past 24 hour(s)) GLUCOSE, POC Collection Time: 01/05/21 12:50 PM  
Result Value Ref Range Glucose (POC) 186 (H) 65 - 100 mg/dL Performed by [place] GLUCOSE, POC Collection Time: 01/05/21  5:11 PM  
Result Value Ref Range Glucose (POC) 84 65 - 100 mg/dL Performed by [place] GLUCOSE, POC  
 Collection Time: 01/05/21  9:59 PM  
Result Value Ref Range Glucose (POC) 167 (H) 65 - 100 mg/dL Performed by Trey Hendricks (CARL) CK Collection Time: 01/06/21  3:47 AM  
Result Value Ref Range CK 38 (L) 39 - 308 U/L  
CBC W/O DIFF Collection Time: 01/06/21  3:47 AM  
Result Value Ref Range WBC 13.6 (H) 4.1 - 11.1 K/uL  
 RBC 2.64 (L) 4.10 - 5.70 M/uL HGB 7.3 (L) 12.1 - 17.0 g/dL HCT 23.4 (L) 36.6 - 50.3 % MCV 88.6 80.0 - 99.0 FL  
 MCH 27.7 26.0 - 34.0 PG  
 MCHC 31.2 30.0 - 36.5 g/dL  
 RDW 17.1 (H) 11.5 - 14.5 % PLATELET 463 (H) 447 - 400 K/uL MPV 9.0 8.9 - 12.9 FL  
 NRBC 0.3 (H) 0  WBC ABSOLUTE NRBC 0.04 (H) 0.00 - 0.01 K/uL METABOLIC PANEL, BASIC Collection Time: 01/06/21  3:47 AM  
Result Value Ref Range Sodium 137 136 - 145 mmol/L Potassium 5.2 (H) 3.5 - 5.1 mmol/L Chloride 109 (H) 97 - 108 mmol/L  
 CO2 21 21 - 32 mmol/L Anion gap 7 5 - 15 mmol/L Glucose 90 65 - 100 mg/dL BUN 40 (H) 6 - 20 MG/DL Creatinine 2.17 (H) 0.70 - 1.30 MG/DL  
 BUN/Creatinine ratio 18 12 - 20 GFR est AA 37 (L) >60 ml/min/1.73m2 GFR est non-AA 30 (L) >60 ml/min/1.73m2 Calcium 8.1 (L) 8.5 - 10.1 MG/DL MAGNESIUM Collection Time: 01/06/21  3:47 AM  
Result Value Ref Range Magnesium 1.9 1.6 - 2.4 mg/dL PHOSPHORUS Collection Time: 01/06/21  3:47 AM  
Result Value Ref Range Phosphorus 4.2 2.6 - 4.7 MG/DL  
GLUCOSE, POC Collection Time: 01/06/21  6:36 AM  
Result Value Ref Range Glucose (POC) 90 65 - 100 mg/dL Performed by [address] [facility] [address] Phone - [phone] Fax - [phone] 
[email or web address]

## 2021-01-06 NOTE — ROUTINE PROCESS
CM placed call to [address] [place] [address] to inquire about personal care provider. [facility] indicated that patient's son and the son's girlfriend are providers of personal care to patient. MONIKA MarshallN RN  of Care Management [phone]

## 2021-01-06 NOTE — PROGRESS NOTES
Hospitalist Progress Note [address] [facility], DO Answering service: [phone] OR [phone] from in house phone Date of Service:  2021 NAME:  Tennille Barrios III 
:  1950 MRN:  [id number] Admission Summary: As per initial admission summary Malena Gleason is a 72 y.o. male with PMH of prostatic cancer s/p radiation, IDDM, heart failure, htn. He apparently told the ER physician that he c/o onset of diarrhea yesterday and some chills over the last 2 days, with occasional cough. For me, however, he denies all of these symptoms and states he came in because his caregiver was concerned d/t his lethargy over the last several days to weeks. He lives home alone and has a caregiver five times a week. He denies cp, sob, fever, chills, sweats, N/V. He has had a wound on his right foot for months. He denies any foot pain d/t neuropathy. He goes to [facility] for nephrology and states he last saw them a few months ago. Prior to today, he believes it has been several months since he last had any blood work. Denies etoh or illicits. Smokes just under 1 PPD. Currently no acute complaints except asking for water. States he takes all his meds daily. Interval history / Subjective: Follow up osteomyelitis. Patient seen and examined earlier today. Not happy regarding having to be NPO for powerline. Patient declines rehab and states he can provide care for himself at home. Assessment & Plan:  
 
Severe sepsis 2/2 osteomyelitis right foot 
-Podiatry consulted  
-Underwent debridement right foot ulcer with removal of infected bone 2020 
-Cultures with proteus mirabilis, stept group B, enterococcus -ID following. Antibiotics transitioned to zosyn  
-surgical path with + margins, will need prolonged antibiotics 
-Powerline ordered for  
-wound vac  
-non weight bearing right lower extremity (patient wheelchair bound at baseline) Shortness of breath 1/4: improved 
-s/p steroids, breathing treatment, hold further steroids 
-chest x-ray with some bilateral airspace disease, consider adding back diuretics if okay by nephrology Acute on chronic anemia, baseline ~9: persistent  
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 Duodenal ucler, non bleeding 
-Has received 6 units total during admission 
-Continue PPI 
  
Acute on CKD: improving 
-nephrology following, on bicarb, valtessa  
-kumar removed 1/2 
  
IDDM, last a1c 8.1 01/2020 
-continue long acting insulin, meal time, SSI  
  
HTN, controlled 
-holding amlodipine, continue hydralazine  
  
Hyponatremia 
-stable, montior Toxic metabolic encephalopathy/drowsiness: resolved 
  
Prostate ca 
-Cont home meds 
  
Tobacco abuse 
-continue patch Code status: full code DVT prophylaxis: scds Care Plan discussed with: Patient/Family Disposition: possible discharge 1/7 pending powerline, antibiotics, and home health set up Hospital Problems  Date Reviewed: 8/20/2020 Codes Class Noted POA * (Principal) Osteomyelitis ([facility] [address] ICD-10-CM: M86.9 ICD-9-CM: 730.20  12/28/2020 Yes Review of Systems:  
Negative unless stated above Vital Signs:  
 Last 24hrs VS reviewed since prior progress note. Most recent are: 
Visit Vitals BP (!) 143/74 (BP 1 Location: Right arm, BP Patient Position: Supine) Pulse 79 Temp 98.5 °F (36.9 °C) Resp 20 Ht 6' (1.829 m) Wt 124.7 kg (275 lb) SpO2 100% BMI 37.30 kg/m² Intake/Output Summary (Last 24 hours) at 1/6/2021 1806 Last data filed at 1/5/2021 2345 Gross per 24 hour Intake 350 ml Output 775 ml Net -425 ml Physical Examination:  
I had a face to face encounter with this patient and independently examined them on January 6, 2021 as outlined below: 
     
Constitutional:  No acute distress, somewhat cooperative, pleasant ENT:  Oral mucous moist, oropharynx benign. Neck supple Resp:  CTA bilaterally. No wheezing/rhonchi/rales. No accessory muscle use CV:  Regular rhythm, normal rate, no murmurs, gallops, rubs GI:  Soft, obese, non distended, non tender. normoactive bowel sounds, no hepatosplenomegaly Musculoskeletal:  trace edema, warm, 2+ pulses throughout. Right lower extremity wrapped Neurologic:  Moves all extremities Data Review:  
 Review and/or order of clinical lab test 
Review and/or order of tests in the radiology section of CPT Review and/or order of tests in the medicine section of CPT Labs:  
 
Recent Labs 01/06/21 
6101 01/05/21 
1334 WBC 13.6* 16.3* HGB 7.3* 7.5* HCT 23.4* 23.9*  
* 486* Recent Labs 01/06/21 
4464 01/05/21 
3927 01/04/21 
9629  131* 134* K 5.2* 5.8* 5.4*  
* 108 107 CO2 21 20* 21 BUN 40* 42* 43* CREA 2.17* 2.27* 2.42* GLU 90 167* 88  
CA 8.1* 8.2* 8.4* MG 1.9 2.2 2.1 PHOS 4.2 5.6* 4.6 No results for input(s): ALT, AP, TBIL, TBILI, TP, ALB, GLOB, GGT, AML, LPSE in the last 72 hours. No lab exists for component: SGOT, GPT, AMYP, HLPSE No results for input(s): INR, PTP, APTT, INREXT, INREXT in the last 72 hours. No results for input(s): FE, TIBC, PSAT, FERR in the last 72 hours. Lab Results Component Value Date/Time Folate 20.0 03/16/2019 04:06 AM  
  
No results for input(s): PH, PCO2, PO2 in the last 72 hours. Recent Labs 01/06/21 
1349 CPK 38* No results found for: CHOL, CHOLX, CHLST, CHOLV, HDL, HDLP, LDL, LDLC, DLDLP, TGLX, TRIGL, TRIGP, CHHD, CHHDX Lab Results Component Value Date/Time Glucose (POC) 87 01/06/2021 12:48 PM  
 Glucose (POC) 62 (L) 01/06/2021 12:20 PM  
 Glucose (POC) 90 01/06/2021 06:36 AM  
 Glucose (POC) 167 (H) 01/05/2021 09:59 PM  
 Glucose (POC) 84 01/05/2021 05:11 PM  
 
Lab Results Component Value Date/Time  Color YELLOW/STRAW 01/03/2021 12:56 PM  
 Appearance CLEAR 01/03/2021 12:56 PM  
 Specific gravity 1.015 01/03/2021 12:56 PM  
 pH (UA) 5.5 01/03/2021 12:56 PM  
 Protein 300 (A) 01/03/2021 12:56 PM  
 Glucose 100 (A) 01/03/2021 12:56 PM  
 Ketone Negative 01/03/2021 12:56 PM  
 Bilirubin Negative 01/03/2021 12:56 PM  
 Urobilinogen 0.2 01/03/2021 12:56 PM  
 Nitrites Negative 01/03/2021 12:56 PM  
 Leukocyte Esterase Negative 01/03/2021 12:56 PM  
 Epithelial cells FEW 01/03/2021 12:56 PM  
 Bacteria 1+ (A) 01/03/2021 12:56 PM  
 WBC 0-4 01/03/2021 12:56 PM  
 RBC 0-5 01/03/2021 12:56 PM  
 
 
 
Medications Reviewed:  
 
Current Facility-Administered Medications Medication Dose Route Frequency  insulin lispro (HUMALOG) injection 10 Units  10 Units SubCUTAneous TIDAC  insulin glargine (LANTUS) injection 40 Units  40 Units SubCUTAneous QHS  balsam peru-castor oiL (VENELEX) ointment   Topical Q8H  
 ammonium lactate (LAC-HYDRIN) 12 % lotion   Topical Q12H  
 sodium bicarbonate tablet 650 mg  650 mg Oral DAILY  sodium chloride (NS) flush 5-40 mL  5-40 mL IntraVENous Q8H  
 sodium chloride (NS) flush 5-40 mL  5-40 mL IntraVENous PRN  pantoprazole (PROTONIX) 40 mg in 0.9% sodium chloride 10 mL injection  40 mg IntraVENous Q12H  
 enzalutamide (XTANDI) chemo capsule 80 mg (patient supplied) (Patient Supplied)  80 mg Oral BID  fluticasone propionate (FLONASE) 50 mcg/actuation nasal spray 2 Spray  2 Spray Both Nostrils DAILY  0.9% sodium chloride infusion 250 mL  250 mL IntraVENous PRN  
 0.9% sodium chloride infusion 250 mL  250 mL IntraVENous PRN  piperacillin-tazobactam (ZOSYN) 3.375 g in 0.9% sodium chloride (MBP/ADV) 100 mL MBP  3.375 g IntraVENous Q8H  
 oxyCODONE-acetaminophen (PERCOCET) 5-325 mg per tablet 1 Tab  1 Tab Oral Q4H PRN  
 0.9% sodium chloride infusion 250 mL  250 mL IntraVENous PRN  
 epoetin chi-epbx (RETACRIT) injection 20,000 Units  20,000 Units SubCUTAneous Q MON, WED & FRI  
  diclofenac (VOLTAREN) 1 % topical gel 2 g  2 g Topical QID  acetaminophen (TYLENOL) tablet 500 mg  500 mg Oral Q4H PRN  
 amLODIPine (NORVASC) tablet 5 mg  5 mg Oral DAILY  [Held by provider] aspirin delayed-release tablet 81 mg  81 mg Oral DAILY  bicalutamide (CASODEX) tablet 50 mg  50 mg Oral DAILY  gabapentin (NEURONTIN) capsule 100 mg  100 mg Oral TID  hydrALAZINE (APRESOLINE) tablet 100 mg  100 mg Oral TID  tamsulosin (FLOMAX) capsule 0.4 mg  0.4 mg Oral DAILY  nicotine (NICODERM CQ) 14 mg/24 hr patch 1 Patch  1 Patch TransDERmal DAILY  insulin lispro (HUMALOG) injection   SubCUTAneous AC&HS  
 glucose chewable tablet 16 g  4 Tab Oral PRN  
 dextrose (D50W) injection syrg 12.5-25 g  12.5-25 g IntraVENous PRN  
 glucagon (GLUCAGEN) injection 1 mg  1 mg IntraMUSCular PRN  
 [Held by provider] heparin (porcine) injection 5,000 Units  5,000 Units SubCUTAneous Q8H  
 
______________________________________________________________________ EXPECTED LENGTH OF STAY: 9d 19h ACTUAL LENGTH OF STAY:          9 Glenna Glover DO

## 2021-01-06 NOTE — PROGRESS NOTES
Tolerated tunneled powerline insertion right subclavian well. Dressing is dry & intact. HOB elevated. Denies any nausea,tolerating PO fluids well. TRANSFER - OUT REPORT: 
 
Verbal report given to Claudia RN(name) on Lane Castro III  being transferred to  [address] [facility](unit) for routine progression of care Report consisted of patients Situation, Background, Assessment and  
Recommendations(SBAR). Information from the following report(s) SBAR was reviewed with the receiving nurse. Lines:  
Peripheral IV 01/06/21 Left Antecubital (Active) Site Assessment Clean, dry, & intact 01/06/21 1626 Phlebitis Assessment 0 01/06/21 1626 Infiltration Assessment 0 01/06/21 1626 Dressing Status Clean, dry, & intact; New 01/06/21 1626 Dressing Type Transparent 01/06/21 1626 Hub Color/Line Status Blue;Flushed;Patent 01/06/21 1626 Alcohol Cap Used Yes 01/06/21 1626 Opportunity for questions and clarification was provided. Patient transported with: Transport, patient chart Tech

## 2021-01-06 NOTE — PROGRESS NOTES
Occupational Therapy 01/06/21 Chart reviewed, attempted to see patient for OT evaluation, however patient adamantly declining any participation, stating he \"will not do sh** until I can eat something. \" Despite max education & encouragement, patient continues to decline, discussed with RN & CM. Will attempt for OT evaluation tomorrow, and if patient declines again, will sign off as he has been declining multiple times, reports he will be fine at home, has been NWB on RLE for months, and can get to/from his w/c and BSC despite no OOB mobility throughout hospital admission. Anticipate he is unsafe to return home at this time, will have IV abx and wound vac management, of which [facility] apartment staff are unable to assist with. Discussed with RN and CM about possible d/c to SNF for medical benefit as patient is not interested in therapy at this time. Thank you, Sunil Swartz, OTD, OTR/L

## 2021-01-06 NOTE — H&P
Radiology History and Physical 
 
Patient: Júnior Hand III 72 y.o. male Chief Complaint: Lethargy History of Present Illness: 72year old male needing IV access for long term antibiotics. History: 
 
Past Medical History:  
Diagnosis Date  Arthritis, Degenerative,  Knee 2011  Carcinoma, Prostate 2011  Degenerative disc disease 2011  Depression/ Anxiety 2011  Diabetes Mellitrus ( non-insulin dependent ) Type 2 2011  
 Heart failure ([address]  HEMATOCHEZIA 2011  Hypertrension 2011  Hypertriglyceridemia 2011  Ill-defined condition   
 lymphadema  Insomnia 2011  Laryngitis 2011  Mild Aortic insufficiency 2011  Mild Concentric LVH (left ventricular hypertrophy) 2011  Neuropathy 2011  Osteoarthritis 2011  Rotator Cuff Tendonitis 2011 Family History Family history unknown: Yes  
 
Social History Socioeconomic History  Marital status: LEGALLY  Spouse name: Not on file  Number of children: Not on file  Years of education: Not on file  Highest education level: Not on file Occupational History  Not on file Social Needs  Financial resource strain: Not on file  Food insecurity Worry: Not on file Inability: Not on file  Transportation needs Medical: Not on file Non-medical: Not on file Tobacco Use  Smoking status: Current Every Day Smoker Packs/day: 1.00 Last attempt to quit: 2019 Years since quittin.1  Smokeless tobacco: Never Used Substance and Sexual Activity  Alcohol use: Not Currently Alcohol/week: 11.7 standard drinks Types: 7 Cans of beer, 7 Shots of liquor per week  Drug use: Yes Types: Marijuana, Cocaine  Sexual activity: Yes  
  Partners: Female Lifestyle  Physical activity Days per week: Not on file Minutes per session: Not on file  Stress: Not on file Relationships  Social connections Talks on phone: Not on file Gets together: Not on file Attends Hoahaoism service: Not on file Active member of club or organization: Not on file Attends meetings of clubs or organizations: Not on file Relationship status: Not on file  Intimate partner violence Fear of current or ex partner: Not on file Emotionally abused: Not on file Physically abused: Not on file Forced sexual activity: Not on file Other Topics Concern [address] Service Not Asked  Blood Transfusions Not Asked  Caffeine Concern Not Asked  Occupational Exposure Not Asked Veto Risk Hazards Not Asked  Sleep Concern Not Asked  Stress Concern Not Asked  Weight Concern Not Asked  Special Diet Not Asked  Back Care Not Asked  Exercise Not Asked  Bike Helmet Not Asked  Seat Belt Not Asked  Self-Exams Not Asked Social History Narrative  Not on file Allergies: No Known Allergies Current Medications: 
Current Facility-Administered Medications Medication Dose Route Frequency  insulin lispro (HUMALOG) injection 10 Units  10 Units SubCUTAneous TIDAC  insulin glargine (LANTUS) injection 40 Units  40 Units SubCUTAneous QHS  lidocaine (XYLOCAINE) 20 mg/mL (2 %) injection 400 mg  20 mL IntraDERMal ONCE  
 balsam peru-castor oiL (VENELEX) ointment   Topical Q8H  
 ammonium lactate (LAC-HYDRIN) 12 % lotion   Topical Q12H  
 sodium bicarbonate tablet 650 mg  650 mg Oral DAILY  sodium chloride (NS) flush 5-40 mL  5-40 mL IntraVENous Q8H  
 sodium chloride (NS) flush 5-40 mL  5-40 mL IntraVENous PRN  pantoprazole (PROTONIX) 40 mg in 0.9% sodium chloride 10 mL injection  40 mg IntraVENous Q12H  
 enzalutamide (XTANDI) chemo capsule 80 mg (patient supplied) (Patient Supplied)  80 mg Oral BID  fluticasone propionate (FLONASE) 50 mcg/actuation nasal spray 2 Spray  2 Spray Both Nostrils DAILY  0.9% sodium chloride infusion 250 mL  250 mL IntraVENous PRN  
 0.9% sodium chloride infusion 250 mL  250 mL IntraVENous PRN  piperacillin-tazobactam (ZOSYN) 3.375 g in 0.9% sodium chloride (MBP/ADV) 100 mL MBP  3.375 g IntraVENous Q8H  
 oxyCODONE-acetaminophen (PERCOCET) 5-325 mg per tablet 1 Tab  1 Tab Oral Q4H PRN  
 0.9% sodium chloride infusion 250 mL  250 mL IntraVENous PRN  
 epoetin chi-epbx (RETACRIT) injection 20,000 Units  20,000 Units SubCUTAneous Q MON, WED & FRI  diclofenac (VOLTAREN) 1 % topical gel 2 g  2 g Topical QID  acetaminophen (TYLENOL) tablet 500 mg  500 mg Oral Q4H PRN  
 amLODIPine (NORVASC) tablet 5 mg  5 mg Oral DAILY  [Held by provider] aspirin delayed-release tablet 81 mg  81 mg Oral DAILY  bicalutamide (CASODEX) tablet 50 mg  50 mg Oral DAILY  gabapentin (NEURONTIN) capsule 100 mg  100 mg Oral TID  hydrALAZINE (APRESOLINE) tablet 100 mg  100 mg Oral TID  tamsulosin (FLOMAX) capsule 0.4 mg  0.4 mg Oral DAILY  nicotine (NICODERM CQ) 14 mg/24 hr patch 1 Patch  1 Patch TransDERmal DAILY  insulin lispro (HUMALOG) injection   SubCUTAneous AC&HS  
 glucose chewable tablet 16 g  4 Tab Oral PRN  
 dextrose (D50W) injection syrg 12.5-25 g  12.5-25 g IntraVENous PRN  
 glucagon (GLUCAGEN) injection 1 mg  1 mg IntraMUSCular PRN  
 [Held by provider] heparin (porcine) injection 5,000 Units  5,000 Units SubCUTAneous Q8H Physical Exam: 
Blood pressure (!) 156/91, pulse 77, temperature 97.7 °F (36.5 °C), resp. rate 17, height 6' (1.829 m), weight 124.4 kg (274 lb 4.8 oz), SpO2 96 %. GENERAL: alert, cooperative, no distress, appears stated age, LUNG: Nonlabored respiration on room air HEART: regular rate and rhythm Alerts:   
Hospital Problems  Date Reviewed: 8/20/2020 Codes Class Noted POA * (Principal) [facility] ICD-10-CM: M86.9 ICD-9-CM: 730.20  12/28/2020 Yes Laboratory:     
Recent Labs 01/06/21 
6375 HGB 7.3* HCT 23.4* WBC 13.6* * BUN 40* CREA 2.17* K 5.2* Plan of Care/Planned Procedure: 
Risks, benefits, and alternatives reviewed with patient and he agrees to proceed with the procedure. Tunneled central venous catheter placement Deemed appropriate for moderate sedation with versed and fentanyl. Wai Fernandez MD 
Interventional Radiology [facility] 
4:09 PM, 1/6/2021

## 2021-01-07 NOTE — WOUND CARE
Re-consulted by nurse request, patient complaining of Venelex burning sacrum. Dr. Ermias Rodarte at bedside during assessment; wound care orders obtained. Sacral wounds are improving, right side sacrum has resolved, left sacrum with purple non-blanchable area and a maroon slightly crusted area approximately 0.7 cm x 3 cm, no drainage, meg-wound intact, 1 wound edge open other is closed. Patient does not want Venelex to this area or foam dressing applied, he will allow Z guard to be applied. Z guard paste applied. Patient stated this is an area that opens up frequently and zinc oxide works the best. 
 
Educated the patient on the importance of repositioning every 2 hours even though he is on a specialty bed. He acknowledged understanding. Right dorsal foot still has purple, non-blanchable tissue, there is slight fluid collection today. Venelex ointment applied and patient tolerated it well. Will allow Venelex ointment to be applied here. Recommendations: 
Sacrum- Every 12 hours cleanse away any soiled cream with soap and water, blot dry, apply Z guard paste. Right dorsal foot- Every 12 hours apply Venelex ointment. Left lower leg/foot- Daily cleanse with soap and water using a wash cloth to remove loose, dry skin. Every 12 hours liberally apply Lac-Hydrin. 
  
Specialty bed: P-500 ordered via [facility]. Use only flat sheet and one incontinence pad. Please call Equipment Distribution at  if not delivered and when patient discharged. Maintain wound VAC 
VAC (NPWT) Dressing Orders: 
VAC Settings: 125 mmHg continuous/low suction Dressing change twice weekly by WOCN. Change canisters when full and measure output q shift. (located 5E or 4W supply room). For sudden development of blood or an increased amount of sunshine bleedin.  Immediately turn off VAC system. 2.  Leave dressing in place. 3.  Hold pressure over wound. 4.  Call physician. If vacuum fails to maintain seal or unable to manage alarm for clogged tubing for >2hrs:                
1.  Contact Physician 2.  Cleanse wound with normal saline. 3.  Saline moistened fluff gauze to base. 4.  Cover with dry dressing. 5.  Secure with transparent film. 6.  Change q 8 hours and as needed. 7.  Notify Physician and WOCN that wound VAC dressing needs to be reapplied. If patient is discharged from our facility: 1.  Remove all of equipment and sponge. 2.  Place moist dressing. 3.  Put VAC system and power cord in clear bag in utility room. (no red bags) For procedures or when pt is off the floor:  
Battery backup on our current inpatient systems lasts for 4 hours and may be used to prevent interruption of treatment- VAC should NOT be turned off. 
  
If disconnecting for more than 2 hours, with discharge, or a pt is admitted with a VAC: 
Discontinue the therapy/ begin wound care orders above, return any outpatient equipment to family or transferring facility, and notify the [id number] Nurse and ordering practitioner. MONIKA RaymondN, RN, [facility] Office x [phone] Pager x [phone]

## 2021-01-07 NOTE — PROGRESS NOTES
Nephrology Progress Note Niharika Fu III Date of Admission : 12/28/2020 CC: Follow up for KATHI on CKD Assessment and Plan KATHI on CKD  
- suspected ATN from severe pre renal azotemia - U/S neg for hydro - UA with proteinuria - Cr trending down 
- cont present care - please avoid PICC line and should get Tisha catheter for abx  
- advised to f/u with Dr Kadie Bowers in 4-6 weeks  
- daily labs 
  
CKD Stage IIIa: 
-  Presumed 2/2 DM and HTN 
- nephrotic range proteinuria 
- baseline Cr 1.7 mg/dl  
- followed by Dr. Kadie Bowers 
  
Hx of prostate cancer s/p XRT 
  
Metabolic acidosis: 
-  Continue oral Bicarb on d/c  
  
Anemia in CKD + blood loss: 
- received PRBCs on 1/2 
- cont JAZ + IV iron 
- EGD 1/5/2021: An actively bleeding Dieulafoy's lesion was noted in the gastric fundus.   
  
Type II DM: 
- on insulin 
  
HTN :  
- Continue current meds  
  
R foot osteo: 
- on abx 
- s/p debridement on 12/30 Group B strep bacteremia: 
- Abx per ID 
  
B/l Airspace disease: 
- ? PNA vs edema 
- COVID neg 
- on zosyn Interval History: 
Seen and examined. Cr improving. K 5.2. No cp, sob, n/v/d reported. Current Medications: all current  Medications have been eviewed in [facility] Review of Systems: Pertinent items are noted in HPI. Objective: 
Vitals:   
Vitals:  
 01/06/21 1745 01/06/21 2026 01/07/21 0310 01/07/21 3658 BP: (!) 143/74 (!) 158/76 (!) 163/88 (!) 167/84 Pulse: 79 88 89 93 Resp: 20 19 18 18 Temp:  97.3 °F (36.3 °C) 98.5 °F (36.9 °C) 98 °F (36.7 °C) SpO2: 100% 97% 97% 97% Weight:      
Height:      
 
Intake and Output: 
No intake/output data recorded. 01/05 1901 - 01/07 0700 In: 7522 [P.O.:1310; I.V.:440] Out: 2025 [Urine:2025] Physical Examination: 
General: NAD,Conversant, obese Neck:  Supple, no mass Resp:  Lungs CTA B/L, no wheezing , normal respiratory effort CV:  RRR,  no murmur or rub,  trace LLE edema, RLE in wrap GI:  Soft, NT, + Bowel sounds, no hepatosplenomegaly Neurologic:  Non focal 
Psych:             AAO x 3 appropriate affect Skin:  No Rash :  Bynum in place 
 
[]    High complexity decision making was performed 
[]    Patient is at high-risk of decompensation with multiple organ involvement Lab Data Personally Reviewed: I have reviewed all the pertinent labs, microbiology data and radiology studies during assessment. Recent Labs 01/07/21 
8330 01/06/21 
7970 01/05/21 
3579  137 131*  
K 5.2* 5.2* 5.8*  
* 109* 108 CO2 22 21 20* GLU 77 90 167* BUN 36* 40* 42* CREA 1.88* 2.17* 2.27* CA 8.1* 8.1* 8.2* MG 1.8 1.9 2.2 PHOS 3.8 4.2 5.6* Recent Labs 01/07/21 
3532 01/06/21 
3481 01/05/21 
1704 WBC 14.2* 13.6* 16.3* HGB 7.0* 7.3* 7.5* HCT 22.6* 23.4* 23.9*  
* 438* 486* No results found for: SDES Lab Results Component Value Date/Time Culture result: (A) 12/30/2020 01:14 PM  
  LIGHT ANAEROBIC GRAM NEGATIVE RODS BETA LACTAMASE POSITIVE Culture result: LIGHT PROTEUS MIRABILIS (A) 12/30/2020 01:14 PM  
 Culture result: (A) 12/30/2020 01:14 PM  
  LIGHT STREPTOCOCCI, BETA HEMOLYTIC GROUP B Penicillin and ampicillin are drugs of choice for treatment of beta-hemolytic streptococcal infections. Susceptibility testing of penicillins and beta-lactams approved by the FDA for treatment of beta-hemolytic streptococcal infections need not be performed routinely, because nonsusceptible isolates are extremely rare. CLSI 2012 Culture result: LIGHT ENTEROCOCCUS FAECALIS (A) 12/30/2020 01:14 PM  
 
Recent Results (from the past 24 hour(s)) GLUCOSE, POC Collection Time: 01/06/21  6:20 PM  
Result Value Ref Range Glucose (POC) 115 (H) 65 - 100 mg/dL Performed by Richard Car (PCT) GLUCOSE, POC Collection Time: 01/06/21  8:55 PM  
Result Value Ref Range Glucose (POC) 116 (H) 65 - 100 mg/dL Performed by Chaitanya Pacheco (SON) CBC W/O DIFF  
 Collection Time: 01/07/21  3:05 AM  
Result Value Ref Range WBC 14.2 (H) 4.1 - 11.1 K/uL  
 RBC 2.57 (L) 4.10 - 5.70 M/uL HGB 7.0 (L) 12.1 - 17.0 g/dL HCT 22.6 (L) 36.6 - 50.3 % MCV 87.9 80.0 - 99.0 FL  
 MCH 27.2 26.0 - 34.0 PG  
 MCHC 31.0 30.0 - 36.5 g/dL  
 RDW 17.2 (H) 11.5 - 14.5 % PLATELET 320 (H) 439 - 400 K/uL MPV 9.1 8.9 - 12.9 FL  
 NRBC 0.4 (H) 0  WBC ABSOLUTE NRBC 0.05 (H) 0.00 - 0.01 K/uL METABOLIC PANEL, BASIC Collection Time: 01/07/21  3:05 AM  
Result Value Ref Range Sodium 137 136 - 145 mmol/L Potassium 5.2 (H) 3.5 - 5.1 mmol/L Chloride 109 (H) 97 - 108 mmol/L  
 CO2 22 21 - 32 mmol/L Anion gap 6 5 - 15 mmol/L Glucose 77 65 - 100 mg/dL BUN 36 (H) 6 - 20 MG/DL Creatinine 1.88 (H) 0.70 - 1.30 MG/DL  
 BUN/Creatinine ratio 19 12 - 20 GFR est AA 43 (L) >60 ml/min/1.73m2 GFR est non-AA 36 (L) >60 ml/min/1.73m2 Calcium 8.1 (L) 8.5 - 10.1 MG/DL MAGNESIUM Collection Time: 01/07/21  3:05 AM  
Result Value Ref Range Magnesium 1.8 1.6 - 2.4 mg/dL PHOSPHORUS Collection Time: 01/07/21  3:05 AM  
Result Value Ref Range Phosphorus 3.8 2.6 - 4.7 MG/DL  
GLUCOSE, POC Collection Time: 01/07/21  6:44 AM  
Result Value Ref Range Glucose (POC) 141 (H) 65 - 100 mg/dL Performed by Chaitanya TRAORE) GLUCOSE, POC Collection Time: 01/07/21 11:27 AM  
Result Value Ref Range Glucose (POC) 136 (H) 65 - 100 mg/dL Performed by Jaci Apodaca MD 
[address] [place] [address], [address] [facility] Phone - [phone] Fax - [phone] 
[email or web address]

## 2021-01-07 NOTE — PROGRESS NOTES
Hospitalist Progress Note Chely Shannon MD 
Answering service: [phone] OR [phone] from in house phone Date of Service:  2021 NAME:  Anastasiya Felix III 
:  1950 MRN:  [id number] Admission Summary: As per initial admission summary Gera Delgado is a 72 y.o. male with PMH of prostatic cancer s/p radiation, IDDM, heart failure, htn. He apparently told the ER physician that he c/o onset of diarrhea yesterday and some chills over the last 2 days, with occasional cough. For me, however, he denies all of these symptoms and states he came in because his caregiver was concerned d/t his lethargy over the last several days to weeks. He lives home alone and has a caregiver five times a week. He denies cp, sob, fever, chills, sweats, N/V. He has had a wound on his right foot for months. He denies any foot pain d/t neuropathy. He goes to [facility] for nephrology and states he last saw them a few months ago. Prior to today, he believes it has been several months since he last had any blood work. Denies etoh or illicits. Smokes just under 1 PPD. Currently no acute complaints except asking for water. States he takes all his meds daily. Interval history / Subjective:  
Discussed disposition w pt Reviewed and eval wound care with wound care staff at bedside and w pt 
dispo pending safe options. I reviewed concerns as to finding at apartment - pt claims DM needles not drug paraphernalia Patient notes basically living, both up sitting and down sleeping, in his motorized wheel chair that reclines for sleeping. \" I can go go sleep in my kitchen if I desire. \" Assessment & Plan:  
 
Severe sepsis 2/2 osteomyelitis right foot 
-Podiatry consulted  
-Underwent debridement right foot ulcer with removal of infected bone 2020 
-Cultures with proteus mirabilis, stept group B, enterococcus -ID following. Antibiotics transitioned to zosyn  
-surgical path with + margins, will need prolonged antibiotics 
-Powerline ordered for 1/6 
-wound vac 1/5 
-non weight bearing right lower extremity (patient wheelchair bound at baseline) Shortness of breath 1/4: improved 
-s/p steroids, breathing treatment, hold further steroids 
-chest x-ray with some bilateral airspace disease, consider adding back diuretics if okay by nephrology  
resovled as an acute issues Acute on chronic anemia, baseline ~9: persistent  
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 Duodenal ucler, non bleeding 
-Has received 6 units total during admission 
-Continue PPI Lab Results Component Value Date/Time Hemoglobin (POC) 6.5 02/04/2020 01:59 PM  
 HGB 7.0 (L) 01/07/2021 03:10 AM  
 cont to monitor  
  
Acute on CKD: improving 
-nephrology following, on bicarb, valtessa  
-kumar removed 1/2 Lab Results Component Value Date/Time Creatinine 1.88 (H) 01/07/2021 03:05 AM  
  
  
IDDM, last a1c 8.1 01/2020 
-continue long acting insulin, meal time, SSI Lab Results Component Value Date/Time Glucose 77 01/07/2021 03:05 AM  
 Glucose (POC) 241 (H) 01/08/2021 06:30 AM  
  
  
HTN, controlled 
-holding amlodipine, continue hydralazine BP Readings from Last 1 Encounters:  
01/08/21 (!) 172/74  
  
  
Hyponatremia 
-stable, montior - resolved Toxic metabolic encephalopathy/drowsiness: resolved 
  
Prostate ca 
-Cont home meds 
  
Tobacco abuse 
-continue patch Code status: full code DVT prophylaxis: scds Care Plan discussed with: Patient/Family Disposition: possible discharge 1/7 pending powerline, antibiotics, and home health set up Hospital Problems  Date Reviewed: 8/20/2020 Codes Class Noted POA * (Principal) Osteomyelitis ([facility] [address] ICD-10-CM: M86.9 ICD-9-CM: 730.20  12/28/2020 Yes Review of Systems:  
Negative unless stated above Vital Signs:  
 Last 24hrs VS reviewed since prior progress note. Most recent are: 
Visit Vitals BP (!) 167/84 (BP 1 Location: Left arm) Pulse 93 Temp 98 °F (36.7 °C) Resp 18 Ht 6' (1.829 m) Wt 124.7 kg (275 lb) SpO2 97% BMI 37.30 kg/m² Intake/Output Summary (Last 24 hours) at 1/7/2021 1418 Last data filed at 1/7/2021 6239 Gross per 24 hour Intake 1400 ml Output 1250 ml Net 150 ml Physical Examination:  
I had a face to face encounter with this patient and independently examined them on January 7, 2021 as outlined below: 
     
Constitutional:  No acute distress, somewhat cooperative, pleasant ENT:  Oral mucous moist, oropharynx benign. Neck supple Resp:  CTA bilaterally. No wheezing/rhonchi/rales. No accessory muscle use CV:  Regular rhythm, normal rate, no murmurs, gallops, rubs GI:  Soft, obese, non distended, non tender. normoactive bowel sounds, no hepatosplenomegaly Musculoskeletal:  trace edema, warm, 2+ pulses throughout. Right lower extremity wrapped Neurologic:  Moves all extremities Data Review:  
 Review and/or order of clinical lab test 
Review and/or order of tests in the radiology section of CPT Review and/or order of tests in the medicine section of CPT Labs:  
 
Recent Labs 01/07/21 
0196 01/06/21 
8523 WBC 14.2* 13.6* HGB 7.0* 7.3* HCT 22.6* 23.4*  
* 438* Recent Labs 01/07/21 
4323 01/06/21 
6067 01/05/21 
7298  137 131*  
K 5.2* 5.2* 5.8*  
* 109* 108 CO2 22 21 20* BUN 36* 40* 42* CREA 1.88* 2.17* 2.27* GLU 77 90 167* CA 8.1* 8.1* 8.2* MG 1.8 1.9 2.2 PHOS 3.8 4.2 5.6* No results for input(s): ALT, AP, TBIL, TBILI, TP, ALB, GLOB, GGT, AML, LPSE in the last 72 hours. No lab exists for component: SGOT, GPT, AMYP, HLPSE No results for input(s): INR, PTP, APTT, INREXT, INREXT in the last 72 hours. No results for input(s): FE, TIBC, PSAT, FERR in the last 72 hours. Lab Results Component Value Date/Time Folate 20.0 03/16/2019 04:06 AM  
  
No results for input(s): PH, PCO2, PO2 in the last 72 hours. Recent Labs 01/06/21 
7569 CPK 38* No results found for: CHOL, CHOLX, CHLST, CHOLV, HDL, HDLP, LDL, LDLC, DLDLP, TGLX, TRIGL, TRIGP, CHHD, CHHDX Lab Results Component Value Date/Time Glucose (POC) 136 (H) 01/07/2021 11:27 AM  
 Glucose (POC) 141 (H) 01/07/2021 06:44 AM  
 Glucose (POC) 116 (H) 01/06/2021 08:55 PM  
 Glucose (POC) 115 (H) 01/06/2021 06:20 PM  
 Glucose (POC) 87 01/06/2021 12:48 PM  
 
Lab Results Component Value Date/Time Color YELLOW/STRAW 01/03/2021 12:56 PM  
 Appearance CLEAR 01/03/2021 12:56 PM  
 Specific gravity 1.015 01/03/2021 12:56 PM  
 pH (UA) 5.5 01/03/2021 12:56 PM  
 Protein 300 (A) 01/03/2021 12:56 PM  
 Glucose 100 (A) 01/03/2021 12:56 PM  
 Ketone Negative 01/03/2021 12:56 PM  
 Bilirubin Negative 01/03/2021 12:56 PM  
 Urobilinogen 0.2 01/03/2021 12:56 PM  
 Nitrites Negative 01/03/2021 12:56 PM  
 Leukocyte Esterase Negative 01/03/2021 12:56 PM  
 Epithelial cells FEW 01/03/2021 12:56 PM  
 Bacteria 1+ (A) 01/03/2021 12:56 PM  
 WBC 0-4 01/03/2021 12:56 PM  
 RBC 0-5 01/03/2021 12:56 PM  
 
 
 
Medications Reviewed:  
 
Current Facility-Administered Medications Medication Dose Route Frequency  insulin lispro (HUMALOG) injection 10 Units  10 Units SubCUTAneous TIDAC  insulin glargine (LANTUS) injection 40 Units  40 Units SubCUTAneous QHS  balsam peru-castor oiL (VENELEX) ointment   Topical Q8H  
 ammonium lactate (LAC-HYDRIN) 12 % lotion   Topical Q12H  
 sodium bicarbonate tablet 650 mg  650 mg Oral DAILY  sodium chloride (NS) flush 5-40 mL  5-40 mL IntraVENous Q8H  
 sodium chloride (NS) flush 5-40 mL  5-40 mL IntraVENous PRN  pantoprazole (PROTONIX) 40 mg in 0.9% sodium chloride 10 mL injection  40 mg IntraVENous Q12H  enzalutamide Wanda Coca) chemo capsule 80 mg (patient supplied) (Patient Supplied)  80 mg Oral BID  fluticasone propionate (FLONASE) 50 mcg/actuation nasal spray 2 Spray  2 Spray Both Nostrils DAILY  0.9% sodium chloride infusion 250 mL  250 mL IntraVENous PRN  
 0.9% sodium chloride infusion 250 mL  250 mL IntraVENous PRN  piperacillin-tazobactam (ZOSYN) 3.375 g in 0.9% sodium chloride (MBP/ADV) 100 mL MBP  3.375 g IntraVENous Q8H  
 oxyCODONE-acetaminophen (PERCOCET) 5-325 mg per tablet 1 Tab  1 Tab Oral Q4H PRN  
 0.9% sodium chloride infusion 250 mL  250 mL IntraVENous PRN  
 epoetin chi-epbx (RETACRIT) injection 20,000 Units  20,000 Units SubCUTAneous Q MON, WED & FRI  diclofenac (VOLTAREN) 1 % topical gel 2 g  2 g Topical QID  acetaminophen (TYLENOL) tablet 500 mg  500 mg Oral Q4H PRN  
 amLODIPine (NORVASC) tablet 5 mg  5 mg Oral DAILY  [Held by provider] aspirin delayed-release tablet 81 mg  81 mg Oral DAILY  bicalutamide (CASODEX) tablet 50 mg  50 mg Oral DAILY  gabapentin (NEURONTIN) capsule 100 mg  100 mg Oral TID  hydrALAZINE (APRESOLINE) tablet 100 mg  100 mg Oral TID  tamsulosin (FLOMAX) capsule 0.4 mg  0.4 mg Oral DAILY  nicotine (NICODERM CQ) 14 mg/24 hr patch 1 Patch  1 Patch TransDERmal DAILY  insulin lispro (HUMALOG) injection   SubCUTAneous AC&HS  
 glucose chewable tablet 16 g  4 Tab Oral PRN  
 dextrose (D50W) injection syrg 12.5-25 g  12.5-25 g IntraVENous PRN  
 glucagon (GLUCAGEN) injection 1 mg  1 mg IntraMUSCular PRN  
 [Held by provider] heparin (porcine) injection 5,000 Units  5,000 Units SubCUTAneous Q8H  
 
______________________________________________________________________ EXPECTED LENGTH OF STAY: 9d 19h ACTUAL LENGTH OF STAY:          Michael Dominguez MD

## 2021-01-07 NOTE — PROGRESS NOTES
PRETTY: 
RUR: 34% Disposition:  TBD (see below) CM received call from patient's community CM on yesterday Liliam Prasad at [facility] ([phone] and fax [phone]). CM provided updates re hospitalization, barriers and home situation. CM contacted [employer] owner Shaneka Bolaños [phone]) to determine if patient's son and girlfriend are paid care-givers in the home. Her response (per her discussion with her aide this morning) is that the son and girlfriend illegally reside in the residence per apt management guidelines. She states that has been drug paraphanalia  found in the residence in the mornings when the aide arrives. According to Ms Kathy Tapia the son is out of the residence when aid arrives and based on condition of apt (extra dishes in the sink, apt in disarray) it appears that someone else is in the apt other than the patient. She (Ms Rebollar voices concern for the safety of her staff. CM will discuss with patient and family and continue to follow. Kane Harrell, [address], [facility] 
[phone] 
 
5:10p  CHAO spoke with Lauren Gardner (patient's daughter) to obtain updates re her conversations she was to have with her siblings. She has not been able to reach siblings. CHAO informed that patient is still refusing therapies. She was informed that referral for the wound vac was sent to dme provider today. CM met with patient to discuss plans for discharge. CM asked again about patient going to a snf. He was adamant that he would not be going to a SNF. He again voiced his fear of COVID. We discussed how he would manage at home. He initially stated he lived alone. CM asked about his son? His response was that he stays there at times but not  every day/night. We discussed his need for IV abx. He states he was not aware that he was receiving abx q 8 hrs. CM asked how he planned to administer his own abx when he goes home? His response was that he would have to administer it himself. CM explained to him that he would have to demonstrate that he could safely administer his medication before he could be d/c'd to home. Patient was somewhat argumentative and later calmed down. He thanked CM for visiting with him. CM contacted Echo Koo (dter [phone]) and gave her an updates from above conversation. CM continuing to follow. Greg Olguin, [address], [address] [place]

## 2021-01-07 NOTE — PROGRESS NOTES
Occupational Therapy 01/07/21 Chart reviewed and spoke with PT & RN. Patient continues to refuse therapy evaluations despite max education and encouragement to participate, reports he will be able to manage himself at home. Concern for patient's ability to manage wound vac and IV antibiotics considering PLOF and unclear current functional abilities. Due to numerous refusals to participate in therapy evaluation, will sign off at this time as patient has no interest in therapy, wants to go home despite any education/information provided. Thank you, Theresa Morel, OTD, OTR/L

## 2021-01-07 NOTE — PROGRESS NOTES
Physical Therapy Attempted again to assess patient. Patient was requesting bed pan and informed patient would be a good time to assess out of bed to commode but patient adamantly said he has things at home to assist him and not here (electric WC that raises him to stand). Affirmed this but explained that we could raise the bed. Patient refused. At this time, patient is not open to PT and have tried 3x. Per patient he has things set up in home so he can manage. Will sign off. Patient does need to have more education, preferably by physician as to what he needs to be able to do at home himself -IV antibiotics, wound vac management if needed and that his home care aides will be unable to assist him with those things. Signing off at this time.  
Isaias Montiel, PT

## 2021-01-07 NOTE — PROGRESS NOTES
[address] 
[address] [facility] [address] [facility], [address] 
[phone] GI PROGRESS NOTE 
 
 
NAME:   Carrillo Bee III  
:    1950 MRN:    [id number] Assessment/Plan Anemia: 
-Heme + stool on admission 2020 
-Hemoglobin 7.0 this AM 2021, continue to monitor- no overt bleeding  
-Transfuse with PRBCs if hemoglobin less than 7.0 
-BID PPI with Protonix 
-EGD 2021: An actively bleeding Dieulafoy's lesion was noted in the gastric fundus.  Rest of the stomach was normal.Duodenum/jejunum:Patchy areas of erythema, congestion and superficial ulcers measuring 2 to 4 mm were noted in the distal bulb and second portion of the duodenum.  No bleeding or stigmata were noted. Two resolution clips were placed in the gastric fundus for control of bleeding from Dieulafoy's lesion Ablation of the lesion was also performed using soft coag settings of the ERBE.  No bleeding was noted at the end of the procedure -GI will sign off at this time, call if needing additional assistance. Patient's anemia is multifactorial with comorbid conditions, patient with no overt bleeding at this time. Patient to continue BID PPI with Protonix. When discharged, will need outpatient follow up in 3-4 weeks. 
 
  
Osteomyelitis: 
-s/p right foot ulcer debridement, wound vac in place -IV Zosyn in use  
-WBC 14.2 today 2021 
  
Will discuss with Dr. Thuy Pond Patient Active Problem List  
Diagnosis Code  Diabetic polyneuropathy (HCC) E11.42  
 Rotator Cuff Tendonitis M71.9, M67.919  
 Diabetes mellitus type 2, controlled ([facility] [address] E11.9  Degenerative disc disease [id number]  Osteoarthrosis involving lower leg M17.10  Depression/ Anxiety F34.1  HTN (hypertension) I10  
 Chronic hip pain M25.559, G89.29  
 Hypertriglyceridemia E78.1  Mild Aortic Insufficiency I35.1  Mild Concentric LVH (left ventricular hypertrophy) I51.7  S/P hip replacement, right U74.186  Stage 3 chronic kidney disease N18.30  Prostate CA ([facility] [address] C61  Type 2 diabetes with nephropathy ([facility]) E11.21  
 Severe obesity (BMI 35.0-39. 9) [facility] E66.01  
 Acute on chronic renal failure (HCC) N17.9, N18.9  KATHI (acute kidney injury) ([facility] [address] N17.9  Diabetic ulcer of right midfoot associated with type 2 diabetes mellitus, with fat layer exposed [facility] E11.621, S68.492  PAD (peripheral artery disease) ([facility]) I73.9  Dependence on nicotine from cigarettes F17.210  
 Osteomyelitis ([facility]) M86.9 Subjective: Rocco Lopez is a 72 y.o.  male Patient without complaints. No nausea, no vomiting, no abdominal pain. No bowl movements overnight, +flatus Tolerating diet. Objective: VITALS:  
Last 24hrs VS reviewed since prior hospitalist progress note. Most recent are: 
Visit Vitals BP (!) 167/84 Pulse 93 Temp 98.5 °F (36.9 °C) Resp 18 Ht 6' (1.829 m) Wt 124.7 kg (275 lb) SpO2 97% BMI 37.30 kg/m² Intake/Output Summary (Last 24 hours) at 1/7/2021 0285 Last data filed at 1/7/2021 5569 Gross per 24 hour Intake 1400 ml Output 1250 ml Net 150 ml PHYSICAL EXAM: 
General   well developed, well nourished, appears stated age, in no acute distress EENT  Normocephalic, Atraumatic, PERRLA, EOMI, sclera clear, nares clear, pharynx normal 
Respiratory   Clear To Auscultation bilaterally Cardiology  Regular Rate and Rythmn Abdominal  Soft, non-tender, non-distended, bowel sounds present, no palpable mass Extremities: Right foot with wound vac Skin  Normal skin turgor.  No rashes or skin ulcers noted Neurological  No focal neurological deficits noted Psychological  Oriented x 3.  Normal affect. 
  
 
  
Lab Data Recent Results (from the past 12 hour(s)) CBC W/O DIFF Collection Time: 01/07/21  3:05 AM  
Result Value Ref Range WBC 14.2 (H) 4.1 - 11.1 K/uL  
 RBC 2.57 (L) 4.10 - 5.70 M/uL HGB 7.0 (L) 12.1 - 17.0 g/dL HCT 22.6 (L) 36.6 - 50.3 % MCV 87.9 80.0 - 99.0 FL  
 MCH 27.2 26.0 - 34.0 PG  
 MCHC 31.0 30.0 - 36.5 g/dL  
 RDW 17.2 (H) 11.5 - 14.5 % PLATELET 782 (H) 810 - 400 K/uL MPV 9.1 8.9 - 12.9 FL  
 NRBC 0.4 (H) 0  WBC ABSOLUTE NRBC 0.05 (H) 0.00 - 0.01 K/uL METABOLIC PANEL, BASIC Collection Time: 01/07/21  3:05 AM  
Result Value Ref Range Sodium 137 136 - 145 mmol/L Potassium 5.2 (H) 3.5 - 5.1 mmol/L Chloride 109 (H) 97 - 108 mmol/L  
 CO2 22 21 - 32 mmol/L Anion gap 6 5 - 15 mmol/L Glucose 77 65 - 100 mg/dL BUN 36 (H) 6 - 20 MG/DL Creatinine 1.88 (H) 0.70 - 1.30 MG/DL  
 BUN/Creatinine ratio 19 12 - 20 GFR est AA 43 (L) >60 ml/min/1.73m2 GFR est non-AA 36 (L) >60 ml/min/1.73m2 Calcium 8.1 (L) 8.5 - 10.1 MG/DL MAGNESIUM Collection Time: 01/07/21  3:05 AM  
Result Value Ref Range Magnesium 1.8 1.6 - 2.4 mg/dL PHOSPHORUS Collection Time: 01/07/21  3:05 AM  
Result Value Ref Range Phosphorus 3.8 2.6 - 4.7 MG/DL  
GLUCOSE, POC Collection Time: 01/07/21  6:44 AM  
Result Value Ref Range Glucose (POC) 141 (H) 65 - 100 mg/dL Performed by Jeanette Alanis (SON) Medications: Reviewed Moses Castro NP

## 2021-01-08 NOTE — PROGRESS NOTES
Nephrology Progress Note Gareth Velez III Date of Admission : 12/28/2020 CC: Follow up for KATHI on CKD Assessment and Plan KATHI on CKD  
- suspected ATN from severe pre renal azotemia - U/S neg for hydro - UA with proteinuria - Cr down to 1.9 
- advised to f/u with Dr Quang Andrade in 4-6 weeks  
- daily labs 
  
CKD Stage IIIa: 
-  Presumed 2/2 DM and HTN 
- nephrotic range proteinuria 
- baseline Cr 1.7 mg/dl  
- followed by Dr. Quang Andrade 
  
Hx of prostate cancer s/p XRT 
  
Metabolic acidosis: 
-  Continue oral Bicarb on d/c  
  
Anemia in CKD + blood loss: 
- received PRBCs on 1/2 
- cont JAZ + IV iron 
- EGD 1/5/2021: An actively bleeding Dieulafoy's lesion was noted in the gastric fundus.   
  
Type II DM: 
- on insulin 
  
HTN :  
- Continue current meds  
  
R foot osteo: 
- on abx 
- s/p debridement on 12/30 Group B strep bacteremia: 
- Abx per ID 
  
B/l Airspace disease: 
- ? PNA vs edema 
- COVID neg 
- on zosyn Interval History: 
Seen and examined. Got Tisha catheter. No cp, sob, n/v/d reported. Current Medications: all current  Medications have been eviewed in [facility] Review of Systems: Pertinent items are noted in HPI. Objective: 
Vitals:   
Vitals:  
 01/07/21 2143 01/08/21 0017 01/08/21 4376 01/08/21 4634 BP: (!) 172/82  (!) 179/85 (!) 172/74 Pulse: 91  92 92 Resp: 18  18 18 Temp: 97.9 °F (36.6 °C)  97.7 °F (36.5 °C) 98.6 °F (37 °C) SpO2: 96%  96% 95% Weight:  121.9 kg (268 lb 11.9 oz) Height:      
 
Intake and Output: 
01/08 0701 - 01/08 1900 In: 240 [P.O.:240] Out: 250 [Urine:250] 01/06 1901 - 01/08 0700 In: 1752 [P.O.:960; I.V.:701] Out: 1775 [JQKOL:0852] Physical Examination: 
General: NAD,Conversant, obese Neck:  Supple, no mass Resp:  Lungs CTA B/L, no wheezing , normal respiratory effort CV:  RRR,  no murmur or rub,  trace LLE edema, RLE in wrap GI:  Soft, NT, + Bowel sounds, no hepatosplenomegaly Neurologic:  Non focal 
 Psych:             AAO x 3 appropriate affect Skin:  No Rash :  Bynum in place 
 
[]    High complexity decision making was performed 
[]    Patient is at high-risk of decompensation with multiple organ involvement Lab Data Personally Reviewed: I have reviewed all the pertinent labs, microbiology data and radiology studies during assessment. Recent Labs 01/07/21 
2759 01/06/21 
0536  137  
K 5.2* 5.2*  
* 109* CO2 22 21 GLU 77 90 BUN 36* 40* CREA 1.88* 2.17* CA 8.1* 8.1*  
MG 1.8 1.9 PHOS 3.8 4.2 Recent Labs 01/08/21 
0310 01/07/21 
8461 01/06/21 
7436 WBC 14.3* 14.2* 13.6* HGB 7.2* 7.0* 7.3* HCT 23.2* 22.6* 23.4*  
 414* 438* No results found for: SDES Lab Results Component Value Date/Time Culture result: (A) 12/30/2020 01:14 PM  
  LIGHT ANAEROBIC GRAM NEGATIVE RODS BETA LACTAMASE POSITIVE Culture result: LIGHT PROTEUS MIRABILIS (A) 12/30/2020 01:14 PM  
 Culture result: (A) 12/30/2020 01:14 PM  
  LIGHT STREPTOCOCCI, BETA HEMOLYTIC GROUP B Penicillin and ampicillin are drugs of choice for treatment of beta-hemolytic streptococcal infections. Susceptibility testing of penicillins and beta-lactams approved by the FDA for treatment of beta-hemolytic streptococcal infections need not be performed routinely, because nonsusceptible isolates are extremely rare. CLSI 2012 Culture result: LIGHT ENTEROCOCCUS FAECALIS (A) 12/30/2020 01:14 PM  
 
Recent Results (from the past 24 hour(s)) GLUCOSE, POC Collection Time: 01/07/21 11:27 AM  
Result Value Ref Range Glucose (POC) 136 (H) 65 - 100 mg/dL Performed by Kamla Lewis GLUCOSE, POC Collection Time: 01/07/21  4:19 PM  
Result Value Ref Range Glucose (POC) 223 (H) 65 - 100 mg/dL Performed by Lisabeth Osgood GLUCOSE, POC Collection Time: 01/07/21  9:25 PM  
Result Value Ref Range Glucose (POC) 216 (H) 65 - 100 mg/dL Performed by Marcel Summers CBC WITH AUTOMATED DIFF Collection Time: 01/08/21  3:10 AM  
Result Value Ref Range WBC 14.3 (H) 4.1 - 11.1 K/uL  
 RBC 2.59 (L) 4.10 - 5.70 M/uL HGB 7.2 (L) 12.1 - 17.0 g/dL HCT 23.2 (L) 36.6 - 50.3 % MCV 89.6 80.0 - 99.0 FL  
 MCH 27.8 26.0 - 34.0 PG  
 MCHC 31.0 30.0 - 36.5 g/dL  
 RDW 17.2 (H) 11.5 - 14.5 % PLATELET 031 113 - 428 K/uL MPV 9.0 8.9 - 12.9 FL  
 NRBC 0.4 (H) 0  WBC ABSOLUTE NRBC 0.06 (H) 0.00 - 0.01 K/uL NEUTROPHILS 83 (H) 32 - 75 % BAND NEUTROPHILS 1 0 - 6 % LYMPHOCYTES 7 (L) 12 - 49 % MONOCYTES 5 5 - 13 % EOSINOPHILS 1 0 - 7 % BASOPHILS 1 0 - 1 % METAMYELOCYTES 1 (H) 0 % MYELOCYTES 1 (H) 0 % IMMATURE GRANULOCYTES 0 %  
 ABS. NEUTROPHILS 12.0 (H) 1.8 - 8.0 K/UL  
 ABS. LYMPHOCYTES 1.0 0.8 - 3.5 K/UL  
 ABS. MONOCYTES 0.7 0.0 - 1.0 K/UL  
 ABS. EOSINOPHILS 0.1 0.0 - 0.4 K/UL  
 ABS. BASOPHILS 0.1 0.0 - 0.1 K/UL  
 ABS. IMM. GRANS. 0.0 K/UL  
 DF MANUAL PLATELET COMMENTS Large Platelets RBC COMMENTS MACROCYTOSIS 1+ 
    
 RBC COMMENTS POLYCHROMASIA 1+ 
    
 RBC COMMENTS ANISOCYTOSIS 1+ GLUCOSE, POC Collection Time: 01/08/21  6:30 AM  
Result Value Ref Range Glucose (POC) 241 (H) 65 - 100 mg/dL Performed by Charles Kumar, [address] [facility] [address], [address] [place] [address] Phone - [phone] Fax - [phone] 
www. [employer]

## 2021-01-08 NOTE — PROGRESS NOTES
Hospitalist Progress Note Delfin Prasad MD 
Answering service: [phone] OR [phone] from in house phone Date of Service:  2021 NAME:  Dick Lewis III 
:  1950 MRN:  [id number] Admission Summary: As per initial admission summary Aura Gutierrez is a 72 y.o. male with PMH of prostatic cancer s/p radiation, IDDM, heart failure, htn. He apparently told the ER physician that he c/o onset of diarrhea yesterday and some chills over the last 2 days, with occasional cough. For me, however, he denies all of these symptoms and states he came in because his caregiver was concerned d/t his lethargy over the last several days to weeks. He lives home alone and has a caregiver five times a week. He denies cp, sob, fever, chills, sweats, N/V. He has had a wound on his right foot for months. He denies any foot pain d/t neuropathy. He goes to [facility] for [facility]rology and states he last saw them a few months ago. Prior to today, he believes it has been several months since he last had any blood work. Denies etoh or illicits. Smokes just under 1 PPD. Currently no acute complaints except asking for water. States he takes all his meds daily. Interval history / Subjective:  
Discussed disposition w pt Reviewed and eval wound care with wound care staff at bedside and w pt 
dispo pending safe options. I reviewed concerns as to finding at apartment - pt claims DM needles not drug paraphernalia Patient notes basically living, both up sitting and down sleeping, in his motorized wheel chair that reclines for sleeping. \" I can go go sleep in my kitchen if I desire. \" Assessment & Plan:  
 
Severe sepsis 2/2 osteomyelitis right foot 
-Podiatry consulted  
-Underwent debridement right foot ulcer with removal of infected bone 2020 
-Cultures with proteus mirabilis, stept group B, enterococcus -ID following. Antibiotics transitioned to zosyn  
-surgical path with + margins, will need prolonged antibiotics 
-Powerline ordered for 1/6 
-wound vac 1/5 
-non weight bearing right lower extremity (patient wheelchair bound at baseline) Shortness of breath 1/4: improved 
-s/p steroids, breathing treatment, hold further steroids 
-chest x-ray with some bilateral airspace disease, consider adding back diuretics if okay by nephrology  
resovled as an acute issues Acute on chronic anemia, baseline ~9: persistent  
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 Duodenal ucler, non bleeding 
-Has received 6 units total during admission 
-Continue PPI 
 cont to monitor Lab Results Component Value Date/Time Hemoglobin (POC) 6.5 02/04/2020 01:59 PM  
 HGB 7.2 (L) 01/08/2021 03:10 AM  
  
Lab Results Component Value Date/Time Iron 25 (L) 12/30/2020 01:17 AM  
 TIBC 177 (L) 12/30/2020 01:17 AM  
 Iron % saturation 14 (L) 12/30/2020 01:17 AM  
 Ferritin 266 12/30/2020 01:17 AM  
 has had IV iron. - f/u iron levels soon  
  
Acute on CKD: improving 
-nephrology following, on bicarb, valtessa  
-kumar removed 1/2 Lab Results Component Value Date/Time Creatinine 1.88 (H) 01/07/2021 03:05 AM  
  
  
IDDM, last a1c 8.1 01/2020 
-continue long acting insulin, meal time, SSI Lab Results Component Value Date/Time Glucose 77 01/07/2021 03:05 AM  
 Glucose (POC) 241 (H) 01/08/2021 06:30 AM  
  
  
HTN, controlled 
-holding amlodipine, continue hydralazine BP Readings from Last 1 Encounters:  
01/08/21 (!) 172/74  
  
  
Hyponatremia 
-stable, montior - resolved Toxic metabolic encephalopathy/drowsiness: resolved 
  
Prostate ca 
-Cont home meds 
  
Tobacco abuse 
-continue patch Code status: full code DVT prophylaxis: scds Care Plan discussed with: Patient/Family Disposition: possible discharge 1/7 pending powerline, antibiotics, and home health set up Hospital Problems  Date Reviewed: 8/20/2020 Codes Class Noted POA * (Principal) Osteomyelitis ([address] ICD-10-CM: M86.9 ICD-9-CM: 730.20  12/28/2020 Yes Review of Systems:  
Negative unless stated above Vital Signs:  
 Last 24hrs VS reviewed since prior progress note. Most recent are: 
Visit Vitals BP (!) 172/74 Pulse 92 Temp 98.6 °F (37 °C) Resp 18 Ht 6' (1.829 m) Wt 121.9 kg (268 lb 11.9 oz) SpO2 95% BMI 36.45 kg/m² Intake/Output Summary (Last 24 hours) at 1/8/2021 1047 Last data filed at 1/8/2021 8890 Gross per 24 hour Intake 501 ml Output 775 ml Net -274 ml Physical Examination:  
I had a face to face encounter with this patient and independently examined them on January 8, 2021 as outlined below: 
     
Constitutional:  No acute distress, somewhat cooperative, pleasant ENT:  Oral mucous moist, oropharynx benign. Neck supple Resp:  CTA bilaterally. No wheezing/rhonchi/rales. No accessory muscle use CV:  Regular rhythm, normal rate, no murmurs, gallops, rubs GI:  Soft, obese, non distended, non tender. normoactive bowel sounds, no hepatosplenomegaly Musculoskeletal:  trace edema, warm, 2+ pulses throughout. Right lower extremity wrapped Neurologic:  Moves all extremities Data Review:  
 Review and/or order of clinical lab test 
Review and/or order of tests in the radiology section of CPT Review and/or order of tests in the medicine section of CPT Labs:  
 
Recent Labs 01/08/21 
0310 01/07/21 
9434 WBC 14.3* 14.2* HGB 7.2* 7.0*  
HCT 23.2* 22.6*  
 414* Recent Labs 01/07/21 
5683 01/06/21 
8633  137  
K 5.2* 5.2*  
* 109* CO2 22 21 BUN 36* 40* CREA 1.88* 2.17* GLU 77 90  
CA 8.1* 8.1*  
MG 1.8 1.9 PHOS 3.8 4.2 No results for input(s): ALT, AP, TBIL, TBILI, TP, ALB, GLOB, GGT, AML, LPSE in the last 72 hours. No lab exists for component: SGOT, GPT, AMYP, HLPSE No results for input(s): INR, PTP, APTT, INREXT, INREXT in the last 72 hours. No results for input(s): FE, TIBC, PSAT, FERR in the last 72 hours. Lab Results Component Value Date/Time Folate 20.0 03/16/2019 04:06 AM  
  
No results for input(s): PH, PCO2, PO2 in the last 72 hours. Recent Labs 01/06/21 
8426 CPK 38* No results found for: CHOL, CHOLX, CHLST, CHOLV, HDL, HDLP, LDL, LDLC, DLDLP, TGLX, TRIGL, TRIGP, CHHD, CHHDX Lab Results Component Value Date/Time Glucose (POC) 241 (H) 01/08/2021 06:30 AM  
 Glucose (POC) 216 (H) 01/07/2021 09:25 PM  
 Glucose (POC) 223 (H) 01/07/2021 04:19 PM  
 Glucose (POC) 136 (H) 01/07/2021 11:27 AM  
 Glucose (POC) 141 (H) 01/07/2021 06:44 AM  
 
Lab Results Component Value Date/Time Color YELLOW/STRAW 01/03/2021 12:56 PM  
 Appearance CLEAR 01/03/2021 12:56 PM  
 Specific gravity 1.015 01/03/2021 12:56 PM  
 pH (UA) 5.5 01/03/2021 12:56 PM  
 Protein 300 (A) 01/03/2021 12:56 PM  
 Glucose 100 (A) 01/03/2021 12:56 PM  
 Ketone Negative 01/03/2021 12:56 PM  
 Bilirubin Negative 01/03/2021 12:56 PM  
 Urobilinogen 0.2 01/03/2021 12:56 PM  
 Nitrites Negative 01/03/2021 12:56 PM  
 Leukocyte Esterase Negative 01/03/2021 12:56 PM  
 Epithelial cells FEW 01/03/2021 12:56 PM  
 Bacteria 1+ (A) 01/03/2021 12:56 PM  
 WBC 0-4 01/03/2021 12:56 PM  
 RBC 0-5 01/03/2021 12:56 PM  
 
 
 
Medications Reviewed:  
 
Current Facility-Administered Medications Medication Dose Route Frequency  [START ON 1/9/2021] collagenase (SANTYL) 250 unit/gram ointment   Topical Once per day on Thu Sat  balsam peru-castor oiL (VENELEX) ointment   Topical Q12H  
 insulin lispro (HUMALOG) injection 10 Units  10 Units SubCUTAneous TIDAC  insulin glargine (LANTUS) injection 40 Units  40 Units SubCUTAneous QHS  ammonium lactate (LAC-HYDRIN) 12 % lotion   Topical Q12H  
 sodium bicarbonate tablet 650 mg  650 mg Oral DAILY  sodium chloride (NS) flush 5-40 mL  5-40 mL IntraVENous Q8H  
 sodium chloride (NS) flush 5-40 mL  5-40 mL IntraVENous PRN  pantoprazole (PROTONIX) 40 mg in 0.9% sodium chloride 10 mL injection  40 mg IntraVENous Q12H  
 enzalutamide (XTANDI) chemo capsule 80 mg (patient supplied) (Patient Supplied)  80 mg Oral BID  fluticasone propionate (FLONASE) 50 mcg/actuation nasal spray 2 Spray  2 Spray Both Nostrils DAILY  0.9% sodium chloride infusion 250 mL  250 mL IntraVENous PRN  
 0.9% sodium chloride infusion 250 mL  250 mL IntraVENous PRN  piperacillin-tazobactam (ZOSYN) 3.375 g in 0.9% sodium chloride (MBP/ADV) 100 mL MBP  3.375 g IntraVENous Q8H  
 oxyCODONE-acetaminophen (PERCOCET) 5-325 mg per tablet 1 Tab  1 Tab Oral Q4H PRN  
 0.9% sodium chloride infusion 250 mL  250 mL IntraVENous PRN  
 epoetin chi-epbx (RETACRIT) injection 20,000 Units  20,000 Units SubCUTAneous Q MON, WED & FRI  diclofenac (VOLTAREN) 1 % topical gel 2 g  2 g Topical QID  acetaminophen (TYLENOL) tablet 500 mg  500 mg Oral Q4H PRN  
 amLODIPine (NORVASC) tablet 5 mg  5 mg Oral DAILY  [Held by provider] aspirin delayed-release tablet 81 mg  81 mg Oral DAILY  bicalutamide (CASODEX) tablet 50 mg  50 mg Oral DAILY  gabapentin (NEURONTIN) capsule 100 mg  100 mg Oral TID  hydrALAZINE (APRESOLINE) tablet 100 mg  100 mg Oral TID  tamsulosin (FLOMAX) capsule 0.4 mg  0.4 mg Oral DAILY  nicotine (NICODERM CQ) 14 mg/24 hr patch 1 Patch  1 Patch TransDERmal DAILY  insulin lispro (HUMALOG) injection   SubCUTAneous AC&HS  
 glucose chewable tablet 16 g  4 Tab Oral PRN  
 dextrose (D50W) injection syrg 12.5-25 g  12.5-25 g IntraVENous PRN  
 glucagon (GLUCAGEN) injection 1 mg  1 mg IntraMUSCular PRN  
 [Held by provider] heparin (porcine) injection 5,000 Units  5,000 Units SubCUTAneous Q8H  
 
______________________________________________________________________ EXPECTED LENGTH OF STAY: 9d 19h ACTUAL LENGTH OF STAY:          Leonard Smith MD

## 2021-01-08 NOTE — PROGRESS NOTES
Progress Note Patient: Prudencio Gamboa MRN: [id number]  SSN: [id number] YOB: 1950  Age: 72 y.o. Sex: male Admit Date: 2020 Subjective:  
 
Patient seen & examined at bedside. Denies any n/v/f/ns/c. Pt seen with wound care nurse. Objective:  
 
Visit Vitals BP (!) 179/85 (BP 1 Location: Left arm, BP Patient Position: At rest) Pulse 92 Temp 97.7 °F (36.5 °C) Resp 18 Ht 6' (1.829 m) Wt 121.9 kg (268 lb 11.9 oz) SpO2 96% BMI 36.45 kg/m² Right Foot Exam: +large plantar lateral ulcer with deep probe. Wound edges viable. Wound base fibrogranular. No pus, no malodor. +area of ecchymosis noted along dorsal midfoot - no sign of infection. Cap-refill intact to toes. Labs/Radiology Review: images and reports reviewed Assessment:  
 
Hospital Problems  Date Reviewed: 2020 Codes Class Noted POA * (Principal) Osteomyelitis ([facility] ICD-10-CM: M86.9 ICD-9-CM: 730.20  2020 Yes Plan/Recommendations/Medical Decision Makin.) Continue wound vac therapy. Will add santyl to be applied under vac dressing. Orders given to keep dorsal ecchymotic area uncovered and dry.  
2.) Continue nwb to right foot. 3.) Continue antibxs per ID.  
4.) Ok to d/c from my standpoint once medically stable and home wound vac and home IV antibxs arranged. See below for d/c instructions. 5.) Vac dressing to be changed again on 21 if Pt still in house. Will reassess Pt at that time if still in house. Discharge Instructions 
  
1.) Follow up with Dr. Terence Chowdhury (Foot Doc) at [address] [place] [address] ([phone]) 1-2 weeks following hospital discharge. 2.) Apply wound vac to right plantar foot ulcer and set to 125 mmHg continuous therapy. Change vac dressing MWF.  
3.) No weightbearing to right foot.   Elevate foot when at rest.

## 2021-01-08 NOTE — PROGRESS NOTES
Home IV Orders 1. Diagnosis: ostemyelitis of the right foot, group b strep bacteremia 2. Routine [facility] 3. Antibiotic: zosyn 3.375 grams q6 hours IV 4. Lab each Monday: CBC/diff/platelets CMP 
 CRP 5. Lab each Thursday: CBC/diff/platelets BUN Creatinine 6. Fax lab to Dr. Judge Jeffers @ [phone]. 
7.  Call Dr. Judge Jeffers @ [phone] for fever >100.5, infection at PICC site, diarrhea, WBC > 15 or < 3, cr > 2.5 8. Duration of therapy: last day of therapy should be feb 12 ,2021 Please call Dr. Judge Jeffers @ [phone] before stopping therapy. 9.  Allergies:  No Known Allergies Otto Shaw MD

## 2021-01-08 NOTE — PROGRESS NOTES
ID Progress Note 2021 Subjective: Afebrile. No complaints. Objective:  
 
Vitals:  
Visit Vitals BP (!) 177/81 Pulse 97 Temp 97.5 °F (36.4 °C) Resp 18 Ht 6' (1.829 m) Wt 121.9 kg (268 lb 11.9 oz) SpO2 97% BMI 36.45 kg/m² Tmax:  Temp (24hrs), Av.9 °F (36.6 °C), Min:97.5 °F (36.4 °C), Max:98.6 °F (37 °C) Exam: Not in distress Lungs: clear to auscultation Heart: s1, s2, no murmurs Abdomen: soft, nontender Labs:  
Lab Results Component Value Date/Time WBC 14.3 (H) 2021 03:10 AM  
 Hemoglobin (POC) 6.5 2020 01:59 PM  
 HGB 7.2 (L) 2021 03:10 AM  
 HCT 23.2 (L) 2021 03:10 AM  
 PLATELET 360  03:10 AM  
 MCV 89.6 2021 03:10 AM  
 
Lab Results Component Value Date/Time Sodium 137 2021 03:05 AM  
 Potassium 5.2 (H) 2021 03:05 AM  
 Chloride 109 (H) 2021 03:05 AM  
 CO2 22 2021 03:05 AM  
 Anion gap 6 2021 03:05 AM  
 Glucose 77 2021 03:05 AM  
 BUN 36 (H) 2021 03:05 AM  
 Creatinine 1.88 (H) 2021 03:05 AM  
 BUN/Creatinine ratio 19 2021 03:05 AM  
 GFR est AA 43 (L) 2021 03:05 AM  
 GFR est non-AA 36 (L) 2021 03:05 AM  
 Calcium 8.1 (L) 2021 03:05 AM  
 Bilirubin, total 0.4 2020 03:41 PM  
 Alk. phosphatase 162 (H) 2020 03:41 PM  
 Protein, total 8.3 (H) 2020 03:41 PM  
 Albumin 1.6 (L) 2020 04:04 AM  
 Globulin 6.5 (H) 2020 03:41 PM  
 A-G Ratio 0.3 (L) 2020 03:41 PM  
 ALT (SGPT) 10 (L) 2020 03:41 PM  
 
 
 
 
 
Assessment:  
#1 osteomyelitis of the right foot 
  
#2 group b strep  bacteremia 
  
#3 renal insufficiency 
  
#4 diabetes 
  
#5 prostate cancer 
  
#6 hypertension 
  
#7 heart failure 
  
  
  
 
 
  
 
Recommendations:  
 
Continue zosyn. There is OM on the surgical margin. He will need prolonged therapy. I will write orders.   
  
 
Iva Crowell MD

## 2021-01-08 NOTE — WOUND CARE
WOCN Note: Follow-up visit for [facility] dressing change to right foot. Seen today with Dr. Hellen Warner 
Previously seen by Jolene Thrasher, [facility], [facility].. Chart shows: 
Admitted for lethargy; osteomyelitis of right foot with debridement and removal of infected bone 12/30 History of DM, smoking, prostate cancer, HTN, heart failure WBC = 14.3 and trending up Admitted from home and lives alone with a caregiver coming in 
 
Assessment:  
A&O x 4. Pre-medicated by RN. Bed: P500 AMY mattress Bilateral heels intact with no redness. Heels offloaded with pillows. 1. POA, right foot wound surgically debrided = 9 x 6.3 x 3cm  Base is 35% soft yellow 65% red with some granulation noted. No purulence or odor. periwound intact. Scant serosanguinous exudate in canister. VAC dressing removed: 2 Mepitel One and 2 black sponges Cleaned with saline and Santyl applied to wound bed. 125 mmHg suction achieved using 1 piece of black foam.  
 
Dorsum of foot has a deep tissue injury = 9 x 8 cm 100% non-blanching burgundy - Venelex applied. Thick dry skin plaques to lower leg - Lac Hydrin applied. Wound Recommendations:   
Maintain VAC as ordered @ 125mmHg suction with twice weekly dressing changes. Transition of Care: Plan to follow weekly and as needed while admitted to hospital with next [facility] dressing change Tuesday, 1/12. TABITHA Campbell, RN, [employer] Certified Wound, Ostomy, Continence Nurse 
office [phone] 
pager [phone] or call  to page

## 2021-01-09 NOTE — PROGRESS NOTES
Bedside shift change report given to Denice Rangel RN (oncoming nurse) by Tran Ibanez (offgoing nurse). Report included the following information SBAR and MAR.

## 2021-01-09 NOTE — PROGRESS NOTES
No labs today We will check back Monday Kaiden Malcolm MD 
[facility] Office :[phone] Fax: [phone]

## 2021-01-09 NOTE — PROGRESS NOTES
Patient is insisting that his nurse on [facility] gave him his Xtandi pill before transferring him over to 6W. The medication was not scanned and RN went over to 6E to ask the RN if the pill was given. The RN stated that he did not give the medication. Patient continued to insist that he already got his 2nd dose for the day and stats that he will not take it again until tomorrow.

## 2021-01-09 NOTE — PROGRESS NOTES
Hospitalist Progress Note Sultana Maciel MD 
Answering service: [phone] OR [phone] from in house phone Date of Service:  2021 NAME:  Urban Landry III 
:  1950 MRN:  [id number] Admission Summary: As per initial admission summary Dontrell Spivey is a 72 y.o. male with PMH of prostatic cancer s/p radiation, IDDM, heart failure, htn. He apparently told the ER physician that he c/o onset of diarrhea yesterday and some chills over the last 2 days, with occasional cough. For me, however, he denies all of these symptoms and states he came in because his caregiver was concerned d/t his lethargy over the last several days to weeks. He lives home alone and has a caregiver five times a week. He denies cp, sob, fever, chills, sweats, N/V. He has had a wound on his right foot for months. He denies any foot pain d/t neuropathy. He goes to [facility] for nephrology and states he last saw them a few months ago. Prior to today, he believes it has been several months since he last had any blood work. Denies etoh or illicits. Smokes just under 1 PPD. Currently no acute complaints except asking for water. States he takes all his meds daily. Interval history / Subjective:  
 
2021 ; No new issues other Assessment & Plan:  
 
Severe sepsis 2/2 osteomyelitis right foot 
-Podiatry consulted  
-Underwent debridement right foot ulcer with removal of infected bone 2020 
-Cultures with proteus mirabilis, stept group B, enterococcus -ID following. Antibiotics transitioned to zosyn  
-surgical path with + margins, will need prolonged antibiotics 
-Powerline ordered for  
-wound vac  
-non weight bearing right lower extremity (patient wheelchair bound at baseline) Shortness of breath : improved 
-s/p steroids, breathing treatment, hold further steroids -chest x-ray with some bilateral airspace disease, consider adding back diuretics if okay by nephrology  
resovled as an acute issues  
-iv lasix 1/8 benfifical pt with severe LVH, concentric with ef 55% range, lasix daily x next three days +/- pending clinical response. ;  
 
Acute on chronic anemia, baseline ~9: persistent  
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 Duodenal ucler, non bleeding 
-Has received 6 units total during admission 
-Continue PPI 
 cont to monitor Lab Results Component Value Date/Time Hemoglobin (POC) 6.5 02/04/2020 01:59 PM  
 HGB 7.2 (L) 01/08/2021 03:10 AM  
  
Lab Results Component Value Date/Time Iron 25 (L) 12/30/2020 01:17 AM  
 TIBC 177 (L) 12/30/2020 01:17 AM  
 Iron % saturation 14 (L) 12/30/2020 01:17 AM  
 Ferritin 266 12/30/2020 01:17 AM  
 has had IV iron. - f/u iron levels soon  
  
Acute on CKD: improving 
-nephrology following, on bicarb, valtessa  
-kumar removed 1/2 Lab Results Component Value Date/Time Creatinine 1.77 (H) 01/09/2021 08:41 AM  
  
  
IDDM, last a1c 8.1 01/2020 
-continue long acting insulin, meal time, SSI Lab Results Component Value Date/Time Glucose 200 (H) 01/09/2021 08:41 AM  
 Glucose (POC) 249 (H) 01/09/2021 11:32 AM  
  
  
HTN, controlled 
-holding amlodipine, continue hydralazine BP Readings from Last 1 Encounters:  
01/09/21 (!) 165/76  
  
  
Hyponatremia 
-stable, montior - resolved Toxic metabolic encephalopathy/drowsiness: resolved 
  
Prostate ca 
-Cont home meds 
  
Tobacco abuse 
-continue patch Code status: full code DVT prophylaxis: scds Care Plan discussed with: Patient/Family Disposition: possible discharge 1/7 pending powerline, antibiotics, and home health set up Hospital Problems  Date Reviewed: 8/20/2020 Codes Class Noted POA * (Principal) Osteomyelitis ([facility] [address] ICD-10-CM: M86.9 ICD-9-CM: 730.20  12/28/2020 Yes Review of Systems:  
Negative unless stated above Vital Signs:  
 Last 24hrs VS reviewed since prior progress note. Most recent are: 
Visit Vitals BP (!) 165/76 (BP 1 Location: Left arm, BP Patient Position: At rest) Pulse 91 Temp 97.6 °F (36.4 °C) Resp 19 Ht 6' (1.829 m) Wt 119.3 kg (263 lb) SpO2 95% BMI 35.67 kg/m² Intake/Output Summary (Last 24 hours) at 1/9/2021 1435 Last data filed at 1/9/2021 2858 Gross per 24 hour Intake  Output 1970 ml Net -1970 ml Physical Examination:  
I had a face to face encounter with this patient and independently examined them on January 9, 2021 as outlined below: 
     
Constitutional:  No acute distress, somewhat cooperative, pleasant ENT:  Oral mucous moist, oropharynx benign. Neck supple Resp:  CTA bilaterally. No wheezing/rhonchi/rales. No accessory muscle use CV:  Regular rhythm, normal rate, no murmurs, gallops, rubs GI:  Soft, obese, non distended, non tender. normoactive bowel sounds, no hepatosplenomegaly Musculoskeletal:  trace edema, warm, 2+ pulses throughout. Right lower extremity wrapped Neurologic:  Moves all extremities Data Review:  
 Review and/or order of clinical lab test 
Review and/or order of tests in the radiology section of CPT Review and/or order of tests in the medicine section of CPT Labs:  
 
Recent Labs 01/08/21 
0310 01/07/21 
7714 WBC 14.3* 14.2* HGB 7.2* 7.0*  
HCT 23.2* 22.6*  
 414* Recent Labs 01/09/21 
6708 01/07/21 
6133  137  
K 4.8 5.2*  
* 109* CO2 24 22 BUN 34* 36* CREA 1.77* 1.88* * 77  
CA 8.8 8.1*  
MG  --  1.8 PHOS 3.3 3.8 Recent Labs 01/09/21 
3726 ALB 1.7* No results for input(s): INR, PTP, APTT, INREXT, INREXT in the last 72 hours. No results for input(s): FE, TIBC, PSAT, FERR in the last 72 hours. Lab Results Component Value Date/Time  Folate 20.0 03/16/2019 04:06 AM  
  
 No results for input(s): PH, PCO2, PO2 in the last 72 hours. No results for input(s): CPK, CKNDX, TROIQ in the last 72 hours. No lab exists for component: CPKMB No results found for: CHOL, CHOLX, CHLST, CHOLV, HDL, HDLP, LDL, LDLC, DLDLP, TGLX, TRIGL, TRIGP, CHHD, CHHDX Lab Results Component Value Date/Time Glucose (POC) 249 (H) 01/09/2021 11:32 AM  
 Glucose (POC) 194 (H) 01/09/2021 07:08 AM  
 Glucose (POC) 339 (H) 01/08/2021 09:31 PM  
 Glucose (POC) 248 (H) 01/08/2021 04:48 PM  
 Glucose (POC) 283 (H) 01/08/2021 11:47 AM  
 
Lab Results Component Value Date/Time Color YELLOW/STRAW 01/03/2021 12:56 PM  
 Appearance CLEAR 01/03/2021 12:56 PM  
 Specific gravity 1.015 01/03/2021 12:56 PM  
 pH (UA) 5.5 01/03/2021 12:56 PM  
 Protein 300 (A) 01/03/2021 12:56 PM  
 Glucose 100 (A) 01/03/2021 12:56 PM  
 Ketone Negative 01/03/2021 12:56 PM  
 Bilirubin Negative 01/03/2021 12:56 PM  
 Urobilinogen 0.2 01/03/2021 12:56 PM  
 Nitrites Negative 01/03/2021 12:56 PM  
 Leukocyte Esterase Negative 01/03/2021 12:56 PM  
 Epithelial cells FEW 01/03/2021 12:56 PM  
 Bacteria 1+ (A) 01/03/2021 12:56 PM  
 WBC 0-4 01/03/2021 12:56 PM  
 RBC 0-5 01/03/2021 12:56 PM  
 
 
 
Medications Reviewed:  
 
Current Facility-Administered Medications Medication Dose Route Frequency  [START ON 1/10/2021] amLODIPine (NORVASC) tablet 10 mg  10 mg Oral DAILY  amLODIPine (NORVASC) tablet 5 mg  5 mg Oral ONCE  
 furosemide (LASIX) injection 40 mg  40 mg IntraVENous DAILY  collagenase (SANTYL) 250 unit/gram ointment   Topical Once per day on Thu Sat  
 albuterol-ipratropium (DUO-NEB) 2.5 MG-0.5 MG/3 ML  3 mL Nebulization Q4H PRN  
 balsam peru-castor oiL (VENELEX) ointment   Topical Q12H  
 insulin lispro (HUMALOG) injection 10 Units  10 Units SubCUTAneous TIDAC  insulin glargine (LANTUS) injection 40 Units  40 Units SubCUTAneous QHS  ammonium lactate (LAC-HYDRIN) 12 % lotion   Topical Q12H  sodium bicarbonate tablet 650 mg  650 mg Oral DAILY  sodium chloride (NS) flush 5-40 mL  5-40 mL IntraVENous Q8H  
 sodium chloride (NS) flush 5-40 mL  5-40 mL IntraVENous PRN  pantoprazole (PROTONIX) 40 mg in 0.9% sodium chloride 10 mL injection  40 mg IntraVENous Q12H  
 enzalutamide (XTANDI) chemo capsule 80 mg (patient supplied) (Patient Supplied)  80 mg Oral BID  fluticasone propionate (FLONASE) 50 mcg/actuation nasal spray 2 Spray  2 Spray Both Nostrils DAILY  0.9% sodium chloride infusion 250 mL  250 mL IntraVENous PRN  
 0.9% sodium chloride infusion 250 mL  250 mL IntraVENous PRN  piperacillin-tazobactam (ZOSYN) 3.375 g in 0.9% sodium chloride (MBP/ADV) 100 mL MBP  3.375 g IntraVENous Q8H  
 oxyCODONE-acetaminophen (PERCOCET) 5-325 mg per tablet 1 Tab  1 Tab Oral Q4H PRN  
 0.9% sodium chloride infusion 250 mL  250 mL IntraVENous PRN  
 epoetin chi-epbx (RETACRIT) injection 20,000 Units  20,000 Units SubCUTAneous Q MON, WED & FRI  diclofenac (VOLTAREN) 1 % topical gel 2 g  2 g Topical QID  acetaminophen (TYLENOL) tablet 500 mg  500 mg Oral Q4H PRN  
 [Held by provider] aspirin delayed-release tablet 81 mg  81 mg Oral DAILY  bicalutamide (CASODEX) tablet 50 mg  50 mg Oral DAILY  gabapentin (NEURONTIN) capsule 100 mg  100 mg Oral TID  hydrALAZINE (APRESOLINE) tablet 100 mg  100 mg Oral TID  tamsulosin (FLOMAX) capsule 0.4 mg  0.4 mg Oral DAILY  nicotine (NICODERM CQ) 14 mg/24 hr patch 1 Patch  1 Patch TransDERmal DAILY  insulin lispro (HUMALOG) injection   SubCUTAneous AC&HS  
 glucose chewable tablet 16 g  4 Tab Oral PRN  
 dextrose (D50W) injection syrg 12.5-25 g  12.5-25 g IntraVENous PRN  
 glucagon (GLUCAGEN) injection 1 mg  1 mg IntraMUSCular PRN  
 [Held by provider] heparin (porcine) injection 5,000 Units  5,000 Units SubCUTAneous Q8H  
 
______________________________________________________________________ EXPECTED LENGTH OF STAY: 9d 19h ACTUAL LENGTH OF STAY:          Angela Topete MD

## 2021-01-09 NOTE — PROGRESS NOTES
Nephrology Progress Note Leona Florence III Date of Admission : 12/28/2020 CC: Follow up for KATHI on CKD Assessment and Plan KATHI on CKD  
- suspected ATN from severe pre renal azotemia - U/S neg for hydro - UA with proteinuria - Cr down to 1.9. labs pending  
- advised to f/u with Dr John Jack in 4-6 weeks  
- daily labs 
  
CKD Stage IIIa: 
-  Presumed 2/2 DM and HTN 
- nephrotic range proteinuria 
- baseline Cr 1.7 mg/dl  
- followed by Dr. John Jack 
  
Hx of prostate cancer s/p XRT 
  
Metabolic acidosis: 
-  Continue oral Bicarb on d/c  
  
Anemia in CKD + blood loss: 
- received PRBCs on 1/2 
- cont JAZ + IV iron 
- EGD 1/5/2021: An actively bleeding Dieulafoy's lesion was noted in the gastric fundus.   
  
Type II DM: 
- on insulin 
  
HTN :  
- Continue current meds  
  
R foot osteo: 
- on abx 
- s/p debridement on 12/30 Group B strep bacteremia: 
- Abx per ID 
  
B/l Airspace disease: 
- ? PNA vs edema 
- COVID neg 
- on zosyn Interval History: 
Seen and examined. Not new sx   No cp, sob, n/v/d reported. Current Medications: all current  Medications have been eviewed in [facility] Review of Systems: Pertinent items are noted in HPI. Objective: 
Vitals:   
Vitals:  
 01/08/21 2017 01/08/21 2120 01/09/21 0457 01/09/21 0757 BP: (!) 166/75  (!) 189/90 (!) 165/76 Pulse: 96 96 95 91 Resp: 18  20 19 Temp: 99 °F (37.2 °C)  99.3 °F (37.4 °C) 97.6 °F (36.4 °C) SpO2: 94% 96% 96% 95% Weight:   119.3 kg (263 lb) Height:      
 
Intake and Output: 
01/09 0701 - 01/09 1900 In: -  
Out: 225 [Urine:225] 01/07 1901 - 01/09 0700 In: 46 [P.O.:240; I.V.:261] Out: 3587 [SQEGC:4673; Drains:25] Physical Examination: 
General: NAD,Conversant, obese Neck:  Supple, no mass Resp:  Lungs CTA B/L, no wheezing , normal respiratory effort CV:  RRR,  no murmur or rub,  trace LLE edema, RLE in wrap GI:  Soft, NT, + Bowel sounds, no hepatosplenomegaly Neurologic:  Non focal 
 Psych:             AAO x 3 appropriate affect Skin:  No Rash :  Bynum in place 
 
[]    High complexity decision making was performed 
[]    Patient is at high-risk of decompensation with multiple organ involvement Lab Data Personally Reviewed: I have reviewed all the pertinent labs, microbiology data and radiology studies during assessment. Recent Labs 01/07/21 
0305   
K 5.2*  
* CO2 22 GLU 77 BUN 36* CREA 1.88* CA 8.1*  
MG 1.8 PHOS 3.8 Recent Labs 01/08/21 
0310 01/07/21 
1611 WBC 14.3* 14.2* HGB 7.2* 7.0*  
HCT 23.2* 22.6*  
 414* No results found for: SDES Lab Results Component Value Date/Time Culture result: (A) 12/30/2020 01:14 PM  
  LIGHT ANAEROBIC GRAM NEGATIVE RODS BETA LACTAMASE POSITIVE Culture result: LIGHT PROTEUS MIRABILIS (A) 12/30/2020 01:14 PM  
 Culture result: (A) 12/30/2020 01:14 PM  
  LIGHT STREPTOCOCCI, BETA HEMOLYTIC GROUP B Penicillin and ampicillin are drugs of choice for treatment of beta-hemolytic streptococcal infections. Susceptibility testing of penicillins and beta-lactams approved by the FDA for treatment of beta-hemolytic streptococcal infections need not be performed routinely, because nonsusceptible isolates are extremely rare. CLSI 2012 Culture result: LIGHT ENTEROCOCCUS FAECALIS (A) 12/30/2020 01:14 PM  
 
Recent Results (from the past 24 hour(s)) GLUCOSE, POC Collection Time: 01/08/21 11:47 AM  
Result Value Ref Range Glucose (POC) 283 (H) 65 - 100 mg/dL Performed by Samia Prabhakar GLUCOSE, POC Collection Time: 01/08/21  4:48 PM  
Result Value Ref Range Glucose (POC) 248 (H) 65 - 100 mg/dL Performed by Colten Tim GLUCOSE, POC Collection Time: 01/08/21  9:31 PM  
Result Value Ref Range Glucose (POC) 339 (H) 65 - 100 mg/dL Performed by Analisa Koenig GLUCOSE, POC Collection Time: 01/09/21  7:08 AM  
Result Value Ref Range Glucose (POC) 194 (H) 65 - 100 mg/dL Performed by Timmy Puentes, Kimmie S Obie Caruso [facility]  
[id number] [facility], [facility] Phone - [phone] Fax - [phone] 
[email or web address]

## 2021-01-10 NOTE — PROGRESS NOTES
Hospitalist Progress Note Laura Rodgers MD 
Answering service: [phone] or [phone] from in house phone Date of Service:  1/10/2021 NAME:  Jane Barnard III 
:  1950 MRN:  [id number] Admission Summary: As per initial admission summary Anibal Francis is a 72 y.o. male with PMH of prostatic cancer s/p radiation, IDDM, heart failure, htn. He apparently told the ER physician that he c/o onset of diarrhea yesterday and some chills over the last 2 days, with occasional cough. For me, however, he denies all of these symptoms and states he came in because his caregiver was concerned d/t his lethargy over the last several days to weeks. He lives home alone and has a caregiver five times a week. He denies cp, sob, fever, chills, sweats, N/V. He has had a wound on his right foot for months. He denies any foot pain d/t neuropathy. He goes to [facility] for nephrology and states he last saw them a few months ago. Prior to today, he believes it has been several months since he last had any blood work. Denies etoh or illicits. Smokes just under 1 PPD. Currently no acute complaints except asking for water. States he takes all his meds daily. Interval history / Subjective:  
 
1/10/2021 ;  
Pt 's sob noted  Last pm was answered w neb His volume status is seemingly good on rounds today but w cont on current lasix dose plan though  Assessment & Plan:  
 
Severe sepsis 2/2 osteomyelitis right foot 
-Podiatry consulted  
-Underwent debridement right foot ulcer with removal of infected bone 2020 
-Cultures with proteus mirabilis, stept group B, enterococcus -ID following. Antibiotics transitioned to zosyn  
-surgical path with + margins, will need prolonged antibiotics 
-Powerline ordered for  
-wound vac  
-non weight bearing right lower extremity (patient wheelchair bound at baseline) Shortness of breath 1/4: improved 
-s/p steroids, breathing treatment, hold further steroids 
-chest x-ray with some bilateral airspace disease, consider adding back diuretics if okay by nephrology  
resovled as an acute issues  
-iv lasix 1/8 benfifical pt with severe LVH, concentric with ef 55% range, lasix daily x next three days +/- pending clinical response. ;  
 
Acute on chronic anemia, baseline ~9: persistent  
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 Duodenal ucler, non bleeding 
-Has received 6 units total during admission 
-Continue PPI 
 cont to monitor Lab Results Component Value Date/Time Hemoglobin (POC) 6.5 02/04/2020 01:59 PM  
 HGB 6.9 (L) 01/10/2021 01:21 AM  
  
Lab Results Component Value Date/Time Iron 25 (L) 12/30/2020 01:17 AM  
 TIBC 177 (L) 12/30/2020 01:17 AM  
 Iron % saturation 14 (L) 12/30/2020 01:17 AM  
 Ferritin 266 12/30/2020 01:17 AM  
 has had IV iron. - f/u iron levels soon for am 
F/u on hgb in am not planning to give blood today as pt not symptomatic 1/10/2021 am  
  
Acute on CKD: improving 
-nephrology following, on bicarb, valtessa  
-kumar removed 1/2 Lab Results Component Value Date/Time Creatinine 1.63 (H) 01/10/2021 01:21 AM  
  
  
IDDM, last a1c 8.1 01/2020 
-continue long acting insulin, meal time, SSI Lab Results Component Value Date/Time Glucose 178 (H) 01/10/2021 01:21 AM  
 Glucose (POC) 178 (H) 01/10/2021 06:28 AM  
  
  
HTN, controlled 
-holding amlodipine, continue hydralazine BP Readings from Last 1 Encounters:  
01/10/21 (!) 173/81  
  
  
Hyponatremia 
-stable, montior - resolved Toxic metabolic encephalopathy/drowsiness: resolved 
  
Prostate ca 
-Cont home meds 
  
Tobacco abuse 
-continue patch Code status: full code DVT prophylaxis: scds Care Plan discussed with: Patient/Family Disposition: TBD Hospital Problems  Date Reviewed: 8/20/2020 Codes Class Noted POA * (Principal) Osteomyelitis ([facility] [address] ICD-10-CM: M86.9 ICD-9-CM: 730.20  12/28/2020 Yes Review of Systems:  
Negative unless stated above Vital Signs:  
 Last 24hrs VS reviewed since prior progress note. Most recent are: 
Visit Vitals BP (!) 173/81 (BP 1 Location: Left arm, BP Patient Position: At rest) Pulse 91 Temp 98.7 °F (37.1 °C) Resp 20 Ht 6' (1.829 m) Wt 119.3 kg (263 lb) SpO2 98% BMI 35.67 kg/m² Intake/Output Summary (Last 24 hours) at 1/10/2021 1006 Last data filed at 1/10/2021 0114 Gross per 24 hour Intake 180 ml Output 1500 ml Net -1320 ml Physical Examination:  
I had a face to face encounter with this patient and independently examined them on January 10, 2021 as outlined below: 
     
Constitutional:  No acute distress, somewhat cooperative, pleasant ENT:  Oral mucous moist, oropharynx benign. Neck supple Resp:  CTA bilaterally. No wheezing/rhonchi/rales. No accessory muscle use CV:  Regular rhythm, normal rate, no murmurs, gallops, rubs GI:  Soft, obese, non distended, non tender. normoactive bowel sounds, no hepatosplenomegaly Musculoskeletal:  trace edema, warm, 2+ pulses throughout. Right lower extremity wrapped Neurologic:  Moves all extremities Data Review:  
 Review and/or order of clinical lab test 
Review and/or order of tests in the radiology section of CPT Review and/or order of tests in the medicine section of CPT Labs:  
 
Recent Labs  
  01/10/21 
0121 01/08/21 
0310 WBC 12.4* 14.3* HGB 6.9* 7.2* HCT 22.7* 23.2*  
 396 Recent Labs  
  01/10/21 
0121 01/09/21 
2307  136  
K 4.4 4.8  
 109* CO2 25 24 BUN 32* 34* CREA 1.63* 1.77* * 200* CA 8.7 8.8 MG 1.4*  --   
PHOS  --  3.3 Recent Labs 01/09/21 
5913 ALB 1.7* No results for input(s): INR, PTP, APTT, INREXT, INREXT in the last 72 hours. No results for input(s): FE, TIBC, PSAT, FERR in the last 72 hours. Lab Results Component Value Date/Time Folate 20.0 03/16/2019 04:06 AM  
  
No results for input(s): PH, PCO2, PO2 in the last 72 hours. No results for input(s): CPK, CKNDX, TROIQ in the last 72 hours. No lab exists for component: CPKMB No results found for: CHOL, CHOLX, CHLST, CHOLV, HDL, HDLP, LDL, LDLC, DLDLP, TGLX, TRIGL, TRIGP, CHHD, CHHDX Lab Results Component Value Date/Time Glucose (POC) 178 (H) 01/10/2021 06:28 AM  
 Glucose (POC) 189 (H) 01/09/2021 10:27 PM  
 Glucose (POC) 228 (H) 01/09/2021 04:22 PM  
 Glucose (POC) 249 (H) 01/09/2021 11:32 AM  
 Glucose (POC) 194 (H) 01/09/2021 07:08 AM  
 
Lab Results Component Value Date/Time Color YELLOW/STRAW 01/03/2021 12:56 PM  
 Appearance CLEAR 01/03/2021 12:56 PM  
 Specific gravity 1.015 01/03/2021 12:56 PM  
 pH (UA) 5.5 01/03/2021 12:56 PM  
 Protein 300 (A) 01/03/2021 12:56 PM  
 Glucose 100 (A) 01/03/2021 12:56 PM  
 Ketone Negative 01/03/2021 12:56 PM  
 Bilirubin Negative 01/03/2021 12:56 PM  
 Urobilinogen 0.2 01/03/2021 12:56 PM  
 Nitrites Negative 01/03/2021 12:56 PM  
 Leukocyte Esterase Negative 01/03/2021 12:56 PM  
 Epithelial cells FEW 01/03/2021 12:56 PM  
 Bacteria 1+ (A) 01/03/2021 12:56 PM  
 WBC 0-4 01/03/2021 12:56 PM  
 RBC 0-5 01/03/2021 12:56 PM  
 
 
 
Medications Reviewed:  
 
Current Facility-Administered Medications Medication Dose Route Frequency  amLODIPine (NORVASC) tablet 10 mg  10 mg Oral DAILY  furosemide (LASIX) injection 40 mg  40 mg IntraVENous DAILY  collagenase (SANTYL) 250 unit/gram ointment   Topical Once per day on Thu Sat  
 albuterol-ipratropium (DUO-NEB) 2.5 MG-0.5 MG/3 ML  3 mL Nebulization Q4H PRN  
 balsam peru-castor oiL (VENELEX) ointment   Topical Q12H  
 insulin lispro (HUMALOG) injection 10 Units  10 Units SubCUTAneous TIDAC  insulin glargine (LANTUS) injection 40 Units  40 Units SubCUTAneous QHS  ammonium lactate (LAC-HYDRIN) 12 % lotion   Topical Q12H  
 sodium bicarbonate tablet 650 mg  650 mg Oral DAILY  sodium chloride (NS) flush 5-40 mL  5-40 mL IntraVENous Q8H  
 sodium chloride (NS) flush 5-40 mL  5-40 mL IntraVENous PRN  pantoprazole (PROTONIX) 40 mg in 0.9% sodium chloride 10 mL injection  40 mg IntraVENous Q12H  
 enzalutamide (XTANDI) chemo capsule 80 mg (patient supplied) (Patient Supplied)  80 mg Oral BID  fluticasone propionate (FLONASE) 50 mcg/actuation nasal spray 2 Spray  2 Spray Both Nostrils DAILY  0.9% sodium chloride infusion 250 mL  250 mL IntraVENous PRN  
 0.9% sodium chloride infusion 250 mL  250 mL IntraVENous PRN  piperacillin-tazobactam (ZOSYN) 3.375 g in 0.9% sodium chloride (MBP/ADV) 100 mL MBP  3.375 g IntraVENous Q8H  
 oxyCODONE-acetaminophen (PERCOCET) 5-325 mg per tablet 1 Tab  1 Tab Oral Q4H PRN  
 0.9% sodium chloride infusion 250 mL  250 mL IntraVENous PRN  
 epoetin chi-epbx (RETACRIT) injection 20,000 Units  20,000 Units SubCUTAneous Q MON, WED & FRI  diclofenac (VOLTAREN) 1 % topical gel 2 g  2 g Topical QID  acetaminophen (TYLENOL) tablet 500 mg  500 mg Oral Q4H PRN  
 [Held by provider] aspirin delayed-release tablet 81 mg  81 mg Oral DAILY  bicalutamide (CASODEX) tablet 50 mg  50 mg Oral DAILY  gabapentin (NEURONTIN) capsule 100 mg  100 mg Oral TID  hydrALAZINE (APRESOLINE) tablet 100 mg  100 mg Oral TID  tamsulosin (FLOMAX) capsule 0.4 mg  0.4 mg Oral DAILY  nicotine (NICODERM CQ) 14 mg/24 hr patch 1 Patch  1 Patch TransDERmal DAILY  insulin lispro (HUMALOG) injection   SubCUTAneous AC&HS  
 glucose chewable tablet 16 g  4 Tab Oral PRN  
 dextrose (D50W) injection syrg 12.5-25 g  12.5-25 g IntraVENous PRN  
 glucagon (GLUCAGEN) injection 1 mg  1 mg IntraMUSCular PRN  
 [Held by provider] heparin (porcine) injection 5,000 Units  5,000 Units SubCUTAneous Q8H  
 
 ______________________________________________________________________ EXPECTED LENGTH OF STAY: 9d 19h ACTUAL LENGTH OF STAY:          Becky Macdonald MD

## 2021-01-10 NOTE — PROGRESS NOTES
This patient is experiencing increased shortness of breath after being cleaning in the bed. He denies chest pain. faint crackles and wheezes auscultated on bilateral lungs. He has a dry cough His bp 165/79 hr 88 pulse ox 99%. He just received a duoneb, said it helped some but is requesting Lasix. Piedad [phone] Paged NP

## 2021-01-11 NOTE — PROGRESS NOTES
Patient complained that  He could  Not breath , Went  To the  Room  To check on  him ,  He  Was tachypneic,  Shallow  Breathing ,inspiratory wheeze on left , clear breath  Sound  On right , spo2  low 80's  bumped up  His oxygen  To  6 litres no  Improvement ,placed  Him  non re-breather 15 litres  , spo2 up to 100% . Respiratory therapist  In the room to give  Breathing  treatment ,  Notified  Provider.

## 2021-01-11 NOTE — PROGRESS NOTES
TRANSFER - OUT REPORT: 
 
Verbal report given to Enriqueta(name) on Brooke July III  being transferred to (unit) for routine progression of care Report consisted of patients Situation, Background, Assessment and  
Recommendations(SBAR). Information from the following report(s) SBAR, Kardex, Intake/Output, MAR and Recent Results was reviewed with the receiving nurse. Lines:  
Venous Access Device powerline 01/06/21 Upper chest (subclavicular area, right (Active) Central Line Being Utilized Yes 01/10/21 0800 Criteria for Appropriate Use Long term IV/antibiotic administration 01/10/21 0800 Site Assessment Clean, dry, & intact 01/10/21 0800 Date of Last Dressing Change 01/06/21 01/10/21 0800 Dressing Status Clean, dry, & intact 01/10/21 0800 Dressing Type Transparent;Disk with Chlorhexadine gluconate (CHG) 01/10/21 0800 Date Accessed (Medial Site) 01/06/21 01/08/21 1011 Positive Blood Return (Medial Site) Yes 01/08/21 2128 Action Taken (Medial Site) Flushed 01/08/21 2128 Positive Blood Return (Lateral Site) Yes 01/08/21 2128 Action Taken (Lateral Site) Flushed 01/08/21 2128 Alcohol Cap Used Yes 01/10/21 0800 Opportunity for questions and clarification was provided. Patient transported with: 
 O2 @ 15 liters Registered Nurse

## 2021-01-11 NOTE — PROGRESS NOTES
Nephrology Progress Note Jayde Walton III Date of Admission : 12/28/2020 CC: Follow up for KATHI on CKD Assessment and Plan KATHI on CKD  
- suspected ATN from severe pre renal azotemia - U/S neg for hydro - UA with proteinuria - Cr close to baseline 
- cont IV diuretics for now 
- advised to f/u with Dr Amanda Resendiz in 4-6 weeks  
- daily labs 
  
CKD Stage IIIa: 
-  Presumed 2/2 DM and HTN 
- nephrotic range proteinuria 
- baseline Cr 1.7 mg/dl  
- followed by Dr. Amanda Resendiz 
  
Hx of prostate cancer s/p XRT 
  
Metabolic acidosis: 
-  Continue oral Bicarb  
  
Anemia in CKD + blood loss: 
- received PRBCs on 1/2 
- cont JAZ + IV iron 
- EGD 1/5/2021: An actively bleeding Dieulafoy's lesion was noted in the gastric fundus.   
  
Type II DM: 
- on insulin 
  
HTN :  
- Continue current meds  
  
R foot osteo: 
- on abx 
- s/p debridement on 12/30 Group B strep bacteremia: 
- Abx per ID 
  
B/l Airspace disease: 
- ? PNA vs edema 
- COVID neg 
- on zosyn Interval History: 
Seen and examined. Not new sx. Cr stable. On  High flow. Diuresing. No cp, sob, n/v/d reported. Current Medications: all current  Medications have been eviewed in [facility] Review of Systems: Pertinent items are noted in HPI. Objective: 
Vitals:   
Vitals:  
 01/11/21 9857 01/11/21 9844 01/11/21 5181 01/11/21 5160 BP:  (!) 159/72 Pulse: (!) 137 (!) 120 (!) 112 Resp: 24 20 20 Temp:  98.5 °F (36.9 °C) SpO2: 98% 100% 98% 93% Weight:      
Height:      
 
Intake and Output: 
No intake/output data recorded. 01/09 1901 - 01/11 0700 In: -  
Out: 5768 [[id number]] Physical Examination: 
General: NAD,Conversant, obese Neck:  Supple, no mass Resp:  Lungs CTA B/L, no wheezing , normal respiratory effort CV:  RRR,  no murmur or rub,  trace LLE edema, RLE in wrap GI:  Soft, NT, + Bowel sounds, no hepatosplenomegaly Neurologic:  Non focal 
Psych:             AAO x 3 appropriate affect Skin:  No Rash :  Bynum in place 
 
[]    High complexity decision making was performed 
[]    Patient is at high-risk of decompensation with multiple organ involvement Lab Data Personally Reviewed: I have reviewed all the pertinent labs, microbiology data and radiology studies during assessment. Recent Labs  
  01/11/21 
0435 01/10/21 
0121 01/09/21 
4407  138 136  
K 4.6 4.4 4.8  
 108 109* CO2 27 25 24 * 178* 200* BUN 31* 32* 34* CREA 1.84* 1.63* 1.77* CA 8.4* 8.7 8.8 MG  --  1.4*  --   
PHOS  --   --  3.3 ALB  --   --  1.7* Recent Labs  
  01/11/21 
0435 01/10/21 
0121 WBC 25.7* 12.4* HGB 8.0* 6.9*  
HCT 26.8* 22.7*  
* 335 No results found for: SDES Lab Results Component Value Date/Time Culture result: (A) 12/30/2020 01:14 PM  
  LIGHT ANAEROBIC GRAM NEGATIVE RODS BETA LACTAMASE POSITIVE Culture result: LIGHT PROTEUS MIRABILIS (A) 12/30/2020 01:14 PM  
 Culture result: (A) 12/30/2020 01:14 PM  
  LIGHT STREPTOCOCCI, BETA HEMOLYTIC GROUP B Penicillin and ampicillin are drugs of choice for treatment of beta-hemolytic streptococcal infections. Susceptibility testing of penicillins and beta-lactams approved by the FDA for treatment of beta-hemolytic streptococcal infections need not be performed routinely, because nonsusceptible isolates are extremely rare. CLSI 2012 Culture result: LIGHT ENTEROCOCCUS FAECALIS (A) 12/30/2020 01:14 PM  
 
Recent Results (from the past 24 hour(s)) GLUCOSE, POC Collection Time: 01/10/21 11:09 AM  
Result Value Ref Range Glucose (POC) 193 (H) 65 - 100 mg/dL Performed by Steffany Puckett  PCT GLUCOSE, POC Collection Time: 01/10/21  4:34 PM  
Result Value Ref Range Glucose (POC) 107 (H) 65 - 100 mg/dL Performed by James Hendricks GLUCOSE, POC Collection Time: 01/10/21  9:48 PM  
Result Value Ref Range Glucose (POC) 214 (H) 65 - 100 mg/dL  Performed by [facility]  PCT   
CBC WITH AUTOMATED DIFF  
 Collection Time: 01/11/21  4:35 AM  
Result Value Ref Range WBC 25.7 (H) 4.1 - 11.1 K/uL  
 RBC 2.95 (L) 4.10 - 5.70 M/uL HGB 8.0 (L) 12.1 - 17.0 g/dL HCT 26.8 (L) 36.6 - 50.3 % MCV 90.8 80.0 - 99.0 FL  
 MCH 27.1 26.0 - 34.0 PG  
 MCHC 29.9 (L) 30.0 - 36.5 g/dL  
 RDW 17.9 (H) 11.5 - 14.5 % PLATELET 518 (H) 255 - 400 K/uL MPV 9.1 8.9 - 12.9 FL  
 NRBC 0.4 (H) 0  WBC ABSOLUTE NRBC 0.09 (H) 0.00 - 0.01 K/uL NEUTROPHILS 86 (H) 32 - 75 % BAND NEUTROPHILS 2 0 - 6 % LYMPHOCYTES 6 (L) 12 - 49 % MONOCYTES 4 (L) 5 - 13 % EOSINOPHILS 1 0 - 7 % BASOPHILS 0 0 - 1 % MYELOCYTES 1 (H) 0 % IMMATURE GRANULOCYTES 0 %  
 ABS. NEUTROPHILS 22.6 (H) 1.8 - 8.0 K/UL  
 ABS. LYMPHOCYTES 1.5 0.8 - 3.5 K/UL  
 ABS. MONOCYTES 1.0 0.0 - 1.0 K/UL  
 ABS. EOSINOPHILS 0.3 0.0 - 0.4 K/UL  
 ABS. BASOPHILS 0.0 0.0 - 0.1 K/UL  
 ABS. IMM. GRANS. 0.0 K/UL  
 DF MANUAL    
 RBC COMMENTS ANISOCYTOSIS 1+ 
    
 RBC COMMENTS STOMATOCYTES 1+ RBC COMMENTS OVALOCYTES 1+ RBC COMMENTS POLYCHROMASIA 
1+ IRON PROFILE Collection Time: 01/11/21  4:35 AM  
Result Value Ref Range Iron 42 35 - 150 ug/dL TIBC 201 (L) 250 - 450 ug/dL Iron % saturation 21 20 - 50 % METABOLIC PANEL, BASIC Collection Time: 01/11/21  4:35 AM  
Result Value Ref Range Sodium 137 136 - 145 mmol/L Potassium 4.6 3.5 - 5.1 mmol/L Chloride 106 97 - 108 mmol/L  
 CO2 27 21 - 32 mmol/L Anion gap 4 (L) 5 - 15 mmol/L Glucose 254 (H) 65 - 100 mg/dL BUN 31 (H) 6 - 20 MG/DL Creatinine 1.84 (H) 0.70 - 1.30 MG/DL  
 BUN/Creatinine ratio 17 12 - 20 GFR est AA 44 (L) >60 ml/min/1.73m2 GFR est non-AA 37 (L) >60 ml/min/1.73m2 Calcium 8.4 (L) 8.5 - 10.1 MG/DL  
NT-PRO BNP Collection Time: 01/11/21  4:35 AM  
Result Value Ref Range NT pro-BNP 8,349 (H) <125 PG/ML  
D DIMER Collection Time: 01/11/21  4:35 AM  
Result Value Ref Range  D-dimer 3.67 (H) 0.00 - 0.65 mg/L U GLUCOSE, POC Collection Time: 01/11/21  7:17 AM  
Result Value Ref Range Glucose (POC) 308 (H) 65 - 100 mg/dL Performed by Rafal RITCHIE GLUCOSE, POC Collection Time: 01/11/21  8:57 AM  
Result Value Ref Range Glucose (POC) 249 (H) 65 - 100 mg/dL Performed by Blessing Solo MD 
[facility] [place]  
[address] [facility], [facility] Phone - [phone] Fax - [phone] 
[email or web address]

## 2021-01-11 NOTE — PROGRESS NOTES
Hospitalist Cross Coverage NP Name: Neela Yee YOB: 1950 MRN: [id number] Admission Date: 12/28/2020  3:14 PM 
 
Date of service: 1/11/2021 4:02 AM  
  
                         
Overnight update:  
  
 
Paged by RN - sudden hypoxia down to mid 80s, anxious, tachycardia - Titrate oxygen to spo2 >90%, duoneb - CXR now, compare to yesterday - evaluate for volume overload vs infectious process 
- Can give AM lasix now - Concern for PE given hypoxia and tachycardia- hx of ca, tobacco use, surgery in past month. With KATHI on CKD can't CT PE study. Will add d-dimer to am labs. Consider VQ scan for AM if no other causative factors Oswaldo HEBERTC, PA-C 
[phone] or [employer]

## 2021-01-11 NOTE — PROGRESS NOTES
Hospitalist Progress Note Nathaly Phelps MD 
Answering service: [phone] OR [phone] from in house phone Date of Service:  2021 NAME:  Júnior Warren III 
:  1950 MRN:  [id number] Admission Summary: As per initial admission summary Mildred Burns is a 72 y.o. male with PMH of prostatic cancer s/p radiation, IDDM, heart failure, htn. He apparently told the ER physician that he c/o onset of diarrhea yesterday and some chills over the last 2 days, with occasional cough. For me, however, he denies all of these symptoms and states he came in because his caregiver was concerned d/t his lethargy over the last several days to weeks. He lives home alone and has a caregiver five times a week. He denies cp, sob, fever, chills, sweats, N/V. He has had a wound on his right foot for months. He denies any foot pain d/t neuropathy. He goes to [facility] for nephrology and states he last saw them a few months ago. Prior to today, he believes it has been several months since he last had any blood work. Denies etoh or illicits. Smokes just under 1 PPD. Currently no acute complaints except asking for water. States he takes all his meds daily. Interval history / Subjective:  
 
2021 ; Had increased 02 needs. Wbc up, cxr abn, bnp bumped; d dimer high For CT chest no contrast, ;pul is clear on exam , no gallop ; PE/ vs Covid pna yet high on diff ; if covid neg will fully anticoagulate for PE. Assessment & Plan:  
 
Severe sepsis 2/2 osteomyelitis right foot 
-Podiatry consulted  
-Underwent debridement right foot ulcer with removal of infected bone 2020 
-Cultures with proteus mirabilis, stept group B, enterococcus -ID following.  Antibiotics transitioned to zosyn  
-surgical path with + margins, will need prolonged antibiotics 
-Powerline ordered for  
-wound vac  
 -non weight bearing right lower extremity (patient wheelchair bound at baseline) Acute hypoxia for high flow; eval causes, pe/covid pna, as of this writing covid pna high on diff. If covid neg will fully anticoagulate for PE ;and echo to look for rt heart strain. Shortness of breath 1/4: improved 
-s/p steroids, breathing treatment, hold further steroids 
-chest x-ray with some bilateral airspace disease, consider adding back diuretics if okay by nephrology  
resovled as an acute issues  
-iv lasix 1/8 benfifical pt with severe LVH, concentric with ef 55% range, lasix daily x next three days +/- pending clinical response. ;  
 
Acute on chronic anemia, baseline ~9: persistent  
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 Duodenal ucler, non bleeding 
-Has received 6 units total during admission 
-Continue PPI 
 cont to monitor Lab Results Component Value Date/Time Hemoglobin (POC) 6.5 02/04/2020 01:59 PM  
 HGB 8.0 (L) 01/11/2021 04:35 AM  
  
Lab Results Component Value Date/Time Iron 42 01/11/2021 04:35 AM  
 TIBC 201 (L) 01/11/2021 04:35 AM  
 Iron % saturation 21 01/11/2021 04:35 AM  
 Ferritin 266 12/30/2020 01:17 AM  
 has had IV iron. - f/u iron levels soon for am 
F/u on hgb in am not planning to give blood today as pt not symptomatic 1/11/2021 am  
  
Acute on CKD: improving 
-nephrology following, on bicarb, valtessa  
-kumar removed 1/2 Lab Results Component Value Date/Time Creatinine 1.84 (H) 01/11/2021 04:35 AM  
  
  
IDDM, last a1c 8.1 01/2020 
-continue long acting insulin, meal time, SSI Lab Results Component Value Date/Time Glucose 254 (H) 01/11/2021 04:35 AM  
 Glucose (POC) 249 (H) 01/11/2021 08:57 AM  
  
  
HTN, controlled 
-holding amlodipine, continue hydralazine BP Readings from Last 1 Encounters:  
01/11/21 (!) 159/72  
  
  
Hyponatremia 
-stable, montior - resolved Toxic metabolic encephalopathy/drowsiness: resolved 
  
 Prostate ca 
-Cont home meds 
  
Tobacco abuse 
-continue patch Code status: full code DVT prophylaxis: scds Care Plan discussed with: Patient/Family Disposition: TBD Hospital Problems  Date Reviewed: 8/20/2020 Codes Class Noted POA * (Principal) Osteomyelitis ([address] ICD-10-CM: M86.9 ICD-9-CM: 730.20  12/28/2020 Yes Review of Systems:  
 
Review of Systems Constitutional: Negative for chills, fever and malaise/fatigue. HENT: Negative. Eyes: Negative. Respiratory: Positive for shortness of breath. Negative for cough, hemoptysis and wheezing. Cardiovascular: Negative. Gastrointestinal: Negative for abdominal pain, nausea and vomiting. Loose stool but no diarrhea Genitourinary: Negative. Musculoskeletal: Negative. Skin: Negative. Neurological: Negative. Psychiatric/Behavioral: Negative. Vital Signs:  
 Last 24hrs VS reviewed since prior progress note. Most recent are: 
Visit Vitals BP (!) 159/72 (BP 1 Location: Left arm, BP Patient Position: At rest) Pulse (!) 112 Temp 98.5 °F (36.9 °C) Resp 20 Ht 6' (1.829 m) Wt 119.3 kg (263 lb) SpO2 93% BMI 35.67 kg/m² Intake/Output Summary (Last 24 hours) at 1/11/2021 0901 Last data filed at 1/10/2021 2146 Gross per 24 hour Intake  Output 750 ml Net -750 ml Physical Examination:  
I had a face to face encounter with this patient and independently examined them on January 11, 2021 as outlined below: 
     
 
Physical Exam 
Constitutional:   
   Appearance: He is obese. HENT:  
   Head: Normocephalic and atraumatic. Right Ear: External ear normal.  
   Nose: Nose normal.  
   Mouth/Throat:  
   Mouth: Mucous membranes are moist.  
   Pharynx: Oropharynx is clear. Eyes:  
   Extraocular Movements: Extraocular movements intact. Conjunctiva/sclera: Conjunctivae normal.  
   Pupils: Pupils are equal, round, and reactive to light. Cardiovascular:  
   Rate and Rhythm: Normal rate and regular rhythm. Heart sounds: No gallop. Pulmonary:  
   Effort: Pulmonary effort is normal.  
   Breath sounds: Normal breath sounds. Comments: On high flow 02 Abdominal:  
   General: Abdomen is flat. Bowel sounds are normal.  
   Palpations: Abdomen is soft. Musculoskeletal:     
   General: Swelling present. Comments: Stable wound rt LE. Trace Plus distal edema Skin: 
   General: Skin is warm and dry. Neurological:  
   Mental Status: He is alert and oriented to person, place, and time. Mental status is at baseline. Psychiatric:     
   Mood and Affect: Mood normal.     
   Behavior: Behavior normal.     
   Judgment: Judgment normal.  
 
 
  
 
Data Review:  
 Review and/or order of clinical lab test 
Review and/or order of tests in the radiology section of CPT Review and/or order of tests in the medicine section of CPT Labs:  
 
Recent Labs  
  01/11/21 
0435 01/10/21 
0121 WBC 25.7* 12.4* HGB 8.0* 6.9*  
HCT 26.8* 22.7*  
* 335 Recent Labs  
  01/11/21 
0435 01/10/21 
0121 01/09/21 
8699  138 136  
K 4.6 4.4 4.8  
 108 109* CO2 27 25 24 BUN 31* 32* 34* CREA 1.84* 1.63* 1.77* * 178* 200* CA 8.4* 8.7 8.8 MG  --  1.4*  --   
PHOS  --   --  3.3 Recent Labs 01/09/21 
9971 ALB 1.7* No results for input(s): INR, PTP, APTT, INREXT, INREXT in the last 72 hours. Recent Labs  
  01/11/21 0435 TIBC 201* PSAT 21 Lab Results Component Value Date/Time Folate 20.0 03/16/2019 04:06 AM  
  
No results for input(s): PH, PCO2, PO2 in the last 72 hours. No results for input(s): CPK, CKNDX, TROIQ in the last 72 hours. No lab exists for component: CPKMB No results found for: CHOL, CHOLX, CHLST, CHOLV, HDL, HDLP, LDL, LDLC, DLDLP, TGLX, TRIGL, TRIGP, CHHD, CHHDX Lab Results Component Value Date/Time  Glucose (POC) 249 (H) 01/11/2021 08:57 AM  
 Glucose (POC) 308 (H) 01/11/2021 07:17 AM  
 Glucose (POC) 214 (H) 01/10/2021 09:48 PM  
 Glucose (POC) 107 (H) 01/10/2021 04:34 PM  
 Glucose (POC) 193 (H) 01/10/2021 11:09 AM  
 
Lab Results Component Value Date/Time Color YELLOW/STRAW 01/03/2021 12:56 PM  
 Appearance CLEAR 01/03/2021 12:56 PM  
 Specific gravity 1.015 01/03/2021 12:56 PM  
 pH (UA) 5.5 01/03/2021 12:56 PM  
 Protein 300 (A) 01/03/2021 12:56 PM  
 Glucose 100 (A) 01/03/2021 12:56 PM  
 Ketone Negative 01/03/2021 12:56 PM  
 Bilirubin Negative 01/03/2021 12:56 PM  
 Urobilinogen 0.2 01/03/2021 12:56 PM  
 Nitrites Negative 01/03/2021 12:56 PM  
 Leukocyte Esterase Negative 01/03/2021 12:56 PM  
 Epithelial cells FEW 01/03/2021 12:56 PM  
 Bacteria 1+ (A) 01/03/2021 12:56 PM  
 WBC 0-4 01/03/2021 12:56 PM  
 RBC 0-5 01/03/2021 12:56 PM  
 
 
 
Medications Reviewed:  
 
Current Facility-Administered Medications Medication Dose Route Frequency  furosemide (LASIX) injection 40 mg  40 mg IntraVENous Q12H  
 methylPREDNISolone (PF) (SOLU-MEDROL) injection 40 mg  40 mg IntraVENous Q8H  
 amLODIPine (NORVASC) tablet 10 mg  10 mg Oral DAILY  collagenase (SANTYL) 250 unit/gram ointment   Topical Once per day on Thu Sat  
 albuterol-ipratropium (DUO-NEB) 2.5 MG-0.5 MG/3 ML  3 mL Nebulization Q4H PRN  
 balsam peru-castor oiL (VENELEX) ointment   Topical Q12H  
 insulin lispro (HUMALOG) injection 10 Units  10 Units SubCUTAneous TIDAC  insulin glargine (LANTUS) injection 40 Units  40 Units SubCUTAneous QHS  ammonium lactate (LAC-HYDRIN) 12 % lotion   Topical Q12H  
 sodium bicarbonate tablet 650 mg  650 mg Oral DAILY  sodium chloride (NS) flush 5-40 mL  5-40 mL IntraVENous Q8H  
 sodium chloride (NS) flush 5-40 mL  5-40 mL IntraVENous PRN  pantoprazole (PROTONIX) 40 mg in 0.9% sodium chloride 10 mL injection  40 mg IntraVENous Q12H  enzalutamide Bryson Appiah) chemo capsule 80 mg (patient supplied) (Patient Supplied)  80 mg Oral BID  fluticasone propionate (FLONASE) 50 mcg/actuation nasal spray 2 Spray  2 Spray Both Nostrils DAILY  0.9% sodium chloride infusion 250 mL  250 mL IntraVENous PRN  
 0.9% sodium chloride infusion 250 mL  250 mL IntraVENous PRN  piperacillin-tazobactam (ZOSYN) 3.375 g in 0.9% sodium chloride (MBP/ADV) 100 mL MBP  3.375 g IntraVENous Q8H  
 oxyCODONE-acetaminophen (PERCOCET) 5-325 mg per tablet 1 Tab  1 Tab Oral Q4H PRN  
 0.9% sodium chloride infusion 250 mL  250 mL IntraVENous PRN  
 epoetin chi-epbx (RETACRIT) injection 20,000 Units  20,000 Units SubCUTAneous Q MON, WED & FRI  diclofenac (VOLTAREN) 1 % topical gel 2 g  2 g Topical QID  acetaminophen (TYLENOL) tablet 500 mg  500 mg Oral Q4H PRN  
 aspirin delayed-release tablet 81 mg  81 mg Oral DAILY  bicalutamide (CASODEX) tablet 50 mg  50 mg Oral DAILY  gabapentin (NEURONTIN) capsule 100 mg  100 mg Oral TID  hydrALAZINE (APRESOLINE) tablet 100 mg  100 mg Oral TID  tamsulosin (FLOMAX) capsule 0.4 mg  0.4 mg Oral DAILY  nicotine (NICODERM CQ) 14 mg/24 hr patch 1 Patch  1 Patch TransDERmal DAILY  insulin lispro (HUMALOG) injection   SubCUTAneous AC&HS  
 glucose chewable tablet 16 g  4 Tab Oral PRN  
 dextrose (D50W) injection syrg 12.5-25 g  12.5-25 g IntraVENous PRN  
 glucagon (GLUCAGEN) injection 1 mg  1 mg IntraMUSCular PRN  
 [Held by provider] heparin (porcine) injection 5,000 Units  5,000 Units SubCUTAneous Q8H  
 
______________________________________________________________________ EXPECTED LENGTH OF STAY: 9d 19h ACTUAL LENGTH OF STAY:          Valrie Rinne, MD

## 2021-01-11 NOTE — PROGRESS NOTES
TRANSFER - IN REPORT: 
 
Verbal report received from [address] [place] [address] (name) on Chelita Pae III  being received from [facility](unit) for routine progression of care Report consisted of patients Situation, Background, Assessment and  
Recommendations(SBAR). Information from the following report(s) SBAR, Kardex, Intake/Output, MAR, Recent Results and Cardiac Rhythm NSR was reviewed with the receiving nurse. Opportunity for questions and clarification was provided. Assessment completed upon patients arrival to unit and care assumed. Patient coming over with overdue meds, and unacknowledged orders, due to nurse being pregnant.

## 2021-01-12 NOTE — PROGRESS NOTES
TRANSFER - IN REPORT: 
 
Verbal report received from [facility] (name) on Gareth Budge III  being received from [facility](unit) for routine progression of care Report consisted of patients Situation, Background, Assessment and  
Recommendations(SBAR). Information from the following report(s) SBAR, Kardex, Recent Results and Cardiac Rhythm NSR was reviewed with the receiving nurse. Opportunity for questions and clarification was provided. Assessment completed upon patients arrival to unit and care assumed.

## 2021-01-12 NOTE — PROGRESS NOTES
Bedside shift change report given to Manuel Chapman (oncoming nurse) by Waldo George (offgoing nurse). Report included the following information SBAR, Kardex, Intake/Output, MAR and Cardiac Rhythm NSR.

## 2021-01-12 NOTE — PROGRESS NOTES
Dr. Vipin Buckner wanted patient to start taking Kayexalate. When the nurse explained the medication to the patient he stated \"He was not an experiment\".

## 2021-01-12 NOTE — CONSULTS
Name: Griselda 74: Jose Rafael Good 56  
: 1950 Admit Date: 2020 Phone: [phone]  Room: [facility] PCP: Joel Epley., MD  MRN: [id number] Date: 2021  Code: Full Code HPI: 
 
9:36 AM    
 
History was obtained from patient and medical records. I was asked by Maureen Allen MD to see Dianne Kenny III in consultation for a chief complaint of shortness of breath. History of Present Illness: 72year old male with past medical history  of prostatic cancer s/p radiation, IDDM, heart failure, htn, who was admitted to [facility] 2020 with complaints of diarrhea. He was diagnosed with osteomyelitis. O2 requirement had worsened in the past few days. He denies fever, chills. He has cough and chest tightness for the past 3-4 days. He is unable to take a deep breath. CT Chest showed bilateral pleural effusions with compressive atelectasis and ground glass nodules in the anterior upper lobes. No contarst used due to renal failure. He is receiving lasix 40 mg bid. WBC 25 => 13 Hgb 7.1 Cr 1.96 Nt pro BNP 10,832 => 8,349 COVID-19 positive 2021 D-dimer 2.87 <= 3.67 
TTE EF 55%. Severe cLVH. Dilated LA. CXR from admission reviewed - bilateral worsening infiltrates. Past Medical History:  
Diagnosis Date  Arthritis, Degenerative,  Knee 2011  Carcinoma, Prostate 2011  Degenerative disc disease 2011  Depression/ Anxiety 2011  Diabetes Mellitrus ( non-insulin dependent ) Type 2 2011  
 Heart failure ([facility] [address]  HEMATOCHEZIA 2011  Hypertrension 2011  Hypertriglyceridemia 2011  Ill-defined condition   
 lymphadema  Insomnia 2011  Laryngitis 2011  Mild Aortic insufficiency 2011  Mild Concentric LVH (left ventricular hypertrophy) 2011  Neuropathy 2011  Osteoarthritis 2011  Rotator Cuff Tendonitis 2011 Past Surgical History: Procedure Laterality Date  COLONOSCOPY N/A 2017 COLONOSCOPY performed by Kaiden Blutn [facility] at [facility]  
 HX ORTHOPAEDIC    
 left hip pinning  HX ORTHOPAEDIC    
 right hip replacement  HX OTHER SURGICAL    
 left little toe amputation  HX UROLOGICAL    
 IR INSERT TUNL CVC W/O PORT OVER 5 YR  2021  IA CARDIAC SURG PROCEDURE UNLIST    
 cardiac cath Family History Family history unknown: Yes  
 
 
Social History Tobacco Use  Smoking status: Current Every Day Smoker Packs/day: 1.00 Last attempt to quit: 2019 Years since quittin.2  Smokeless tobacco: Never Used Substance Use Topics  Alcohol use: Not Currently Alcohol/week: 11.7 standard drinks Types: 7 Cans of beer, 7 Shots of liquor per week No Known Allergies Current Facility-Administered Medications Medication Dose Route Frequency  furosemide (LASIX) injection 40 mg  40 mg IntraVENous Q12H  
 methylPREDNISolone (PF) (SOLU-MEDROL) injection 40 mg  40 mg IntraVENous Q8H  
 enoxaparin (LOVENOX) injection 40 mg  40 mg SubCUTAneous Q12H  
 ascorbic acid (vitamin C) (VITAMIN C) tablet 500 mg  500 mg Oral DAILY  zinc sulfate (ZINCATE) 220 (50) mg capsule 1 Cap  1 Cap Oral DAILY  amLODIPine (NORVASC) tablet 10 mg  10 mg Oral DAILY  collagenase (SANTYL) 250 unit/gram ointment   Topical Once per day on Thu Sat  
 albuterol-ipratropium (DUO-NEB) 2.5 MG-0.5 MG/3 ML  3 mL Nebulization Q4H PRN  
 balsam peru-castor oiL (VENELEX) ointment   Topical Q12H  
 insulin lispro (HUMALOG) injection 10 Units  10 Units SubCUTAneous TIDAC  insulin glargine (LANTUS) injection 40 Units  40 Units SubCUTAneous QHS  ammonium lactate (LAC-HYDRIN) 12 % lotion   Topical Q12H  
 sodium bicarbonate tablet 650 mg  650 mg Oral DAILY  sodium chloride (NS) flush 5-40 mL  5-40 mL IntraVENous Q8H  
 sodium chloride (NS) flush 5-40 mL  5-40 mL IntraVENous PRN  
  pantoprazole (PROTONIX) 40 mg in 0.9% sodium chloride 10 mL injection  40 mg IntraVENous Q12H  
 enzalutamide (XTANDI) chemo capsule 80 mg (patient supplied) (Patient Supplied)  80 mg Oral BID  fluticasone propionate (FLONASE) 50 mcg/actuation nasal spray 2 Spray  2 Spray Both Nostrils DAILY  0.9% sodium chloride infusion 250 mL  250 mL IntraVENous PRN  
 0.9% sodium chloride infusion 250 mL  250 mL IntraVENous PRN  piperacillin-tazobactam (ZOSYN) 3.375 g in 0.9% sodium chloride (MBP/ADV) 100 mL MBP  3.375 g IntraVENous Q8H  
 oxyCODONE-acetaminophen (PERCOCET) 5-325 mg per tablet 1 Tab  1 Tab Oral Q4H PRN  
 0.9% sodium chloride infusion 250 mL  250 mL IntraVENous PRN  
 epoetin chi-epbx (RETACRIT) injection 20,000 Units  20,000 Units SubCUTAneous Q MON, WED & FRI  diclofenac (VOLTAREN) 1 % topical gel 2 g  2 g Topical QID  acetaminophen (TYLENOL) tablet 500 mg  500 mg Oral Q4H PRN  
 aspirin delayed-release tablet 81 mg  81 mg Oral DAILY  bicalutamide (CASODEX) tablet 50 mg  50 mg Oral DAILY  gabapentin (NEURONTIN) capsule 100 mg  100 mg Oral TID  hydrALAZINE (APRESOLINE) tablet 100 mg  100 mg Oral TID  tamsulosin (FLOMAX) capsule 0.4 mg  0.4 mg Oral DAILY  nicotine (NICODERM CQ) 14 mg/24 hr patch 1 Patch  1 Patch TransDERmal DAILY  insulin lispro (HUMALOG) injection   SubCUTAneous AC&HS  
 glucose chewable tablet 16 g  4 Tab Oral PRN  
 dextrose (D50W) injection syrg 12.5-25 g  12.5-25 g IntraVENous PRN  
 glucagon (GLUCAGEN) injection 1 mg  1 mg IntraMUSCular PRN  
 
 
 
REVIEW OF SYSTEMS Negative except as stated in the HPI. Physical Exam:  
Visit Vitals BP (!) 159/68 (BP 1 Location: Left arm, BP Patient Position: At rest) Pulse 84 Temp 97.8 °F (36.6 °C) Resp 18 Ht 6' (1.829 m) Wt 116.7 kg (257 lb 4.4 oz) SpO2 100% BMI 34.89 kg/m² Due to COVID-19 pandemic and patient COVID-19 positive status, examination was deferred. General:  Alert, cooperative, no distress, appears stated age. Head:  Normocephalic. Extremities: ild leg edema. Neurologic: Grossly nonfocal  
 
 
Lab Results Component Value Date/Time Sodium 136 01/12/2021 04:22 AM  
 Potassium 5.7 (H) 01/12/2021 04:22 AM  
 Chloride 106 01/12/2021 04:22 AM  
 CO2 25 01/12/2021 04:22 AM  
 BUN 39 (H) 01/12/2021 04:22 AM  
 Creatinine 1.96 (H) 01/12/2021 04:22 AM  
 Glucose 310 (H) 01/12/2021 04:22 AM  
 Calcium 8.8 01/12/2021 04:22 AM  
 Magnesium 1.4 (L) 01/10/2021 01:21 AM  
 Phosphorus 3.3 01/09/2021 08:41 AM  
 Lactic acid 1.6 12/28/2020 04:28 PM  
 
 
Lab Results Component Value Date/Time WBC 13.1 (H) 01/12/2021 04:22 AM  
 HGB 7.1 (L) 01/12/2021 04:22 AM  
 PLATELET 925 77/26/7112 04:22 AM  
 MCV 93.4 01/12/2021 04:22 AM  
 
 
Lab Results Component Value Date/Time INR 1.1 12/31/2020 05:03 PM  
 aPTT 33.1 (H) 10/24/2016 03:26 PM  
 Alk. phosphatase 129 (H) 01/12/2021 04:22 AM  
 Protein, total 8.1 01/12/2021 04:22 AM  
 Albumin 1.9 (L) 01/12/2021 04:22 AM  
 Globulin 6.2 (H) 01/12/2021 04:22 AM  
 
 
Lab Results Component Value Date/Time Iron 42 01/11/2021 04:35 AM  
 TIBC 201 (L) 01/11/2021 04:35 AM  
 Iron % saturation 21 01/11/2021 04:35 AM  
 Ferritin 266 12/30/2020 01:17 AM  
 
 
Lab Results Component Value Date/Time C-Reactive protein 27.70 (H) 12/29/2020 04:04 AM  
 TSH 1.40 12/28/2020 03:41 PM  
 
 
Lab Results Component Value Date/Time CK 38 (L) 01/06/2021 03:47 AM  
 Troponin-I, Qt. <0.05 12/28/2020 03:41 PM  
  
 
Lab Results Component Value Date/Time Culture result: (A) 12/30/2020 01:14 PM  
  LIGHT ANAEROBIC GRAM NEGATIVE RODS BETA LACTAMASE POSITIVE  Culture result: LIGHT PROTEUS MIRABILIS (A) 12/30/2020 01:14 PM  
 Culture result: (A) 12/30/2020 01:14 PM  
 LIGHT STREPTOCOCCI, BETA HEMOLYTIC GROUP B Penicillin and ampicillin are drugs of choice for treatment of beta-hemolytic streptococcal infections. Susceptibility testing of penicillins and beta-lactams approved by the FDA for treatment of beta-hemolytic streptococcal infections need not be performed routinely, because nonsusceptible isolates are extremely rare. CLSI 2012 Culture result: LIGHT ENTEROCOCCUS FAECALIS (A) 12/30/2020 01:14 PM  
 
 
Lab Results Component Value Date/Time Color YELLOW/STRAW 01/03/2021 12:56 PM  
 Appearance CLEAR 01/03/2021 12:56 PM  
 pH (UA) 5.5 01/03/2021 12:56 PM  
 Protein 300 (A) 01/03/2021 12:56 PM  
 Glucose 100 (A) 01/03/2021 12:56 PM  
 Ketone Negative 01/03/2021 12:56 PM  
 Bilirubin Negative 01/03/2021 12:56 PM  
 Blood Negative 01/03/2021 12:56 PM  
 Urobilinogen 0.2 01/03/2021 12:56 PM  
 Nitrites Negative 01/03/2021 12:56 PM  
 Leukocyte Esterase Negative 01/03/2021 12:56 PM  
 WBC 0-4 01/03/2021 12:56 PM  
 RBC 0-5 01/03/2021 12:56 PM  
 Bacteria 1+ (A) 01/03/2021 12:56 PM  
 
 
IMPRESSION: 
=========== 
-COVID-19 PUI. -COVID-19 pneumonia. -Hypoxemic respiratory failure. -Volume overload with hx of severe cLVH (likely has diastolic dysfunction) and CKD. -KATHI on CKD. PLAN: 
===== 
-Suspect combination of volume overload and covid-19 pneumonitis/ pneumonia. -O2 supplementation to keep sats above 92%. On 6 lpm by NC. 
-Check CRP and PCT. -Change solumedrol to decadron. -Abx per ID. -Pharmacy consult for Remdisivir. Risk, benefit discussed with the patient. 
-Check echo when available. -COVID-19 d-dimer based anticoagulation. 
-Sleep prone > 4 hours each night. -Vitamin C and zinc. 
  
Thank you for the consult.  Will follow. 
Michell Levi MD

## 2021-01-12 NOTE — PROGRESS NOTES
Nephrology Progress Note Don Del Rio III Date of Admission : 12/28/2020 CC: Follow up for KATHI on CKD Assessment and Plan KATHI on CKD  
- suspected ATN from severe pre renal azotemia - U/S neg for hydro - UA with proteinuria - Cr close to baseline 
- cont IV diuretics for now 
- daily labs and strict I/Os Hyperkalemia: 
- low K diet, no ACE/ARB 
- SPS 30g x 1 
- repeat labs this PM 
  
CKD Stage IIIa: 
-  Presumed 2/2 DM and HTN 
- nephrotic range proteinuria 
- baseline Cr 1.7 mg/dl  
- followed by Dr. Louis Gates COVID-19 PNA: 
- per primary team 
  
Hx of prostate cancer s/p XRT 
  
Metabolic acidosis: 
-  Continue oral Bicarb  
  
Anemia in CKD + blood loss: 
- received PRBCs on 1/2 
- cont JAZ + IV iron 
- EGD 1/5/2021: An actively bleeding Dieulafoy's lesion was noted in the gastric fundus.   
  
Type II DM: 
- on insulin 
  
HTN :  
- Continue current meds  
  
R foot osteo: 
- on abx 
- s/p debridement on 12/30 Group B strep bacteremia: 
- Abx per ID 
  
B/l Airspace disease: 
- ? PNA vs edema 
- COVID neg 
- on zosyn Interval History: Not examined in the room Now COVID +. O2 sats stable per RN. On  High flow. Diuresing now. UOP not all recorded. Cr up some, K 5.7. No cp, n/v/d reported. Current Medications: all current  Medications have been eviewed in [facility] Review of Systems: Pertinent items are noted in HPI. Objective: 
Vitals:   
Vitals:  
 01/11/21 2000 01/12/21 0033 01/12/21 9390 01/12/21 2838 BP: (!) 157/71 (!) 158/65 (!) 164/71 (!) 159/68 Pulse: 78 84 84 Resp: 20 16 20 18 Temp: 98.9 °F (37.2 °C) 98.6 °F (37 °C) 98.9 °F (37.2 °C) 97.8 °F (36.6 °C) SpO2: 100% 100% 100% 100% Weight:   116.7 kg (257 lb 4.4 oz) Height:      
 
Intake and Output: 
No intake/output data recorded. 01/10 1901 - 01/12 0700 In: 416 [P.O.:416] Out: 750 [Urine:750] Physical Examination:  Not examined in the room due to COVID-19 infection: General: Obese, on high flow Resp:  Stable oxygenation on high flow CV:  RRR on monitor Neurologic:  Non focal 
Psych:             Anxious per report :  Bynum in place 
 
[]    High complexity decision making was performed 
[]    Patient is at high-risk of decompensation with multiple organ involvement Lab Data Personally Reviewed: I have reviewed all the pertinent labs, microbiology data and radiology studies during assessment. Recent Labs  
  01/12/21 
0422 01/11/21 
0435 01/10/21 
0121  137 138  
K 5.7* 4.6 4.4  
 106 108 CO2 25 27 25 * 254* 178* BUN 39* 31* 32* CREA 1.96* 1.84* 1.63* CA 8.8 8.4* 8.7 MG  --   --  1.4* ALB 1.9*  --   --   
ALT 10*  --   --   
 
Recent Labs  
  01/12/21 
0422 01/11/21 
0435 01/10/21 
0121 WBC 13.1* 25.7* 12.4* HGB 7.1* 8.0* 6.9*  
HCT 24.2* 26.8* 22.7*  
 502* 335 No results found for: SDES Lab Results Component Value Date/Time Culture result: (A) 12/30/2020 01:14 PM  
  LIGHT ANAEROBIC GRAM NEGATIVE RODS BETA LACTAMASE POSITIVE Culture result: LIGHT PROTEUS MIRABILIS (A) 12/30/2020 01:14 PM  
 Culture result: (A) 12/30/2020 01:14 PM  
  LIGHT STREPTOCOCCI, BETA HEMOLYTIC GROUP B Penicillin and ampicillin are drugs of choice for treatment of beta-hemolytic streptococcal infections. Susceptibility testing of penicillins and beta-lactams approved by the FDA for treatment of beta-hemolytic streptococcal infections need not be performed routinely, because nonsusceptible isolates are extremely rare. CLSI 2012 Culture result: LIGHT ENTEROCOCCUS FAECALIS (A) 12/30/2020 01:14 PM  
 
Recent Results (from the past 24 hour(s)) GLUCOSE, POC Collection Time: 01/11/21  1:08 PM  
Result Value Ref Range Glucose (POC) 248 (H) 65 - 100 mg/dL Performed by Neena Donahue SARS-COV-2 Collection Time: 01/11/21  1:15 PM  
Result Value Ref Range  Specimen source Nasopharyngeal    
 Specimen source Nasopharyngeal    
 COVID-19 rapid test Detected (AA) NOTD Specimen type NP Swab GLUCOSE, POC Collection Time: 01/11/21  5:01 PM  
Result Value Ref Range Glucose (POC) 212 (H) 65 - 100 mg/dL Performed by Helga Maynard (CON) GLUCOSE, POC Collection Time: 01/11/21 10:15 PM  
Result Value Ref Range Glucose (POC) 235 (H) 65 - 100 mg/dL Performed by Johnnie Escalera D DIMER Collection Time: 01/12/21  4:22 AM  
Result Value Ref Range D-dimer 2.87 (H) 0.00 - 0.65 mg/L FEU  
CBC WITH AUTOMATED DIFF Collection Time: 01/12/21  4:22 AM  
Result Value Ref Range WBC 13.1 (H) 4.1 - 11.1 K/uL  
 RBC 2.59 (L) 4.10 - 5.70 M/uL HGB 7.1 (L) 12.1 - 17.0 g/dL HCT 24.2 (L) 36.6 - 50.3 % MCV 93.4 80.0 - 99.0 FL  
 MCH 27.4 26.0 - 34.0 PG  
 MCHC 29.3 (L) 30.0 - 36.5 g/dL  
 RDW 17.9 (H) 11.5 - 14.5 % PLATELET 235 889 - 837 K/uL MPV 9.0 8.9 - 12.9 FL  
 NRBC 0.2 (H) 0  WBC ABSOLUTE NRBC 0.03 (H) 0.00 - 0.01 K/uL NEUTROPHILS 95 (H) 32 - 75 % LYMPHOCYTES 3 (L) 12 - 49 % MONOCYTES 1 (L) 5 - 13 % EOSINOPHILS 0 0 - 7 % BASOPHILS 0 0 - 1 % IMMATURE GRANULOCYTES 1 (H) 0.0 - 0.5 % ABS. NEUTROPHILS 12.5 (H) 1.8 - 8.0 K/UL  
 ABS. LYMPHOCYTES 0.4 (L) 0.8 - 3.5 K/UL  
 ABS. MONOCYTES 0.1 0.0 - 1.0 K/UL  
 ABS. EOSINOPHILS 0.0 0.0 - 0.4 K/UL  
 ABS. BASOPHILS 0.0 0.0 - 0.1 K/UL  
 ABS. IMM. GRANS. 0.1 (H) 0.00 - 0.04 K/UL  
 DF SMEAR SCANNED    
 RBC COMMENTS OVALOCYTES PRESENT 
    
 RBC COMMENTS ANISOCYTOSIS 
1+ METABOLIC PANEL, COMPREHENSIVE Collection Time: 01/12/21  4:22 AM  
Result Value Ref Range Sodium 136 136 - 145 mmol/L Potassium 5.7 (H) 3.5 - 5.1 mmol/L Chloride 106 97 - 108 mmol/L  
 CO2 25 21 - 32 mmol/L Anion gap 5 5 - 15 mmol/L Glucose 310 (H) 65 - 100 mg/dL BUN 39 (H) 6 - 20 MG/DL Creatinine 1.96 (H) 0.70 - 1.30 MG/DL  
 BUN/Creatinine ratio 20 12 - 20 GFR est AA 41 (L) >60 ml/min/1.73m2 GFR est non-AA 34 (L) >60 ml/min/1.73m2 Calcium 8.8 8.5 - 10.1 MG/DL Bilirubin, total 0.2 0.2 - 1.0 MG/DL  
 ALT (SGPT) 10 (L) 12 - 78 U/L  
 AST (SGOT) 10 (L) 15 - 37 U/L Alk. phosphatase 129 (H) 45 - 117 U/L Protein, total 8.1 6.4 - 8.2 g/dL Albumin 1.9 (L) 3.5 - 5.0 g/dL Globulin 6.2 (H) 2.0 - 4.0 g/dL A-G Ratio 0.3 (L) 1.1 - 2.2 GLUCOSE, POC Collection Time: 01/12/21  6:49 AM  
Result Value Ref Range Glucose (POC) 269 (H) 65 - 100 mg/dL Performed by Jaimie Johnson MD 
[address] [place] [address], [address] [facility] Phone - [phone] Fax - [phone] 
[email or web address]

## 2021-01-12 NOTE — PROGRESS NOTES
ID Progress Note 2021 Subjective: Afebrile. He developed shortness of breath and diarrhea. Covid test is positive. abdominal pain, headache or sore throat ROS: No anaphylaxis, seizures, syncope, hematemesis, hematochezia Objective:  
 
Vitals:  
Visit Vitals BP (!) 169/76 (BP 1 Location: Left arm, BP Patient Position: At rest) Pulse 78 Temp 98.5 °F (36.9 °C) Resp 24 Ht 6' (1.829 m) Wt 119.3 kg (263 lb) SpO2 98% BMI 35.67 kg/m² Tmax:  Temp (24hrs), Av.5 °F (36.9 °C), Min:98.5 °F (36.9 °C), Max:98.5 °F (36.9 °C) Exam: Not in distress Pink conjunctivae, anicteric sclerae No cervical lymphdenopathy Lung clear, no rales, wheezes or rhonchi Heart: s1, s2, RRR, no murmurs rubs or clicks Abdomen: soft nontender, no guarding or rebound Knees not warm or tender Speech fluent Labs:  
Lab Results Component Value Date/Time WBC 25.7 (H) 2021 04:35 AM  
 Hemoglobin (POC) 6.5 2020 01:59 PM  
 HGB 8.0 (L) 2021 04:35 AM  
 HCT 26.8 (L) 2021 04:35 AM  
 PLATELET 957 (H)  04:35 AM  
 MCV 90.8 2021 04:35 AM  
 
Lab Results Component Value Date/Time Sodium 137 2021 04:35 AM  
 Potassium 4.6 2021 04:35 AM  
 Chloride 106 2021 04:35 AM  
 CO2 27 2021 04:35 AM  
 Anion gap 4 (L) 2021 04:35 AM  
 Glucose 254 (H) 2021 04:35 AM  
 BUN 31 (H) 2021 04:35 AM  
 Creatinine 1.84 (H) 2021 04:35 AM  
 BUN/Creatinine ratio 17 2021 04:35 AM  
 GFR est AA 44 (L) 2021 04:35 AM  
 GFR est non-AA 37 (L) 2021 04:35 AM  
 Calcium 8.4 (L) 2021 04:35 AM  
 Bilirubin, total 0.4 2020 03:41 PM  
 Alk. phosphatase 162 (H) 2020 03:41 PM  
 Protein, total 8.3 (H) 2020 03:41 PM  
 Albumin 1.7 (L) 2021 08:41 AM  
 Globulin 6.5 (H) 2020 03:41 PM  
 A-G Ratio 0.3 (L) 2020 03:41 PM  
 ALT (SGPT) 10 (L) 2020 03:41 PM  
 
 
 
 
 
Assessment: #1 osteomyelitis of the right foot 
  
#2 group b strep  bacteremia 
  
#3 renal insufficiency 
  
#4 diabetes 
  
#5 prostate cancer 
  
#6 hypertension 
  
#7 heart failure 
  
 #8 covid #9 diarrhea  
  
 
 
  
 
Recommendations:  
 
Continue zosyn. There is OM on the surgical margin. He will need prolonged therapy. Orders written. He has tested positive for covid. He is on steroids now. He is interested in remdesivir. Cr cl is 50. There has been no liver function test for a number of days now. I have ordered one. Send for c. Diff Thelma Arrington MD

## 2021-01-12 NOTE — PROGRESS NOTES
Hospitalist Progress Note Padmini Block MD 
Answering service: [phone] OR [phone] from in house phone Date of Service:  2021 NAME:  Geno Alexander III 
:  1950 MRN:  [id number] Admission Summary: As per initial admission summary Clarita Sanchez is a 72 y.o. male with PMH of prostatic cancer s/p radiation, IDDM, heart failure, htn. He apparently told the ER physician that he c/o onset of diarrhea yesterday and some chills over the last 2 days, with occasional cough. For me, however, he denies all of these symptoms and states he came in because his caregiver was concerned d/t his lethargy over the last several days to weeks. He lives home alone and has a caregiver five times a week. He denies cp, sob, fever, chills, sweats, N/V. He has had a wound on his right foot for months. He denies any foot pain d/t neuropathy. He goes to [facility] for nephrology and states he last saw them a few months ago. Prior to today, he believes it has been several months since he last had any blood work. Denies etoh or illicits. Smokes just under 1 PPD. Currently no acute complaints except asking for water. States he takes all his meds daily. Interval history / Subjective:  
 
2021 ;  
Cont sob, 02 sats stable however on 5 LPM ( was on 15 LPM last pm. ; no cp no fever , no n/v. +malaise, other per ROS. Assessment & Plan:  
 
Severe sepsis 2/2 osteomyelitis right foot 
-Podiatry consulted  
-Underwent debridement right foot ulcer with removal of infected bone 2020 
-Cultures with proteus mirabilis, stept group B, enterococcus -ID following. Antibiotics transitioned to zosyn  
-surgical path with + margins, will need prolonged antibiotics 
-Powerline ordered for  
-wound vac  
-non weight bearing right lower extremity (patient wheelchair bound at baseline) COVID + w hypoxia and pneumonia by CT on mid flow Acute hypoxia for high flow; eval causes, pe/covid pna, as of this writing covid pna high on diff. If covid neg will fully anticoagulate for PE ;and echo to look for rt heart strain. - CT suggest Covid PNa, will not push full anticoag, pt sats 100 % on 5 LPM 1/12 , improved from 15 LPM 1/11 1/11 Shortness of breath 1/4: improved 
-s/p steroids, breathing treatment, hold further steroids 
-chest x-ray with some bilateral airspace disease, consider adding back diuretics if okay by nephrology  
resovled as an acute issues  
-iv lasix 1/8 benfifical pt with severe LVH, concentric with ef 55% range, lasix daily x next three days +/- pending clinical response. ;  
 
Acute on chronic anemia, baseline ~9: persistent  
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 Duodenal ucler, non bleeding 
-Has received 6 units total during admission 
-Continue PPI 
 cont to monitor Lab Results Component Value Date/Time Hemoglobin (POC) 6.5 02/04/2020 01:59 PM  
 HGB 7.1 (L) 01/12/2021 04:22 AM  
  
Lab Results Component Value Date/Time Iron 42 01/11/2021 04:35 AM  
 TIBC 201 (L) 01/11/2021 04:35 AM  
 Iron % saturation 21 01/11/2021 04:35 AM  
 Ferritin 266 12/30/2020 01:17 AM  
 has had IV iron. - f/u iron levels soon for am 
F/u on hgb in am not planning to give blood today as pt not symptomatic 1/12/2021 am  
  
Acute on CKD: improving 
-nephrology following, on bicarb, valtessa  
-kumar removed 1/2 Lab Results Component Value Date/Time Creatinine 1.96 (H) 01/12/2021 04:22 AM  
  
  
IDDM, last a1c 8.1 01/2020 
-continue long acting insulin, meal time, SSI Lab Results Component Value Date/Time Glucose 310 (H) 01/12/2021 04:22 AM  
 Glucose (POC) 228 (H) 01/12/2021 11:35 AM  
  
  
HTN, controlled 
-holding amlodipine, continue hydralazine BP Readings from Last 1 Encounters:  
01/12/21 (!) 204/163 Med's adjusted 1/12/2021  
  
Hyponatremia 
-stable, montior - resolved Toxic metabolic encephalopathy/drowsiness: resolved 
  
Prostate ca 
-Cont home meds 
  
Tobacco abuse 
-continue patch Code status: full code DVT prophylaxis: scds Care Plan discussed with: Patient/Family Disposition: TBD Hospital Problems  Date Reviewed: 8/20/2020 Codes Class Noted POA * (Principal) Osteomyelitis ([address] ICD-10-CM: M86.9 ICD-9-CM: 730.20  12/28/2020 Yes After personally interviewing pt at bedside the following is noted on 1/12/2021 : 
 
Review of Systems Constitutional: Positive for malaise/fatigue. Negative for chills and fever. HENT: Negative. Eyes: Negative. Respiratory: Positive for shortness of breath and wheezing. Negative for cough. Cardiovascular: Negative. Gastrointestinal: Negative. Genitourinary: Negative. Musculoskeletal: Negative. No acute m/s issues Skin: Negative. Stable lower ext skin uclers Neurological: Negative. Psychiatric/Behavioral: Negative. I had a face to face encounter with patient on 1/12/2021 at bedside for the following physical exam: PHYSICAL EXAM: 
 
Visit Vitals BP (!) 204/163 (BP 1 Location: Left arm, BP Patient Position: At rest) Pulse 99 Temp 97.6 °F (36.4 °C) Resp 22 Ht 6' (1.829 m) Wt 116.7 kg (257 lb 4.4 oz) SpO2 97% BMI 34.89 kg/m² Physical Exam 
Constitutional:   
   General: He is not in acute distress. Appearance: He is obese. He is ill-appearing. He is not diaphoretic. HENT:  
   Head: Normocephalic and atraumatic. Right Ear: External ear normal.  
   Left Ear: External ear normal.  
   Nose: Nose normal.  
   Mouth/Throat:  
   Mouth: Mucous membranes are moist.  
   Pharynx: Oropharynx is clear. Eyes:  
   Extraocular Movements: Extraocular movements intact. Conjunctiva/sclera: Conjunctivae normal.  
   Pupils: Pupils are equal, round, and reactive to light. Neck: Musculoskeletal: Normal range of motion and neck supple. Cardiovascular:  
   Rate and Rhythm: Normal rate and regular rhythm. Pulmonary:  
   Effort: Pulmonary effort is normal.  
   Breath sounds: Normal breath sounds. Abdominal:  
   General: Abdomen is flat. Bowel sounds are normal.  
   Palpations: Abdomen is soft. Musculoskeletal:     
   General: Swelling present. No deformity. Right lower leg: Edema present. Left lower leg: Edema present. Skin: 
   General: Skin is warm and dry. Comments: Stable wounds LE's Neurological:  
   General: No focal deficit present. Mental Status: Mental status is at baseline. Psychiatric:     
   Mood and Affect: Mood normal.     
   Thought Content: Thought content normal.     
   Judgment: Judgment normal.  
 
  
 
 
  
  
 
Intake/Output Summary (Last 24 hours) at 1/12/2021 1628 Last data filed at 1/11/2021 1700 Gross per 24 hour Intake 200 ml Output  Net 200 ml Data Review:  
 Review and/or order of clinical lab test 
Review and/or order of tests in the radiology section of CPT Review and/or order of tests in the medicine section of CPT Labs:  
 
Recent Labs  
  01/12/21 0422 01/11/21 0435 WBC 13.1* 25.7* HGB 7.1* 8.0*  
HCT 24.2* 26.8*  
 502* Recent Labs  
  01/12/21 
0422 01/11/21 
0435 01/10/21 
0121  137 138  
K 5.7* 4.6 4.4  
 106 108 CO2 25 27 25 BUN 39* 31* 32* CREA 1.96* 1.84* 1.63* * 254* 178* CA 8.8 8.4* 8.7 MG  --   --  1.4* Recent Labs  
  01/12/21 
1020 01/12/21 0422 ALT 11* 10* * 129* TBILI 0.2 0.2 TP 7.2 8.1 ALB 2.0* 1.9*  
GLOB 5.2* 6.2* No results for input(s): INR, PTP, APTT, INREXT, INREXT in the last 72 hours. Recent Labs  
  01/11/21 0435 TIBC 201* PSAT 21 Lab Results Component Value Date/Time Folate 20.0 03/16/2019 04:06 AM  
  
No results for input(s): PH, PCO2, PO2 in the last 72 hours. No results for input(s): CPK, CKNDX, TROIQ in the last 72 hours. No lab exists for component: CPKMB No results found for: CHOL, CHOLX, CHLST, CHOLV, HDL, HDLP, LDL, LDLC, DLDLP, TGLX, TRIGL, TRIGP, CHHD, CHHDX Lab Results Component Value Date/Time Glucose (POC) 228 (H) 01/12/2021 11:35 AM  
 Glucose (POC) 269 (H) 01/12/2021 06:49 AM  
 Glucose (POC) 235 (H) 01/11/2021 10:15 PM  
 Glucose (POC) 212 (H) 01/11/2021 05:01 PM  
 Glucose (POC) 248 (H) 01/11/2021 01:08 PM  
 
Lab Results Component Value Date/Time Color YELLOW/STRAW 01/03/2021 12:56 PM  
 Appearance CLEAR 01/03/2021 12:56 PM  
 Specific gravity 1.015 01/03/2021 12:56 PM  
 pH (UA) 5.5 01/03/2021 12:56 PM  
 Protein 300 (A) 01/03/2021 12:56 PM  
 Glucose 100 (A) 01/03/2021 12:56 PM  
 Ketone Negative 01/03/2021 12:56 PM  
 Bilirubin Negative 01/03/2021 12:56 PM  
 Urobilinogen 0.2 01/03/2021 12:56 PM  
 Nitrites Negative 01/03/2021 12:56 PM  
 Leukocyte Esterase Negative 01/03/2021 12:56 PM  
 Epithelial cells FEW 01/03/2021 12:56 PM  
 Bacteria 1+ (A) 01/03/2021 12:56 PM  
 WBC 0-4 01/03/2021 12:56 PM  
 RBC 0-5 01/03/2021 12:56 PM  
 
 
 
Medications Reviewed:  
 
Current Facility-Administered Medications Medication Dose Route Frequency  dexamethasone (PF) (DECADRON) 10 mg/mL injection 6 mg  6 mg IntraVENous Q24H  
 sodium polystyrene (KAYEXALATE) 15 gram/60 mL oral suspension 30 g  30 g Oral NOW  miconazole (MICOTIN) 2 % powder   Topical BID  albuterol (PROVENTIL HFA, VENTOLIN HFA, PROAIR HFA) inhaler 2 Puff  2 Puff Inhalation TID RT  
 hydrALAZINE (APRESOLINE) 20 mg/mL injection 20 mg  20 mg IntraVENous Q6H PRN  
 cloNIDine HCL (CATAPRES) tablet 0.1 mg  0.1 mg Oral BID  furosemide (LASIX) injection 40 mg  40 mg IntraVENous Q12H  
 enoxaparin (LOVENOX) injection 40 mg  40 mg SubCUTAneous Q12H  
 ascorbic acid (vitamin C) (VITAMIN C) tablet 500 mg  500 mg Oral DAILY  zinc sulfate (ZINCATE) 220 (50) mg capsule 1 Cap  1 Cap Oral DAILY  amLODIPine (NORVASC) tablet 10 mg  10 mg Oral DAILY  collagenase (SANTYL) 250 unit/gram ointment   Topical Once per day on Thu Sat  
 albuterol-ipratropium (DUO-NEB) 2.5 MG-0.5 MG/3 ML  3 mL Nebulization Q4H PRN  
 balsam peru-castor oiL (VENELEX) ointment   Topical Q12H  
 insulin lispro (HUMALOG) injection 10 Units  10 Units SubCUTAneous TIDAC  insulin glargine (LANTUS) injection 40 Units  40 Units SubCUTAneous QHS  ammonium lactate (LAC-HYDRIN) 12 % lotion   Topical Q12H  
 sodium bicarbonate tablet 650 mg  650 mg Oral DAILY  sodium chloride (NS) flush 5-40 mL  5-40 mL IntraVENous Q8H  
 sodium chloride (NS) flush 5-40 mL  5-40 mL IntraVENous PRN  pantoprazole (PROTONIX) 40 mg in 0.9% sodium chloride 10 mL injection  40 mg IntraVENous Q12H  
 enzalutamide (XTANDI) chemo capsule 80 mg (patient supplied) (Patient Supplied)  80 mg Oral BID  fluticasone propionate (FLONASE) 50 mcg/actuation nasal spray 2 Spray  2 Spray Both Nostrils DAILY  0.9% sodium chloride infusion 250 mL  250 mL IntraVENous PRN  
 0.9% sodium chloride infusion 250 mL  250 mL IntraVENous PRN  piperacillin-tazobactam (ZOSYN) 3.375 g in 0.9% sodium chloride (MBP/ADV) 100 mL MBP  3.375 g IntraVENous Q8H  
 oxyCODONE-acetaminophen (PERCOCET) 5-325 mg per tablet 1 Tab  1 Tab Oral Q4H PRN  
 0.9% sodium chloride infusion 250 mL  250 mL IntraVENous PRN  
 epoetin chi-epbx (RETACRIT) injection 20,000 Units  20,000 Units SubCUTAneous Q MON, WED & FRI  diclofenac (VOLTAREN) 1 % topical gel 2 g  2 g Topical QID  acetaminophen (TYLENOL) tablet 500 mg  500 mg Oral Q4H PRN  
 aspirin delayed-release tablet 81 mg  81 mg Oral DAILY  bicalutamide (CASODEX) tablet 50 mg  50 mg Oral DAILY  gabapentin (NEURONTIN) capsule 100 mg  100 mg Oral TID  hydrALAZINE (APRESOLINE) tablet 100 mg  100 mg Oral TID  tamsulosin (FLOMAX) capsule 0.4 mg  0.4 mg Oral DAILY  nicotine (NICODERM CQ) 14 mg/24 hr patch 1 Patch  1 Patch TransDERmal DAILY  insulin lispro (HUMALOG) injection   SubCUTAneous AC&HS  
 glucose chewable tablet 16 g  4 Tab Oral PRN  
 dextrose (D50W) injection syrg 12.5-25 g  12.5-25 g IntraVENous PRN  
 glucagon (GLUCAGEN) injection 1 mg  1 mg IntraMUSCular PRN  
 
______________________________________________________________________ EXPECTED LENGTH OF STAY: 9d 19h ACTUAL LENGTH OF STAY:          Cam Hightower MD

## 2021-01-12 NOTE — PROGRESS NOTES
Bedside and Verbal shift change report given to Kelley Moreira (oncoming nurse) by Jason Berg (offgoing nurse). Report included the following information Kardex, Intake/Output, MAR, Recent Results and Cardiac Rhythm NSR.

## 2021-01-12 NOTE — PROGRESS NOTES
Progress Note Patient: Maggi Cantrell MRN: [id number]  SSN: [id number] YOB: 1950  Age: 72 y.o. Sex: male Admit Date: 2020 Subjective:  
 
Patient seen & examined at bedside. Denies any n/v/f/ns/c.  
 
Objective:  
 
Visit Vitals BP (!) 164/71 (BP 1 Location: Left arm) Pulse 84 Temp 98.9 °F (37.2 °C) Resp 20 Ht 6' (1.829 m) Wt 116.7 kg (257 lb 4.4 oz) SpO2 100% BMI 34.89 kg/m² Right Foot Exam: wound vac dressing intact an vac running at continuous therapy. Dorsal midfoot eschar stable. Labs/Radiology Review: images and reports reviewed Assessment:  
 
Hospital Problems  Date Reviewed: 2020 Codes Class Noted POA * (Principal) Osteomyelitis ([facility] [address] ICD-10-CM: M86.9 ICD-9-CM: 730.20  2020 Yes Plan/Recommendations/Medical Decision Makin.) Continue antibxs per ID. Pt now COVID+ 
2.) Continue wound vac therapy. 3.) Continue nwb to right foot. 4.) No further intervention needed at this time from my standpoint. Will sign off. Reconsult PRN. See below for discharge instructions. Discharge Instructions 
  
1.) Follow up with Dr. Joao Rome (Foot Doc) at [address] [place] [address] ([phone]) 2-3 weeks following hospital discharge.  
2.) Apply wound vac to right plantar foot ulcer and set to 125 mmHg continuous therapy.  Change vac dressing MWF.  
3.) No weightbearing to right foot.  Elevate foot when at rest.

## 2021-01-12 NOTE — WOUND CARE
WOCN Note: Follow-up visit for [facility] dressing change to right foot.  
  
Chart shows: 
Admitted for lethargy; osteomyelitis of right foot with debridement and removal of infected bone 12/30 History of DM, smoking, prostate cancer, HTN, heart failure WBC = 13.1 Admitted from home 
  
Assessment:  
A&O x 4. Used urinal and apologized that he could not wait for bedpan for BM. I cleaned him of a large soft stool with assist of one other nurse for turning left & right. Bed: P500 AMY mattress 
  
Bilateral heels intact with no redness. Heels offloaded with pillows. 
  
1. POA, right foot wound post surgical debridement = 7 x 5.5 x 3cm  Base is 30% soft yellow 70% granular red (improved tissue quality). No purulence or odor. Immediate periwound with hypopigmented skin. Satellite wound toward bottom of foot under 5th toe that was included in original measurement but now there is a bridge of skin between the two areas = 1.3 x 1.3 x 0.4 cm red granular base. Scant serosanguinous exudate in canister. VAC dressing removed: 1 black sponge Cleaned with saline and Santyl applied to yellow tissue in wound bed. Applied Opticel AG to smaller wound. 125 mmHg suction achieved using 1 piece of black foam bridged to lower leg.  
  
Dorsum of foot has a deep tissue injury = 5 x 7.5 x 0 cm 100% non-blanching burgundy - Venelex applied.   
  
Thick dry skin plaques to lower leg - Lac Hydrin applied. Bilateral upper buttocks with MASD & friction damage with sawtooth apprearance and maceration. Open areas are moist red. Weeping red rash to right axilla and  consistent with candidiasis.  
  
Wound Recommendations:   
Maintain VAC as ordered @ 125mmHg suction with twice weekly dressing changes. Buttocks and dorsum of right foot: venelex twice daily.  
Axilla: antifungal cream twice daily.  
  
 Transition of Care: Plan to follow weekly and as needed while admitted to hospital with [facility] dressing change Friday, 1/15. MONIKA GarciaN, RN, [facility] Certified Wound, Ostomy, Continence Nurse 
office [phone] 
pager [phone] or call  to page

## 2021-01-12 NOTE — PROGRESS NOTES
Comprehensive Nutrition Assessment Type and Reason for Visit: Roland Robledo Nutrition Recommendations/Plan: 1. Consistent CHO 2200 kcal, low K+ diet 2. Monitor Phos, consider binder vs restriction if no improvement 3. Adjusted ONS to Nepro x1 daily (adds 425 kcals &19g Protein daily) Nutrition Assessment:    
1/12: follow up. Pt remains in house for long-term abx, now +COVID-19, worsening renal function noted, still with high K+ & diet is adjusted by RD today. Although needs are high with wound, PO intake has been very good, will reduce the number of high protein supplements offered. BG will rise with steroids added. Consult diabetes specialists for recommendations PRN. Nephrology, wound care, pulmonary and ID following; GI placed clips to control gastric bleeding on 1/5, resolved & signed off. Pt is eating very well. Wt trending down with diuresis. 1/5: 72 y.o. male with PMHx of prostate CA s/p XRT on oral chemo, DM, CHF, and HTN. Admitted with severe sepsis 2° osteomyelitis of R foot, podiatry following. S/p I&D and removal of infected bone 12/30, wound VAC placement today 1/5, to get powerline for tomorrow for long-term IV abx. Noted acute on chronic anemia - GI following. EGD 1/4:  Dieulafoy's lesion, actively bleed s/p clipping; duodenal ulcer, non-bleeding. On PPI. Has received 6 units PRBC since admission. Completed 5 days of Venofer. KATHI on CKD, nephrology following. Hyperkalemia s/p Veltassa today, hyperphosphatemia. Starting bicarb today. SOB 1/4 improved, s/p 1 dose IV steroids and breathing treatment. Pt from home, wheelchair bound at baseline, has caregiver 5 days/week x 6 hours/day. Concern with discharge disposition, CM involved. Pt unwilling to work with PT today. Upon RD visit to room, pt pleasant and receptive. Reports a good appetite, but c/o lack of flavor on tray. States he doesn't need salt, knows it isn't good for him, but just more seasoning in general.  Noted pt not making own meal selections which he is fully capable of doing. Message sent to diet office for someone to come take order. Discussed nutrition and wound healing. Pt verbalized understanding. Receptive to trying vanilla Ensure High Protein (less kcal and K+ even compared to Nepro) and Yoel supplements.  lb, denies wt loss. Edentulous, but takes a regular diet at home without difficulty. However, states having more difficult with our food. Preferences updated for soft solids. Pt should likely have K+ restriction since has good PO intake. Monitor Phos improvements, if none would consider Phos binder. RD will continue to follow along with PO adequacy. Malnutrition Assessment: 
Malnutrition Status:  No malnutrition Nutritionally Significant Medications:  
Vit C, Zinc, Casodex (chemo), Xtandi (chemo), Decadron, Retacrit 3x/wk, Lasix, Lantus 40 units, correctional scale, Humalog 10 units TID ac meals, Percocet, Protonix, Zosyn, sodium bicarb, Kayexalate 1x (today) Estimated Daily Nutrient Needs: 
Energy (kcal): 9591 - 2560 kcal/d (MSJ xAF 1.2-13) Protein (g): 95-117g (0.8-1.0g/kg) Fluid (ml/day): 1500ml Nutrition Related Findings:  
Edema: 2+ bilat LE pitting Last BM: 1/10 - soft ABD: soft, obese Wounds:   
Wound vac(R foot) Current Nutrition Therapies: DIET DIABETIC CONSISTENT CARB Regular Meal intake:  
Patient Vitals for the past 168 hrs: 
 % Diet Eaten 01/11/21 1700 75 % 01/11/21 1325 75 % 01/11/21 1043 100 % 01/09/21 1733 50 % 01/09/21 1337 10 % 01/09/21 0947 25 % 01/07/21 0844 100 % 01/06/21 2026 100 % 01/05/21 2049 100 % Anthropometric Measures: 
· Height:  6' (182.9 cm) · Current Body Wt:  116.7 kg (257 lb 4.4 oz) · Admission Body Wt:  270 lb(source unknown) · Usual Body Wt:  113.4 kg (250 lb) · Ideal Body Wt:  178 lbs:  144.5 % · BMI Category:  Obese class 1 (BMI 30.0-34. 9) Body mass index is 34.89 kg/m². Last 3 Recorded Weights in this Encounter 01/08/21 0017 01/09/21 0457 01/12/21 3208 Weight: 121.9 kg (268 lb 11.9 oz) 119.3 kg (263 lb) 116.7 kg (257 lb 4.4 oz) Wt Readings from Last 20 Encounters:  
01/05/21 112 kg (246 lb 14.6 oz) 02/24/20 127 kg (280 lb) 02/10/20 127 kg (280 lb) 05/28/19 124.7 kg (275 lb) 04/04/19 124.7 kg (275 lb)  
03/31/19 125 kg (275 lb 9.2 oz)  
03/20/19 122.8 kg (270 lb 12.8 oz) 04/26/18 130.6 kg (288 lb)  
02/27/18 126.1 kg (278 lb)  
06/15/17 124.2 kg (273 lb 14.4 oz) 04/28/17 127.5 kg (281 lb) 10/21/16 117.9 kg (260 lb) 10/20/16 117.5 kg (259 lb) 10/19/16 117.5 kg (259 lb) 10/12/16 120.7 kg (266 lb)  
09/14/16 122.5 kg (270 lb) 08/02/16 117.9 kg (260 lb)  
07/12/16 115.2 kg (254 lb)  
06/30/16 115.2 kg (254 lb) 06/02/16 117 kg (258 lb) Nutrition Diagnosis:  
· Increased nutrient needs related to increased demand for energy/nutrients as evidenced by wounds with wound vac and +COVID-19 · Altered nutrition-related lab values related to renal dysfunction as evidenced by lab values & proteinuria. Nutrition Interventions:  
Food and/or Nutrient Delivery: Modify current diet, Modify oral nutrition supplement, Vitamin supplement Nutrition Education and Counseling: Counseling initiated Coordination of Nutrition Care: Continue to monitor while inpatient Goals: 
Continued intake of 75% of meals and ONS offered Nutrition Monitoring and Evaluation:  
Behavioral-Environmental Outcomes: None identified Food/Nutrient Intake Outcomes: Food and nutrient intake, Supplement intake, Vitamin/mineral intake Physical Signs/Symptoms Outcomes: Biochemical data, GI status, Fluid status or edema, Weight, Skin, Meal time behavior, Nutrition focused physical findings Discharge Planning:   
Continue current diet Recent Labs  
  01/12/21 
0422 01/11/21 
0435 01/10/21 
0121 * 254* 178* BUN 39* 31* 32* CREA 1.96* 1.84* 1.63*  137 138  
K 5.7* 4.6 4.4  
 106 108 CO2 25 27 25  
CA 8.8 8.4* 8.7 MG  --   --  1.4* Recent Labs  
  01/12/21 
1020 01/12/21 0422 ALT 11* 10* * 129* TBILI 0.2 0.2 TP 7.2 8.1 ALB 2.0* 1.9*  
GLOB 5.2* 6.2* No results for input(s): LAC in the last 72 hours. Recent Labs  
  01/12/21 0422 01/11/21 0435 WBC 13.1* 25.7* HGB 7.1* 8.0*  
HCT 24.2* 26.8*  
 502* No results for input(s): PREALB in the last 72 hours. No results for input(s): TRIGL in the last 72 hours. Recent Labs  
  01/12/21 
1135 01/12/21 
1808 01/11/21 
2215 01/11/21 
1701 01/11/21 
1308 01/11/21 
4377 01/11/21 
9782 01/10/21 
2148 01/10/21 
1634 01/10/21 
1109 01/10/21 
0576 01/09/21 
2227 01/09/21 
1622 GLUCPOC 228* 269* 235* 212* 248* 249* 308* 214* 107* 193* 178* 189* 228* Lab Results Component Value Date/Time Hemoglobin A1c 8.0 (H) 03/28/2019 07:35 PM  
 Hemoglobin A1c (POC) 8.1 01/07/2020 01:02 PM  
 
 
Iron Profile Iron Date Value Ref Range Status 01/11/2021 42 35 - 150 ug/dL Final  
12/30/2020 25 (L) 35 - 150 ug/dL Final  
12/28/2020 21 (L) 35 - 150 ug/dL Final  
03/16/2019 68 35 - 150 ug/dL Final  
 
TIBC Date Value Ref Range Status 01/11/2021 201 (L) 250 - 450 ug/dL Final  
12/30/2020 177 (L) 250 - 450 ug/dL Final  
12/28/2020 203 (L) 250 - 450 ug/dL Final  
03/16/2019 236 (L) 250 - 450 ug/dL Final  
 
Iron % saturation Date Value Ref Range Status 01/11/2021 21 20 - 50 % Final  
12/30/2020 14 (L) 20 - 50 % Final  
 12/28/2020 10 (L) 20 - 50 % Final  
03/16/2019 29 20 - 50 % Final  
 
 
Ferritin Date Value Ref Range Status 12/30/2020 266 26 - 388 NG/ML Final  
12/28/2020 347 26 - 388 NG/ML Final  
 
 
B Vitamins Folate Date Value Ref Range Status 03/16/2019 20.0 5.0 - 21.0 ng/mL Final  
 
Vitamin B12 Date Value Ref Range Status 12/28/2020 913 193 - 986 pg/mL Final  
03/16/2019 620 193 - 986 pg/mL Final  
 
 
 
John Dietz RD, MS Available via [place] Pager# [phone]

## 2021-01-13 NOTE — PROGRESS NOTES
Bedside and Verbal shift change report given to Luana Anaya (oncoming nurse) by Magda Quinones (offgoing nurse). Report included the following information Kardex, Intake/Output, MAR, Recent Results and Cardiac Rhythm NSR.

## 2021-01-13 NOTE — PROGRESS NOTES
Hospitalist Progress Note Leslye Aguilar MD 
Answering service: [phone] or [phone] from in house phone Date of Service:  2021 NAME:  Niharika Fu III 
:  1950 MRN:  [id number] Admission Summary: As per initial admission summary Fernanda Rivero is a 72 y.o. male with PMH of prostatic cancer s/p radiation, IDDM, heart failure, htn. He apparently told the ER physician that he c/o onset of diarrhea yesterday and some chills over the last 2 days, with occasional cough. For me, however, he denies all of these symptoms and states he came in because his caregiver was concerned d/t his lethargy over the last several days to weeks. He lives home alone and has a caregiver five times a week. He denies cp, sob, fever, chills, sweats, N/V. He has had a wound on his right foot for months. He denies any foot pain d/t neuropathy. He goes to [facility] for nephrology and states he last saw them a few months ago. Prior to today, he believes it has been several months since he last had any blood work. Denies etoh or illicits. Smokes just under 1 PPD. Currently no acute complaints except asking for water. States he takes all his meds daily. Interval history / Subjective:  
 
2021 ;  
Felt \"rough\" last pm 
Did not sleep prone as suggested by Pul Med for 4 hours Appreciate both ID and Pul med input Wound vac dressings changed 2020 Cont on 02 4-5 LM down form BIPAP/15 LPM two days ago Now on Remdesivir Assessment & Plan:  
 
Severe sepsis 2/2 osteomyelitis right foot 
-Podiatry consulted  
-Underwent debridement right foot ulcer with removal of infected bone 2020 
-Cultures with proteus mirabilis, stept group B, enterococcus -ID following.  Antibiotics transitioned to zosyn  
-surgical path with + margins, will need prolonged antibiotics 
-Powerline ordered for  
-wound vac  
 -non weight bearing right lower extremity (patient wheelchair bound at baseline)- Initial sepsis resolved, now on abx for completion and wound vac COVID + w hypoxia and pneumonia by CT on mid flow = 1/13/2021 : Cont on 02 4-5 LM down form BIPAP/15 LPM two days ago Now on Remdesivir ; pul/id following Acute hypoxia for high flow; eval causes, pe/covid pna, as of this writing covid pna high on diff. If covid neg will fully anticoagulate for PE ;and echo to look for rt heart strain. - CT suggest Covid PNa, will not push full anticoag, pt sats 100 % on 5 LPM 1/12 , improved from 15 LPM 1/11 1/11= see above COVID Shortness of breath 1/4: improved 
-s/p steroids, breathing treatment, hold further steroids 
-chest x-ray with some bilateral airspace disease, consider adding back diuretics if okay by nephrology  
resovled as an acute issues  
-iv lasix 1/8 benfifical pt with severe LVH, concentric with ef 55% range, lasix daily x next three days +/- pending clinical response. ;- on lasix and inhalers. Acute on chronic anemia, baseline ~9: persistent  
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 Duodenal ucler, non bleeding 
-Has received 6 units total during admission 
-Continue PPI 
 cont to monitor Lab Results Component Value Date/Time Hemoglobin (POC) 6.5 02/04/2020 01:59 PM  
 HGB 7.2 (L) 01/13/2021 03:39 AM  
  
Lab Results Component Value Date/Time Iron 42 01/11/2021 04:35 AM  
 TIBC 201 (L) 01/11/2021 04:35 AM  
 Iron % saturation 21 01/11/2021 04:35 AM  
 Ferritin 266 12/30/2020 01:17 AM  
 has had IV iron. - f/u iron levels soon for am 
F/u on hgb in am not planning to give blood today as pt not symptomatic 1/13/2021 am  
  
Acute on CKD: improving 
-nephrology following, on bicarb, valtessa  
-kumar removed 1/2 Lab Results Component Value Date/Time Creatinine 1.74 (H) 01/13/2021 03:39 AM  
  
  
IDDM, last a1c 8.1 01/2020 -continue long acting insulin, meal time, SSI Lab Results Component Value Date/Time Glucose 91 01/13/2021 03:39 AM  
 Glucose (POC) 133 (H) 01/13/2021 07:17 AM  
  
  
HTN, controlled 
-holding amlodipine, continue hydralazine BP Readings from Last 1 Encounters:  
01/13/21 (!) 177/68 Med's adjusted 1/12/2021  
  
Hyponatremia 
-stable, montior - resolved Toxic metabolic encephalopathy/drowsiness: resolved 
  
Prostate ca 
-Cont home meds 
  
Tobacco abuse 
-continue patch Code status: full code DVT prophylaxis: scds Care Plan discussed with: Patient/Family Disposition: TBD Hospital Problems  Date Reviewed: 8/20/2020 Codes Class Noted POA * (Principal) Osteomyelitis ([facility] [address] ICD-10-CM: M86.9 ICD-9-CM: 730.20  12/28/2020 Yes After personally interviewing pt at bedside the following is noted on 1/13/2021 : 
 
Review of Systems Constitutional:  
     Generally felt \"rough\" last pm, no particular's did sit up last pm ,seemed to \"help my breathing\" HENT: Negative. Eyes: Negative. Respiratory: Positive for shortness of breath and wheezing. Cardiovascular: Negative. Gastrointestinal: Positive for diarrhea. Negative for nausea and vomiting. Genitourinary: Negative. Musculoskeletal: Negative. Skin: Negative. Neurological: Negative. Psychiatric/Behavioral: Negative. I had a face to face encounter with patient on 1/13/2021 at bedside for the following physical exam: PHYSICAL EXAM: 
 
Visit Vitals BP (!) 177/68 (BP 1 Location: Left arm, BP Patient Position: Post activity) Pulse 85 Temp 97.6 °F (36.4 °C) Resp 20 Ht 6' (1.829 m) Wt 111.2 kg (245 lb 2.4 oz) SpO2 98% BMI 33.25 kg/m² Physical Exam 
Constitutional:   
   Appearance: He is obese. He is ill-appearing. Comments: General debility w poor bed mobility, obese HENT:  
   Head: Normocephalic and atraumatic. Right Ear: External ear normal.  
   Left Ear: External ear normal.  
   Nose: Nose normal.  
   Mouth/Throat:  
   Mouth: Mucous membranes are moist.  
   Pharynx: Oropharynx is clear. Eyes:  
   Extraocular Movements: Extraocular movements intact. Conjunctiva/sclera: Conjunctivae normal.  
   Pupils: Pupils are equal, round, and reactive to light. Neck: Musculoskeletal: Neck supple. No neck rigidity or muscular tenderness. Cardiovascular:  
   Rate and Rhythm: Normal rate and regular rhythm. Pulmonary:  
   Effort: Pulmonary effort is normal.  
   Breath sounds: Normal breath sounds. Abdominal:  
   General: Abdomen is flat. Bowel sounds are normal.  
   Palpations: Abdomen is soft. Musculoskeletal:  
   Comments: Chronic distal edema Skin: 
   Coloration: Skin is not jaundiced or pale. Comments: Wounds rt leg w vac in place last changed 1/12/2020 Neurological:  
   General: No focal deficit present. Mental Status: He is alert. Mental status is at baseline. Psychiatric:     
   Mood and Affect: Mood normal.     
   Behavior: Behavior normal.     
   Thought Content: Thought content normal.  
 
  
 
 
  
  
 
  
 
Intake/Output Summary (Last 24 hours) at 1/13/2021 5638 Last data filed at 1/13/2021 0124 Gross per 24 hour Intake 500 ml Output 600 ml Net -100 ml Data Review:  
 Review and/or order of clinical lab test 
Review and/or order of tests in the radiology section of CPT Review and/or order of tests in the medicine section of CPT Labs:  
 
Recent Labs  
  01/13/21 
0339 01/12/21 
0422 WBC 9.6 13.1* HGB 7.2* 7.1*  
HCT 24.2* 24.2*  
 329 Recent Labs  
  01/13/21 
0339 01/12/21 [phone] 01/12/21 
0422  138 136  
K 4.3 5.4* 5.7*  
 107 106 CO2 25 25 25 BUN 48* 45* 39* CREA 1.74* 1.91* 1.96* GLU 91 316* 310* CA 9.0 8.8 8.8 PHOS  --  4.6  --   
 
Recent Labs  
  01/12/21 [phone] 01/12/21 
1020 01/12/21 Shaye 32 ALT  --  11* 10* AP  --  128* 129* TBILI  --  0.2 0.2 TP  --  7.2 8.1 ALB 1.8* 2.0* 1.9*  
GLOB  --  5.2* 6.2* No results for input(s): INR, PTP, APTT, INREXT, INREXT in the last 72 hours. Recent Labs  
  01/11/21 
0435 TIBC 201* PSAT 21 Lab Results Component Value Date/Time Folate 20.0 03/16/2019 04:06 AM  
  
No results for input(s): PH, PCO2, PO2 in the last 72 hours. Recent Labs  
  01/13/21 
0667 CPK 22* No results found for: CHOL, CHOLX, CHLST, CHOLV, HDL, HDLP, LDL, LDLC, DLDLP, TGLX, TRIGL, TRIGP, CHHD, CHHDX Lab Results Component Value Date/Time Glucose (POC) 133 (H) 01/13/2021 07:17 AM  
 Glucose (POC) 274 (H) 01/12/2021 09:30 PM  
 Glucose (POC) 352 (H) 01/12/2021 06:15 PM  
 Glucose (POC) 365 (H) 01/12/2021 06:03 PM  
 Glucose (POC) 228 (H) 01/12/2021 11:35 AM  
 
Lab Results Component Value Date/Time Color YELLOW/STRAW 01/03/2021 12:56 PM  
 Appearance CLEAR 01/03/2021 12:56 PM  
 Specific gravity 1.015 01/03/2021 12:56 PM  
 pH (UA) 5.5 01/03/2021 12:56 PM  
 Protein 300 (A) 01/03/2021 12:56 PM  
 Glucose 100 (A) 01/03/2021 12:56 PM  
 Ketone Negative 01/03/2021 12:56 PM  
 Bilirubin Negative 01/03/2021 12:56 PM  
 Urobilinogen 0.2 01/03/2021 12:56 PM  
 Nitrites Negative 01/03/2021 12:56 PM  
 Leukocyte Esterase Negative 01/03/2021 12:56 PM  
 Epithelial cells FEW 01/03/2021 12:56 PM  
 Bacteria 1+ (A) 01/03/2021 12:56 PM  
 WBC 0-4 01/03/2021 12:56 PM  
 RBC 0-5 01/03/2021 12:56 PM  
 
 
 
Medications Reviewed:  
 
Current Facility-Administered Medications Medication Dose Route Frequency  dexamethasone (PF) (DECADRON) 10 mg/mL injection 6 mg  6 mg IntraVENous Q24H  
 miconazole (MICOTIN) 2 % powder   Topical BID  albuterol (PROVENTIL HFA, VENTOLIN HFA, PROAIR HFA) inhaler 2 Puff  2 Puff Inhalation TID RT  
 hydrALAZINE (APRESOLINE) 20 mg/mL injection 20 mg  20 mg IntraVENous Q6H PRN  
  cloNIDine HCL (CATAPRES) tablet 0.1 mg  0.1 mg Oral BID  remdesivir 100 mg in 0.9% sodium chloride 250 mL IVPB  100 mg IntraVENous Q24H  
 furosemide (LASIX) injection 40 mg  40 mg IntraVENous Q12H  
 enoxaparin (LOVENOX) injection 40 mg  40 mg SubCUTAneous Q12H  
 ascorbic acid (vitamin C) (VITAMIN C) tablet 500 mg  500 mg Oral DAILY  zinc sulfate (ZINCATE) 220 (50) mg capsule 1 Cap  1 Cap Oral DAILY  amLODIPine (NORVASC) tablet 10 mg  10 mg Oral DAILY  collagenase (SANTYL) 250 unit/gram ointment   Topical Once per day on Thu Sat  
 albuterol-ipratropium (DUO-NEB) 2.5 MG-0.5 MG/3 ML  3 mL Nebulization Q4H PRN  
 balsam peru-castor oiL (VENELEX) ointment   Topical Q12H  
 insulin lispro (HUMALOG) injection 10 Units  10 Units SubCUTAneous TIDAC  insulin glargine (LANTUS) injection 40 Units  40 Units SubCUTAneous QHS  ammonium lactate (LAC-HYDRIN) 12 % lotion   Topical Q12H  
 sodium bicarbonate tablet 650 mg  650 mg Oral DAILY  sodium chloride (NS) flush 5-40 mL  5-40 mL IntraVENous Q8H  
 sodium chloride (NS) flush 5-40 mL  5-40 mL IntraVENous PRN  pantoprazole (PROTONIX) 40 mg in 0.9% sodium chloride 10 mL injection  40 mg IntraVENous Q12H  
 enzalutamide (XTANDI) chemo capsule 80 mg (patient supplied) (Patient Supplied)  80 mg Oral BID  fluticasone propionate (FLONASE) 50 mcg/actuation nasal spray 2 Spray  2 Spray Both Nostrils DAILY  0.9% sodium chloride infusion 250 mL  250 mL IntraVENous PRN  
 0.9% sodium chloride infusion 250 mL  250 mL IntraVENous PRN  piperacillin-tazobactam (ZOSYN) 3.375 g in 0.9% sodium chloride (MBP/ADV) 100 mL MBP  3.375 g IntraVENous Q8H  
 oxyCODONE-acetaminophen (PERCOCET) 5-325 mg per tablet 1 Tab  1 Tab Oral Q4H PRN  
 0.9% sodium chloride infusion 250 mL  250 mL IntraVENous PRN  
 epoetin chi-epbx (RETACRIT) injection 20,000 Units  20,000 Units SubCUTAneous Q MON, WED & FRI  
  diclofenac (VOLTAREN) 1 % topical gel 2 g  2 g Topical QID  acetaminophen (TYLENOL) tablet 500 mg  500 mg Oral Q4H PRN  
 aspirin delayed-release tablet 81 mg  81 mg Oral DAILY  bicalutamide (CASODEX) tablet 50 mg  50 mg Oral DAILY  gabapentin (NEURONTIN) capsule 100 mg  100 mg Oral TID  hydrALAZINE (APRESOLINE) tablet 100 mg  100 mg Oral TID  tamsulosin (FLOMAX) capsule 0.4 mg  0.4 mg Oral DAILY  nicotine (NICODERM CQ) 14 mg/24 hr patch 1 Patch  1 Patch TransDERmal DAILY  insulin lispro (HUMALOG) injection   SubCUTAneous AC&HS  
 glucose chewable tablet 16 g  4 Tab Oral PRN  
 dextrose (D50W) injection syrg 12.5-25 g  12.5-25 g IntraVENous PRN  
 glucagon (GLUCAGEN) injection 1 mg  1 mg IntraMUSCular PRN  
 
______________________________________________________________________ EXPECTED LENGTH OF STAY: 9d 19h ACTUAL LENGTH OF STAY:          Noemi You MD

## 2021-01-13 NOTE — PROGRESS NOTES
Nephrology Progress Note Skylar Ford III Date of Admission : 12/28/2020 CC: Follow up for KATHI on CKD Assessment and Plan KATHI on CKD  
- suspected ATN from severe pre renal azotemia - U/S neg for hydro - UA with proteinuria - Cr close to baseline 
- cont IV diuretics for now 
- daily labs and strict I/Os Hyperkalemia: 
- low K diet, no ACE/ARB 
- K stable CKD Stage IIIa: 
-  Presumed 2/2 DM and HTN 
- nephrotic range proteinuria 
- baseline Cr 1.7 mg/dl  
- followed by Dr. Nba Chaves COVID-19 PNA: 
- per primary team and pulmonary 
  
Hx of prostate cancer s/p XRT 
  
Metabolic acidosis: 
-  Continue oral Bicarb  
  
Anemia in CKD + blood loss: 
- received PRBCs on 1/2 
- cont JAZ + IV iron 
- EGD 1/5/2021: An actively bleeding Dieulafoy's lesion was noted in the gastric fundus.   
  
Type II DM: 
- on insulin 
  
HTN :  
- Continue current meds  
  
R foot osteo: 
- on abx 
- s/p debridement on 12/30 Group B strep bacteremia: 
- Abx per ID 
  
B/l Airspace disease: 
- ? PNA vs edema 
- COVID neg 
- on zosyn Interval History: Not examined in the room Stable per RN. Cr stable. K better. Refused SPS  Yesterday. No increase in SOB per RN. Voiding ok. Current Medications: all current  Medications have been eviewed in [facility] Review of Systems: Pertinent items are noted in HPI. Objective: 
Vitals:   
Vitals:  
 01/13/21 1093 01/13/21 0711 01/13/21 0839 01/13/21 1110 BP:   (!) 197/85 (!) 130/56 Pulse:   99 97 Resp:   24 27 Temp:   97.9 °F (36.6 °C) 97.9 °F (36.6 °C) SpO2:  98% 98% 97% Weight: 111.2 kg (245 lb 2.4 oz) Height:      
 
Intake and Output: 
01/13 0701 - 01/13 1900 In: -  
Out: 300 [Urine:275; Drains:25] 
01/11 1901 - 01/13 0700 In: 500 [P.O.:500] Out: 600 [Urine:600] Physical Examination:  Not examined in the room due to COVID-19 infection: 
 
General: Obese, on high flow Resp:  Stable oxygenation on high flow CV:  RRR on monitor Neurologic:  Non focal 
Psych:             Anxious per report :  Bynum in place 
 
[]    High complexity decision making was performed 
[]    Patient is at high-risk of decompensation with multiple organ involvement Lab Data Personally Reviewed: I have reviewed all the pertinent labs, microbiology data and radiology studies during assessment. Recent Labs  
  01/13/21 0339 01/12/21 [phone] 01/12/21 
1020 01/12/21 
0422  138  --  136  
K 4.3 5.4*  --  5.7*  
 107  --  106 CO2 25 25  --  25  
GLU 91 316*  --  310* BUN 48* 45*  --  39* CREA 1.74* 1.91*  --  1.96* CA 9.0 8.8  --  8.8 PHOS  --  4.6  --   --   
ALB  --  1.8* 2.0* 1.9* ALT  --   --  11* 10* Recent Labs  
  01/13/21 0339 01/12/21 
0422 01/11/21 
0435 WBC 9.6 13.1* 25.7* HGB 7.2* 7.1* 8.0*  
HCT 24.2* 24.2* 26.8*  
 329 502* No results found for: SDES Lab Results Component Value Date/Time Culture result: (A) 12/30/2020 01:14 PM  
  LIGHT ANAEROBIC GRAM NEGATIVE RODS BETA LACTAMASE POSITIVE Culture result: LIGHT PROTEUS MIRABILIS (A) 12/30/2020 01:14 PM  
 Culture result: (A) 12/30/2020 01:14 PM  
  LIGHT STREPTOCOCCI, BETA HEMOLYTIC GROUP B Penicillin and ampicillin are drugs of choice for treatment of beta-hemolytic streptococcal infections. Susceptibility testing of penicillins and beta-lactams approved by the FDA for treatment of beta-hemolytic streptococcal infections need not be performed routinely, because nonsusceptible isolates are extremely rare. CLSI 2012 Culture result: LIGHT ENTEROCOCCUS FAECALIS (A) 12/30/2020 01:14 PM  
 
Recent Results (from the past 24 hour(s)) GLUCOSE, POC Collection Time: 01/12/21  6:03 PM  
Result Value Ref Range Glucose (POC) 365 (H) 65 - 100 mg/dL Performed by Neena Donahue RENAL FUNCTION PANEL Collection Time: 01/12/21  6:10 PM  
Result Value Ref Range Sodium 138 136 - 145 mmol/L  Potassium 5.4 (H) 3.5 - 5.1 mmol/L  
 Chloride 107 97 - 108 mmol/L  
 CO2 25 21 - 32 mmol/L Anion gap 6 5 - 15 mmol/L Glucose 316 (H) 65 - 100 mg/dL BUN 45 (H) 6 - 20 MG/DL Creatinine 1.91 (H) 0.70 - 1.30 MG/DL  
 BUN/Creatinine ratio 24 (H) 12 - 20 GFR est AA 43 (L) >60 ml/min/1.73m2 GFR est non-AA 35 (L) >60 ml/min/1.73m2 Calcium 8.8 8.5 - 10.1 MG/DL Phosphorus 4.6 2.6 - 4.7 MG/DL Albumin 1.8 (L) 3.5 - 5.0 g/dL GLUCOSE, POC Collection Time: 01/12/21  6:15 PM  
Result Value Ref Range Glucose (POC) 352 (H) 65 - 100 mg/dL Performed by Nandini Hayward GLUCOSE, POC Collection Time: 01/12/21  9:30 PM  
Result Value Ref Range Glucose (POC) 274 (H) 65 - 100 mg/dL Performed by Deepthi Weir CK Collection Time: 01/13/21  3:39 AM  
Result Value Ref Range CK 22 (L) 39 - 308 U/L  
D DIMER Collection Time: 01/13/21  3:39 AM  
Result Value Ref Range D-dimer 2.80 (H) 0.00 - 0.65 mg/L FEU METABOLIC PANEL, BASIC Collection Time: 01/13/21  3:39 AM  
Result Value Ref Range Sodium 139 136 - 145 mmol/L Potassium 4.3 3.5 - 5.1 mmol/L Chloride 108 97 - 108 mmol/L  
 CO2 25 21 - 32 mmol/L Anion gap 6 5 - 15 mmol/L Glucose 91 65 - 100 mg/dL BUN 48 (H) 6 - 20 MG/DL Creatinine 1.74 (H) 0.70 - 1.30 MG/DL  
 BUN/Creatinine ratio 28 (H) 12 - 20 GFR est AA 47 (L) >60 ml/min/1.73m2 GFR est non-AA 39 (L) >60 ml/min/1.73m2 Calcium 9.0 8.5 - 10.1 MG/DL  
CBC WITH AUTOMATED DIFF Collection Time: 01/13/21  3:39 AM  
Result Value Ref Range WBC 9.6 4.1 - 11.1 K/uL  
 RBC 2.63 (L) 4.10 - 5.70 M/uL HGB 7.2 (L) 12.1 - 17.0 g/dL HCT 24.2 (L) 36.6 - 50.3 % MCV 92.0 80.0 - 99.0 FL  
 MCH 27.4 26.0 - 34.0 PG  
 MCHC 29.8 (L) 30.0 - 36.5 g/dL  
 RDW 17.8 (H) 11.5 - 14.5 % PLATELET 891 301 - 652 K/uL MPV 9.2 8.9 - 12.9 FL  
 NRBC 0.3 (H) 0  WBC ABSOLUTE NRBC 0.03 (H) 0.00 - 0.01 K/uL NEUTROPHILS 87 (H) 32 - 75 % LYMPHOCYTES 6 (L) 12 - 49 % MONOCYTES 6 5 - 13 % EOSINOPHILS 0 0 - 7 % BASOPHILS 0 0 - 1 % IMMATURE GRANULOCYTES 1 (H) 0.0 - 0.5 % ABS. NEUTROPHILS 8.3 (H) 1.8 - 8.0 K/UL  
 ABS. LYMPHOCYTES 0.6 (L) 0.8 - 3.5 K/UL  
 ABS. MONOCYTES 0.6 0.0 - 1.0 K/UL  
 ABS. EOSINOPHILS 0.0 0.0 - 0.4 K/UL  
 ABS. BASOPHILS 0.0 0.0 - 0.1 K/UL  
 ABS. IMM. GRANS. 0.1 (H) 0.00 - 0.04 K/UL  
 DF SMEAR SCANNED    
 RBC COMMENTS ANISOCYTOSIS 1+ 
    
 RBC COMMENTS OVALOCYTES PRESENT 
    
 RBC COMMENTS POLYCHROMASIA 1+ GLUCOSE, POC Collection Time: 01/13/21  7:17 AM  
Result Value Ref Range Glucose (POC) 133 (H) 65 - 100 mg/dL Performed by Marie Stanley MD 
[address] [place] [address], [address] [facility] Phone - [phone] Fax - [phone] 
[email or web address]

## 2021-01-13 NOTE — PROGRESS NOTES
Bedside shift change report given to Lanette Rodgers (oncoming nurse) by Hortensia Mckeon (offgoing nurse). Report included the following information SBAR, Kardex, Intake/Output, MAR and Cardiac Rhythm NSR.

## 2021-01-13 NOTE — PROGRESS NOTES
Name: Griselda 74: Jose Rafael Good 56  
: 1950 Admit Date: 2020 Phone: [phone]  Room: [facility] PCP: Carlita Hawkins MD  MRN: [id number] Date: 2021  Code: Full Code Interval History: 
States that his breathing feels a little better than yesterday. States that he feels nebulizers work better than MDIs. Initial HPI: 
 
9:36 AM    
 
History was obtained from patient and medical records. I was asked by Jossy Ledesma MD to see Alvaradocherise Kenneth SANTOS in consultation for a chief complaint of shortness of breath. History of Present Illness: 72year old male with past medical history  of prostatic cancer s/p radiation, IDDM, heart failure, htn, who was admitted to [facility] 2020 with complaints of diarrhea. He was diagnosed with osteomyelitis. O2 requirement had worsened in the past few days. He denies fever, chills. He has cough and chest tightness for the past 3-4 days. He is unable to take a deep breath. CT Chest showed bilateral pleural effusions with compressive atelectasis and ground glass nodules in the anterior upper lobes. No contarst used due to renal failure. He is receiving lasix 40 mg bid. WBC 25 => 13 Hgb 7.1 Cr 1.96 Nt pro BNP 10,832 => 8,349 COVID-19 positive 2021 D-dimer 2.87 <= 3.67 
TTE EF 55%. Severe cLVH. Dilated LA. CXR from admission reviewed - bilateral worsening infiltrates. Past Medical History:  
Diagnosis Date  Arthritis, Degenerative,  Knee 2011  Carcinoma, Prostate 2011  Degenerative disc disease 2011  Depression/ Anxiety 2011  Diabetes Mellitrus ( non-insulin dependent ) Type 2 2011  
 Heart failure ([facility] [address]  HEMATOCHEZIA 2011  Hypertrension 2011  Hypertriglyceridemia 2011  Ill-defined condition   
 lymphadema  Insomnia 2011  Laryngitis 2011  Mild Aortic insufficiency 2011  Mild Concentric LVH (left ventricular hypertrophy) 2011  Neuropathy 2011  Osteoarthritis 2011  Rotator Cuff Tendonitis 2011 Past Surgical History:  
Procedure Laterality Date  COLONOSCOPY N/A 2017 COLONOSCOPY performed by Jalyn Carpio MD at [facility] ENDOSCOPY  
 HX ORTHOPAEDIC    
 left hip pinning  HX ORTHOPAEDIC    
 right hip replacement  HX OTHER SURGICAL    
 left little toe amputation  HX UROLOGICAL    
 IR INSERT TUNL CVC W/O PORT OVER 5 YR  2021  DC CARDIAC SURG PROCEDURE UNLIST    
 cardiac cath Family History Family history unknown: Yes  
 
 
Social History Tobacco Use  Smoking status: Current Every Day Smoker Packs/day: 1.00 Last attempt to quit: 2019 Years since quittin.2  Smokeless tobacco: Never Used Substance Use Topics  Alcohol use: Not Currently Alcohol/week: 11.7 standard drinks Types: 7 Cans of beer, 7 Shots of liquor per week No Known Allergies Current Facility-Administered Medications Medication Dose Route Frequency  dexamethasone (PF) (DECADRON) 10 mg/mL injection 6 mg  6 mg IntraVENous Q24H  
 miconazole (MICOTIN) 2 % powder   Topical BID  albuterol (PROVENTIL HFA, VENTOLIN HFA, PROAIR HFA) inhaler 2 Puff  2 Puff Inhalation TID RT  
 hydrALAZINE (APRESOLINE) 20 mg/mL injection 20 mg  20 mg IntraVENous Q6H PRN  
 cloNIDine HCL (CATAPRES) tablet 0.1 mg  0.1 mg Oral BID  remdesivir 100 mg in 0.9% sodium chloride 250 mL IVPB  100 mg IntraVENous Q24H  
 furosemide (LASIX) injection 40 mg  40 mg IntraVENous Q12H  
 enoxaparin (LOVENOX) injection 40 mg  40 mg SubCUTAneous Q12H  
 ascorbic acid (vitamin C) (VITAMIN C) tablet 500 mg  500 mg Oral DAILY  zinc sulfate (ZINCATE) 220 (50) mg capsule 1 Cap  1 Cap Oral DAILY  amLODIPine (NORVASC) tablet 10 mg  10 mg Oral DAILY  collagenase (SANTYL) 250 unit/gram ointment   Topical Once per day on Thu Sat  albuterol-ipratropium (DUO-NEB) 2.5 MG-0.5 MG/3 ML  3 mL Nebulization Q4H PRN  
 balsam peru-castor oiL (VENELEX) ointment   Topical Q12H  
 insulin lispro (HUMALOG) injection 10 Units  10 Units SubCUTAneous TIDAC  insulin glargine (LANTUS) injection 40 Units  40 Units SubCUTAneous QHS  ammonium lactate (LAC-HYDRIN) 12 % lotion   Topical Q12H  
 sodium bicarbonate tablet 650 mg  650 mg Oral DAILY  sodium chloride (NS) flush 5-40 mL  5-40 mL IntraVENous Q8H  
 sodium chloride (NS) flush 5-40 mL  5-40 mL IntraVENous PRN  pantoprazole (PROTONIX) 40 mg in 0.9% sodium chloride 10 mL injection  40 mg IntraVENous Q12H  
 enzalutamide (XTANDI) chemo capsule 80 mg (patient supplied) (Patient Supplied)  80 mg Oral BID  fluticasone propionate (FLONASE) 50 mcg/actuation nasal spray 2 Spray  2 Spray Both Nostrils DAILY  0.9% sodium chloride infusion 250 mL  250 mL IntraVENous PRN  
 0.9% sodium chloride infusion 250 mL  250 mL IntraVENous PRN  piperacillin-tazobactam (ZOSYN) 3.375 g in 0.9% sodium chloride (MBP/ADV) 100 mL MBP  3.375 g IntraVENous Q8H  
 oxyCODONE-acetaminophen (PERCOCET) 5-325 mg per tablet 1 Tab  1 Tab Oral Q4H PRN  
 0.9% sodium chloride infusion 250 mL  250 mL IntraVENous PRN  
 epoetin chi-epbx (RETACRIT) injection 20,000 Units  20,000 Units SubCUTAneous Q MON, WED & FRI  diclofenac (VOLTAREN) 1 % topical gel 2 g  2 g Topical QID  acetaminophen (TYLENOL) tablet 500 mg  500 mg Oral Q4H PRN  
 aspirin delayed-release tablet 81 mg  81 mg Oral DAILY  bicalutamide (CASODEX) tablet 50 mg  50 mg Oral DAILY  gabapentin (NEURONTIN) capsule 100 mg  100 mg Oral TID  hydrALAZINE (APRESOLINE) tablet 100 mg  100 mg Oral TID  tamsulosin (FLOMAX) capsule 0.4 mg  0.4 mg Oral DAILY  nicotine (NICODERM CQ) 14 mg/24 hr patch 1 Patch  1 Patch TransDERmal DAILY  insulin lispro (HUMALOG) injection   SubCUTAneous AC&HS  
 glucose chewable tablet 16 g  4 Tab Oral PRN  
  dextrose (D50W) injection syrg 12.5-25 g  12.5-25 g IntraVENous PRN  
 glucagon (GLUCAGEN) injection 1 mg  1 mg IntraMUSCular PRN  
 
 
 
REVIEW OF SYSTEMS Negative except as stated in the HPI. Physical Exam:  
Visit Vitals BP (!) 197/85 (BP 1 Location: Right arm, BP Patient Position: At rest) Pulse 99 Temp 97.9 °F (36.6 °C) Resp 24 Ht 6' (1.829 m) Wt 111.2 kg (245 lb 2.4 oz) SpO2 98% BMI 33.25 kg/m² Due to COVID-19 pandemic and patient COVID-19 positive status, examination was deferred. General:  Alert, cooperative, no distress, appears stated age. Head:  Normocephalic. Extremities: ild leg edema. Neurologic: Grossly nonfocal  
 
 
Lab Results Component Value Date/Time Sodium 139 01/13/2021 03:39 AM  
 Potassium 4.3 01/13/2021 03:39 AM  
 Chloride 108 01/13/2021 03:39 AM  
 CO2 25 01/13/2021 03:39 AM  
 BUN 48 (H) 01/13/2021 03:39 AM  
 Creatinine 1.74 (H) 01/13/2021 03:39 AM  
 Glucose 91 01/13/2021 03:39 AM  
 Calcium 9.0 01/13/2021 03:39 AM  
 Magnesium 1.4 (L) 01/10/2021 01:21 AM  
 Phosphorus 4.6 01/12/2021 06:10 PM  
 Lactic acid 1.6 12/28/2020 04:28 PM  
 
 
Lab Results Component Value Date/Time WBC 9.6 01/13/2021 03:39 AM  
 HGB 7.2 (L) 01/13/2021 03:39 AM  
 PLATELET 606 02/26/2347 03:39 AM  
 MCV 92.0 01/13/2021 03:39 AM  
 
 
Lab Results Component Value Date/Time INR 1.1 12/31/2020 05:03 PM  
 aPTT 33.1 (H) 10/24/2016 03:26 PM  
 Alk. phosphatase 128 (H) 01/12/2021 10:20 AM  
 Protein, total 7.2 01/12/2021 10:20 AM  
 Albumin 1.8 (L) 01/12/2021 06:10 PM  
 Globulin 5.2 (H) 01/12/2021 10:20 AM  
 
 
Lab Results Component Value Date/Time Iron 42 01/11/2021 04:35 AM  
 TIBC 201 (L) 01/11/2021 04:35 AM  
 Iron % saturation 21 01/11/2021 04:35 AM  
 Ferritin 266 12/30/2020 01:17 AM  
 
 
Lab Results Component Value Date/Time  C-Reactive protein 5.89 (H) 01/12/2021 10:20 AM  
 TSH 1.40 12/28/2020 03:41 PM  
 
 
Lab Results Component Value Date/Time CK 22 (L) 01/13/2021 03:39 AM  
 Troponin-I, Qt. <0.05 12/28/2020 03:41 PM  
  
 
Lab Results Component Value Date/Time Culture result: (A) 12/30/2020 01:14 PM  
  LIGHT ANAEROBIC GRAM NEGATIVE RODS BETA LACTAMASE POSITIVE Culture result: LIGHT PROTEUS MIRABILIS (A) 12/30/2020 01:14 PM  
 Culture result: (A) 12/30/2020 01:14 PM  
  LIGHT STREPTOCOCCI, BETA HEMOLYTIC GROUP B Penicillin and ampicillin are drugs of choice for treatment of beta-hemolytic streptococcal infections. Susceptibility testing of penicillins and beta-lactams approved by the FDA for treatment of beta-hemolytic streptococcal infections need not be performed routinely, because nonsusceptible isolates are extremely rare. CLSI 2012 Culture result: LIGHT ENTEROCOCCUS FAECALIS (A) 12/30/2020 01:14 PM  
 
 
Lab Results Component Value Date/Time Color YELLOW/STRAW 01/03/2021 12:56 PM  
 Appearance CLEAR 01/03/2021 12:56 PM  
 pH (UA) 5.5 01/03/2021 12:56 PM  
 Protein 300 (A) 01/03/2021 12:56 PM  
 Glucose 100 (A) 01/03/2021 12:56 PM  
 Ketone Negative 01/03/2021 12:56 PM  
 Bilirubin Negative 01/03/2021 12:56 PM  
 Blood Negative 01/03/2021 12:56 PM  
 Urobilinogen 0.2 01/03/2021 12:56 PM  
 Nitrites Negative 01/03/2021 12:56 PM  
 Leukocyte Esterase Negative 01/03/2021 12:56 PM  
 WBC 0-4 01/03/2021 12:56 PM  
 RBC 0-5 01/03/2021 12:56 PM  
 Bacteria 1+ (A) 01/03/2021 12:56 PM  
 
 
IMPRESSION: 
=========== 
-COVID-19 positive 
-COVID-19 pneumonia. -Hypoxemic respiratory failure. -Volume overload with hx of severe cLVH (likely has diastolic dysfunction) and CKD. -KATHI on CKD. -Tobacco abuse PLAN: 
===== 
-Suspect combination of volume overload and covid-19 pneumonitis/ pneumonia. -O2 supplementation to keep sats above 92%. On 6 lpm by NC. 
-Decardon 
-Abx per ID. -Remdisivir. 
-Check echo when available. -COVID-19 d-dimer based anticoagulation. -Sleep prone > 4 hours each night. 
-Vitamin C and zinc. 
-Nicotine replacement, cessation encouraged 
-Nebs if able 
  
Thank you for the consult. Will follow. 
  
JING Restrepo

## 2021-01-13 NOTE — PROGRESS NOTES
PRETTY: 
 
RUR 35% Patient lives at home and has personal care, provided by his son and girlfriend. Personal Care , [employer] ([phone]) [employer], [employer] [address]) Fax ([phone]) Wound vac was delivered to patient when he was in [address], per [place] with [employer]  
[phone]. Maykel with [facility]( [phone]) confirmed that patient is open to them for [facility] PT/OT/SN. Referral sent to [facility] via [facility] to resume services. CM left message for Maykel with [facility] to confirm that patient is open to them. [phone]. Patient will need  Do Gonzalez at discharge. Tin Randle RN/CRM

## 2021-01-14 NOTE — PROGRESS NOTES
ID Progress Note 2021 Subjective: Afebrile. Feels better. Objective:  
 
Vitals:  
Visit Vitals BP (!) 172/61 (BP 1 Location: Left arm, BP Patient Position: At rest) Pulse 92 Temp 98.4 °F (36.9 °C) Resp 20 Ht 6' (1.829 m) Wt 111.2 kg (245 lb 2.4 oz) SpO2 97% BMI 33.25 kg/m² Tmax:  Temp (24hrs), Av.7 °F (36.5 °C), Min:96.8 °F (36 °C), Max:98.4 °F (36.9 °C) Exam: Not in distress Abdomen: soft nontender, no guarding or rebound Knees not warm or tender Speech fluent Labs:  
Lab Results Component Value Date/Time WBC 9.6 2021 03:39 AM  
 Hemoglobin (POC) 6.5 2020 01:59 PM  
 HGB 7.2 (L) 2021 03:39 AM  
 HCT 24.2 (L) 2021 03:39 AM  
 PLATELET 546  03:39 AM  
 MCV 92.0 2021 03:39 AM  
 
Lab Results Component Value Date/Time Sodium 139 2021 03:39 AM  
 Potassium 4.3 2021 03:39 AM  
 Chloride 108 2021 03:39 AM  
 CO2 25 2021 03:39 AM  
 Anion gap 6 2021 03:39 AM  
 Glucose 91 2021 03:39 AM  
 BUN 48 (H) 2021 03:39 AM  
 Creatinine 1.74 (H) 2021 03:39 AM  
 BUN/Creatinine ratio 28 (H) 2021 03:39 AM  
 GFR est AA 47 (L) 2021 03:39 AM  
 GFR est non-AA 39 (L) 2021 03:39 AM  
 Calcium 9.0 2021 03:39 AM  
 Bilirubin, total 0.2 2021 10:20 AM  
 Alk. phosphatase 128 (H) 2021 10:20 AM  
 Protein, total 7.2 2021 10:20 AM  
 Albumin 1.8 (L) 2021 06:10 PM  
 Globulin 5.2 (H) 2021 10:20 AM  
 A-G Ratio 0.4 (L) 2021 10:20 AM  
 ALT (SGPT) 11 (L) 2021 10:20 AM  
 
 
 
 
 
Assessment:  
#1 osteomyelitis of the right foot 
  
#2 group b strep  bacteremia 
  
#3 renal insufficiency 
  
#4 diabetes 
  
#5 prostate cancer 
  
#6 hypertension 
  
#7 heart failure 
  
 #8 covid #9 diarrhea  
  
 
 
  
 
Recommendations:  
 
Continue zosyn. There is OM on the surgical margin. He will need prolonged therapy. Orders written. Continue remdesivir until 1/16 
 
10 day dexa course or shorter if he can be weaned off o2.   
 
 
  
 
Thelma Arrington MD

## 2021-01-14 NOTE — PROGRESS NOTES
PCCM: 
 
O2 requirements are down to 4lpm 
 
D dimer 3.13 PLAN: 
Melvena Guero Remdesivir, decadron, if d dimer increases then lovenox to therapeutic levels Wean O2 as tolerated We will be available again to see if needed  Oneil Padilla PA-C

## 2021-01-14 NOTE — PROGRESS NOTES
[address] [facility]  Hospitalist Group Hospitalist Progress Note Meño Vasquez MD 
Answering service: [phone] OR [phone] from in house phone Date of Service:  2021 NAME:  Layton Cooper III 
:  1950 MRN:  [id number] Admission Summary:  
Layton Cooper III is a 72 y. o. male with PMH of prostatic cancer s/p radiation, IDDM, heart failure, htn. He apparently told the ER physician that he c/o onset of diarrhea yesterday and some chills over the last 2 days, with occasional cough. For me, however, he denies all of these symptoms and states he came in because his caregiver was concerned d/t his lethargy over the last several days to weeks. Interval history / Subjective:  
Complaining of shortness of breath, requesting to sit up in his chair. Not really wanting to chat about anything else. Assessment & Plan:  
 
Severe sepsis 2/ - resolved 
osteomyelitis right foot -  S/p debridement right foot ulcer with removal of infected bone 2020 
-Cultures with proteus mirabilis, stept group B, enterococcus -ID following. Antibiotics transitioned to zosyn  
-surgical path with + margins, will need prolonged antibiotics ~6 weeks  
-Powerline placed  
-wound vac  
-non weight bearing right lower extremity (patient wheelchair bound at baseline)-  
  
 
COVID PNA Acute Hypoxic respiratory failure - O2 weaning, keep saturations over 90% 
- on remdesivir, decadron until  
- VitC, VitD, and Zn 
- Pulm following  
- Continue diuresis with lasix 40mg IV BID  
  
Acute on chronic anemia, baseline ~9: persistent GI bleed - resolved. -EGD  with Dieulafoy's lesion, actively bleeding, s/p clips placement. Duodenal ucler, non bleeding 
-Has received 6 units total during admission 
-Continue PPI 
- s/p IV iron - Monitor CBC 
  
Acute on CKD: improving -nephrology following, on bicarb, valtessa  
-kumar removed  
  
IDDM, last a1c 8.1 01/2020 
-continue long acting insulin, decrease to 30U due to low BS this am  
- Continue SSI, meal time insulin and POCT glucose checks HTN, controlled 
- amlodipine, clonidine, hydralazine,  
 
Prostate ca 
-Cont home meds 
  
Tobacco abuse 
-continue patch Code status: FULL 
DVT prophylaxis: SCDs due to recent GI bleed Care Plan discussed with: Patient/Family Anticipated Disposition: Home w/Family and SNF/LTC Anticipated Discharge: Greater than 48 hours Hospital Problems  Date Reviewed: 8/20/2020 Codes Class Noted POA * (Principal) Osteomyelitis ([facility] [address] ICD-10-CM: M86.9 ICD-9-CM: 730.20  12/28/2020 Yes Review of Systems: A comprehensive review of systems was negative except for that written in the HPI. Vital Signs:  
 Last 24hrs VS reviewed since prior progress note. Most recent are: 
Visit Vitals BP (!) 175/73 Pulse 94 Temp 98.8 °F (37.1 °C) Resp 20 Ht 6' (1.829 m) Wt 112.2 kg (247 lb 5.7 oz) SpO2 94% BMI 33.55 kg/m² Intake/Output Summary (Last 24 hours) at 1/14/2021 1340 Last data filed at 1/14/2021 0253 Gross per 24 hour Intake  Output 1350 ml Net -1350 ml Physical Examination:  
 
I had a face to face encounter with this patient and independently examined them on 1/14/2021 as outlined below: 
 
     
Constitutional:  Mild respiratory distress ENT:  Oral mucosa dry, oropharynx benign. Resp:  Course bilaterally. No wheezing/rhonchi/rales. Minimal accessory muscle use CV:  Regular rhythm, normal rate, no murmurs, gallops, rubs GI:  Soft, non distended, non tender. normoactive bowel sounds, no hepatosplenomegaly Musculoskeletal:  No edema, warm, 2+ pulses throughout. R wound vac in place. Neurologic:  Moves all extremities. AAOx3, CN II-XII reviewed Skin:  Good turgor, no rashes or ulcers Data Review: Review and/or order of clinical lab test 
Review and/or order of tests in the radiology section of CPT Review and/or order of tests in the medicine section of CPT Labs:  
 
Recent Labs  
  01/13/21 
0339 01/12/21 
0422 WBC 9.6 13.1* HGB 7.2* 7.1*  
HCT 24.2* 24.2*  
 329 Recent Labs  
  01/14/21 
0535 01/13/21 
0339 01/12/21 18  139 138  
K 4.0 4.3 5.4*  
 108 107 CO2 28 25 25 BUN 46* 48* 45* CREA 1.76* 1.74* 1.91* GLU 64* 91 316* CA 9.0 9.0 8.8 MG 1.8  --   --   
PHOS  --   --  4.6 Recent Labs  
  01/14/21 
0535 01/12/21 18 01/12/21 
1020 01/12/21 0422 ALT 14  --  11* 10*   --  128* 129* TBILI 0.2  --  0.2 0.2 TP 7.8  --  7.2 8.1 ALB 2.0* 1.8* 2.0* 1.9*  
GLOB 5.8*  --  5.2* 6.2* No results for input(s): INR, PTP, APTT, INREXT in the last 72 hours. No results for input(s): FE, TIBC, PSAT, FERR in the last 72 hours. Lab Results Component Value Date/Time Folate 20.0 03/16/2019 04:06 AM  
  
No results for input(s): PH, PCO2, PO2 in the last 72 hours. Recent Labs  
  01/13/21 
4017 CPK 22* No results found for: CHOL, CHOLX, CHLST, CHOLV, HDL, HDLP, LDL, LDLC, DLDLP, TGLX, TRIGL, TRIGP, CHHD, CHHDX Lab Results Component Value Date/Time Glucose (POC) 131 (H) 01/14/2021 11:41 AM  
 Glucose (POC) 86 01/14/2021 06:58 AM  
 Glucose (POC) 127 (H) 01/13/2021 09:56 PM  
 Glucose (POC) 266 (H) 01/13/2021 05:30 PM  
 Glucose (POC) 223 (H) 01/13/2021 11:53 AM  
 
Lab Results Component Value Date/Time  Color YELLOW/STRAW 01/03/2021 12:56 PM  
 Appearance CLEAR 01/03/2021 12:56 PM  
 Specific gravity 1.015 01/03/2021 12:56 PM  
 pH (UA) 5.5 01/03/2021 12:56 PM  
 Protein 300 (A) 01/03/2021 12:56 PM  
 Glucose 100 (A) 01/03/2021 12:56 PM  
 Ketone Negative 01/03/2021 12:56 PM  
 Bilirubin Negative 01/03/2021 12:56 PM  
 Urobilinogen 0.2 01/03/2021 12:56 PM  
 Nitrites Negative 01/03/2021 12:56 PM  
 Leukocyte Esterase Negative 01/03/2021 12:56 PM  
 Epithelial cells FEW 01/03/2021 12:56 PM  
 Bacteria 1+ (A) 01/03/2021 12:56 PM  
 WBC 0-4 01/03/2021 12:56 PM  
 RBC 0-5 01/03/2021 12:56 PM  
 
 
 
Medications Reviewed:  
 
Current Facility-Administered Medications Medication Dose Route Frequency  [START ON 1/15/2021] dexAMETHasone (DECADRON) tablet 6 mg  6 mg Oral DAILY  albuterol (PROVENTIL HFA, VENTOLIN HFA, PROAIR HFA) inhaler 2 Puff  2 Puff Inhalation Q4H RT  
 miconazole (MICOTIN) 2 % powder   Topical BID  hydrALAZINE (APRESOLINE) 20 mg/mL injection 20 mg  20 mg IntraVENous Q6H PRN  
 cloNIDine HCL (CATAPRES) tablet 0.1 mg  0.1 mg Oral BID  remdesivir 100 mg in 0.9% sodium chloride 250 mL IVPB  100 mg IntraVENous Q24H  
 furosemide (LASIX) injection 40 mg  40 mg IntraVENous Q12H  
 enoxaparin (LOVENOX) injection 40 mg  40 mg SubCUTAneous Q12H  
 ascorbic acid (vitamin C) (VITAMIN C) tablet 500 mg  500 mg Oral DAILY  zinc sulfate (ZINCATE) 220 (50) mg capsule 1 Cap  1 Cap Oral DAILY  amLODIPine (NORVASC) tablet 10 mg  10 mg Oral DAILY  collagenase (SANTYL) 250 unit/gram ointment   Topical Once per day on Thu Sat  balsam peru-castor oiL (VENELEX) ointment   Topical Q12H  
 insulin lispro (HUMALOG) injection 10 Units  10 Units SubCUTAneous TIDAC  insulin glargine (LANTUS) injection 40 Units  40 Units SubCUTAneous QHS  ammonium lactate (LAC-HYDRIN) 12 % lotion   Topical Q12H  
 sodium bicarbonate tablet 650 mg  650 mg Oral DAILY  sodium chloride (NS) flush 5-40 mL  5-40 mL IntraVENous Q8H  
 sodium chloride (NS) flush 5-40 mL  5-40 mL IntraVENous PRN  pantoprazole (PROTONIX) 40 mg in 0.9% sodium chloride 10 mL injection  40 mg IntraVENous Q12H  
 enzalutamide (XTANDI) chemo capsule 80 mg (patient supplied) (Patient Supplied)  80 mg Oral BID  fluticasone propionate (FLONASE) 50 mcg/actuation nasal spray 2 Spray  2 Spray Both Nostrils DAILY  piperacillin-tazobactam (ZOSYN) 3.375 g in 0.9% sodium chloride (MBP/ADV) 100 mL MBP  3.375 g IntraVENous Q8H  
 oxyCODONE-acetaminophen (PERCOCET) 5-325 mg per tablet 1 Tab  1 Tab Oral Q4H PRN  
 epoetin chi-epbx (RETACRIT) injection 20,000 Units  20,000 Units SubCUTAneous Q MON, WED & FRI  diclofenac (VOLTAREN) 1 % topical gel 2 g  2 g Topical QID  acetaminophen (TYLENOL) tablet 500 mg  500 mg Oral Q4H PRN  
 aspirin delayed-release tablet 81 mg  81 mg Oral DAILY  bicalutamide (CASODEX) tablet 50 mg  50 mg Oral DAILY  gabapentin (NEURONTIN) capsule 100 mg  100 mg Oral TID  hydrALAZINE (APRESOLINE) tablet 100 mg  100 mg Oral TID  tamsulosin (FLOMAX) capsule 0.4 mg  0.4 mg Oral DAILY  nicotine (NICODERM CQ) 14 mg/24 hr patch 1 Patch  1 Patch TransDERmal DAILY  insulin lispro (HUMALOG) injection   SubCUTAneous AC&HS  
 glucose chewable tablet 16 g  4 Tab Oral PRN  
 dextrose (D50W) injection syrg 12.5-25 g  12.5-25 g IntraVENous PRN  
 glucagon (GLUCAGEN) injection 1 mg  1 mg IntraMUSCular PRN  
 
______________________________________________________________________ EXPECTED LENGTH OF STAY: 9d 19h ACTUAL LENGTH OF STAY:          17 
 
            
Kadie Hughes MD

## 2021-01-14 NOTE — PROGRESS NOTES
Problem: Falls - Risk of 
Goal: *Absence of Falls Description: Document Tom Moran Fall Risk and appropriate interventions in the flowsheet. Outcome: Progressing Towards Goal 
Note: Fall Risk Interventions: 
Mobility Interventions: Strengthening exercises (ROM-active/passive) Medication Interventions: Bed/chair exit alarm Elimination Interventions: Patient to call for help with toileting needs Problem: Patient Education: Go to Patient Education Activity Goal: Patient/Family Education Outcome: Progressing Towards Goal 
  
Problem: Pressure Injury - Risk of 
Goal: *Prevention of pressure injury Description: Document Edgar Scale and appropriate interventions in the flowsheet. Outcome: Progressing Towards Goal 
Note: Pressure Injury Interventions: 
Sensory Interventions: Assess changes in LOC Moisture Interventions: Absorbent underpads Activity Interventions: Assess need for specialty bed Mobility Interventions: Assess need for specialty bed Nutrition Interventions: Document food/fluid/supplement intake Friction and Shear Interventions: Apply protective barrier, creams and emollients Problem: Patient Education: Go to Patient Education Activity Goal: Patient/Family Education Outcome: Progressing Towards Goal 
  
Problem: Risk for Spread of Infection Goal: Prevent transmission of infectious organism to others Description: Prevent the transmission of infectious organisms to other patients, staff members, and visitors. Outcome: Progressing Towards Goal 
  
Problem: Patient Education:  Go to Education Activity Goal: Patient/Family Education Outcome: Progressing Towards Goal 
  
Problem: Infection - Risk of, Urinary Catheter-Associated Urinary Tract Infection Goal: *Absence of infection signs and symptoms Outcome: Progressing Towards Goal 
  
Problem: Patient Education: Go to Patient Education Activity Goal: Patient/Family Education Outcome: Progressing Towards Goal 
  
Problem: Nutrition Deficit Goal: *Optimize nutritional status Outcome: Progressing Towards Goal

## 2021-01-14 NOTE — PROGRESS NOTES
Bedside shift change report given to Madai Kasper RN (oncoming nurse) by Shirin Calderon RN (offgoing nurse). Report included the following information SBAR, Intake/Output, MAR, Recent Results and Cardiac Rhythm ST.

## 2021-01-14 NOTE — PROGRESS NOTES
Physician Progress Note Lalo Kelley 
CSN #:                  [id number] :                       1950 ADMIT DATE:       2020 3:14 PM 
[address] [place] DATE: 
RESPONDING 
PROVIDER #:        Sujata Murillo MD 
 
 
 
 
QUERY TEXT: 
 
Pt admitted with sepsis due to osteomyelitis right foot and has CHF documented in history. If possible, please document in progress notes and discharge summary further specificity regarding the type and acuity of CHF: 
 
The medical record reflects the following: 
Risk Factors: 73 y/o male admitted for sepsis due to osteomyelitis of  right foot. pt experiencing SOB, tachycardia and increased 02 requirements. PMH IDDM, HTN, CHF, prostate ca with radiation, NT pro-BNP: 10,832 receiving Lasix 40mg bid. also found to be covid + on 21 Clinical Indicators: 
20 IMPRESSION: Bilateral patchy interstitial and airspace opacities can be seen with atypical/viral infection or pulmonary edema. 20: Final result 
? LV: Estimated LVEF is 55 - 60%. Normal cavity size and systolic function (ejection fraction normal). Severe concentric hypertrophy. Inconclusive left ventricular diastolic function. 1/10/2021 01:21 NT pro-BNP: 10,832 (H) 
2021 04:35 NT pro-BNP: 8,349 (H) 
21 COVID-19 rapid test Detected 21 CT ches: IMPRESSION:? 
1. Peripheral areas of groundglass opacity and consolidation are consistent with atypical pneumonia, viral pneumonia. 2. Small-to-moderate bilateral pleural effusions cause fairly severe atelectasis in both lower lobes. 21 Pulmonary MD PN: 21 Pulm MD PN 
- lasix 40 mg bid. - Nt pro BNP 10,832 => 8,349 
- TE EF 55%. Severe cLVH. Dilated LA. - CXR from admission reviewed - bilateral worsening infiltrates. - Volume overload with hx of severe LVH (likely has diastolic dysfunction) and CKD. Treatment: admit, hospitalist consult, Nephrology consult, Pulmonary consult, CXR, Labs, Meds - Lasix 40mg IV q12hrs, amlodipine 10mg po daily, clonidine 0.1mg po 2x daily, hydralazine 100mg po daily, daily weights, strict I/O's,supplemental oxygen - High flow nasal cannula, spot check 02, covid testing Cheryle J Rilee RN,BSN Clinical Documentation Integrity [phone] Options provided: 
-- Acute Diastolic CHF/HFpEF 
-- Chronic Diastolic CHF/HFpEF 
-- Other - I will add my own diagnosis -- Disagree - Not applicable / Not valid -- Disagree - Clinically unable to determine / Unknown 
-- Refer to Clinical Documentation Reviewer PROVIDER RESPONSE TEXT: 
 
This patient is in acute diastolic CHF/HFpEF.  
 
Query created by: Tatiana Romero on 1/14/2021 12:08 PM 
 
 
Electronically signed by:  Aníbal Garcia MD 1/14/2021 5:52 PM

## 2021-01-14 NOTE — PROGRESS NOTES
Nephrology Progress Note Roxanna Riley III Date of Admission : 12/28/2020 CC: Follow up for KATHI on CKD Assessment and Plan KATHI on CKD  
- suspected ATN from severe pre renal azotemia - U/S neg for hydro - UA with proteinuria - Cr stable 
- cont present care 
- daily labs Hyperkalemia: 
- low K diet, no ACE/ARB 
- K stable CKD Stage IIIa: 
-  Presumed 2/2 DM and HTN 
- nephrotic range proteinuria 
- baseline Cr 1.7 mg/dl  
- followed by Dr. Omar Varela COVID-19 PNA: 
- per primary team and pulmonary 
  
Hx of prostate cancer s/p XRT 
  
Metabolic acidosis: 
-  Continue oral Bicarb  
  
Anemia in CKD + blood loss: 
- received PRBCs on 1/2 
- cont JAZ + IV iron 
- EGD 1/5/2021: An actively bleeding Dieulafoy's lesion was noted in the gastric fundus.   
  
Type II DM: 
- on insulin 
  
HTN :  
- Continue current meds  
  
R foot osteo: 
- on abx 
- s/p debridement on 12/30 Group B strep bacteremia: 
- Abx per ID 
  
B/l Airspace disease: 
- ? PNA vs edema 
- COVID neg 
- on zosyn Interval History: Not examined in the room Stable per RN. Cr stable. No increase in SOB per RN. Voiding ok. Current Medications: all current  Medications have been eviewed in [facility] Review of Systems: Pertinent items are noted in HPI. Objective: 
Vitals:   
Vitals:  
 01/13/21 2159 01/14/21 0129 01/14/21 0215 01/14/21 7260 BP: (!) 173/67  (!) 171/75 (!) 175/73 Pulse: 87  95 94 Resp:   18 20 Temp:   99.3 °F (37.4 °C) 98.8 °F (37.1 °C) SpO2:   96% 94% Weight:  112.2 kg (247 lb 5.7 oz) Height:   6' (1.829 m) Intake and Output: 
01/14 0701 - 01/14 1900 In: -  
Out: 495 [GHGFY:046] 01/12 1901 - 01/14 0700 In: 240 [P.O.:240] Out: 1975 [OZMFB:5703; Drains:50] Physical Examination:  Not examined in the room due to COVID-19 infection: 
 
General: Obese, on high flow Resp:  Stable oxygenation on high flow CV:  RRR on monitor Neurologic:  Non focal 
 Psych:             Anxious per report :  Bynum in place 
 
[]    High complexity decision making was performed 
[]    Patient is at high-risk of decompensation with multiple organ involvement Lab Data Personally Reviewed: I have reviewed all the pertinent labs, microbiology data and radiology studies during assessment. Recent Labs  
  01/14/21 
0535 01/13/21 
0339 01/12/21 [phone] 01/12/21 
1020 01/12/21 
0422  139 138  --  136  
K 4.0 4.3 5.4*  --  5.7*  
 108 107  --  106 CO2 28 25 25  --  25  
GLU 64* 91 316*  --  310* BUN 46* 48* 45*  --  39* CREA 1.76* 1.74* 1.91*  --  1.96* CA 9.0 9.0 8.8  --  8.8 PHOS  --   --  4.6  --   --   
ALB 2.0*  --  1.8* 2.0* 1.9* ALT 14  --   --  11* 10* Recent Labs  
  01/13/21 0339 01/12/21 0422 WBC 9.6 13.1* HGB 7.2* 7.1*  
HCT 24.2* 24.2*  
 329 No results found for: SDES Lab Results Component Value Date/Time Culture result: (A) 12/30/2020 01:14 PM  
  LIGHT ANAEROBIC GRAM NEGATIVE RODS BETA LACTAMASE POSITIVE Culture result: LIGHT PROTEUS MIRABILIS (A) 12/30/2020 01:14 PM  
 Culture result: (A) 12/30/2020 01:14 PM  
  LIGHT STREPTOCOCCI, BETA HEMOLYTIC GROUP B Penicillin and ampicillin are drugs of choice for treatment of beta-hemolytic streptococcal infections. Susceptibility testing of penicillins and beta-lactams approved by the FDA for treatment of beta-hemolytic streptococcal infections need not be performed routinely, because nonsusceptible isolates are extremely rare. CLSI 2012 Culture result: LIGHT ENTEROCOCCUS FAECALIS (A) 12/30/2020 01:14 PM  
 
Recent Results (from the past 24 hour(s)) GLUCOSE, POC Collection Time: 01/13/21 11:53 AM  
Result Value Ref Range Glucose (POC) 223 (H) 65 - 100 mg/dL Performed by Gurjit Soni GLUCOSE, POC Collection Time: 01/13/21  5:30 PM  
Result Value Ref Range Glucose (POC) 266 (H) 65 - 100 mg/dL  Performed by Yeimy Sutton, POC  
 Collection Time: 01/13/21  9:56 PM  
Result Value Ref Range Glucose (POC) 127 (H) 65 - 100 mg/dL Performed by Creed Rinne D DIMER Collection Time: 01/14/21  5:35 AM  
Result Value Ref Range D-dimer 3.13 (H) 0.00 - 0.65 mg/L FEU METABOLIC PANEL, COMPREHENSIVE Collection Time: 01/14/21  5:35 AM  
Result Value Ref Range Sodium 140 136 - 145 mmol/L Potassium 4.0 3.5 - 5.1 mmol/L Chloride 106 97 - 108 mmol/L  
 CO2 28 21 - 32 mmol/L Anion gap 6 5 - 15 mmol/L Glucose 64 (L) 65 - 100 mg/dL BUN 46 (H) 6 - 20 MG/DL Creatinine 1.76 (H) 0.70 - 1.30 MG/DL  
 BUN/Creatinine ratio 26 (H) 12 - 20 GFR est AA 47 (L) >60 ml/min/1.73m2 GFR est non-AA 39 (L) >60 ml/min/1.73m2 Calcium 9.0 8.5 - 10.1 MG/DL Bilirubin, total 0.2 0.2 - 1.0 MG/DL  
 ALT (SGPT) 14 12 - 78 U/L  
 AST (SGOT) 22 15 - 37 U/L Alk. phosphatase 117 45 - 117 U/L Protein, total 7.8 6.4 - 8.2 g/dL Albumin 2.0 (L) 3.5 - 5.0 g/dL Globulin 5.8 (H) 2.0 - 4.0 g/dL A-G Ratio 0.3 (L) 1.1 - 2.2 GLUCOSE, POC Collection Time: 01/14/21  6:58 AM  
Result Value Ref Range Glucose (POC) 86 65 - 100 mg/dL Performed by Tommie Hernandez MD 
[facility]  
[address] [facility], [facility] Phone - [phone] Fax - [phone] 
[email or web address]

## 2021-01-14 NOTE — PROGRESS NOTES
Clinical Pharmacy Note: IV to PO Automatic Conversion Please note: Tammy Lantigua IIIs medication (dexamethasone, pantoprazole) have been changed from IV to PO based on the following critiera: 
 
Patient is taking scheduled oral medications Patient is tolerating tube feeds at goal rate or a full liquid, soft or regular diet This IV to PO conversion is based on the P&T approved automatic conversion policy for eligible patients. Please call with questions.

## 2021-01-14 NOTE — PROGRESS NOTES
Patient refused vital signs along with multiple medications and tried to refuse transfer but eventually reconsidered. TRANSFER - OUT REPORT: 
 
Verbal report given to Gregg(name) on Derral Haw III  being transferred to [address] [place] [address](unit) for routine progression of care Report consisted of patients Situation, Background, Assessment and  
Recommendations(SBAR). Information from the following report(s) SBAR, Kardex, ED Summary, Procedure Summary, Intake/Output, MAR, Accordion, Recent Results, Med Rec Status and Cardiac Rhythm NSR was reviewed with the receiving nurse. Lines: PICC Double Lumen Right (Active) Central Line Being Utilized Yes 01/13/21 1534 Criteria for Appropriate Use Limited/no vessel suitable for conventional peripheral access 01/13/21 1534 Site Assessment Clean, dry, & intact 01/13/21 1534 Phlebitis Assessment 0 01/13/21 1534 Infiltration Assessment 0 01/13/21 1534 Date of Last Dressing Change 01/12/21 01/13/21 1534 Dressing Status Clean, dry, & intact 01/13/21 1534 Action Taken Open ports on tubing capped 01/13/21 1534 Dressing Type Disk with Chlorhexadine gluconate (CHG); Transparent 01/13/21 1534 Hub Color/Line Status Infusing 01/13/21 1534 Positive Blood Return (Site #1) Yes 01/13/21 1534 Hub Color/Line Status Purple;Flushed; Infusing 01/13/21 0041 Positive Blood Return (Site #2) Yes 01/13/21 1534 Alcohol Cap Used Yes 01/12/21 0033 Venous Access Device powerline 01/06/21 Upper chest (subclavicular area, right (Active) Central Line Being Utilized Yes 01/13/21 0340 Criteria for Appropriate Use Limited/no vessel suitable for conventional peripheral access 01/13/21 0849 Site Assessment Clean, dry, & intact; Clean;Dry; Intact 01/13/21 0849 Date of Last Dressing Change 01/12/21 01/13/21 2331 Dressing Status Clean, dry, & intact 01/13/21 0849 Dressing Type Disk with Chlorhexadine gluconate (CHG); Transparent 01/13/21 7517 Action Taken Open ports on tubing capped 01/13/21 0849 Date Accessed (Medial Site) 01/06/21 01/08/21 1011 Positive Blood Return (Medial Site) Yes 01/13/21 0849 Action Taken (Medial Site) Flushed 01/08/21 2128 Positive Blood Return (Lateral Site) Yes 01/13/21 0849 Action Taken (Lateral Site) Flushed 01/08/21 2128 Alcohol Cap Used Yes 01/13/21 0849 Opportunity for questions and clarification was provided. Patient transported with: 
 O2 @ 4 liters

## 2021-01-14 NOTE — PROGRESS NOTES
Problem: Falls - Risk of 
Goal: *Absence of Falls Description: Document Jacqueline Khan Fall Risk and appropriate interventions in the flowsheet. Outcome: Progressing Towards Goal 
Note: Fall Risk Interventions: 
  
 
  
 
Medication Interventions: Patient to call before getting OOB Elimination Interventions: Call light in reach Problem: Patient Education: Go to Patient Education Activity Goal: Patient/Family Education Outcome: Progressing Towards Goal 
  
Problem: Pressure Injury - Risk of 
Goal: *Prevention of pressure injury Description: Document Edgar Scale and appropriate interventions in the flowsheet. Outcome: Progressing Towards Goal 
Note: Pressure Injury Interventions: 
Sensory Interventions: Assess changes in LOC Moisture Interventions: Apply protective barrier, creams and emollients Activity Interventions: PT/OT evaluation Mobility Interventions: Pressure redistribution bed/mattress (bed type) Nutrition Interventions: Document food/fluid/supplement intake Friction and Shear Interventions: Apply protective barrier, creams and emollients Problem: Patient Education: Go to Patient Education Activity Goal: Patient/Family Education Outcome: Progressing Towards Goal 
  
Problem: Risk for Spread of Infection Goal: Prevent transmission of infectious organism to others Description: Prevent the transmission of infectious organisms to other patients, staff members, and visitors. Outcome: Progressing Towards Goal 
  
Problem: Patient Education:  Go to Education Activity Goal: Patient/Family Education Outcome: Progressing Towards Goal 
  
Problem: Infection - Risk of, Urinary Catheter-Associated Urinary Tract Infection Goal: *Absence of infection signs and symptoms Outcome: Progressing Towards Goal 
  
Problem: Patient Education: Go to Patient Education Activity Goal: Patient/Family Education Outcome: Progressing Towards Goal 
  
Problem: Nutrition Deficit Goal: *Optimize nutritional status Outcome: Progressing Towards Goal

## 2021-01-14 NOTE — PROGRESS NOTES
Patient 9 beat run of Atrial Tach. Patient no c/o of chest pain or SOB. A&Ox4. Zo Rosales NP Hospitalist and he will add-on Magnesium to CMP. Will continue to monitor.

## 2021-01-15 NOTE — PROGRESS NOTES
01/15/21 1350 Vital Signs Temp 98 °F (36.7 °C) Temp Source Axillary Pulse (Heart Rate) (!) 105 Heart Rate Source Monitor Resp Rate 22  
O2 Sat (%) 98 % Level of Consciousness Alert  
BP (!) 158/68 MAP (Calculated) 98 BP 1 Method Automatic  
BP 1 Location Left arm BP Patient Position At rest  
MEWS Score 3 Pt recovering from RRT which was called at 1300; vitals improving

## 2021-01-15 NOTE — PROGRESS NOTES
TRANSFER - OUT REPORT: 
 
Verbal report given to [employer] (name) on Layton Cooper III  being transferred to [facility]) for change in patient condition(Respirtory failure) Report consisted of patients Situation, Background, Assessment and  
Recommendations(SBAR). Information from the following report(s) SBAR, Kardex, Intake/Output and MAR was reviewed with the receiving nurse. Lines:  
Venous Access Device powerline 01/06/21 Upper chest (subclavicular area, right (Active) Central Line Being Utilized Yes 01/15/21 5422 Criteria for Appropriate Use Long term IV/antibiotic administration 01/15/21 0522 Site Assessment Clean, dry, & intact 01/15/21 5173 Date of Last Dressing Change 01/12/21 01/15/21 4787 Dressing Status Clean, dry, & intact 01/15/21 2048 Dressing Type Disk with Chlorhexadine gluconate (CHG); Transparent 01/15/21 5296 Action Taken Open ports on tubing capped 01/15/21 0942 Date Accessed (Medial Site) 01/06/21 01/08/21 1011 Positive Blood Return (Medial Site) Yes 01/15/21 1822 Action Taken (Medial Site) Flushed 01/14/21 1151 Positive Blood Return (Lateral Site) Yes 01/15/21 5245 Action Taken (Lateral Site) Flushed 01/14/21 8394 Alcohol Cap Used Yes 01/15/21 3811 Opportunity for questions and clarification was provided. Patient transported with: 
Belongings, medications, on bipap

## 2021-01-15 NOTE — PROGRESS NOTES
Nephrology Progress Note Steve Adam III Date of Admission : 12/28/2020 CC: Follow up for KATHI on CKD Assessment and Plan KATHI on CKD  
- suspected ATN from severe pre renal azotemia - U/S neg for hydro - UA with proteinuria - Cr stable at baseline 
- cont current diuretics 
- daily labs Hyperkalemia: 
- low K diet, no ACE/ARB 
- K stable CKD Stage IIIa: 
-  Presumed 2/2 DM and HTN 
- nephrotic range proteinuria 
- baseline Cr 1.7 mg/dl  
- followed by Dr. Ramila Ruggiero COVID-19 PNA: 
- positive on 1/11 
- on decadron and remdesivir 
  
Hx of prostate cancer s/p XRT 
  
Metabolic acidosis: 
-  Continue oral Bicarb  
  
Anemia in CKD + blood loss: 
- cont JAZ 
- EGD 1/5/2021: An actively bleeding Dieulafoy's lesion was noted in the gastric fundus.   
- for 1 unit today of PRBCs 
  
Type II DM: 
- on insulin 
  
HTN :  
- Continue current meds  
  
R foot osteo: 
- on abx 
- s/p debridement on 12/30 Group B strep bacteremia: 
- repeat cx neg Interval History: Not examined in the room Stable per RN. Cr stable. Voiding well on lasix.  hgb dropped again. To get PRBCs today. No increase in SOB per RN. On NC O2. No reported cp, n/v/d. Current Medications: all current  Medications have been eviewed in [facility] Review of Systems: Pertinent items are noted in HPI. Objective: 
Vitals:   
Vitals:  
 01/14/21 1459 01/14/21 2048 01/15/21 0144 01/15/21 0935 BP: (!) 167/80 (!) 154/71 (!) 145/75 (!) 171/73 Pulse: 96 81 83 96 Resp: 20 18 20 20 Temp: 98.7 °F (37.1 °C) 98.6 °F (37 °C) 98.2 °F (36.8 °C) 98.1 °F (36.7 °C) SpO2: 96% 98% 96% 93% Weight:   116.6 kg (257 lb 1.6 oz) Height:      
 
Intake and Output: 
No intake/output data recorded. 01/13 1901 - 01/15 0700 In: 720 [P.O.:720] Out: 80 [Urine:2800; Drains:25] Physical Examination:  Not examined in the room due to COVID-19 infection: 
 
General: Obese, on high flow Resp:  Stable oxygenation on high flow CV:  RRR on monitor Neurologic:  Non focal 
Psych:             Anxious per report :  Bynum in place 
 
[]    High complexity decision making was performed 
[]    Patient is at high-risk of decompensation with multiple organ involvement Lab Data Personally Reviewed: I have reviewed all the pertinent labs, microbiology data and radiology studies during assessment. Recent Labs  
  01/15/21 
0559 01/15/21 
0558 01/14/21 
0535 01/12/21 [phone] 01/12/21 [phone]  137 140   < > 138  
K 4.1 4.0 4.0   < > 5.4*  
 105 106   < > 107 CO2 25 26 28   < > 25 * 237* 64*   < > 316* BUN 47* 46* 46*   < > 45* CREA 1.83* 1.75* 1.76*   < > 1.91* CA 8.2* 8.6 9.0   < > 8.8 MG  --  1.8 1.8  --   --   
PHOS  --  4.4  --   --  4.6 ALB 1.8*  --  2.0*  --  1.8* ALT 14  --  14  --   --   
 < > = values in this interval not displayed. Recent Labs  
  01/15/21 
0558 01/13/21 
8264 WBC 6.6 9.6 HGB 6.7* 7.2* HCT 22.7* 24.2*  
 319 No results found for: SDES Lab Results Component Value Date/Time Culture result: (A) 12/30/2020 01:14 PM  
  LIGHT ANAEROBIC GRAM NEGATIVE RODS BETA LACTAMASE POSITIVE Culture result: LIGHT PROTEUS MIRABILIS (A) 12/30/2020 01:14 PM  
 Culture result: (A) 12/30/2020 01:14 PM  
  LIGHT STREPTOCOCCI, BETA HEMOLYTIC GROUP B Penicillin and ampicillin are drugs of choice for treatment of beta-hemolytic streptococcal infections. Susceptibility testing of penicillins and beta-lactams approved by the FDA for treatment of beta-hemolytic streptococcal infections need not be performed routinely, because nonsusceptible isolates are extremely rare. CLSI 2012 Culture result: LIGHT ENTEROCOCCUS FAECALIS (A) 12/30/2020 01:14 PM  
 
Recent Results (from the past 24 hour(s)) GLUCOSE, POC Collection Time: 01/14/21 11:41 AM  
Result Value Ref Range Glucose (POC) 131 (H) 65 - 100 mg/dL Performed by Jackelyn Ordonez GLUCOSE, POC Collection Time: 01/14/21  5:19 PM  
Result Value Ref Range Glucose (POC) 169 (H) 65 - 100 mg/dL Performed by Bita Miramontes GLUCOSE, POC Collection Time: 01/14/21  8:54 PM  
Result Value Ref Range Glucose (POC) 159 (H) 65 - 100 mg/dL Performed by Meli RITCHIE   
D DIMER Collection Time: 01/15/21  5:58 AM  
Result Value Ref Range D-dimer 2.56 (H) 0.00 - 0.65 mg/L FEU MAGNESIUM Collection Time: 01/15/21  5:58 AM  
Result Value Ref Range Magnesium 1.8 1.6 - 2.4 mg/dL METABOLIC PANEL, BASIC Collection Time: 01/15/21  5:58 AM  
Result Value Ref Range Sodium 137 136 - 145 mmol/L Potassium 4.0 3.5 - 5.1 mmol/L Chloride 105 97 - 108 mmol/L  
 CO2 26 21 - 32 mmol/L Anion gap 6 5 - 15 mmol/L Glucose 237 (H) 65 - 100 mg/dL BUN 46 (H) 6 - 20 MG/DL Creatinine 1.75 (H) 0.70 - 1.30 MG/DL  
 BUN/Creatinine ratio 26 (H) 12 - 20 GFR est AA 47 (L) >60 ml/min/1.73m2 GFR est non-AA 39 (L) >60 ml/min/1.73m2 Calcium 8.6 8.5 - 10.1 MG/DL  
PHOSPHORUS Collection Time: 01/15/21  5:58 AM  
Result Value Ref Range Phosphorus 4.4 2.6 - 4.7 MG/DL  
CBC WITH AUTOMATED DIFF Collection Time: 01/15/21  5:58 AM  
Result Value Ref Range WBC 6.6 4.1 - 11.1 K/uL  
 RBC 2.45 (L) 4.10 - 5.70 M/uL HGB 6.7 (L) 12.1 - 17.0 g/dL HCT 22.7 (L) 36.6 - 50.3 % MCV 92.7 80.0 - 99.0 FL  
 MCH 27.3 26.0 - 34.0 PG  
 MCHC 29.5 (L) 30.0 - 36.5 g/dL  
 RDW 18.2 (H) 11.5 - 14.5 % PLATELET 840 100 - 007 K/uL MPV 9.1 8.9 - 12.9 FL  
 NRBC 0.3 (H) 0  WBC ABSOLUTE NRBC 0.02 (H) 0.00 - 0.01 K/uL NEUTROPHILS 86 (H) 32 - 75 % LYMPHOCYTES 6 (L) 12 - 49 % MONOCYTES 7 5 - 13 % EOSINOPHILS 0 0 - 7 % BASOPHILS 0 0 - 1 % IMMATURE GRANULOCYTES 1 (H) 0.0 - 0.5 % ABS. NEUTROPHILS 5.6 1.8 - 8.0 K/UL  
 ABS. LYMPHOCYTES 0.4 (L) 0.8 - 3.5 K/UL  
 ABS. MONOCYTES 0.5 0.0 - 1.0 K/UL  
 ABS. EOSINOPHILS 0.0 0.0 - 0.4 K/UL ABS. BASOPHILS 0.0 0.0 - 0.1 K/UL  
 ABS. IMM. GRANS. 0.1 (H) 0.00 - 0.04 K/UL  
 DF SMEAR SCANNED    
 RBC COMMENTS ANISOCYTOSIS 1+ 
    
 RBC COMMENTS MACROCYTOSIS 1+ 
    
 RBC COMMENTS OVALOCYTES PRESENT 
    
 RBC COMMENTS POLYCHROMASIA PRESENT 
    
METABOLIC PANEL, COMPREHENSIVE Collection Time: 01/15/21  5:59 AM  
Result Value Ref Range Sodium 138 136 - 145 mmol/L Potassium 4.1 3.5 - 5.1 mmol/L Chloride 106 97 - 108 mmol/L  
 CO2 25 21 - 32 mmol/L Anion gap 7 5 - 15 mmol/L Glucose 249 (H) 65 - 100 mg/dL BUN 47 (H) 6 - 20 MG/DL Creatinine 1.83 (H) 0.70 - 1.30 MG/DL  
 BUN/Creatinine ratio 26 (H) 12 - 20 GFR est AA 45 (L) >60 ml/min/1.73m2 GFR est non-AA 37 (L) >60 ml/min/1.73m2 Calcium 8.2 (L) 8.5 - 10.1 MG/DL Bilirubin, total 0.2 0.2 - 1.0 MG/DL  
 ALT (SGPT) 14 12 - 78 U/L  
 AST (SGOT) 17 15 - 37 U/L Alk. phosphatase 115 45 - 117 U/L Protein, total 7.4 6.4 - 8.2 g/dL Albumin 1.8 (L) 3.5 - 5.0 g/dL Globulin 5.6 (H) 2.0 - 4.0 g/dL A-G Ratio 0.3 (L) 1.1 - 2.2 GLUCOSE, POC Collection Time: 01/15/21  6:01 AM  
Result Value Ref Range Glucose (POC) 276 (H) 65 - 100 mg/dL Performed by Pierre Oropeza   
RBC, ALLOCATE Collection Time: 01/15/21  8:00 AM  
Result Value Ref Range HISTORY CHECKED? Historical check performed Nazario Ruvalcaba MD 
[address] [place] [address], [address] [facility] Phone - [phone] Fax - [phone] 
[email or web address]

## 2021-01-15 NOTE — PROGRESS NOTES
Clinical Pharmacy Note: IV to PO Automatic Conversion Please note: Sarah Hernandez IIIs medication (pantoprazole) has been changed from IV to PO based on the following critiera: 
 
Patient is taking scheduled oral medications Patient is tolerating tube feeds at goal rate or a full liquid, soft or regular diet GIB resolved This IV to PO conversion is based on the P&T approved automatic conversion policy for eligible patients. Please call with questions.

## 2021-01-15 NOTE — PROGRESS NOTES
Bedside shift change report given to Jessi Quan [address] (oncoming nurse) by Mary Rainey (offgoing nurse). Report included the following information SBAR, Kardex, Intake/Output and MAR.

## 2021-01-15 NOTE — PROGRESS NOTES
Problem: Falls - Risk of 
Goal: *Absence of Falls Description: Document Radha Thompson Fall Risk and appropriate interventions in the flowsheet. Outcome: Progressing Towards Goal 
Note: Fall Risk Interventions: 
Mobility Interventions: Patient to call before getting OOB, Strengthening exercises (ROM-active/passive) Medication Interventions: Bed/chair exit alarm, Evaluate medications/consider consulting pharmacy, Patient to call before getting OOB Elimination Interventions: Patient to call for help with toileting needs, Call light in reach, Bed/chair exit alarm Problem: Pressure Injury - Risk of 
Goal: *Prevention of pressure injury Description: Document Edgar Scale and appropriate interventions in the flowsheet. Outcome: Progressing Towards Goal 
Note: Pressure Injury Interventions: 
Sensory Interventions: Assess changes in LOC, Monitor skin under medical devices, Pressure redistribution bed/mattress (bed type) Moisture Interventions: Absorbent underpads, Apply protective barrier, creams and emollients, Maintain skin hydration (lotion/cream) Activity Interventions: Assess need for specialty bed, Pressure redistribution bed/mattress(bed type) Mobility Interventions: Pressure redistribution bed/mattress (bed type) Nutrition Interventions: Document food/fluid/supplement intake Friction and Shear Interventions: Apply protective barrier, creams and emollients, Minimize layers Problem: Infection - Risk of, Urinary Catheter-Associated Urinary Tract Infection Goal: *Absence of infection signs and symptoms Outcome: Progressing Towards Goal 
  
Problem: Nutrition Deficit Goal: *Optimize nutritional status Outcome: Progressing Towards Goal

## 2021-01-15 NOTE — PROGRESS NOTES
+                                                                                                                                  
                                               
 
Critical Care Documentation Name: Mildred Burns YOB: 1950 MRN: [id number] Admission Date: 12/28/2020  3:14 PM 
 
Date of service: 1/15/2021 Active Diagnoses: 
 
Hospital Problems  Date Reviewed: 8/20/2020 Codes Class Noted POA * (Principal) Osteomyelitis ([facility] [address] ICD-10-CM: M86.9 ICD-9-CM: 730.20  12/28/2020 Yes Chief Complaint: SOB Clinical Presentation: 
65y/o male with pmh of prostate CA, IDDM, CHF, HTN who presented with sepsis 2/2 OM now s/p debridement and wound vac. Unfortunately contracted COVID during his hospital stay. Now with acute hypoxic respiratory failure. Initially had improved some. RRT called for desaturation in 70%'s responding to non rebreather. Physical Exam:  
Gen: Moderate respiratory distress, HEENT: NC/AT, sclera anicteric, PERRL, EOMI 
CV: Regular, tachycardic. normal S1 and S2, no pedal edema Resp: Course bilaterally. Increased work of breathing with accessory muslce use, Tachypnea in 30's. Abd: Obese, NT/ND, normal bowel sounds, no rebound or guarding Ext: 2+ pulses, no edema Skin: No rashes or lesions Data Reviewed: All diagnostic labs and studies have been reviewed. Assessment and Plan: 
65y/o male with pmh of prostate CA, IDDM, CHF, HTN who presented with sepsis 2/2 OM now s/p debridement and wound vac. Unfortunately contracted COVID during his hospital stay, now with worsening hypoxic respiratory failure. - Obtain ABG, place on HFNC now - CXR STAT 
- EKG STAT 
- Keep saturations over 90% - Transfer to higher level of care IMCU. Attempted to contact daughter, however received voicemail.  
- Increase lasix 80mg BID Addendum: ABG reviewed, acute respiratory acidosis, place order for BiPAP. Medications Administered:  
Sedation: [ ] yes [ ] no Anxiolytics: [ ] yes [ ] no Antiarrhythmics: [ ] yes [ ] no Antihypertensives: [ ] yes [ ] no  
Pressors: [ ] yes [ ] no IVF's: [ ] yes [ ] no BiPAP [X] yes Critical Care Attestation: This patient is unstable and critically ill. Due to a high probability of clinically significant, life threatening deterioration, the patient required my highest level of preparedness to intervene emergently and I personally spent this critical care time directly and personally managing the patient. This critical care time included obtaining a history; examining the patient; pulse oximetry; ordering and review of studies; arranging urgent treatment with development of a management plan; evaluation of patient's response to treatment; frequent reassessment; and, discussions with other providers and/or family. This critical care time was performed to assess and manage the high probability of imminent, life-threatening deterioration that could result in multi-organ failure and death. It was exclusive of separately billable procedures, treating other patients, and teaching time. Time Spent:  
 
I personally spent 45 minutes in providing critical care time.  
 
Jah Humphreys MD 
1/15/2021 
3:23 PM

## 2021-01-15 NOTE — PROGRESS NOTES
PRETTY: 1. RUR-39% 2. Patient had rapid response, currently on biPap. 3. Patient is from home, but when stable may need SNF. 4. Palliative consult note pending. 5. Patient will need wheelchair van vs BLS transport on discharge. 6. ID following- per MD patient may need OP iV abx. CM noted this morning that outpatient resources are arranged with: 1. Personal care agency- [place] [phone] ([phone]) 2. [facility] contact information, Álvaro Bindu ([phone]) Fax ([phone]) 3. Per cm note, patient should have wound vac that was delivered from [facility]. [facility], [phone]) 4. [facility] arranged. Given patient condition at this time, cm will speak with him and family to discuss discharge disposition once stable.   
 
 
 
Yola Hobson, [facility]

## 2021-01-15 NOTE — PROGRESS NOTES
Spoke with Dr. Ada Snow and she said pt needs to be on bipap, informed her that he was being transferred to [facility] and she said \"he needs to be on bipap now whether or not he transfers now before he goes into resp arrest\" Called resp. And relayed MDs message, resp said \"I cant stay in the room with the pt while he's on bipap. Can you please call IMCU and see how much longer it will be till he transfers up? \" RN attempted to call IMCU for report (going into rm [phone]). Was told that \"room wasn't neg. Pressure and not clean, pt might be going into rm 412 but that room isnt clean either and i'm not sure which nurse will be getting the pt if it's 412. I'll call the supervisor but as of right now I dont know what to do. \" 
 
Let charge RN Lawyer Cunningham) know of situation; resp came through unit with bipap machine. 1530: attempted to call report again and was told I could not give report right now. Let Melinda know and said she will get in touch with manager.

## 2021-01-15 NOTE — PROGRESS NOTES
Notified MD Anne Gaytan of pt mental status. Ordered to repeat abg at [address]. Bedside and Verbal shift change report given to [facility] (oncoming nurse) by Catherine Brown (offgoing nurse). Report included the following information SBAR, Kardex, Intake/Output, MAR, Accordion and Recent Results. TRANSFER - IN REPORT: 
 
Verbal report received from [facility] on Lysle Doll III  being received from (unit) for change in patient condition(hypoxia requiring bipap) Report consisted of patients Situation, Background, Assessment and  
Recommendations(SBAR). Information from the following report(s) SBAR, Kardex, Intake/Output, MAR, Accordion, and Recent Results was reviewed with the receiving nurse. Opportunity for questions and clarification was provided. Assessment completed upon patients arrival to unit and care assumed. Primary Nurse Adis Nunez and RUBA HERNDON performed a dual skin assessment on this patient Impairment noted- see wound doc flow sheet Edgar score is   
 
 
 
 
 
Problem: Falls - Risk of 
Goal: *Absence of Falls Description: Document [facility] Fall Risk and appropriate interventions in the flowsheet. Outcome: Progressing Towards Goal 
Note: Fall Risk Interventions: 
Mobility Interventions: Communicate number of staff needed for ambulation/transfer, OT consult for ADLs, Patient to call before getting OOB, PT Consult for mobility concerns, PT Consult for assist device competence, Strengthening exercises (ROM-active/passive) Medication Interventions: Evaluate medications/consider consulting pharmacy, Patient to call before getting OOB, Teach patient to arise slowly Elimination Interventions: Call light in reach, Patient to call for help with toileting needs, Stay With Me (per policy), Toilet paper/wipes in reach, Toileting schedule/hourly rounds, Urinal in reach Problem: Patient Education: Go to Patient Education Activity Goal: Patient/Family Education Outcome: Progressing Towards Goal 
  
Problem: Pressure Injury - Risk of 
Goal: *Prevention of pressure injury Description: Document Edgar Scale and appropriate interventions in the flowsheet. Outcome: Progressing Towards Goal 
Note: Pressure Injury Interventions: 
Sensory Interventions: Assess changes in LOC, Assess need for specialty bed, Avoid rigorous massage over bony prominences, Chair cushion, Check visual cues for pain, Discuss PT/OT consult with provider, Float heels, Keep linens dry and wrinkle-free, Maintain/enhance activity level, Minimize linen layers, Monitor skin under medical devices, Pad between skin to skin, Pressure redistribution bed/mattress (bed type), Turn and reposition approx. every two hours (pillows and wedges if needed) Moisture Interventions: Absorbent underpads, Apply protective barrier, creams and emollients, Assess need for specialty bed, Check for incontinence Q2 hours and as needed, Contain wound drainage, Limit adult briefs, Minimize layers, Moisture barrier, Offer toileting Q_hr Activity Interventions: Increase time out of bed, Pressure redistribution bed/mattress(bed type), PT/OT evaluation Mobility Interventions: Assess need for specialty bed, Float heels, HOB 30 degrees or less, Pressure redistribution bed/mattress (bed type), PT/OT evaluation, Turn and reposition approx. every two hours(pillow and wedges) Nutrition Interventions: Document food/fluid/supplement intake, Discuss nutritional consult with provider Friction and Shear Interventions: Apply protective barrier, creams and emollients, Foam dressings/transparent film/skin sealants, HOB 30 degrees or less, Minimize layers Problem: Patient Education: Go to Patient Education Activity Goal: Patient/Family Education Outcome: Progressing Towards Goal 
  
Problem: Risk for Spread of Infection Goal: Prevent transmission of infectious organism to others Description: Prevent the transmission of infectious organisms to other patients, staff members, and visitors. Outcome: Progressing Towards Goal 
  
Problem: Patient Education:  Go to Education Activity Goal: Patient/Family Education Outcome: Progressing Towards Goal

## 2021-01-15 NOTE — PROGRESS NOTES
[facility]  Hospitalist Group Hospitalist Progress Note Scott Downs MD 
Answering service: [phone] OR [phone] from in house phone Date of Service:  1/15/2021 NAME:  Kayla Majano III 
:  1950 MRN:  [id number] Admission Summary:  
Kayla Majano III is a 72 y. o. male with PMH of prostatic cancer s/p radiation, IDDM, heart failure, htn. He apparently told the ER physician that he c/o onset of diarrhea yesterday and some chills over the last 2 days, with occasional cough. For me, however, he denies all of these symptoms and states he came in because his caregiver was concerned d/t his lethargy over the last several days to weeks. Interval history / Subjective:  
Seen early this am, Was going well. Said his breathing had improved some since yesterday. Had RRT later. See note for further details. Assessment & Plan:  
 
COVID PNA Acute Hypoxic respiratory failure - O2 weaning, keep saturations over 90%, transitioning back to HFNC vs. BiPAP 
- Recheck ABG, chest X ray  
- on remdesivir, decadron until  
- VitC, VitD, and Zn 
- Pulm following  
- Continue diuresis with lasix 40mg IV BID  
- Check procalcitonin Severe sepsis 2/2 - resolved 
osteomyelitis right foot -  S/p debridement right foot ulcer with removal of infected bone 2020 
-Cultures with proteus mirabilis, stept group B, enterococcus -ID following. Antibiotics transitioned to zosyn  
-surgical path with + margins, will need prolonged antibiotics ~6 weeks  
-Powerline placed  
-wound vac  
-non weight bearing right lower extremity (patient wheelchair bound at baseline)-  
  
Acute on chronic anemia, baseline ~9: persistent GI bleed   
-EGD  with Dieulafoy's lesion, actively bleeding, s/p clips placement. Duodenal ucler, non bleeding - Transfuse for hemoglobin less than 7, additional unit now  
-Continue PPI 
- s/p IV iron - Monitor CBC 
  
Acute on CKD: improving 
-nephrology following, on bicarb, valtessa  
-kumar removed  
  
IDDM, last a1c 8.1 01/2020 
-continue long acting insulin, 30U - Continue SSI, meal time insulin and POCT glucose checks HTN, controlled 
- amlodipine, clonidine, hydralazine,  
 
Prostate ca 
-Cont home meds 
  
Tobacco abuse 
-continue patch Code status: FULL 
DVT prophylaxis: SCDs due to recent GI bleed Care Plan discussed with: Patient/Family Anticipated Disposition: Home w/Family and SNF/LTC Anticipated Discharge: Greater than 48 hours Hospital Problems  Date Reviewed: 8/20/2020 Codes Class Noted POA * (Principal) Osteomyelitis ([facility] [address] ICD-10-CM: M86.9 ICD-9-CM: 730.20  12/28/2020 Yes Review of Systems: A comprehensive review of systems was negative except for that written in the HPI. Vital Signs:  
 Last 24hrs VS reviewed since prior progress note. Most recent are: 
Visit Vitals BP (!) 158/68 (BP 1 Location: Left arm, BP Patient Position: At rest) Pulse (!) 105 Temp 98 °F (36.7 °C) Resp 22 Ht 6' (1.829 m) Wt 116.6 kg (257 lb 1.6 oz) SpO2 98% BMI 34.87 kg/m² Intake/Output Summary (Last 24 hours) at 1/15/2021 1402 Last data filed at 1/15/2021 1259 Gross per 24 hour Intake 240 ml Output 1350 ml Net -1110 ml Physical Examination:  
 
I had a face to face encounter with this patient and independently examined them on 1/15/2021 as outlined below: 
 
     
Constitutional:  Mild respiratory distress ENT:  Oral mucosa dry, oropharynx benign. Resp:  Course bilaterally. No wheezing/rhonchi/rales. Minimal accessory muscle use CV:  Regular rhythm, normal rate, no murmurs, gallops, rubs GI:  Soft, non distended, non tender. normoactive bowel sounds, no hepatosplenomegaly Musculoskeletal:  No edema, warm, 2+ pulses throughout. R wound vac in place. Neurologic:  Moves all extremities. AAOx3, CN II-XII reviewed Skin:  Good turgor, no rashes or ulcers Data Review:  
 Review and/or order of clinical lab test 
Review and/or order of tests in the radiology section of CPT Review and/or order of tests in the medicine section of [facility] Labs:  
 
Recent Labs  
  01/15/21 
0558 01/13/21 
3543 WBC 6.6 9.6 HGB 6.7* 7.2* HCT 22.7* 24.2*  
 319 Recent Labs  
  01/15/21 
0559 01/15/21 
0558 01/14/21 
0535 01/12/21 [phone] 01/12/21 [phone]  137 140   < > 138  
K 4.1 4.0 4.0   < > 5.4*  
 105 106   < > 107 CO2 25 26 28   < > 25 BUN 47* 46* 46*   < > 45* CREA 1.83* 1.75* 1.76*   < > 1.91* * 237* 64*   < > 316* CA 8.2* 8.6 9.0   < > 8.8 MG  --  1.8 1.8  --   --   
PHOS  --  4.4  --   --  4.6  
 < > = values in this interval not displayed. Recent Labs  
  01/15/21 
0559 01/14/21 
0535 01/12/21 [phone] ALT 14 14  --   
 117  --   
TBILI 0.2 0.2  --   
TP 7.4 7.8  --   
ALB 1.8* 2.0* 1.8*  
GLOB 5.6* 5.8*  -- No results for input(s): INR, PTP, APTT, INREXT, INREXT in the last 72 hours. No results for input(s): FE, TIBC, PSAT, FERR in the last 72 hours. Lab Results Component Value Date/Time Folate 20.0 03/16/2019 04:06 AM  
  
No results for input(s): PH, PCO2, PO2 in the last 72 hours. Recent Labs  
  01/13/21 
8607 CPK 22* No results found for: CHOL, CHOLX, CHLST, CHOLV, HDL, HDLP, LDL, LDLC, DLDLP, TGLX, TRIGL, TRIGP, CHHD, CHHDX Lab Results Component Value Date/Time Glucose (POC) 220 (H) 01/15/2021 12:21 PM  
 Glucose (POC) 276 (H) 01/15/2021 06:01 AM  
 Glucose (POC) 159 (H) 01/14/2021 08:54 PM  
 Glucose (POC) 169 (H) 01/14/2021 05:19 PM  
 Glucose (POC) 131 (H) 01/14/2021 11:41 AM  
 
Lab Results Component Value Date/Time  Color YELLOW/STRAW 01/03/2021 12:56 PM  
 Appearance CLEAR 01/03/2021 12:56 PM  
 Specific gravity 1.015 01/03/2021 12:56 PM  
 pH (UA) 5.5 01/03/2021 12:56 PM  
 Protein 300 (A) 01/03/2021 12:56 PM  
 Glucose 100 (A) 01/03/2021 12:56 PM  
 Ketone Negative 01/03/2021 12:56 PM  
 Bilirubin Negative 01/03/2021 12:56 PM  
 Urobilinogen 0.2 01/03/2021 12:56 PM  
 Nitrites Negative 01/03/2021 12:56 PM  
 Leukocyte Esterase Negative 01/03/2021 12:56 PM  
 Epithelial cells FEW 01/03/2021 12:56 PM  
 Bacteria 1+ (A) 01/03/2021 12:56 PM  
 WBC 0-4 01/03/2021 12:56 PM  
 RBC 0-5 01/03/2021 12:56 PM  
 
 
 
Medications Reviewed:  
 
Current Facility-Administered Medications Medication Dose Route Frequency  0.9% sodium chloride infusion 250 mL  250 mL IntraVENous PRN  pantoprazole (PROTONIX) tablet 40 mg  40 mg Oral ACB&D  
 dexAMETHasone (DECADRON) tablet 6 mg  6 mg Oral DAILY  insulin glargine (LANTUS) injection 30 Units  30 Units SubCUTAneous QHS  albuterol (PROVENTIL HFA, VENTOLIN HFA, PROAIR HFA) inhaler 2 Puff  2 Puff Inhalation Q4H RT  
 miconazole (MICOTIN) 2 % powder   Topical BID  hydrALAZINE (APRESOLINE) 20 mg/mL injection 20 mg  20 mg IntraVENous Q6H PRN  
 cloNIDine HCL (CATAPRES) tablet 0.1 mg  0.1 mg Oral BID  remdesivir 100 mg in 0.9% sodium chloride 250 mL IVPB  100 mg IntraVENous Q24H  
 furosemide (LASIX) injection 40 mg  40 mg IntraVENous Q12H  
 enoxaparin (LOVENOX) injection 40 mg  40 mg SubCUTAneous Q12H  
 ascorbic acid (vitamin C) (VITAMIN C) tablet 500 mg  500 mg Oral DAILY  zinc sulfate (ZINCATE) 220 (50) mg capsule 1 Cap  1 Cap Oral DAILY  amLODIPine (NORVASC) tablet 10 mg  10 mg Oral DAILY  collagenase (SANTYL) 250 unit/gram ointment   Topical Once per day on Thu Sat  balsam peru-castor oiL (VENELEX) ointment   Topical Q12H  
 insulin lispro (HUMALOG) injection 10 Units  10 Units SubCUTAneous TIDAC  ammonium lactate (LAC-HYDRIN) 12 % lotion   Topical Q12H  sodium bicarbonate tablet 650 mg  650 mg Oral DAILY  sodium chloride (NS) flush 5-40 mL  5-40 mL IntraVENous Q8H  
 sodium chloride (NS) flush 5-40 mL  5-40 mL IntraVENous PRN  
 enzalutamide (XTANDI) chemo capsule 80 mg (patient supplied) (Patient Supplied)  80 mg Oral BID  fluticasone propionate (FLONASE) 50 mcg/actuation nasal spray 2 Spray  2 Spray Both Nostrils DAILY  piperacillin-tazobactam (ZOSYN) 3.375 g in 0.9% sodium chloride (MBP/ADV) 100 mL MBP  3.375 g IntraVENous Q8H  
 oxyCODONE-acetaminophen (PERCOCET) 5-325 mg per tablet 1 Tab  1 Tab Oral Q4H PRN  
 epoetin chi-epbx (RETACRIT) injection 20,000 Units  20,000 Units SubCUTAneous Q MON, WED & FRI  diclofenac (VOLTAREN) 1 % topical gel 2 g  2 g Topical QID  acetaminophen (TYLENOL) tablet 500 mg  500 mg Oral Q4H PRN  
 aspirin delayed-release tablet 81 mg  81 mg Oral DAILY  bicalutamide (CASODEX) tablet 50 mg  50 mg Oral DAILY  gabapentin (NEURONTIN) capsule 100 mg  100 mg Oral TID  hydrALAZINE (APRESOLINE) tablet 100 mg  100 mg Oral TID  tamsulosin (FLOMAX) capsule 0.4 mg  0.4 mg Oral DAILY  nicotine (NICODERM CQ) 14 mg/24 hr patch 1 Patch  1 Patch TransDERmal DAILY  insulin lispro (HUMALOG) injection   SubCUTAneous AC&HS  
 glucose chewable tablet 16 g  4 Tab Oral PRN  
 dextrose (D50W) injection syrg 12.5-25 g  12.5-25 g IntraVENous PRN  
 glucagon (GLUCAGEN) injection 1 mg  1 mg IntraMUSCular PRN  
 
______________________________________________________________________ EXPECTED LENGTH OF STAY: 9d 19h ACTUAL LENGTH OF STAY:          18 
 
            
Candi Walker MD

## 2021-01-15 NOTE — PROGRESS NOTES
Spiritual Care Assessment/Progress Note [address] 
 
 
NAME: Anastasiya Felix III      MRN: [id number] AGE: 72 y.o. SEX: male Anabaptist Affiliation: [facility] Language: [place] 1/15/2021     Total Time (in minutes): 12 Spiritual Assessment begun in [facility] through conversation with: 
  
    []Patient        [] Family    [] Friend(s) Reason for Consult: Rapid response team  
 
Spiritual beliefs: (Please include comment if needed) 
   [] Identifies with a sasha tradition:     
   [] Supported by a sasha community:        
   [] Claims no spiritual orientation:       
   [] Seeking spiritual identity:            
   [] Adheres to an individual form of spirituality:       
   [x] Not able to assess:                   
 
    
Identified resources for coping:  
   [] Prayer                           
   [] Music                  [] Guided Imagery 
   [] Family/friends                 [] Pet visits [] Devotional reading                         [x] Unknown 
   [] Other Interventions offered during this visit: (See comments for more details) Patient Interventions: Other (comment) Plan of Care: 
 
 [] Support spiritual and/or cultural needs  
 [] Support AMD and/or advance care planning process    
 [] Support grieving process 
 [] Coordinate Rites and/or Rituals  
 [] Coordination with community clergy [] No spiritual needs identified at this time 
 [] Detailed Plan of Care below (See Comments)  [] Make referral to Music Therapy 
[] Make referral to Pet Therapy    
[] Make referral to Addiction services 
[] Make referral to [facility] 
[] Make referral to Spiritual Care Partner 
[] No future visits requested       
[x] Follow up upon further referrals Comments:  responded to RRT called for patient. Staff offering care; no family present. Pt currently on isolation precautions. Unable to assess at this time regarding any spiritual needs. Chaplains remain available upon further referral. 
 
Robert Mckeon, [employer]

## 2021-01-16 NOTE — PROGRESS NOTES
Bedside shift change report given to [facility] (oncoming nurse) by Joana Damian (offgoing nurse). Report included the following information SBAR, ED Summary, MAR and Recent Results.

## 2021-01-16 NOTE — PROGRESS NOTES
Bedside shift change report given to Gloria Rachel RN (oncoming nurse) by Mallorie Mcrae (offgoing nurse). Report included the following information SBAR, Kardex, Intake/Output, MAR, Accordion and Recent Results. Problem: Falls - Risk of 
Goal: *Absence of Falls Description: Document [facility] Fall Risk and appropriate interventions in the flowsheet. Outcome: Progressing Towards Goal 
Note: Fall Risk Interventions: 
Mobility Interventions: Communicate number of staff needed for ambulation/transfer, Patient to call before getting OOB, Strengthening exercises (ROM-active/passive) Mentation Interventions: Bed/chair exit alarm, Reorient patient Medication Interventions: Patient to call before getting OOB, Teach patient to arise slowly Elimination Interventions: Call light in reach, Patient to call for help with toileting needs, Urinal in reach, Toileting schedule/hourly rounds Problem: Pressure Injury - Risk of 
Goal: *Prevention of pressure injury Description: Document Edgar Scale and appropriate interventions in the flowsheet. Outcome: Progressing Towards Goal 
Note: Pressure Injury Interventions: 
Sensory Interventions: Assess changes in LOC, Float heels, Minimize linen layers, Pressure redistribution bed/mattress (bed type) Moisture Interventions: Absorbent underpads, Limit adult briefs, Minimize layers, Moisture barrier Activity Interventions: Increase time out of bed, PT/OT evaluation, Pressure redistribution bed/mattress(bed type) Mobility Interventions: Float heels, HOB 30 degrees or less, Turn and reposition approx. every two hours(pillow and wedges) Nutrition Interventions: Document food/fluid/supplement intake, Offer support with meals,snacks and hydration Friction and Shear Interventions: HOB 30 degrees or less, Lift sheet, Minimize layers

## 2021-01-16 NOTE — PROGRESS NOTES
ID Progress Note 1/15/2021 Subjective: Afebrile. Back on high flow. He tells me he is not coughing. White blood cell count is normal. 
 
 
ROS: No anaphylaxis, seizures, syncope, hematemesis, hematochezia Objective:  
 
Vitals:  
Visit Vitals BP (!) 116/53 (BP 1 Location: Left arm, BP Patient Position: At rest) Pulse 70 Temp 98.4 °F (36.9 °C) Resp 15 Ht 6' (1.829 m) Wt 115.5 kg (254 lb 9.6 oz) SpO2 98% BMI 34.53 kg/m² Tmax:  Temp (24hrs), Av.3 °F (36.8 °C), Min:98 °F (36.7 °C), Max:98.6 °F (37 °C) Exam: Not in distress Pink conjunctivae, anicteric sclerae No cervical lymphdenopathy Lungs bilateral crackles Heart: s1, s2, RRR, no murmurs rubs or clicks Abdomen: soft nontender, no guarding or rebound Knees not warm or tender Speech fluent Labs:  
Lab Results Component Value Date/Time WBC 6.6 01/15/2021 05:58 AM  
 Hemoglobin (POC) 6.5 2020 01:59 PM  
 HGB 6.7 (L) 01/15/2021 05:58 AM  
 HCT 22.7 (L) 01/15/2021 05:58 AM  
 PLATELET 203  05:58 AM  
 MCV 92.7 01/15/2021 05:58 AM  
 
Lab Results Component Value Date/Time Sodium 138 01/15/2021 05:59 AM  
 Potassium 4.1 01/15/2021 05:59 AM  
 Chloride 106 01/15/2021 05:59 AM  
 CO2 25 01/15/2021 05:59 AM  
 Anion gap 7 01/15/2021 05:59 AM  
 Glucose 249 (H) 01/15/2021 05:59 AM  
 BUN 47 (H) 01/15/2021 05:59 AM  
 Creatinine 1.83 (H) 01/15/2021 05:59 AM  
 BUN/Creatinine ratio 26 (H) 01/15/2021 05:59 AM  
 GFR est AA 45 (L) 01/15/2021 05:59 AM  
 GFR est non-AA 37 (L) 01/15/2021 05:59 AM  
 Calcium 8.2 (L) 01/15/2021 05:59 AM  
 Bilirubin, total 0.2 01/15/2021 05:59 AM  
 Alk.  phosphatase 115 01/15/2021 05:59 AM  
 Protein, total 7.4 01/15/2021 05:59 AM  
 Albumin 1.8 (L) 01/15/2021 05:59 AM  
 Globulin 5.6 (H) 01/15/2021 05:59 AM  
 A-G Ratio 0.3 (L) 01/15/2021 05:59 AM  
 ALT (SGPT) 14 01/15/2021 05:59 AM  
 
 
 
 
 
Assessment:  
#1 osteomyelitis of the right foot 
  
 #2 group b strep  bacteremia 
  
#3 renal insufficiency 
  
#4 diabetes 
  
#5 prostate cancer 
  
#6 hypertension 
  
#7 heart failure 
  
 #8 covid #9 diarrhea  
  
 
 
  
 
Recommendations:  
 
Continue zosyn. There is OM on the surgical margin. He will need prolonged therapy. Orders written. Continue remdesivir until 1/16 
 
10 day dexa course or shorter if he can be weaned off o2. Chest x-ray reviewed. He has increased bilateral infiltrates. His procalcitonin is low. His white blood cell count is normal.  Perhaps this is from fluid and not from an infection. Lasix has been increased to 80 mg twice a day now. If he spikes fever or his white count goes up, we could start him on Zyvox. Team available over the weekend if needed Thelma Arrington MD

## 2021-01-16 NOTE — PROGRESS NOTES
[address] [facility] Adult  Hospitalist Group Hospitalist Progress Note Mac Sagastume MD 
Answering service: [phone] OR [phone] from in house phone Date of Service:  2021 NAME:  Deisy Hobbs III 
:  1950 MRN:  [id number] Admission Summary:  
Deisy Hobbs III is a 72 y. o. male with PMH of prostatic cancer s/p radiation, IDDM, heart failure, htn. He apparently told the ER physician that he c/o onset of diarrhea yesterday and some chills over the last 2 days, with occasional cough. For me, however, he denies all of these symptoms and states he came in because his caregiver was concerned d/t his lethargy over the last several days to weeks. Interval history / Subjective:  
-3L yesterday Improved from breathing standpoint. Still with shortness of breath but able to be weaned to mid flow. Discussed with his daughters, and they would like to set up a zoom call. Assessment & Plan:  
 
COVID PNA Acute Hypoxic respiratory failure - O2 weaning, keep saturations over 90%, transitioning back to mid flow NC with BiPAP PRN  
- remdesivir to be completed , on decadron until  
- VitC, VitD, and Zn 
- Pulm following  
- Continue diuresis with lasix 80mg BID, with strict I and O  
- Pro calcitonin down trending and now evidence of new bacterial PNA. No need to broaden abx. Severe sepsis 2/2 - resolved 
osteomyelitis right foot -  S/p debridement right foot ulcer with removal of infected bone 2020 
-Cultures with proteus mirabilis, stept group B, enterococcus -ID following. Antibiotics transitioned to zosyn  
-surgical path with + margins, will need prolonged antibiotics ~6 weeks (Zosyn) 
-Powerline placed  
-wound vac  
-non weight bearing right lower extremity (patient wheelchair bound at baseline)-  
  
Acute on chronic anemia, baseline ~9: persistent GI bleed   
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 Duodenal ucler, non bleeding - Transfuse for hemoglobin less than 7, additional unit now  
-Continue PPI 
- s/p IV iron - Monitor CBC 
  
Acute on CKD: improving 
-nephrology following, on bicarb, valtessa  
-kumar removed  
  
IDDM, last a1c 8.1 01/2020 
-continue long acting insulin, 30U - Continue SSI, meal time insulin and POCT glucose checks HTN, controlled 
- amlodipine, clonidine, hydralazine,  
 
Prostate ca 
-Cont home meds 
  
Tobacco abuse 
-continue patch Code status: FULL 
DVT prophylaxis: SCDs due to recent GI bleed Care Plan discussed with: Patient/Family Anticipated Disposition: Home w/Family and SNF/LTC Anticipated Discharge: Greater than 48 hours Hospital Problems  Date Reviewed: 8/20/2020 Codes Class Noted POA * (Principal) Osteomyelitis ([facility] [address] ICD-10-CM: M86.9 ICD-9-CM: 730.20  12/28/2020 Yes Review of Systems: A comprehensive review of systems was negative except for that written in the HPI. Vital Signs:  
 Last 24hrs VS reviewed since prior progress note. Most recent are: 
Visit Vitals BP (!) 154/69 (BP 1 Location: Left arm, BP Patient Position: At rest) Pulse 90 Temp 97.7 °F (36.5 °C) Resp 20 Ht 6' (1.829 m) Wt 116.4 kg (256 lb 11.2 oz) SpO2 98% BMI 34.81 kg/m² Intake/Output Summary (Last 24 hours) at 1/16/2021 1322 Last data filed at 1/16/2021 1131 Gross per 24 hour Intake 444.2 ml Output 2200 ml Net -1755.8 ml Physical Examination:  
 
I had a face to face encounter with this patient and independently examined them on 1/16/2021 as outlined below: 
 
     
Constitutional:  Mild respiratory distress ENT:  Oral mucosa dry, oropharynx benign. Resp:  Course bilaterally. No wheezing/rhonchi/rales. Minimal accessory muscle use CV:  Regular rhythm, normal rate, no murmurs, gallops, rubs GI:  Soft, non distended, non tender. normoactive bowel sounds, no hepatosplenomegaly Musculoskeletal:  No edema, warm, 2+ pulses throughout. R wound vac in place. Neurologic:  Moves all extremities. AAOx3, CN II-XII reviewed Skin:  Good turgor, no rashes or ulcers Data Review:  
 Review and/or order of clinical lab test 
Review and/or order of tests in the radiology section of CPT Review and/or order of tests in the medicine section of [facility] Labs:  
 
Recent Labs  
  01/16/21 
0118 01/15/21 
5644 WBC 5.7 6.6 HGB 7.3* 6.7* HCT 23.9* 22.7*  
 249 Recent Labs  
  01/16/21 
0118 01/15/21 
0559 01/15/21 
4587 01/14/21 
0535  138 137 140  
K 4.4 4.1 4.0 4.0  
 106 105 106 CO2 29 25 26 28 BUN 51* 47* 46* 46* CREA 1.82* 1.83* 1.75* 1.76* * 249* 237* 64*  
CA 8.1* 8.2* 8.6 9.0 MG 1.8  --  1.8 1.8 PHOS 5.0*  --  4.4  --   
 
Recent Labs  
  01/16/21 
0118 01/15/21 
0559 01/14/21 
0535 ALT 11* 14 14  115 117 TBILI 0.2 0.2 0.2 TP 6.6 7.4 7.8 ALB 1.9* 1.8* 2.0*  
GLOB 4.7* 5.6* 5.8* No results for input(s): INR, PTP, APTT, INREXT, INREXT in the last 72 hours. No results for input(s): FE, TIBC, PSAT, FERR in the last 72 hours. Lab Results Component Value Date/Time Folate 20.0 03/16/2019 04:06 AM  
  
No results for input(s): PH, PCO2, PO2 in the last 72 hours. No results for input(s): CPK, CKNDX, TROIQ in the last 72 hours. No lab exists for component: CPKMB No results found for: CHOL, CHOLX, CHLST, CHOLV, HDL, HDLP, LDL, LDLC, DLDLP, TGLX, TRIGL, TRIGP, CHHD, CHHDX Lab Results Component Value Date/Time Glucose (POC) 173 (H) 01/16/2021 11:47 AM  
 Glucose (POC) 107 (H) 01/16/2021 08:14 AM  
 Glucose (POC) 141 (H) 01/15/2021 09:35 PM  
 Glucose (POC) 180 (H) 01/15/2021 04:36 PM  
 Glucose (POC) 220 (H) 01/15/2021 12:21 PM  
 
Lab Results Component Value Date/Time  Color YELLOW/STRAW 01/03/2021 12:56 PM  
 Appearance CLEAR 01/03/2021 12:56 PM  
 Specific gravity 1.015 01/03/2021 12:56 PM  
 pH (UA) 5.5 01/03/2021 12:56 PM  
 Protein 300 (A) 01/03/2021 12:56 PM  
 Glucose 100 (A) 01/03/2021 12:56 PM  
 Ketone Negative 01/03/2021 12:56 PM  
 Bilirubin Negative 01/03/2021 12:56 PM  
 Urobilinogen 0.2 01/03/2021 12:56 PM  
 Nitrites Negative 01/03/2021 12:56 PM  
 Leukocyte Esterase Negative 01/03/2021 12:56 PM  
 Epithelial cells FEW 01/03/2021 12:56 PM  
 Bacteria 1+ (A) 01/03/2021 12:56 PM  
 WBC 0-4 01/03/2021 12:56 PM  
 RBC 0-5 01/03/2021 12:56 PM  
 
 
 
Medications Reviewed:  
 
Current Facility-Administered Medications Medication Dose Route Frequency  0.9% sodium chloride infusion 250 mL  250 mL IntraVENous PRN  pantoprazole (PROTONIX) tablet 40 mg  40 mg Oral ACB&D  
 furosemide (LASIX) injection 80 mg  80 mg IntraVENous Q12H  
 dexAMETHasone (DECADRON) tablet 6 mg  6 mg Oral DAILY  insulin glargine (LANTUS) injection 30 Units  30 Units SubCUTAneous QHS  albuterol (PROVENTIL HFA, VENTOLIN HFA, PROAIR HFA) inhaler 2 Puff  2 Puff Inhalation Q4H RT  
 miconazole (MICOTIN) 2 % powder   Topical BID  hydrALAZINE (APRESOLINE) 20 mg/mL injection 20 mg  20 mg IntraVENous Q6H PRN  
 cloNIDine HCL (CATAPRES) tablet 0.1 mg  0.1 mg Oral BID  remdesivir 100 mg in 0.9% sodium chloride 250 mL IVPB  100 mg IntraVENous Q24H  
 enoxaparin (LOVENOX) injection 40 mg  40 mg SubCUTAneous Q12H  
 ascorbic acid (vitamin C) (VITAMIN C) tablet 500 mg  500 mg Oral DAILY  zinc sulfate (ZINCATE) 220 (50) mg capsule 1 Cap  1 Cap Oral DAILY  amLODIPine (NORVASC) tablet 10 mg  10 mg Oral DAILY  collagenase (SANTYL) 250 unit/gram ointment   Topical Once per day on Thu Sat  balsam peru-castor oiL (VENELEX) ointment   Topical Q12H  
 insulin lispro (HUMALOG) injection 10 Units  10 Units SubCUTAneous TIDAC  ammonium lactate (LAC-HYDRIN) 12 % lotion   Topical Q12H  sodium bicarbonate tablet 650 mg  650 mg Oral DAILY  sodium chloride (NS) flush 5-40 mL  5-40 mL IntraVENous Q8H  
 sodium chloride (NS) flush 5-40 mL  5-40 mL IntraVENous PRN  
 enzalutamide (XTANDI) chemo capsule 80 mg (patient supplied) (Patient Supplied)  80 mg Oral BID  fluticasone propionate (FLONASE) 50 mcg/actuation nasal spray 2 Spray  2 Spray Both Nostrils DAILY  piperacillin-tazobactam (ZOSYN) 3.375 g in 0.9% sodium chloride (MBP/ADV) 100 mL MBP  3.375 g IntraVENous Q8H  
 oxyCODONE-acetaminophen (PERCOCET) 5-325 mg per tablet 1 Tab  1 Tab Oral Q4H PRN  
 epoetin chi-epbx (RETACRIT) injection 20,000 Units  20,000 Units SubCUTAneous Q MON, WED & FRI  diclofenac (VOLTAREN) 1 % topical gel 2 g  2 g Topical QID  acetaminophen (TYLENOL) tablet 500 mg  500 mg Oral Q4H PRN  
 aspirin delayed-release tablet 81 mg  81 mg Oral DAILY  bicalutamide (CASODEX) tablet 50 mg  50 mg Oral DAILY  gabapentin (NEURONTIN) capsule 100 mg  100 mg Oral TID  hydrALAZINE (APRESOLINE) tablet 100 mg  100 mg Oral TID  tamsulosin (FLOMAX) capsule 0.4 mg  0.4 mg Oral DAILY  nicotine (NICODERM CQ) 14 mg/24 hr patch 1 Patch  1 Patch TransDERmal DAILY  insulin lispro (HUMALOG) injection   SubCUTAneous AC&HS  
 glucose chewable tablet 16 g  4 Tab Oral PRN  
 dextrose (D50W) injection syrg 12.5-25 g  12.5-25 g IntraVENous PRN  
 glucagon (GLUCAGEN) injection 1 mg  1 mg IntraMUSCular PRN  
 
______________________________________________________________________ EXPECTED LENGTH OF STAY: 9d 19h ACTUAL LENGTH OF STAY:          19 
 
            
Kadie Hughes MD

## 2021-01-17 NOTE — PROGRESS NOTES
Bedside shift change report given to Tanika Valdivia (oncoming nurse) by Diana Bennett RN (offgoing nurse). Report included the following information SBAR, Kardex, Procedure Summary, Intake/Output, MAR, Recent Results, Med Rec Status and Cardiac Rhythm NSR Pt  Is able shift  side to side and able to move up in bed with x2 assist. Pt has refused to turn to his side when suggested by this RN. I reminded pt that he needs to shift body every so often as to prevent pressure ulcers. Pt states understanding but still refused. Last 3 Recorded Weights in this Encounter 01/15/21 1830 01/16/21 0413 01/17/21 0018 Weight: 115.5 kg (254 lb 9.6 oz) 116.4 kg (256 lb 11.2 oz) 115.2 kg (254 lb) Problem: Falls - Risk of 
Goal: *Absence of Falls Description: Document Jey Wills Fall Risk and appropriate interventions in the flowsheet. Outcome: Progressing Towards Goal 
Note: Fall Risk Interventions: 
Mobility Interventions: Communicate number of staff needed for ambulation/transfer, Patient to call before getting OOB Mentation Interventions: Adequate sleep, hydration, pain control, Bed/chair exit alarm Medication Interventions: Evaluate medications/consider consulting pharmacy Elimination Interventions: Call light in reach Problem: Patient Education: Go to Patient Education Activity Goal: Patient/Family Education Outcome: Progressing Towards Goal 
  
Problem: Pressure Injury - Risk of 
Goal: *Prevention of pressure injury Description: Document Edgar Scale and appropriate interventions in the flowsheet. Outcome: Progressing Towards Goal 
Note: Pressure Injury Interventions: 
Sensory Interventions: Assess need for specialty bed, Float heels, Keep linens dry and wrinkle-free Moisture Interventions: Absorbent underpads, Apply protective barrier, creams and emollients, Assess need for specialty bed Activity Interventions: Increase time out of bed, Pressure redistribution bed/mattress(bed type) Mobility Interventions: Float heels, HOB 30 degrees or less, Pressure redistribution bed/mattress (bed type) Nutrition Interventions: Document food/fluid/supplement intake Friction and Shear Interventions: Feet elevated on foot rest, HOB 30 degrees or less Problem: Risk for Spread of Infection Goal: Prevent transmission of infectious organism to others Description: Prevent the transmission of infectious organisms to other patients, staff members, and visitors. Outcome: Progressing Towards Goal 
  
Problem: Nutrition Deficit Goal: *Optimize nutritional status Outcome: Progressing Towards Goal

## 2021-01-17 NOTE — PROGRESS NOTES
1500: updated pt's daughter, Keyla Velarde. Scheduled zoom meeting for later this afternoon, at 1830. Email: [email or web address] Verbal shift change report given to Candi Ceja (oncoming nurse) by Shena Griffin (offgoing nurse). Report included the following information SBAR, Kardex, ED Summary, Procedure Summary, Intake/Output, MAR, Recent Results and Cardiac Rhythm NSR.

## 2021-01-17 NOTE — PROGRESS NOTES
[address] [facility] Adult  Hospitalist Group Hospitalist Progress Note Silas Rueda MD 
Answering service: [phone] OR [phone] from in house phone Date of Service:  2021 NAME:  Lane Castro III 
:  1950 MRN:  [id number] Admission Summary:  
Lane Castro III is a 72 y. o. male with PMH of prostatic cancer s/p radiation, IDDM, heart failure, htn. He apparently told the ER physician that he c/o onset of diarrhea yesterday and some chills over the last 2 days, with occasional cough. For me, however, he denies all of these symptoms and states he came in because his caregiver was concerned d/t his lethargy over the last several days to weeks. Interval history / Subjective: Had to go back on BiPAP intermittently yesterday due to feeling subjective SOB. Saturations have been good. Overall he feels slightly better than yesterday. Still with episodes of intermittent anxiety and SOB requiring BiPAP. Net - 1.6L yesterday. Assessment & Plan:  
 
COVID PNA Acute Hypoxic respiratory failure - O2 weaning, keep saturations over 90%, transitioning back to mid flow NC with BiPAP PRN  
- remdesivir completed , on decadron until  
- VitC, VitD, and Zn 
- Pulm following  
- Continue diuresis with lasix 80mg BID, with strict I and O  
- Pro calcitonin down trending and now evidence of new bacterial PNA. No need to broaden abx. Severe sepsis 2/2 - resolved 
osteomyelitis right foot -  S/p debridement right foot ulcer with removal of infected bone 2020 
-Cultures with proteus mirabilis, stept group B, enterococcus -ID following.  Antibiotics transitioned to zosyn  
-surgical path with + margins, will need prolonged antibiotics ~6 weeks (Zosyn) 
-Powerline placed  
-wound vac  
-non weight bearing right lower extremity (patient wheelchair bound at baseline)-  
   
Acute on chronic anemia, baseline ~9: persistent GI bleed   
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 Duodenal ucler, non bleeding - Transfuse for hemoglobin less than 7, additional unit now  
-Continue PPI 
- s/p IV iron - Monitor CBC 
  
Acute on CKD: improving 
-nephrology following, on bicarb, valtessa  
-kumar removed  
  
IDDM, last a1c 8.1 01/2020 
-continue long acting insulin, decrease to 25U due to BS 70's - Continue SSI, meal time insulin and POCT glucose checks HTN, controlled 
- amlodipine, clonidine, hydralazine,  
 
Prostate ca 
-Cont home meds 
  
Tobacco abuse 
-continue patch Code status: FULL 
DVT prophylaxis: SCDs due to recent GI bleed Care Plan discussed with: Patient/Family Anticipated Disposition: Home w/Family and SNF/LTC Anticipated Discharge: Greater than 48 hours Hospital Problems  Date Reviewed: 8/20/2020 Codes Class Noted POA * (Principal) Osteomyelitis ([facility] [address] ICD-10-CM: M86.9 ICD-9-CM: 730.20  12/28/2020 Yes Review of Systems: A comprehensive review of systems was negative except for that written in the HPI. Vital Signs:  
 Last 24hrs VS reviewed since prior progress note. Most recent are: 
Visit Vitals BP (!) 143/63 (BP 1 Location: Left arm, BP Patient Position: At rest) Pulse 84 Temp 98.5 °F (36.9 °C) Resp 21 Ht 6' (1.829 m) Wt 115.2 kg (254 lb) SpO2 98% BMI 34.45 kg/m² Intake/Output Summary (Last 24 hours) at 1/17/2021 1430 Last data filed at 1/17/2021 1252 Gross per 24 hour Intake 860 ml Output 2315 ml Net -1455 ml Physical Examination:  
 
I had a face to face encounter with this patient and independently examined them on 1/17/2021 as outlined below: 
 
     
Constitutional:  NAD, awake and in bed. ENT:  Oral mucosa moist, oropharynx benign. Resp:  Course bilaterally. + scattered exp wheezing. No increased work of breathing. CV:  Regular rhythm, normal rate, no murmurs, gallops, rubs GI:  Soft, non distended, non tender. normoactive bowel sounds, no hepatosplenomegaly Musculoskeletal:  No edema, warm, 2+ pulses throughout. R wound vac in place. Neurologic:  Moves all extremities. AAOx3, CN II-XII reviewed Skin:  Good turgor, no rashes or ulcers Data Review:  
 Review and/or order of clinical lab test 
Review and/or order of tests in the radiology section of CPT Review and/or order of tests in the medicine section of CPT Labs:  
 
Recent Labs  
  01/17/21 
0512 01/16/21 
0118 WBC 4.6 5.7 HGB 7.1* 7.3* HCT 23.5* 23.9*  
 227 Recent Labs  
  01/17/21 
7255 01/16/21 
0118 01/15/21 
0559 01/15/21 
8042  139 138 137  
K 4.3 4.4 4.1 4.0  
 108 106 105 CO2 26 29 25 26 BUN 56* 51* 47* 46* CREA 1.99* 1.82* 1.83* 1.75* * 107* 249* 237* CA 8.5 8.1* 8.2* 8.6 MG 1.9 1.8  --  1.8 PHOS 4.1 5.0*  --  4.4 Recent Labs  
  01/16/21 0118 01/15/21 
0559 ALT 11* 14  
 115 TBILI 0.2 0.2 TP 6.6 7.4 ALB 1.9* 1.8*  
GLOB 4.7* 5.6* No results for input(s): INR, PTP, APTT, INREXT, INREXT in the last 72 hours. No results for input(s): FE, TIBC, PSAT, FERR in the last 72 hours. Lab Results Component Value Date/Time Folate 20.0 03/16/2019 04:06 AM  
  
No results for input(s): PH, PCO2, PO2 in the last 72 hours. No results for input(s): CPK, CKNDX, TROIQ in the last 72 hours. No lab exists for component: CPKMB No results found for: CHOL, CHOLX, CHLST, CHOLV, HDL, HDLP, LDL, LDLC, DLDLP, TGLX, TRIGL, TRIGP, CHHD, CHHDX Lab Results Component Value Date/Time Glucose (POC) 231 (H) 01/17/2021 11:55 AM  
 Glucose (POC) 78 01/17/2021 08:45 AM  
 Glucose (POC) 135 (H) 01/16/2021 09:50 PM  
 Glucose (POC) 250 (H) 01/16/2021 06:33 PM  
 Glucose (POC) 268 (H) 01/16/2021 04:53 PM  
 
Lab Results Component Value Date/Time Color YELLOW/STRAW 01/03/2021 12:56 PM  
 Appearance CLEAR 01/03/2021 12:56 PM  
 Specific gravity 1.015 01/03/2021 12:56 PM  
 pH (UA) 5.5 01/03/2021 12:56 PM  
 Protein 300 (A) 01/03/2021 12:56 PM  
 Glucose 100 (A) 01/03/2021 12:56 PM  
 Ketone Negative 01/03/2021 12:56 PM  
 Bilirubin Negative 01/03/2021 12:56 PM  
 Urobilinogen 0.2 01/03/2021 12:56 PM  
 Nitrites Negative 01/03/2021 12:56 PM  
 Leukocyte Esterase Negative 01/03/2021 12:56 PM  
 Epithelial cells FEW 01/03/2021 12:56 PM  
 Bacteria 1+ (A) 01/03/2021 12:56 PM  
 WBC 0-4 01/03/2021 12:56 PM  
 RBC 0-5 01/03/2021 12:56 PM  
 
 
 
Medications Reviewed:  
 
Current Facility-Administered Medications Medication Dose Route Frequency  0.9% sodium chloride infusion 250 mL  250 mL IntraVENous PRN  pantoprazole (PROTONIX) tablet 40 mg  40 mg Oral ACB&D  
 furosemide (LASIX) injection 80 mg  80 mg IntraVENous Q12H  
 dexAMETHasone (DECADRON) tablet 6 mg  6 mg Oral DAILY  insulin glargine (LANTUS) injection 30 Units  30 Units SubCUTAneous QHS  albuterol (PROVENTIL HFA, VENTOLIN HFA, PROAIR HFA) inhaler 2 Puff  2 Puff Inhalation Q4H RT  
 miconazole (MICOTIN) 2 % powder   Topical BID  hydrALAZINE (APRESOLINE) 20 mg/mL injection 20 mg  20 mg IntraVENous Q6H PRN  
 cloNIDine HCL (CATAPRES) tablet 0.1 mg  0.1 mg Oral BID  enoxaparin (LOVENOX) injection 40 mg  40 mg SubCUTAneous Q12H  
 ascorbic acid (vitamin C) (VITAMIN C) tablet 500 mg  500 mg Oral DAILY  zinc sulfate (ZINCATE) 220 (50) mg capsule 1 Cap  1 Cap Oral DAILY  amLODIPine (NORVASC) tablet 10 mg  10 mg Oral DAILY  collagenase (SANTYL) 250 unit/gram ointment   Topical Once per day on Thu Sat  balsam peru-castor oiL (VENELEX) ointment   Topical Q12H  
 insulin lispro (HUMALOG) injection 10 Units  10 Units SubCUTAneous TIDAC  ammonium lactate (LAC-HYDRIN) 12 % lotion   Topical Q12H  
 sodium bicarbonate tablet 650 mg  650 mg Oral DAILY  sodium chloride (NS) flush 5-40 mL  5-40 mL IntraVENous Q8H  
 sodium chloride (NS) flush 5-40 mL  5-40 mL IntraVENous PRN  
 enzalutamide (XTANDI) chemo capsule 80 mg (patient supplied) (Patient Supplied)  80 mg Oral BID  fluticasone propionate (FLONASE) 50 mcg/actuation nasal spray 2 Spray  2 Spray Both Nostrils DAILY  piperacillin-tazobactam (ZOSYN) 3.375 g in 0.9% sodium chloride (MBP/ADV) 100 mL MBP  3.375 g IntraVENous Q8H  
 oxyCODONE-acetaminophen (PERCOCET) 5-325 mg per tablet 1 Tab  1 Tab Oral Q4H PRN  
 epoetin chi-epbx (RETACRIT) injection 20,000 Units  20,000 Units SubCUTAneous Q MON, WED & FRI  diclofenac (VOLTAREN) 1 % topical gel 2 g  2 g Topical QID  acetaminophen (TYLENOL) tablet 500 mg  500 mg Oral Q4H PRN  
 aspirin delayed-release tablet 81 mg  81 mg Oral DAILY  bicalutamide (CASODEX) tablet 50 mg  50 mg Oral DAILY  gabapentin (NEURONTIN) capsule 100 mg  100 mg Oral TID  hydrALAZINE (APRESOLINE) tablet 100 mg  100 mg Oral TID  tamsulosin (FLOMAX) capsule 0.4 mg  0.4 mg Oral DAILY  nicotine (NICODERM CQ) 14 mg/24 hr patch 1 Patch  1 Patch TransDERmal DAILY  insulin lispro (HUMALOG) injection   SubCUTAneous AC&HS  
 glucose chewable tablet 16 g  4 Tab Oral PRN  
 dextrose (D50W) injection syrg 12.5-25 g  12.5-25 g IntraVENous PRN  
 glucagon (GLUCAGEN) injection 1 mg  1 mg IntraMUSCular PRN  
 
______________________________________________________________________ EXPECTED LENGTH OF STAY: 9d 19h ACTUAL LENGTH OF STAY:          20 
 
            
Jose Rafael Prince MD

## 2021-01-18 NOTE — PROGRESS NOTES
ID Progress Note 2021 Subjective: Afebrile. He is requiring a significant amount of oxygen. No dysuria or abdominal pain. His chest x-ray today shows worsening right airspace disease ROS: No anaphylaxis, seizures, syncope, hematemesis, hematochezia Objective:  
 
Vitals:  
Visit Vitals BP (!) 154/58 Pulse 80 Temp 97.9 °F (36.6 °C) Resp 20 Ht 6' (1.829 m) Wt 114 kg (251 lb 6.4 oz) SpO2 (!) 80% BMI 34.10 kg/m² Tmax:  Temp (24hrs), Av °F (36.7 °C), Min:97.6 °F (36.4 °C), Max:98.5 °F (36.9 °C) Exam: Not in distress Pink conjunctivae, anicteric sclerae No cervical lymphdenopathy Lungs bilateral crackles Heart: s1, s2, RRR, no murmurs rubs or clicks Abdomen: soft nontender, no guarding or rebound Knees not warm or tender Speech fluent Labs:  
Lab Results Component Value Date/Time WBC 6.1 2021 05:51 AM  
 Hemoglobin (POC) 6.5 2020 01:59 PM  
 HGB 7.3 (L) 2021 05:51 AM  
 HCT 24.0 (L) 2021 05:51 AM  
 PLATELET 584  05:51 AM  
 MCV 91.6 2021 05:51 AM  
 
Lab Results Component Value Date/Time Sodium 139 2021 05:51 AM  
 Potassium 4.1 2021 05:51 AM  
 Chloride 105 2021 05:51 AM  
 CO2 27 2021 05:51 AM  
 Anion gap 7 2021 05:51 AM  
 Glucose 88 2021 05:51 AM  
 BUN 56 (H) 2021 05:51 AM  
 Creatinine 1.94 (H) 2021 05:51 AM  
 BUN/Creatinine ratio 29 (H) 2021 05:51 AM  
 GFR est AA 42 (L) 2021 05:51 AM  
 GFR est non-AA 34 (L) 2021 05:51 AM  
 Calcium 8.3 (L) 2021 05:51 AM  
 Bilirubin, total 0.2 2021 01:18 AM  
 Alk.  phosphatase 106 2021 01:18 AM  
 Protein, total 6.6 2021 01:18 AM  
 Albumin 1.9 (L) 2021 01:18 AM  
 Globulin 4.7 (H) 2021 01:18 AM  
 A-G Ratio 0.4 (L) 2021 01:18 AM  
 ALT (SGPT) 11 (L) 2021 01:18 AM  
 
 
 
 
 
Assessment:  
#1 osteomyelitis of the right foot 
  
 #2 group b strep  bacteremia 
  
#3 renal insufficiency 
  
#4 diabetes 
  
#5 prostate cancer 
  
#6 hypertension 
  
#7 heart failure 
  
 #8 covid #9 diarrhea  
  
 
 
  
 
Recommendations:  
 
Continue zosyn. There is OM on the surgical margin. He will need prolonged therapy. Orders written. He has completed treatment with remdesivir.  (1/121/16) He has been on dexamethasone since January 12. Chest x-ray reviewed. He is still requiring significant oxygen. His chest x-ray shows worsening despite getting Lasix 80 mg twice a day. I will place him on Zyvox today.  
 
 
  
 
Otto Shaw MD

## 2021-01-18 NOTE — CONSULTS
Palliative Medicine Consult [place]: [phone]) Patient Name: Carlos Stout YOB: 1950 Date of Initial Consult: 1/18/2021 Reason for Consult: care decisions Requesting Provider: Dr. Fermin Conway Primary Care Physician: Carolina Yo MD 
 
 SUMMARY:  
Sarah Hernandez III is a 72 y.o. with a past history of prostate cancer s/p XRT/ now w widespread bone mets, Heart failure, IDDM, tobacco use (1PPD), HTN, diabetic foot wound, who was admitted on 12/28/2020 from home with a diagnosis of diarrhea, chills for 2 days , lethargy. Current medical issues leading to Palliative Medicine involvement include: Prolonged hospital stay, with osteomyelitis of R foot s/p debridement 12/30/2020 (needs 6 weeks abx, on Zosyn, Wound vac 1/5), COVID positive respiratory failure (covid negative 12/28, covid positive 1/11), remdesivir end 1/16, on decadron until 1/21, requiring diuresis. GI bleed ( EGD 1/4: clip to active bleed Dieulafoy lesion, duodenal ulcer not bleeding) s/p IV iron. KATHI on CKD. Patient continues to have high oxygen requirement, on/off BIPAP Patient is  for 30 years. He has 4 children. Dtr Ida Gates ( [phone]) is one he says he would want to make medical decisions if needed. Also son Mark Martins 
dtr Melinda PALLIATIVE DIAGNOSES:  
1. Shortness of breath, Acute respiratory failure, COVID19 pneumonia 2. Osteomyelitis R foot 3. GI bleed, anemia 4. Debility 5. Metastatic prostate cancer (on casodex) 6. Anxiety about health 7. Care goals discussion PLAN:  
1. Palliative Medicine introduced to patient as added layer of support. 1. Bowman about this difficult journey over past few months, many challenges. 2. He is comfortable right now, but being in hospital so long has been hard. He feels everyone has been so nice, and he's getting good help. 3. Getting SOB is especially hard. He feels it helps when he gets lasix, when he has nebs, and also to have someone in the room is very helpful. It's hard to wait for someone to get into help him when he needs it, can feel very anxious at times, especially knowing what that SOB can be like. 4. He had a zoom call with family yesterday, that really was great. 5. He has never completed AMD.  He has never appointed MPOA, but would trust daughter, Fitz Mora, if needed. Clarified, that mPOA would only decide for him /be his voice IF he wasn't able to make own choices. 6. Someone mentioned ventilator to him yesterday, he was on that in the past, remembers how awful it was, hopes he wouldn't need that again. We talk at length about that, how IF he did get worse and get put on ventilator, not likely to be bridge to recovery, likely he would pass on machines. We talk about his underlying health issues in addition to COVID, and that he's in a different spot now than when he was last on ventilator in Aug/Sept.  This makes sense to him, and he tells me he wouldn't want a ventilator, if he wasn't going to get better, like recover to get out of hospital / back home. Recommend we talk w his dtr Fitz Mora about this, so she has good understanding of his wishes, where he draws the line/ what would be acceptable and what wouldn't. Right now we continue to do all we can to improve his health, help him recover from underlying issues. If someone chooses to avoid ventilator and they have worse respiratory issues, we help them with medications to be comfortable. Patient agrees to zoom call with Fitz Mora to review care goals. 7. Call placed to Fitz Mora, I introduced palliative medicine and explain above conversation. She gave me her email so we could set up zoom at 4;00 today. She also wants to include her brother Tisha Clemons and her sister Melinda 2. Initial consult note routed to primary continuity provider and/or primary health care team members 3. Communicated plan of care with: [id number] Team 
 
Addendum One hour zoom with the patient, children. Reviewed patient and my conversations of earlier today. Long discussion about ventilation, anticipated poor outcome if it comes to that. Patient initially says he would never want intubation again, and said \"I don't want to die with a tube in my throat\". Very emotional input, family would want him to be intubated, just in case, he survived intubation before, with hope he could make it. At end of conversation, after hearing what the children wanted, patient said he would be accepting of intubation, and recommended that if it comes to that \"they be the  of what to do\" Patient very appreciative of the opportunity for conversation, chance to talk with his children about this. Patient remains FULL CODE. GOALS OF CARE / TREATMENT PREFERENCES:  
 
GOALS OF CARE: 
Patient/Health Care Proxy Stated Goals: Rehabilitation TREATMENT PREFERENCES:  
Code Status: Full Code Advance Care Planning: 
[] The [facility] Interdisciplinary Team has updated the ACP Navigator with [address] and Patient Capacity Advance Care Planning 12/30/2020 Patient's Healthcare Decision Maker is: -  
Primary Decision Maker Name -  
Primary Decision Maker Phone Number -  
Primary Decision Maker Relationship to Patient -  
Confirm Advance Directive None Patient Would Like to Complete Advance Directive - Medical Interventions: Limited additional interventions Other Instructions: Other: As far as possible, the palliative care team has discussed with patient / health care proxy about goals of care / treatment preferences for patient. HISTORY:  
 
History obtained from: chart, patient CHIEF COMPLAINT: admitted with chills, diarrhea HPI/SUBJECTIVE:   
 The patient is:  
[x] Verbal and participatory [] Non-participatory due to:  
 
72year old male with complex medical history as above Admitted with diarrhea, chills, and caregiver reporting lethargy Clinical Pain Assessment (nonverbal scale for severity on nonverbal patients):  
Clinical Pain Assessment Severity: 0 Activity (Movement): Lying quietly, normal position Duration: for how long has pt been experiencing pain (e.g., 2 days, 1 month, years) Frequency: how often pain is an issue (e.g., several times per day, once every few days, constant) FUNCTIONAL ASSESSMENT:  
 
Palliative Performance Scale (PPS): PPS: 30 
 
 
 PSYCHOSOCIAL/SPIRITUAL SCREENING:  
 
Palliative IDT has assessed this patient for cultural preferences / practices and a referral made as appropriate to needs (Cultural Services, Patient Advocacy, Ethics, etc.) Any spiritual / Jehovah's witness concerns: 
[] Yes /  [x] No 
 
Caregiver Burnout: 
[] Yes /  [x] No /  [] No Caregiver Present Anticipatory grief assessment:  
[x] Normal  / [] Maladaptive ESAS Anxiety: Anxiety: 0 
 
ESAS Depression: Depression: 0 REVIEW OF SYSTEMS:  
 
Positive and pertinent negative findings in ROS are noted above in HPI. The following systems were [x] reviewed / [] unable to be reviewed as noted in HPI Other findings are noted below. Systems: constitutional, ears/nose/mouth/throat, respiratory, gastrointestinal, genitourinary, musculoskeletal, integumentary, neurologic, psychiatric, endocrine. Positive findings noted below. Modified ESAS Completed by: provider Fatigue: 5 Drowsiness: 0 Depression: 0 Pain: 0 Anxiety: 0 Nausea: 0 Anorexia: 1 Dyspnea: 4 Constipation: No  
  Stool Occurrence(s): 1 PHYSICAL EXAM:  
 
From RN flowsheet: 
Wt Readings from Last 3 Encounters:  
01/18/21 251 lb 6.4 oz (114 kg) 02/24/20 280 lb (127 kg) 02/10/20 280 lb (127 kg) Blood pressure (!) 157/59, pulse 84, temperature 97.7 °F (36.5 °C), resp. rate 28, height 6' (1.829 m), weight 251 lb 6.4 oz (114 kg), SpO2 97 %. Pain Scale 1: Numeric (0 - 10) Pain Intensity 1: 8 Pain Onset 1: acute Pain Location 1: Leg 
Pain Orientation 1: Right Pain Description 1: Aching, Throbbing Pain Intervention(s) 1: Medication (see MAR) Last bowel movement, if known:  
 
Constitutional: awake, alert lying in hospital bed NAD Eyes: pupils equal, anicteric Hi flow Nasal cannula in place Elevated RR Speech is fluent Good eye contact, affect normal 
Insight intact Wound vac in place R foot. HISTORY:  
 
Principal Problem: 
  Osteomyelitis ([facility] [address] (12/28/2020) Past Medical History:  
Diagnosis Date  Arthritis, Degenerative,  Knee 4/4/2011  Carcinoma, Prostate 4/4/2011  Degenerative disc disease 4/4/2011  Depression/ Anxiety 4/4/2011  Diabetes Mellitrus ( non-insulin dependent ) Type 2 4/4/2011  
 Heart failure ([address]  HEMATOCHEZIA 4/4/2011  Hypertrension 4/4/2011  Hypertriglyceridemia 4/4/2011  Ill-defined condition   
 lymphadema  Insomnia 4/4/2011  Laryngitis 4/4/2011  Mild Aortic insufficiency 4/4/2011  Mild Concentric LVH (left ventricular hypertrophy) 4/4/2011  Neuropathy 4/4/2011  Osteoarthritis 4/4/2011  Rotator Cuff Tendonitis 4/4/2011 Past Surgical History:  
Procedure Laterality Date  COLONOSCOPY N/A 4/28/2017 COLONOSCOPY performed by [facility]ika Abebe MD at [facility] ENDOSCOPY  
 HX ORTHOPAEDIC    
 left hip pinning  HX ORTHOPAEDIC    
 right hip replacement  HX OTHER SURGICAL    
 left little toe amputation  HX UROLOGICAL    
 IR INSERT TUNL CVC W/O PORT OVER 5 YR  1/6/2021  [place] CARDIAC SURG PROCEDURE UNLIST    
 cardiac cath Family History Family history unknown: Yes History reviewed, no pertinent family history. Social History Tobacco Use  Smoking status: Current Every Day Smoker Packs/day: 1.00 Last attempt to quit: 2019 Years since quittin.2  Smokeless tobacco: Never Used Substance Use Topics  Alcohol use: Not Currently Alcohol/week: 11.7 standard drinks Types: 7 Cans of beer, 7 Shots of liquor per week No Known Allergies Current Facility-Administered Medications Medication Dose Route Frequency  insulin glargine (LANTUS) injection 27 Units  27 Units SubCUTAneous QHS  [START ON 2021] enoxaparin (LOVENOX) injection 40 mg  40 mg SubCUTAneous Q24H  
 linezolid in dextrose 5% (ZYVOX) IVPB premix in D5W 600 mg  600 mg IntraVENous Q12H  
 0.9% sodium chloride infusion 250 mL  250 mL IntraVENous PRN  pantoprazole (PROTONIX) tablet 40 mg  40 mg Oral ACB&D  
 furosemide (LASIX) injection 80 mg  80 mg IntraVENous Q12H  
 dexAMETHasone (DECADRON) tablet 6 mg  6 mg Oral DAILY  albuterol (PROVENTIL HFA, VENTOLIN HFA, PROAIR HFA) inhaler 2 Puff  2 Puff Inhalation Q4H RT  
 miconazole (MICOTIN) 2 % powder   Topical BID  hydrALAZINE (APRESOLINE) 20 mg/mL injection 20 mg  20 mg IntraVENous Q6H PRN  
 cloNIDine HCL (CATAPRES) tablet 0.1 mg  0.1 mg Oral BID  ascorbic acid (vitamin C) (VITAMIN C) tablet 500 mg  500 mg Oral DAILY  zinc sulfate (ZINCATE) 220 (50) mg capsule 1 Cap  1 Cap Oral DAILY  amLODIPine (NORVASC) tablet 10 mg  10 mg Oral DAILY  collagenase (SANTYL) 250 unit/gram ointment   Topical Once per day on u Sat  balsam peru-castor oiL (VENELEX) ointment   Topical Q12H  
 insulin lispro (HUMALOG) injection 10 Units  10 Units SubCUTAneous TIDAC  ammonium lactate (LAC-HYDRIN) 12 % lotion   Topical Q12H  
 sodium bicarbonate tablet 650 mg  650 mg Oral DAILY  sodium chloride (NS) flush 5-40 mL  5-40 mL IntraVENous Q8H  
 sodium chloride (NS) flush 5-40 mL  5-40 mL IntraVENous PRN  
 enzalutamide (XTANDI) chemo capsule 80 mg (patient supplied) (Patient Supplied)  80 mg Oral BID  
 • fluticasone propionate (FLONASE) 50 mcg/actuation nasal spray 2 Spray  2 Spray Both Nostrils DAILY  
• piperacillin-tazobactam (ZOSYN) 3.375 g in 0.9% sodium chloride (MBP/ADV) 100 mL MBP  3.375 g IntraVENous Q8H  
• oxyCODONE-acetaminophen (PERCOCET) 5-325 mg per tablet 1 Tab  1 Tab Oral Q4H PRN  
• epoetin chi-epbx (RETACRIT) injection 20,000 Units  20,000 Units SubCUTAneous Q MON, WED & FRI  
• diclofenac (VOLTAREN) 1 % topical gel 2 g  2 g Topical QID  
• acetaminophen (TYLENOL) tablet 500 mg  500 mg Oral Q4H PRN  
• aspirin delayed-release tablet 81 mg  81 mg Oral DAILY  
• bicalutamide (CASODEX) tablet 50 mg  50 mg Oral DAILY  
• gabapentin (NEURONTIN) capsule 100 mg  100 mg Oral TID  
• hydrALAZINE (APRESOLINE) tablet 100 mg  100 mg Oral TID  
• tamsulosin (FLOMAX) capsule 0.4 mg  0.4 mg Oral DAILY  
• nicotine (NICODERM CQ) 14 mg/24 hr patch 1 Patch  1 Patch TransDERmal DAILY  
• insulin lispro (HUMALOG) injection   SubCUTAneous AC&HS  
• glucose chewable tablet 16 g  4 Tab Oral PRN  
• dextrose (D50W) injection syrg 12.5-25 g  12.5-25 g IntraVENous PRN  
• glucagon (GLUCAGEN) injection 1 mg  1 mg IntraMUSCular PRN  
 
 
 
 LAB AND IMAGING FINDINGS:  
 
Lab Results  
Component Value Date/Time  
 WBC 6.1 01/18/2021 05:51 AM  
 HGB 7.3 (L) 01/18/2021 05:51 AM  
 PLATELET 233 01/18/2021 05:51 AM  
 
Lab Results  
Component Value Date/Time  
 Sodium 139 01/18/2021 05:51 AM  
 Potassium 4.1 01/18/2021 05:51 AM  
 Chloride 105 01/18/2021 05:51 AM  
 CO2 27 01/18/2021 05:51 AM  
 BUN 56 (H) 01/18/2021 05:51 AM  
 Creatinine 1.94 (H) 01/18/2021 05:51 AM  
 Calcium 8.3 (L) 01/18/2021 05:51 AM  
 Magnesium 1.8 01/18/2021 05:51 AM  
 Phosphorus 3.6 01/18/2021 05:51 AM  
  
Lab Results  
Component Value Date/Time  
 Alk. phosphatase 106 01/16/2021 01:18 AM  
 Protein, total 6.6 01/16/2021 01:18 AM  
 Albumin 1.9 (L) 01/16/2021 01:18 AM  
 Globulin 4.7 (H) 01/16/2021 01:18 AM  
 
Lab Results  
 Component Value Date/Time INR 1.1 12/31/2020 05:03 PM  
 Prothrombin time 11.3 (H) 12/31/2020 05:03 PM  
 aPTT 33.1 (H) 10/24/2016 03:26 PM  
  
Lab Results Component Value Date/Time Iron 42 01/11/2021 04:35 AM  
 TIBC 201 (L) 01/11/2021 04:35 AM  
 Iron % saturation 21 01/11/2021 04:35 AM  
 Ferritin 266 12/30/2020 01:17 AM  
  
No results found for: PH, PCO2, PO2 No components found for: [place] Lab Results Component Value Date/Time CK 22 (L) 01/13/2021 03:39 AM  
  
 
 
   
 
Total time: 95min Counseling / coordination time, spent as noted above: 70 
> 50% counseling / coordination?: yes Prolonged service was provided for  []30 min   []75 min in face to face time in the presence of the patient, spent as noted above. Time Start:  
Time End:  
Note: this can only be billed with 52623 (initial) or 40376 (follow up). If multiple start / stop times, list each separately.

## 2021-01-18 NOTE — PROGRESS NOTES
Nephrology Progress Note Layton Cooper III Date of Admission : 12/28/2020 CC: Follow up for KATHI on CKD Assessment and Plan KATHI on CKD  
- suspected ATN from severe pre renal azotemia - U/S neg for hydro - UA with proteinuria - Cr stable 
- cont diuresis 
- daily labs and I/os Hyperkalemia: 
- low K diet, no ACE/ARB 
- K stable CKD Stage IIIa: 
-  Presumed 2/2 DM and HTN 
- nephrotic range proteinuria 
- baseline Cr 1.7 mg/dl  
- followed by Dr. Bong Kaye COVID-19 PNA: 
- positive on 1/11 
- on decadron and remdesivir 
  
Hx of prostate cancer s/p XRT 
  
Metabolic acidosis: 
-  Continue oral Bicarb  
  
Anemia in CKD + blood loss: 
- cont JAZ 
- EGD 1/5/2021: An actively bleeding Dieulafoy's lesion was noted in the gastric fundus.   
  
Type II DM: 
- on insulin 
  
HTN :  
- Continue current meds  
  
R foot osteo: 
- on abx 
- s/p debridement on 12/30 Group B strep bacteremia: 
- repeat cx neg Interval History: Not examined in the room Stable oxygenation per RN. Cr stable. Stable UOP. No other issues per RN overnight. Current Medications: all current  Medications have been eviewed in [facility] Review of Systems: Pertinent items are noted in HPI. Objective: 
Vitals:   
Vitals:  
 01/18/21 0456 01/18/21 0538 01/18/21 0700 01/18/21 0802 BP:  138/65 (!) 154/58 Pulse: 69 74 80 Resp: 23 19 20 Temp:  98.4 °F (36.9 °C) 97.9 °F (36.6 °C) SpO2: 97% 95% 98% (!) 80% Weight: 114 kg (251 lb 6.4 oz) Height:      
 
Intake and Output: 
No intake/output data recorded. 01/16 1901 - 01/18 0700 In: 960 [P.O.:360; I.V.:600] Out: 2836 [CEGMF:1941; Drains:100] Physical Examination:  Not examined in the room due to COVID-19 infection: 
 
General: Obese, on high flow Resp:  Stable oxygenation on high flow CV:  RRR on monitor Neurologic:  Non focal 
Psych:             Anxious per report :  Bynum in place []    High complexity decision making was performed 
[]    Patient is at high-risk of decompensation with multiple organ involvement Lab Data Personally Reviewed: I have reviewed all the pertinent labs, microbiology data and radiology studies during assessment. Recent Labs  
  01/18/21 0551 01/17/21 0512 01/16/21 
0118  141 139  
K 4.1 4.3 4.4  
 108 108 CO2 27 26 29 GLU 88 115* 107* BUN 56* 56* 51* CREA 1.94* 1.99* 1.82* CA 8.3* 8.5 8.1*  
MG 1.8 1.9 1.8 PHOS 3.6 4.1 5.0* ALB  --   --  1.9* ALT  --   --  11* Recent Labs  
  01/18/21 0551 01/17/21 0512 01/16/21 
0118 WBC 6.1 4.6 5.7 HGB 7.3* 7.1* 7.3* HCT 24.0* 23.5* 23.9*  
 224 227 No results found for: SDES Lab Results Component Value Date/Time Culture result: (A) 12/30/2020 01:14 PM  
  LIGHT ANAEROBIC GRAM NEGATIVE RODS BETA LACTAMASE POSITIVE Culture result: LIGHT PROTEUS MIRABILIS (A) 12/30/2020 01:14 PM  
 Culture result: (A) 12/30/2020 01:14 PM  
  LIGHT STREPTOCOCCI, BETA HEMOLYTIC GROUP B Penicillin and ampicillin are drugs of choice for treatment of beta-hemolytic streptococcal infections. Susceptibility testing of penicillins and beta-lactams approved by the FDA for treatment of beta-hemolytic streptococcal infections need not be performed routinely, because nonsusceptible isolates are extremely rare. CLSI 2012 Culture result: LIGHT ENTEROCOCCUS FAECALIS (A) 12/30/2020 01:14 PM  
 
Recent Results (from the past 24 hour(s)) GLUCOSE, POC Collection Time: 01/17/21 11:55 AM  
Result Value Ref Range Glucose (POC) 231 (H) 65 - 100 mg/dL Performed by Vonnie SPARKS   
GLUCOSE, POC Collection Time: 01/17/21  4:38 PM  
Result Value Ref Range Glucose (POC) 141 (H) 65 - 100 mg/dL Performed by Opal Wen GLUCOSE, POC Collection Time: 01/17/21  9:43 PM  
Result Value Ref Range Glucose (POC) 211 (H) 65 - 100 mg/dL Performed by Annmarie Duke D DIMER Collection Time: 01/18/21  5:51 AM  
Result Value Ref Range D-dimer 1.71 (H) 0.00 - 0.65 mg/L FEU MAGNESIUM Collection Time: 01/18/21  5:51 AM  
Result Value Ref Range Magnesium 1.8 1.6 - 2.4 mg/dL METABOLIC PANEL, BASIC Collection Time: 01/18/21  5:51 AM  
Result Value Ref Range Sodium 139 136 - 145 mmol/L Potassium 4.1 3.5 - 5.1 mmol/L Chloride 105 97 - 108 mmol/L  
 CO2 27 21 - 32 mmol/L Anion gap 7 5 - 15 mmol/L Glucose 88 65 - 100 mg/dL BUN 56 (H) 6 - 20 MG/DL Creatinine 1.94 (H) 0.70 - 1.30 MG/DL  
 BUN/Creatinine ratio 29 (H) 12 - 20 GFR est AA 42 (L) >60 ml/min/1.73m2 GFR est non-AA 34 (L) >60 ml/min/1.73m2 Calcium 8.3 (L) 8.5 - 10.1 MG/DL  
PHOSPHORUS Collection Time: 01/18/21  5:51 AM  
Result Value Ref Range Phosphorus 3.6 2.6 - 4.7 MG/DL  
CBC WITH AUTOMATED DIFF Collection Time: 01/18/21  5:51 AM  
Result Value Ref Range WBC 6.1 4.1 - 11.1 K/uL  
 RBC 2.62 (L) 4.10 - 5.70 M/uL HGB 7.3 (L) 12.1 - 17.0 g/dL HCT 24.0 (L) 36.6 - 50.3 % MCV 91.6 80.0 - 99.0 FL  
 MCH 27.9 26.0 - 34.0 PG  
 MCHC 30.4 30.0 - 36.5 g/dL  
 RDW 17.7 (H) 11.5 - 14.5 % PLATELET 635 938 - 445 K/uL MPV 9.1 8.9 - 12.9 FL  
 NRBC 0.3 (H) 0  WBC ABSOLUTE NRBC 0.02 (H) 0.00 - 0.01 K/uL NEUTROPHILS 79 (H) 32 - 75 % LYMPHOCYTES 14 12 - 49 % MONOCYTES 6 5 - 13 % EOSINOPHILS 0 0 - 7 % BASOPHILS 0 0 - 1 % IMMATURE GRANULOCYTES 1 (H) 0.0 - 0.5 % ABS. NEUTROPHILS 4.8 1.8 - 8.0 K/UL  
 ABS. LYMPHOCYTES 0.9 0.8 - 3.5 K/UL  
 ABS. MONOCYTES 0.4 0.0 - 1.0 K/UL  
 ABS. EOSINOPHILS 0.0 0.0 - 0.4 K/UL  
 ABS. BASOPHILS 0.0 0.0 - 0.1 K/UL  
 ABS. IMM. GRANS. 0.0 0.00 - 0.04 K/UL  
 DF AUTOMATED    
GLUCOSE, POC Collection Time: 01/18/21  8:08 AM  
Result Value Ref Range Glucose (POC) 257 (H) 65 - 100 mg/dL Performed by Bipin Majano  PCT Ferdinand Jenkins MD 
[facility]  
 [address] [facility], [facility], [facility] [place] Phone - [phone] Fax - [phone] 
[email or web address]

## 2021-01-18 NOTE — ROUTINE PROCESS
0730- Bedside and Verbal shift change report given to Fred Kirkland RN (oncoming nurse) by May Tapia (offgoing nurse). Report included the following information SBAR, Kardex, Intake/Output, MAR, Accordion, Recent Results, Cardiac Rhythm NSR and Quality Measures.

## 2021-01-18 NOTE — PROGRESS NOTES
[facility] Hospitalist Progress Note Claudetta Grange, MD 
Answering service: [phone] OR [phone] from in house phone Date of Service:  2021 NAME:  Priyank Ernst III 
:  1950 MRN:  [id number] Admission Summary:  
Priyank Ernst III is a 72 y. o. male with PMH of prostatic cancer s/p radiation, IDDM, heart failure, htn. He apparently told the ER physician that he c/o onset of diarrhea yesterday and some chills over the last 2 days, with occasional cough. For me, however, he denies all of these symptoms and states he came in because his caregiver was concerned d/t his lethargy over the last several days to weeks. Interval history / Subjective:  
  
Pt asked to be put on BiPAP last night, and early this morning for SOB. He thinks his breathing is better at times, but then he will get very short of breath and requests BiPAP which helps. Diuresing well, with over 3L out. Assessment & Plan:  
 
COVID PNA Acute Hypoxic respiratory failure - O2 weaning, keep saturations over 90%, transitioning back to HFNC with BiPAP PRN  
- remdesivir completed , on decadron until  
- VitC, VitD, and Zn 
- Pulm following  
- Continue diuresis with lasix 80mg BID, with strict I and O  
- Pro calcitonin down trending and now evidence of new bacterial PNA. No need to broaden abx.  
- repeat CXR today Severe sepsis 2/2 - resolved 
osteomyelitis right foot -  S/p debridement right foot ulcer with removal of infected bone 2020 GroupB strep bacteremia - positive , neg thereafter.  
-Cultures with proteus mirabilis, stept group B, enterococcus -ID following.  Antibiotics transitioned to zosyn  
-surgical path with + margins, will need prolonged antibiotics ~6 weeks (Zosyn) 
-Powerline placed  
-wound vac  
 -non weight bearing right lower extremity (patient wheelchair bound at baseline)-  
  
Acute on chronic anemia, baseline ~9: persistent GI bleed   
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 Duodenal ucler, non bleeding - Transfuse for hemoglobin less than 7, additional unit now  
-Continue PPI 
- s/p IV iron - Monitor CBC 
  
Acute on CKD: improving 
-nephrology following, on bicarb, valtessa  
-kumar removed  
  
IDDM, last a1c 8.1 01/2020 
-continue long acting insulin, increase to 27U 
- Continue SSI, meal time insulin and POCT glucose checks HTN, controlled 
- amlodipine, clonidine, hydralazine,  
 
Prostate ca 
-Cont home meds 
  
Tobacco abuse 
-continue patch Code status: FULL 
DVT prophylaxis: SCDs due to recent GI bleed Care Plan discussed with: Patient/Family Anticipated Disposition: Home w/Family and SNF/LTC Anticipated Discharge: Greater than 48 hours Hospital Problems  Date Reviewed: 8/20/2020 Codes Class Noted POA * (Principal) Osteomyelitis ([facility] [address] ICD-10-CM: M86.9 ICD-9-CM: 730.20  12/28/2020 Yes Review of Systems: A comprehensive review of systems was negative except for that written in the HPI. Vital Signs:  
 Last 24hrs VS reviewed since prior progress note. Most recent are: 
Visit Vitals BP (!) 154/58 Pulse 80 Temp 97.9 °F (36.6 °C) Resp 20 Ht 6' (1.829 m) Wt 114 kg (251 lb 6.4 oz) SpO2 (!) 80% BMI 34.10 kg/m² Intake/Output Summary (Last 24 hours) at 1/18/2021 0865 Last data filed at 1/18/2021 3218 Gross per 24 hour Intake 100 ml Output 2920 ml Net -2820 ml Physical Examination:  
 
I had a face to face encounter with this patient and independently examined them on 1/18/2021 as outlined below: 
 
     
Constitutional:  NAD, awake and in bed. On BiPAP  
ENT:  Oral mucosa moist, oropharynx benign. Resp:  Course bilaterally. No increased work of breathing. On BIPAP CV:  Regular rhythm, normal rate, no murmurs, gallops, rubs GI:  Soft, non distended, non tender. normoactive bowel sounds, no hepatosplenomegaly Musculoskeletal:  No edema, warm, 2+ pulses throughout. R wound vac in place. Neurologic:  Moves all extremities. AAOx3, CN II-XII reviewed Skin:  Good turgor, no rashes or ulcers Data Review:  
 Review and/or order of clinical lab test 
Review and/or order of tests in the radiology section of CPT Review and/or order of tests in the medicine section of CPT Labs:  
 
Recent Labs  
  01/18/21 
0551 01/17/21 
4968 WBC 6.1 4.6 HGB 7.3* 7.1*  
HCT 24.0* 23.5*  
 224 Recent Labs  
  01/18/21 
0551 01/17/21 
0512 01/16/21 
0118  141 139  
K 4.1 4.3 4.4  
 108 108 CO2 27 26 29 BUN 56* 56* 51* CREA 1.94* 1.99* 1.82* GLU 88 115* 107* CA 8.3* 8.5 8.1*  
MG 1.8 1.9 1.8 PHOS 3.6 4.1 5.0* Recent Labs  
  01/16/21 
0118 ALT 11*  TBILI 0.2 TP 6.6 ALB 1.9*  
GLOB 4.7* No results for input(s): INR, PTP, APTT, INREXT, INREXT in the last 72 hours. No results for input(s): FE, TIBC, PSAT, FERR in the last 72 hours. Lab Results Component Value Date/Time Folate 20.0 03/16/2019 04:06 AM  
  
No results for input(s): PH, PCO2, PO2 in the last 72 hours. No results for input(s): CPK, CKNDX, TROIQ in the last 72 hours. No lab exists for component: CPKMB No results found for: CHOL, CHOLX, CHLST, CHOLV, HDL, HDLP, LDL, LDLC, DLDLP, TGLX, TRIGL, TRIGP, CHHD, CHHDX Lab Results Component Value Date/Time Glucose (POC) 257 (H) 01/18/2021 08:08 AM  
 Glucose (POC) 211 (H) 01/17/2021 09:43 PM  
 Glucose (POC) 141 (H) 01/17/2021 04:38 PM  
 Glucose (POC) 231 (H) 01/17/2021 11:55 AM  
 Glucose (POC) 78 01/17/2021 08:45 AM  
 
Lab Results Component Value Date/Time  Color YELLOW/STRAW 01/03/2021 12:56 PM  
 Appearance CLEAR 01/03/2021 12:56 PM  
 Specific gravity 1.015 01/03/2021 12:56 PM  
 pH (UA) 5.5 01/03/2021 12:56 PM  
 Protein 300 (A) 01/03/2021 12:56 PM  
 Glucose 100 (A) 01/03/2021 12:56 PM  
 Ketone Negative 01/03/2021 12:56 PM  
 Bilirubin Negative 01/03/2021 12:56 PM  
 Urobilinogen 0.2 01/03/2021 12:56 PM  
 Nitrites Negative 01/03/2021 12:56 PM  
 Leukocyte Esterase Negative 01/03/2021 12:56 PM  
 Epithelial cells FEW 01/03/2021 12:56 PM  
 Bacteria 1+ (A) 01/03/2021 12:56 PM  
 WBC 0-4 01/03/2021 12:56 PM  
 RBC 0-5 01/03/2021 12:56 PM  
 
 
 
Medications Reviewed:  
 
Current Facility-Administered Medications Medication Dose Route Frequency  magnesium sulfate 2 g/50 ml IVPB (premix or compounded)  2 g IntraVENous ONCE  
 insulin glargine (LANTUS) injection 25 Units  25 Units SubCUTAneous QHS  
 0.9% sodium chloride infusion 250 mL  250 mL IntraVENous PRN  pantoprazole (PROTONIX) tablet 40 mg  40 mg Oral ACB&D  
 furosemide (LASIX) injection 80 mg  80 mg IntraVENous Q12H  
 dexAMETHasone (DECADRON) tablet 6 mg  6 mg Oral DAILY  albuterol (PROVENTIL HFA, VENTOLIN HFA, PROAIR HFA) inhaler 2 Puff  2 Puff Inhalation Q4H RT  
 miconazole (MICOTIN) 2 % powder   Topical BID  hydrALAZINE (APRESOLINE) 20 mg/mL injection 20 mg  20 mg IntraVENous Q6H PRN  
 cloNIDine HCL (CATAPRES) tablet 0.1 mg  0.1 mg Oral BID  enoxaparin (LOVENOX) injection 40 mg  40 mg SubCUTAneous Q12H  
 ascorbic acid (vitamin C) (VITAMIN C) tablet 500 mg  500 mg Oral DAILY  zinc sulfate (ZINCATE) 220 (50) mg capsule 1 Cap  1 Cap Oral DAILY  amLODIPine (NORVASC) tablet 10 mg  10 mg Oral DAILY  collagenase (SANTYL) 250 unit/gram ointment   Topical Once per day on Thu Sat  balsam peru-castor oiL (VENELEX) ointment   Topical Q12H  
 insulin lispro (HUMALOG) injection 10 Units  10 Units SubCUTAneous TIDAC  ammonium lactate (LAC-HYDRIN) 12 % lotion   Topical Q12H  
 sodium bicarbonate tablet 650 mg  650 mg Oral DAILY  sodium chloride (NS) flush 5-40 mL  5-40 mL IntraVENous Q8H  
  sodium chloride (NS) flush 5-40 mL  5-40 mL IntraVENous PRN  
 enzalutamide (XTANDI) chemo capsule 80 mg (patient supplied) (Patient Supplied)  80 mg Oral BID  fluticasone propionate (FLONASE) 50 mcg/actuation nasal spray 2 Spray  2 Spray Both Nostrils DAILY  piperacillin-tazobactam (ZOSYN) 3.375 g in 0.9% sodium chloride (MBP/ADV) 100 mL MBP  3.375 g IntraVENous Q8H  
 oxyCODONE-acetaminophen (PERCOCET) 5-325 mg per tablet 1 Tab  1 Tab Oral Q4H PRN  
 epoetin chi-epbx (RETACRIT) injection 20,000 Units  20,000 Units SubCUTAneous Q MON, WED & FRI  diclofenac (VOLTAREN) 1 % topical gel 2 g  2 g Topical QID  acetaminophen (TYLENOL) tablet 500 mg  500 mg Oral Q4H PRN  
 aspirin delayed-release tablet 81 mg  81 mg Oral DAILY  bicalutamide (CASODEX) tablet 50 mg  50 mg Oral DAILY  gabapentin (NEURONTIN) capsule 100 mg  100 mg Oral TID  hydrALAZINE (APRESOLINE) tablet 100 mg  100 mg Oral TID  tamsulosin (FLOMAX) capsule 0.4 mg  0.4 mg Oral DAILY  nicotine (NICODERM CQ) 14 mg/24 hr patch 1 Patch  1 Patch TransDERmal DAILY  insulin lispro (HUMALOG) injection   SubCUTAneous AC&HS  
 glucose chewable tablet 16 g  4 Tab Oral PRN  
 dextrose (D50W) injection syrg 12.5-25 g  12.5-25 g IntraVENous PRN  
 glucagon (GLUCAGEN) injection 1 mg  1 mg IntraMUSCular PRN  
 
______________________________________________________________________ EXPECTED LENGTH OF STAY: 9d 19h ACTUAL LENGTH OF STAY:          21 
 
            
Tarsha Garcia MD

## 2021-01-18 NOTE — ACP (ADVANCE CARE PLANNING)
Advance care plan Patient would want his daughter Hai Mayers to make medical decisions if he was unable. At this time he chooses FULL CODE and would be accepting of ventilator if needed.

## 2021-01-18 NOTE — PROGRESS NOTES
Transition Plan of Care RUR 41%-High 
Covid positive-completed remdesivir continues to need B-pap prn. Post debridement of right foot-wound vac in place Per ID will need long-term IV antibiotics. Has PICC currently. Will need SNF at discharge for IV antibiotics and perhaps rehab as well. Jammie Drake RN CRM Ext [phone]

## 2021-01-19 NOTE — PROGRESS NOTES
Bedside shift change report given to Mirza Ruiz RN (oncoming nurse) by Fazal Mancini RN (offgoing nurse). Report included the following information SBAR, Kardex, ED Summary, [facility] and Recent Results.

## 2021-01-19 NOTE — PROGRESS NOTES
Physical Therapy Orders acknowledged, chart reviewed. Met with pt who agreed to attempt PT, apologized for not cooperating during previous attempts. Pt provided history, then declined all activity including repositioning in bed. Requested PT return tomorrow. Will continue to follow.  
 
Elina Diana, PT, MPT

## 2021-01-19 NOTE — PROGRESS NOTES
Nephrology Progress Note Breann Salazar III Date of Admission : 2020 CC: Follow up for KATHI on CKD Assessment and Plan KATHI on CKD  
- suspected ATN from severe pre renal azotemia - U/S neg for hydro - UA with proteinuria - Cr stable 
- cont diuresis. RLE edema is chronic  
- daily labs and I/os Hyperkalemia: 
- low K diet, no ACE/ARB 
- K stable CKD Stage IIIa: 
-  Presumed 2/2 DM and HTN 
- nephrotic range proteinuria 
- baseline Cr 1.7 mg/dl  
- followed by Dr. Reynold Coppola COVID-19 PNA: 
- positive on  
- on decadron and remdesivir 
  
Hx of prostate cancer s/p XRT 
  
Metabolic acidosis: 
-  Continue oral Bicarb  
  
Anemia in CKD + blood loss: 
- cont JAZ 
- EGD 2021: An actively bleeding Dieulafoy's lesion was noted in the gastric fundus.   
  
Type II DM: 
- on insulin 
  
HTN :  
- Continue current meds  
  
R foot osteo: 
- on abx 
- s/p debridement on  Group B strep bacteremia: 
- repeat cx neg Interval History: 
Seen and examined He is asking to be proned on BIPAP Feels better Today Renal function stable w/ lasix use Current Medications: all current  Medications have been eviewed in [facility] Review of Systems: Pertinent items are noted in HPI. Objective: 
Vitals:   
Vitals:  
 21 0511 21 0747 21 0816 21 1134 BP: (!) 167/71 (!) 156/61 (!) 157/67 (!) 160/83 Pulse: 83 74 76 80 Resp:  21 24 Temp: 98.3 °F (36.8 °C) 98.3 °F (36.8 °C) 97.8 °F (36.6 °C) 97.6 °F (36.4 °C) SpO2: 92% 92% 91% 90% Weight: 113.9 kg (251 lb) Height:      
 
Intake and Output: 
 0701 - 1900 In: -  
Out: 2156 [GZMZE:1529]  1901 -  0700 In: -  
Out: 7969 [Urine:3330; UMKQK] Physical Examination:  
General: Obese, on BIPAP Resp:  cta anteriorly CV:  S1, s2, no murmur Neurologic:  Non focal 
Psych:             Anxious :  Bynum in place []    High complexity decision making was performed 
[]    Patient is at high-risk of decompensation with multiple organ involvement Lab Data Personally Reviewed: I have reviewed all the pertinent labs, microbiology data and radiology studies during assessment. Recent Labs  
  01/19/21 0207 01/18/21 
0551 01/17/21 
5828  139 141  
K 4.3 4.1 4.3  105 108 CO2 27 27 26 * 88 115* BUN 58* 56* 56* CREA 1.97* 1.94* 1.99* CA 8.5 8.3* 8.5 MG 2.1 1.8 1.9 PHOS 3.4 3.6 4.1 ALB 1.8*  --   --   
ALT 12  --   --   
 
Recent Labs  
  01/19/21 0207 01/18/21 
0551 01/17/21 
9360 WBC 6.0 6.1 4.6 HGB 7.2* 7.3* 7.1*  
HCT 23.9* 24.0* 23.5*  
 233 224 No results found for: SDES Lab Results Component Value Date/Time Culture result: (A) 12/30/2020 01:14 PM  
  LIGHT ANAEROBIC GRAM NEGATIVE RODS BETA LACTAMASE POSITIVE Culture result: LIGHT PROTEUS MIRABILIS (A) 12/30/2020 01:14 PM  
 Culture result: (A) 12/30/2020 01:14 PM  
  LIGHT STREPTOCOCCI, BETA HEMOLYTIC GROUP B Penicillin and ampicillin are drugs of choice for treatment of beta-hemolytic streptococcal infections. Susceptibility testing of penicillins and beta-lactams approved by the FDA for treatment of beta-hemolytic streptococcal infections need not be performed routinely, because nonsusceptible isolates are extremely rare. CLSI 2012 Culture result: LIGHT ENTEROCOCCUS FAECALIS (A) 12/30/2020 01:14 PM  
 
Recent Results (from the past 24 hour(s)) GLUCOSE, POC Collection Time: 01/18/21  5:01 PM  
Result Value Ref Range Glucose (POC) 213 (H) 65 - 100 mg/dL Performed by Rama Griffin GLUCOSE, POC Collection Time: 01/18/21  9:30 PM  
Result Value Ref Range Glucose (POC) 219 (H) 65 - 100 mg/dL Performed by Luis Alfredo AKBAR DIMER Collection Time: 01/19/21  2:07 AM  
Result Value Ref Range D-dimer 1.85 (H) 0.00 - 0.65 mg/L FEU MAGNESIUM  Collection Time: 01/19/21  2:07 AM  
 Result Value Ref Range Magnesium 2.1 1.6 - 2.4 mg/dL METABOLIC PANEL, BASIC Collection Time: 01/19/21  2:07 AM  
Result Value Ref Range Sodium 138 136 - 145 mmol/L Potassium 4.3 3.5 - 5.1 mmol/L Chloride 103 97 - 108 mmol/L  
 CO2 27 21 - 32 mmol/L Anion gap 8 5 - 15 mmol/L Glucose 167 (H) 65 - 100 mg/dL BUN 58 (H) 6 - 20 MG/DL Creatinine 1.97 (H) 0.70 - 1.30 MG/DL  
 BUN/Creatinine ratio 29 (H) 12 - 20 GFR est AA 41 (L) >60 ml/min/1.73m2 GFR est non-AA 34 (L) >60 ml/min/1.73m2 Calcium 8.5 8.5 - 10.1 MG/DL  
PHOSPHORUS Collection Time: 01/19/21  2:07 AM  
Result Value Ref Range Phosphorus 3.4 2.6 - 4.7 MG/DL  
CBC WITH AUTOMATED DIFF Collection Time: 01/19/21  2:07 AM  
Result Value Ref Range WBC 6.0 4.1 - 11.1 K/uL  
 RBC 2.63 (L) 4.10 - 5.70 M/uL HGB 7.2 (L) 12.1 - 17.0 g/dL HCT 23.9 (L) 36.6 - 50.3 % MCV 90.9 80.0 - 99.0 FL  
 MCH 27.4 26.0 - 34.0 PG  
 MCHC 30.1 30.0 - 36.5 g/dL  
 RDW 18.0 (H) 11.5 - 14.5 % PLATELET 010 841 - 549 K/uL MPV 9.4 8.9 - 12.9 FL  
 NRBC 0.3 (H) 0  WBC ABSOLUTE NRBC 0.02 (H) 0.00 - 0.01 K/uL NEUTROPHILS 82 (H) 32 - 75 % LYMPHOCYTES 11 (L) 12 - 49 % MONOCYTES 7 5 - 13 % EOSINOPHILS 0 0 - 7 % BASOPHILS 0 0 - 1 % IMMATURE GRANULOCYTES 0 0.0 - 0.5 % ABS. NEUTROPHILS 4.9 1.8 - 8.0 K/UL  
 ABS. LYMPHOCYTES 0.7 (L) 0.8 - 3.5 K/UL  
 ABS. MONOCYTES 0.4 0.0 - 1.0 K/UL  
 ABS. EOSINOPHILS 0.0 0.0 - 0.4 K/UL  
 ABS. BASOPHILS 0.0 0.0 - 0.1 K/UL  
 ABS. IMM. GRANS. 0.0 0.00 - 0.04 K/UL  
 DF SMEAR SCANNED    
 RBC COMMENTS ANISOCYTOSIS 1+ 
    
 RBC COMMENTS MICROCYTOSIS 1+ 
    
 RBC COMMENTS POLYCHROMASIA 1+ 
    
 RBC COMMENTS OVALOCYTES 1+ HEPATIC FUNCTION PANEL Collection Time: 01/19/21  2:07 AM  
Result Value Ref Range Protein, total 7.3 6.4 - 8.2 g/dL Albumin 1.8 (L) 3.5 - 5.0 g/dL Globulin 5.5 (H) 2.0 - 4.0 g/dL A-G Ratio 0.3 (L) 1.1 - 2.2 Bilirubin, total 0.2 0.2 - 1.0 MG/DL Bilirubin, direct <0.1 0.0 - 0.2 MG/DL Alk. phosphatase 105 45 - 117 U/L  
 AST (SGOT) 11 (L) 15 - 37 U/L  
 ALT (SGPT) 12 12 - 78 U/L  
PROCALCITONIN Collection Time: 01/19/21  2:07 AM  
Result Value Ref Range Procalcitonin 0.31 ng/mL GLUCOSE, POC Collection Time: 01/19/21  9:31 AM  
Result Value Ref Range Glucose (POC) 198 (H) 65 - 100 mg/dL Performed by Rachel Lafleur GLUCOSE, POC Collection Time: 01/19/21 11:32 AM  
Result Value Ref Range Glucose (POC) 147 (H) 65 - 100 mg/dL Performed by Rivera Pelaez MD 
[address] [place] [address], [facility] Phone - [phone] Fax - [phone] 
[email or web address]

## 2021-01-19 NOTE — ROUTINE PROCESS
0730 Bedside and Verbal shift change report given to Kelly Mclaughlin RN (oncoming nurse) by Pete Douglass (offgoing nurse). Report included the following information SBAR, Kardex, Intake/Output, MAR, Accordion, Recent Results, Cardiac Rhythm NSR and Quality Measures.

## 2021-01-19 NOTE — PROGRESS NOTES
[address] [facility] Adult  Hospitalist Group Hospitalist Progress Note Jessica Luevano MD 
Answering service: [phone] OR [phone] from in house phone Date of Service:  2021 NAME:  Breann Salazar III 
:  1950 MRN:  [id number] Admission Summary:  
Breann Salazar III is a 72 y. o. male with PMH of prostatic cancer s/p radiation, IDDM, heart failure, htn. He apparently told the ER physician that he c/o onset of diarrhea yesterday and some chills over the last 2 days, with occasional cough. For me, however, he denies all of these symptoms and states he came in because his caregiver was concerned d/t his lethargy over the last several days to weeks. Interval history / Subjective:  
  
O2 requirements are about the same. He thinks his breathing has not improved much. Has been intermittently on BiPAP. When he gets short of breath he does feel like putting the BiPAP on helps. Net negative Assessment & Plan:  
 
COVID PNA Acute Hypoxic respiratory failure - O2 weaning, keep saturations over 90%, transitioning back to HFNC with BiPAP PRN  
- remdesivir completed , on decadron until  
- VitC, VitD, and Zn 
- Pulm following, infectious disease following  
- Continue diuresis with lasix 80mg BID, with strict I and O  
- Pro calcitonin down trending,  
- Linezolid started by ID -- 
- repeat CXR tomorrow - Palliative consulted, following. Severe sepsis 2/2 - resolved 
osteomyelitis right foot -  S/p debridement right foot ulcer with removal of infected bone 2020 GroupB strep bacteremia - positive , neg thereafter.  
-Cultures with proteus mirabilis, stept group B, enterococcus -ID following.  Antibiotics transitioned to zosyn  
-surgical path with + margins, will need prolonged antibiotics ~6 weeks (Zosyn) 
-Powerline placed  
-wound vac  
 -non weight bearing right lower extremity (patient wheelchair bound at baseline)-  
  
Acute on chronic anemia, baseline ~9: persistent GI bleed   
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 Duodenal ucler, non bleeding - Transfuse for hemoglobin less than 7, additional unit now  
-Continue PPI 
- s/p IV iron - Monitor CBC 
  
Acute on CKD: improving 
-nephrology following, on bicarb, valtessa  
-kumar removed  
  
IDDM, last a1c 8.1 01/2020 
-continue long acting insulin, continue 27U 
- Continue SSI, meal time insulin and POCT glucose checks HTN, controlled 
- amlodipine, clonidine, hydralazine,  
 
Prostate ca 
-Cont home meds 
  
Tobacco abuse 
-continue patch Code status: FULL 
DVT prophylaxis: SCDs due to recent GI bleed Care Plan discussed with: Patient/Family Anticipated Disposition: Home w/Family and SNF/LTC Anticipated Discharge: Greater than 48 hours Hospital Problems  Date Reviewed: 8/20/2020 Codes Class Noted POA * (Principal) Osteomyelitis ([facility] [address] ICD-10-CM: M86.9 ICD-9-CM: 730.20  12/28/2020 Yes Review of Systems: A comprehensive review of systems was negative except for that written in the HPI. Vital Signs:  
 Last 24hrs VS reviewed since prior progress note. Most recent are: 
Visit Vitals BP (!) 160/83 (BP 1 Location: Left arm, BP Patient Position: At rest) Pulse 80 Temp 97.6 °F (36.4 °C) Resp 24 Ht 6' (1.829 m) Wt 113.9 kg (251 lb) SpO2 90% BMI 34.04 kg/m² Intake/Output Summary (Last 24 hours) at 1/19/2021 1436 Last data filed at 1/19/2021 1241 Gross per 24 hour Intake  Output 2380 ml Net -2380 ml Physical Examination:  
 
I had a face to face encounter with this patient and independently examined them on 1/19/2021 as outlined below: 
 
     
Constitutional:  NAD, awake and in bed. On HFNC  
ENT:  Oral mucosa moist, oropharynx benign. Resp:  Course bilaterally. No increased work of breathing. No wheezing CV:  Regular rhythm, normal rate, no murmurs, gallops, rubs GI:  Soft, non distended, non tender. normoactive bowel sounds, no hepatosplenomegaly Musculoskeletal:  No edema, warm, 2+ pulses throughout. R wound vac in place. Neurologic:  Moves all extremities. AAOx3, CN II-XII reviewed Skin:  Good turgor, no rashes or ulcers Data Review:  
 Review and/or order of clinical lab test 
Review and/or order of tests in the radiology section of CPT Review and/or order of tests in the medicine section of CPT Labs:  
 
Recent Labs  
  01/19/21 0207 01/18/21 
3072 WBC 6.0 6.1 HGB 7.2* 7.3* HCT 23.9* 24.0*  
 233 Recent Labs  
  01/19/21 0207 01/18/21 
0551 01/17/21 
1781  139 141  
K 4.3 4.1 4.3  105 108 CO2 27 27 26 BUN 58* 56* 56* CREA 1.97* 1.94* 1.99* * 88 115* CA 8.5 8.3* 8.5 MG 2.1 1.8 1.9 PHOS 3.4 3.6 4.1 Recent Labs  
  01/19/21 0207 ALT 12  
 TBILI 0.2 TP 7.3 ALB 1.8*  
GLOB 5.5* No results for input(s): INR, PTP, APTT, INREXT, INREXT in the last 72 hours. No results for input(s): FE, TIBC, PSAT, FERR in the last 72 hours. Lab Results Component Value Date/Time Folate 20.0 03/16/2019 04:06 AM  
  
No results for input(s): PH, PCO2, PO2 in the last 72 hours. No results for input(s): CPK, CKNDX, TROIQ in the last 72 hours. No lab exists for component: CPKMB No results found for: CHOL, CHOLX, CHLST, CHOLV, HDL, HDLP, LDL, LDLC, DLDLP, TGLX, TRIGL, TRIGP, CHHD, CHHDX Lab Results Component Value Date/Time Glucose (POC) 147 (H) 01/19/2021 11:32 AM  
 Glucose (POC) 198 (H) 01/19/2021 09:31 AM  
 Glucose (POC) 219 (H) 01/18/2021 09:30 PM  
 Glucose (POC) 213 (H) 01/18/2021 05:01 PM  
 Glucose (POC) 223 (H) 01/18/2021 11:32 AM  
 
Lab Results Component Value Date/Time  Color YELLOW/STRAW 01/03/2021 12:56 PM  
 Appearance CLEAR 01/03/2021 12:56 PM  
 Specific gravity 1.015 01/03/2021 12:56 PM  
 pH (UA) 5.5 01/03/2021 12:56 PM  
 Protein 300 (A) 01/03/2021 12:56 PM  
 Glucose 100 (A) 01/03/2021 12:56 PM  
 Ketone Negative 01/03/2021 12:56 PM  
 Bilirubin Negative 01/03/2021 12:56 PM  
 Urobilinogen 0.2 01/03/2021 12:56 PM  
 Nitrites Negative 01/03/2021 12:56 PM  
 Leukocyte Esterase Negative 01/03/2021 12:56 PM  
 Epithelial cells FEW 01/03/2021 12:56 PM  
 Bacteria 1+ (A) 01/03/2021 12:56 PM  
 WBC 0-4 01/03/2021 12:56 PM  
 RBC 0-5 01/03/2021 12:56 PM  
 
 
 
Medications Reviewed:  
 
Current Facility-Administered Medications Medication Dose Route Frequency  insulin glargine (LANTUS) injection 27 Units  27 Units SubCUTAneous QHS  enoxaparin (LOVENOX) injection 40 mg  40 mg SubCUTAneous Q24H  
 linezolid in dextrose 5% (ZYVOX) IVPB premix in D5W 600 mg  600 mg IntraVENous Q12H  
 0.9% sodium chloride infusion 250 mL  250 mL IntraVENous PRN  pantoprazole (PROTONIX) tablet 40 mg  40 mg Oral ACB&D  
 furosemide (LASIX) injection 80 mg  80 mg IntraVENous Q12H  
 dexAMETHasone (DECADRON) tablet 6 mg  6 mg Oral DAILY  albuterol (PROVENTIL HFA, VENTOLIN HFA, PROAIR HFA) inhaler 2 Puff  2 Puff Inhalation Q4H RT  
 miconazole (MICOTIN) 2 % powder   Topical BID  hydrALAZINE (APRESOLINE) 20 mg/mL injection 20 mg  20 mg IntraVENous Q6H PRN  
 cloNIDine HCL (CATAPRES) tablet 0.1 mg  0.1 mg Oral BID  ascorbic acid (vitamin C) (VITAMIN C) tablet 500 mg  500 mg Oral DAILY  zinc sulfate (ZINCATE) 220 (50) mg capsule 1 Cap  1 Cap Oral DAILY  amLODIPine (NORVASC) tablet 10 mg  10 mg Oral DAILY  collagenase (SANTYL) 250 unit/gram ointment   Topical Once per day on Thu Sat  balsam peru-castor oiL (VENELEX) ointment   Topical Q12H  
 insulin lispro (HUMALOG) injection 10 Units  10 Units SubCUTAneous TIDAC  ammonium lactate (LAC-HYDRIN) 12 % lotion   Topical Q12H  sodium bicarbonate tablet 650 mg  650 mg Oral DAILY  sodium chloride (NS) flush 5-40 mL  5-40 mL IntraVENous Q8H  
 sodium chloride (NS) flush 5-40 mL  5-40 mL IntraVENous PRN  
 enzalutamide (XTANDI) chemo capsule 80 mg (patient supplied) (Patient Supplied)  80 mg Oral BID  fluticasone propionate (FLONASE) 50 mcg/actuation nasal spray 2 Spray  2 Spray Both Nostrils DAILY  piperacillin-tazobactam (ZOSYN) 3.375 g in 0.9% sodium chloride (MBP/ADV) 100 mL MBP  3.375 g IntraVENous Q8H  
 oxyCODONE-acetaminophen (PERCOCET) 5-325 mg per tablet 1 Tab  1 Tab Oral Q4H PRN  
 epoetin chi-epbx (RETACRIT) injection 20,000 Units  20,000 Units SubCUTAneous Q MON, WED & FRI  diclofenac (VOLTAREN) 1 % topical gel 2 g  2 g Topical QID  acetaminophen (TYLENOL) tablet 500 mg  500 mg Oral Q4H PRN  
 aspirin delayed-release tablet 81 mg  81 mg Oral DAILY  bicalutamide (CASODEX) tablet 50 mg  50 mg Oral DAILY  gabapentin (NEURONTIN) capsule 100 mg  100 mg Oral TID  hydrALAZINE (APRESOLINE) tablet 100 mg  100 mg Oral TID  tamsulosin (FLOMAX) capsule 0.4 mg  0.4 mg Oral DAILY  nicotine (NICODERM CQ) 14 mg/24 hr patch 1 Patch  1 Patch TransDERmal DAILY  insulin lispro (HUMALOG) injection   SubCUTAneous AC&HS  
 glucose chewable tablet 16 g  4 Tab Oral PRN  
 dextrose (D50W) injection syrg 12.5-25 g  12.5-25 g IntraVENous PRN  
 glucagon (GLUCAGEN) injection 1 mg  1 mg IntraMUSCular PRN  
 
______________________________________________________________________ EXPECTED LENGTH OF STAY: 9d 19h ACTUAL LENGTH OF STAY:          22 
 
            
Silas Rueda MD

## 2021-01-19 NOTE — PROGRESS NOTES
ID Progress Note 2021 Subjective: Afebrile. On BiPAP right now per his request.  According to the nursing staff, he was not desaturating on high flow. Objective:  
 
Vitals:  
Visit Vitals BP (!) 160/83 (BP 1 Location: Left arm, BP Patient Position: At rest) Pulse 80 Temp 97.6 °F (36.4 °C) Resp 24 Ht 6' (1.829 m) Wt 113.9 kg (251 lb) SpO2 100% BMI 34.04 kg/m² Tmax:  Temp (24hrs), Av °F (36.7 °C), Min:97.6 °F (36.4 °C), Max:98.7 °F (37.1 °C) Exam:   
Clear to auscultation Heart: s1, s2, RRR, no murmurs rubs or clicks Abdomen: soft nontender, no guarding or rebound Speech fluent Labs:  
Lab Results Component Value Date/Time WBC 6.0 2021 02:07 AM  
 Hemoglobin (POC) 6.5 2020 01:59 PM  
 HGB 7.2 (L) 2021 02:07 AM  
 HCT 23.9 (L) 2021 02:07 AM  
 PLATELET 897  02:07 AM  
 MCV 90.9 2021 02:07 AM  
 
Lab Results Component Value Date/Time Sodium 138 2021 02:07 AM  
 Potassium 4.3 2021 02:07 AM  
 Chloride 103 2021 02:07 AM  
 CO2 27 2021 02:07 AM  
 Anion gap 8 2021 02:07 AM  
 Glucose 167 (H) 2021 02:07 AM  
 BUN 58 (H) 2021 02:07 AM  
 Creatinine 1.97 (H) 2021 02:07 AM  
 BUN/Creatinine ratio 29 (H) 2021 02:07 AM  
 GFR est AA 41 (L) 2021 02:07 AM  
 GFR est non-AA 34 (L) 2021 02:07 AM  
 Calcium 8.5 2021 02:07 AM  
 Bilirubin, total 0.2 2021 02:07 AM  
 Alk. phosphatase 105 2021 02:07 AM  
 Protein, total 7.3 2021 02:07 AM  
 Albumin 1.8 (L) 2021 02:07 AM  
 Globulin 5.5 (H) 2021 02:07 AM  
 A-G Ratio 0.3 (L) 2021 02:07 AM  
 ALT (SGPT) 12 2021 02:07 AM  
 
 
 
 
 
Assessment:  
#1 osteomyelitis of the right foot 
  
#2 group b strep  bacteremia 
  
#3 renal insufficiency 
  
#4 diabetes 
  
#5 prostate cancer 
  
#6 hypertension 
  
#7 heart failure 
  
 #8 covid #9 diarrhea  
  
 
 
  
 
 Recommendations:  
 
Continue zosyn. There is OM on the surgical margin. He will need prolonged therapy. Orders written. He has completed treatment with remdesivir.  (1/121/16) He has been on dexamethasone since January 12. Continue Zyvox for his airspace disease. I have spoken with his daughter. She desires for her father to get ivermectin for Covid. She is going to email me articles about it, but I suspect that these are going to be studies that have flawed methodologies as the NIH has not recommended ivermectin use. The best evidence comes from study published in chest where it showed that there was some benefit with ivermectin, but this was a retrospective trial and it was determined that a lot of the people who got ivermectin also got corticosteroids which is of benefit in Covid.  
 
 
  
 
Kathie Montiel MD

## 2021-01-19 NOTE — PROGRESS NOTES
Comprehensive Nutrition Assessment Type and Reason for Visit: Nayeli Rosario Nutrition Recommendations/Plan: 1. Consistent CHO diet with Nepro once daily Nutrition Assessment:    
 
1/19: chart reviewed for follow-up. Renal fx stable per nephrology, but now requiring high flow with intermittent use of BiPAP. Appetite declining. Palliative, ID, wound care also following. Wt up and down d/t fluid. K, Phos WNL. BG up and down, but not overtly out of control. Recommend continuing Nepro once daily. Would increase usage if willing given increased needs and decreased oral intake. Could also try alternatives, but pt currently on BiPAP and unable to speak with directly. RD will continue to follow along closely with PO adequacy and nutritional needs. 1/12: follow up. Pt remains in house for long-term abx, now +COVID-19, worsening renal function noted, still with high K+ & diet is adjusted by RD today. Although needs are high with wound, PO intake has been very good, will reduce the number of high protein supplements offered. BG will rise with steroids added. Consult diabetes specialists for recommendations PRN. Nephrology, wound care, pulmonary and ID following; GI placed clips to control gastric bleeding on 1/5, resolved & signed off. Pt is eating very well. Wt trending down with diuresis. 1/5: 72 y.o. male with PMHx of prostate CA s/p XRT on oral chemo, DM, CHF, and HTN. Admitted with severe sepsis 2° osteomyelitis of R foot, podiatry following. S/p I&D and removal of infected bone 12/30, wound VAC placement today 1/5, to get powerline for tomorrow for long-term IV abx. Noted acute on chronic anemia - GI following. EGD 1/4:  Dieulafoy's lesion, actively bleed s/p clipping; duodenal ulcer, non-bleeding. On PPI. Has received 6 units PRBC since admission. Completed 5 days of Venofer. KATHI on CKD, nephrology following. Hyperkalemia s/p Veltassa today, hyperphosphatemia. Starting bicarb today. SOB 1/4 improved, s/p 1 dose IV steroids and breathing treatment. Pt from home, wheelchair bound at baseline, has caregiver 5 days/week x 6 hours/day. Concern with discharge disposition, CM involved. Pt unwilling to work with PT today. Upon RD visit to room, pt pleasant and receptive. Reports a good appetite, but c/o lack of flavor on tray. States he doesn't need salt, knows it isn't good for him, but just more seasoning in general.  Noted pt not making own meal selections which he is fully capable of doing. Message sent to diet office for someone to come take order. Discussed nutrition and wound healing. Pt verbalized understanding. Receptive to trying vanilla Ensure High Protein (less kcal and K+ even compared to Nepro) and Yoel supplements.  lb, denies wt loss. Edentulous, but takes a regular diet at home without difficulty. However, states having more difficult with our food. Preferences updated for soft solids. Pt should likely have K+ restriction since has good PO intake. Monitor Phos improvements, if none would consider Phos binder. RD will continue to follow along with PO adequacy. Malnutrition Assessment: 
Malnutrition Status:  No malnutrition Nutritionally Significant Medications: Vit C, Zinc, Casodex (chemo), Xtandi (chemo), Decadron, Retacrit 3x/wk, Lasix, Lantus 27 units, correctional scale, Humalog 10 units TID ac meals, Zyvox, Percocet, Protonix, Zincate Estimated Daily Nutrient Needs: 
Energy (kcal): 0730 - 2560 kcal/d (MSJ xAF 1.2-13) Protein (g): 95-117g (0.8-1.0g/kg) Fluid (ml/day): 1500ml Nutrition Related Findings:  
Edema: 3+ bilat LE; non-pitting Last BM: 1/18 - loose ABD: distended Wounds:   
Wound vac(R foot) Current Nutrition Therapies: DIET DIABETIC CONSISTENT CARB Regular Meal intake:  
Patient Vitals for the past 168 hrs: 
 % Diet Eaten 01/15/21 1830 0 % 01/14/21 1757 90 % 01/14/21 1329 10 % 01/14/21 0957 25 % 01/13/21 1030 240 % 01/13/21 0846 10 % Anthropometric Measures: 
· Height:  6' (182.9 cm) · Current Body Wt:  113.9 kg (251 lb 1.7 oz) · Admission Body Wt:  270 lb(source unknown) · Usual Body Wt:  113.4 kg (250 lb) · Ideal Body Wt:  178 lbs:  141.1 % · BMI Category:  Obese class 1 (BMI 30.0-34. 9) Body mass index is 34.04 kg/m². Last 3 Recorded Weights in this Encounter 01/17/21 0018 01/18/21 8006 01/19/21 3185 Weight: 115.2 kg (254 lb) 114 kg (251 lb 6.4 oz) 113.9 kg (251 lb) Wt Readings from Last 20 Encounters:  
01/19/21 113.9 kg (251 lb) 01/05/21 112 kg (246 lb 14.6 oz) 02/24/20 127 kg (280 lb) 02/10/20 127 kg (280 lb) 05/28/19 124.7 kg (275 lb) 04/04/19 124.7 kg (275 lb)  
03/31/19 125 kg (275 lb 9.2 oz)  
03/20/19 122.8 kg (270 lb 12.8 oz) 04/26/18 130.6 kg (288 lb)  
02/27/18 126.1 kg (278 lb)  
06/15/17 124.2 kg (273 lb 14.4 oz) 04/28/17 127.5 kg (281 lb) 10/21/16 117.9 kg (260 lb) 10/20/16 117.5 kg (259 lb) 10/19/16 117.5 kg (259 lb) 10/12/16 120.7 kg (266 lb)  
09/14/16 122.5 kg (270 lb) 08/02/16 117.9 kg (260 lb)  
07/12/16 115.2 kg (254 lb)  
06/30/16 115.2 kg (254 lb) 06/02/16 117 kg (258 lb) Nutrition Diagnosis: · Increased nutrient needs related to increased demand for energy/nutrients as evidenced by wounds with wound vac and +COVID-19 · Altered nutrition-related lab values related to renal dysfunction as evidenced by lab values & proteinuria. Nutrition Interventions:  
Food and/or Nutrient Delivery: Modify current diet, Modify oral nutrition supplement, Vitamin supplement Nutrition Education and Counseling: Counseling initiated Coordination of Nutrition Care: Continue to monitor while inpatient Goals: 
Continued intake of 75% of meals and ONS offered Nutrition Monitoring and Evaluation:  
Behavioral-Environmental Outcomes: None identified Food/Nutrient Intake Outcomes: Food and nutrient intake, Supplement intake, Vitamin/mineral intake Physical Signs/Symptoms Outcomes: Biochemical data, GI status, Fluid status or edema, Weight, Skin, Meal time behavior, Nutrition focused physical findings Discharge Planning:   
Continue current diet Recent Labs  
  01/19/21 
0207 01/18/21 
0551 01/17/21 
2186 * 88 115* BUN 58* 56* 56* CREA 1.97* 1.94* 1.99*  139 141  
K 4.3 4.1 4.3  105 108 CO2 27 27 26  
CA 8.5 8.3* 8.5 PHOS 3.4 3.6 4.1 MG 2.1 1.8 1.9 Recent Labs  
  01/19/21 
0207 ALT 12  
 TBILI 0.2 TP 7.3 ALB 1.8*  
GLOB 5.5* No results for input(s): LAC in the last 72 hours. Recent Labs  
  01/19/21 
0207 01/18/21 
0551 WBC 6.0 6.1 HGB 7.2* 7.3* HCT 23.9* 24.0*  
 233 No results for input(s): PREALB in the last 72 hours. No results for input(s): TRIGL in the last 72 hours. Recent Labs  
  01/19/21 
1132 01/19/21 
1187 01/18/21 
2130 01/18/21 
1701 01/18/21 
1132 01/18/21 
2731 01/17/21 
2143 01/17/21 
1638 01/17/21 
1155 01/17/21 
0845 01/16/21 
2150 01/16/21 
1833 GLUCPOC 147* 198* 219* 213* 223* 257* 211* 141* 231* 78 135* 250* Lab Results Component Value Date/Time Hemoglobin A1c 8.0 (H) 03/28/2019 07:35 PM  
 Hemoglobin A1c (POC) 8.1 01/07/2020 01:02 PM  
 
 
Iron Profile Iron Date Value Ref Range Status 01/11/2021 42 35 - 150 ug/dL Final  
12/30/2020 25 (L) 35 - 150 ug/dL Final  
12/28/2020 21 (L) 35 - 150 ug/dL Final  
03/16/2019 68 35 - 150 ug/dL Final  
 
TIBC Date Value Ref Range Status 01/11/2021 201 (L) 250 - 450 ug/dL Final  
12/30/2020 177 (L) 250 - 450 ug/dL Final  
12/28/2020 203 (L) 250 - 450 ug/dL Final  
03/16/2019 236 (L) 250 - 450 ug/dL Final  
 
Iron % saturation Date Value Ref Range Status 01/11/2021 21 20 - 50 % Final  
12/30/2020 14 (L) 20 - 50 % Final  
12/28/2020 10 (L) 20 - 50 % Final  
03/16/2019 29 20 - 50 % Final  
 
 
Ferritin Date Value Ref Range Status 12/30/2020 266 26 - 388 NG/ML Final  
12/28/2020 347 26 - 388 NG/ML Final  
 
 
B Vitamins Folate Date Value Ref Range Status 03/16/2019 20.0 5.0 - 21.0 ng/mL Final  
 
Vitamin B12 Date Value Ref Range Status 12/28/2020 913 193 - 986 pg/mL Final  
03/16/2019 620 193 - 986 pg/mL Final  
 
 
Gaby Layne RD Available via [place] Pager# [phone]

## 2021-01-19 NOTE — WOUND CARE
WOCN Note:  
 
  
Follow-up visit for VAC dressing change to right foot.  
  
Chart shows: 
Admitted for lethargy; osteomyelitis of right foot with debridement and removal of infected bone 12/30; now Covid-19+ History of DM, smoking, prostate cancer, HTN, heart failure Admitted from home 
  
Assessment:  
A&O x 4; lethargic and sleeps through most of my visit. Bed: P500 AMY mattress 
  
Bilateral heels intact with no redness.  Heels offloaded with pillows. Bilateral buttocks improved and mostly resurfaced with hypopigmentation. Skip areas of partial thickness.  
  
1. POA, right foot wound post surgical debridement = 7 x 5 x 2.3cm  Base is 30% soft yellow 70% granular red.  No purulence or odor.  Immediate periwound with hypopigmented skin.  Satellite wound toward bottom of foot under 5th toe that was included in original measurement but now there is a bridge of skin between the two areas = 1 x 1 x 0.2 cm red granular base.   Scant serosanguinous exudate in canister. VAC dressing removed: 1 black sponge Cleaned with saline and Santyl applied to yellow tissue in wound bed. Applied Opticel AG to smaller wound. 125 mmHg suction achieved using 1 piece of black foam bridged to lower leg.  
  
Dorsum of foot has a deep tissue injury = 5 x 7 x 0 cm 100% non-blanching burgundy - Venelex applied.   
  
Thick dry skin plaques to lower leg - Lac Hydrin applied. 
  
Wound Recommendations:   
Maintain VAC as ordered @ 125mmHg suction with twice weekly dressing changes.  Buttocks and dorsum of right foot: venelex twice daily. Axilla: antifungal cream twice daily.  
  
Transition of Care: Plan to follow weekly and as needed while admitted to hospital with [facility] dressing change Friday, 1/22 TABITHA Mcpherson, RN, [employer] Certified Wound, Ostomy, Continence Nurse 
office [phone] 
pager [phone] or call  to page

## 2021-01-19 NOTE — CONSULTS
Palliative Medicine Consult Nakul: [phone]) Patient Name: Neela Yee YOB: 1950 Date of Initial Consult: 1/18/2021 Reason for Consult: care decisions Requesting Provider: Dr. Sawyer Yates Primary Care Physician: Mandy Orosco., MD 
 
 SUMMARY:  
Priyank Ernst III is a 72 y.o. with a past history of prostate cancer s/p XRT/ now w widespread bone mets, Heart failure, IDDM, tobacco use (1PPD), HTN, diabetic foot wound, who was admitted on 12/28/2020 from home with a diagnosis of diarrhea, chills for 2 days , lethargy. Current medical issues leading to Palliative Medicine involvement include: Prolonged hospital stay, with osteomyelitis of R foot s/p debridement 12/30/2020 (needs 6 weeks abx, on Zosyn, Wound vac 1/5), COVID positive respiratory failure (covid negative 12/28, covid positive 1/11), remdesivir end 1/16, on decadron until 1/21, requiring diuresis. GI bleed ( EGD 1/4: clip to active bleed Dieulafoy lesion, duodenal ulcer not bleeding) s/p IV iron. KATHI on CKD. Patient continues to have high oxygen requirement, on/off BIPAP Patient is  for 30 years but still legally . Lives alone in own apartment, with frequent visits from family. He has 4 children. Dtr Cammie Betancourt ( [phone]) is one he says he would want to make medical decisions if needed. Also michelle Espinosa 
dtr Melinda PALLIATIVE DIAGNOSES:  
1. Shortness of breath, Acute respiratory failure, COVID19 pneumonia 2. Osteomyelitis R foot 3. GI bleed, anemia 4. Debility 5. Metastatic prostate cancer (on casodex) 6. Anxiety about health 7. Care goals discussion PLAN:  
Patient got some rest last night, always wants more, this makes him so tired. Didn't have the appetite to eat the lamb chops his daughter brought for him. He would like to work with physical therapy today, I put a consult back in. Patient tells me \"I was really rude to them\" before, but he's now willing to work on that. Reassured him we understand what it's like to feel badly and how that influences behavior sometimes. Wants to try to sit on side of bed with pillows. Revisited our conversations of yesterday. Encouraged him to complete AMD but he's not interested, so that's OK. He understands that without completing mPOA paperwork, his wife would be legal NOK for medical decisions (and he is OK with that) . Encouraged his ongoing conversations w family about his wishes, he is agreeable now to intubation if that's needed, but he should consider more about that, like what about if he needed a trach or long term care in the bed w ventilator? He tells me he wouldn't want to have a trach or be stuck in a bed, and I let him know that's important for his family to know how he feels, what he would/ wouldn't find acceptable so they can be his voice. Goal for today is to get up to side of bed with therapy. Will check in tomorrow. Discussed w bedside nurse and attending MD 
 
 GOALS OF CARE / TREATMENT PREFERENCES:  
 
GOALS OF CARE: 
Patient/Health Care Proxy Stated Goals: Rehabilitation TREATMENT PREFERENCES:  
Code Status: Full Code Advance Care Planning: 
[] [facility] Interdisciplinary Team has updated the ACP Navigator with [address] and Patient Capacity Advance Care Planning 12/30/2020 Patient's Healthcare Decision Maker is: -  
Primary Decision Maker Name -  
Primary Decision Maker Phone Number -  
Primary Decision Maker Relationship to Patient -  
Confirm Advance Directive None Patient Would Like to Complete Advance Directive - Medical Interventions: Limited additional interventions Other Instructions: Other: As far as possible, the palliative care team has discussed with patient / health care proxy about goals of care / treatment preferences for patient. HISTORY:  
 
History obtained from: chart, patient CHIEF COMPLAINT: admitted with chills, diarrhea HPI/SUBJECTIVE: The patient is:  
[x] Verbal and participatory [] Non-participatory due to:  
 
72year old male with complex medical history as above Admitted with diarrhea, chills, and caregiver reporting lethargy Clinical Pain Assessment (nonverbal scale for severity on nonverbal patients):  
Clinical Pain Assessment Severity: 0 Activity (Movement): Lying quietly, normal position Duration: for how long has pt been experiencing pain (e.g., 2 days, 1 month, years) Frequency: how often pain is an issue (e.g., several times per day, once every few days, constant) FUNCTIONAL ASSESSMENT:  
 
Palliative Performance Scale (PPS): PPS: 30 
 
 
 PSYCHOSOCIAL/SPIRITUAL SCREENING:  
 
Palliative IDT has assessed this patient for cultural preferences / practices and a referral made as appropriate to needs (Cultural Services, Patient Advocacy, Ethics, etc.) Any spiritual / Rastafarian concerns: 
[] Yes /  [x] No 
 
Caregiver Burnout: 
[] Yes /  [x] No /  [] No Caregiver Present Anticipatory grief assessment:  
[x] Normal  / [] Maladaptive ESAS Anxiety: Anxiety: 0 
 
ESAS Depression: Depression: 0 REVIEW OF SYSTEMS:  
 
Positive and pertinent negative findings in ROS are noted above in HPI. The following systems were [x] reviewed / [] unable to be reviewed as noted in HPI Other findings are noted below. Systems: constitutional, ears/nose/mouth/throat, respiratory, gastrointestinal, genitourinary, musculoskeletal, integumentary, neurologic, psychiatric, endocrine. Positive findings noted below. Modified ESAS Completed by: provider Fatigue: 5 Drowsiness: 0 Depression: 0 Pain: 0 Anxiety: 0 Nausea: 0 Anorexia: 1 Dyspnea: 4 Constipation: No  
  Stool Occurrence(s): 1 PHYSICAL EXAM:  
 
From RN flowsheet: 
Wt Readings from Last 3 Encounters:  
01/19/21 251 lb (113.9 kg) 02/24/20 280 lb (127 kg) 02/10/20 280 lb (127 kg) Blood pressure (!) 160/83, pulse 80, temperature 97.6 °F (36.4 °C), resp. rate 24, height 6' (1.829 m), weight 251 lb (113.9 kg), SpO2 90 %. Pain Scale 1: Numeric (0 - 10) Pain Intensity 1: 0 Pain Onset 1: acute Pain Location 1: Leg 
Pain Orientation 1: Right Pain Description 1: Aching, Throbbing Pain Intervention(s) 1: Medication (see MAR) Last bowel movement, if known:  
 
Constitutional: awake, alert lying in hospital bed NAD Eyes: pupils equal, anicteric Hi flow Nasal cannula in place Elevated RR Speech is fluent Good eye contact, affect normal 
Insight intact Wound vac in place R foot. HISTORY:  
 
Principal Problem: 
  Osteomyelitis ([address] (12/28/2020) Past Medical History:  
Diagnosis Date  Arthritis, Degenerative,  Knee 4/4/2011  Carcinoma, Prostate 4/4/2011  Degenerative disc disease 4/4/2011  Depression/ Anxiety 4/4/2011  Diabetes Mellitrus ( non-insulin dependent ) Type 2 4/4/2011  
 Heart failure ([address]  HEMATOCHEZIA 4/4/2011  Hypertrension 4/4/2011  Hypertriglyceridemia 4/4/2011  Ill-defined condition   
 lymphadema  Insomnia 4/4/2011  Laryngitis 4/4/2011  Mild Aortic insufficiency 4/4/2011  Mild Concentric LVH (left ventricular hypertrophy) 4/4/2011  Neuropathy 4/4/2011  Osteoarthritis 4/4/2011  Rotator Cuff Tendonitis 4/4/2011 Past Surgical History:  
Procedure Laterality Date  COLONOSCOPY N/A 4/28/2017 COLONOSCOPY performed by Jalyn Carpio MD at [facility] ENDOSCOPY  
 HX ORTHOPAEDIC    
 left hip pinning  HX ORTHOPAEDIC    
 right hip replacement  HX OTHER SURGICAL    
 left little toe amputation  HX UROLOGICAL    
 IR INSERT TUNL CVC W/O PORT OVER 5 YR  1/6/2021  DC CARDIAC SURG PROCEDURE UNLIST    
 cardiac cath Family History Family history unknown: Yes History reviewed, no pertinent family history. Social History Tobacco Use  Smoking status: Current Every Day Smoker Packs/day: 1.00 Last attempt to quit: 2019 Years since quittin.2  Smokeless tobacco: Never Used Substance Use Topics  Alcohol use: Not Currently Alcohol/week: 11.7 standard drinks Types: 7 Cans of beer, 7 Shots of liquor per week No Known Allergies Current Facility-Administered Medications Medication Dose Route Frequency  insulin glargine (LANTUS) injection 27 Units  27 Units SubCUTAneous QHS  enoxaparin (LOVENOX) injection 40 mg  40 mg SubCUTAneous Q24H  
 linezolid in dextrose 5% (ZYVOX) IVPB premix in D5W 600 mg  600 mg IntraVENous Q12H  
 0.9% sodium chloride infusion 250 mL  250 mL IntraVENous PRN  pantoprazole (PROTONIX) tablet 40 mg  40 mg Oral ACB&D  
 furosemide (LASIX) injection 80 mg  80 mg IntraVENous Q12H  
 dexAMETHasone (DECADRON) tablet 6 mg  6 mg Oral DAILY  albuterol (PROVENTIL HFA, VENTOLIN HFA, PROAIR HFA) inhaler 2 Puff  2 Puff Inhalation Q4H RT  
 miconazole (MICOTIN) 2 % powder   Topical BID  hydrALAZINE (APRESOLINE) 20 mg/mL injection 20 mg  20 mg IntraVENous Q6H PRN  
 cloNIDine HCL (CATAPRES) tablet 0.1 mg  0.1 mg Oral BID  ascorbic acid (vitamin C) (VITAMIN C) tablet 500 mg  500 mg Oral DAILY  zinc sulfate (ZINCATE) 220 (50) mg capsule 1 Cap  1 Cap Oral DAILY  amLODIPine (NORVASC) tablet 10 mg  10 mg Oral DAILY  collagenase (SANTYL) 250 unit/gram ointment   Topical Once per day on Thu Sat  balsam peru-castor oiL (VENELEX) ointment   Topical Q12H  
 insulin lispro (HUMALOG) injection 10 Units  10 Units SubCUTAneous TIDAC  ammonium lactate (LAC-HYDRIN) 12 % lotion   Topical Q12H  
 sodium bicarbonate tablet 650 mg  650 mg Oral DAILY  sodium chloride (NS) flush 5-40 mL  5-40 mL IntraVENous Q8H  
 sodium chloride (NS) flush 5-40 mL  5-40 mL IntraVENous PRN  
 enzalutamide (XTANDI) chemo capsule 80 mg (patient supplied) (Patient Supplied)  80 mg Oral BID  fluticasone propionate (FLONASE) 50 mcg/actuation nasal spray 2 Spray  2 Spray Both Nostrils DAILY  piperacillin-tazobactam (ZOSYN) 3.375 g in 0.9% sodium chloride (MBP/ADV) 100 mL MBP  3.375 g IntraVENous Q8H  
 oxyCODONE-acetaminophen (PERCOCET) 5-325 mg per tablet 1 Tab  1 Tab Oral Q4H PRN  
 epoetin chi-epbx (RETACRIT) injection 20,000 Units  20,000 Units SubCUTAneous Q MON, WED & FRI  diclofenac (VOLTAREN) 1 % topical gel 2 g  2 g Topical QID  acetaminophen (TYLENOL) tablet 500 mg  500 mg Oral Q4H PRN  
 aspirin delayed-release tablet 81 mg  81 mg Oral DAILY  bicalutamide (CASODEX) tablet 50 mg  50 mg Oral DAILY  gabapentin (NEURONTIN) capsule 100 mg  100 mg Oral TID  hydrALAZINE (APRESOLINE) tablet 100 mg  100 mg Oral TID  tamsulosin (FLOMAX) capsule 0.4 mg  0.4 mg Oral DAILY  nicotine (NICODERM CQ) 14 mg/24 hr patch 1 Patch  1 Patch TransDERmal DAILY  insulin lispro (HUMALOG) injection   SubCUTAneous AC&HS  
 glucose chewable tablet 16 g  4 Tab Oral PRN  
 dextrose (D50W) injection syrg 12.5-25 g  12.5-25 g IntraVENous PRN  
 glucagon (GLUCAGEN) injection 1 mg  1 mg IntraMUSCular PRN  
 
 
 
 LAB AND IMAGING FINDINGS:  
 
Lab Results Component Value Date/Time WBC 6.0 01/19/2021 02:07 AM  
 HGB 7.2 (L) 01/19/2021 02:07 AM  
 PLATELET 557 20/54/5517 02:07 AM  
 
Lab Results Component Value Date/Time Sodium 138 01/19/2021 02:07 AM  
 Potassium 4.3 01/19/2021 02:07 AM  
 Chloride 103 01/19/2021 02:07 AM  
 CO2 27 01/19/2021 02:07 AM  
 BUN 58 (H) 01/19/2021 02:07 AM  
 Creatinine 1.97 (H) 01/19/2021 02:07 AM  
 Calcium 8.5 01/19/2021 02:07 AM  
 Magnesium 2.1 01/19/2021 02:07 AM  
 Phosphorus 3.4 01/19/2021 02:07 AM  
  
Lab Results Component Value Date/Time Alk. phosphatase 105 01/19/2021 02:07 AM  
 Protein, total 7.3 01/19/2021 02:07 AM  
 Albumin 1.8 (L) 01/19/2021 02:07 AM  
 Globulin 5.5 (H) 01/19/2021 02:07 AM  
 
Lab Results Component Value Date/Time INR 1.1 12/31/2020 05:03 PM  
 Prothrombin time 11.3 (H) 12/31/2020 05:03 PM  
 aPTT 33.1 (H) 10/24/2016 03:26 PM  
  
Lab Results Component Value Date/Time Iron 42 01/11/2021 04:35 AM  
 TIBC 201 (L) 01/11/2021 04:35 AM  
 Iron % saturation 21 01/11/2021 04:35 AM  
 Ferritin 266 12/30/2020 01:17 AM  
  
No results found for: PH, PCO2, PO2 No components found for: [place] Lab Results Component Value Date/Time CK 22 (L) 01/13/2021 03:39 AM  
  
 
 
   
 
Total time: 25 
Counseling / coordination time, spent as noted above: 15 
> 50% counseling / coordination?: yes Prolonged service was provided for  []30 min   []75 min in face to face time in the presence of the patient, spent as noted above. Time Start:  
Time End:  
Note: this can only be billed with 45271 (initial) or 27871 (follow up). If multiple start / stop times, list each separately.

## 2021-01-20 NOTE — PROGRESS NOTES
Bedside shift change report given to Magdy [place] RN (oncoming nurse) by Stanley Carrel, RN (offgoing nurse). Report included the following information SBAR, Kardex, ED Summary, MAR, Recent Results and Cardiac Rhythm NSR.

## 2021-01-20 NOTE — PROGRESS NOTES
Bedside and Verbal shift change report given to Claudia Martin (oncoming nurse) by Татьяна Delcid (offgoing nurse). Report included the following information SBAR, Kardex, ED Summary, MAR, Recent Results, Med Rec Status, Cardiac Rhythm NSR, Alarm Parameters  and Quality Measures.

## 2021-01-20 NOTE — PROGRESS NOTES
Nephrology Progress Note Leona Florence III Date of Admission : 12/28/2020 CC: Follow up for KATHI on CKD Assessment and Plan KATHI on CKD  
- suspected ATN from severe pre renal azotemia - U/S neg for hydro - UA with proteinuria - Cr stable w/ lasix . Continue current dose  
- cont diuresis. RLE edema is chronic  
- daily labs and I/os Hyperkalemia: 
- low K diet, no ACE/ARB 
- K stable CKD Stage IIIa: 
-  Presumed 2/2 DM and HTN 
- nephrotic range proteinuria 
- baseline Cr 1.7 mg/dl  
- followed by Dr. John Jack COVID-19 PNA: 
- positive on 1/11 
- on decadron and remdesivir 
  
Hx of prostate cancer s/p XRT 
  
Metabolic acidosis: 
-  Continue oral Bicarb  
  
Anemia in CKD + blood loss: 
- cont JAZ 
- EGD 1/5/2021: An actively bleeding Dieulafoy's lesion was noted in the gastric fundus.   
  
Type II DM: 
- on insulin 
  
HTN :  
- Continue current meds  
  
R foot osteo: 
- on abx 
- s/p debridement on 12/30 Group B strep bacteremia: 
- repeat cx neg Interval History: Not seen in room but chart reviewed Good UOP Stable renal function Stable oxygenation Current Medications: all current  Medications have been eviewed in [facility] Review of Systems: Review of systems not obtained due to patient factors. Objective: 
Vitals:   
Vitals:  
 01/20/21 0820 01/20/21 0924 01/20/21 1030 01/20/21 1100 BP: (!) 156/64 (!) 160/69 (!) 146/69 (!) 144/74 Pulse: 81 79 74 71 Resp: 23 16 15 28 Temp: 97.7 °F (36.5 °C) 97.2 °F (36.2 °C)  98 °F (36.7 °C) SpO2: 100% 100% 100% 100% Weight:      
Height:      
 
Intake and Output: 
01/20 0701 - 01/20 1900 In: 410 [I.V.:100] Out: 1150 [[id number]] 01/18 1901 - 01/20 0700 In: -  
Out: 2600 [Urine:2400; Drains:200] Physical Examination:  
General: Obese, on BIPAP Resp:  cta anteriorly CV:  S1, s2, no murmur Neurologic:  Non focal 
Psych:             Anxious :  Bynum in place []    High complexity decision making was performed 
[]    Patient is at high-risk of decompensation with multiple organ involvement Lab Data Personally Reviewed: I have reviewed all the pertinent labs, microbiology data and radiology studies during assessment. Recent Labs  
  01/20/21 
0157 01/19/21 0207 01/18/21 
8917  138 139  
K 4.2 4.3 4.1  103 105 CO2 29 27 27 * 167* 88 BUN 59* 58* 56* CREA 1.96* 1.97* 1.94* CA 8.5 8.5 8.3*  
MG 2.0 2.1 1.8 PHOS 3.2 3.4 3.6 ALB  --  1.8*  --   
ALT  --  12  --   
 
Recent Labs  
  01/20/21 
0158 01/19/21 
0207 01/18/21 
0551 WBC 5.1 6.0 6.1 HGB 6.9* 7.2* 7.3* HCT 22.7* 23.9* 24.0*  
 238 233 No results found for: SDES Lab Results Component Value Date/Time Culture result: (A) 12/30/2020 01:14 PM  
  LIGHT ANAEROBIC GRAM NEGATIVE RODS BETA LACTAMASE POSITIVE Culture result: LIGHT PROTEUS MIRABILIS (A) 12/30/2020 01:14 PM  
 Culture result: (A) 12/30/2020 01:14 PM  
  LIGHT STREPTOCOCCI, BETA HEMOLYTIC GROUP B Penicillin and ampicillin are drugs of choice for treatment of beta-hemolytic streptococcal infections. Susceptibility testing of penicillins and beta-lactams approved by the FDA for treatment of beta-hemolytic streptococcal infections need not be performed routinely, because nonsusceptible isolates are extremely rare. CLSI 2012 Culture result: LIGHT ENTEROCOCCUS FAECALIS (A) 12/30/2020 01:14 PM  
 
Recent Results (from the past 24 hour(s)) GLUCOSE, POC Collection Time: 01/19/21  4:59 PM  
Result Value Ref Range Glucose (POC) 227 (H) 65 - 100 mg/dL Performed by Carolina Arora GLUCOSE, POC Collection Time: 01/19/21  9:33 PM  
Result Value Ref Range Glucose (POC) 221 (H) 65 - 100 mg/dL Performed by Abraham Lucio MAGNESIUM Collection Time: 01/20/21  1:57 AM  
Result Value Ref Range Magnesium 2.0 1.6 - 2.4 mg/dL METABOLIC PANEL, BASIC  
 Collection Time: 01/20/21  1:57 AM  
Result Value Ref Range Sodium 139 136 - 145 mmol/L Potassium 4.2 3.5 - 5.1 mmol/L Chloride 103 97 - 108 mmol/L  
 CO2 29 21 - 32 mmol/L Anion gap 7 5 - 15 mmol/L Glucose 136 (H) 65 - 100 mg/dL BUN 59 (H) 6 - 20 MG/DL Creatinine 1.96 (H) 0.70 - 1.30 MG/DL  
 BUN/Creatinine ratio 30 (H) 12 - 20 GFR est AA 41 (L) >60 ml/min/1.73m2 GFR est non-AA 34 (L) >60 ml/min/1.73m2 Calcium 8.5 8.5 - 10.1 MG/DL  
PHOSPHORUS Collection Time: 01/20/21  1:57 AM  
Result Value Ref Range Phosphorus 3.2 2.6 - 4.7 MG/DL  
CK Collection Time: 01/20/21  1:58 AM  
Result Value Ref Range CK 15 (L) 39 - 308 U/L  
D DIMER Collection Time: 01/20/21  1:58 AM  
Result Value Ref Range D-dimer 1.54 (H) 0.00 - 0.65 mg/L FEU  
CBC WITH AUTOMATED DIFF Collection Time: 01/20/21  1:58 AM  
Result Value Ref Range WBC 5.1 4.1 - 11.1 K/uL  
 RBC 2.51 (L) 4.10 - 5.70 M/uL HGB 6.9 (L) 12.1 - 17.0 g/dL HCT 22.7 (L) 36.6 - 50.3 % MCV 90.4 80.0 - 99.0 FL  
 MCH 27.5 26.0 - 34.0 PG  
 MCHC 30.4 30.0 - 36.5 g/dL  
 RDW 18.2 (H) 11.5 - 14.5 % PLATELET 157 347 - 153 K/uL MPV 9.5 8.9 - 12.9 FL  
 NRBC 0.0 0  WBC ABSOLUTE NRBC 0.00 0.00 - 0.01 K/uL NEUTROPHILS 79 (H) 32 - 75 % LYMPHOCYTES 13 12 - 49 % MONOCYTES 6 5 - 13 % EOSINOPHILS 1 0 - 7 % BASOPHILS 0 0 - 1 % IMMATURE GRANULOCYTES 1 (H) 0.0 - 0.5 % ABS. NEUTROPHILS 3.9 1.8 - 8.0 K/UL  
 ABS. LYMPHOCYTES 0.7 (L) 0.8 - 3.5 K/UL  
 ABS. MONOCYTES 0.3 0.0 - 1.0 K/UL  
 ABS. EOSINOPHILS 0.1 0.0 - 0.4 K/UL  
 ABS. BASOPHILS 0.0 0.0 - 0.1 K/UL  
 ABS. IMM. GRANS. 0.1 (H) 0.00 - 0.04 K/UL  
 DF SMEAR SCANNED    
 RBC COMMENTS OVALOCYTES PRESENT 
    
 RBC COMMENTS ANISOCYTOSIS 1+ 
    
 RBC COMMENTS POLYCHROMASIA 1+ 
    
 RBC COMMENTS BASOPHILIC STIPPLING 
PRESENT 
    
RBC, ALLOCATE Collection Time: 01/20/21  4:00 AM  
Result Value Ref Range HISTORY CHECKED? Historical check performed TYPE & SCREEN Collection Time: 01/20/21  4:16 AM  
Result Value Ref Range Crossmatch Expiration 01/23/2021,2359 ABO/Rh(D) O POSITIVE Antibody screen NEG Unit number [id number] Blood component type RC LR Unit division 00 Status of unit ISSUED Crossmatch result Compatible GLUCOSE, POC Collection Time: 01/20/21  8:07 AM  
Result Value Ref Range Glucose (POC) 181 (H) 65 - 100 mg/dL Performed by Destini Baker GLUCOSE, POC Collection Time: 01/20/21  1:02 PM  
Result Value Ref Range Glucose (POC) 289 (H) 65 - 100 mg/dL Performed by Destini Arboleda MD 
[address] [place] [address], [address] [place] [address] Phone - [phone] Fax - [phone] 
[email or web address]

## 2021-01-20 NOTE — PROGRESS NOTES
[address] [facility] Adult  Hospitalist Group Hospitalist Progress Note Emma Medina MD 
Answering service: [phone] OR [phone] from in house phone Date of Service:  2021 NAME:  Teodoro Dick III 
:  1950 MRN:  [id number] Admission Summary:  
Teodoro Dick III is a 72 y. o. male with PMH of prostatic cancer s/p radiation, IDDM, heart failure, htn. He apparently told the ER physician that he c/o onset of diarrhea yesterday and some chills over the last 2 days, with occasional cough. For me, however, he denies all of these symptoms and states he came in because his caregiver was concerned d/t his lethargy over the last several days to weeks. Interval history / Subjective:  
  
Pt  Yelling \"help\", went to bedside. Wanted to get his BiPAP replaced. Feeling SOB. CXR pending. Given 1U PRBC About -2.5L out yesterday Assessment & Plan:  
 
COVID PNA Acute Hypoxic respiratory failure - O2 weaning, keep saturations over 90%, transitioning back to HFNC with BiPAP PRN  
- remdesivir completed , on decadron until  
- VitC, VitD, and Zn 
- Pulm following, infectious disease following, Would not recommend ivermectin as this has not been proven in any RCT 
- Continue diuresis with lasix 80mg BID, with strict I and O  
- Pro calcitonin down trending,  
- Linezolid started by ID -- 
- repeat CXR today - Palliative consulted, following. Severe sepsis 2/2 - resolved 
osteomyelitis right foot -  S/p debridement right foot ulcer with removal of infected bone 2020 GroupB strep bacteremia - positive , neg thereafter.  
-Cultures with proteus mirabilis, stept group B, enterococcus -ID following.  Antibiotics transitioned to zosyn  
-surgical path with + margins, will need prolonged antibiotics ~6 weeks (Zosyn) 
-Powerline placed  
-wound vac  
 -non weight bearing right lower extremity (patient wheelchair bound at baseline)-  
  
Acute on chronic anemia, baseline ~9: persistent GI bleed   
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 Duodenal ucler, non bleeding - Transfuse for hemoglobin less than 7, PRBC this am  
-Continue PPI 
- s/p IV iron - Monitor CBC 
  
Acute on CKD: improving 
-nephrology following, on bicarb, valtessa  
-kumar removed  
  
IDDM, last a1c 8.1 01/2020 
-continue long acting insulin, continue 27U 
- Continue SSI, meal time insulin and POCT glucose checks HTN, controlled 
- amlodipine, clonidine, hydralazine,  
 
Prostate ca 
-Cont home meds 
  
Tobacco abuse 
-continue patch Code status: FULL 
DVT prophylaxis: SCDs due to recent GI bleed Care Plan discussed with: Patient/Family Anticipated Disposition: Home w/Family and SNF/LTC Anticipated Discharge: Greater than 48 hours Hospital Problems  Date Reviewed: 8/20/2020 Codes Class Noted POA * (Principal) Osteomyelitis ([facility] [address] ICD-10-CM: M86.9 ICD-9-CM: 730.20  12/28/2020 Yes Review of Systems: A comprehensive review of systems was negative except for that written in the HPI. Vital Signs:  
 Last 24hrs VS reviewed since prior progress note. Most recent are: 
Visit Vitals BP (!) 144/74 Pulse 71 Temp 98 °F (36.7 °C) Resp 28 Ht 6' (1.829 m) Wt 114.4 kg (252 lb 1.6 oz) SpO2 100% BMI 34.19 kg/m² Intake/Output Summary (Last 24 hours) at 1/20/2021 1337 Last data filed at 1/20/2021 1312 Gross per 24 hour Intake 410 ml Output 2100 ml Net -1690 ml Physical Examination:  
 
I had a face to face encounter with this patient and independently examined them on 1/20/2021 as outlined below: 
 
     
Constitutional:  NAD, awake and in bed. On BiPAP  
ENT:  Oral mucosa moist, oropharynx benign. Resp:  Course bilaterally. Tight, with poor air movement. CV:  Regular rhythm, normal rate, no murmurs, gallops, rubs GI:  Soft, non distended, non tender. normoactive bowel sounds, no hepatosplenomegaly Musculoskeletal:  No edema, warm, 2+ pulses throughout. R wound vac in place. Neurologic:  Moves all extremities. AAOx3, CN II-XII reviewed Skin:  Good turgor, no rashes or ulcers Data Review:  
 Review and/or order of clinical lab test 
Review and/or order of tests in the radiology section of CPT Review and/or order of tests in the medicine section of CPT Labs:  
 
Recent Labs  
  01/20/21 0158 01/19/21 0207 WBC 5.1 6.0 HGB 6.9* 7.2* HCT 22.7* 23.9*  
 238 Recent Labs  
  01/20/21 0157 01/19/21 0207 01/18/21 
7878  138 139  
K 4.2 4.3 4.1  103 105 CO2 29 27 27 BUN 59* 58* 56* CREA 1.96* 1.97* 1.94* * 167* 88  
CA 8.5 8.5 8.3*  
MG 2.0 2.1 1.8 PHOS 3.2 3.4 3.6 Recent Labs  
  01/19/21 0207 ALT 12  
 TBILI 0.2 TP 7.3 ALB 1.8*  
GLOB 5.5* No results for input(s): INR, PTP, APTT, INREXT, INREXT in the last 72 hours. No results for input(s): FE, TIBC, PSAT, FERR in the last 72 hours. Lab Results Component Value Date/Time Folate 20.0 03/16/2019 04:06 AM  
  
No results for input(s): PH, PCO2, PO2 in the last 72 hours. Recent Labs  
  01/20/21 0158 CPK 15* No results found for: CHOL, CHOLX, CHLST, CHOLV, HDL, HDLP, LDL, LDLC, DLDLP, TGLX, TRIGL, TRIGP, CHHD, CHHDX Lab Results Component Value Date/Time Glucose (POC) 289 (H) 01/20/2021 01:02 PM  
 Glucose (POC) 181 (H) 01/20/2021 08:07 AM  
 Glucose (POC) 221 (H) 01/19/2021 09:33 PM  
 Glucose (POC) 227 (H) 01/19/2021 04:59 PM  
 Glucose (POC) 147 (H) 01/19/2021 11:32 AM  
 
Lab Results Component Value Date/Time  Color YELLOW/STRAW 01/03/2021 12:56 PM  
 Appearance CLEAR 01/03/2021 12:56 PM  
 Specific gravity 1.015 01/03/2021 12:56 PM  
 pH (UA) 5.5 01/03/2021 12:56 PM  
 Protein 300 (A) 01/03/2021 12:56 PM  
 Glucose 100 (A) 01/03/2021 12:56 PM  
 Ketone Negative 01/03/2021 12:56 PM  
 Bilirubin Negative 01/03/2021 12:56 PM  
 Urobilinogen 0.2 01/03/2021 12:56 PM  
 Nitrites Negative 01/03/2021 12:56 PM  
 Leukocyte Esterase Negative 01/03/2021 12:56 PM  
 Epithelial cells FEW 01/03/2021 12:56 PM  
 Bacteria 1+ (A) 01/03/2021 12:56 PM  
 WBC 0-4 01/03/2021 12:56 PM  
 RBC 0-5 01/03/2021 12:56 PM  
 
 
 
Medications Reviewed:  
 
Current Facility-Administered Medications Medication Dose Route Frequency  0.9% sodium chloride infusion 250 mL  250 mL IntraVENous PRN  
 insulin glargine (LANTUS) injection 27 Units  27 Units SubCUTAneous QHS  enoxaparin (LOVENOX) injection 40 mg  40 mg SubCUTAneous Q24H  
 linezolid in dextrose 5% (ZYVOX) IVPB premix in D5W 600 mg  600 mg IntraVENous Q12H  
 0.9% sodium chloride infusion 250 mL  250 mL IntraVENous PRN  pantoprazole (PROTONIX) tablet 40 mg  40 mg Oral ACB&D  
 furosemide (LASIX) injection 80 mg  80 mg IntraVENous Q12H  
 dexAMETHasone (DECADRON) tablet 6 mg  6 mg Oral DAILY  albuterol (PROVENTIL HFA, VENTOLIN HFA, PROAIR HFA) inhaler 2 Puff  2 Puff Inhalation Q4H RT  
 miconazole (MICOTIN) 2 % powder   Topical BID  hydrALAZINE (APRESOLINE) 20 mg/mL injection 20 mg  20 mg IntraVENous Q6H PRN  
 cloNIDine HCL (CATAPRES) tablet 0.1 mg  0.1 mg Oral BID  ascorbic acid (vitamin C) (VITAMIN C) tablet 500 mg  500 mg Oral DAILY  amLODIPine (NORVASC) tablet 10 mg  10 mg Oral DAILY  collagenase (SANTYL) 250 unit/gram ointment   Topical Once per day on Thu Sat  balsam peru-castor oiL (VENELEX) ointment   Topical Q12H  
 insulin lispro (HUMALOG) injection 10 Units  10 Units SubCUTAneous TIDAC  ammonium lactate (LAC-HYDRIN) 12 % lotion   Topical Q12H  
 sodium bicarbonate tablet 650 mg  650 mg Oral DAILY  sodium chloride (NS) flush 5-40 mL  5-40 mL IntraVENous Q8H  
  sodium chloride (NS) flush 5-40 mL  5-40 mL IntraVENous PRN  
 enzalutamide (XTANDI) chemo capsule 80 mg (patient supplied) (Patient Supplied)  80 mg Oral BID  fluticasone propionate (FLONASE) 50 mcg/actuation nasal spray 2 Spray  2 Spray Both Nostrils DAILY  piperacillin-tazobactam (ZOSYN) 3.375 g in 0.9% sodium chloride (MBP/ADV) 100 mL MBP  3.375 g IntraVENous Q8H  
 oxyCODONE-acetaminophen (PERCOCET) 5-325 mg per tablet 1 Tab  1 Tab Oral Q4H PRN  
 epoetin chi-epbx (RETACRIT) injection 20,000 Units  20,000 Units SubCUTAneous Q MON, WED & FRI  diclofenac (VOLTAREN) 1 % topical gel 2 g  2 g Topical QID  acetaminophen (TYLENOL) tablet 500 mg  500 mg Oral Q4H PRN  
 aspirin delayed-release tablet 81 mg  81 mg Oral DAILY  bicalutamide (CASODEX) tablet 50 mg  50 mg Oral DAILY  gabapentin (NEURONTIN) capsule 100 mg  100 mg Oral TID  hydrALAZINE (APRESOLINE) tablet 100 mg  100 mg Oral TID  tamsulosin (FLOMAX) capsule 0.4 mg  0.4 mg Oral DAILY  nicotine (NICODERM CQ) 14 mg/24 hr patch 1 Patch  1 Patch TransDERmal DAILY  insulin lispro (HUMALOG) injection   SubCUTAneous AC&HS  
 glucose chewable tablet 16 g  4 Tab Oral PRN  
 dextrose (D50W) injection syrg 12.5-25 g  12.5-25 g IntraVENous PRN  
 glucagon (GLUCAGEN) injection 1 mg  1 mg IntraMUSCular PRN  
 
______________________________________________________________________ EXPECTED LENGTH OF STAY: 9d 19h ACTUAL LENGTH OF STAY:          23 
 
            
Asaf Hale MD

## 2021-01-20 NOTE — PROGRESS NOTES
Physical therapy: 
 
Chart reviewed. Noted patient with Hgb 6.9 and is to receive a blood transfusion. Will defer and continue to follow. Thank you

## 2021-01-20 NOTE — PROGRESS NOTES
Tyler NP Progress note Name: Malena Gleason YOB: 1950 MRN: [id number] Admission Date: 12/28/2020  3:14 PM 
 
Date of service: 1/20/2021 3:57 AM  
  
                         
Overnight Update:  
  
 
Notified by patient's nurse of current HGB/HCT: 
 
Lab Results Component Value Date/Time HGB 6.9 (L) 01/20/2021 01:58 AM  
 HCT 22.7 (L) 01/20/2021 01:58 AM  
 
Will transfuse 1 unit(s) of PRBC. Anuj REED-C, PAMarsC 
[phone] or [employer]

## 2021-01-21 NOTE — PROGRESS NOTES
Bedside and Verbal shift change report given to Emily Chavira (oncoming nurse) by Kamilla Wakefield (offgoing nurse). Report included the following information SBAR, Kardex, ED Summary, MAR, Accordion, Recent Results, Med Rec Status, Cardiac Rhythm NSR, Alarm Parameters  and Quality Measures.

## 2021-01-21 NOTE — PROGRESS NOTES
PRETTY- Pending referrals to several SNF's 
CM spoke with pt's daughter, Armin Griffin ([phone]) to discuss this pt's disposition. Discussed skilled nursing home placement vs pt going home with home health. Pt has extensive care needs to include needing a wound vac, prolonged IV antibx and therapy. She asked that referrals be sent to [facility], [address] [place] [address] and [facility].. Cm sent referrals to all of these facilities. An LTSS will need to be completed. Roland Randall

## 2021-01-21 NOTE — PROGRESS NOTES
Bedside shift change report given to Claudean Joe (oncoming nurse) by Jailene Harding RN (offgoing nurse). Report included the following information SBAR, Kardex, Intake/Output, Recent Results and Cardiac Rhythm NSR. Intake/Output Summary (Last 24 hours) at 1/20/2021 2107 Last data filed at 1/20/2021 1993 Gross per 24 hour Intake 1110 ml Output 1800 ml Net -690 ml

## 2021-01-21 NOTE — PROGRESS NOTES
Problem: Mobility Impaired (Adult and Pediatric) Goal: *Acute Goals and Plan of Care (Insert Text) Description: FUNCTIONAL STATUS PRIOR TO ADMISSION: The patient was functional at the wheelchair level and required stand-by assistance for transfers to the chair. He has a power and manual chair at home. HOME SUPPORT PRIOR TO ADMISSION: The patient lived alone with hired caregivers and family to provide assistance, though he is often at home alone overnight. Physical Therapy Goals Initiated 1/21/2021 1. Patient will move from supine to sit and sit to supine  and roll side to side in bed with moderate assistance  within 7 day(s). 2.  Patient will transfer from bed to chair and chair to bed with moderate assistance  using the least restrictive device within 7 day(s). 3.  Patient will perform sit to stand with moderate assistance  within 7 day(s). Outcome: Progressing Towards Goal 
 PHYSICAL THERAPY EVALUATION Patient: Everardo Willson (24 y.o. male) Date: 1/21/2021 Primary [facility]: [facility] [M86.9] Procedure(s) (LRB): ESOPHAGOGASTRODUODENOSCOPY (EGD)    :- (N/A) ESOPHAGOGASTRODUODENAL (EGD) BIOPSY (N/A) RESOLUTION CLIP (N/A) 17 Days Post-Op Precautions:     
 
 
ASSESSMENT Based on the objective data described below, the patient presents with significant weakness and ROM limitations after a prolonged hospitalization due to osteomyelitis and COVID with complex medical history. He has multiple joint issues, with R RCT, limited motion and strength in the L shoulder, severely limited L hip and knee flexion due to previous fx and moderately limited RLE ROM and strength due to immobility. He is obese, has a wound vac on the R lower leg and is currently on BiPAP. At home, he was managing his own transfers with limited assistance, be he has not been OOB since admission 24 days ago. Just positioning him in chair position in the bed was challenging for him due to back and hip pain/tightness. Had him work on pulling himself forward to support his own trunk, but he can only use his LUE to assist (due to R shld pain and limited motion/strength) and only in a certain arc of movement due to the L shld limitations. He is willing to work with therapy and we will work on progressing him up to sitting EOB as he is able to tolerate. Agree that rehab is likely necessary, since getting enough assistance at home does not sound like a reasonable goal for him. However, we will continue to update recommendations with each visit. Current Level of Function Impacting Discharge (mobility/balance): max assist for bed mobility, has not been able to dangle EOB yet Functional Outcome Measure: The patient scored Total: 35/100 on the Barthel Index which is indicative of severely impaired ability to care for basic self needs/dependency on others. Other factors to consider for discharge: COVID positive Patient will benefit from skilled therapy intervention to address the above noted impairments. PLAN : 
Recommendations and Planned Interventions: bed mobility training, transfer training, therapeutic exercises, patient and family training/education, and therapeutic activities Frequency/Duration: Patient will be followed by physical therapy:  4 times a week to address goals. Recommendation for discharge: (in order for the patient to meet his/her long term goals) Therapy up to 5 days/week in SNF setting This discharge recommendation: 
Has not yet been discussed the attending provider and/or case management IF patient discharges home will need the following DME: to be determined (TBD) SUBJECTIVE:  
Patient stated I want to try, but you have to go slow.  OBJECTIVE DATA SUMMARY:  
HISTORY:   
Past Medical History:  
Diagnosis Date Arthritis, Degenerative,  Knee 4/4/2011 Carcinoma, Prostate 4/4/2011 Degenerative disc disease 4/4/2011 Depression/ Anxiety 4/4/2011 Diabetes Mellitrus ( non-insulin dependent ) Type 2 4/4/2011 Heart failure ([address] HEMATOCHEZIA 4/4/2011 Hypertrension 4/4/2011 Hypertriglyceridemia 4/4/2011 Ill-defined condition   
 lymphadema Insomnia 4/4/2011 Laryngitis 4/4/2011 Mild Aortic insufficiency 4/4/2011 Mild Concentric LVH (left ventricular hypertrophy) 4/4/2011 Neuropathy 4/4/2011 Osteoarthritis 4/4/2011 Rotator Cuff Tendonitis 4/4/2011 Past Surgical History:  
Procedure Laterality Date COLONOSCOPY N/A 4/28/2017 COLONOSCOPY performed by Lesley Fisher MD at [facility] ENDOSCOPY  
 HX ORTHOPAEDIC    
 left hip pinning HX ORTHOPAEDIC    
 right hip replacement HX OTHER SURGICAL    
 left little toe amputation HX UROLOGICAL    
 IR INSERT TUNL CVC W/O PORT OVER 5 YR  1/6/2021 CA CARDIAC SURG PROCEDURE UNLIST    
 cardiac cath Personal factors and/or comorbidities impacting plan of care: as noted above Home Situation Home Environment: Apartment One/Two Story Residence: One story Living Alone: Yes Support Systems: Home care staff, Child(linnea)(CNA 9-3 daily) Patient Expects to be Discharged to[de-identified] Private residence Current DME Used/Available at Home: Wheelchair, power, Crutches, Raised toilet seat, Grab bars EXAMINATION/PRESENTATION/DECISION MAKING:  
Critical Behavior: 
Neurologic State: Alert Orientation Level: Oriented X4 Hearing: Auditory Auditory Impairment: None Skin:  per nursing, note wound vac RLE Edema: bilateral LE edema noted Range Of Motion: 
AROM: Generally decreased, functional(acute on chronic limitations in shoulders, hips, knees, trun) Strength:   
Strength: Generally decreased, functional(LUE 3/5 in avail range, RLE 3-/5 in avail range, RUE/LLE 2/5) Tone & Sensation:  
Tone: Normal 
  
  
  
  
Sensation: (decreased in bilateral LEs) Coordination: 
Coordination: Within functional limits(in UEs) Vision:  
  
Functional Mobility: 
Bed Mobility: 
Rolling: Maximum assistance Supine to Sit: (unable to dangle EOB due to weakness/limited ROM) Transfers: 
  
  
     
  
     
  
  
Balance:  
  
Ambulation/Gait Training: 
  
  
  
  
  
  
  
  
  
  
  
  
  
  
  
  
  
  
 Stairs: Therapeutic Exercises: ROM to extremities and pulling self forward in chair position in bed Functional Measure: 
Barthel Index: 
 
Bathin Bladder: 10 Bowels: 10 
Groomin Dressin Feeding: 10 Mobility: 0 Stairs: 0 Toilet Use: 0 Transfer (Bed to Chair and Back): 0 Total: 35/100 The Barthel ADL Index: Guidelines 1. The index should be used as a record of what a patient does, not as a record of what a patient could do. 2. The main aim is to establish degree of independence from any help, physical or verbal, however minor and for whatever reason. 3. The need for supervision renders the patient not independent. 4. A patient's performance should be established using the best available evidence. Asking the patient, friends/relatives and nurses are the usual sources, but direct observation and common sense are also important. However direct testing is not needed. 5. Usually the patient's performance over the preceding 24-48 hours is important, but occasionally longer periods will be relevant. 6. Middle categories imply that the patient supplies over 50 per cent of the effort. 7. Use of aids to be independent is allowed. Joanh Sesay., Barthel, D.W. (3126). Functional evaluation: the Barthel Index. [facility] (14)2. Michelle Godwin gretel LLOYD BatesF, Nina Marmolejo., Shukri Garcias., [place], [address] (1999). Measuring the change indisability after inpatient rehabilitation; comparison of the responsiveness of the Barthel Index and Functional [place] Measure. Journal of Neurology, Neurosurgery, and Psychiatry, 66(4), 713-119. Luisito Garcia, N.J.A, CEDRIC Cole, & Jass Jones, M.A. (2004.) Assessment of post-stroke quality of life in cost-effectiveness studies: The usefulness of the Barthel Index and the EuroQoL-5D. [facility], 04, 708-14 Physical Therapy Evaluation Charge Determination History Examination Presentation Decision-Making HIGH Complexity :3+ comorbidities / personal factors will impact the outcome/ POC  MEDIUM Complexity : 3 Standardized tests and measures addressing body structure, function, activity limitation and / or participation in recreation  MEDIUM Complexity : Evolving with changing characteristics  HIGH Complexity : FOTO score of 1- 25 Based on the above components, the patient evaluation is determined to be of the following complexity level: MEDIUM Pain Rating: 
Pain with ROM in all joints Activity Tolerance:  
Fair, desaturates with exertion and requires oxygen, requires frequent rest breaks, and observed SOB with activity After treatment patient left in no apparent distress:  
Call bell within reach, Side rails x 3, and bed in chair position COMMUNICATION/EDUCATION:  
The patients plan of care was discussed with: Registered nurse. Fall prevention education was provided and the patient/caregiver indicated understanding., Patient/family have participated as able in goal setting and plan of care. , and Patient/family agree to work toward stated goals and plan of care. Thank you for this referral. 
Linda Brown, PT Time Calculation: 34 mins

## 2021-01-21 NOTE — PROGRESS NOTES
Nephrology Progress Note Breann Salazar III Date of Admission : 12/28/2020 CC: Follow up for KATHI on CKD Assessment and Plan KATHI on CKD  
- suspected ATN from severe pre renal azotemia - U/S neg for hydro - UA with proteinuria  
- changed lasix to torsemide 80 mg BID 
- IV albumin 25 gm Q12 today  
- daily labs and I/os Hyperkalemia: 
- low K diet, no ACE/ARB 
- K stable CKD Stage IIIa: 
-  Presumed 2/2 DM and HTN 
- nephrotic range proteinuria 
- baseline Cr 1.7 mg/dl  
- followed by Dr. Reynold Coppola COVID-19 PNA: 
- positive on 1/11 
- on decadron and remdesivir 
  
Hx of prostate cancer s/p XRT 
  
Metabolic acidosis: 
-  Continue oral Bicarb  
  
Anemia in CKD + blood loss: 
- cont JAZ 
- EGD 1/5/2021: An actively bleeding Dieulafoy's lesion was noted in the gastric fundus.   
  
Type II DM: 
- on insulin 
  
HTN :  
- Continue current meds  
  
R foot osteo: 
- on abx 
- s/p debridement on 12/30 Group B strep bacteremia: 
- repeat cx neg Interval History: 
Seen and examined Doing better Slight worsening of RLE edema  
abdomne distended, non tender Current Medications: all current  Medications have been eviewed in [facility] Review of Systems: A comprehensive review of systems was negative except for that written in the HPI. Objective: 
Vitals:   
Vitals:  
 01/21/21 9821 01/21/21 5792 01/21/21 0843 01/21/21 1158 BP:   (!) 153/61 (!) 150/64 Pulse:    85 Resp:    27 Temp:    98.1 °F (36.7 °C) SpO2: 100% (!) 10% 100% 94% Weight:      
Height:      
 
Intake and Output: 
No intake/output data recorded. 01/19 1901 - 01/21 0700 In: 9003 [P.O.:960; I.V.:200] Out: 3120 [[id number]] Physical Examination:  
General: Obese, on BIPAP Resp:  cta anteriorly CV:  S1, s2, no murmur Neurologic:  Non focal 
Psych:             Anxious :  Bynum in place 
 
[]    High complexity decision making was performed []    Patient is at high-risk of decompensation with multiple organ involvement Lab Data Personally Reviewed: I have reviewed all the pertinent labs, microbiology data and radiology studies during assessment. Recent Labs  
  01/21/21 
0516 01/20/21 
0157 01/19/21 
0207  139 138  
K 4.2 4.2 4.3  103 103 CO2 30 29 27 * 136* 167* BUN 56* 59* 58* CREA 1.89* 1.96* 1.97* CA 8.8 8.5 8.5 MG  --  2.0 2.1 PHOS  --  3.2 3.4 ALB 1.9*  --  1.8* ALT 7*  --  12 Recent Labs  
  01/21/21 
1134 01/20/21 
0158 01/19/21 
1168 WBC 6.6 5.1 6.0 HGB 8.6* 6.9* 7.2* HCT 28.4* 22.7* 23.9*  
 231 238 No results found for: SDES Lab Results Component Value Date/Time Culture result: (A) 12/30/2020 01:14 PM  
  LIGHT ANAEROBIC GRAM NEGATIVE RODS BETA LACTAMASE POSITIVE Culture result: LIGHT PROTEUS MIRABILIS (A) 12/30/2020 01:14 PM  
 Culture result: (A) 12/30/2020 01:14 PM  
  LIGHT STREPTOCOCCI, BETA HEMOLYTIC GROUP B Penicillin and ampicillin are drugs of choice for treatment of beta-hemolytic streptococcal infections. Susceptibility testing of penicillins and beta-lactams approved by the FDA for treatment of beta-hemolytic streptococcal infections need not be performed routinely, because nonsusceptible isolates are extremely rare. CLSI 2012 Culture result: LIGHT ENTEROCOCCUS FAECALIS (A) 12/30/2020 01:14 PM  
 
Recent Results (from the past 24 hour(s)) GLUCOSE, POC Collection Time: 01/20/21  1:02 PM  
Result Value Ref Range Glucose (POC) 289 (H) 65 - 100 mg/dL Performed by Sally Gan GLUCOSE, POC Collection Time: 01/20/21  6:04 PM  
Result Value Ref Range Glucose (POC) 273 (H) 65 - 100 mg/dL Performed by Dianne Maloney GLUCOSE, POC Collection Time: 01/20/21  9:34 PM  
Result Value Ref Range Glucose (POC) 241 (H) 65 - 100 mg/dL Performed by King Cheek D DIMER Collection Time: 01/21/21  5:16 AM  
Result Value Ref Range D-dimer 1.66 (H) 0.00 - 0.65 mg/L FEU METABOLIC PANEL, COMPREHENSIVE Collection Time: 01/21/21  5:16 AM  
Result Value Ref Range Sodium 138 136 - 145 mmol/L Potassium 4.2 3.5 - 5.1 mmol/L Chloride 103 97 - 108 mmol/L  
 CO2 30 21 - 32 mmol/L Anion gap 5 5 - 15 mmol/L Glucose 116 (H) 65 - 100 mg/dL BUN 56 (H) 6 - 20 MG/DL Creatinine 1.89 (H) 0.70 - 1.30 MG/DL  
 BUN/Creatinine ratio 30 (H) 12 - 20 GFR est AA 43 (L) >60 ml/min/1.73m2 GFR est non-AA 36 (L) >60 ml/min/1.73m2 Calcium 8.8 8.5 - 10.1 MG/DL Bilirubin, total 0.2 0.2 - 1.0 MG/DL  
 ALT (SGPT) 7 (L) 12 - 78 U/L  
 AST (SGOT) 8 (L) 15 - 37 U/L Alk. phosphatase 94 45 - 117 U/L Protein, total 7.3 6.4 - 8.2 g/dL Albumin 1.9 (L) 3.5 - 5.0 g/dL Globulin 5.4 (H) 2.0 - 4.0 g/dL A-G Ratio 0.4 (L) 1.1 - 2.2 GLUCOSE, POC Collection Time: 01/21/21  8:21 AM  
Result Value Ref Range Glucose (POC) 325 (H) 65 - 100 mg/dL Performed by Esau Chance GLUCOSE, POC Collection Time: 01/21/21 11:32 AM  
Result Value Ref Range Glucose (POC) 175 (H) 65 - 100 mg/dL Performed by Roni Alfonso CBC W/O DIFF Collection Time: 01/21/21 11:34 AM  
Result Value Ref Range WBC 6.6 4.1 - 11.1 K/uL  
 RBC 3.10 (L) 4.10 - 5.70 M/uL HGB 8.6 (L) 12.1 - 17.0 g/dL HCT 28.4 (L) 36.6 - 50.3 % MCV 91.6 80.0 - 99.0 FL  
 MCH 27.7 26.0 - 34.0 PG  
 MCHC 30.3 30.0 - 36.5 g/dL  
 RDW 18.2 (H) 11.5 - 14.5 % PLATELET 601 313 - 330 K/uL MPV 9.9 8.9 - 12.9 FL  
 NRBC 0.3 (H) 0  WBC ABSOLUTE NRBC 0.02 (H) 0.00 - 0.01 K/uL Khloe Lim MD 
[facility]  
[address], [address] [facility] Phone - [phone] Fax - [phone] 
[email or web address]

## 2021-01-21 NOTE — PROGRESS NOTES
Nutrition Note Brief check in with PO intake/adequacy. Remains with PRN BiPAP, currently on high flow. Appetite still down, requesting/ drinking a lot of juice per nursing, but does not drink Nepro. Eating ~25% of meals. Pt needs to up his protein intake with wound VAC. BG variable, now off steroids. Fasting well controlled, but hyperglycemia with POC Glu in part d/t high sugar consumption with fruit juices. Pt desires sweets, but should aim to maintain BG <180 mg/dL for wound healing. Pt has tried Ensure HP as well as Yoel this admission and did not like. We have some fruit flavored LiquaCel protein pack samples (low CHO, no sugar) that pt willing to try. Also Prosource. Both can be mixed in with juice since he is going to drink anyway. Will bring by for pt to try. Ultimately could try Ensure Clear if poor PO continues, but very high CHO so will hold off for now. Pt should also be on a MVI with poor PO intake. Will add. Monitor labs. RD will continue to follow along closely. Recent Labs  
  01/21/21 
0516 01/20/21 
0157 01/19/21 
0207 * 136* 167* BUN 56* 59* 58* CREA 1.89* 1.96* 1.97*  139 138  
K 4.2 4.2 4.3  103 103 CO2 30 29 27  
CA 8.8 8.5 8.5 PHOS  --  3.2 3.4 MG  --  2.0 2.1 Recent Labs  
  01/21/21 
1132 01/21/21 
6529 01/20/21 
2134 01/20/21 
1804 01/20/21 
1302 01/20/21 
1146 01/19/21 
2133 01/19/21 
1659 01/19/21 
1132 01/19/21 
0931 01/18/21 
2130 01/18/21 
1701 GLUCPOC 175* 325* 241* 273* 289* 181* 221* 227* 147* 198* 219* 213* Maria Elena Jackson RD Available via [facility] Pager# [phone]

## 2021-01-21 NOTE — PROGRESS NOTES
CM completed pt's LTSS for long term care or community based care if needed. Medicaid LTSS Screening submitted for processing.

## 2021-01-21 NOTE — PROGRESS NOTES
[address] [facility] Adult  Hospitalist Group Hospitalist Progress Note Renee Lerma MD 
Answering service: [phone] OR [phone] from in house phone Progress Note Patient: Elaine Shaffer MRN: [id number]  SSN: [id number] YOB: 1950  Age: 72 y.o. Sex: male Admit Date: 12/28/2020 LOS: 24 days Subjective:  
 
Patient presents with acute hypoxic respiratory failure, still needing BiPAP as needed. Also osteomyelitis of the right foot, currently with wound VAC. Otherwise patient appears well and denies no acute complaint at this time. Objective:  
 
Vitals:  
 01/21/21 9344 01/21/21 9835 01/21/21 0843 01/21/21 1158 BP:   (!) 153/61 (!) 150/64 Pulse:    85 Resp:    27 Temp:    98.1 °F (36.7 °C) SpO2: 100% (!) 10% 100% 94% Weight:      
Height:      
  
 
Intake and Output: 
Current Shift: No intake/output data recorded. Last three shifts: 01/19 1901 - 01/21 0700 In: 6344 [P.O.:960; I.V.:200] Out: 3120 [[id number]] Physical Exam:  
GENERAL: alert, cooperative, no distress, appears stated age THROAT & NECK: normal and no erythema or exudates noted. LUNG: clear to auscultation bilaterally HEART: regular rate and rhythm, S1, S2 normal, no murmur, click, rub or gallop ABDOMEN: soft, non-tender. Bowel sounds normal. No masses,  no organomegaly EXTREMITIES: Right foot with dressing, wound VAC present SKIN: no rash or abnormalities NEUROLOGIC: Patient is awake and alert PSYCHIATRIC: non focal 
 
Lab/Data Review: All lab results for the last 24 hours reviewed. Recent Results (from the past 24 hour(s)) GLUCOSE, POC Collection Time: 01/20/21  6:04 PM  
Result Value Ref Range Glucose (POC) 273 (H) 65 - 100 mg/dL Performed by Catherine Miller GLUCOSE, POC Collection Time: 01/20/21  9:34 PM  
Result Value Ref Range Glucose (POC) 241 (H) 65 - 100 mg/dL Performed by Teo AKBAR DIMER Collection Time: 01/21/21  5:16 AM  
Result Value Ref Range D-dimer 1.66 (H) 0.00 - 0.65 mg/L U METABOLIC PANEL, COMPREHENSIVE Collection Time: 01/21/21  5:16 AM  
Result Value Ref Range Sodium 138 136 - 145 mmol/L Potassium 4.2 3.5 - 5.1 mmol/L Chloride 103 97 - 108 mmol/L  
 CO2 30 21 - 32 mmol/L Anion gap 5 5 - 15 mmol/L Glucose 116 (H) 65 - 100 mg/dL BUN 56 (H) 6 - 20 MG/DL Creatinine 1.89 (H) 0.70 - 1.30 MG/DL  
 BUN/Creatinine ratio 30 (H) 12 - 20 GFR est AA 43 (L) >60 ml/min/1.73m2 GFR est non-AA 36 (L) >60 ml/min/1.73m2 Calcium 8.8 8.5 - 10.1 MG/DL Bilirubin, total 0.2 0.2 - 1.0 MG/DL  
 ALT (SGPT) 7 (L) 12 - 78 U/L  
 AST (SGOT) 8 (L) 15 - 37 U/L Alk. phosphatase 94 45 - 117 U/L Protein, total 7.3 6.4 - 8.2 g/dL Albumin 1.9 (L) 3.5 - 5.0 g/dL Globulin 5.4 (H) 2.0 - 4.0 g/dL A-G Ratio 0.4 (L) 1.1 - 2.2 GLUCOSE, POC Collection Time: 01/21/21  8:21 AM  
Result Value Ref Range Glucose (POC) 325 (H) 65 - 100 mg/dL Performed by Nikki Morales GLUCOSE, POC Collection Time: 01/21/21 11:32 AM  
Result Value Ref Range Glucose (POC) 175 (H) 65 - 100 mg/dL Performed by Dian Bean CBC W/O DIFF Collection Time: 01/21/21 11:34 AM  
Result Value Ref Range WBC 6.6 4.1 - 11.1 K/uL  
 RBC 3.10 (L) 4.10 - 5.70 M/uL HGB 8.6 (L) 12.1 - 17.0 g/dL HCT 28.4 (L) 36.6 - 50.3 % MCV 91.6 80.0 - 99.0 FL  
 MCH 27.7 26.0 - 34.0 PG  
 MCHC 30.3 30.0 - 36.5 g/dL  
 RDW 18.2 (H) 11.5 - 14.5 % PLATELET 993 315 - 185 K/uL MPV 9.9 8.9 - 12.9 FL  
 NRBC 0.3 (H) 0  WBC ABSOLUTE NRBC 0.02 (H) 0.00 - 0.01 K/uL Imaging: Xr Chest [facility] Result Date: 1/20/2021 EXAM: XR CHEST PORT INDICATION: Heart Failure COMPARISON: 1/18/2021 FINDINGS: A portable AP radiograph of the chest was obtained at 1623 hours. Severe bilateral pulmonary opacification is again demonstrated predominating in the perihilar and adjacent regions. There is relative sparing at the pulmonary apices. There is no pneumothorax. There are small bilateral pleural effusions. Obscured cardiac and mediastinal contours appear stable. Right IJ line is again shown terminating in the superior vena cava. Nonspecific severe bilateral pulmonary opacification again shown. Assessment and Plan:  
 
Acute respiratory failure 
-Acute hypoxic respiratory failure due to pneumonia 
-Requiring BiPAP as needed, on high flow 
-Previously evaluated by pulmonology Pneumonia 
-Severe bilateral pulmonary opacification on chest x-ray 
-Patient positive for COVID-19 
-Completed remdesivir 1/16, completed Decadron 1/21 
-On vitamin C and zinc 
-On linezolid, started by ID 
-Palliative care following Sepsis 
-Sepsis due to osteomyelitis and pneumonia 
-Now resolved Osteomyelitis 
-Osteomyelitis of the right foot 
-S/p debridement of the right foot ulcer with removal of infected bone 
-Wound cultures with Proteus, group B strep and Enterococcus 
-On Zosyn, plan for 6 weeks of IV antibiotics per ID 
-Patient also with wound VAC 
-Nonweightbearing status of the right lower extremity, patient uses wheelchair at baseline Bacteremia 
-Patient with group B strep bacteremia on blood cultures 12/28, repeat blood cultures negative 
-On empiric antibiotics, ID following GI bleed 
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 
-Continue PPI Anemia 
-Acute blood loss anemia, s/p blood transfusion 
-On iron replacement KATHI 
-KATHI on baseline CKD 
-Renal function back to baseline Hypertension 
-Continue amlodipine, clonidine, hydralazine and torsemide -Monitor blood pressure Diabetes 
-On Lantus along with Premeal insulin and sliding scale coverage Prostate cancer 
-On Dunn Saurabh Tobacco use 
-On nicotine patch Morbid obesity 
-With BMI of 34.6 
-Outpatient follow-up for weight management Anticipated disposition is more than 48 hours pending progress. Signed By: Paige Seo MD   
 January 21, 2021

## 2021-01-22 NOTE — PROGRESS NOTES
Hospitalist Cross Coverage NP Name: Carlos Stout YOB: 1950 MRN: [id number] Admission Date: 12/28/2020  3:14 PM 
 
Date of service: 1/22/2021 1:16 AM  
  
                         
Overnight update:  
  
 
Paged by RN - increased anxious this evening, removing NIPPV mask. - Dose of seroquel now, evaluate for sources of agitation including hypoglycemia, BIPAP disfunction Shan Born FNP-C, PA-C 
[phone] or Tiger[employer]

## 2021-01-22 NOTE — PROGRESS NOTES
[address] [facility] Adult  Hospitalist Group Hospitalist Progress Note Debra Peraza MD 
Answering service: [phone] OR [phone] from in house phone Progress Note Patient: Chel Green MRN: [id number]  SSN: [id number] YOB: 1950  Age: 72 y.o. Sex: male Admit Date: 12/28/2020 LOS: 25 days Subjective:  
 
Patient presents with acute hypoxic respiratory failure, still needing BiPAP as needed. Also osteomyelitis of the right foot, currently with wound VAC. Otherwise patient appears well and denies no acute complaint at this time. Objective:  
 
Vitals:  
 01/22/21 0938 01/22/21 1130 01/22/21 1139 01/22/21 1141 BP: (!) 180/88  (!) 187/78 (!) 183/67 Pulse: 84  84 84 Resp: 20 19 18 Temp: 97.6 °F (36.4 °C)   97.2 °F (36.2 °C) SpO2: 100% 98% 98% 100% Weight:      
Height:      
  
 
Intake and Output: 
Current Shift: 01/22 0701 - 01/22 1900 In: -  
Out: 275 [Urine:275] Last three shifts: 01/20 1901 - 01/22 0700 In: 5300 [P.O.:1440; I.V.:1300] Out: 1320 [[id number]] Physical Exam:  
GENERAL: alert, cooperative, no distress, appears stated age THROAT & NECK: normal and no erythema or exudates noted. LUNG: clear to auscultation bilaterally HEART: regular rate and rhythm, S1, S2 normal, no murmur, click, rub or gallop ABDOMEN: soft, non-tender. Bowel sounds normal. No masses,  no organomegaly EXTREMITIES: Right foot with dressing, wound VAC present SKIN: no rash or abnormalities NEUROLOGIC: Patient is awake and alert PSYCHIATRIC: non focal 
 
Lab/Data Review: All lab results for the last 24 hours reviewed. Recent Results (from the past 24 hour(s)) GLUCOSE, POC Collection Time: 01/21/21  6:30 PM  
Result Value Ref Range Glucose (POC) 147 (H) 65 - 100 mg/dL Performed by James Evangelista GLUCOSE, POC  
 Collection Time: 01/21/21  8:46 PM  
Result Value Ref Range Glucose (POC) 128 (H) 65 - 100 mg/dL Performed by Daily Dealy Danisha GLUCOSE, POC Collection Time: 01/22/21  2:47 AM  
Result Value Ref Range Glucose (POC) 100 65 - 100 mg/dL Performed by Srini Danisha D DIMER Collection Time: 01/22/21  2:48 AM  
Result Value Ref Range D-dimer 1.68 (H) 0.00 - 0.65 mg/L FEU METABOLIC PANEL, COMPREHENSIVE Collection Time: 01/22/21  2:48 AM  
Result Value Ref Range Sodium 139 136 - 145 mmol/L Potassium 4.2 3.5 - 5.1 mmol/L Chloride 103 97 - 108 mmol/L  
 CO2 28 21 - 32 mmol/L Anion gap 8 5 - 15 mmol/L Glucose 121 (H) 65 - 100 mg/dL BUN 54 (H) 6 - 20 MG/DL Creatinine 1.86 (H) 0.70 - 1.30 MG/DL  
 BUN/Creatinine ratio 29 (H) 12 - 20 GFR est AA 44 (L) >60 ml/min/1.73m2 GFR est non-AA 36 (L) >60 ml/min/1.73m2 Calcium 9.4 8.5 - 10.1 MG/DL Bilirubin, total 0.4 0.2 - 1.0 MG/DL  
 ALT (SGPT) 11 (L) 12 - 78 U/L  
 AST (SGOT) 12 (L) 15 - 37 U/L Alk. phosphatase 101 45 - 117 U/L Protein, total 8.1 6.4 - 8.2 g/dL Albumin 2.6 (L) 3.5 - 5.0 g/dL Globulin 5.5 (H) 2.0 - 4.0 g/dL A-G Ratio 0.5 (L) 1.1 - 2.2    
CBC WITH AUTOMATED DIFF Collection Time: 01/22/21  2:48 AM  
Result Value Ref Range WBC 7.8 4.1 - 11.1 K/uL  
 RBC 3.18 (L) 4.10 - 5.70 M/uL HGB 8.8 (L) 12.1 - 17.0 g/dL HCT 29.4 (L) 36.6 - 50.3 % MCV 92.5 80.0 - 99.0 FL  
 MCH 27.7 26.0 - 34.0 PG  
 MCHC 29.9 (L) 30.0 - 36.5 g/dL  
 RDW 18.1 (H) 11.5 - 14.5 % PLATELET 202 544 - 351 K/uL MPV 10.0 8.9 - 12.9 FL  
 NRBC 0.4 (H) 0  WBC ABSOLUTE NRBC 0.03 (H) 0.00 - 0.01 K/uL NEUTROPHILS 87 (H) 32 - 75 % LYMPHOCYTES 4 (L) 12 - 49 % MONOCYTES 6 5 - 13 % EOSINOPHILS 2 0 - 7 % BASOPHILS 0 0 - 1 % IMMATURE GRANULOCYTES 1 (H) 0.0 - 0.5 % ABS. NEUTROPHILS 6.7 1.8 - 8.0 K/UL  
 ABS. LYMPHOCYTES 0.3 (L) 0.8 - 3.5 K/UL  
 ABS. MONOCYTES 0.5 0.0 - 1.0 K/UL ABS. EOSINOPHILS 0.2 0.0 - 0.4 K/UL  
 ABS. BASOPHILS 0.0 0.0 - 0.1 K/UL  
 ABS. IMM. GRANS. 0.1 (H) 0.00 - 0.04 K/UL  
 DF SMEAR SCANNED    
 RBC COMMENTS ANISOCYTOSIS 1+ 
    
 RBC COMMENTS STOMATOCYTES 1+ RBC COMMENTS POLYCHROMASIA 1+ 
    
 RBC COMMENTS OVALOCYTES 1+ GLUCOSE, POC Collection Time: 01/22/21  9:07 AM  
Result Value Ref Range Glucose (POC) 114 (H) 65 - 100 mg/dL Performed by [employer] P   
GLUCOSE, POC Collection Time: 01/22/21 12:14 PM  
Result Value Ref Range Glucose (POC) 181 (H) 65 - 100 mg/dL Performed by [employer] P Imaging: No results found. Assessment and Plan:  
 
Acute respiratory failure 
-Acute hypoxic respiratory failure due to pneumonia 
-Requiring BiPAP as needed, on mid flow 
-Previously evaluated by pulmonology Pneumonia 
-Severe bilateral pulmonary opacification on chest x-ray 
-Patient positive for COVID-19 
-Completed remdesivir 1/16, completed Decadron 1/21 
-On vitamin C and zinc 
-On linezolid, started by ID 
-Palliative care following Sepsis 
-Sepsis due to osteomyelitis and pneumonia 
-Now resolved Osteomyelitis 
-Osteomyelitis of the right foot 
-S/p debridement of the right foot ulcer with removal of infected bone 
-Wound cultures with Proteus, group B strep and Enterococcus 
-On Zosyn, plan for 6 weeks of IV antibiotics per ID 
-Patient also with wound VAC 
-Nonweightbearing status of the right lower extremity, patient uses wheelchair at baseline Bacteremia 
-Patient with group B strep bacteremia on blood cultures 12/28, repeat blood cultures negative 
-On empiric antibiotics, ID following GI bleed 
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 
-Continue PPI Anemia 
-Acute blood loss anemia, s/p blood transfusion 
-On iron replacement KATHI 
-KATHI on baseline CKD 
-Renal function back to baseline Hypertension -Continue amlodipine, clonidine, hydralazine and torsemide 
-Monitor blood pressure Diabetes 
-On Lantus along with Premeal insulin and sliding scale coverage Prostate cancer 
-On Cyrena Ghee Tobacco use 
-On nicotine patch Morbid obesity 
-With BMI of 34.6 
-Outpatient follow-up for weight management Anticipated disposition is more than 48 hours pending progress. Signed By: Paris Ayers MD   
 January 22, 2021

## 2021-01-22 NOTE — PROGRESS NOTES
Verbal shift change report given to Meli (oncoming nurse) by Taj Lopez (offgoing nurse). Report included the following information SBAR, Kardex, Intake/Output, MAR, Accordion and Recent Results. Assumed care of patient. Throughout shift, pt is demanding, yelling at staff when he has to repeat himself or does not want to do something, and uncooperative. Pt refusing turns. Pt had hypoglycemic episode in evening; orange juice given, however, pt got upset with this RN, yelling that he will only drink the orange juice over a glass of ice. Pt yells at staff when you try to do any sort of patient care if he is hungry or eating. He also yells if the blood pressure cuff is not removed right after the pressure is taken. Percocet given twice for pain. Wound vac canister changed today by wound care. Pt's O2 does not desaturate when on midflow nasal cannula, however, pt begins to have labored breathing and uses accessory muscles. Reports feeling more comfortable while bipap is on. IV hydralazine given once for elevated BP. Pt voiding appropriately with urinal. One large bowel movement in am.  
 
Verbal shift change report given to Taj Lopez (oncoming nurse) by Juan Forman (offgoing nurse). Report included the following information SBAR, Kardex, Intake/Output, MAR, Accordion and Recent Results.

## 2021-01-22 NOTE — PROGRESS NOTES
ID Progress Note 2021 Subjective: Afebrile. On high flow. He feels better. Objective:  
 
Vitals:  
Visit Vitals BP (!) 150/64 (BP 1 Location: Left arm, BP Patient Position: At rest) Pulse 71 Temp 98.1 °F (36.7 °C) Resp 27 Ht 6' (1.829 m) Wt 115.7 kg (255 lb 1.6 oz) SpO2 100% BMI 34.60 kg/m² Tmax:  Temp (24hrs), Av.9 °F (36.6 °C), Min:97.7 °F (36.5 °C), Max:98.1 °F (36.7 °C) Exam:   
Clear to auscultation Heart: s1, s2, RRR, no murmurs rubs or clicks Abdomen: soft nontender, no guarding or rebound Speech fluent Labs:  
Lab Results Component Value Date/Time WBC 6.6 2021 11:34 AM  
 Hemoglobin (POC) 6.5 2020 01:59 PM  
 HGB 8.6 (L) 2021 11:34 AM  
 HCT 28.4 (L) 2021 11:34 AM  
 PLATELET 767  11:34 AM  
 MCV 91.6 2021 11:34 AM  
 
Lab Results Component Value Date/Time Sodium 138 2021 05:16 AM  
 Potassium 4.2 2021 05:16 AM  
 Chloride 103 2021 05:16 AM  
 CO2 30 2021 05:16 AM  
 Anion gap 5 2021 05:16 AM  
 Glucose 116 (H) 2021 05:16 AM  
 BUN 56 (H) 2021 05:16 AM  
 Creatinine 1.89 (H) 2021 05:16 AM  
 BUN/Creatinine ratio 30 (H) 2021 05:16 AM  
 GFR est AA 43 (L) 2021 05:16 AM  
 GFR est non-AA 36 (L) 2021 05:16 AM  
 Calcium 8.8 2021 05:16 AM  
 Bilirubin, total 0.2 2021 05:16 AM  
 Alk. phosphatase 94 2021 05:16 AM  
 Protein, total 7.3 2021 05:16 AM  
 Albumin 1.9 (L) 2021 05:16 AM  
 Globulin 5.4 (H) 2021 05:16 AM  
 A-G Ratio 0.4 (L) 2021 05:16 AM  
 ALT (SGPT) 7 (L) 2021 05:16 AM  
 
 
 
 
 
Assessment:  
#1 osteomyelitis of the right foot 
  
#2 group b strep  bacteremia 
  
#3 renal insufficiency 
  
#4 diabetes 
  
#5 prostate cancer 
  
#6 hypertension 
  
#7 heart failure 
  
 #8 covid #9 diarrhea  
  
 
 
  
 
Recommendations: Continue zosyn. There is OM on the surgical margin. He will need prolonged therapy. Orders written. He has completed treatment with remdesivir.  (1/121/16) He has completed dexamethasone (1/12 - 1/21) Continue Zyvox for his airspace disease.  
 
 
 
 
  
 
Shelley Nath MD

## 2021-01-22 NOTE — WOUND CARE
WOCN Note: Follow-up visit for VAC dressing change to right foot.  
  
Chart shows: 
Admitted for lethargy; osteomyelitis of right foot with debridement and removal of infected bone 12/30; now Covid-19+ History of DM, smoking, prostate cancer, HTN, heart failure Admitted from home 
  
Assessment:  
A&O x 4; lethargic and sleeps through most of my visit.  
Bed: [address] [place] 
  
1. POA, right foot wound post surgical debridement = 6 x 5 x 3cm  Base is 30% soft yellow 70% granular red.  No purulence or odor.  Immediate periwound with hypopigmented skin.  Satellite wound toward bottom of foot under 5th toe that was included in original measurement but now there is a bridge of skin between the two areas = 1 x 1 x 0.2 cm red granular base.   Scant serosanguinous exudate in canister. VAC dressing removed: 1 black sponge Cleaned with saline and Santyl applied to yellow tissue in wound bed. Applied Opticel AG to smaller wound. 125 mmHg suction achieved using 1 piece of black foam bridged to lower leg.  
  
Dorsum of foot has a deep tissue injury = 6 x 6 x 0 cm 100% non-blanching burgundy - Venelex applied.   
  
Thick dry skin plaques to lower leg - Lac Hydrin applied. 
  
Wound Recommendations:   
Maintain VAC as ordered @ 125mmHg suction with twice weekly dressing changes.  Buttocks and dorsum of right foot: venelex twice daily. Axilla: antifungal cream twice daily.  
  
Transition of Care: Plan to follow weekly and as needed while admitted to hospital with [facility] dressing change Tuesday, 1/25 TABITHA Ly, RN, [employer] Certified Wound, Ostomy, Continence Nurse 
office [phone] 
pager [phone] or call  to page

## 2021-01-22 NOTE — PROGRESS NOTES
CM noted that this pt's LTSS successfully processed in the DMAS portal. CM provided a copy of this LTSS to pt's CM Carolina Lisa

## 2021-01-22 NOTE — PROGRESS NOTES
Nephrology Progress Note Leona Florence III Date of Admission : 12/28/2020 CC: Follow up for KATHI on CKD Assessment and Plan KATHI on CKD  
- resolving ATN. U/S neg for hydro - switched diuretics back to IV 
- can stop IV albumin tomorrow  
- daily labs and I/os Hyperkalemia: 
- low K diet, no ACE/ARB 
- K stable CKD Stage IIIa: 
-  Presumed 2/2 DM and HTN 
- nephrotic range proteinuria 
- baseline Cr 1.7 mg/dl  
- followed by Dr. John Jack COVID-19 PNA: 
- positive on 1/11 
- on decadron and remdesivir 
  
Hx of prostate cancer s/p XRT 
  
Metabolic acidosis: 
-  Stopped oral Bicarb  
  
Anemia in CKD + blood loss: 
- cont JAZ 
- EGD 1/5/2021: gastric fundal lesion  
  
Type II DM: 
- on insulin 
  
HTN :  
- stable  
  
R foot osteo: 
- on abx 
- s/p debridement on 12/30 Group B strep bacteremia: 
- repeat cx neg Interval History: Not examined in room d/w Nurse [facility] Pt reports reduced UOP He is requesting BIPAP despite good oxygenation w/ Midflow Current Medications: all current  Medications have been eviewed in [facility] Review of Systems: A comprehensive review of systems was negative except for that written in the HPI. Objective: 
Vitals:   
Vitals:  
 01/22/21 0938 01/22/21 1130 01/22/21 1139 01/22/21 1141 BP: (!) 180/88  (!) 187/78 (!) 183/67 Pulse: 84  84 84 Resp: 20 19 18 Temp: 97.6 °F (36.4 °C)   97.2 °F (36.2 °C) SpO2: 100% 98% 98% 100% Weight:      
Height:      
 
Intake and Output: 
01/22 0701 - 01/22 1900 In: -  
Out: 275 [Urine:275] 01/20 1901 - 01/22 0700 In: 6326 [P.O.:1440; I.V.:1300] Out: 1320 [[id number]] Physical Examination:  
General: Obese, on BIPAP Resp:  No distress CV:  RRR on monitor Neurologic:  Non focal 
Psych:             Anxious :  No kumar []    High complexity decision making was performed 
[]    Patient is at high-risk of decompensation with multiple organ involvement Lab Data Personally Reviewed: I have reviewed all the pertinent labs, microbiology data and radiology studies during assessment. Recent Labs  
  01/22/21 
0248 01/21/21 
0516 01/20/21 
0157  138 139  
K 4.2 4.2 4.2  103 103 CO2 28 30 29 * 116* 136* BUN 54* 56* 59* CREA 1.86* 1.89* 1.96* CA 9.4 8.8 8.5 MG  --   --  2.0 PHOS  --   --  3.2 ALB 2.6* 1.9*  --   
ALT 11* 7*  --   
 
Recent Labs  
  01/22/21 
0248 01/21/21 
1134 01/20/21 
0158 WBC 7.8 6.6 5.1 HGB 8.8* 8.6* 6.9*  
HCT 29.4* 28.4* 22.7*  
 256 231 No results found for: SDES Lab Results Component Value Date/Time Culture result: (A) 12/30/2020 01:14 PM  
  LIGHT ANAEROBIC GRAM NEGATIVE RODS BETA LACTAMASE POSITIVE Culture result: LIGHT PROTEUS MIRABILIS (A) 12/30/2020 01:14 PM  
 Culture result: (A) 12/30/2020 01:14 PM  
  LIGHT STREPTOCOCCI, BETA HEMOLYTIC GROUP B Penicillin and ampicillin are drugs of choice for treatment of beta-hemolytic streptococcal infections. Susceptibility testing of penicillins and beta-lactams approved by the FDA for treatment of beta-hemolytic streptococcal infections need not be performed routinely, because nonsusceptible isolates are extremely rare. CLSI 2012 Culture result: LIGHT ENTEROCOCCUS FAECALIS (A) 12/30/2020 01:14 PM  
 
Recent Results (from the past 24 hour(s)) GLUCOSE, POC Collection Time: 01/21/21  6:30 PM  
Result Value Ref Range Glucose (POC) 147 (H) 65 - 100 mg/dL Performed by Danette Moody GLUCOSE, POC Collection Time: 01/21/21  8:46 PM  
Result Value Ref Range Glucose (POC) 128 (H) 65 - 100 mg/dL Performed by Anthony Giang GLUCOSE, POC Collection Time: 01/22/21  2:47 AM  
Result Value Ref Range Glucose (POC) 100 65 - 100 mg/dL Performed by Anthony Giang D DIMER Collection Time: 01/22/21  2:48 AM  
Result Value Ref Range D-dimer 1.68 (H) 0.00 - 0.65 mg/L FEU METABOLIC PANEL, COMPREHENSIVE  
 Collection Time: 01/22/21  2:48 AM  
Result Value Ref Range Sodium 139 136 - 145 mmol/L Potassium 4.2 3.5 - 5.1 mmol/L Chloride 103 97 - 108 mmol/L  
 CO2 28 21 - 32 mmol/L Anion gap 8 5 - 15 mmol/L Glucose 121 (H) 65 - 100 mg/dL BUN 54 (H) 6 - 20 MG/DL Creatinine 1.86 (H) 0.70 - 1.30 MG/DL  
 BUN/Creatinine ratio 29 (H) 12 - 20 GFR est AA 44 (L) >60 ml/min/1.73m2 GFR est non-AA 36 (L) >60 ml/min/1.73m2 Calcium 9.4 8.5 - 10.1 MG/DL Bilirubin, total 0.4 0.2 - 1.0 MG/DL  
 ALT (SGPT) 11 (L) 12 - 78 U/L  
 AST (SGOT) 12 (L) 15 - 37 U/L Alk. phosphatase 101 45 - 117 U/L Protein, total 8.1 6.4 - 8.2 g/dL Albumin 2.6 (L) 3.5 - 5.0 g/dL Globulin 5.5 (H) 2.0 - 4.0 g/dL A-G Ratio 0.5 (L) 1.1 - 2.2    
CBC WITH AUTOMATED DIFF Collection Time: 01/22/21  2:48 AM  
Result Value Ref Range WBC 7.8 4.1 - 11.1 K/uL  
 RBC 3.18 (L) 4.10 - 5.70 M/uL HGB 8.8 (L) 12.1 - 17.0 g/dL HCT 29.4 (L) 36.6 - 50.3 % MCV 92.5 80.0 - 99.0 FL  
 MCH 27.7 26.0 - 34.0 PG  
 MCHC 29.9 (L) 30.0 - 36.5 g/dL  
 RDW 18.1 (H) 11.5 - 14.5 % PLATELET 481 368 - 111 K/uL MPV 10.0 8.9 - 12.9 FL  
 NRBC 0.4 (H) 0  WBC ABSOLUTE NRBC 0.03 (H) 0.00 - 0.01 K/uL NEUTROPHILS 87 (H) 32 - 75 % LYMPHOCYTES 4 (L) 12 - 49 % MONOCYTES 6 5 - 13 % EOSINOPHILS 2 0 - 7 % BASOPHILS 0 0 - 1 % IMMATURE GRANULOCYTES 1 (H) 0.0 - 0.5 % ABS. NEUTROPHILS 6.7 1.8 - 8.0 K/UL  
 ABS. LYMPHOCYTES 0.3 (L) 0.8 - 3.5 K/UL  
 ABS. MONOCYTES 0.5 0.0 - 1.0 K/UL  
 ABS. EOSINOPHILS 0.2 0.0 - 0.4 K/UL  
 ABS. BASOPHILS 0.0 0.0 - 0.1 K/UL  
 ABS. IMM. GRANS. 0.1 (H) 0.00 - 0.04 K/UL  
 DF SMEAR SCANNED    
 RBC COMMENTS ANISOCYTOSIS 1+ 
    
 RBC COMMENTS STOMATOCYTES 1+ RBC COMMENTS POLYCHROMASIA 1+ 
    
 RBC COMMENTS OVALOCYTES 1+ GLUCOSE, POC Collection Time: 01/22/21  9:07 AM  
Result Value Ref Range Glucose (POC) 114 (H) 65 - 100 mg/dL Performed by Vonnie SPARKS   
GLUCOSE, POC Collection Time: 01/22/21 12:14 PM  
Result Value Ref Range Glucose (POC) 181 (H) 65 - 100 mg/dL Performed by Vonnie Pelaez MD 
[address] [place] [address] A [facility] Phone - [phone] Fax - [phone] 
[email or web address]

## 2021-01-22 NOTE — PROGRESS NOTES
Assisted pt to bedpan for bowel movement. Pt had large, dark, loose stool. Pt c/o itching and burning of gluteal folds. Bathed pt with washcloths, soap, and water. Hydrocolloid applied to wound on sacrum. Zinc barrier cream applied to the rest of sacrum. Miconazole powder applied to groin and armpits. Pt's linens changed. Turned to left side. Heels floated. Bedside shift change report given to Asif Velasquez (oncoming nurse) by Norma Howard RN (offgoing nurse). Report included the following information SBAR, Kardex, ED Summary, Intake/Output, Recent Results and Cardiac Rhythm NSR. Intake/Output Summary (Last 24 hours) at 1/21/2021 2046 Last data filed at 1/21/2021 1640 Gross per 24 hour Intake 2140 ml Output 1320 ml Net 820 ml

## 2021-01-22 NOTE — PROGRESS NOTES
Problem: Self Care Deficits Care Plan (Adult) Goal: *Acute Goals and Plan of Care (Insert Text) Description:  
FUNCTIONAL STATUS PRIOR TO ADMISSION: Patient was modified independent for basic upper body ADLs and toileting, total assistance knee-feet dressing and bathing in which stayed in clothes until aid arrived. Moderate assistance instrumental ADLs at w/c level and aids assistance. The patient was functional at the wheelchair level and was modified independent for transfers to the chair. Crutches from front door to w/c at door during EMS / medical transports. Decreased insight into deficits and learned dependency. Right rotator cuff tear and left decreased ROM. HOME SUPPORT: The patient lived alone with aid and family to provide assistance during the day, alone at night. Occupational Therapy Goals Initiated 1/22/2021 1. Patient will perform sitting EOB ADLs/therapeutic activity 1 min with maximal assistance within 7 day(s). 2.  Patient will perform grooming with moderate assistance  within 7 day(s). 3.  Patient will perform upper body dressing with maximal assistance within 7 day(s). 4.  Patient will perform rolling in bed, bed pan toilet transfers with moderate assistance  within 7 day(s). 5.  Patient will perform rolling in bed toileting hygiene with moderate assistance  within 7 day(s). 6.  Patient will participate in upper extremity therapeutic exercise/activities with minimal assistance within 7 day(s). Outcome: Not Met OCCUPATIONAL THERAPY EVALUATION Patient: Giovanny Valerio (89 y.o. male) Date: 1/22/2021 Primary Diagnosis: Osteomyelitis ([facility] [address] [M86.9] Procedure(s) (LRB): ESOPHAGOGASTRODUODENOSCOPY (EGD)    :- (N/A) ESOPHAGOGASTRODUODENAL (EGD) BIOPSY (N/A) RESOLUTION CLIP (N/A) 18 Days Post-Op Precautions:   Contact(droplet plus) ASSESSMENT Based on the objective data described below, the patient presents with poor ROM, functional reach, strength, ability to come to sitting, wounds throughout (wound vac as well), hard edema, cognition (complex processing, insight into deficits and abilities) and behavior (anxiety, learned dependency). Current Level of Function Impacting Discharge (ADLs/self-care): max assistance upper body, total assistance lower body, bed mobility total assistance Functional Outcome Measure: The patient scored Total: 25/100 on the Barthel Index outcome measure which is indicative of 75% impaired ability to care for basic self needs/dependency on others; inferred 100% dependency on others for instrumental ADLs. Other factors to consider for discharge: family and aid at home can assist 
  
Patient will benefit from skilled therapy intervention to address the above noted impairments. PLAN : 
Recommendations and Planned Interventions: self care training, functional mobility training, therapeutic exercise, balance training, therapeutic activities, cognitive retraining, endurance activities, patient education, home safety training, and family training/education Frequency/Duration: Patient will be followed by occupational therapy 3 times a week to address goals. Next session: hand sponges, theraband, handouts Recommendation for discharge: (in order for the patient to meet his/her long term goals) Therapy up to 5 days/week in SNF setting This discharge recommendation: 
Has not yet been discussed the attending provider and/or case management IF patient discharges home will need the following DME: hospital bed and mechanical lift SUBJECTIVE:  
Patient stated Tell the tow girls before you that I am sorry for my poor attitude.  OBJECTIVE DATA SUMMARY:  
HISTORY:  
Past Medical History:  
Diagnosis Date Arthritis, Degenerative,  Knee 4/4/2011 Carcinoma, Prostate 4/4/2011 Degenerative disc disease 4/4/2011 Depression/ Anxiety 4/4/2011 Diabetes Mellitrus ( non-insulin dependent ) Type 2 4/4/2011 Heart failure ([address] HEMATOCHEZIA 4/4/2011 Hypertrension 4/4/2011 Hypertriglyceridemia 4/4/2011 Ill-defined condition   
 lymphadema Insomnia 4/4/2011 Laryngitis 4/4/2011 Mild Aortic insufficiency 4/4/2011 Mild Concentric LVH (left ventricular hypertrophy) 4/4/2011 Neuropathy 4/4/2011 Osteoarthritis 4/4/2011 Rotator Cuff Tendonitis 4/4/2011 Past Surgical History:  
Procedure Laterality Date COLONOSCOPY N/A 4/28/2017 COLONOSCOPY performed by Jesus Osborne MD at [facility] ENDOSCOPY  
 HX ORTHOPAEDIC    
 left hip pinning HX ORTHOPAEDIC    
 right hip replacement HX OTHER SURGICAL    
 left little toe amputation HX UROLOGICAL    
 IR INSERT TUNL CVC W/O PORT OVER 5 YR  1/6/2021 [place] CARDIAC SURG PROCEDURE UNLIST    
 cardiac cath Expanded or extensive additional review of patient history:  
 
Home Situation Home Environment: Apartment One/Two Story Residence: One story Living Alone: Yes Support Systems: Home care staff, Child(linnea)(CNA 9-3 daily) Patient Expects to be Discharged to[de-identified] Private residence Current DME Used/Available at Home: Wheelchair, power, Crutches, Raised toilet seat, Grab bars Hand dominance: Right EXAMINATION OF PERFORMANCE DEFICITS: 
Cognitive/Behavioral Status: 
Neurologic State: Alert;Eyes open spontaneously Orientation Level: Oriented X4 Cognition: Appropriate decision making Perception: Appears intact Perseveration: No perseveration noted Safety/Judgement: Awareness of environment;Good awareness of safety precautions Skin: wounds throughout, wound vac R LE, I & D right foot Edema: hard abdominal edema Hearing: Auditory Auditory Impairment: None Vision/Perceptual:   
    
    
    
  
    
Acuity: Impaired near vision Corrective Lenses: Reading glasses Range of Motion: R shoulder ~60*, L shoulder flexion ~60* and then after education supination to 90* then able to complete shoulder flexion 90* AROM: Generally decreased, functional 
  
  
  
  
  
  
  
 
Strength: 
-3/5 shoulder flexion Elbows-digits +3/5 Strength: Generally decreased, functional 
  
  
  
  
 
Coordination: 
Coordination: Within functional limits Fine Motor Skills-Upper: Left Intact; Right Intact Gross Motor Skills-Upper: Left Intact; Right Intact Tone & Sensation: 
 
Tone: Normal 
Sensation: Impaired(B LEs) Balance: 
  
 
Functional Mobility and Transfers for ADLs: 
Bed Mobility: 
  
 
Transfers: ADL Assessment: 
Feeding: Moderate assistance opened foil containers, drank with cues small slow sips, pace self. Bipap placed back on quickly, patient declining HFL, nurse cleared. Oral Facial Hygiene/Grooming: Maximum assistance setup but poor O2 tolerance Bathing: Total assistance Upper Body Dressing: Total assistance Lower Body Dressing: Total assistance Toileting: Total assistance Patient tolerated bed placed in chair position 20 mins. ADL Intervention and task modifications: 
 Patient instructed and indicated understanding the benefits of maintaining activity tolerance, functional mobility, and independence with self care tasks during acute stay  to ensure safe return home and to baseline. Encouraged patient to increase frequency and duration OOB, sit up for all meals, perform daily ADLs (wash upper body), and performing functional mobility to/from bed pan. \"I can\" wash upper body, exercise arms and legs Cognitive Retraining Safety/Judgement: Awareness of environment;Good awareness of safety precautions Therapeutic Exercise: Patient instructed and demonstrated pulling self up/trunk flexion utilizing bed rails 5 reps 1 set with 1 min recovery between each rep, unable to clear spine from back of bed, bed lauren not under patient to assist in facilitating trunk flexion in prep for ADLs. Patient instruction and demonstrated shoulder flexion 5 reps 3 sets v. [place], PLB with moderate assistance instruction to continue, techniques, supination 90* to prevent injury shoulders. Written on white board as reminder. Functional Measure: 
Barthel Index: 
 
Bathin Bladder: 10 Bowels: 10 
Groomin Dressin Feedin Mobility: 0 Stairs: 0 Toilet Use: 0 Transfer (Bed to Chair and Back): 0 Total: 25/100 The Barthel ADL Index: Guidelines 1. The index should be used as a record of what a patient does, not as a record of what a patient could do. 2. The main aim is to establish degree of independence from any help, physical or verbal, however minor and for whatever reason. 3. The need for supervision renders the patient not independent. 4. A patient's performance should be established using the best available evidence. Asking the patient, friends/relatives and nurses are the usual sources, but direct observation and common sense are also important. However direct testing is not needed. 5. Usually the patient's performance over the preceding 24-48 hours is important, but occasionally longer periods will be relevant. 6. Middle categories imply that the patient supplies over 50 per cent of the effort. 7. Use of aids to be independent is allowed. Charles Nevarez., Barthel, D.W. (8468). Functional evaluation: the Barthel Index. [address] [facility] (14)2. PJ Woodward Se, Edmund Roman, Nallely De La O., Lissette, [address] (1999). Measuring the change indisability after inpatient rehabilitation; comparison of the responsiveness of the Barthel Index and Functional King Measure. Journal of Neurology, Neurosurgery, and Psychiatry, 66(4), 736-463. ANDRES Landaverde, CEDRIC Cole, & Sonia Plaza M.A. (2004.) Assessment of post-stroke quality of life in cost-effectiveness studies: The usefulness of the Barthel Index and the EuroQoL-5D. [facility], 13, 381-98 Occupational Therapy Evaluation Charge Determination History Examination Decision-Making Based on the above components, the patient evaluation is determined to be of the following complexity level:  
Pain Rating: 
 
 
Activity Tolerance:  
requires frequent rest breaks Vitals:  
 01/22/21 1130 01/22/21 1139 01/22/21 1141 01/22/21 1422 BP:  (!) 187/78 (!) 183/67 (!) 172/69 BP 1 Location:   Right arm BP Patient Position:   At rest During activity Pulse:  84 84 82 Resp:  19 18 (!) 34 Temp:   97.2 °F (36.2 °C) SpO2: 98% 98% 100% 98% Weight:      
Height:      
 
Bipap After treatment patient left in no apparent distress:   
Call bell within reach, Side rails x 3, and bed/chair position COMMUNICATION/EDUCATION:  
The patients plan of care was discussed with: Registered nurse. Home safety education was provided and the patient/caregiver indicated understanding., Patient/family have participated as able in goal setting and plan of care. , and Patient/family agree to work toward stated goals and plan of care. This patients plan of care is appropriate for delegation to [facility]. Thank you for this referral. 
Tracy Nuno Time Calculation: 33 mins

## 2021-01-23 NOTE — PROGRESS NOTES
[facility] 
 
ICU TEAM Progress Note Name: Teodoro Dick III  
: 1950 MRN: [id number] Date: 2021 Subjective:  
Progress Note: 2021 Mr. Maritza Miller is a 73 yo male w a PMH of prostate cancer s/p radiation, tobacco use disorder (1 ppd), CKD3a, gastric angioectasias,  IDDM, heart failure, HTN who was admitted with R foot osteomyelitis on . He was transferred to the ICU today after experiencing a cardiac arrest in the [facility]. Please see below for a brief synopsis of his hospital course, by problem. Cardiac arrest, s/p ROSC: Not cooled as he regained mental function s/p ROSC. Code time: reported to be 12 minutes The patient had been taken off BiPAP for lunch. He was on intermittent BiPAP and HiFlow, demonstrating stability to come off to eat. He ate his lunch and was later found SOB unresponsive and pulseless. He was thereafter transferred to the ICU. He regained mentation, was able to respond to commands, shake head/yes, no, but ultimately did require sedation for compliance with mechanical ventilation. Likely hypoxic arrest. 
Severe Sepsis secondary to Osteomyelitis: Admitted with a chronic wound on his R foot. He was started on broad spectrum antibiotics with vanc, zosyn, and flagyl and underwent wound debridement on . BLC positive for S agalactiae on . Foot cultures + for Protues mirabilis, group B strep, and Enterococcus. Antibiotics were then narrowed to zosyn. Wound vac placed on foot . Zosyn is planned for 6 weeks. Acute Hypoxic Respiratory failure: He also demonstrated dyspnea on initial admission, but was negative for COVID. Initially treated with steroids. On 1/11, he developed increased oxygen needs. He was COVID positive. He was started on and completed treatment with steroids (completed 1/21), remdesivir (completed 1/16), zinc, and vitamin C. He initially required BiPAP on 1/11, and was weaned to 4-5L/min. CT chest showed bilateral pleural effusions with ground glass. Diuresis was attempted. He was continued on zosyn. He required transfer to [facility] on 1/15 due to continued desaturations -> 70s on NRB. CXR with increased bilateral infiltrates. Started linezolid on 1/18. Required intubation on 1/23. See above under cardiac arrest.  
Acute on chronic CKD3a: Baseline Cr 1.7. Renal has followed throughout his course. IDDM2: On the floor he was on Lantus, lis[pro scheduled and lispro SSI. Upon intubation and changing to NPO, I cut lantus to 14 units, stopped scheduled lispro and increased SSI to q6. Will re-eval.  Will ask DM management to assist beginning Monday. Anemia: Course c/b anemia w heme + stool. EGD on 1/4 showing superficial ulcers in the distal bulb and second portion of the duodenum measuring 2-4mm. No active bleeding noted. BID protonix. Received PRBC 1/2. COVID negative 12/28, 12/29, but positive 1/11 POD:19 Days Post-Op S/P: Procedure(s): ESOPHAGOGASTRODUODENOSCOPY (EGD) ESOPHAGOGASTRODUODENAL (EGD) BIOPSY RESOLUTION CLIP Active Problem List:  
 
Problem List  Date Reviewed: 8/20/2020 Codes Class * (Principal) Osteomyelitis ([facility].) ICD-10-CM: M86.9 ICD-9-CM: 730.20 Diabetic ulcer of right midfoot associated with type 2 diabetes mellitus, with fat layer exposed ([facility] ICD-10-CM: E11.621, G10.397 ICD-9-CM: 250.80, 707.14 PAD (peripheral artery disease) (HCC) ICD-10-CM: I73.9 ICD-9-CM: 443.9 Dependence on nicotine from cigarettes ICD-10-CM: F17.210 ICD-9-CM: 305.1 KATHI (acute kidney injury) ([facility].) ICD-10-CM: N17.9 ICD-9-CM: 028. 9 Acute on chronic renal failure (HCC) ICD-10-CM: N17.9, N18.9 ICD-9-CM: 584.9, 585.9 Severe obesity (BMI 35.0-39. 9) with comorbidity ([facility].) ICD-10-CM: E66.01 
ICD-9-CM: 278.01 Type 2 diabetes with nephropathy (HCC) ICD-10-CM: E11.21 
ICD-9-CM: 250.40, 583.81 S/P hip replacement, right ICD-10-CM: R99.599 ICD-9-CM: V43.64 Stage 3 chronic kidney disease ICD-10-CM: N18.30 ICD-9-CM: 501. 3 Prostate [facility]) ICD-10-CM: B22 ICD-9-CM: 589 Diabetic polyneuropathy ([facility].) ICD-10-CM: E11.42 
ICD-9-CM: 250.60, 357.2 Rotator Cuff Tendonitis ICD-10-CM: M71.9, M67.919 ICD-9-CM: 726.10 Diabetes mellitus type 2, controlled ([facility].) ICD-10-CM: E11.9 ICD-9-CM: 250.00 Degenerative disc disease ICD-10-CM: [id number] ICD-9-CM: 722.6 Osteoarthrosis involving lower leg ICD-10-CM: M17.10 ICD-9-CM: 715.96 Depression/ Anxiety ICD-10-CM: F34.1 ICD-9-CM: 300.4 HTN (hypertension) ICD-10-CM: I10 
ICD-9-CM: 401.9 Chronic hip pain ICD-10-CM: M25.559, G89.29 ICD-9-CM: 719.45, 338.29 Hypertriglyceridemia ICD-10-CM: E78.1 ICD-9-CM: 272.1 Mild Aortic Insufficiency ICD-10-CM: I35.1 ICD-9-CM: 424.1 Mild Concentric LVH (left ventricular hypertrophy) ICD-10-CM: I51.7 ICD-9-CM: 429.3 Past Medical History: has a past medical history of Arthritis, Degenerative,  Knee (4/4/2011), Carcinoma, Prostate (4/4/2011), Degenerative disc disease (4/4/2011), Depression/ Anxiety (4/4/2011), Diabetes Mellitrus ( non-insulin dependent ) Type 2 (4/4/2011), Heart failure ([facility] HEMATOCHEZIA (4/4/2011), Hypertrension (4/4/2011), Hypertriglyceridemia (4/4/2011), Ill-defined condition, Insomnia (4/4/2011), Laryngitis (4/4/2011), Mild Aortic insufficiency (4/4/2011), Mild Concentric LVH (left ventricular hypertrophy) (4/4/2011), Neuropathy (4/4/2011), Osteoarthritis (4/4/2011), and Rotator Cuff Tendonitis (4/4/2011). Past Surgical History:  
 
 has a past surgical history that includes hx urological; hx other surgical; pr cardiac surg procedure unlist; colonoscopy (N/A, 4/28/2017); hx orthopaedic; hx orthopaedic; and ir insert tunl cvc w/o port over 5 yr (1/6/2021). Home Medications:  
 
Prior to Admission medications Medication Sig Start Date End Date Taking? Authorizing Provider  
pregabalin (LYRICA) 150 mg capsule TAKE ONE CAPSULE BY MOUTH TWICE A DAY 12/7/20  Yes Leana Buerger., MD  
bicalutamide (CASODEX) 50 mg tablet TAKE ONE TABLET BY MOUTH DAILY 11/30/20  Yes Leana Buerger., MD  
torsemide (DEMADEX) 20 mg tablet TAKE FOUR TABLETS BY MOUTH TWICE A DAY 11/23/20  Yes Leana Buerger., MD  
pantoprazole (PROTONIX) 40 mg tablet TAKE ONE TABLET BY MOUTH TWICE DAILY ONCE IN THE MORNING AND ONCE IN THE EVENING 11/18/20  Yes Leana Buerger., MD  
hydrOXYzine HCL (ATARAX) 50 mg tablet TAKE ONE TABLET BY MOUTH THREE TIMES A DAY AS NEEDED FOR ITCHING FOR UP TO 10 DAYS 10/22/20  Yes Leana Buerger., MD  
amLODIPine (NORVASC) 10 mg tablet TAKE ONE TABLET BY MOUTH DAILY 10/2/20  Yes Leana Buerger., MD  
hydrALAZINE (APRESOLINE) 100 mg tablet Take 1 Tab by mouth three (3) times daily.  9/16/20  Yes Leana Buerger., MD  
 ascorbic acid, vitamin C, (Vitamin C) 500 mg tablet Take  by mouth. Yes Provider, Historical  
enzalutamide (XTANDI) 40 mg capsule Take 160 mg by mouth daily. Yes Provider, Historical  
Thera-Tabs M 27 mg iron-400 mcg tab TAKE ONE TABLET BY MOUTH DAILY 7/17/20  Yes Stacie Goltz., MD  
tamsulosin [facility]) 0.4 mg capsule TAKE ONE CAPSULE BY MOUTH DAILY 7/16/20  Yes Stacie Goltz., MD  
Insulin Syringe-Needle U-100 (BD Insulin Syringe Ultra-Fine) 1 mL 31 gauge x 5/16 syrg USE TO INJECT INSULIN FIVE TIMES A DAY 6/16/20  Yes Stacie Goltz., MD  
ferrous sulfate 325 mg (65 mg iron) tablet TAKE ONE TABLET BY MOUTH DAILY BEFORE BREAKFAST 6/16/20  Yes Stacie Goltz., MD  
gabapentin (NEURONTIN) 300 mg capsule Take 1 Cap by mouth three (3) times daily. Max Daily Amount: 900 mg. 5/25/20  Yes Stacie Goltz., MD  
triamcinolone acetonide (KENALOG) 0.1 % ointment APPLY A THIN LAYER TO AFFECTED AREA(S) TWO TIMES A DAY **DO NOT EXCEED 2.66 GRAMS PER DAY** 5/19/20  Yes Stacie Goltz., MD  
diclofenac (VOLTAREN) 1 % gel Apply  to affected area four (4) times daily. 4/16/20  Yes Stacie Goltz., MD  
ammonium lactate (AMLACTIN) 12 % topical cream Apply  to affected area two (2) times a day. rub in to affected area well 3/10/20  Yes Stacie Goltz., MD  
ammonium lactate (LAC-HYDRIN FIVE) 5 % lotion Apply 1 Applicator to affected area daily. Apply daily bilateral lower legs 3/5/20  Yes Stacie Goltz., MD  
insulin glargine (LANTUS U-100 INSULIN) 100 unit/mL injection 72 Units by SubCUTAneous route daily. 3/5/20  Yes Stacie Goltz., MD  
insulin regular (NOVOLIN R, HUMULIN R) 100 unit/mL injection 24 units sc tid 3/5/20  Yes Stacie Goltz., MD  
ketoconazole (NIZORAL) 2 % topical cream Apply  to affected area two (2) times a day.  2/4/20  Yes Stacie Goltz., MD  
ketoconazole (NIZORAL) 2 % shampoo Sig:shampoo once a week 2/4/20  Yes Stacie Goltz., MD  
 bisacodyl (DULCOLAX, BISACODYL,) 10 mg suppository Insert 10 mg into rectum daily. Yes Provider, Historical  
insulin aspart U-100 (NOVOLOG FLEXPEN U-100 INSULIN) 100 unit/mL (3 mL) inpn by SubCUTAneous route. Yes Provider, Historical  
Insulin Needles, Disposable, (NOVOFINE 32) 32 gauge x 1/4\" ndle Sig:use 4 times a day as needed 19  Yes Bhupendra Corona MD  
NOVOLIN R REGULAR U-100 INSULN 100 unit/mL injection INJECT 14 UNITS UNDER THE SKIN THREE TIMES A DAY WITH MEALS AS NEEDED 19  Yes Bhupendra Corona MD  
fluticasone propionate [facility]) 50 mcg/actuation nasal spray SPRAY TWO SPRAYS IN THE AFFECTED NOSTRIL DAILY 19  Yes Bhupendra Corona MD  
aspirin delayed-release 81 mg tablet Take 81 mg by mouth daily. Yes Provider, Historical  
acetaminophen (PAIN AND FEVER) 500 mg tablet TAKE ONE TABLET BY MOUTH EVERY 6 HOURS AS NEEDED FOR PAIN 18  Yes Bhupendra Corona MD  
Calcium Carbonate-Vit D3-Min 600 mg calcium- 400 unit tab Take 1 Tab by mouth two (2) times a day. Yes Provider, Historical  
 
 
Allergies/Social/Family History: No Known Allergies Social History Tobacco Use  Smoking status: Current Every Day Smoker Packs/day: 1.00 Last attempt to quit: 2019 Years since quittin.2  Smokeless tobacco: Never Used Substance Use Topics  Alcohol use: Not Currently Alcohol/week: 11.7 standard drinks Types: 7 Cans of beer, 7 Shots of liquor per week Family History Family history unknown: Yes Review of Systems:  
 
Unable to provide intubated and sedated Objective:  
Vital Signs: 
Visit Vitals BP (!) 154/66 (BP 1 Location: Right arm, BP Patient Position: At rest) Pulse (!) 128 Temp (!) 114 °F (45.6 °C) Resp 17 Ht 6' (1.829 m) Wt 116.2 kg (256 lb 3.2 oz) SpO2 98% BMI 34.75 kg/m² O2 Flow Rate (L/min): 15 l/min(Mid flow at 9 liters also) O2 Device: Ventilator, Endotracheal tube Temp (24hrs), Av.2 °F (37.9 °C), Min:97.2 °F (36.2 °C), Max:114 °F (45.6 °C) Intake/Output:  
 
Intake/Output Summary (Last 24 hours) at 2021 1356 Last data filed at 2021 1441 Gross per 24 hour Intake  Output 1510 ml Net -1510 ml Physical Exam: 
 
General: Sedated, on mechanical ventilation Eye: PERRLA Neurologic:  Sedated, non-focal 
Neck:Supple, no JVD Lungs: Rhonchi throughout Heart: RRR, normal S1/S2, 1+ edema in BLE Abdomen:  Soft, non-tender, non-distended Skin: Wound vac to left foot; BLE with chronic skin changes LABS AND  DATA: Personally reviewed Recent Labs  
  21 
0114 21 
0248 WBC 5.6 7.8 HGB 7.7* 8.8* HCT 25.7* 29.4*  
 318 Recent Labs  
  21 
0113 21 
0248  139  
K 4.3 4.2  103 CO2 30 28 BUN 51* 54* CREA 1.71* 1.86* * 121* CA 8.8 9.4 Recent Labs  
  21 
0113 21 
0248 AP 95 101  
TP 7.5 8.1 ALB 2.4* 2.6*  
GLOB 5.1* 5.5* No results for input(s): INR, PTP, APTT, INREXT in the last 72 hours. No results for input(s): PHI, PCO2I, PO2I, FIO2I in the last 72 hours. No results for input(s): CPK, CKMB, TROIQ, BNPP in the last 72 hours. Vent settings: 80% PEEP 8 420 R 30 MEDS: Reviewed Chest X-Ray: personally reviewed and report checked - : Interval endotracheal tube placement. Interval worsening of diffuse 
bilateral airspace disease with new bilateral pleural effusions. · TTE: : LV: Estimated LVEF is 55 - 60%. Normal cavity size and systolic function (ejection fraction normal). Severe concentric hypertrophy. Inconclusive left ventricular diastolic function. · Image quality for this study was technically difficult. · LA: Dilated left atrium. · AV: With no significant stenosis. MV: Moderate mitral annular calcification. Mild mitral valve regurgitation is present. Repeat TTE ordered and pending BLE DVT ultrasound 1/23: No evidence of DVT Assessment and Plan:  
 
Septic shock secondary to Osteomyelitis: Admitted with a chronic wound on his R foot. Acute hypotension likely a component of sedation. Did not administer fluids due to overall fluid overloaded state. Currently on broad spectrum antibiotics. Requires levophed. - ID following, appreciate recs 
- Continue zosyn - Added hydrocortisone 50mg q6h Acute Hypoxic Respiratory Failure secondary to COVID Pneumonia: He also demonstrated dyspnea on initial admission, but was negative for COVID. Initially treated with steroids. On 1/11, he developed increased oxygen needs. He was COVID positive. He was started on and completed treatment with steroids (completed 1/21), remdesivir (completed 1/16), zinc, and vitamin C. Intubated 1/23. 
- Wean vent settings as tolerated - On ARDS volume ventilation - Diuresis as tolerated, see below - Steroids as above - Continue Zyvox and zosyn - Sputum and blood cultures sent - Sedated on propofol and fentanyl, will wean as tolerated HFpEFw exacerbation: Elevated BNP with increased infiltrates. Likely a consequence of COVID pneumonia, but also a component of fluid overload with elevated BNP. Undergoing diuresis. Net negative 1.2 L yesterday. - Administered 80mg lasix, goal -1L today Acute on chronic CKD3a: Baseline Cr 1.7. Renal has followed throughout his course. - Continue diuresis IDDM2: On the floor he was on Lantus, lis[pro scheduled and lispro SSI. Upon intubation and changing to NPO, I cut lantus to 14 units, stopped scheduled lispro and increased SSI to q6. Will re-eval.  Will ask DM management to assist beginning Monday. Anemia: Course c/b anemia w heme + stool. EGD on 1/4 showing superficial ulcers in the distal bulb and second portion of the duodenum measuring 2-4mm. No active bleeding noted. BID protonix. Received PRBC 1/2. Deconditioning: Unable to participate in PT/OT Goals of care: Had two extensive conversations with the daughter today. The patient has been intubated two times before. When the patient was initially brought to the unit, he was saying, around the tube that he \"didn't want this\" and to \"take out the tube\". This was difficult to interpret as he was receiving sedation. She was informed of this, but said that she had the conversation with him earlier in the week and he specifically said that if he experienced respiratory compromise he would want to be intubated and have full code status. Multidisciplinary Rounds Completed:  Rounded w bedside RN 
 
ABCDEF Bundle/Checklist 
Pain Medications: Fentanyl Target RASS: 0 - Alert & Calm - Spontaneously pays attention to caregiver Sedation Medications: Propofol CAM-ICU:  IBRAHIMA Mobility: Poor PT/OT: PT consulted and on board Restraints: Soft wrist restraints Discussed Plan of Care (goals of care): Yes Addressed Code Status: Full Code CARDIOVASCULAR Cardiac Gtts: Norepinephrine SBP Goal of: > 90 mmHg MAP Goal of: > 65 mmHg Transfusion Trigger (Hgb): <7 g/dL RESPIRATORY Vent Goals:  
Optimize PEEP/Ventilation/Oxygenation DVT Prophylaxis (if no, list reason): Lovenox SPO2 Goal: > 92% Pulmonary toilet: Duo-Nebs GI/ Bynum Catheter Present: Yes GI Prophylaxis: Protonix (pantoprazole) Nutrition: Yes TF IVFs:n 
Insulin: y 
 
ANTIBIOTICS Antibiotics: 
Zyvox and zosyn T/L/D Tubes:NGT Lines: CVC, art line SPECIAL EQUIPMENT Vent DISPOSITION Stay in ICU CRITICAL CARE CONSULTANT NOTE I had a face to face encounter with the patient, reviewed and interpreted patient data including clinical events, labs, images, vital signs, I/O's, and examined patient. I have discussed the case and the plan and management of the patient's care with the consulting services, the bedside nurses and the respiratory therapist.   
 
NOTE OF PERSONAL INVOLVEMENT IN CARE This patient has a high probability of imminent, clinically significant deterioration, which requires the highest level of preparedness to intervene urgently. I participated in the decision-making and personally managed or directed the management of the following life and organ supporting interventions that required my frequent assessment to treat or prevent imminent deterioration. I personally spent 125 minutes of critical care time. This is time spent at this critically ill patient's bedside actively involved in patient care as well as the coordination of care and discussions with the patient's family. This does not include any procedural time which has been billed separately. DORCAS [facility] Critical [facility] 1/23/2021

## 2021-01-23 NOTE — CONSULTS
ID Progress Note 2021 Subjective: Afebrile. Who prefers to be on BiPAP although nursing says he has good saturations on high flow. Objective:  
 
Vitals:  
Visit Vitals BP (!) 187/75 Pulse 94 Temp 97.2 °F (36.2 °C) Resp 27 Ht 6' (1.829 m) Wt 116.2 kg (256 lb 3.2 oz) SpO2 100% BMI 34.75 kg/m² Tmax:  Temp (24hrs), Av.4 °F (36.9 °C), Min:97.2 °F (36.2 °C), Max:99.3 °F (37.4 °C) Exam: On BiPAP Lungs: clear to auscultation Heart: s1, s2, no murmurs Abdomen: soft, nontender Labs:  
Lab Results Component Value Date/Time WBC 5.6 2021 01:14 AM  
 Hemoglobin (POC) 6.5 2020 01:59 PM  
 HGB 7.7 (L) 2021 01:14 AM  
 HCT 25.7 (L) 2021 01:14 AM  
 PLATELET 967  01:14 AM  
 MCV 93.1 2021 01:14 AM  
 
Lab Results Component Value Date/Time Sodium 139 2021 01:13 AM  
 Potassium 4.3 2021 01:13 AM  
 Chloride 103 2021 01:13 AM  
 CO2 30 2021 01:13 AM  
 Anion gap 6 2021 01:13 AM  
 Glucose 110 (H) 2021 01:13 AM  
 BUN 51 (H) 2021 01:13 AM  
 Creatinine 1.71 (H) 2021 01:13 AM  
 BUN/Creatinine ratio 30 (H) 2021 01:13 AM  
 GFR est AA 48 (L) 2021 01:13 AM  
 GFR est non-AA 40 (L) 2021 01:13 AM  
 Calcium 8.8 2021 01:13 AM  
 Bilirubin, total 0.4 2021 01:13 AM  
 Alk. phosphatase 95 2021 01:13 AM  
 Protein, total 7.5 2021 01:13 AM  
 Albumin 2.4 (L) 2021 01:13 AM  
 Globulin 5.1 (H) 2021 01:13 AM  
 A-G Ratio 0.5 (L) 2021 01:13 AM  
 ALT (SGPT) 9 (L) 2021 01:13 AM  
 
 
 
 
 
Assessment:  
 
#1 osteomyelitis of the right foot 
  
#2 group b strep  bacteremia 
  
#3 renal insufficiency 
  
#4 diabetes 
  
#5 prostate cancer 
  
#6 hypertension 
  
#7 heart failure 
  
 #8 covid  
  
#9 diarrhea Recommendations:  
 
Continue zosyn. There is OM on the surgical margin. He will need prolonged therapy. Orders written.   
He has completed treatment with remdesivir.  (1/121/16) 
  He has completed dexamethasone (1/12 - 1/21)  
  Continue Zyvox for his airspace disease. 
   
Team available tomorrow if needed Chacha Reynoso MD

## 2021-01-23 NOTE — PROGRESS NOTES
Spiritual Care Assessment/Progress Note [address] 
 
 
NAME: Lalo Hamilton III      MRN: [id number] AGE: 72 y.o. SEX: male Buddhist Affiliation: [facility] Language: English  
 
1/23/2021     Total Time (in minutes): 20 Spiritual Assessment begun in [facility] 7S1 INTENSIVE CARE through conversation with: 
  
    []Patient        [] Family    [] Friend(s) Reason for Consult: Code Blue/99 Spiritual beliefs: (Please include comment if needed) 
   [] Identifies with a sasha tradition:     
   [] Supported by a sasha community:        
   [] Claims no spiritual orientation:       
   [] Seeking spiritual identity:            
   [] Adheres to an individual form of spirituality:       
   [x] Not able to assess:                   
 
    
Identified resources for coping:  
   [] Prayer                           
   [] Music                  [] Guided Imagery 
   [] Family/friends                 [] Pet visits [] Devotional reading                         [x] Unknown 
   [] Other:                               
 
 
Interventions offered during this visit: (See comments for more details) Patient Interventions: Other (comment) Plan of Care: 
 
 [] Support spiritual and/or cultural needs  
 [] Support AMD and/or advance care planning process    
 [] Support grieving process 
 [] Coordinate Rites and/or Rituals  
 [] Coordination with community clergy [] No spiritual needs identified at this time 
 [] Detailed Plan of Care below (See Comments)  [] Make referral to Music Therapy 
[] Make referral to Pet Therapy    
[] Make referral to Addiction services 
[] Make referral to [facility] 
[] Make referral to Spiritual Care Partner 
[] No future visits requested       
[x] Follow up upon further referrals Comments:  responded to Rapid Response Team (RRT) room 412, which was elevated to Code Blue. Patient is on Covid 19 isolation precautions. Staff with patient providing care. No family or visitor present. Patient transferred to ICU room 1721. Please contact spiritual care for further referral or consult. Rev. Hannah Jurado MDiv, [facility], [facility]  paging service: [phone])

## 2021-01-23 NOTE — PROGRESS NOTES
[facility] Adult  Hospitalist Group Hospitalist Progress Note Paradise Arnold MD 
Answering service: [phone] OR [phone] from in house phone Progress Note Patient: Prudencio Gamboa MRN: [id number]  SSN: [id number] YOB: 1950  Age: 72 y.o. Sex: male Admit Date: 12/28/2020 LOS: 26 days Subjective:  
 
Patient presents with acute hypoxic respiratory failure, still needing BiPAP as needed. Also osteomyelitis of the right foot, currently with wound VAC. Otherwise patient appears well and denies no acute complaint at this time. Objective:  
 
Vitals:  
 01/23/21 0641 01/23/21 0744 01/23/21 1000 01/23/21 1108 BP: (!) 165/65   (!) 187/75 Pulse: 81   94 Resp: 22   27 Temp: 98.5 °F (36.9 °C)   97.2 °F (36.2 °C) SpO2: 98% 98% 97% 100% Weight:      
Height:      
  
 
Intake and Output: 
Current Shift: 01/23 0701 - 01/23 1900 In: -  
Out: 1100 [Urine:1100] Last three shifts: 01/21 1901 - 01/23 0700 In: 600 [I.V.:600] [facility] Enc[facility] Physical Exam:  
GENERAL: alert, cooperative, no distress, appears stated age THROAT & NECK: normal and no erythema or exudates noted. LUNG: clear to auscultation bilaterally HEART: regular rate and rhythm, S1, S2 normal, no murmur, click, rub or gallop ABDOMEN: soft, non-tender. Bowel sounds normal. No masses,  no organomegaly EXTREMITIES: Right foot with dressing, wound VAC present SKIN: no rash or abnormalities NEUROLOGIC: Patient is awake and alert PSYCHIATRIC: non focal 
 
Lab/Data Review: All lab results for the last 24 hours reviewed. Recent Results (from the past 24 hour(s)) GLUCOSE, POC Collection Time: 01/22/21  5:02 PM  
Result Value Ref Range Glucose (POC) 69 65 - 100 mg/dL Performed by Jason Betancourt GLUCOSE, POC  Collection Time: 01/22/21  5:44 PM  
 Result Value Ref Range Glucose (POC) 110 (H) 65 - 100 mg/dL Performed by Amy Perla GLUCOSE, POC Collection Time: 01/22/21  8:45 PM  
Result Value Ref Range Glucose (POC) 183 (H) 65 - 100 mg/dL Performed by Hillary Mendoza D DIMER Collection Time: 01/23/21  1:13 AM  
Result Value Ref Range D-dimer 1.43 (H) 0.00 - 0.65 mg/L FEU METABOLIC PANEL, COMPREHENSIVE Collection Time: 01/23/21  1:13 AM  
Result Value Ref Range Sodium 139 136 - 145 mmol/L Potassium 4.3 3.5 - 5.1 mmol/L Chloride 103 97 - 108 mmol/L  
 CO2 30 21 - 32 mmol/L Anion gap 6 5 - 15 mmol/L Glucose 110 (H) 65 - 100 mg/dL BUN 51 (H) 6 - 20 MG/DL Creatinine 1.71 (H) 0.70 - 1.30 MG/DL  
 BUN/Creatinine ratio 30 (H) 12 - 20 GFR est AA 48 (L) >60 ml/min/1.73m2 GFR est non-AA 40 (L) >60 ml/min/1.73m2 Calcium 8.8 8.5 - 10.1 MG/DL Bilirubin, total 0.4 0.2 - 1.0 MG/DL  
 ALT (SGPT) 9 (L) 12 - 78 U/L  
 AST (SGOT) 12 (L) 15 - 37 U/L Alk. phosphatase 95 45 - 117 U/L Protein, total 7.5 6.4 - 8.2 g/dL Albumin 2.4 (L) 3.5 - 5.0 g/dL Globulin 5.1 (H) 2.0 - 4.0 g/dL A-G Ratio 0.5 (L) 1.1 - 2.2    
CBC W/O DIFF Collection Time: 01/23/21  1:14 AM  
Result Value Ref Range WBC 5.6 4.1 - 11.1 K/uL  
 RBC 2.76 (L) 4.10 - 5.70 M/uL HGB 7.7 (L) 12.1 - 17.0 g/dL HCT 25.7 (L) 36.6 - 50.3 % MCV 93.1 80.0 - 99.0 FL  
 MCH 27.9 26.0 - 34.0 PG  
 MCHC 30.0 30.0 - 36.5 g/dL  
 RDW 18.2 (H) 11.5 - 14.5 % PLATELET 253 823 - 489 K/uL MPV 9.4 8.9 - 12.9 FL  
 NRBC 0.5 (H) 0  WBC ABSOLUTE NRBC 0.03 (H) 0.00 - 0.01 K/uL GLUCOSE, POC Collection Time: 01/23/21 10:00 AM  
Result Value Ref Range Glucose (POC) 146 (H) 65 - 100 mg/dL Performed by Nevaeh Munoz GLUCOSE, POC Collection Time: 01/23/21 11:39 AM  
Result Value Ref Range Glucose (POC) 147 (H) 65 - 100 mg/dL Performed by Nevaeh Munoz Imaging: No results found. Assessment and Plan:  
 
Acute respiratory failure 
-Acute hypoxic respiratory failure due to pneumonia 
-Requiring BiPAP as needed, on mid flow 
-Previously evaluated by pulmonology Pneumonia 
-Severe bilateral pulmonary opacification on chest x-ray 
-Patient positive for COVID-19 
-Completed remdesivir 1/16, completed Decadron 1/21 
-On vitamin C and zinc 
-On linezolid, started by ID 
-Palliative care following Sepsis 
-Sepsis due to osteomyelitis and pneumonia 
-Now resolved Osteomyelitis 
-Osteomyelitis of the right foot 
-S/p debridement of the right foot ulcer with removal of infected bone 
-Wound cultures with Proteus, group B strep and Enterococcus 
-On Zosyn, plan for 6 weeks of IV antibiotics per ID 
-Patient also with wound VAC 
-Nonweightbearing status of the right lower extremity, patient uses wheelchair at baseline Bacteremia 
-Patient with group B strep bacteremia on blood cultures 12/28, repeat blood cultures negative 
-On empiric antibiotics, ID following GI bleed 
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 
-Continue PPI Anemia 
-Acute blood loss anemia, s/p blood transfusion 
-On iron replacement KATHI 
-KATHI on baseline CKD 
-Renal function back to baseline Hypertension 
-Continue amlodipine, clonidine, hydralazine and torsemide 
-Monitor blood pressure Diabetes 
-On Lantus along with Premeal insulin and sliding scale coverage Prostate cancer 
-On Cyrena Ghee Tobacco use 
-On nicotine patch Morbid obesity 
-With BMI of 34.6 
-Outpatient follow-up for weight management Generalized weakness 
-Patient will need placement for rehab once medically stable Anticipated disposition is more than 48 hours pending progress. Signed By: Paris Ayers MD   
 January 23, 2021

## 2021-01-23 NOTE — PROGRESS NOTES
Verbal shift change report given to Flory Dunn RN (oncoming nurse) by Bernadette Hopkins RN (offgoing nurse). Report included the following information SBAR, Kardex, ED Summary, Procedure Summary, Intake/Output, MAR, Accordion, Recent Results, Med Rec Status and Cardiac Rhythm NSR. Patient Vitals for the past 12 hrs: 
 Temp Pulse Resp BP SpO2  
01/23/21 0641 98.5 °F (36.9 °C) 81 22 (!) 165/65 98 % 01/23/21 0623 98.5 °F (36.9 °C)      
01/23/21 0422     100 % 01/23/21 0329 99.2 °F (37.3 °C) 81 17 (!) 159/67 98 % 01/23/21 0101 99.3 °F (37.4 °C) 75 12 (!) 156/64 100 % 01/23/21 0043     99 % 01/22/21 2156 98.8 °F (37.1 °C) 83 12 (!) 167/83 99 % Last 3 Recorded Weights in this Encounter 01/21/21 6338 01/22/21 0319 01/23/21 0033 Weight: 115.7 kg (255 lb 1.6 oz) 117 kg (258 lb) 116.2 kg (256 lb 3.2 oz)

## 2021-01-23 NOTE — PROGRESS NOTES
1302: Dr. Zhanna Gerber notified of the following: Pt is trying to use bipap AND still eat/drink (pt instructed many times by previous staff and today by myself when I took over his care. He v.u.)  I took off his bipap and put back on his midflow at 9L/ gave him the NRB 15L for comfort. He is at 100% oxygen with RR 20's. He is very anxious and would like to get him something for anxiety. Dr. Zhanna Gerber returned message and awaiting new orders. 1310: Pt called for staff and wants his bipap back on, stating he felt like he couldn't breathe. Bipap placed back on and pt sats 100% with RR high 20's. Re-instructed patient not to eat or drink while on bipap, and he v.u. Will continue to monitor. Pt's rhythm changed from NSR to idioventricular rhythm. Charge nurse went in to check on patient, he had his bipap off, was unresponsive. Code Blue initiated at 1330. See code documentation. 1350: Pt taken to ICU after CPR, ROSC, and intubation. Verbal bedside report given to [facility], [facility]. Intensivist and RNs at bedside.

## 2021-01-23 NOTE — PROGRESS NOTES
1400 - TRANSFER - IN REPORT: 
 
Verbal report received from 1202 Brayan Blue RN(name) on Chelita Pae III  being received from [facility]) for urgent transfer Report consisted of patients Situation, Background, Assessment and  
Recommendations(SBAR). Information from the following report(s) SBAR, MAR and Recent Results was reviewed with the receiving nurse. Opportunity for questions and clarification was provided. Assessment completed upon patients arrival to unit and care assumed. Primary Nurse Ayana Lewis and Shilpa Lyman RN performed a dual skin assessment on this patient Impairment noted- see wound doc flow sheet Edgar score is 11 Labs sent. Fentanyl @ 50mcg, Prop @30mcg/kg/min and [email or web address], NGT and ETT in place. Art line pending. 1600 - Bedside shift change report given to RUBA Garrison (oncoming nurse) by David Andujar RN (offgoing nurse). Report included the following information SBAR, Intake/Output, MAR, Recent Results and Cardiac Rhythm NSR.

## 2021-01-23 NOTE — PROCEDURES
Procedure: Emergent endotracheal intubation Indication: Responding to CODE BLUE on medical floor Medication: None no medication used Complication: No immediate complication patient tolerated the procedure well Procedure in detail: This is a 71-year-old gentleman with progressive hypoxic respiratory failure due to COVID-19 pneumonia. MOR CALDERÓN was called overhead and I responded immediately, when I arrived to the room after wearing the appropriate protective equipment ROSC was achieved and I immediately directed my attention to secure airway. Using video-assisted Henry blade size 4 vocal cords were visualized with ease and then size 7.5 ET tube was inserted for the first attempt. Cuff inflated and the tube was secured at 22 cm at the teeth. Position confirmed with color change and capnogram and bilateral auscultation. Saturation came up to 95% with ambo bagging. He has good heart rate and adequate blood pressure. I transferred him and maintained ventilation in route via Ambu bag to the seven floor ICU and I defer to the care to the ICU team on that floor.

## 2021-01-23 NOTE — PROGRESS NOTES
+                                                                                                                                  
                                               
 
Critical Care Documentation Name: Joey Hawk YOB: 1950 MRN: [id number] Admission Date: 12/28/2020  3:14 PM 
 
Date of service: 1/23/2021 Active Diagnoses: 
 
Hospital Problems  Date Reviewed: 8/20/2020 Codes Class Noted POA * (Principal) [facility] ICD-10-CM: M86.9 ICD-9-CM: 730.20  12/28/2020 Yes Chief Complaint: 
Ervin English Clinical Presentation: 
Patient was seen by me while here at this morning and at his baseline, using BiPAP during my evaluation. This afternoon, patient was taken off of BiPAP and ate his lunch. Patient, after finishing his lunch, was put on nonrebreather and found to be stable. Later he started complaining of shortness of breath and while boarding back on BiPAP, patient became unresponsive and pulseless. CODE BLUE was called. CPR initiated and has received 1 mg of epinephrine. After a few attempts of CPR, patient had return of spontaneous circulation. Patient was evaluated by critical care and subsequently intubated and transferred to ICU. Physical Exam:  
GENERAL: Unresponsive THROAT & NECK: normal and no erythema or exudates noted. LUNG: clear to auscultation bilaterally HEART: S1-S2 tachycardic ABDOMEN: soft, non-tender. Bowel sounds normal. No masses,  no organomegaly EXTREMITIES: Right foot with dressing, wound VAC present SKIN: no rash or abnormalities NEUROLOGIC: Unresponsive PSYCHIATRIC: non focal 
 
Data Reviewed: All diagnostic labs and studies have been reviewed. Assessment and Plan: Patient has cardiac arrest and return of spontaneous circulation after CPR. Etiology of cardiac arrest undetermined at this time. Labs and chest x-ray pending. Patient is currently intubated and transferred to ICU for higher level of care. I have updated patient's daughter as well as sign out to ICU team. 
 
Medications Administered:  
Sedation: [ ] yes [ ] no Anxiolytics: [ ] yes [ ] no Antiarrhythmics: [ ] yes [ ] no Antihypertensives: [ ] yes [ ] no  
Pressors: [ ] yes [ ] no IVF's: [ ] yes [ ] no  
 
 
Critical Care Attestation: This patient is unstable and critically ill. Due to a high probability of clinically significant, life threatening deterioration, the patient required my highest level of preparedness to intervene emergently and I personally spent this critical care time directly and personally managing the patient. This critical care time included obtaining a history; examining the patient; pulse oximetry; ordering and review of studies; arranging urgent treatment with development of a management plan; evaluation of patient's response to treatment; frequent reassessment; and, discussions with other providers and/or family. This critical care time was performed to assess and manage the high probability of imminent, life-threatening deterioration that could result in multi-organ failure and death. It was exclusive of separately billable procedures, treating other patients, and teaching time. Time Spent:  
 
I personally spent 30 minutes in providing critical care time.  
 
Edin Higgins MD 
1/23/2021 
3:26 PM

## 2021-01-24 NOTE — PROGRESS NOTES
Verbal shift change report given to Meli (oncoming nurse) by Gio Grimes (offgoing nurse). Report included the following information SBAR, Kardex, Intake/Output, MAR, Accordion and Recent Results. Confirmed with pharmacy that  Casodex and Starla Leep are not able to be crushed. A-line placed by Ryanne Kearney NP. Bedside and Verbal shift change report given to Cynthia Briggs (oncoming nurse) by Rolan Warner (offgoing nurse). Report included the following information SBAR, Kardex, Intake/Output, MAR, Accordion and Recent Results.

## 2021-01-24 NOTE — PROGRESS NOTES
[facility] 
 
ICU TEAM Progress Note Name: Jose Porras III  
: 1950 MRN: [id number] Date: 2021 Subjective:  
Progress Note: 2021 Mr. Garland Patiño is a 71 yo male w a PMH of prostate cancer s/p radiation, tobacco use disorder (1 ppd), CKD3a, gastric angioectasias,  IDDM, heart failure, HTN who was admitted with R foot osteomyelitis on . He was transferred to the ICU  after experiencing a cardiac arrest in the IMCU. Please see my note from  for a synopsis of his hospital course. COVID negative , , but positive  Overnight events: Changed to PC due to high peak pressures; no other acute overnight events Active Problem List:  
 
Problem List  Date Reviewed: 2020 Codes Class * (Principal) Osteomyelitis ([facility].) ICD-10-CM: M86.9 ICD-9-CM: 730.20 Diabetic ulcer of right midfoot associated with type 2 diabetes mellitus, with fat layer exposed ([facility].) ICD-10-CM: E11.621, T83.309 ICD-9-CM: 250.80, 707.14 PAD (peripheral artery disease) (HCC) ICD-10-CM: I73.9 ICD-9-CM: 443.9 Dependence on nicotine from cigarettes ICD-10-CM: F17.210 ICD-9-CM: 305.1 KATHI (acute kidney injury) ([facility] ICD-10-CM: N17.9 ICD-9-CM: 149. 9 Acute on chronic renal failure (HCC) ICD-10-CM: N17.9, N18.9 ICD-9-CM: 584.9, 585.9 Severe obesity (BMI 35.0-39. 9) with comorbidity ([facility] ICD-10-CM: E66.01 
ICD-9-CM: 278.01 Type 2 diabetes with nephropathy (HCC) ICD-10-CM: E11.21 
ICD-9-CM: 250.40, 583.81 S/P hip replacement, right ICD-10-CM: X98.324 ICD-9-CM: V43.64 Stage 3 chronic kidney disease ICD-10-CM: N18.30 ICD-9-CM: 076. 3 Prostate [facility]) ICD-10-CM: M12 ICD-9-CM: 560 Diabetic polyneuropathy ([facility] ICD-10-CM: E11.42 
ICD-9-CM: 250.60, 357.2 Rotator Cuff Tendonitis ICD-10-CM: M71.9, M67.919 ICD-9-CM: 726.10 Diabetes mellitus type 2, controlled ([facility] ICD-10-CM: E11.9 ICD-9-CM: 250.00 Degenerative disc disease ICD-10-CM: QHH2191 ICD-9-CM: 722.6 Osteoarthrosis involving lower leg ICD-10-CM: M17.10 ICD-9-CM: 715.96 Depression/ Anxiety ICD-10-CM: F34.1 ICD-9-CM: 300.4 HTN (hypertension) ICD-10-CM: I10 
ICD-9-CM: 401.9 Chronic hip pain ICD-10-CM: M25.559, G89.29 ICD-9-CM: 719.45, 338.29 Hypertriglyceridemia ICD-10-CM: E78.1 ICD-9-CM: 272.1 Mild Aortic Insufficiency ICD-10-CM: I35.1 ICD-9-CM: 424.1 Mild Concentric LVH (left ventricular hypertrophy) ICD-10-CM: I51.7 ICD-9-CM: 429.3 Past Medical History:  
 
 has a past medical history of Arthritis, Degenerative,  Knee (4/4/2011), Carcinoma, Prostate (4/4/2011), Degenerative disc disease (4/4/2011), Depression/ Anxiety (4/4/2011), Diabetes Mellitrus ( non-insulin dependent ) Type 2 (4/4/2011), [facility] ([facility] HEMATOCHEZIA (4/4/2011), Hypertrension (4/4/2011), Hypertriglyceridemia (4/4/2011), Ill-defined condition, Insomnia (4/4/2011), Laryngitis (4/4/2011), Mild Aortic insufficiency (4/4/2011), Mild Concentric LVH (left ventricular hypertrophy) (4/4/2011), Neuropathy (4/4/2011), Osteoarthritis (4/4/2011), and Rotator Cuff Tendonitis (4/4/2011). Past Surgical History:  
 
 has a past surgical history that includes hx urological; hx other surgical; pr cardiac surg procedure unlist; colonoscopy (N/A, 4/28/2017); hx orthopaedic; hx orthopaedic; and ir insert tunl cvc w/o port over 5 yr (1/6/2021). Home Medications:  
 
Prior to Admission medications Medication Sig Start Date End Date Taking?  Authorizing Provider  
pregabalin (LYRICA) 150 mg capsule TAKE ONE CAPSULE BY MOUTH TWICE A DAY 12/7/20  Yes Romi Ontiveros MD  
bicalutamide (CASODEX) 50 mg tablet TAKE ONE TABLET BY MOUTH DAILY 11/30/20  Yes Romi Ontiveros MD  
 torsemide (DEMADEX) 20 mg tablet TAKE FOUR TABLETS BY MOUTH TWICE A DAY 11/23/20  Yes Adan Cervantes Jr., MD  
pantoprazole (PROTONIX) 40 mg tablet TAKE ONE TABLET BY MOUTH TWICE DAILY ONCE IN THE MORNING AND ONCE IN THE EVENING 11/18/20  Yes Adan Cervantes Jr., MD  
hydrOXYzine HCL (ATARAX) 50 mg tablet TAKE ONE TABLET BY MOUTH THREE TIMES A DAY AS NEEDED FOR ITCHING FOR UP TO 10 DAYS 10/22/20  Yes Adan Cervantes Jr., MD  
amLODIPine (NORVASC) 10 mg tablet TAKE ONE TABLET BY MOUTH DAILY 10/2/20  Yes Adan Cervantes Jr., MD  
hydrALAZINE (APRESOLINE) 100 mg tablet Take 1 Tab by mouth three (3) times daily. 9/16/20  Yes Adan Cervantes Jr., MD  
ascorbic acid, vitamin C, (Vitamin C) 500 mg tablet Take  by mouth.   Yes Provider, Historical  
enzalutamide (XTANDI) 40 mg capsule Take 160 mg by mouth daily.   Yes Provider, Historical  
Thera-Tabs M 27 mg iron-400 mcg tab TAKE ONE TABLET BY MOUTH DAILY 7/17/20  Yes Adan Cervantes Jr., MD  
tamsulosin (FLOMAX) 0.4 mg capsule TAKE ONE CAPSULE BY MOUTH DAILY 7/16/20  Yes Adan Cervantes Jr., MD  
Insulin Syringe-Needle U-100 (BD Insulin Syringe Ultra-Fine) 1 mL 31 gauge x 5/16 syrg USE TO INJECT INSULIN FIVE TIMES A DAY 6/16/20  Yes Adan Cervantes Jr., MD  
ferrous sulfate 325 mg (65 mg iron) tablet TAKE ONE TABLET BY MOUTH DAILY BEFORE BREAKFAST 6/16/20  Yes Adan Cervantes Jr., MD  
gabapentin (NEURONTIN) 300 mg capsule Take 1 Cap by mouth three (3) times daily. Max Daily Amount: 900 mg. 5/25/20  Yes Adan Cervantes Jr., MD  
triamcinolone acetonide (KENALOG) 0.1 % ointment APPLY A THIN LAYER TO AFFECTED AREA(S) TWO TIMES A DAY **DO NOT EXCEED 2.66 GRAMS PER DAY** 5/19/20  Yes Adan Cervantes Jr., MD  
diclofenac (VOLTAREN) 1 % gel Apply  to affected area four (4) times daily. 4/16/20  Yes Adan Cervantes Jr., MD  
 ammonium lactate (AMLACTIN) 12 % topical cream Apply  to affected area two (2) times a day. rub in to affected area well 3/10/20  Yes Bhupendra Corona MD  
ammonium lactate (LAC-HYDRIN FIVE) 5 % lotion Apply 1 Applicator to affected area daily. Apply daily bilateral lower legs 3/5/20  Yes Bhupendra Corona MD  
insulin glargine (LANTUS U-100 INSULIN) 100 unit/mL injection 72 Units by SubCUTAneous route daily. 3/5/20  Yes Bhupendra Corona MD  
insulin regular (NOVOLIN R, HUMULIN R) 100 unit/mL injection 24 units sc tid 3/5/20  Yes Bhupendra Corona MD  
ketoconazole (NIZORAL) 2 % topical cream Apply  to affected area two (2) times a day. 2/4/20  Yes Bhupendra Corona MD  
ketoconazole (NIZORAL) 2 % shampoo Sig:shampoo once a week 2/4/20  Yes Bhupendra Corona MD  
bisacodyl (DULCOLAX, BISACODYL,) 10 mg suppository Insert 10 mg into rectum daily. Yes Provider, Historical  
insulin aspart U-100 (NOVOLOG FLEXPEN U-100 INSULIN) 100 unit/mL (3 mL) inpn by SubCUTAneous route. Yes Provider, Historical  
Insulin Needles, Disposable, (NOVOFINE 32) 32 gauge x 1/4\" ndle Sig:use 4 times a day as needed 11/13/19  Yes Bhupendra Corona MD  
NOVOLIN R REGULAR U-100 INSULN 100 unit/mL injection INJECT 14 UNITS UNDER THE SKIN THREE TIMES A DAY WITH MEALS AS NEEDED 8/6/19  Yes Bhupendra Corona MD  
fluticasone propionate [facility]) 50 mcg/actuation nasal spray SPRAY TWO SPRAYS IN THE AFFECTED NOSTRIL DAILY 7/16/19  Yes Bhupendra Corona MD  
aspirin delayed-release 81 mg tablet Take 81 mg by mouth daily. Yes Provider, Historical  
acetaminophen (PAIN AND FEVER) 500 mg tablet TAKE ONE TABLET BY MOUTH EVERY 6 HOURS AS NEEDED FOR PAIN 8/28/18  Yes Bhupendra Corona MD  
Calcium Carbonate-Vit D3-Min 600 mg calcium- 400 unit tab Take 1 Tab by mouth two (2) times a day. Yes Provider, Historical  
 
 
Allergies/Social/Family History: No Known Allergies Social History Tobacco Use  
  Smoking status: Current Every Day Smoker Packs/day: 1.00 Last attempt to quit: 2019 Years since quittin.2  Smokeless tobacco: Never Used Substance Use Topics  Alcohol use: Not Currently Alcohol/week: 11.7 standard drinks Types: 7 Cans of beer, 7 Shots of liquor per week Family History Family history unknown: Yes Review of Systems:  
 
Unable to provide intubated and sedated Objective:  
Vital Signs: 
Visit Vitals /61 Pulse (!) 58 Temp 98.2 °F (36.8 °C) Resp 30 Ht 6' (1.829 m) Wt 117 kg (257 lb 15 oz) SpO2 99% BMI 34.98 kg/m² O2 Flow Rate (L/min): 15 l/min(Mid flow at 9 liters also) O2 Device: Endotracheal tube Temp (24hrs), Av.1 °F (36.7 °C), Min:97.7 °F (36.5 °C), Max:98.4 °F (36.9 °C) Intake/Output:  
 
Intake/Output Summary (Last 24 hours) at 2021 1156 Last data filed at 2021 1002 Gross per 24 hour Intake 2033.73 ml Output 985 ml Net 1048.73 ml Physical Exam: 
 
General: Sedated, on mechanical ventilation Eye: PERRLA Neurologic:  Sedated, non-focal 
Neck:Supple, no JVD Lungs: Rhonchi throughout Heart: RRR, normal S1/S2, 1+ edema in BLE Abdomen:  Soft, non-tender, non-distended Skin: Wound vac to left foot; BLE with chronic skin changes LABS AND  DATA: Personally reviewed Recent Labs  
  21 
9476 21 
0114 WBC 3.3* 5.6 HGB 6.0* 7.7* HCT 19.7* 25.7*  
 279 Recent Labs  
  21 
1818 21 
1445  141  
K 4.2 3.9  103 CO2 28 31 BUN 51* 54* CREA 2.10* 1.94* GLU 98 172* CA 8.0* 8.5 MG  --  2.0 PHOS  --  5.4* Recent Labs  
  21 
9966 21 
0113 AP 74 95  
TP 6.4 7.5 ALB 2.2* 2.4*  
GLOB 4.2* 5.1* No results for input(s): INR, PTP, APTT, INREXT, INREXT in the last 72 hours. No results for input(s): PHI, PCO2I, PO2I, FIO2I in the last 72 hours. Recent Labs  
  21 
1445 TROIQ 0.05* Vent settings: PC 70% PEEP 8 PC 22 R 30 MEDS: Reviewed Chest X-Ray: personally reviewed and report checked - 1/23: Interval endotracheal tube placement. Interval worsening of diffuse 
bilateral airspace disease with new bilateral pleural effusions. · TTE: 12/29: LV: Estimated LVEF is 55 - 60%. Normal cavity size and systolic function (ejection fraction normal). Severe concentric hypertrophy. Inconclusive left ventricular diastolic function. · Image quality for this study was technically difficult. · LA: Dilated left atrium. · AV: With no significant stenosis. MV: Moderate mitral annular calcification. Mild mitral valve regurgitation is present. Repeat TTE ordered and pending BLE DVT ultrasound 1/23: No evidence of DVT Assessment and Plan:  
 
Septic shock: Mulitfactorial likely due to COVID-19. Patient also known to have osteomyelitis. Possible to have superimposed bacterial pneumonia. Exacerbated by fluid overloaded state evidenced by bilateral pleural effusions on CXR. Admitted with a chronic wound on his R foot. Acute hypotension likely a component of sedation. Did not administer fluids due to overall fluid overloaded state. Currently on broad spectrum antibiotics. Requires levophed. - ID following, appreciate recs 
- Continue zosyn - Hydrocortisone 50mg q6h - Sedating with fent/propofol - Lung protective ventilation 
- Change to PC today due to high peak pressures - Follow up on blood and sputum cultures that were sent on 1/23; CIS Acute Hypoxic Respiratory Failure secondary to COVID Pneumonia: He also demonstrated dyspnea on initial admission, but was negative for COVID. Initially treated with steroids. On 1/11, he developed increased oxygen needs. He was COVID positive. He was started on and completed treatment with steroids (completed 1/21), remdesivir (completed 1/16), zinc, and vitamin C. Intubated 1/23. 
- Wean vent settings as tolerated - On ARDS volume ventilation - Diuresis as tolerated, see below - Steroids as above - Continue Zyvox and zosyn - Sputum and blood cultures sent - Sedated on propofol and fentanyl, will wean as tolerated HFpEFw exacerbation: Elevated BNP (5,192, though it has been higher this hospitalization with a  Peak of 10,832 -> difficult to interpret in setting of renal disease) with increased infiltrates and bilateral pleural effusions. Likely a consequence of COVID pneumonia, but also demonstrates clinical findings of fluid overload. Net negative -600 yesterday 
- TTE done and result pending Acute on chronic CKD3a: Baseline Cr 1.7. Renal has followed throughout his course. Cr up to 2.10 today 
- Holding on diuresis due to acute rise in Cr today IDDM2: On the floor he was on Lantus, lis[pro scheduled and lispro SSI. Upon intubation and changing to NPO, I cut lantus to 14 units, stopped scheduled lispro and increased SSI to q6. Will re-eval.  Will ask DM management to assist beginning Monday. - Reviewed glucose overnight and no indication to increase basal insulin today, will evaluate again 1/25 as TF will have been on longer period and will increase to goal 
 
Anemia: Course c/b anemia w heme + stool. EGD on 1/4 showing superficial ulcers in the distal bulb and second portion of the duodenum measuring 2-4mm. No active bleeding noted. BID protonix. Received PRBC 1/2. - Acute drop in H/H this am, however, rechecking out of c/f spurious lab value since WBC count also significantly reduced; will transfuse if repeat value is also decreased, no evidence of acute bleeding Deconditioning: Unable to participate in PT/OT Prostate cancer: Holding casodex as this cannot be crushed and given via OGT Goals of care: Had extensive [address] discussion with daughter yesterday, see progress note from 1/23. Full code status. Multidisciplinary Rounds Completed:  Rounded w bedside RN 
 
ABCDEF Bundle/Checklist 
Pain Medications: Fentanyl Target RASS: 0 - Alert & Calm - Spontaneously pays attention to caregiver Sedation Medications: Propofol CAM-ICU:  IBRAHIMA Mobility: Poor PT/OT: PT consulted and on board - Unable to participate at this time Restraints: Soft wrist restraints Discussed Plan of Care (goals of care): Yes Addressed Code Status: Full Code CARDIOVASCULAR Cardiac Gtts: Norepinephrine SBP Goal of: > 90 mmHg MAP Goal of: > 65 mmHg Transfusion Trigger (Hgb): <7 g/dL RESPIRATORY Vent Goals:  
Optimize PEEP/Ventilation/Oxygenation DVT Prophylaxis (if no, list reason): Lovenox SPO2 Goal: > 92% Pulmonary toilet: Duo-Nebs GI/ Bynum Catheter Present: Yes GI Prophylaxis: Protonix (pantoprazole) Nutrition: Yes TF IVFs:n 
Insulin: y 
 
ANTIBIOTICS Antibiotics: 
Zyvox and zosyn T/L/D Tubes:NGT Lines: CVC, art line SPECIAL EQUIPMENT Vent DISPOSITION Stay in ICU CRITICAL CARE CONSULTANT NOTE I had a face to face encounter with the patient, reviewed and interpreted patient data including clinical events, labs, images, vital signs, I/O's, and examined patient. I have discussed the case and the plan and management of the patient's care with the consulting services, the bedside nurses and the respiratory therapist.   
 
NOTE OF PERSONAL INVOLVEMENT IN CARE This patient has a high probability of imminent, clinically significant deterioration, which requires the highest level of preparedness to intervene urgently. I participated in the decision-making and personally managed or directed the management of the following life and organ supporting interventions that required my frequent assessment to treat or prevent imminent deterioration. I personally spent 45 minutes of critical care time. This is time spent at this critically ill patient's bedside actively involved in patient care as well as the coordination of care and discussions with the patient's family. This does not include any procedural time which has been billed separately. Cholo Lopez, DORCAS [facility] Critical [facility] 1/24/2021

## 2021-01-24 NOTE — PROGRESS NOTES
Nephrology Progress Note Carrillo Bee III Date of Admission : 12/28/2020 CC: Follow up for KATHI on CKD Assessment and Plan KATHI on CKD  
- avoid hypotension 
- iv albumin 
- lasix 80 mg bid 
- daily labs and I/os Anemia 
- PRBC TX per primary team 
 
RESP FAILURE on vent 
- continue diuresis Hyperkalemia: 
- low K diet, no ACE/ARB 
- K stable CKD Stage IIIa: 
-  Presumed 2/2 DM and HTN 
- nephrotic range proteinuria 
- baseline Cr 1.7 mg/dl  
- followed by Dr. Nicola Becerra COVID-19 PNA: 
- positive on 1/11 
- on decadron and remdesivir 
  
Hx of prostate cancer s/p XRT 
  
Metabolic acidosis: 
-  Stopped oral Bicarb  
  
Anemia in CKD + blood loss: 
- cont JAZ 
- EGD 1/5/2021: gastric fundal lesion  
  
Type II DM: 
- on insulin 
  
HTN :  
- stop norvasc, clonidine - lower hydralazine 
  
R foot osteo: 
- on abx 
- s/p debridement on 12/30 Group B strep bacteremia: 
- repeat cx neg Interval History: Not examined in room He was intubated yesterday 
bp low yesterday CR WORSE Current Medications: all current  Medications have been eviewed in [facility] Review of Systems: A comprehensive review of systems was negative except for that written in the HPI. Objective: 
Vitals:   
Vitals:  
 01/24/21 1113 01/24/21 1200 01/24/21 1213 01/24/21 1300 BP: 113/61 122/64  123/62 Pulse:  (!) 59  (!) 59 Resp:  30 30 30 Temp:  98.5 °F (36.9 °C) SpO2:  100%  100% Weight: 117 kg (257 lb 15 oz) Height: 6' (1.829 m) Intake and Output: 
01/24 0701 - 01/24 1900 In: 851.2 [I.V.:731.2] Out: 380 [Urine:380] 01/22 1901 - 01/24 0700 In: 1398.1 [P.O.:240; I.V.:1158.1] Out: 2005 [FYDAX:8873; Drains:10] Physical Examination:  
GEN: ON VENT 
NECK- ET tube + RESP: no distress NEURO:Can't access due to patient's current condition Exam deferred due to COVID-19 infection in order to preserve PPE AND minimize risk of  
 transmission to dialysis and renal transplant patient per ASN and RPA guidelines: 
 
 
 
 
[]    High complexity decision making was performed 
[]    Patient is at high-risk of decompensation with multiple organ involvement Lab Data Personally Reviewed: I have reviewed all the pertinent labs, microbiology data and radiology studies during assessment. Recent Labs  
  01/24/21 
8668 01/23/21 
1445 01/23/21 
0113 01/22/21 
0248  141 139 139  
K 4.2 3.9 4.3 4.2  103 103 103 CO2 28 31 30 28 GLU 98 172* 110* 121* BUN 51* 54* 51* 54* CREA 2.10* 1.94* 1.71* 1.86* CA 8.0* 8.5 8.8 9.4 MG  --  2.0  --   --   
PHOS  --  5.4*  --   --   
ALB 2.2*  --  2.4* 2.6* ALT 14  --  9* 11* Recent Labs  
  01/24/21 
1228 01/24/21 
4552 01/23/21 
0114 WBC 2.9* 3.3* 5.6 HGB 5.9* 6.0* 7.7* HCT 19.3* 19.7* 25.7*  
 233 279 No results found for: SDES Lab Results Component Value Date/Time Culture result: NO GROWTH AFTER 12 HOURS 01/23/2021 03:37 PM  
 Culture result: LIGHT YEAST, (APPARENT CANDIDA ALBICANS) (A) 01/23/2021 02:45 PM  
 Culture result: NO NORMAL RESPIRATORY MICHELLE ISOLATED SO FAR 01/23/2021 02:45 PM  
 
Recent Results (from the past 24 hour(s)) LACTIC ACID Collection Time: 01/23/21  3:35 PM  
Result Value Ref Range Lactic acid 1.7 0.4 - 2.0 MMOL/L  
CULTURE, BLOOD, PAIRED Collection Time: 01/23/21  3:37 PM  
 Specimen: Blood Result Value Ref Range Special Requests: NO SPECIAL REQUESTS Culture result: NO GROWTH AFTER 12 HOURS    
GLUCOSE, POC Collection Time: 01/23/21  6:21 PM  
Result Value Ref Range Glucose (POC) 176 (H) 65 - 100 mg/dL Performed by [employer] GLUCOSE, POC Collection Time: 01/23/21 11:07 PM  
Result Value Ref Range Glucose (POC) 126 (H) 65 - 100 mg/dL Performed by Delia Ferrari GLUCOSE, POC Collection Time: 01/24/21  6:07 AM  
Result Value Ref Range Glucose (POC) 100 65 - 100 mg/dL Performed by Safia Paige Collection Time: 01/24/21  6:09 AM  
Result Value Ref Range D-dimer 1.71 (H) 0.00 - 0.65 mg/L FEU  
CBC W/O DIFF Collection Time: 01/24/21  6:11 AM  
Result Value Ref Range WBC 3.3 (L) 4.1 - 11.1 K/uL  
 RBC 2.12 (L) 4.10 - 5.70 M/uL HGB 6.0 (L) 12.1 - 17.0 g/dL HCT 19.7 (L) 36.6 - 50.3 % MCV 92.9 80.0 - 99.0 FL  
 MCH 28.3 26.0 - 34.0 PG  
 MCHC 30.5 30.0 - 36.5 g/dL  
 RDW 18.4 (H) 11.5 - 14.5 % PLATELET 337 961 - 761 K/uL MPV 9.4 8.9 - 12.9 FL  
 NRBC 1.5 (H) 0  WBC ABSOLUTE NRBC 0.05 (H) 0.00 - 0.01 K/uL METABOLIC PANEL, COMPREHENSIVE Collection Time: 01/24/21  6:11 AM  
Result Value Ref Range Sodium 143 136 - 145 mmol/L Potassium 4.2 3.5 - 5.1 mmol/L Chloride 105 97 - 108 mmol/L  
 CO2 28 21 - 32 mmol/L Anion gap 10 5 - 15 mmol/L Glucose 98 65 - 100 mg/dL BUN 51 (H) 6 - 20 MG/DL Creatinine 2.10 (H) 0.70 - 1.30 MG/DL  
 BUN/Creatinine ratio 24 (H) 12 - 20 GFR est AA 38 (L) >60 ml/min/1.73m2 GFR est non-AA 31 (L) >60 ml/min/1.73m2 Calcium 8.0 (L) 8.5 - 10.1 MG/DL Bilirubin, total 0.4 0.2 - 1.0 MG/DL  
 ALT (SGPT) 14 12 - 78 U/L  
 AST (SGOT) 16 15 - 37 U/L Alk. phosphatase 74 45 - 117 U/L Protein, total 6.4 6.4 - 8.2 g/dL Albumin 2.2 (L) 3.5 - 5.0 g/dL Globulin 4.2 (H) 2.0 - 4.0 g/dL A-G Ratio 0.5 (L) 1.1 - 2.2 ECHO ADULT FOLLOW-UP OR LIMITED Collection Time: 01/24/21 11:13 AM  
Result Value Ref Range IVSd 1.21 (A) 0.6 - 1.0 cm LVIDd 5.02 4.2 - 5.9 cm LVIDs 3.77 cm  
 LVPWd 1.46 (A) 0.6 - 1.0 cm  
 BP EF 49.6 (A) 55 - 100 percent LV Ejection Fraction MOD 2C 52 percent LV Ejection Fraction MOD 4C 41 percent LV ED Vol A2C 127.17 mL  
 LV ED Vol A4C 100.76 mL  
 LV ED Vol .14 67 - 155 mL  
 LV ES Vol A2C 60.94 mL  
 LV ES Vol A4C 59.62 mL  
 LV ES Vol BP 66.64 (A) 22 - 58 mL Left Atrium Major Axis 4.18 cm  Tapse 1.87 1.5 - 2.0 cm  
 Ao Root D 3.69 cm  
 LV Mass .7 88 - 224 g LV Mass AL Index 115.3 49 - 115 g/m2 LVES Vol Index BP 28.1 mL/m2 LVED Vol Index BP 55.7 mL/m2 Left Atrium Minor Axis 1.76 cm  
 LVED Vol Index A4C 42.4 mL/m2 LVED Vol Index A2C 53.6 mL/m2 LVES Vol Index A4C 25.1 mL/m2 LVES Vol Index A2C 25.7 mL/m2 GLUCOSE, POC Collection Time: 01/24/21 12:19 PM  
Result Value Ref Range Glucose (POC) 121 (H) 65 - 100 mg/dL Performed by [employer] CBC W/O DIFF Collection Time: 01/24/21 12:28 PM  
Result Value Ref Range WBC 2.9 (L) 4.1 - 11.1 K/uL  
 RBC 2.16 (L) 4.10 - 5.70 M/uL HGB 5.9 (LL) 12.1 - 17.0 g/dL HCT 19.3 (L) 36.6 - 50.3 % MCV 89.4 80.0 - 99.0 FL  
 MCH 27.3 26.0 - 34.0 PG  
 MCHC 30.6 30.0 - 36.5 g/dL  
 RDW 18.2 (H) 11.5 - 14.5 % PLATELET 986 178 - 613 K/uL MPV 9.7 8.9 - 12.9 FL  
 NRBC 1.4 (H) 0  WBC ABSOLUTE NRBC 0.04 (H) 0.00 - 0.01 K/uL MD Jud Hortaton [facility] [address], [address] [facility] Phone - [phone] Fax - [phone] 
[email or web address]. [email or web address]

## 2021-01-24 NOTE — PROGRESS NOTES
1930 Bedside and Verbal shift change report given to RUBA Stewart (oncoming nurse) by [facility] (offgoing nurse). Report included the following information SBAR, Kardex, Intake/Output, MAR and Cardiac Rhythm NSR.  
 
0000 CHG bath given, linen changed. Patient tolerated well.   
 
0200 Report given to [facility]

## 2021-01-25 NOTE — PROGRESS NOTES
Comprehensive Nutrition Assessment Type and Reason for Visit: Initial, Consult Nutrition Recommendations/Plan:   
 
Vit D3 Modify enteral nutrition: Nepro @ 20 ml/hr with 2 packets Prosource bid and 50 ml water flush q 6 hr Check magnesium and phosphorus with am labs Check TG  
Nutrition Assessment:   
72 y.o. male with PMHx of prostate CA s/p XRT on oral chemo, DM, CHF, CKD 3, and HTN. Admitted with severe sepsis 2° osteomyelitis of R foot, s/p debridement/removal of infected bone and wound vac placement. COVID + 1/11 (negative on admission). Cardiac arrest 1/23 with acute hypoxic respiratory failure 2/2 COVID PNA-intubated. KATHI on CKD 3, fluid overload-diuresis held today 2/2 rising Cr. HFpEF. Patient eating poorly for at least 4-5 days prior to cardiac arrest. Eating ~25% of meals and drinking a lot of juice; did not like multiple supplements. Tube feeding ordered @ trophic rate-plan to start advancing to goal today. Diprivan @ current rate however will provide 920 lipid calories per day. New tube feeding goal: Nepro @ 20 ml/hr with 2 packets Prosource bid; continue 50 ml water flush q 6 hr. This will provide 440 ml, 1005 calories (1965 including Diprivan), 94 gm protein and 750 ml free water (tube feeding/flush) per day to meet estimated needs. Potassium slightly BNL however not suspect it was replaced since diuretic on hold today. Continue to monitor. Recommend checking phosphorus and magnesium with am labs tomorrow. Weight has fluctuated throughout hospital admission. Current weight includes some edema. Malnutrition Assessment: 
Malnutrition Status: At risk for malnutrition (specify) d/t critical illness Context:  Acute illness Nutritionally Significant Medications:  
Diprivan, Fentanyl, Albumin, Vit C, Retacrit, hydrocortisone, Lantus,  Humalog, Prevacid, MVI Estimated Daily Nutrient Needs: Energy (kcal): 8992-6150(15-18 kcal/kg); Weight Used for Energy Requirements: Current Protein (g): 95-117g (0.8-1.0g/kg); Weight Used for Protein Requirements: Current(116.7 kg) Fluid (ml/day): ~1850; Method Used for Fluid Requirements: 1 ml/kcal 
 
Nutrition Related Findings:      
BM: 1/25 Edema: NP genital, 1+ pitting LLE, 2+ pitting RLE Wounds:  Deep tissue injury, Wound vac on right foot Current Nutrition Therapies:  
Diet: NPO Tube Feeding: Nepro @ 20 ml/hr with 50 ml water flush q 6 hr; goal 60 ml/hr Additional Caloric Sources: Diprivan @ 34.9 ml/hr Meal intake: No data found. Anthropometric Measures: 
· Height:  6' (182.9 cm) · Current Body Wt:  117 kg (257 lb 15 oz) · Admission Body Wt:  270 lb(source unknown) · Ideal Body Wt: 178#  :  144.9 % · BMI Categories:  Obese class 1 (BMI 30.0-34. 9) Wt Readings from Last 10 Encounters:  
01/24/21 117 kg (257 lb 15 oz) 02/24/20 127 kg (280 lb) 02/10/20 127 kg (280 lb) 05/28/19 124.7 kg (275 lb) 04/04/19 124.7 kg (275 lb)  
03/31/19 125 kg (275 lb 9.2 oz)  
03/20/19 122.8 kg (270 lb 12.8 oz) 04/26/18 130.6 kg (288 lb)  
02/27/18 126.1 kg (278 lb)  
06/15/17 124.2 kg (273 lb 14.4 oz) Nutrition Diagnosis:  
· Increased nutrient needs related to increased demand for energy/nutrients as evidenced by wounds. · Altered nutrition-related lab values related to renal dysfunction as evidenced by lab values · Inadequate oral intake related to impaired respiratory function as evidenced by NPO or clear liquid status due to medical condition, intubation Nutrition Interventions:  
Food and/or Nutrient Delivery: Modify tube feeding Nutrition Education and Counseling: No recommendations at this time Coordination of Nutrition Care: Continue to monitor while inpatient, Interdisciplinary rounds Goals: Tolerate tube feeding at goal in next 2-3 days. Nutrition Monitoring and Evaluation: Behavioral-Environmental Outcomes: None identified Food/Nutrient Intake Outcomes: Enteral nutrition intake/tolerance Physical Signs/Symptoms Outcomes: Biochemical data, GI status, Fluid status or edema, Hemodynamic status, Weight Discharge Planning: Too soon to determine Romy Cali RD CNSC Contact: Perfect Serve

## 2021-01-25 NOTE — PROGRESS NOTES
ID Progress Note 2021 Subjective: Afebrile. He arrested last Saturday. On the vent. Objective:  
 
Vitals:  
Visit Vitals BP (!) 170/71 Pulse 74 Temp 97.7 °F (36.5 °C) Resp 14 Ht 6' (1.829 m) Wt 117 kg (257 lb 15 oz) SpO2 100% BMI 34.98 kg/m² Tmax:  Temp (24hrs), Av.2 °F (36.8 °C), Min:97.7 °F (36.5 °C), Max:98.5 °F (36.9 °C) Exam:  Intubated Lungs: clear to auscultation Heart: s1, s2, no murmurs Abdomen: soft, nontender Labs:  
Lab Results Component Value Date/Time WBC 4.5 2021 05:26 AM  
 Hemoglobin (POC) 6.5 2020 01:59 PM  
 HGB 7.4 (L) 2021 05:26 AM  
 HCT 23.5 (L) 2021 05:26 AM  
 PLATELET 693  05:26 AM  
 MCV 88.7 2021 05:26 AM  
 
Lab Results Component Value Date/Time Sodium 137 2021 05:26 AM  
 Potassium 3.3 (L) 2021 05:26 AM  
 Chloride 103 2021 05:26 AM  
 CO2 28 2021 05:26 AM  
 Anion gap 6 2021 05:26 AM  
 Glucose 90 2021 05:26 AM  
 BUN 56 (H) 2021 05:26 AM  
 Creatinine 2.46 (H) 2021 05:26 AM  
 BUN/Creatinine ratio 23 (H) 2021 05:26 AM  
 GFR est AA 32 (L) 2021 05:26 AM  
 GFR est non-AA 26 (L) 2021 05:26 AM  
 Calcium 8.2 (L) 2021 05:26 AM  
 Bilirubin, total 0.5 2021 05:26 AM  
 Alk. phosphatase 79 2021 05:26 AM  
 Protein, total 6.9 2021 05:26 AM  
 Albumin 2.6 (L) 2021 05:26 AM  
 Globulin 4.3 (H) 2021 05:26 AM  
 A-G Ratio 0.6 (L) 2021 05:26 AM  
 ALT (SGPT) 11 (L) 2021 05:26 AM  
 
 
 
 
 
Assessment:  
 
#1 osteomyelitis of the right foot 
  
#2 group b strep  bacteremia 
  
#3 renal insufficiency 
  
#4 diabetes 
  
#5 prostate cancer 
  
#6 hypertension 
  
#7 heart failure 
  
 #8 covid  
  
#9 diarrhea Recommendations:  
 
Continue zosyn. There is OM on the surgical margin. He will need prolonged therapy.  Orders written. 
  
 He has completed treatment with remdesivir.  (1/121/16) 
  He has completed dexamethasone (1/12 - 1/21)  
  
Continue Zyvox for his airspace disease.  
 
Kenia Smiley MD

## 2021-01-25 NOTE — PROGRESS NOTES
[facility] 
 
ICU TEAM Progress Note Name: Niharika Fu III  
: 1950 MRN: [id number] Date: 2021 I Subjective:  
Progress Note: 2021 Reason for ICU Admission: Acute hypoxic respiratory failure due to COVID-19 pneumonia. Status post cardiac arrest on the medical floor Interval history: Mr. Bob Camarena is a 73 yo male w a PMH of prostate cancer s/p radiation, tobacco use disorder (1 ppd), CKD3a, gastric angioectasias,  IDDM, heart failure, HTN who was admitted with R foot osteomyelitis on . He was transferred to the ICU on  after experiencing a cardiac arrest in the [facility]. Patient has prolonged hospital course as he was admitted on  and sepsis due to osteomyelitis of the right foot Overnight Events:  
No acute event overnight, responsive and try to communicate despite being on high dose of propofol and fentanyl. Tolerating tube feeding. No fever required 1 unit packed RBC transfusion. Active Problem List:  
 
Problem List  Date Reviewed: 2020 Codes Class * (Principal) Osteomyelitis ([facility].) ICD-10-CM: M86.9 ICD-9-CM: 730.20 Diabetic ulcer of right midfoot associated with type 2 diabetes mellitus, with fat layer exposed ([facility] ICD-10-CM: E11.621, J01.137 ICD-9-CM: 250.80, 707.14 PAD (peripheral artery disease) ([facility]) ICD-10-CM: I73.9 ICD-9-CM: 443.9 Dependence on nicotine from cigarettes ICD-10-CM: F17.210 ICD-9-CM: 305.1 KATHI (acute kidney injury) ([facility] ICD-10-CM: N17.9 ICD-9-CM: 489. 9 Acute on chronic renal failure (HCC) ICD-10-CM: N17.9, N18.9 ICD-9-CM: 584.9, 585.9 Severe obesity (BMI 35.0-39. 9) with comorbidity ([facility] ICD-10-CM: E66.01 
ICD-9-CM: 278.01 Type 2 diabetes with nephropathy (HCC) ICD-10-CM: E11.21 
ICD-9-CM: 250.40, 583.81 S/P hip replacement, right ICD-10-CM: D43.078 ICD-9-CM: V43.64   
   
 Stage 3 chronic kidney disease ICD-10-CM: N18.30 ICD-9-CM: 289. 3 Prostate CA [facility]) ICD-10-CM: K07 ICD-9-CM: 348 Diabetic polyneuropathy ([facility].) ICD-10-CM: E11.42 
ICD-9-CM: 250.60, 357.2 Rotator Cuff Tendonitis ICD-10-CM: M71.9, M67.919 ICD-9-CM: 726.10 Diabetes mellitus type 2, controlled ([facility].) ICD-10-CM: E11.9 ICD-9-CM: 250.00 Degenerative disc disease ICD-10-CM: [id number] ICD-9-CM: 722.6 Osteoarthrosis involving lower leg ICD-10-CM: M17.10 ICD-9-CM: 715.96 Depression/ Anxiety ICD-10-CM: F34.1 ICD-9-CM: 300.4 HTN (hypertension) ICD-10-CM: I10 
ICD-9-CM: 401.9 Chronic hip pain ICD-10-CM: M25.559, G89.29 ICD-9-CM: 719.45, 338.29 Hypertriglyceridemia ICD-10-CM: E78.1 ICD-9-CM: 272.1 Mild Aortic Insufficiency ICD-10-CM: I35.1 ICD-9-CM: 424.1 Mild Concentric LVH (left ventricular hypertrophy) ICD-10-CM: I51.7 ICD-9-CM: 429.3 Past Medical History:  
 
 has a past medical history of Arthritis, Degenerative,  Knee (4/4/2011), Carcinoma, Prostate (4/4/2011), Degenerative disc disease (4/4/2011), Depression/ Anxiety (4/4/2011), Diabetes Mellitrus ( non-insulin dependent ) Type 2 (4/4/2011), Heart failure ([facility] HEMATOCHEZIA (4/4/2011), Hypertrension (4/4/2011), Hypertriglyceridemia (4/4/2011), Ill-defined condition, Insomnia (4/4/2011), Laryngitis (4/4/2011), Mild Aortic insufficiency (4/4/2011), Mild Concentric LVH (left ventricular hypertrophy) (4/4/2011), Neuropathy (4/4/2011), Osteoarthritis (4/4/2011), and Rotator Cuff Tendonitis (4/4/2011). Past Surgical History:  
 
 has a past surgical history that includes hx urological; hx other surgical; pr cardiac surg procedure unlist; colonoscopy (N/A, 4/28/2017); hx orthopaedic; hx orthopaedic; and ir insert tunl cvc w/o port over 5 yr (1/6/2021). Home Medications:  
 
Prior to Admission medications Medication Sig Start Date End Date Taking? Authorizing Provider  
pregabalin (LYRICA) 150 mg capsule TAKE ONE CAPSULE BY MOUTH TWICE A DAY 12/7/20  Yes Bill Ramon., MD  
bicalutamide (CASODEX) 50 mg tablet TAKE ONE TABLET BY MOUTH DAILY 11/30/20  Yes Pama Drain., MD  
torsemide (DEMADEX) 20 mg tablet TAKE FOUR TABLETS BY MOUTH TWICE A DAY 11/23/20  Yes Pama Drain., MD  
pantoprazole (PROTONIX) 40 mg tablet TAKE ONE TABLET BY MOUTH TWICE DAILY ONCE IN THE MORNING AND ONCE IN THE EVENING 11/18/20  Yes Pama Drain., MD  
hydrOXYzine HCL (ATARAX) 50 mg tablet TAKE ONE TABLET BY MOUTH THREE TIMES A DAY AS NEEDED FOR ITCHING FOR UP TO 10 DAYS 10/22/20  Yes Pama Drain., MD  
amLODIPine (NORVASC) 10 mg tablet TAKE ONE TABLET BY MOUTH DAILY 10/2/20  Yes Pama Drain., MD  
hydrALAZINE (APRESOLINE) 100 mg tablet Take 1 Tab by mouth three (3) times daily. 9/16/20  Yes Pama Tristen., MD  
ascorbic acid, vitamin C, (Vitamin C) 500 mg tablet Take  by mouth. Yes Provider, Historical  
enzalutamide (XTANDI) 40 mg capsule Take 160 mg by mouth daily. Yes Provider, Historical  
Thera-Tabs M 27 mg iron-400 mcg tab TAKE ONE TABLET BY MOUTH DAILY 7/17/20  Yes Pama Drain., MD  
tamsulosin [facility]) 0.4 mg capsule TAKE ONE CAPSULE BY MOUTH DAILY 7/16/20  Yes Pama Tristen., MD  
Insulin Syringe-Needle U-100 (BD Insulin Syringe Ultra-Fine) 1 mL 31 gauge x 5/16 syrg USE TO INJECT INSULIN FIVE TIMES A DAY 6/16/20  Yes Pama Drain., MD  
ferrous sulfate 325 mg (65 mg iron) tablet TAKE ONE TABLET BY MOUTH DAILY BEFORE BREAKFAST 6/16/20  Yes Pama Drain., MD  
gabapentin (NEURONTIN) 300 mg capsule Take 1 Cap by mouth three (3) times daily.  Max Daily Amount: 900 mg. 5/25/20  Yes Bill Ramon., MD  
 triamcinolone acetonide (KENALOG) 0.1 % ointment APPLY A THIN LAYER TO AFFECTED AREA(S) TWO TIMES A DAY **DO NOT EXCEED 2.66 GRAMS PER DAY** 5/19/20  Yes Silvia Skinner MD  
diclofenac (VOLTAREN) 1 % gel Apply  to affected area four (4) times daily. 4/16/20  Yes Silvia Skinner MD  
ammonium lactate (AMLACTIN) 12 % topical cream Apply  to affected area two (2) times a day. rub in to affected area well 3/10/20  Yes Silvia Skinner MD  
ammonium lactate (LAC-HYDRIN FIVE) 5 % lotion Apply 1 Applicator to affected area daily. Apply daily bilateral lower legs 3/5/20  Yes Silvia Skinner MD  
insulin glargine (LANTUS U-100 INSULIN) 100 unit/mL injection 72 Units by SubCUTAneous route daily. 3/5/20  Yes Silvia Skinner MD  
insulin regular (NOVOLIN R, HUMULIN R) 100 unit/mL injection 24 units sc tid 3/5/20  Yes Silvia Skinner MD  
ketoconazole (NIZORAL) 2 % topical cream Apply  to affected area two (2) times a day. 2/4/20  Yes Silvia Skinner MD  
ketoconazole (NIZORAL) 2 % shampoo Sig:shampoo once a week 2/4/20  Yes Silvia Skinner MD  
bisacodyl (DULCOLAX, BISACODYL,) 10 mg suppository Insert 10 mg into rectum daily. Yes Provider, Historical  
insulin aspart U-100 (NOVOLOG FLEXPEN U-100 INSULIN) 100 unit/mL (3 mL) inpn by SubCUTAneous route. Yes Provider, Historical  
Insulin Needles, Disposable, (NOVOFINE 32) 32 gauge x 1/4\" ndle Sig:use 4 times a day as needed 11/13/19  Yes Silvia Skinner MD  
NOVOLIN R REGULAR U-100 INSULN 100 unit/mL injection INJECT 14 UNITS UNDER THE SKIN THREE TIMES A DAY WITH MEALS AS NEEDED 8/6/19  Yes Silvia Skinner MD  
fluticasone propionate [facility]) 50 mcg/actuation nasal spray SPRAY TWO SPRAYS IN THE AFFECTED NOSTRIL DAILY 7/16/19  Yes Silvia Skinner MD  
aspirin delayed-release 81 mg tablet Take 81 mg by mouth daily.    Yes Provider, Historical  
 acetaminophen (PAIN AND FEVER) 500 mg tablet TAKE ONE TABLET BY MOUTH EVERY 6 HOURS AS NEEDED FOR PAIN 18  Yes Nabeel Bose MD  
Calcium Carbonate-Vit D3-Min 600 mg calcium- 400 unit tab Take 1 Tab by mouth two (2) times a day. Yes Provider, Historical  
 
 
Allergies/Social/Family History: No Known Allergies Social History Tobacco Use  Smoking status: Current Every Day Smoker Packs/day: 1.00 Last attempt to quit: 2019 Years since quittin.2  Smokeless tobacco: Never Used Substance Use Topics  Alcohol use: Not Currently Alcohol/week: 11.7 standard drinks Types: 7 Cans of beer, 7 Shots of liquor per week Family History Family history unknown: Yes Review of Systems:  
 
Not able to obtain due to patient medical condition Objective:  
Vital Signs: 
Visit Vitals BP (!) 156/64 Pulse (!) 52 Temp 97.7 °F (36.5 °C) Resp 30 Ht (P) 6' (1.829 m) Wt 117 kg (257 lb 15 oz) SpO2 99% BMI (P) 34.98 kg/m² O2 Flow Rate (L/min): 15 l/min(Mid flow at 9 liters also) O2 Device: Endotracheal tube Temp (24hrs), Av.3 °F (36.8 °C), Min:97.7 °F (36.5 °C), Max:98.7 °F (37.1 °C) Intake/Output:  
 
Intake/Output Summary (Last 24 hours) at 2021 1236 Last data filed at 2021 1054 Gross per 24 hour Intake 1310.02 ml Output 2050 ml Net -739.98 ml Physical Exam: 
 
General: Sedated, on mechanical ventilation Eye: PERRLA Neurologic:  Sedated, non-focal.  Opens eyes purposefully and move upper limbs purposefully try to communicate despite being on sedation Neck:Supple, no JVD Lungs: Rhonchi throughout Heart: RRR, normal S1/S2, 1+ edema in BLE Abdomen:  Soft, non-tender, non-distended Skin: Wound vac to left foot; BLE with chronic skin changes 
  
 
LABS AND  DATA: Personally reviewed Recent Labs  
  21 
0526 21 
1228 WBC 4.5 2.9* HGB 7.4* 5.9*  
HCT 23.5* 19.3*  
 247 Recent Labs   01/25/21 
0526 01/24/21 
0611 01/23/21 
1445  
 143 141  
K 3.3* 4.2 3.9  
 105 103  
CO2 28 28 31  
BUN 56* 51* 54*  
CREA 2.46* 2.10* 1.94*  
GLU 90 98 172*  
CA 8.2* 8.0* 8.5  
MG  --   --  2.0  
PHOS  --   --  5.4*  
 
Recent Labs  
  01/25/21 
0526 01/24/21 
0611  
AP 79 74  
TP 6.9 6.4  
ALB 2.6* 2.2*  
GLOB 4.3* 4.2*  
 
No results for input(s): INR, PTP, APTT, INREXT in the last 72 hours.  
No results for input(s): PHI, PCO2I, PO2I, FIO2I in the last 72 hours. 
Recent Labs  
  01/23/21 
1445  
TROIQ 0.05*  
 
 
Hemodynamics:  
PAP:   CO:    
Wedge:   CI:    
CVP:    SVR:    
  PVR:    
 
Ventilator Settings: 
Mode Rate Tidal Volume Pressure FiO2 PEEP  
Assist control, Pressure control   430 ml    60 % 8 cm H20  
 
Peak airway pressure: 532 cm H2O   
Minute ventilation: 17 l/min   
 
 
MEDS: Reviewed 
 
Chest X-Ray: 
CXR Results  (Last 48 hours)  
          
 01/23/21 1442  XR CHEST PORT Final result  
 Impression:  Interval endotracheal tube placement. Interval worsening of diffuse  
bilateral airspace disease with new bilateral pleural effusions.  
  
 Narrative:  EXAM: XR CHEST PORT  
   
INDICATION: ETT placement  
   
COMPARISON: 1/20/2021  
   
FINDINGS: A portable AP radiograph of the chest was obtained at 1431 hours. The  
patient is on a cardiac monitor. The endotracheal tube terminates about 6 cm  
above the yahir. The right IJ line terminates over the cavoatrial junction.  
Diffuse bilateral airspace disease has increased, especially on the left. There  
appear to be new bilateral pleural effusions.  
   
  
  
 
 
 
Assessment and Plan:  
Septic shock: Mulitfactorial likely due to COVID-19.   Currently on broad spectrum antibiotics. Requires levophed. 
-Patient on Zyvox and Zosyn.  I appreciate infectious disease input 
- Start weaning stress dose hydrocortisone hydrocortisone to 50mg q8h 
- Sedating with fent/propofol 
- Lung protective ventilation 
 - Follow up on blood and sputum cultures that were sent on 1/23; 
  
Acute Hypoxic Respiratory Failure secondary to COVID Pneumonia: He also demonstrated dyspnea on initial admission, but was negative for COVID. Initially treated with steroids. On 1/11, he developed increased oxygen needs. He was COVID positive. He was started on and completed treatment with steroids (completed 1/21), remdesivir (completed 1/16), zinc, and vitamin C. Intubated 1/23. 
- Wean vent settings as tolerated - On ARDS volume ventilation - Steroids as above - Continue Zyvox and zosyn - Sputum and blood cultures sent - Sedated on propofol and fentanyl, will wean as tolerated 
  
HFpEFw exacerbation: Elevated BNP [difficult to interpret given his kidney function] echo done and report as below · LV: Estimated LVEF is 45 - 50%. Biplane method used to measure ejection fraction. Normal cavity size. Moderate concentric hypertrophy. · RV: Mildly dilated right ventricle. Mildly reduced systolic function. Acute on chronic CKD3a: Baseline Cr 1.7. Renal has followed throughout his course. Appreciate nephrology input. They placed diuretics on hold today DM type II: Adjust insulin to achieve reasonable glycemic control Anemia: Acute on chronic. Received 1 packed RBC on January 24 with good result. Continue to monitor and transfuse for hemoglobin below 7 
  
Deconditioning: Unable to participate in PT/OT 
  
Prostate cancer: Holding casodex as this cannot be crushed and given via OGT Patient remains full code. I appreciate palliative care team input regarding addressing goals of care. DISPOSITION Stay in ICU CRITICAL CARE CONSULTANT NOTE I had a face to face encounter with the patient, reviewed and interpreted patient data including clinical events, labs, images, vital signs, I/O's, and examined patient. I have discussed the case and the plan and management of the patient's care with the consulting services, the bedside nurses and the respiratory therapist.   
 
NOTE OF PERSONAL INVOLVEMENT IN CARE This patient has a high probability of imminent, clinically significant deterioration, which requires the highest level of preparedness to intervene urgently. I participated in the decision-making and personally managed or directed the management of the following life and organ supporting interventions that required my frequent assessment to treat or prevent imminent deterioration. I personally spent 40 minutes of critical care time. This is time spent at this critically ill patient's bedside actively involved in patient care as well as the coordination of care and discussions with the patient's family. This does not include any procedural time which has been billed separately. Kiya Henao M.D. Staff Intensivist/Pulmonologist 
[facility] 1/25/2021

## 2021-01-25 NOTE — CONSULTS
Palliative Medicine Consult [place]: [phone]) Patient Name: Mildred Burns YOB: 1950 Date of Initial Consult: 1/18/2021 Reason for Consult: care decisions Requesting Provider: Dr. Joao Coreas Primary Care Physician: Leana Buerger., MD 
 
 SUMMARY:  
Júnior Warren III is a 72 y.o. with a past history of prostate cancer s/p XRT/ now w widespread bone mets, Heart failure, IDDM, tobacco use (1PPD), HTN, diabetic foot wound, who was admitted on 12/28/2020 from home with a diagnosis of diarrhea, chills for 2 days , lethargy. Current medical issues leading to Palliative Medicine involvement include: Prolonged hospital stay, with osteomyelitis of R foot s/p debridement 12/30/2020 (needs 6 weeks abx, on Zosyn, Wound vac 1/5), COVID positive respiratory failure (covid negative 12/28, covid positive 1/11) ongoing high oxygen requirement, remdesivir end 1/16, on decadron until 1/21, requiring diuresis. GI bleed ( EGD 1/4: clip to active bleed Dieulafoy lesion, duodenal ulcer not bleeding) s/p IV iron. KATHI on CKD. 
 
1/23/2021 Cardiac arrest, s/p ROSC after 12minutes CPR, regained mentation Patient is  for 30 years but still legally . Lives alone in own apartment, with frequent visits from family. He has 4 children. Dtr Marisol Medina ( [phone]) is one he says he would want to make medical decisions if needed. Also son Shyanne Paniagua 
dtr Melinda PALLIATIVE DIAGNOSES:  
1. Acute respiratory failure, COVID19 pneumonia, intubated 1/23/2021 2. Acute renal failure 3. Osteomyelitis R foot 4. GI bleed, anemia 5. Debility 6. Metastatic prostate cancer (on casodex) 7. Anxiety about health 8. Care goals discussion  PLAN:  
 Patient is well known to me from visits on IMCU. We had discussed care goals several times, including once with his family. He chose not to complete AMD paperwork but had voiced that he trusted dtr, Jacob Cantrell, for medical decision making. Discussed with bedside nurse Call placed to dtPratima gonzales  She has just gotten extensive update from bedside nurse. Reviewed current issues, including concern that kidneys are not doing good, labs show that kidneys are starting to fail. Education provided about ventilator weaning process. Agree to see how he can do, and I let her know that from my discussions with him last week, I know he accepts ventilator only for SHORT TERM, and that he would not want trach/ or long term vent support. We will see how he does, can keep talking about care goals as we have more information about kidney function/ ability to wean off vent. Checked that she still has my number. Will check in again tomorrow GOALS OF CARE / TREATMENT PREFERENCES:  
 
GOALS OF CARE: 
Patient/Health Care Proxy Stated Goals: Rehabilitation TREATMENT PREFERENCES:  
Code Status: Full Code Advance Care Planning: 
[] [facility] Interdisciplinary Team has updated the ACP Navigator with [address] and Patient Capacity Advance Care Planning 12/30/2020 Patient's Healthcare Decision Maker is: -  
Primary Decision Maker Name -  
Primary Decision Maker Phone Number -  
Primary Decision Maker Relationship to Patient -  
Confirm Advance Directive None Patient Would Like to Complete Advance Directive - Medical Interventions: Limited additional interventions Other Instructions: Other: As far as possible, the palliative care team has discussed with patient / health care proxy about goals of care / treatment preferences for patient. HISTORY:  
 
History obtained from: chart, patient CHIEF COMPLAINT: admitted with chills, diarrhea HPI/SUBJECTIVE:   
 The patient is:  
[x] Verbal and participatory [] Non-participatory due to:  
 
72year old male with complex medical history as above Admitted with diarrhea, chills, and caregiver reporting lethargy Clinical Pain Assessment (nonverbal scale for severity on nonverbal patients):  
Clinical Pain Assessment Severity: 0 Activity (Movement): Lying quietly, normal position Duration: for how long has pt been experiencing pain (e.g., 2 days, 1 month, years) Frequency: how often pain is an issue (e.g., several times per day, once every few days, constant) FUNCTIONAL ASSESSMENT:  
 
Palliative Performance Scale (PPS): PPS: 30 
 
 
 PSYCHOSOCIAL/SPIRITUAL SCREENING:  
 
Palliative IDT has assessed this patient for cultural preferences / practices and a referral made as appropriate to needs (Cultural Services, Patient Advocacy, Ethics, etc.) Any spiritual / Latter-day concerns: 
[] Yes /  [x] No 
 
Caregiver Burnout: 
[] Yes /  [x] No /  [] No Caregiver Present Anticipatory grief assessment:  
[x] Normal  / [] Maladaptive ESAS Anxiety: Anxiety: 0 
 
ESAS Depression: Depression: 0 REVIEW OF SYSTEMS:  
 
Positive and pertinent negative findings in ROS are noted above in HPI. The following systems were [x] reviewed / [] unable to be reviewed as noted in HPI Other findings are noted below. Systems: constitutional, ears/nose/mouth/throat, respiratory, gastrointestinal, genitourinary, musculoskeletal, integumentary, neurologic, psychiatric, endocrine. Positive findings noted below. Modified ESAS Completed by: provider Fatigue: 5 Drowsiness: 0 Depression: 0 Pain: 0 Anxiety: 0 Nausea: 0 Anorexia: 1 Dyspnea: 4 Constipation: No  
  Stool Occurrence(s): 1 PHYSICAL EXAM:  
 
From RN flowsheet: 
Wt Readings from Last 3 Encounters:  
01/24/21 257 lb 15 oz (117 kg) 02/24/20 280 lb (127 kg) 02/10/20 280 lb (127 kg) Blood pressure (!) 156/64, pulse (!) 52, temperature 97.7 °F (36.5 °C), resp. rate 30, height 6' (1.829 m), weight 257 lb 15 oz (117 kg), SpO2 99 %. Pain Scale 1: Adult Nonverbal Pain Scale Pain Intensity 1: 0 Pain Onset 1: ongoing Pain Location 1: Foot Pain Orientation 1: Right Pain Description 1: Aching Pain Intervention(s) 1: Medication (see MAR) Last bowel movement, if known:  
 
Constitutional: awake, alert lying in hospital bed NAD Eyes: pupils equal, anicteric Hi flow Nasal cannula in place Elevated RR Speech is fluent Good eye contact, affect normal 
Insight intact Wound vac in place R foot. HISTORY:  
 
Principal Problem: 
  Osteomyelitis ([facility] [address] (12/28/2020) Past Medical History:  
Diagnosis Date  Arthritis, Degenerative,  Knee 4/4/2011  Carcinoma, Prostate 4/4/2011  Degenerative disc disease 4/4/2011  Depression/ Anxiety 4/4/2011  Diabetes Mellitrus ( non-insulin dependent ) Type 2 4/4/2011  
 Heart failure ([address]  HEMATOCHEZIA 4/4/2011  Hypertrension 4/4/2011  Hypertriglyceridemia 4/4/2011  Ill-defined condition   
 lymphadema  Insomnia 4/4/2011  Laryngitis 4/4/2011  Mild Aortic insufficiency 4/4/2011  Mild Concentric LVH (left ventricular hypertrophy) 4/4/2011  Neuropathy 4/4/2011  Osteoarthritis 4/4/2011  Rotator Cuff Tendonitis 4/4/2011 Past Surgical History:  
Procedure Laterality Date  COLONOSCOPY N/A 4/28/2017 COLONOSCOPY performed by Kiersten Grant MD at [facility] ENDOSCOPY  
 HX ORTHOPAEDIC    
 left hip pinning  HX ORTHOPAEDIC    
 right hip replacement  HX OTHER SURGICAL    
 left little toe amputation  HX UROLOGICAL    
 IR INSERT TUNL CVC W/O PORT OVER 5 YR  1/6/2021  ME CARDIAC SURG PROCEDURE UNLIST    
 cardiac cath Family History Family history unknown: Yes History reviewed, no pertinent family history. Social History Tobacco Use  Smoking status: Current Every Day Smoker Packs/day: 1.00 Last attempt to quit: 2019 Years since quittin.2  Smokeless tobacco: Never Used Substance Use Topics  Alcohol use: Not Currently Alcohol/week: 11.7 standard drinks Types: 7 Cans of beer, 7 Shots of liquor per week No Known Allergies Current Facility-Administered Medications Medication Dose Route Frequency  albumin human 25% (BUMINATE) solution 25 g  25 g IntraVENous Q12H  hydrocortisone Sod Succ (PF) (SOLU-CORTEF) injection 50 mg  50 mg IntraVENous Q8H  
 0.9% sodium chloride infusion 250 mL  250 mL IntraVENous PRN  
 fentaNYL (PF) 1,500 mcg/30 mL (50 mcg/mL) infusion  0-200 mcg/hr IntraVENous TITRATE  propofol (DIPRIVAN) 10 mg/mL infusion  0-50 mcg/kg/min IntraVENous TITRATE  vasopressin (VASOSTRICT) 20 Units in 0.9% sodium chloride 100 mL infusion  0-0.04 Units/min IntraVENous TITRATE  
 NOREPINephrine (LEVOPHED) 8 mg in 5% dextrose 250mL (32 mcg/mL) infusion  0.5-30 mcg/min IntraVENous TITRATE  insulin lispro (HUMALOG) injection   SubCUTAneous Q6H  
 lansoprazole (PREVACID) 3 mg/mL oral suspension 30 mg  30 mg Per NG tube BID  insulin glargine (LANTUS) injection 14 Units  14 Units SubCUTAneous QHS  [Held by provider] insulin lispro (HUMALOG) injection 12 Units  12 Units SubCUTAneous TIDAC  therapeutic multivitamin (THERAGRAN) tablet 1 Tab  1 Tab Oral DAILY  0.9% sodium chloride infusion 250 mL  250 mL IntraVENous PRN  
 enoxaparin (LOVENOX) injection 40 mg  40 mg SubCUTAneous Q24H  
 linezolid in dextrose 5% (ZYVOX) IVPB premix in D5W 600 mg  600 mg IntraVENous Q12H  
 0.9% sodium chloride infusion 250 mL  250 mL IntraVENous PRN  
 miconazole (MICOTIN) 2 % powder   Topical BID  hydrALAZINE (APRESOLINE) 20 mg/mL injection 20 mg  20 mg IntraVENous Q6H PRN  
 [Held by provider] cloNIDine HCL (CATAPRES) tablet 0.1 mg  0.1 mg Oral BID  ascorbic acid (vitamin C) (VITAMIN C) tablet 500 mg  500 mg Oral DAILY  [Held by provider] amLODIPine (NORVASC) tablet 10 mg  10 mg Oral DAILY  collagenase (SANTYL) 250 unit/gram ointment   Topical Once per day on Thu Sat  balsam peru-castor oiL (VENELEX) ointment   Topical Q12H  
 ammonium lactate (LAC-HYDRIN) 12 % lotion   Topical Q12H  
 sodium chloride (NS) flush 5-40 mL  5-40 mL IntraVENous Q8H  
 sodium chloride (NS) flush 5-40 mL  5-40 mL IntraVENous PRN  
 [Held by provider] enzalutamide Santi Meline) chemo capsule 80 mg (patient supplied) (Patient Supplied)  80 mg Oral BID  fluticasone propionate (FLONASE) 50 mcg/actuation nasal spray 2 Spray  2 Spray Both Nostrils DAILY  piperacillin-tazobactam (ZOSYN) 3.375 g in 0.9% sodium chloride (MBP/ADV) 100 mL MBP  3.375 g IntraVENous Q8H  
 oxyCODONE-acetaminophen (PERCOCET) 5-325 mg per tablet 1 Tab  1 Tab Oral Q4H PRN  
 epoetin chi-epbx (RETACRIT) injection 20,000 Units  20,000 Units SubCUTAneous Q MON, WED & FRI  diclofenac (VOLTAREN) 1 % topical gel 2 g  2 g Topical QID  acetaminophen (TYLENOL) tablet 500 mg  500 mg Oral Q4H PRN  
 aspirin delayed-release tablet 81 mg  81 mg Oral DAILY  [Held by provider] bicalutamide (CASODEX) tablet 50 mg  50 mg Oral DAILY  gabapentin (NEURONTIN) capsule 100 mg  100 mg Oral TID  [Held by provider] tamsulosin (FLOMAX) capsule 0.4 mg  0.4 mg Oral DAILY  nicotine (NICODERM CQ) 14 mg/24 hr patch 1 Patch  1 Patch TransDERmal DAILY  glucose chewable tablet 16 g  4 Tab Oral PRN  
 dextrose (D50W) injection syrg 12.5-25 g  12.5-25 g IntraVENous PRN  
 glucagon (GLUCAGEN) injection 1 mg  1 mg IntraMUSCular PRN  
 
 
 
 LAB AND IMAGING FINDINGS:  
 
Lab Results Component Value Date/Time WBC 4.5 01/25/2021 05:26 AM  
 HGB 7.4 (L) 01/25/2021 05:26 AM  
 PLATELET 653 59/41/1207 05:26 AM  
 
Lab Results Component Value Date/Time  Sodium 137 01/25/2021 05:26 AM  
 Potassium 3.3 (L) 01/25/2021 05:26 AM  
 Chloride 103 01/25/2021 05:26 AM  
 CO2 28 01/25/2021 05:26 AM  
 BUN 56 (H) 01/25/2021 05:26 AM  
 Creatinine 2.46 (H) 01/25/2021 05:26 AM  
 Calcium 8.2 (L) 01/25/2021 05:26 AM  
 Magnesium 2.0 01/23/2021 02:45 PM  
 Phosphorus 5.4 (H) 01/23/2021 02:45 PM  
  
Lab Results Component Value Date/Time Alk. phosphatase 79 01/25/2021 05:26 AM  
 Protein, total 6.9 01/25/2021 05:26 AM  
 Albumin 2.6 (L) 01/25/2021 05:26 AM  
 Globulin 4.3 (H) 01/25/2021 05:26 AM  
 
Lab Results Component Value Date/Time INR 1.1 12/31/2020 05:03 PM  
 Prothrombin time 11.3 (H) 12/31/2020 05:03 PM  
 aPTT 33.1 (H) 10/24/2016 03:26 PM  
  
Lab Results Component Value Date/Time Iron 42 01/11/2021 04:35 AM  
 TIBC 201 (L) 01/11/2021 04:35 AM  
 Iron % saturation 21 01/11/2021 04:35 AM  
 Ferritin 266 12/30/2020 01:17 AM  
  
No results found for: PH, PCO2, PO2 No components found for: [place] Lab Results Component Value Date/Time CK 15 (L) 01/20/2021 01:58 AM  
  
 
 
   
 
Total time: 35 
Counseling / coordination time, spent as noted above: 25 
> 50% counseling / coordination?: yes Prolonged service was provided for  []30 min   []75 min in face to face time in the presence of the patient, spent as noted above. Time Start:  
Time End:  
Note: this can only be billed with 34418 (initial) or 78157 (follow up). If multiple start / stop times, list each separately.

## 2021-01-25 NOTE — PROGRESS NOTES
Problem: Mobility Impaired (Adult and Pediatric) Goal: *Acute Goals and Plan of Care (Insert Text) Description: FUNCTIONAL STATUS PRIOR TO ADMISSION: The patient was functional at the wheelchair level and required stand-by assistance for transfers to the chair. He has a power and manual chair at home. HOME SUPPORT PRIOR TO ADMISSION: The patient lived alone with hired caregivers and family to provide assistance, though he is often at home alone overnight. Physical Therapy Goals Goals re-evaluated 1/25/2021 1. Patient will roll side to side in bed with maximal assistance within 7 day(s). 2.  Patient will transfer supine<>sit with maximal assistance within 7 days. 3.  Patient will tolerate bed in chair position x30 min for pulmonary toileting and increased tolerance to upright in 7 days. 4.  Patient will participate in B LE AROM there ex in prep for mobility progression within 7 days. 5.  Patient will tolerate sitting edge of bed with maximal assistance x2 minutes in preparation for transfers within 7 days. Initiated 1/21/2021 1. Patient will move from supine to sit and sit to supine  and roll side to side in bed with moderate assistance  within 7 day(s). 2.  Patient will transfer from bed to chair and chair to bed with moderate assistance  using the least restrictive device within 7 day(s). 3.  Patient will perform sit to stand with moderate assistance  within 7 day(s). Outcome: Not Met PHYSICAL THERAPY REEVALUATION Patient: Jimmy Villar (89 y.o. male) Date: 1/25/2021 Primary Diagnosis: Osteomyelitis ([address] [M86.9] Procedure(s) (LRB): ESOPHAGOGASTRODUODENOSCOPY (EGD)    :- (N/A) ESOPHAGOGASTRODUODENAL (EGD) BIOPSY (N/A) RESOLUTION CLIP (N/A) 21 Days Post-Op Precautions: Contact(droplet plus) ASSESSMENT Pt re-evaluated by PT s/p cardiac arrest on 1/23/21. Pt transferred to ICU for higher level of care and is now intubated (FiO2 60%, PEEP 8). Pt received on sedation but remains alert and able to follow commands and actively participate in session. He was able to move all extremities against gravity (note most difficulty in RUE which is his baseline). Pt able to participate in supine exercises. O2 sats remained >97% throughout. Ended session in bed with HOB elevated, soft wrist restraints in place, all needs met, and nursing updated. Recommending SNF upon discharge. Current Level of Function Impacting Discharge (mobility/balance): totalA for all mobility Functional Outcome Measure: The patient scored 0/100 on the Barthel outcome measure which is indicative of 100% impairment and dependence on others for basic mobility and self care tasks. Other factors to consider for discharge: prolonged and complicated hospital stay, code blue 1/23, currently intubated and sedated, covid +, wound vac RLE Patient will benefit from skilled therapy intervention to address the above noted impairments. PLAN : 
Recommendations and Planned Interventions: bed mobility training, transfer training, gait training, therapeutic exercises, neuromuscular re-education, patient and family training/education, and therapeutic activities Frequency/Duration: Patient will be followed by physical therapy:  3 times a week to address goals. Recommendation for discharge: (in order for the patient to meet his/her long term goals) Therapy up to 5 days/week in SNF setting This discharge recommendation: 
Has not yet been discussed the attending provider and/or case management Equipment recommendations for successful discharge (if) home: TBD SUBJECTIVE:  
Patient intubated but able to nod yes/no appropriately OBJECTIVE DATA SUMMARY:  
HISTORY:   
Past Medical History:  
Diagnosis Date Arthritis, Degenerative,  Knee 4/4/2011 Carcinoma, Prostate 4/4/2011 Degenerative disc disease 4/4/2011 Depression/ Anxiety 4/4/2011 Diabetes Mellitrus ( non-insulin dependent ) Type 2 4/4/2011 Heart failure ([facility] [address] HEMATOCHEZIA 4/4/2011 Hypertrension 4/4/2011 Hypertriglyceridemia 4/4/2011 Ill-defined condition   
 lymphadema Insomnia 4/4/2011 Laryngitis 4/4/2011 Mild Aortic insufficiency 4/4/2011 Mild Concentric LVH (left ventricular hypertrophy) 4/4/2011 Neuropathy 4/4/2011 Osteoarthritis 4/4/2011 Rotator Cuff Tendonitis 4/4/2011 Past Surgical History:  
Procedure Laterality Date COLONOSCOPY N/A 4/28/2017 COLONOSCOPY performed by Yesika Abebe MD at [facility] ENDOSCOPY  
 HX ORTHOPAEDIC    
 left hip pinning HX ORTHOPAEDIC    
 right hip replacement HX OTHER SURGICAL    
 left little toe amputation HX UROLOGICAL    
 IR INSERT TUNL CVC W/O PORT OVER 5 YR  1/6/2021 [place] CARDIAC SURG PROCEDURE UNLIST    
 cardiac cath Hospital course since last seen and reason for reevaluation: Cardiac arrest on 1/23, transfer to ICU for higher level of care and now currently intubated Home Situation Home Environment: Apartment One/Two Story Residence: One story Living Alone: Yes Support Systems: Home care staff, Child(linnea)(CNA 9-3 daily) Patient Expects to be Discharged to[de-identified] Private residence Current DME Used/Available at Home: Wheelchair, power, Crutches, Raised toilet seat, Grab bars EXAMINATION/PRESENTATION/DECISION MAKING:  
Critical Behavior: 
Neurologic State: Eyes open to voice Orientation Level: Unable to verbalize Cognition: Follows commands Safety/Judgement: Awareness of environment, Good awareness of safety precautions Hearing: Auditory Auditory Impairment: None Range Of Motion: 
AROM: Generally decreased, functional 
  
  
  
PROM: Generally decreased, functional 
  
  
  
Strength:   
Strength: Grossly decreased, non-functional 
 Tone & Sensation:  
Tone: Normal 
  
  
  
  
Sensation: Impaired Coordination: 
Coordination: Within functional limits Vision:  
  
Functional Mobility: 
TotalA for all mobility Functional Measure: 
Barthel Index: 
 
Bathin Bladder: 0 Bowels: 0 Groomin Dressin Feedin Mobility: 0 Stairs: 0 Toilet Use: 0 Transfer (Bed to Chair and Back): 0 Total: 0/100 The Barthel ADL Index: Guidelines 1. The index should be used as a record of what a patient does, not as a record of what a patient could do. 2. The main aim is to establish degree of independence from any help, physical or verbal, however minor and for whatever reason. 3. The need for supervision renders the patient not independent. 4. A patient's performance should be established using the best available evidence. Asking the patient, friends/relatives and nurses are the usual sources, but direct observation and common sense are also important. However direct testing is not needed. 5. Usually the patient's performance over the preceding 24-48 hours is important, but occasionally longer periods will be relevant. 6. Middle categories imply that the patient supplies over 50 per cent of the effort. 7. Use of aids to be independent is allowed. Oz Harmon., Barthel, D.W. (3671). Functional evaluation: the Barthel Index. [facility] (14)2. PJ Mcmullen, Mariano Wade., Daterry Lockwood., Dianna Shetty, [address] [place] [address] (). Measuring the change indisability after inpatient rehabilitation; comparison of the responsiveness of the Barthel Index and Functional [place] Measure. Journal of Neurology, Neurosurgery, and Psychiatry, 66(4), 019-865. Dre Bautista, N.J.A, CEDRIC Cole, & Tamy Sahu MCRISTHIAN. (2004.) Assessment of post-stroke quality of life in cost-effectiveness studies: The usefulness of the Barthel Index and the EuroQoL-5D. [facility], 13, 183-85 Activity Tolerance:  
Poor After treatment patient left in no apparent distress:  
Supine in bed, Call bell within reach, and Restraints COMMUNICATION/EDUCATION:  
The patients plan of care was discussed with: Registered nurse. Fall prevention education was provided and the patient/caregiver indicated understanding., Patient/family have participated as able in goal setting and plan of care. , and Patient/family agree to work toward stated goals and plan of care. Thank you for this referral. 
Taco Cooper, PT, DPT Time Calculation: 14 mins

## 2021-01-25 NOTE — PROCEDURES
[facility] Procedure Note - Arterial Access:  
Performed by Jose Calvo NP. Immediately prior to the procedure, the patient was reevaluated and found suitable for the planned procedure and any planned medications. Immediately prior to the procedure a time out was called to verify the correct patient, procedure, equipment, staff, and marking as appropriate. Central line Bundle: 
Full sterile barrier precautions used. 5 mL 1% Lidocaine placed at insertion site. Insertion Date: 1/23 1800 Procedure Location:  L radial 
Condition: Elective. Consent:  YES. Method: Seldinger technique. Site Prep: ChloraPrep. Procedure: Arterial Catheter Insertion in Left  Radial 
Catheter inserted into a new site. Number of Attempts:  2 Indication: Monitoring. There was bright red, pulsatile blood return. Femoral Site? no. If Yes, reason femoral site was chosen: NA 
Catheter secured. Biopatch in place? yes. Sterile Bio-occlusive dressing placed. Complication None. The procedure was tolerated well. Jose Calvo NP Critical Care Medicine [facility]

## 2021-01-25 NOTE — PROGRESS NOTES
Nephrology Progress Note Karen Salomon III Date of Admission : 12/28/2020 CC: Follow up for KATHI on CKD Assessment and Plan KATHI on CKD: 
- Cr up, regisley from diuresis 
- hold lasix today 
- cont albumin 
- daily labs and I/Os RESP FAILURE on vent Hyperkalemia: 
- low K diet, no ACE/ARB 
- K stable CKD Stage IIIa: 
-  Presumed 2/2 DM and HTN 
- nephrotic range proteinuria 
- baseline Cr 1.7 mg/dl  
- followed by Dr. Johanne Zamorano COVID-19 PNA: 
- positive on 1/11 
- on decadron and remdesivir 
  
Hx of prostate cancer s/p XRT 
  
Metabolic acidosis: 
-  Stopped oral Bicarb  
  
Anemia in CKD + blood loss: 
- cont JAZ 
- EGD 1/5/2021: gastric fundal lesion  
- transfuse PRN 
  
Type II DM: 
- on insulin 
  
HTN :  
- BP stable now 
  
R foot osteo: 
- on abx 
- s/p debridement on 12/30 Group B strep bacteremia: 
- repeat cx neg Interval History: Not examined in room Remains intubated. Awake on the vent. BP stable.  hgb better. Cr worse today. Not on pressors at this time. Current Medications: all current  Medications have been eviewed in [facility] Review of Systems: A comprehensive review of systems was negative except for that written in the HPI. Objective: 
Vitals:   
Vitals:  
 01/25/21 0700 01/25/21 0734 01/25/21 0800 01/25/21 0830 BP: 134/61  (!) 140/64 Pulse: (!) 52 (!) 51 (!) 51 (!) 51 Resp: 30 30 30 30 Temp:   97.7 °F (36.5 °C) SpO2: 98% 97% 97% 97% Weight:      
Height:      
 
Intake and Output: 
01/25 0701 - 01/25 1900 In: -  
Out: 150 [Urine:150] 01/23 1901 - 01/25 0700 In: 2991.9 [I.V.:2398.1] Out: 9777 [Urine:2580; Drains:10] Physical Examination: 
GEN: ON VENT 
NECK- ET tube + RESP: no distress NEURO:Can't access due to patient's current condition Exam deferred due to COVID-19 infection in order to preserve PPE AND minimize risk of  
transmission to dialysis and renal transplant patient per ASN and RPA guidelines: []    High complexity decision making was performed 
[]    Patient is at high-risk of decompensation with multiple organ involvement Lab Data Personally Reviewed: I have reviewed all the pertinent labs, microbiology data and radiology studies during assessment. Recent Labs  
  01/25/21 
0526 01/24/21 
0242 01/23/21 
1445 01/23/21 
0113  143 141 139  
K 3.3* 4.2 3.9 4.3  105 103 103 CO2 28 28 31 30 GLU 90 98 172* 110* BUN 56* 51* 54* 51* CREA 2.46* 2.10* 1.94* 1.71* CA 8.2* 8.0* 8.5 8.8 MG  --   --  2.0  --   
PHOS  --   --  5.4*  --   
ALB 2.6* 2.2*  --  2.4* ALT 11* 14  --  9* Recent Labs  
  01/25/21 
0526 01/24/21 
1228 01/24/21 
0595 WBC 4.5 2.9* 3.3* HGB 7.4* 5.9* 6.0*  
HCT 23.5* 19.3* 19.7*  247 233 No results found for: SDES Lab Results Component Value Date/Time Culture result: NO GROWTH 2 DAYS 01/23/2021 03:37 PM  
 Culture result: LIGHT YEAST, (APPARENT CANDIDA ALBICANS) (A) 01/23/2021 02:45 PM  
 Culture result: NO NORMAL RESPIRATORY MICHELLE ISOLATED SO FAR 01/23/2021 02:45 PM  
 
Recent Results (from the past 24 hour(s)) ECHO ADULT FOLLOW-UP OR LIMITED Collection Time: 01/24/21 11:13 AM  
Result Value Ref Range IVSd 1.21 (A) 0.6 - 1.0 cm LVIDd 5.02 4.2 - 5.9 cm LVIDs 3.77 cm  
 LVPWd 1.46 (A) 0.6 - 1.0 cm  
 BP EF 49.6 (A) 55 - 100 percent LV Ejection Fraction MOD 2C 52 percent LV Ejection Fraction MOD 4C 41 percent LV ED Vol A2C 127.17 mL  
 LV ED Vol A4C 100.76 mL  
 LV ED Vol .14 67 - 155 mL  
 LV ES Vol A2C 60.94 mL  
 LV ES Vol A4C 59.62 mL  
 LV ES Vol BP 66.64 (A) 22 - 58 mL Left Atrium Major Axis 4.18 cm Tapse 1.87 1.5 - 2.0 cm Ao Root D 3.69 cm  
 LV Mass .7 88 - 224 g LV Mass AL Index 115.3 49 - 115 g/m2 LVES Vol Index BP 28.1 mL/m2 LVED Vol Index BP 55.7 mL/m2 Left Atrium Minor Axis 1.76 cm  
 LVED Vol Index A4C 42.4 mL/m2 LVED Vol Index A2C 53.6 mL/m2 LVES Vol Index A4C 25.1 mL/m2 LVES Vol Index A2C 25.7 mL/m2 GLUCOSE, POC Collection Time: 01/24/21 12:19 PM  
Result Value Ref Range Glucose (POC) 121 (H) 65 - 100 mg/dL Performed by Joshua Villar CBC W/O DIFF Collection Time: 01/24/21 12:28 PM  
Result Value Ref Range WBC 2.9 (L) 4.1 - 11.1 K/uL  
 RBC 2.16 (L) 4.10 - 5.70 M/uL HGB 5.9 (LL) 12.1 - 17.0 g/dL HCT 19.3 (L) 36.6 - 50.3 % MCV 89.4 80.0 - 99.0 FL  
 MCH 27.3 26.0 - 34.0 PG  
 MCHC 30.6 30.0 - 36.5 g/dL  
 RDW 18.2 (H) 11.5 - 14.5 % PLATELET 240 153 - 554 K/uL MPV 9.7 8.9 - 12.9 FL  
 NRBC 1.4 (H) 0  WBC ABSOLUTE NRBC 0.04 (H) 0.00 - 0.01 K/uL  
RBC, ALLOCATE Collection Time: 01/24/21  1:15 PM  
Result Value Ref Range HISTORY CHECKED? Historical check performed TYPE & SCREEN Collection Time: 01/24/21  4:13 PM  
Result Value Ref Range Crossmatch Expiration 01/27/2021,2359 ABO/Rh(D) O POSITIVE Antibody screen NEG Unit number [id number] Blood component type [facility] Unit division 00 Status of unit TRANSFUSED Crossmatch result Compatible GLUCOSE, POC Collection Time: 01/24/21  6:01 PM  
Result Value Ref Range Glucose (POC) 103 (H) 65 - 100 mg/dL Performed by Joshua Villar GLUCOSE, POC Collection Time: 01/24/21  9:59 PM  
Result Value Ref Range Glucose (POC) 101 (H) 65 - 100 mg/dL Performed by Shandra leal RN   
CBC W/O DIFF Collection Time: 01/25/21  5:26 AM  
Result Value Ref Range WBC 4.5 4.1 - 11.1 K/uL  
 RBC 2.65 (L) 4.10 - 5.70 M/uL HGB 7.4 (L) 12.1 - 17.0 g/dL HCT 23.5 (L) 36.6 - 50.3 % MCV 88.7 80.0 - 99.0 FL  
 MCH 27.9 26.0 - 34.0 PG  
 MCHC 31.5 30.0 - 36.5 g/dL  
 RDW 18.0 (H) 11.5 - 14.5 % PLATELET 753 217 - 706 K/uL MPV 9.3 8.9 - 12.9 FL  
 NRBC 0.9 (H) 0  WBC ABSOLUTE NRBC 0.04 (H) 0.00 - 0.01 K/uL METABOLIC PANEL, COMPREHENSIVE Collection Time: 01/25/21  5:26 AM  
Result Value Ref Range Sodium 137 136 - 145 mmol/L Potassium 3.3 (L) 3.5 - 5.1 mmol/L Chloride 103 97 - 108 mmol/L  
 CO2 28 21 - 32 mmol/L Anion gap 6 5 - 15 mmol/L Glucose 90 65 - 100 mg/dL BUN 56 (H) 6 - 20 MG/DL Creatinine 2.46 (H) 0.70 - 1.30 MG/DL  
 BUN/Creatinine ratio 23 (H) 12 - 20 GFR est AA 32 (L) >60 ml/min/1.73m2 GFR est non-AA 26 (L) >60 ml/min/1.73m2 Calcium 8.2 (L) 8.5 - 10.1 MG/DL Bilirubin, total 0.5 0.2 - 1.0 MG/DL  
 ALT (SGPT) 11 (L) 12 - 78 U/L  
 AST (SGOT) 17 15 - 37 U/L Alk. phosphatase 79 45 - 117 U/L Protein, total 6.9 6.4 - 8.2 g/dL Albumin 2.6 (L) 3.5 - 5.0 g/dL Globulin 4.3 (H) 2.0 - 4.0 g/dL A-G Ratio 0.6 (L) 1.1 - 2.2 GLUCOSE, POC Collection Time: 01/25/21  6:01 AM  
Result Value Ref Range Glucose (POC) 95 65 - 100 mg/dL Performed by Juanjose Allison MD 
[facility]  
[address] [place] [address], [address] [facility] Phone - [phone] Fax - [phone] 
[email or web address]

## 2021-01-26 NOTE — PROGRESS NOTES
Physical Therapy: hold Chart reviewed and discussed with RN in prep for PT treatment. Pt just extubated and currently on bipap, not appropriate for PT intervention at this time. Will continue to follow.  
 
Stevie Duron, PT, DPT

## 2021-01-26 NOTE — PROGRESS NOTES
Occupational Therapy Note:  
 
Pt noted with change in medical status the end of last week and intubated. Pt's medical plan has progressed to extubation today but currently on BiPap. Pt not appropriate for skilled OT intervention at this time and will follow up tomorrow as appropriate.   
 
 
Carrington Jon, OT

## 2021-01-26 NOTE — PROGRESS NOTES
0730: Bedside and Verbal shift change report given to Debbie Marquez RN (oncoming nurse) by Mariya Bianchi RN (offgoing nurse). Report included the following information SBAR, Kardex, Procedure Summary, Intake/Output, MAR, Accordion, Recent Results, Med Rec Status, Cardiac Rhythm NSR and Alarm Parameters . 0900: Primary Nurse Chacorta Franklin and Hetal Delgado RN performed a dual skin assessment on this patient Impairment noted- see wound doc flow sheet Edgar score is 13 
 
1100: Pt BP in 200s; notified NP; orders received for Labetalol 1300: Pt extubated; placed on NC; O2 sats dropped to 60s; RT placed pt on Bipap and pt recovered SATs to 90s; 
 
1400: pt states he \"feels like its hard to breathe\"; notified NP; Duoneb treatment ordered 1855: Pt Intubated by Dr. Sheriff; tolerated well; became hypotensive after procedure will order Levo if pressure does not improve;  
 
1930: Bedside and Verbal shift change report given to Mariya Bianchi RN (oncoming nurse) by Debbie Marquez RN (offgoing nurse). Report included the following information SBAR, Kardex, Intake/Output, MAR, Accordion, Recent Results, Med Rec Status, Cardiac Rhythm NSR and Alarm Parameters .

## 2021-01-26 NOTE — PROGRESS NOTES
ID Progress Note 2021 Subjective: Afebrile. He has been extubated. Objective:  
 
Vitals:  
Visit Vitals BP (!) 143/69 Pulse 78 Temp (!) 89.6 °F (32 °C) Resp 15 Ht 6' (1.829 m) Wt 114.1 kg (251 lb 8.7 oz) SpO2 96% BMI 34.12 kg/m² Tmax:  Temp (24hrs), Av °F (35.6 °C), Min:89.6 °F (32 °C), Max:98.3 °F (36.8 °C) Exam: On bipap Labs:  
Lab Results Component Value Date/Time WBC 4.3 2021 07:01 AM  
 Hemoglobin (POC) 6.5 2020 01:59 PM  
 HGB 7.1 (L) 2021 07:01 AM  
 HCT 22.6 (L) 2021 07:01 AM  
 PLATELET 611  07:01 AM  
 MCV 89.0 2021 07:01 AM  
 
Lab Results Component Value Date/Time Sodium 138 2021 07:01 AM  
 Potassium 3.1 (L) 2021 07:01 AM  
 Chloride 101 2021 07:01 AM  
 CO2 25 2021 07:01 AM  
 Anion gap 12 2021 07:01 AM  
 Glucose 106 (H) 2021 07:01 AM  
 BUN 56 (H) 2021 07:01 AM  
 Creatinine 2.38 (H) 2021 07:01 AM  
 BUN/Creatinine ratio 24 (H) 2021 07:01 AM  
 GFR est AA 33 (L) 2021 07:01 AM  
 GFR est non-AA 27 (L) 2021 07:01 AM  
 Calcium 8.5 2021 07:01 AM  
 Bilirubin, total 0.5 2021 05:26 AM  
 Alk. phosphatase 79 2021 05:26 AM  
 Protein, total 6.9 2021 05:26 AM  
 Albumin 2.6 (L) 2021 05:26 AM  
 Globulin 4.3 (H) 2021 05:26 AM  
 A-G Ratio 0.6 (L) 2021 05:26 AM  
 ALT (SGPT) 11 (L) 2021 05:26 AM  
 
 
 
 
 
Assessment:  
 
#1 osteomyelitis of the right foot 
  
#2 group b strep  bacteremia 
  
#3 renal insufficiency 
  
#4 diabetes 
  
#5 prostate cancer 
  
#6 hypertension 
  
#7 heart failure 
  
 #8 covid  
  
#9 diarrhea Recommendations:  
 
Continue zosyn. There is OM on the surgical margin. He will need prolonged therapy. Orders written. 
  
He has completed treatment with remdesivir.  () 
  He has completed dexamethasone ( - )  
  
 Continue Zyvox for his airspace disease. Hopefully, we can discontinue this soon Wilmer Doss MD

## 2021-01-26 NOTE — PROGRESS NOTES
Nephrology Progress Note Kayla Majano III Date of Admission : 12/28/2020 CC: Follow up for KATHI on CKD Assessment and Plan KATHI on CKD: 
- Cr up, yonis from diuresis 
- UOP stable 
- cont present care RESP FAILURE on vent Hypokalemia: 
- replete PRN 
 
CKD Stage IIIa: 
-  Presumed 2/2 DM and HTN 
- nephrotic range proteinuria 
- baseline Cr 1.7 mg/dl  
- followed by Dr. Agata Weeks COVID-19 PNA: 
- positive on 1/11 
- s/p decadron and remdesivir 
  
Hx of prostate cancer s/p XRT 
  
Metabolic acidosis: 
-  Stopped oral Bicarb  
  
Anemia in CKD + blood loss: 
- cont JAZ 
- EGD 1/5/2021: gastric fundal lesion  
- transfuse PRN 
  
Type II DM: 
- on insulin 
  
HTN :  
- BP stable now 
  
R foot osteo: 
- on abx 
- s/p debridement on 12/30 Group B strep bacteremia Interval History: Not examined in room Failed SBT yesterday. Now sedated. Cr stable. UOP stable off lasix. CXR looks better. No other issues overnight per RN 
 
Current Medications: all current  Medications have been eviewed in [facility] Review of Systems: A comprehensive review of systems was negative except for that written in the HPI. Objective: 
Vitals:   
Vitals:  
 01/26/21 0200 01/26/21 0230 01/26/21 0300 01/26/21 0416 BP: (!) 141/68 138/68 (!) 142/67 Pulse: (!) 52 (!) 52 (!) 52 Resp: 30 30 30 30 Temp:      
SpO2: 98% 99% 98% Weight:      
Height:      
 
Intake and Output: 
01/25 1901 - 01/26 0700 In: 2108.2 [I.V.:1888.2] Out: 470 [Urine:470] 01/24 0701 - 01/25 1900 In: 2443.8 [I.V.:1750] Out: 8184 [Urine:2830] Physical Examination: 
GEN: ON VENT 
NECK- ET tube + RESP: no distress NEURO:Can't access due to patient's current condition Exam deferred due to COVID-19 infection in order to preserve PPE AND minimize risk of  
transmission to dialysis and renal transplant patient per ASN and RPA guidelines: 
 
 
 
 
[]    High complexity decision making was performed []    Patient is at high-risk of decompensation with multiple organ involvement Lab Data Personally Reviewed: I have reviewed all the pertinent labs, microbiology data and radiology studies during assessment. Recent Labs  
  01/25/21 
0526 01/24/21 
4569 01/23/21 
1445  143 141  
K 3.3* 4.2 3.9  105 103 CO2 28 28 31 GLU 90 98 172* BUN 56* 51* 54* CREA 2.46* 2.10* 1.94* CA 8.2* 8.0* 8.5 MG  --   --  2.0 PHOS  --   --  5.4* ALB 2.6* 2.2*  --   
ALT 11* 14  --   
 
Recent Labs  
  01/25/21 
0526 01/24/21 
1228 01/24/21 
4958 WBC 4.5 2.9* 3.3* HGB 7.4* 5.9* 6.0*  
HCT 23.5* 19.3* 19.7*  247 233 No results found for: SDES Lab Results Component Value Date/Time Culture result: NO GROWTH 2 DAYS 01/23/2021 03:37 PM  
 Culture result: LIGHT YEAST, (APPARENT CANDIDA ALBICANS) (A) 01/23/2021 02:45 PM  
 Culture result: NO NORMAL RESPIRATORY MICHELLE ISOLATED 01/23/2021 02:45 PM  
 
Recent Results (from the past 24 hour(s)) GLUCOSE, POC Collection Time: 01/25/21  1:05 PM  
Result Value Ref Range Glucose (POC) 149 (H) 65 - 100 mg/dL Performed by Chasity Espinoza GLUCOSE, POC Collection Time: 01/25/21  5:40 PM  
Result Value Ref Range Glucose (POC) 136 (H) 65 - 100 mg/dL Performed by Closwaldo Espinoza GLUCOSE, POC Collection Time: 01/26/21  1:26 AM  
Result Value Ref Range Glucose (POC) 170 (H) 65 - 100 mg/dL Performed by Jessenia Dixon MD 
[address] [place] [address], [facility] Phone - [phone] Fax - [phone] 
[email or web address]

## 2021-01-26 NOTE — CONSULTS
Palliative Medicine Consult [place]: [phone]) Patient Name: Chel Green YOB: 1950 Date of Initial Consult: 1/18/2021 Reason for Consult: care decisions Requesting Provider: Dr. Shabnam Geronimo Primary Care Physician: Carlita Hawkins MD 
 
 SUMMARY:  
Layton Cooper III is a 72 y.o. with a past history of prostate cancer s/p XRT/ now w widespread bone mets, Heart failure, IDDM, tobacco use (1PPD), HTN, diabetic foot wound, who was admitted on 12/28/2020 from home with a diagnosis of diarrhea, chills for 2 days , lethargy. Current medical issues leading to Palliative Medicine involvement include: Prolonged hospital stay, with osteomyelitis of R foot s/p debridement 12/30/2020 (needs 6 weeks abx, on Zosyn, Wound vac 1/5), COVID positive respiratory failure (covid negative 12/28, covid positive 1/11) ongoing high oxygen requirement, remdesivir end 1/16, on decadron until 1/21, requiring diuresis. GI bleed ( EGD 1/4: clip to active bleed Dieulafoy lesion, duodenal ulcer not bleeding) s/p IV iron. KATHI on CKD. 
 
1/23/2021 Cardiac arrest, s/p ROSC after 12minutes CPR, regained mentation Patient is  for 30 years but still legally . Lives alone in own apartment, with frequent visits from family. He has 4 children. Dtr Valeria Avalos ( [phone]) is one he says he would want to make medical decisions if needed. Also son Serenity Stevens 
dtr Melinda PALLIATIVE DIAGNOSES:  
1. Acute respiratory failure, COVID19 pneumonia, intubated 1/23/2021 2. Acute renal failure 3. Osteomyelitis R foot 4. GI bleed, anemia 5. Debility 6. Metastatic prostate cancer (on casodex) 7. Anxiety about health, agitation on vent 8. Care goals discussion PLAN:  
Discussed w bedside nurse, ICU team 
Renal labs still pending for today. SBTs ongoing. He is highly anxious on vent, does not tolerate well. Goals clear at this time, will continue to follow Addendum:   Pt extubated to BIPAP Call placed to [facility].. Update provided. Education provided about weaning process/ extubation. Warn her that there is high possibility he could get intubated again. He tells us he doesn't want all this, doesn't want tube, but also will say \"don't want to die\". [facility]. is praying for his recovery, thinks that because he did it before (made it off vent in August and September) he can do it again, this is very unrealistic since now he has COVID and osteomyelitis etc.  She continues to be very clear about goals for aggressive interventions (even if he doesn't like it) because she knows he has will to live, wants to survive. She asks if she could just take him home -- (she does NOT mean hospice) she means with all the \"gear\". Explain that's not possible, he's on such high levels of oxygen this can't be done from home. She hasn't seen her dad in a month, wants so badly to be at bedside. Let her know I have asked ICU team to order COVID test again (last one 1/11/21) If he could become negative he could have family visit, which I think would be very helpful. Also they are hoping to Yovany Bunn-- have asked nurse to help w that if possible Discussed with bedside nurse and ICU team 
Our team will follow peripherally as goals are clear for aggressive interventions. GOALS OF CARE / TREATMENT PREFERENCES:  
 
GOALS OF CARE: 
Patient/Health Care Proxy Stated Goals: Rehabilitation TREATMENT PREFERENCES:  
Code Status: Full Code Advance Care Planning: 
[] The [employer] Interdisciplinary Team has updated the ACP Navigator with Lynette and Patient Capacity Advance Care Planning 12/30/2020 Patient's Healthcare Decision Maker is: -  
Primary Decision Maker Name -  
Primary Decision Maker Phone Number -  
Primary Decision Maker Relationship to Patient -  
Confirm Advance Directive None Patient Would Like to Complete Advance Directive -  
 
 
 Medical Interventions: Limited additional interventions Other Instructions: Other: As far as possible, the palliative care team has discussed with patient / health care proxy about goals of care / treatment preferences for patient. HISTORY:  
 
History obtained from: chart, patient CHIEF COMPLAINT: admitted with chills, diarrhea HPI/SUBJECTIVE: The patient is:  
[x] Verbal and participatory [] Non-participatory due to:  
 
72year old male with complex medical history as above Admitted with diarrhea, chills, and caregiver reporting lethargy Clinical Pain Assessment (nonverbal scale for severity on nonverbal patients):  
Clinical Pain Assessment Severity: 0 Activity (Movement): Lying quietly, normal position Duration: for how long has pt been experiencing pain (e.g., 2 days, 1 month, years) Frequency: how often pain is an issue (e.g., several times per day, once every few days, constant) FUNCTIONAL ASSESSMENT:  
 
Palliative Performance Scale (PPS): PPS: 30 
 
 
 PSYCHOSOCIAL/SPIRITUAL SCREENING:  
 
Palliative IDT has assessed this patient for cultural preferences / practices and a referral made as appropriate to needs (Cultural Services, Patient Advocacy, Ethics, etc.) Any spiritual / [employer] concerns: 
[] Yes /  [x] No 
 
Caregiver Burnout: 
[] Yes /  [x] No /  [] No Caregiver Present Anticipatory grief assessment:  
[x] Normal  / [] Maladaptive ESAS Anxiety: Anxiety: 0 
 
ESAS Depression: Depression: 0 REVIEW OF SYSTEMS:  
 
Positive and pertinent negative findings in ROS are noted above in HPI. The following systems were [x] reviewed / [] unable to be reviewed as noted in HPI Other findings are noted below. Systems: constitutional, ears/nose/mouth/throat, respiratory, gastrointestinal, genitourinary, musculoskeletal, integumentary, neurologic, psychiatric, endocrine. Positive findings noted below. Modified ESAS Completed by: provider Fatigue: 5 Drowsiness: 0 Depression: 0 Pain: 0 Anxiety: 0 Nausea: 0 Anorexia: 1 Dyspnea: 4 Constipation: No  
  Stool Occurrence(s): 1 PHYSICAL EXAM:  
 
From RN flowsheet: 
Wt Readings from Last 3 Encounters:  
01/25/21 251 lb 8.7 oz (114.1 kg) 02/24/20 280 lb (127 kg) 02/10/20 280 lb (127 kg) Blood pressure (!) 174/77, pulse 74, temperature (!) 96.5 °F (35.8 °C), resp. rate 30, height 6' (1.829 m), weight 251 lb 8.7 oz (114.1 kg), SpO2 95 %. Pain Scale 1: Numeric (0 - 10) Pain Intensity 1: 0 Pain Onset 1: ongoing Pain Location 1: Foot Pain Orientation 1: Right Pain Description 1: Aching Pain Intervention(s) 1: Medication (see MAR) Last bowel movement, if known:  
 
Constitutional: awake, anxious on vent Mouthing words I cannot understand Offered pen/paper but his writing is scribble. Seems to suggest he's not getting enough air. HISTORY:  
 
Principal Problem: 
  Osteomyelitis ([facility] [address] (12/28/2020) Past Medical History:  
Diagnosis Date  Arthritis, Degenerative,  Knee 4/4/2011  Carcinoma, Prostate 4/4/2011  Degenerative disc disease 4/4/2011  Depression/ Anxiety 4/4/2011  Diabetes Mellitrus ( non-insulin dependent ) Type 2 4/4/2011  
 Heart failure ([facility] [address]  HEMATOCHEZIA 4/4/2011  Hypertrension 4/4/2011  Hypertriglyceridemia 4/4/2011  Ill-defined condition   
 lymphadema  Insomnia 4/4/2011  Laryngitis 4/4/2011  Mild Aortic insufficiency 4/4/2011  Mild Concentric LVH (left ventricular hypertrophy) 4/4/2011  Neuropathy 4/4/2011  Osteoarthritis 4/4/2011  Rotator Cuff Tendonitis 4/4/2011 Past Surgical History:  
Procedure Laterality Date  COLONOSCOPY N/A 4/28/2017 COLONOSCOPY performed by Rosa Stephen MD at \[facility]\"" ENDOSCOPY  
 HX ORTHOPAEDIC    
 left hip pinning  HX ORTHOPAEDIC    
 right hip replacement  HX OTHER SURGICAL    
 left little toe amputation  HX UROLOGICAL  IR INSERT TUNL CVC W/O PORT OVER 5 YR  2021  OR CARDIAC SURG PROCEDURE UNLIST    
 cardiac cath Family History Family history unknown: Yes History reviewed, no pertinent family history. Social History Tobacco Use  Smoking status: Current Every Day Smoker Packs/day: 1.00 Last attempt to quit: 2019 Years since quittin.2  Smokeless tobacco: Never Used Substance Use Topics  Alcohol use: Not Currently Alcohol/week: 11.7 standard drinks Types: 7 Cans of beer, 7 Shots of liquor per week No Known Allergies Current Facility-Administered Medications Medication Dose Route Frequency  aspirin chewable tablet 81 mg  81 mg Oral DAILY  albumin human 25% (BUMINATE) solution 25 g  25 g IntraVENous Q12H  hydrocortisone Sod Succ (PF) (SOLU-CORTEF) injection 50 mg  50 mg IntraVENous Q8H  
 0.9% sodium chloride infusion 250 mL  250 mL IntraVENous PRN  
 fentaNYL (PF) 1,500 mcg/30 mL (50 mcg/mL) infusion  0-200 mcg/hr IntraVENous TITRATE  propofol (DIPRIVAN) 10 mg/mL infusion  0-50 mcg/kg/min IntraVENous TITRATE  insulin lispro (HUMALOG) injection   SubCUTAneous Q6H  
 lansoprazole (PREVACID) 3 mg/mL oral suspension 30 mg  30 mg Per NG tube BID  insulin glargine (LANTUS) injection 14 Units  14 Units SubCUTAneous QHS  [Held by provider] insulin lispro (HUMALOG) injection 12 Units  12 Units SubCUTAneous TIDAC  therapeutic multivitamin (THERAGRAN) tablet 1 Tab  1 Tab Oral DAILY  0.9% sodium chloride infusion 250 mL  250 mL IntraVENous PRN  
 enoxaparin (LOVENOX) injection 40 mg  40 mg SubCUTAneous Q24H  
 linezolid in dextrose 5% (ZYVOX) IVPB premix in D5W 600 mg  600 mg IntraVENous Q12H  
 0.9% sodium chloride infusion 250 mL  250 mL IntraVENous PRN  
 miconazole (MICOTIN) 2 % powder   Topical BID  hydrALAZINE (APRESOLINE) 20 mg/mL injection 20 mg  20 mg IntraVENous Q6H PRN  
  [Held by provider] cloNIDine HCL (CATAPRES) tablet 0.1 mg  0.1 mg Oral BID  ascorbic acid (vitamin C) (VITAMIN C) tablet 500 mg  500 mg Oral DAILY  [Held by provider] amLODIPine (NORVASC) tablet 10 mg  10 mg Oral DAILY  collagenase (SANTYL) 250 unit/gram ointment   Topical Once per day on Thu Sat  balsam peru-castor oiL (VENELEX) ointment   Topical Q12H  
 ammonium lactate (LAC-HYDRIN) 12 % lotion   Topical Q12H  
 sodium chloride (NS) flush 5-40 mL  5-40 mL IntraVENous Q8H  
 sodium chloride (NS) flush 5-40 mL  5-40 mL IntraVENous PRN  
 [Held by provider] enzalutamide Ladona Lade) chemo capsule 80 mg (patient supplied) (Patient Supplied)  80 mg Oral BID  fluticasone propionate (FLONASE) 50 mcg/actuation nasal spray 2 Spray  2 Spray Both Nostrils DAILY  piperacillin-tazobactam (ZOSYN) 3.375 g in 0.9% sodium chloride (MBP/ADV) 100 mL MBP  3.375 g IntraVENous Q8H  
 oxyCODONE-acetaminophen (PERCOCET) 5-325 mg per tablet 1 Tab  1 Tab Oral Q4H PRN  
 epoetin chi-epbx (RETACRIT) injection 20,000 Units  20,000 Units SubCUTAneous Q MON, WED & FRI  diclofenac (VOLTAREN) 1 % topical gel 2 g  2 g Topical QID  acetaminophen (TYLENOL) tablet 500 mg  500 mg Oral Q4H PRN  
 [Held by provider] bicalutamide (CASODEX) tablet 50 mg  50 mg Oral DAILY  gabapentin (NEURONTIN) capsule 100 mg  100 mg Oral TID  [Held by provider] tamsulosin (FLOMAX) capsule 0.4 mg  0.4 mg Oral DAILY  nicotine (NICODERM CQ) 14 mg/24 hr patch 1 Patch  1 Patch TransDERmal DAILY  glucose chewable tablet 16 g  4 Tab Oral PRN  
 dextrose (D50W) injection syrg 12.5-25 g  12.5-25 g IntraVENous PRN  
 glucagon (GLUCAGEN) injection 1 mg  1 mg IntraMUSCular PRN  
 
 
 
 LAB AND IMAGING FINDINGS:  
 
Lab Results Component Value Date/Time WBC 4.3 01/26/2021 07:01 AM  
 HGB 7.1 (L) 01/26/2021 07:01 AM  
 PLATELET 396 81/12/1523 07:01 AM  
 
Lab Results Component Value Date/Time  Sodium 137 01/25/2021 05:26 AM  
 Potassium 3.3 (L) 01/25/2021 05:26 AM  
 Chloride 103 01/25/2021 05:26 AM  
 CO2 28 01/25/2021 05:26 AM  
 BUN 56 (H) 01/25/2021 05:26 AM  
 Creatinine 2.46 (H) 01/25/2021 05:26 AM  
 Calcium 8.2 (L) 01/25/2021 05:26 AM  
 Magnesium 2.2 01/26/2021 07:01 AM  
 Phosphorus 5.0 (H) 01/26/2021 07:01 AM  
  
Lab Results Component Value Date/Time Alk. phosphatase 79 01/25/2021 05:26 AM  
 Protein, total 6.9 01/25/2021 05:26 AM  
 Albumin 2.6 (L) 01/25/2021 05:26 AM  
 Globulin 4.3 (H) 01/25/2021 05:26 AM  
 
Lab Results Component Value Date/Time INR 1.1 12/31/2020 05:03 PM  
 Prothrombin time 11.3 (H) 12/31/2020 05:03 PM  
 aPTT 33.1 (H) 10/24/2016 03:26 PM  
  
Lab Results Component Value Date/Time Iron 42 01/11/2021 04:35 AM  
 TIBC 201 (L) 01/11/2021 04:35 AM  
 Iron % saturation 21 01/11/2021 04:35 AM  
 Ferritin 266 12/30/2020 01:17 AM  
  
No results found for: PH, PCO2, PO2 No components found for: [place] Lab Results Component Value Date/Time CK 15 (L) 01/20/2021 01:58 AM  
  
 
 
   
 
Total time: 65min Counseling / coordination time, spent as noted above: 
> 50% counseling / coordination?: 35 yes Prolonged service was provided for  []30 min   []75 min in face to face time in the presence of the patient, spent as noted above. Time Start:  
Time End:  
Note: this can only be billed with 35231 (initial) or 83017 (follow up). If multiple start / stop times, list each separately.

## 2021-01-26 NOTE — PROGRESS NOTES
[facility] 
 
ICU TEAM Progress Note Name: Tammy Lnatigua III  
: 1950 MRN: [id number] Date: 2021 I Subjective:  
Progress Note: 2021 Reason for ICU Admission: Acute hypoxic respiratory failure due to COVID-19 pneumonia. Status post cardiac arrest on the medical floor Interval history: Mr. Kelly Lacey is a 73 yo male w a PMH of prostate cancer s/p radiation, tobacco use disorder (1 ppd), CKD3a, gastric angioectasias,  IDDM, heart failure, HTN who was admitted with R foot osteomyelitis on . He was transferred to the ICU on  after experiencing a cardiac arrest in the [facility]. Patient has prolonged hospital course as he was admitted on  and sepsis due to osteomyelitis of the right foot Overnight Events:  
No acute event overnight. Active Problem List:  
 
Problem List  Date Reviewed: 2020 Codes Class * (Principal) Osteomyelitis ([facility].) ICD-10-CM: M86.9 ICD-9-CM: 730.20 Diabetic ulcer of right midfoot associated with type 2 diabetes mellitus, with fat layer exposed ([facility] ICD-10-CM: E11.621, N86.978 ICD-9-CM: 250.80, 707.14 PAD (peripheral artery disease) (HCC) ICD-10-CM: I73.9 ICD-9-CM: 443.9 Dependence on nicotine from cigarettes ICD-10-CM: F17.210 ICD-9-CM: 305.1 KATHI (acute kidney injury) ([facility] ICD-10-CM: N17.9 ICD-9-CM: 029. 9 Acute on chronic renal failure (HCC) ICD-10-CM: N17.9, N18.9 ICD-9-CM: 584.9, 585.9 Severe obesity (BMI 35.0-39. 9) with comorbidity ([facility] ICD-10-CM: E66.01 
ICD-9-CM: 278.01 Type 2 diabetes with nephropathy (HCC) ICD-10-CM: E11.21 
ICD-9-CM: 250.40, 583.81 S/P hip replacement, right ICD-10-CM: N36.906 ICD-9-CM: V43.64 Stage 3 chronic kidney disease ICD-10-CM: N18.30 ICD-9-CM: 161. 3 Prostate CA [facility]) ICD-10-CM: B21 ICD-9-CM: 899  Diabetic polyneuropathy ([facility] ICD-10-CM: E11.42 
 ICD-9-CM: 250.60, 357.2 Rotator Cuff Tendonitis ICD-10-CM: M71.9, M67.919 ICD-9-CM: 726.10 Diabetes mellitus type 2, controlled ([facility].) ICD-10-CM: E11.9 ICD-9-CM: 250.00 Degenerative disc disease ICD-10-CM: [id number] ICD-9-CM: 722.6 Osteoarthrosis involving lower leg ICD-10-CM: M17.10 ICD-9-CM: 715.96 Depression/ Anxiety ICD-10-CM: F34.1 ICD-9-CM: 300.4 HTN (hypertension) ICD-10-CM: I10 
ICD-9-CM: 401.9 Chronic hip pain ICD-10-CM: M25.559, G89.29 ICD-9-CM: 719.45, 338.29 Hypertriglyceridemia ICD-10-CM: E78.1 ICD-9-CM: 272.1 Mild Aortic Insufficiency ICD-10-CM: I35.1 ICD-9-CM: 424.1 Mild Concentric LVH (left ventricular hypertrophy) ICD-10-CM: I51.7 ICD-9-CM: 429.3 Past Medical History:  
 
 has a past medical history of Arthritis, Degenerative,  Knee (4/4/2011), Carcinoma, Prostate (4/4/2011), Degenerative disc disease (4/4/2011), Depression/ Anxiety (4/4/2011), Diabetes Mellitrus ( non-insulin dependent ) Type 2 (4/4/2011), Heart failure ([facility] HEMATOCHEZIA (4/4/2011), Hypertrension (4/4/2011), Hypertriglyceridemia (4/4/2011), Ill-defined condition, Insomnia (4/4/2011), Laryngitis (4/4/2011), Mild Aortic insufficiency (4/4/2011), Mild Concentric LVH (left ventricular hypertrophy) (4/4/2011), Neuropathy (4/4/2011), Osteoarthritis (4/4/2011), and Rotator Cuff Tendonitis (4/4/2011). Past Surgical History:  
 
 has a past surgical history that includes hx urological; hx other surgical; pr cardiac surg procedure unlist; colonoscopy (N/A, 4/28/2017); hx orthopaedic; hx orthopaedic; and ir insert tunl cvc w/o port over 5 yr (1/6/2021). Home Medications:  
 
Prior to Admission medications Medication Sig Start Date End Date Taking?  Authorizing Provider  
pregabalin (LYRICA) 150 mg capsule TAKE ONE CAPSULE BY MOUTH TWICE A DAY 12/7/20  Yes Mandy Mancilla MD  
 bicalutamide (CASODEX) 50 mg tablet TAKE ONE TABLET BY MOUTH DAILY 11/30/20  Yes Hetal Bruno MD  
torsemide (DEMADEX) 20 mg tablet TAKE FOUR TABLETS BY MOUTH TWICE A DAY 11/23/20  Yes Hetal Bruno MD  
pantoprazole (PROTONIX) 40 mg tablet TAKE ONE TABLET BY MOUTH TWICE DAILY ONCE IN THE MORNING AND ONCE IN THE EVENING 11/18/20  Yes Hetal Bruno MD  
hydrOXYzine HCL (ATARAX) 50 mg tablet TAKE ONE TABLET BY MOUTH THREE TIMES A DAY AS NEEDED FOR ITCHING FOR UP TO 10 DAYS 10/22/20  Yes Hetal Bruno MD  
amLODIPine (NORVASC) 10 mg tablet TAKE ONE TABLET BY MOUTH DAILY 10/2/20  Yes Hetal Bruno MD  
hydrALAZINE (APRESOLINE) 100 mg tablet Take 1 Tab by mouth three (3) times daily. 9/16/20  Yes Hetal Bruno MD  
ascorbic acid, vitamin C, (Vitamin C) 500 mg tablet Take  by mouth. Yes Provider, Historical  
enzalutamide (XTANDI) 40 mg capsule Take 160 mg by mouth daily. Yes Provider, Historical  
Thera-Tabs M 27 mg iron-400 mcg tab TAKE ONE TABLET BY MOUTH DAILY 7/17/20  Yes Hetal Bruno MD  
tamsulosin [facility]) 0.4 mg capsule TAKE ONE CAPSULE BY MOUTH DAILY 7/16/20  Yes Hetal Bruno MD  
Insulin Syringe-Needle U-100 (BD Insulin Syringe Ultra-Fine) 1 mL 31 gauge x 5/16 syrg USE TO INJECT INSULIN FIVE TIMES A DAY 6/16/20  Yes Hetal Bruno MD  
ferrous sulfate 325 mg (65 mg iron) tablet TAKE ONE TABLET BY MOUTH DAILY BEFORE BREAKFAST 6/16/20  Yes Hetal Bruno MD  
gabapentin (NEURONTIN) 300 mg capsule Take 1 Cap by mouth three (3) times daily. Max Daily Amount: 900 mg. 5/25/20  Yes Hetal Bruno MD  
triamcinolone acetonide (KENALOG) 0.1 % ointment APPLY A THIN LAYER TO AFFECTED AREA(S) TWO TIMES A DAY **DO NOT EXCEED 2.66 GRAMS PER DAY** 5/19/20  Yes Hetal Bruno MD  
diclofenac (VOLTAREN) 1 % gel Apply  to affected area four (4) times daily.  4/16/20  Yes Hetal Bruno MD  
 ammonium lactate (AMLACTIN) 12 % topical cream Apply  to affected area two (2) times a day. rub in to affected area well 3/10/20  Yes Carlita Hawkins MD  
ammonium lactate (LAC-HYDRIN FIVE) 5 % lotion Apply 1 Applicator to affected area daily. Apply daily bilateral lower legs 3/5/20  Yes Carlita Hawkins MD  
insulin glargine (LANTUS U-100 INSULIN) 100 unit/mL injection 72 Units by SubCUTAneous route daily. 3/5/20  Yes Carlita Hawkins MD  
insulin regular (NOVOLIN R, HUMULIN R) 100 unit/mL injection 24 units sc tid 3/5/20  Yes Carlita Hawkins MD  
ketoconazole (NIZORAL) 2 % topical cream Apply  to affected area two (2) times a day. 2/4/20  Yes Carlita Ott.MD cooper (NIZORAL) 2 % shampoo Sig:shampoo once a week 2/4/20  Yes Carlita Hawkins MD  
bisacodyl (DULCOLAX, BISACODYL,) 10 mg suppository Insert 10 mg into rectum daily. Yes Provider, Historical  
insulin aspart U-100 (NOVOLOG FLEXPEN U-100 INSULIN) 100 unit/mL (3 mL) inpn by SubCUTAneous route. Yes Provider, Historical  
Insulin Needles, Disposable, (NOVOFINE 32) 32 gauge x 1/4\" ndle Sig:use 4 times a day as needed 11/13/19  Yes Carlita Ott., MD  
NOVOLIN R REGULAR U-100 INSULN 100 unit/mL injection INJECT 14 UNITS UNDER THE SKIN THREE TIMES A DAY WITH MEALS AS NEEDED 8/6/19  Yes Carlita Hawkins MD  
fluticasone propionate [facility]) 50 mcg/actuation nasal spray SPRAY TWO SPRAYS IN THE AFFECTED NOSTRIL DAILY 7/16/19  Yes Carlita Hawkins MD  
aspirin delayed-release 81 mg tablet Take 81 mg by mouth daily. Yes Provider, Historical  
acetaminophen (PAIN AND FEVER) 500 mg tablet TAKE ONE TABLET BY MOUTH EVERY 6 HOURS AS NEEDED FOR PAIN 8/28/18  Yes Carlita Hawkins MD  
Calcium Carbonate-Vit D3-Min 600 mg calcium- 400 unit tab Take 1 Tab by mouth two (2) times a day. Yes Provider, Historical  
 
 
Allergies/Social/Family History: No Known Allergies Social History Tobacco Use  
  Smoking status: Current Every Day Smoker Packs/day: 1.00 Last attempt to quit: 2019 Years since quittin.2  Smokeless tobacco: Never Used Substance Use Topics  Alcohol use: Not Currently Alcohol/week: 11.7 standard drinks Types: 7 Cans of beer, 7 Shots of liquor per week Family History Family history unknown: Yes Review of Systems:  
 
Not able to obtain due to patient medical condition Objective:  
Vital Signs: 
Visit Vitals BP (!) 143/69 Pulse 78 Temp 98.2 °F (36.8 °C) Resp 15 Ht 6' (1.829 m) Wt 114.1 kg (251 lb 8.7 oz) SpO2 96% BMI 34.12 kg/m² O2 Flow Rate (L/min): 15 l/min(Mid flow at 9 liters also) O2 Device: Endotracheal tube Temp (24hrs), Av.3 °F (36.3 °C), Min:96.4 °F (35.8 °C), Max:98.3 °F (36.8 °C) Intake/Output:  
 
Intake/Output Summary (Last 24 hours) at 2021 1234 Last data filed at 2021 0700 Gross per 24 hour Intake 2459.35 ml Output 1070 ml Net 1389.35 ml Physical Exam: 
 
General: Awake, on mechanical ventilation Eye: PERRLA Neurologic:  Following commands, non-focal.  Opens eyes purposefully and move upper limbs purposefully try to communicate despite being on sedation Neck:Supple, no JVD Lungs: Rhonchi throughout Heart: RRR, normal S1/S2, 1+ edema in BLE Abdomen:  Soft, non-tender, non-distended Skin: Wound vac to left foot; BLE with chronic skin changes 
  
 
LABS AND  DATA: Personally reviewed Recent Labs  
  21 
0701 21 
8252 WBC 4.3 4.5 HGB 7.1* 7.4* HCT 22.6* 23.5*  
 265 Recent Labs  
  21 
0701 21 
0526 21 
1445 21 
1445  137   < > 141  
K 3.1* 3.3*   < > 3.9  103   < > 103 CO2 25 28   < > 31 BUN 56* 56*   < > 54* CREA 2.38* 2.46*   < > 1.94* * 90   < > 172* CA 8.5 8.2*   < > 8.5 MG 2.2  --   --  2.0 PHOS 5.0*  --   --  5.4*  
 < > = values in this interval not displayed. Recent Labs  
  01/25/21 
0526 01/24/21 
8167 AP 79 74  
TP 6.9 6.4 ALB 2.6* 2.2*  
GLOB 4.3* 4.2* No results for input(s): INR, PTP, APTT, INREXT, INREXT in the last 72 hours. No results for input(s): PHI, PCO2I, PO2I, FIO2I in the last 72 hours. Recent Labs  
  01/23/21 
1445 TROIQ 0.05* Hemodynamics:  
PAP:   CO:    
Wedge:   CI:    
CVP:    SVR:    
  PVR:    
 
Ventilator Settings: 
Mode Rate Tidal Volume Pressure FiO2 PEEP Assist control, Pressure control   430 ml    50 % 8 cm H20 Peak airway pressure: 31 cm H2O Minute ventilation: 11.3 l/min MEDS: Reviewed Chest X-Ray: CXR Results  (Last 48 hours) None Assessment and Plan:  
Septic shock: Mulitfactorial likely due to COVID-19. Currently on broad spectrum antibiotics. Off levophed 
-Patient on Zyvox and Zosyn. I appreciate infectious disease input 
- Start weaning stress dose hydrocortisone hydrocortisone to 50mg q8h 
- Sedating with fent/propofol, being weaned for SBT 
- Lung protective ventilation - Follow up on blood and sputum cultures that were sent on 1/23, no growth x 3 days 
  
Acute Hypoxic Respiratory Failure secondary to COVID Pneumonia: He also demonstrated dyspnea on initial admission, but was negative for COVID. Initially treated with steroids. On 1/11, he developed increased oxygen needs. He was COVID positive. He was started on and completed treatment with steroids (completed 1/21), remdesivir (completed 1/16), zinc, and vitamin C. Intubated 1/23. 
- Wean vent settings as tolerated - On ARDS volume ventilation - Steroids as above - Continue Zyvox and zosyn - Sputum and blood cultures sent - Plan to wean to extubate today, following commands well, anxious on vent.  
  
HFpEFw exacerbation: Elevated BNP [difficult to interpret given his kidney function] echo done and report as below · LV: Estimated LVEF is 45 - 50%. Biplane method used to measure ejection fraction. Normal cavity size. Moderate concentric hypertrophy. · RV: Mildly dilated right ventricle. Mildly reduced systolic function. Acute on chronic CKD3a: Baseline Cr 1.7. Renal has followed throughout his course. Appreciate nephrology input. Creat today 2.46 DM type II: Adjust insulin to achieve reasonable glycemic control Anemia: Acute on chronic. Received 1 packed RBC on January 24 with good result. Continue to monitor and transfuse for hemoglobin below 7 
  
Deconditioning: Unable to participate in PT/OT 
  
Prostate cancer: Holding casodex as this cannot be crushed and given via OGT Patient remains full code. I appreciate palliative care team input regarding addressing goals of care. DISPOSITION Stay in ICU CRITICAL CARE CONSULTANT NOTE I had a face to face encounter with the patient, reviewed and interpreted patient data including clinical events, labs, images, vital signs, I/O's, and examined patient. I have discussed the case and the plan and management of the patient's care with the consulting services, the bedside nurses and the respiratory therapist.   
 
NOTE OF PERSONAL INVOLVEMENT IN CARE This patient has a high probability of imminent, clinically significant deterioration, which requires the highest level of preparedness to intervene urgently. I participated in the decision-making and personally managed or directed the management of the following life and organ supporting interventions that required my frequent assessment to treat or prevent imminent deterioration. I personally spent 60 minutes of critical care time. This is time spent at this critically ill patient's bedside actively involved in patient care as well as the coordination of care and discussions with the patient's family. This does not include any procedural time which has been billed separately. Robe Arenas [facility] Critical Care Medicine [facility]

## 2021-01-26 NOTE — WOUND CARE
WOCN Note: Follow-up visit for VAC dressing change to right foot.  
  
Chart shows: 
Admitted for lethargy; osteomyelitis of right foot with debridement and removal of infected bone 12/30; now Covid-19+ History of DM, smoking, prostate cancer, HTN, heart failure Admitted from home 
  
Assessment:  
Intubated but interactive.  
Surface: Juan AMY mattress Sacrum and buttocks mostly intact with hypopigmentation and scattered MASD. venelex applied.  
  
1. POA, right foot wound post surgical debridement = 6 x 4.6 x 3cm  Base is 30% soft yellow 70% granular red.  No purulence or odor.  Immediate periwound with hypopigmented skin.  Satellite wounds are now essentially resurfaced. Scant serosanguinous exudate in canister. Removed 2 sutures per Dr. Isa Mccann. 
[facility] dressing removed: 1 black sponge Cleaned with saline and Santyl applied to yellow tissue in wound bed. Applied Opticel AG to satellite fragile tissue 125 mmHg suction achieved using 1 piece of black foam bridged to lower leg.  
  
Dorsum of foot has a deep tissue injury = 6 x 6 x 0 cm 100% non-blanching burgundy - Venelex applied.   
  
Thick dry skin plaques to lower leg - Lac Hydrin applied. 
  
Wound Recommendations:   
Maintain VAC as ordered @ 125mmHg suction with twice weekly dressing changes.  Buttocks and dorsum of right foot: venelex twice daily. Axilla: antifungal cream twice daily.  
  
Transition of Care: Plan to follow weekly and as needed while admitted to hospital with next [facility] dressing change Friday, 1/29. MONIKA JaraN, RN, [employer] Certified Wound, Ostomy, Continence Nurse 
office [phone] 
pager [phone] or call  to page

## 2021-01-26 NOTE — PROGRESS NOTES
PRETTY PLAN: 
 
RUR-44% Planning to wean off vent today Patient unable to participate in PT/OT at this time SNF placement when medically stable. [facility] successfully processed. S transport Y. Aneesh Neves MSA, RN,CRM

## 2021-01-27 NOTE — PROCEDURES
The patient was placed in a flat position. Induction was achieved using versed, etomidate and succinylcholine. The patient was easily ventilated using an ambu bag. The CMAC BLADE was used and inserted into the oropharynx at which time there was a Grade 1 view of the vocal cords. A 8.0-[place] endotracheal tube was inserted and visualized going through the vocal cords. The stylette was removed. Colorimetric change was visualized on the CO2 meter. Breath sounds were heard in both lung fields equally. The endotracheal tube was placed at 24cm, measured at the teeth.

## 2021-01-27 NOTE — PROGRESS NOTES
1930: Bedside and Verbal shift change report given to TARUN Reddy RN (oncoming nurse) by Lakisha Elizalde. David Naidu (offgoing nurse). Report included the following information SBAR, Kardex, Intake/Output, MAR, Accordion, Recent Results, Cardiac Rhythm SB/NSR and Dual Neuro Assessment. 2000: RN at bedside because patient is coughing over ventilator without oxygenation. Versed given per Jason Mcfarlane NP. Fentanyl increased to 300 mcg/hr. See MAR for administrations. RN discussing plan for continued sedation support in light of last night and inadequacy of sedation medication. Plan for ketamine drip if patient is unable to be adequately sedated with PRN versed. 2100:  X-ray performed for evaluation of ETT and OGT. OGT not able to be visualized. RN placed new order for KUB to evaluate placement. Pending test and results.

## 2021-01-27 NOTE — PROGRESS NOTES
[facility] 
 
ICU TEAM Progress Note Name: Guero Munoz III  
: 1950 MRN: [id number] Date: 2021 I Subjective:  
Progress Note: 2021 Reason for ICU Admission: Acute hypoxic respiratory failure due to COVID-19 pneumonia. Status post cardiac arrest on the medical floor Interval history: Mr. Matilda Morales is a 71 yo male w a PMH of prostate cancer s/p radiation, tobacco use disorder (1 ppd), CKD3a, gastric angioectasias,  IDDM, heart failure, HTN who was admitted with R foot osteomyelitis on . He was transferred to the ICU on  after experiencing a cardiac arrest in the [facility]. Patient has prolonged hospital course as he was admitted on  and sepsis due to osteomyelitis of the right foot Overnight Events:  
- Patient extubated yesterday and reintubated given respiratory distress and hypoxia Active Problem List:  
 
Problem List  Date Reviewed: 2020 Codes Class * (Principal) [facility] ([facility] ICD-10-CM: M86.9 ICD-9-CM: 730.20 Diabetic ulcer of right midfoot associated with type 2 diabetes mellitus, with fat layer exposed ([facility] ICD-10-CM: E11.621, C03.120 ICD-9-CM: 250.80, 707.14 PAD (peripheral artery disease) (HCC) ICD-10-CM: I73.9 ICD-9-CM: 443.9 Dependence on nicotine from cigarettes ICD-10-CM: F17.210 ICD-9-CM: 305.1 KATHI (acute kidney injury) ([facility] ICD-10-CM: N17.9 ICD-9-CM: 822. 9 Acute on chronic renal failure (HCC) ICD-10-CM: N17.9, N18.9 ICD-9-CM: 584.9, 585.9 Severe obesity (BMI 35.0-39. 9) with comorbidity ([facility] ICD-10-CM: E66.01 
ICD-9-CM: 278.01 Type 2 diabetes with nephropathy (HCC) ICD-10-CM: E11.21 
ICD-9-CM: 250.40, 583.81 S/P hip replacement, right ICD-10-CM: P50.194 ICD-9-CM: V43.64 Stage 3 chronic kidney disease ICD-10-CM: N18.30 ICD-9-CM: 540. 3 Prostate [facility]) ICD-10-CM: L12 ICD-9-CM: 746 Diabetic polyneuropathy ([facility] ICD-10-CM: E11.42 
ICD-9-CM: 250.60, 357.2 Rotator Cuff Tendonitis ICD-10-CM: M71.9, M67.919 ICD-9-CM: 726.10 Diabetes mellitus type 2, controlled ([facility] ICD-10-CM: E11.9 ICD-9-CM: 250.00 Degenerative disc disease ICD-10-CM: [id number] ICD-9-CM: 722.6 Osteoarthrosis involving lower leg ICD-10-CM: M17.10 ICD-9-CM: 715.96 Depression/ Anxiety ICD-10-CM: F34.1 ICD-9-CM: 300.4 HTN (hypertension) ICD-10-CM: I10 
ICD-9-CM: 401.9 Chronic hip pain ICD-10-CM: M25.559, G89.29 ICD-9-CM: 719.45, 338.29 Hypertriglyceridemia ICD-10-CM: E78.1 ICD-9-CM: 272.1 Mild Aortic Insufficiency ICD-10-CM: I35.1 ICD-9-CM: 424.1 Mild Concentric LVH (left ventricular hypertrophy) ICD-10-CM: I51.7 ICD-9-CM: 429.3 Objective:  
Vital Signs: 
Visit Vitals BP (!) 106/56 Pulse (!) 53 Temp 97.6 °F (36.4 °C) Resp 22 Ht 6' (1.829 m) Wt 113 kg (249 lb 1.9 oz) SpO2 98% BMI 33.79 kg/m² O2 Flow Rate (L/min): 15 l/min(Mid flow at 9 liters also) O2 Device: Ventilator, Endotracheal tube Temp (24hrs), Av.8 °F (36.6 °C), Min:97.4 °F (36.3 °C), Max:98.3 °F (36.8 °C) Intake/Output:  
 
Intake/Output Summary (Last 24 hours) at 2021 8761 Last data filed at 2021 0700 Gross per 24 hour Intake 1834.06 ml Output 1250 ml Net 584.06 ml Physical Exam: 
 
General: Sedated and intubated, NAD Eye: PERRLA Neurologic:  Difficult to obtain exam given sedation. Patient previously following commands- withdraws equally with all extremities Neck:Supple, no JVD Lungs: Rhonchi throughout Heart: RRR, normal S1/S2, 1+ edema in BLE Abdomen:  Soft, non-tender, non-distended Skin: Wound vac to right lateral calf- draining serous/tan fluid; BLE with chronic skin changes 
  
 
LABS AND  DATA: Personally reviewed Recent Labs  
  21 
0520 21 
0701 WBC 5.2 4.3 HGB 7.7* 7.1*  
 HCT 24.5* 22.6*  
 251 Recent Labs  
  01/27/21 
0521 01/27/21 
0520 01/26/21 
0701 NA  --  137 138 K  --  3.5 3.1*  
CL  --  102 101 CO2  --  25 25 BUN  --  54* 56* CREA  --  2.52* 2.38* GLU  --  100 106* CA  --  8.5 8.5 MG 2.4  --  2.2 PHOS 6.1*  --  5.0* Recent Labs  
  01/25/21 
4896 AP 79  
TP 6.9 ALB 2.6*  
GLOB 4.3* No results for input(s): INR, PTP, APTT, INREXT, INREXT in the last 72 hours. No results for input(s): PHI, PCO2I, PO2I, FIO2I in the last 72 hours. Recent Labs  
  01/27/21 
1078 CPK 17* Ventilator Settings: 
Mode Rate Tidal Volume Pressure FiO2 PEEP Assist control   550 ml    50 % 8 cm H20 Peak airway pressure: 30 cm H2O Minute ventilation: 12.5 l/min MEDS: Reviewed Chest X-Ray: CXR Results  (Last 48 hours) 01/26/21 2014  XR CHEST PORT Final result Impression:  Lines and tubes as described. Slight improvement in severe bilateral lung  
infiltrates. .  . Narrative:  INDICATION:  s/p intubation and OGT placement EXAM: Chest single view. COMPARISON: 1/23/2021. FINDINGS: A single frontal view of the chest at 2008 hours shows severe diffuse  
lung infiltrates with slight improvement. ET tube now appears approximately 3 cm  
above the yahir. Gastric tube traverses the thorax but its tip is not seen. Right IJ catheter shows its tip over the superior vena cava with no  
pneumothorax. .  The heart, mediastinum and pulmonary vasculature are stable . The bony thorax is unremarkable for age. .  
   
  
  
 
 
 
Assessment and Plan:  
Septic shock: Mulitfactorial likely due to COVID-19. Currently on broad spectrum antibiotics. Off levophed. On 12/30 cultures from wound, patient growing proteus and GBS 
-Patient on Zyvox and Zosyn. Appreciate infectious disease input 
- Start weaning stress dose- hydrocortisone to 50mg q8h 
- Sedating with fent/propofol - Lung protective ventilation - Follow up on blood and sputum cultures that were sent on 1/23, no growth x 3 days 
  
Acute Hypoxic Respiratory Failure secondary to COVID Pneumonia: He also demonstrated dyspnea on initial admission, but was negative for COVID. Initially treated with steroids. On 1/11, he developed increased oxygen needs. He was COVID positive. He was started on and completed treatment with steroids (completed 1/21), remdesivir (completed 1/16), zinc, and vitamin C. Intubated 1/23. Extubated and reintubated on 1/26 
- Wean vent settings as tolerated - On ARDS volume ventilation - Steroids as above - Continue Zyvox and zosyn - Sputum and blood cultures sent 
  
HFpEF w exacerbation: Elevated BNP [difficult to interpret given his kidney function] echo done and report as below · LV: Estimated LVEF is 45 - 50%. Biplane method used to measure ejection fraction. Normal cavity size. Moderate concentric hypertrophy. · RV: Mildly dilated right ventricle. Mildly reduced systolic function. Acute on chronic CKD3a: Baseline Cr 1.7. Renal has followed throughout his course. Appreciate nephrology input. Creat today 2.52 DM type II:  
- Continue Lantus 14 units nightly 
- Continue SSI q6h Anemia: Acute on chronic. Received 1 packed RBC on January 24 with good result. Continue to monitor and transfuse for hemoglobin below 7 
  
Deconditioning: Unable to participate in PT/OT 
  
Prostate cancer: Holding casodex as this cannot be crushed and given via OGT Patient remains full code. I appreciate palliative care team input regarding addressing goals of care. DISPOSITION Stay in ICU CRITICAL CARE CONSULTANT NOTE I had a face to face encounter with the patient, reviewed and interpreted patient data including clinical events, labs, images, vital signs, I/O's, and examined patient. I have discussed the case and the plan and management of the patient's care with the consulting services, the bedside nurses and the respiratory therapist.   
 
NOTE OF PERSONAL INVOLVEMENT IN CARE This patient has a high probability of imminent, clinically significant deterioration, which requires the highest level of preparedness to intervene urgently. I participated in the decision-making and personally managed or directed the management of the following life and organ supporting interventions that required my frequent assessment to treat or prevent imminent deterioration. I personally spent 40 minutes of critical care time. This is time spent at this critically ill patient's bedside actively involved in patient care as well as the coordination of care and discussions with the patient's family. This does not include any procedural time which has been billed separately. Sonya Standard, ACNP Critical Care Medicine [facility]

## 2021-01-27 NOTE — PROGRESS NOTES
ID Progress Note 2021 Subjective: Afebrile. He had to be reintubated again today. ROS: Unobtainable Objective:  
 
Vitals:  
Visit Vitals /63 Pulse (!) 55 Temp 98.3 °F (36.8 °C) Resp 22 Ht 6' (1.829 m) Wt 113 kg (249 lb 1.9 oz) SpO2 99% BMI 33.79 kg/m² Tmax:  Temp (24hrs), Av.7 °F (36.5 °C), Min:97.4 °F (36.3 °C), Max:98.3 °F (36.8 °C) Exam: On the ventilator Pink conjunctivae, anicteric sclerae No cervical lymphdenopathy Lung clear, no rales, wheezes or rhonchi Heart: s1, s2, RRR, no murmurs rubs or clicks Abdomen: soft nontender, no guarding or rebound Knees not warm or tender, right foot has wound VAC. Labs:  
Lab Results Component Value Date/Time WBC 5.2 2021 05:20 AM  
 Hemoglobin (POC) 6.5 2020 01:59 PM  
 HGB 7.7 (L) 2021 05:20 AM  
 HCT 24.5 (L) 2021 05:20 AM  
 PLATELET 800 27/15/8052 05:20 AM  
 MCV 90.1 2021 05:20 AM  
 
Lab Results Component Value Date/Time Sodium 137 2021 05:20 AM  
 Potassium 3.5 2021 05:20 AM  
 Chloride 102 2021 05:20 AM  
 CO2 25 2021 05:20 AM  
 Anion gap 10 2021 05:20 AM  
 Glucose 100 2021 05:20 AM  
 BUN 54 (H) 2021 05:20 AM  
 Creatinine 2.52 (H) 2021 05:20 AM  
 BUN/Creatinine ratio 21 (H) 2021 05:20 AM  
 GFR est AA 31 (L) 2021 05:20 AM  
 GFR est non-AA 26 (L) 2021 05:20 AM  
 Calcium 8.5 2021 05:20 AM  
 Bilirubin, total 0.5 2021 05:26 AM  
 Alk.  phosphatase 79 2021 05:26 AM  
 Protein, total 6.9 2021 05:26 AM  
 Albumin 2.6 (L) 2021 05:26 AM  
 Globulin 4.3 (H) 2021 05:26 AM  
 A-G Ratio 0.6 (L) 2021 05:26 AM  
 ALT (SGPT) 11 (L) 2021 05:26 AM  
 
 
 
 
 
Assessment:  
 
#1 osteomyelitis of the right foot 
  
#2 group b strep  bacteremia 
  
#3 renal insufficiency 
  
#4 diabetes 
  
#5 prostate cancer 
  
#6 hypertension 
  
#7 heart failure 
  
  #8 covid  
  
#9 diarrhea Recommendations:  
 
Continue zosyn. There is OM on the surgical margin. He will need prolonged therapy. Orders written. 
  
He has completed treatment with remdesivir.  (1/121/16) 
  He has completed dexamethasone (1/12 - 1/21)  
  
Continue Zyvox for his airspace disease. Hopefully, we can discontinue this soon Radha Hayes MD

## 2021-01-27 NOTE — PROGRESS NOTES
ABG drawn 1/26/20 @ 2203 Ph 7.34  
PCO2 49 PO2 261 BE -0.4 HCO3- 26.0 SaO2 99.6% Vent settings: / 22/ +15/ 100% Results given to RN. FIO2 weaned per protocol. TV increased to 7mls/kg.

## 2021-01-27 NOTE — PROGRESS NOTES
Occupational Therapy Note:  
 
Pt reintubated late yesterday following extubation in the morning. Pt briefly using bipap but unable to maintain and required more respiratory support. Pt currently intubated and sedated with propofol. Will hold at this time and follow up as able and appropriate.   
 
 
Carrington Jon, OT

## 2021-01-27 NOTE — PROGRESS NOTES
1855: pt intubated at this time by MD, 8.5 25@ lip, pt preoxygenated by BIPAP, tube verified by BBS, cmac, co2 detector, and cxr. Will obtain ABG in 1 hour

## 2021-01-28 NOTE — PROGRESS NOTES
[facility] 
 
ICU TEAM Progress Note Name: Lalo Hamilton III  
: 1950 MRN: [id number] Date: 2021 Subjective:  
Progress Note: 2021 Reason for ICU Admission: Acute hypoxic respiratory failure due to COVID-19 pneumonia. Status post cardiac arrest on the medical floor Interval history: Mr. Kadie Hou is a 71 yo male w a PMH of prostate cancer s/p radiation, tobacco use disorder (1 ppd), CKD3a, gastric angioectasias,  IDDM, heart failure, HTN who was admitted with R foot osteomyelitis on . He was transferred to the ICU on  after experiencing a cardiac arrest in the [facility]. Patient has prolonged hospital course as he was admitted on  and sepsis due to osteomyelitis of the right foot Overnight Events:  
- slowly weaning ventilator requirements- currently on 50% with PEEP 12 Active Problem List:  
 
Problem List  Date Reviewed: 2020 Codes Class * (Principal) Osteomyelitis ([facility] ICD-10-CM: M86.9 ICD-9-CM: 730.20 Diabetic ulcer of right midfoot associated with type 2 diabetes mellitus, with fat layer exposed ([facility] ICD-10-CM: E11.621, O71.862 ICD-9-CM: 250.80, 707.14 PAD (peripheral artery disease) ([facility]) ICD-10-CM: I73.9 ICD-9-CM: 443.9 Dependence on nicotine from cigarettes ICD-10-CM: F17.210 ICD-9-CM: 305.1 KATHI (acute kidney injury) ([facility] ICD-10-CM: N17.9 ICD-9-CM: 665. 9 Acute on chronic renal failure (HCC) ICD-10-CM: N17.9, N18.9 ICD-9-CM: 584.9, 585.9 Severe obesity (BMI 35.0-39. 9) with comorbidity ([facility] ICD-10-CM: E66.01 
ICD-9-CM: 278.01 Type 2 diabetes with nephropathy (HCC) ICD-10-CM: E11.21 
ICD-9-CM: 250.40, 583.81 S/P hip replacement, right ICD-10-CM: I38.772 ICD-9-CM: V43.64 Stage 3 chronic kidney disease ICD-10-CM: N18.30 ICD-9-CM: 144. 3 Prostate [facility]) ICD-10-CM: A30 ICD-9-CM: 786 Diabetic polyneuropathy ([facility] ICD-10-CM: E11.42 
ICD-9-CM: 250.60, 357.2 Rotator Cuff Tendonitis ICD-10-CM: M71.9, M67.919 ICD-9-CM: 726.10 Diabetes mellitus type 2, controlled ([facility] ICD-10-CM: E11.9 ICD-9-CM: 250.00 Degenerative disc disease ICD-10-CM: [id number] ICD-9-CM: 722.6 Osteoarthrosis involving lower leg ICD-10-CM: M17.10 ICD-9-CM: 715.96 Depression/ Anxiety ICD-10-CM: F34.1 ICD-9-CM: 300.4 HTN (hypertension) ICD-10-CM: I10 
ICD-9-CM: 401.9 Chronic hip pain ICD-10-CM: M25.559, G89.29 ICD-9-CM: 719.45, 338.29 Hypertriglyceridemia ICD-10-CM: E78.1 ICD-9-CM: 272.1 Mild Aortic Insufficiency ICD-10-CM: I35.1 ICD-9-CM: 424.1 Mild Concentric LVH (left ventricular hypertrophy) ICD-10-CM: I51.7 ICD-9-CM: 429.3 Objective:  
Vital Signs: 
Visit Vitals /63 Pulse (!) 52 Temp 97.9 °F (36.6 °C) Resp (P) 22 Ht 6' (1.829 m) Wt 114.3 kg (251 lb 15.8 oz) SpO2 98% BMI 34.18 kg/m² O2 Flow Rate (L/min): 15 l/min(Mid flow at 9 liters also) O2 Device: Ventilator, Endotracheal tube Temp (24hrs), Av.1 °F (36.7 °C), Min:97.9 °F (36.6 °C), Max:98.3 °F (36.8 °C) Intake/Output:  
 
Intake/Output Summary (Last 24 hours) at 2021 0820 Last data filed at 2021 0400 Gross per 24 hour Intake 1006.4 ml Output 935 ml Net 71.4 ml Physical Exam: 
 
General: Sedated and intubated, NAD Eye: PERRLA Neurologic:  Difficult to obtain exam given sedation. Patient previously following commands- withdraws equally with all extremities Neck:Supple, no JVD Lungs: Rhonchi throughout Heart: RRR, normal S1/S2, 1+ edema in BLE Abdomen:  Soft, non-tender, non-distended Skin: Wound vac to right lateral calf- draining serous/tan fluid; BLE with chronic skin changes 
  
 
LABS AND  DATA: Personally reviewed Recent Labs  
  21 
0447 21 
0520 WBC 4.1 5.2 HGB 6.7* 7.7*  
 HCT 21.4* 24.5*  
 314 Recent Labs  
  01/28/21 
0447 01/27/21 
0521 01/27/21 
0520 01/26/21 
0701   --  137 138  
K 3.5  --  3.5 3.1*  
  --  102 101 CO2 25  --  25 25 BUN 63*  --  54* 56* CREA 2.78*  --  2.52* 2.38* *  --  100 106* CA 8.0*  --  8.5 8.5 MG 2.4 2.4  --  2.2 PHOS  --  6.1*  --  5.0* No results for input(s): AP, TBIL, TP, ALB, GLOB, AML, LPSE in the last 72 hours. No lab exists for component: SGOT, GPT, AMYP No results for input(s): INR, PTP, APTT, INREXT, INREXT in the last 72 hours. No results for input(s): PHI, PCO2I, PO2I, FIO2I in the last 72 hours. Recent Labs  
  01/27/21 
7572 CPK 17* Ventilator Settings: 
Mode Rate Tidal Volume Pressure FiO2 PEEP  
(P) Assist control   550 ml    50 % 12 cm H20(weaned after ABG) Peak airway pressure: 30 cm H2O Minute ventilation: 12.6 l/min MEDS: Reviewed Chest X-Ray: CXR Results  (Last 48 hours) 01/26/21 2014  XR CHEST PORT Final result Impression:  Lines and tubes as described. Slight improvement in severe bilateral lung  
infiltrates. .  . Narrative:  INDICATION:  s/p intubation and OGT placement EXAM: Chest single view. COMPARISON: 1/23/2021. FINDINGS: A single frontal view of the chest at 2008 hours shows severe diffuse  
lung infiltrates with slight improvement. ET tube now appears approximately 3 cm  
above the yahir. Gastric tube traverses the thorax but its tip is not seen. Right IJ catheter shows its tip over the superior vena cava with no  
pneumothorax. .  The heart, mediastinum and pulmonary vasculature are stable . The bony thorax is unremarkable for age. .  
   
  
  
 
 
 
Assessment and Plan:  
Septic shock: Mulitfactorial likely due to COVID-19. Currently on broad spectrum antibiotics. Off levophed. On 12/30 cultures from wound, patient growing proteus and GBS - Patient on Zyvox and Zosyn. Appreciate infectious disease input 
- Start weaning stress dose- hydrocortisone to 50mg q8h 
- Sedating with fent/propofol - Lung protective ventilation - Follow up on blood and sputum cultures that were sent on 1/23, no growth x 3 days 
  
Acute Hypoxic Respiratory Failure secondary to COVID Pneumonia: He also demonstrated dyspnea on initial admission, but was negative for COVID. Initially treated with steroids. On 1/11, he developed increased oxygen needs. He was COVID positive. He was started on and completed treatment with steroids (completed 1/21), remdesivir (completed 1/16), zinc, and vitamin C. Intubated 1/23. Extubated and reintubated on 1/26 
- Wean vent settings as tolerated - On ARDS volume ventilation - Steroids as above - Continue Zyvox and zosyn - Sputum and blood cultures sent 
  
HFpEF w exacerbation: Elevated BNP [difficult to interpret given his kidney function] echo done and report as below · LV: Estimated LVEF is 45 - 50%. Biplane method used to measure ejection fraction. Normal cavity size. Moderate concentric hypertrophy. · RV: Mildly dilated right ventricle. Mildly reduced systolic function. Acute on chronic CKD3a: Baseline Cr 1.7. Renal has followed throughout his course. Appreciate nephrology input. Creat today 2.78 DM type II:  
- Continue Lantus 14 units nightly 
- Continue SSI q6h Anemia: Acute on chronic. Received 1 packed RBC on January 24 with good result. Continue to monitor and transfuse for hemoglobin below 7 
  
Deconditioning: Unable to participate in PT/OT 
  
Prostate cancer: Holding casodex as this cannot be crushed and given via OGT Patient remains full code. I appreciate palliative care team input regarding addressing goals of care. DISPOSITION Stay in ICU CRITICAL CARE CONSULTANT NOTE I had a face to face encounter with the patient, reviewed and interpreted patient data including clinical events, labs, images, vital signs, I/O's, and examined patient. I have discussed the case and the plan and management of the patient's care with the consulting services, the bedside nurses and the respiratory therapist.   
 
NOTE OF PERSONAL INVOLVEMENT IN CARE This patient has a high probability of imminent, clinically significant deterioration, which requires the highest level of preparedness to intervene urgently. I participated in the decision-making and personally managed or directed the management of the following life and organ supporting interventions that required my frequent assessment to treat or prevent imminent deterioration. I personally spent 40 minutes of critical care time. This is time spent at this critically ill patient's bedside actively involved in patient care as well as the coordination of care and discussions with the patient's family. This does not include any procedural time which has been billed separately. [facility], ACNP Critical Care Medicine [facility]

## 2021-01-28 NOTE — PROGRESS NOTES
Physical Therapy 1/28/2021 Chart reviewed up to date. Per OT, pt not following commands during session and to defer PT at this time. Will follow-up as appropriate. Recommend with nursing patient to complete as able in order to maintain strength, endurance and independence: chair position in bed for 30-60 min with foot board. Thank you.  
Brandon Hutton, PT, DPT

## 2021-01-28 NOTE — PROGRESS NOTES
PRETTY 
Patient admitted with sepsis r/t osteomyelitis of right foot. Transferred to ICU s/p cardiac arrest. 
RUR 50% Disposition TBD Remains in ICU vented, wound vac intact to right calf. Care management is continuing to follow. Julianna Sam RN,Care Management

## 2021-01-28 NOTE — PROGRESS NOTES
Problem: Self Care Deficits Care Plan (Adult) Goal: *Acute Goals and Plan of Care (Insert Text) Description:  
FUNCTIONAL STATUS PRIOR TO ADMISSION: Patient was modified independent for basic upper body ADLs and toileting, total assistance knee-feet dressing and bathing in which stayed in clothes until aid arrived. Moderate assistance instrumental ADLs at w/c level and aids assistance. The patient was functional at the wheelchair level and was modified independent for transfers to the chair. Crutches from front door to w/c at door during EMS / medical transports. Decreased insight into deficits and learned dependency. Right rotator cuff tear and left decreased ROM. HOME SUPPORT: The patient lived alone with aid and family to provide assistance during the day, alone at night. Occupational Therapy Goals Goals initiated 1/28/2021 1. Patient will complete 1 grooming activities with bed in chair position with mod A within 7 days. 2.  Patient will complete gentle UE exercises with mod A for 10 minutes within 7 days. 3.  Patient will engage with upper body bathing activities with max A within 7 days. 4.  Patient will follow commands for therapeutic activities 75% of to the time within 7 days. 5.  Patient will progress with sitting EOB to assess functional transfers and activity tolerance with max A x 2 within 7 days. Initiated 1/22/2021; discontinue all goals due to change in medical status 1/28/2021 1. Patient will perform sitting EOB ADLs/therapeutic activity 1 min with maximal assistance within 7 day(s). 2.  Patient will perform grooming with moderate assistance  within 7 day(s). 3.  Patient will perform upper body dressing with maximal assistance within 7 day(s). 4.  Patient will perform rolling in bed, bed pan toilet transfers with moderate assistance  within 7 day(s). 5.  Patient will perform rolling in bed toileting hygiene with moderate assistance  within 7 day(s). 6.  Patient will participate in upper extremity therapeutic exercise/activities with minimal assistance within 7 day(s). Outcome: Progressing Towards Goal 
 
OCCUPATIONAL THERAPY RE-EVALUATION Patient: Leonila Bowie (26 y.o. male) Date: 1/28/2021 Diagnosis: Osteomyelitis ([facility] [M86.9] Osteomyelitis ([facility] Procedure(s) (LRB): ESOPHAGOGASTRODUODENOSCOPY (EGD)    :- (N/A) ESOPHAGOGASTRODUODENAL (EGD) BIOPSY (N/A) RESOLUTION CLIP (N/A) 24 Days Post-Op Precautions: Contact, Fall(COVID +) Chart, occupational therapy assessment, plan of care, and goals were reviewed. ASSESSMENT Based on the objective data described below, decreased independence with all self care and functional mobility following admission for OM. During admission, pt found to have COVID and noted with respiratory depression and cardiac arrest last week. Pt was transferred to ICU. Pt was extubated on 1/26/21 to [facility] but unable to sustain and required reintubation. Pt remains intubated at this time. He does alert to his name and does follow some simple commands but inconsistently. He did make eye contact with therapist and attempted to shake head to answer questions but again not consistent. He does actively move ANTHONY UE's but very weak. Full assessment limited due to pt being groggy and falling asleep at times. Will follow pt for continued therapy while in acute setting and anticipation he will require some level of rehab pending continued progression. Current Level of Function Impacting Discharge (ADLs): total A Other factors to consider for discharge: debility, COVID, intubation PLAN : 
Recommendations and Planned Interventions: self care training, functional mobility training, therapeutic exercise, balance training, visual/perceptual training, therapeutic activities, cognitive retraining, endurance activities, patient education, home safety training, and family training/education Frequency/Duration: Patient will be followed by occupational therapy 3 times a week to address goals. Recommend with staff: bed in chair position TID as able Recommend next OT session: UE exercise, grooming, visual scanning, progress to EOB with PT and RT as appropriate. Recommendation for discharge: (in order for the patient to meet his/her long term goals) To be determined: pending progression and recovery This discharge recommendation: 
Has not yet been discussed the attending provider and/or case management Equipment recommendations for successful discharge (if) home: none SUBJECTIVE:  
Patient stated Pt is non-verbal due to intubation OBJECTIVE DATA SUMMARY:  
Hospital course since last seen and reason for reevaluation: see above Cognitive/Behavioral Status: 
Neurologic State: Drowsy Orientation Level: Unable to verbalize(intubated) Cognition: Follows commands Perception: Appears intact(makes eye contact but unable to sustain eye contact) Perseveration: No perseveration noted Skin: see nursing notes Edema: none noted Hearing: Auditory Auditory Impairment: None Vision/Perceptual:   
    
    
    
  
    
Acuity: (makes brief eye contact but assessment limited 2'intubation) Range of Motion: 
 
AROM: Grossly decreased, non-functional 
PROM: Within functional limits Strength: 
 
Strength: Grossly decreased, non-functional 
  
  
  
  
 
Coordination: 
Coordination: Grossly decreased, non-functional 
Fine Motor Skills-Upper: Right Impaired;Left Intact Gross Motor Skills-Upper: Right Impaired;Left Impaired Tone & Sensation: 
 
Tone: Normal 
Sensation: (unable to complete full assessment) Functional Mobility and Transfers for ADLs: 
Bed Mobility: 
Rolling: (intubated and remained in bed) Supine to Sit: (intubated and remained in bed) Sit to Supine: (intubated and remained in bed) Transfers: Sit to Stand: (unable to complete full assessment) Functional Transfers Bathroom Mobility: (unable to complete full assessment) Toilet Transfer : (unable to complete full assessment) Bed to Chair: (unable to complete full assessment) Balance: 
Sitting: (intubated and remained in bed) ADL Assessment: 
Feeding: (NPO intubated) Oral Facial Hygiene/Grooming: Total assistance Bathing: Total assistance Upper Body Dressing: Total assistance Lower Body Dressing: Total assistance Toileting: Total assistance ADL Intervention and task modifications: 
 Gentle ROM and command following activities. Therapeutic Exercises:  
Gentle ROM for ANTHONY UE's Functional Measure: 
Barthel Index: 
 
Bathin Bladder: 0 Bowels: 0 Groomin Dressin Feedin Mobility: 0 Stairs: 0 Toilet Use: 0 Transfer (Bed to Chair and Back): 0 Total: 0/100 The Barthel ADL Index: Guidelines 1. The index should be used as a record of what a patient does, not as a record of what a patient could do. 2. The main aim is to establish degree of independence from any help, physical or verbal, however minor and for whatever reason. 3. The need for supervision renders the patient not independent. 4. A patient's performance should be established using the best available evidence. Asking the patient, friends/relatives and nurses are the usual sources, but direct observation and common sense are also important. However direct testing is not needed. 5. Usually the patient's performance over the preceding 24-48 hours is important, but occasionally longer periods will be relevant. 6. Middle categories imply that the patient supplies over 50 per cent of the effort. 7. Use of aids to be independent is allowed. Eber Trenton., Barthel, D.W. (3125). Functional evaluation: the Barthel Index. [facility] (14)2. PJ Interiano, Lizbeth Vizcarra., Hammad ValderramaAdventHealth East [place], [address] (1999). Measuring the change indisability after inpatient rehabilitation; comparison of the responsiveness of the Barthel Index and Functional [place] Measure. Journal of Neurology, Neurosurgery, and Psychiatry, 66(4), 311-628. ANDRES Osorio, CEDRIC Cole, & Raffaele Rodgers M.A. (2004.) Assessment of post-stroke quality of life in cost-effectiveness studies: The usefulness of the Barthel Index and the EuroQoL-5D. [facility], 13, 594-23 Pain: No pain Activity Tolerance:  
Good After treatment patient left in no apparent distress:  
Restraints COMMUNICATION/COLLABORATION:  
The patients plan of care was discussed with: Physical therapist and Registered nurse. Ryan Dyson OT Time Calculation: 15 mins

## 2021-01-28 NOTE — PROGRESS NOTES
ID Progress Note 2021 Subjective: Afebrile. On the ventilator. ROS: Unobtainable Objective:  
 
Vitals:  
Visit Vitals BP (!) 143/62 Pulse (!) 49 Temp 98 °F (36.7 °C) Resp 22 Ht 6' (1.829 m) Wt 114.3 kg (251 lb 15.8 oz) SpO2 97% BMI 34.18 kg/m² Tmax:  Temp (24hrs), Av.1 °F (36.7 °C), Min:97.9 °F (36.6 °C), Max:98.3 °F (36.8 °C) Exam: On the ventilator Sedated Labs:  
Lab Results Component Value Date/Time WBC 4.1 2021 04:47 AM  
 Hemoglobin (POC) 6.5 2020 01:59 PM  
 HGB 6.7 (L) 2021 04:47 AM  
 HCT 21.4 (L) 2021 04:47 AM  
 PLATELET 218  04:47 AM  
 MCV 89.5 2021 04:47 AM  
 
Lab Results Component Value Date/Time Sodium 139 2021 04:47 AM  
 Potassium 3.5 2021 04:47 AM  
 Chloride 101 2021 04:47 AM  
 CO2 25 2021 04:47 AM  
 Anion gap 13 2021 04:47 AM  
 Glucose 122 (H) 2021 04:47 AM  
 BUN 63 (H) 2021 04:47 AM  
 Creatinine 2.78 (H) 2021 04:47 AM  
 BUN/Creatinine ratio 23 (H) 2021 04:47 AM  
 GFR est AA 28 (L) 2021 04:47 AM  
 GFR est non-AA 23 (L) 2021 04:47 AM  
 Calcium 8.0 (L) 2021 04:47 AM  
 Bilirubin, total 0.5 2021 05:26 AM  
 Alk. phosphatase 79 2021 05:26 AM  
 Protein, total 6.9 2021 05:26 AM  
 Albumin 2.6 (L) 2021 05:26 AM  
 Globulin 4.3 (H) 2021 05:26 AM  
 A-G Ratio 0.6 (L) 2021 05:26 AM  
 ALT (SGPT) 11 (L) 2021 05:26 AM  
 
 
 
 
 
Assessment:  
 
#1 osteomyelitis of the right foot 
  
#2 group b strep  bacteremia 
  
#3 renal insufficiency 
  
#4 diabetes 
  
#5 prostate cancer 
  
#6 hypertension 
  
#7 heart failure 
  
 #8 covid  
  
#9 diarrhea Recommendations:  
 
Continue zosyn. There is OM on the surgical margin. He will need prolonged therapy. Orders written. 
  
He has completed treatment with remdesivir.   () 
  
 He has completed dexamethasone (1/12 - 1/21)  
  
Continue Zyvox for his airspace disease.   
 
 
Wilmer Doss MD

## 2021-01-28 NOTE — PROGRESS NOTES
Nephrology Progress Note Ghassan Daniel III Date of Admission : 12/28/2020 CC: Follow up for KATHI on CKD Assessment and Plan KATHI on CKD: 
- likely 2/2 ATN 
- hold diuretics - start albumin infusions x 4 today 
- daily labs and I/Os RESP FAILURE on vent Hypokalemia: 
- replete PRN 
 
CKD Stage IIIa: 
-  Presumed 2/2 DM and HTN 
- nephrotic range proteinuria 
- baseline Cr 1.7 mg/dl  
- followed by Dr. Nallely Guadarrama COVID-19 PNA: 
- positive on 1/11 
- s/p decadron and remdesivir 
  
Hx of prostate cancer s/p XRT 
  
Metabolic acidosis: 
- resolved 
  
Anemia in CKD + blood loss: 
- cont JAZ 
- EGD 1/5/2021: gastric fundal lesion  
- transfuse PRN 
  
Type II DM: 
- on insulin 
  
HTN :  
- BP stable now 
  
R foot osteo: 
- on abx 
- s/p debridement on 12/30 Group B strep bacteremia Interval History: Not examined in room Off pressors now. Cr up. UOP approx 1 L in the past 24 hrs. Remains sedated on the vent. Possible SBT and extubation today. Current Medications: all current  Medications have been eviewed in [facility] Review of Systems: A comprehensive review of systems was negative except for that written in the HPI. Objective: 
Vitals:   
Vitals:  
 01/28/21 0700 01/28/21 0730 01/28/21 0800 01/28/21 6490 BP: (!) 129/57 (!) 122/57 129/63 Pulse: (!) 49 (!) 47 (!) 52 Resp: 22 22 22 (P) 22 Temp:      
SpO2: 98% 98% 98% Weight:      
Height:      
 
Intake and Output: 
No intake/output data recorded. 01/26 1901 - 01/28 0700 In: 2347.6 [I.V.:2277.6] Out: 1310 [Urine:1295; Drains:15] Physical Examination: 
GEN: ON VENT 
NECK- ET tube + RESP: no distress NEURO:Can't access due to patient's current condition Exam deferred due to COVID-19 infection in order to preserve PPE AND minimize risk of  
transmission to dialysis and renal transplant patient per ASN and RPA guidelines: 
 
 
 
 
[]    High complexity decision making was performed []    Patient is at high-risk of decompensation with multiple organ involvement Lab Data Personally Reviewed: I have reviewed all the pertinent labs, microbiology data and radiology studies during assessment. Recent Labs  
  01/28/21 0447 01/27/21 0521 01/27/21 
0520 01/26/21 
0701   --  137 138  
K 3.5  --  3.5 3.1*  
  --  102 101 CO2 25  --  25 25 *  --  100 106* BUN 63*  --  54* 56* CREA 2.78*  --  2.52* 2.38* CA 8.0*  --  8.5 8.5 MG 2.4 2.4  --  2.2 PHOS  --  6.1*  --  5.0* Recent Labs  
  01/28/21 0447 01/27/21 0520 01/26/21 
0701 WBC 4.1 5.2 4.3 HGB 6.7* 7.7* 7.1*  
HCT 21.4* 24.5* 22.6*  
 314 251 No results found for: SDES Lab Results Component Value Date/Time Culture result: NO GROWTH 5 DAYS 01/23/2021 03:37 PM  
 Culture result: LIGHT YEAST, (APPARENT CANDIDA ALBICANS) (A) 01/23/2021 02:45 PM  
 Culture result: NO NORMAL RESPIRATORY MICHELLE ISOLATED 01/23/2021 02:45 PM  
 
Recent Results (from the past 24 hour(s)) GLUCOSE, POC Collection Time: 01/27/21  5:58 PM  
Result Value Ref Range Glucose (POC) 132 (H) 65 - 100 mg/dL Performed by Radha Morning GLUCOSE, POC Collection Time: 01/28/21  2:37 AM  
Result Value Ref Range Glucose (POC) 123 (H) 65 - 100 mg/dL Performed by Yen Alonzo Collection Time: 01/28/21  4:47 AM  
Result Value Ref Range D-dimer 1.75 (H) 0.00 - 0.65 mg/L FEU MAGNESIUM Collection Time: 01/28/21  4:47 AM  
Result Value Ref Range Magnesium 2.4 1.6 - 2.4 mg/dL METABOLIC PANEL, BASIC Collection Time: 01/28/21  4:47 AM  
Result Value Ref Range Sodium 139 136 - 145 mmol/L Potassium 3.5 3.5 - 5.1 mmol/L Chloride 101 97 - 108 mmol/L  
 CO2 25 21 - 32 mmol/L Anion gap 13 5 - 15 mmol/L Glucose 122 (H) 65 - 100 mg/dL BUN 63 (H) 6 - 20 MG/DL  Creatinine 2.78 (H) 0.70 - 1.30 MG/DL  
 BUN/Creatinine ratio 23 (H) 12 - 20    
 GFR est AA 28 (L) >60 ml/min/1.73m2 GFR est non-AA 23 (L) >60 ml/min/1.73m2 Calcium 8.0 (L) 8.5 - 10.1 MG/DL  
CBC W/O DIFF Collection Time: 01/28/21  4:47 AM  
Result Value Ref Range WBC 4.1 4.1 - 11.1 K/uL  
 RBC 2.39 (L) 4.10 - 5.70 M/uL HGB 6.7 (L) 12.1 - 17.0 g/dL HCT 21.4 (L) 36.6 - 50.3 % MCV 89.5 80.0 - 99.0 FL  
 MCH 28.0 26.0 - 34.0 PG  
 MCHC 31.3 30.0 - 36.5 g/dL  
 RDW 18.2 (H) 11.5 - 14.5 % PLATELET 432 610 - 443 K/uL MPV 9.0 8.9 - 12.9 FL  
 NRBC 0.0 0  WBC ABSOLUTE NRBC 0.00 0.00 - 0.01 K/uL BLOOD GAS, ARTERIAL Collection Time: 01/28/21  5:08 AM  
Result Value Ref Range pH 7.44 7.35 - 7.45    
 PCO2 39 35 - 45 mmHg PO2 142 (H) 80 - 100 mmHg O2 SAT 99 (H) 92 - 97 % BICARBONATE 26 22 - 26 mmol/L  
 BASE EXCESS 1.8 mmol/L  
 O2 METHOD VENT    
 FIO2 50 % MODE ASSIST CONTROL Tidal volume 550.0 SET RATE 22 PEEP/CPAP 15.0 Sample source ARTERIAL    
 SITE RIGHT RADIAL JUVENTINO'S TEST YES    
GLUCOSE, POC Collection Time: 01/28/21  5:16 AM  
Result Value Ref Range Glucose (POC) 130 (H) 65 - 100 mg/dL Performed by CARLOS Allison MD 
[facility]  
[address], [address] [facility] Phone - [phone] Fax - [phone] 
[email or web address]

## 2021-01-29 NOTE — PROGRESS NOTES
[facility] 
 
ICU TEAM Progress Note Name: Deisy Hobbs III  
: 1950 MRN: [id number] Date: 2021 Subjective:  
Progress Note: 2021 Reason for ICU Admission: Acute hypoxic respiratory failure due to COVID-19 pneumonia. Status post cardiac arrest on the medical floor Interval history: Mr. Alfonzo Sung is a 73 yo male w a PMH of prostate cancer s/p radiation, tobacco use disorder (1 ppd), CKD3a, gastric angioectasias,  IDDM, heart failure, HTN who was admitted with R foot osteomyelitis on . He was transferred to the ICU on  after experiencing a cardiac arrest in the [facility]. Patient has prolonged hospital course as he was admitted on  and sepsis due to osteomyelitis of the right foot Patient now admitted to ICU due to COVID pneumonia requiring intubation. Patient failed extubation on . Overnight Events:  
- Patient with worsening hypoxia overnight - Currently on 60%, PEEP 10 
- Patient more alert and interactive this morning Active Problem List:  
 
Problem List  Date Reviewed: 2020 Codes Class * (Principal) Osteomyelitis ([facility] ICD-10-CM: M86.9 ICD-9-CM: 730.20 Diabetic ulcer of right midfoot associated with type 2 diabetes mellitus, with fat layer exposed [facility] ICD-10-CM: E11.621, L24.512 ICD-9-CM: 250.80, 707.14 PAD (peripheral artery disease) ([facility]) ICD-10-CM: I73.9 ICD-9-CM: 443.9 Dependence on nicotine from cigarettes ICD-10-CM: F17.210 ICD-9-CM: 305.1 KATHI (acute kidney injury) ([facility] ICD-10-CM: N17.9 ICD-9-CM: 925. 9 Acute on chronic renal failure (HCC) ICD-10-CM: N17.9, N18.9 ICD-9-CM: 584.9, 585.9 Severe obesity (BMI 35.0-39. 9) with comorbidity ([facility] ICD-10-CM: E66.01 
ICD-9-CM: 278.01 Type 2 diabetes with nephropathy (HCC) ICD-10-CM: E11.21 
ICD-9-CM: 250.40, 583.81 S/P hip replacement, right ICD-10-CM: Z76.275 ICD-9-CM: V43.64 Stage 3 chronic kidney disease ICD-10-CM: N18.30 ICD-9-CM: 421. 3 Prostate CA [facility]) ICD-10-CM: A99 ICD-9-CM: 806 Diabetic polyneuropathy ([facility] ICD-10-CM: E11.42 
ICD-9-CM: 250.60, 357.2 Rotator Cuff Tendonitis ICD-10-CM: M71.9, M67.919 ICD-9-CM: 726.10 Diabetes mellitus type 2, controlled ([facility] ICD-10-CM: E11.9 ICD-9-CM: 250.00 Degenerative disc disease ICD-10-CM: [id number] ICD-9-CM: 722.6 Osteoarthrosis involving lower leg ICD-10-CM: M17.10 ICD-9-CM: 715.96 Depression/ Anxiety ICD-10-CM: F34.1 ICD-9-CM: 300.4 HTN (hypertension) ICD-10-CM: I10 
ICD-9-CM: 401.9 Chronic hip pain ICD-10-CM: M25.559, G89.29 ICD-9-CM: 719.45, 338.29 Hypertriglyceridemia ICD-10-CM: E78.1 ICD-9-CM: 272.1 Mild Aortic Insufficiency ICD-10-CM: I35.1 ICD-9-CM: 424.1 Mild Concentric LVH (left ventricular hypertrophy) ICD-10-CM: I51.7 ICD-9-CM: 429.3 Objective:  
Vital Signs: 
Visit Vitals BP (!) 142/61 Pulse 65 Temp 98.2 °F (36.8 °C) Resp 18 Ht 6' (1.829 m) Wt 115.8 kg (255 lb 4.7 oz) SpO2 100% BMI 34.62 kg/m² O2 Flow Rate (L/min): 15 l/min(Mid flow at 9 liters also) O2 Device: Ventilator, Endotracheal tube Temp (24hrs), Av.2 °F (36.8 °C), Min:97.9 °F (36.6 °C), Max:98.7 °F (37.1 °C) Intake/Output:  
 
Intake/Output Summary (Last 24 hours) at 2021 5186 Last data filed at 2021 0400 Gross per 24 hour Intake 1677.9 ml Output 1050 ml Net 627.9 ml  
 
 
Physical Exam: 
 
General: Sedated and intubated- will wake up and interact to voice, NAD Eye: PERRLA Neurologic: Patient will open eyes to voice, follow commands, equal strength throughout Neck:Supple, no JVD Lungs: Diminished lung sounds Heart: RRR, normal S1/S2, 1+ edema in BLE Abdomen:  Soft, non-tender, non-distended Skin: Wound vac to right lateral calf- draining serous/tan fluid; BLE with chronic skin changes 
  
 
LABS AND  DATA: Personally reviewed Recent Labs  
  01/29/21 
0536 01/28/21 
1435 WBC 4.9 3.8* HGB 7.3* 7.6* HCT 23.0* 23.7*  
 221 Recent Labs  
  01/29/21 
0537 01/29/21 
0536 01/28/21 
0447 01/27/21 
6499   --  139  --   
K 3.0*  --  3.5  --   
CL 99  --  101  --   
CO2 25  --  25  --   
BUN 64*  --  63*  --   
CREA 2.59*  --  2.78*  --   
*  --  122*  --   
CA 8.4*  --  8.0*  --   
MG  --  2.5* 2.4 2.4 PHOS  --   --   --  6.1* No results for input(s): AP, TBIL, TP, ALB, GLOB, AML, LPSE in the last 72 hours. No lab exists for component: SGOT, GPT, AMYP No results for input(s): INR, PTP, APTT, INREXT, INREXT in the last 72 hours. No results for input(s): PHI, PCO2I, PO2I, FIO2I in the last 72 hours. Recent Labs  
  01/27/21 
9939 CPK 17* Ventilator Settings: 
Mode Rate Tidal Volume Pressure FiO2 PEEP Assist control, Volume control   550 ml    70 %(increased per Dr Abrahan Lirinao after ABG) 10 cm H20 Peak airway pressure: 23 cm H2O Minute ventilation: 10.2 l/min MEDS: Reviewed Chest X-Ray: CXR Results  (Last 48 hours) None Assessment and Plan:  
Septic shock: Mulitfactorial likely due to COVID-19 and SSTI. Currently on broad spectrum antibiotics. Off levophed. On 12/30 cultures from wound, patient growing proteus and GBS 
- Patient on Zyvox (day 12 of 14) and Zosyn. Appreciate infectious disease input 
- Decreased hydrocortisone to 50mg q12h for 4 more doses- then will discontinue - Sedating with fentanyl, propofol, and midazolam- wean propofol as tolerated - Lung protective ventilation - Follow up on blood and sputum cultures that were sent on 1/23, no growth x 3 days 
  
 Acute Hypoxic Respiratory Failure secondary to COVID Pneumonia: He also demonstrated dyspnea on initial admission, but was negative for COVID. Initially treated with steroids. On 1/11, he developed increased oxygen needs. He was COVID positive. He was started on and completed treatment with steroids (completed 1/21), remdesivir (completed 1/16), zinc, and vitamin C. Intubated 1/23. Extubated and reintubated on 1/26 
- Wean vent settings as tolerated - On ARDS volume ventilation - Steroids as above - Continue Zyvox and zosyn - Sputum and blood cultures negative to date 
  
HFpEF w exacerbation: Elevated BNP [difficult to interpret given his kidney function] echo done and report as below · LV: Estimated LVEF is 45 - 50%. Biplane method used to measure ejection fraction. Normal cavity size. Moderate concentric hypertrophy. · RV: Mildly dilated right ventricle. Mildly reduced systolic function. Acute on chronic CKD3a: Baseline Cr 1.7. Renal has followed throughout his course. Appreciate nephrology input. Creat improving- now 2.59 with adequate urine output DM type II:  
- Continue Lantus 8 units nightly 
- Continue SSI q6h Anemia: Acute on chronic. Received 1 packed RBC on January 24 and 28 with appropriate response. No evidence of blood loss  Continue to monitor and transfuse for hemoglobin below 7 
  
Deconditioning: Unable to participate in PT/OT 
  
Prostate cancer: Holding casodex as this cannot be crushed and given via OGT 
 
 
DISPOSITION Stay in ICU CRITICAL CARE CONSULTANT NOTE I had a face to face encounter with the patient, reviewed and interpreted patient data including clinical events, labs, images, vital signs, I/O's, and examined patient.   I have discussed the case and the plan and management of the patient's care with the consulting services, the bedside nurses and the respiratory therapist.   
 
NOTE OF PERSONAL INVOLVEMENT IN CARE  
 This patient has a high probability of imminent, clinically significant deterioration, which requires the highest level of preparedness to intervene urgently. I participated in the decision-making and personally managed or directed the management of the following life and organ supporting interventions that required my frequent assessment to treat or prevent imminent deterioration. I personally spent 40 minutes of critical care time. This is time spent at this critically ill patient's bedside actively involved in patient care as well as the coordination of care and discussions with the patient's family. This does not include any procedural time which has been billed separately. Price Mckinnon, ACNP Critical Care Medicine [facility]

## 2021-01-29 NOTE — PROGRESS NOTES
ID Progress Note 2021 Subjective: Afebrile. On the ventilator at 60% fio2 ROS: Unobtainable Objective:  
 
Vitals:  
Visit Vitals BP (!) 150/66 Pulse (!) 58 Temp 97.3 °F (36.3 °C) Resp 18 Ht 6' (1.829 m) Wt 115.8 kg (255 lb 4.7 oz) SpO2 96% BMI 34.62 kg/m² Tmax:  Temp (24hrs), Av.8 °F (36.6 °C), Min:97.3 °F (36.3 °C), Max:98.2 °F (36.8 °C) Exam: On the ventilator Sedated Not in distress Lung clear, no rales, wheezes or rhonchi Heart: s1, s2, RRR, no murmurs rubs or clicks Abdomen: soft nontender, no guarding or rebound Labs:  
Lab Results Component Value Date/Time WBC 4.9 2021 05:36 AM  
 Hemoglobin (POC) 6.5 2020 01:59 PM  
 HGB 7.3 (L) 2021 05:36 AM  
 HCT 23.0 (L) 2021 05:36 AM  
 PLATELET 230  05:36 AM  
 MCV 88.8 2021 05:36 AM  
 
Lab Results Component Value Date/Time Sodium 139 2021 05:37 AM  
 Potassium 3.0 (L) 2021 05:37 AM  
 Chloride 99 2021 05:37 AM  
 CO2 25 2021 05:37 AM  
 Anion gap 15 2021 05:37 AM  
 Glucose 124 (H) 2021 05:37 AM  
 BUN 64 (H) 2021 05:37 AM  
 Creatinine 2.59 (H) 2021 05:37 AM  
 BUN/Creatinine ratio 25 (H) 2021 05:37 AM  
 GFR est AA 30 (L) 2021 05:37 AM  
 GFR est non-AA 25 (L) 2021 05:37 AM  
 Calcium 8.4 (L) 2021 05:37 AM  
 Bilirubin, total 0.5 2021 05:26 AM  
 Alk. phosphatase 79 2021 05:26 AM  
 Protein, total 6.9 2021 05:26 AM  
 Albumin 2.6 (L) 2021 05:26 AM  
 Globulin 4.3 (H) 2021 05:26 AM  
 A-G Ratio 0.6 (L) 2021 05:26 AM  
 ALT (SGPT) 11 (L) 2021 05:26 AM  
 
 
 
 
 
Assessment:  
 
#1 osteomyelitis of the right foot 
  
#2 group b strep  bacteremia 
  
#3 renal insufficiency 
  
#4 diabetes 
  
#5 prostate cancer 
  
#6 hypertension 
  
#7 heart failure 
  
 #8 covid  
  
#9 diarrhea Recommendations: Continue zosyn. There is OM on the surgical margin. He will need prolonged therapy. Orders written. 
  
He has completed treatment with remdesivir.  (1/121/16) 
  He has completed dexamethasone (1/12 - 1/21)  
  
Continue Zyvox for his airspace disease until 1/31.   
 
 
Whitney Paredes MD

## 2021-01-29 NOTE — PROGRESS NOTES
Nephrology Progress Note Skylar Ford III Date of Admission : 12/28/2020 CC: Follow up for KATHI on CKD Assessment and Plan KATHI on CKD: 
- likely 2/2 ATN 
- Cr better 
- lasix 80mg IV x 1 today 
- daily labs and I/Os Resp failure: 
- on the vent 
- from COVID-19 + volume 
- diurese as above Hypokalemia: 
- IV repletion ordered CKD Stage IIIa: 
-  Presumed 2/2 DM and HTN 
- nephrotic range proteinuria 
- baseline Cr 1.7 mg/dl  
- followed by Dr. Nba Chaves COVID-19 PNA: 
- positive on 1/11 
- s/p decadron and remdesivir 
  
Hx of prostate cancer s/p XRT 
  
Metabolic acidosis: 
- resolved 
  
Anemia in CKD + blood loss: 
- cont JAZ 
- EGD 1/5/2021: gastric fundal lesion  
- transfuse PRN 
  
Type II DM: 
- on insulin 
  
HTN :  
- BP stable now 
  
R foot osteo: 
- on abx 
- s/p debridement on 12/30 Group B strep bacteremia Interval History: Not examined in room Sedated on the vent. BP stable. UOP stable. Cr better today. Plans for another SBT this AM, possible extubation. Current Medications: all current  Medications have been eviewed in [facility] Review of Systems: A comprehensive review of systems was negative except for that written in the HPI. Objective: 
Vitals:   
Vitals:  
 01/29/21 0700 01/29/21 0800 01/29/21 0825 01/29/21 0900 BP: 139/60 (!) 158/70  (!) 150/66 Pulse: (!) 55 (!) 56 (!) 55 (!) 58 Resp: 18 18 18 18 Temp:  97.3 °F (36.3 °C) SpO2: 100% 100% 98% 96% Weight:      
Height:      
 
Intake and Output: 
01/29 0701 - 01/29 1900 In: 317.4 [I.V.:167.4] Out: 500 [Urine:450; Drains:50] 
01/27 1901 - 01/29 0700 In: 3680.6 [I.V.:2664.3] Out: 2330 [Urine:2105; Drains:225] Physical Examination: 
GEN: ON VENT 
NECK- ET tube + RESP: no distress NEURO:Can't access due to patient's current condition Exam deferred due to COVID-19 infection in order to preserve PPE AND minimize risk of  
 transmission to dialysis and renal transplant patient per ASN and RPA guidelines: 
 
 
 
 
[]    High complexity decision making was performed 
[]    Patient is at high-risk of decompensation with multiple organ involvement Lab Data Personally Reviewed: I have reviewed all the pertinent labs, microbiology data and radiology studies during assessment. Recent Labs  
  01/29/21 
2792 01/29/21 
0536 01/28/21 
0447 01/27/21 
0521 01/27/21 
9952   --  139  --  137  
K 3.0*  --  3.5  --  3.5 CL 99  --  101  --  102 CO2 25  --  25  --  25 *  --  122*  --  100 BUN 64*  --  63*  --  54* CREA 2.59*  --  2.78*  --  2.52* CA 8.4*  --  8.0*  --  8.5 MG  --  2.5* 2.4 2.4  --   
PHOS  --   --   --  6.1*  --   
 
Recent Labs  
  01/29/21 
0536 01/28/21 
1435 01/28/21 0447 WBC 4.9 3.8* 4.1 HGB 7.3* 7.6* 6.7* HCT 23.0* 23.7* 21.4*  
 221 236 No results found for: SDES Lab Results Component Value Date/Time Culture result: NO GROWTH 5 DAYS 01/23/2021 03:37 PM  
 Culture result: LIGHT YEAST, (APPARENT CANDIDA ALBICANS) (A) 01/23/2021 02:45 PM  
 Culture result: NO NORMAL RESPIRATORY MICHELLE ISOLATED 01/23/2021 02:45 PM  
 
Recent Results (from the past 24 hour(s)) GLUCOSE, POC Collection Time: 01/28/21 11:44 AM  
Result Value Ref Range Glucose (POC) 126 (H) 65 - 100 mg/dL Performed by Jania Dwyer SARS-COV-2 Collection Time: 01/28/21  2:35 PM  
Result Value Ref Range SARS-CoV-2 Please find results under separate order CBC WITH AUTOMATED DIFF Collection Time: 01/28/21  2:35 PM  
Result Value Ref Range WBC 3.8 (L) 4.1 - 11.1 K/uL  
 RBC 2.66 (L) 4.10 - 5.70 M/uL HGB 7.6 (L) 12.1 - 17.0 g/dL HCT 23.7 (L) 36.6 - 50.3 % MCV 89.1 80.0 - 99.0 FL  
 MCH 28.6 26.0 - 34.0 PG  
 MCHC 32.1 30.0 - 36.5 g/dL  
 RDW 17.6 (H) 11.5 - 14.5 % PLATELET 884 436 - 258 K/uL MPV 9.0 8.9 - 12.9 FL  
 NRBC 0.0 0  WBC ABSOLUTE NRBC 0.00 0.00 - 0.01 K/uL NEUTROPHILS 79 (H) 32 - 75 % LYMPHOCYTES 12 12 - 49 % MONOCYTES 6 5 - 13 % EOSINOPHILS 2 0 - 7 % BASOPHILS 0 0 - 1 % IMMATURE GRANULOCYTES 1 (H) 0.0 - 0.5 % ABS. NEUTROPHILS 3.0 1.8 - 8.0 K/UL  
 ABS. LYMPHOCYTES 0.5 (L) 0.8 - 3.5 K/UL  
 ABS. MONOCYTES 0.2 0.0 - 1.0 K/UL  
 ABS. EOSINOPHILS 0.1 0.0 - 0.4 K/UL  
 ABS. BASOPHILS 0.0 0.0 - 0.1 K/UL  
 ABS. IMM. GRANS. 0.0 0.00 - 0.04 K/UL  
 DF SMEAR SCANNED    
 RBC COMMENTS ANISOCYTOSIS 1+ 
    
 RBC COMMENTS OVALOCYTES PRESENT 
    
 RBC COMMENTS POLYCHROMASIA 1+ GLUCOSE, POC Collection Time: 01/28/21  6:30 PM  
Result Value Ref Range Glucose (POC) 139 (H) 65 - 100 mg/dL Performed by Adria Dancer GLUCOSE, POC Collection Time: 01/28/21 11:47 PM  
Result Value Ref Range Glucose (POC) 97 65 - 100 mg/dL Performed by Eulogio Stiles Collection Time: 01/29/21  5:36 AM  
Result Value Ref Range D-dimer 1.59 (H) 0.00 - 0.65 mg/L FEU MAGNESIUM Collection Time: 01/29/21  5:36 AM  
Result Value Ref Range Magnesium 2.5 (H) 1.6 - 2.4 mg/dL CBC W/O DIFF Collection Time: 01/29/21  5:36 AM  
Result Value Ref Range WBC 4.9 4.1 - 11.1 K/uL  
 RBC 2.59 (L) 4.10 - 5.70 M/uL HGB 7.3 (L) 12.1 - 17.0 g/dL HCT 23.0 (L) 36.6 - 50.3 % MCV 88.8 80.0 - 99.0 FL  
 MCH 28.2 26.0 - 34.0 PG  
 MCHC 31.7 30.0 - 36.5 g/dL  
 RDW 17.8 (H) 11.5 - 14.5 % PLATELET 029 764 - 607 K/uL MPV 9.5 8.9 - 12.9 FL  
 NRBC 0.0 0  WBC ABSOLUTE NRBC 0.00 0.00 - 0.01 K/uL BLOOD GAS, ARTERIAL Collection Time: 01/29/21  5:36 AM  
Result Value Ref Range pH 7.37 7.35 - 7.45    
 PCO2 43 35 - 45 mmHg PO2 58 (L) 80 - 100 mmHg O2 SAT 90 (L) 92 - 97 % BICARBONATE 24 22 - 26 mmol/L  
 BASE DEFICIT 1.0 mmol/L  
 O2 METHOD VENT    
 FIO2 50 % MODE ASSIST CONTROL Tidal volume 580.0 SET RATE 18 PEEP/CPAP 10.0  Sample source ARTERIAL    
 SITE RIGHT RADIAL JUVENTINO'S TEST YES    
METABOLIC PANEL, BASIC Collection Time: 01/29/21  5:37 AM  
Result Value Ref Range Sodium 139 136 - 145 mmol/L Potassium 3.0 (L) 3.5 - 5.1 mmol/L Chloride 99 97 - 108 mmol/L  
 CO2 25 21 - 32 mmol/L Anion gap 15 5 - 15 mmol/L Glucose 124 (H) 65 - 100 mg/dL BUN 64 (H) 6 - 20 MG/DL Creatinine 2.59 (H) 0.70 - 1.30 MG/DL  
 BUN/Creatinine ratio 25 (H) 12 - 20 GFR est AA 30 (L) >60 ml/min/1.73m2 GFR est non-AA 25 (L) >60 ml/min/1.73m2 Calcium 8.4 (L) 8.5 - 10.1 MG/DL  
GLUCOSE, POC Collection Time: 01/29/21  6:24 AM  
Result Value Ref Range Glucose (POC) 132 (H) 65 - 100 mg/dL Performed by CARLOS Olsen MD 
[address], [address] [facility] Phone - [phone] Fax - [phone] 
[email or web address] 
 20-Dec-2020 17:38

## 2021-01-29 NOTE — PROGRESS NOTES
0730 Verbal shift change report given to [address] (oncoming nurse) by Tonya Martinez RN (offgoing nurse). Report included the following information SBAR, Kardex, ED Summary, Procedure Summary, MAR and Recent Results. 0800 Assessment complete. Calm and resting. Mouth care complete. Turned. 1000 Patient worked with PT. Follows simple commands (eyes open, squeeze hands, etc.) but drowsy this morning. 1400 Pt. Heart rate upper 40's and 50's. (has been 52's consistently up until now) This RN talked with NP, she stated there is no current concern, he has been running in the 50's, and to continue to monitor. 1500 Wound care changing wound vac. Pillow case used to elevate scrotum. Lotions applied to skin. 1800 Zinc to sacrum, scrotum sling, heels elevated, turned to side, new linens, mouth care. 1930 Verbal shift change report given to Tonya Martinez RN (oncoming nurse) by [address] (offgoing nurse). Report included the following information SBAR, Kardex, Procedure Summary, Intake/Output, MAR and Recent Results.

## 2021-01-29 NOTE — WOUND CARE
WOCN Note: Follow-up visit for VAC dressing change to right foot.  
  
Chart shows: 
Admitted for lethargy; osteomyelitis of right foot with debridement and removal of infected bone 12/30; now Covid-19+ History of DM, smoking, prostate cancer, HTN, heart failure Admitted from home 
  
Assessment:  
Intubated and sleepy - very little interaction today.   
Surface: [place] AMY mattress 
  
1. POA, right foot wound post surgical debridement = 6 x 6 x 3cm  Base is 40% soft yellow 60% granular red (some degradation of tissue)  Also a new extention of red/burgundy tissue toward plantar margin.  No purulence or odor.  Immediate periwound with hypopigmented skin.  Satellite wounds are now essentially resurfaced. Scant serosanguinous exudate in canister. Removed 2 sutures per Dr. Roberta Blake. 
[address] [employer] dressing removed: 1 black sponge Cleaned with saline and Santyl applied to yellow tissue in wound bed.   
125 mmHg suction achieved using 1 piece of black foam bridged to lower leg.  
  
Dorsum of foot has a deep tissue injury = 6 x 6 x 0 cm 100% non-blanching dry burgundy - Santyl applied.   
  
Thick dry skin plaques to lower leg - Lac Hydrin applied. 
  
Wound Recommendations:   
Maintain VAC as ordered @ 125mmHg suction with twice weekly dressing changes.  Buttocks and dorsum of right foot: venelex twice daily. Axilla: antifungal cream twice daily.  
  
Transition of Care: Plan to follow weekly and as needed while admitted to hospital with next [address] dressing change Tuesday, 2/2. TABITHA Garcia, RN, [employer] Certified Wound, Ostomy, Continence Nurse 
office [phone] 
pager [phone] or call  to page

## 2021-01-30 NOTE — PROGRESS NOTES
Nephrology Progress Note Júnior Warren III Date of Admission : 12/28/2020 CC: Follow up for KATHI on CKD Assessment and Plan KATHI on CKD: 
- likely 2/2 ATN 
- LABS PENDING  
- Ordered CXR  
- lasix 40 mg daily  
- daily labs  
- keep kumar for now Resp failure: 
- on the vent 
- from COVID-19 + volume 
- diurese as above Hypokalemia: 
-replete PRN  
 
CKD Stage IIIa: 
-  Presumed 2/2 DM and HTN 
- nephrotic range proteinuria 
- baseline Cr 1.7 mg/dl  
- followed by Dr. Rosalba Snow COVID-19 PNA: 
- positive on 1/11 
- s/p decadron and remdesivir 
  
Hx of prostate cancer s/p XRT 
  
Metabolic acidosis: 
- resolved 
  
Anemia in CKD + blood loss: 
- cont JAZ 
- EGD 1/5/2021: gastric fundal lesion  
- transfuse PRN 
  
Type II DM: 
- on insulin 
  
HTN :  
- BP stable now 
  
R foot osteo: 
- on abx 
- s/p debridement on 12/30 Group B strep bacteremia Interval History: Not examined in room Sedated on the vent. On 60% FIO2. BP stable. UOP stable. Labs pending. Remains on vent Current Medications: all current  Medications have been eviewed in [facility] Review of Systems: A comprehensive review of systems was negative except for that written in the HPI. Objective: 
Vitals:   
Vitals:  
 01/30/21 0300 01/30/21 0400 01/30/21 0500 01/30/21 0600 BP: 134/61 127/60 (!) 130/59 127/60 Pulse: (!) 47 (!) 46 (!) 45 (!) 45 Resp: 18 18 16 18 Temp:  (!) 96.5 °F (35.8 °C) SpO2: 96% 96% 96% 95% Weight:      
Height:      
 
Intake and Output: 
01/29 1901 - 01/30 0700 In: -  
Out: 880 [Urine:705; Drains:175] 01/28 0701 - 01/29 1900 In: 5002.3 [I.V.:3636] Out: 0999 [JWHSH:6683; SNMALL:871] Physical Examination: 
GEN: ON VENT 
NECK- ET tube + RESP: no distress NEURO:Can't access due to patient's current condition Exam deferred due to COVID-19 infection in order to preserve PPE AND minimize risk of  
 transmission to dialysis and renal transplant patient per ASN and RPA guidelines: 
 
 
 
 
[]    High complexity decision making was performed 
[]    Patient is at high-risk of decompensation with multiple organ involvement Lab Data Personally Reviewed: I have reviewed all the pertinent labs, microbiology data and radiology studies during assessment. Recent Labs  
  01/30/21 
3637 01/29/21 
0537 01/29/21 
0536 01/28/21 
0447 NA  --  139  --  139 K  --  3.0*  --  3.5 CL  --  99  --  101 CO2  --  25  --  25  
GLU  --  124*  --  122* BUN  --  64*  --  63* CREA  --  2.59*  --  2.78* CA  --  8.4*  --  8.0*  
MG 2.3  --  2.5* 2.4 Recent Labs  
  01/30/21 
0339 01/29/21 
0536 01/28/21 
1435 WBC 5.1 4.9 3.8* HGB 7.3* 7.3* 7.6* HCT 23.7* 23.0* 23.7*  
 224 221 No results found for: SDES Lab Results Component Value Date/Time Culture result: NO GROWTH 5 DAYS 01/23/2021 03:37 PM  
 Culture result: LIGHT YEAST, (APPARENT CANDIDA ALBICANS) (A) 01/23/2021 02:45 PM  
 Culture result: NO NORMAL RESPIRATORY MICHELLE ISOLATED 01/23/2021 02:45 PM  
 
Recent Results (from the past 24 hour(s)) GLUCOSE, POC Collection Time: 01/29/21 12:00 PM  
Result Value Ref Range Glucose (POC) 145 (H) 65 - 100 mg/dL Performed by Adah Dust TRIGLYCERIDE Collection Time: 01/29/21  2:51 PM  
Result Value Ref Range Triglyceride 481 (H) <150 MG/DL  
GLUCOSE, POC Collection Time: 01/29/21  5:55 PM  
Result Value Ref Range Glucose (POC) 96 65 - 100 mg/dL Performed by Adah Dust GLUCOSE, POC Collection Time: 01/29/21 11:57 PM  
Result Value Ref Range Glucose (POC) 145 (H) 65 - 100 mg/dL Performed by GOMEZ CINTRON   
MAGNESIUM Collection Time: 01/30/21  3:38 AM  
Result Value Ref Range Magnesium 2.3 1.6 - 2.4 mg/dL D DIMER Collection Time: 01/30/21  3:39 AM  
Result Value Ref Range  D-dimer 1.44 (H) 0.00 - 0.65 mg/L FEU  
CBC WITH AUTOMATED DIFF  
 Collection Time: 01/30/21  3:39 AM  
Result Value Ref Range WBC 5.1 4.1 - 11.1 K/uL  
 RBC 2.63 (L) 4.10 - 5.70 M/uL HGB 7.3 (L) 12.1 - 17.0 g/dL HCT 23.7 (L) 36.6 - 50.3 % MCV 90.1 80.0 - 99.0 FL  
 MCH 27.8 26.0 - 34.0 PG  
 MCHC 30.8 30.0 - 36.5 g/dL  
 RDW 18.4 (H) 11.5 - 14.5 % PLATELET 385 020 - 768 K/uL MPV 9.3 8.9 - 12.9 FL  
 NRBC 0.0 0  WBC ABSOLUTE NRBC 0.00 0.00 - 0.01 K/uL NEUTROPHILS 84 (H) 32 - 75 % LYMPHOCYTES 8 (L) 12 - 49 % MONOCYTES 4 (L) 5 - 13 % EOSINOPHILS 3 0 - 7 % BASOPHILS 0 0 - 1 % IMMATURE GRANULOCYTES 1 (H) 0.0 - 0.5 % ABS. NEUTROPHILS 4.2 1.8 - 8.0 K/UL  
 ABS. LYMPHOCYTES 0.4 (L) 0.8 - 3.5 K/UL  
 ABS. MONOCYTES 0.2 0.0 - 1.0 K/UL  
 ABS. EOSINOPHILS 0.2 0.0 - 0.4 K/UL  
 ABS. BASOPHILS 0.0 0.0 - 0.1 K/UL  
 ABS. IMM. GRANS. 0.1 (H) 0.00 - 0.04 K/UL  
 DF SMEAR SCANNED    
 RBC COMMENTS ANISOCYTOSIS 1+ 
    
 RBC COMMENTS OVALOCYTES PRESENT 
    
 WBC COMMENTS BASOPHILIC STIPPLING    
BLOOD GAS, ARTERIAL Collection Time: 01/30/21  5:46 AM  
Result Value Ref Range pH 7.37 7.35 - 7.45    
 PCO2 39 35 - 45 mmHg PO2 79 (L) 80 - 100 mmHg O2 SAT 96 92 - 97 % BICARBONATE 22 22 - 26 mmol/L  
 BASE DEFICIT 2.8 mmol/L  
 O2 METHOD VENT    
 FIO2 60 % MODE ASSIST CONTROL Tidal volume 550.0 SET RATE 18 PEEP/CPAP 10.0 Sample source ARTERIAL    
 SITE RIGHT BRACHIAL JUVENTINO'S TEST YES    
GLUCOSE, POC Collection Time: 01/30/21  6:01 AM  
Result Value Ref Range Glucose (POC) 128 (H) 65 - 100 mg/dL Performed by CARLOS Soria MD 
[address] [place] [address], [facility] Phone - [phone] Fax - [phone] 
[email or web address]

## 2021-01-30 NOTE — PROGRESS NOTES
[facility] 
 
ICU TEAM Progress Note Name: Skylar Ford III  
: 1950 MRN: [id number] Date: 2021 Subjective:  
Progress Note: 2021 Reason for ICU Admission: Acute hypoxic respiratory failure due to COVID-19 pneumonia. Status post cardiac arrest on the medical floor Interval history: Mr. Galileo Aguilar is a 71 yo male w a PMH of prostate cancer s/p radiation, tobacco use disorder (1 ppd), CKD3a, gastric angioectasias,  IDDM, heart failure, HTN who was admitted with R foot osteomyelitis on . He was transferred to the ICU on  after experiencing a cardiac arrest in the [facility]. Patient has prolonged hospital course as he was admitted on  and sepsis due to osteomyelitis of the right foot Patient now admitted to ICU due to COVID pneumonia requiring intubation. Patient failed extubation on . Overnight Events:  
- Currently on 60%, PEEP 10 - unable to wean this am 
- anxious and non compliant with vent overnight, re-sedated this am. 
- Likely may need trach to continue vent weaning. 
- leg wound vac intact. Active Problem List:  
 
Problem List  Date Reviewed: 2020 Codes Class * (Principal) Osteomyelitis ([facility] ICD-10-CM: M86.9 ICD-9-CM: 730.20 Diabetic ulcer of right midfoot associated with type 2 diabetes mellitus, with fat layer exposed ([facility] ICD-10-CM: E11.621, T44.900 ICD-9-CM: 250.80, 707.14 PAD (peripheral artery disease) (HCC) ICD-10-CM: I73.9 ICD-9-CM: 443.9 Dependence on nicotine from cigarettes ICD-10-CM: F17.210 ICD-9-CM: 305.1 KATHI (acute kidney injury) ([facility] ICD-10-CM: N17.9 ICD-9-CM: 857. 9 Acute on chronic renal failure (HCC) ICD-10-CM: N17.9, N18.9 ICD-9-CM: 584.9, 585.9 Severe obesity (BMI 35.0-39. 9) with comorbidity ([address] ICD-10-CM: E66.01 
ICD-9-CM: 278.01  Type 2 diabetes with nephropathy (HCC) ICD-10-CM: E11.21 
 ICD-9-CM: 250.40, 583.81 S/P hip replacement, right ICD-10-CM: J48.845 ICD-9-CM: V43.64 Stage 3 chronic kidney disease ICD-10-CM: N18.30 ICD-9-CM: 053. 3 Prostate CA [facility]) ICD-10-CM: S33 ICD-9-CM: 841 Diabetic polyneuropathy ([facility] ICD-10-CM: E11.42 
ICD-9-CM: 250.60, 357.2 Rotator Cuff Tendonitis ICD-10-CM: M71.9, M67.919 ICD-9-CM: 726.10 Diabetes mellitus type 2, controlled ([facility] ICD-10-CM: E11.9 ICD-9-CM: 250.00 Degenerative disc disease ICD-10-CM: [id number] ICD-9-CM: 722.6 Osteoarthrosis involving lower leg ICD-10-CM: M17.10 ICD-9-CM: 715.96 Depression/ Anxiety ICD-10-CM: F34.1 ICD-9-CM: 300.4 HTN (hypertension) ICD-10-CM: I10 
ICD-9-CM: 401.9 Chronic hip pain ICD-10-CM: M25.559, G89.29 ICD-9-CM: 719.45, 338.29 Hypertriglyceridemia ICD-10-CM: E78.1 ICD-9-CM: 272.1 Mild Aortic Insufficiency ICD-10-CM: I35.1 ICD-9-CM: 424.1 Mild Concentric LVH (left ventricular hypertrophy) ICD-10-CM: I51.7 ICD-9-CM: 429.3 Objective:  
Vital Signs: 
Visit Vitals BP (!) 123/58 Pulse (!) 54 Temp (!) 96.5 °F (35.8 °C) Comment: Bear Hugger On Resp 18 Ht 6' (1.829 m) Wt 115.8 kg (255 lb 4.7 oz) SpO2 94% BMI 34.62 kg/m² O2 Flow Rate (L/min): 15 l/min(Mid flow at 9 liters also) O2 Device: Endotracheal tube, Ventilator Temp (24hrs), Av.9 °F (36.1 °C), Min:96.5 °F (35.8 °C), Max:97.3 °F (36.3 °C) Intake/Output:  
 
Intake/Output Summary (Last 24 hours) at 2021 1540 Last data filed at 2021 1000 Gross per 24 hour Intake 1241.78 ml Output 1440 ml Net -198.22 ml Physical Exam: 
 
General: Sedated and intubated- will wake up and interact to voice, NAD Eye: PERRLA Neurologic: Patient will open eyes to voice, follow commands, equal strength throughout Neck:Supple, no JVD Lungs: Diminished lung sounds Heart: RRR, normal S1/S2, 1+ edema in BLE 
 Abdomen:  Soft, non-tender, non-distended Skin: Wound vac to right lateral calf- draining serous/tan fluid; BLE with chronic skin changes 
  
 
LABS AND  DATA: Personally reviewed Recent Labs  
  01/30/21 
8533 01/29/21 
0536 WBC 5.1 4.9 HGB 7.3* 7.3* HCT 23.7* 23.0*  
 224 Recent Labs  
  01/30/21 
4895 01/29/21 
0537 01/29/21 
0536 01/28/21 
0447 NA  --  139  --  139 K  --  3.0*  --  3.5 CL  --  99  --  101 CO2  --  25  --  25 BUN  --  64*  --  63* CREA  --  2.59*  --  2.78* GLU  --  124*  --  122* CA  --  8.4*  --  8.0*  
MG 2.3  --  2.5* 2.4 No results for input(s): AP, TBIL, TP, ALB, GLOB, AML, LPSE in the last 72 hours. No lab exists for component: SGOT, GPT, AMYP No results for input(s): INR, PTP, APTT, INREXT, INREXT in the last 72 hours. No results for input(s): PHI, PCO2I, PO2I, FIO2I in the last 72 hours. No results for input(s): CPK, CKMB, TROIQ, BNPP in the last 72 hours. Ventilator Settings: 
Mode Rate Tidal Volume Pressure FiO2 PEEP Assist control   550 ml    60 % 10 cm H20 Peak airway pressure: 25 cm H2O Minute ventilation: 8 l/min MEDS: Reviewed Chest X-Ray: CXR Results  (Last 48 hours) 01/30/21 0649  XR CHEST PORT Final result Impression:  No significant change. Narrative:  INDICATION: COVID PNA EXAMINATION:  AP CHEST, PORTABLE  
   
COMPARISON: January 26, 2021 FINDINGS: Single AP portable view of the chest demonstrates no change in  
position of the lines and tubes. The cardiomediastinal silhouette is unchanged. Persistent diffuse bilateral airspace disease. No pneumothorax. Assessment and Plan:  
Septic shock: Mulitfactorial likely due to COVID-19 and SSTI. - Currently on broad spectrum antibiotics. Off levophed. On 12/30 cultures from wound, patient growing proteus and GBS 
- Patient on Zyvox (day 12 of 14) and Zosyn. Appreciate infectious disease input - Hydrocortisone to 50mg q12h until 1/31 - then will discontinue - Sedating with fentanyl, propofol, and midazolam- wean propofol as tolerated - Lung protective ventilation - Follow up on blood and sputum cultures that were sent on 1/23, blood cx no growth x 4 days, sputum light candida with result of normal suri. 
  
Acute Hypoxic Respiratory Failure secondary to COVID Pneumonia:  
- He also demonstrated dyspnea on initial admission, but was negative for COVID. Initially treated with steroids.   
- On 1/11, he developed increased oxygen needs. He was COVID positive. - He was started on and completed treatment with steroids (completed 1/21), remdesivir (completed 1/16), zinc, and vitamin C.  
- Intubated 1/23. - Extubated and re-intubated on 1/26.  
- Wean vent settings as tolerated. - On ARDS volume ventilation. 
- Steroids as above. - Continue Zyvox and zosyn. - Sputum and blood cultures negative to date. 
  
HFpEF w exacerbation: Elevated BNP [difficult to interpret given his kidney function] echo done and report as below · LV: Estimated LVEF is 45 - 50%. Biplane method used to measure ejection fraction. Normal cavity size. Moderate concentric hypertrophy. · RV: Mildly dilated right ventricle. Mildly reduced systolic function. Acute on chronic CKD3a:  
- Baseline Cr 1.7. Renal has followed throughout his course. Appreciate nephrology input. DM type II:  
- Continue Lantus 8 units nightly 
- Continue SSI q6h Anemia:  
-Acute on chronic.  
- Received 1 packed RBC on January 24 and 28 with appropriate response.  
-No evidence of blood loss  Continue to monitor and transfuse for hemoglobin below 7.0 
  
Deconditioning: Unable to participate in PT/OT 
  
Prostate cancer: Holding casodex as this cannot be crushed and given via OGT 
 
 
DISPOSITION Stay in ICU CRITICAL CARE CONSULTANT NOTE I had a face to face encounter with the patient, reviewed and interpreted patient data including clinical events, labs, images, vital signs, I/O's, and examined patient. I have discussed the case and the plan and management of the patient's care with the consulting services, the bedside nurses and the respiratory therapist.   
 
NOTE OF PERSONAL INVOLVEMENT IN CARE This patient has a high probability of imminent, clinically significant deterioration, which requires the highest level of preparedness to intervene urgently. I participated in the decision-making and personally managed or directed the management of the following life and organ supporting interventions that required my frequent assessment to treat or prevent imminent deterioration. I personally spent 40 minutes of critical care time. This is time spent at this critically ill patient's bedside actively involved in patient care as well as the coordination of care and discussions with the patient's family. This does not include any procedural time which has been billed separately. Ariel Muhammad [facility] Critical Care Medicine [facility] [place] [facility]

## 2021-01-31 NOTE — PROGRESS NOTES
Nephrology Progress Note Dick Co III Date of Admission : 12/28/2020 CC: Follow up for KATHI on CKD Assessment and Plan KATHI on CKD: 
- 2/2 ATN  
- Cr rising but overall relatively stable for his degree of CKD 
- ordered IV albumin  
- resume diuretics this afternoon - No emergent need for dialysis Resp failure: 
- on the vent 
- from COVID-19 + volume  
- diurese as above Hypokalemia: 
-replete PRN  
 
CKD 4 
-  Presumed 2/2 DM and HTN 
- nephrotic range proteinuria 
- baseline Cr 1.7 mg/dl  
- followed by Dr. Tavarez Means COVID-19 PNA: 
- positive on 1/11 
- s/p decadron and remdesivir 
  
Hx of prostate cancer s/p XRT 
  
Metabolic acidosis: 
- resolved 
  
Anemia in CKD + blood loss: 
- cont JAZ 
- EGD 1/5/2021: gastric fundal lesion  
- transfuse PRN 
  
Type II DM: 
- on insulin 
  
HTN :  
- BP stable now 
  
R foot osteo: 
- on abx 
- s/p debridement on 12/30 Group B strep bacteremia Interval History: Not examined in room Sedated on the vent. Stable on vent CXR slightly better afrer diuresis Current Medications: all current  Medications have been eviewed in [facility] Review of Systems: A comprehensive review of systems was negative except for that written in the HPI. Objective: 
Vitals:   
Vitals:  
 01/31/21 0536 01/31/21 0600 01/31/21 0700 01/31/21 0800 BP:  (!) 136/56 (!) 137/54 (!) 151/57 Pulse: (!) 44 (!) 42 (!) 43 (!) 44 Resp: 18 18 18 18 Temp:      
SpO2: 94% 94% 96% 97% Weight:      
Height:      
 
Intake and Output: 
No intake/output data recorded. 01/29 1901 - 01/31 0700 In: 3601.2 [I.V.:2591.2] Out: 4474 [Urine:1368; Drains:275] Physical Examination: 
GEN: ON VENT 
NECK- ET tube + RESP: no distress NEURO:Can't access due to patient's current condition Exam deferred due to COVID-19 infection in order to preserve PPE AND minimize risk of  
transmission to dialysis and renal transplant patient per ASN and RPA guidelines: []    High complexity decision making was performed 
[]    Patient is at high-risk of decompensation with multiple organ involvement Lab Data Personally Reviewed: I have reviewed all the pertinent labs, microbiology data and radiology studies during assessment. Recent Labs  
  01/31/21 0417 01/30/21 
6450 01/29/21 
0537 01/29/21 
0536   --  139  --   
K 2.9*  --  3.0*  --   
  --  99  --   
CO2 27  --  25  --   
*  --  124*  --   
BUN 73*  --  64*  --   
CREA 2.77*  --  2.59*  --   
CA 8.5  --  8.4*  --   
MG 2.3 2.3  --  2.5* Recent Labs  
  01/31/21 0417 01/30/21 
0339 01/29/21 
0536 WBC 5.4 5.1 4.9 HGB 7.9* 7.3* 7.3* HCT 26.2* 23.7* 23.0*  
 231 224 No results found for: SDES Lab Results Component Value Date/Time Culture result: NO GROWTH 5 DAYS 01/23/2021 03:37 PM  
 Culture result: LIGHT YEAST, (APPARENT CANDIDA ALBICANS) (A) 01/23/2021 02:45 PM  
 Culture result: NO NORMAL RESPIRATORY MICHELLE ISOLATED 01/23/2021 02:45 PM  
 
Recent Results (from the past 24 hour(s)) GLUCOSE, POC Collection Time: 01/30/21  1:17 PM  
Result Value Ref Range Glucose (POC) 119 (H) 65 - 100 mg/dL Performed by [employer] GLUCOSE, POC Collection Time: 01/30/21  5:38 PM  
Result Value Ref Range Glucose (POC) 89 65 - 100 mg/dL Performed by [employer] GLUCOSE, POC Collection Time: 01/31/21 12:22 AM  
Result Value Ref Range Glucose (POC) 140 (H) 65 - 100 mg/dL Performed by [employer] D DIMER Collection Time: 01/31/21  4:17 AM  
Result Value Ref Range D-dimer 1.87 (H) 0.00 - 0.65 mg/L FEU MAGNESIUM Collection Time: 01/31/21  4:17 AM  
Result Value Ref Range Magnesium 2.3 1.6 - 2.4 mg/dL METABOLIC PANEL, BASIC Collection Time: 01/31/21  4:17 AM  
Result Value Ref Range Sodium 137 136 - 145 mmol/L Potassium 2.9 (L) 3.5 - 5.1 mmol/L  Chloride 103 97 - 108 mmol/L  
 CO2 27 21 - 32 mmol/L  
  Anion gap 7 5 - 15 mmol/L  
 Glucose 103 (H) 65 - 100 mg/dL  
 BUN 73 (H) 6 - 20 MG/DL  
 Creatinine 2.77 (H) 0.70 - 1.30 MG/DL  
 BUN/Creatinine ratio 26 (H) 12 - 20    
 GFR est AA 28 (L) >60 ml/min/1.73m2  
 GFR est non-AA 23 (L) >60 ml/min/1.73m2  
 Calcium 8.5 8.5 - 10.1 MG/DL  
CBC WITH AUTOMATED DIFF  
 Collection Time: 01/31/21  4:17 AM  
Result Value Ref Range  
 WBC 5.4 4.1 - 11.1 K/uL  
 RBC 2.86 (L) 4.10 - 5.70 M/uL  
 HGB 7.9 (L) 12.1 - 17.0 g/dL  
 HCT 26.2 (L) 36.6 - 50.3 %  
 MCV 91.6 80.0 - 99.0 FL  
 MCH 27.6 26.0 - 34.0 PG  
 MCHC 30.2 30.0 - 36.5 g/dL  
 RDW 18.1 (H) 11.5 - 14.5 %  
 PLATELET 274 150 - 400 K/uL  
 MPV 9.5 8.9 - 12.9 FL  
 NRBC 0.0 0  WBC  
 ABSOLUTE NRBC 0.00 0.00 - 0.01 K/uL  
 NEUTROPHILS 82 (H) 32 - 75 %  
 LYMPHOCYTES 9 (L) 12 - 49 %  
 MONOCYTES 5 5 - 13 %  
 EOSINOPHILS 3 0 - 7 %  
 BASOPHILS 0 0 - 1 %  
 IMMATURE GRANULOCYTES 1 (H) 0.0 - 0.5 %  
 ABS. NEUTROPHILS 4.3 1.8 - 8.0 K/UL  
 ABS. LYMPHOCYTES 0.5 (L) 0.8 - 3.5 K/UL  
 ABS. MONOCYTES 0.3 0.0 - 1.0 K/UL  
 ABS. EOSINOPHILS 0.2 0.0 - 0.4 K/UL  
 ABS. BASOPHILS 0.0 0.0 - 0.1 K/UL  
 ABS. IMM. GRANS. 0.1 (H) 0.00 - 0.04 K/UL  
 DF SMEAR SCANNED    
 RBC COMMENTS ANISOCYTOSIS 
1+ 
    
 RBC COMMENTS MACROCYTOSIS 
1+ 
    
 RBC COMMENTS BASOPHILIC STIPPLING 
1+ 
    
BLOOD GAS, ARTERIAL  
 Collection Time: 01/31/21  5:23 AM  
Result Value Ref Range  
 pH 7.36 7.35 - 7.45    
 PCO2 45 35 - 45 mmHg  
 PO2 162 (H) 80 - 100 mmHg  
 O2 SAT 99 (H) 92 - 97 %  
 BICARBONATE 25 22 - 26 mmol/L  
 BASE DEFICIT 1.2 mmol/L  
 O2 METHOD VENT    
 FIO2 100 %  
 MODE ASSIST CONTROL    
 Tidal volume 550.0    
 SET RATE 18    
 PEEP/CPAP 12.0    
 Sample source ARTERIAL    
 SITE RIGHT RADIAL    
 JUVENTINO'S TEST YES    
GLUCOSE, POC  
 Collection Time: 01/31/21  6:27 AM  
Result Value Ref Range  
 Glucose (POC) 97 65 - 100 mg/dL  
 Performed by Betina Singh MD 
 [address] [place] [address], [facility] Phone - [phone] Fax - [phone] 
[email or web address]

## 2021-01-31 NOTE — PROGRESS NOTES
[facility] 
 
ICU TEAM Progress Note Name: Dulce Marti III  
: 1950 MRN: [id number] Date: 2021 Subjective:  
Progress Note: 2021 Reason for ICU Admission: Acute hypoxic respiratory failure due to COVID-19 pneumonia. Status post cardiac arrest on the medical floor Interval history: Mr. Caitlin Alcantar is a 73 yo male w a PMH of prostate cancer s/p radiation, tobacco use disorder (1 ppd), CKD3a, gastric angioectasias,  IDDM, heart failure, HTN who was admitted with R foot osteomyelitis on . He was transferred to the ICU on  after experiencing a cardiac arrest in the [facility]. Patient has prolonged hospital course as he was admitted on  and sepsis due to osteomyelitis of the right foot Patient now admitted to ICU due to COVID pneumonia requiring intubation. Patient failed extubation on . Overnight Events:  
- Required increase in vent support, now on PEEP 12 and 100% FiO2 Active Problem List:  
 
Problem List  Date Reviewed: 2020 Codes Class * (Principal) Osteomyelitis ([facility] ICD-10-CM: M86.9 ICD-9-CM: 730.20 Diabetic ulcer of right midfoot associated with type 2 diabetes mellitus, with fat layer exposed ([facility] ICD-10-CM: E11.621, W10.199 ICD-9-CM: 250.80, 707.14 PAD (peripheral artery disease) ([facility]) ICD-10-CM: I73.9 ICD-9-CM: 443.9 Dependence on nicotine from cigarettes ICD-10-CM: F17.210 ICD-9-CM: 305.1 KATHI (acute kidney injury) ([facility] ICD-10-CM: N17.9 ICD-9-CM: 770. 9 Acute on chronic renal failure (HCC) ICD-10-CM: N17.9, N18.9 ICD-9-CM: 584.9, 585.9 Severe obesity (BMI 35.0-39. 9) with comorbidity ([facility] ICD-10-CM: E66.01 
ICD-9-CM: 278.01 Type 2 diabetes with nephropathy (HCC) ICD-10-CM: E11.21 
ICD-9-CM: 250.40, 583.81 S/P hip replacement, right ICD-10-CM: K45.354 ICD-9-CM: V43.64   
   
  Stage 3 chronic kidney disease ICD-10-CM: N18.30 
ICD-9-CM: 585.3   
   
 Prostate CA (HCC) ICD-10-CM: C61 
ICD-9-CM: 185   
   
 Diabetic polyneuropathy (HCC) ICD-10-CM: E11.42 
ICD-9-CM: 250.60, 357.2   
   
 Rotator Cuff Tendonitis ICD-10-CM: M71.9, M67.919 
ICD-9-CM: 726.10   
   
 Diabetes mellitus type 2, controlled (HCC) ICD-10-CM: E11.9 
ICD-9-CM: 250.00   
   
 Degenerative disc disease ICD-10-CM: [id number] 
ICD-9-CM: 722.6   
   
 Osteoarthrosis involving lower leg ICD-10-CM: M17.10 
ICD-9-CM: 715.96   
   
 Depression/ Anxiety ICD-10-CM: F34.1 
ICD-9-CM: 300.4   
   
 HTN (hypertension) ICD-10-CM: I10 
ICD-9-CM: 401.9   
   
 Chronic hip pain ICD-10-CM: M25.559, G89.29 
ICD-9-CM: 719.45, 338.29   
   
 Hypertriglyceridemia ICD-10-CM: E78.1 
ICD-9-CM: 272.1   
   
 Mild Aortic Insufficiency ICD-10-CM: I35.1 
ICD-9-CM: 424.1   
   
 Mild Concentric LVH (left ventricular hypertrophy) ICD-10-CM: I51.7 
ICD-9-CM: 429.3   
   
  
 
 
Objective:  
Vital Signs: 
Visit Vitals 
BP (!) 151/57  
Pulse (!) 44  
Temp 97.4 °F (36.3 °C)  
Resp 18  
Ht 6' (1.829 m)  
Wt 116.2 kg (256 lb 2.8 oz)  
SpO2 97%  
BMI 34.74 kg/m²  
 O2 Flow Rate (L/min): 15 l/min(Mid flow at 9 liters also) O2 Device: Endotracheal tube, Ventilator Temp (24hrs), Av.2 °F (36.8 °C), Min:97.4 °F (36.3 °C), Max:99 °F (37.2 °C) 
 
    
 
Intake/Output:  
 
Intake/Output Summary (Last 24 hours) at 2021 0846 
Last data filed at 2021 0700 
Gross per 24 hour  
Intake 2729.05 ml  
Output 763 ml  
Net 1966.05 ml  
 
 
Physical Exam: 
 
General: Sedated and intubated- will wake up and interact to voice, NAD 
Eye: PERRLA 
Neurologic: Patient will open eyes to voice, follow commands, equal strength throughout 
Neck:Supple, no JVD 
Lungs: Diminished lung sounds 
Heart: RRR, normal S1/S2, 1+ edema in BLE 
Abdomen:  Soft, non-tender, non-distended 
Skin: Wound vac to right lateral calf- draining serous/tan fluid; BLE with chronic skin changes 
  
 
 LABS AND  DATA: Personally reviewed Recent Labs  
  01/31/21 0417 01/30/21 
8959 WBC 5.4 5.1 HGB 7.9* 7.3* HCT 26.2* 23.7*  
 231 Recent Labs  
  01/31/21 
0417 01/30/21 
1524 01/29/21 
4294   --  139  
K 2.9*  --  3.0*  
  --  99  
CO2 27  --  25  
BUN 73*  --  64* CREA 2.77*  --  2.59* *  --  124* CA 8.5  --  8.4* MG 2.3 2.3  -- No results for input(s): AP, TBIL, TP, ALB, GLOB, AML, LPSE in the last 72 hours. No lab exists for component: SGOT, GPT, AMYP No results for input(s): INR, PTP, APTT, INREXT, INREXT in the last 72 hours. No results for input(s): PHI, PCO2I, PO2I, FIO2I in the last 72 hours. No results for input(s): CPK, CKMB, TROIQ, BNPP in the last 72 hours. Ventilator Settings: 
Mode Rate Tidal Volume Pressure FiO2 PEEP Assist control, Volume control   550 ml    60 %(weaned from 85%) 10 cm H20 Peak airway pressure: 26 cm H2O Minute ventilation: 10.3 l/min MEDS: Reviewed Chest X-Ray: CXR Results  (Last 48 hours) 01/31/21 0504  XR CHEST PORT Final result Impression:  1. Mild interval improvement in bilateral airspace disease. Narrative:  EXAM:  XR CHEST PORT INDICATION:   Hypoxia COMPARISON: Chest radiograph 1/30/2021. FINDINGS: AP radiograph of the chest was obtained. Stable support tubes and lines. No pneumothorax. Mild interval improvement in  
bilateral airspace disease. Unchanged cardiomegaly. 01/30/21 0649  XR CHEST PORT Final result Impression:  No significant change. Narrative:  INDICATION: COVID PNA EXAMINATION:  AP CHEST, PORTABLE  
   
COMPARISON: January 26, 2021 FINDINGS: Single AP portable view of the chest demonstrates no change in  
position of the lines and tubes. The cardiomediastinal silhouette is unchanged. Persistent diffuse bilateral airspace disease. No pneumothorax. Assessment and Plan: Septic shock: Mulitfactorial likely due to COVID-19 and SSTI. - Currently on broad spectrum antibiotics. Off levophed. On 12/30 cultures from wound, patient growing proteus and GBS 
- Patient on Zyvox (day 12 of 14) and Zosyn. Appreciate infectious disease input - Hydrocortisone to 50mg q12h to finish today 1/31/21 
- Sedating with fentanyl, propofol, and midazolam- wean propofol as tolerated - Lung protective ventilation, titrate down to maintain sats greater than 92% - Follow up on blood and sputum cultures that were sent on 1/23, blood cx no growth x 4 days, sputum light candida with result of normal suri. 
  
Acute Hypoxic Respiratory Failure secondary to COVID Pneumonia:  
- He also demonstrated dyspnea on initial admission, but was negative for COVID. Initially treated with steroids.   
- On 1/11, he developed increased oxygen needs. He was COVID positive. - He was started on and completed treatment with steroids (completed 1/21), remdesivir (completed 1/16), zinc, and vitamin C.  
- Intubated 1/23. - Extubated and re-intubated on 1/26.  
- Wean vent settings as tolerated. - On ARDS volume ventilation. 
- Steroids as above. - Continue Zyvox and zosyn. - Sputum and blood cultures negative to date. 
  
HFpEF w exacerbation: Elevated BNP [difficult to interpret given his kidney function] echo done and report as below · LV: Estimated LVEF is 45 - 50%. Biplane method used to measure ejection fraction. Normal cavity size. Moderate concentric hypertrophy. · RV: Mildly dilated right ventricle. Mildly reduced systolic function. · Continue diuresis Acute on chronic CKD3a:  
- Baseline Cr 1.7. Renal has followed throughout his course. Appreciate nephrology input. DM type II:  
- Continue Lantus 8 units nightly 
- Continue SSI q6h Anemia:  
-Acute on chronic.  
- Received 1 packed RBC on January 24 and 28 with appropriate response. -No evidence of blood loss  Continue to monitor and transfuse for hemoglobin below 7.0 
  
Deconditioning: Unable to participate in PT/OT 
  
Prostate cancer: Holding casodex as this cannot be crushed and given via OGT 
 
 
DISPOSITION Stay in ICU CRITICAL CARE CONSULTANT NOTE I had a face to face encounter with the patient, reviewed and interpreted patient data including clinical events, labs, images, vital signs, I/O's, and examined patient. I have discussed the case and the plan and management of the patient's care with the consulting services, the bedside nurses and the respiratory therapist.   
 
NOTE OF PERSONAL INVOLVEMENT IN CARE This patient has a high probability of imminent, clinically significant deterioration, which requires the highest level of preparedness to intervene urgently. I participated in the decision-making and personally managed or directed the management of the following life and organ supporting interventions that required my frequent assessment to treat or prevent imminent deterioration. I personally spent 50 minutes of critical care time. This is time spent at this critically ill patient's bedside actively involved in patient care as well as the coordination of care and discussions with the patient's family. This does not include any procedural time which has been billed separately. Samuel Santos [facility] Critical Care Medicine [facility]

## 2021-01-31 NOTE — PROGRESS NOTES
1930: Bedside and Verbal shift change report given to [address] (oncoming nurse) by Luis Felipe Ponce RN (offgoing nurse). Report included the following information SBAR, Kardex, ED Summary, Intake/Output, MAR, Recent Results, Cardiac Rhythm SB and Alarm Parameters . 0325: Pt O2 sats decreased to 80s during bath. Pt sat upright, suctioned, FiO2 increased to 100% for 2 min. Pt still not recovering; RT called to bedside. 0340: RT adjusted FiO2 to 90%; pt O2 sats between 88-91%. Called Dylan NP. Orders received for a STAT CXR and to increase PEEP. RT adjusted PEEP to 12; FiO2 at 90%. 0530: RT weaned vent settings, FiO2 85%. 0630: RT weaned vent settings, FiO2 60% PEEP 10. 
 
0830: Bedside and Verbal shift change report given to [address] (oncoming nurse) by Cherelle Fermin RN (offgoing nurse). Report included the following information SBAR, Kardex, Intake/Output, MAR, Recent Results, Cardiac Rhythm SB and Alarm Parameters .

## 2021-01-31 NOTE — PROGRESS NOTES
Bedside and Verbal shift change report given to [address] (oncoming nurse) by Bhavani Snyder (offgoing nurse). Report included the following information SBAR, Intake/Output and Cardiac Rhythm SB Primary Nurse Warner Posadas RN and Bhavani Snyder RN performed a dual skin assessment on this patient Impairment noted- see wound doc flow sheet Edgar score is 13 Bedside and Verbal shift change report given to Keegan Linda (oncoming nurse) by [address] (offgoing nurse).  Report included the following information SBAR, Intake/Output and Cardiac Rhythm SB.

## 2021-02-01 NOTE — PROGRESS NOTES
0730 Verbal shift change report given to [address] (oncoming nurse) by Martínez Lee RN (offgoing nurse). Report included the following information SBAR, Kardex, Procedure Summary, Intake/Output, MAR and Recent Results. 0800 Assessment complete. Turned. Mouth care. 0900 Levo restarted, MAP goal >70.  
 
1800 Neurologically unchanged from 0800.  
 
1930 Verbal shift change report given to [place] (oncoming nurse) by [address] (offgoing nurse). Report included the following information SBAR, Kardex, Intake/Output, MAR and Recent Results.

## 2021-02-01 NOTE — PROGRESS NOTES
ID Progress Note 2021 Subjective: Afebrile. On the ventilator. ROS: Unobtainable Objective:  
 
Vitals:  
Visit Vitals BP (!) 109/50 Pulse 67 Temp 99.3 °F (37.4 °C) Resp 18 Ht 6' (1.829 m) Wt 119.3 kg (263 lb 0.1 oz) SpO2 93% BMI 35.67 kg/m² Tmax:  Temp (24hrs), Av.2 °F (36.2 °C), Min:94.5 °F (34.7 °C), Max:99.3 °F (37.4 °C) Exam: On the ventilator Sedated Not in distress Lung clear, no rales, wheezes or rhonchi Heart: s1, s2, RRR, no murmurs rubs or clicks Abdomen: soft nontender, no guarding or rebound Labs:  
Lab Results Component Value Date/Time WBC 4.3 2021 06:16 AM  
 Hemoglobin (POC) 6.5 2020 01:59 PM  
 HGB 7.3 (L) 2021 06:16 AM  
 HCT 23.7 (L) 2021 06:16 AM  
 PLATELET 707  06:16 AM  
 MCV 90.5 2021 06:16 AM  
 
Lab Results Component Value Date/Time Sodium 138 2021 06:16 AM  
 Potassium 3.0 (L) 2021 06:16 AM  
 Chloride 103 2021 06:16 AM  
 CO2 25 2021 06:16 AM  
 Anion gap 10 2021 06:16 AM  
 Glucose 81 2021 06:16 AM  
 BUN 72 (H) 2021 06:16 AM  
 Creatinine 2.68 (H) 2021 06:16 AM  
 BUN/Creatinine ratio 27 (H) 2021 06:16 AM  
 GFR est AA 29 (L) 2021 06:16 AM  
 GFR est non-AA 24 (L) 2021 06:16 AM  
 Calcium 8.2 (L) 2021 06:16 AM  
 Bilirubin, total 0.5 2021 05:26 AM  
 Alk. phosphatase 79 2021 05:26 AM  
 Protein, total 6.9 2021 05:26 AM  
 Albumin 2.6 (L) 2021 05:26 AM  
 Globulin 4.3 (H) 2021 05:26 AM  
 A-G Ratio 0.6 (L) 2021 05:26 AM  
 ALT (SGPT) 11 (L) 2021 05:26 AM  
 
 
 
 
 
Assessment:  
 
#1 osteomyelitis of the right foot 
  
#2 group b strep  bacteremia 
  
#3 renal insufficiency 
  
#4 diabetes 
  
#5 prostate cancer 
  
#6 hypertension 
  
#7 heart failure 
  
 #8 covid  
  
#9 diarrhea Recommendations: Continue zosyn. There is OM on the surgical margin. He will need prolonged therapy. Orders written. Last day slated for feb 12, 2021 
  
He has completed treatment with remdesivir.  (1/121/16) 
  He has completed dexamethasone (1/12 - 1/21)  Completed 14 day course of zyvox on 1/31.   
 
 
Carlos Landaverde MD

## 2021-02-01 NOTE — ROUTINE PROCESS
Chart reviewed, patient received on vent. Per ABCDEF protocol, will work with patient when PEEP is 10.0 or less, FIO2 60% or less (currently PEEP 10.0 and FIO2 75%), and patient is following basic commands. Will follow patient peripherally. Recommend nursing to complete with patient, as able, in order to promote cardiopulmonary systems, maintain strength, endurance and independence:  
-bed in chair position with foot board on 3x/day ~30-60 mins each and/OR reverse trendelenburg with foot board on and non-skid footwear 
-passive ROM during bathing B UEs and LEs 
-positioning to prevent contractures and edema. Thank you for your assistance. Willi Ellison MS, OTR/L

## 2021-02-01 NOTE — PROGRESS NOTES
Bedside and Verbal shift change report given to [address]  (oncoming nurse) by Laura Terrazas (offgoing nurse). Report included the following information SBAR, Intake/Output and Cardiac Rhythm SB. Noted 7 beat run of Vtach bp stable at that time. Will Kuhn made aware. No order or intervention. Patient resting when observed . Bedside and Verbal shift change report given to [address] (oncoming nurse) by [address] (offgoing nurse).  Report included the following information SBAR, Intake/Output and Cardiac Rhythm SB.

## 2021-02-01 NOTE — PROGRESS NOTES
1930: Bedside and Verbal shift change report given to TARUN Reddy, RN (oncoming nurse) by MICHELLE Deng RN (offgoing nurse). Report included the following information SBAR, Kardex, Intake/Output, MAR, Accordion, Recent Results, Med Rec Status, Cardiac Rhythm sinus bradycardia, Alarm Parameters  and Dual Neuro Assessment. 2200:  RN at bedside to bathe patient. Patient turned to left side and O2 sats dropped to 70's. Vigorous suction performed and resolution of sats within 1-2 minutes. 0730: Bedside and Verbal shift change report given to CLAU Logan and Bailey Maravilla, RUBA (oncoming nurse) by Milka Reddy RN (offgoing nurse). Report included the following information SBAR, Kardex, Intake/Output, MAR, Accordion, Recent Results, Cardiac Rhythm NSR/SB, Alarm Parameters  and Dual Neuro Assessment.

## 2021-02-01 NOTE — PROGRESS NOTES
Chart reviewed, patient received sedated and on vent. Per ABCDEF protocol, will work with patient when PEEP is 10.0 or less, FIO2 60% or less (currently FiO2 75% and PEEP 10), and patient is following basic commands. Will follow patient peripherally. Recommend nursing to complete with patient, as able, in order to promote cardiopulmonary systems, maintain strength, endurance and independence:  
-bed in chair position with foot board on 3x/day 30-60 mins max each and/OR reverse trendelenburg with foot board on and non-skid footwear 
-passive ROM B UEs and LEs during bathing to prevent contractures 
-positioning to prevent edema and contractures. Thank you for your assistance.

## 2021-02-01 NOTE — PROGRESS NOTES
Nephrology Progress Note Sarah Herron III Date of Admission : 12/28/2020 CC: Follow up for KATHI on CKD Assessment and Plan KATHI on CKD: 
- presumed ATN  
- euvolemic on exam. Acid base/ Lytes : Ok 
- diuretic dependent for CKD-- continue Bumex 1 mg BID  
- IV albumin 12.5 gm Q12 
- Increased FW flushes 
- labs daily - Keep kumar  
- no emergent need for RRT Resp failure: 
COVID-19 PNA: positive on 1/11 
- s/p decadron and remdesivir 
- on the vent 
- diurese as above CKD 4 
-  Presumed 2/2 DM and HTN 
- nephrotic range proteinuria 
- baseline Cr 1.7 mg/dl  
- followed by Dr. Karla Eli at [facility] Hx of prostate cancer s/p XRT 
  
Anemia in CKD + blood loss: 
- cont JAZ 
- EGD 1/5/2021: gastric fundal lesion  
- transfuse PRN 
  
Type II DM: 
- on insulin 
  
HTN :  
- BP stable now 
  
R foot osteo: 
- on abx 
- s/p debridement on 12/30 Group B strep bacteremia Interval History: 
Seen and examined in room Cr slighly up.  cc MAP 60-65 for the most part On 75% Fio2 and PEEP at 10 Current Medications: all current  Medications have been eviewed in [facility] Review of Systems: A comprehensive review of systems was negative except for that written in the HPI. Objective: 
Vitals:   
Vitals:  
 02/01/21 0430 02/01/21 0500 02/01/21 0530 02/01/21 0600 BP: (!) 113/53 (!) 109/51 (!) 103/49 (!) 15/27 Pulse: (!) 51 (!) 53 (!) 55 (!) 55 Resp: 18 18 18 18 Temp:      
SpO2: 92% 93% 93% 92% Weight:      
Height:      
 
Intake and Output: 
No intake/output data recorded. 01/30 1901 - 02/01 0700 In: 4514.5 [I.V.:3024.5] Out: 2158 [XGBTI:4701; YVWNKQ:777] Physical Examination: seen and examined on room GEN: ON VENT 
NECK- ET tube + RESP: no distress ABD :chronic distension, BS + 
EXT : MINIMAL NON PITTING EDEMA  
NEURO:Can't access due to patient's current condition Exam deferred due to COVID-19 infection in order to preserve PPE AND minimize risk of  
 transmission to dialysis and renal transplant patient per ASN and RPA guidelines: 
 
 
 
 
[]    High complexity decision making was performed 
[]    Patient is at high-risk of decompensation with multiple organ involvement Lab Data Personally Reviewed: I have reviewed all the pertinent labs, microbiology data and radiology studies during assessment. Recent Labs 02/01/21 
0485 01/31/21 
6026 01/30/21 
2453  137  --   
K 3.0* 2.9*  --   
 103  --   
CO2 25 27  --   
GLU 81 103*  --   
BUN 72* 73*  --   
CREA 2.68* 2.77*  --   
CA 8.2* 8.5  --   
MG 2.4 2.3 2.3 Recent Labs 02/01/21 
9759 01/31/21 
8169 01/30/21 
5694 WBC 4.3 5.4 5.1 HGB 7.3* 7.9* 7.3* HCT 23.7* 26.2* 23.7*  
 274 231 No results found for: SDES Lab Results Component Value Date/Time Culture result: NO GROWTH 5 DAYS 01/23/2021 03:37 PM  
 Culture result: LIGHT YEAST, (APPARENT CANDIDA ALBICANS) (A) 01/23/2021 02:45 PM  
 Culture result: NO NORMAL RESPIRATORY MICHELLE ISOLATED 01/23/2021 02:45 PM  
 
Recent Results (from the past 24 hour(s)) GLUCOSE, POC Collection Time: 01/31/21 12:08 PM  
Result Value Ref Range Glucose (POC) 106 (H) 65 - 100 mg/dL Performed by [employer] GLUCOSE, POC Collection Time: 01/31/21  6:06 PM  
Result Value Ref Range Glucose (POC) 107 (H) 65 - 100 mg/dL Performed by [employer] GLUCOSE, POC Collection Time: 02/01/21  1:34 AM  
Result Value Ref Range Glucose (POC) 102 (H) 65 - 100 mg/dL Performed by [facility] KATIE [address] [place] [address], ARTERIAL Collection Time: 02/01/21  3:26 AM  
Result Value Ref Range pH 7.36 7.35 - 7.45    
 PCO2 44 35 - 45 mmHg PO2 70 (L) 80 - 100 mmHg O2 SAT 93 92 - 97 % BICARBONATE 24 22 - 26 mmol/L  
 BASE DEFICIT 1.4 mmol/L  
 O2 METHOD VENT    
 FIO2 60 % MODE ASSIST CONTROL Tidal volume 550.0 SET RATE 18 PEEP/CPAP 10.0  Sample source ARTERIAL    
 SITE RIGHT RADIAL    
 JUVENTINO'S TEST YES    
 TEMPERATURE 37.0 GLUCOSE, POC Collection Time: 02/01/21  6:14 AM  
Result Value Ref Range Glucose (POC) 91 65 - 100 mg/dL Performed by [address] Collection Time: 02/01/21  6:16 AM  
Result Value Ref Range D-dimer 1.47 (H) 0.00 - 0.65 mg/L FEU  
CBC WITH AUTOMATED DIFF Collection Time: 02/01/21  6:16 AM  
Result Value Ref Range WBC 4.3 4.1 - 11.1 K/uL  
 RBC 2.62 (L) 4.10 - 5.70 M/uL HGB 7.3 (L) 12.1 - 17.0 g/dL HCT 23.7 (L) 36.6 - 50.3 % MCV 90.5 80.0 - 99.0 FL  
 MCH 27.9 26.0 - 34.0 PG  
 MCHC 30.8 30.0 - 36.5 g/dL  
 RDW 18.6 (H) 11.5 - 14.5 % PLATELET 629 071 - 553 K/uL MPV 9.3 8.9 - 12.9 FL  
 NRBC 0.5 (H) 0  WBC ABSOLUTE NRBC 0.02 (H) 0.00 - 0.01 K/uL NEUTROPHILS 76 (H) 32 - 75 % LYMPHOCYTES 11 (L) 12 - 49 % MONOCYTES 7 5 - 13 % EOSINOPHILS 4 0 - 7 % BASOPHILS 1 0 - 1 % IMMATURE GRANULOCYTES 1 (H) 0.0 - 0.5 % ABS. NEUTROPHILS 3.3 1.8 - 8.0 K/UL  
 ABS. LYMPHOCYTES 0.5 (L) 0.8 - 3.5 K/UL  
 ABS. MONOCYTES 0.3 0.0 - 1.0 K/UL  
 ABS. EOSINOPHILS 0.2 0.0 - 0.4 K/UL  
 ABS. BASOPHILS 0.0 0.0 - 0.1 K/UL  
 ABS. IMM. GRANS. 0.0 0.00 - 0.04 K/UL  
 DF SMEAR SCANNED    
 RBC COMMENTS POLYCHROMASIA PRESENT 
    
 RBC COMMENTS ANISOCYTOSIS 
1+ METABOLIC PANEL, BASIC Collection Time: 02/01/21  6:16 AM  
Result Value Ref Range Sodium 138 136 - 145 mmol/L Potassium 3.0 (L) 3.5 - 5.1 mmol/L Chloride 103 97 - 108 mmol/L  
 CO2 25 21 - 32 mmol/L Anion gap 10 5 - 15 mmol/L Glucose 81 65 - 100 mg/dL BUN 72 (H) 6 - 20 MG/DL Creatinine 2.68 (H) 0.70 - 1.30 MG/DL  
 BUN/Creatinine ratio 27 (H) 12 - 20 GFR est AA 29 (L) >60 ml/min/1.73m2 GFR est non-AA 24 (L) >60 ml/min/1.73m2 Calcium 8.2 (L) 8.5 - 10.1 MG/DL MAGNESIUM Collection Time: 02/01/21  6:16 AM  
Result Value Ref Range Magnesium 2.4 1.6 - 2.4 mg/dL IRON PROFILE  Collection Time: 02/01/21  6:16 AM  
 Result Value Ref Range Iron 43 35 - 150 ug/dL TIBC 167 (L) 250 - 450 ug/dL Iron % saturation 26 20 - 50 % Keegan Malin MD 
[address] [place] [address] Phone - [phone] Fax - [phone] 
[email or web address]

## 2021-02-01 NOTE — PROGRESS NOTES
[facility] 
 
ICU TEAM Progress Note Name: Steve Adam III  
: 1950 MRN: [id number] Date: 2021 Subjective:  
Progress Note: 2021 Mr. Loki Smith is a 73 yo male w a PMH of prostate cancer s/p radiation, tobacco use disorder (1 ppd), CKD3a, gastric angioectasias,  IDDM, heart failure, HTN who was admitted with R foot osteomyelitis on . He was transferred to the ICU today after experiencing a cardiac arrest in the [facility]. Please see below for a brief synopsis of his hospital course, by problem. 
  
 
Acute Hypoxic Respiratory failure secondary to COVID pneumonia: He also demonstrated dyspnea on initial admission, but was negative for COVID. Initially treated with steroids. On , he developed increased oxygen needs. He was COVID positive. He was started on and completed treatment with steroids (completed ), remdesivir (completed ), zinc, and vitamin C. He initially required BiPAP on , and was weaned to 4-5L/min. CT chest showed bilateral pleural effusions with ground glass. Diuresis was attempted. He was continued on zosyn. He required transfer to [facility] on 1/15 due to continued desaturations -> 70s on NRB. CXR with increased bilateral infiltrates. Started linezolid on . Required intubation on . See below under cardiac arrest. He was extubated on , but required reintubation the same day. He completed a course of empiric 14d zyvox (ended ) and repeated empiric stress dose steroids (ended ). He remains on prolonged zosyn for treatment of osteomyelitis. Respiratory cultures have remained NGTD. Cardiac arrest, s/p ROSC: Not cooled as he regained mental function s/p ROSC. Code time: reported to be 12 minutes The patient had been taken off BiPAP for lunch. He was on intermittent BiPAP and HiFlow, demonstrating stability to come off to eat. He ate his lunch and was later found SOB unresponsive and pulseless. He was thereafter transferred to the ICU. He regained mentation, was able to respond to commands, shake head/yes, no, but ultimately did require sedation for compliance with mechanical ventilation. Likely hypoxic arrest. 
Septic shock: Osteomyelitis has been only identified source of infection. Please see above under respiratory failure for full courses of abx. Has been on/off pressors since ICU admission. Admitted with a chronic wound on his R foot. He was started on broad spectrum antibiotics with vanc, zosyn, and flagyl and underwent wound debridement on 12/30. BLC positive for S agalactiae on 12/28. Foot cultures + for Protues mirabilis, group B strep, and Enterococcus. Antibiotics were then narrowed to zosyn. Wound vac placed on foot 1/5. Zosyn is planned for 6 weeks. Acute on chronic CKD3a: Baseline Cr 1.7. Renal has followed throughout his course. IDDM2: Lantus and lispro Anemia: Course c/b anemia w heme + stool. EGD on 1/4 showing superficial ulcers in the distal bulb and second portion of the duodenum measuring 2-4mm. No active bleeding noted. BID protonix. Received PRBC 1/2. 
  
COVID negative 12/28, 12/29, but positive 1/11 No acute overnight events Active Problem List:  
 
Problem List  Date Reviewed: 8/20/2020 Codes Class * (Principal) Osteomyelitis ([facility] ICD-10-CM: M86.9 ICD-9-CM: 730.20 Diabetic ulcer of right midfoot associated with type 2 diabetes mellitus, with fat layer exposed ([facility] ICD-10-CM: E11.621, O09.908 ICD-9-CM: 250.80, 707.14 PAD (peripheral artery disease) (HCC) ICD-10-CM: I73.9 ICD-9-CM: 443.9 Dependence on nicotine from cigarettes ICD-10-CM: F17.210 ICD-9-CM: 305.1 KATHI (acute kidney injury) ([facility] ICD-10-CM: N17.9 ICD-9-CM: 730. 9 Acute on chronic renal failure (HCC) ICD-10-CM: N17.9, N18.9 ICD-9-CM: 584.9, 585.9 Severe obesity (BMI 35.0-39. 9) with comorbidity ([facility] ICD-10-CM: E66.01 
ICD-9-CM: 278.01 Type 2 diabetes with nephropathy (HCC) ICD-10-CM: E11.21 
ICD-9-CM: 250.40, 583.81 S/P hip replacement, right ICD-10-CM: Z76.418 ICD-9-CM: V43.64 Stage 3 chronic kidney disease ICD-10-CM: N18.30 ICD-9-CM: 938. 3 Prostate [facility]) ICD-10-CM: D63 ICD-9-CM: 002 Diabetic polyneuropathy ([facility] ICD-10-CM: E11.42 
ICD-9-CM: 250.60, 357.2 Rotator Cuff Tendonitis ICD-10-CM: M71.9, M67.919 ICD-9-CM: 726.10 Diabetes mellitus type 2, controlled ([facility] ICD-10-CM: E11.9 ICD-9-CM: 250.00 Degenerative disc disease ICD-10-CM: [id number] ICD-9-CM: 722.6 Osteoarthrosis involving lower leg ICD-10-CM: M17.10 ICD-9-CM: 715.96 Depression/ Anxiety ICD-10-CM: F34.1 ICD-9-CM: 300.4 HTN (hypertension) ICD-10-CM: I10 
ICD-9-CM: 401.9 Chronic hip pain ICD-10-CM: M25.559, G89.29 ICD-9-CM: 719.45, 338.29 Hypertriglyceridemia ICD-10-CM: E78.1 ICD-9-CM: 272.1 Mild Aortic Insufficiency ICD-10-CM: I35.1 ICD-9-CM: 424.1 Mild Concentric LVH (left ventricular hypertrophy) ICD-10-CM: I51.7 ICD-9-CM: 429.3 Past Medical History: has a past medical history of Arthritis, Degenerative,  Knee (4/4/2011), Carcinoma, Prostate (4/4/2011), Degenerative disc disease (4/4/2011), Depression/ Anxiety (4/4/2011), Diabetes Mellitrus ( non-insulin dependent ) Type 2 (4/4/2011), Heart failure ([facility] HEMATOCHEZIA (4/4/2011), Hypertrension (4/4/2011), Hypertriglyceridemia (4/4/2011), Ill-defined condition, Insomnia (4/4/2011), Laryngitis (4/4/2011), Mild Aortic insufficiency (4/4/2011), Mild Concentric LVH (left ventricular hypertrophy) (4/4/2011), Neuropathy (4/4/2011), Osteoarthritis (4/4/2011), and Rotator Cuff Tendonitis (4/4/2011). Past Surgical History:  
 
 has a past surgical history that includes hx urological; hx other surgical; pr cardiac surg procedure unlist; colonoscopy (N/A, 4/28/2017); hx orthopaedic; hx orthopaedic; and ir insert tunl cvc w/o port over 5 yr (1/6/2021). Home Medications:  
 
Prior to Admission medications Medication Sig Start Date End Date Taking? Authorizing Provider  
pregabalin (LYRICA) 150 mg capsule TAKE ONE CAPSULE BY MOUTH TWICE A DAY 12/7/20  Yes Bhupendra Corona MD  
bicalutamide (CASODEX) 50 mg tablet TAKE ONE TABLET BY MOUTH DAILY 11/30/20  Yes Bhupendra Corona MD  
torsemide (DEMADEX) 20 mg tablet TAKE FOUR TABLETS BY MOUTH TWICE A DAY 11/23/20  Yes Bhupendra Corona MD  
pantoprazole (PROTONIX) 40 mg tablet TAKE ONE TABLET BY MOUTH TWICE DAILY ONCE IN THE MORNING AND ONCE IN THE EVENING 11/18/20  Yes Bhupendra Corona MD  
hydrOXYzine HCL (ATARAX) 50 mg tablet TAKE ONE TABLET BY MOUTH THREE TIMES A DAY AS NEEDED FOR ITCHING FOR UP TO 10 DAYS 10/22/20  Yes Bhupendra Corona MD  
amLODIPine (NORVASC) 10 mg tablet TAKE ONE TABLET BY MOUTH DAILY 10/2/20  Yes Bhupendra Corona MD  
hydrALAZINE (APRESOLINE) 100 mg tablet Take 1 Tab by mouth three (3) times daily.  9/16/20  Yes Bhupendra Corona MD  
 ascorbic acid, vitamin C, (Vitamin C) 500 mg tablet Take  by mouth. Yes Provider, Historical  
enzalutamide (XTANDI) 40 mg capsule Take 160 mg by mouth daily. Yes Provider, Historical  
Thera-Tabs M 27 mg iron-400 mcg tab TAKE ONE TABLET BY MOUTH DAILY 7/17/20  Yes Mandy Mancilla MD  
tamsulosin [facility]) 0.4 mg capsule TAKE ONE CAPSULE BY MOUTH DAILY 7/16/20  Yes Mandy Mancilla MD  
Insulin Syringe-Needle U-100 (BD Insulin Syringe Ultra-Fine) 1 mL 31 gauge x 5/16 syrg USE TO INJECT INSULIN FIVE TIMES A DAY 6/16/20  Yes Mandy Mancilla MD  
ferrous sulfate 325 mg (65 mg iron) tablet TAKE ONE TABLET BY MOUTH DAILY BEFORE BREAKFAST 6/16/20  Yes Mandy Mancilla MD  
gabapentin (NEURONTIN) 300 mg capsule Take 1 Cap by mouth three (3) times daily. Max Daily Amount: 900 mg. 5/25/20  Yes Mandy Mancilla MD  
triamcinolone acetonide (KENALOG) 0.1 % ointment APPLY A THIN LAYER TO AFFECTED AREA(S) TWO TIMES A DAY **DO NOT EXCEED 2.66 GRAMS PER DAY** 5/19/20  Yes Mandy Mancilla MD  
diclofenac (VOLTAREN) 1 % gel Apply  to affected area four (4) times daily. 4/16/20  Yes Mandy Mancilla MD  
ammonium lactate (AMLACTIN) 12 % topical cream Apply  to affected area two (2) times a day. rub in to affected area well 3/10/20  Yes Mandy Mancilla MD  
ammonium lactate (LAC-HYDRIN FIVE) 5 % lotion Apply 1 Applicator to affected area daily. Apply daily bilateral lower legs 3/5/20  Yes Mandy Mancilla MD  
insulin glargine (LANTUS U-100 INSULIN) 100 unit/mL injection 72 Units by SubCUTAneous route daily. 3/5/20  Yes Mandy Mancilla MD  
insulin regular (NOVOLIN R, HUMULIN R) 100 unit/mL injection 24 units sc tid 3/5/20  Yes Mandy Mancilla MD  
ketoconazole (NIZORAL) 2 % topical cream Apply  to affected area two (2) times a day.  2/4/20  Yes Mandy Mancilla MD  
ketoconazole (NIZORAL) 2 % shampoo Sig:shampoo once a week 2/4/20  Yes Mandy Mancilla MD  
 bisacodyl (DULCOLAX, BISACODYL,) 10 mg suppository Insert 10 mg into rectum daily. Yes Provider, Historical  
insulin aspart U-100 (NOVOLOG FLEXPEN U-100 INSULIN) 100 unit/mL (3 mL) inpn by SubCUTAneous route. Yes Provider, Historical  
Insulin Needles, Disposable, (NOVOFINE 32) 32 gauge x 1/4\" ndle Sig:use 4 times a day as needed 19  Yes Nabeel Bose MD  
NOVOLIN R REGULAR U-100 INSULN 100 unit/mL injection INJECT 14 UNITS UNDER THE SKIN THREE TIMES A DAY WITH MEALS AS NEEDED 19  Yes Nabeel Bose MD  
fluticasone propionate [facility]) 50 mcg/actuation nasal spray SPRAY TWO SPRAYS IN THE AFFECTED NOSTRIL DAILY 19  Yes Nabeel Bose MD  
aspirin delayed-release 81 mg tablet Take 81 mg by mouth daily. Yes Provider, Historical  
acetaminophen (PAIN AND FEVER) 500 mg tablet TAKE ONE TABLET BY MOUTH EVERY 6 HOURS AS NEEDED FOR PAIN 18  Yes Nabeel Bose MD  
Calcium Carbonate-Vit D3-Min 600 mg calcium- 400 unit tab Take 1 Tab by mouth two (2) times a day. Yes Provider, Historical  
 
 
Allergies/Social/Family History: No Known Allergies Social History Tobacco Use  Smoking status: Current Every Day Smoker Packs/day: 1.00 Last attempt to quit: 2019 Years since quittin.2  Smokeless tobacco: Never Used Substance Use Topics  Alcohol use: Not Currently Alcohol/week: 11.7 standard drinks Types: 7 Cans of beer, 7 Shots of liquor per week Family History Family history unknown: Yes Review of Systems:  
 
Unable to provide - intubated and sedated Objective:  
Vital Signs: 
Visit Vitals BP (!) 105/52 Pulse 60 Temp 98.3 °F (36.8 °C) Resp 18 Ht 6' (1.829 m) Wt 119.3 kg (263 lb 0.1 oz) SpO2 92% BMI 35.67 kg/m² O2 Flow Rate (L/min): 15 l/min(Mid flow at 9 liters also) O2 Device: Endotracheal tube, Ventilator Temp (24hrs), Av.7 °F (35.9 °C), Min:94.5 °F (34.7 °C), Max:98.3 °F (36.8 °C) Intake/Output:  
 
Intake/Output Summary (Last 24 hours) at 2/1/2021 5326 Last data filed at 2/1/2021 6378 Gross per 24 hour Intake 2624.8 ml Output 1495 ml Net 1129.8 ml Physical Exam: 
  
General:  Sedated, RASS -3 Eye:  PERRLA Neurologic:  Sedated Neck:  No JVD Lungs: CTAB, on mechanical ventilation Heart: RRR, normal S1/S2, 2+ pulses, 2+ edema Abdomen:  soft, non-tender. Bowel sounds normal. No masses,  no organomegaly Skin:Wound vac to R foot; chronic skin changes to BLE 
 
LABS AND  DATA: Personally reviewed Recent Labs 02/01/21 
0839 01/31/21 
3461 WBC 4.3 5.4 HGB 7.3* 7.9*  
HCT 23.7* 26.2*  
 274 Recent Labs 02/01/21 
2304 01/31/21 
7590  137  
K 3.0* 2.9*  
 103 CO2 25 27 BUN 72* 73* CREA 2.68* 2.77* GLU 81 103* CA 8.2* 8.5 MG 2.4 2.3 No results for input(s): AP, TBIL, TP, ALB, GLOB, AML, LPSE in the last 72 hours. No lab exists for component: SGOT, GPT, AMYP No results for input(s): INR, PTP, APTT, INREXT in the last 72 hours. No results for input(s): PHI, PCO2I, PO2I, FIO2I in the last 72 hours. No results for input(s): CPK, CKMB, TROIQ, BNPP in the last 72 hours. Ventilator Settings: 
AC: 75% R 18 PEEP 10  MEDS: Reviewed Chest X-Ray: personally reviewed and report checked: 1/31: Mild interval improvement in bilateral airspace disease. 1/24: TTE: LV: Estimated LVEF is 45 - 50%. Biplane method used to measure ejection fraction. Normal cavity size. Moderate concentric hypertrophy. RV: Mildly dilated right ventricle. Mildly reduced systolic function. Assessment:  
 
Acute Hypoxic Respiratory Failure Secondary to COVID Pneumonia: Completed steroids 1/31. Not candidate for remdesivir. Intubated 1/23. Failed extubation 1/26. 
- ARDS volume ventilation - Wean vent settings as tolerated - Diuresis as below, as tolerated - PPx lovenox 
- Continue GOC with daughter - has been adamant that pt is full code Septic Shock: Osteomyelitis is only known bacterial infectious etiology. Likely cytokine reaction in setting of COVID-19. Cultures NGTD. Completed steroids. Completed course of linezolid (empiric). On prolonged zoysn for osteomyelitis. - Continue zosyn - Wean levo as tolerated for goal MAP 70 KATHI on CKD3a: Renal following 
- Goal MAP 70 - Albumin per renal 
- Continue bumex per renal 
 
HFpEF with Exacerbation: EF 45-50%, mildly dilated RV w mildly reduced systolic function. Diuresis difficult in setting of KATHI on CKD3. 
- Diuresis w assistance of renal 
 
Delirium: When sedation is lightened, experiences hyperactive delirium, which interferes with weaning 
- Continue precedex - Minimize benzos R foot wound: Complicated by osteomyelitis. - Wound vac - Zosyn as above - ID following Deconditioning: Unable to work w PT/OT at this time Prostate Cancer: Holding casodex as this cannot be crushed an pt has OGT Multidisciplinary Rounds Completed: Yes ABCDEF Bundle/Checklist 
Pain Medications: Fentanyl @275 Target RASS: 0 - Alert & Calm - Spontaneously pays attention to caregiver Sedation Medications: Propofol CAM-ICU:  Positive Mobility: Poor PT/OT:Unable to participate Restraints: Soft wrist restraints Discussed Plan of Care (goals of care): Yes Addressed Code Status: Full Code CARDIOVASCULAR Cardiac Gtts: Norepinephrine SBP Goal of: > 90 mmHg MAP Goal of: > 70 mmHg Transfusion Trigger (Hgb): <7 g/dL RESPIRATORY Vent Goals:  
Optimize PEEP/Ventilation/Oxygenation DVT Prophylaxis (if no, list reason): Lovenox SPO2 Goal: > 92% GI/ Bynum Catheter Present: Yes GI Prophylaxis: Protonix (pantoprazole) Nutrition: Yes TF Bowel Movement: Yes Bowel Regimen: Docusate (Colace) Insulin: Y 
 
ANTIBIOTICS Antibiotics: 
Zosyn T/L/D Tubes: Orogastric Tube Lines: [employer] Drains: Bynum Catheter [employer]QUIPMENT Vent DISPOSITION Stay in ICU 
 
 CRITICAL CARE CONSULTANT NOTE I had a face to face encounter with the patient, reviewed and interpreted patient data including clinical events, labs, images, vital signs, I/O's, and examined patient. I have discussed the case and the plan and management of the patient's care with the consulting services, the bedside nurses and the respiratory therapist.   
 
NOTE OF PERSONAL INVOLVEMENT IN CARE This patient has a high probability of imminent, clinically significant deterioration, which requires the highest level of preparedness to intervene urgently. I participated in the decision-making and personally managed or directed the management of the following life and organ supporting interventions that required my frequent assessment to treat or prevent imminent deterioration. I personally spent 55 minutes of critical care time. This is time spent at this critically ill patient's bedside actively involved in patient care as well as the coordination of care and discussions with the patient's family. This does not include any procedural time which has been billed separately. Jose Calvo NP [facility] Care 2/1/2021

## 2021-02-02 NOTE — PROGRESS NOTES
Nephrology Progress Note Júnior Warren III Date of Admission : 12/28/2020 CC: Follow up for KATHI on CKD Assessment and Plan KATHI on CKD: 
- presumed ATN  
- Non oliguric  
- Increased Bumex to 2 mg BID  
- labs daily Hypokalemia : 
- replete PRN Resp failure: 
COVID-19 PNA: positive on 1/11 
- s/p decadron and remdesivir 
- on the vent 
- diurese as above CKD 4 
-  Presumed 2/2 DM and HTN 
- nephrotic range proteinuria 
- baseline Cr 1.7 mg/dl  
- followed by Dr. Rosalba Snow at [facility] Hx of prostate cancer s/p XRT 
  
Anemia in CKD + blood loss: 
- cont JAZ 
- EGD 1/5/2021: gastric fundal lesion  
- transfuse PRN 
  
Type II DM: 
- on insulin 
  
HTN :  
- BP stable now 
  
R foot osteo: 
- on abx 
- s/p debridement on 12/30 Group B strep bacteremia Interval History: Not examined in room UOP 1.1L Stable on vent Cr stable K low and being replaced Current Medications: all current  Medications have been eviewed in [facility] Lists of hospitals in the [place] Review of Systems: A comprehensive review of systems was negative except for that written in the HPI. Objective: 
Vitals:   
Vitals:  
 02/02/21 0441 02/02/21 0500 02/02/21 0600 02/02/21 0700 BP:  (!) 128/50 (!) 113/45 (!) 131/57 Pulse: 61 61 63 66 Resp: 18   18 Temp:   99.1 °F (37.3 °C) SpO2: 95% 96% 96% 94% Weight:   120 kg (264 lb 8.8 oz) Height:      
 
Intake and Output: 
No intake/output data recorded. 01/31 1901 - 02/02 0700 In: 4609.3 [I.V.:2684.3] Out: 1311 [YWVWQ:0308; CFHKRP:186] Physical Examination: seen and examined on room GEN: ON VENT 
NECK- ET tube + RESP: no distress ABD :chronic distension, BS + 
EXT : MINIMAL NON PITTING EDEMA  
NEURO:Can't access due to patient's current condition Exam deferred due to COVID-19 infection in order to preserve PPE AND minimize risk of  
transmission to dialysis and renal transplant patient per ASN and RPA guidelines: []    High complexity decision making was performed 
[]    Patient is at high-risk of decompensation with multiple organ involvement Lab Data Personally Reviewed: I have reviewed all the pertinent labs, microbiology data and radiology studies during assessment. Recent Labs 02/01/21 
1624 02/01/21 
0919 01/31/21 
7527  138 137  
K 3.0* 3.0* 2.9*  
* 103 103 CO2 24 25 27 GLU 93 81 103* BUN 70* 72* 73* CREA 2.48* 2.68* 2.77* CA 7.4* 8.2* 8.5 MG  --  2.4 2.3 Recent Labs 02/02/21 
7953 02/01/21 
5318 01/31/21 
3177 WBC 8.8 4.3 5.4 HGB 8.4* 7.3* 7.9*  
HCT 27.1* 23.7* 26.2*  
 236 274 No results found for: SDES Lab Results Component Value Date/Time Culture result: NO GROWTH 5 DAYS 01/23/2021 03:37 PM  
 Culture result: LIGHT YEAST, (APPARENT CANDIDA ALBICANS) (A) 01/23/2021 02:45 PM  
 Culture result: NO NORMAL RESPIRATORY MICHELLE ISOLATED 01/23/2021 02:45 PM  
 
Recent Results (from the past 24 hour(s)) GLUCOSE, POC Collection Time: 02/01/21 11:29 AM  
Result Value Ref Range Glucose (POC) 109 (H) 65 - 100 mg/dL Performed by Serenity Durham LIPID PANEL Collection Time: 02/01/21  4:24 PM  
Result Value Ref Range LIPID PROFILE Cholesterol, total 176 <200 MG/DL Triglyceride 541 (H) <150 MG/DL  
 HDL Cholesterol 27 MG/DL  
 LDL, calculated Not calculated due to elevated triglyceride level 0 - 100 MG/DL VLDL, calculated  MG/DL Calculation not valid with this patient's other Lipid values. CHOL/HDL Ratio 6.5 (H) 0.0 - 5.0 METABOLIC PANEL, BASIC Collection Time: 02/01/21  4:24 PM  
Result Value Ref Range Sodium 142 136 - 145 mmol/L Potassium 3.0 (L) 3.5 - 5.1 mmol/L Chloride 109 (H) 97 - 108 mmol/L  
 CO2 24 21 - 32 mmol/L Anion gap 9 5 - 15 mmol/L Glucose 93 65 - 100 mg/dL BUN 70 (H) 6 - 20 MG/DL  Creatinine 2.48 (H) 0.70 - 1.30 MG/DL  
 BUN/Creatinine ratio 28 (H) 12 - 20    
 GFR est AA 31 (L) >60 ml/min/1.73m2 GFR est non-AA 26 (L) >60 ml/min/1.73m2 Calcium 7.4 (L) 8.5 - 10.1 MG/DL  
SAMPLES BEING HELD Collection Time: 02/01/21  4:24 PM  
Result Value Ref Range SAMPLES BEING HELD 1 PST COMMENT Add-on orders for these samples will be processed based on acceptable specimen integrity and analyte stability, which may vary by analyte. LDL, DIRECT Collection Time: 02/01/21  4:24 PM  
Result Value Ref Range LDL,Direct 94 0 - 100 mg/dl GLUCOSE, POC Collection Time: 02/01/21  6:41 PM  
Result Value Ref Range Glucose (POC) 123 (H) 65 - 100 mg/dL Performed by Pedro Pac GLUCOSE, POC Collection Time: 02/02/21 12:19 AM  
Result Value Ref Range Glucose (POC) 115 (H) 65 - 100 mg/dL Performed by Yasmine Lei BLOOD GAS, ARTERIAL Collection Time: 02/02/21  5:45 AM  
Result Value Ref Range pH 7.30 (L) 7.35 - 7.45    
 PCO2 46 (H) 35 - 45 mmHg PO2 94 80 - 100 mmHg O2 SAT 96 92 - 97 % BICARBONATE 22 22 - 26 mmol/L  
 BASE DEFICIT 4.7 mmol/L  
 O2 METHOD VENT    
 FIO2 75 % MODE ASSIST CONTROL Tidal volume 550.0 SET RATE 18 PEEP/CPAP 10.0 Sample source ARTERIAL    
 SITE RIGHT RADIAL JUVENTINO'S TEST YES    
GLUCOSE, POC Collection Time: 02/02/21  6:15 AM  
Result Value Ref Range Glucose (POC) 154 (H) 65 - 100 mg/dL Performed by Gal Reed D DIMER Collection Time: 02/02/21  6:40 AM  
Result Value Ref Range D-dimer 2.19 (H) 0.00 - 0.65 mg/L FEU  
CBC WITH AUTOMATED DIFF Collection Time: 02/02/21  6:40 AM  
Result Value Ref Range WBC 8.8 4.1 - 11.1 K/uL  
 RBC 2.94 (L) 4.10 - 5.70 M/uL HGB 8.4 (L) 12.1 - 17.0 g/dL HCT 27.1 (L) 36.6 - 50.3 % MCV 92.2 80.0 - 99.0 FL  
 MCH 28.6 26.0 - 34.0 PG  
 MCHC 31.0 30.0 - 36.5 g/dL  
 RDW 19.4 (H) 11.5 - 14.5 % PLATELET 685 901 - 112 K/uL MPV 9.7 8.9 - 12.9 FL  
 NRBC 0.3 (H) 0  WBC ABSOLUTE NRBC 0.03 (H) 0.00 - 0.01 K/uL NEUTROPHILS PENDING % LYMPHOCYTES PENDING % MONOCYTES PENDING % EOSINOPHILS PENDING % BASOPHILS PENDING % IMMATURE GRANULOCYTES PENDING %  
 ABS. NEUTROPHILS PENDING K/UL  
 ABS. LYMPHOCYTES PENDING K/UL  
 ABS. MONOCYTES PENDING K/UL  
 ABS. EOSINOPHILS PENDING K/UL  
 ABS. BASOPHILS PENDING K/UL  
 ABS. IMM. GRANS. PENDING K/UL  
 DF PENDING Meng Abel MD 
[address] [place] [address], [address] [facility] Phone - [phone] Fax - [phone] 
[email or web address]

## 2021-02-02 NOTE — PROGRESS NOTES
[facility] 
 
ICU TEAM Progress Note 
 
Name: Jeanmarie Nelson III  
: 1950  
MRN: [id number]  
Date: 2021   
 
Subjective:  
Progress Note: 2021   
 
Mr. Jeanmarie Nelson III is a 69 yo male w a PMH of prostate cancer s/p radiation, tobacco use disorder (1 ppd), CKD3a, gastric angioectasias,  IDDM, heart failure, HTN who was admitted with R foot osteomyelitis on .  He was transferred to the ICU today after experiencing a cardiac arrest in the IM.  Please see below for a brief synopsis of his hospital course, by problem. 
  
Acute Hypoxic Respiratory failure secondary to COVID pneumonia: He also demonstrated dyspnea on initial admission, but was negative for COVID. Initially treated with steroids.  On , he developed increased oxygen needs.  He was COVID positive.  He was started on and completed treatment with steroids (completed ), remdesivir (completed ), zinc, and vitamin C. He initially required BiPAP on , and was weaned to 4-5L/min. CT chest showed bilateral pleural effusions with ground glass.  Diuresis was attempted.  He was continued on zosyn.  He required transfer to [facility] on 1/15 due to continued desaturations -> 70s on NRB.  CXR with increased bilateral infiltrates. Started linezolid on .  Required intubation on .  See below under cardiac arrest. He was extubated on , but required reintubation the same day. He completed a course of empiric 14d zyvox (ended ) and repeated empiric stress dose steroids (ended ).  He remains on prolonged zosyn for treatment of osteomyelitis. Respiratory cultures have remained NGTD. 
 Cardiac arrest, s/p ROSC: Not cooled as he regained mental function s/p ROSC. Code time: reported to be 12 minutes The patient had been taken off BiPAP for lunch. He was on intermittent BiPAP and HiFlow, demonstrating stability to come off to eat. He ate his lunch and was later found SOB unresponsive and pulseless. He was thereafter transferred to the ICU. He regained mentation, was able to respond to commands, shake head/yes, no, but ultimately did require sedation for compliance with mechanical ventilation. Likely hypoxic arrest. 
Septic shock: Osteomyelitis has been only identified source of infection. Please see above under respiratory failure for full courses of abx. Has been on/off pressors since ICU admission. Admitted with a chronic wound on his R foot. He was started on broad spectrum antibiotics with vanc, zosyn, and flagyl and underwent wound debridement on 12/30. BLC positive for S agalactiae on 12/28. Foot cultures + for Protues mirabilis, group B strep, and Enterococcus. Antibiotics were then narrowed to zosyn. Wound vac placed on foot 1/5. Zosyn is planned for 6 weeks. Acute on chronic CKD3a: Baseline Cr 1.7. Renal has followed throughout his course. Renal failure progressively worsening. IDDM2: Lantus and lispro Anemia: Course c/b anemia w heme + stool. EGD on 1/4 showing superficial ulcers in the distal bulb and second portion of the duodenum measuring 2-4mm. No active bleeding noted. BID protonix. Received PRBC 1/2. 
  
COVID negative 12/28, 12/29, but positive 1/11 No acute overnight events Active Problem List:  
 
Problem List  Date Reviewed: 8/20/2020 Codes Class * (Principal) Osteomyelitis ([facility].) ICD-10-CM: M86.9 ICD-9-CM: 730.20 Diabetic ulcer of right midfoot associated with type 2 diabetes mellitus, with fat layer exposed ([facility] ICD-10-CM: E11.621, S01.881 ICD-9-CM: 250.80, 707.14 PAD (peripheral artery disease) (HCC) ICD-10-CM: I73.9 ICD-9-CM: 443.9 Dependence on nicotine from cigarettes ICD-10-CM: F17.210 ICD-9-CM: 305.1 KATHI (acute kidney injury) ([facility] 75.) ICD-10-CM: N17.9 ICD-9-CM: 918. 9 Acute on chronic renal failure (HCC) ICD-10-CM: N17.9, N18.9 ICD-9-CM: 584.9, 585.9 Severe obesity (BMI 35.0-39. 9) with comorbidity ([facility] 75.) ICD-10-CM: E66.01 
ICD-9-CM: 278.01 Type 2 diabetes with nephropathy (HCC) ICD-10-CM: E11.21 
ICD-9-CM: 250.40, 583.81 S/P hip replacement, right ICD-10-CM: J72.664 ICD-9-CM: V43.64 Stage 3 chronic kidney disease ICD-10-CM: N18.30 ICD-9-CM: 242. 3 Prostate [facility]) ICD-10-CM: Y86 ICD-9-CM: 982 Diabetic polyneuropathy ([facility] 75.) ICD-10-CM: E11.42 
ICD-9-CM: 250.60, 357.2 Rotator Cuff Tendonitis ICD-10-CM: M71.9, M67.919 ICD-9-CM: 726.10 Diabetes mellitus type 2, controlled ([facility] 75.) ICD-10-CM: E11.9 ICD-9-CM: 250.00 Degenerative disc disease ICD-10-CM: [id number] ICD-9-CM: 722.6 Osteoarthrosis involving lower leg ICD-10-CM: M17.10 ICD-9-CM: 715.96 Depression/ Anxiety ICD-10-CM: F34.1 ICD-9-CM: 300.4 HTN (hypertension) ICD-10-CM: I10 
ICD-9-CM: 401.9 Chronic hip pain ICD-10-CM: M25.559, G89.29 ICD-9-CM: 719.45, 338.29 Hypertriglyceridemia ICD-10-CM: E78.1 ICD-9-CM: 272.1 Mild Aortic Insufficiency ICD-10-CM: I35.1 ICD-9-CM: 424.1 Mild Concentric LVH (left ventricular hypertrophy) ICD-10-CM: I51.7 ICD-9-CM: 429.3 Past Medical History: has a past medical history of Arthritis, Degenerative,  Knee (4/4/2011), Carcinoma, Prostate (4/4/2011), Degenerative disc disease (4/4/2011), Depression/ Anxiety (4/4/2011), Diabetes Mellitrus ( non-insulin dependent ) Type 2 (4/4/2011), Heart failure ([facility] HEMATOCHEZIA (4/4/2011), Hypertrension (4/4/2011), Hypertriglyceridemia (4/4/2011), Ill-defined condition, Insomnia (4/4/2011), Laryngitis (4/4/2011), Mild Aortic insufficiency (4/4/2011), Mild Concentric LVH (left ventricular hypertrophy) (4/4/2011), Neuropathy (4/4/2011), Osteoarthritis (4/4/2011), and Rotator Cuff Tendonitis (4/4/2011). Past Surgical History:  
 
 has a past surgical history that includes hx urological; hx other surgical; pr cardiac surg procedure unlist; colonoscopy (N/A, 4/28/2017); hx orthopaedic; hx orthopaedic; and ir insert tunl cvc w/o port over 5 yr (1/6/2021). Home Medications:  
 
Prior to Admission medications Medication Sig Start Date End Date Taking? Authorizing Provider  
pregabalin (LYRICA) 150 mg capsule TAKE ONE CAPSULE BY MOUTH TWICE A DAY 12/7/20  Yes Heron Daniels MD  
bicalutamide (CASODEX) 50 mg tablet TAKE ONE TABLET BY MOUTH DAILY 11/30/20  Yes Heron Daniels MD  
torsemide (DEMADEX) 20 mg tablet TAKE FOUR TABLETS BY MOUTH TWICE A DAY 11/23/20  Yes Heron Daniels MD  
pantoprazole (PROTONIX) 40 mg tablet TAKE ONE TABLET BY MOUTH TWICE DAILY ONCE IN THE MORNING AND ONCE IN THE EVENING 11/18/20  Yes Heron Daniels MD  
hydrOXYzine HCL (ATARAX) 50 mg tablet TAKE ONE TABLET BY MOUTH THREE TIMES A DAY AS NEEDED FOR ITCHING FOR UP TO 10 DAYS 10/22/20  Yes Heron Daniels MD  
amLODIPine (NORVASC) 10 mg tablet TAKE ONE TABLET BY MOUTH DAILY 10/2/20  Yes Heron Daniels MD  
hydrALAZINE (APRESOLINE) 100 mg tablet Take 1 Tab by mouth three (3) times daily.  9/16/20  Yes Heron Michel., MD  
 ascorbic acid, vitamin C, (Vitamin C) 500 mg tablet Take  by mouth. Yes Provider, Historical  
enzalutamide (XTANDI) 40 mg capsule Take 160 mg by mouth daily. Yes Provider, Historical  
Thera-Tabs M 27 mg iron-400 mcg tab TAKE ONE TABLET BY MOUTH DAILY 7/17/20  Yes Nabeel Bose MD  
tamsulosin [facility]) 0.4 mg capsule TAKE ONE CAPSULE BY MOUTH DAILY 7/16/20  Yes Nabeel Bose MD  
Insulin Syringe-Needle U-100 (BD Insulin Syringe Ultra-Fine) 1 mL 31 gauge x 5/16 syrg USE TO INJECT INSULIN FIVE TIMES A DAY 6/16/20  Yes Nabeel Bose MD  
ferrous sulfate 325 mg (65 mg iron) tablet TAKE ONE TABLET BY MOUTH DAILY BEFORE BREAKFAST 6/16/20  Yes Nabeel Bose MD  
gabapentin (NEURONTIN) 300 mg capsule Take 1 Cap by mouth three (3) times daily. Max Daily Amount: 900 mg. 5/25/20  Yes Nabeel Boes MD  
triamcinolone acetonide (KENALOG) 0.1 % ointment APPLY A THIN LAYER TO AFFECTED AREA(S) TWO TIMES A DAY **DO NOT EXCEED 2.66 GRAMS PER DAY** 5/19/20  Yes Nabeel Bose MD  
diclofenac (VOLTAREN) 1 % gel Apply  to affected area four (4) times daily. 4/16/20  Yes Nabeel Bose MD  
ammonium lactate (AMLACTIN) 12 % topical cream Apply  to affected area two (2) times a day. rub in to affected area well 3/10/20  Yes Nabeel Bsoe MD  
ammonium lactate (LAC-HYDRIN FIVE) 5 % lotion Apply 1 Applicator to affected area daily. Apply daily bilateral lower legs 3/5/20  Yes Nabeel Bose MD  
insulin glargine (LANTUS U-100 INSULIN) 100 unit/mL injection 72 Units by SubCUTAneous route daily. 3/5/20  Yes Nabeel Bose MD  
insulin regular (NOVOLIN R, HUMULIN R) 100 unit/mL injection 24 units sc tid 3/5/20  Yes Nabeel Bose MD  
ketoconazole (NIZORAL) 2 % topical cream Apply  to affected area two (2) times a day.  2/4/20  Yes Nabeel Bose MD  
ketoconazole (NIZORAL) 2 % shampoo Sig:shampoo once a week 2/4/20  Yes Nabeel Bose MD  
 bisacodyl (DULCOLAX, BISACODYL,) 10 mg suppository Insert 10 mg into rectum daily. Yes Provider, Historical  
insulin aspart U-100 (NOVOLOG FLEXPEN U-100 INSULIN) 100 unit/mL (3 mL) inpn by SubCUTAneous route. Yes Provider, Historical  
Insulin Needles, Disposable, (NOVOFINE 32) 32 gauge x 1/4\" ndle Sig:use 4 times a day as needed 19  Yes Juanpablo Nunez MD  
NOVOLIN R REGULAR U-100 INSULN 100 unit/mL injection INJECT 14 UNITS UNDER THE SKIN THREE TIMES A DAY WITH MEALS AS NEEDED 19  Yes Juanpablo Nunez MD  
fluticasone propionate [facility]) 50 mcg/actuation nasal spray SPRAY TWO SPRAYS IN THE AFFECTED NOSTRIL DAILY 19  Yes Juanpablo Nunez MD  
aspirin delayed-release 81 mg tablet Take 81 mg by mouth daily. Yes Provider, Historical  
acetaminophen (PAIN AND FEVER) 500 mg tablet TAKE ONE TABLET BY MOUTH EVERY 6 HOURS AS NEEDED FOR PAIN 18  Yes Juanpablo Nunez MD  
Calcium Carbonate-Vit D3-Min 600 mg calcium- 400 unit tab Take 1 Tab by mouth two (2) times a day. Yes Provider, Historical  
 
Allergies/Social/Family History: No Known Allergies Social History Tobacco Use  Smoking status: Current Every Day Smoker Packs/day: 1.00 Last attempt to quit: 2019 Years since quittin.2  Smokeless tobacco: Never Used Substance Use Topics  Alcohol use: Not Currently Alcohol/week: 11.7 standard drinks Types: 7 Cans of beer, 7 Shots of liquor per week Family History Family history unknown: Yes Review of Systems:  
 
Unable to provide - intubated and sedated Objective:  
Vital Signs: 
Visit Vitals BP (!) 142/58 Pulse 67 Temp 98.2 °F (36.8 °C) Resp 18 Ht 6' (1.829 m) Wt 120 kg (264 lb 8.8 oz) SpO2 98% BMI 35.88 kg/m² O2 Flow Rate (L/min): 15 l/min(Mid flow at 9 liters also) O2 Device: Endotracheal tube, Ventilator Temp (24hrs), Av.8 °F (37.1 °C), Min:98.2 °F (36.8 °C), Max:99.1 °F (37.3 °C) Intake/Output:  
 
Intake/Output Summary (Last 24 hours) at 2/2/2021 1648 Last data filed at 2/2/2021 1600 Gross per 24 hour Intake 2353.34 ml Output 1250 ml Net 1103.34 ml Physical Exam: 
  
General:  Sedated, RASS -3 Eye:  PERRLA Neurologic:  Sedated Neck:  No JVD Lungs: CTAB, on mechanical ventilation Heart: RRR, normal S1/S2, 2+ pulses, 2+ edema Abdomen:  soft, non-tender. Bowel sounds normal. No masses,  no organomegaly Skin:Wound vac to R foot; chronic skin changes to BLE 
 
LABS AND  DATA: Personally reviewed Recent Labs 02/02/21 
9107 02/01/21 
7537 WBC 8.8 4.3 HGB 8.4* 7.3* HCT 27.1* 23.7*  
 236 Recent Labs 02/02/21 22 [id number] 02/02/21 
6006 02/01/21 
1420 02/01/21 
2418  140   < > 138  
K 3.7 3.7   < > 3.0*  
 105   < > 103 CO2 24 22   < > 25 BUN 83* 80*   < > 72* CREA 3.17* 3.13*   < > 2.68* * 145*   < > 81  
CA 8.9 8.6   < > 8.2* MG  --  2.5*  --  2.4 PHOS 6.5* 6.6*  --   --   
 < > = values in this interval not displayed. Recent Labs 02/02/21 22 [id number] ALB 3.2* No results for input(s): INR, PTP, APTT, INREXT, INREXT in the last 72 hours. No results for input(s): PHI, PCO2I, PO2I, FIO2I in the last 72 hours. No results for input(s): CPK, CKMB, TROIQ, BNPP in the last 72 hours. Ventilator Settings: 
AC: 75% R 18 PEEP 10  MEDS: Reviewed Chest X-Ray: personally reviewed and report checked: 1/31: Mild interval improvement in bilateral airspace disease. 1/24: TTE: LV: Estimated LVEF is 45 - 50%. Biplane method used to measure ejection fraction. Normal cavity size. Moderate concentric hypertrophy. RV: Mildly dilated right ventricle. Mildly reduced systolic function. Assessment:  
 
Acute Hypoxic Respiratory Failure Secondary to COVID Pneumonia: Completed steroids 1/31. Not candidate for remdesivir. Intubated 1/23. Failed extubation 1/26. 
- ARDS volume ventilation - Wean vent settings as tolerated - Diuretics as per renal 
- PPx lovenox 
- Continue GOC with daughter - has been adamant that pt is full code Septic Shock: Osteomyelitis is only known bacterial infectious etiology. Likely cytokine reaction in setting of COVID-19. Cultures NGTD. Completed steroids. Completed course of linezolid (empiric). On prolonged zoysn for osteomyelitis. - Continue zosyn - Wean levo as tolerated for goal MAP 70 KATHI on CKD3a: Renal following; renal failure progressively worsening 
- Goal MAP 70 - Albumin per renal 
- Continue bumex per renal 
 
HFpEF with Exacerbation: EF 45-50%, mildly dilated RV w mildly reduced systolic function. Diuresis difficult in setting of KATHI on CKD3. 
- Diuresis w assistance of renal 
 
Delirium: When sedation is lightened, experiences hyperactive delirium, which interferes with weaning 
- Continue precedex - Minimize benzos R foot wound: Complicated by osteomyelitis. - Wound vac - Zosyn as above - ID following Deconditioning: Unable to work w PT/OT at this time Prostate Cancer: Holding casodex as this cannot be crushed an pt has OGT Multidisciplinary Rounds Completed: Yes ABCDEF Bundle/Checklist 
Pain Medications: Fentanyl @275 Target RASS: 0 - Alert & Calm - Spontaneously pays attention to caregiver Sedation Medications: Propofol CAM-ICU:  Positive Mobility: Poor PT/OT:Unable to participate Restraints: Soft wrist restraints Discussed Plan of Care (goals of care): Yes Addressed Code Status: Full Code CARDIOVASCULAR Cardiac Gtts: Norepinephrine SBP Goal of: > 90 mmHg MAP Goal of: > 70 mmHg Transfusion Trigger (Hgb): <7 g/dL RESPIRATORY Vent Goals:  
Optimize PEEP/Ventilation/Oxygenation DVT Prophylaxis (if no, list reason): Lovenox SPO2 Goal: > 92% GI/ Bynum Catheter Present: Yes GI Prophylaxis: Protonix (pantoprazole) Nutrition: Yes TF Bowel Movement: Yes Bowel Regimen: Docusate (Colace) Insulin: Y 
 
ANTIBIOTICS Antibiotics: 
Zosyn T/L/D Tubes: Orogastric Tube Lines: [employer] Drains: Bynum Catheter SPECIAL [employer]QUIPMENT Vent DISPOSITION Stay in ICU CRITICAL CARE CONSULTANT NOTE I had a face to face encounter with the patient, reviewed and interpreted patient data including clinical events, labs, images, vital signs, I/O's, and examined patient. I have discussed the case and the plan and management of the patient's care with the consulting services, the bedside nurses and the respiratory therapist.   
 
NOTE OF PERSONAL INVOLVEMENT IN CARE This patient has a high probability of imminent, clinically significant deterioration, which requires the highest level of preparedness to intervene urgently. I participated in the decision-making and personally managed or directed the management of the following life and organ supporting interventions that required my frequent assessment to treat or prevent imminent deterioration. I personally spent 35 minutes of critical care time. This is time spent at this critically ill patient's bedside actively involved in patient care as well as the coordination of care and discussions with the patient's family. This does not include any procedural time which has been billed separately. Keyanna Jacobson NP [facility] [place] [facility] 
2/2/2021

## 2021-02-02 NOTE — ROUTINE PROCESS
Occupational Therapy 3138 -  
02.02.2021 Chart reviewed, patient received on vent. Per ABCDEF protocol, will work with patient when PEEP is 10.0 or less, FIO2 60% or less (currently PEEP 10.0 and FIO2 75%), and patient is following basic commands. Will follow patient peripherally.  
  
Recommend nursing to complete with patient, as able, in order to promote cardiopulmonary systems, maintain strength, endurance and independence:  
-bed in chair position with foot board on 3x/day ~30-60 mins each and/OR reverse trendelenburg with foot board on and non-skid footwear 
-passive ROM during bathing B UEs and LEs 
-positioning to prevent contractures and edema.  
  
Thank you for your assistance.  
  
Willi Sharma MS, OTR/L

## 2021-02-02 NOTE — PROGRESS NOTES
1930: Bedside and Verbal shift change report given to TARUN Reddy RN (oncoming nurse) by CLAU Chen RN and Edy Howell RN (offgoing nurse). Report included the following information SBAR, Kardex, Intake/Output, MAR, Accordion, Recent Results, Cardiac Rhythm SB and Alarm Parameters . 2330: Bedside and Verbal shift change report given to RUBA Torre (oncoming nurse) by TARUN Reddy RN (offgoing nurse). Report included the following information SBAR, Kardex, Intake/Output, MAR, Accordion, Recent Results, Cardiac Rhythm SB and Alarm Parameters .

## 2021-02-02 NOTE — PROGRESS NOTES
Bedside shift change report received from [facility]. Report included the following information SBAR, Kardex, Procedure Summary, Intake/Output, MAR and Recent Results.

## 2021-02-02 NOTE — PROGRESS NOTES
0730: Bedside shift change report given to Roya Lopez RN (oncoming nurse) by Dain Goode RN (offgoing nurse). Report included the following information SBAR, Kardex, Intake/Output, MAR, Accordion and Recent Results. 1640: Update given to pt's daughter, Mario Comer.  
 
1800: Primary Nurse Ambrose Oviedo and RUBA Sotelo performed a dual skin assessment on this patient Impairment noted- see wound doc flow sheet Edgar score is 11 
  
1930: Bedside shift change report given to Rosendo Stanley RN (oncoming nurse) by Roya Lopez RN (offgoing nurse). Report included the following information SBAR, Kardex, Intake/Output, MAR, Accordion, Recent Results and Med Rec Status.

## 2021-02-02 NOTE — PROGRESS NOTES
Comprehensive Nutrition Assessment Type and Reason for Visit: Aftab Kelsey Nutrition Recommendations/Plan:   
 
Hold Colace Change MVI to [place] (liquid) Continue current tube feeding Nutrition Assessment:   
72 y.o. male with PMHx of prostate CA s/p XRT on oral chemo, DM, CHF, CKD 3, and HTN.  Admitted with severe sepsis 2° osteomyelitis of R foot, s/p debridement/removal of infected bone and wound vac placement. COVID + 1/11 (negative on admission). Cardiac arrest 1/23 with acute hypoxic respiratory failure 2/2 COVID PNA-intubated. Failed extubation 1/26. May need tracheostomy. KATHI on CKD 3 2/2 ATN. HFpEF. WOCN following (DTI and wound vac). Delirium. Mr Ezequiel James has tolerated enteral feedings well. Modified tube feeding yesterday d/t requiring less Propofol. FMS placed 1/28-having 150-490 ml out per day. Recommend holding Colace. TG trending up despite being on less Propofol (however noted rate increased this afternoon). Continue to monitor TG- decrease Propofol rate as able. ? Alternative sedative. Phosphorous elevated d/t renal failure. BUN and Cr stable today. Tube feeding as ordered provides 1100 ml, 1830 calories  (2565 including Propofol),114 gm protein and 1820 ml free water (tube feeding/flush). Since Propofol rate increased pt's energy needs are being slightly exceeded (22 kcal/kg). Will continue to monitor. If unable to wean this down in the next 1-2 days then will adjust tube feeding. Malnutrition Assessment: 
Malnutrition Status: At risk for malnutrition (specify) d/t critical illness Context:  Acute illness Nutritionally Significant Medications: 
Propofol, Fentanyl, Albumin, Vit C, Bumex, Vit D3, Colace, Humalog, Prevacid, KCL, Effer-K, MVI Estimated Daily Nutrient Needs: 
Energy (kcal): 2320 (20 kcal/kg); Weight Used for Energy Requirements: Current Protein (g): 117 (1g/kg); Weight Used for Protein Requirements: Current(116.7 kg) Fluid (ml/day): 500 + UOP; Method Used for Fluid Requirements: Standard renal 
 
Nutrition Related Findings:      
BM: 2/1 Edema: 1+RUE, 2+ BLE, 3+ genital 
Wounds:  Deep tissue injury, Wound vac Current Nutrition Therapies:  
Diet: NPO Tube Feeding: Nepro @ 50 ml/hr with 3 packets Prosource daily and 100 ml water flush q 3 hr Additional Caloric Sources:  Propofol @ 27.9 ml/hr Anthropometric Measures: 
· Height:  6' (182.9 cm) · Current Body Wt:  119.3 kg (263 lb 0.1 oz) · Admission Body Wt:  270 lb(source unknown) · Ideal Body Wt: 178#  :  147.8 % · BMI Categories:  Obese class 1 (BMI 30.0-34. 9) Wt Readings from Last 10 Encounters:  
02/02/21 120 kg (264 lb 8.8 oz) 02/24/20 127 kg (280 lb) 02/10/20 127 kg (280 lb) 05/28/19 124.7 kg (275 lb) 04/04/19 124.7 kg (275 lb)  
03/31/19 125 kg (275 lb 9.2 oz)  
03/20/19 122.8 kg (270 lb 12.8 oz) 04/26/18 130.6 kg (288 lb)  
02/27/18 126.1 kg (278 lb)  
06/15/17 124.2 kg (273 lb 14.4 oz) Nutrition Diagnosis:  
· Increased nutrient needs related to increased demand for energy/nutrients as evidenced by wounds · Altered nutrition-related lab values related to renal dysfunction as evidenced by lab values · Inadequate oral intake related to impaired respiratory function as evidenced by NPO or clear liquid status due to medical condition, intubation Nutrition Interventions:  
Food and/or Nutrient Delivery: Modify tube feeding Nutrition Education and Counseling: No recommendations at this time Coordination of Nutrition Care: Continue to monitor while inpatient, Interdisciplinary rounds Goals: 
Tube feeding to meet 90% estimated protein needs x 5-7 days. Nutrition Monitoring and Evaluation:  
Behavioral-Environmental Outcomes: None identified Food/Nutrient Intake Outcomes: Enteral nutrition intake/tolerance Physical Signs/Symptoms Outcomes: Biochemical data, GI status, Fluid status or edema, Hemodynamic status, Weight Discharge Planning: Too soon to determine Tayler De La Cruz RD CNSC Contact: [employer]

## 2021-02-02 NOTE — WOUND CARE
WOCN Note: Follow-up visit for VAC dressing change to right foot.  
  
Chart shows: 
Admitted for lethargy; osteomyelitis of right foot with debridement and removal of infected bone 12/30; now Covid-19+ History of DM, smoking, prostate cancer, HTN, heart failure Admitted from home 
  
Assessment:  
Intubated and does not arouse with wound care.    
Surface: [place] AMY mattress 
  
1. POA, right foot wound post surgical debridement = 6 x 6 x 3cm  Base is 30% soft yellow 70% granular red (improved)   No purulence or odor.  Immediate periwound with hypopigmented skin.  Scant serosanguinous exudate in canister.   
VAC dressing removed: 1 black sponge Cleaned with saline and Santyl applied to yellow tissue in wound bed.   
125 mmHg suction achieved using 1 piece of black foam bridged to lower leg.  
  
Dorsum of foot has a deep tissue injury = 6 x 6 x 0 cm 100% non-blanching dry burgundy - Santyl applied.   
  
Thick dry skin plaques to lower leg - Lac Hydrin applied. 
  
Wound Recommendations:   
Maintain VAC as ordered @ 125mmHg suction with twice weekly dressing changes.  Buttocks and dorsum of right foot: venelex twice daily. Axilla: antifungal cream twice daily.  
  
Transition of Care: Plan to follow weekly and as needed while admitted to hospital with [facility] dressing change Tuesday, 2/5 TABITHA Webb, RN, [facility] Certified Wound, Ostomy, Continence Nurse 
office [phone] 
pager [phone] or call  to page

## 2021-02-03 NOTE — PROGRESS NOTES
Nephrology Progress Note Kayla Romanol TOM Date of Admission : 12/28/2020 CC: Follow up for KATHI on CKD Assessment and Plan KATHI on CKD: 
- ATN . Rising Creatinine 
- hold diuretics - d/w daughter Ms Gregory Diaz about possible dialysis. She wants to proceed with dialysis if it comes to that. - re-assess renal function tomorrow Hypokalemia : 
- replete PRN Resp failure: 
COVID-19 PNA: positive on 1/11 
- s/p decadron and remdesivir 
- on the vent CKD 4 
-  Presumed 2/2 DM and HTN 
- nephrotic range proteinuria 
- baseline Cr 1.7 mg/dl  
- followed by Dr. Agata Weeks at [facility] Hx of prostate cancer s/p XRT 
  
Anemia in CKD + blood loss: 
- cont JAZ 
- EGD 1/5/2021: gastric fundal lesion  
- transfuse PRN 
  
Type II DM: 
- on insulin 
  
HTN :  
- BP stable now 
  
R foot osteo: 
- on abx 
- s/p debridement on 12/30 Group B strep bacteremia Interval History: 
Seen and examined from outside the room FIO2 down to 50% Cr worse UOP : 800 cc Current Medications: all current  Medications have been eviewed in [facility] Review of Systems: A comprehensive review of systems was negative except for that written in the HPI. Objective: 
Vitals:   
Vitals:  
 02/03/21 0700 02/03/21 0800 02/03/21 0900 02/03/21 1000 BP: (!) 116/49 (!) 126/51 (!) 137/54 (!) 109/46 Pulse: 70 63 73 68 Resp: 18 18 18 18 Temp:  98.5 °F (36.9 °C) SpO2: 94% 94% 94% 93% Weight:      
Height:      
 
Intake and Output: 
02/03 0701 - 02/03 1900 In: -  
Out: 45 [Urine:45] 
02/01 1901 - 02/03 0700 In: 2015 [I.V.:2430] Out: 1410 [Urine:1225; Drains:185] Physical Examination: seen and examined on room GEN: ON VENT 
NECK- ET tube + RESP: no distress ABD :chronic distension, BS + 
EXT : MINIMAL NON PITTING EDEMA  
NEURO:Can't access due to patient's current condition Exam deferred due to COVID-19 infection in order to preserve PPE AND minimize risk of  
 transmission to dialysis and renal transplant patient per ASN and RPA guidelines: 
 
 
 
 
[]    High complexity decision making was performed 
[]    Patient is at high-risk of decompensation with multiple organ involvement Lab Data Personally Reviewed: I have reviewed all the pertinent labs, microbiology data and radiology studies during assessment. Recent Labs 02/03/21 
9406 02/02/21 22 [id number] 02/02/21 
0265 02/01/21 
7359 02/01/21 
8261  140 140   < > 138  
K 3.6 3.7 3.7   < > 3.0*  
 104 105   < > 103 CO2 23 24 22   < > 25 * 161* 145*   < > 81 BUN 88* 83* 80*   < > 72* CREA 3.45* 3.17* 3.13*   < > 2.68* CA 8.6 8.9 8.6   < > 8.2* MG 2.5*  --  2.5*  --  2.4 PHOS 6.5* 6.5* 6.6*  --   --   
ALB 2.9*  2.9* 3.2*  --   --   --   
ALT 8*  --   --   --   --   
 < > = values in this interval not displayed. Recent Labs 02/03/21 
3107 02/02/21 
2468 02/01/21 
7209 WBC 7.9 8.8 4.3 HGB 7.2* 8.4* 7.3* HCT 23.5* 27.1* 23.7*  
 323 236 No results found for: SDES Lab Results Component Value Date/Time Culture result: NO GROWTH 5 DAYS 01/23/2021 03:37 PM  
 Culture result: LIGHT YEAST, (APPARENT CANDIDA ALBICANS) (A) 01/23/2021 02:45 PM  
 Culture result: NO NORMAL RESPIRATORY MICHELLE ISOLATED 01/23/2021 02:45 PM  
 
Recent Results (from the past 24 hour(s)) GLUCOSE, POC Collection Time: 02/02/21 11:55 AM  
Result Value Ref Range Glucose (POC) 162 (H) 65 - 100 mg/dL Performed by Juanjo Pelayo RENAL FUNCTION PANEL Collection Time: 02/02/21 12:17 PM  
Result Value Ref Range Sodium 140 136 - 145 mmol/L Potassium 3.7 3.5 - 5.1 mmol/L Chloride 104 97 - 108 mmol/L  
 CO2 24 21 - 32 mmol/L Anion gap 12 5 - 15 mmol/L Glucose 161 (H) 65 - 100 mg/dL BUN 83 (H) 6 - 20 MG/DL Creatinine 3.17 (H) 0.70 - 1.30 MG/DL  
 BUN/Creatinine ratio 26 (H) 12 - 20 GFR est AA 24 (L) >60 ml/min/1.73m2 GFR est non-AA 20 (L) >60 ml/min/1.73m2 Calcium 8.9 8.5 - 10.1 MG/DL Phosphorus 6.5 (H) 2.6 - 4.7 MG/DL Albumin 3.2 (L) 3.5 - 5.0 g/dL TRIGLYCERIDE Collection Time: 02/02/21 12:17 PM  
Result Value Ref Range Triglyceride 555 (H) <150 MG/DL  
LIPID PANEL Collection Time: 02/02/21 12:17 PM  
Result Value Ref Range LIPID PROFILE Cholesterol, total 166 <200 MG/DL Triglyceride 560 (H) <150 MG/DL  
 HDL Cholesterol 26 MG/DL  
 LDL, calculated Not calculated due to elevated triglyceride level 0 - 100 MG/DL VLDL, calculated  MG/DL Calculation not valid with this patient's other Lipid values. CHOL/HDL Ratio 6.4 (H) 0.0 - 5.0 LDL, DIRECT Collection Time: 02/02/21 12:17 PM  
Result Value Ref Range LDL,Direct 68 0 - 100 mg/dl GLUCOSE, POC Collection Time: 02/02/21  6:00 PM  
Result Value Ref Range Glucose (POC) 142 (H) 65 - 100 mg/dL Performed by Lauren Zapata GLUCOSE, POC Collection Time: 02/03/21 12:21 AM  
Result Value Ref Range Glucose (POC) 181 (H) 65 - 100 mg/dL Performed by Yen Bangura , ARTERIAL Collection Time: 02/03/21  5:31 AM  
Result Value Ref Range pH 7.30 (L) 7.35 - 7.45    
 PCO2 47 (H) 35 - 45 mmHg PO2 66 (L) 80 - 100 mmHg O2 SAT 91 (L) 92 - 97 % BICARBONATE 23 22 - 26 mmol/L  
 BASE DEFICIT 4.1 mmol/L  
 O2 METHOD VENT    
 FIO2 50 % MODE ASSIST CONTROL Tidal volume 550.0 SET RATE 18 PEEP/CPAP 10.0 Sample source ARTERIAL    
 SITE RIGHT RADIAL JUVENTINO'S TEST YES    
GLUCOSE, POC Collection Time: 02/03/21  6:09 AM  
Result Value Ref Range Glucose (POC) 198 (H) 65 - 100 mg/dL Performed by Darian [address]ood Rd Collection Time: 02/03/21  6:34 AM  
Result Value Ref Range D-dimer 2.18 (H) 0.00 - 0.65 mg/L FEU MAGNESIUM Collection Time: 02/03/21  6:34 AM  
Result Value Ref Range Magnesium 2.5 (H) 1.6 - 2.4 mg/dL RENAL FUNCTION PANEL Collection Time: 02/03/21  6:34 AM  
Result Value Ref Range Sodium 140 136 - 145 mmol/L Potassium 3.6 3.5 - 5.1 mmol/L Chloride 103 97 - 108 mmol/L  
 CO2 23 21 - 32 mmol/L Anion gap 14 5 - 15 mmol/L Glucose 178 (H) 65 - 100 mg/dL BUN 88 (H) 6 - 20 MG/DL Creatinine 3.45 (H) 0.70 - 1.30 MG/DL  
 BUN/Creatinine ratio 26 (H) 12 - 20 GFR est AA 22 (L) >60 ml/min/1.73m2 GFR est non-AA 18 (L) >60 ml/min/1.73m2 Calcium 8.6 8.5 - 10.1 MG/DL Phosphorus 6.5 (H) 2.6 - 4.7 MG/DL Albumin 2.9 (L) 3.5 - 5.0 g/dL CBC WITH AUTOMATED DIFF Collection Time: 02/03/21  6:34 AM  
Result Value Ref Range WBC 7.9 4.1 - 11.1 K/uL  
 RBC 2.53 (L) 4.10 - 5.70 M/uL HGB 7.2 (L) 12.1 - 17.0 g/dL HCT 23.5 (L) 36.6 - 50.3 % MCV 92.9 80.0 - 99.0 FL  
 MCH 28.5 26.0 - 34.0 PG  
 MCHC 30.6 30.0 - 36.5 g/dL  
 RDW 19.3 (H) 11.5 - 14.5 % PLATELET 963 333 - 414 K/uL MPV 9.4 8.9 - 12.9 FL  
 NRBC 0.3 (H) 0  WBC ABSOLUTE NRBC 0.02 (H) 0.00 - 0.01 K/uL NEUTROPHILS 84 (H) 32 - 75 % LYMPHOCYTES 6 (L) 12 - 49 % MONOCYTES 6 5 - 13 % EOSINOPHILS 3 0 - 7 % BASOPHILS 0 0 - 1 % IMMATURE GRANULOCYTES 1 (H) 0.0 - 0.5 % ABS. NEUTROPHILS 6.6 1.8 - 8.0 K/UL  
 ABS. LYMPHOCYTES 0.5 (L) 0.8 - 3.5 K/UL  
 ABS. MONOCYTES 0.5 0.0 - 1.0 K/UL  
 ABS. EOSINOPHILS 0.2 0.0 - 0.4 K/UL  
 ABS. BASOPHILS 0.0 0.0 - 0.1 K/UL  
 ABS. IMM. GRANS. 0.1 (H) 0.00 - 0.04 K/UL  
 DF SMEAR SCANNED    
 RBC COMMENTS ANISOCYTOSIS 1+ 
    
 RBC COMMENTS POLYCHROMASIA 1+ 
    
LIPID PANEL Collection Time: 02/03/21  6:34 AM  
Result Value Ref Range LIPID PROFILE Cholesterol, total 165 <200 MG/DL Triglyceride 564 (H) <150 MG/DL  
 HDL Cholesterol 25 MG/DL  
 LDL, calculated Not calculated due to elevated triglyceride level 0 - 100 MG/DL VLDL, calculated  MG/DL Calculation not valid with this patient's other Lipid values. CHOL/HDL Ratio 6.6 (H) 0.0 - 5.0 TRIGLYCERIDE Collection Time: 02/03/21  6:34 AM  
Result Value Ref Range Triglyceride 576 (H) <150 MG/DL  
HEPATIC FUNCTION PANEL Collection Time: 02/03/21  6:34 AM  
Result Value Ref Range Protein, total 6.5 6.4 - 8.2 g/dL Albumin 2.9 (L) 3.5 - 5.0 g/dL Globulin 3.6 2.0 - 4.0 g/dL A-G Ratio 0.8 (L) 1.1 - 2.2 Bilirubin, total 0.6 0.2 - 1.0 MG/DL Bilirubin, direct 0.3 (H) 0.0 - 0.2 MG/DL Alk. phosphatase 80 45 - 117 U/L  
 AST (SGOT) 7 (L) 15 - 37 U/L  
 ALT (SGPT) 8 (L) 12 - 78 U/L  
LDL, DIRECT Collection Time: 02/03/21  6:34 AM  
Result Value Ref Range LDL,Direct 60 0 - 100 mg/dl Sreedhar Burger MD 
[facility]  
[address], [address] [facility] Phone - [phone] Fax - [phone] 
[email or web address]

## 2021-02-03 NOTE — PROGRESS NOTES
0730: Bedside shift change report given to Valerie Arthur (oncoming nurse) by Dariela Kuhn (offgoing nurse). Report included the following information SBAR, Kardex, Intake/Output and MAR.  
 
 
1930: Bedside shift change report given to Leonid Dickson (oncoming nurse) by  Eusebio Pardo nurse). Report included the following information SBAR, Kardex, Intake/Output and MAR.

## 2021-02-03 NOTE — PROGRESS NOTES
ID Progress Note 2/3/2021 Subjective: Afebrile. On the ventilator at 50% fio2 ROS: Unobtainable Objective:  
 
Vitals:  
Visit Vitals BP (!) 134/59 Pulse 69 Temp 98.5 °F (36.9 °C) Resp 18 Ht 6' (1.829 m) Wt 125 kg (275 lb 9.2 oz) SpO2 92% BMI 37.37 kg/m² Tmax:  Temp (24hrs), Av.3 °F (36.8 °C), Min:97.4 °F (36.3 °C), Max:99 °F (37.2 °C) Exam:  
intubated Not in distress Pink conjunctivae, anicteric sclerae No cervical lymphdenopathy Lung clear, no rales, wheezes or rhonchi Heart: s1, s2, RRR, no murmurs rubs or clicks Abdomen: soft nontender, no guarding or rebound Has wound vac on right foot Labs:  
Lab Results Component Value Date/Time WBC 7.9 2021 06:34 AM  
 Hemoglobin (POC) 6.5 2020 01:59 PM  
 HGB 7.2 (L) 2021 06:34 AM  
 HCT 23.5 (L) 2021 06:34 AM  
 PLATELET 562  06:34 AM  
 MCV 92.9 2021 06:34 AM  
 
Lab Results Component Value Date/Time Sodium 140 2021 06:34 AM  
 Potassium 3.6 2021 06:34 AM  
 Chloride 103 2021 06:34 AM  
 CO2 23 2021 06:34 AM  
 Anion gap 14 2021 06:34 AM  
 Glucose 178 (H) 2021 06:34 AM  
 BUN 88 (H) 2021 06:34 AM  
 Creatinine 3.45 (H) 2021 06:34 AM  
 BUN/Creatinine ratio 26 (H) 2021 06:34 AM  
 GFR est AA 22 (L) 2021 06:34 AM  
 GFR est non-AA 18 (L) 2021 06:34 AM  
 Calcium 8.6 2021 06:34 AM  
 Bilirubin, total 0.6 2021 06:34 AM  
 Alk.  phosphatase 80 2021 06:34 AM  
 Protein, total 6.5 2021 06:34 AM  
 Albumin 2.9 (L) 2021 06:34 AM  
 Albumin 2.9 (L) 2021 06:34 AM  
 Globulin 3.6 2021 06:34 AM  
 A-G Ratio 0.8 (L) 2021 06:34 AM  
 ALT (SGPT) 8 (L) 2021 06:34 AM  
 
 
 
 
 
Assessment:  
 
#1 osteomyelitis of the right foot 
  
#2 group b strep  bacteremia 
  
#3 renal insufficiency 
  
#4 diabetes 
  
#5 prostate cancer 
  
#6 hypertension 
  
 #7 heart failure 
  
 #8 covid  
  
#9 diarrhea Recommendations:  
 
Continue zosyn. There is OM on the surgical margin. He will need prolonged therapy. Orders written. Last day slated for feb 12, 2021. Check crp tomorrow.  
  
He has completed treatment with remdesivir.  (1/121/16) 
  He has completed dexamethasone (1/12 - 1/21)  Completed 14 day course of zyvox on 1/31.   
 
 
Mercer Nageotte, MD

## 2021-02-03 NOTE — PROGRESS NOTES
Physical therapy: 
 
Chart reviewed and spoke with RN in prep for PT session. Pt remains intubated and sedated and not appropriate for therapy at this time. Patient has not been appropriate for 3 consecutive days/attempts. Will complete current PT order. Please re-consult if patient becomes medically appropriate to participate. Per ABCDEF protocol, will work with patient when PEEP is 10.0 or less, FIO2 60% or less, and patient is following basic commands. Will follow patient peripherally. Recommend nursing to complete with patient, as able, in order to promote cardiopulmonary systems, maintain strength, endurance and independence:  
-bed in chair position with foot board on 3x/day 30-60 mins max each and/OR reverse trendelenburg with foot board on and non-skid footwear 
-passive ROM B UEs and LEs during bathing to prevent contractures 
-positioning to prevent edema and contractures. Thank you for your assistance.   
 
Jada Estrada, PT, DPT

## 2021-02-03 NOTE — PROGRESS NOTES
[facility] 
 
ICU TEAM Progress Note Name: Georgina Manzo III  
: 1950 MRN: [id number] Date: 2/3/2021 Subjective:  
Progress Note: 2/3/2021 Mr. Jered Sarabia is a 71 yo male w a PMH of prostate cancer s/p radiation, tobacco use disorder (1 ppd), CKD3a, gastric angioectasias,  IDDM, heart failure, HTN who was admitted with R foot osteomyelitis on . He was transferred to the ICU today after experiencing a cardiac arrest in the [facility]. Please see below for a brief synopsis of his hospital course, by problem. 
  
Acute Hypoxic Respiratory failure secondary to COVID pneumonia: He also demonstrated dyspnea on initial admission, but was negative for COVID. Initially treated with steroids. On , he developed increased oxygen needs. He was COVID positive. He was started on and completed treatment with steroids (completed ), remdesivir (completed ), zinc, and vitamin C. He initially required BiPAP on , and was weaned to 4-5L/min. CT chest showed bilateral pleural effusions with ground glass. Diuresis was attempted. He was continued on zosyn. He required transfer to THE [facility] on 1/15 due to continued desaturations -> 70s on NRB. CXR with increased bilateral infiltrates. Started linezolid on . Required intubation on . See below under cardiac arrest. He was extubated on , but required reintubation the same day. He completed a course of empiric 14d zyvox (ended ) and repeated empiric stress dose steroids (ended ). He remains on prolonged zosyn for treatment of osteomyelitis. Respiratory cultures have remained NGTD. Cardiac arrest, s/p ROSC: Not cooled as he regained mental function s/p ROSC. Code time: reported to be 12 minutes The patient had been taken off BiPAP for lunch. He was on intermittent BiPAP and HiFlow, demonstrating stability to come off to eat. He ate his lunch and was later found SOB unresponsive and pulseless. He was thereafter transferred to the ICU. He regained mentation, was able to respond to commands, shake head/yes, no, but ultimately did require sedation for compliance with mechanical ventilation. Likely hypoxic arrest. 
Septic shock: Osteomyelitis has been only identified source of infection. Please see above under respiratory failure for full courses of abx. Has been on/off pressors since ICU admission. Admitted with a chronic wound on his R foot. He was started on broad spectrum antibiotics with vanc, zosyn, and flagyl and underwent wound debridement on 12/30. BLC positive for S agalactiae on 12/28. Foot cultures + for Protues mirabilis, group B strep, and Enterococcus. Antibiotics were then narrowed to zosyn. Wound vac placed on foot 1/5. Zosyn is planned for 6 weeks. Acute on chronic CKD3a: Baseline Cr 1.7. Renal has followed throughout his course. Renal failure progressively worsening. IDDM2: Lantus and lispro Anemia: Course c/b anemia w heme + stool. EGD on 1/4 showing superficial ulcers in the distal bulb and second portion of the duodenum measuring 2-4mm. No active bleeding noted. BID protonix. Received PRBC 1/2. 
  
COVID negative 12/28, 12/29, but positive 1/11 Overnight: 
Progressively worsening renal failure Active Problem List:  
 
Problem List  Date Reviewed: 8/20/2020 Codes Class * (Principal) Osteomyelitis ([facility] ICD-10-CM: M86.9 ICD-9-CM: 730.20 Diabetic ulcer of right midfoot associated with type 2 diabetes mellitus, with fat layer exposed ([facility] ICD-10-CM: E11.621, W65.379 ICD-9-CM: 250.80, 707.14   
   
 PAD (peripheral artery disease) (HCC) ICD-10-CM: I73.9 ICD-9-CM: 443.9 Dependence on nicotine from cigarettes ICD-10-CM: F17.210 ICD-9-CM: 305.1 KATHI (acute kidney injury) ([facility] 75.) ICD-10-CM: N17.9 ICD-9-CM: 469. 9 Acute on chronic renal failure (HCC) ICD-10-CM: N17.9, N18.9 ICD-9-CM: 584.9, 585.9 Severe obesity (BMI 35.0-39. 9) with comorbidity ([facility] 75.) ICD-10-CM: E66.01 
ICD-9-CM: 278.01 Type 2 diabetes with nephropathy (HCC) ICD-10-CM: E11.21 
ICD-9-CM: 250.40, 583.81 S/P hip replacement, right ICD-10-CM: Z99.174 ICD-9-CM: V43.64 Stage 3 chronic kidney disease ICD-10-CM: N18.30 ICD-9-CM: 758. 3 Prostate [facility]) ICD-10-CM: I54 ICD-9-CM: 465 Diabetic polyneuropathy ([facility] 75.) ICD-10-CM: E11.42 
ICD-9-CM: 250.60, 357.2 Rotator Cuff Tendonitis ICD-10-CM: M71.9, M67.919 ICD-9-CM: 726.10 Diabetes mellitus type 2, controlled ([facility] 75.) ICD-10-CM: E11.9 ICD-9-CM: 250.00 Degenerative disc disease ICD-10-CM: [id number] ICD-9-CM: 722.6 Osteoarthrosis involving lower leg ICD-10-CM: M17.10 ICD-9-CM: 715.96 Depression/ Anxiety ICD-10-CM: F34.1 ICD-9-CM: 300.4 HTN (hypertension) ICD-10-CM: I10 
ICD-9-CM: 401.9 Chronic hip pain ICD-10-CM: M25.559, G89.29 ICD-9-CM: 719.45, 338.29 Hypertriglyceridemia ICD-10-CM: E78.1 ICD-9-CM: 272.1 Mild Aortic Insufficiency ICD-10-CM: I35.1 ICD-9-CM: 424.1 Mild Concentric LVH (left ventricular hypertrophy) ICD-10-CM: I51.7 ICD-9-CM: 429.3 Past Medical History: has a past medical history of Arthritis, Degenerative,  Knee (4/4/2011), Carcinoma, Prostate (4/4/2011), Degenerative disc disease (4/4/2011), Depression/ Anxiety (4/4/2011), Diabetes Mellitrus ( non-insulin dependent ) Type 2 (4/4/2011), Heart failure ([facility] HEMATOCHEZIA (4/4/2011), Hypertrension (4/4/2011), Hypertriglyceridemia (4/4/2011), Ill-defined condition, Insomnia (4/4/2011), Laryngitis (4/4/2011), Mild Aortic insufficiency (4/4/2011), Mild Concentric LVH (left ventricular hypertrophy) (4/4/2011), Neuropathy (4/4/2011), Osteoarthritis (4/4/2011), and Rotator Cuff Tendonitis (4/4/2011). Past Surgical History:  
 
 has a past surgical history that includes hx urological; hx other surgical; pr cardiac surg procedure unlist; colonoscopy (N/A, 4/28/2017); hx orthopaedic; hx orthopaedic; and ir insert tunl cvc w/o port over 5 yr (1/6/2021). Home Medications:  
 
Prior to Admission medications Medication Sig Start Date End Date Taking? Authorizing Provider  
pregabalin (LYRICA) 150 mg capsule TAKE ONE CAPSULE BY MOUTH TWICE A DAY 12/7/20  Yes Cas Valadez MD  
bicalutamide (CASODEX) 50 mg tablet TAKE ONE TABLET BY MOUTH DAILY 11/30/20  Yes Cas Valadez MD  
torsemide (DEMADEX) 20 mg tablet TAKE FOUR TABLETS BY MOUTH TWICE A DAY 11/23/20  Yes Cas Valadez MD  
pantoprazole (PROTONIX) 40 mg tablet TAKE ONE TABLET BY MOUTH TWICE DAILY ONCE IN THE MORNING AND ONCE IN THE EVENING 11/18/20  Yes Cas Valadez MD  
hydrOXYzine HCL (ATARAX) 50 mg tablet TAKE ONE TABLET BY MOUTH THREE TIMES A DAY AS NEEDED FOR ITCHING FOR UP TO 10 DAYS 10/22/20  Yes Cas Valadez MD  
amLODIPine (NORVASC) 10 mg tablet TAKE ONE TABLET BY MOUTH DAILY 10/2/20  Yes Cas Valadez MD  
hydrALAZINE (APRESOLINE) 100 mg tablet Take 1 Tab by mouth three (3) times daily.  9/16/20  Yes Cas Valadez MD  
 ascorbic acid, vitamin C, (Vitamin C) 500 mg tablet Take  by mouth. Yes Provider, Historical  
enzalutamide (XTANDI) 40 mg capsule Take 160 mg by mouth daily. Yes Provider, Historical  
Thera-Tabs M 27 mg iron-400 mcg tab TAKE ONE TABLET BY MOUTH DAILY 7/17/20  Yes Eliazar Phillips MD  
tamsulosin [facility]) 0.4 mg capsule TAKE ONE CAPSULE BY MOUTH DAILY 7/16/20  Yes Eliazar Phillips MD  
Insulin Syringe-Needle U-100 (BD Insulin Syringe Ultra-Fine) 1 mL 31 gauge x 5/16 syrg USE TO INJECT INSULIN FIVE TIMES A DAY 6/16/20  Yes Eliazar Phillips MD  
ferrous sulfate 325 mg (65 mg iron) tablet TAKE ONE TABLET BY MOUTH DAILY BEFORE BREAKFAST 6/16/20  Yes Eliazar Phillips MD  
gabapentin (NEURONTIN) 300 mg capsule Take 1 Cap by mouth three (3) times daily. Max Daily Amount: 900 mg. 5/25/20  Yes Eliazar Phillips MD  
triamcinolone acetonide (KENALOG) 0.1 % ointment APPLY A THIN LAYER TO AFFECTED AREA(S) TWO TIMES A DAY **DO NOT EXCEED 2.66 GRAMS PER DAY** 5/19/20  Yes Eliazar Phillips MD  
diclofenac (VOLTAREN) 1 % gel Apply  to affected area four (4) times daily. 4/16/20  Yes Eliazar Phillips MD  
ammonium lactate (AMLACTIN) 12 % topical cream Apply  to affected area two (2) times a day. rub in to affected area well 3/10/20  Yes Eliazar Phillips MD  
ammonium lactate (LAC-HYDRIN FIVE) 5 % lotion Apply 1 Applicator to affected area daily. Apply daily bilateral lower legs 3/5/20  Yes Eliazar Phillips MD  
insulin glargine (LANTUS U-100 INSULIN) 100 unit/mL injection 72 Units by SubCUTAneous route daily. 3/5/20  Yes Eliazar Phillips MD  
insulin regular (NOVOLIN R, HUMULIN R) 100 unit/mL injection 24 units sc tid 3/5/20  Yes Eliazar Phillips MD  
ketoconazole (NIZORAL) 2 % topical cream Apply  to affected area two (2) times a day.  2/4/20  Yes Eliazar Phillips MD  
ketoconazole (NIZORAL) 2 % shampoo Sig:shampoo once a week 2/4/20  Yes Eliazar Phillips MD  
 bisacodyl (DULCOLAX, BISACODYL,) 10 mg suppository Insert 10 mg into rectum daily. Yes Provider, Historical  
insulin aspart U-100 (NOVOLOG FLEXPEN U-100 INSULIN) 100 unit/mL (3 mL) inpn by SubCUTAneous route. Yes Provider, Historical  
Insulin Needles, Disposable, (NOVOFINE 32) 32 gauge x 1/4\" ndle Sig:use 4 times a day as needed 19  Yes MD LINDA Voss R REGULAR U-100 INSULN 100 unit/mL injection INJECT 14 UNITS UNDER THE SKIN THREE TIMES A DAY WITH MEALS AS NEEDED 19  Yes Mary Anderson MD  
fluticasone propionate [facility]) 50 mcg/actuation nasal spray SPRAY TWO SPRAYS IN THE AFFECTED NOSTRIL DAILY 19  Yes Mary Anderson MD  
aspirin delayed-release 81 mg tablet Take 81 mg by mouth daily. Yes Provider, Historical  
acetaminophen (PAIN AND FEVER) 500 mg tablet TAKE ONE TABLET BY MOUTH EVERY 6 HOURS AS NEEDED FOR PAIN 18  Yes Mary Anderson MD  
Calcium Carbonate-Vit D3-Min 600 mg calcium- 400 unit tab Take 1 Tab by mouth two (2) times a day. Yes Provider, Historical  
 
Allergies/Social/Family History: No Known Allergies Social History Tobacco Use  Smoking status: Current Every Day Smoker Packs/day: 1.00 Last attempt to quit: 2019 Years since quittin.2  Smokeless tobacco: Never Used Substance Use Topics  Alcohol use: Not Currently Alcohol/week: 11.7 standard drinks Types: 7 Cans of beer, 7 Shots of liquor per week Family History Family history unknown: Yes Review of Systems:  
 
Unable to provide - intubated and sedated Objective:  
Vital Signs: 
Visit Vitals BP (!) 134/59 Pulse 70 Temp 98.5 °F (36.9 °C) Resp 18 Ht 6' (1.829 m) Wt 125 kg (275 lb 9.2 oz) SpO2 92% BMI 37.37 kg/m² O2 Flow Rate (L/min): 15 l/min(Mid flow at 9 liters also) O2 Device: Endotracheal tube Temp (24hrs), Av.3 °F (36.8 °C), Min:97.4 °F (36.3 °C), Max:99 °F (37.2 °C) Intake/Output: Intake/Output Summary (Last 24 hours) at 2/3/2021 [facility] Last data filed at 2/3/2021 1300 Gross per 24 hour Intake 3145.42 ml Output 290 ml Net 2855.42 ml Physical Exam: 
  
General:  Sedated, RASS -3 Eye:  PERRLA Neurologic:  Sedated Neck:  No JVD Lungs: CTAB, on mechanical ventilation Heart: RRR, normal S1/S2, 2+ pulses, 2+ edema Abdomen:  soft, non-tender. Bowel sounds normal. No masses,  no organomegaly Skin:Wound vac to R foot; chronic skin changes to BLE 
 
LABS AND  DATA: Personally reviewed Recent Labs 02/03/21 
0144 02/02/21 
3104 WBC 7.9 8.8 HGB 7.2* 8.4* HCT 23.5* 27.1*  
 323 Recent Labs 02/03/21 
5440 02/02/21 22 [id number] 02/02/21 
3944  140 140  
K 3.6 3.7 3.7  104 105 CO2 23 24 22 BUN 88* 83* 80* CREA 3.45* 3.17* 3.13* * 161* 145* CA 8.6 8.9 8.6 MG 2.5*  --  2.5* PHOS 6.5* 6.5* 6.6* Recent Labs 02/03/21 
3174 02/02/21 22 [id number] AP 80  --   
TP 6.5  --   
ALB 2.9*  2.9* 3.2*  
GLOB 3.6  -- No results for input(s): INR, PTP, APTT, INREXT, INREXT in the last 72 hours. No results for input(s): PHI, PCO2I, PO2I, FIO2I in the last 72 hours. No results for input(s): CPK, CKMB, TROIQ, BNPP in the last 72 hours. Ventilator Settings: 
AC: 50% R 18 PEEP 10  MEDS: Reviewed Chest X-Ray: personally reviewed and report checked: 1/31: Mild interval improvement in bilateral airspace disease. 1/24: TTE: LV: Estimated LVEF is 45 - 50%. Biplane method used to measure ejection fraction. Normal cavity size. Moderate concentric hypertrophy. RV: Mildly dilated right ventricle. Mildly reduced systolic function. Assessment:  
 
Acute Hypoxic Respiratory Failure Secondary to COVID Pneumonia: Completed steroids 1/31. Not candidate for remdesivir. Intubated 1/23. Failed extubation 1/26. 
- ARDS volume ventilation - Wean vent settings as tolerated - Diuretics as per renal 
- PPx lovenox - Continue GOC with daughter - has been adamant that pt is full code Septic Shock: Osteomyelitis is only known bacterial infectious etiology. Likely cytokine reaction in setting of COVID-19. Cultures NGTD. Completed steroids. Completed course of linezolid (empiric). On prolonged zoysn for osteomyelitis. - Continue zosyn - end date 2/12 
- Wean levo as tolerated for goal MAP 70 KATHI on CKD3a: Renal following; renal failure progressively worsening 
- Goal MAP 70 - Albumin per renal 
- Continue bumex per renal 
- Progressing toward needing HD; renal spoke with daughter today HFpEF with Exacerbation: EF 45-50%, mildly dilated RV w mildly reduced systolic function. Diuresis difficult in setting of KATHI on CKD3. 
- Difficult to diurese due to KATHI on CKD Delirium: When sedation is lightened, experiences hyperactive delirium, which interferes with weaning 
- Continue precedex - Minimize benzos R foot wound: Complicated by osteomyelitis. - Wound vac - Zosyn as above - ID following Deconditioning: Unable to work w PT/OT at this time Prostate Cancer: Holding casodex as this cannot be crushed an pt has OGT Multidisciplinary Rounds Completed: Yes ABCDEF Bundle/Checklist 
Pain Medications: Fentanyl @300 Target RASS: 0 - Alert & Calm - Spontaneously pays attention to caregiver Sedation Medications: Propofol CAM-ICU:  Positive Mobility: Poor PT/OT:Unable to participate Restraints: Soft wrist restraints Discussed Plan of Care (goals of care): Yes Addressed Code Status: Full Code CARDIOVASCULAR Cardiac Gtts: Norepinephrine SBP Goal of: > 90 mmHg MAP Goal of: > 70 mmHg Transfusion Trigger (Hgb): <7 g/dL RESPIRATORY Vent Goals:  
Optimize PEEP/Ventilation/Oxygenation DVT Prophylaxis (if no, list reason): Lovenox SPO2 Goal: > 92% GI/ Bynum Catheter Present: Yes GI Prophylaxis: Protonix (pantoprazole) Nutrition: Yes TF Bowel Movement: Yes 
 Bowel Regimen: Docusate (Colace) Insulin: Y 
 
ANTIBIOTICS Antibiotics: 
Zosyn T/L/D Tubes: Orogastric Tube Lines: [employer] Drains: Bynum Catheter [employer] Vent DISPOSITION Stay in ICU CRITICAL CARE CONSULTANT NOTE I had a face to face encounter with the patient, reviewed and interpreted patient data including clinical events, labs, images, vital signs, I/O's, and examined patient. I have discussed the case and the plan and management of the patient's care with the consulting services, the bedside nurses and the respiratory therapist.   
 
NOTE OF PERSONAL INVOLVEMENT IN CARE This patient has a high probability of imminent, clinically significant deterioration, which requires the highest level of preparedness to intervene urgently. I participated in the decision-making and personally managed or directed the management of the following life and organ supporting interventions that required my frequent assessment to treat or prevent imminent deterioration. I personally spent 35 minutes of critical care time. This is time spent at this critically ill patient's bedside actively involved in patient care as well as the coordination of care and discussions with the patient's family. This does not include any procedural time which has been billed separately. Keyanna Jacobson NP [facility] Critical Care 2/3/2021

## 2021-02-03 NOTE — PROGRESS NOTES
1930: Bedside and Verbal shift change report given to TARUN Reddy RN (oncoming nurse) by Tj Miller. Mercedez Landeros RN (offgoing nurse). Report included the following information SBAR, Kardex, Intake/Output, MAR, Accordion, Recent Results, Cardiac Rhythm NSR and Alarm Parameters . 0730: Bedside and Verbal shift change report given to CLAU Adhikari RN (oncoming nurse) by Kristen Reddy RN (offgoing nurse). Report included the following information SBAR, Kardex, Intake/Output, MAR, Accordion, Recent Results, Cardiac Rhythm NSR, Alarm Parameters  and Dual Neuro Assessment.

## 2021-02-03 NOTE — PROGRESS NOTES
ID Progress Note 2021 Subjective: Afebrile. On the ventilator. ROS: Unobtainable Objective:  
 
Vitals:  
Visit Vitals BP (!) 142/58 Pulse 67 Temp 98.2 °F (36.8 °C) Resp 18 Ht 6' (1.829 m) Wt 120 kg (264 lb 8.8 oz) SpO2 98% BMI 35.88 kg/m² Tmax:  Temp (24hrs), Av.8 °F (37.1 °C), Min:98.2 °F (36.8 °C), Max:99.1 °F (37.3 °C) Exam: On the ventilator Sedated Labs:  
Lab Results Component Value Date/Time WBC 8.8 2021 06:40 AM  
 Hemoglobin (POC) 6.5 2020 01:59 PM  
 HGB 8.4 (L) 2021 06:40 AM  
 HCT 27.1 (L) 2021 06:40 AM  
 PLATELET 507  06:40 AM  
 MCV 92.2 2021 06:40 AM  
 
Lab Results Component Value Date/Time Sodium 140 2021 12:17 PM  
 Potassium 3.7 2021 12:17 PM  
 Chloride 104 2021 12:17 PM  
 CO2 24 2021 12:17 PM  
 Anion gap 12 2021 12:17 PM  
 Glucose 161 (H) 2021 12:17 PM  
 BUN 83 (H) 2021 12:17 PM  
 Creatinine 3.17 (H) 2021 12:17 PM  
 BUN/Creatinine ratio 26 (H) 2021 12:17 PM  
 GFR est AA 24 (L) 2021 12:17 PM  
 GFR est non-AA 20 (L) 2021 12:17 PM  
 Calcium 8.9 2021 12:17 PM  
 Bilirubin, total 0.5 2021 05:26 AM  
 Alk. phosphatase 79 2021 05:26 AM  
 Protein, total 6.9 2021 05:26 AM  
 Albumin 3.2 (L) 2021 12:17 PM  
 Globulin 4.3 (H) 2021 05:26 AM  
 A-G Ratio 0.6 (L) 2021 05:26 AM  
 ALT (SGPT) 11 (L) 2021 05:26 AM  
 
 
 
 
 
Assessment:  
 
#1 osteomyelitis of the right foot 
  
#2 group b strep  bacteremia 
  
#3 renal insufficiency 
  
#4 diabetes 
  
#5 prostate cancer 
  
#6 hypertension 
  
#7 heart failure 
  
 #8 covid  
  
#9 diarrhea Recommendations:  
 
Continue zosyn. There is OM on the surgical margin. He will need prolonged therapy. Orders written. Last day slated for 2021 
  
He has completed treatment with remdesivir.   () 
  
 He has completed dexamethasone (1/12 - 1/21)  Completed 14 day course of zyvox on 1/31.   
 
 
Travis Cunningham MD

## 2021-02-03 NOTE — ROUTINE PROCESS
Occupational therapy 1742 -  
02.03.2021 
  
Chart reviewed and spoke with PT (who discussed with RN) in prep for OT session. Pt remains intubated and sedated and not appropriate for therapy at this time. Patient has not been appropriate for 3 consecutive days/attempts. Will complete current OT order. Please re-consult if patient becomes medically appropriate to participate. 
  
Per ABCDEF protocol, will work with patient when PEEP is 10.0 or less, FIO2 60% or less, and patient is following basic commands. Will follow patient peripherally.  
  
Recommend nursing to complete with patient, as able, in order to promote cardiopulmonary systems, maintain strength, endurance and independence:  
-bed in chair position with foot board on 3x/day 30-60 mins max each and/OR reverse trendelenburg with foot board on and non-skid footwear 
-passive ROM B UEs and LEs during bathing to prevent contractures 
-positioning to prevent edema and contractures.  
  
Thank you for your assistance.  
  
Willi Mendez MS, OTR/L

## 2021-02-04 NOTE — PROGRESS NOTES
PRETTY 
Patient admitted with sepsis r/t osteomyelitis of right foot. RUR 48% Disposition TBD Patient remains in the ICU vented, sedated wound vac to right foot. Patient is having worsening renal function, per notes daughter wishes to proceed with dialysis. Care management is continuing to follow for transitions of care. Pretty Alcaraz RN, Care Management

## 2021-02-04 NOTE — PROGRESS NOTES
0730: Bedside shift change report given to Dallas Bryant (oncoming nurse) by Michelle Perez (offgoing nurse). Report included the following information SBAR, Kardex, Intake/Output and MAR.  
 
 
1900: Bedside shift change report given to NILAY Ge  (oncoming nurse) by Dallas Bryant (offgoing nurse). Report included the following information SBAR, Kardex, Intake/Output and MAR.

## 2021-02-04 NOTE — PROGRESS NOTES
[facility] 
 
ICU TEAM Progress Note Name: Cristin Andrews III  
: 1950 MRN: [id number] Date: 2021 Subjective:  
Progress Note: 2021 Mr. Leela Michaels is a 73 yo male w a PMH of prostate cancer s/p radiation, tobacco use disorder (1 ppd), CKD3a, gastric angioectasias,  IDDM, heart failure, HTN who was admitted with R foot osteomyelitis on . He was transferred to the ICU today after experiencing a cardiac arrest in the [facility]. Please see below for a brief synopsis of his hospital course, by problem. 
  
Acute Hypoxic Respiratory failure secondary to COVID pneumonia: He also demonstrated dyspnea on initial admission, but was negative for COVID. Initially treated with steroids. On , he developed increased oxygen needs. He was COVID positive. He was started on and completed treatment with steroids (completed ), remdesivir (completed ), zinc, and vitamin C. He initially required BiPAP on , and was weaned to 4-5L/min. CT chest showed bilateral pleural effusions with ground glass. Diuresis was attempted. He was continued on zosyn. He required transfer to [facility] on 1/15 due to continued desaturations -> 70s on NRB. CXR with increased bilateral infiltrates. Started linezolid on . Required intubation on . See below under cardiac arrest. He was extubated on , but required reintubation the same day. He completed a course of empiric 14d zyvox (ended ) and repeated empiric stress dose steroids (ended ). He remains on prolonged zosyn for treatment of osteomyelitis. Respiratory cultures have remained NGTD. He continues to fail SBTs. Cardiac arrest, s/p ROSC: Not cooled as he regained mental function s/p ROSC. Code time: reported to be 12 minutes The patient had been taken off BiPAP for lunch. He was on intermittent BiPAP and HiFlow, demonstrating stability to come off to eat. He ate his lunch and was later found SOB unresponsive and pulseless. He was thereafter transferred to the ICU. He regained mentation, was able to respond to commands, shake head/yes, no, but ultimately did require sedation for compliance with mechanical ventilation. Likely hypoxic arrest. 
Septic shock: Osteomyelitis has been only identified source of infection. Please see above under respiratory failure for full courses of abx. Has been on/off pressors since ICU admission. Admitted with a chronic wound on his R foot. He was started on broad spectrum antibiotics with vanc, zosyn, and flagyl and underwent wound debridement on 12/30. BLC positive for S agalactiae on 12/28. Foot cultures + for Protues mirabilis, group B strep, and Enterococcus. Antibiotics were then narrowed to zosyn. Wound vac placed on foot 1/5. Zosyn is planned for 6 weeks. Acute on chronic CKD3a: Baseline Cr 1.7. Renal has followed throughout his course. Renal failure progressively worsening. He is to start HD today. Planning to place a line later today. IDDM2: Lantus and lispro Anemia: Course c/b anemia w heme + stool. EGD on 1/4 showing superficial ulcers in the distal bulb and second portion of the duodenum measuring 2-4mm. No active bleeding noted. BID protonix. Received PRBC 1/2. 
  
COVID negative 12/28, 12/29, but positive 1/11 Overnight: 
Progressively worsening renal failure; Plan for HD today Active Problem List:  
 
Problem List  Date Reviewed: 8/20/2020 Codes Class * (Principal) Osteomyelitis ([facility].) ICD-10-CM: M86.9 ICD-9-CM: 730.20 Diabetic ulcer of right midfoot associated with type 2 diabetes mellitus, with fat layer exposed ([email or web address] ICD-10-CM: E11.621, T88.352 ICD-9-CM: 250.80, 707.14 PAD (peripheral artery disease) ([facility]) ICD-10-CM: I73.9 ICD-9-CM: 443.9 Dependence on nicotine from cigarettes ICD-10-CM: F17.210 ICD-9-CM: 305.1 KAHTI (acute kidney injury) ([email or web address] 75.) ICD-10-CM: N17.9 ICD-9-CM: 352. 9 Acute on chronic renal failure (HCC) ICD-10-CM: N17.9, N18.9 ICD-9-CM: 584.9, 585.9 Severe obesity (BMI 35.0-39. 9) with comorbidity ([email or web address] 75.) ICD-10-CM: E66.01 
ICD-9-CM: 278.01 Type 2 diabetes with nephropathy ([facility]) ICD-10-CM: E11.21 
ICD-9-CM: 250.40, 583.81 S/P hip replacement, right ICD-10-CM: H95.604 ICD-9-CM: V43.64 Stage 3 chronic kidney disease ICD-10-CM: N18.30 ICD-9-CM: 816. 3 Prostate [facility]) ICD-10-CM: O05 ICD-9-CM: 451 Diabetic polyneuropathy ([email or web address] 75.) ICD-10-CM: E11.42 
ICD-9-CM: 250.60, 357.2 Rotator Cuff Tendonitis ICD-10-CM: M71.9, M67.919 ICD-9-CM: 726.10 Diabetes mellitus type 2, controlled ([facility] [[email or web address] 75.) ICD-10-CM: E11.9 ICD-9-CM: 250.00 Degenerative disc disease ICD-10-CM: FEI0265 ICD-9-CM: 722.6 Osteoarthrosis involving lower leg ICD-10-CM: M17.10 ICD-9-CM: 715.96 Depression/ Anxiety ICD-10-CM: F34.1 ICD-9-CM: 300.4 HTN (hypertension) ICD-10-CM: I10 
ICD-9-CM: 401.9 Chronic hip pain ICD-10-CM: M25.559, G89.29 ICD-9-CM: 719.45, 338.29 Hypertriglyceridemia ICD-10-CM: E78.1 ICD-9-CM: 272.1 Mild Aortic Insufficiency ICD-10-CM: I35.1 ICD-9-CM: 424.1 Mild Concentric LVH (left ventricular hypertrophy) ICD-10-CM: I51.7 ICD-9-CM: 429.3 Past Medical History: has a past medical history of Arthritis, Degenerative,  Knee (4/4/2011), Carcinoma, Prostate (4/4/2011), Degenerative disc disease (4/4/2011), Depression/ Anxiety (4/4/2011), Diabetes Mellitrus ( non-insulin dependent ) Type 2 (4/4/2011), Heart failure ([facility] HEMATOCHEZIA (4/4/2011), Hypertrension (4/4/2011), Hypertriglyceridemia (4/4/2011), Ill-defined condition, Insomnia (4/4/2011), Laryngitis (4/4/2011), Mild Aortic insufficiency (4/4/2011), Mild Concentric LVH (left ventricular hypertrophy) (4/4/2011), Neuropathy (4/4/2011), Osteoarthritis (4/4/2011), and Rotator Cuff Tendonitis (4/4/2011). Past Surgical History:  
 
 has a past surgical history that includes hx urological; hx other surgical; pr cardiac surg procedure unlist; colonoscopy (N/A, 4/28/2017); hx orthopaedic; hx orthopaedic; and ir insert tunl cvc w/o port over 5 yr (1/6/2021). Home Medications:  
 
Prior to Admission medications Medication Sig Start Date End Date Taking? Authorizing Provider  
pregabalin (LYRICA) 150 mg capsule TAKE ONE CAPSULE BY MOUTH TWICE A DAY 12/7/20  Yes Mandy Mancilla MD  
bicalutamide (CASODEX) 50 mg tablet TAKE ONE TABLET BY MOUTH DAILY 11/30/20  Yes Mandy Mancilla MD  
torsemide (DEMADEX) 20 mg tablet TAKE FOUR TABLETS BY MOUTH TWICE A DAY 11/23/20  Yes Mandy Mancilla MD  
pantoprazole (PROTONIX) 40 mg tablet TAKE ONE TABLET BY MOUTH TWICE DAILY ONCE IN THE MORNING AND ONCE IN THE EVENING 11/18/20  Yes Mandy Mancilla MD  
hydrOXYzine HCL (ATARAX) 50 mg tablet TAKE ONE TABLET BY MOUTH THREE TIMES A DAY AS NEEDED FOR ITCHING FOR UP TO 10 DAYS 10/22/20  Yes Mandy Mancilla MD  
amLODIPine (NORVASC) 10 mg tablet TAKE ONE TABLET BY MOUTH DAILY 10/2/20  Yes Mandy Mancilla MD  
hydrALAZINE (APRESOLINE) 100 mg tablet Take 1 Tab by mouth three (3) times daily.  9/16/20  Yes Mandy Mancilla MD  
 ascorbic acid, vitamin C, (Vitamin C) 500 mg tablet Take  by mouth. Yes Provider, Historical  
enzalutamide (XTANDI) 40 mg capsule Take 160 mg by mouth daily. Yes Provider, Historical  
Thera-Tabs M 27 mg iron-400 mcg tab TAKE ONE TABLET BY MOUTH DAILY 7/17/20  Yes Mary Lynn MD  
tamsulosin [facility]) 0.4 mg capsule TAKE ONE CAPSULE BY MOUTH DAILY 7/16/20  Yes Mary Lynn MD  
Insulin Syringe-Needle U-100 (BD Insulin Syringe Ultra-Fine) 1 mL 31 gauge x 5/16 syrg USE TO INJECT INSULIN FIVE TIMES A DAY 6/16/20  Yes Mary Lynn MD  
ferrous sulfate 325 mg (65 mg iron) tablet TAKE ONE TABLET BY MOUTH DAILY BEFORE BREAKFAST 6/16/20  Yes Mary Lynn MD  
gabapentin (NEURONTIN) 300 mg capsule Take 1 Cap by mouth three (3) times daily. Max Daily Amount: 900 mg. 5/25/20  Yes Mary Lynn MD  
triamcinolone acetonide (KENALOG) 0.1 % ointment APPLY A THIN LAYER TO AFFECTED AREA(S) TWO TIMES A DAY **DO NOT EXCEED 2.66 GRAMS PER DAY** 5/19/20  Yes Mary Lynn MD  
diclofenac (VOLTAREN) 1 % gel Apply  to affected area four (4) times daily. 4/16/20  Yes Mary Lynn MD  
ammonium lactate (AMLACTIN) 12 % topical cream Apply  to affected area two (2) times a day. rub in to affected area well 3/10/20  Yes Mary Lynn MD  
ammonium lactate (LAC-HYDRIN FIVE) 5 % lotion Apply 1 Applicator to affected area daily. Apply daily bilateral lower legs 3/5/20  Yes Mary Lynn MD  
insulin glargine (LANTUS U-100 INSULIN) 100 unit/mL injection 72 Units by SubCUTAneous route daily. 3/5/20  Yes Mary Lynn MD  
insulin regular (NOVOLIN R, HUMULIN R) 100 unit/mL injection 24 units sc tid 3/5/20  Yes Mary Lynn MD  
ketoconazole (NIZORAL) 2 % topical cream Apply  to affected area two (2) times a day.  2/4/20  Yes Mary Lynn MD  
ketoconazole (NIZORAL) 2 % shampoo Sig:shampoo once a week 2/4/20  Yes Mary Lynn MD  
 bisacodyl (DULCOLAX, BISACODYL,) 10 mg suppository Insert 10 mg into rectum daily. Yes Provider, Historical  
insulin aspart U-100 (NOVOLOG FLEXPEN U-100 INSULIN) 100 unit/mL (3 mL) inpn by SubCUTAneous route. Yes Provider, Historical  
Insulin Needles, Disposable, (NOVOFINE 32) 32 gauge x 1/4\" ndle Sig:use 4 times a day as needed 19  Yes MD REMI LuciaLIN R REGULAR U-100 INSULN 100 unit/mL injection INJECT 14 UNITS UNDER THE SKIN THREE TIMES A DAY WITH MEALS AS NEEDED 19  Yes Sarina Carmona MD  
fluticasone propionate [facility]) 50 mcg/actuation nasal spray SPRAY TWO SPRAYS IN THE AFFECTED NOSTRIL DAILY 19  Yes Sarina Carmona MD  
aspirin delayed-release 81 mg tablet Take 81 mg by mouth daily. Yes Provider, Historical  
acetaminophen (PAIN AND FEVER) 500 mg tablet TAKE ONE TABLET BY MOUTH EVERY 6 HOURS AS NEEDED FOR PAIN 18  Yes Sarina Carmona MD  
Calcium Carbonate-Vit D3-Min 600 mg calcium- 400 unit tab Take 1 Tab by mouth two (2) times a day. Yes Provider, Historical  
 
Allergies/Social/Family History: No Known Allergies Social History Tobacco Use  Smoking status: Current Every Day Smoker Packs/day: 1.00 Last attempt to quit: 2019 Years since quittin.2  Smokeless tobacco: Never Used Substance Use Topics  Alcohol use: Not Currently Alcohol/week: 11.7 standard drinks Types: 7 Cans of beer, 7 Shots of liquor per week Family History Family history unknown: Yes Review of Systems:  
 
Unable to provide - intubated and sedated Objective:  
Vital Signs: 
Visit Vitals BP (!) 113/50 Pulse 73 Temp 98 °F (36.7 °C) Resp 18 Ht 6' (1.829 m) Wt 125 kg (275 lb 9.2 oz) SpO2 95% BMI 37.37 kg/m² O2 Flow Rate (L/min): 15 l/min(Mid flow at 9 liters also) O2 Device: Endotracheal tube Temp (24hrs), Av.2 °F (36.8 °C), Min:97.9 °F (36.6 °C), Max:98.7 °F (37.1 °C) Intake/Output: Intake/Output Summary (Last 24 hours) at 2/4/2021 1837 Last data filed at 2/4/2021 1600 Gross per 24 hour Intake 3125.16 ml Output 720 ml Net 2405.16 ml Physical Exam: 
  
General:  Sedated, RASS -3 -> Attempted to wean sedation, desaturated Eye:  PERRLA Neurologic:  Sedated Neck:  No JVD Lungs: CTAB, on mechanical ventilation Heart: RRR, normal S1/S2, 2+ pulses, 2+ edema Abdomen:  soft, non-tender. Bowel sounds normal. No masses,  no organomegaly Skin:Wound vac to R foot; chronic skin changes to BLE 
 
LABS AND  DATA: Personally reviewed Recent Labs 02/04/21 
4632 02/03/21 
7084 WBC 8.3 7.9 HGB 7.3* 7.2* HCT 23.5* 23.5*  
 284 Recent Labs 02/04/21 
6122 02/04/21 
0250 02/03/21 
7836  136 140  
K 3.6 3.6 3.6  104 103 CO2 22 21 23 BUN 91* 89* 88* CREA 3.72* 3.69* 3.45* * 219* 178* CA 8.9 8.9 8.6 MG 2.3  --  2.5* PHOS 5.8* 6.0* 6.5* Recent Labs 02/04/21 
0127 02/04/21 
0250 02/03/21 
3724 AP 79  --  80  
TP 7.0  --  6.5 ALB 2.8* 2.7* 2.9*  2.9*  
GLOB 4.2*  --  3.6 No results for input(s): INR, PTP, APTT, INREXT, INREXT in the last 72 hours. No results for input(s): PHI, PCO2I, PO2I, FIO2I in the last 72 hours. No results for input(s): CPK, CKMB, TROIQ, BNPP in the last 72 hours. Ventilator Settings: 
AC: 60% R 18 PEEP 10  (increased from 50% FIo2 in last 24h) MEDS: Reviewed 2/4: cxr: Increased interstitial and airspace opacities with bilateral pleural 
effusions. Assessment:  
 
Acute Hypoxic Respiratory Failure Secondary to COVID Pneumonia: Completed steroids 1/31. Not candidate for remdesivir. Intubated 1/23. Failed extubation 1/26. 
- ARDS volume ventilation - Wean vent settings as tolerated 
- Start HD today as per renal 
- Heparin for DVT ppx 
- Continue GOC with daughter - has been adamant that pt is full code - Discuss trach with daughter - continue discussion tomorrow; HD started today Septic Shock: Osteomyelitis is only known bacterial infectious etiology. Likely cytokine reaction in setting of COVID-19. Cultures NGTD. Completed steroids. Completed course of linezolid (empiric). On prolonged zoysn for osteomyelitis. - Continue zosyn - end date 2/12 
- MAP goal 65; levo as needed with HD for optimal volume removal 
 
KATHI on CKD3a: Renal following; renal failure progressively worsening 
- Start HD today as per renal 
 
HFpEF with Exacerbation: EF 45-50%, mildly dilated RV w mildly reduced systolic function. Diuresis difficult in setting of KATHI on CKD3. 
- HD for volume removal 
 
Delirium: When sedation is lightened, experiences hyperactive delirium, which interferes with weaning 
- Continue precedex - Minimize benzos R foot wound: Complicated by osteomyelitis. - Wound vac - Zosyn as above - ID following Deconditioning: Unable to work w PT/OT at this time Prostate Cancer: Holding casodex as this cannot be crushed an pt has OGT Multidisciplinary Rounds Completed: Yes ABCDEF Bundle/Checklist 
Pain Medications: Fentanyl @300 Target RASS: 0 - Alert & Calm - Spontaneously pays attention to caregiver Sedation Medications: Propofol CAM-ICU:  Positive Mobility: Poor PT/OT:Unable to participate Restraints: Soft wrist restraints Discussed Plan of Care (goals of care): Yes Addressed Code Status: Full Code CARDIOVASCULAR Cardiac Gtts: Norepinephrine SBP Goal of: > 90 mmHg MAP Goal of: >65 Transfusion Trigger (Hgb): <7 g/dL RESPIRATORY Vent Goals:  
Optimize PEEP/Ventilation/Oxygenation DVT Prophylaxis (if no, list reason): Lovenox SPO2 Goal: > 92% GI/ Bynum Catheter Present: Yes GI Prophylaxis: Protonix (pantoprazole)  (hx GIB) Nutrition: Yes TF Bowel Movement: Yes Bowel Regimen: Docusate (Colace) Insulin: Y 
 
ANTIBIOTICS Antibiotics: 
Zosyn T/L/D 
 Tubes: Orogastric Tube Lines: [employer] Drains: Bynum Catheter [employer] Vent DISPOSITION Stay in ICU CRITICAL CARE CONSULTANT NOTE I had a face to face encounter with the patient, reviewed and interpreted patient data including clinical events, labs, images, vital signs, I/O's, and examined patient. I have discussed the case and the plan and management of the patient's care with the consulting services, the bedside nurses and the respiratory therapist.   
 
NOTE OF PERSONAL INVOLVEMENT IN CARE This patient has a high probability of imminent, clinically significant deterioration, which requires the highest level of preparedness to intervene urgently. I participated in the decision-making and personally managed or directed the management of the following life and organ supporting interventions that required my frequent assessment to treat or prevent imminent deterioration. I personally spent 35 minutes of critical care time. This is time spent at this critically ill patient's bedside actively involved in patient care as well as the coordination of care and discussions with the patient's family. This does not include any procedural time which has been billed separately. Todd Martinez NP [facility] Critical Care 2/4/2021

## 2021-02-04 NOTE — PROGRESS NOTES
Nephrology Progress Note [facility] Date of Admission : 12/28/2020 CC: Follow up for KATHI on CKD Assessment and Plan KATHI on CKD: 
- worsening renal function from ATN  
- d/w daughter -- consented for parth and RRT 
- HD today and TTS schedule if stable - May need pressor support for HD Resp failure: 
COVID-19 PNA: positive on 1/11 
- s/p decadron and remdesivir 
- on the vent CKD 4 
-  Presumed 2/2 DM and HTN 
- nephrotic range proteinuria 
- baseline Cr 1.7 mg/dl  
- followed by Dr. Miguel Angel Cortes at [facility] Hx of prostate cancer s/p XRT 
  
Anemia in CKD + blood loss: 
- cont JAZ 
- EGD 1/5/2021: gastric fundal lesion  
- transfuse PRN  
  
Type II DM: 
- on insulin 
  
HTN :  
- BP stable now 
  
R foot osteo: 
- on abx 
- s/p debridement on 12/30 Group B strep bacteremia Interval History: 
Overnight increased Fio2 from 50 TO 60% Cr worse UOP :< 1L 
Renal function worse Mild acidosis D/w daughter over the phone Current Medications: all current  Medications have been eviewed in [facility] Review of Systems: A comprehensive review of systems was negative except for that written in the HPI. Objective: 
Vitals:   
Vitals:  
 02/04/21 0700 02/04/21 0751 02/04/21 0800 02/04/21 0935 BP: (!) 125/57  (!) 109/50 Pulse: 87 79 77 Resp: 18 18 18 Temp:   97.9 °F (36.6 °C) 97.9 °F (36.6 °C) SpO2: 94% 95% 95% Weight:      
Height:      
 
Intake and Output: 
02/04 0701 - 02/04 1900 In: -  
Out: 100 [Urine:100] 02/02 1901 - 02/04 0700 In: 5804.9 [I.V.:2104.9] Out: 950 [Urine:950] Physical Examination: seen and examined on room GEN: ON VENT 
NECK- ET tube + RESP: no distress ABD :chronic distension, BS + 
EXT : non pitting edema NEURO:Can't access due to patient's current condition Exam deferred due to COVID-19 infection in order to preserve PPE AND minimize risk of  
transmission to dialysis and renal transplant patient per ASN and RPA guidelines: []    High complexity decision making was performed 
[]    Patient is at high-risk of decompensation with multiple organ involvement Lab Data Personally Reviewed: I have reviewed all the pertinent labs, microbiology data and radiology studies during assessment. Recent Labs 02/04/21 
4444 02/04/21 
0250 02/03/21 
7721 02/02/21 
9501 02/02/21 
7292  136 140   < > 140  
K 3.6 3.6 3.6   < > 3.7  104 103   < > 105 CO2 22 21 23   < > 22 * 219* 178*   < > 145* BUN 91* 89* 88*   < > 80* CREA 3.72* 3.69* 3.45*   < > 3.13* CA 8.9 8.9 8.6   < > 8.6 MG 2.3  --  2.5*  --  2.5* PHOS 5.8* 6.0* 6.5*   < > 6.6* ALB 2.8* 2.7* 2.9*  2.9*   < >  --   
ALT <6*  --  8*  --   --   
 < > = values in this interval not displayed. Recent Labs 02/04/21 
3196 02/03/21 
9888 02/02/21 
0615 WBC 8.3 7.9 8.8 HGB 7.3* 7.2* 8.4* HCT 23.5* 23.5* 27.1*  
 284 323 No results found for: SDES Lab Results Component Value Date/Time Culture result: NO GROWTH 5 DAYS 01/23/2021 03:37 PM  
 Culture result: LIGHT YEAST, (APPARENT CANDIDA ALBICANS) (A) 01/23/2021 02:45 PM  
 Culture result: NO NORMAL RESPIRATORY MICHELLE ISOLATED 01/23/2021 02:45 PM  
 
Recent Results (from the past 24 hour(s)) GLUCOSE, POC Collection Time: 02/03/21 11:47 AM  
Result Value Ref Range Glucose (POC) 193 (H) 65 - 100 mg/dL Performed by Lisa Hsu GLUCOSE, POC Collection Time: 02/03/21  5:21 PM  
Result Value Ref Range Glucose (POC) 195 (H) 65 - 100 mg/dL Performed by Lisa Hsu GLUCOSE, POC Collection Time: 02/03/21 11:19 PM  
Result Value Ref Range Glucose (POC) 217 (H) 65 - 100 mg/dL Performed by Donell Mueller RENAL FUNCTION PANEL Collection Time: 02/04/21  2:50 AM  
Result Value Ref Range Sodium 136 136 - 145 mmol/L Potassium 3.6 3.5 - 5.1 mmol/L Chloride 104 97 - 108 mmol/L  
 CO2 21 21 - 32 mmol/L  Anion gap 11 5 - 15 mmol/L  
 Glucose 219 (H) 65 - 100 mg/dL BUN 89 (H) 6 - 20 MG/DL Creatinine 3.69 (H) 0.70 - 1.30 MG/DL  
 BUN/Creatinine ratio 24 (H) 12 - 20 GFR est AA 20 (L) >60 ml/min/1.73m2 GFR est non-AA 16 (L) >60 ml/min/1.73m2 Calcium 8.9 8.5 - 10.1 MG/DL Phosphorus 6.0 (H) 2.6 - 4.7 MG/DL Albumin 2.7 (L) 3.5 - 5.0 g/dL CBC WITH AUTOMATED DIFF Collection Time: 02/04/21  2:50 AM  
Result Value Ref Range WBC 8.3 4.1 - 11.1 K/uL  
 RBC 2.58 (L) 4.10 - 5.70 M/uL HGB 7.3 (L) 12.1 - 17.0 g/dL HCT 23.5 (L) 36.6 - 50.3 % MCV 91.1 80.0 - 99.0 FL  
 MCH 28.3 26.0 - 34.0 PG  
 MCHC 31.1 30.0 - 36.5 g/dL  
 RDW 19.8 (H) 11.5 - 14.5 % PLATELET 690 909 - 962 K/uL MPV 9.4 8.9 - 12.9 FL  
 NRBC 0.8 (H) 0  WBC ABSOLUTE NRBC 0.07 (H) 0.00 - 0.01 K/uL NEUTROPHILS 87 (H) 32 - 75 % LYMPHOCYTES 4 (L) 12 - 49 % MONOCYTES 5 5 - 13 % EOSINOPHILS 3 0 - 7 % BASOPHILS 0 0 - 1 % IMMATURE GRANULOCYTES 1 (H) 0.0 - 0.5 % ABS. NEUTROPHILS 7.2 1.8 - 8.0 K/UL  
 ABS. LYMPHOCYTES 0.3 (L) 0.8 - 3.5 K/UL  
 ABS. MONOCYTES 0.4 0.0 - 1.0 K/UL  
 ABS. EOSINOPHILS 0.3 0.0 - 0.4 K/UL  
 ABS. BASOPHILS 0.0 0.0 - 0.1 K/UL  
 ABS. IMM. GRANS. 0.1 (H) 0.00 - 0.04 K/UL  
 DF SMEAR SCANNED    
 RBC COMMENTS ANISOCYTOSIS 1+ 
    
 RBC COMMENTS POLYCHROMASIA 1+ 
    
 RBC COMMENTS OVALOCYTES PRESENT 
    
 WBC COMMENTS BASOPHILIC STIPPLING    
MAGNESIUM Collection Time: 02/04/21  2:54 AM  
Result Value Ref Range Magnesium 2.3 1.6 - 2.4 mg/dL PHOSPHORUS Collection Time: 02/04/21  2:54 AM  
Result Value Ref Range Phosphorus 5.8 (H) 2.6 - 4.7 MG/DL  
C REACTIVE PROTEIN, QT Collection Time: 02/04/21  2:54 AM  
Result Value Ref Range C-Reactive protein 14.50 (H) 0.00 - 0.60 mg/dL METABOLIC PANEL, COMPREHENSIVE Collection Time: 02/04/21  2:54 AM  
Result Value Ref Range Sodium 136 136 - 145 mmol/L Potassium 3.6 3.5 - 5.1 mmol/L  Chloride 104 97 - 108 mmol/L  
 CO2 22 21 - 32 mmol/L Anion gap 10 5 - 15 mmol/L Glucose 216 (H) 65 - 100 mg/dL BUN 91 (H) 6 - 20 MG/DL Creatinine 3.72 (H) 0.70 - 1.30 MG/DL  
 BUN/Creatinine ratio 24 (H) 12 - 20 GFR est AA 20 (L) >60 ml/min/1.73m2 GFR est non-AA 16 (L) >60 ml/min/1.73m2 Calcium 8.9 8.5 - 10.1 MG/DL Bilirubin, total 0.6 0.2 - 1.0 MG/DL  
 ALT (SGPT) <6 (L) 12 - 78 U/L  
 AST (SGOT) 11 (L) 15 - 37 U/L Alk. phosphatase 79 45 - 117 U/L Protein, total 7.0 6.4 - 8.2 g/dL Albumin 2.8 (L) 3.5 - 5.0 g/dL Globulin 4.2 (H) 2.0 - 4.0 g/dL A-G Ratio 0.7 (L) 1.1 - 2.2 BLOOD GAS, ARTERIAL Collection Time: 02/04/21  4:44 AM  
Result Value Ref Range pH 7.30 (L) 7.35 - 7.45    
 PCO2 45 35 - 45 mmHg PO2 75 (L) 80 - 100 mmHg O2 SAT 94 92 - 97 % BICARBONATE 22 22 - 26 mmol/L  
 BASE DEFICIT 4.8 mmol/L  
 O2 METHOD VENT Sample source ARTERIAL    
 SITE LEFT RADIAL JUVENTINO'S TEST NOT APPLICABLE    
GLUCOSE, POC Collection Time: 02/04/21  5:29 AM  
Result Value Ref Range Glucose (POC) 267 (H) 65 - 100 mg/dL Performed by MD David Calle [facility] [address], [address] [facility] Phone - [phone] Fax - [phone] 
[email or web address]

## 2021-02-04 NOTE — PROGRESS NOTES
1930: Bedside and Verbal shift change [facility] by Rachael Ng RN (offgoing nurse). Report included the following information SBAR, Kardex, Intake/Output, MAR, Recent Results, Cardiac Rhythm NSR and Alarm Parameters . 2130: FiO2 increased to 60% for O2 sats 88-90%. 0525: Started SAT. Slowly weaning off Fentantyl. Propofol, and Versed gtt. Per Isauro Yun NP, goal is to lower gtts to half dosage. RT in room; decreased FiO2 to 50% in preparation for SBT. 0559: Versed gtt at 3 mg/hr, Propofol gtt paused, and Fentanyl gtt at 150 mcg/hr. Pt O2 sats 88%, hypertensive, moving BUE, and frequently grimacing; no command following. Angelica Oreilly NP to bedside to evaluate. Received orders to increase Versed and Fentanyl; titrate Propofol back on if needed. 0730: Bedside and Verbal shift change report given to [address] [place] [address] (oncoming nurse) by Halle Pandya RN (offgoing nurse). Report included the following information SBAR, Kardex, Intake/Output, MAR, Recent Results, Cardiac Rhythm NSR and Alarm Parameters .

## 2021-02-04 NOTE — PROGRESS NOTES
Day #34 of Zosyn - Renal Dosing Update Indication:  Osteomyelitis Current regimen:  3.375 gm IV Q 8 hr Abx regimen: Monotherapy Recent Labs 21 
1612 21 
0250 21 
3569 21 
0721 21 
7243 WBC  --  8.3 7.9  --  8.8 CREA 3.72* 3.69* 3.45*   < > 3.13* BUN 91* 89* 88*   < > 80*  
 < > = values in this interval not displayed. Est CrCl: KATHI on intermittent HD Temp (24hrs), Av.3 °F (36.8 °C), Min:97.9 °F (36.6 °C), Max:98.7 °F (37.1 °C) Recent cultures: None Plan: Now that the patient is transitioning to intermittent HD, the dose of Zosyn will be adjusted to 3.375 gm IV Q 12 hr per [facility] P&T Committee Protocol with respect to renal function. Pharmacy will continue to monitor patient daily and will make dosage adjustments based upon changing renal function.

## 2021-02-04 NOTE — PROGRESS NOTES
ID Progress Note 2021 Subjective: Afebrile. On the ventilator ROS: Unobtainable Objective:  
 
Vitals:  
Visit Vitals BP (!) 109/50 Pulse 77 Temp 97.9 °F (36.6 °C) Resp 18 Ht 6' (1.829 m) Wt 125 kg (275 lb 9.2 oz) SpO2 95% BMI 37.37 kg/m² Tmax:  Temp (24hrs), Av.3 °F (36.8 °C), Min:97.9 °F (36.6 °C), Max:98.7 °F (37.1 °C) Exam:  
intubated Sedated Labs:  
Lab Results Component Value Date/Time WBC 8.3 2021 02:50 AM  
 Hemoglobin (POC) 6.5 2020 01:59 PM  
 HGB 7.3 (L) 2021 02:50 AM  
 HCT 23.5 (L) 2021 02:50 AM  
 PLATELET 006  02:50 AM  
 MCV 91.1 2021 02:50 AM  
 
Lab Results Component Value Date/Time Sodium 136 2021 02:54 AM  
 Potassium 3.6 2021 02:54 AM  
 Chloride 104 2021 02:54 AM  
 CO2 22 2021 02:54 AM  
 Anion gap 10 2021 02:54 AM  
 Glucose 216 (H) 2021 02:54 AM  
 BUN 91 (H) 2021 02:54 AM  
 Creatinine 3.72 (H) 2021 02:54 AM  
 BUN/Creatinine ratio 24 (H) 2021 02:54 AM  
 GFR est AA 20 (L) 2021 02:54 AM  
 GFR est non-AA 16 (L) 2021 02:54 AM  
 Calcium 8.9 2021 02:54 AM  
 Bilirubin, total 0.6 2021 02:54 AM  
 Alk. phosphatase 79 2021 02:54 AM  
 Protein, total 7.0 2021 02:54 AM  
 Albumin 2.8 (L) 2021 02:54 AM  
 Globulin 4.2 (H) 2021 02:54 AM  
 A-G Ratio 0.7 (L) 2021 02:54 AM  
 ALT (SGPT) <6 (L) 2021 02:54 AM  
 
 
 
 
 
Assessment:  
 
#1 osteomyelitis of the right foot 
  
#2 group b strep  bacteremia 
  
#3 renal insufficiency 
  
#4 diabetes 
  
#5 prostate cancer 
  
#6 hypertension 
  
#7 heart failure 
  
 #8 covid  
  
#9 diarrhea Recommendations: Continue zosyn. There is OM on the surgical margin. He will need prolonged therapy. Orders written. Last day slated for feb 12, 2021. CRP elevated, but could also be from his lung process. I will need to take a look at the wound. Will arrange with wound care.  
  
He has completed treatment with remdesivir.  (1/121/16) 
  He has completed dexamethasone (1/12 - 1/21)  Completed 14 day course of zyvox on 1/31.   
 
 
Jimena Bush MD

## 2021-02-04 NOTE — PROCEDURES
[facility] Procedure Note - Central Venous Access:  
Performed by Kurtis Rodriguez NP 
 
Obtained informed Consent. Immediately prior to the procedure, the patient was reevaluated and found suitable for the planned procedure and any planned medications. Immediately prior to the procedure a time out was called to verify the correct patient, procedure, equipment, staff, and marking as appropriate. Central line Bundle: 
Full sterile barrier precautions used. 7-Step Sterility Protocol followed. (cap, mask sterile gown, sterile gloves, large sterile sheet, hand hygiene, 2% chlorhexidine for cutaneous antisepsis) 5 mL 1% Lidocaine placed at insertion site. Patient positioned in Trendelenburg?yes The site was prepped with ChloraPrep. Using Seldinger technique a Hemodialysis Catheter was placed in the Right via direct cannulation with 1 number of attempts for Dialysis. Ultrasound Guidance was utilized. There was good dark, non-pulsatile blood return in all ports. Femoral Site? no. If Yes, reason femoral site was chosen: na 
Catheter secured. Biopatch in place? yes. Sterile Bio-occlusive dressing placed. The following complications were encountered: None. A follow-up chest x-ray was ordered post procedure. The procedure was tolerated well. Kurtis Rodriguez NP Critical Care Medicine [facility]

## 2021-02-05 PROBLEM — J80 ARDS (ADULT RESPIRATORY DISTRESS SYNDROME) (HCC): Status: ACTIVE | Noted: 2021-01-01

## 2021-02-05 NOTE — PROCEDURES
[facility]  [phone] Vitals   Pre   Post   Assessment   Pre   Post    
Temp  97.7  97.6 LOC  Intubated, sedated Intubated, sedated HR   66 68 Lungs   vented  vented B/P  105/51 103/48 Cardiac   regular  regular Resp   18 18 Skin   Dry. warm  dry, warm Pain level  0 0 Edema   
generalized 
 generalized Orders: Duration:   Start:   2010 End:   2340 Total:   3.5 hrs Dialyzer:   Dialyzer/Set Up Inspection: Kimberley Ernst (02/04/21 2010) K Bath:   Dialysate K (mEq/L): 3.5 (02/04/21 2010) Ca Bath:   Dialysate CA (mEq/L): 2.5 (02/04/21 2010) Na/Bicarb:   Dialysate NA (mEq/L): 140 (02/04/21 2010) Target Fluid Removal:   Goal/Amount of Fluid to Remove (mL): 1500 mL (02/04/21 2010) Access Type & Location:   Right CVC: Each catheter limb disinfected per p&p, caps removed, hubs disinfected per p&p. Labs Obtained/Reviewed Critical Results Called   Date when labs were drawn- 
Hgb-   
HGB Date Value Ref Range Status 02/04/2021 7.3 (L) 12.1 - 17.0 g/dL Final  
 
K-   
Potassium Date Value Ref Range Status 02/04/2021 3.6 3.5 - 5.1 mmol/L Final  
 
Ca-  
Calcium Date Value Ref Range Status 02/04/2021 8.9 8.5 - 10.1 MG/DL Final  
 
Bun-  
BUN Date Value Ref Range Status 02/04/2021 91 (H) 6 - 20 MG/DL Final  
 
Creat-  
Creatinine Date Value Ref Range Status 02/04/2021 3.72 (H) 0.70 - 1.30 MG/DL Final  
 
  
Medications/ Blood Products Given Name   Dose   Route and Time Heparin 3200 units HD ports 2350 Blood Volume Processed (BVP):    63L Net Fluid Removed:  1500cc Comments Time Out Done:  
Primary Nurse Rpt Pre: Patrick Marcum RN 
Primary Nurse Rpt Post: Aram Boogie RN 
Pt Education:  Access care, procedure Care Plan: continue HD tx as per MD order Tx Summary: Tolerated tx well, Each dialysis catheter limb disinfected per p&p, blood returned per p&p, each dialysis hub disinfected per p&p, post dialysis catheter dwell instilled per order, and caps applied. Admiting Diagnosis: 
Pt's previous clinic- 
Consent signed - Informed Consent Verified: Yes (02/04/21 2010) Hepatitis Status- unknown Machine #- Machine Number: [id number] (02/04/21 2010) Telemetry status- monitored at the bedside

## 2021-02-05 NOTE — PROGRESS NOTES
Nephrology Progress Note Sarah Herron III Date of Admission : 12/28/2020 CC: Follow up for KATHI on CKD Assessment and Plan KATHI on CKD: 
- worsening renal function from ATN  
- Rudolph and dialysis initiated on 2/4 
- HD tomorrow and then MWF   
- PRN pressor support for HD Resp failure: 
COVID-19 PNA: positive on 1/11 
- s/p decadron and remdesivir 
- on the vent CKD 4 
-  Presumed 2/2 DM and HTN 
- nephrotic range proteinuria 
- baseline Cr 1.7 mg/dl  
- followed by Dr. Karla Eli at [facility] Hx of prostate cancer s/p XRT 
  
Anemia in CKD + blood loss: 
- cont JAZ 
- EGD 1/5/2021: gastric fundal lesion  
- transfuse PRN  
  
Type II DM: 
- on insulin 
  
HTN :  
- BP stable now 
  
R foot osteo: 
- on abx 
- s/p debridement on 12/30 Group B strep bacteremia Interval History: 
Dialyzed and 1.5 Kg removed Now needing Levophed at 5 mcg On 60% Fio2 Otherwise stable Current Medications: all current  Medications have been eviewed in [facility] Review of Systems: A comprehensive review of systems was negative except for that written in the HPI. Objective: 
Vitals:   
Vitals:  
 02/05/21 0945 02/05/21 1000 02/05/21 1015 02/05/21 1030 BP: (!) 126/56 (!) 115/57 (!) 113/56 (!) 101/48 Pulse: 94 83 78 82 Resp: 18 18 18 18 Temp:      
TempSrc:      
SpO2: 95% 95% 95% 94% Weight:      
Height:      
 
Intake and Output: 
02/05 0701 - 02/05 1900 In: 841.7 [I.V.:416.7] Out: 35 [Urine:35] 
02/03 1901 - 02/05 0700 In: 4798.1 [I.V.:1478.1] Out: 2150 [Urine:650] Physical Examination: seen and examined on room GEN: ON VENT 
NECK- ET tube + RESP: no distress ABD :chronic distension, BS + 
EXT : edema + NEURO:Can't access due to patient's current condition Exam deferred due to COVID-19 infection in order to preserve PPE AND minimize risk of  
transmission to dialysis and renal transplant patient per ASN and RPA guidelines: []    High complexity decision making was performed 
[]    Patient is at high-risk of decompensation with multiple organ involvement Lab Data Personally Reviewed: I have reviewed all the pertinent labs, microbiology data and radiology studies during assessment. Recent Labs 02/05/21 
1764 02/04/21 
4288 02/04/21 
0250 02/03/21 
3667  136 136 140  
K 3.3* 3.6 3.6 3.6  104 104 103 CO2 27 22 21 23 * 216* 219* 178* BUN 56* 91* 89* 88* CREA 2.58* 3.72* 3.69* 3.45* CA 8.6 8.9 8.9 8.6 MG 2.2 2.3  --  2.5* PHOS 4.1 5.8* 6.0* 6.5* ALB 2.8* 2.8* 2.7* 2.9*  2.9* ALT  --  <6*  --  8* Recent Labs 02/05/21 
9268 02/04/21 
0250 02/03/21 
8598 WBC 6.2 8.3 7.9 HGB 6.4* 7.3* 7.2* HCT 20.8* 23.5* 23.5*  
 264 284 No results found for: SDES Lab Results Component Value Date/Time Culture result: NO GROWTH 5 DAYS 01/23/2021 03:37 PM  
 Culture result: LIGHT YEAST, (APPARENT CANDIDA ALBICANS) (A) 01/23/2021 02:45 PM  
 Culture result: NO NORMAL RESPIRATORY MICHELLE ISOLATED 01/23/2021 02:45 PM  
 
Recent Results (from the past 24 hour(s)) GLUCOSE, POC Collection Time: 02/04/21 12:20 PM  
Result Value Ref Range Glucose (POC) 275 (H) 65 - 100 mg/dL Performed by Jonathan Herndon GLUCOSE, POC Collection Time: 02/04/21  6:13 PM  
Result Value Ref Range Glucose (POC) 268 (H) 65 - 100 mg/dL Performed by Jonathan Herndon HEP B SURFACE AB Collection Time: 02/04/21  8:28 PM  
Result Value Ref Range Hepatitis B surface Ab 4.00 mIU/mL Hep B surface Ab Interp. NONREACTIVE NR    
HEP B SURFACE AG Collection Time: 02/04/21  8:28 PM  
Result Value Ref Range Hepatitis B surface Ag <0.10 Index Hep B surface Ag Interp. Negative NEG    
GLUCOSE, POC Collection Time: 02/05/21 12:14 AM  
Result Value Ref Range Glucose (POC) 183 (H) 65 - 100 mg/dL  Performed by GOMEZ ABDULAZIZ   
RENAL FUNCTION PANEL  
 Collection Time: 02/05/21  3:23 AM  
Result Value Ref Range Sodium 138 136 - 145 mmol/L Potassium 3.3 (L) 3.5 - 5.1 mmol/L Chloride 102 97 - 108 mmol/L  
 CO2 27 21 - 32 mmol/L Anion gap 9 5 - 15 mmol/L Glucose 175 (H) 65 - 100 mg/dL BUN 56 (H) 6 - 20 MG/DL Creatinine 2.58 (H) 0.70 - 1.30 MG/DL  
 BUN/Creatinine ratio 22 (H) 12 - 20 GFR est AA 30 (L) >60 ml/min/1.73m2 GFR est non-AA 25 (L) >60 ml/min/1.73m2 Calcium 8.6 8.5 - 10.1 MG/DL Phosphorus 4.1 2.6 - 4.7 MG/DL Albumin 2.8 (L) 3.5 - 5.0 g/dL CBC WITH AUTOMATED DIFF Collection Time: 02/05/21  3:23 AM  
Result Value Ref Range WBC 6.2 4.1 - 11.1 K/uL  
 RBC 2.28 (L) 4.10 - 5.70 M/uL HGB 6.4 (L) 12.1 - 17.0 g/dL HCT 20.8 (L) 36.6 - 50.3 % MCV 91.2 80.0 - 99.0 FL  
 MCH 28.1 26.0 - 34.0 PG  
 MCHC 30.8 30.0 - 36.5 g/dL  
 RDW 19.9 (H) 11.5 - 14.5 % PLATELET 510 803 - 794 K/uL MPV 9.3 8.9 - 12.9 FL  
 NRBC 0.3 (H) 0  WBC ABSOLUTE NRBC 0.02 (H) 0.00 - 0.01 K/uL NEUTROPHILS 85 (H) 32 - 75 % LYMPHOCYTES 5 (L) 12 - 49 % MONOCYTES 6 5 - 13 % EOSINOPHILS 3 0 - 7 % BASOPHILS 0 0 - 1 % IMMATURE GRANULOCYTES 1 (H) 0.0 - 0.5 % ABS. NEUTROPHILS 5.2 1.8 - 8.0 K/UL  
 ABS. LYMPHOCYTES 0.3 (L) 0.8 - 3.5 K/UL  
 ABS. MONOCYTES 0.4 0.0 - 1.0 K/UL  
 ABS. EOSINOPHILS 0.2 0.0 - 0.4 K/UL  
 ABS. BASOPHILS 0.0 0.0 - 0.1 K/UL  
 ABS. IMM. GRANS. 0.1 (H) 0.00 - 0.04 K/UL  
 DF SMEAR SCANNED    
 RBC COMMENTS ANISOCYTOSIS 
2+ MAGNESIUM Collection Time: 02/05/21  3:23 AM  
Result Value Ref Range Magnesium 2.2 1.6 - 2.4 mg/dL  
RBC, ALLOCATE Collection Time: 02/05/21  5:15 AM  
Result Value Ref Range HISTORY CHECKED? Historical check performed BLOOD GAS, ARTERIAL Collection Time: 02/05/21  5:15 AM  
Result Value Ref Range pH 7.33 (L) 7.35 - 7.45    
 PCO2 49 (H) 35 - 45 mmHg PO2 78 (L) 80 - 100 mmHg O2 SAT 95 92 - 97 %  BICARBONATE 25 22 - 26 mmol/L  
 BASE DEFICIT 1.3 mmol/L  
 O2 METHOD VENT    
 FIO2 60 % MODE ASSIST CONTROL Tidal volume 550.0 SET RATE 18 PEEP/CPAP 10.0 Sample source ARTERIAL    
 SITE RIGHT RADIAL JUVENTINO'S TEST YES    
TYPE & SCREEN Collection Time: 02/05/21  6:45 AM  
Result Value Ref Range Crossmatch Expiration 02/08/2021,2359 ABO/Rh(D) O POSITIVE Antibody screen NEG Unit number [id number] Blood component type  LR Unit division 00 Status of unit ISSUED Crossmatch result Compatible GLUCOSE, POC Collection Time: 02/05/21  6:46 AM  
Result Value Ref Range Glucose (POC) 168 (H) 65 - 100 mg/dL Performed by CARLOS Soria MD 
[address], [facility] [address] Phone - [phone] Fax - [phone] 
[email or web address]

## 2021-02-05 NOTE — CONSULTS
Thoracic Surgery ICU Consultation Admit Date: 12/28/2020 Reason for Consultation: Tracheostomy placement HPI: 
Nazario Emmanuel III is a 72 y.o. male with extensice PMH as noted below & COVID POS who we are asked to see in Thoracic Surgery consultation for tracheostomy placement. Patient is currently intubated & sedated, therefore history obtained through chart review. 73 yo male w a past history of prostate cancer s/p XRT/ now w widespread bone mets, Heart failure, IDDM, tobacco use (1PPD), HTN, diabetic feet wounds, who was admitted on 12/28/2020 from home with a diagnosis of diarrhea, chills for 2 days, associated with lethargy. Diagnosed with osteomyelitis of Right foot s/p debridement 12/30/2020 by Dr. Darby Cisneros (needs 6 weeks abx, on Zosyn, Wound vac 1/5), COVID positive respiratory failure (covid negative 12/28, Covid POS 1/11),   He experienced a cardiac arrest in the IMCU on 1/23/2021 and was emergently intubated and transferred to ICU.   
Remains intubated with FiO2 60% & PEEP 10.0 Patient Active Problem List  
 Diagnosis Date Noted  Osteomyelitis ([address] 12/28/2020  Diabetic ulcer of right midfoot associated with type 2 diabetes mellitus, with fat layer exposed ([address] 05/19/2020  PAD (peripheral artery disease) ([address] 05/19/2020  Dependence on nicotine from cigarettes 05/19/2020  KATHI (acute kidney injury) ([address] 03/28/2019  Acute on chronic renal failure ([address] 03/14/2019  Severe obesity (BMI 35.0-39. 9) with comorbidity ([address] 03/28/2018  Type 2 diabetes with nephropathy ([address] 02/27/2018  S/P hip replacement, right 10/31/2017  Stage 3 chronic kidney disease 10/31/2017  Prostate CA ([address] 10/31/2017  Diabetic polyneuropathy ([address] 04/04/2011  Rotator Cuff Tendonitis 04/04/2011  Diabetes mellitus type 2, controlled ([address] 04/04/2011  Degenerative disc disease 04/04/2011  Osteoarthrosis involving lower leg 04/04/2011  Depression/ Anxiety 2011  
 HTN (hypertension) 2011  Chronic hip pain 2011  Hypertriglyceridemia 2011  Mild Aortic Insufficiency 2011  Mild Concentric LVH (left ventricular hypertrophy) 2011 Past Medical History:  
Diagnosis Date  Arthritis, Degenerative,  Knee 2011  Carcinoma, Prostate 2011  Degenerative disc disease 2011  Depression/ Anxiety 2011  Diabetes Mellitrus ( non-insulin dependent ) Type 2 2011  
 Heart failure ([address]  HEMATOCHEZIA 2011  Hypertrension 2011  Hypertriglyceridemia 2011  Ill-defined condition   
 lymphadema  Insomnia 2011  Laryngitis 2011  Mild Aortic insufficiency 2011  Mild Concentric LVH (left ventricular hypertrophy) 2011  Neuropathy 2011  Osteoarthritis 2011  Rotator Cuff Tendonitis 2011 Past Surgical History:  
Procedure Laterality Date  COLONOSCOPY N/A 2017 COLONOSCOPY performed by Rosa Gregg MD at [facility] ENDOSCOPY  
 HX ORTHOPAEDIC    
 left hip pinning  HX ORTHOPAEDIC    
 right hip replacement  HX OTHER SURGICAL    
 left little toe amputation  HX UROLOGICAL    
 IR INSERT TUNL CVC W/O PORT OVER 5 YR  2021  [place] CARDIAC SURG PROCEDURE UNLIST    
 cardiac cath Social History Tobacco Use  Smoking status: Current Every Day Smoker Packs/day: 1.00 Last attempt to quit: 2019 Years since quittin.2  Smokeless tobacco: Never Used Substance Use Topics  Alcohol use: Not Currently Alcohol/week: 11.7 standard drinks Types: 7 Cans of beer, 7 Shots of liquor per week Family History Family history unknown: Yes Prior to Admission medications Medication Sig Start Date End Date Taking?  Authorizing Provider  
pregabalin (LYRICA) 150 mg capsule TAKE ONE CAPSULE BY MOUTH TWICE A DAY 20  Yes Romi Ontiveros MD  
 bicalutamide (CASODEX) 50 mg tablet TAKE ONE TABLET BY MOUTH DAILY 11/30/20  Yes Carolina Yo MD  
torsemide (DEMADEX) 20 mg tablet TAKE FOUR TABLETS BY MOUTH TWICE A DAY 11/23/20  Yes Carolina Yo MD  
pantoprazole (PROTONIX) 40 mg tablet TAKE ONE TABLET BY MOUTH TWICE DAILY ONCE IN THE MORNING AND ONCE IN THE EVENING 11/18/20  Yes Carolina Yo MD  
hydrOXYzine HCL (ATARAX) 50 mg tablet TAKE ONE TABLET BY MOUTH THREE TIMES A DAY AS NEEDED FOR ITCHING FOR UP TO 10 DAYS 10/22/20  Yes Carolina Yo MD  
amLODIPine (NORVASC) 10 mg tablet TAKE ONE TABLET BY MOUTH DAILY 10/2/20  Yes Carolina Yo MD  
hydrALAZINE (APRESOLINE) 100 mg tablet Take 1 Tab by mouth three (3) times daily. 9/16/20  Yes Carolina Yo MD  
ascorbic acid, vitamin C, (Vitamin C) 500 mg tablet Take  by mouth. Yes Provider, Historical  
enzalutamide (XTANDI) 40 mg capsule Take 160 mg by mouth daily. Yes Provider, Historical  
Thera-Tabs M 27 mg iron-400 mcg tab TAKE ONE TABLET BY MOUTH DAILY 7/17/20  Yes Carolina Yo MD  
tamsulosin [facility]) 0.4 mg capsule TAKE ONE CAPSULE BY MOUTH DAILY 7/16/20  Yes Carolina Yo MD  
Insulin Syringe-Needle U-100 (BD Insulin Syringe Ultra-Fine) 1 mL 31 gauge x 5/16 syrg USE TO INJECT INSULIN FIVE TIMES A DAY 6/16/20  Yes Carolina Yo MD  
ferrous sulfate 325 mg (65 mg iron) tablet TAKE ONE TABLET BY MOUTH DAILY BEFORE BREAKFAST 6/16/20  Yes Carolina Yo MD  
gabapentin (NEURONTIN) 300 mg capsule Take 1 Cap by mouth three (3) times daily. Max Daily Amount: 900 mg. 5/25/20  Yes Carolina Yo MD  
triamcinolone acetonide (KENALOG) 0.1 % ointment APPLY A THIN LAYER TO AFFECTED AREA(S) TWO TIMES A DAY **DO NOT EXCEED 2.66 GRAMS PER DAY** 5/19/20  Yes Carolina Yo MD  
diclofenac (VOLTAREN) 1 % gel Apply  to affected area four (4) times daily.  4/16/20  Yes Carolina Yo MD  
 ammonium lactate (AMLACTIN) 12 % topical cream Apply  to affected area two (2) times a day. rub in to affected area well 3/10/20  Yes Eligio Santiago MD  
ammonium lactate (LAC-HYDRIN FIVE) 5 % lotion Apply 1 Applicator to affected area daily. Apply daily bilateral lower legs 3/5/20  Yes Eligio Santiago MD  
insulin glargine (LANTUS U-100 INSULIN) 100 unit/mL injection 72 Units by SubCUTAneous route daily. 3/5/20  Yes Eligio Santiago MD  
insulin regular (NOVOLIN R, HUMULIN R) 100 unit/mL injection 24 units sc tid 3/5/20  Yes Eligio Santiago MD  
ketoconazole (NIZORAL) 2 % topical cream Apply  to affected area two (2) times a day. 2/4/20  Yes Eligio Santiago MD  
ketoconazole (NIZORAL) 2 % shampoo Sig:shampoo once a week 2/4/20  Yes Eligio Santiago MD  
bisacodyl (DULCOLAX, BISACODYL,) 10 mg suppository Insert 10 mg into rectum daily. Yes Provider, Historical  
insulin aspart U-100 (NOVOLOG FLEXPEN U-100 INSULIN) 100 unit/mL (3 mL) inpn by SubCUTAneous route. Yes Provider, Historical  
Insulin Needles, Disposable, (NOVOFINE 32) 32 gauge x 1/4\" ndle Sig:use 4 times a day as needed 11/13/19  Yes Eligio Santiago MD  
NOVOLIN R REGULAR U-100 INSULN 100 unit/mL injection INJECT 14 UNITS UNDER THE SKIN THREE TIMES A DAY WITH MEALS AS NEEDED 8/6/19  Yes Eligio Santiago MD  
fluticasone propionate [facility]) 50 mcg/actuation nasal spray SPRAY TWO SPRAYS IN THE AFFECTED NOSTRIL DAILY 7/16/19  Yes Eligio Santiago MD  
aspirin delayed-release 81 mg tablet Take 81 mg by mouth daily. Yes Provider, Historical  
acetaminophen (PAIN AND FEVER) 500 mg tablet TAKE ONE TABLET BY MOUTH EVERY 6 HOURS AS NEEDED FOR PAIN 8/28/18  Yes Eligio Santiago MD  
Calcium Carbonate-Vit D3-Min 600 mg calcium- 400 unit tab Take 1 Tab by mouth two (2) times a day. Yes Provider, Historical  
 
No Known Allergies Subjective:  
 
Review of Systems: Review of systems not obtained due to patient factors. Objective:  
 
Blood pressure (!) 101/48, pulse 82, temperature 99.1 °F (37.3 °C), resp. rate 18, height 6' (1.829 m), weight 275 lb 9.2 oz (125 kg), SpO2 94 %. Recent Results (from the past 24 hour(s)) GLUCOSE, POC Collection Time: 02/04/21 12:20 PM  
Result Value Ref Range Glucose (POC) 275 (H) 65 - 100 mg/dL Performed by Monica Banjohn GLUCOSE, POC Collection Time: 02/04/21  6:13 PM  
Result Value Ref Range Glucose (POC) 268 (H) 65 - 100 mg/dL Performed by Monica Banana HEP B SURFACE AB Collection Time: 02/04/21  8:28 PM  
Result Value Ref Range Hepatitis B surface Ab 4.00 mIU/mL Hep B surface Ab Interp. NONREACTIVE NR    
HEP B SURFACE AG Collection Time: 02/04/21  8:28 PM  
Result Value Ref Range Hepatitis B surface Ag <0.10 Index Hep B surface Ag Interp. Negative NEG    
GLUCOSE, POC Collection Time: 02/05/21 12:14 AM  
Result Value Ref Range Glucose (POC) 183 (H) 65 - 100 mg/dL Performed by GeoGRAFI   
RENAL FUNCTION PANEL Collection Time: 02/05/21  3:23 AM  
Result Value Ref Range Sodium 138 136 - 145 mmol/L Potassium 3.3 (L) 3.5 - 5.1 mmol/L Chloride 102 97 - 108 mmol/L  
 CO2 27 21 - 32 mmol/L Anion gap 9 5 - 15 mmol/L Glucose 175 (H) 65 - 100 mg/dL BUN 56 (H) 6 - 20 MG/DL Creatinine 2.58 (H) 0.70 - 1.30 MG/DL  
 BUN/Creatinine ratio 22 (H) 12 - 20 GFR est AA 30 (L) >60 ml/min/1.73m2 GFR est non-AA 25 (L) >60 ml/min/1.73m2 Calcium 8.6 8.5 - 10.1 MG/DL Phosphorus 4.1 2.6 - 4.7 MG/DL Albumin 2.8 (L) 3.5 - 5.0 g/dL CBC WITH AUTOMATED DIFF Collection Time: 02/05/21  3:23 AM  
Result Value Ref Range WBC 6.2 4.1 - 11.1 K/uL  
 RBC 2.28 (L) 4.10 - 5.70 M/uL HGB 6.4 (L) 12.1 - 17.0 g/dL HCT 20.8 (L) 36.6 - 50.3 % MCV 91.2 80.0 - 99.0 FL  
 MCH 28.1 26.0 - 34.0 PG  
 MCHC 30.8 30.0 - 36.5 g/dL  
 RDW 19.9 (H) 11.5 - 14.5 % PLATELET 037 095 - 021 K/uL MPV 9.3 8.9 - 12.9 FL  
 NRBC 0.3 (H) 0  WBC ABSOLUTE NRBC 0.02 (H) 0.00 - 0.01 K/uL NEUTROPHILS 85 (H) 32 - 75 % LYMPHOCYTES 5 (L) 12 - 49 % MONOCYTES 6 5 - 13 % EOSINOPHILS 3 0 - 7 % BASOPHILS 0 0 - 1 % IMMATURE GRANULOCYTES 1 (H) 0.0 - 0.5 % ABS. NEUTROPHILS 5.2 1.8 - 8.0 K/UL  
 ABS. LYMPHOCYTES 0.3 (L) 0.8 - 3.5 K/UL  
 ABS. MONOCYTES 0.4 0.0 - 1.0 K/UL  
 ABS. EOSINOPHILS 0.2 0.0 - 0.4 K/UL  
 ABS. BASOPHILS 0.0 0.0 - 0.1 K/UL  
 ABS. IMM. GRANS. 0.1 (H) 0.00 - 0.04 K/UL  
 DF SMEAR SCANNED    
 RBC COMMENTS ANISOCYTOSIS 
2+ MAGNESIUM Collection Time: 02/05/21  3:23 AM  
Result Value Ref Range Magnesium 2.2 1.6 - 2.4 mg/dL  
RBC, ALLOCATE Collection Time: 02/05/21  5:15 AM  
Result Value Ref Range HISTORY CHECKED? Historical check performed BLOOD GAS, ARTERIAL Collection Time: 02/05/21  5:15 AM  
Result Value Ref Range pH 7.33 (L) 7.35 - 7.45    
 PCO2 49 (H) 35 - 45 mmHg PO2 78 (L) 80 - 100 mmHg O2 SAT 95 92 - 97 % BICARBONATE 25 22 - 26 mmol/L  
 BASE DEFICIT 1.3 mmol/L  
 O2 METHOD VENT    
 FIO2 60 % MODE ASSIST CONTROL Tidal volume 550.0 SET RATE 18 PEEP/CPAP 10.0 Sample source ARTERIAL    
 SITE RIGHT RADIAL JUVENTINO'S TEST YES    
TYPE & SCREEN Collection Time: 02/05/21  6:45 AM  
Result Value Ref Range Crossmatch Expiration 02/08/2021,2359 ABO/Rh(D) O POSITIVE Antibody screen NEG Unit number [id number] Blood component type RC LR Unit division 00 Status of unit ISSUED Crossmatch result Compatible GLUCOSE, POC Collection Time: 02/05/21  6:46 AM  
Result Value Ref Range Glucose (POC) 168 (H) 65 - 100 mg/dL Performed by Nely Kasper   
 
_____________________ Physical Exam:  
 
General:  Intubated & sedated, obese Eyes:   closed. Throat: +ET tube Neck: Supple, symmetrical, trachea midline. Lungs:   Clear to auscultation bilaterally. Heart:  Regular rate and rhythm. Abdomen:   Soft, non-tender, round Extremities: Bi;at lower extremities 2+edema, L foot s/p toe amputation, R foot w healing ulcerations. Skin: Skin w/d Assessment:  
Principal Problem: 
  Osteomyelitis ([address] (12/28/2020) Plan:  
 
Plan for Tracheostomy placment on Monday, 2/8/2021 Will obtian consent from daughter Hold tubes feeds & anticoagulation 0400 Monday 2/8/2021 Thank you for including us in the care of your patient. Signed By: JING Miner February 5, 2021

## 2021-02-05 NOTE — PROGRESS NOTES
[facility] 
 
ICU TEAM Progress Note Name: Lane Castro III  
: 1950 MRN: [id number] Date: 2021 Subjective:  
Progress Note: 2021 Mr. Pennie Min is a 71 yo male w a PMH of prostate cancer s/p radiation, tobacco use disorder (1 ppd), CKD3a, gastric angioectasias,  IDDM, heart failure, HTN who was admitted with R foot osteomyelitis on . He was transferred to the ICU today after experiencing a cardiac arrest in the [facility]. Please see below for a brief synopsis of his hospital course, by problem. 
  
Acute Hypoxic Respiratory failure secondary to COVID pneumonia: He also demonstrated dyspnea on initial admission, but was negative for COVID. Initially treated with steroids. On , he developed increased oxygen needs. He was COVID positive. He was started on and completed treatment with steroids (completed ), remdesivir (completed ), zinc, and vitamin C. He initially required BiPAP on , and was weaned to 4-5L/min. CT chest showed bilateral pleural effusions with ground glass. Diuresis was attempted. He was continued on zosyn. He required transfer to [facility] on 1/15 due to continued desaturations -> 70s on NRB. CXR with increased bilateral infiltrates. Started linezolid on . Required intubation on . See below under cardiac arrest. He was extubated on , but required reintubation the same day. He completed a course of empiric 14d zyvox (ended ) and repeated empiric stress dose steroids (ended ). He remains on prolonged zosyn for treatment of osteomyelitis. Respiratory cultures have remained NGTD. He continues to fail SBTs. Cardiac arrest, s/p ROSC: Not cooled as he regained mental function s/p ROSC. Code time: reported to be 12 minutes The patient had been taken off BiPAP for lunch. He was on intermittent BiPAP and HiFlow, demonstrating stability to come off to eat. He ate his lunch and was later found SOB unresponsive and pulseless. He was thereafter transferred to the ICU. He regained mentation, was able to respond to commands, shake head/yes, no, but ultimately did require sedation for compliance with mechanical ventilation. Likely hypoxic arrest. 
Septic shock: Osteomyelitis has been only identified source of infection. Please see above under respiratory failure for full courses of abx. Has been on/off pressors since ICU admission. Admitted with a chronic wound on his R foot. He was started on broad spectrum antibiotics with vanc, zosyn, and flagyl and underwent wound debridement on 12/30. BLC positive for S agalactiae on 12/28. Foot cultures + for Protues mirabilis, group B strep, and Enterococcus. Antibiotics were then narrowed to zosyn. Wound vac placed on foot 1/5. Zosyn is planned for 6 weeks. Acute on chronic CKD3a: Baseline Cr 1.7. Renal has followed throughout his course. Renal failure progressively worsening. He started IHD on 2/4. IDDM2: Lantus and lispro Anemia, acute on chronic: Course c/b anemia w heme + stool. EGD on 1/4 showing superficial ulcers in the distal bulb and second portion of the duodenum measuring 2-4mm. No active bleeding noted. BID protonix. Received PRBC 1/2. 
  
COVID negative 12/28, 12/29, but positive 1/11 Overnight: 
Progressively worsening renal failure; Plan for HD today Active Problem List:  
 
Problem List  Date Reviewed: 8/20/2020 Codes Class * (Principal) Osteomyelitis ([facility] ICD-10-CM: M86.9 ICD-9-CM: 730.20 Diabetic ulcer of right midfoot associated with type 2 diabetes mellitus, with fat layer exposed ([facility] ICD-10-CM: E11.621, H53.426 ICD-9-CM: 250.80, 707.14 PAD (peripheral artery disease) ([facility]) ICD-10-CM: I73.9 ICD-9-CM: 443.9 Dependence on nicotine from cigarettes ICD-10-CM: F17.210 ICD-9-CM: 305.1 KAHTI (acute kidney injury) ([facility] ICD-10-CM: N17.9 ICD-9-CM: 868. 9 Acute on chronic renal failure (HCC) ICD-10-CM: N17.9, N18.9 ICD-9-CM: 584.9, 585.9 Severe obesity (BMI 35.0-39. 9) with comorbidity ([facility] ICD-10-CM: E66.01 
ICD-9-CM: 278.01 Type 2 diabetes with nephropathy ([facility]) ICD-10-CM: E11.21 
ICD-9-CM: 250.40, 583.81 S/P hip replacement, right ICD-10-CM: V23.286 ICD-9-CM: V43.64 Stage 3 chronic kidney disease ICD-10-CM: N18.30 ICD-9-CM: 565. 3 Prostate [facility]) ICD-10-CM: L62 ICD-9-CM: 880 Diabetic polyneuropathy ([facility] ICD-10-CM: E11.42 
ICD-9-CM: 250.60, 357.2 Rotator Cuff Tendonitis ICD-10-CM: M71.9, M67.919 ICD-9-CM: 726.10 Diabetes mellitus type 2, controlled ([facility] ICD-10-CM: E11.9 ICD-9-CM: 250.00 Degenerative disc disease ICD-10-CM: [id number] ICD-9-CM: 722.6 Osteoarthrosis involving lower leg ICD-10-CM: M17.10 ICD-9-CM: 715.96 Depression/ Anxiety ICD-10-CM: F34.1 ICD-9-CM: 300.4 HTN (hypertension) ICD-10-CM: I10 
ICD-9-CM: 401.9 Chronic hip pain ICD-10-CM: M25.559, G89.29 ICD-9-CM: 719.45, 338.29 Hypertriglyceridemia ICD-10-CM: E78.1 ICD-9-CM: 272.1 Mild Aortic Insufficiency ICD-10-CM: I35.1 ICD-9-CM: 424.1 Mild Concentric LVH (left ventricular hypertrophy) ICD-10-CM: I51.7 ICD-9-CM: 429.3 Past Medical History: has a past medical history of Arthritis, Degenerative,  Knee (4/4/2011), Carcinoma, Prostate (4/4/2011), Degenerative disc disease (4/4/2011), Depression/ Anxiety (4/4/2011), Diabetes Mellitrus ( non-insulin dependent ) Type 2 (4/4/2011), Heart failure ([facility] HEMATOCHEZIA (4/4/2011), Hypertrension (4/4/2011), Hypertriglyceridemia (4/4/2011), Ill-defined condition, Insomnia (4/4/2011), Laryngitis (4/4/2011), Mild Aortic insufficiency (4/4/2011), Mild Concentric LVH (left ventricular hypertrophy) (4/4/2011), Neuropathy (4/4/2011), Osteoarthritis (4/4/2011), and Rotator Cuff Tendonitis (4/4/2011). Past Surgical History:  
 
 has a past surgical history that includes hx urological; hx other surgical; pr cardiac surg procedure unlist; colonoscopy (N/A, 4/28/2017); hx orthopaedic; hx orthopaedic; and ir insert tunl cvc w/o port over 5 yr (1/6/2021). Home Medications:  
 
Prior to Admission medications Medication Sig Start Date End Date Taking? Authorizing Provider  
pregabalin (LYRICA) 150 mg capsule TAKE ONE CAPSULE BY MOUTH TWICE A DAY 12/7/20  Yes Sarina Carmona MD  
bicalutamide (CASODEX) 50 mg tablet TAKE ONE TABLET BY MOUTH DAILY 11/30/20  Yes Sarnia Carmona MD  
torsemide (DEMADEX) 20 mg tablet TAKE FOUR TABLETS BY MOUTH TWICE A DAY 11/23/20  Yes Sarina Carmona MD  
pantoprazole (PROTONIX) 40 mg tablet TAKE ONE TABLET BY MOUTH TWICE DAILY ONCE IN THE MORNING AND ONCE IN THE EVENING 11/18/20  Yes Sarina Carmona MD  
hydrOXYzine HCL (ATARAX) 50 mg tablet TAKE ONE TABLET BY MOUTH THREE TIMES A DAY AS NEEDED FOR ITCHING FOR UP TO 10 DAYS 10/22/20  Yes Sarina Carmona MD  
amLODIPine (NORVASC) 10 mg tablet TAKE ONE TABLET BY MOUTH DAILY 10/2/20  Yes Sarina Carmona MD  
hydrALAZINE (APRESOLINE) 100 mg tablet Take 1 Tab by mouth three (3) times daily.  9/16/20  Yes Sarina Carmona MD  
 ascorbic acid, vitamin C, (Vitamin C) 500 mg tablet Take  by mouth. Yes Provider, Historical  
enzalutamide (XTANDI) 40 mg capsule Take 160 mg by mouth daily. Yes Provider, Historical  
Thera-Tabs M 27 mg iron-400 mcg tab TAKE ONE TABLET BY MOUTH DAILY 7/17/20  Yes Carolina Yo MD  
tamsulosin [facility]) 0.4 mg capsule TAKE ONE CAPSULE BY MOUTH DAILY 7/16/20  Yes Carolina Yo MD  
Insulin Syringe-Needle U-100 (BD Insulin Syringe Ultra-Fine) 1 mL 31 gauge x 5/16 syrg USE TO INJECT INSULIN FIVE TIMES A DAY 6/16/20  Yes Carolina Yo MD  
ferrous sulfate 325 mg (65 mg iron) tablet TAKE ONE TABLET BY MOUTH DAILY BEFORE BREAKFAST 6/16/20  Yes Carolina Yo MD  
gabapentin (NEURONTIN) 300 mg capsule Take 1 Cap by mouth three (3) times daily. Max Daily Amount: 900 mg. 5/25/20  Yes Carolina Yo MD  
triamcinolone acetonide (KENALOG) 0.1 % ointment APPLY A THIN LAYER TO AFFECTED AREA(S) TWO TIMES A DAY **DO NOT EXCEED 2.66 GRAMS PER DAY** 5/19/20  Yes Carolina Yo MD  
diclofenac (VOLTAREN) 1 % gel Apply  to affected area four (4) times daily. 4/16/20  Yes Carolina Yo MD  
ammonium lactate (AMLACTIN) 12 % topical cream Apply  to affected area two (2) times a day. rub in to affected area well 3/10/20  Yes Carolina Yo MD  
ammonium lactate (LAC-HYDRIN FIVE) 5 % lotion Apply 1 Applicator to affected area daily. Apply daily bilateral lower legs 3/5/20  Yes Carolina Yo MD  
insulin glargine (LANTUS U-100 INSULIN) 100 unit/mL injection 72 Units by SubCUTAneous route daily. 3/5/20  Yes Carolina Yo MD  
insulin regular (NOVOLIN R, HUMULIN R) 100 unit/mL injection 24 units sc tid 3/5/20  Yes Carolina Yo MD  
ketoconazole (NIZORAL) 2 % topical cream Apply  to affected area two (2) times a day.  2/4/20  Yes Carolina Yo MD  
ketoconazole (NIZORAL) 2 % shampoo Sig:shampoo once a week 2/4/20  Yes Carolina Yo MD  
 bisacodyl (DULCOLAX, BISACODYL,) 10 mg suppository Insert 10 mg into rectum daily. Yes Provider, Historical  
insulin aspart U-100 (NOVOLOG FLEXPEN U-100 INSULIN) 100 unit/mL (3 mL) inpn by SubCUTAneous route. Yes Provider, Historical  
Insulin Needles, Disposable, (NOVOFINE 32) 32 gauge x 1/4\" ndle Sig:use 4 times a day as needed 19  Yes MD LINDA Farris R REGULAR U-100 INSULN 100 unit/mL injection INJECT 14 UNITS UNDER THE SKIN THREE TIMES A DAY WITH MEALS AS NEEDED 19  Yes Jason Rinaldi MD  
fluticasone propionate [facility]) 50 mcg/actuation nasal spray SPRAY TWO SPRAYS IN THE AFFECTED NOSTRIL DAILY 19  Yes Jason Rinaldi MD  
aspirin delayed-release 81 mg tablet Take 81 mg by mouth daily. Yes Provider, Historical  
acetaminophen (PAIN AND FEVER) 500 mg tablet TAKE ONE TABLET BY MOUTH EVERY 6 HOURS AS NEEDED FOR PAIN 18  Yes Jason Rinaldi MD  
Calcium Carbonate-Vit D3-Min 600 mg calcium- 400 unit tab Take 1 Tab by mouth two (2) times a day. Yes Provider, Historical  
 
Allergies/Social/Family History: No Known Allergies Social History Tobacco Use  Smoking status: Current Every Day Smoker Packs/day: 1.00 Last attempt to quit: 2019 Years since quittin.2  Smokeless tobacco: Never Used Substance Use Topics  Alcohol use: Not Currently Alcohol/week: 11.7 standard drinks Types: 7 Cans of beer, 7 Shots of liquor per week Family History Family history unknown: Yes Review of Systems:  
 
Unable to provide - intubated and sedated Objective:  
Vital Signs: 
Visit Vitals BP (!) 115/57 Pulse 83 Temp 99.1 °F (37.3 °C) Resp 18 Ht 6' (1.829 m) Wt 125 kg (275 lb 9.2 oz) SpO2 95% BMI 37.37 kg/m² O2 Flow Rate (L/min): 15 l/min(Mid flow at 9 liters also) O2 Device: Ventilator, Endotracheal tube Temp (24hrs), Av.1 °F (36.7 °C), Min:97.4 °F (36.3 °C), Max:99.1 °F (37.3 °C) Intake/Output:  
 
Intake/Output Summary (Last 24 hours) at 2/5/2021 1030 Last data filed at 2/5/2021 0800 Gross per 24 hour Intake 3255 ml Output 1615 ml Net 1640 ml Physical Exam: 
  
General:  Sedated, RASS -3 Eye:  PERRLA Neurologic:  Sedated Neck:  No JVD Lungs: CTAB, on mechanical ventilation Heart: RRR, normal S1/S2, 2+ pulses, 2+ edema Abdomen:  soft, non-tender. Bowel sounds normal. No masses,  no organomegaly Skin:Wound vac to R foot; chronic skin changes to BLE 
 
LABS AND  DATA: Personally reviewed Recent Labs 02/05/21 
9594 02/04/21 
0250 WBC 6.2 8.3 HGB 6.4* 7.3* HCT 20.8* 23.5*  
 264 Recent Labs 02/05/21 
9120 02/04/21 
9914  136  
K 3.3* 3.6  104 CO2 27 22 BUN 56* 91* CREA 2.58* 3.72* * 216* CA 8.6 8.9 MG 2.2 2.3 PHOS 4.1 5.8* Recent Labs 02/05/21 
5694 02/04/21 
5959 02/03/21 
3843 02/03/21 2023 AP  --  79  --  80  
TP  --  7.0  --  6.5 ALB 2.8* 2.8*   < > 2.9*  2.9*  
GLOB  --  4.2*  --  3.6  
 < > = values in this interval not displayed. No results for input(s): INR, PTP, APTT, INREXT, INREXT in the last 72 hours. No results for input(s): PHI, PCO2I, PO2I, FIO2I in the last 72 hours. No results for input(s): CPK, CKMB, TROIQ, BNPP in the last 72 hours. Ventilator Settings: 
AC: 60% R 18 PEEP 10  MEDS: Reviewed 2/4: cxr: Increased interstitial and airspace opacities with bilateral pleural 
effusions. Assessment:  
 
Acute Hypoxic Respiratory Failure Secondary to COVID Pneumonia: Completed steroids 1/31. Not candidate for remdesivir. Intubated 1/23. Failed extubation 1/26. 
- Discussed trach with daughter; daughter would like to pursue trach; consulted Dr Jay Tim - ARDS volume ventilation - Wean vent settings as tolerated - IHD with volume removal per renal 
- Heparin for DVT ppx 
- Continue GOC with daughter - has been adamant that pt is full code - Discuss trach with daughter - continue discussion tomorrow; HD started today Sepsis: Osteomyelitis is only known bacterial infectious etiology. Likely cytokine reaction in setting of COVID-19. Cultures NGTD. Completed steroids. Completed course of linezolid (empiric). On prolonged zoysn for osteomyelitis. Now off levo. - Continue zosyn - end date 2/12 
- MAP goal 65 KATHI on CKD3a: Renal following; renal failure progressively worsening 
- IHD per renal; next 2/6 Anemia, acute on chronic: PRBC on 2/5 HFpEF with Exacerbation: EF 45-50%, mildly dilated RV w mildly reduced systolic function. Diuresis difficult in setting of KATHI on CKD3. 
- IHD for volume removal 
 
Delirium: When sedation is lightened, experiences hyperactive delirium, which interferes with weaning 
- Continue precedex - Minimize benzos R foot wound: Complicated by osteomyelitis. - Wound vac - Zosyn as above - ID following Deconditioning: Unable to work w PT/OT at this time Prostate Cancer: Holding casodex as this cannot be crushed an pt has OGT Multidisciplinary Rounds Completed: Yes ABCDEF Bundle/Checklist 
Pain Medications: Fentanyl @300 Target RASS: 0 - Alert & Calm - Spontaneously pays attention to caregiver Sedation Medications: Propofol CAM-ICU:  Positive Mobility: Poor PT/OT:Unable to participate Restraints: Soft wrist restraints Discussed Plan of Care (goals of care): Yes Addressed Code Status: Full Code CARDIOVASCULAR Cardiac Gtts: Norepinephrine SBP Goal of: > 90 mmHg MAP Goal of: >65 Transfusion Trigger (Hgb): <7 g/dL RESPIRATORY Vent Goals:  
Optimize PEEP/Ventilation/Oxygenation DVT Prophylaxis (if no, list reason): Lovenox SPO2 Goal: > 92% GI/ Bynum Catheter Present: Yes GI Prophylaxis: Protonix (pantoprazole)  (hx GIB) Nutrition: Yes TF Bowel Movement: Yes Bowel Regimen: Docusate (Colace) Insulin: Y 
 
ANTIBIOTICS Antibiotics: 
Zosyn T/L/D Tubes: Orogastric Tube Lines: [employer] Drains: Bynum Catheter SPECIAL EQUIPMENT Vent DISPOSITION Stay in ICU CRITICAL CARE CONSULTANT NOTE I had a face to face encounter with the patient, reviewed and interpreted patient data including clinical events, labs, images, vital signs, I/O's, and examined patient. I have discussed the case and the plan and management of the patient's care with the consulting services, the bedside nurses and the respiratory therapist.   
 
NOTE OF PERSONAL INVOLVEMENT IN CARE This patient has a high probability of imminent, clinically significant deterioration, which requires the highest level of preparedness to intervene urgently. I participated in the decision-making and personally managed or directed the management of the following life and organ supporting interventions that required my frequent assessment to treat or prevent imminent deterioration. I personally spent 40 minutes of critical care time. This is time spent at this critically ill patient's bedside actively involved in patient care as well as the coordination of care and discussions with the patient's family. This does not include any procedural time which has been billed separately. Bonita Medina NP [facility] Critical Care 
2/5/2021

## 2021-02-05 NOTE — WOUND CARE
WOCN Note: Follow-up visit for Right foot VAC change Previously seen by Renita Read RN [id number] Assessed in room 7101. Covid + 
PPE: N95, face shield, gown and gloves. Chart reviewed. Admitted DX:  Lethargy; osteo of right foot with debridement and removal of infected bone on 12/30 by Dr Roberta Blake.   
Past Medical History: DM, smoker, prostate cancer, htn, hf. 
 
Assessment:  
Patient is intubated, unresponsive and requires assist of 2 -3 with repositioning. Bed: In touch dayana AMY mattress NG and wrist restraints in use. Patient has a kumar and FMS. Initial placement on 1.28.21. Removed 2.2.21. Reinserted on 2.5. 21. Patient does not respond to repositioning or wound care. Patient repositioned on left side with multiple pillows. Heels intact and offloaded with pillows. 1.  Sacrum and buttocks have scattered bleeding denuded areas from MASD. There is also scattered denuded areas distal to the anus at the perineal area and  not involving the anal mucosa. 2.  Right Foot s/p surgical debridement:  6 x 6 x 3 cm; 70% red and burgundy 30% tan devitalized; Serosanguinous exudate in canister; no odor; periwound macerated. 3.  Dorsal right foot:  Stable dry black eschar. Left open to air. Treatment: 
Right foot:  Removed 1 black foam.  Cleansed with saline; window paned around wound; applied santyl and resumed NPWT at 125 mmHg using 1 black foam to wound bed and under track pad and bridged to lower leg. Wound, Pressure Prevention & Skin Care Recommendations: 1. Minimize layers of linen/pads under patient to optimize support surface. 2.  Turn/reposition approximately every 2 hours and offload heels. 3.  Manage moisture/ Keep skin folds clean and dry. 4.  Sacrum, buttocks and meg anal/perinuem:  Every 8 hours and as needed apply stoma powder to open denuded areas and cover with Desitin Zinc cream.  When using FMS insure that there is no tension on the tubing. Reposition tubing with repositioning. 5.  Right foot:  Continue VAC at 125 mmHg. With changes twice weekly. 6.  Dorsal right foot:  Leave open to air Discussed above plan with Meli YEH. Transition of Care: VAC changes twice weekly on Tuesdays and Fridays. TABITHA Massey RN [facility] Available on [employer] Pager [phone] Office [phone]

## 2021-02-05 NOTE — PROGRESS NOTES
0730: Bedside and Verbal shift change report given to Betty To Rn (oncoming nurse) by Brittany Sanchez RN (offgoing nurse). Report included the following information SBAR, Kardex, Intake/Output, MAR, Recent Results and Med Rec Status. ICU multidisciplinary rounds lead by Rossy Zee RN (Intensivist): The following were reviewed and discussed: current labs,  recent imaging, lines/drains, review of body systems, nutrition, cultures, mobility, length of ICU stay. The plan of care for the day is as follows  Discussion of care and possible tracheotomy, Flexiseal for stool control and helign of wounds, lab follow p with hgb.(written note by RN) Bedside and Verbal shift change report given to Lionel Beckman (oncoming nurse) by Michelle Layne RN (offgoing nurse). Report included the following information SBAR, Kardex, Procedure Summary, Intake/Output, MAR, Recent Results and Med Rec Status.

## 2021-02-05 NOTE — PROGRESS NOTES
Spiritual Care Assessment/Progress Note [address] 
 
 
NAME: Jayde Walton III      MRN: [id number] AGE: 72 y.o. SEX: male Shinto Affiliation: [facility] Language: [place] 2/5/2021     Total Time (in minutes): 19 Spiritual Assessment begun in [address] [facility] through conversation with: 
  
    []Patient        [x] Family    [] Friend(s) Reason for Consult: Palliative Care, Initial/Spiritual Assessment Spiritual beliefs: (Please include comment if needed) [x] Identifies with a sasha tradition:     
   [] Supported by a sasha community:        
   [] Claims no spiritual orientation:       
   [] Seeking spiritual identity:            
   [] Adheres to an individual form of spirituality:       
   [] Not able to assess:                   
 
    
Identified resources for coping:  
   [x] Prayer                           
   [] Music                  [] Guided Imagery [x] Family/friends                 [] Pet visits [] Devotional reading                         [] Unknown 
   [] Other:                                          
 
 
Interventions offered during this visit: (See comments for more details) Patient Interventions: Prayer (actual) Family/Friend(s): Affirmation of emotions/emotional suffering, Affirmation of sasha, Catharsis/review of pertinent events in supportive environment, Coping skills reviewed/reinforced, Guidance concerning next steps/process to be expected, Iconic (affirming the presence of God/Higher Power), Normalization of emotional/spiritual concerns, Prayer (actual), Prayer (assurance of) Plan of Care: 
 
 [x] Support spiritual and/or cultural needs  
 [] Support AMD and/or advance care planning process    
 [] Support grieving process 
 [] Coordinate Rites and/or Rituals  
 [] Coordination with community clergy [] No spiritual needs identified at this time [] Detailed Plan of Care below (See Comments)  [] Make referral to Music Therapy 
[] Make referral to Pet Therapy    
[] Make referral to Addiction services 
[] Make referral to [facility] 
[] Make referral to Spiritual Care Partner 
[] No future visits requested       
[x] Follow up upon further referrals Comments:  contacted pt's daughter. She was extremely appreciative of  support and prayer. She mentioned they are hopeful and gave me an update on pt's status. She shared they are continuing to pray for him and his healing she mentioned pt possibly getting a trach on Monday.  provided pastoral listening, support and prayer. Let her know of  availability. Please contact [phone] [facility] for further support. [address] Mallory Damon, MACE 
 [phone])

## 2021-02-06 NOTE — PROGRESS NOTES
Progress Note Assessment:  
Principal Problem: 
  Osteomyelitis ([facility] [address] (12/28/2020) Active Problems: 
  ARDS (adult respiratory distress syndrome) ([facility] [address] (2/5/2021) KATHI on CKD: 
- worsening renal function from ATN  - [address] [place] and dialysis initiated on 2/4 
- HD today then MWF   
- PRN pressor support for HD 
  
Resp failure: 
COVID-19 PNA: positive on 1/11  
 
CKD 4 -  Presumed 2/2 DM and HTN 
- nephrotic range proteinuria 
- baseline Cr 1.7 mg/dl  
- followed by Dr. Andrzej Crews at [facility]  
  
Hx of prostate cancer s/p XRT 
  
Anemia in CKD + blood loss: 
- cont JAZ 
- EGD 1/5/2021: gastric fundal lesion  
- transfuse PRN  
  
Type II DM: 
- on insulin 
  
HTN :  
- BP stable now 
  
R foot osteo: 
- on abx 
- s/p debridement on 12/30 
  
Group B strep bacteremia Plan: Hd today then MWF as planned Time spent with patient during dialysis no Subjective:  
 
 
Complaint:  not examined COVID POS Current Facility-Administered Medications Medication Dose Route Frequency  [START ON 2/7/2021] insulin glargine (LANTUS) injection 10 Units  10 Units SubCUTAneous DAILY  0.9% sodium chloride infusion 250 mL  250 mL IntraVENous PRN  
 L.acidophilus-paracasei-S.thermophil-bifidobacter (RISAQUAD) 8 billion cell capsule  1 Cap Nasogastric DAILY  zinc oxide-cod liver oil (DESITIN) 40 % paste   Topical Q8H  
 sevelamer carbonate (RENVELA) oral powder 1.6 g  1.6 g Oral TID WITH MEALS  guar gum (BENEFIBER) packet 1 Packet  4 g Nasogastric QID  piperacillin-tazobactam (ZOSYN) 3.375 g in 0.9% sodium chloride (MBP/ADV) 100 mL MBP  3.375 g IntraVENous Q12H  
 heparin (porcine) injection 7,500 Units  7,500 Units SubCUTAneous Q8H  
 epoetin chi-epbx (RETACRIT) injection 20,000 Units  20,000 Units SubCUTAneous Q MON, WED & FRI  
 NOREPINephrine (LEVOPHED) 8 mg in 5% dextrose 250mL (32 mcg/mL) infusion  0.5-16 mcg/min IntraVENous TITRATE  0.9% sodium chloride infusion 250 mL  250 mL IntraVENous PRN  
 midazolam in normal saline (VERSED) 1 mg/mL infusion  0-10 mg/hr IntraVENous TITRATE  chlorhexidine (ORAL CARE KIT) 0.12 % mouthwash 15 mL  15 mL Oral Q12H  aspirin chewable tablet 81 mg  81 mg Oral DAILY  cholecalciferol (VITAMIN D3) (1000 Units /25 mcg) tablet 2 Tab  2,000 Units Per NG tube DAILY  midazolam (VERSED) injection 2 mg  2 mg IntraVENous Q1H PRN  
 0.9% sodium chloride infusion 250 mL  250 mL IntraVENous PRN  
 fentaNYL (PF) 1,500 mcg/30 mL (50 mcg/mL) infusion  0-300 mcg/hr IntraVENous TITRATE  propofol (DIPRIVAN) 10 mg/mL infusion  0-50 mcg/kg/min IntraVENous TITRATE  insulin lispro (HUMALOG) injection   SubCUTAneous Q6H  
 lansoprazole (PREVACID) 3 mg/mL oral suspension 30 mg  30 mg Per NG tube BID  therapeutic multivitamin (THERAGRAN) tablet 1 Tab  1 Tab Oral DAILY  0.9% sodium chloride infusion 250 mL  250 mL IntraVENous PRN  
 0.9% sodium chloride infusion 250 mL  250 mL IntraVENous PRN  
 miconazole (MICOTIN) 2 % powder   Topical BID  hydrALAZINE (APRESOLINE) 20 mg/mL injection 20 mg  20 mg IntraVENous Q6H PRN  
 [Held by provider] cloNIDine HCL (CATAPRES) tablet 0.1 mg  0.1 mg Oral BID  ascorbic acid (vitamin C) (VITAMIN C) tablet 500 mg  500 mg Oral DAILY  [Held by provider] amLODIPine (NORVASC) tablet 10 mg  10 mg Oral DAILY  collagenase (SANTYL) 250 unit/gram ointment   Topical Once per day on Thu Sat  balsam peru-castor oiL (VENELEX) ointment   Topical Q12H  
 ammonium lactate (LAC-HYDRIN) 12 % lotion   Topical Q12H  
 sodium chloride (NS) flush 5-40 mL  5-40 mL IntraVENous Q8H  
 sodium chloride (NS) flush 5-40 mL  5-40 mL IntraVENous PRN  
 [Held by provider] enzalutamide Monroy Beans) chemo capsule 80 mg (patient supplied) (Patient Supplied)  80 mg Oral BID  
 oxyCODONE-acetaminophen (PERCOCET) 5-325 mg per tablet 1 Tab  1 Tab Oral Q4H PRN  
  diclofenac (VOLTAREN) 1 % topical gel 2 g  2 g Topical QID  acetaminophen (TYLENOL) tablet 500 mg  500 mg Oral Q4H PRN  
 [Held by provider] bicalutamide (CASODEX) tablet 50 mg  50 mg Oral DAILY  gabapentin (NEURONTIN) capsule 100 mg  100 mg Oral TID  [Held by provider] tamsulosin (FLOMAX) capsule 0.4 mg  0.4 mg Oral DAILY  nicotine (NICODERM CQ) 14 mg/24 hr patch 1 Patch  1 Patch TransDERmal DAILY  glucose chewable tablet 16 g  4 Tab Oral PRN  
 dextrose (D50W) injection syrg 12.5-25 g  12.5-25 g IntraVENous PRN  
 glucagon (GLUCAGEN) injection 1 mg  1 mg IntraMUSCular PRN Review of Systems Pertinent items are noted in HPI. Objective:  
 
Visit Vitals /60 Pulse 79 Temp 97.9 °F (36.6 °C) Resp 18 Ht 6' (1.829 m) Wt 122.1 kg (269 lb 2.9 oz) SpO2 91% BMI 36.51 kg/m² Temp (24hrs), Av °F (36.7 °C), Min:97 °F (36.1 °C), Max:99.2 °F (37.3 °C) 
 
 
701 -  1900 In: -  
Out: 30 [Urine:30] Physical Exam:  
 General [    ] healthy     [] acutely ill [    ] chronically ill   [ x   ] critical    
 Skin  [] Nl color/turgor [    ] Eyes  [] EOMI/PERRL [    ]  
 ENT  [] clear/moist [    ] Neck  [] supple  [    ] Pulm  [] clear to A/P [    ]  
 CV  [] RRR  [    ] ABD  [] Soft  [    ]  
   [] Bynum  [    ]  
 MS  [] Edema  [    ] Neuro             [] nonfocal  [    ] Psyche           [x] not assessed    [    ] Data Review:  
 
LABS:  
Recent Results (from the past 24 hour(s)) GLUCOSE, POC Collection Time: 21 12:25 PM  
Result Value Ref Range Glucose (POC) 232 (H) 65 - 100 mg/dL Performed by Wm Gr RN   
CBC W/O DIFF Collection Time: 21  2:18 PM  
Result Value Ref Range WBC 8.7 4.1 - 11.1 K/uL  
 RBC 2.70 (L) 4.10 - 5.70 M/uL HGB 7.7 (L) 12.1 - 17.0 g/dL HCT 25.0 (L) 36.6 - 50.3 % MCV 92.6 80.0 - 99.0 FL  
 MCH 28.5 26.0 - 34.0 PG  
 MCHC 30.8 30.0 - 36.5 g/dL  
 RDW 19.4 (H) 11.5 - 14.5 % PLATELET 995 404 - 724 K/uL MPV 8.8 (L) 8.9 - 12.9 FL  
 NRBC 0.3 (H) 0  WBC ABSOLUTE NRBC 0.03 (H) 0.00 - 0.01 K/uL GLUCOSE, POC Collection Time: 02/05/21  6:52 PM  
Result Value Ref Range Glucose (POC) 236 (H) 65 - 100 mg/dL Performed by Paula [facility] RUBA   
GLUCOSE, POC Collection Time: 02/06/21 12:22 AM  
Result Value Ref Range Glucose (POC) 239 (H) 65 - 100 mg/dL Performed by Karson Mon RENAL FUNCTION PANEL Collection Time: 02/06/21  4:29 AM  
Result Value Ref Range Sodium 135 (L) 136 - 145 mmol/L Potassium 3.2 (L) 3.5 - 5.1 mmol/L Chloride 98 97 - 108 mmol/L  
 CO2 25 21 - 32 mmol/L Anion gap 12 5 - 15 mmol/L Glucose 209 (H) 65 - 100 mg/dL BUN 67 (H) 6 - 20 MG/DL Creatinine 3.41 (H) 0.70 - 1.30 MG/DL  
 BUN/Creatinine ratio 20 12 - 20 GFR est AA 22 (L) >60 ml/min/1.73m2 GFR est non-AA 18 (L) >60 ml/min/1.73m2 Calcium 9.2 8.5 - 10.1 MG/DL Phosphorus 4.6 2.6 - 4.7 MG/DL Albumin 2.8 (L) 3.5 - 5.0 g/dL CBC WITH AUTOMATED DIFF Collection Time: 02/06/21  4:29 AM  
Result Value Ref Range WBC 5.6 4.1 - 11.1 K/uL  
 RBC 2.63 (L) 4.10 - 5.70 M/uL HGB 7.7 (L) 12.1 - 17.0 g/dL HCT 24.3 (L) 36.6 - 50.3 % MCV 92.4 80.0 - 99.0 FL  
 MCH 29.3 26.0 - 34.0 PG  
 MCHC 31.7 30.0 - 36.5 g/dL  
 RDW 19.2 (H) 11.5 - 14.5 % PLATELET 562 721 - 709 K/uL MPV 9.4 8.9 - 12.9 FL  
 NRBC 0.5 (H) 0  WBC ABSOLUTE NRBC 0.03 (H) 0.00 - 0.01 K/uL NEUTROPHILS 81 (H) 32 - 75 % LYMPHOCYTES 7 (L) 12 - 49 % MONOCYTES 8 5 - 13 % EOSINOPHILS 4 0 - 7 % BASOPHILS 0 0 - 1 % IMMATURE GRANULOCYTES 0 0.0 - 0.5 % ABS. NEUTROPHILS 4.6 1.8 - 8.0 K/UL  
 ABS. LYMPHOCYTES 0.4 (L) 0.8 - 3.5 K/UL  
 ABS. MONOCYTES 0.4 0.0 - 1.0 K/UL  
 ABS. EOSINOPHILS 0.2 0.0 - 0.4 K/UL  
 ABS. BASOPHILS 0.0 0.0 - 0.1 K/UL  
 ABS. IMM. GRANS. 0.0 0.00 - 0.04 K/UL  
 DF SMEAR SCANNED    
 RBC COMMENTS OVALOCYTES 1+ RBC COMMENTS POLYCHROMASIA 1+ 
    
 RBC COMMENTS ANISOCYTOSIS 1+ 
    
 RBC COMMENTS MACROCYTOSIS 
PRESENT 
    
GLUCOSE, POC Collection Time: 02/06/21  6:12 AM  
Result Value Ref Range Glucose (POC) 51 (L) 65 - 100 mg/dL Performed by Cristina Gallardo GLUCOSE, POC Collection Time: 02/06/21  6:13 AM  
Result Value Ref Range Glucose (POC) 214 (H) 65 - 100 mg/dL Performed by Cristina Gallardo Signed By: Ivett Castaneda MD, CONSTANTINO February 6, 2021  
 
 
 
 
 
 
  
 
 
[email or web address]assGe[email or web address]

## 2021-02-06 NOTE — PROGRESS NOTES
0730: Bedside shift change report given to Dallas Bryant (oncoming nurse) by [facility] (offgoing nurse). Report included the following information SBAR, Kardex, Intake/Output and MAR.  
 
 
1930: Bedside shift change report given to Viola Sandhoff (oncoming nurse) by Dallas Bryant (offgoing nurse). Report included the following information SBAR, Kardex, Intake/Output and MAR.

## 2021-02-06 NOTE — DIALYSIS
[facility] Dialysis Team [place]  [phone] Vitals   Pre   Post   Assessment   Pre   Post    
Temp  Temp: 99.6 °F (37.6 °C) (02/06/21 1534)  99.2 LOC  sedated sedated HR   Pulse (Heart Rate): 72 (02/06/21 1534) 69 Lungs   Coarse on ventilator  coarse on ventilator B/P   BP: (!) 106/59 (02/06/21 1534) 129/62 Cardiac   B/p wnl with Hernan drip  b/p wnl on hernan drip Resp   Resp Rate: 18 (02/06/21 1534) 18 Skin   intact  intaact Pain level  Now showing any pain 0 Edema  +1 generalized +1 generalized Orders: Duration:   Start:   1537 End:   1905 Total:   3.5 hrs Dialyzer:   Dialyzer/Set Up Inspection: Laura Cordova (02/06/21 1534) K Bath:   Dialysate K (mEq/L): 3.5 (02/06/21 1534) Ca Bath:   Dialysate CA (mEq/L): 2.5 (02/06/21 1534) Na/Bicarb:   Dialysate NA (mEq/L): 140 (02/06/21 1534) Target Fluid Removal:   Goal/Amount of Fluid to Remove (mL): 1500 mL (02/06/21 1534) Access Type & Location:   RT IJ parth, dressing c/d/i, no signs of infection noted. Each catheter limb disinfected per p&p, caps removed, hubs disinfected per p&p. Blood aspirated and lines flushed without any issues Labs Obtained/Reviewed Critical Results Called   Date when labs were drawn- 
Hgb-   
HGB Date Value Ref Range Status 02/06/2021 7.7 (L) 12.1 - 17.0 g/dL Final  
 
K-   
Potassium Date Value Ref Range Status 02/06/2021 3.2 (L) 3.5 - 5.1 mmol/L Final  
 
Ca-  
Calcium Date Value Ref Range Status 02/06/2021 9.2 8.5 - 10.1 MG/DL Final  
 
Bun-  
BUN Date Value Ref Range Status 02/06/2021 67 (H) 6 - 20 MG/DL Final  
 
Creat-  
Creatinine Date Value Ref Range Status 02/06/2021 3.41 (H) 0.70 - 1.30 MG/DL Final  
  Comment:  
  INVESTIGATED PER DELTA CHECK PROTOCOL Medications/ Blood Products Given Name   Dose   Route and Time    
heparin 3200 units 1700 units venous, 1500 units arterial  
    
    
Blood Volume Processed (BVP):    70.7 Net Fluid Removed:  1.5kg Comments Time Out Done: 1430 Primary Nurse Rpt Pre:Lexii Apple RN 
Primary Nurse Rpt Post: Terrell Rodriguez RN 
Pt Education:pt sedated Care Plan:ARF Tx Summary: 
1537 Dialysis started with Hernan drip started at 4mcg to help with fluid removal 
1905 Dialysis completed. Each dialysis catheter limb disinfected per p&p, blood returned per p&p, each dialysis hub disinfected per p&p, post dialysis catheter dwell instilled with heparin  per order, and caps applied. Admiting Diagnosis:ARDS,  
Pt's previous clinic-not assigned Consent signed - Informed Consent Verified: Yes (02/06/21 1534) Hepatitis Status- 2/4/21 NEG AG 
Machine #- Machine Number: [id number] (02/06/21 1534) Telemetry status- 
Pre-dialysis wt. -

## 2021-02-06 NOTE — PROGRESS NOTES
[facility] 
 
ICU TEAM Progress Note Name: Nazario Emmanuel III  
: 1950 MRN: [id number] Date: 2021 Subjective:  
Progress Note: 2021 Mr. Mic Singh is a 71 yo male w a PMH of prostate cancer s/p radiation, tobacco use disorder (1 ppd), CKD3a, gastric angioectasias,  IDDM, heart failure, HTN who was admitted with R foot osteomyelitis on . He was transferred to the ICU today after experiencing a cardiac arrest in the [facility]. Please see below for a brief synopsis of his hospital course, by problem. 
  
Acute Hypoxic Respiratory failure secondary to COVID pneumonia: He also demonstrated dyspnea on initial admission, but was negative for COVID. Initially treated with steroids. On , he developed increased oxygen needs. He was COVID positive. He was started on and completed treatment with steroids (completed ), remdesivir (completed ), zinc, and vitamin C. He initially required BiPAP on , and was weaned to 4-5L/min. CT chest showed bilateral pleural effusions with ground glass. Diuresis was attempted. He was continued on zosyn. He required transfer to [facility] on 1/15 due to continued desaturations -> 70s on NRB. CXR with increased bilateral infiltrates. Started linezolid on . Required intubation on . See below under cardiac arrest. He was extubated on , but required reintubation the same day. He completed a course of empiric 14d zyvox (ended ) and repeated empiric stress dose steroids (ended ). He remains on prolonged zosyn for treatment of osteomyelitis. Respiratory cultures have remained NGTD. He continues to fail SBTs. Cardiac arrest, s/p ROSC: Not cooled as he regained mental function s/p ROSC. Code time: reported to be 12 minutes The patient had been taken off BiPAP for lunch. He was on intermittent BiPAP and HiFlow, demonstrating stability to come off to eat. He ate his lunch and was later found SOB unresponsive and pulseless. He was thereafter transferred to the ICU. He regained mentation, was able to respond to commands, shake head/yes, no, but ultimately did require sedation for compliance with mechanical ventilation. Likely hypoxic arrest. 
 
Septic shock: Osteomyelitis has been only identified source of infection. Please see above under respiratory failure for full courses of abx. Has been on/off pressors since ICU admission. Admitted with a chronic wound on his R foot. He was started on broad spectrum antibiotics with vanc, zosyn, and flagyl and underwent wound debridement on 12/30. BLC positive for S agalactiae on 12/28. Foot cultures + for Protues mirabilis, group B strep, and Enterococcus. Antibiotics were then narrowed to zosyn. Wound vac placed on foot 1/5. Zosyn is planned for 6 weeks. Acute on chronic CKD3a: Baseline Cr 1.7. Renal has followed throughout his course. Renal failure progressively worsening. He started IHD on 2/4. IDDM2: Lantus and lispro Anemia, acute on chronic: Course c/b anemia w heme + stool. EGD on 1/4 showing superficial ulcers in the distal bulb and second portion of the duodenum measuring 2-4mm. No active bleeding noted. BID protonix. Received PRBC 1/2. 
  
COVID negative 12/28, 12/29, but positive 1/11 Overnight: 
Some issues with ventilation and vent settings changed to pressure support. Planning for possible trach next week. Active Problem List:  
 
Problem List  Date Reviewed: 8/20/2020 Codes Class ARDS (adult respiratory distress syndrome) ([facility] ICD-10-CM: D86 
ICD-9-CM: 518.52 * (Principal) Osteomyelitis ([facility].) ICD-10-CM: M86.9 ICD-9-CM: 730.20 Diabetic ulcer of right midfoot associated with type 2 diabetes mellitus, with fat layer exposed ([facility].) ICD-10-CM: E11.621, H73.951 ICD-9-CM: 250.80, 707.14 PAD (peripheral artery disease) ([facility]) ICD-10-CM: I73.9 ICD-9-CM: 443.9 Dependence on nicotine from cigarettes ICD-10-CM: F17.210 ICD-9-CM: 305.1 KATHI (acute kidney injury) ([facility] ICD-10-CM: N17.9 ICD-9-CM: 308. 9 Acute on chronic renal failure (HCC) ICD-10-CM: N17.9, N18.9 ICD-9-CM: 584.9, 585.9 Severe obesity (BMI 35.0-39. 9) with comorbidity ([facility].) ICD-10-CM: E66.01 
ICD-9-CM: 278.01 Type 2 diabetes with nephropathy (HCC) ICD-10-CM: E11.21 
ICD-9-CM: 250.40, 583.81 S/P hip replacement, right ICD-10-CM: I87.175 ICD-9-CM: V43.64 Stage 3 chronic kidney disease ICD-10-CM: N18.30 ICD-9-CM: 077. 3 Prostate [facility]) ICD-10-CM: G04 ICD-9-CM: 235 Diabetic polyneuropathy ([facility] ICD-10-CM: E11.42 
ICD-9-CM: 250.60, 357.2 Rotator Cuff Tendonitis ICD-10-CM: M71.9, M67.919 ICD-9-CM: 726.10 Diabetes mellitus type 2, controlled ([facility].) ICD-10-CM: E11.9 ICD-9-CM: 250.00 Degenerative disc disease ICD-10-CM: [id number] ICD-9-CM: 722.6 Osteoarthrosis involving lower leg ICD-10-CM: M17.10 ICD-9-CM: 715.96 Depression/ Anxiety ICD-10-CM: F34.1 ICD-9-CM: 300.4 HTN (hypertension) ICD-10-CM: I10 
ICD-9-CM: 401.9 Chronic hip pain ICD-10-CM: M25.559, G89.29 ICD-9-CM: 719.45, 338.29 Hypertriglyceridemia ICD-10-CM: E78.1 ICD-9-CM: 272.1 Mild Aortic Insufficiency ICD-10-CM: I35.1 ICD-9-CM: 424.1 Mild Concentric LVH (left ventricular hypertrophy) ICD-10-CM: I51.7 ICD-9-CM: 429.3 Past Medical History: has a past medical history of Arthritis, Degenerative,  Knee (4/4/2011), Carcinoma, Prostate (4/4/2011), Degenerative disc disease (4/4/2011), Depression/ Anxiety (4/4/2011), Diabetes Mellitrus ( non-insulin dependent ) Type 2 (4/4/2011), Heart failure ([facility] HEMATOCHEZIA (4/4/2011), Hypertrension (4/4/2011), Hypertriglyceridemia (4/4/2011), Ill-defined condition, Insomnia (4/4/2011), Laryngitis (4/4/2011), Mild Aortic insufficiency (4/4/2011), Mild Concentric LVH (left ventricular hypertrophy) (4/4/2011), Neuropathy (4/4/2011), Osteoarthritis (4/4/2011), and Rotator Cuff Tendonitis (4/4/2011). Past Surgical History:  
 
 has a past surgical history that includes hx urological; hx other surgical; pr cardiac surg procedure unlist; colonoscopy (N/A, 4/28/2017); hx orthopaedic; hx orthopaedic; and ir insert tunl cvc w/o port over 5 yr (1/6/2021). Home Medications:  
 
Prior to Admission medications Medication Sig Start Date End Date Taking? Authorizing Provider  
pregabalin (LYRICA) 150 mg capsule TAKE ONE CAPSULE BY MOUTH TWICE A DAY 12/7/20  Yes Leonel Wheatley MD  
bicalutamide (CASODEX) 50 mg tablet TAKE ONE TABLET BY MOUTH DAILY 11/30/20  Yes Leonel Wheatley MD  
torsemide (DEMADEX) 20 mg tablet TAKE FOUR TABLETS BY MOUTH TWICE A DAY 11/23/20  Yes Leonel Wheatley MD  
pantoprazole (PROTONIX) 40 mg tablet TAKE ONE TABLET BY MOUTH TWICE DAILY ONCE IN THE MORNING AND ONCE IN THE EVENING 11/18/20  Yes Leonel Wheatley MD  
hydrOXYzine HCL (ATARAX) 50 mg tablet TAKE ONE TABLET BY MOUTH THREE TIMES A DAY AS NEEDED FOR ITCHING FOR UP TO 10 DAYS 10/22/20  Yes Leonel Wheatley MD  
amLODIPine (NORVASC) 10 mg tablet TAKE ONE TABLET BY MOUTH DAILY 10/2/20  Yes Leonel Wheatley MD  
hydrALAZINE (APRESOLINE) 100 mg tablet Take 1 Tab by mouth three (3) times daily.  9/16/20  Yes Leonel Wheatley MD  
 ascorbic acid, vitamin C, (Vitamin C) 500 mg tablet Take  by mouth. Yes Provider, Historical  
enzalutamide (XTANDI) 40 mg capsule Take 160 mg by mouth daily. Yes Provider, Historical  
Thera-Tabs M 27 mg iron-400 mcg tab TAKE ONE TABLET BY MOUTH DAILY 7/17/20  Yes Sarina Carmona MD  
tamsulosin [facility]) 0.4 mg capsule TAKE ONE CAPSULE BY MOUTH DAILY 7/16/20  Yes Sarina Carmona MD  
Insulin Syringe-Needle U-100 (BD Insulin Syringe Ultra-Fine) 1 mL 31 gauge x 5/16 syrg USE TO INJECT INSULIN FIVE TIMES A DAY 6/16/20  Yes Sarina Carmona MD  
ferrous sulfate 325 mg (65 mg iron) tablet TAKE ONE TABLET BY MOUTH DAILY BEFORE BREAKFAST 6/16/20  Yes Sarina Carmona MD  
gabapentin (NEURONTIN) 300 mg capsule Take 1 Cap by mouth three (3) times daily. Max Daily Amount: 900 mg. 5/25/20  Yes Sarina Carmona MD  
triamcinolone acetonide (KENALOG) 0.1 % ointment APPLY A THIN LAYER TO AFFECTED AREA(S) TWO TIMES A DAY **DO NOT EXCEED 2.66 GRAMS PER DAY** 5/19/20  Yes Sarina Carmona MD  
diclofenac (VOLTAREN) 1 % gel Apply  to affected area four (4) times daily. 4/16/20  Yes Sarina Carmona MD  
ammonium lactate (AMLACTIN) 12 % topical cream Apply  to affected area two (2) times a day. rub in to affected area well 3/10/20  Yes Sarina Carmona MD  
ammonium lactate (LAC-HYDRIN FIVE) 5 % lotion Apply 1 Applicator to affected area daily. Apply daily bilateral lower legs 3/5/20  Yes Sarina Carmona MD  
insulin glargine (LANTUS U-100 INSULIN) 100 unit/mL injection 72 Units by SubCUTAneous route daily. 3/5/20  Yes Sarina Carmona MD  
insulin regular (NOVOLIN R, HUMULIN R) 100 unit/mL injection 24 units sc tid 3/5/20  Yes Sarina Carmona MD  
ketoconazole (NIZORAL) 2 % topical cream Apply  to affected area two (2) times a day.  2/4/20  Yes Sarina Carmona MD  
ketoconazole (NIZORAL) 2 % shampoo Sig:shampoo once a week 2/4/20  Yes Sarina Carmona MD  
 bisacodyl (DULCOLAX, BISACODYL,) 10 mg suppository Insert 10 mg into rectum daily. Yes Provider, Historical  
insulin aspart U-100 (NOVOLOG FLEXPEN U-100 INSULIN) 100 unit/mL (3 mL) inpn by SubCUTAneous route. Yes Provider, Historical  
Insulin Needles, Disposable, (NOVOFINE 32) 32 gauge x 1/4\" ndle Sig:use 4 times a day as needed 19  Yes MD LINDA Ashby REGULAR U-100 INSULN 100 unit/mL injection INJECT 14 UNITS UNDER THE SKIN THREE TIMES A DAY WITH MEALS AS NEEDED 19  Yes Mary Lynn MD  
fluticasone propionate [facility]) 50 mcg/actuation nasal spray SPRAY TWO SPRAYS IN THE AFFECTED NOSTRIL DAILY 19  Yes Mary Lynn MD  
aspirin delayed-release 81 mg tablet Take 81 mg by mouth daily. Yes Provider, Historical  
acetaminophen (PAIN AND FEVER) 500 mg tablet TAKE ONE TABLET BY MOUTH EVERY 6 HOURS AS NEEDED FOR PAIN 18  Yes Mary Lynn MD  
Calcium Carbonate-Vit D3-Min 600 mg calcium- 400 unit tab Take 1 Tab by mouth two (2) times a day. Yes Provider, Historical  
 
Allergies/Social/Family History: No Known Allergies Social History Tobacco Use  Smoking status: Current Every Day Smoker Packs/day: 1.00 Last attempt to quit: 2019 Years since quittin.2  Smokeless tobacco: Never Used Substance Use Topics  Alcohol use: Not Currently Alcohol/week: 11.7 standard drinks Types: 7 Cans of beer, 7 Shots of liquor per week Family History Family history unknown: Yes Review of Systems:  
 
Unable to provide - intubated and sedated Objective:  
Vital Signs: 
Visit Vitals /60 Pulse 92 Temp 97.9 °F (36.6 °C) Resp 22 Ht 6' (1.829 m) Wt 122.1 kg (269 lb 2.9 oz) SpO2 93% BMI 36.51 kg/m² O2 Flow Rate (L/min): 15 l/min(Mid flow at 9 liters also) O2 Device: Ventilator Temp (24hrs), Av °F (36.7 °C), Min:97 °F (36.1 °C), Max:99.2 °F (37.3 °C) Intake/Output: Intake/Output Summary (Last 24 hours) at 2/6/2021 0511 Last data filed at 2/6/2021 0900 Gross per 24 hour Intake 2349.78 ml Output 1660 ml Net 689.78 ml Physical Exam: 
  
 
General: Intubated and sedated EENT: PERRL 3mm/brisk. MMM Resp: Coarse throughout and decreased to bases CV: Regular rhythm, normal rate, no murmurs GI: Soft, moderately distended with hypoactive BS. Flexi-seal 
Extremities: Lymphedema with eschar noted B/L R>L. Wound vac to RLE. Vascular changes. Warm to touch. Unable to palpate pulses but dopplerable Neurologic: RASS -3. Skin: Warm and dry. Lines: [place] [facility] [place] CVC, YISSEL Galdamez, [place], [place], OGT 
 
 
LABS AND  DATA: Personally reviewed Recent Labs 02/06/21 
0429 02/05/21 [address] WBC 5.6 8.7 HGB 7.7* 7.7* HCT 24.3* 25.0*  
 290 Recent Labs 02/06/21 
1524 02/05/21 
0733 02/04/21 
4229 * 138 136  
K 3.2* 3.3* 3.6 CL 98 102 104 CO2 25 27 22 BUN 67* 56* 91* CREA 3.41* 2.58* 3.72* * 175* 216* CA 9.2 8.6 8.9 MG  --  2.2 2.3 PHOS 4.6 4.1 5.8* Recent Labs 02/06/21 2011 02/05/21 
6472 02/04/21 
5352 AP  --   --  79  
TP  --   --  7.0 ALB 2.8* 2.8* 2.8*  
GLOB  --   --  4.2* No results for input(s): INR, PTP, APTT, INREXT, INREXT in the last 72 hours. No results for input(s): PHI, PCO2I, PO2I, FIO2I in the last 72 hours. No results for input(s): CPK, CKMB, TROIQ, BNPP in the last 72 hours. Ventilator Settings: 
AC: 60% R 18 PEEP 10  MEDS: Reviewed 2/6 CXR: Reviewed 2/6 (X-Ray from 2/5) IMPRESSION Multilobar pneumonia/ARDS. Assessment:  
 
Acute Hypoxic Respiratory Failure Secondary to COVID Pneumonia:   
- Intubated and extubated on several occasions - Family has consented to trach and plan for trach on Monday, 2/ 
- ARDS volume ventilation, changed to pressure support overnight -> ABG pending - Wean vent settings as tolerated pending ABG results Sepsis: Osteomyelitis is only known bacterial infectious etiology. Likely cytokine reaction in setting of COVID-19. Cultures NGTD. Completed steroids. Completed course of linezolid (empiric). On prolonged zoysn for osteomyelitis. Now off pressors - Continue zosyn - end date 2/12 
- MAP goal 65 KATHI on CKD3a: Renal following; renal failure progressively worsening 
- IHD per renal; next 2/6 Anemia, acute on chronic: PRBC on 2/5 
- Hgb stable 7.7 HFpEF with Exacerbation: EF 45-50%, mildly dilated RV w mildly reduced systolic function. Diuresis difficult in setting of KATHI on CKD3. 
- IHD for volume removal 
 
Delirium: When sedation is lightened, experiences hyperactive delirium, which interferes with weaning 
- Continue precedex - Minimize benzos R foot wound: Complicated by osteomyelitis. - Wound vac - Zosyn as above - ID following Deconditioning: Unable to work w PT/OT at this time Prostate Cancer: Holding casodex as this cannot be crushed an pt has OGT Multidisciplinary Rounds Completed:  No 
 
ABCDEF Bundle/Checklist 
Pain Medications: Fentanyl Target RASS: -3 Sedation Medications: Propofol, Versed CAM-ICU:  Positive Mobility: Poor PT/OT:Unable to participate Restraints: Soft wrist restraints. Reassess 2/6 @ 0930 Discussed Plan of Care (goals of care): Yes with RN. Will call family this afternoon Addressed Code Status: Full Code CARDIOVASCULAR Cardiac Gtts: None SBP Goal of: > 90 mmHg MAP Goal of: >65 Transfusion Trigger (Hgb): <7 g/dL RESPIRATORY Vent Goals: Reviewed ABG 7.22/61/71.5/24.2 -> changed back to volume control Optimize PEEP/Ventilation/Oxygenation DVT Prophylaxis (if no, list reason): Heparin SQ  
SPO2 Goal: > 92% GI/ [place] Catheter Present: Yes GI Prophylaxis: Protonix (pantoprazole)  (hx GIB) Nutrition: Yes TF Bowel Movement: Yes Bowel Regimen: Docusate (Colace) Insulin: Y -> Increase Lantus to 10 units ANTIBIOTICS Antibiotics: 
Zosyn T/L/D Tubes: Orogastric Tube Lines: [employer] Drains: [place] Catheter [employer]MENT Vent DISPOSITION Stay in ICU CRITICAL CARE CONSULTANT NOTE I had a face to face encounter with the patient, reviewed and interpreted patient data including clinical events, labs, images, vital signs, I/O's, and examined patient. I have discussed the case and the plan and management of the patient's care with the consulting services, the bedside nurses and the respiratory therapist.   
 
NOTE OF PERSONAL INVOLVEMENT IN CARE This patient has a high probability of imminent, clinically significant deterioration, which requires the highest level of preparedness to intervene urgently. I participated in the decision-making and personally managed or directed the management of the following life and organ supporting interventions that required my frequent assessment to treat or prevent imminent deterioration. I personally spent 35 minutes of critical care time. This is time spent at this critically ill patient's bedside actively involved in patient care as well as the coordination of care and discussions with the patient's family. This does not include any procedural time which has been billed separately. Brittany Sanchez DNP, [facility] 
2/6/2021

## 2021-02-06 NOTE — PROGRESS NOTES
Bedside shift change report received from Jeremiah Velazco[address]. Report included the following information SBAR, Kardex, Procedure Summary, Intake/Output, MAR and Recent Results.

## 2021-02-06 NOTE — PROGRESS NOTES
ID Progress Note 2021 Subjective: Afebrile. On the ventilator at 60% fio2. New central line placed in . ROS: Unobtainable Objective:  
 
Vitals:  
Visit Vitals BP (!) 145/68 (BP 1 Location: Left upper arm, BP Patient Position: At rest) Pulse 66 Temp 97 °F (36.1 °C) Resp 18 Ht 6' (1.829 m) Wt 125 kg (275 lb 9.2 oz) SpO2 96% BMI 37.37 kg/m² Tmax:  Temp (24hrs), Av.2 °F (36.8 °C), Min:97 °F (36.1 °C), Max:99.2 °F (37.3 °C) Exam:  
intubated Sedated Labs:  
Lab Results Component Value Date/Time WBC 8.7 2021 02:18 PM  
 Hemoglobin (POC) 6.5 2020 01:59 PM  
 HGB 7.7 (L) 2021 02:18 PM  
 HCT 25.0 (L) 2021 02:18 PM  
 PLATELET 834  02:18 PM  
 MCV 92.6 2021 02:18 PM  
 
Lab Results Component Value Date/Time Sodium 138 2021 03:23 AM  
 Potassium 3.3 (L) 2021 03:23 AM  
 Chloride 102 2021 03:23 AM  
 CO2 27 2021 03:23 AM  
 Anion gap 9 2021 03:23 AM  
 Glucose 175 (H) 2021 03:23 AM  
 BUN 56 (H) 2021 03:23 AM  
 Creatinine 2.58 (H) 2021 03:23 AM  
 BUN/Creatinine ratio 22 (H) 2021 03:23 AM  
 GFR est AA 30 (L) 2021 03:23 AM  
 GFR est non-AA 25 (L) 2021 03:23 AM  
 Calcium 8.6 2021 03:23 AM  
 Bilirubin, total 0.6 2021 02:54 AM  
 Alk. phosphatase 79 2021 02:54 AM  
 Protein, total 7.0 2021 02:54 AM  
 Albumin 2.8 (L) 2021 03:23 AM  
 Globulin 4.2 (H) 2021 02:54 AM  
 A-G Ratio 0.7 (L) 2021 02:54 AM  
 ALT (SGPT) <6 (L) 2021 02:54 AM  
 
 
 
 
 
Assessment:  
 
#1 osteomyelitis of the right foot 
  
#2 group b strep  bacteremia 
  
#3 renal insufficiency 
  
#4 diabetes 
  
#5 prostate cancer 
  
#6 hypertension 
  
#7 heart failure 
  
 #8 covid  
  
#9 diarrhea Recommendations: Continue zosyn. There is OM on the surgical margin. He will need prolonged therapy. Orders written. Last day slated for feb 12, 2021. CRP elevated, but could also be from his lung process. I will need to take a look at the wound. Will arrange with wound care next week.  
  
He has completed treatment with remdesivir.  (1/121/16) 
  He has completed dexamethasone (1/12 - 1/21)  Completed 14 day course of zyvox on 1/31. Team available over the weekend if needed Whitney Paredes MD

## 2021-02-07 NOTE — PROGRESS NOTES
0730-Bedside and Verbal shift change report given to [address] (oncoming nurse) by Barbara Nelson RN (offgoing nurse).  Report included the following information SBAR, Kardex, ED Summary, Procedure Summary, Intake/Output, MAR, Recent Results and Cardiac Rhythm SR.

## 2021-02-07 NOTE — PROGRESS NOTES
1930-Bedside and Verbal shift change report given to Mani (oncoming nurse) by Kelsie Piedra (offgoing nurse).  Report included the following information SBAR, Kardex, ED Summary, Intake/Output, MAR, Recent Results and Cardiac Rhythm SR.

## 2021-02-07 NOTE — PROGRESS NOTES
[facility] 
 
ICU TEAM Progress Note Name: Michael Loco III  
: 1950 MRN: [id number] Date: 2021 Subjective:  
Progress Note: 2021 Mr. Kesahv Leo is a 73 yo male w a PMH of prostate cancer s/p radiation, tobacco use disorder (1 ppd), CKD3a, gastric angioectasias,  IDDM, heart failure, HTN who was admitted with R foot osteomyelitis on . He was transferred to the ICU today after experiencing a cardiac arrest in the [facility]. Please see below for a brief synopsis of his hospital course, by problem. 
  
Acute Hypoxic Respiratory failure secondary to COVID pneumonia: He also demonstrated dyspnea on initial admission, but was negative for COVID. Initially treated with steroids. On , he developed increased oxygen needs. He was COVID positive. He was started on and completed treatment with steroids (completed ), remdesivir (completed ), zinc, and vitamin C. He initially required BiPAP on , and was weaned to 4-5L/min. CT chest showed bilateral pleural effusions with ground glass. Diuresis was attempted. He was continued on zosyn. He required transfer to [facility] on 1/15 due to continued desaturations -> 70s on NRB. CXR with increased bilateral infiltrates. Started linezolid on . Required intubation on . See below under cardiac arrest. He was extubated on , but required reintubation the same day. He completed a course of empiric 14d zyvox (ended ) and repeated empiric stress dose steroids (ended ). He remains on prolonged zosyn for treatment of osteomyelitis. Respiratory cultures have remained NGTD. He continues to fail SBTs and have intermittent increase in oxygen requirements Cardiac arrest, s/p ROSC: Not cooled as he regained mental function s/p ROSC. Code time: reported to be 12 minutes The patient had been taken off BiPAP for lunch. He was on intermittent BiPAP and HiFlow, demonstrating stability to come off to eat. He ate his lunch and was later found SOB unresponsive and pulseless. He was thereafter transferred to the ICU. He regained mentation, was able to respond to commands, shake head/yes, no, but ultimately did require sedation for compliance with mechanical ventilation. Likely hypoxic arrest. 
 
Septic shock: Osteomyelitis has been only identified source of infection. Please see above under respiratory failure for full courses of abx. Has been on/off pressors since ICU admission. Admitted with a chronic wound on his R foot. He was started on broad spectrum antibiotics with vanc, zosyn, and flagyl and underwent wound debridement on 12/30. BLC positive for S agalactiae on 12/28. Foot cultures + for Protues mirabilis, group B strep, and Enterococcus. Antibiotics were then narrowed to zosyn. Wound vac placed on foot 1/5. Zosyn is planned for 6 weeks. Acute on chronic CKD3a: Baseline Cr 1.7. Renal has followed throughout his course. Renal failure progressively worsening. He started IHD on 2/4. IDDM2: Lantus and lispro Anemia, acute on chronic: Course c/b anemia w heme + stool. EGD on 1/4 showing superficial ulcers in the distal bulb and second portion of the duodenum measuring 2-4mm.   
  
COVID negative 12/28, 12/29, but positive 1/11 Overnight: 
Started on low dose Levophed yesterday evening with dialysis and hopefully can come off today. Intermittent increase in oxygen requirements. Plan for trach tomorrow. Receiving potassium replacement. NPO at midnight (orders placed) Active Problem List:  
 
Problem List  Date Reviewed: 8/20/2020 Codes Class  ARDS (adult respiratory distress syndrome) ([facility] ICD-10-CM: T42 
ICD-9-CM: 518.52   
   
 * (Principal) Osteomyelitis ([facility] ICD-10-CM: M86.9 ICD-9-CM: 730.20 Diabetic ulcer of right midfoot associated with type 2 diabetes mellitus, with fat layer exposed ([facility] ICD-10-CM: E11.621, N60.052 ICD-9-CM: 250.80, 707.14 PAD (peripheral artery disease) ([facility]) ICD-10-CM: I73.9 ICD-9-CM: 443.9 Dependence on nicotine from cigarettes ICD-10-CM: F17.210 ICD-9-CM: 305.1 KATHI (acute kidney injury) ([facility] ICD-10-CM: N17.9 ICD-9-CM: 856. 9 Acute on chronic renal failure (HCC) ICD-10-CM: N17.9, N18.9 ICD-9-CM: 584.9, 585.9 Severe obesity (BMI 35.0-39. 9) with comorbidity ([facility] ICD-10-CM: E66.01 
ICD-9-CM: 278.01 Type 2 diabetes with nephropathy (HCC) ICD-10-CM: E11.21 
ICD-9-CM: 250.40, 583.81 S/P hip replacement, right ICD-10-CM: Q98.251 ICD-9-CM: V43.64 Stage 3 chronic kidney disease ICD-10-CM: N18.30 ICD-9-CM: 870. 3 Prostate [facility]) ICD-10-CM: O26 ICD-9-CM: 890 Diabetic polyneuropathy ([facility] ICD-10-CM: E11.42 
ICD-9-CM: 250.60, 357.2 Rotator Cuff Tendonitis ICD-10-CM: M71.9, M67.919 ICD-9-CM: 726.10 Diabetes mellitus type 2, controlled ([facility] ICD-10-CM: E11.9 ICD-9-CM: 250.00 Degenerative disc disease ICD-10-CM: [id number] ICD-9-CM: 722.6 Osteoarthrosis involving lower leg ICD-10-CM: M17.10 ICD-9-CM: 715.96 Depression/ Anxiety ICD-10-CM: F34.1 ICD-9-CM: 300.4 HTN (hypertension) ICD-10-CM: I10 
ICD-9-CM: 401.9 Chronic hip pain ICD-10-CM: M25.559, G89.29 ICD-9-CM: 719.45, 338.29 Hypertriglyceridemia ICD-10-CM: E78.1 ICD-9-CM: 272.1 Mild Aortic Insufficiency ICD-10-CM: I35.1 ICD-9-CM: 424.1 Mild Concentric LVH (left ventricular hypertrophy) ICD-10-CM: I51.7 ICD-9-CM: 429.3 Past Medical History: has a past medical history of Arthritis, Degenerative,  Knee (4/4/2011), Carcinoma, Prostate (4/4/2011), Degenerative disc disease (4/4/2011), Depression/ Anxiety (4/4/2011), Diabetes Mellitrus ( non-insulin dependent ) Type 2 (4/4/2011), Heart failure ([facility] HEMATOCHEZIA (4/4/2011), Hypertrension (4/4/2011), Hypertriglyceridemia (4/4/2011), Ill-defined condition, Insomnia (4/4/2011), Laryngitis (4/4/2011), Mild Aortic insufficiency (4/4/2011), Mild Concentric LVH (left ventricular hypertrophy) (4/4/2011), Neuropathy (4/4/2011), Osteoarthritis (4/4/2011), and Rotator Cuff Tendonitis (4/4/2011). Past Surgical History:  
 
 has a past surgical history that includes hx urological; hx other surgical; pr cardiac surg procedure unlist; colonoscopy (N/A, 4/28/2017); hx orthopaedic; hx orthopaedic; and ir insert tunl cvc w/o port over 5 yr (1/6/2021). Home Medications:  
 
Prior to Admission medications Medication Sig Start Date End Date Taking? Authorizing Provider  
pregabalin (LYRICA) 150 mg capsule TAKE ONE CAPSULE BY MOUTH TWICE A DAY 12/7/20  Yes Joaquin Mcleod MD  
bicalutamide (CASODEX) 50 mg tablet TAKE ONE TABLET BY MOUTH DAILY 11/30/20  Yes Joaquin Mcleod MD  
torsemide (DEMADEX) 20 mg tablet TAKE FOUR TABLETS BY MOUTH TWICE A DAY 11/23/20  Yes Joaquin Mcleod MD  
pantoprazole (PROTONIX) 40 mg tablet TAKE ONE TABLET BY MOUTH TWICE DAILY ONCE IN THE MORNING AND ONCE IN THE EVENING 11/18/20  Yes Joaquin Mcleod MD  
hydrOXYzine HCL (ATARAX) 50 mg tablet TAKE ONE TABLET BY MOUTH THREE TIMES A DAY AS NEEDED FOR ITCHING FOR UP TO 10 DAYS 10/22/20  Yes Joaquin Mcleod MD  
amLODIPine (NORVASC) 10 mg tablet TAKE ONE TABLET BY MOUTH DAILY 10/2/20  Yes Joaquin Mcleod MD  
hydrALAZINE (APRESOLINE) 100 mg tablet Take 1 Tab by mouth three (3) times daily.  9/16/20  Yes Joaquin Mcleod MD  
 ascorbic acid, vitamin C, (Vitamin C) 500 mg tablet Take  by mouth. Yes Provider, Historical  
enzalutamide (XTANDI) 40 mg capsule Take 160 mg by mouth daily. Yes Provider, Historical  
Thera-Tabs M 27 mg iron-400 mcg tab TAKE ONE TABLET BY MOUTH DAILY 7/17/20  Yes Hetal Bruno MD  
tamsulosin [facility]) 0.4 mg capsule TAKE ONE CAPSULE BY MOUTH DAILY 7/16/20  Yes Hetal Bruno MD  
Insulin Syringe-Needle U-100 (BD Insulin Syringe Ultra-Fine) 1 mL 31 gauge x 5/16 syrg USE TO INJECT INSULIN FIVE TIMES A DAY 6/16/20  Yes Hetal Bruno MD  
ferrous sulfate 325 mg (65 mg iron) tablet TAKE ONE TABLET BY MOUTH DAILY BEFORE BREAKFAST 6/16/20  Yes Hetal Bruno MD  
gabapentin (NEURONTIN) 300 mg capsule Take 1 Cap by mouth three (3) times daily. Max Daily Amount: 900 mg. 5/25/20  Yes Hetal Bruno MD  
triamcinolone acetonide (KENALOG) 0.1 % ointment APPLY A THIN LAYER TO AFFECTED AREA(S) TWO TIMES A DAY **DO NOT EXCEED 2.66 GRAMS PER DAY** 5/19/20  Yes Hetal Bruno MD  
diclofenac (VOLTAREN) 1 % gel Apply  to affected area four (4) times daily. 4/16/20  Yes Hetal Bruno MD  
ammonium lactate (AMLACTIN) 12 % topical cream Apply  to affected area two (2) times a day. rub in to affected area well 3/10/20  Yes Hetal Bruno MD  
ammonium lactate (LAC-HYDRIN FIVE) 5 % lotion Apply 1 Applicator to affected area daily. Apply daily bilateral lower legs 3/5/20  Yes Hetal Bruno MD  
insulin glargine (LANTUS U-100 INSULIN) 100 unit/mL injection 72 Units by SubCUTAneous route daily. 3/5/20  Yes Hetal Bruno MD  
insulin regular (NOVOLIN R, HUMULIN R) 100 unit/mL injection 24 units sc tid 3/5/20  Yes Hetal Bruno MD  
ketoconazole (NIZORAL) 2 % topical cream Apply  to affected area two (2) times a day.  2/4/20  Yes Hetal Bruno MD  
ketoconazole (NIZORAL) 2 % shampoo Sig:shampoo once a week 2/4/20  Yes Hetal Bruno MD  
 bisacodyl (DULCOLAX, BISACODYL,) 10 mg suppository Insert 10 mg into rectum daily. Yes Provider, Historical  
insulin aspart U-100 (NOVOLOG FLEXPEN U-100 INSULIN) 100 unit/mL (3 mL) inpn by SubCUTAneous route. Yes Provider, Historical  
Insulin Needles, Disposable, (NOVOFINE 32) 32 gauge x 1/4\" ndle Sig:use 4 times a day as needed 19  Yes MD LINDA Foley R REGULAR U-100 INSULN 100 unit/mL injection INJECT 14 UNITS UNDER THE SKIN THREE TIMES A DAY WITH MEALS AS NEEDED 19  Yes Noy Castillo MD  
fluticasone propionate [facility]) 50 mcg/actuation nasal spray SPRAY TWO SPRAYS IN THE AFFECTED NOSTRIL DAILY 19  Yes Noy Castillo MD  
aspirin delayed-release 81 mg tablet Take 81 mg by mouth daily. Yes Provider, Historical  
acetaminophen (PAIN AND FEVER) 500 mg tablet TAKE ONE TABLET BY MOUTH EVERY 6 HOURS AS NEEDED FOR PAIN 18  Yes Noy Castillo MD  
Calcium Carbonate-Vit D3-Min 600 mg calcium- 400 unit tab Take 1 Tab by mouth two (2) times a day. Yes Provider, Historical  
 
Allergies/Social/Family History: No Known Allergies Social History Tobacco Use  Smoking status: Current Every Day Smoker Packs/day: 1.00 Last attempt to quit: 2019 Years since quittin.2  Smokeless tobacco: Never Used Substance Use Topics  Alcohol use: Not Currently Alcohol/week: 11.7 standard drinks Types: 7 Cans of beer, 7 Shots of liquor per week Family History Family history unknown: Yes Review of Systems:  
 
Unable to provide - intubated and sedated Objective:  
Vital Signs: 
Visit Vitals /61 Pulse 61 Temp 98.2 °F (36.8 °C) Resp 18 Ht 6' (1.829 m) Wt 122.9 kg (270 lb 15.1 oz) SpO2 96% BMI 36.75 kg/m² O2 Flow Rate (L/min): 15 l/min(Mid flow at 9 liters also) O2 Device: Endotracheal tube Temp (24hrs), Av.8 °F (37.1 °C), Min:97.9 °F (36.6 °C), Max:99.6 °F (37.6 °C) Intake/Output: Intake/Output Summary (Last 24 hours) at 2/7/2021 1339 Last data filed at 2/7/2021 1200 Gross per 24 hour Intake 1854.27 ml Output 2025 ml Net -170.73 ml Physical Exam: 
  
 
General: Intubated and sedated EENT: PERRL 3mm/brisk. MMM Resp: Coarse throughout and decreased to bases CV: Regular rhythm, normal rate, no murmurs GI: Soft, moderately distended with hypoactive BS. Flexi-seal 
Extremities: Lymphedema with eschar noted B/L R>L. Wound vac to RLE. Vascular changes. Warm to touch. Unable to palpate pulses but dopplerable Neurologic: RASS -3. Skin: Warm and dry. Lines: Vabaduse 41 CVC, RIJ Rudolph, Bynum, Flexiseal, OGT 
 
 
LABS AND  DATA: Personally reviewed Recent Labs 02/07/21 
0335 02/06/21 
9144 WBC 6.4 5.6 HGB 7.0* 7.7* HCT 22.3* 24.3*  
 278 Recent Labs 02/07/21 
3106 02/06/21 
9225 02/05/21 
9579 * 135* 138  
K 3.0* 3.2* 3.3*  
CL 98 98 102 CO2 26 25 27 BUN 42* 67* 56* CREA 2.65* 3.41* 2.58* * 209* 175* CA 8.6 9.2 8.6 MG 2.1  --  2.2 PHOS 2.7 4.6 4.1 Recent Labs 02/07/21 
0335 02/06/21 
8313 ALB 2.6* 2.8* No results for input(s): INR, PTP, APTT, INREXT, INREXT in the last 72 hours. No results for input(s): PHI, PCO2I, PO2I, FIO2I in the last 72 hours. No results for input(s): CPK, CKMB, TROIQ, BNPP in the last 72 hours. Ventilator Settings: 
AC: 60% R 18 PEEP 10  MEDS: Reviewed 2/6 CXR: Reviewed 2/6 (X-Ray from 2/5) IMPRESSION Multilobar pneumonia/ARDS. Assessment:  
 
Acute Hypoxic Respiratory Failure Secondary to COVID Pneumonia:   
- Intubated and extubated on several occasions - Family has consented to trach and plan for trach on Monday, 2/8 - ARDS volume ventilation. Vt ~ 600ml 
- Unable to do much weaning on vent due to oxygenation. 
- May need to consider NO if no improvement Sepsis: Osteomyelitis is only known bacterial infectious etiology. Likely cytokine reaction in setting of COVID-19. Cultures NGTD. Completed steroids. Completed course of linezolid (empiric). On prolonged zoysn for osteomyelitis. - Continue zosyn - end date 2/12 KATHI on CKD3a: Renal following; renal failure progressively worsening 
- IHD per renal; received on 2/6 
- HD again on Monday Anemia, acute on chronic: PRBC on 2/5 
- Hgb stable 7.7 -> down to 7.0 today but relatively the same day over day so hold on transfusion HFpEF with Exacerbation: EF 45-50%, mildly dilated RV w mildly reduced systolic function. Diuresis difficult in setting of KATHI on CKD3. 
- IHD for volume removal as we are able Delirium: When sedation is lightened, experiences hyperactive delirium, which interferes with weaning 
- Continue propofol & wean Versed R foot wound: Complicated by osteomyelitis. - Wound vac - Zosyn as above - ID following Deconditioning: Unable to work w PT/OT at this time Prostate Cancer: Holding casodex as this cannot be crushed an pt has OGT Multidisciplinary Rounds Completed:  Rounded with RN 
 
ABCDEF Bundle/Checklist 
Pain Medications: Fentanyl Target RASS: -3 Sedation Medications: Propofol, Versed CAM-ICU:  Positive Mobility: Poor PT/OT:Unable to participate Restraints: Soft wrist restraints. Reassess 2/7 @ 0930 Discussed Plan of Care (goals of care): Yes with RN. Addressed Code Status: Full Code CARDIOVASCULAR Cardiac Gtts: None. Levophed intermittently SBP Goal of: > 90 mmHg MAP Goal of: >65 Transfusion Trigger (Hgb): <7 g/dL RESPIRATORY Vent Goals:  
Optimize PEEP/Ventilation/Oxygenation DVT Prophylaxis (if no, list reason): Heparin SQ  
SPO2 Goal: > 92% GI/ Bynum Catheter Present: Yes GI Prophylaxis: Yes  (hx GIB) Nutrition: Yes TF Bowel Movement: Yes Bowel Regimen: Docusate (Colace) Insulin: Y - Lantus ANTIBIOTICS Antibiotics: 
Zosyn T/L/D 
 Tubes: Orogastric Tube Lines: [employer] Drains: Bynum Catheter SPECIAL [employer]QU[employer]MENT Vent DISPOSITION Stay in ICU CRITICAL CARE CONSULTANT NOTE I had a face to face encounter with the patient, reviewed and interpreted patient data including clinical events, labs, images, vital signs, I/O's, and examined patient. I have discussed the case and the plan and management of the patient's care with the consulting services, the bedside nurses and the respiratory therapist.   
 
NOTE OF PERSONAL INVOLVEMENT IN CARE This patient has a high probability of imminent, clinically significant deterioration, which requires the highest level of preparedness to intervene urgently. I participated in the decision-making and personally managed or directed the management of the following life and organ supporting interventions that required my frequent assessment to treat or prevent imminent deterioration. I personally spent 35 minutes of critical care time. This is time spent at this critically ill patient's bedside actively involved in patient care as well as the coordination of care and discussions with the patient's family. This does not include any procedural time which has been billed separately. Chioma Magana DNP, [facility] Critical Care 
2/7/2021

## 2021-02-08 NOTE — PROGRESS NOTES
PRETTY 
Patient admitted with sepsis r/t osteomyelitis of right foot. COVID positive RUR 48% Disposition TBD Patient remains in the ICU vented on Levophed. Wound Vac to right foot. For HD today. Family wishing to proceed with trach placement.   
Thuy Damon RN,Care Management

## 2021-02-08 NOTE — PROGRESS NOTES
0730 Verbal shift change report given to Andreia Jones RN (oncoming nurse) by Cathy Dover RN (offgoing nurse). Report included the following information SBAR, Kardex, ED Summary, Procedure Summary, Intake/Output, MAR and Recent Results. 0800 Hemoglobin 6.8. 1 unit RBC ordered. 0930 Patient did not tolerate suctioning well. Congested, with weak cough, desat (70's-80's) with low volumes. Resp in room. Respiratory Suctioned and received mucous back with saline lavage multiple times. Patient O2 sat stabilized and vent pressures improved. 1 unit RBC started. 1130 Tube feedings restarted. Plan for trach tomorrow not today. 1330 HD nurse starting dialysis. 1525 Hgb 7.8 post 1 unit RBC. NP made aware. No new orders at this time. 1930 Verbal shift change report given to Serene Espino RN (oncoming nurse) by Gloria Dobbins (offgoing nurse). Report included the following information SBAR, Kardex, Procedure Summary, Intake/Output, MAR and Recent Results.

## 2021-02-08 NOTE — PROGRESS NOTES
Nephrology Progress Note Jayde Walton III Date of Admission : 12/28/2020 CC: Follow up for KATHI on CKD Assessment and Plan KATHI on CKD: 
- worsening renal function from ATN  
- Rudolph and dialysis initiated on 2/4 
- HD today 
- pressors as needed Resp failure: 
COVID-19 PNA: positive on 1/11 
- s/p decadron and remdesivir 
- on the vent CKD 4 
-  Presumed 2/2 DM and HTN 
- nephrotic range proteinuria 
- baseline Cr 1.7 mg/dl  
- followed by Dr. Amanda Resendiz at [facility] Hx of prostate cancer s/p XRT 
  
Anemia in CKD + blood loss: 
- cont JAZ 
- EGD 1/5/2021: gastric fundal lesion  
- transfuse PRN  
  
Type II DM: 
- on insulin 
  
HTN :  
- BP stable now 
  
R foot osteo: 
- on abx 
- s/p debridement on 12/30 Group B strep bacteremia Interval History: Not examined in the room On the vent. Poor UOP. On levo. For HD later today. No new issues per RN 
 
Current Medications: all current  Medications have been eviewed in [facility] Review of Systems: A comprehensive review of systems was negative except for that written in the HPI. Objective: 
Vitals:   
Vitals:  
 02/08/21 0400 02/08/21 0500 02/08/21 0600 02/08/21 0700 BP: (!) 113/56 (!) 110/57 (!) 110/57 (!) 110/57 Pulse: 77 74 72 72 Resp: 21 (!) 32 21 18 Temp: 99.7 °F (37.6 °C) TempSrc:      
SpO2: 92% 93% 92% 93% Weight:      
Height:      
 
Intake and Output: 
No intake/output data recorded. 02/06 1901 - 02/08 0700 In: 3139.7 [I.V.:1489.7] Out: 1015 [Urine:165; Drains:850] Physical Examination: not examined in the room GEN: ON VENT 
NECK- ET tube + RESP: no distress ABD :chronic distension, BS + 
EXT : edema + NEURO:sedated on the vent Exam deferred due to COVID-19 infection in order to preserve PPE AND minimize risk of  
transmission to dialysis and renal transplant patient per ASN and RPA guidelines: 
 
 
 
 
[]    High complexity decision making was performed []    Patient is at high-risk of decompensation with multiple organ involvement Lab Data Personally Reviewed: I have reviewed all the pertinent labs, microbiology data and radiology studies during assessment. Recent Labs 02/08/21 
0505 02/08/21 
0503 02/07/21 
0335 02/06/21 
0209 NA  --  134* 135* 135* K  --  3.6 3.0* 3.2*  
CL  --  98 98 98 CO2  --  25 26 25 GLU  --  210* 195* 209* BUN  --  52* 42* 67* CREA  --  3.41* 2.65* 3.41* CA  --  9.0 8.6 9.2 MG 2.2  --  2.1  --   
PHOS  --  3.6 2.7 4.6 ALB  --  2.3* 2.6* 2.8* Recent Labs 02/08/21 0505 02/07/21 0335 02/06/21 
6300 WBC 6.3 6.4 5.6 HGB 6.8* 7.0* 7.7* HCT 21.7* 22.3* 24.3*  
 253 278 No results found for: [facility] Lab Results Component Value Date/Time Culture result: NO GROWTH 5 DAYS 01/23/2021 03:37 PM  
 Culture result: LIGHT YEAST, (APPARENT CANDIDA ALBICANS) (A) 01/23/2021 02:45 PM  
 Culture result: NO NORMAL RESPIRATORY IMCHELLE ISOLATED 01/23/2021 02:45 PM  
 
Recent Results (from the past 24 hour(s)) GLUCOSE, POC Collection Time: 02/07/21 12:04 PM  
Result Value Ref Range Glucose (POC) 249 (H) 65 - 100 mg/dL Performed by Chao Nopal GLUCOSE, POC Collection Time: 02/07/21  5:21 PM  
Result Value Ref Range Glucose (POC) 252 (H) 65 - 100 mg/dL Performed by Chaoboby Murphy GLUCOSE, POC Collection Time: 02/07/21 11:25 PM  
Result Value Ref Range Glucose (POC) 208 (H) 65 - 100 mg/dL Performed by Ryan Sorensen   
BLOOD GAS, ARTERIAL Collection Time: 02/08/21  3:37 AM  
Result Value Ref Range pH 7.31 (L) 7.35 - 7.45    
 PCO2 49 (H) 35 - 45 mmHg PO2 64 (L) 80 - 100 mmHg O2 SAT 90 (L) 92 - 97 % BICARBONATE 24 22 - 26 mmol/L  
 BASE DEFICIT 2.8 mmol/L  
 O2 METHOD VENT    
 FIO2 70 % MODE ASSIST CONTROL Tidal volume 550.0 SET RATE 18 PEEP/CPAP 10.0 Sample source ARTERIAL    
 SITE LEFT RADIAL  JUVENTINO'S TEST YES    
GLUCOSE, POC  
 Collection Time: 02/08/21  4:56 AM  
Result Value Ref Range Glucose (POC) 200 (H) 65 - 100 mg/dL Performed by Janet Thomas RENAL FUNCTION PANEL Collection Time: 02/08/21  5:03 AM  
Result Value Ref Range Sodium 134 (L) 136 - 145 mmol/L Potassium 3.6 3.5 - 5.1 mmol/L Chloride 98 97 - 108 mmol/L  
 CO2 25 21 - 32 mmol/L Anion gap 11 5 - 15 mmol/L Glucose 210 (H) 65 - 100 mg/dL BUN 52 (H) 6 - 20 MG/DL Creatinine 3.41 (H) 0.70 - 1.30 MG/DL  
 BUN/Creatinine ratio 15 12 - 20 GFR est AA 22 (L) >60 ml/min/1.73m2 GFR est non-AA 18 (L) >60 ml/min/1.73m2 Calcium 9.0 8.5 - 10.1 MG/DL Phosphorus 3.6 2.6 - 4.7 MG/DL Albumin 2.3 (L) 3.5 - 5.0 g/dL MAGNESIUM Collection Time: 02/08/21  5:05 AM  
Result Value Ref Range Magnesium 2.2 1.6 - 2.4 mg/dL CBC WITH AUTOMATED DIFF Collection Time: 02/08/21  5:05 AM  
Result Value Ref Range WBC 6.3 4.1 - 11.1 K/uL  
 RBC 2.37 (L) 4.10 - 5.70 M/uL HGB 6.8 (L) 12.1 - 17.0 g/dL HCT 21.7 (L) 36.6 - 50.3 % MCV 91.6 80.0 - 99.0 FL  
 MCH 28.7 26.0 - 34.0 PG  
 MCHC 31.3 30.0 - 36.5 g/dL  
 RDW 19.9 (H) 11.5 - 14.5 % PLATELET 273 958 - 908 K/uL MPV 9.2 8.9 - 12.9 FL  
 NRBC 0.5 (H) 0  WBC ABSOLUTE NRBC 0.03 (H) 0.00 - 0.01 K/uL NEUTROPHILS 80 (H) 32 - 75 % LYMPHOCYTES 6 (L) 12 - 49 % MONOCYTES 7 5 - 13 % EOSINOPHILS 5 0 - 7 % BASOPHILS 1 0 - 1 % IMMATURE GRANULOCYTES 1 (H) 0.0 - 0.5 % ABS. NEUTROPHILS 5.0 1.8 - 8.0 K/UL  
 ABS. LYMPHOCYTES 0.4 (L) 0.8 - 3.5 K/UL  
 ABS. MONOCYTES 0.4 0.0 - 1.0 K/UL  
 ABS. EOSINOPHILS 0.3 0.0 - 0.4 K/UL  
 ABS. BASOPHILS 0.1 0.0 - 0.1 K/UL  
 ABS. IMM. GRANS. 0.1 (H) 0.00 - 0.04 K/UL  
 DF SMEAR SCANNED    
 RBC COMMENTS POLYCHROMASIA 1+ 
    
 RBC COMMENTS BASOPHILIC STIPPLING 
PRESENT 
    
 RBC COMMENTS ANISOCYTOSIS 
1+ Analisa Anna MD 
[address] [place] [address]  
[id number] [place] [address], [address] [facility] Phone - [phone] Fax - [phone] 
[email or web address]

## 2021-02-08 NOTE — PROGRESS NOTES
[facility] 
 
ICU TEAM Progress Note Name: Cristin Andrews III  
: 1950 MRN: [id number] Date: 2021 Subjective:  
Progress Note: 2021 Mr. Leela Michaels is a 71 yo male w a PMH of prostate cancer s/p radiation, tobacco use disorder (1 ppd), CKD3a, gastric angioectasias,  IDDM, heart failure, HTN who was admitted with R foot osteomyelitis on . He was transferred to the ICU after experiencing a cardiac arrest in the [facility]. Please see below for a brief synopsis of his hospital course, by problem. 
  
Acute Hypoxic Respiratory failure secondary to COVID pneumonia: He also demonstrated dyspnea on initial admission, but was negative for COVID. Initially treated with steroids. On , he developed increased oxygen needs. He was COVID positive. He was started on and completed treatment with steroids (completed ), remdesivir (completed ), zinc, and vitamin C. He initially required BiPAP on , and was weaned to 4-5L/min. CT chest showed bilateral pleural effusions with ground glass. Diuresis was attempted. He was continued on zosyn. He required transfer to [facility] on 1/15 due to continued desaturations -> 70s on NRB. CXR with increased bilateral infiltrates. Started linezolid on . Required intubation on . See below under cardiac arrest. He was extubated on , but required reintubation the same day. He completed a course of empiric 14d zyvox (ended ) and repeated empiric stress dose steroids (ended ). He remains on prolonged zosyn for treatment of osteomyelitis. Respiratory cultures have remained NGTD. He continues to fail SBTs and have intermittent increase in oxygen requirements Cardiac arrest, s/p ROSC: Not cooled as he regained mental function s/p ROSC. Code time: reported to be 12 minutes The patient had been taken off BiPAP for lunch. He was on intermittent BiPAP and HiFlow, demonstrating stability to come off to eat. He ate his lunch and was later found SOB unresponsive and pulseless. He was thereafter transferred to the ICU. He regained mentation, was able to respond to commands, shake head/yes, no, but ultimately did require sedation for compliance with mechanical ventilation. Likely hypoxic arrest. 
 
Septic shock: Osteomyelitis has been only identified source of infection. Please see above under respiratory failure for full courses of abx. Has been on/off pressors since ICU admission. Admitted with a chronic wound on his R foot. He was started on broad spectrum antibiotics with vanc, zosyn, and flagyl and underwent wound debridement on 12/30. BLC positive for S agalactiae on 12/28. Foot cultures + for Protues mirabilis, group B strep, and Enterococcus. Antibiotics were then narrowed to zosyn. Wound vac placed on foot 1/5. Zosyn is planned for 6 weeks. Acute on chronic CKD3a: Baseline Cr 1.7. Renal has followed throughout his course. Renal failure progressively worsening. He started IHD on 2/4. IDDM2: Lantus and lispro Anemia, acute on chronic: Course c/b anemia w heme + stool. EGD on 1/4 showing superficial ulcers in the distal bulb and second portion of the duodenum measuring 2-4mm.   
  
COVID negative 12/28, 12/29, but positive 1/11 Overnight: 
Low dose Levophed yesterday evening with dialysis, usually able to wean off once completed. Vent settings changed to PC due to hypoxia and high peak pressures Plan for trach tomorrow. NPO at midnight (orders placed) Active Problem List:  
 
Problem List  Date Reviewed: 8/20/2020 Codes Class  ARDS (adult respiratory distress syndrome) ([facility] ICD-10-CM: H13 
ICD-9-CM: 518.52   
   
 * (Principal) Osteomyelitis ([facility] ICD-10-CM: M86.9 ICD-9-CM: 730.20 Diabetic ulcer of right midfoot associated with type 2 diabetes mellitus, with fat layer exposed ([facility] ICD-10-CM: E11.621, L87.066 ICD-9-CM: 250.80, 707.14 PAD (peripheral artery disease) (HCC) ICD-10-CM: I73.9 ICD-9-CM: 443.9 Dependence on nicotine from cigarettes ICD-10-CM: F17.210 ICD-9-CM: 305.1 KATHI (acute kidney injury) ([facility] ICD-10-CM: N17.9 ICD-9-CM: 205. 9 Acute on chronic renal failure (HCC) ICD-10-CM: N17.9, N18.9 ICD-9-CM: 584.9, 585.9 Severe obesity (BMI 35.0-39. 9) with comorbidity ([facility] ICD-10-CM: E66.01 
ICD-9-CM: 278.01 Type 2 diabetes with nephropathy (HCC) ICD-10-CM: E11.21 
ICD-9-CM: 250.40, 583.81 S/P hip replacement, right ICD-10-CM: R06.223 ICD-9-CM: V43.64 Stage 3 chronic kidney disease ICD-10-CM: N18.30 ICD-9-CM: 266. 3 Prostate [facility]) ICD-10-CM: D14 ICD-9-CM: 580 Diabetic polyneuropathy ([facility] ICD-10-CM: E11.42 
ICD-9-CM: 250.60, 357.2 Rotator Cuff Tendonitis ICD-10-CM: M71.9, M67.919 ICD-9-CM: 726.10 Diabetes mellitus type 2, controlled ([facility] ICD-10-CM: E11.9 ICD-9-CM: 250.00 Degenerative disc disease ICD-10-CM: [id number] ICD-9-CM: 722.6 Osteoarthrosis involving lower leg ICD-10-CM: M17.10 ICD-9-CM: 715.96 Depression/ Anxiety ICD-10-CM: F34.1 ICD-9-CM: 300.4 HTN (hypertension) ICD-10-CM: I10 
ICD-9-CM: 401.9 Chronic hip pain ICD-10-CM: M25.559, G89.29 ICD-9-CM: 719.45, 338.29 Hypertriglyceridemia ICD-10-CM: E78.1 ICD-9-CM: 272.1 Mild Aortic Insufficiency ICD-10-CM: I35.1 ICD-9-CM: 424.1 Mild Concentric LVH (left ventricular hypertrophy) ICD-10-CM: I51.7 ICD-9-CM: 429.3 Past Medical History: has a past medical history of Arthritis, Degenerative,  Knee (4/4/2011), Carcinoma, Prostate (4/4/2011), Degenerative disc disease (4/4/2011), Depression/ Anxiety (4/4/2011), Diabetes Mellitrus ( non-insulin dependent ) Type 2 (4/4/2011), Heart failure ([facility] HEMATOCHEZIA (4/4/2011), Hypertrension (4/4/2011), Hypertriglyceridemia (4/4/2011), Ill-defined condition, Insomnia (4/4/2011), Laryngitis (4/4/2011), Mild Aortic insufficiency (4/4/2011), Mild Concentric LVH (left ventricular hypertrophy) (4/4/2011), Neuropathy (4/4/2011), Osteoarthritis (4/4/2011), and Rotator Cuff Tendonitis (4/4/2011). Past Surgical History:  
 
 has a past surgical history that includes hx urological; hx other surgical; pr cardiac surg procedure unlist; colonoscopy (N/A, 4/28/2017); hx orthopaedic; hx orthopaedic; and ir insert tunl cvc w/o port over 5 yr (1/6/2021). Home Medications:  
 
Prior to Admission medications Medication Sig Start Date End Date Taking? Authorizing Provider  
pregabalin (LYRICA) 150 mg capsule TAKE ONE CAPSULE BY MOUTH TWICE A DAY 12/7/20  Yes Sarina Carmona MD  
bicalutamide (CASODEX) 50 mg tablet TAKE ONE TABLET BY MOUTH DAILY 11/30/20  Yes Sarina Carmona MD  
torsemide (DEMADEX) 20 mg tablet TAKE FOUR TABLETS BY MOUTH TWICE A DAY 11/23/20  Yes Sarina Carmona MD  
pantoprazole (PROTONIX) 40 mg tablet TAKE ONE TABLET BY MOUTH TWICE DAILY ONCE IN THE MORNING AND ONCE IN THE EVENING 11/18/20  Yes Sarina Carmona MD  
hydrOXYzine HCL (ATARAX) 50 mg tablet TAKE ONE TABLET BY MOUTH THREE TIMES A DAY AS NEEDED FOR ITCHING FOR UP TO 10 DAYS 10/22/20  Yes Sarina Carmona MD  
amLODIPine (NORVASC) 10 mg tablet TAKE ONE TABLET BY MOUTH DAILY 10/2/20  Yes Sarina Carmona MD  
hydrALAZINE (APRESOLINE) 100 mg tablet Take 1 Tab by mouth three (3) times daily.  9/16/20  Yes Sarina Carmona MD  
 ascorbic acid, vitamin C, (Vitamin C) 500 mg tablet Take  by mouth. Yes Provider, Historical  
enzalutamide (XTANDI) 40 mg capsule Take 160 mg by mouth daily. Yes Provider, Historical  
Thera-Tabs M 27 mg iron-400 mcg tab TAKE ONE TABLET BY MOUTH DAILY 7/17/20  Yes Cornell Caballero MD  
tamsulosin [facility]) 0.4 mg capsule TAKE ONE CAPSULE BY MOUTH DAILY 7/16/20  Yes Cornell Caballero MD  
Insulin Syringe-Needle U-100 (BD Insulin Syringe Ultra-Fine) 1 mL 31 gauge x 5/16 syrg USE TO INJECT INSULIN FIVE TIMES A DAY 6/16/20  Yes Cornell Caballero MD  
ferrous sulfate 325 mg (65 mg iron) tablet TAKE ONE TABLET BY MOUTH DAILY BEFORE BREAKFAST 6/16/20  Yes Cornell Caballero MD  
gabapentin (NEURONTIN) 300 mg capsule Take 1 Cap by mouth three (3) times daily. Max Daily Amount: 900 mg. 5/25/20  Yes Cornell Caballero MD  
triamcinolone acetonide (KENALOG) 0.1 % ointment APPLY A THIN LAYER TO AFFECTED AREA(S) TWO TIMES A DAY **DO NOT EXCEED 2.66 GRAMS PER DAY** 5/19/20  Yes Cornell Caballero MD  
diclofenac (VOLTAREN) 1 % gel Apply  to affected area four (4) times daily. 4/16/20  Yes Cornell Caballero MD  
ammonium lactate (AMLACTIN) 12 % topical cream Apply  to affected area two (2) times a day. rub in to affected area well 3/10/20  Yes Cornell Caballero MD  
ammonium lactate (LAC-HYDRIN FIVE) 5 % lotion Apply 1 Applicator to affected area daily. Apply daily bilateral lower legs 3/5/20  Yes Cornell Caballero MD  
insulin glargine (LANTUS U-100 INSULIN) 100 unit/mL injection 72 Units by SubCUTAneous route daily. 3/5/20  Yes Cornell Caballero MD  
insulin regular (NOVOLIN R, HUMULIN R) 100 unit/mL injection 24 units sc tid 3/5/20  Yes Cornell Caballero MD  
ketoconazole (NIZORAL) 2 % topical cream Apply  to affected area two (2) times a day.  2/4/20  Yes Cornell Caballero MD  
ketoconazole (NIZORAL) 2 % shampoo Sig:shampoo once a week 2/4/20  Yes Cornell Caballero MD  
 bisacodyl (DULCOLAX, BISACODYL,) 10 mg suppository Insert 10 mg into rectum daily. Yes Provider, Historical  
insulin aspart U-100 (NOVOLOG FLEXPEN U-100 INSULIN) 100 unit/mL (3 mL) inpn by SubCUTAneous route. Yes Provider, Historical  
Insulin Needles, Disposable, (NOVOFINE 32) 32 gauge x 1/4\" ndle Sig:use 4 times a day as needed 19  Yes MD LINDA Voss R REGULAR U-100 INSULN 100 unit/mL injection INJECT 14 UNITS UNDER THE SKIN THREE TIMES A DAY WITH MEALS AS NEEDED 19  Yes Mary Anderson MD  
fluticasone propionate [facility]) 50 mcg/actuation nasal spray SPRAY TWO SPRAYS IN THE AFFECTED NOSTRIL DAILY 19  Yes Mary Anderson MD  
aspirin delayed-release 81 mg tablet Take 81 mg by mouth daily. Yes Provider, Historical  
acetaminophen (PAIN AND FEVER) 500 mg tablet TAKE ONE TABLET BY MOUTH EVERY 6 HOURS AS NEEDED FOR PAIN 18  Yes Mary Anderson MD  
Calcium Carbonate-Vit D3-Min 600 mg calcium- 400 unit tab Take 1 Tab by mouth two (2) times a day. Yes Provider, Historical  
 
Allergies/Social/Family History: No Known Allergies Social History Tobacco Use  Smoking status: Current Every Day Smoker Packs/day: 1.00 Last attempt to quit: 2019 Years since quittin.2  Smokeless tobacco: Never Used Substance Use Topics  Alcohol use: Not Currently Alcohol/week: 11.7 standard drinks Types: 7 Cans of beer, 7 Shots of liquor per week Family History Family history unknown: Yes Review of Systems:  
 
Unable to provide - intubated and sedated Objective:  
Vital Signs: 
Visit Vitals /60 Pulse 63 Temp 97.8 °F (36.6 °C) Resp 18 Ht 6' (1.829 m) Wt 122.9 kg (270 lb 15.1 oz) SpO2 94% BMI 36.75 kg/m² O2 Flow Rate (L/min): 15 l/min(Mid flow at 9 liters also) O2 Device: Endotracheal tube, Ventilator Temp (24hrs), Av.5 °F (36.9 °C), Min:97.8 °F (36.6 °C), Max:99.7 °F (37.6 °C) Intake/Output:  
 
Intake/Output Summary (Last 24 hours) at 2/8/2021 1237 Last data filed at 2/8/2021 1216 Gross per 24 hour Intake 2405.48 ml Output 515 ml Net 1890.48 ml Physical Exam: 
  
 
General: Intubated and sedated EENT: PERRL 3mm/brisk. MMM Resp: Coarse throughout and decreased to bases CV: Regular rhythm, normal rate, no murmurs GI: Soft, moderately distended with hypoactive BS. Flexi-seal 
Extremities: Lymphedema with eschar noted B/L R>L. Wound vac to RLE. Vascular changes. Warm to touch. Unable to palpate pulses but dopplerable Neurologic: RASS -3. Skin: Warm and dry. Lines: [address] 41 CVC, RIJ Rudolph, Bynum, Flexiseal, OGT 
 
 
LABS AND  DATA: Personally reviewed Recent Labs 02/08/21 
0505 02/07/21 
0335 WBC 6.3 6.4 HGB 6.8* 7.0*  
HCT 21.7* 22.3*  
 253 Recent Labs 02/08/21 
0505 02/08/21 
0503 02/07/21 
0335 NA  --  134* 135* K  --  3.6 3.0*  
CL  --  98 98 CO2  --  25 26 BUN  --  52* 42* CREA  --  3.41* 2.65* GLU  --  210* 195* CA  --  9.0 8.6 MG 2.2  --  2.1 PHOS  --  3.6 2.7 Recent Labs 02/08/21 
0503 02/07/21 
0335 ALB 2.3* 2.6* No results for input(s): INR, PTP, APTT, INREXT, INREXT in the last 72 hours. No results for input(s): PHI, PCO2I, PO2I, FIO2I in the last 72 hours. No results for input(s): CPK, CKMB, TROIQ, BNPP in the last 72 hours. Ventilator Settings: 
AC: 60% R 18 PEEP 10  MEDS: Reviewed 2/6 CXR: Reviewed 2/6 (X-Ray from 2/5) IMPRESSION Multilobar pneumonia/ARDS. Assessment:  
 
Acute Hypoxic Respiratory Failure Secondary to COVID Pneumonia:   
- Intubated and extubated on several occasions - Family has consented to trach and plan for trach on Tuesday, 2/9 
- ARDS volume ventilation. Vt ~ 600ml 
- Unable to do much weaning on vent due to oxygenation. 
- May need to consider NO if no improvement Sepsis: Osteomyelitis is only known bacterial infectious etiology. Likely cytokine reaction in setting of COVID-19. Cultures NGTD. Completed steroids. Completed course of linezolid (empiric). On prolonged zoysn for osteomyelitis. - Continue zosyn - end date 2/12 KATHI on CKD3a: Renal following; renal failure progressively worsening 
- IHD per renal; received on 2/6 
- HD on Monday, Wed. Fri Anemia, acute on chronic: PRBC on 2/5 
- Hgb down to 6.8 today, transfuse 1 PRBCs HFpEF with Exacerbation: EF 45-50%, mildly dilated RV w mildly reduced systolic function. Diuresis difficult in setting of KATHI on CKD3. 
- IHD for volume removal as we are able Delirium: When sedation is lightened, experiences hyperactive delirium, which interferes with weaning 
- Continue propofol & wean Versed R foot wound: Complicated by osteomyelitis. - Wound vac - Zosyn as above - ID following Deconditioning: Unable to work w PT/OT at this time Prostate Cancer: Holding casodex as this cannot be crushed an pt has OGT Multidisciplinary Rounds Completed:  Rounded with RN 
 
ABCDEF Bundle/Checklist 
Pain Medications: Fentanyl Target RASS: -3 Sedation Medications: Propofol, Versed CAM-ICU:  Positive Mobility: Poor PT/OT:Unable to participate Restraints: Soft wrist restraints. Discussed Plan of Care (goals of care): Yes with RN. Addressed Code Status: Full Code CARDIOVASCULAR Cardiac Gtts: None. Levophed intermittently SBP Goal of: > 90 mmHg MAP Goal of: >65 Transfusion Trigger (Hgb): <7 g/dL RESPIRATORY Vent Goals:  
Optimize PEEP/Ventilation/Oxygenation DVT Prophylaxis (if no, list reason): Heparin SQ  
SPO2 Goal: > 92% GI/ Bynum Catheter Present: Yes GI Prophylaxis: Yes  (hx GIB) Nutrition: Yes TF Bowel Movement: Yes Bowel Regimen: Docusate (Colace) Insulin: Y - Lantus ANTIBIOTICS Antibiotics: 
Zosyn T/L/D Tubes: Orogastric Tube Lines: [employer] Drains: Bynum Catheter SPECIAL E[employer]MENT Vent DISPOSITION Stay in ICU CRITICAL CARE CONSULTANT NOTE I had a face to face encounter with the patient, reviewed and interpreted patient data including clinical events, labs, images, vital signs, I/O's, and examined patient. I have discussed the case and the plan and management of the patient's care with the consulting services, the bedside nurses and the respiratory therapist.   
 
NOTE OF PERSONAL INVOLVEMENT IN CARE This patient has a high probability of imminent, clinically significant deterioration, which requires the highest level of preparedness to intervene urgently. I participated in the decision-making and personally managed or directed the management of the following life and organ supporting interventions that required my frequent assessment to treat or prevent imminent deterioration. I personally spent 35 minutes of critical care time. This is time spent at this critically ill patient's bedside actively involved in patient care as well as the coordination of care and discussions with the patient's family. This does not include any procedural time which has been billed separately. [place] [facility] Critical Care Medicine [facility]

## 2021-02-08 NOTE — PROGRESS NOTES
1930 Bedside and Verbal shift change report given to RITA Verde RN (oncoming nurse) by Giuseppe Núñez RN (offgoing nurse). Report included the following information SBAR, Kardex, Intake/Output, MAR, Recent Results, Cardiac Rhythm NSR and Alarm Parameters . 0000 TF held for trach in AM 
 
0238 RN attempting to wean sedation. Pt now non-compliant w/ ventilator, stacking breaths. Sedation titrated appropriately. See MAR. 
 
0730 Bedside and Verbal shift change report given to Litzy Contreras RN (oncoming nurse) by Darcey Duane, RN (offgoing nurse). Report included the following information SBAR, Kardex, Intake/Output, MAR, Recent Results, Cardiac Rhythm NSR and Alarm Parameters .

## 2021-02-08 NOTE — PROGRESS NOTES
[facility] Dialysis Team [place]  [phone] Vitals   Pre   Post   Assessment   Pre   Post    
Temp  Temp: 98.7 °F (37.1 °C) (02/08/21 1437)  98.9 LOC  Sedated, Intubated, unable to verbalize, no command following Sedated, Intubated, unable to verbalize, no command following HR   65 74 Lungs   Coarse bilaterally Coarse bilaterally B/P  127/66 135/64 Cardiac   NSR, S1, S2 NSR, S1, S2  
Resp   18 18 Skin   R IJ Rudolph, bilateral lower extremity wounds R IJ Rudolph, bilateral lower extremity wounds Pain level  0 0 Edema  Generalized 2-3+ Generalized 2-3+ Orders: Duration:   Start:   6631 End:   1808 Total:   3.5 hours Dialyzer:   Dialyzer/Set Up Inspection: Lenny Mccullough (02/08/21 1437) K Bath:   Dialysate K (mEq/L): 3.5 (02/08/21 1437) Ca Bath:   Dialysate CA (mEq/L): 2.5 (02/08/21 1437) Na/Bicarb:   Dialysate NA (mEq/L): 140 (02/08/21 1437) Target Fluid Removal:   Goal/Amount of Fluid to Remove (mL): 1500 mL (02/08/21 1437) Access Type & Location:   RIJ CVC: Dressing soiled with old serosanguinous drainage, dry, intact. No s/s of infection. New sterile dressing applied today 2/8/21, large neck skin tear noted at crease of neck during dressing change, 4x4 applied, informed primary RN. Both lumens aspirate & flush well. Running well at . Labs Obtained/Reviewed Critical Results Called   Date when labs were drawn- 
Hgb-   
HGB Date Value Ref Range Status 02/08/2021 6.8 (L) 12.1 - 17.0 g/dL Final  
 
K-   
Potassium Date Value Ref Range Status 02/08/2021 3.6 3.5 - 5.1 mmol/L Final  
 
Ca-  
Calcium Date Value Ref Range Status 02/08/2021 9.0 8.5 - 10.1 MG/DL Final  
 
Bun-  
BUN Date Value Ref Range Status 02/08/2021 52 (H) 6 - 20 MG/DL Final  
 
Creat-  
Creatinine Date Value Ref Range Status 02/08/2021 3.41 (H) 0.70 - 1.30 MG/DL Final  
 
  
Medications/ Blood Products Given Name   Dose   Route and Time Heparin 1:1000  1700 Units venous, 1500 Units arterial R IJ CVC dwell at end of dialysis treatment Blood Volume Processed (BVP):    76.3 L Net Fluid Removed:  1500 mL Comments Time Out Done: Yes Primary Nurse Rpt Pre: Bcuk Lagos RN 
Primary Nurse Rpt Post: Buck Lagos RN 
Pt Education: N/A Care Plan: KATHI Tx Summary: SBAR received from Primary RN, dialysis treatment reviewed with primary RN. Pt sedated/intubated, no command following, physical assessment, machine preparation, and safety testing performed per policy. Consent signed & on file. 1437: Each catheter limb disinfected per p&p, caps removed, hubs disinfected per p&p. Each lumen aspirated for blood return and flushed with Normal Saline per policy. Labs drawn per request/ order. VSS. Dialysis Tx initiated. 1530: VSS, lines patent and intact, pt resting. 1630: VSS, lines patent and intact, pt resting. 1730: VSS, lines patent and intact, pt resting. 1808: Tx ended. VSS. Each dialysis catheter limb disinfected per p&p, all possible blood returned per p&p, and each dialysis hub disinfected per p&p. Each lumen flushed, post dialysis catheter Heparin dwell instilled per order, and caps applied. Bed locked and in the lowest position, call bell and belongings in reach. SBAR given to Primary, RN. Patient is stable at time of my departure. All Dialysis related medications have been reviewed. Admiting Diagnosis: Severe sepsis 2/2 osteomyelitis right foot Pt's previous clinic- N/A Consent signed - Informed Consent Verified: Yes (02/08/21 1437) Hospital Consent - Signed and on file Hepatitis Status- 2/4/21 HBsAG Negative, HBsAB 4 Susceptible Machine #- Machine Number: [id number] (02/08/21 [id number]) Telemetry status- NSR Pre-dialysis wt. -  N/A

## 2021-02-08 NOTE — CONSULTS
Palliative Medicine Goals clear for aggressive interventions. Plans in place for Trach. Will sign off, please call again if family becomes interested in discussion about care goals. (at this time they are clear for full interventions)

## 2021-02-09 NOTE — PROGRESS NOTES
0730 Verbal shift change report given to [address] (oncoming nurse) by Mark Otero RN (offgoing nurse). Report included the following information SBAR, Kardex, Procedure Summary, Intake/Output, MAR and Recent Results. 0800 Tube feeds held for trach placement today. 0900 Hgb 7.0 NP order 1 unit  RBC prior to trach placement. Need type and screen first. 
 
1238 Blood transfusion started. 18 MD informed that trach placement is canceled for the day. Restart tube feeds. 1730 Hgb post 1 unit 7.4 
 
1930 Verbal shift change report given to Lovely Denver, RN (oncoming nurse) by [address] (offgoing nurse). Report included the following information SBAR, Kardex, Procedure Summary, Intake/Output, MAR and Recent Results.

## 2021-02-09 NOTE — PROGRESS NOTES
[facility] 
 
ICU TEAM Progress Note 
 
Name: Jeanmarie Nelson III  
: 1950  
MRN: [id number]  
Date: 2021   
 
Subjective:  
Progress Note: 2021   
 
Mr. Jeanmarie Nelson III is a 69 yo male w a PMH of prostate cancer s/p radiation, tobacco use disorder (1 ppd), CKD3a, gastric angioectasias,  IDDM, heart failure, HTN who was admitted with R foot osteomyelitis on .  He was transferred to the ICU after experiencing a cardiac arrest in the IM.  Please see below for a brief synopsis of his hospital course, by problem. 
  
Acute Hypoxic Respiratory failure secondary to COVID pneumonia: He also demonstrated dyspnea on initial admission, but was negative for COVID. Initially treated with steroids.  On , he developed increased oxygen needs.  He was COVID positive.  He was started on and completed treatment with steroids (completed ), remdesivir (completed ), zinc, and vitamin C. He initially required BiPAP on , and was weaned to 4-5L/min. CT chest showed bilateral pleural effusions with ground glass.  Diuresis was attempted.  He was continued on zosyn.  He required transfer to [facility] on 1/15 due to continued desaturations -> 70s on NRB.  CXR with increased bilateral infiltrates. Started linezolid on .  Required intubation on .  See below under cardiac arrest. He was extubated on , but required reintubation the same day. He completed a course of empiric 14d zyvox (ended ) and repeated empiric stress dose steroids (ended ).  He remains on prolonged zosyn for treatment of osteomyelitis. Respiratory cultures have remained NGTD.  He continues to fail SBTs and have intermittent increase in oxygen requirements 
 
 Cardiac arrest, s/p ROSC: Not cooled as he regained mental function s/p ROSC. Code time: reported to be 12 minutes The patient had been taken off BiPAP for lunch. He was on intermittent BiPAP and HiFlow, demonstrating stability to come off to eat. He ate his lunch and was later found SOB unresponsive and pulseless. He was thereafter transferred to the ICU. He regained mentation, was able to respond to commands, shake head/yes, no, but ultimately did require sedation for compliance with mechanical ventilation. Likely hypoxic arrest. 
 
Septic shock: Osteomyelitis has been only identified source of infection. Please see above under respiratory failure for full courses of abx. Has been on/off pressors since ICU admission. Admitted with a chronic wound on his R foot. He was started on broad spectrum antibiotics with vanc, zosyn, and flagyl and underwent wound debridement on 12/30. BLC positive for S agalactiae on 12/28. Foot cultures + for Protues mirabilis, group B strep, and Enterococcus. Antibiotics were then narrowed to zosyn. Wound vac placed on foot 1/5. Zosyn is planned for 6 weeks. Acute on chronic CKD3a: Baseline Cr 1.7. Renal has followed throughout his course. Renal failure progressively worsening. He started IHD on 2/4. IDDM2: Lantus and lispro Anemia, acute on chronic: Course c/b anemia w heme + stool. EGD on 1/4 showing superficial ulcers in the distal bulb and second portion of the duodenum measuring 2-4mm.   
  
COVID negative 12/28, 12/29, but positive 1/11 Overnight: 
Low dose Levophed yesterday evening with dialysis, usually able to wean off once completed. Vent settings changed to PC due to hypoxia and high peak pressures Plan for trach tomorrow. NPO at midnight (orders placed) Active Problem List:  
 
Problem List  Date Reviewed: 8/20/2020 Codes Class  ARDS (adult respiratory distress syndrome) ([facility] ICD-10-CM: Y90 
ICD-9-CM: 518.52   
   
  * (Principal) Osteomyelitis (HCC) ICD-10-CM: M86.9 
ICD-9-CM: 730.20   
   
 Diabetic ulcer of right midfoot associated with type 2 diabetes mellitus, with fat layer exposed ([facility]) ICD-10-CM: E11.621, L97.412 
ICD-9-CM: 250.80, 707.14   
   
 PAD (peripheral artery disease) ([facility]) ICD-10-CM: I73.9 
ICD-9-CM: 443.9   
   
 Dependence on nicotine from cigarettes ICD-10-CM: F17.210 
ICD-9-CM: 305.1   
   
 KATHI (acute kidney injury) ([facility]) ICD-10-CM: N17.9 
ICD-9-CM: 584.9   
   
 Acute on chronic renal failure ([facility]) ICD-10-CM: N17.9, N18.9 
ICD-9-CM: 584.9, 585.9   
   
 Severe obesity (BMI 35.0-39.9) with comorbidity ([facility]) ICD-10-CM: E66.01 
ICD-9-CM: 278.01   
   
 Type 2 diabetes with nephropathy ([facility]) ICD-10-CM: E11.21 
ICD-9-CM: 250.40, 583.81   
   
 S/P hip replacement, right ICD-10-CM: Z96.641 
ICD-9-CM: V43.64   
   
 Stage 3 chronic kidney disease ICD-10-CM: N18.30 
ICD-9-CM: 585.3   
   
 Prostate CA ([facility]) ICD-10-CM: C61 
ICD-9-CM: 185   
   
 Diabetic polyneuropathy ([facility]) ICD-10-CM: E11.42 
ICD-9-CM: 250.60, 357.2   
   
 Rotator Cuff Tendonitis ICD-10-CM: M71.9, M67.919 
ICD-9-CM: 726.10   
   
 Diabetes mellitus type 2, controlled ([facility]) ICD-10-CM: E11.9 
ICD-9-CM: 250.00   
   
 Degenerative disc disease ICD-10-CM: IPQ9863 
ICD-9-CM: 722.6   
   
 Osteoarthrosis involving lower leg ICD-10-CM: M17.10 
ICD-9-CM: 715.96   
   
 Depression/ Anxiety ICD-10-CM: F34.1 
ICD-9-CM: 300.4   
   
 HTN (hypertension) ICD-10-CM: I10 
ICD-9-CM: 401.9   
   
 Chronic hip pain ICD-10-CM: M25.559, G89.29 
ICD-9-CM: 719.45, 338.29   
   
 Hypertriglyceridemia ICD-10-CM: E78.1 
ICD-9-CM: 272.1   
   
 Mild Aortic Insufficiency ICD-10-CM: I35.1 
ICD-9-CM: 424.1   
   
 Mild Concentric LVH (left ventricular hypertrophy) ICD-10-CM: I51.7 
ICD-9-CM: 429.3   
   
  
 
 
Past Medical History:  
 
 has a past medical history of Arthritis, Degenerative,  Knee (4/4/2011), Carcinoma, Prostate (4/4/2011), Degenerative disc disease (4/4/2011), Depression/ Anxiety (4/4/2011), Diabetes Mellitrus ( non-insulin dependent ) Type 2 (4/4/2011), Heart failure ([facility] HEMATOCHEZIA (4/4/2011), Hypertrension (4/4/2011), Hypertriglyceridemia (4/4/2011), Ill-defined condition, Insomnia (4/4/2011), Laryngitis (4/4/2011), Mild Aortic insufficiency (4/4/2011), Mild Concentric LVH (left ventricular hypertrophy) (4/4/2011), Neuropathy (4/4/2011), Osteoarthritis (4/4/2011), and Rotator Cuff Tendonitis (4/4/2011). Past Surgical History:  
 
 has a past surgical history that includes hx urological; hx other surgical; pr cardiac surg procedure unlist; colonoscopy (N/A, 4/28/2017); hx orthopaedic; hx orthopaedic; and ir insert tunl cvc w/o port over 5 yr (1/6/2021). Home Medications:  
 
Prior to Admission medications Medication Sig Start Date End Date Taking? Authorizing Provider  
pregabalin (LYRICA) 150 mg capsule TAKE ONE CAPSULE BY MOUTH TWICE A DAY 12/7/20  Yes Joel Epley., MD  
bicalutamide (CASODEX) 50 mg tablet TAKE ONE TABLET BY MOUTH DAILY 11/30/20  Yes Joel Epley., MD  
torsemide (DEMADEX) 20 mg tablet TAKE FOUR TABLETS BY MOUTH TWICE A DAY 11/23/20  Yes Joel Epley., MD  
pantoprazole (PROTONIX) 40 mg tablet TAKE ONE TABLET BY MOUTH TWICE DAILY ONCE IN THE MORNING AND ONCE IN THE EVENING 11/18/20  Yes Joel Epley., MD  
hydrOXYzine HCL (ATARAX) 50 mg tablet TAKE ONE TABLET BY MOUTH THREE TIMES A DAY AS NEEDED FOR ITCHING FOR UP TO 10 DAYS 10/22/20  Yes Joel Epley., MD  
amLODIPine (NORVASC) 10 mg tablet TAKE ONE TABLET BY MOUTH DAILY 10/2/20  Yes Joel Epley., MD  
hydrALAZINE (APRESOLINE) 100 mg tablet Take 1 Tab by mouth three (3) times daily.  9/16/20  Yes Joel Epley., MD  
 ascorbic acid, vitamin C, (Vitamin C) 500 mg tablet Take  by mouth. Yes Provider, Historical  
enzalutamide (XTANDI) 40 mg capsule Take 160 mg by mouth daily. Yes Provider, Historical  
Thera-Tabs M 27 mg iron-400 mcg tab TAKE ONE TABLET BY MOUTH DAILY 7/17/20  Yes Hetal Bruno MD  
tamsulosin [facility]) 0.4 mg capsule TAKE ONE CAPSULE BY MOUTH DAILY 7/16/20  Yes Hetal Bruno MD  
Insulin Syringe-Needle U-100 (BD Insulin Syringe Ultra-Fine) 1 mL 31 gauge x 5/16 syrg USE TO INJECT INSULIN FIVE TIMES A DAY 6/16/20  Yes Hetal Bruno MD  
ferrous sulfate 325 mg (65 mg iron) tablet TAKE ONE TABLET BY MOUTH DAILY BEFORE BREAKFAST 6/16/20  Yes Hetal Bruno MD  
gabapentin (NEURONTIN) 300 mg capsule Take 1 Cap by mouth three (3) times daily. Max Daily Amount: 900 mg. 5/25/20  Yes Hetal Bruno MD  
triamcinolone acetonide (KENALOG) 0.1 % ointment APPLY A THIN LAYER TO AFFECTED AREA(S) TWO TIMES A DAY **DO NOT EXCEED 2.66 GRAMS PER DAY** 5/19/20  Yes Hetal Bruno MD  
diclofenac (VOLTAREN) 1 % gel Apply  to affected area four (4) times daily. 4/16/20  Yes Hetal Bruno MD  
ammonium lactate (AMLACTIN) 12 % topical cream Apply  to affected area two (2) times a day. rub in to affected area well 3/10/20  Yes Hetal Bruno MD  
ammonium lactate (LAC-HYDRIN FIVE) 5 % lotion Apply 1 Applicator to affected area daily. Apply daily bilateral lower legs 3/5/20  Yes Hetal Bruno MD  
insulin glargine (LANTUS U-100 INSULIN) 100 unit/mL injection 72 Units by SubCUTAneous route daily. 3/5/20  Yes Hetal Bruno MD  
insulin regular (NOVOLIN R, HUMULIN R) 100 unit/mL injection 24 units sc tid 3/5/20  Yes Hetal Bruno MD  
ketoconazole (NIZORAL) 2 % topical cream Apply  to affected area two (2) times a day.  2/4/20  Yes Hetal Bruno MD  
ketoconazole (NIZORAL) 2 % shampoo Sig:shampoo once a week 2/4/20  Yes Hetal Bruno MD  
 bisacodyl (DULCOLAX, BISACODYL,) 10 mg suppository Insert 10 mg into rectum daily. Yes Provider, Historical  
insulin aspart U-100 (NOVOLOG FLEXPEN U-100 INSULIN) 100 unit/mL (3 mL) inpn by SubCUTAneous route. Yes Provider, Historical  
Insulin Needles, Disposable, (NOVOFINE 32) 32 gauge x 1/4\" ndle Sig:use 4 times a day as needed 19  Yes Stacie Goltz., MD NOVOLIN R REGULAR U-100 INSULN 100 unit/mL injection INJECT 14 UNITS UNDER THE SKIN THREE TIMES A DAY WITH MEALS AS NEEDED 19  Yes Stacie Goltz., MD  
fluticasone propionate [facility]) 50 mcg/actuation nasal spray SPRAY TWO SPRAYS IN THE AFFECTED NOSTRIL DAILY 19  Yes Stacie Goltz., MD  
aspirin delayed-release 81 mg tablet Take 81 mg by mouth daily. Yes Provider, Historical  
acetaminophen (PAIN AND FEVER) 500 mg tablet TAKE ONE TABLET BY MOUTH EVERY 6 HOURS AS NEEDED FOR PAIN 18  Yes Stacie Goltz., MD  
Calcium Carbonate-Vit D3-Min 600 mg calcium- 400 unit tab Take 1 Tab by mouth two (2) times a day. Yes Provider, Historical  
 
Allergies/Social/Family History: No Known Allergies Social History Tobacco Use  Smoking status: Current Every Day Smoker Packs/day: 1.00 Last attempt to quit: 2019 Years since quittin.2  Smokeless tobacco: Never Used Substance Use Topics  Alcohol use: Not Currently Alcohol/week: 11.7 standard drinks Types: 7 Cans of beer, 7 Shots of liquor per week Family History Family history unknown: Yes Review of Systems:  
 
Unable to provide - intubated and sedated Objective:  
Vital Signs: 
Visit Vitals /63 Pulse 67 Temp 98.4 °F (36.9 °C) Resp 18 Ht 6' (1.829 m) Wt 123.1 kg (271 lb 6.2 oz) SpO2 92% BMI 36.81 kg/m² O2 Flow Rate (L/min): 15 l/min(Mid flow at 9 liters also) O2 Device: Endotracheal tube Temp (24hrs), Av.6 °F (37 °C), Min:97.8 °F (36.6 °C), Max:99.8 °F (37.7 °C) Intake/Output: Intake/Output Summary (Last 24 hours) at 2/9/2021 1026 Last data filed at 2/9/2021 1000 Gross per 24 hour Intake 3114.86 ml Output 1805 ml Net 1309.86 ml Physical Exam: 
  
 
General: Intubated and sedated EENT: PERRL 3mm/brisk. MMM Resp: Coarse throughout and decreased to bases CV: Regular rhythm, normal rate, no murmurs GI: Soft, moderately distended with hypoactive BS. Flexi-seal 
Extremities: Lymphedema with eschar noted B/L R>L. Wound vac to RLE. Vascular changes. Warm to touch. Unable to palpate pulses but dopplerable Neurologic: RASS -3. Skin: Warm and dry. Lines: [address] CVC, RIJ Rudolph, Bynum, Flexiseal, OGT 
 
 
LABS AND  DATA: Personally reviewed Recent Labs 02/09/21 
3324 02/08/21 [address] WBC 8.9 5.5 HGB 7.1* 7.8* HCT 22.3* 24.2*  
 215 Recent Labs 02/09/21 
8538 02/09/21 
7338 02/08/21 
0505 02/08/21 
0503 *  --   --  134* K 3.6  --   --  3.6 CL 99  --   --  98  
CO2 27  --   --  25 BUN 32*  --   --  52* CREA 2.56*  --   --  3.41* *  --   --  210* CA 8.6  --   --  9.0 MG  --  2.0 2.2  --   
PHOS 2.6 2.8  --  3.6 Recent Labs 02/09/21 
0413 02/08/21 
0503 ALB 2.2* 2.3* No results for input(s): INR, PTP, APTT, INREXT, INREXT in the last 72 hours. No results for input(s): PHI, PCO2I, PO2I, FIO2I in the last 72 hours. No results for input(s): CPK, CKMB, TROIQ, BNPP in the last 72 hours. Ventilator Settings: 
AC: 60% R 18 PEEP 10  MEDS: Reviewed 2/6 CXR: Reviewed 2/6 (X-Ray from 2/5) IMPRESSION Multilobar pneumonia/ARDS. Assessment:  
 
Acute Hypoxic Respiratory Failure Secondary to COVID Pneumonia:   
- Intubated and extubated on several occasions - Family has consented to trach and plan for trach sometime this week if able to wean Fio2 - ARDS volume ventilation. Vt ~ 600ml 
- Unable to do much weaning on vent due to oxygenation. 
- May need to consider NO if no improvement Sepsis: Osteomyelitis is only known bacterial infectious etiology. Likely cytokine reaction in setting of COVID-19. Cultures NGTD. Completed steroids. Completed course of linezolid (empiric). On prolonged zoysn for osteomyelitis. - Continue zosyn - end date 2/12 KATHI on CKD3a: Renal following; renal failure progressively worsening 
- IHD per renal; received on 2/6 
- HD on Monday, Wed. Fri Anemia, acute on chronic: PRBC on 2/5 
- Hgb down to 7.1 today, transfuse 1 PRBCs in prep for possible Trach placement today HFpEF with Exacerbation: EF 45-50%, mildly dilated RV w mildly reduced systolic function. Diuresis difficult in setting of KATHI on CKD3. 
- IHD for volume removal as we are able Delirium: When sedation is lightened, experiences hyperactive delirium, which interferes with weaning 
- Continue propofol & wean Versed R foot wound: Complicated by osteomyelitis. - Wound vac - Zosyn as above - ID following Deconditioning: Unable to work w PT/OT at this time Prostate Cancer: Holding casodex as this cannot be crushed an pt has OGT Multidisciplinary Rounds Completed:  Rounded with RN 
 
ABCDEF Bundle/Checklist 
Pain Medications: Fentanyl Target RASS: -3 Sedation Medications: Propofol, Versed CAM-ICU:  Positive Mobility: Poor PT/OT:Unable to participate Restraints: Soft wrist restraints. Discussed Plan of Care (goals of care): Yes with RN. Addressed Code Status: Full Code CARDIOVASCULAR Cardiac Gtts: None. Levophed intermittently SBP Goal of: > 90 mmHg MAP Goal of: >65 Transfusion Trigger (Hgb): <7 g/dL RESPIRATORY Vent Goals:  
Optimize PEEP/Ventilation/Oxygenation DVT Prophylaxis (if no, list reason): Heparin SQ  
SPO2 Goal: > 92% GI/ Bynum Catheter Present: Yes GI Prophylaxis: Yes  (hx GIB) Nutrition: Yes TF Bowel Movement: Yes Bowel Regimen: Docusate (Colace) Insulin: Y - Lantus ANTIBIOTICS Antibiotics: 
Zosyn T/L/D Tubes: Orogastric Tube Lines: [employer] Drains: Bynum Catheter SPECIAL EQUIPMENT Vent DISPOSITION Stay in ICU CRITICAL CARE CONSULTANT NOTE I had a face to face encounter with the patient, reviewed and interpreted patient data including clinical events, labs, images, vital signs, I/O's, and examined patient. I have discussed the case and the plan and management of the patient's care with the consulting services, the bedside nurses and the respiratory therapist.   
 
NOTE OF PERSONAL INVOLVEMENT IN CARE This patient has a high probability of imminent, clinically significant deterioration, which requires the highest level of preparedness to intervene urgently. I participated in the decision-making and personally managed or directed the management of the following life and organ supporting interventions that required my frequent assessment to treat or prevent imminent deterioration. I personally spent 40 minutes of critical care time. This is time spent at this critically ill patient's bedside actively involved in patient care as well as the coordination of care and discussions with the patient's family. This does not include any procedural time which has been billed separately. Alison Hall [facility] Critical Care Medicine [facility]

## 2021-02-09 NOTE — WOUND CARE
WOCN Note: Follow-up visit for VAC dressing change to right foot. Seen today with Dr. Ericka Rojas. 
  
Chart shows: 
Admitted for lethargy; osteomyelitis of right foot with debridement and removal of infected bone 12/30; now Covid-19+ History of DM, smoking, prostate cancer, HTN, heart failure Admitted from home 
  
Assessment:  
Intubated and does not arouse with wound care.    
Surface: Juan medley RN in room to help him turn with assist of 2 from right to left FlexiSeal in place with liquid stool in tubing. No anal erosion noted. Inserted 2/5 
  
1. POA, right foot wound post surgical debridement = 6 x 6 x 3cm  Base is 30% soft yellow 70% granular red (unchanged)   No purulence or odor.  Immediate periwound with hypopigmented skin.  Scant serosanguinous exudate in canister.   
VAC dressing removed: 1 black sponge Cleaned with saline and Santyl applied to yellow tissue in wound bed.   
125 mmHg suction achieved using 1 piece of black foam bridged to lower leg.  
  
Dorsum of foot has a deep tissue injury = 4 x 4 x 0 cm (smaller) 100% non-blanching & dry - Santyl applied.   
  
Thick dry skin plaques to lower leg - Lac Hydrin applied. Scattered denudation and MASD to buttocks. Remnants of zinc cream and I applied Venelex.  
  
Wound Recommendations:   
Maintain VAC as ordered @ 125mmHg suction with twice weekly dressing changes.  Buttocks and dorsum of right foot: venelex twice daily. Axilla: antifungal cream twice daily.  
  
Transition of Care: Plan to follow weekly and as needed while admitted to hospital with [facility] dressing change Friday, 2/12 TABITHA Garcia, RN, [employer] Certified Wound, Ostomy, Continence Nurse 
office [phone] 
pager [phone] or call  to page

## 2021-02-09 NOTE — PROGRESS NOTES
ID Progress Note 2021 Subjective: Afebrile. On the ventilator at 70% fio2. New central line placed in . ROS: Unobtainable Objective:  
 
Vitals:  
Visit Vitals /60 Pulse 78 Temp 98.9 °F (37.2 °C) (Axillary) Resp 18 Ht 6' (1.829 m) Wt 122.9 kg (270 lb 15.1 oz) SpO2 98% BMI 36.75 kg/m² Tmax:  Temp (24hrs), Av.6 °F (37 °C), Min:97.8 °F (36.6 °C), Max:99.7 °F (37.6 °C) Exam:  
intubated Lung clear, no rales, wheezes or rhonchi Heart: s1, s2, RRR, no murmurs rubs or clicks Abdomen: soft nontender, no guarding or rebound Labs:  
Lab Results Component Value Date/Time WBC 5.5 2021 02:18 PM  
 Hemoglobin (POC) 6.5 2020 01:59 PM  
 HGB 7.8 (L) 2021 02:18 PM  
 HCT 24.2 (L) 2021 02:18 PM  
 PLATELET 050  02:18 PM  
 MCV 90.0 2021 02:18 PM  
 
Lab Results Component Value Date/Time Sodium 134 (L) 2021 05:03 AM  
 Potassium 3.6 2021 05:03 AM  
 Chloride 98 2021 05:03 AM  
 CO2 25 2021 05:03 AM  
 Anion gap 11 2021 05:03 AM  
 Glucose 210 (H) 2021 05:03 AM  
 BUN 52 (H) 2021 05:03 AM  
 Creatinine 3.41 (H) 2021 05:03 AM  
 BUN/Creatinine ratio 15 2021 05:03 AM  
 GFR est AA 22 (L) 2021 05:03 AM  
 GFR est non-AA 18 (L) 2021 05:03 AM  
 Calcium 9.0 2021 05:03 AM  
 Bilirubin, total 0.6 2021 02:54 AM  
 Alk. phosphatase 79 2021 02:54 AM  
 Protein, total 7.0 2021 02:54 AM  
 Albumin 2.3 (L) 2021 05:03 AM  
 Globulin 4.2 (H) 2021 02:54 AM  
 A-G Ratio 0.7 (L) 2021 02:54 AM  
 ALT (SGPT) <6 (L) 2021 02:54 AM  
 
 
 
 
 
Assessment:  
 
#1 osteomyelitis of the right foot 
  
#2 group b strep  bacteremia 
  
#3 renal insufficiency 
  
#4 diabetes 
  
#5 prostate cancer 
  
#6 hypertension 
  
#7 heart failure 
  
 #8 covid  
  
#9 diarrhea Recommendations: Continue zosyn. There is OM on the surgical margin. He will need prolonged therapy. Orders written. Last day slated for feb 12, 2021. CRP elevated, but could also be from his lung process. I will need to take a look at the wound. Will arrange with wound care this  week.  
  
He has completed treatment with remdesivir.  (1/121/16) 
  He has completed dexamethasone (1/12 - 1/21)  Completed 14 day course of zyvox on 1/31.   
 
 
 
Wilmer Doss MD

## 2021-02-09 NOTE — PROGRESS NOTES
[facility] 
 
ICU TEAM Progress Note Name: Tennille Barrios III  
: 1950 MRN: [id number] Date: 2021 Subjective:  
Progress Note: 2021 Mr. Maria Rivera is a 73 yo male w a PMH of prostate cancer s/p radiation, tobacco use disorder (1 ppd), CKD3a, gastric angioectasias,  IDDM, heart failure, HTN who was admitted with R foot osteomyelitis on . He was transferred to the ICU after experiencing a cardiac arrest in the [facility]. Please see below for a brief synopsis of his hospital course, by problem. 
  
Acute Hypoxic Respiratory failure secondary to COVID pneumonia: He also demonstrated dyspnea on initial admission, but was negative for COVID. Initially treated with steroids. On , he developed increased oxygen needs. He was COVID positive. He was started on and completed treatment with steroids (completed ), remdesivir (completed ), zinc, and vitamin C. He initially required BiPAP on , and was weaned to 4-5L/min. CT chest showed bilateral pleural effusions with ground glass. Diuresis was attempted. He was continued on zosyn. He required transfer to [facility] on 1/15 due to continued desaturations -> 70s on NRB. CXR with increased bilateral infiltrates. Started linezolid on . Required intubation on . See below under cardiac arrest. He was extubated on , but required reintubation the same day. He completed a course of empiric 14d zyvox (ended ) and repeated empiric stress dose steroids (ended ). He remains on prolonged zosyn for treatment of osteomyelitis. Respiratory cultures have remained NGTD. He continues to fail SBTs and have intermittent increase in oxygen requirements Cardiac arrest, s/p ROSC: Not cooled as he regained mental function s/p ROSC. Code time: reported to be 12 minutes The patient had been taken off BiPAP for lunch. He was on intermittent BiPAP and HiFlow, demonstrating stability to come off to eat. He ate his lunch and was later found SOB unresponsive and pulseless. He was thereafter transferred to the ICU. He regained mentation, was able to respond to commands, shake head/yes, no, but ultimately did require sedation for compliance with mechanical ventilation. Likely hypoxic arrest. 
 
Septic shock: Osteomyelitis has been only identified source of infection. Please see above under respiratory failure for full courses of abx. Has been on/off pressors since ICU admission. Admitted with a chronic wound on his R foot. He was started on broad spectrum antibiotics with vanc, zosyn, and flagyl and underwent wound debridement on 12/30. BLC positive for S agalactiae on 12/28. Foot cultures + for Protues mirabilis, group B strep, and Enterococcus. Antibiotics were then narrowed to zosyn. Wound vac placed on foot 1/5. Zosyn is planned for 6 weeks. Acute on chronic CKD3a: Baseline Cr 1.7. Renal has followed throughout his course. Renal failure progressively worsening. He started IHD on 2/4. IDDM2: Lantus and lispro Anemia, acute on chronic: Course c/b anemia w heme + stool. EGD on 1/4 showing superficial ulcers in the distal bulb and second portion of the duodenum measuring 2-4mm.   
  
COVID negative 12/28, 12/29, but positive 1/11 Overnight: 
Off levophed ABG today Plan for trach - however may be on hold due to high vent settings. Recheck covid test 
Give another unit of PRBCs today Active Problem List:  
 
Problem List  Date Reviewed: 8/20/2020 Codes Class ARDS (adult respiratory distress syndrome) ([facility] ICD-10-CM: S07 
ICD-9-CM: 518.52 * (Principal) Osteomyelitis ([facility].) ICD-10-CM: M86.9 ICD-9-CM: 730.20 Diabetic ulcer of right midfoot associated with type 2 diabetes mellitus, with fat layer exposed ([facility].) ICD-10-CM: E11.621, K26.918 ICD-9-CM: 250.80, 707.14 PAD (peripheral artery disease) ([facility]) ICD-10-CM: I73.9 ICD-9-CM: 443.9 Dependence on nicotine from cigarettes ICD-10-CM: F17.210 ICD-9-CM: 305.1 KATHI (acute kidney injury) ([facility].) ICD-10-CM: N17.9 ICD-9-CM: 519. 9 Acute on chronic renal failure (HCC) ICD-10-CM: N17.9, N18.9 ICD-9-CM: 584.9, 585.9 Severe obesity (BMI 35.0-39. 9) with comorbidity ([facility] ICD-10-CM: E66.01 
ICD-9-CM: 278.01 Type 2 diabetes with nephropathy ([facility]) ICD-10-CM: E11.21 
ICD-9-CM: 250.40, 583.81 S/P hip replacement, right ICD-10-CM: K98.643 ICD-9-CM: V43.64 Stage 3 chronic kidney disease ICD-10-CM: N18.30 ICD-9-CM: 532. 3 Prostate [facility]) ICD-10-CM: M12 ICD-9-CM: 089 Diabetic polyneuropathy ([facility] ICD-10-CM: E11.42 
ICD-9-CM: 250.60, 357.2 Rotator Cuff Tendonitis ICD-10-CM: M71.9, M67.919 ICD-9-CM: 726.10 Diabetes mellitus type 2, controlled ([facility].) ICD-10-CM: E11.9 ICD-9-CM: 250.00 Degenerative disc disease ICD-10-CM: IZD6649 ICD-9-CM: 722.6 Osteoarthrosis involving lower leg ICD-10-CM: M17.10 ICD-9-CM: 715.96 Depression/ Anxiety ICD-10-CM: F34.1 ICD-9-CM: 300.4 HTN (hypertension) ICD-10-CM: I10 
ICD-9-CM: 401.9 Chronic hip pain ICD-10-CM: M25.559, G89.29 ICD-9-CM: 719.45, 338.29 Hypertriglyceridemia ICD-10-CM: E78.1 ICD-9-CM: 272.1 Mild Aortic Insufficiency ICD-10-CM: I35.1 ICD-9-CM: 424.1 Mild Concentric LVH (left ventricular hypertrophy) ICD-10-CM: I51.7 ICD-9-CM: 429.3 Past Medical History: has a past medical history of Arthritis, Degenerative,  Knee (4/4/2011), Carcinoma, Prostate (4/4/2011), Degenerative disc disease (4/4/2011), Depression/ Anxiety (4/4/2011), Diabetes Mellitrus ( non-insulin dependent ) Type 2 (4/4/2011), Heart failure ([facility] HEMATOCHEZIA (4/4/2011), Hypertrension (4/4/2011), Hypertriglyceridemia (4/4/2011), Ill-defined condition, Insomnia (4/4/2011), Laryngitis (4/4/2011), Mild Aortic insufficiency (4/4/2011), Mild Concentric LVH (left ventricular hypertrophy) (4/4/2011), Neuropathy (4/4/2011), Osteoarthritis (4/4/2011), and Rotator Cuff Tendonitis (4/4/2011). Past Surgical History:  
 
 has a past surgical history that includes hx urological; hx other surgical; pr cardiac surg procedure unlist; colonoscopy (N/A, 4/28/2017); hx orthopaedic; hx orthopaedic; and ir insert tunl cvc w/o port over 5 yr (1/6/2021). Home Medications:  
 
Prior to Admission medications Medication Sig Start Date End Date Taking? Authorizing Provider  
pregabalin (LYRICA) 150 mg capsule TAKE ONE CAPSULE BY MOUTH TWICE A DAY 12/7/20  Yes Heron Daniels MD  
bicalutamide (CASODEX) 50 mg tablet TAKE ONE TABLET BY MOUTH DAILY 11/30/20  Yes Heron Daniels MD  
torsemide (DEMADEX) 20 mg tablet TAKE FOUR TABLETS BY MOUTH TWICE A DAY 11/23/20  Yes Heron Daniels MD  
pantoprazole (PROTONIX) 40 mg tablet TAKE ONE TABLET BY MOUTH TWICE DAILY ONCE IN THE MORNING AND ONCE IN THE EVENING 11/18/20  Yes Heron Daniels MD  
hydrOXYzine HCL (ATARAX) 50 mg tablet TAKE ONE TABLET BY MOUTH THREE TIMES A DAY AS NEEDED FOR ITCHING FOR UP TO 10 DAYS 10/22/20  Yes Heron Daniels MD  
amLODIPine (NORVASC) 10 mg tablet TAKE ONE TABLET BY MOUTH DAILY 10/2/20  Yes Heron Daniels MD  
hydrALAZINE (APRESOLINE) 100 mg tablet Take 1 Tab by mouth three (3) times daily.  9/16/20  Yes Heron Michel., MD  
 ascorbic acid, vitamin C, (Vitamin C) 500 mg tablet Take  by mouth. Yes Provider, Historical  
enzalutamide (XTANDI) 40 mg capsule Take 160 mg by mouth daily. Yes Provider, Historical  
Thera-Tabs M 27 mg iron-400 mcg tab TAKE ONE TABLET BY MOUTH DAILY 7/17/20  Yes Margarita Kearsn MD  
tamsulosin [facility]) 0.4 mg capsule TAKE ONE CAPSULE BY MOUTH DAILY 7/16/20  Yes Margarita Kearns MD  
Insulin Syringe-Needle U-100 (BD Insulin Syringe Ultra-Fine) 1 mL 31 gauge x 5/16 syrg USE TO INJECT INSULIN FIVE TIMES A DAY 6/16/20  Yes Margarita Kearns MD  
ferrous sulfate 325 mg (65 mg iron) tablet TAKE ONE TABLET BY MOUTH DAILY BEFORE BREAKFAST 6/16/20  Yes Margarita Kearns MD  
gabapentin (NEURONTIN) 300 mg capsule Take 1 Cap by mouth three (3) times daily. Max Daily Amount: 900 mg. 5/25/20  Yes Margarita Kearns MD  
triamcinolone acetonide (KENALOG) 0.1 % ointment APPLY A THIN LAYER TO AFFECTED AREA(S) TWO TIMES A DAY **DO NOT EXCEED 2.66 GRAMS PER DAY** 5/19/20  Yes Margarita Kearns MD  
diclofenac (VOLTAREN) 1 % gel Apply  to affected area four (4) times daily. 4/16/20  Yes Margarita Kearns MD  
ammonium lactate (AMLACTIN) 12 % topical cream Apply  to affected area two (2) times a day. rub in to affected area well 3/10/20  Yes Margarita Kearns MD  
ammonium lactate (LAC-HYDRIN FIVE) 5 % lotion Apply 1 Applicator to affected area daily. Apply daily bilateral lower legs 3/5/20  Yes Margarita Kearns MD  
insulin glargine (LANTUS U-100 INSULIN) 100 unit/mL injection 72 Units by SubCUTAneous route daily. 3/5/20  Yes Margarita Kearns MD  
insulin regular (NOVOLIN R, HUMULIN R) 100 unit/mL injection 24 units sc tid 3/5/20  Yes Margarita Kearns MD  
ketoconazole (NIZORAL) 2 % topical cream Apply  to affected area two (2) times a day.  2/4/20  Yes Margarita Kearns MD  
ketoconazole (NIZORAL) 2 % shampoo Sig:shampoo once a week 2/4/20  Yes Margarita Kearns MD  
 bisacodyl (DULCOLAX, BISACODYL,) 10 mg suppository Insert 10 mg into rectum daily. Yes Provider, Historical  
insulin aspart U-100 (NOVOLOG FLEXPEN U-100 INSULIN) 100 unit/mL (3 mL) inpn by SubCUTAneous route. Yes Provider, Historical  
Insulin Needles, Disposable, (NOVOFINE 32) 32 gauge x 1/4\" ndle Sig:use 4 times a day as needed 19  Yes MD LINDA Haney R REGULAR U-100 INSULN 100 unit/mL injection INJECT 14 UNITS UNDER THE SKIN THREE TIMES A DAY WITH MEALS AS NEEDED 19  Yes Michelle Marquis MD  
fluticasone propionate [facility]) 50 mcg/actuation nasal spray SPRAY TWO SPRAYS IN THE AFFECTED NOSTRIL DAILY 19  Yes Michelle Marquis MD  
aspirin delayed-release 81 mg tablet Take 81 mg by mouth daily. Yes Provider, Historical  
acetaminophen (PAIN AND FEVER) 500 mg tablet TAKE ONE TABLET BY MOUTH EVERY 6 HOURS AS NEEDED FOR PAIN 18  Yes Michelle Marquis MD  
Calcium Carbonate-Vit D3-Min 600 mg calcium- 400 unit tab Take 1 Tab by mouth two (2) times a day. Yes Provider, Historical  
 
Allergies/Social/Family History: No Known Allergies Social History Tobacco Use  Smoking status: Current Every Day Smoker Packs/day: 1.00 Last attempt to quit: 2019 Years since quittin.2  Smokeless tobacco: Never Used Substance Use Topics  Alcohol use: Not Currently Alcohol/week: 11.7 standard drinks Types: 7 Cans of beer, 7 Shots of liquor per week Family History Family history unknown: Yes Review of Systems:  
 
Unable to provide - intubated and sedated Objective:  
Vital Signs: 
Visit Vitals BP (!) 120/58 Pulse 65 Temp 98.4 °F (36.9 °C) Resp 18 Ht 6' (1.829 m) Wt 123.1 kg (271 lb 6.2 oz) SpO2 90% BMI 36.81 kg/m² O2 Flow Rate (L/min): 15 l/min(Mid flow at 9 liters also) O2 Device: Endotracheal tube Temp (24hrs), Av.6 °F (37 °C), Min:97.8 °F (36.6 °C), Max:99.8 °F (37.7 °C) Intake/Output:  
 
Intake/Output Summary (Last 24 hours) at 2/9/2021 1157 Last data filed at 2/9/2021 1100 Gross per 24 hour Intake 3135.4 ml Output 1805 ml Net 1330.4 ml Physical Exam: 
  
 
General: Intubated and sedated EENT: PERRL 3mm/brisk. MMM Resp: Coarse throughout and decreased to bases CV: Regular rhythm, normal rate, no murmurs GI: Soft, moderately distended with hypoactive BS. Flexi-seal 
Extremities: Lymphedema with eschar noted B/L R>L. Wound vac to RLE. Vascular changes. Warm to touch. Unable to palpate pulses but dopplerable Neurologic: RASS -3. Skin: Warm and dry. Lines: [address] CVC, RIJ Rudolph, Bynum, Flexiseal, OGT 
 
 
LABS AND  DATA: Personally reviewed Recent Labs 02/09/21 
4108 02/08/21 [address] WBC 8.9 5.5 HGB 7.1* 7.8* HCT 22.3* 24.2*  
 215 Recent Labs 02/09/21 
2292 02/09/21 
9057 02/08/21 
0505 02/08/21 
0503 *  --   --  134* K 3.6  --   --  3.6 CL 99  --   --  98  
CO2 27  --   --  25 BUN 32*  --   --  52* CREA 2.56*  --   --  3.41* *  --   --  210* CA 8.6  --   --  9.0 MG  --  2.0 2.2  --   
PHOS 2.6 2.8  --  3.6 Recent Labs 02/09/21 
0413 02/08/21 
0503 ALB 2.2* 2.3* No results for input(s): INR, PTP, APTT, INREXT, INREXT in the last 72 hours. No results for input(s): PHI, PCO2I, PO2I, FIO2I in the last 72 hours. No results for input(s): CPK, CKMB, TROIQ, BNPP in the last 72 hours. Ventilator Settings: 
AC: 60% R 18 PEEP 10  MEDS: Reviewed 2/6 CXR: Reviewed 2/6 (X-Ray from 2/5) IMPRESSION Multilobar pneumonia/ARDS. Assessment:  
 
Acute Hypoxic Respiratory Failure Secondary to COVID Pneumonia:   
- Intubated and extubated on several occasions - Family has consented to trach and plan for trach on Tuesday, 2/9 
- ARDS volume ventilation. Vt ~ 600 ml  
- Unable to do much weaning on vent due to oxygenation. 
- May need to consider NO if no improvement Sepsis: Osteomyelitis is only known bacterial infectious etiology. Likely cytokine reaction in setting of COVID-19. Cultures NGTD. Completed steroids. Completed course of linezolid (empiric). On prolonged zoysn for osteomyelitis. - Continue zosyn - end date 2/12 KATHI on CKD3a: Renal following; renal failure progressively worsening 
- IHD per renal; received on 2/6 
- HD on Monday, Wed. Fri Anemia, acute on chronic: PRBC on 2/5 
- Hgb down to 6.8 today, transfuse 1 PRBCs HFpEF with Exacerbation:   
- EF 45-50%, mildly dilated RV w mildly reduced systolic function. Diuresis difficult in setting of KATHI on CKD3.   
- IHD for volume removal as we are able. Delirium:  
- When sedation is lightened, experiences hyperactive delirium, which interferes with weaning 
- Continue propofol & wean Versed R foot wound: Complicated by osteomyelitis. - Wound vac - Zosyn as above - ID following Deconditioning: Unable to work w PT/OT at this time Prostate Cancer: Holding casodex as this cannot be crushed an pt has OGT Multidisciplinary Rounds Completed:  Rounded with RN 
 
ABCDEF Bundle/Checklist 
Pain Medications: Fentanyl Target RASS: -3 Sedation Medications: Propofol, Versed CAM-ICU:  Positive Mobility: Poor PT/OT:Unable to participate Restraints: Soft wrist restraints. Discussed Plan of Care (goals of care): Yes with RN. Addressed Code Status: Full Code CARDIOVASCULAR Cardiac Gtts: None. Levophed intermittently SBP Goal of: > 90 mmHg MAP Goal of: >65 Transfusion Trigger (Hgb): <7 g/dL RESPIRATORY Vent Goals:  
Optimize PEEP/Ventilation/Oxygenation DVT Prophylaxis (if no, list reason): Heparin SQ  
SPO2 Goal: > 92% GI/ Bynum Catheter Present: Yes GI Prophylaxis: Yes  (hx GIB) Nutrition: Yes TF Bowel Movement: Yes Bowel Regimen: Docusate (Colace) Insulin: Y - Lantus ANTIBIOTICS Antibiotics: 
Zosyn T/L/D Tubes: Orogastric Tube Lines: [facility] Drains: Bynum Catheter SPECIAL EQUIPMENT Vent DISPOSITION Stay in ICU CRITICAL CARE CONSULTANT NOTE I had a face to face encounter with the patient, reviewed and interpreted patient data including clinical events, labs, images, vital signs, I/O's, and examined patient. I have discussed the case and the plan and management of the patient's care with the consulting services, the bedside nurses and the respiratory therapist.   
 
NOTE OF PERSONAL INVOLVEMENT IN CARE This patient has a high probability of imminent, clinically significant deterioration, which requires the highest level of preparedness to intervene urgently. I participated in the decision-making and personally managed or directed the management of the following life and organ supporting interventions that required my frequent assessment to treat or prevent imminent deterioration. I personally spent 35 minutes of critical care time. This is time spent at this critically ill patient's bedside actively involved in patient care as well as the coordination of care and discussions with the patient's family. This does not include any procedural time which has been billed separately. Pricila Valentino [facility] Critical Care Medicine [facility]

## 2021-02-09 NOTE — PROGRESS NOTES
ID Progress Note 
2021 Subjective: Afebrile. On the ventilator. New central line placed in . ROS: Unobtainable Objective:  
 
Vitals:  
Visit Vitals BP (!) 132/59 (BP 1 Location: Left upper arm, BP Patient Position: At rest) Pulse 68 Temp 98.4 °F (36.9 °C) Resp 18 Ht 6' (1.829 m) Wt 123.1 kg (271 lb 6.2 oz) SpO2 99% BMI 36.81 kg/m² Tmax:  Temp (24hrs), Av.6 °F (37 °C), Min:97.8 °F (36.6 °C), Max:99.8 °F (37.7 °C) Exam:  
intubated Right plantar area has a wound that is generally granulating. There is a small area of slough. Nu purulence or odor. Labs:  
Lab Results Component Value Date/Time WBC 8.9 2021 04:12 AM  
 Hemoglobin (POC) 6.5 2020 01:59 PM  
 HGB 7.1 (L) 2021 04:12 AM  
 HCT 22.3 (L) 2021 04:12 AM  
 PLATELET 629  04:12 AM  
 MCV 92.5 2021 04:12 AM  
 
Lab Results Component Value Date/Time Sodium 133 (L) 2021 04:13 AM  
 Potassium 3.6 2021 04:13 AM  
 Chloride 99 2021 04:13 AM  
 CO2 27 2021 04:13 AM  
 Anion gap 7 2021 04:13 AM  
 Glucose 224 (H) 2021 04:13 AM  
 BUN 32 (H) 2021 04:13 AM  
 Creatinine 2.56 (H) 2021 04:13 AM  
 BUN/Creatinine ratio 13 2021 04:13 AM  
 GFR est AA 30 (L) 2021 04:13 AM  
 GFR est non-AA 25 (L) 2021 04:13 AM  
 Calcium 8.6 2021 04:13 AM  
 Bilirubin, total 0.6 2021 02:54 AM  
 Alk. phosphatase 79 2021 02:54 AM  
 Protein, total 7.0 2021 02:54 AM  
 Albumin 2.2 (L) 2021 04:13 AM  
 Globulin 4.2 (H) 2021 02:54 AM  
 A-G Ratio 0.7 (L) 2021 02:54 AM  
 ALT (SGPT) <6 (L) 2021 02:54 AM  
 
 
 
 
 
Assessment:  
 
#1 osteomyelitis of the right foot 
  
#2 group b strep  bacteremia 
  
#3 renal insufficiency 
  
#4 diabetes 
  
#5 prostate cancer 
  
#6 hypertension 
  
#7 heart failure 
  
 #8 covid  
  
#9 diarrhea Recommendations: Continue zosyn. There is OM on the surgical margin. CRP elevated, but could also be from his lung process. The wound looks good. We can discontinue zosyn on 2/12/21 He has completed treatment with remdesivir.  (1/121/16) 
  He has completed dexamethasone (1/12 - 1/21)  Completed 14 day course of zyvox on 1/31.   
 
 
 
Gayatri Myrick MD

## 2021-02-09 NOTE — PROGRESS NOTES
Nephrology Progress Note Lin Isabel III Date of Admission : 12/28/2020 CC: Follow up for KATHI on CKD Assessment and Plan KATHI on CKD: 
- worsening renal function from ATN  
- Rudolph and dialysis initiated on 2/4 
- HD MWF for now Resp failure: 
COVID-19 PNA: positive on 1/11 
- s/p decadron and remdesivir 
- on the vent 
- trach today CKD 4 
-  Presumed 2/2 DM and HTN 
- nephrotic range proteinuria 
- baseline Cr 1.7 mg/dl  
- followed by Dr. Andrzej Crews at [facility] Hx of prostate cancer s/p XRT 
  
Anemia in CKD + blood loss: 
- cont JAZ 
- EGD 1/5/2021: gastric fundal lesion  
- transfused yesterday 
  
Type II DM: 
- on insulin 
  
HTN :  
- BP stable now 
  
R foot osteo: 
- on abx 
- s/p debridement on 12/30 Group B strep bacteremia Interval History: Not examined in the room On the vent. Poor UOP. HD yesterday. Received PRBCs yesterday. For trach today. Current Medications: all current  Medications have been eviewed in [facility] Review of Systems: A comprehensive review of systems was negative except for that written in the HPI. Objective: 
Vitals:   
Vitals:  
 02/09/21 0830 02/09/21 0900 02/09/21 0930 02/09/21 1000 BP: (!) 114/55 (!) 125/59  123/63 Pulse: 69 69 68 67 Resp: 18 18 18 18 Temp:      
TempSrc:      
SpO2: 90% 91% 92% 92% Weight:   123.1 kg (271 lb 6.2 oz) Height:      
 
Intake and Output: 
02/09 0701 - 02/09 1900 In: 287.2 [I.V.:127.2] Out: 30 [Urine:5; Drains:25] 
02/07 1901 - 02/09 0700 In: 3884.4 [I.V.:1796.9] Out: 2010 [Urine:210; ZQAPBX:184] Physical Examination: not examined in the room GEN: ON VENT 
NECK- ET tube + RESP: no distress ABD :chronic distension, BS + 
EXT : edema + NEURO:sedated on the vent Exam deferred due to COVID-19 infection in order to preserve PPE AND minimize risk of  
transmission to dialysis and renal transplant patient per ASN and RPA guidelines: []    High complexity decision making was performed 
[]    Patient is at high-risk of decompensation with multiple organ involvement Lab Data Personally Reviewed: I have reviewed all the pertinent labs, microbiology data and radiology studies during assessment. Recent Labs 02/09/21 
7977 02/09/21 
2967 02/08/21 
0505 02/08/21 
0503 02/07/21 
0335 *  --   --  134* 135* K 3.6  --   --  3.6 3.0*  
CL 99  --   --  98 98 CO2 27  --   --  25 26 *  --   --  210* 195* BUN 32*  --   --  52* 42* CREA 2.56*  --   --  3.41* 2.65* CA 8.6  --   --  9.0 8.6 MG  --  2.0 2.2  --  2.1 PHOS 2.6 2.8  --  3.6 2.7 ALB 2.2*  --   --  2.3* 2.6* Recent Labs 02/09/21 
4123 02/08/21 22 [id number] 02/08/21 
0505 WBC 8.9 5.5 6.3 HGB 7.1* 7.8* 6.8* HCT 22.3* 24.2* 21.7*  
 215 217 No results found for: SDES Lab Results Component Value Date/Time Culture result: NO GROWTH 5 DAYS 01/23/2021 03:37 PM  
 Culture result: LIGHT YEAST, (APPARENT CANDIDA ALBICANS) (A) 01/23/2021 02:45 PM  
 Culture result: NO NORMAL RESPIRATORY MICHELLE ISOLATED 01/23/2021 02:45 PM  
 
Recent Results (from the past 24 hour(s)) GLUCOSE, POC Collection Time: 02/08/21 11:27 AM  
Result Value Ref Range Glucose (POC) 206 (H) 65 - 100 mg/dL Performed by Toni Kuhn CBC W/O DIFF Collection Time: 02/08/21  2:18 PM  
Result Value Ref Range WBC 5.5 4.1 - 11.1 K/uL  
 RBC 2.69 (L) 4.10 - 5.70 M/uL HGB 7.8 (L) 12.1 - 17.0 g/dL HCT 24.2 (L) 36.6 - 50.3 % MCV 90.0 80.0 - 99.0 FL  
 MCH 29.0 26.0 - 34.0 PG  
 MCHC 32.2 30.0 - 36.5 g/dL  
 RDW 19.2 (H) 11.5 - 14.5 % PLATELET 437 803 - 539 K/uL MPV 9.1 8.9 - 12.9 FL  
 NRBC 0.6 (H) 0  WBC ABSOLUTE NRBC 0.03 (H) 0.00 - 0.01 K/uL GLUCOSE, POC Collection Time: 02/08/21  5:40 PM  
Result Value Ref Range Glucose (POC) 141 (H) 65 - 100 mg/dL Performed by [employer].    
GLUCOSE, POC  
 Collection Time: 02/08/21 11:02 PM  
Result Value Ref Range Glucose (POC) 182 (H) 65 - 100 mg/dL Performed by Libby Cedeno Collection Time: 02/09/21  4:12 AM  
Result Value Ref Range Magnesium 2.0 1.6 - 2.4 mg/dL PHOSPHORUS Collection Time: 02/09/21  4:12 AM  
Result Value Ref Range Phosphorus 2.8 2.6 - 4.7 MG/DL  
CBC WITH AUTOMATED DIFF Collection Time: 02/09/21  4:12 AM  
Result Value Ref Range WBC 8.9 4.1 - 11.1 K/uL  
 RBC 2.41 (L) 4.10 - 5.70 M/uL HGB 7.1 (L) 12.1 - 17.0 g/dL HCT 22.3 (L) 36.6 - 50.3 % MCV 92.5 80.0 - 99.0 FL  
 MCH 29.5 26.0 - 34.0 PG  
 MCHC 31.8 30.0 - 36.5 g/dL  
 RDW 19.3 (H) 11.5 - 14.5 % PLATELET 700 238 - 708 K/uL MPV 9.1 8.9 - 12.9 FL  
 NRBC 0.2 (H) 0  WBC ABSOLUTE NRBC 0.02 (H) 0.00 - 0.01 K/uL NEUTROPHILS 86 (H) 32 - 75 % LYMPHOCYTES 4 (L) 12 - 49 % MONOCYTES 6 5 - 13 % EOSINOPHILS 3 0 - 7 % BASOPHILS 0 0 - 1 % IMMATURE GRANULOCYTES 1 (H) 0.0 - 0.5 % ABS. NEUTROPHILS 7.6 1.8 - 8.0 K/UL  
 ABS. LYMPHOCYTES 0.4 (L) 0.8 - 3.5 K/UL  
 ABS. MONOCYTES 0.5 0.0 - 1.0 K/UL  
 ABS. EOSINOPHILS 0.3 0.0 - 0.4 K/UL  
 ABS. BASOPHILS 0.0 0.0 - 0.1 K/UL  
 ABS. IMM. GRANS. 0.1 (H) 0.00 - 0.04 K/UL  
 DF SMEAR SCANNED    
 RBC COMMENTS POLYCHROMASIA 1+ 
    
 RBC COMMENTS BASOPHILIC STIPPLING 
ANISOCYTOSIS 1+ RENAL FUNCTION PANEL Collection Time: 02/09/21  4:13 AM  
Result Value Ref Range Sodium 133 (L) 136 - 145 mmol/L Potassium 3.6 3.5 - 5.1 mmol/L Chloride 99 97 - 108 mmol/L  
 CO2 27 21 - 32 mmol/L Anion gap 7 5 - 15 mmol/L Glucose 224 (H) 65 - 100 mg/dL BUN 32 (H) 6 - 20 MG/DL Creatinine 2.56 (H) 0.70 - 1.30 MG/DL  
 BUN/Creatinine ratio 13 12 - 20 GFR est AA 30 (L) >60 ml/min/1.73m2 GFR est non-AA 25 (L) >60 ml/min/1.73m2 Calcium 8.6 8.5 - 10.1 MG/DL Phosphorus 2.6 2.6 - 4.7 MG/DL Albumin 2.2 (L) 3.5 - 5.0 g/dL GLUCOSE, POC  
 Collection Time: 02/09/21  5:27 AM  
Result Value Ref Range Glucose (POC) 237 (H) 65 - 100 mg/dL Performed by Tevin Sauer) Marlise Sheri Guevara MD 
[address] [place] [address] Phone - [phone] Fax - [phone] 
[email or web address] Th[facility] [email or web address]

## 2021-02-10 NOTE — PROGRESS NOTES
Nursing Shift Note 1930-0800 1945: Bedside and Verbal shift change report given to Shira Braun RN (oncoming nurse) by Ami Hunt RN(offgoing nurse). Report included the following information SBAR, Kardex, Procedure Summary, Intake/Output, MAR, Accordion, Recent Results and Cardiac Rhythm NSR.  
 
2355: decreased versed to 5mg/hr 
 
0120: Patient vent alarming- increased propofol rate 0350: Patient vent continuing to alarm with inconsistent/ low tidal volume. Rate and rhythm looks consistent on vent, not breathing over vent. Spoke to RT 
 
0400: Patient bathed, linen changed. Desaturated to 82% during turning, had to pause to allow patient to recover. Slowly recovered to 90% post activity. 0500: Spoke to RT about patient vent continuing to alarm, have lavaged/suction multiple times with appearance of improvement directly after- scant clear sputum. RT to assess patient/ draw scheduled ABGs 
 
0600: Patient with low volumes for RT, RT suctioned and bagged patient for several minutes, MD called to bedside to assess need for bronchoscopy- tidal volumes improved, continue to monitor for now. 0740: Bedside and Verbal shift change report given to [facility] RUBA MURO (oncoming nurse) by Shira Braun RN (offgoing nurse). Report included the following information SBAR, Kardex, Procedure Summary, Intake/Output, MAR, Accordion, Recent Results and Cardiac Rhythm NSR.

## 2021-02-10 NOTE — PROGRESS NOTES
Nephrology Progress Note Nanette Cooley III Date of Admission : 12/28/2020 CC: Follow up for KATHI on CKD Assessment and Plan KATHI on CKD: 
- worsening renal function from ATN  
- Rudolph and dialysis initiated on 2/4 
- HD MWF for now 
- HD now - pessors as needed to augment UF Resp failure: 
COVID-19 PNA: positive on 1/11 
- s/p decadron and remdesivir 
- on the vent 
- for trach CKD 4 
-  Presumed 2/2 DM and HTN 
- nephrotic range proteinuria 
- baseline Cr 1.7 mg/dl  
- followed by Dr. Johanne Zamorano at [address] [facility] Hx of prostate cancer s/p XRT 
  
Anemia in CKD + blood loss: 
- cont JAZ 
- EGD 1/5/2021: gastric fundal lesion  
- transfused yesterday 
  
Type II DM: 
- on insulin 
  
HTN :  
- BP stable now 
  
R foot osteo: 
- on abx 
- s/p debridement on 12/30 Group B strep bacteremia Interval History: Not examined in the room On dialysis now. BP stable. Not on pressors currently. Current Medications: all current  Medications have been eviewed in [facility] Review of Systems: A comprehensive review of systems was negative except for that written in the HPI. Objective: 
Vitals:   
Vitals:  
 02/10/21 0830 02/10/21 0845 02/10/21 0900 02/10/21 0915 BP: (!) 106/59 (!) 121/59 (!) 120/56 (!) 122/59 Pulse: 65 66 66 66 Resp: 20 20 20 20 Temp:      
TempSrc:      
SpO2: 96% 97% 95% 96% Weight:      
Height:      
 
Intake and Output: 
02/10 0701 - 02/10 1900 In: -  
Out: 10 [Urine:10] 
02/08 1901 - 02/10 0700 In: 4565.6 [I.V.:1829.3] Out: 526 [Urine:151; CNKOCH:911] Physical Examination: not examined in the room GEN: ON VENT 
NECK- ET tube + RESP: no distress ABD :chronic distension, BS + 
EXT : edema + NEURO:sedated on the vent Exam deferred due to COVID-19 infection in order to preserve PPE AND minimize risk of  
transmission to dialysis and renal transplant patient per ASN and RPA guidelines: 
 
 
 
 
[]    High complexity decision making was performed []    Patient is at high-risk of decompensation with multiple organ involvement Lab Data Personally Reviewed: I have reviewed all the pertinent labs, microbiology data and radiology studies during assessment. Recent Labs  
  02/10/21 
0359 02/09/21 
0413 02/09/21 
9441 02/08/21 
0505 02/08/21 
0503 * 133*  --   --  134* K 3.5 3.6  --   --  3.6 CL 97 99  --   --  98  
CO2 25 27  --   --  25 * 224*  --   --  210* BUN 41* 32*  --   --  52* CREA 3.18* 2.56*  --   --  3.41* CA 9.0 8.6  --   --  9.0 MG 2.1  --  2.0 2.2  --   
PHOS 3.8 2.6 2.8  --  3.6 ALB 2.4* 2.2*  --   --  2.3* Recent Labs  
  02/10/21 
0359 02/09/21 
1715 02/09/21 
0412 02/08/21 [address] 1 WBC 8.6  --  8.9 5.5 HGB 8.6* 7.4* 7.1* 7.8* HCT 28.1* 22.5* 22.3* 24.2*  
  --  303 215 No results found for: SDES Lab Results Component Value Date/Time Culture result: NO GROWTH 5 DAYS 01/23/2021 03:37 PM  
 Culture result: LIGHT YEAST, (APPARENT CANDIDA ALBICANS) (A) 01/23/2021 02:45 PM  
 Culture result: NO NORMAL RESPIRATORY MICHELLE ISOLATED 01/23/2021 02:45 PM  
 
Recent Results (from the past 24 hour(s)) TYPE & SCREEN Collection Time: 02/09/21 11:03 AM  
Result Value Ref Range Crossmatch Expiration 02/12/2021,2389 ABO/Rh(D) O POSITIVE Antibody screen NEG Unit number [id number] Blood component type RC LR Unit division 00 Status of unit TRANSFUSED Crossmatch result Compatible SARS-COV-2 Collection Time: 02/09/21 11:03 AM  
Result Value Ref Range SARS-CoV-2 Please find results under separate order GLUCOSE, POC Collection Time: 02/09/21 11:16 AM  
Result Value Ref Range Glucose (POC) 239 (H) 65 - 100 mg/dL Performed by Poonam JACKSON   
BLOOD GAS, ARTERIAL Collection Time: 02/09/21 12:33 PM  
Result Value Ref Range pH 7.44 7.35 - 7.45    
 PCO2 39 35 - 45 mmHg PO2 52 (L) 80 - 100 mmHg O2 SAT 88 (L) 92 - 97 % BICARBONATE 26 22 - 26 mmol/L  
 BASE EXCESS 1.9 mmol/L  
 O2 METHOD VENT    
 FIO2 0.70 % SET RATE 18    
 IPAP/PIP 20    
 PEEP/CPAP 10.0 Sample source ARTERIAL    
 SITE RIGHT RADIAL JUVENTINO'S TEST YES    
GLUCOSE, POC Collection Time: 02/09/21  5:14 PM  
Result Value Ref Range Glucose (POC) 179 (H) 65 - 100 mg/dL Performed by Leonila JACKSON   
HGB & HCT Collection Time: 02/09/21  5:15 PM  
Result Value Ref Range HGB 7.4 (L) 12.1 - 17.0 g/dL HCT 22.5 (L) 36.6 - 50.3 % GLUCOSE, POC Collection Time: 02/09/21 11:43 PM  
Result Value Ref Range Glucose (POC) 212 (H) 65 - 100 mg/dL Performed by Julien Flynn RENAL FUNCTION PANEL Collection Time: 02/10/21  3:59 AM  
Result Value Ref Range Sodium 131 (L) 136 - 145 mmol/L Potassium 3.5 3.5 - 5.1 mmol/L Chloride 97 97 - 108 mmol/L  
 CO2 25 21 - 32 mmol/L Anion gap 9 5 - 15 mmol/L Glucose 191 (H) 65 - 100 mg/dL BUN 41 (H) 6 - 20 MG/DL Creatinine 3.18 (H) 0.70 - 1.30 MG/DL  
 BUN/Creatinine ratio 13 12 - 20 GFR est AA 24 (L) >60 ml/min/1.73m2 GFR est non-AA 20 (L) >60 ml/min/1.73m2 Calcium 9.0 8.5 - 10.1 MG/DL Phosphorus 3.8 2.6 - 4.7 MG/DL Albumin 2.4 (L) 3.5 - 5.0 g/dL CBC WITH AUTOMATED DIFF Collection Time: 02/10/21  3:59 AM  
Result Value Ref Range WBC 8.6 4.1 - 11.1 K/uL  
 RBC 3.08 (L) 4.10 - 5.70 M/uL HGB 8.6 (L) 12.1 - 17.0 g/dL HCT 28.1 (L) 36.6 - 50.3 % MCV 91.2 80.0 - 99.0 FL  
 MCH 27.9 26.0 - 34.0 PG  
 MCHC 30.6 30.0 - 36.5 g/dL RDW 20.0 (H) 11.5 - 14.5 % PLATELET 220 146 - 852 K/uL MPV 9.2 8.9 - 12.9 FL  
 NRBC 0.6 (H) 0  WBC ABSOLUTE NRBC 0.05 (H) 0.00 - 0.01 K/uL NEUTROPHILS 81 (H) 32 - 75 % LYMPHOCYTES 7 (L) 12 - 49 % MONOCYTES 6 5 - 13 % EOSINOPHILS 4 0 - 7 % BASOPHILS 1 0 - 1 % IMMATURE GRANULOCYTES 1 (H) 0.0 - 0.5 % ABS. NEUTROPHILS 7.0 1.8 - 8.0 K/UL  
 ABS. LYMPHOCYTES 0.6 (L) 0.8 - 3.5 K/UL ABS. MONOCYTES 0.5 0.0 - 1.0 K/UL  
 ABS. EOSINOPHILS 0.3 0.0 - 0.4 K/UL  
 ABS. BASOPHILS 0.1 0.0 - 0.1 K/UL  
 ABS. IMM. GRANS. 0.1 (H) 0.00 - 0.04 K/UL  
 DF SMEAR SCANNED    
 RBC COMMENTS ANISOCYTOSIS 2+ 
    
 RBC COMMENTS POLYCHROMASIA PRESENT 
    
MAGNESIUM Collection Time: 02/10/21  3:59 AM  
Result Value Ref Range Magnesium 2.1 1.6 - 2.4 mg/dL BLOOD GAS, ARTERIAL Collection Time: 02/10/21  5:35 AM  
Result Value Ref Range pH 7.26 (L) 7.35 - 7.45    
 PCO2 54 (H) 35 - 45 mmHg PO2 65 (L) 80 - 100 mmHg O2 SAT 90 (L) 92 - 97 % BICARBONATE 24 22 - 26 mmol/L  
 BASE DEFICIT 4.1 mmol/L  
 O2 METHOD VENT    
 MODE ASSIST CONTROL Sample source ARTERIAL    
 SITE RIGHT RADIAL JUVENTINO'S TEST YES    
 TEMPERATURE 37.0 GLUCOSE, POC Collection Time: 02/10/21  6:16 AM  
Result Value Ref Range Glucose (POC) 225 (H) 65 - 100 mg/dL Performed by Carol Barnes MD 
[address] [place] [address], [address] [facility] Phone - [phone] Fax - [phone] 
[email or web address]

## 2021-02-10 NOTE — PROCEDURES
[facility] Dialysis Team [place]  [phone] Vitals   Pre   Post   Assessment   Pre   Post    
Temp  98.6  98.7 LOC  Sedated Unresponsive to pain Sedated Unresponsive to pain HR   65 70 Lungs   Diminished in lower lobes/ ventilated  no changes B/P   113/57  Cardiac   HRR/ monitored at BS/ remotely  HRR Resp   20 20 Skin   W/D. Open sores on feet. Missing baby toe on lt foot  no changes Pain level  0/10 0/10 Edema  Gen/ 2+ on all extremities Still 2+ on extremities Orders: Duration:   Start:   0815 End:   1145 Total:   3.5 hrs Dialyzer:   Dialyzer/Set Up Inspection: Revaclear([id number]/ [id number]) (02/10/21 0815) K Bath:   Dialysate K (mEq/L): 3.5 (02/10/21 0815) Ca Bath:   Dialysate CA (mEq/L): 2.5 (02/10/21 0815) Na/Bicarb:   Dialysate NA (mEq/L): 140 (02/10/21 0815) Target Fluid Removal:   Goal/Amount of Fluid to Remove (mL): 1500 mL (02/10/21 0815) Access Type & Location:   RIJ tem cath:  Ports cleaned w/ alcohol and preventics. +aspir/ NS flushes. Josie CDI, dated 2/8/21. No redness  or drainage noted Labs Obtained/Reviewed Critical Results Called   Date when labs were drawn- 
Hgb-   
HGB Date Value Ref Range Status 02/10/2021 8.6 (L) 12.1 - 17.0 g/dL Final  
 
K-   
Potassium Date Value Ref Range Status 02/10/2021 3.5 3.5 - 5.1 mmol/L Final  
 
Ca-  
Calcium Date Value Ref Range Status 02/10/2021 9.0 8.5 - 10.1 MG/DL Final  
 
Bun-  
BUN Date Value Ref Range Status 02/10/2021 41 (H) 6 - 20 MG/DL Final  
 
Creat-  
Creatinine Date Value Ref Range Status 02/10/2021 3.18 (H) 0.70 - 1.30 MG/DL Final  
 
  
Medications/ Blood Products Given Name   Dose   Route and Time Heparin 1000 units/ml 1.5ml art 
1.7ml cristina ICD at 1145 Blood Volume Processed (BVP):    83 Net Fluid Removed:  1500ml Comments Time Out Done: yes, at 0810 mjb Primary Nurse Rpt Pre:  Loris Dancer, RN 
 Primary Nurse Rpt Post:  Steve Hinton RN 
Pt Education: pt sedated and unresponsive to pain Care Plan:  Continue to do HD txs as ordered Tx Summary: Tolerated tx well. At end, blood in circuit returned w/ 300ml NS. Hep dwells instilled. Sterile caps applied and clamps secured. Drsg still CDI. Admiting Diagnosis:  Osteomyelitis/ ARDS/ ESRD Pt's previous clinic- 
Consent signed - verified Hepatitis Status- Hep B Ag neg 2/4/21 Hep B AB susc 2/4/21 Machine #- [id number]/ [id number] Telemetry status- monitored at [facility] and remotely Pre-dialysis wt. - unable to weigh--no scales on bed

## 2021-02-10 NOTE — PROGRESS NOTES
0730: Bedside shift change report given to ADIKTIVO, RN (oncoming nurse) by [place] RN (offgoing nurse). Report included the following information SBAR, Intake/Output, MAR, Recent Results and Cardiac Rhythm NSR.

## 2021-02-10 NOTE — PROGRESS NOTES
[facility] 
 
ICU TEAM Progress Note Name: Gareth Velez III  
: 1950 MRN: [id number] Date: 2/10/2021 Subjective:  
Progress Note: 2/10/2021 Mr. Calvin Lua is a 71 yo male w a PMH of prostate cancer s/p radiation, tobacco use disorder (1 ppd), CKD3a, gastric angioectasias,  IDDM, heart failure, HTN who was admitted with R foot osteomyelitis on . He was transferred to the ICU after experiencing a cardiac arrest in the [facility]. Please see below for a brief synopsis of his hospital course, by problem. 
  
Acute Hypoxic Respiratory failure secondary to COVID pneumonia: He also demonstrated dyspnea on initial admission, but was negative for COVID. Initially treated with steroids. On , he developed increased oxygen needs. He was COVID positive. He was started on and completed treatment with steroids (completed ), remdesivir (completed ), zinc, and vitamin C. He initially required BiPAP on , and was weaned to 4-5L/min. CT chest showed bilateral pleural effusions with ground glass. Diuresis was attempted. He was continued on zosyn. He required transfer to [facility] on 1/15 due to continued desaturations -> 70s on NRB. CXR with increased bilateral infiltrates. Started linezolid on . Required intubation on . See below under cardiac arrest. He was extubated on , but required reintubation the same day. He completed a course of empiric 14d zyvox (ended ) and repeated empiric stress dose steroids (ended ). He remains on prolonged zosyn for treatment of osteomyelitis. Respiratory cultures have remained NGTD. He continues to fail SBTs and have intermittent increase in oxygen requirements Cardiac arrest, s/p ROSC: Not cooled as he regained mental function s/p ROSC. Code time: reported to be 12 minutes The patient had been taken off BiPAP for lunch. He was on intermittent BiPAP and HiFlow, demonstrating stability to come off to eat. He ate his lunch and was later found SOB unresponsive and pulseless. He was thereafter transferred to the ICU. He regained mentation, was able to respond to commands, shake head/yes, no, but ultimately did require sedation for compliance with mechanical ventilation. Likely hypoxic arrest. 
 
Septic shock: Osteomyelitis has been only identified source of infection. Please see above under respiratory failure for full courses of abx. Has been on/off pressors since ICU admission. Admitted with a chronic wound on his R foot. He was started on broad spectrum antibiotics with vanc, zosyn, and flagyl and underwent wound debridement on 12/30. BLC positive for S agalactiae on 12/28. Foot cultures + for Protues mirabilis, group B strep, and Enterococcus. Antibiotics were then narrowed to zosyn. Wound vac placed on foot 1/5. Zosyn for 6 weeks. Acute on chronic CKD3a: Baseline Cr 1.7. Renal has followed throughout his course. Renal failure progressively worsening. He started IHD on 2/4. IDDM2: Lantus and lispro Anemia, acute on chronic: Course c/b anemia w heme + stool. EGD on 1/4 showing superficial ulcers in the distal bulb and second portion of the duodenum measuring 2-4mm.   
  
COVID negative 12/28, 12/29, but positive 1/11 Overnight: 
Off levophed Hold on trach placement for worsening Hypoxia. Fo2 now 90% and PEEP 12 Recheck covid test - pending Active Problem List:  
 
Problem List  Date Reviewed: 8/20/2020 Codes Class ARDS (adult respiratory distress syndrome) ([facility] ICD-10-CM: T36 
ICD-9-CM: 518.52 * (Principal) Osteomyelitis ([facility].) ICD-10-CM: M86.9 ICD-9-CM: 730.20 Diabetic ulcer of right midfoot associated with type 2 diabetes mellitus, with fat layer exposed ([facility] ICD-10-CM: E11.621, O78.332 ICD-9-CM: 250.80, 707.14 PAD (peripheral artery disease) ([facility]) ICD-10-CM: I73.9 ICD-9-CM: 443.9 Dependence on nicotine from cigarettes ICD-10-CM: F17.210 ICD-9-CM: 305.1 KATHI (acute kidney injury) ([facility].) ICD-10-CM: N17.9 ICD-9-CM: 072. 9 Acute on chronic renal failure (HCC) ICD-10-CM: N17.9, N18.9 ICD-9-CM: 584.9, 585.9 Severe obesity (BMI 35.0-39. 9) with comorbidity ([facility] ICD-10-CM: E66.01 
ICD-9-CM: 278.01 Type 2 diabetes with nephropathy ([facility]) ICD-10-CM: E11.21 
ICD-9-CM: 250.40, 583.81 S/P hip replacement, right ICD-10-CM: O60.252 ICD-9-CM: V43.64 Stage 3 chronic kidney disease ICD-10-CM: N18.30 ICD-9-CM: 507. 3 Prostate [facility]) ICD-10-CM: A37 ICD-9-CM: 582 Diabetic polyneuropathy ([facility] ICD-10-CM: E11.42 
ICD-9-CM: 250.60, 357.2 Rotator Cuff Tendonitis ICD-10-CM: M71.9, M67.919 ICD-9-CM: 726.10 Diabetes mellitus type 2, controlled ([facility] ICD-10-CM: E11.9 ICD-9-CM: 250.00 Degenerative disc disease ICD-10-CM: RMB5142 ICD-9-CM: 722.6 Osteoarthrosis involving lower leg ICD-10-CM: M17.10 ICD-9-CM: 715.96 Depression/ Anxiety ICD-10-CM: F34.1 ICD-9-CM: 300.4 HTN (hypertension) ICD-10-CM: I10 
ICD-9-CM: 401.9 Chronic hip pain ICD-10-CM: M25.559, G89.29 ICD-9-CM: 719.45, 338.29 Hypertriglyceridemia ICD-10-CM: E78.1 ICD-9-CM: 272.1 Mild Aortic Insufficiency ICD-10-CM: I35.1 ICD-9-CM: 424.1 Mild Concentric LVH (left ventricular hypertrophy) ICD-10-CM: I51.7 ICD-9-CM: 429.3 Past Medical History: has a past medical history of Arthritis, Degenerative,  Knee (4/4/2011), Carcinoma, Prostate (4/4/2011), Degenerative disc disease (4/4/2011), Depression/ Anxiety (4/4/2011), Diabetes Mellitrus ( non-insulin dependent ) Type 2 (4/4/2011), Heart failure ([facility] HEMATOCHEZIA (4/4/2011), Hypertrension (4/4/2011), Hypertriglyceridemia (4/4/2011), Ill-defined condition, Insomnia (4/4/2011), Laryngitis (4/4/2011), Mild Aortic insufficiency (4/4/2011), Mild Concentric LVH (left ventricular hypertrophy) (4/4/2011), Neuropathy (4/4/2011), Osteoarthritis (4/4/2011), and Rotator Cuff Tendonitis (4/4/2011). Past Surgical History:  
 
 has a past surgical history that includes hx urological; hx other surgical; pr cardiac surg procedure unlist; colonoscopy (N/A, 4/28/2017); hx orthopaedic; hx orthopaedic; and ir insert tunl cvc w/o port over 5 yr (1/6/2021). Home Medications:  
 
Prior to Admission medications Medication Sig Start Date End Date Taking? Authorizing Provider  
pregabalin (LYRICA) 150 mg capsule TAKE ONE CAPSULE BY MOUTH TWICE A DAY 12/7/20  Yes Romi Ontiveros MD  
bicalutamide (CASODEX) 50 mg tablet TAKE ONE TABLET BY MOUTH DAILY 11/30/20  Yes Romi Ontiveros MD  
torsemide (DEMADEX) 20 mg tablet TAKE FOUR TABLETS BY MOUTH TWICE A DAY 11/23/20  Yes Romi Ontiveros MD  
pantoprazole (PROTONIX) 40 mg tablet TAKE ONE TABLET BY MOUTH TWICE DAILY ONCE IN THE MORNING AND ONCE IN THE EVENING 11/18/20  Yes Romi Ontiveros MD  
hydrOXYzine HCL (ATARAX) 50 mg tablet TAKE ONE TABLET BY MOUTH THREE TIMES A DAY AS NEEDED FOR ITCHING FOR UP TO 10 DAYS 10/22/20  Yes Romi Ontiveros MD  
amLODIPine (NORVASC) 10 mg tablet TAKE ONE TABLET BY MOUTH DAILY 10/2/20  Yes Romi Ontiveros MD  
hydrALAZINE (APRESOLINE) 100 mg tablet Take 1 Tab by mouth three (3) times daily.  9/16/20  Yes Romi Ontiveros MD  
 ascorbic acid, vitamin C, (Vitamin C) 500 mg tablet Take  by mouth. Yes Provider, Historical  
enzalutamide (XTANDI) 40 mg capsule Take 160 mg by mouth daily. Yes Provider, Historical  
Thera-Tabs M 27 mg iron-400 mcg tab TAKE ONE TABLET BY MOUTH DAILY 7/17/20  Yes Leana Buerger., MD  
tamsulosin [facility]) 0.4 mg capsule TAKE ONE CAPSULE BY MOUTH DAILY 7/16/20  Yes Leana Buerger., MD  
Insulin Syringe-Needle U-100 (BD Insulin Syringe Ultra-Fine) 1 mL 31 gauge x 5/16 syrg USE TO INJECT INSULIN FIVE TIMES A DAY 6/16/20  Yes Leana Buerger., MD  
ferrous sulfate 325 mg (65 mg iron) tablet TAKE ONE TABLET BY MOUTH DAILY BEFORE BREAKFAST 6/16/20  Yes Leana Buerger., MD  
gabapentin (NEURONTIN) 300 mg capsule Take 1 Cap by mouth three (3) times daily. Max Daily Amount: 900 mg. 5/25/20  Yes Leana Buerger., MD  
triamcinolone acetonide (KENALOG) 0.1 % ointment APPLY A THIN LAYER TO AFFECTED AREA(S) TWO TIMES A DAY **DO NOT EXCEED 2.66 GRAMS PER DAY** 5/19/20  Yes Leana Buerger., MD  
diclofenac (VOLTAREN) 1 % gel Apply  to affected area four (4) times daily. 4/16/20  Yes Leana Buerger., MD  
ammonium lactate (AMLACTIN) 12 % topical cream Apply  to affected area two (2) times a day. rub in to affected area well 3/10/20  Yes Leana Buerger., MD  
ammonium lactate (LAC-HYDRIN FIVE) 5 % lotion Apply 1 Applicator to affected area daily. Apply daily bilateral lower legs 3/5/20  Yes Leana Buerger., MD  
insulin glargine (LANTUS U-100 INSULIN) 100 unit/mL injection 72 Units by SubCUTAneous route daily. 3/5/20  Yes Leana Buerger., MD  
insulin regular (NOVOLIN R, HUMULIN R) 100 unit/mL injection 24 units sc tid 3/5/20  Yes Leana Buerger., MD  
ketoconazole (NIZORAL) 2 % topical cream Apply  to affected area two (2) times a day.  2/4/20  Yes Leana Buerger., MD  
ketoconazole (NIZORAL) 2 % shampoo Sig:shampoo once a week 2/4/20  Yes Leana Buerger., MD  
 bisacodyl (DULCOLAX, BISACODYL,) 10 mg suppository Insert 10 mg into rectum daily. Yes Provider, Historical  
insulin aspart U-100 (NOVOLOG FLEXPEN U-100 INSULIN) 100 unit/mL (3 mL) inpn by SubCUTAneous route. Yes Provider, Historical  
Insulin Needles, Disposable, (NOVOFINE 32) 32 gauge x 1/4\" ndle Sig:use 4 times a day as needed 19  Yes MD REMI SalgadoLIN R REGULAR U-100 INSULN 100 unit/mL injection INJECT 14 UNITS UNDER THE SKIN THREE TIMES A DAY WITH MEALS AS NEEDED 19  Yes Hetal Bruno MD  
fluticasone propionate [facility]) 50 mcg/actuation nasal spray SPRAY TWO SPRAYS IN THE AFFECTED NOSTRIL DAILY 19  Yes Hetal Bruno MD  
aspirin delayed-release 81 mg tablet Take 81 mg by mouth daily. Yes Provider, Historical  
acetaminophen (PAIN AND FEVER) 500 mg tablet TAKE ONE TABLET BY MOUTH EVERY 6 HOURS AS NEEDED FOR PAIN 18  Yes Hetal Bruno MD  
Calcium Carbonate-Vit D3-Min 600 mg calcium- 400 unit tab Take 1 Tab by mouth two (2) times a day. Yes Provider, Historical  
 
Allergies/Social/Family History: No Known Allergies Social History Tobacco Use  Smoking status: Current Every Day Smoker Packs/day: 1.00 Last attempt to quit: 2019 Years since quittin.2  Smokeless tobacco: Never Used Substance Use Topics  Alcohol use: Not Currently Alcohol/week: 11.7 standard drinks Types: 7 Cans of beer, 7 Shots of liquor per week Family History Family history unknown: Yes Review of Systems:  
 
Unable to provide - intubated and sedated Objective:  
Vital Signs: 
Visit Vitals BP (!) 121/57 Pulse 69 Comment: status Temp 98.6 °F (37 °C) (Axillary) Resp 20 Ht 6' (1.829 m) Wt 124 kg (273 lb 5.9 oz) SpO2 96% BMI 37.08 kg/m² O2 Flow Rate (L/min): 15 l/min(Mid flow at 9 liters also) O2 Device: Endotracheal tube, Ventilator Temp (24hrs), Av.4 °F (36.9 °C), Min:98 °F (36.7 °C), Max:98.9 °F (37.2 °C) Intake/Output:  
 
Intake/Output Summary (Last 24 hours) at 2/10/2021 1050 Last data filed at 2/10/2021 0800 Gross per 24 hour Intake 2669.55 ml Output 261 ml Net 2408.55 ml Physical Exam: 
General: Intubated and sedated EENT: PERRL 3mm/brisk. MMM Resp: Coarse throughout and decreased to bases CV: Regular rhythm, normal rate, no murmurs GI: Soft, moderately distended with hypoactive BS. Flexi-seal 
Extremities: Lymphedema with eschar noted B/L R>L. Wound vac to RLE. Vascular changes. Warm to touch. Unable to palpate pulses but dopplerable Neurologic: RASS -3. Skin: Warm and dry. Lines: V[place] 41 CVC, RIJ Rudolph, Bynum, Flexiseal, OGT 
 
 
LABS AND  DATA: Personally reviewed Recent Labs  
  02/10/21 
0359 02/09/21 
1715 21 
2866 WBC 8.6  --  8.9 HGB 8.6* 7.4* 7.1*  
HCT 28.1* 22.5* 22.3*  
  --  303 Recent Labs  
  02/10/21 
03521 
0413 21 
3028 * 133*  --   
K 3.5 3.6  --   
CL 97 99  --   
CO2 25 27  --   
BUN 41* 32*  --   
CREA 3.18* 2.56*  --   
* 224*  --   
CA 9.0 8.6  --   
MG 2.1  --  2.0 PHOS 3.8 2.6 2.8 Recent Labs  
  02/10/21 
0359 02/09/21 
0413 ALB 2.4* 2.2* No results for input(s): INR, PTP, APTT, INREXT, INREXT in the last 72 hours. No results for input(s): PHI, PCO2I, PO2I, FIO2I in the last 72 hours. No results for input(s): CPK, CKMB, TROIQ, BNPP in the last 72 hours. Ventilator Settings: 
AC: 60% R 18 PEEP 10  MEDS: Reviewed  CXR: Reviewed  (X-Ray from ) IMPRESSION Multilobar pneumonia/ARDS. Assessment:  
 
Acute Hypoxic Respiratory Failure Secondary to COVID Pneumonia:   
- Intubated and extubated on several occasions - Family has consented to trach when able to place - ARDS volume ventilation. Vt ~ 600 ml  
- Unable to do much weaning on vent due to oxygenation. 
- May need to consider NO if no improvement Sepsis: Osteomyelitis is only known bacterial infectious etiology. Likely cytokine reaction in setting of COVID-19. Cultures NGTD. Completed steroids. Completed course of linezolid (empiric). On prolonged zoysn for osteomyelitis. - Continue zosyn - end date 2/12 KATHI on CKD3a: Renal following; renal failure progressively worsening 
- IHD per renal; received on 2/6 
- HD on Monday, Wed. Fri Anemia, acute on chronic: PRBC on 2/5 
- Hgb down to 6.8 today, transfuse 1 PRBCs HFpEF with Exacerbation:   
- EF 45-50%, mildly dilated RV w mildly reduced systolic function. Diuresis difficult in setting of KATHI on CKD3.   
- IHD for volume removal as we are able. Delirium:  
- When sedation is lightened, experiences hyperactive delirium, which interferes with weaning 
- Continue propofol & wean Versed R foot wound: Complicated by osteomyelitis. - Wound vac - Zosyn as above - ID following Deconditioning: Unable to work w PT/OT at this time Prostate Cancer: Holding casodex as this cannot be crushed an pt has OGT. Multidisciplinary Rounds Completed:  Rounded with RN 
 
ABCDEF Bundle/Checklist 
Pain Medications: Fentanyl Target RASS: -3 Sedation Medications: Propofol, Versed CAM-ICU:  Positive Mobility: Poor PT/OT:Unable to participate Restraints: Soft wrist restraints. Discussed Plan of Care (goals of care): Yes with RN. Addressed Code Status: Full Code CARDIOVASCULAR Cardiac Gtts: None. Levophed intermittently with dialysis SBP Goal of: > 90 mmHg MAP Goal of: >65 Transfusion Trigger (Hgb): <7 g/dL RESPIRATORY Vent Goals:  
Optimize PEEP/Ventilation/Oxygenation DVT Prophylaxis (if no, list reason): Heparin SQ  
SPO2 Goal: > 92% GI/ Bynum Catheter Present: Yes GI Prophylaxis: Yes  (hx GIB) Nutrition: Yes TF Bowel Movement: Yes 
 Bowel Regimen: Docusate (Colace) Insulin: Y - Lantus ANTIBIOTICS Antibiotics: 
Zosyn T/L/D Tubes: Orogastric Tube Lines: [employer] Drains: Bynum Catheter [employer] Vent DISPOSITION Stay in ICU CRITICAL CARE CONSULTANT NOTE I had a face to face encounter with the patient, reviewed and interpreted patient data including clinical events, labs, images, vital signs, I/O's, and examined patient. I have discussed the case and the plan and management of the patient's care with the consulting services, the bedside nurses and the respiratory therapist.   
 
NOTE OF PERSONAL INVOLVEMENT IN CARE This patient has a high probability of imminent, clinically significant deterioration, which requires the highest level of preparedness to intervene urgently. I participated in the decision-making and personally managed or directed the management of the following life and organ supporting interventions that required my frequent assessment to treat or prevent imminent deterioration. I personally spent 40 minutes of critical care time. This is time spent at this critically ill patient's bedside actively involved in patient care as well as the coordination of care and discussions with the patient's family. This does not include any procedural time which has been billed separately. Lnidsay Webster [facility] Critical Care Medicine [facility]

## 2021-02-10 NOTE — PROGRESS NOTES
02/10/21 0558  
Vent Settings  
FIO2 (%) 90 %  
CMV Rate Set 20  
Back-Up Rate 20  
Vt Set (ml) 500 ml  
PEEP/VENT (cm H2O) 12 cm H20  
I:E Ratio 1:2  
Insp Flow (l/min) 55 l/min  
Flow Trigger 3  
Ventilator Measurements  
Resp Rate Observed 20  
Vt Exhaled (Machine Breath) (ml) 558 ml  
Ve Observed (l/min) 10.7 l/min  
PIP Observed (cm H2O) 32 cm H2O  
Plateau Pressure (cm H2O) 24 cm H2O  
MAP (cm H2O) 19  
I:E Ratio Actual 1:2  
Auto PEEP Observed (cm H2O) 0.4 cm H2O  
Static Compliance (ml/cm H20) 51 ml/cm H20  
Vent Method/Mode  
Ventilator Mode Assist control;Volume control 
(mode change per Dr Armstrong)  
 
0110 RT noted inconsistent volumes on PC. RT discussed with RN. Possible need for increased sedation. Pt  moving, fighting vent although he looks comfortable, no biting noted. 
0535 RT geraldo ABG. RT noted continued widely variable volumes. Pt isnt moving but RT notes pt has inconsistent  chest movement. RT & RN discussed. RT to possibly re-address after ABG. FIO2 increased to 90%  immediately after ABG d/t borderline SpO2. 
0545 RT troubleshooting inconsistent volumes. Congestion noted. SXN catheter easy to pass through ETT.  Some bio-film noted but cleared with SXN. Pt not able to ventilate with some breaths, volumes <100mls,  high PIPs. Pt has no gag reflex. No secretions obtained with SXN, even with lavage. RT changed mode to  VC. Pt not able to ventilate. RT switched back to PC. Pt still not ventilating. Still  No secretions with SXN. RT  called for RN & Dr Armstrong. Pt may need bronch. RT removed pt from vent to bag. Pt easy to bag. Still no  secretions with suction. After a couple minutes, pt back on vent. Now ventilating, achieving 550-600mls. 
 6615 Dr Alex Arevalo now at bedside. RT explained events. This RT believes pt may have secretions wrapped around  end of the ETT which is affecting the patients ability to pull consistent volumes & at times not able to pull  volumes at all. The secretions are intermittently dropping in the pathway of the end of the ETT. This would  also explain why we are not able to [facility] it out of the way & the patient being easy to bag.  RT reported to Dr Alex Arevalo.

## 2021-02-10 NOTE — PROGRESS NOTES
ID Progress Note 2/10/2021 Subjective: Afebrile. On the ventilator at 90%. New central line placed in . ROS: Unobtainable Objective:  
 
Vitals:  
Visit Vitals /62 Pulse 70 Comment: status Temp 98.6 °F (37 °C) (Axillary) Resp 20 Ht 6' (1.829 m) Wt 124 kg (273 lb 5.9 oz) SpO2 96% BMI 37.08 kg/m² Tmax:  Temp (24hrs), Av.4 °F (36.9 °C), Min:98 °F (36.7 °C), Max:98.9 °F (37.2 °C) Exam:  
intubated Lung clear, no rales, wheezes or rhonchi Heart: s1, s2, RRR, no murmurs rubs or clicks Abdomen: soft nontender, no guarding or rebound Labs:  
Lab Results Component Value Date/Time WBC 8.6 02/10/2021 03:59 AM  
 Hemoglobin (POC) 6.5 2020 01:59 PM  
 HGB 8.6 (L) 02/10/2021 03:59 AM  
 HCT 28.1 (L) 02/10/2021 03:59 AM  
 PLATELET 062  03:59 AM  
 MCV 91.2 02/10/2021 03:59 AM  
 
Lab Results Component Value Date/Time Sodium 131 (L) 02/10/2021 03:59 AM  
 Potassium 3.5 02/10/2021 03:59 AM  
 Chloride 97 02/10/2021 03:59 AM  
 CO2 25 02/10/2021 03:59 AM  
 Anion gap 9 02/10/2021 03:59 AM  
 Glucose 191 (H) 02/10/2021 03:59 AM  
 BUN 41 (H) 02/10/2021 03:59 AM  
 Creatinine 3.18 (H) 02/10/2021 03:59 AM  
 BUN/Creatinine ratio 13 02/10/2021 03:59 AM  
 GFR est AA 24 (L) 02/10/2021 03:59 AM  
 GFR est non-AA 20 (L) 02/10/2021 03:59 AM  
 Calcium 9.0 02/10/2021 03:59 AM  
 Bilirubin, total 0.6 2021 02:54 AM  
 Alk. phosphatase 79 2021 02:54 AM  
 Protein, total 7.0 2021 02:54 AM  
 Albumin 2.4 (L) 02/10/2021 03:59 AM  
 Globulin 4.2 (H) 2021 02:54 AM  
 A-G Ratio 0.7 (L) 2021 02:54 AM  
 ALT (SGPT) <6 (L) 2021 02:54 AM  
 
 
 
 
 
Assessment:  
 
#1 osteomyelitis of the right foot 
  
#2 group b strep  bacteremia 
  
#3 renal insufficiency 
  
#4 diabetes 
  
#5 prostate cancer 
  
#6 hypertension 
  
#7 heart failure 
  
 #8 covid  
  
#9 diarrhea Recommendations: Continue zosyn. There is OM on the surgical margin. CRP elevated, but could also be from his lung process. The wound looks good. We can discontinue zosyn on 2/12/21 He has completed treatment with remdesivir.  (1/121/16) 
  He has completed dexamethasone (1/12 - 1/21)  Completed 14 day course of zyvox on 1/31.   
 
 
 
Loree Christie MD

## 2021-02-10 NOTE — PROGRESS NOTES
Comprehensive Nutrition Assessment Type and Reason for Visit: Rosemary Jain Nutrition Recommendations/Plan:   
 
Check TG Modify tube feeding: Nepro @ 40 ml/hr with 2 packets Prosource bid and 80 ml water flush q 4 hr Phosphorus WNL-?discontinue Renvela Nutrition Assessment:   
72 y.o. male with PMHx of prostate CA s/p XRT on oral chemo, DM, CHF, CKD 3, and HTN.  Admitted with severe sepsis 2° osteomyelitis of R foot, s/p debridement/removal of infected bone and wound vac placement-wound is improving per WOCN. COVID + 1/11 (negative on admission). Cardiac arrest 1/23 with acute hypoxic respiratory failure 2/2 COVID PNA-intubated. Failed extubation 1/26. Needs trach but vent requirements too high. KATHI on CKD 3 2/2 ATN-started HD 2/4. HFpEF. WOCN following (DTI and wound vac). Mr Odalys Couch is tolerating enteral feeding well. FMS placed 2/5 d/t multiple loose stools. Bowel regimen discontinued; Probiotic and Benefiber ordered. Less output the past couple of days but tube feeding being held for possible trach placement. Would not hold tube feeding at midnight-hold at 8 am; connect OGT to suction prior to trach being placed. This will help to ensure pt is receiving adequate nutrition and not hold feeding for too long unnecessarily. TG elevated @ 576 when checked last week. Propofol @ current rate will provide 755 lipid calories (69 gm fat) per day. Tube feeding as ordered provides 1100 ml, 2130 calories (2880 including Propofol), 133 gm protein and 1780 ml free water (tube feeding/flush) per day to exceed energy needs. Recommend adjusting tube feeding to avoid overfeeding while on Propofol. New goal: Nepro @ 40 ml/hr with 2 packets Prosource bid and 80 ml water flush q 4 hr. This will provide 880 ml, 1800 calories (2555 including Propofol), 130 gm protein and 1350 ml free water (tube feeding/flush) per day. Sodium BNL and is trending down. Reduced water flush today-continue to monitor and adjust prn. May wish to discontinue Renvela since phosphorus now WNL. Malnutrition Assessment: 
Malnutrition Status: At risk for malnutrition (specify) d/t critical illness Context:  Acute illness Nutritionally Significant Medications:  
Propofol, Versed, Fentanyl, Vit C, Vit D3, Benefiber, Lantus, Humalog, Risaquad, Prevacid, Renvela, MVI Estimated Daily Nutrient Needs: 
Energy (kcal): 2320 (20 kcal/kg); Weight Used for Energy Requirements: Current Protein (g): 140 (1.2g/kg or 1.7g/kg IBW); Weight Used for Protein Requirements: Current(116.7 kg) Fluid (ml/day): ~0021-7514; Method Used for Fluid Requirements: Standard renal 
 
Nutrition Related Findings:      
BM: 2/9 (FMS) Edema: 1+ generalized, 2+ genital, Trace BUE, BLE Wounds:  Deep tissue injury, Wound vac Current Nutrition Therapies:  
Diet: NPO Tube Feeding: Nepro @ 50 ml/hr with 3 packets Prosource daily and 100 ml water flush q 3 hr Additional Caloric Sources: Propofol @ 31.4 ml/hr Anthropometric Measures: 
· Height:  6' (182.9 cm) · Current Body Wt:  124 kg (273 lb 5.9 oz) · Admission Body Wt:  270 lb(source unknown) · Ideal Body Wt: 178#   :  153.6 % · BMI Categories:  Obese class 1 (BMI 30.0-34. 9) Wt Readings from Last 10 Encounters:  
02/10/21 124 kg (273 lb 5.9 oz) 02/24/20 127 kg (280 lb) 02/10/20 127 kg (280 lb) 05/28/19 124.7 kg (275 lb) 04/04/19 124.7 kg (275 lb)  
03/31/19 125 kg (275 lb 9.2 oz)  
03/20/19 122.8 kg (270 lb 12.8 oz) 04/26/18 130.6 kg (288 lb)  
02/27/18 126.1 kg (278 lb)  
06/15/17 124.2 kg (273 lb 14.4 oz) Nutrition Diagnosis:  
· Increased nutrient needs related to increased demand for energy/nutrients as evidenced by wounds ·  
· Inadequate oral intake related to impaired respiratory function as evidenced by NPO or clear liquid status due to medical condition, intubation Nutrition Interventions:  
Food and/or Nutrient Delivery: Modify tube feeding Nutrition Education and Counseling: No recommendations at this time Coordination of Nutrition Care: Continue to monitor while inpatient, Interdisciplinary rounds Goals: 
Tube feeding to meet 90% estimated protein needs x 5-7 days. Nutrition Monitoring and Evaluation:  
Behavioral-Environmental Outcomes: None identified Food/Nutrient Intake Outcomes: Enteral nutrition intake/tolerance Physical Signs/Symptoms Outcomes: Biochemical data, GI status, Fluid status or edema, Hemodynamic status, Weight Discharge Planning: Too soon to determine Natali Valencia RD CNSC Contact: Perfect Serve

## 2021-02-11 NOTE — PROGRESS NOTES
Nutrition Note Chart reviewed; discussed during MDR. Patient remains critically ill-plan to prone again today. Propofol rate increased to 34.9 ml/hr which will provide 84 gm fat and 920 calories per day. Discussed checking TG during rounds. New tube feeding goal: Prone: Nepro @ 20 ml/hr x 14 hr; Supine: 60 ml/hr x 6 hr with 2 packets Prosource bid and 50 ml water flush q 4 hr. This will provide a total of 640 ml, 1390 calories (2310 including Propofol), 112 gm protein and 1000 ml free water (tube feeding/flush) per day to meet % estimated protein and energy needs, respectively. RD to follow. Estimated Nutrition Needs:  
Energy: 2320 (20 kcal/kg)  Wt used: Current Protein: 140 (1.2g/kg or 1.7g/kg IBW)  Wt used: Current(116.7 kg) Fluid: ~1491-0440 Electronically signed by Porter Mckeon RD on 2/11/2021 at 1:51 PM 
Contact: Perfect Serve

## 2021-02-11 NOTE — PROGRESS NOTES
ID Progress Note 2021 Subjective: Afebrile. New central line placed in . ROS: Unobtainable Objective:  
 
Vitals:  
Visit Vitals /76 Pulse 90 Temp 98.6 °F (37 °C) Resp 20 Ht 6' (1.829 m) Wt 124.7 kg (274 lb 14.6 oz) SpO2 97% BMI 37.29 kg/m² Tmax:  Temp (24hrs), Av.6 °F (37 °C), Min:98.3 °F (36.8 °C), Max:99 °F (37.2 °C) Exam:  
intubated 
prone Labs:  
Lab Results Component Value Date/Time WBC 7.7 2021 03:18 AM  
 Hemoglobin (POC) 6.5 2020 01:59 PM  
 HGB 8.1 (L) 2021 03:18 AM  
 HCT 26.2 (L) 2021 03:18 AM  
 PLATELET 611 15/81/1270 03:18 AM  
 MCV 93.9 2021 03:18 AM  
 
Lab Results Component Value Date/Time Sodium 132 (L) 2021 03:18 AM  
 Potassium 3.7 2021 03:18 AM  
 Chloride 96 (L) 2021 03:18 AM  
 CO2 26 2021 03:18 AM  
 Anion gap 10 2021 03:18 AM  
 Glucose 197 (H) 2021 03:18 AM  
 BUN 34 (H) 2021 03:18 AM  
 Creatinine 2.44 (H) 2021 03:18 AM  
 BUN/Creatinine ratio 14 2021 03:18 AM  
 GFR est AA 32 (L) 2021 03:18 AM  
 GFR est non-AA 26 (L) 2021 03:18 AM  
 Calcium 8.9 2021 03:18 AM  
 Bilirubin, total 0.6 2021 02:54 AM  
 Alk. phosphatase 79 2021 02:54 AM  
 Protein, total 7.0 2021 02:54 AM  
 Albumin 2.3 (L) 2021 03:18 AM  
 Globulin 4.2 (H) 2021 02:54 AM  
 A-G Ratio 0.7 (L) 2021 02:54 AM  
 ALT (SGPT) <6 (L) 2021 02:54 AM  
 
 
 
 
 
Assessment:  
 
#1 osteomyelitis of the right foot 
  
#2 group b strep  bacteremia 
  
#3 renal insufficiency 
  
#4 diabetes 
  
#5 prostate cancer 
  
#6 hypertension 
  
#7 heart failure 
  
 #8 covid  
  
#9 diarrhea Recommendations:  
 
Continue zosyn. There is OM on the surgical margin. CRP elevated, but could also be from his lung process. The wound looks good. We can discontinue zosyn on 21 He has completed treatment with remdesivir.  (1/121/16) 
  He has completed dexamethasone (1/12 - 1/21)  Completed 14 day course of zyvox on 1/31.   
 
 
 
Carlos Landaverde MD

## 2021-02-11 NOTE — PROGRESS NOTES
0730: Bedside shift change report given to Miguel Angel Ramirez RN  (oncoming nurse) by Nahid Gómez RN  (offgoing nurse). Report included the following information SBAR, Kardex, Intake/Output, MAR, Recent Results, Cardiac Rhythm SR and Alarm Parameters . 5165: Attempted to wean versed to 6 mg. 
5749: Versed rate changed back to 7 mg for desating and vent asynchrony,will NP notified. 0940: Propofol increased to 45 mcg. 
0943: Propofol increased to 50 mcg. [id number] Aj NP notified of increased sedation requirement and O2 sats 88-90%, orders to follow 1148: Versed maxed at 10 mg, fentanyl increased to 200 mcg per DORCAS Garcia. 
1213: Triglycerides drawn. Bynum d/c'd. 
1424: Fentanyl maxed at 300 mcg per Nakita Hooker NP for prone. Propofol decreased to 25 mcg per DORCAS Garcia rt high triglycerides. 1330: Triglycerides resulted at 1,303, discussed with DORCAS Garcia, will wean off propofol. 1447: 50 mg rocuronium given in preparation for proning. 1500: Pt proned. FiO2 100%. Tube feed rate changed per order. Sacral dressing changed, Desitin applied. 1618: 50 mg rocuronium and 2 mg versed IV push given for low tidal volumes and vent asynchrony. Propofol stopped for high triglycerides. 1623: Versed rate change to 15. Propofol stopped. RT weaned FiO2 to 90% 1743: Bseline TOF 4/4 twitches at 50 mA. Nimbex started at 3 mcg per order 1745: Pt suctioned orally, large amounts of secretions similar to tube feeds suctioned out, tube feeds paused, will notify provider. 1800: Nimbex rate change to 4 mcg, TOF 4/4  at 50mA 1815: Nimbex rate changed to 5 mcg, TOF 4/4 at [address]: TOF 0/4, nimbex changed to 4 mcg.   
1930: Bedside shift change report given to Nahid Gómez RN (oncoming nurse) by Miguel Angel Ramirez RN (offgoing nurse). Report included the following information SBAR, Kardex, Intake/Output, MAR, Recent Results, Cardiac Rhythm NSR and Alarm Parameters .

## 2021-02-11 NOTE — PROGRESS NOTES
Nursing Shift Note [id number] 1940: Bedside and Verbal shift change report given to Abraham Sauer RN (oncoming nurse) by Sourav Rome RN (offgoing nurse). Report included the following information SBAR, Kardex, Procedure Summary, Intake/Output, MAR, Accordion, Recent Results and Cardiac Rhythm NSR.  
 
2100: Patient incontinent of stool, flexiseal came out. Performed incontinent care, applied Desitin, patient desaturating so paused and will place new flexi later 0050: Patient given full bath, new flexi seal placed Central line dressing changed. Feeding set changed. Patient weaned to 40 propofol, 7 Versed 1504: Bedside and Verbal shift change report given to [facility] RUBA MURO (oncoming nurse) by Abraham Sauer RN (offgoing nurse). Report included the following information SBAR, Kardex, Procedure Summary, Intake/Output, MAR, Accordion and Cardiac Rhythm NSR.

## 2021-02-11 NOTE — PROGRESS NOTES
[facility] 
 
ICU TEAM Progress Note Name: Steve Adam III  
: 1950 MRN: [id number] Date: 2021 Subjective:  
Progress Note: 2021 Mr. Loki Smith is a 71 yo male w a PMH of prostate cancer s/p radiation, tobacco use disorder (1 ppd), CKD3a, gastric angioectasias, IDDM, heart failure, HTN who was admitted with R foot osteomyelitis on . He was transferred to the ICU after experiencing a cardiac arrest in the [facility]. Please see below for a brief synopsis of his hospital course, by problem. · Acute Hypoxic Respiratory failure secondary to COVID pneumonia: He also demonstrated dyspnea on initial admission, but was negative for COVID. Initially treated with steroids. On , he developed increased oxygen needs. He was COVID positive. He was started on and completed treatment with steroids (completed ), remdesivir (completed ), zinc, and vitamin C. He initially required BiPAP on , and was weaned to 4-5L/min. CT chest showed bilateral pleural effusions with ground glass. Diuresis was attempted. He was continued on zosyn. He required transfer to [facility] on 1/15 due to continued desaturations -> 70s on NRB. CXR with increased bilateral infiltrates. Started linezolid on . Required intubation on . See below under cardiac arrest. He was extubated on , but required reintubation the same day. He completed a course of empiric 14d zyvox (ended ) and repeated empiric stress dose steroids (ended ). He remains on prolonged zosyn for treatment of osteomyelitis. Respiratory cultures have remained NGTD. He continues to fail SBTs and have intermittent increase in oxygen requirements. Began prone therapy . · Cardiac arrest, s/p ROSC: Not cooled as he regained mental function s/p ROSC. Code time: reported to be 12 minutes The patient had been taken off BiPAP for lunch.  He was on intermittent BiPAP and HiFlow, demonstrating stability to come off to eat. He ate his lunch and was later found SOB unresponsive and pulseless.  He was thereafter transferred to the ICU.  He regained mentation, was able to respond to commands, shake head/yes, no, but ultimately did require sedation for compliance with mechanical ventilation. Likely hypoxic arrest. 
 
· Septic shock: Osteomyelitis has been only identified source of infection.  Please see above under respiratory failure for full courses of abx.  Has been on/off pressors since ICU admission. Admitted with a chronic wound on his R foot. He was started on broad spectrum antibiotics with vanc, zosyn, and flagyl and underwent wound debridement on 12/30.  BLC positive for S agalactiae on 12/28.  Foot cultures + for Protues mirabilis, group B strep, and Enterococcus.  Antibiotics were then narrowed to zosyn. Wound vac placed on foot 1/5. Zosyn for 6 weeks. 
 
· Acute on chronic CKD3a: Baseline Cr 1.7. Renal has followed throughout his course. Renal failure progressively worsening.  He started IHD on 2/4. 
 
· IDDM2: Lantus and lispro 
 
· Anemia, acute on chronic: Course c/b anemia w heme + stool.  EGD on 1/4 showing superficial ulcers in the distal bulb and second portion of the duodenum measuring 2-4mm.   
  
· COVID negative 12/28, 12/29, but positive 1/11 02/11/21  
Overnight Events: 
Worsening oxygenation - hold on trach placement 
P:F Ratio is 73 - will attempt proning today. 
Dialysis MWF per nephrology 
Update Patient Daughter on status and plan to prone today. Discussed that we will see if patient improves with this therapy over the next few days and then re-evaluate.  
 
COVID test on 1/28 and recheck on 2/9 - positive 
  
Active Problem List:  
 
 Problem List  Date Reviewed: 8/20/2020 Codes Class ARDS (adult respiratory distress syndrome) ([facility] ICD-10-CM: S28 
ICD-9-CM: 518.52 * (Principal) Osteomyelitis ([facility] ICD-10-CM: M86.9 ICD-9-CM: 730.20 Diabetic ulcer of right midfoot associated with type 2 diabetes mellitus, with fat layer exposed ([facility] ICD-10-CM: E11.621, C70.113 ICD-9-CM: 250.80, 707.14 PAD (peripheral artery disease) ([facility]) ICD-10-CM: I73.9 ICD-9-CM: 443.9 Dependence on nicotine from cigarettes ICD-10-CM: F17.210 ICD-9-CM: 305.1 KATHI (acute kidney injury) ([facility] ICD-10-CM: N17.9 ICD-9-CM: 475. 9 Acute on chronic renal failure (HCC) ICD-10-CM: N17.9, N18.9 ICD-9-CM: 584.9, 585.9 Severe obesity (BMI 35.0-39. 9) with comorbidity ([facility] ICD-10-CM: E66.01 
ICD-9-CM: 278.01 Type 2 diabetes with nephropathy (HCC) ICD-10-CM: E11.21 
ICD-9-CM: 250.40, 583.81 S/P hip replacement, right ICD-10-CM: S16.553 ICD-9-CM: V43.64 Stage 3 chronic kidney disease ICD-10-CM: N18.30 ICD-9-CM: 179. 3 Prostate [facility]) ICD-10-CM: K59 ICD-9-CM: 297 Diabetic polyneuropathy ([facility] ICD-10-CM: E11.42 
ICD-9-CM: 250.60, 357.2 Rotator Cuff Tendonitis ICD-10-CM: M71.9, M67.919 ICD-9-CM: 726.10 Diabetes mellitus type 2, controlled ([facility] ICD-10-CM: E11.9 ICD-9-CM: 250.00 Degenerative disc disease ICD-10-CM: [id number] ICD-9-CM: 722.6 Osteoarthrosis involving lower leg ICD-10-CM: M17.10 ICD-9-CM: 715.96 Depression/ Anxiety ICD-10-CM: F34.1 ICD-9-CM: 300.4 HTN (hypertension) ICD-10-CM: I10 
ICD-9-CM: 401.9 Chronic hip pain ICD-10-CM: M25.559, G89.29 ICD-9-CM: 719.45, 338.29 Hypertriglyceridemia ICD-10-CM: E78.1 ICD-9-CM: 272.1 Mild Aortic Insufficiency ICD-10-CM: I35.1 ICD-9-CM: 424.1 Mild Concentric LVH (left ventricular hypertrophy) ICD-10-CM: I51.7 ICD-9-CM: 429.3 Past Medical History:  
 
 has a past medical history of Arthritis, Degenerative,  Knee (4/4/2011), Carcinoma, Prostate (4/4/2011), Degenerative disc disease (4/4/2011), Depression/ Anxiety (4/4/2011), Diabetes Mellitrus ( non-insulin dependent ) Type 2 (4/4/2011), Heart failure ([facility] HEMATOCHEZIA (4/4/2011), Hypertrension (4/4/2011), Hypertriglyceridemia (4/4/2011), Ill-defined condition, Insomnia (4/4/2011), Laryngitis (4/4/2011), Mild Aortic insufficiency (4/4/2011), Mild Concentric LVH (left ventricular hypertrophy) (4/4/2011), Neuropathy (4/4/2011), Osteoarthritis (4/4/2011), and Rotator Cuff Tendonitis (4/4/2011). Past Surgical History:  
 
 has a past surgical history that includes hx urological; hx other surgical; pr cardiac surg procedure unlist; colonoscopy (N/A, 4/28/2017); hx orthopaedic; hx orthopaedic; and ir insert tunl cvc w/o port over 5 yr (1/6/2021). Home Medications:  
 
Prior to Admission medications Medication Sig Start Date End Date Taking? Authorizing Provider  
pregabalin (LYRICA) 150 mg capsule TAKE ONE CAPSULE BY MOUTH TWICE A DAY 12/7/20  Yes Jason Rinaldi MD  
bicalutamide (CASODEX) 50 mg tablet TAKE ONE TABLET BY MOUTH DAILY 11/30/20  Yes Jason Rinaldi MD  
torsemide (DEMADEX) 20 mg tablet TAKE FOUR TABLETS BY MOUTH TWICE A DAY 11/23/20  Yes Jason Rinaldi MD  
pantoprazole (PROTONIX) 40 mg tablet TAKE ONE TABLET BY MOUTH TWICE DAILY ONCE IN THE MORNING AND ONCE IN THE EVENING 11/18/20  Yes Jason Rinaldi MD  
hydrOXYzine HCL (ATARAX) 50 mg tablet TAKE ONE TABLET BY MOUTH THREE TIMES A DAY AS NEEDED FOR ITCHING FOR UP TO 10 DAYS 10/22/20  Yes Jason Rinaldi MD  
amLODIPine (NORVASC) 10 mg tablet TAKE ONE TABLET BY MOUTH DAILY 10/2/20  Yes Jason Rinaldi MD  
hydrALAZINE (APRESOLINE) 100 mg tablet Take 1 Tab by mouth three (3) times daily.  9/16/20  Yes Jason Severino., MD  
 ascorbic acid, vitamin C, (Vitamin C) 500 mg tablet Take  by mouth. Yes Provider, Historical  
enzalutamide (XTANDI) 40 mg capsule Take 160 mg by mouth daily. Yes Provider, Historical  
Thera-Tabs M 27 mg iron-400 mcg tab TAKE ONE TABLET BY MOUTH DAILY 7/17/20  Yes Carolina Yo MD  
tamsulosin [facility]) 0.4 mg capsule TAKE ONE CAPSULE BY MOUTH DAILY 7/16/20  Yes Carolina Yo MD  
Insulin Syringe-Needle U-100 (BD Insulin Syringe Ultra-Fine) 1 mL 31 gauge x 5/16 syrg USE TO INJECT INSULIN FIVE TIMES A DAY 6/16/20  Yes Carolina Yo MD  
ferrous sulfate 325 mg (65 mg iron) tablet TAKE ONE TABLET BY MOUTH DAILY BEFORE BREAKFAST 6/16/20  Yes Carolina Yo MD  
gabapentin (NEURONTIN) 300 mg capsule Take 1 Cap by mouth three (3) times daily. Max Daily Amount: 900 mg. 5/25/20  Yes Carolina Yo MD  
triamcinolone acetonide (KENALOG) 0.1 % ointment APPLY A THIN LAYER TO AFFECTED AREA(S) TWO TIMES A DAY **DO NOT EXCEED 2.66 GRAMS PER DAY** 5/19/20  Yes Carolina Yo MD  
diclofenac (VOLTAREN) 1 % gel Apply  to affected area four (4) times daily. 4/16/20  Yes Carolina Yo MD  
ammonium lactate (AMLACTIN) 12 % topical cream Apply  to affected area two (2) times a day. rub in to affected area well 3/10/20  Yes Carolina Yo MD  
ammonium lactate (LAC-HYDRIN FIVE) 5 % lotion Apply 1 Applicator to affected area daily. Apply daily bilateral lower legs 3/5/20  Yes Carolina Yo MD  
insulin glargine (LANTUS U-100 INSULIN) 100 unit/mL injection 72 Units by SubCUTAneous route daily. 3/5/20  Yes Carolina Yo MD  
insulin regular (NOVOLIN R, HUMULIN R) 100 unit/mL injection 24 units sc tid 3/5/20  Yes Carolina Yo MD  
ketoconazole (NIZORAL) 2 % topical cream Apply  to affected area two (2) times a day.  2/4/20  Yes Carolina Yo MD  
ketoconazole (NIZORAL) 2 % shampoo Sig:shampoo once a week 2/4/20  Yes Carolina Yo MD  
 bisacodyl (DULCOLAX, BISACODYL,) 10 mg suppository Insert 10 mg into rectum daily. Yes Provider, Historical  
insulin aspart U-100 (NOVOLOG FLEXPEN U-100 INSULIN) 100 unit/mL (3 mL) inpn by SubCUTAneous route. Yes Provider, Historical  
Insulin Needles, Disposable, (NOVOFINE 32) 32 gauge x 1/4\" ndle Sig:use 4 times a day as needed 19  Yes MD LINDA Dorsey R REGULAR U-100 INSULN 100 unit/mL injection INJECT 14 UNITS UNDER THE SKIN THREE TIMES A DAY WITH MEALS AS NEEDED 19  Yes Jorge Bermeo MD  
fluticasone propionate [facility]) 50 mcg/actuation nasal spray SPRAY TWO SPRAYS IN THE AFFECTED NOSTRIL DAILY 19  Yes Jorge Bermeo MD  
aspirin delayed-release 81 mg tablet Take 81 mg by mouth daily. Yes Provider, Historical  
acetaminophen (PAIN AND FEVER) 500 mg tablet TAKE ONE TABLET BY MOUTH EVERY 6 HOURS AS NEEDED FOR PAIN 18  Yes Jorge Bermeo MD  
Calcium Carbonate-Vit D3-Min 600 mg calcium- 400 unit tab Take 1 Tab by mouth two (2) times a day. Yes Provider, Historical  
 
Allergies/Social/Family History: No Known Allergies Social History Tobacco Use  Smoking status: Current Every Day Smoker Packs/day: 1.00 Last attempt to quit: 2019 Years since quittin.2  Smokeless tobacco: Never Used Substance Use Topics  Alcohol use: Not Currently Alcohol/week: 11.7 standard drinks Types: 7 Cans of beer, 7 Shots of liquor per week Family History Family history unknown: Yes Review of Systems:  
 
Unable to provide - intubated and sedated Objective:  
Vital Signs: 
Visit Vitals BP (!) 110/53 Pulse 82 Temp 99 °F (37.2 °C) Resp 20 Ht 6' (1.829 m) Wt 124.7 kg (274 lb 14.6 oz) SpO2 94% BMI 37.29 kg/m² O2 Flow Rate (L/min): 15 l/min(Mid flow at 9 liters also) O2 Device: Ventilator, Endotracheal tube Temp (24hrs), Av.6 °F (37 °C), Min:98.3 °F (36.8 °C), Max:99 °F (37.2 °C) Intake/Output:  
 
Intake/Output Summary (Last 24 hours) at 2/11/2021 0801 Last data filed at 2/11/2021 0300 Gross per 24 hour Intake 1993.9 ml Output 1591 ml Net 402.9 ml  
 
Physical Exam: 
General: Intubated and sedated EENT: PERRL 3mm/brisk. MMM Resp: Coarse throughout and decreased to bases CV: Regular rhythm, normal rate, no murmurs GI: Soft, moderately distended with hypoactive BS. Flexi-seal 
Extremities: Lymphedema with eschar noted B/L R>L. Wound vac to RLE. Vascular changes. Warm to touch. Unable to palpate pulses but dopplerable Neurologic: RASS -3. Skin: Warm and dry. Lines: Vabaduse 41 CVC, RIJ Rudolph, Bynum, Flexiseal, OGT 
 
 
LABS AND  DATA: Personally reviewed Recent Labs  
  02/11/21 
0318 02/10/21 
0359 WBC 7.7 8.6 HGB 8.1* 8.6* HCT 26.2* 28.1*  
 239 Recent Labs  
  02/11/21 0318 02/10/21 
0359 * 131*  
K 3.7 3.5 CL 96* 97  
CO2 26 25 BUN 34* 41* CREA 2.44* 3.18* * 191* CA 8.9 9.0 MG 2.2 2.1 PHOS 3.4 3.8 Recent Labs  
  02/11/21 
0318 02/10/21 
0359 ALB 2.3* 2.4* No results for input(s): INR, PTP, APTT, INREXT, INREXT in the last 72 hours. No results for input(s): PHI, PCO2I, PO2I, FIO2I in the last 72 hours. No results for input(s): CPK, CKMB, TROIQ, BNPP in the last 72 hours. Ventilator Settings: 
AC: 60% R 18 PEEP 10  MEDS: Reviewed 2/6 CXR: Reviewed 2/6 (X-Ray from 2/5) IMPRESSION Multilobar pneumonia/ARDS. Assessment:  
 
Acute Hypoxic Respiratory Failure Secondary to COVID Pneumonia:   
- Intubated and extubated on several occasions - Family has consented to trach when able to place - ARDS volume ventilation. Vt ~ 600 ml  
- Unable to do much weaning on vent due to oxygenation. 
- May need to consider NO if no improvement Sepsis: Osteomyelitis is only known bacterial infectious etiology. Likely cytokine reaction in setting of COVID-19. Cultures NGTD. Completed steroids. Completed course of linezolid (empiric). On prolonged zoysn for osteomyelitis. - Continue zosyn - end date 2/12 KATHI on CKD3a: Renal following; renal failure progressively worsening 
- IHD per renal; received on 2/6 
- HD on Monday, Wed. Fri Anemia, acute on chronic: PRBC on 2/5 
- Hgb down to 6.8 today, transfuse 1 PRBCs HFpEF with Exacerbation:   
- EF 45-50%, mildly dilated RV w mildly reduced systolic function. Diuresis difficult in setting of KATHI on CKD3.   
- IHD for volume removal as we are able. Delirium:  
- When sedation is lightened, experiences hyperactive delirium, which interferes with weaning 
- Continue propofol & wean Versed R foot wound: Complicated by osteomyelitis. - Wound vac - Zosyn as above - ID following Deconditioning: Unable to work w PT/OT at this time Prostate Cancer: Holding casodex as this cannot be crushed an pt has OGT. Multidisciplinary Rounds Completed:  Rounded with RN 
 
ABCDEF Bundle/Checklist 
Pain Medications: Fentanyl Target RASS: -3 Sedation Medications: Versed and Fentanyl Wean off propofol for elevated troponins. CAM-ICU:  Positive Mobility: Poor PT/OT:Unable to participate Restraints: Soft wrist restraints. Discussed Plan of Care (goals of care): Yes with RN. Addressed Code Status: Full Code CARDIOVASCULAR Cardiac Gtts: None. Levophed intermittently with dialysis SBP Goal of: > 90 mmHg MAP Goal of: >65 Transfusion Trigger (Hgb): <7 g/dL RESPIRATORY Vent Goals:  
Optimize PEEP/Ventilation/Oxygenation DVT Prophylaxis (if no, list reason): Heparin SQ  
SPO2 Goal: > 92% GI/ Bynum Catheter Present: Yes GI Prophylaxis: Yes  (hx GIB) Nutrition: Yes TF Bowel Movement: Yes Bowel Regimen: Docusate (Colace) Insulin: Y - Lantus ANTIBIOTICS Antibiotics: 
Zosyn T/L/D Tubes: Orogastric Tube Lines: [employer] Drains: Bynum Catheter SPECIAL [employer] Vent DISPOSITION Stay in ICU CRITICAL CARE CONSULTANT NOTE I had a face to face encounter with the patient, reviewed and interpreted patient data including clinical events, labs, images, vital signs, I/O's, and examined patient. I have discussed the case and the plan and management of the patient's care with the consulting services, the bedside nurses and the respiratory therapist.   
 
NOTE OF PERSONAL INVOLVEMENT IN CARE This patient has a high probability of imminent, clinically significant deterioration, which requires the highest level of preparedness to intervene urgently. I participated in the decision-making and personally managed or directed the management of the following life and organ supporting interventions that required my frequent assessment to treat or prevent imminent deterioration. I personally spent 50 minutes of critical care time. This is time spent at this critically ill patient's bedside actively involved in patient care as well as the coordination of care and discussions with the patient's family. This does not include any procedural time which has been billed separately. Lars Dueñas [facility] Critical Care Medicine [facility] Emergency Department Physicians

## 2021-02-11 NOTE — PROGRESS NOTES
Nephrology Progress Note Júnior Warren III Date of Admission : 12/28/2020 CC: Follow up for KATHI on CKD Assessment and Plan KATHI on CKD: 
- worsening renal function from ATN  
- Rudolph and dialysis initiated on 2/4 
- HD MWF for now 
- ok to remove kumar 
- daily labs Resp failure: 
COVID-19 PNA: positive on 1/11 
- s/p decadron and remdesivir 
- on the vent 
- for eventual trach CKD 4 
-  Presumed 2/2 DM and HTN 
- nephrotic range proteinuria 
- baseline Cr 1.7 mg/dl  
- followed by Dr. Rosalba Snow at [facility] Hx of prostate cancer s/p XRT 
  
Anemia in CKD + blood loss: 
- cont JAZ 
- EGD 1/5/2021: gastric fundal lesion  
- transfused yesterday 
  
Type II DM: 
- on insulin 
  
HTN :  
- BP stable now 
  
R foot osteo: 
- on abx 
- s/p debridement on 12/30 Group B strep bacteremia Interval History: Not examined in the room Per RN, stable. Dialyzed w/o pressors yesterday. UF 1.5kg. Not on pressors currently. No UOP recorded w/ kumar Current Medications: all current  Medications have been eviewed in [facility] in the [place] Review of Systems: A comprehensive review of systems was negative except for that written in the HPI. Objective: 
Vitals:   
Vitals:  
 02/11/21 0400 02/11/21 0500 02/11/21 0600 02/11/21 0700 BP: 132/61 (!) 120/59 (!) 123/57 (!) 110/53 Pulse: 83 82 85 83 Resp: 21 21 20 20 Temp: 99 °F (37.2 °C) TempSrc:      
SpO2: 93% 95% 93% 93% Weight: 124.7 kg (274 lb 14.6 oz) Height:      
 
Intake and Output: 
No intake/output data recorded. 02/09 1901 - 02/11 0700 In: 3903.9 [I.V.:1218.9] Out: 1793 [Urine:157; Drains:25] Physical Examination: not examined in the room GEN: ON VENT 
NECK- ET tube + RESP: no distress ABD :chronic distension, BS + 
EXT : edema + NEURO:sedated on the vent : + kumar Exam deferred due to COVID-19 infection in order to preserve PPE AND minimize risk of  
 transmission to dialysis and renal transplant patient per ASN and RPA guidelines: 
 
 
 
 
[]    High complexity decision making was performed 
[]    Patient is at high-risk of decompensation with multiple organ involvement Lab Data Personally Reviewed: I have reviewed all the pertinent labs, microbiology data and radiology studies during assessment. Recent Labs  
  02/11/21 
6031 02/10/21 
0359 02/09/21 
0413 02/09/21 
5290 * 131* 133*  --   
K 3.7 3.5 3.6  --   
CL 96* 97 99  --   
CO2 26 25 27  --   
* 191* 224*  --   
BUN 34* 41* 32*  --   
CREA 2.44* 3.18* 2.56*  --   
CA 8.9 9.0 8.6  --   
MG 2.2 2.1  --  2.0 PHOS 3.4 3.8 2.6 2.8 ALB 2.3* 2.4* 2.2*  --   
 
Recent Labs  
  02/11/21 
0500 02/10/21 
0359 02/09/21 
1715 02/09/21 
4385 WBC 7.7 8.6  --  8.9 HGB 8.1* 8.6* 7.4* 7.1*  
HCT 26.2* 28.1* 22.5* 22.3*  
 239  --  303 No results found for: SDES Lab Results Component Value Date/Time Culture result: NO GROWTH 5 DAYS 01/23/2021 03:37 PM  
 Culture result: LIGHT YEAST, (APPARENT CANDIDA ALBICANS) (A) 01/23/2021 02:45 PM  
 Culture result: NO NORMAL RESPIRATORY MICHELLE ISOLATED 01/23/2021 02:45 PM  
 
Recent Results (from the past 24 hour(s)) GLUCOSE, POC Collection Time: 02/10/21 12:29 PM  
Result Value Ref Range Glucose (POC) 182 (H) 65 - 100 mg/dL Performed by Manuel Martinez GLUCOSE, POC Collection Time: 02/10/21  6:31 PM  
Result Value Ref Range Glucose (POC) 195 (H) 65 - 100 mg/dL Performed by Manuel Patient GLUCOSE, POC Collection Time: 02/10/21 11:25 PM  
Result Value Ref Range Glucose (POC) 214 (H) 65 - 100 mg/dL Performed by Ramos Hurst MAGNESIUM Collection Time: 02/11/21  3:18 AM  
Result Value Ref Range Magnesium 2.2 1.6 - 2.4 mg/dL CBC WITH AUTOMATED DIFF Collection Time: 02/11/21  3:18 AM  
Result Value Ref Range WBC 7.7 4.1 - 11.1 K/uL  
 RBC 2.79 (L) 4.10 - 5.70 M/uL HGB 8.1 (L) 12.1 - 17.0 g/dL HCT 26.2 (L) 36.6 - 50.3 % MCV 93.9 80.0 - 99.0 FL  
 MCH 29.0 26.0 - 34.0 PG  
 MCHC 30.9 30.0 - 36.5 g/dL  
 RDW 19.7 (H) 11.5 - 14.5 % PLATELET 623 655 - 603 K/uL MPV 9.3 8.9 - 12.9 FL  
 NRBC 0.5 (H) 0  WBC ABSOLUTE NRBC 0.04 (H) 0.00 - 0.01 K/uL NEUTROPHILS 86 (H) 32 - 75 % LYMPHOCYTES 4 (L) 12 - 49 % MONOCYTES 5 5 - 13 % EOSINOPHILS 4 0 - 7 % BASOPHILS 0 0 - 1 % IMMATURE GRANULOCYTES 1 (H) 0.0 - 0.5 % ABS. NEUTROPHILS 6.6 1.8 - 8.0 K/UL  
 ABS. LYMPHOCYTES 0.3 (L) 0.8 - 3.5 K/UL  
 ABS. MONOCYTES 0.4 0.0 - 1.0 K/UL  
 ABS. EOSINOPHILS 0.3 0.0 - 0.4 K/UL  
 ABS. BASOPHILS 0.0 0.0 - 0.1 K/UL  
 ABS. IMM. GRANS. 0.1 (H) 0.00 - 0.04 K/UL  
 DF SMEAR SCANNED    
 RBC COMMENTS ANISOCYTOSIS 1+ 
    
 RBC COMMENTS MACROCYTOSIS 1+ 
    
 RBC COMMENTS POLYCHROMASIA PRESENT 
    
 RBC COMMENTS SMUDGE CELLS SEEN    
RENAL FUNCTION PANEL Collection Time: 02/11/21  3:18 AM  
Result Value Ref Range Sodium 132 (L) 136 - 145 mmol/L Potassium 3.7 3.5 - 5.1 mmol/L Chloride 96 (L) 97 - 108 mmol/L  
 CO2 26 21 - 32 mmol/L Anion gap 10 5 - 15 mmol/L Glucose 197 (H) 65 - 100 mg/dL BUN 34 (H) 6 - 20 MG/DL Creatinine 2.44 (H) 0.70 - 1.30 MG/DL  
 BUN/Creatinine ratio 14 12 - 20 GFR est AA 32 (L) >60 ml/min/1.73m2 GFR est non-AA 26 (L) >60 ml/min/1.73m2 Calcium 8.9 8.5 - 10.1 MG/DL Phosphorus 3.4 2.6 - 4.7 MG/DL Albumin 2.3 (L) 3.5 - 5.0 g/dL BLOOD GAS, ARTERIAL Collection Time: 02/11/21  3:57 AM  
Result Value Ref Range pH 7.30 (L) 7.35 - 7.45    
 PCO2 57 (H) 35 - 45 mmHg PO2 66 (L) 80 - 100 mmHg O2 SAT 91 (L) 92 - 97 % BICARBONATE 27 (H) 22 - 26 mmol/L  
 BASE DEFICIT 0.3 mmol/L  
 O2 METHOD VENT Sample source ARTERIAL    
 SITE LEFT RADIAL JUVENTINO'S TEST YES    
GLUCOSE, POC Collection Time: 02/11/21  5:23 AM  
Result Value Ref Range Glucose (POC) 207 (H) 65 - 100 mg/dL Performed by Kait Ruvalcaba MD 
[facility]  
[id number], [address] [facility] Phone - [phone] Fax - [phone] 
[email or web address]

## 2021-02-12 NOTE — PROGRESS NOTES
Nephrology Progress Note Luanne Cody III Date of Admission : 12/28/2020 CC: Follow up for KATHI on CKD Assessment and Plan KATHI on CKD: 
- worsening renal function from ATN  
- [place] and dialysis initiated on 2/4 
- HD MWF for now 
- HD for later this AM 
 
Resp failure: 
COVID-19 PNA: positive on 1/11 
- s/p decadron and remdesivir 
- on the vent 
- for eventual trach CKD 4 
-  Presumed 2/2 DM and HTN 
- nephrotic range proteinuria 
- baseline Cr 1.7 mg/dl  
- followed by Dr. Rosa Maria Alcantar at [facility] Hx of prostate cancer s/p XRT 
  
Anemia in CKD + blood loss: 
- cont JAZ 
- EGD 1/5/2021: gastric fundal lesion  
- transfused yesterday 
  
Type II DM: 
- on insulin 
  
HTN :  
- BP stable now 
  
R foot osteo: 
- on abx 
- s/p debridement on 12/30 Group B strep bacteremia Interval History: Not examined in the room Per RN, stable. Supine now. Off pressors. No UOP. For HD later this AM.  No other issues per RN. Current Medications: all current  Medications have been eviewed in [facility] Review of Systems: A comprehensive review of systems was negative except for that written in the HPI. Objective: 
Vitals:   
Vitals:  
 02/12/21 0941 02/12/21 0945 02/12/21 1000 02/12/21 1015 BP:  116/62 112/62 (!) 101/59 Pulse: 80 80 78 76 Resp: 26 26 26 9 Temp:      
TempSrc:      
SpO2: 95% 96% 98% 100% Weight:      
Height:      
 
Intake and Output: 
No intake/output data recorded. 02/10 1901 - 02/12 0700 In: 3214.7 [I.V.:1664.7] Out: 368 [Urine:68; AQSJSY:221] Physical Examination: not examined in the room GEN: ON VENT 
NECK- ET tube + RESP: no distress ABD :chronic distension, BS + 
EXT : edema + NEURO:sedated on the vent :  No kumar Exam deferred due to COVID-19 infection in order to preserve PPE AND minimize risk of  
transmission to dialysis and renal transplant patient per ASN and RPA guidelines: 
 
 
 
 
[]    High complexity decision making was performed []    Patient is at high-risk of decompensation with multiple organ involvement Lab Data Personally Reviewed: I have reviewed all the pertinent labs, microbiology data and radiology studies during assessment. Recent Labs  
  02/12/21 
0400 02/11/21 
0318 02/10/21 
0359 * 132* 131* K 4.0 3.7 3.5 CL 97 96* 97  
CO2 24 26 25 * 197* 191* BUN 50* 34* 41* CREA 3.17* 2.44* 3.18* CA 8.9 8.9 9.0 MG 2.3 2.2 2.1 PHOS 4.2 3.4 3.8 ALB 2.1* 2.3* 2.4* Recent Labs  
  02/12/21 
0400 02/11/21 
0318 02/10/21 
0359 WBC 7.0 7.7 8.6 HGB 8.0* 8.1* 8.6* HCT 26.1* 26.2* 28.1*  
 219 239 No results found for: [facility] Lab Results Component Value Date/Time Culture result: NO GROWTH 5 DAYS 01/23/2021 03:37 PM  
 Culture result: LIGHT YEAST, (APPARENT CANDIDA ALBICANS) (A) 01/23/2021 02:45 PM  
 Culture result: NO NORMAL RESPIRATORY MICHELLE ISOLATED 01/23/2021 02:45 PM  
 
Recent Results (from the past 24 hour(s)) GLUCOSE, POC Collection Time: 02/11/21 11:51 AM  
Result Value Ref Range Glucose (POC) 214 (H) 65 - 100 mg/dL Performed by Maren Husain TRIGLYCERIDE Collection Time: 02/11/21 12:13 PM  
Result Value Ref Range Triglyceride 1,303 (H) <150 MG/DL  
GLUCOSE, POC Collection Time: 02/11/21  5:30 PM  
Result Value Ref Range Glucose (POC) 202 (H) 65 - 100 mg/dL Performed by Maren Husain BLOOD GAS, ARTERIAL Collection Time: 02/11/21  6:07 PM  
Result Value Ref Range pH 7.19 (LL) 7.35 - 7.45    
 PCO2 71 (H) 35 - 45 mmHg PO2 83 80 - 100 mmHg O2 SAT 93 92 - 97 % BICARBONATE 26 22 - 26 mmol/L  
 BASE DEFICIT 3.7 mmol/L  
 O2 METHOD VENT    
 FIO2 0.90 % MODE ASSIST CONTROL Tidal volume 500.0 SET RATE 20    
 PEEP/CPAP 12.0 Sample source ARTERIAL    
 SITE LEFT RADIAL JUVENTINO'S TEST YES  Critical value read back Called to Lorna Mccoy NP on 02/11/2021 at 18:10   
BLOOD GAS, ARTERIAL  
 Collection Time: 02/11/21 10:53 PM  
Result Value Ref Range pH 7.29 (L) 7.35 - 7.45    
 PCO2 54 (H) 35 - 45 mmHg PO2 115 (H) 80 - 100 mmHg O2 SAT 98 (H) 92 - 97 % BICARBONATE 25 22 - 26 mmol/L  
 BASE DEFICIT 2.2 mmol/L  
 O2 METHOD VENT    
 FIO2 90 % MODE ASSIST CONTROL Tidal volume 500.0 SET RATE 26    
 PEEP/CPAP 12.0 Sample source ARTERIAL    
 SITE DRAWN FROM ARTERIAL LINE JUVENTINO'S TEST NOT APPLICABLE    
GLUCOSE, POC Collection Time: 02/11/21 11:49 PM  
Result Value Ref Range Glucose (POC) 214 (H) 65 - 100 mg/dL Performed by Zackery Hendrix MAGNESIUM Collection Time: 02/12/21  4:00 AM  
Result Value Ref Range Magnesium 2.3 1.6 - 2.4 mg/dL CBC WITH AUTOMATED DIFF Collection Time: 02/12/21  4:00 AM  
Result Value Ref Range WBC 7.0 4.1 - 11.1 K/uL  
 RBC 2.85 (L) 4.10 - 5.70 M/uL HGB 8.0 (L) 12.1 - 17.0 g/dL HCT 26.1 (L) 36.6 - 50.3 % MCV 91.6 80.0 - 99.0 FL  
 MCH 28.1 26.0 - 34.0 PG  
 MCHC 30.7 30.0 - 36.5 g/dL  
 RDW 19.2 (H) 11.5 - 14.5 % PLATELET 244 584 - 304 K/uL MPV 9.2 8.9 - 12.9 FL  
 NRBC 0.4 (H) 0  WBC ABSOLUTE NRBC 0.03 (H) 0.00 - 0.01 K/uL NEUTROPHILS 82 (H) 32 - 75 % LYMPHOCYTES 7 (L) 12 - 49 % MONOCYTES 6 5 - 13 % EOSINOPHILS 4 0 - 7 % BASOPHILS 0 0 - 1 % IMMATURE GRANULOCYTES 1 (H) 0.0 - 0.5 % ABS. NEUTROPHILS 5.7 1.8 - 8.0 K/UL  
 ABS. LYMPHOCYTES 0.5 (L) 0.8 - 3.5 K/UL  
 ABS. MONOCYTES 0.4 0.0 - 1.0 K/UL  
 ABS. EOSINOPHILS 0.3 0.0 - 0.4 K/UL  
 ABS. BASOPHILS 0.0 0.0 - 0.1 K/UL  
 ABS. IMM. GRANS. 0.1 (H) 0.00 - 0.04 K/UL  
 DF SMEAR SCANNED    
 PLATELET COMMENTS Large Platelets RBC COMMENTS ANISOCYTOSIS 1+ 
    
 RBC COMMENTS BASOPHILIC STIPPLING 1+ 
    
 RBC COMMENTS POLYCHROMASIA 1+ 
    
 RBC COMMENTS OVALOCYTES 1+ RENAL FUNCTION PANEL Collection Time: 02/12/21  4:00 AM  
Result Value Ref Range  Sodium 132 (L) 136 - 145 mmol/L  
 Potassium 4.0 3.5 - 5.1 mmol/L Chloride 97 97 - 108 mmol/L  
 CO2 24 21 - 32 mmol/L Anion gap 11 5 - 15 mmol/L Glucose 112 (H) 65 - 100 mg/dL BUN 50 (H) 6 - 20 MG/DL Creatinine 3.17 (H) 0.70 - 1.30 MG/DL  
 BUN/Creatinine ratio 16 12 - 20 GFR est AA 24 (L) >60 ml/min/1.73m2 GFR est non-AA 20 (L) >60 ml/min/1.73m2 Calcium 8.9 8.5 - 10.1 MG/DL Phosphorus 4.2 2.6 - 4.7 MG/DL Albumin 2.1 (L) 3.5 - 5.0 g/dL BLOOD GAS, ARTERIAL Collection Time: 02/12/21  4:10 AM  
Result Value Ref Range pH 7.38 7.35 - 7.45    
 PCO2 41 35 - 45 mmHg PO2 110 (H) 80 - 100 mmHg O2 SAT 98 (H) 92 - 97 % BICARBONATE 23 22 - 26 mmol/L  
 BASE DEFICIT 1.6 mmol/L  
 O2 METHOD VENT    
 FIO2 90 % MODE ASSIST CONTROL Tidal volume 500.0 SET RATE 26    
 PEEP/CPAP 12.0 Sample source ARTERIAL    
 SITE LEFT RADIAL JUVENTINO'S TEST YES    
GLUCOSE, POC Collection Time: 02/12/21  5:52 AM  
Result Value Ref Range Glucose (POC) 121 (H) 65 - 100 mg/dL Performed by Elizabeth Lawrence MD 
[address] [place] [address], [address] [facility] Phone - [phone] Fax - [phone] 
[email or web address]

## 2021-02-12 NOTE — PROGRESS NOTES
Nursing Shift Note [id number] 1940: Bedside and Verbal shift change report given to Sue Howe RN (oncoming nurse) by RUBA Quinones (offgoing nurse). Report included the following information SBAR, Kardex, Procedure Summary, Intake/Output, MAR, Accordion, Recent Results and Cardiac Rhythm NSR. 
Patient proned 
 
2220: Patient head turned left with help of RT and second RN.  
 
2305: new bag of nimbex held, titrated down to 3.5 d/t 0/4 twitches at 50  
 
0000: draining blister found on left cheek (now facing up)- possibly from pressure. Placed foam to protect.  
 
0250: 1/4 twitches 
 
0625: Patient flipped to supine.  
 
0640: Patient with swelling around eyes and face, L cheek with open breakdown, R cheek with discoloration and weeping. Breakdown/discoloration under nose. Cleansed, applied venelex.  
Breakdown in bilateral neck folds, open blister under parth dressing on left. Cleaned, replaced dressing attempting to leave adhesive off of open area.  
 
OGT noted to have migrated out (around 55). Repositioned and re-advanced OGT to 63.  
 
Bathed patient, performed wound care to existing wounds 
 
Oxygen saturation maintaining around 90% since supined. 
 
0718: received call from radiologist about CXR findings- reported that OGT and ETT had receded, ETT can be advanced 1-2 cm.  Discussed with respiratory therapist, Beronica.  
RN already advanced OGT since time of x-ray. Will obtain a KUB to confirm placement before use.  
 
0750: Bedside and Verbal shift change report given to Stacie Ramirez RN (oncoming nurse) by Sue Howe RN (offgoing nurse). Report included the following information SBAR, Kardex, Procedure Summary, Intake/Output, MAR, Accordion, Recent Results and Cardiac Rhythm NSR.  
 
0810: Chaitanya arrived to begin HD.

## 2021-02-12 NOTE — PROCEDURES
[facility] Dialysis Team [place]  [phone] Vitals   Pre   Post   Assessment   Pre   Post    
Temp  Temp: 97.7 °F (36.5 °C) (02/12/21 0840)   LOC  Sedated, minimally responsive to pain Remains sedated HR   Pulse (Heart Rate): 76 (02/12/21 0840) 86 Lungs   Vented, diminished in bases Had episode of sats 83-86, currently satting 93 B/P  BP: (!) 110/52 (02/12/21 0840) 139/67 Cardiac   S1S2, NSR NSR Resp   Resp Rate: 26 (02/12/21 0840) 26 Skin   Cool, dry. Non-intact BLE No change Pain level  Pain Intensity 1: 0 (02/12/21 0000)  Edema  Generalized, 2+ pitting 2+ Orders: Duration:   Start:   0840 End:   1145 Total:   3 hrs 5 mins Dialyzer:   Dialyzer/Set Up Inspection: Kimberley Ernst (02/12/21 0840) K Bath:   Dialysate K (mEq/L): 3 (02/12/21 0840) Ca Bath:   Dialysate CA (mEq/L): 2.5 (02/12/21 0840) Na/Bicarb:   Dialysate NA (mEq/L): 140 (02/12/21 0840) Target Fluid Removal:   Goal/Amount of Fluid to Remove (mL): 1500 mL (02/12/21 0840) Access Type & Location:   RIJ non-tunneled CVC, transparent dsg CDI dated 2/12/21. +aspiration/flush and lines reversed to achieve ordered BFR and safe AP/. Labs Obtained/Reviewed Critical Results Called   Date when labs were drawn- 
Hgb-   
HGB Date Value Ref Range Status 02/12/2021 8.0 (L) 12.1 - 17.0 g/dL Final  
 
K-   
Potassium Date Value Ref Range Status 02/12/2021 4.0 3.5 - 5.1 mmol/L Final  
  Comment: SPECIMEN HEMOLYZED, RESULTS MAY BE AFFECTED Ca-  
Calcium Date Value Ref Range Status 02/12/2021 8.9 8.5 - 10.1 MG/DL Final  
 
Bun-  
BUN Date Value Ref Range Status 02/12/2021 50 (H) 6 - 20 MG/DL Final  
 
Creat-  
Creatinine Date Value Ref Range Status 02/12/2021 3.17 (H) 0.70 - 1.30 MG/DL Final  
  
Medications/ Blood Products Given Name   Dose   Route and Time    
heparin 3200 units  Arterial dwell 1.5mL, venous dwell 1.7mL Blood Volume Processed (BVP):  66L Net Fluid Removed:  1300 mL Comments Time Out Done: 9877 Primary Nurse Rpt Pre: MANUEL Vizcarra RN 
Primary Nurse Rpt Post: MANUEL Vizcarra RN 
Pt Education: n/a- pt sedated and vented Care Plan: HD today, cont MWF Tx Summary: 
0830: Safety checks complete, time out performed. 0840: CVC assessed, no redness, warmth or drainage noted. Transparent dressing and biopatch CDI. Each catheter limb disinfected for 60 seconds per limb with alcohol swabs. Caps removed, dialysis CVC hub scrubbed with Prevantics for 15 seconds, followed by a 5 second dry time per Hospital P&P. Aspirated and discarded 5ml from each lumen. +aspiration/flush. HD initiated. Access visible, lines secure. Medications reviewed. 1145: Venous pressures >500, immediately rinsed all possible blood rinsed back. MANUEL Salazar MD notified. Each catheter limb disinfected for 60 seconds per limb with alcohol swabs. Dialysis CVC hubs scrubbed with Prevantics for 15 seconds, followed by a 5 second dry time per Hospital P&P. Saline flushed, hep locked and capped. Bedside SBAR given to primary RN. Admitting Diagnosis: COVID, KATHI on CKD Pt's previous clinic: n/a Informed Consent Verified: Yes (02/12/21 0840) Hepatitis Status: HBsAg: Neg (02/04/21) HBsAb: (xxx) Machine Number: [id number] (02/12/21 0840) Telemetry status: bedside cardiac monitor Pre-Dialysis Weight: 124.7 kg (274 lb 14.6 oz) (02/12/21 0840)

## 2021-02-12 NOTE — PROGRESS NOTES
ID Progress Note 2021 Subjective: Afebrile. New central line placed in . On the vent at 80% fio2. On paralytic ROS: Unobtainable Objective:  
 
Vitals:  
Visit Vitals /63 Pulse 79 Temp 97.7 °F (36.5 °C) (Oral) Resp 26 Ht 6' (1.829 m) Wt 124.7 kg (274 lb 14.6 oz) SpO2 98% BMI 37.29 kg/m² Tmax:  Temp (24hrs), Av.3 °F (36.8 °C), Min:97.7 °F (36.5 °C), Max:98.8 °F (37.1 °C) Exam:  
intubated Not in distress Pink conjunctivae, anicteric sclerae Right neck has a central line. Inferior to that, there is some redness Lung clear, no rales, wheezes or rhonchi Heart: s1, s2, RRR, no murmurs rubs or clicks Abdomen: soft nontender, no guarding or rebound Knees not warm or tender Labs:  
Lab Results Component Value Date/Time WBC 7.0 2021 04:00 AM  
 Hemoglobin (POC) 6.5 2020 01:59 PM  
 HGB 8.0 (L) 2021 04:00 AM  
 HCT 26.1 (L) 2021 04:00 AM  
 PLATELET 431  04:00 AM  
 MCV 91.6 2021 04:00 AM  
 
Lab Results Component Value Date/Time Sodium 132 (L) 2021 04:00 AM  
 Potassium 4.0 2021 04:00 AM  
 Chloride 97 2021 04:00 AM  
 CO2 24 2021 04:00 AM  
 Anion gap 11 2021 04:00 AM  
 Glucose 112 (H) 2021 04:00 AM  
 BUN 50 (H) 2021 04:00 AM  
 Creatinine 3.17 (H) 2021 04:00 AM  
 BUN/Creatinine ratio 16 2021 04:00 AM  
 GFR est AA 24 (L) 2021 04:00 AM  
 GFR est non-AA 20 (L) 2021 04:00 AM  
 Calcium 8.9 2021 04:00 AM  
 Bilirubin, total 0.6 2021 02:54 AM  
 Alk.  phosphatase 79 2021 02:54 AM  
 Protein, total 7.0 2021 02:54 AM  
 Albumin 2.1 (L) 2021 04:00 AM  
 Globulin 4.2 (H) 2021 02:54 AM  
 A-G Ratio 0.7 (L) 2021 02:54 AM  
 ALT (SGPT) <6 (L) 2021 02:54 AM  
 
 
 
 
 
Assessment:  
 
#1 osteomyelitis of the right foot 
  
#2 group b strep  bacteremia 
  
#3 renal insufficiency 
  
 #4 diabetes 
  
#5 prostate cancer 
  
#6 hypertension 
  
#7 heart failure 
  
 #8 covid  
  
#9 diarrhea Recommendations:  
 
Continue zosyn. There is OM on the surgical margin. CRP elevated, but could also be from his lung process. The wound looks good. We can discontinue zosyn after today's doses He has completed treatment with remdesivir.  (1/121/16) 
  He has completed dexamethasone (1/12 - 1/21)  Completed 14 day course of zyvox on 1/31. Has some redness inferior to the right CVL. It is slightly warm. Will start vanc. Team available over the weekend if needed.   
 
[place] Ar[place] MD

## 2021-02-12 NOTE — PROGRESS NOTES
PRETTY 
Patient with acute hypoxic respiratory failure r/t COVID 19, now ARDS. Remains COVID positive RUR 51% Disposition TBD Patient remains intubated, paralyzed. IVF: Nimbex, Fentanyl and Versed gtts. Patient continues to be proned. [place] is on hold. HD M-W-F schedule. Care management is continuing to follow. Froy Monahan RN,Care Management

## 2021-02-12 NOTE — WOUND CARE
WOCN Note: Follow-up visit for VAC dressing change to right foot.  
  
Chart shows: 
Admitted for lethargy; osteomyelitis of right foot with debridement and removal of infected bone 12/30; now Covid-19+ History of DM, smoking, prostate cancer, HTN, heart failure Admitted from home 
  
Assessment:  
Intubated and does not arouse with wound care.   
Saturations dropping and RN with other team members turned him into prone position.   
Surface: [place] AMY mattress FlexiSeal in place with liquid stool in tubing; Inserted 2/5 
  
1. POA, right foot wound post surgical debridement = 6 x 6 x 3cm  Base is 30% soft yellow 70% granular red (unchanged)   No purulence or odor.  Immediate periwound with hypopigmented skin.  Scant serosanguinous exudate in canister.  no change VAC dressing removed: 1 black sponge Cleaned with saline and Santyl applied to yellow tissue in wound bed.   
125 mmHg suction achieved using 1 piece of black foam bridged to lower leg.  
  
Dorsum of foot has a deep tissue injury = 4 x 4 x 0 cm 100% non-blanching & dry - Santyl applied.   
  
Scattered denudation and MASD to buttocks. Remnants of zinc cream and I applied Venelex. Essentially unchanged.  
  
Wound Recommendations:   
Maintain VAC as ordered @ 125mmHg suction with twice weekly dressing changes.  Buttocks and dorsum of right foot: venelex twice daily. Axilla: antifungal cream twice daily.  
  
Transition of Care: Plan to follow weekly and as needed while admitted to hospital with [facility] dressing change Monday, 2/15 MONIKA ShaverN, RN, [employer] Certified Wound, Ostomy, Continence Nurse 
office [phone] 
pager [phone] or call  to page

## 2021-02-12 NOTE — PROGRESS NOTES
[facility] 
 
ICU TEAM Progress Note Name: Lalo Hamilton III  
: 1950 MRN: [id number] Date: 2021 Subjective:  
Progress Note: 2021 Mr. Kaide Hou is a 73 yo male w a PMH of prostate cancer s/p radiation, tobacco use disorder (1 ppd), CKD3a, gastric angioectasias, IDDM, heart failure, HTN who was admitted with R foot osteomyelitis on . He was transferred to the ICU after experiencing a cardiac arrest in the [facility]. Please see below for a brief synopsis of his hospital course, by problem. · Acute Hypoxic Respiratory failure secondary to COVID pneumonia: He also demonstrated dyspnea on initial admission, but was negative for COVID. Initially treated with steroids. On , he developed increased oxygen needs. He was COVID positive. He was started on and completed treatment with steroids (completed ), remdesivir (completed ), zinc, and vitamin C. He initially required BiPAP on , and was weaned to 4-5L/min. CT chest showed bilateral pleural effusions with ground glass. Diuresis was attempted. He was continued on zosyn. He required transfer to [facility] on 1/15 due to continued desaturations -> 70s on NRB. CXR with increased bilateral infiltrates. Started linezolid on . Required intubation on . See below under cardiac arrest. He was extubated on , but required reintubation the same day. He completed a course of empiric 14d zyvox (ended ) and repeated empiric stress dose steroids (ended ). He remains on prolonged zosyn for treatment of osteomyelitis. Respiratory cultures have remained NGTD. He continues to fail SBTs and have intermittent increase in oxygen requirements. Began prone therapy . · Cardiac arrest, s/p ROSC: Not cooled as he regained mental function s/p ROSC. Code time: reported to be 12 minutes The patient had been taken off BiPAP for lunch. He was on intermittent BiPAP and HiFlow, demonstrating stability to come off to eat. He ate his lunch and was later found SOB unresponsive and pulseless. He was thereafter transferred to the ICU. He regained mentation, was able to respond to commands, shake head/yes, no, but ultimately did require sedation for compliance with mechanical ventilation. Likely hypoxic arrest. 
 
· Septic shock: Osteomyelitis has been only identified source of infection. Please see above under respiratory failure for full courses of abx. Has been on/off pressors since ICU admission. Admitted with a chronic wound on his R foot. He was started on broad spectrum antibiotics with vanc, zosyn, and flagyl and underwent wound debridement on 12/30. BLC positive for S agalactiae on 12/28. Foot cultures + for Protues mirabilis, group B strep, and Enterococcus. Antibiotics were then narrowed to zosyn. Wound vac placed on foot 1/5. Zosyn for 6 weeks. · Acute on chronic CKD3a: Baseline Cr 1.7. Renal has followed throughout his course. Renal failure progressively worsening. He started IHD on 2/4. · IDDM2: Lantus and lispro · Anemia, acute on chronic: Course c/b anemia w heme + stool. EGD on 1/4 showing superficial ulcers in the distal bulb and second portion of the duodenum measuring 2-4mm.   
  
· COVID negative 12/28, 12/29, but positive 1/11 02/12/21 Overnight Events: Worsening oxygenation - Had to increase ventilator support when supine Proned this afternoon secondary to hypoxia NO started Dialysis MWF per nephrology Update Patient Daughter on status COVID test on 1/28 and recheck on 2/9 - positive Active Problem List:  
 
Problem List  Date Reviewed: 8/20/2020 Codes Class  ARDS (adult respiratory distress syndrome) ([facility] ICD-10-CM: Q34 
 ICD-9-CM: 518.52 * (Principal) Osteomyelitis ([facility] ICD-10-CM: M86.9 ICD-9-CM: 730.20 Diabetic ulcer of right midfoot associated with type 2 diabetes mellitus, with fat layer exposed ([facility] ICD-10-CM: E11.621, I14.805 ICD-9-CM: 250.80, 707.14 PAD (peripheral artery disease) ([facility]) ICD-10-CM: I73.9 ICD-9-CM: 443.9 Dependence on nicotine from cigarettes ICD-10-CM: F17.210 ICD-9-CM: 305.1 KATHI (acute kidney injury) ([facility] ICD-10-CM: N17.9 ICD-9-CM: 156. 9 Acute on chronic renal failure (HCC) ICD-10-CM: N17.9, N18.9 ICD-9-CM: 584.9, 585.9 Severe obesity (BMI 35.0-39. 9) with comorbidity ([facility] ICD-10-CM: E66.01 
ICD-9-CM: 278.01 Type 2 diabetes with nephropathy ([facility]) ICD-10-CM: E11.21 
ICD-9-CM: 250.40, 583.81 S/P hip replacement, right ICD-10-CM: M62.990 ICD-9-CM: V43.64 Stage 3 chronic kidney disease ICD-10-CM: N18.30 ICD-9-CM: 658. 3 Prostate [facility]) ICD-10-CM: U88 ICD-9-CM: 521 Diabetic polyneuropathy ([facility] ICD-10-CM: E11.42 
ICD-9-CM: 250.60, 357.2 Rotator Cuff Tendonitis ICD-10-CM: M71.9, M67.919 ICD-9-CM: 726.10 Diabetes mellitus type 2, controlled ([facility] ICD-10-CM: E11.9 ICD-9-CM: 250.00 Degenerative disc disease ICD-10-CM: [id number] ICD-9-CM: 722.6 Osteoarthrosis involving lower leg ICD-10-CM: M17.10 ICD-9-CM: 715.96 Depression/ Anxiety ICD-10-CM: F34.1 ICD-9-CM: 300.4 HTN (hypertension) ICD-10-CM: I10 
ICD-9-CM: 401.9 Chronic hip pain ICD-10-CM: M25.559, G89.29 ICD-9-CM: 719.45, 338.29 Hypertriglyceridemia ICD-10-CM: E78.1 ICD-9-CM: 272.1 Mild Aortic Insufficiency ICD-10-CM: I35.1 ICD-9-CM: 424.1 Mild Concentric LVH (left ventricular hypertrophy) ICD-10-CM: I51.7 ICD-9-CM: 429.3 Past Medical History: has a past medical history of Arthritis, Degenerative,  Knee (4/4/2011), Carcinoma, Prostate (4/4/2011), Degenerative disc disease (4/4/2011), Depression/ Anxiety (4/4/2011), Diabetes Mellitrus ( non-insulin dependent ) Type 2 (4/4/2011), Heart failure ([facility] HEMATOCHEZIA (4/4/2011), Hypertrension (4/4/2011), Hypertriglyceridemia (4/4/2011), Ill-defined condition, Insomnia (4/4/2011), Laryngitis (4/4/2011), Mild Aortic insufficiency (4/4/2011), Mild Concentric LVH (left ventricular hypertrophy) (4/4/2011), Neuropathy (4/4/2011), Osteoarthritis (4/4/2011), and Rotator Cuff Tendonitis (4/4/2011). Past Surgical History:  
 
 has a past surgical history that includes hx urological; hx other surgical; pr cardiac surg procedure unlist; colonoscopy (N/A, 4/28/2017); hx orthopaedic; hx orthopaedic; and ir insert tunl cvc w/o port over 5 yr (1/6/2021). Home Medications:  
 
Prior to Admission medications Medication Sig Start Date End Date Taking? Authorizing Provider  
pregabalin (LYRICA) 150 mg capsule TAKE ONE CAPSULE BY MOUTH TWICE A DAY 12/7/20  Yes Cheryl Conte MD  
bicalutamide (CASODEX) 50 mg tablet TAKE ONE TABLET BY MOUTH DAILY 11/30/20  Yes Cheryl Conte MD  
torsemide (DEMADEX) 20 mg tablet TAKE FOUR TABLETS BY MOUTH TWICE A DAY 11/23/20  Yes Cheryl Conte MD  
pantoprazole (PROTONIX) 40 mg tablet TAKE ONE TABLET BY MOUTH TWICE DAILY ONCE IN THE MORNING AND ONCE IN THE EVENING 11/18/20  Yes Cheryl Conte MD  
hydrOXYzine HCL (ATARAX) 50 mg tablet TAKE ONE TABLET BY MOUTH THREE TIMES A DAY AS NEEDED FOR ITCHING FOR UP TO 10 DAYS 10/22/20  Yes Cheryl Conte MD  
amLODIPine (NORVASC) 10 mg tablet TAKE ONE TABLET BY MOUTH DAILY 10/2/20  Yes Cheryl Conte MD  
hydrALAZINE (APRESOLINE) 100 mg tablet Take 1 Tab by mouth three (3) times daily.  9/16/20  Yes Cheryl Conte MD  
 ascorbic acid, vitamin C, (Vitamin C) 500 mg tablet Take  by mouth. Yes Provider, Historical  
enzalutamide (XTANDI) 40 mg capsule Take 160 mg by mouth daily. Yes Provider, Historical  
Thera-Tabs M 27 mg iron-400 mcg tab TAKE ONE TABLET BY MOUTH DAILY 7/17/20  Yes Feliz Espitia MD  
tamsulosin [facility]) 0.4 mg capsule TAKE ONE CAPSULE BY MOUTH DAILY 7/16/20  Yes Feliz Espitia MD  
Insulin Syringe-Needle U-100 (BD Insulin Syringe Ultra-Fine) 1 mL 31 gauge x 5/16 syrg USE TO INJECT INSULIN FIVE TIMES A DAY 6/16/20  Yes Feliz Espitia MD  
ferrous sulfate 325 mg (65 mg iron) tablet TAKE ONE TABLET BY MOUTH DAILY BEFORE BREAKFAST 6/16/20  Yes Feliz Espitia MD  
gabapentin (NEURONTIN) 300 mg capsule Take 1 Cap by mouth three (3) times daily. Max Daily Amount: 900 mg. 5/25/20  Yes Feliz Espitia MD  
triamcinolone acetonide (KENALOG) 0.1 % ointment APPLY A THIN LAYER TO AFFECTED AREA(S) TWO TIMES A DAY **DO NOT EXCEED 2.66 GRAMS PER DAY** 5/19/20  Yes Feliz Espitia MD  
diclofenac (VOLTAREN) 1 % gel Apply  to affected area four (4) times daily. 4/16/20  Yes Feliz Espitia MD  
ammonium lactate (AMLACTIN) 12 % topical cream Apply  to affected area two (2) times a day. rub in to affected area well 3/10/20  Yes Feliz Espitia MD  
ammonium lactate (LAC-HYDRIN FIVE) 5 % lotion Apply 1 Applicator to affected area daily. Apply daily bilateral lower legs 3/5/20  Yes Feliz Espitia MD  
insulin glargine (LANTUS U-100 INSULIN) 100 unit/mL injection 72 Units by SubCUTAneous route daily. 3/5/20  Yes Feliz Espitia MD  
insulin regular (NOVOLIN R, HUMULIN R) 100 unit/mL injection 24 units sc tid 3/5/20  Yes Feliz Espitia MD  
ketoconazole (NIZORAL) 2 % topical cream Apply  to affected area two (2) times a day.  2/4/20  Yes Feliz Espitia MD  
ketoconazole (NIZORAL) 2 % shampoo Sig:shampoo once a week 2/4/20  Yes Feliz Espitia MD  
 bisacodyl (DULCOLAX, BISACODYL,) 10 mg suppository Insert 10 mg into rectum daily. Yes Provider, Historical  
insulin aspart U-100 (NOVOLOG FLEXPEN U-100 INSULIN) 100 unit/mL (3 mL) inpn by SubCUTAneous route. Yes Provider, Historical  
Insulin Needles, Disposable, (NOVOFINE 32) 32 gauge x 1/4\" ndle Sig:use 4 times a day as needed 19  Yes MD LINDA Solomon REGULAR U-100 INSULN 100 unit/mL injection INJECT 14 UNITS UNDER THE SKIN THREE TIMES A DAY WITH MEALS AS NEEDED 19  Yes Jacob Brown MD  
fluticasone propionate [facility]) 50 mcg/actuation nasal spray SPRAY TWO SPRAYS IN THE AFFECTED NOSTRIL DAILY 19  Yes Jacob Brown MD  
aspirin delayed-release 81 mg tablet Take 81 mg by mouth daily. Yes Provider, Historical  
acetaminophen (PAIN AND FEVER) 500 mg tablet TAKE ONE TABLET BY MOUTH EVERY 6 HOURS AS NEEDED FOR PAIN 18  Yes Jacob Brown MD  
Calcium Carbonate-Vit D3-Min 600 mg calcium- 400 unit tab Take 1 Tab by mouth two (2) times a day. Yes Provider, Historical  
 
Allergies/Social/Family History: No Known Allergies Social History Tobacco Use  Smoking status: Current Every Day Smoker Packs/day: 1.00 Last attempt to quit: 2019 Years since quittin.2  Smokeless tobacco: Never Used Substance Use Topics  Alcohol use: Not Currently Alcohol/week: 11.7 standard drinks Types: 7 Cans of beer, 7 Shots of liquor per week Family History Family history unknown: Yes Review of Systems:  
 
Unable to provide - intubated and sedated Objective:  
Vital Signs: 
Visit Vitals BP (!) 156/70 Pulse 85 Temp 97.7 °F (36.5 °C) Resp 26 Ht 6' (1.829 m) Wt 124.7 kg (274 lb 14.6 oz) SpO2 91% BMI 37.29 kg/m² O2 Flow Rate (L/min): 15 l/min(Mid flow at 9 liters also) O2 Device: Ventilator, Endotracheal tube Temp (24hrs), Av.4 °F (36.9 °C), Min:97.7 °F (36.5 °C), Max:98.8 °F (37.1 °C) Intake/Output:  
 
Intake/Output Summary (Last 24 hours) at 2/12/2021 0801 Last data filed at 2/12/2021 0500 Gross per 24 hour Intake 1377.54 ml Output 200 ml Net 1177.54 ml Physical Exam: 
General: Intubated and sedated EENT: PERRL 3mm/brisk. MMM Resp: Coarse throughout and decreased to bases CV: Regular rhythm, normal rate, no murmurs GI: Soft, moderately distended with hypoactive BS. Flexi-seal 
Extremities: Lymphedema with eschar noted B/L R>L. Wound vac to RLE. Vascular changes. Warm to touch. Unable to palpate pulses but dopplerable Neurologic: RASS -3. Skin: Warm and dry. Lines: Vabad[address] 41 CVC, RIJ Rudolph, Bynum, Flexiseal, OGT 
 
 
LABS AND  DATA: Personally reviewed Recent Labs  
  02/12/21 
0400 02/11/21 
0318 WBC 7.0 7.7 HGB 8.0* 8.1* HCT 26.1* 26.2*  
 219 Recent Labs  
  02/12/21 
0400 02/11/21 
0318 * 132* K 4.0 3.7 CL 97 96* CO2 24 26 BUN 50* 34* CREA 3.17* 2.44* * 197* CA 8.9 8.9 MG 2.3 2.2 PHOS 4.2 3.4 Recent Labs  
  02/12/21 
0400 02/11/21 
0318 ALB 2.1* 2.3* No results for input(s): INR, PTP, APTT, INREXT, INREXT in the last 72 hours. No results for input(s): PHI, PCO2I, PO2I, FIO2I in the last 72 hours. No results for input(s): CPK, CKMB, TROIQ, BNPP in the last 72 hours. Ventilator Settings: 
AC: 100% R 18 PEEP 12    
  
MEDS: Reviewed 2/15 
  
CXR: Reviewed 2/12 IMPRESSION Multilobar pneumonia/ARDS. Assessment:  
 
Acute Hypoxic Respiratory Failure Secondary to COVID Pneumonia:   
- Intubated and extubated on several occasions - Family has consented to trach when able to place - ARDS volume ventilation. Vt ~ 600 ml  
- Unable to do much weaning on vent due to oxygenation. 
- May need to consider NO if no improvement Sepsis: Osteomyelitis is only known bacterial infectious etiology. Likely cytokine reaction in setting of COVID-19. Cultures NGTD. Completed steroids. Completed course of linezolid (empiric). On prolonged zoysn for osteomyelitis. - Continue zosyn - end date 2/12 KATHI on CKD3a: Renal following; renal failure progressively worsening 
- IHD per renal; received on 2/6 
- HD on Monday, Wed. Fri Anemia, acute on chronic: PRBC on 2/5 
- Hgb down to 6.8 today, transfuse 1 PRBCs HFpEF with Exacerbation:   
- EF 45-50%, mildly dilated RV w mildly reduced systolic function. Diuresis difficult in setting of KATHI on CKD3.   
- IHD for volume removal as we are able. Delirium:  
- When sedation is lightened, experiences hyperactive delirium, which interferes with weaning 
- Continue propofol & wean Versed R foot wound: Complicated by osteomyelitis. - Wound vac - Zosyn as above - ID following Deconditioning: Unable to work w PT/OT at this time Prostate Cancer: Holding casodex as this cannot be crushed an pt has OGT. Multidisciplinary Rounds Completed:  Rounded with RN 
 
ABCDEF Bundle/Checklist 
Pain Medications: Fentanyl Target RASS: -3 Sedation Medications: Versed and Fentanyl Wean off propofol for elevated troponins. CAM-ICU:  Positive Mobility: Poor PT/OT:Unable to participate Restraints: Soft wrist restraints. Discussed Plan of Care (goals of care): Yes with RN. Addressed Code Status: Full Code CARDIOVASCULAR Cardiac Gtts: None. Levophed intermittently with dialysis SBP Goal of: > 90 mmHg MAP Goal of: >65 Transfusion Trigger (Hgb): <7 g/dL RESPIRATORY Vent Goals:  
Optimize PEEP/Ventilation/Oxygenation DVT Prophylaxis (if no, list reason): Heparin SQ  
SPO2 Goal: > 92% GI/ Bynum Catheter Present: Yes GI Prophylaxis: Yes  (hx GIB) Nutrition: Yes TF Bowel Movement: Yes Bowel Regimen: Docusate (Colace) Insulin: Y - Lantus ANTIBIOTICS Antibiotics: 
Zosyn T/L/D Tubes: Orogastric Tube Lines: [employer] Drains: Bynum Catheter [employer] Vent DISPOSITION Stay in ICU CRITICAL CARE CONSULTANT NOTE I had a face to face encounter with the patient, reviewed and interpreted patient data including clinical events, labs, images, vital signs, I/O's, and examined patient. I have discussed the case and the plan and management of the patient's care with the consulting services, the bedside nurses and the respiratory therapist.   
 
NOTE OF PERSONAL INVOLVEMENT IN CARE This patient has a high probability of imminent, clinically significant deterioration, which requires the highest level of preparedness to intervene urgently. I participated in the decision-making and personally managed or directed the management of the following life and organ supporting interventions that required my frequent assessment to treat or prevent imminent deterioration. I personally spent 50 minutes of critical care time. This is time spent at this critically ill patient's bedside actively involved in patient care as well as the coordination of care and discussions with the patient's family. This does not include any procedural time which has been billed separately. Eddie Kelsey NP Critical Care Medicine [facility]

## 2021-02-12 NOTE — PROGRESS NOTES
Pharmacist Note - Vancomycin Dosing (Hemodialysis Patient) Consult provided for this 72 y.o. male for indication of SSTI (redness inferior to the right CVL) This patient is also on the following antibiotic regimen(s): Vancomycin Patient on vancomycin PTA? NO Lab Results Component Value Date/Time Creatinine 3.17 (H) 2021 04:00 AM  
 WBC 7.0 2021 04:00 AM  
 BUN 50 (H) 2021 04:00 AM  
Temp (24hrs), Av.2 °F (36.8 °C), Min:97.7 °F (36.5 °C), Max:98.8 °F (37.1 °C) Estimated CrCl:  ESRD on HD (Monday/Wednesday/Friday) Cultures: No recent cx For this HD patient, will initiate therapy with a loading dose of Vancomycin 2000 mg, to be followed with a dose of 1000 mg after each HD session. Dose will be adjusted to maintain a pre-HD trough of approximately 15 - 20 mcg/mL for therapeutic goal of 10 - 15 mcg/mL as approximately 35% of drug will be removed by HD filtration. Pharmacy to follow patient daily and order levels / make dose adjustments as appropriate.

## 2021-02-12 NOTE — PROGRESS NOTES
0900: RN at bedside for pt sats 83%, TOF 4/4 at 50, nimbex up to 4 mcg 
0910: RT at bedside, advance ETT to 24 cm at gum and retaped, FiO2 increased to 100%,  
0911: TOF 4/4 at 50, nimbex titrated to 5  
0916: Sats 86% TOF 4/4 at 50, nimbex increased to 6. 
0923: Darius Doty, NP notified of pt's sats, no new orders at time. 9610: TOF 4/4 at 50, Nimbex increased to 7 
0931: Bubbling noted from pt's mouth with excessive po secretions, orally suctioned by RN, air added to cuff by RT, pt's sats increased to 94% 3289: TOF 4/4 at 50, Nimbex increased to 8 
1100: KUB verified OGT placement, tube feed restarted, PO meds administered. 1104: New fentanyl syringe hung 
1130: Triglyceride lab drawn 1156: new bag nimbex hung 1213: RN and NP at bedside, O2 sats 77%, FiO2 increased 100%, orders received to prone pt now and add nitric. Tube feeds stopped. 1230: Pt proned for acute respiratory distress 1245: Nimbex increased 9, TOF 3/4 with low tidal volumes. 1300: TOF reassessed 2/4 
1352: New bag Versed hung at 15.  
1400: Tube feed restarted at 20 ml/hr. 1526: New bag nimbex hung at 9 mcg, new fentanyl hung at 300 
1829: New bag Nimbex hung at 9 mcg.

## 2021-02-13 NOTE — PROGRESS NOTES
1930-bedside report received from Ashley RN using sbar format 
 
0730-bedside report given to RN using sbar format

## 2021-02-13 NOTE — PROGRESS NOTES
Nephrology Progress Note Niharika Fu III Date of Admission : 12/28/2020 CC: Follow up for KATHI on CKD Assessment and Plan KATHI on CKD: 
- worsening renal function from ATN  
- Rudolph and dialysis initiated on 2/4 
- HD MWF for now 
- next HD Monday 
- decrease free water due to low Na Resp failure: 
COVID-19 PNA: positive on 1/11 
- s/p decadron and remdesivir 
- on the vent 
- for eventual trach CKD 4 
-  Presumed 2/2 DM and HTN 
- nephrotic range proteinuria 
- baseline Cr 1.7 mg/dl  
- followed by Dr. Kadie Bowers at [facility] Hx of prostate cancer s/p XRT 
  
Anemia in CKD + blood loss: 
- cont JAZ 
- EGD 1/5/2021: gastric fundal lesion  
- transfused yesterday 
  
Type II DM: 
- on insulin 
  
HTN :  
- BP stable now 
  
R foot osteo: 
- on abx 
- s/p debridement on 12/30 Group B strep bacteremia Interval History: Not examined in the room Per RN, stable. Supine now. Off pressors. No UOP. Had issues with HD cath and may need repalced. D/w CCM attending. Goals of care issues to be addresed Current Medications: all current  Medications have been eviewed in [facility] Review of Systems: A comprehensive review of systems was negative except for that written in the HPI. Objective: 
Vitals:   
Vitals:  
 02/13/21 0900 02/13/21 0930 02/13/21 1000 02/13/21 1030 BP: (!) 94/48 (!) 147/57 (!) 136/56 (!) 103/49 Pulse: 76 80 77 80 Resp: 26 26 26 26 Temp:      
TempSrc:      
SpO2: 100% 100% 99% 96% Weight:      
Height:      
 
Intake and Output: 
02/13 0701 - 02/13 1900 In: 644.1 [I.V.:324.1] Out: 0  
02/11 1901 - 02/13 0700 In: 2832.2 [I.V.:1792.2] Out: 1425 [Drains:125] Physical Examination: not examined in the room GEN: ON VENT 
NECK- ET tube + RESP: no distress ABD :chronic distension, BS + 
EXT : edema + NEURO:sedated on the vent :  No kumar Exam deferred due to COVID-19 infection in order to preserve PPE AND minimize risk of  
 transmission to dialysis and renal transplant patient per ASN and RPA guidelines: 
 
 
 
 
[x]    High complexity decision making was performed 
[x]    Patient is at high-risk of decompensation with multiple organ involvement Lab Data Personally Reviewed: I have reviewed all the pertinent labs, microbiology data and radiology studies during assessment. Recent Labs  
  02/13/21 
0523 02/12/21 
0400 02/11/21 
9705 * 132* 132* K 3.4* 4.0 3.7 CL 90* 97 96* CO2 22 24 26 GLU 83 112* 197* BUN 35* 50* 34* CREA 2.25* 3.17* 2.44* CA 7.3* 8.9 8.9 MG 1.7 2.3 2.2 PHOS 3.3 4.2 3.4 ALB 1.6* 2.1* 2.3* Recent Labs  
  02/13/21 
0523 02/12/21 
0400 02/11/21 
9098 WBC 8.0 7.0 7.7 HGB 7.0* 8.0* 8.1* HCT 22.2* 26.1* 26.2*  
 216 219 No results found for: SDES Lab Results Component Value Date/Time Culture result: NO GROWTH 5 DAYS 01/23/2021 03:37 PM  
 Culture result: LIGHT YEAST, (APPARENT CANDIDA ALBICANS) (A) 01/23/2021 02:45 PM  
 Culture result: NO NORMAL RESPIRATORY MICHELLE ISOLATED 01/23/2021 02:45 PM  
 
Recent Results (from the past 24 hour(s)) BLOOD GAS, ARTERIAL Collection Time: 02/12/21  3:45 PM  
Result Value Ref Range pH 7.40 7.35 - 7.45    
 PCO2 43 35 - 45 mmHg PO2 454 (H) 80 - 100 mmHg O2  (H) 92 - 97 % BICARBONATE 26 22 - 26 mmol/L  
 BASE EXCESS 0.9 mmol/L  
 O2 METHOD VENT    
 FIO2 1.0 % MODE ASSIST CONTROL Tidal volume 500.0 SET RATE 26    
 PEEP/CPAP 16.0 Sample source ARTERIAL    
 SITE RIGHT RADIAL JUVENTINO'S TEST YES    
GLUCOSE, POC Collection Time: 02/12/21  6:04 PM  
Result Value Ref Range Glucose (POC) 199 (H) 65 - 100 mg/dL Performed by Chyna Cruz GLUCOSE, POC Collection Time: 02/12/21 11:55 PM  
Result Value Ref Range Glucose (POC) 190 (H) 65 - 100 mg/dL Performed by [id number] Collection Time: 02/13/21  4:20 AM  
Result Value Ref Range pH 7.42 7.35 - 7.45    
 PCO2 38 35 - 45 mmHg PO2 200 (H) 80 - 100 mmHg BICARBONATE 24 22 - 26 mmol/L  
 BASE DEFICIT 0.6 mmol/L Carboxy-Hgb 0.4 (L) 1 - 2 % Methemoglobin 0.6 0 - 1.4 %  
 tHb 8.8 (L) 14 - 17 g/dL Oxyhemoglobin 98.6 (H) 94 - 97 % O2 SATURATION 100 (H) 95 - 99 % O2 METHOD VENT    
 FIO2 60 % MODE ASSIST CONTROL Tidal volume 500.0 SET RATE 26    
 PEEP/CPAP 12.0 Sample source ARTERIAL    
 SITE RIGHT RADIAL JUVENTINO'S TEST YES    
MAGNESIUM Collection Time: 02/13/21  5:23 AM  
Result Value Ref Range Magnesium 1.7 1.6 - 2.4 mg/dL RENAL FUNCTION PANEL Collection Time: 02/13/21  5:23 AM  
Result Value Ref Range Sodium 124 (L) 136 - 145 mmol/L Potassium 3.4 (L) 3.5 - 5.1 mmol/L Chloride 90 (L) 97 - 108 mmol/L  
 CO2 22 21 - 32 mmol/L Anion gap 12 5 - 15 mmol/L Glucose 83 65 - 100 mg/dL BUN 35 (H) 6 - 20 MG/DL Creatinine 2.25 (H) 0.70 - 1.30 MG/DL  
 BUN/Creatinine ratio 16 12 - 20 GFR est AA 35 (L) >60 ml/min/1.73m2 GFR est non-AA 29 (L) >60 ml/min/1.73m2 Calcium 7.3 (L) 8.5 - 10.1 MG/DL Phosphorus 3.3 2.6 - 4.7 MG/DL Albumin 1.6 (L) 3.5 - 5.0 g/dL CBC WITH AUTOMATED DIFF Collection Time: 02/13/21  5:23 AM  
Result Value Ref Range WBC 8.0 4.1 - 11.1 K/uL  
 RBC 2.44 (L) 4.10 - 5.70 M/uL HGB 7.0 (L) 12.1 - 17.0 g/dL HCT 22.2 (L) 36.6 - 50.3 % MCV 91.0 80.0 - 99.0 FL  
 MCH 28.7 26.0 - 34.0 PG  
 MCHC 31.5 30.0 - 36.5 g/dL  
 RDW 19.1 (H) 11.5 - 14.5 % PLATELET 334 380 - 642 K/uL MPV 9.2 8.9 - 12.9 FL  
 NRBC 0.0 0  WBC ABSOLUTE NRBC 0.00 0.00 - 0.01 K/uL NEUTROPHILS 85 (H) 32 - 75 % LYMPHOCYTES 5 (L) 12 - 49 % MONOCYTES 7 5 - 13 % EOSINOPHILS 2 0 - 7 % BASOPHILS 0 0 - 1 % IMMATURE GRANULOCYTES 1 (H) 0.0 - 0.5 % ABS. NEUTROPHILS 6.7 1.8 - 8.0 K/UL  
 ABS. LYMPHOCYTES 0.4 (L) 0.8 - 3.5 K/UL  
 ABS. MONOCYTES 0.6 0.0 - 1.0 K/UL  
 ABS. EOSINOPHILS 0.2 0.0 - 0.4 K/UL ABS. BASOPHILS 0.0 0.0 - 0.1 K/UL  
 ABS. IMM. GRANS. 0.1 (H) 0.00 - 0.04 K/UL  
 DF SMEAR SCANNED    
 RBC COMMENTS ANISOCYTOSIS 1+ 
    
 RBC COMMENTS OVALOCYTES 1+ RBC COMMENTS POLYCHROMASIA 1+ GLUCOSE, POC Collection Time: 02/13/21  5:29 AM  
Result Value Ref Range Glucose (POC) 104 (H) 65 - 100 mg/dL Performed by [address] [place] [address], ARTERIAL Collection Time: 02/13/21  6:04 AM  
Result Value Ref Range pH 7.42 7.35 - 7.45    
 PCO2 37 35 - 45 mmHg PO2 102 (H) 80 - 100 mmHg O2 SAT 98 (H) 92 - 97 % BICARBONATE 23 22 - 26 mmol/L  
 BASE DEFICIT 0.6 mmol/L  
 O2 METHOD VENT    
 FIO2 60 % MODE ASSIST CONTROL Tidal volume 500.0 SET RATE 26    
 PEEP/CPAP 12.0 Sample source ARTERIAL    
 SITE RIGHT RADIAL JUVENTINO'S TEST YES Abby Robertson MD 
[facility] THE [address], [address] [facility] Phone - [phone] Fax - [phone] 
[email or web address]. [email or web address]

## 2021-02-13 NOTE — PROGRESS NOTES
[facility] 
 
ICU TEAM Progress Note Name: Kayla Majano III  
: 1950 MRN: [id number] Date: 2021 Subjective:  
Progress Note: 2021 Mr. Rober Ruiz is a 73 yo male w a PMH of prostate cancer s/p radiation, tobacco use disorder (1 ppd), CKD3a, gastric angioectasias, IDDM, heart failure, HTN who was admitted with R foot osteomyelitis on . He was transferred to the ICU after experiencing a cardiac arrest in the [facility]. Please see below for a brief synopsis of his hospital course, by problem. · Acute Hypoxic Respiratory failure secondary to COVID pneumonia: He also demonstrated dyspnea on initial admission, but was negative for COVID. Initially treated with steroids. On , he developed increased oxygen needs. He was COVID positive. He was started on and completed treatment with steroids (completed ), remdesivir (completed ), zinc, and vitamin C. He initially required BiPAP on , and was weaned to 4-5L/min. CT chest showed bilateral pleural effusions with ground glass. Diuresis was attempted. He was continued on zosyn. He required transfer to [facility] on 1/15 due to continued desaturations -> 70s on NRB. CXR with increased bilateral infiltrates. Started linezolid on . Required intubation on . See below under cardiac arrest. He was extubated on , but required reintubation the same day. He completed a course of empiric 14d zyvox (ended ) and repeated empiric stress dose steroids (ended ). He remains on prolonged zosyn for treatment of osteomyelitis. Respiratory cultures have remained NGTD. He continues to fail SBTs and have intermittent increase in oxygen requirements. Began prone therapy . · Cardiac arrest, s/p ROSC: Not cooled as he regained mental function s/p ROSC. Code time: reported to be 12 minutes The patient had been taken off BiPAP for lunch.  He was on intermittent BiPAP and HiFlow, demonstrating stability to come off to eat. He ate his lunch and was later found SOB unresponsive and pulseless.  He was thereafter transferred to the ICU.  He regained mentation, was able to respond to commands, shake head/yes, no, but ultimately did require sedation for compliance with mechanical ventilation. Likely hypoxic arrest. 
 
· Septic shock: Osteomyelitis has been only identified source of infection.  Please see above under respiratory failure for full courses of abx.  Has been on/off pressors since ICU admission. Admitted with a chronic wound on his R foot. He was started on broad spectrum antibiotics with vanc, zosyn, and flagyl and underwent wound debridement on 12/30.  BLC positive for S agalactiae on 12/28.  Foot cultures + for Protues mirabilis, group B strep, and Enterococcus.  Antibiotics were then narrowed to zosyn. Wound vac placed on foot 1/5. Zosyn for 6 weeks per ID recs  
 
· Acute on chronic CKD3a: Baseline Cr 1.7. Renal has followed throughout his course. Renal failure progressively worsening.  He started IHD on 2/4. 
 
· IDDM2: Lantus and lispro 
 
· Anemia, acute on chronic: Course c/b anemia w heme + stool.  EGD on 1/4 showing superficial ulcers in the distal bulb and second portion of the duodenum measuring 2-4mm.   
  
· COVID negative 12/28, 12/29, but positive 1/11 02/13/21  
Overnight Events: 
Some ventilator weaning overnight, on 60 %, peep 12 
NO started  
Dialysis MWF per nephrology 
Update Patient Daughter on status 
 
COVID test on 1/28 and recheck on 2/9 - positive 
  
Active Problem List:  
 
Problem List  Date Reviewed: 8/20/2020  
       Codes Class  
 ARDS (adult respiratory distress syndrome) (HCC) ICD-10-CM: J80 
ICD-9-CM: 518.52   
   
 * (Principal) Osteomyelitis ([facility] ICD-10-CM: M86.9 ICD-9-CM: 730.20 Diabetic ulcer of right midfoot associated with type 2 diabetes mellitus, with fat layer exposed ([facility] ICD-10-CM: E11.621, P49.429 ICD-9-CM: 250.80, 707.14 PAD (peripheral artery disease) (HCC) ICD-10-CM: I73.9 ICD-9-CM: 443.9 Dependence on nicotine from cigarettes ICD-10-CM: F17.210 ICD-9-CM: 305.1 KATHI (acute kidney injury) ([facility] ICD-10-CM: N17.9 ICD-9-CM: 233. 9 Acute on chronic renal failure (HCC) ICD-10-CM: N17.9, N18.9 ICD-9-CM: 584.9, 585.9 Severe obesity (BMI 35.0-39. 9) with comorbidity ([facility] ICD-10-CM: E66.01 
ICD-9-CM: 278.01 Type 2 diabetes with nephropathy (HCC) ICD-10-CM: E11.21 
ICD-9-CM: 250.40, 583.81 S/P hip replacement, right ICD-10-CM: V08.661 ICD-9-CM: V43.64 Stage 3 chronic kidney disease ICD-10-CM: N18.30 ICD-9-CM: 678. 3 Prostate [facility]) ICD-10-CM: V78 ICD-9-CM: 787 Diabetic polyneuropathy ([facility] ICD-10-CM: E11.42 
ICD-9-CM: 250.60, 357.2 Rotator Cuff Tendonitis ICD-10-CM: M71.9, M67.919 ICD-9-CM: 726.10 Diabetes mellitus type 2, controlled ([facility] ICD-10-CM: E11.9 ICD-9-CM: 250.00 Degenerative disc disease ICD-10-CM: [id number] ICD-9-CM: 722.6 Osteoarthrosis involving lower leg ICD-10-CM: M17.10 ICD-9-CM: 715.96 Depression/ Anxiety ICD-10-CM: F34.1 ICD-9-CM: 300.4 HTN (hypertension) ICD-10-CM: I10 
ICD-9-CM: 401.9 Chronic hip pain ICD-10-CM: M25.559, G89.29 ICD-9-CM: 719.45, 338.29 Hypertriglyceridemia ICD-10-CM: E78.1 ICD-9-CM: 272.1 Mild Aortic Insufficiency ICD-10-CM: I35.1 ICD-9-CM: 424.1 Mild Concentric LVH (left ventricular hypertrophy) ICD-10-CM: I51.7 ICD-9-CM: 429.3 Past Medical History: has a past medical history of Arthritis, Degenerative,  Knee (4/4/2011), Carcinoma, Prostate (4/4/2011), Degenerative disc disease (4/4/2011), Depression/ Anxiety (4/4/2011), Diabetes Mellitrus ( non-insulin dependent ) Type 2 (4/4/2011), Heart failure ([facility] HEMATOCHEZIA (4/4/2011), Hypertrension (4/4/2011), Hypertriglyceridemia (4/4/2011), Ill-defined condition, Insomnia (4/4/2011), Laryngitis (4/4/2011), Mild Aortic insufficiency (4/4/2011), Mild Concentric LVH (left ventricular hypertrophy) (4/4/2011), Neuropathy (4/4/2011), Osteoarthritis (4/4/2011), and Rotator Cuff Tendonitis (4/4/2011). Past Surgical History:  
 
 has a past surgical history that includes hx urological; hx other surgical; pr cardiac surg procedure unlist; colonoscopy (N/A, 4/28/2017); hx orthopaedic; hx orthopaedic; and ir insert tunl cvc w/o port over 5 yr (1/6/2021). Home Medications:  
 
Prior to Admission medications Medication Sig Start Date End Date Taking? Authorizing Provider  
pregabalin (LYRICA) 150 mg capsule TAKE ONE CAPSULE BY MOUTH TWICE A DAY 12/7/20  Yes Leatha Chaves MD  
bicalutamide (CASODEX) 50 mg tablet TAKE ONE TABLET BY MOUTH DAILY 11/30/20  Yes Leatha Chaves MD  
torsemide (DEMADEX) 20 mg tablet TAKE FOUR TABLETS BY MOUTH TWICE A DAY 11/23/20  Yes Leatha Chaves MD  
pantoprazole (PROTONIX) 40 mg tablet TAKE ONE TABLET BY MOUTH TWICE DAILY ONCE IN THE MORNING AND ONCE IN THE EVENING 11/18/20  Yes Leatha Chaves MD  
hydrOXYzine HCL (ATARAX) 50 mg tablet TAKE ONE TABLET BY MOUTH THREE TIMES A DAY AS NEEDED FOR ITCHING FOR UP TO 10 DAYS 10/22/20  Yes Leatha Chaves MD  
amLODIPine (NORVASC) 10 mg tablet TAKE ONE TABLET BY MOUTH DAILY 10/2/20  Yes Leatha Chaves MD  
hydrALAZINE (APRESOLINE) 100 mg tablet Take 1 Tab by mouth three (3) times daily.  9/16/20  Yes Leatha Masters., MD  
 ascorbic acid, vitamin C, (Vitamin C) 500 mg tablet Take  by mouth. Yes Provider, Historical  
enzalutamide (XTANDI) 40 mg capsule Take 160 mg by mouth daily. Yes Provider, Historical  
Thera-Tabs M 27 mg iron-400 mcg tab TAKE ONE TABLET BY MOUTH DAILY 7/17/20  Yes Jorge Bermeo MD  
tamsulosin [facility]) 0.4 mg capsule TAKE ONE CAPSULE BY MOUTH DAILY 7/16/20  Yes Jorge Bermeo MD  
Insulin Syringe-Needle U-100 (BD Insulin Syringe Ultra-Fine) 1 mL 31 gauge x 5/16 syrg USE TO INJECT INSULIN FIVE TIMES A DAY 6/16/20  Yes Jorge Bermeo MD  
ferrous sulfate 325 mg (65 mg iron) tablet TAKE ONE TABLET BY MOUTH DAILY BEFORE BREAKFAST 6/16/20  Yes Jorge Bermeo MD  
gabapentin (NEURONTIN) 300 mg capsule Take 1 Cap by mouth three (3) times daily. Max Daily Amount: 900 mg. 5/25/20  Yes Jorge Bermeo MD  
triamcinolone acetonide (KENALOG) 0.1 % ointment APPLY A THIN LAYER TO AFFECTED AREA(S) TWO TIMES A DAY **DO NOT EXCEED 2.66 GRAMS PER DAY** 5/19/20  Yes Jorge Bermeo MD  
diclofenac (VOLTAREN) 1 % gel Apply  to affected area four (4) times daily. 4/16/20  Yes Jroge Bermeo MD  
ammonium lactate (AMLACTIN) 12 % topical cream Apply  to affected area two (2) times a day. rub in to affected area well 3/10/20  Yes Jorge Bermeo MD  
ammonium lactate (LAC-HYDRIN FIVE) 5 % lotion Apply 1 Applicator to affected area daily. Apply daily bilateral lower legs 3/5/20  Yes Jorge Bermeo MD  
insulin glargine (LANTUS U-100 INSULIN) 100 unit/mL injection 72 Units by SubCUTAneous route daily. 3/5/20  Yes Jorge Bermeo MD  
insulin regular (NOVOLIN R, HUMULIN R) 100 unit/mL injection 24 units sc tid 3/5/20  Yes Jorge Bermeo MD  
ketoconazole (NIZORAL) 2 % topical cream Apply  to affected area two (2) times a day.  2/4/20  Yes Jorge Bermeo MD  
ketoconazole (NIZORAL) 2 % shampoo Sig:shampoo once a week 2/4/20  Yes Jorge Bermeo MD  
 bisacodyl (DULCOLAX, BISACODYL,) 10 mg suppository Insert 10 mg into rectum daily. Yes Provider, Historical  
insulin aspart U-100 (NOVOLOG FLEXPEN U-100 INSULIN) 100 unit/mL (3 mL) inpn by SubCUTAneous route. Yes Provider, Historical  
Insulin Needles, Disposable, (NOVOFINE 32) 32 gauge x 1/4\" ndle Sig:use 4 times a day as needed 19  Yes Eligio Santiago MD  
NOVOLIN R REGULAR U-100 INSULN 100 unit/mL injection INJECT 14 UNITS UNDER THE SKIN THREE TIMES A DAY WITH MEALS AS NEEDED 19  Yes Eligio Santiago MD  
fluticasone propionate [facility]) 50 mcg/actuation nasal spray SPRAY TWO SPRAYS IN THE AFFECTED NOSTRIL DAILY 19  Yes Eligio Santiago MD  
aspirin delayed-release 81 mg tablet Take 81 mg by mouth daily. Yes Provider, Historical  
acetaminophen (PAIN AND FEVER) 500 mg tablet TAKE ONE TABLET BY MOUTH EVERY 6 HOURS AS NEEDED FOR PAIN 18  Yes Eligio Santiago MD  
Calcium Carbonate-Vit D3-Min 600 mg calcium- 400 unit tab Take 1 Tab by mouth two (2) times a day. Yes Provider, Historical  
 
Allergies/Social/Family History: No Known Allergies Social History Tobacco Use  Smoking status: Current Every Day Smoker Packs/day: 1.00 Last attempt to quit: 2019 Years since quittin.2  Smokeless tobacco: Never Used Substance Use Topics  Alcohol use: Not Currently Alcohol/week: 11.7 standard drinks Types: 7 Cans of beer, 7 Shots of liquor per week Family History Family history unknown: Yes Review of Systems:  
 
Unable to provide - intubated and sedated Objective:  
Vital Signs: 
Visit Vitals BP (!) 147/57 Pulse 80 Temp 98.8 °F (37.1 °C) Resp 26 Ht 6' (1.829 m) Wt 129.2 kg (284 lb 13.4 oz) SpO2 100% BMI 38.63 kg/m² O2 Flow Rate (L/min): 15 l/min(Mid flow at 9 liters also) O2 Device: Endotracheal tube, Ventilator Temp (24hrs), Av.4 °F (36.9 °C), Min:98.1 °F (36.7 °C), Max:98.8 °F (37.1 °C) Intake/Output:  
 
Intake/Output Summary (Last 24 hours) at 2/13/2021 3328 Last data filed at 2/13/2021 4069 Gross per 24 hour Intake 2812.41 ml Output 1325 ml Net 1487.41 ml Physical Exam: 
General: Intubated and sedated EENT: PERRL 3mm/brisk. MMM Resp: Coarse throughout and decreased to bases CV: Regular rhythm, normal rate, no murmurs GI: Soft, moderately distended with hypoactive BS. Flexi-seal 
Extremities: Lymphedema with eschar noted B/L R>L. Wound vac to RLE. Vascular changes. Warm to touch. Unable to palpate pulses but dopplerable Neurologic: RASS -3. Skin: Warm and dry. Lines: Vabaduse 41 CVC, RIJ Rudolph, Bynum, Flexiseal, OGT 
 
 
LABS AND  DATA: Personally reviewed Recent Labs  
  02/13/21 
0523 02/12/21 
0400 WBC 8.0 7.0 HGB 7.0* 8.0*  
HCT 22.2* 26.1*  
 216 Recent Labs  
  02/13/21 
0523 02/12/21 
0400 02/11/21 
1778 * 132* 132* K 3.4* 4.0 3.7 CL 90* 97 96* CO2 22 24 26 BUN 35* 50* 34* CREA 2.25* 3.17* 2.44* GLU 83 112* 197* CA 7.3* 8.9 8.9 MG  --  2.3 2.2 PHOS 3.3 4.2 3.4 Recent Labs  
  02/13/21 
0523 02/12/21 
0400 ALB 1.6* 2.1* No results for input(s): INR, PTP, APTT, INREXT, INREXT in the last 72 hours. No results for input(s): PHI, PCO2I, PO2I, FIO2I in the last 72 hours. No results for input(s): CPK, CKMB, TROIQ, BNPP in the last 72 hours. Ventilator Settings: 
AC: 100% R 18 PEEP 12    
  
MEDS: Reviewed 2/15 
  
CXR: Reviewed 2/12 IMPRESSION Multilobar pneumonia/ARDS. Assessment:  
 
Acute Hypoxic Respiratory Failure Secondary to COVID Pneumonia:   
- Intubated and extubated on several occasions - Family has consented to trach when able to place - ARDS volume ventilation. Vt ~ 600 ml  
- Unable to do much weaning on vent due to oxygenation requirements  
- NO started yesterday, cont and wean as appropriate and tolerated - Paralyzed and sedated for ventilator compliance Sepsis: Osteomyelitis is only known bacterial infectious etiology. Likely cytokine reaction in setting of COVID-19. Cultures NGTD. Completed steroids. Completed course of linezolid (empiric). On prolonged zoysn for osteomyelitis. - Continue zosyn - end date 2/12 KATHI on CKD3a: Renal following; renal failure progressively worsening 
- IHD per renal; received on 2/6 
- HD on Monday, Wed. Fri Anemia, acute on chronic: PRBC on 2/5 
- Hgb down to 7 today, CTM, no identified source of bleed, sp transfuse 1 PRBCs yesterday HFpEF with Exacerbation:   
- EF 45-50%, mildly dilated RV w mildly reduced systolic function. Diuresis difficult in setting of KATHI on CKD3.   
- IHD for volume removal as we are able. Delirium:  
- When sedation is lightened, experiences hyperactive delirium, which interferes with weaning 
- Continue propofol & Versed, on nimbex R foot wound: Complicated by osteomyelitis. - Wound vac - Zosyn as above - ID following Deconditioning: Unable to work w PT/OT at this time Prostate Cancer: Holding casodex as this cannot be crushed an pt has OGT. Multidisciplinary Rounds Completed:  Rounded with RN 
 
ABCDEF Bundle/Checklist 
Pain Medications: Fentanyl Target RASS: -3 Sedation Medications: Versed and Fentanyl CAM-ICU:  Positive Mobility: Poor PT/OT:Unable to participate Restraints: Soft wrist restraints. Discussed Plan of Care (goals of care): Yes with RN. Addressed Code Status: Full Code CARDIOVASCULAR Cardiac Gtts: None. Levophed intermittently with dialysis SBP Goal of: > 90 mmHg MAP Goal of: >65 Transfusion Trigger (Hgb): <7 g/dL RESPIRATORY Vent Goals:  
Optimize PEEP/Ventilation/Oxygenation DVT Prophylaxis (if no, list reason): Heparin SQ  
SPO2 Goal: > 92% GI/ Bynum Catheter Present: Yes GI Prophylaxis: Yes  (hx GIB) Nutrition: Yes TF Bowel Movement: Yes Bowel Regimen: Docusate (Colace) Insulin: Y - Lantus ANTIBIOTICS Antibiotics: Zosyn T/L/D Tubes: Orogastric Tube Lines: [employer] Drains: Bynum Catheter SPECIAL EQUIPMENT Vent DISPOSITION Stay in ICU CRITICAL CARE CONSULTANT NOTE I had a face to face encounter with the patient, reviewed and interpreted patient data including clinical events, labs, images, vital signs, I/O's, and examined patient. I have discussed the case and the plan and management of the patient's care with the consulting services, the bedside nurses and the respiratory therapist.   
 
NOTE OF PERSONAL INVOLVEMENT IN CARE This patient has a high probability of imminent, clinically significant deterioration, which requires the highest level of preparedness to intervene urgently. I participated in the decision-making and personally managed or directed the management of the following life and organ supporting interventions that required my frequent assessment to treat or prevent imminent deterioration. I personally spent 50 minutes of critical care time. This is time spent at this critically ill patient's bedside actively involved in patient care as well as the coordination of care and discussions with the patient's family. This does not include any procedural time which has been billed separately. Heide Uriostegui NP Critical Care Medicine [facility]

## 2021-02-13 NOTE — PROGRESS NOTES
0730 Bedside and Verbal shift change report given to SERAFIN TREVIZO RN (oncoming nurse) by Arnol Huizar RN (offgoing nurse). Report included the following information SBAR, Kardex, ED Summary, OR Summary, Procedure Summary, Intake/Output, MAR, Accordion, Recent Results, Med Rec Status, Cardiac Rhythm NSR and Alarm Parameters . 1340 Nimbex had run out, while this RN off floor with other patient. New bag hung. TOF 4/4 twitches. Increased to 10 mcg/kg/min. 1355 TOF 4/4 twitches. Patient still compliant with ventilator. Sai Orozco NP notified. No new orders at this time. 1830 TOF 1/4 twitches. 1930 Bedside and Verbal shift change report given to [id number], RN (oncoming nurse) by Henny Harry. RUBA TREVIZO (offgoing nurse). Report included the following information SBAR, Kardex, ED Summary, OR Summary, Procedure Summary, Intake/Output, MAR, Accordion, Recent Results, Med Rec Status, Cardiac Rhythm NSR and Alarm Parameters . Shift Summery: Pt has episode of desating to the 50's when changing pad and cleaning pt up. RT notified and Sai Orozco NP at beside. fiO2 up to 100% and PEEP back up to 12.

## 2021-02-14 NOTE — PROGRESS NOTES
1930 Bedside and Verbal shift change report given to S. [id number]  Kiran Torres RN (oncoming nurse) by Puja Haas. RUBA TREVIZO (offgoing nurse). Report included the following information SBAR, Kardex, ED Summary, OR Summary, Procedure Summary, Intake/Output, MAR, Accordion, Recent Results, Med Rec Status, Cardiac Rhythm NSR and Alarm Parameters . 2000-desat into the low 70's on 100% with turning and repositioning requiring bagging to get sats into the 80's  Pt recovered after 15min 0730-bedside report given to RN using sbar format

## 2021-02-14 NOTE — PROGRESS NOTES
[facility] 
 
ICU TEAM Progress Note Name: Priyank Ernst III  
: 1950 MRN: [id number] Date: 2021 Subjective:  
Progress Note: 2021 Mr. Lina Green is a 73 yo male w a PMH of prostate cancer s/p radiation, tobacco use disorder (1 ppd), CKD3a, gastric angioectasias, IDDM, heart failure, HTN who was admitted with R foot osteomyelitis on . He was transferred to the ICU after experiencing a cardiac arrest in the [facility]. Please see below for a brief synopsis of his hospital course, by problem. · Acute Hypoxic Respiratory failure secondary to COVID pneumonia: He also demonstrated dyspnea on initial admission, but was negative for COVID. Initially treated with steroids. On , he developed increased oxygen needs. He was COVID positive. He was started on and completed treatment with steroids (completed ), remdesivir (completed ), zinc, and vitamin C. He initially required BiPAP on , and was weaned to 4-5L/min. CT chest showed bilateral pleural effusions with ground glass. Diuresis was attempted. He was continued on zosyn. He required transfer to [facility] on 1/15 due to continued desaturations -> 70s on NRB. CXR with increased bilateral infiltrates. Started linezolid on . Required intubation on . See below under cardiac arrest. He was extubated on , but required reintubation the same day. He completed a course of empiric 14d zyvox (ended ) and repeated empiric stress dose steroids (ended ). He remains on prolonged zosyn for treatment of osteomyelitis. Respiratory cultures have remained NGTD. He continues to fail SBTs and have intermittent increase in oxygen requirements. Began prone therapy . · Cardiac arrest, s/p ROSC: Not cooled as he regained mental function s/p ROSC. Code time: reported to be 12 minutes The patient had been taken off BiPAP for lunch. He was on intermittent BiPAP and HiFlow, demonstrating stability to come off to eat. He ate his lunch and was later found SOB unresponsive and pulseless. He was thereafter transferred to the ICU. He regained mentation, was able to respond to commands, shake head/yes, no, but ultimately did require sedation for compliance with mechanical ventilation. Likely hypoxic arrest. 
 
· Septic shock: Osteomyelitis has been only identified source of infection. Please see above under respiratory failure for full courses of abx. Has been on/off pressors since ICU admission. Admitted with a chronic wound on his R foot. He was started on broad spectrum antibiotics with vanc, zosyn, and flagyl and underwent wound debridement on 12/30. BLC positive for S agalactiae on 12/28. Foot cultures + for Protues mirabilis, group B strep, and Enterococcus. Antibiotics were then narrowed to zosyn. Wound vac placed on foot 1/5. Zosyn for 6 weeks per ID recs · Acute on chronic CKD3a: Baseline Cr 1.7. Renal has followed throughout his course. Renal failure progressively worsening. He started IHD on 2/4. · IDDM2: Lantus and lispro · Anemia, acute on chronic: Course c/b anemia w heme + stool. EGD on 1/4 showing superficial ulcers in the distal bulb and second portion of the duodenum measuring 2-4mm.   
  
· COVID negative 12/28, 12/29, but positive 1/11 02/14/21 Overnight Events: 
Some ventilator weaning overnight, on 60 %, peep 12 NO started Dialysis MWF per nephrology Update Patient Daughter on status Hgb 6.5 this am, order to transfuse 1 unit pRBCs COVID test on 1/28 and recheck on 2/9 - positive Active Problem List:  
 
Problem List  Date Reviewed: 8/20/2020 Codes Class  ARDS (adult respiratory distress syndrome) ([facility] [address] ICD-10-CM: E04 
 ICD-9-CM: 518.52 * (Principal) Osteomyelitis ([address] ICD-10-CM: M86.9 ICD-9-CM: 730.20 Diabetic ulcer of right midfoot associated with type 2 diabetes mellitus, with fat layer exposed ([address] ICD-10-CM: E11.621, P01.637 ICD-9-CM: 250.80, 707.14 PAD (peripheral artery disease) ([facility]) ICD-10-CM: I73.9 ICD-9-CM: 443.9 Dependence on nicotine from cigarettes ICD-10-CM: F17.210 ICD-9-CM: 305.1 KATHI (acute kidney injury) ([address] ICD-10-CM: N17.9 ICD-9-CM: 122. 9 Acute on chronic renal failure (HCC) ICD-10-CM: N17.9, N18.9 ICD-9-CM: 584.9, 585.9 Severe obesity (BMI 35.0-39. 9) with comorbidity ([address] ICD-10-CM: E66.01 
ICD-9-CM: 278.01 Type 2 diabetes with nephropathy ([facility]) ICD-10-CM: E11.21 
ICD-9-CM: 250.40, 583.81 S/P hip replacement, right ICD-10-CM: K70.457 ICD-9-CM: V43.64 Stage 3 chronic kidney disease ICD-10-CM: N18.30 ICD-9-CM: 826. 3 Prostate [facility]) ICD-10-CM: I15 ICD-9-CM: 073 Diabetic polyneuropathy ([address] ICD-10-CM: E11.42 
ICD-9-CM: 250.60, 357.2 Rotator Cuff Tendonitis ICD-10-CM: M71.9, M67.919 ICD-9-CM: 726.10 Diabetes mellitus type 2, controlled ([address] ICD-10-CM: E11.9 ICD-9-CM: 250.00 Degenerative disc disease ICD-10-CM: DSM4338 ICD-9-CM: 722.6 Osteoarthrosis involving lower leg ICD-10-CM: M17.10 ICD-9-CM: 715.96 Depression/ Anxiety ICD-10-CM: F34.1 ICD-9-CM: 300.4 HTN (hypertension) ICD-10-CM: I10 
ICD-9-CM: 401.9 Chronic hip pain ICD-10-CM: M25.559, G89.29 ICD-9-CM: 719.45, 338.29 Hypertriglyceridemia ICD-10-CM: E78.1 ICD-9-CM: 272.1 Mild Aortic Insufficiency ICD-10-CM: I35.1 ICD-9-CM: 424.1 Mild Concentric LVH (left ventricular hypertrophy) ICD-10-CM: I51.7 ICD-9-CM: 429.3 Past Medical History: has a past medical history of Arthritis, Degenerative,  Knee (4/4/2011), Carcinoma, Prostate (4/4/2011), Degenerative disc disease (4/4/2011), Depression/ Anxiety (4/4/2011), Diabetes Mellitrus ( non-insulin dependent ) Type 2 (4/4/2011), Heart failure ([facility] [address] HEMATOCHEZIA (4/4/2011), Hypertrension (4/4/2011), Hypertriglyceridemia (4/4/2011), Ill-defined condition, Insomnia (4/4/2011), Laryngitis (4/4/2011), Mild Aortic insufficiency (4/4/2011), Mild Concentric LVH (left ventricular hypertrophy) (4/4/2011), Neuropathy (4/4/2011), Osteoarthritis (4/4/2011), and Rotator Cuff Tendonitis (4/4/2011). Past Surgical History:  
 
 has a past surgical history that includes hx urological; hx other surgical; pr cardiac surg procedure unlist; colonoscopy (N/A, 4/28/2017); hx orthopaedic; hx orthopaedic; and ir insert tunl cvc w/o port over 5 yr (1/6/2021). Home Medications:  
 
Prior to Admission medications Medication Sig Start Date End Date Taking? Authorizing Provider  
pregabalin (LYRICA) 150 mg capsule TAKE ONE CAPSULE BY MOUTH TWICE A DAY 12/7/20  Yes Joaquin Mcleod MD  
bicalutamide (CASODEX) 50 mg tablet TAKE ONE TABLET BY MOUTH DAILY 11/30/20  Yes Joaquin Mcleod MD  
torsemide (DEMADEX) 20 mg tablet TAKE FOUR TABLETS BY MOUTH TWICE A DAY 11/23/20  Yes Joaquin Mcleod MD  
pantoprazole (PROTONIX) 40 mg tablet TAKE ONE TABLET BY MOUTH TWICE DAILY ONCE IN THE MORNING AND ONCE IN THE EVENING 11/18/20  Yes Joaquin Mcleod MD  
hydrOXYzine HCL (ATARAX) 50 mg tablet TAKE ONE TABLET BY MOUTH THREE TIMES A DAY AS NEEDED FOR ITCHING FOR UP TO 10 DAYS 10/22/20  Yes Joaquin Mcleod MD  
amLODIPine (NORVASC) 10 mg tablet TAKE ONE TABLET BY MOUTH DAILY 10/2/20  Yes Joaquin Mcleod MD  
hydrALAZINE (APRESOLINE) 100 mg tablet Take 1 Tab by mouth three (3) times daily.  9/16/20  Yes Joaquin Mcleod MD  
 ascorbic acid, vitamin C, (Vitamin C) 500 mg tablet Take  by mouth. Yes Provider, Historical  
enzalutamide (XTANDI) 40 mg capsule Take 160 mg by mouth daily. Yes Provider, Historical  
Thera-Tabs M 27 mg iron-400 mcg tab TAKE ONE TABLET BY MOUTH DAILY 7/17/20  Yes Eliazar Phillips MD  
tamsulosin [facility]) 0.4 mg capsule TAKE ONE CAPSULE BY MOUTH DAILY 7/16/20  Yes Eliazar Phillips MD  
Insulin Syringe-Needle U-100 (BD Insulin Syringe Ultra-Fine) 1 mL 31 gauge x 5/16 syrg USE TO INJECT INSULIN FIVE TIMES A DAY 6/16/20  Yes Eliazar Phillips MD  
ferrous sulfate 325 mg (65 mg iron) tablet TAKE ONE TABLET BY MOUTH DAILY BEFORE BREAKFAST 6/16/20  Yes Eliazar Phillips MD  
gabapentin (NEURONTIN) 300 mg capsule Take 1 Cap by mouth three (3) times daily. Max Daily Amount: 900 mg. 5/25/20  Yes Eliazar Phillips MD  
triamcinolone acetonide (KENALOG) 0.1 % ointment APPLY A THIN LAYER TO AFFECTED AREA(S) TWO TIMES A DAY **DO NOT EXCEED 2.66 GRAMS PER DAY** 5/19/20  Yes Eliazar Phillips MD  
diclofenac (VOLTAREN) 1 % gel Apply  to affected area four (4) times daily. 4/16/20  Yes Eliazar Phillips MD  
ammonium lactate (AMLACTIN) 12 % topical cream Apply  to affected area two (2) times a day. rub in to affected area well 3/10/20  Yes Eliazar Phillips MD  
ammonium lactate (LAC-HYDRIN FIVE) 5 % lotion Apply 1 Applicator to affected area daily. Apply daily bilateral lower legs 3/5/20  Yes Eliazar Phillips MD  
insulin glargine (LANTUS U-100 INSULIN) 100 unit/mL injection 72 Units by SubCUTAneous route daily. 3/5/20  Yes Eliazar Phillips MD  
insulin regular (NOVOLIN R, HUMULIN R) 100 unit/mL injection 24 units sc tid 3/5/20  Yes Eliazar Phillips MD  
ketoconazole (NIZORAL) 2 % topical cream Apply  to affected area two (2) times a day.  2/4/20  Yes Eliazar Phillips MD  
ketoconazole (NIZORAL) 2 % shampoo Sig:shampoo once a week 2/4/20  Yes Eliazar Phillips MD  
 bisacodyl (DULCOLAX, BISACODYL,) 10 mg suppository Insert 10 mg into rectum daily. Yes Provider, Historical  
insulin aspart U-100 (NOVOLOG FLEXPEN U-100 INSULIN) 100 unit/mL (3 mL) inpn by SubCUTAneous route. Yes Provider, Historical  
Insulin Needles, Disposable, (NOVOFINE 32) 32 gauge x 1/4\" ndle Sig:use 4 times a day as needed 19  Yes Radha Vieyra MD  
NOVOLIN R REGULAR U-100 INSULN 100 unit/mL injection INJECT 14 UNITS UNDER THE SKIN THREE TIMES A DAY WITH MEALS AS NEEDED 19  Yes Radha Vieyra MD  
fluticasone propionate [facility]) 50 mcg/actuation nasal spray SPRAY TWO SPRAYS IN THE AFFECTED NOSTRIL DAILY 19  Yes Radha Vieyra MD  
aspirin delayed-release 81 mg tablet Take 81 mg by mouth daily. Yes Provider, Historical  
acetaminophen (PAIN AND FEVER) 500 mg tablet TAKE ONE TABLET BY MOUTH EVERY 6 HOURS AS NEEDED FOR PAIN 18  Yes Radha Vieyar MD  
Calcium Carbonate-Vit D3-Min 600 mg calcium- 400 unit tab Take 1 Tab by mouth two (2) times a day. Yes Provider, Historical  
 
Allergies/Social/Family History: No Known Allergies Social History Tobacco Use  Smoking status: Current Every Day Smoker Packs/day: 1.00 Last attempt to quit: 2019 Years since quittin.2  Smokeless tobacco: Never Used Substance Use Topics  Alcohol use: Not Currently Alcohol/week: 11.7 standard drinks Types: 7 Cans of beer, 7 Shots of liquor per week Family History Family history unknown: Yes Review of Systems:  
 
Unable to provide - intubated and sedated Objective:  
Vital Signs: 
Visit Vitals BP (!) 103/56 Pulse 73 Temp 98.1 °F (36.7 °C) Resp 26 Ht 6' (1.829 m) Wt 129.6 kg (285 lb 11.5 oz) SpO2 99% BMI 38.75 kg/m² O2 Flow Rate (L/min): 15 l/min(Mid flow at 9 liters also) O2 Device: Endotracheal tube, Ventilator Temp (24hrs), Av.1 °F (37.3 °C), Min:98.1 °F (36.7 °C), Max:100.3 °F (37.9 °C) Intake/Output:  
 
Intake/Output Summary (Last 24 hours) at 2/14/2021 1950 Last data filed at 2/14/2021 0900 Gross per 24 hour Intake 3211.34 ml Output 525 ml Net 2686.34 ml Physical Exam: 
General: Intubated and sedated EENT: PERRL 3mm/brisk. MMM Resp: Coarse throughout and decreased to bases CV: Regular rhythm, normal rate, no murmurs GI: Soft, moderately distended with hypoactive BS. Flexi-seal 
Extremities: Lymphedema with eschar noted B/L R>L. Wound vac to RLE. Vascular changes. Warm to touch. Unable to palpate pulses but dopplerable Neurologic: RASS -3. Skin: Warm and dry. Lines: [address] 41 CVC, RIJ Rudolph, Bynum, Flexiseal, OGT 
 
 
LABS AND  DATA: Personally reviewed Recent Labs  
  02/14/21 
8689 02/13/21 
7864 WBC 7.5 8.0 HGB 6.5* 7.0*  
HCT 21.0* 22.2*  
 190 Recent Labs  
  02/14/21 
0606 02/13/21 
1345 02/13/21 
0523 02/12/21 
0400 * 134* 124* 132* K 3.9 3.8 3.4* 4.0  
CL 97 99 90* 97  
CO2 23 27 22 24 BUN 55* 44* 35* 50* CREA 3.32* 2.98* 2.25* 3.17* GLU 72 114* 83 112* CA 8.4* 8.5 7.3* 8.9 MG  --   --  1.7 2.3 PHOS 4.2  --  3.3 4.2 Recent Labs  
  02/14/21 
0606 02/13/21 
1345 AP  --  105 TP  --  7.2 ALB 1.8* 2.0*  
GLOB  --  5.2* No results for input(s): INR, PTP, APTT, INREXT, INREXT in the last 72 hours. No results for input(s): PHI, PCO2I, PO2I, FIO2I in the last 72 hours. No results for input(s): CPK, CKMB, TROIQ, BNPP in the last 72 hours. Ventilator Settings: 
AC: 60% R 18 PEEP 12    
  
MEDS: Reviewed 2/15 
  
CXR: Reviewed 2/12 IMPRESSION Multilobar pneumonia/ARDS. Assessment:  
 
Acute Hypoxic Respiratory Failure Secondary to COVID Pneumonia:   
- Intubated and extubated on several occasions - Family has consented to trach when able to place - ARDS volume ventilation. Vt ~ 600 ml  
- Unable to do much weaning on vent due to oxygenation requirements - NO started yesterday, cont and wean as appropriate and tolerated - Paralyzed and sedated for ventilator compliance Sepsis: Osteomyelitis is only known bacterial infectious etiology. Likely cytokine reaction in setting of COVID-19. Cultures NGTD. Completed steroids. Completed course of linezolid (empiric). On prolonged zoysn for osteomyelitis. - Continue zosyn - end date 2/12 KATHI on CKD3a: Renal following; renal failure progressively worsening 
- IHD per renal; received on 2/6 
- HD on Monday, Wed. Fri Anemia, acute on chronic: PRBC on 2/5 
- Hgb down to 7 today, CTM, no identified source of bleed, sp transfuse 1 PRBCs yesterday HFpEF with Exacerbation:   
- EF 45-50%, mildly dilated RV w mildly reduced systolic function. Diuresis difficult in setting of KATHI on CKD3.   
- IHD for volume removal as we are able. Delirium:  
- When sedation is lightened, experiences hyperactive delirium, which interferes with weaning 
- Continue propofol & Versed, on nimbex R foot wound: Complicated by osteomyelitis. - Wound vac - Zosyn as above - ID following Deconditioning: Unable to work w PT/OT at this time Prostate Cancer: Holding casodex as this cannot be crushed an pt has OGT. Multidisciplinary Rounds Completed:  Rounded with RN 
 
ABCDEF Bundle/Checklist 
Pain Medications: Fentanyl Target RASS: -3 Sedation Medications: Versed and Fentanyl CAM-ICU:  Positive Mobility: Poor PT/OT:Unable to participate Restraints: Soft wrist restraints. Discussed Plan of Care (goals of care): Yes with RN. Addressed Code Status: Full Code CARDIOVASCULAR Cardiac Gtts: None. Levophed intermittently with dialysis SBP Goal of: > 90 mmHg MAP Goal of: >65 Transfusion Trigger (Hgb): <7 g/dL RESPIRATORY Vent Goals:  
Optimize PEEP/Ventilation/Oxygenation DVT Prophylaxis (if no, list reason): Heparin SQ  
SPO2 Goal: > 92% GI/ Bynum Catheter Present: Yes GI Prophylaxis: Yes  (hx GIB) Nutrition: Yes TF Bowel Movement: Yes Bowel Regimen: Docusate (Colace) Insulin: Y - Lantus ANTIBIOTICS Antibiotics: 
Zosyn T/L/D Tubes: Orogastric Tube Lines: [employer] Drains: Bynum Catheter SPECIAL EQUIPMENT Vent DISPOSITION Stay in ICU CRITICAL CARE CONSULTANT NOTE I had a face to face encounter with the patient, reviewed and interpreted patient data including clinical events, labs, images, vital signs, I/O's, and examined patient. I have discussed the case and the plan and management of the patient's care with the consulting services, the bedside nurses and the respiratory therapist.   
 
NOTE OF PERSONAL INVOLVEMENT IN CARE This patient has a high probability of imminent, clinically significant deterioration, which requires the highest level of preparedness to intervene urgently. I participated in the decision-making and personally managed or directed the management of the following life and organ supporting interventions that required my frequent assessment to treat or prevent imminent deterioration. I personally spent 50 minutes of critical care time. This is time spent at this critically ill patient's bedside actively involved in patient care as well as the coordination of care and discussions with the patient's family. This does not include any procedural time which has been billed separately. Adan Spencer NP Critical Care Medicine [facility]

## 2021-02-14 NOTE — PROGRESS NOTES
Nephrology Progress Note Georgina Manzo III Date of Admission : 12/28/2020 CC: Follow up for KATHI on CKD Assessment and Plan KATHI on CKD: 
- worsening renal function from ATN  
- Rudolph and dialysis initiated on 2/4 
- HD MWF for now 
- next HD Monday 
- na better Resp failure: 
COVID-19 PNA: positive on 1/11 
- s/p decadron and remdesivir 
- on the vent 
- for eventual trach 
- paralysed CKD 4 
-  Presumed 2/2 DM and HTN 
- nephrotic range proteinuria 
- baseline Cr 1.7 mg/dl  
- followed by Dr. Karma Pack at [facility] Hx of prostate cancer s/p XRT 
  
Anemia in CKD + blood loss: 
- cont JAZ 
- EGD 1/5/2021: gastric fundal lesion  
- one unit PRBC today  
  
Type II DM: 
- on insulin 
  
HTN :  
- BP stable now 
  
R foot osteo: 
- on abx 
- s/p debridement on 12/30 Group B strep bacteremia Interval History: Not examined in the room Per RN, stable. Supine now. Off pressors. No UOP. Had issues with HD cath and may need repalced. D/w CCM team. If HD cath does not run tomorrow, they will change it Current Medications: all current  Medications have been eviewed in [facility] Review of Systems: A comprehensive review of systems was negative except for that written in the HPI. Objective: 
Vitals:   
Vitals:  
 02/14/21 0818 02/14/21 0900 02/14/21 1000 02/14/21 1140 BP:  (!) 103/56 (!) 110/58 Pulse: 76 73 78 77 Resp: 26 26 26 26 Temp:      
TempSrc:      
SpO2: 100% 99% 99% 98% Weight:      
Height:      
 
Intake and Output: 
02/14 0701 - 02/14 1900 In: 444.1 [I.V.:164.1] Out: 25 [Drains:25] 
02/12 1901 - 02/14 0700 In: 7993 [I.V.:2193] Out: 500 [Drains:500] Physical Examination: not examined in the room GEN: ON VENT 
NECK- ET tube + RESP: no distress ABD :chronic distension, BS + 
EXT : edema + NEURO:sedated on the vent :  No kumar Exam deferred due to COVID-19 infection in order to preserve PPE AND minimize risk of  
 transmission to dialysis and renal transplant patient per ASN and RPA guidelines: 
 
 
 
 
[x]    High complexity decision making was performed 
[x]    Patient is at high-risk of decompensation with multiple organ involvement Lab Data Personally Reviewed: I have reviewed all the pertinent labs, microbiology data and radiology studies during assessment. Recent Labs  
  02/14/21 
0606 02/13/21 
1345 02/13/21 
0523 02/12/21 
0400 * 134* 124* 132* K 3.9 3.8 3.4* 4.0  
CL 97 99 90* 97  
CO2 23 27 22 24 GLU 72 114* 83 112* BUN 55* 44* 35* 50* CREA 3.32* 2.98* 2.25* 3.17* CA 8.4* 8.5 7.3* 8.9 MG  --   --  1.7 2.3 PHOS 4.2  --  3.3 4.2 ALB 1.8* 2.0* 1.6* 2.1* ALT  --  7*  --   --   
 
Recent Labs  
  02/14/21 
0609 02/13/21 
0523 02/12/21 
0400 WBC 7.5 8.0 7.0 HGB 6.5* 7.0* 8.0*  
HCT 21.0* 22.2* 26.1*  
 190 216 No results found for: SDES Lab Results Component Value Date/Time Culture result: NO GROWTH 5 DAYS 01/23/2021 03:37 PM  
 Culture result: LIGHT YEAST, (APPARENT CANDIDA ALBICANS) (A) 01/23/2021 02:45 PM  
 Culture result: NO NORMAL RESPIRATORY MICHELLE ISOLATED 01/23/2021 02:45 PM  
 
Recent Results (from the past 24 hour(s)) METABOLIC PANEL, COMPREHENSIVE Collection Time: 02/13/21  1:45 PM  
Result Value Ref Range Sodium 134 (L) 136 - 145 mmol/L Potassium 3.8 3.5 - 5.1 mmol/L Chloride 99 97 - 108 mmol/L  
 CO2 27 21 - 32 mmol/L Anion gap 8 5 - 15 mmol/L Glucose 114 (H) 65 - 100 mg/dL BUN 44 (H) 6 - 20 MG/DL Creatinine 2.98 (H) 0.70 - 1.30 MG/DL  
 BUN/Creatinine ratio 15 12 - 20 GFR est AA 25 (L) >60 ml/min/1.73m2 GFR est non-AA 21 (L) >60 ml/min/1.73m2 Calcium 8.5 8.5 - 10.1 MG/DL Bilirubin, total 0.4 0.2 - 1.0 MG/DL  
 ALT (SGPT) 7 (L) 12 - 78 U/L  
 AST (SGOT) 11 (L) 15 - 37 U/L Alk. phosphatase 105 45 - 117 U/L Protein, total 7.2 6.4 - 8.2 g/dL Albumin 2.0 (L) 3.5 - 5.0 g/dL Globulin 5.2 (H) 2.0 - 4.0 g/dL A-G Ratio 0.4 (L) 1.1 - 2.2 GLUCOSE, POC Collection Time: 02/13/21  1:45 PM  
Result Value Ref Range Glucose (POC) 125 (H) 65 - 100 mg/dL Performed by Murray Cuellar GLUCOSE, POC Collection Time: 02/13/21  6:03 PM  
Result Value Ref Range Glucose (POC) 153 (H) 65 - 100 mg/dL Performed by Murray Cuellar GLUCOSE, POC Collection Time: 02/13/21 11:39 PM  
Result Value Ref Range Glucose (POC) 141 (H) 65 - 100 mg/dL Performed by [address] [place] [address], ARTERIAL Collection Time: 02/14/21  4:32 AM  
Result Value Ref Range pH 7.40 7.35 - 7.45    
 PCO2 35 35 - 45 mmHg PO2 214 (H) 80 - 100 mmHg O2 SAT 99 (H) 92 - 97 % BICARBONATE 21 (L) 22 - 26 mmol/L  
 BASE DEFICIT 3.1 mmol/L  
 O2 METHOD VENT    
 FIO2 100 % MODE ASSIST CONTROL Tidal volume 500.0 SET RATE 26    
 PEEP/CPAP 12.0 Sample source ARTERIAL    
 SITE RIGHT RADIAL JUVENTINO'S TEST YES    
CARBOXYHEMOGLOBIN Collection Time: 02/14/21  4:36 AM  
Result Value Ref Range Carboxy-Hgb 0.3 (L) 1 - 2 % Methemoglobin 0.4 0 - 1.4 %  
 tHb 9.4 (L) 14 - 17 g/dL Oxyhemoglobin 98.8 (H) 94 - 97 % O2 SATURATION 100 (H) 95 - 99 % SITE RIGHT RADIAL Sample source ARTERIAL RENAL FUNCTION PANEL Collection Time: 02/14/21  6:06 AM  
Result Value Ref Range Sodium 132 (L) 136 - 145 mmol/L Potassium 3.9 3.5 - 5.1 mmol/L Chloride 97 97 - 108 mmol/L  
 CO2 23 21 - 32 mmol/L Anion gap 12 5 - 15 mmol/L Glucose 72 65 - 100 mg/dL BUN 55 (H) 6 - 20 MG/DL Creatinine 3.32 (H) 0.70 - 1.30 MG/DL  
 BUN/Creatinine ratio 17 12 - 20 GFR est AA 22 (L) >60 ml/min/1.73m2 GFR est non-AA 19 (L) >60 ml/min/1.73m2 Calcium 8.4 (L) 8.5 - 10.1 MG/DL Phosphorus 4.2 2.6 - 4.7 MG/DL Albumin 1.8 (L) 3.5 - 5.0 g/dL CBC W/O DIFF Collection Time: 02/14/21  6:09 AM  
Result Value Ref Range WBC 7.5 4.1 - 11.1 K/uL  
 RBC 2.29 (L) 4.10 - 5.70 M/uL HGB 6.5 (L) 12.1 - 17.0 g/dL HCT 21.0 (L) 36.6 - 50.3 % MCV 91.7 80.0 - 99.0 FL  
 MCH 28.4 26.0 - 34.0 PG  
 MCHC 31.0 30.0 - 36.5 g/dL  
 RDW 19.0 (H) 11.5 - 14.5 % PLATELET 902 417 - 766 K/uL MPV 9.6 8.9 - 12.9 FL  
 NRBC 0.3 (H) 0  WBC ABSOLUTE NRBC 0.02 (H) 0.00 - 0.01 K/uL GLUCOSE, POC Collection Time: 02/14/21  6:11 AM  
Result Value Ref Range Glucose (POC) 81 65 - 100 mg/dL Performed by Erich NEAL, [employer]OCATE Collection Time: 02/14/21 10:00 AM  
Result Value Ref Range HISTORY CHECKED? Historical check performed TRIGLYCERIDE Collection Time: 02/14/21 10:23 AM  
Result Value Ref Range Triglyceride 411 (H) <150 MG/DL Marifer Brennan MD 
[facility] [address], [address] [facility] Phone - [phone] Fax - [phone] 
[email or web address]

## 2021-02-14 NOTE — PROGRESS NOTES
0730 Verbal shift change report given to [facility] ERNST RICHARDSON (oncoming nurse) by Alysia Antonio RN (offgoing nurse). Report included the following information SBAR, Kardex, Procedure Summary, Intake/Output, MAR and Recent Results. 0800 Blood sugar at 0600 was low. NP ordered to give 12 units lantus and hold 25 unit order today. Hgb 6.5 NP will order 1 unit RBC.  
 
1245 Blood transfusion began. 1830 Re checked hemoglobin. result 6.1 
 
1900 NP ordered 1 unit RBC. 1930 Verbal shift change report given to Tesfaye Aaron RN (oncoming nurse) by [facility] ERNST RICHARDSON (offgoing nurse). Report included the following information SBAR, Kardex, Procedure Summary, Intake/Output, MAR and Recent Results.

## 2021-02-15 NOTE — PROGRESS NOTES
Nephrology Progress Note Reno Krabbe III Date of Admission : 12/28/2020 CC: Follow up for KATHI on CKD Assessment and Plan KATHI on CKD: 
- 2/2  ATN. Rudolph placed and dialysis initiated on 2/4 
- HD MWF. HD today - tpa for catheter and heparin for dialysis ordered Resp failure: 
COVID-19 PNA: positive on 1/11 
- s/p decadron and remdesivir 
- hypoxic and being proned again today  
- per [facility] 
 
CKD 4 
- Presumed 2/2 DM and HTN 
- nephrotic range proteinuria 
- baseline Cr 1.7 mg/dl  
- followed by Dr. Nallely Guadarrama at [facility] Hx of prostate cancer s/p XRT 
  
Anemia in CKD + blood loss: 
- cont JAZ 
- EGD 1/5/2021: gastric fundal lesion  
- one unit PRBC today  
  
Type II DM: 
- on insulin 
  
HTN :  
- BP stable now 
  
R foot osteo: 
- on abx 
- s/p debridement on 12/30 Group B strep bacteremia Interval History: Not examined in the room Dialysis issues noted Labs reviewed Worsening Hypoxia and being proned now Current Medications: all current  Medications have been eviewed in [facility] Review of Systems: A comprehensive review of systems was negative except for that written in the HPI. Objective: 
Vitals:   
Vitals:  
 02/15/21 0600 02/15/21 0630 02/15/21 0700 02/15/21 3018 BP: 126/65 122/61 123/63 Pulse: 91 93 94 Resp: 26 26 26 26 Temp:      
TempSrc:      
SpO2: 90% 90% 90% Weight:      
Height:      
 
Intake and Output: 
No intake/output data recorded. 02/13 1901 - 02/15 0700 In: 4263.6 [I.V.:2002.3] Out: 1100 [Drains:600] Physical Examination: not examined in the room GEN: ON VENT 
NECK- ET tube + RESP: no distress ABD :chronic distension, BS + 
EXT : edema + NEURO:sedated on the vent :  No kumar Exam deferred due to COVID-19 infection in order to preserve PPE AND minimize risk of  
transmission to dialysis and renal transplant patient per ASN and RPA guidelines: 
 
 
 
 
[x]    High complexity decision making was performed [x]    Patient is at high-risk of decompensation with multiple organ involvement Lab Data Personally Reviewed: I have reviewed all the pertinent labs, microbiology data and radiology studies during assessment. Recent Labs  
  02/15/21 
0540 02/14/21 
0606 02/13/21 
1345 02/13/21 
5741 * 132* 134* 124* K 4.0 3.9 3.8 3.4*  
CL 96* 97 99 90* CO2 23 23 27 22 GLU 73 72 114* 83 BUN 64* 55* 44* 35* CREA 3.83* 3.32* 2.98* 2.25* CA 8.5 8.4* 8.5 7.3*  
MG  --   --   --  1.7 PHOS 4.5 4.2  --  3.3 ALB 1.9* 1.8* 2.0* 1.6* ALT  --   --  7*  --   
 
Recent Labs  
  02/15/21 
0541 02/14/21 
1807 02/14/21 
7054 02/13/21 
1024 WBC 8.0  --  7.5 8.0 HGB 8.1* 6.1* 6.5* 7.0*  
HCT 26.1* 19.8* 21.0* 22.2*  
  --  222 190 No results found for: SDES Lab Results Component Value Date/Time Culture result: NO GROWTH 5 DAYS 01/23/2021 03:37 PM  
 Culture result: LIGHT YEAST, (APPARENT CANDIDA ALBICANS) (A) 01/23/2021 02:45 PM  
 Culture result: NO NORMAL RESPIRATORY MICHELLE ISOLATED 01/23/2021 02:45 PM  
 
Recent Results (from the past 24 hour(s)) RBC, ALLOCATE Collection Time: 02/14/21 10:00 AM  
Result Value Ref Range HISTORY CHECKED? Historical check performed TRIGLYCERIDE Collection Time: 02/14/21 10:23 AM  
Result Value Ref Range Triglyceride 411 (H) <150 MG/DL  
TYPE & SCREEN Collection Time: 02/14/21 10:23 AM  
Result Value Ref Range Crossmatch Expiration 02/17/2021,2359 ABO/Rh(D) O POSITIVE Antibody screen NEG Unit number [id number] Blood component type RC LR,2 Unit division 00 Status of unit TRANSFUSED Crossmatch result Compatible Unit number [id number] Blood component type RC LR Unit division 00 Status of unit TRANSFUSED Crossmatch result Compatible GLUCOSE, POC Collection Time: 02/14/21 12:47 PM  
Result Value Ref Range Glucose (POC) 104 (H) 65 - 100 mg/dL  Performed by Vishal Bender   
 GLUCOSE, POC Collection Time: 02/14/21 12:48 PM  
Result Value Ref Range Glucose (POC) 98 65 - 100 mg/dL Performed by Lora Slaughter. GLUCOSE, POC Collection Time: 02/14/21  6:02 PM  
Result Value Ref Range Glucose (POC) 106 (H) 65 - 100 mg/dL Performed by [facility]   
HGB & HCT Collection Time: 02/14/21  6:07 PM  
Result Value Ref Range HGB 6.1 (L) 12.1 - 17.0 g/dL HCT 19.8 (L) 36.6 - 50.3 % RBC, ALLOCATE Collection Time: 02/14/21  7:00 PM  
Result Value Ref Range HISTORY CHECKED? Historical check performed GLUCOSE, POC Collection Time: 02/14/21 11:55 PM  
Result Value Ref Range Glucose (POC) 100 65 - 100 mg/dL Performed by [id number] Collection Time: 02/15/21  4:35 AM  
Result Value Ref Range pH 7.39 7.35 - 7.45    
 PCO2 36 35 - 45 mmHg PO2 57 (L) 80 - 100 mmHg BICARBONATE 21 (L) 22 - 26 mmol/L  
 BASE DEFICIT 3.7 mmol/L Carboxy-Hgb 0.4 (L) 1 - 2 % Methemoglobin 0.5 0 - 1.4 %  
 tHb 9.2 (L) 14 - 17 g/dL Oxyhemoglobin 88.5 (L) 94 - 97 % O2 SATURATION 89 (L) 95 - 99 % O2 METHOD VENT    
 FIO2 100 % MODE ASSIST CONTROL Tidal volume 500.0 SET RATE 26    
 PEEP/CPAP 12.0 Sample source ARTERIAL    
 SITE RIGHT RADIAL JUVENTINO'S TEST YES    
CARBOXYHEMOGLOBIN Collection Time: 02/15/21  4:35 AM  
Result Value Ref Range Carboxy-Hgb 0.4 (L) 1 - 2 % Methemoglobin 0.5 0 - 1.4 %  
 tHb 9.2 (L) 14 - 17 g/dL Oxyhemoglobin 88.5 (L) 94 - 97 % O2 SATURATION 89 (L) 95 - 99 % SITE RIGHT RADIAL Sample source ARTERIAL RENAL FUNCTION PANEL Collection Time: 02/15/21  5:40 AM  
Result Value Ref Range Sodium 131 (L) 136 - 145 mmol/L Potassium 4.0 3.5 - 5.1 mmol/L Chloride 96 (L) 97 - 108 mmol/L  
 CO2 23 21 - 32 mmol/L Anion gap 12 5 - 15 mmol/L Glucose 73 65 - 100 mg/dL BUN 64 (H) 6 - 20 MG/DL  Creatinine 3.83 (H) 0.70 - 1.30 MG/DL  
 BUN/Creatinine ratio 17 12 - 20 GFR est AA 19 (L) >60 ml/min/1.73m2 GFR est non-AA 16 (L) >60 ml/min/1.73m2 Calcium 8.5 8.5 - 10.1 MG/DL Phosphorus 4.5 2.6 - 4.7 MG/DL Albumin 1.9 (L) 3.5 - 5.0 g/dL CBC W/O DIFF Collection Time: 02/15/21  5:41 AM  
Result Value Ref Range WBC 8.0 4.1 - 11.1 K/uL  
 RBC 2.90 (L) 4.10 - 5.70 M/uL HGB 8.1 (L) 12.1 - 17.0 g/dL HCT 26.1 (L) 36.6 - 50.3 % MCV 90.0 80.0 - 99.0 FL  
 MCH 27.9 26.0 - 34.0 PG  
 MCHC 31.0 30.0 - 36.5 g/dL  
 RDW 18.4 (H) 11.5 - 14.5 % PLATELET 680 959 - 758 K/uL MPV 9.6 8.9 - 12.9 FL  
 NRBC 0.0 0  WBC ABSOLUTE NRBC 0.00 0.00 - 0.01 K/uL GLUCOSE, POC Collection Time: 02/15/21  5:41 AM  
Result Value Ref Range Glucose (POC) 82 65 - 100 mg/dL Performed by Erich Larsen MD 
[facility]  
[address] [place] [address], [address] [facility] Phone - [phone] Fax - [phone] 
[email or web address]

## 2021-02-15 NOTE — PROGRESS NOTES
02/14/21 2130 Oxygen Therapy O2 Sat (%) (!) 89 % Pulse via Oximetry 81 beats per minute O2 Device Ventilator;Endotracheal tube; Heated;Humidifier FIO2 (%) 100 % RN increased FIO2 to 100% due to persistent desaturation event. Pt very slowly increased then sat at 89%. RN discussed with NP. OK for SpO2 88% or > PaO2 55. No proning at this time.

## 2021-02-15 NOTE — PROGRESS NOTES
10: 32 Epi gtt started at 2mg for low BP and HR, per Dylan 10:38- 2m magnesium give IVP 
10:48- Code called, pt

## 2021-02-15 NOTE — DISCHARGE SUMMARY
[facility]                                                                                         Discharge Summary     Patient: Joey Hawk       MRN: [id number]       YOB: 1950       Age: 72 y.o. Date of admission:  2020    Date of discharge:  2/15/2021    Primary care provider:  Leatha Chaves MD     Admitting provider:  Rhys Sim MD    Discharging provider(s): Rebekah Starks, NP - Staff [address]     Consultations  · IP CONSULT TO INFECTIOUS DISEASES  · IP CONSULT TO NEPHROLOGY  · IP CONSULT TO PODIATRY  · IP CONSULT TO GASTROENTEROLOGY  · IP CONSULT TO INFECTIOUS DISEASES  · IP CONSULT TO THORACIC SURGERY  · IP CONSULT TO PULMONOLOGY    Procedures  · 2021 CVL insertion   ·  ETT insertion   ·  Arterial line insertion   ·  ETT insertion   ·  Upper GI endoscopy     Discharge destination: [facility]    Admission diagnosis  · Osteomyelitis [facility]) [M86.9]    Please refer to the admission history and physical for details on the presenting problem. Final discharge diagnoses and brief hospital course         Mr. Blue Khan is a 71 yo male w a PMH of prostate cancer s/p radiation, tobacco use disorder (1 ppd), CKD3a, gastric angioectasias, IDDM, heart failure, HTN who was admitted with R foot osteomyelitis on . Lynn Dodge was transferred to the ICU after experiencing a cardiac arrest in the [facility] see below for a brief synopsis of his hospital course, by problem.     · Acute Hypoxic Respiratory failure secondary to COVID pneumonia: He also demonstrated dyspnea on initial admission, but was negative for COVID. Initially treated with steroids.  On , he developed increased oxygen needs.  He was COVID positive. Lynn Dodge was started on and completed treatment with steroids (completed ), remdesivir (completed ), zinc, and vitamin C. He initially required BiPAP on , and was weaned to 4-5L/min.  CT chest showed bilateral pleural effusions with ground glass.  Diuresis was attempted. Rigoberto Bender was continued on zosyn.  He required transfer to [facility] on 1/15 due to continued desaturations -> 70s on NRB.  CXR with increased bilateral infiltrates. Started linezolid on 1/18.  Required intubation on 1/23.  See below under cardiac arrest. He was extubated on 1/26, but required reintubation the same day. He completed a course of empiric 14d zyvox (ended 1/31) and repeated empiric stress dose steroids (ended 1/31). He remains on prolonged zosyn for treatment of osteomyelitis. Respiratory cultures have remained NGTD. He continues to fail SBTs and have intermittent increase in oxygen requirements. Began prone therapy 02/11.      · Cardiac arrest, s/p ROSC: Not cooled as he regained mental function s/p ROSC. Code time: reported to be 12 minutes The patient had been taken off BiPAP for lunch.  He was on intermittent BiPAP and HiFlow, demonstrating stability to come off to eat. He ate his lunch and was later found SOB unresponsive and pulseless.  He was thereafter transferred to the ICU. Rigoberto Bender regained mentation, was able to respond to commands, shake head/yes, no, but ultimately did require sedation for compliance with mechanical ventilation. Likely hypoxic arrest.     · Septic shock: Osteomyelitis has been only identified source of infection. Please see above under respiratory failure for full courses of abx. Has been on/off pressors since ICU admission. Admitted with a chronic wound on his R foot. He was started on broad spectrum antibiotics with vanc, zosyn, and flagyl and underwent wound debridement on 12/30.  BLC positive for S agalactiae on 12/28.  Foot cultures + for Protues mirabilis, group B strep, and Enterococcus.  Antibiotics were then narrowed to zosyn. Wound vac placed on foot 1/5. Zosyn for 6 weeks per ID recs      · Acute on chronic CKD3a: Baseline Cr 1.7. Renal has followed throughout his course.  Renal failure progressively worsening. He started IHD on 2/4.     · IDDM2: Lantus and lispro     · Anemia, acute on chronic: Course c/b anemia w heme + stool.  EGD on 1/4 showing superficial ulcers in the distal bulb and second portion of the duodenum measuring 2-4mm.       · COVID negative 12/28, 12/29, but positive 1/11    Pt became increasingly hypoxic and was unable to oxygenate with maximum support on the ventilator. Pt lost a pulse (See code charting). Despite maximum rescue attempts, pt was unable to regain a pulse. His pupils became fixed and dilated, no cough or gag noted. Time of death was called [phone]. Please see death note. Physical examination at discharge  Visit Vitals  BP (!) 49/36   Pulse (!) 102   Temp 98.3 °F (36.8 °C)   Resp 26   Ht 6' (1.829 m)   Wt 136.6 kg (301 lb 2.4 oz)   SpO2 (!) 17%   BMI 40.84 kg/m²          Recent Labs     02/15/21  0541 02/14/21  1807 02/14/21  0609 02/13/21  0523   WBC 8.0  --  7.5 8.0   HGB 8.1* 6.1* 6.5* 7.0*   HCT 26.1* 19.8* 21.0* 22.2*     --  222 190     Recent Labs     02/15/21  0540 02/14/21  0606 02/13/21  1345 02/13/21  0523   * 132* 134* 124*   K 4.0 3.9 3.8 3.4*   CL 96* 97 99 90*   CO2 23 23 27 22   BUN 64* 55* 44* 35*   CREA 3.83* 3.32* 2.98* 2.25*   GLU 73 72 114* 83   CA 8.5 8.4* 8.5 7.3*   MG  --   --   --  1.7   PHOS 4.5 4.2  --  3.3     Recent Labs     02/15/21  0540 02/14/21  0606 02/13/21  1345   AP  --   --  105   TP  --   --  7.2   ALB 1.9* 1.8* 2.0*   GLOB  --   --  5.2*     No results for input(s): INR, PTP, APTT, INREXT in the last 72 hours. No results for input(s): FE, TIBC, PSAT, FERR in the last 72 hours. Recent Labs     02/15/21  0435 02/14/21  0432   PH 7.39 7.40   PCO2 36 35   PO2 57* 214*     No results for input(s): CPK, CKMB in the last 72 hours.     No lab exists for component: TROPONINI  No components found for: [place]    ---------------------------------    Chronic Diagnoses:    Problem List as of 2/15/2021 Date Reviewed: 8/20/2020          Codes Class Noted - Resolved    ARDS (adult respiratory distress syndrome) ([facility] ICD-10-CM: H18  ICD-9-CM: 518.52  2/5/2021 - Present        * (Principal) Osteomyelitis ([facility] ICD-10-CM: M86.9  ICD-9-CM: 730.20  12/28/2020 - Present        Diabetic ulcer of right midfoot associated with type 2 diabetes mellitus, with fat layer exposed ([facility] ICD-10-CM: E11.621, P11.867  ICD-9-CM: 250.80, 707.14  5/19/2020 - Present        PAD (peripheral artery disease) (HCC) ICD-10-CM: I73.9  ICD-9-CM: 443.9  5/19/2020 - Present        Dependence on nicotine from cigarettes ICD-10-CM: F17.210  ICD-9-CM: 305.1  5/19/2020 - Present        KATHI (acute kidney injury) ([facility] ICD-10-CM: N17.9  ICD-9-CM: 584.9  3/28/2019 - Present        Acute on chronic renal failure (HCC) ICD-10-CM: N17.9, N18.9  ICD-9-CM: 584.9, 585.9  3/14/2019 - Present        Severe obesity (BMI 35.0-39. 9) with comorbidity ([facility] ICD-10-CM: E66.01  ICD-9-CM: 278.01  3/28/2018 - Present        Type 2 diabetes with nephropathy ([facility] ICD-10-CM: E11.21  ICD-9-CM: 250.40, 583.81  2/27/2018 - Present        S/P hip replacement, right ICD-10-CM: E72.451  ICD-9-CM: V43.64  10/31/2017 - Present        Stage 3 chronic kidney disease ICD-10-CM: N18.30  ICD-9-CM: 585.3  10/31/2017 - Present        Prostate CA ([facility] ICD-10-CM: C61  ICD-9-CM: 407  10/31/2017 - Present        Diabetic polyneuropathy ([facility] ICD-10-CM: E11.42  ICD-9-CM: 250.60, 357.2  4/4/2011 - Present        Rotator Cuff Tendonitis ICD-10-CM: M71.9, M67.919  ICD-9-CM: 726.10  4/4/2011 - Present        Diabetes mellitus type 2, controlled ([facility] ICD-10-CM: E11.9  ICD-9-CM: 250.00  4/4/2011 - Present        Degenerative disc disease ICD-10-CM: IOC6610  ICD-9-CM: 722.6  4/4/2011 - Present        Osteoarthrosis involving lower leg ICD-10-CM: M17.10  ICD-9-CM: 715.96  4/4/2011 - Present        Depression/ Anxiety ICD-10-CM: F34.1  ICD-9-CM: 300.4  4/4/2011 - Present        HTN (hypertension) ICD-10-CM: I10  ICD-9-CM: 401.9  4/4/2011 - Present        Chronic hip pain ICD-10-CM: M25.559, G89.29  ICD-9-CM: 719.45, 338.29  4/4/2011 - Present        Hypertriglyceridemia ICD-10-CM: E78.1  ICD-9-CM: 272.1  4/4/2011 - Present        Mild Aortic Insufficiency ICD-10-CM: I35.1  ICD-9-CM: 424.1  4/4/2011 - Present        Mild Concentric LVH (left ventricular hypertrophy) ICD-10-CM: I51.7  ICD-9-CM: 429.3  4/4/2011 - Present        RESOLVED: Primary osteoarthritis of right hip ICD-10-CM: M16.11  ICD-9-CM: 715.15  10/31/2017 - 8/30/2018        RESOLVED: Osteomyelitis ([address] ICD-10-CM: M86.9  ICD-9-CM: 730.20  10/21/2016 - 8/30/2018        RESOLVED: Laryngitis ICD-10-CM: J04.0  ICD-9-CM: 464.00  4/4/2011 - 8/30/2018        RESOLVED: Insomnia ICD-10-CM: G47.00  ICD-9-CM: 780.52  4/4/2011 - 8/30/2018        RESOLVED: Carcinoma, Prostate ICD-10-CM: C61  ICD-9-CM: 510  4/4/2011 - 8/30/2018        RESOLVED: HEMATOCHEZIA ICD-10-CM: K62.5  ICD-9-CM: 569.3  4/4/2011 - 8/30/2018              Time spent on discharge related activities today greater than 30 minutes.       Signed:      Kang Quesada NP   Staff Intensivist  [facility]    2/15/2021   11:11 AM     Dr. Josias Redman    Attending        Cc: Nabeel Bose MD

## 2021-02-15 NOTE — PROGRESS NOTES
1930 Verbal shift change report given to Eladia Em RN (oncoming nurse) by Dakota Arcos (offgoing nurse). Report included the following information SBAR, Kardex, Procedure Summary, Intake/Output, MAR and Recent Results. Continues to  desat into the upper 70's to low 80's on 60% with turning and repositioning requiring Fio2 to be inc to 100% Pt recovered after 15min 1953-2nd unit PRBC started 2000-abd distended/firm no bowel sounds appreciated-OGT placed to suction recovered 300cc of TF.  
2010-OGT placed to LIWS and NP Valeria updated TF on hold 
2102-spont desat to 80%  Placed on 100% fio2 and RT and NP updated will wean as able 2124-sats slowly recovering to 90% on 100% Fio2- per NP keep sat >/=88% 2209-sat 90%-91% 2249-sats now 92-93% remains on 100% 2356-PRBC completed/remains on 100% fio2 sat 95% 0327-am heparin placed on hold for possible replacement of parth cath. sats dipped to 90% with mouth care 0537-4/4 nimbex inc to 10mcg/kg/min  NP Valeria updated no new orders at this time 0730-bedside report given to RN using sbar format

## 2021-02-15 NOTE — DEATH NOTE
Death Declaration [facility] CRITICAL CARE Pt Name  Kayla Majano III Admit date:  12/28/2020 Date and time of death:  2/15/2021 @ [id number] Room Number  7101/01 Medical Record Number  [id number] @ . [address]  
Age  72 y.o. Date of Birth 1950 PCP Jorge Bermeo MD  
Attending physician Lavell Borrego MD   
 
Code Status  Full Code Patient seen and examined Mental status   Unresponsive Pupils Dilated and Fixed Respiration Nil Pulse  Absent Heart Sounds  Absent Rhythm  Flat line Family  Notified by Nursing staff [employer]  Notified by Nursing staff Death certificate and discharge summary completion remain  Tyrone Blake MD's and Love Rosales NP responsibility. 2/15/2021

## 2021-02-15 NOTE — PROGRESS NOTES
responded to code blue. Pt's daughter has been contacted and will be coming to the hospital. [employer] will be present as daughter arrives for support. [address] Mallory Damon, [facility] 
 [phone]

## 2021-02-15 NOTE — PROGRESS NOTES
provided pastoral listening, support and prayer with pt's daughter Heather Piedra.  talked with pt and NP and physician. Let her know of  support and availability. Please contact [phone] [facility] for further support. [address] Mallory Damon, [facility] 
 [phone]

## 2021-02-15 NOTE — PROGRESS NOTES
[facility] 
 
ICU TEAM Progress Note Name: Steve Adam III  
: 1950 MRN: [id number] Date: 2/15/2021 Subjective:  
Progress Note: 2/15/2021 Mr. Loki Smith is a 73 yo male w a PMH of prostate cancer s/p radiation, tobacco use disorder (1 ppd), CKD3a, gastric angioectasias, IDDM, heart failure, HTN who was admitted with R foot osteomyelitis on . He was transferred to the ICU after experiencing a cardiac arrest in the [facility]. Please see below for a brief synopsis of his hospital course, by problem. · Acute Hypoxic Respiratory failure secondary to COVID pneumonia: He also demonstrated dyspnea on initial admission, but was negative for COVID. Initially treated with steroids. On , he developed increased oxygen needs. He was COVID positive. He was started on and completed treatment with steroids (completed ), remdesivir (completed ), zinc, and vitamin C. He initially required BiPAP on , and was weaned to 4-5L/min. CT chest showed bilateral pleural effusions with ground glass. Diuresis was attempted. He was continued on zosyn. He required transfer to [facility] on 1/15 due to continued desaturations -> 70s on NRB. CXR with increased bilateral infiltrates. Started linezolid on . Required intubation on . See below under cardiac arrest. He was extubated on , but required reintubation the same day. He completed a course of empiric 14d zyvox (ended ) and repeated empiric stress dose steroids (ended ). He remains on prolonged zosyn for treatment of osteomyelitis. Respiratory cultures have remained NGTD. He continues to fail SBTs and have intermittent increase in oxygen requirements. Began prone therapy . · Cardiac arrest, s/p ROSC: Not cooled as he regained mental function s/p ROSC. Code time: reported to be 12 minutes The patient had been taken off BiPAP for lunch. He was on intermittent BiPAP and HiFlow, demonstrating stability to come off to eat. He ate his lunch and was later found SOB unresponsive and pulseless. He was thereafter transferred to the ICU. He regained mentation, was able to respond to commands, shake head/yes, no, but ultimately did require sedation for compliance with mechanical ventilation. Likely hypoxic arrest. 
 
· Septic shock: Osteomyelitis has been only identified source of infection. Please see above under respiratory failure for full courses of abx. Has been on/off pressors since ICU admission. Admitted with a chronic wound on his R foot. He was started on broad spectrum antibiotics with vanc, zosyn, and flagyl and underwent wound debridement on 12/30. BLC positive for S agalactiae on 12/28. Foot cultures + for Protues mirabilis, group B strep, and Enterococcus. Antibiotics were then narrowed to zosyn. Wound vac placed on foot 1/5. Zosyn for 6 weeks per ID recs · Acute on chronic CKD3a: Baseline Cr 1.7. Renal has followed throughout his course. Renal failure progressively worsening. He started IHD on 2/4. · IDDM2: Lantus and lispro · Anemia, acute on chronic: Course c/b anemia w heme + stool. EGD on 1/4 showing superficial ulcers in the distal bulb and second portion of the duodenum measuring 2-4mm.   
  
· COVID negative 12/28, 12/29, but positive 1/11 
 
02/15/21 Overnight Events: 
Increased hypoxia overnight, ventilator support increased to 100% and peep 12 NO continued Dialysis MWF per nephrology, plans for HD today Update Patient Daughter on status Hgb 8 this am  
 
COVID test on 1/28 and recheck on 2/9 - positive PT more unstable this am, plans for zoom meeting today with family for goals of care discussion, pt has very poor prognosis and is showing no signs of improvement Active Problem List:  
 
Problem List  Date Reviewed: 8/20/2020 Codes Class ARDS (adult respiratory distress syndrome) ([facility] ICD-10-CM: M50 
ICD-9-CM: 518.52 * (Principal) Osteomyelitis ([facility] ICD-10-CM: M86.9 ICD-9-CM: 730.20 Diabetic ulcer of right midfoot associated with type 2 diabetes mellitus, with fat layer exposed ([facility] ICD-10-CM: E11.621, I18.035 ICD-9-CM: 250.80, 707.14 PAD (peripheral artery disease) (HCC) ICD-10-CM: I73.9 ICD-9-CM: 443.9 Dependence on nicotine from cigarettes ICD-10-CM: F17.210 ICD-9-CM: 305.1 KATHI (acute kidney injury) ([facility] ICD-10-CM: N17.9 ICD-9-CM: 576. 9 Acute on chronic renal failure (HCC) ICD-10-CM: N17.9, N18.9 ICD-9-CM: 584.9, 585.9 Severe obesity (BMI 35.0-39. 9) with comorbidity ([facility] ICD-10-CM: E66.01 
ICD-9-CM: 278.01 Type 2 diabetes with nephropathy (HCC) ICD-10-CM: E11.21 
ICD-9-CM: 250.40, 583.81 S/P hip replacement, right ICD-10-CM: U93.790 ICD-9-CM: V43.64 Stage 3 chronic kidney disease ICD-10-CM: N18.30 ICD-9-CM: 425. 3 Prostate CA [facility]) ICD-10-CM: G06 ICD-9-CM: 826 Diabetic polyneuropathy ([facility] ICD-10-CM: E11.42 
ICD-9-CM: 250.60, 357.2 Rotator Cuff Tendonitis ICD-10-CM: M71.9, M67.919 ICD-9-CM: 726.10 Diabetes mellitus type 2, controlled ([facility] ICD-10-CM: E11.9 ICD-9-CM: 250.00 Degenerative disc disease ICD-10-CM: GSA0776 ICD-9-CM: 722.6 Osteoarthrosis involving lower leg ICD-10-CM: M17.10 ICD-9-CM: 715.96 Depression/ Anxiety ICD-10-CM: F34.1 ICD-9-CM: 300.4 HTN (hypertension) ICD-10-CM: I10 
ICD-9-CM: 401.9 Chronic hip pain ICD-10-CM: M25.559, G89.29 ICD-9-CM: 719.45, 338.29 Hypertriglyceridemia ICD-10-CM: E78.1 ICD-9-CM: 272.1 Mild Aortic Insufficiency ICD-10-CM: I35.1 ICD-9-CM: 424.1 Mild Concentric LVH (left ventricular hypertrophy) ICD-10-CM: I51.7 ICD-9-CM: 429.3 Past Medical History:  
 
 has a past medical history of Arthritis, Degenerative,  Knee (4/4/2011), Carcinoma, Prostate (4/4/2011), Degenerative disc disease (4/4/2011), Depression/ Anxiety (4/4/2011), Diabetes Mellitrus ( non-insulin dependent ) Type 2 (4/4/2011), Heart failure ([facility] HEMATOCHEZIA (4/4/2011), Hypertrension (4/4/2011), Hypertriglyceridemia (4/4/2011), Ill-defined condition, Insomnia (4/4/2011), Laryngitis (4/4/2011), Mild Aortic insufficiency (4/4/2011), Mild Concentric LVH (left ventricular hypertrophy) (4/4/2011), Neuropathy (4/4/2011), Osteoarthritis (4/4/2011), and Rotator Cuff Tendonitis (4/4/2011). Past Surgical History:  
 
 has a past surgical history that includes hx urological; hx other surgical; pr cardiac surg procedure unlist; colonoscopy (N/A, 4/28/2017); hx orthopaedic; hx orthopaedic; and ir insert tunl cvc w/o port over 5 yr (1/6/2021). Home Medications:  
 
Prior to Admission medications Medication Sig Start Date End Date Taking?  Authorizing Provider  
pregabalin (LYRICA) 150 mg capsule TAKE ONE CAPSULE BY MOUTH TWICE A DAY 12/7/20  Yes Heron Daniels MD  
bicalutamide (CASODEX) 50 mg tablet TAKE ONE TABLET BY MOUTH DAILY 11/30/20  Yes Heron Daniels MD  
torsemide (DEMADEX) 20 mg tablet TAKE FOUR TABLETS BY MOUTH TWICE A DAY 11/23/20  Yes Heron Daniels MD  
pantoprazole (PROTONIX) 40 mg tablet TAKE ONE TABLET BY MOUTH TWICE DAILY ONCE IN THE MORNING AND ONCE IN THE EVENING 11/18/20  Yes Heron Daniels MD  
hydrOXYzine HCL (ATARAX) 50 mg tablet TAKE ONE TABLET BY MOUTH THREE TIMES A DAY AS NEEDED FOR ITCHING FOR UP TO 10 DAYS 10/22/20  Yes Heron Michel., MD  
 amLODIPine (NORVASC) 10 mg tablet TAKE ONE TABLET BY MOUTH DAILY 10/2/20  Yes Stacie Goltz., MD  
hydrALAZINE (APRESOLINE) 100 mg tablet Take 1 Tab by mouth three (3) times daily. 9/16/20  Yes Stacie Goltz., MD  
ascorbic acid, vitamin C, (Vitamin C) 500 mg tablet Take  by mouth. Yes Provider, Historical  
enzalutamide (XTANDI) 40 mg capsule Take 160 mg by mouth daily. Yes Provider, Historical  
Thera-Tabs M 27 mg iron-400 mcg tab TAKE ONE TABLET BY MOUTH DAILY 7/17/20  Yes Stacie Goltz., MD  
tamsulosin [facility]) 0.4 mg capsule TAKE ONE CAPSULE BY MOUTH DAILY 7/16/20  Yes Stacie Goltz., MD  
Insulin Syringe-Needle U-100 (BD Insulin Syringe Ultra-Fine) 1 mL 31 gauge x 5/16 syrg USE TO INJECT INSULIN FIVE TIMES A DAY 6/16/20  Yes Stacie Goltz., MD  
ferrous sulfate 325 mg (65 mg iron) tablet TAKE ONE TABLET BY MOUTH DAILY BEFORE BREAKFAST 6/16/20  Yes Stacie Goltz., MD  
gabapentin (NEURONTIN) 300 mg capsule Take 1 Cap by mouth three (3) times daily. Max Daily Amount: 900 mg. 5/25/20  Yes Stacie Goltz., MD  
triamcinolone acetonide (KENALOG) 0.1 % ointment APPLY A THIN LAYER TO AFFECTED AREA(S) TWO TIMES A DAY **DO NOT EXCEED 2.66 GRAMS PER DAY** 5/19/20  Yes Stacie Goltz., MD  
diclofenac (VOLTAREN) 1 % gel Apply  to affected area four (4) times daily. 4/16/20  Yes Stacie Goltz., MD  
ammonium lactate (AMLACTIN) 12 % topical cream Apply  to affected area two (2) times a day. rub in to affected area well 3/10/20  Yes Stacie Goltz., MD  
ammonium lactate (LAC-HYDRIN FIVE) 5 % lotion Apply 1 Applicator to affected area daily. Apply daily bilateral lower legs 3/5/20  Yes Stacie Goltz., MD  
insulin glargine (LANTUS U-100 INSULIN) 100 unit/mL injection 72 Units by SubCUTAneous route daily.  3/5/20  Yes Stacie Goltz., MD  
insulin regular (NOVOLIN R, HUMULIN R) 100 unit/mL injection 24 units sc tid 3/5/20  Yes Stacie Goltz., MD  
 ketoconazole (NIZORAL) 2 % topical cream Apply  to affected area two (2) times a day. 20  Yes Leonel Wheatley MD  
ketoconazole (NIZORAL) 2 % shampoo Sig:shampoo once a week 20  Yes Leonel Wheatley MD  
bisacodyl (DULCOLAX, BISACODYL,) 10 mg suppository Insert 10 mg into rectum daily. Yes Provider, Historical  
insulin aspart U-100 (NOVOLOG FLEXPEN U-100 INSULIN) 100 unit/mL (3 mL) inpn by SubCUTAneous route. Yes Provider, Historical  
Insulin Needles, Disposable, (NOVOFINE 32) 32 gauge x /\" ndle Sig:use 4 times a day as needed 19  Yes Leonel Wheatley MD  
NOVOLIN R REGULAR U-100 INSULN 100 unit/mL injection INJECT 14 UNITS UNDER THE SKIN THREE TIMES A DAY WITH MEALS AS NEEDED 19  Yes Leonel Wheatley MD  
fluticasone propionate [facility]) 50 mcg/actuation nasal spray SPRAY TWO SPRAYS IN THE AFFECTED NOSTRIL DAILY 19  Yes Leonel Wheatley MD  
aspirin delayed-release 81 mg tablet Take 81 mg by mouth daily. Yes Provider, Historical  
acetaminophen (PAIN AND FEVER) 500 mg tablet TAKE ONE TABLET BY MOUTH EVERY 6 HOURS AS NEEDED FOR PAIN 18  Yes Leonel Wheatley MD  
Calcium Carbonate-Vit D3-Min 600 mg calcium- 400 unit tab Take 1 Tab by mouth two (2) times a day. Yes Provider, Historical  
 
Allergies/Social/Family History: No Known Allergies Social History Tobacco Use  Smoking status: Current Every Day Smoker Packs/day: 1.00 Last attempt to quit: 2019 Years since quittin.2  Smokeless tobacco: Never Used Substance Use Topics  Alcohol use: Not Currently Alcohol/week: 11.7 standard drinks Types: 7 Cans of beer, 7 Shots of liquor per week Family History Family history unknown: Yes Review of Systems:  
 
Unable to provide - intubated and sedated Objective:  
Vital Signs: 
Visit Vitals BP (!) 49/36 Pulse (!) 102 Temp 98.3 °F (36.8 °C) Resp 26 Ht 6' (1.829 m) Wt 136.6 kg (301 lb 2.4 oz) SpO2 (!) 17% BMI 40.84 kg/m² O2 Flow Rate (L/min): 15 l/min(Mid flow at 9 liters also) O2 Device: Ventilator, Endotracheal tube, Heated, Humidifier Temp (24hrs), Av.2 °F (36.8 °C), Min:97.9 °F (36.6 °C), Max:98.3 °F (36.8 °C) Intake/Output:  
 
Intake/Output Summary (Last 24 hours) at 2/15/2021 1102 Last data filed at 2/15/2021 0700 Gross per 24 hour Intake 2438.8 ml Output 575 ml Net 1863.8 ml Physical Exam: 
General: Intubated and sedated EENT: PERRL 3mm/brisk. MMM Resp: Coarse throughout and decreased to bases CV: Regular rhythm, normal rate, no murmurs GI: Soft, moderately distended with hypoactive BS. Flexi-seal 
Extremities: Lymphedema with eschar noted B/L R>L. Wound vac to RLE. Vascular changes. Warm to touch. Unable to palpate pulses but dopplerable Neurologic: RASS -3. Skin: Warm and dry. Lines: Vabaduse 41 CVC, RIJ Rudolph, Bynum, Flexiseal, OGT 
 
 
LABS AND  DATA: Personally reviewed Recent Labs  
  02/15/21 
0541 21 
1807 21 
3681 WBC 8.0  --  7.5 HGB 8.1* 6.1* 6.5* HCT 26.1* 19.8* 21.0*  
  --  222 Recent Labs  
  02/15/21 
0540 21 
0606 21 
1495 21 
3553 * 132*   < > 124* K 4.0 3.9   < > 3.4*  
CL 96* 97   < > 90* CO2 23 23   < > 22 BUN 64* 55*   < > 35* CREA 3.83* 3.32*   < > 2.25* GLU 73 72   < > 83  
CA 8.5 8.4*   < > 7.3*  
MG  --   --   --  1.7 PHOS 4.5 4.2  --  3.3  
 < > = values in this interval not displayed. Recent Labs  
  02/15/21 
0540 21 
0606 21 
1345 AP  --   --  105 TP  --   --  7.2 ALB 1.9* 1.8* 2.0*  
GLOB  --   --  5.2* No results for input(s): INR, PTP, APTT, INREXT, INREXT in the last 72 hours. No results for input(s): PHI, PCO2I, PO2I, FIO2I in the last 72 hours. No results for input(s): CPK, CKMB, TROIQ, BNPP in the last 72 hours. Ventilator Settings: 
AC: 100% R 18 PEEP 12  MEDS: Reviewed 2/15 CXR: Reviewed 2/12 IMPRESSION Multilobar pneumonia/ARDS. Assessment:  
 
Acute Hypoxic Respiratory Failure Secondary to COVID Pneumonia:   
- Intubated and extubated on several occasions - Family has consented to trach when able to place - ARDS volume ventilation. Vt ~ 600 ml  
- Unable to do much weaning on vent due to oxygenation requirements  
- NO, cont and wean as appropriate and tolerated - Paralyzed and sedated for ventilator compliance Sepsis: Osteomyelitis is only known bacterial infectious etiology. Likely cytokine reaction in setting of COVID-19. Cultures NGTD. Completed steroids. Completed course of linezolid (empiric). On prolonged zoysn for osteomyelitis. - Continue zosyn - end date 2/12 KATHI on CKD3a: Renal following; renal failure progressively worsening 
- IHD per renal; received on 2/6 
- HD on Monday, Wed. Fri Anemia, acute on chronic: PRBC on 2/5 
- Hgb 8 today, CTM, no identified source of bleed HFpEF with Exacerbation:   
- EF 45-50%, mildly dilated RV w mildly reduced systolic function. Diuresis difficult in setting of KATHI on CKD3.   
- IHD for volume removal as we are able. Delirium:  
- When sedation is lightened, experiences hyperactive delirium, which interferes with weaning 
- Continue propofol & Versed, on nimbex R foot wound: Complicated by osteomyelitis. - Wound vac - Zosyn as above - ID following Deconditioning: Unable to work w PT/OT at this time Prostate Cancer: Holding casodex as this cannot be crushed an pt has OGT. Multidisciplinary Rounds Completed:  Rounded with RN 
 
ABCDEF Bundle/Checklist 
Pain Medications: Fentanyl Target RASS: -3 Sedation Medications: Versed and Fentanyl CAM-ICU:  Positive Mobility: Poor PT/OT:Unable to participate Restraints: Soft wrist restraints. Discussed Plan of Care (goals of care): Yes with RN. Addressed Code Status: Full Code CARDIOVASCULAR 
 Cardiac Gtts: None. Levophed intermittently with dialysis SBP Goal of: > 90 mmHg MAP Goal of: >65 Transfusion Trigger (Hgb): <7 g/dL RESPIRATORY Vent Goals:  
Optimize PEEP/Ventilation/Oxygenation DVT Prophylaxis (if no, list reason): Heparin SQ  
SPO2 Goal: > 92% GI/ Bynum Catheter Present: Yes GI Prophylaxis: Yes  (hx GIB) Nutrition: Yes TF Bowel Movement: Yes Bowel Regimen: Docusate (Colace) Insulin: Y - Lantus ANTIBIOTICS Antibiotics: 
Zosyn T/L/D Tubes: Orogastric Tube Lines: [employer] Drains: Bynum Catheter SPECIAL EQUIPMENT Vent DISPOSITION Stay in ICU CRITICAL CARE CONSULTANT NOTE I had a face to face encounter with the patient, reviewed and interpreted patient data including clinical events, labs, images, vital signs, I/O's, and examined patient. I have discussed the case and the plan and management of the patient's care with the consulting services, the bedside nurses and the respiratory therapist.   
 
NOTE OF PERSONAL INVOLVEMENT IN CARE This patient has a high probability of imminent, clinically significant deterioration, which requires the highest level of preparedness to intervene urgently. I participated in the decision-making and personally managed or directed the management of the following life and organ supporting interventions that required my frequent assessment to treat or prevent imminent deterioration. I personally spent 120 minutes of critical care time. This is time spent at this critically ill patient's bedside actively involved in patient care as well as the coordination of care and discussions with the patient's family. This does not include any procedural time which has been billed separately. Gerri Benjamin, NP Critical Care Medicine [facility]

## 2021-02-15 NOTE — PROGRESS NOTES
02/15/21 0422 Vent Settings FIO2 (%) 100 % CMV Rate Set 26 Back-Up Rate 26 Vt Set (ml) 500 ml PEEP/VENT (cm H2O) 12 cm H20  
I:E Ratio 1:2 Insp Flow (l/min) 70 l/min Flow Trigger 3 Ventilator Measurements Resp Rate Observed 26 Vt Exhaled (Machine Breath) (ml) 562 ml Ve Observed (l/min) 14.7 l/min PIP Observed (cm H2O) 39 cm H2O Plateau Pressure (cm H2O) 26 cm H2O  
MAP (cm H2O) 22 I:E Ratio Actual 1:2 Auto PEEP Observed (cm H2O) 0.1 cm H2O Static Compliance (ml/cm H20) 37 ml/cm H20 Vent Method/Mode Ventilation Method Conventional  
Ventilator Mode Assist control;Volume control  
$$ Ventilator Subsequent Subsequent Vent Day Please note increasing PIPs. Volumes fluctuate 530-580mls. . Pt may benefit from PC ventilation if he can achieve sufficient volumes on PC.

## 2021-08-05 NOTE — PROGRESS NOTES
Verbal shift change report given to RUBA Garrison (oncoming nurse) by Yoanna Teresa RN (offgoing nurse). Report included the following information SBAR, Kardex, ED Summary, Intake/Output, MAR, Accordion, Recent Results, Med Rec Status and Cardiac Rhythm NSR. Patient Vitals for the past 12 hrs: 
 Temp Pulse Resp BP SpO2  
01/22/21 0732 96.9 °F (36.1 °C) 84 19  99 % 01/22/21 0730  78  (!) 160/76   
01/22/21 0253 (!) 96.6 °F (35.9 °C) 81 20 (!) 169/74 99 % 01/21/21 2159     100 % 01/21/21 2124  73 14 (!) 140/65 100 % Last 3 Recorded Weights in this Encounter 01/20/21 0559 01/21/21 3157 01/22/21 1388 Weight: 114.4 kg (252 lb 1.6 oz) 115.7 kg (255 lb 1.6 oz) 117 kg (258 lb) Patient very anxious, removed BIPAP multiple times overnight. NP notified. Seroquel ordered. BG checked, . Respiratory notified regarding patient's status. RT checked for BIPAP dysfunction and worked on patient with deep and slow breathing. RN also administered percocet for patient's foot/hip pain. 100

## 2021-10-23 NOTE — PROGRESS NOTES
CLINICAL NUTRITION SERVICES - EDUCATION NOTE    Received diet education consult for diabetes management  CDE reports pt will most likely go home on insulin: carb ratio      NUTRITION HISTORY:  Information obtained from pt:  Pt has an understanding of carbohydrate counting,  His wife is an avid counter.  He is used to 60-75 gm per meal.      Diet:  Currently on clear liquid, fair intake  ?  NUTRITION DIAGNOSIS:  Food- and nutrition-related knowledge deficit related to blood glucose control as evidenced by  DKA, and possible DM1 rather than 2    INTERVENTIONS:  Provided instruction on eating with diabetes, carbohydrate counting, need for evening snack if goes home on a longer acting insulin.  Encouraged pt to combine complex carbohydrate foods with fiber and/or protein - provided examples. Insulin:carb ratio will be determined by MD prior to discharge and pt will need related education.    Provided  the following handouts: Carbohydrate Counting for People with Diabetes, label reading, extensive food list (portions/carbs); and placemat for DM diet ed    Goals:  Patient verbalizes understanding of diet by discussing grams of carbohydrate based on serving sizes.     Follow Up:  Patient to ask any further nutrition-related questions before discharge. In addition, pt may request outpatient RD appointment.     RD contact information provided.       Problem: Mobility Impaired (Adult and Pediatric) Goal: *Acute Goals and Plan of Care (Insert Text) Description: FUNCTIONAL STATUS PRIOR TO ADMISSION: The patient was functional at the wheelchair level and required stand-by assistance for transfers to the chair. He has a power and manual chair at home. HOME SUPPORT PRIOR TO ADMISSION: The patient lived alone with hired caregivers and family to provide assistance, though he is often at home alone overnight. Physical Therapy Goals Goals re-evaluated 1/25/2021. Goals re-evaluated 1/29/2021 and appropriate for carryover. 1.  Patient will roll side to side in bed with maximal assistance within 7 day(s). 2.  Patient will transfer supine<>sit with maximal assistance within 7 days. 3.  Patient will tolerate bed in chair position x30 min for pulmonary toileting and increased tolerance to upright in 7 days. 4.  Patient will participate in B LE AROM there ex in prep for mobility progression within 7 days. 5.  Patient will tolerate sitting edge of bed with maximal assistance x2 minutes in preparation for transfers within 7 days. Initiated 1/21/2021 1. Patient will move from supine to sit and sit to supine  and roll side to side in bed with moderate assistance  within 7 day(s). 2.  Patient will transfer from bed to chair and chair to bed with moderate assistance  using the least restrictive device within 7 day(s). 3.  Patient will perform sit to stand with moderate assistance  within 7 day(s). Outcome: Not Met PHYSICAL THERAPY REEVALUATION Patient: Jimmy Villar (27 y.o. male) Date: 1/29/2021 Primary Diagnosis: Osteomyelitis ([address] [M86.9] Procedure(s) (LRB): ESOPHAGOGASTRODUODENOSCOPY (EGD)    :- (N/A) ESOPHAGOGASTRODUODENAL (EGD) BIOPSY (N/A) RESOLUTION CLIP (N/A) 25 Days Post-Op Precautions: Contact, Fall(COVID +) ASSESSMENT Based on the objective data below, pt presents with drowsiness, decreased activity tolerance, grossly decreased strength and overall decline in functional mobility. Pt was extubated on 1/26 to BiPAP but was unable to sustain and required reintubation. Pt cleared for PT re-eval by RN and received on vent (FiO2 60%, PEEP 10). Mobility limited due to decreased alertness. Pt opened eyes on command but quickly closed them. He squeezed both hands and wiggled toes, no other commands followed or active movent appreciated. Performed PROM to all extremities and left pt with all needs met. Recommendation TBD pending vent weaning. Current Level of Function Impacting Discharge (mobility/balance): totalA for all mobility Functional Outcome Measure: The patient scored 0/100 on the Barthel outcome measure which is indicative of 100% impairment and dependence on others for basic mobility and self care tasks. Other factors to consider for discharge: intubated, drowsiness Patient will benefit from skilled therapy intervention to address the above noted impairments. PLAN : 
Recommendations and Planned Interventions: bed mobility training, transfer training, gait training, therapeutic exercises, neuromuscular re-education, patient and family training/education, and therapeutic activities Frequency/Duration: Patient will be followed by physical therapy:  3 times a week to address goals. Recommendation for discharge: (in order for the patient to meet his/her long term goals) To be determined: pending vent weaning This discharge recommendation: 
Has not yet been discussed the attending provider and/or case management Equipment recommendations for successful discharge (if) home: TBD SUBJECTIVE:  
Patient intubated and non verbal  
 
OBJECTIVE DATA SUMMARY:  
HISTORY:   
Past Medical History:  
Diagnosis Date Arthritis, Degenerative,  Knee 4/4/2011 Carcinoma, Prostate 4/4/2011 Degenerative disc disease 4/4/2011 Depression/ Anxiety 4/4/2011 Diabetes Mellitrus ( non-insulin dependent ) Type 2 4/4/2011 Heart failure ([facility] [address] HEMATOCHEZIA 4/4/2011 Hypertrension 4/4/2011 Hypertriglyceridemia 4/4/2011 Ill-defined condition   
 lymphadema Insomnia 4/4/2011 Laryngitis 4/4/2011 Mild Aortic insufficiency 4/4/2011 Mild Concentric LVH (left ventricular hypertrophy) 4/4/2011 Neuropathy 4/4/2011 Osteoarthritis 4/4/2011 Rotator Cuff Tendonitis 4/4/2011 Past Surgical History:  
Procedure Laterality Date COLONOSCOPY N/A 4/28/2017 COLONOSCOPY performed by Janae Ramos MD at [facility] ENDOSCOPY  
 HX ORTHOPAEDIC    
 left hip pinning HX ORTHOPAEDIC    
 right hip replacement HX OTHER SURGICAL    
 left little toe amputation HX UROLOGICAL    
 IR INSERT TUNL CVC W/O PORT OVER 5 YR  1/6/2021 [place] CARDIAC SURG PROCEDURE UNLIST    
 cardiac cath Hospital course since last seen and reason for reevaluation: Pt re-intubated Home Situation Home Environment: Apartment One/Two Story Residence: One story Living Alone: Yes Support Systems: Home care staff, Child(linnea)(CNA 9-3 daily) Patient Expects to be Discharged to[de-identified] Private residence Current DME Used/Available at Home: Wheelchair, power, Crutches, Raised toilet seat, Grab bars EXAMINATION/PRESENTATION/DECISION MAKING:  
Critical Behavior: 
Neurologic State: Eyes open spontaneously Orientation Level: Unable to verbalize Cognition: Follows commands Safety/Judgement: Awareness of environment, Good awareness of safety precautions Hearing: Auditory Auditory Impairment: None Range Of Motion: 
AROM: Grossly decreased, non-functional 
  
  
  
PROM: Within functional limits Strength:   
Strength: Grossly decreased, non-functional 
  
  
  
  
  
  
Tone & Sensation:  
Tone: Normal 
  
  
  
  
Sensation: (unable to assess due to drowsiness) Coordination: 
Coordination: Grossly decreased, non-functional 
Vision:  
  
Functional Mobility: 
TotalA for all mobility Functional Measure: 
Barthel Index: 
 
Bathin Bladder: 0 Bowels: 0 Groomin Dressin Feedin Mobility: 0 Stairs: 0 Toilet Use: 0 Transfer (Bed to Chair and Back): 0 Total: 0/100 The Barthel ADL Index: Guidelines 1. The index should be used as a record of what a patient does, not as a record of what a patient could do. 2. The main aim is to establish degree of independence from any help, physical or verbal, however minor and for whatever reason. 3. The need for supervision renders the patient not independent. 4. A patient's performance should be established using the best available evidence. Asking the patient, friends/relatives and nurses are the usual sources, but direct observation and common sense are also important. However direct testing is not needed. 5. Usually the patient's performance over the preceding 24-48 hours is important, but occasionally longer periods will be relevant. 6. Middle categories imply that the patient supplies over 50 per cent of the effort. 7. Use of aids to be independent is allowed. Wander Matthews., Barthel, D.W. ([id number]). Functional evaluation: the Barthel Index. [address] [facility] (14)2. PJ Barlow, Roxana Lassiter., Franci Kincaid., [place], [address] [place] [address] (). Measuring the change indisability after inpatient rehabilitation; comparison of the responsiveness of the Barthel Index and Functional [place] Measure. Journal of Neurology, Neurosurgery, and Psychiatry, 66(4), 490-267. Oral CHELO Aaron.VIJAYA, CEDRIC Cole, & Alicia Glass MLinetteA. (2004.) Assessment of post-stroke quality of life in cost-effectiveness studies: The usefulness of the Barthel Index and the EuroQoL-5D. [facility], 13, 222-10 Activity Tolerance:  
Poor After treatment patient left in no apparent distress: Supine in bed, Call bell within reach, and Restraints COMMUNICATION/EDUCATION:  
The patients plan of care was discussed with: Occupational therapist and Registered nurse. Patient is unable to participate in goal setting and plan of care. Thank you for this referral. 
Mary Brown, PT, DPT Time Calculation: 10 mins

## 2022-04-15 NOTE — ADDENDUM NOTE
Addended by: Carroll Arevalo  on: 5/16/2017 03:32 PM     Modules accepted: Orders
Addended by: Darrell Burt on: 5/16/2017 03:26 PM     Modules accepted: Orders
Addended by: Olga Matthews on: 5/16/2017 03:44 PM     Modules accepted: Orders
Addended by: Sergio Jalloh  on: 5/16/2017 03:13 PM     Modules accepted: Orders
General Sunscreen Counseling: I recommended a broad spectrum sunscreen with a SPF of 30 or higher.  I explained that SPF 30 sunscreens block approximately 97 percent of the sun's harmful rays.  Sunscreens should be applied at least 15 minutes prior to expected sun exposure and then every 2 hours after that as long as sun exposure continues. If swimming or exercising sunscreen should be reapplied every 45 minutes to an hour after getting wet or sweating.  One ounce, or the equivalent of a shot glass full of sunscreen, is adequate to protect the skin not covered by a bathing suit. I also recommended a lip balm with a sunscreen as well. Sun protective clothing can be used in lieu of sunscreen but must be worn the entire time you are exposed to the sun's rays.
Detail Level: Generalized

## 2022-04-20 NOTE — ADDENDUM NOTE
Addended by: Charis Rizvi on: 4/30/2019 12:58 PM 
 
 Modules accepted: Anthony Strong peripheral pulses

## 2022-06-02 NOTE — PATIENT INSTRUCTIONS
[employer] Activation    Thank you for requesting access to [employer]. Please follow the instructions below to securely access and download your online medical record. [employer] allows you to send messages to your doctor, view your test results, renew your prescriptions, schedule appointments, and more. How Do I Sign Up? 1. In your internet browser, go to [email or web address]  2. Click on the First Time User? Click Here link in the Sign In box. You will be redirect to the New Member Sign Up page. 3. Enter your [employer] Access Code exactly as it appears below. You will not need to use this code after youve completed the sign-up process. If you do not sign up before the expiration date, you must request a new code. [employer] Access Code: Activation code not generated  Current [employer] Status: Patient Declined (This is the date your [employer] access code will )    4. Enter the last four digits of your Social Security Number (xxxx) and Date of Birth (mm/dd/yyyy) as indicated and click Submit. You will be taken to the next sign-up page. 5. Create a [employer] ID. This will be your [employer] login ID and cannot be changed, so think of one that is secure and easy to remember. 6. Create a [employer] password. You can change your password at any time. 7. Enter your Password Reset Question and Answer. This can be used at a later time if you forget your password. 8. Enter your e-mail address. You will receive e-mail notification when new information is available in [address] 9. Click Sign Up. You can now view and download portions of your medical record. 10. Click the Download Summary menu link to download a portable copy of your medical information. Additional Information    If you have questions, please visit the Frequently Asked Questions section of the [employer] website at [email or web address] Remember, [employer] is NOT to be used for urgent needs. For medical emergencies, dial 911. no

## 2022-12-14 NOTE — WOUND CARE
Much better non fasting on kidney  WOCN Note: Follow-up visit for VAC dressing change to right foot.  
  
Chart shows: 
Admitted for lethargy; osteomyelitis of right foot with debridement and removal of infected bone 12/30; now Covid-19+ History of DM, smoking, prostate cancer, HTN, heart failure Admitted from home 
  
Assessment:  
A&O x 4; lethargic and sleeps through most of my visit. Bed: P500 AMY mattress 
  
Bilateral heels intact with no redness.  Heels offloaded with pillows. 
  
1. POA, right foot wound post surgical debridement = 7 x 5 x 3.2cm  Base is 30% soft yellow 70% granular red.  No purulence or odor.  Immediate periwound with hypopigmented skin. Satellite wound toward bottom of foot under 5th toe that was included in original measurement but now there is a bridge of skin between the two areas = 1.3 x 1.3 x 0.4 cm red granular base.   Scant serosanguinous exudate in canister. VAC dressing removed: 1 black sponge Cleaned with saline and Santyl applied to yellow tissue in wound bed. Applied Opticel AG to smaller wound. 125 mmHg suction achieved using 1 piece of black foam bridged to lower leg.  
  
Dorsum of foot has a deep tissue injury = 5 x 7.5 x 0 cm 100% non-blanching burgundy - Venelex applied.   
  
Thick dry skin plaques to lower leg - Lac Hydrin applied. 
  
Wound Recommendations:   
Maintain VAC as ordered @ 125mmHg suction with twice weekly dressing changes.  Buttocks and dorsum of right foot: venelex twice daily. Axilla: antifungal cream twice daily.  
  
Transition of Care: Plan to follow weekly and as needed while admitted to hospital with [facility] dressing change Tuesday, 1/19. Pritesh Coughlin, MONIKAN, RN, [employer] Certified Wound, Ostomy, Continence Nurse 
office [phone] 
pager [phone] or call  to page

## (undated) DEVICE — 1200 GUARD II KIT W/5MM TUBE W/O VAC TUBE: Brand: GUARDIAN

## (undated) DEVICE — CONTAINER,SPECIMEN,4OZ,OR STRL: Brand: MEDLINE

## (undated) DEVICE — PACK,BASIC,SIRUS,V: Brand: MEDLINE

## (undated) DEVICE — BASIN EMESIS 500CC ROSE 250/CS 60/PLT: Brand: MEDEGEN MEDICAL PRODUCTS, LLC

## (undated) DEVICE — Device

## (undated) DEVICE — FCPS BX HOT RJ4 2.2MMX240CM -- RADIAL JAW 4 BX/40

## (undated) DEVICE — INTENDED FOR TISSUE SEPARATION, AND OTHER PROCEDURES THAT REQUIRE A SHARP SURGICAL BLADE TO PUNCTURE OR CUT.: Brand: BARD-PARKER ® CARBON RIB-BACK BLADES

## (undated) DEVICE — PAD,ABDOMINAL,5"X9",ST,LF,25/BX: Brand: MEDLINE INDUSTRIES, INC.

## (undated) DEVICE — BNDG ELAS HK LOOP 6X5YD NS -- MATRIX

## (undated) DEVICE — PADDING CST CRMPD 3INX4YD NS --

## (undated) DEVICE — SYR 3ML LL TIP 1/10ML GRAD --

## (undated) DEVICE — REM POLYHESIVE ADULT PATIENT RETURN ELECTRODE: Brand: VALLEYLAB

## (undated) DEVICE — NEEDLE HYPO 25GA L1.5IN BVL ORIENTED ECLIPSE

## (undated) DEVICE — Device: Brand: MEDEX

## (undated) DEVICE — KENDALL RADIOLUCENT FOAM MONITORING ELECTRODE RECTANGULAR SHAPE: Brand: KENDALL

## (undated) DEVICE — CANNULA CUSH AD W/ 14FT TBG

## (undated) DEVICE — DRESSING,GAUZE,XEROFORM,CURAD,1"X8",ST: Brand: CURAD

## (undated) DEVICE — GAUZE,PACKING STRIP,IODOFORM,1/2"X5YD,ST: Brand: CURAD

## (undated) DEVICE — Z DISCONTINUED PER MEDLINE LINE GAS SAMPLING O2/CO2 LNG AD 13 FT NSL W/ TBNG FILTERLINE

## (undated) DEVICE — HANDPIECE SET WITH BONE CLEANING TIP AND SUCTION TUBE: Brand: INTERPULSE

## (undated) DEVICE — SMALL OSC. SAW BLADE, 9MM X 24.6MM X 0.38MM: Brand: MICROAIRE®

## (undated) DEVICE — BNDG ELAS HK LOOP 4X5YD NS -- MATRIX

## (undated) DEVICE — STERILE POLYISOPRENE POWDER-FREE SURGICAL GLOVES WITH EMOLLIENT COATING: Brand: PROTEXIS

## (undated) DEVICE — INFECTION CONTROL KIT SYS

## (undated) DEVICE — HANDLE LT SNAP ON ULT DURABLE LENS FOR TRUMPF ALC DISPOSABLE

## (undated) DEVICE — SOLUTION IV 1000ML 0.9% SOD CHL

## (undated) DEVICE — SPONGE GZ W4XL4IN COT 12 PLY TYP VII WVN C FLD DSGN

## (undated) DEVICE — NEONATAL-ADULT SPO2 SENSOR: Brand: NELLCOR

## (undated) DEVICE — NEEDLE HYPO 18GA L1.5IN PNK S STL HUB POLYPR SHLD REG BVL

## (undated) DEVICE — SOLIDIFIER MEDC 1200ML -- CONVERT TO 356117

## (undated) DEVICE — SYRINGE,EAR/ULCER, 2 OZ, STERILE: Brand: MEDLINE

## (undated) DEVICE — CATH IV AUTOGRD BC BLU 22GA 25 -- INSYTE

## (undated) DEVICE — BANDAGE,GAUZE,BULKEE II,4.5"X4.1YD,STRL: Brand: MEDLINE

## (undated) DEVICE — (D)SYR 10ML 1/5ML GRAD NSAF -- PKGING CHANGE USE ITEM 338027

## (undated) DEVICE — DRAPE,REIN 53X77,STERILE: Brand: MEDLINE

## (undated) DEVICE — SYR 10ML LUER LOK 1/5ML GRAD --

## (undated) DEVICE — PENCIL ES L3M BTTN SWCH S STL HEX LOK BLDE ELECTRD HOLSTER

## (undated) DEVICE — TUBING HYDR IRR --

## (undated) DEVICE — TOWEL 4 PLY TISS 19X30 SUE WHT

## (undated) DEVICE — SURGICAL PROCEDURE PACK BASIN MAJ SET CUST NO CAUT

## (undated) DEVICE — BANDAGE,ELASTIC,ESMARK,STERILE,4"X9',LF: Brand: MEDLINE

## (undated) DEVICE — COVER LT HNDL PLAS RIG 1 PER PK

## (undated) DEVICE — SET ADMIN 16ML TBNG L100IN 2 Y INJ SITE IV PIGGY BK DISP

## (undated) DEVICE — DRAPE,EXTREMITY,89X128,STERILE: Brand: MEDLINE